# Patient Record
Sex: FEMALE | Race: WHITE | NOT HISPANIC OR LATINO | Employment: OTHER | URBAN - METROPOLITAN AREA
[De-identification: names, ages, dates, MRNs, and addresses within clinical notes are randomized per-mention and may not be internally consistent; named-entity substitution may affect disease eponyms.]

---

## 2017-01-03 ENCOUNTER — HOSPITAL ENCOUNTER (EMERGENCY)
Facility: HOSPITAL | Age: 62
Discharge: HOME | End: 2017-01-03
Payer: MEDICAID

## 2017-01-03 ENCOUNTER — OFFICE VISIT (OUTPATIENT)
Dept: FAMILY MEDICINE | Facility: CLINIC | Age: 62
End: 2017-01-03
Payer: MEDICAID

## 2017-01-03 VITALS
TEMPERATURE: 98 F | DIASTOLIC BLOOD PRESSURE: 70 MMHG | OXYGEN SATURATION: 96 % | HEART RATE: 83 BPM | SYSTOLIC BLOOD PRESSURE: 134 MMHG | HEIGHT: 62 IN | WEIGHT: 169.56 LBS | BODY MASS INDEX: 31.2 KG/M2

## 2017-01-03 VITALS
HEIGHT: 61 IN | HEART RATE: 85 BPM | RESPIRATION RATE: 16 BRPM | BODY MASS INDEX: 31.91 KG/M2 | DIASTOLIC BLOOD PRESSURE: 68 MMHG | WEIGHT: 169 LBS | SYSTOLIC BLOOD PRESSURE: 136 MMHG | TEMPERATURE: 98 F | OXYGEN SATURATION: 96 %

## 2017-01-03 DIAGNOSIS — M54.50 ACUTE BILATERAL LOW BACK PAIN WITHOUT SCIATICA: Primary | ICD-10-CM

## 2017-01-03 DIAGNOSIS — I71.40 ABDOMINAL AORTIC ANEURYSM (AAA) WITHOUT RUPTURE: Primary | ICD-10-CM

## 2017-01-03 LAB
ALBUMIN SERPL BCP-MCNC: 3.4 G/DL
ALP SERPL-CCNC: 99 U/L
ALT SERPL W/O P-5'-P-CCNC: 22 U/L
AMYLASE SERPL-CCNC: 56 U/L
ANION GAP SERPL CALC-SCNC: 9 MMOL/L
AST SERPL-CCNC: 18 U/L
BASOPHILS # BLD AUTO: 0.05 K/UL
BASOPHILS NFR BLD: 0.5 %
BILIRUB SERPL-MCNC: 0.3 MG/DL
BILIRUB UR QL STRIP: NEGATIVE
BUN SERPL-MCNC: 18 MG/DL
CALCIUM SERPL-MCNC: 9.5 MG/DL
CHLORIDE SERPL-SCNC: 100 MMOL/L
CLARITY UR: CLEAR
CO2 SERPL-SCNC: 24 MMOL/L
COLOR UR: YELLOW
CREAT SERPL-MCNC: 0.9 MG/DL
DIFFERENTIAL METHOD: ABNORMAL
EOSINOPHIL # BLD AUTO: 0.5 K/UL
EOSINOPHIL NFR BLD: 4.7 %
ERYTHROCYTE [DISTWIDTH] IN BLOOD BY AUTOMATED COUNT: 14.4 %
EST. GFR  (AFRICAN AMERICAN): >60 ML/MIN/1.73 M^2
EST. GFR  (NON AFRICAN AMERICAN): >60 ML/MIN/1.73 M^2
GLUCOSE SERPL-MCNC: 100 MG/DL
GLUCOSE UR QL STRIP: NEGATIVE
HCT VFR BLD AUTO: 36.7 %
HGB BLD-MCNC: 12.5 G/DL
HGB UR QL STRIP: NEGATIVE
KETONES UR QL STRIP: NEGATIVE
LEUKOCYTE ESTERASE UR QL STRIP: NEGATIVE
LIPASE SERPL-CCNC: 25 U/L
LYMPHOCYTES # BLD AUTO: 2.8 K/UL
LYMPHOCYTES NFR BLD: 29.2 %
MCH RBC QN AUTO: 30 PG
MCHC RBC AUTO-ENTMCNC: 34.1 %
MCV RBC AUTO: 88 FL
MONOCYTES # BLD AUTO: 0.7 K/UL
MONOCYTES NFR BLD: 7.1 %
NEUTROPHILS # BLD AUTO: 5.6 K/UL
NEUTROPHILS NFR BLD: 58.5 %
NITRITE UR QL STRIP: NEGATIVE
PH UR STRIP: 6 [PH] (ref 5–8)
PLATELET # BLD AUTO: 292 K/UL
PMV BLD AUTO: 9.5 FL
POTASSIUM SERPL-SCNC: 4.5 MMOL/L
PROT SERPL-MCNC: 7.4 G/DL
PROT UR QL STRIP: NEGATIVE
RBC # BLD AUTO: 4.16 M/UL
SODIUM SERPL-SCNC: 133 MMOL/L
SP GR UR STRIP: 1.01 (ref 1–1.03)
URN SPEC COLLECT METH UR: NORMAL
UROBILINOGEN UR STRIP-ACNC: NEGATIVE EU/DL
WBC # BLD AUTO: 9.53 K/UL

## 2017-01-03 PROCEDURE — 83690 ASSAY OF LIPASE: CPT

## 2017-01-03 PROCEDURE — 99284 EMERGENCY DEPT VISIT MOD MDM: CPT | Mod: 27

## 2017-01-03 PROCEDURE — 85025 COMPLETE CBC W/AUTO DIFF WBC: CPT

## 2017-01-03 PROCEDURE — 82150 ASSAY OF AMYLASE: CPT

## 2017-01-03 PROCEDURE — 99214 OFFICE O/P EST MOD 30 MIN: CPT | Mod: S$PBB,,, | Performed by: FAMILY MEDICINE

## 2017-01-03 PROCEDURE — 99999 PR PBB SHADOW E&M-EST. PATIENT-LVL III: CPT | Mod: PBBFAC,,, | Performed by: FAMILY MEDICINE

## 2017-01-03 PROCEDURE — 25500020 PHARM REV CODE 255: Performed by: PHYSICIAN ASSISTANT

## 2017-01-03 PROCEDURE — 81003 URINALYSIS AUTO W/O SCOPE: CPT

## 2017-01-03 PROCEDURE — 80053 COMPREHEN METABOLIC PANEL: CPT

## 2017-01-03 RX ORDER — CYCLOBENZAPRINE HCL 10 MG
10 TABLET ORAL 3 TIMES DAILY PRN
Qty: 15 TABLET | Refills: 0 | Status: SHIPPED | OUTPATIENT
Start: 2017-01-03 | End: 2017-01-08

## 2017-01-03 RX ORDER — HYDROCODONE BITARTRATE AND ACETAMINOPHEN 10; 325 MG/1; MG/1
1 TABLET ORAL EVERY 4 HOURS PRN
Qty: 12 TABLET | Refills: 0 | Status: SHIPPED | OUTPATIENT
Start: 2017-01-03 | End: 2017-01-13

## 2017-01-03 RX ADMIN — IOHEXOL 100 ML: 300 INJECTION, SOLUTION INTRAVENOUS at 05:01

## 2017-01-03 NOTE — DISCHARGE INSTRUCTIONS
General Neck and Back Pain    Both neck and back pain are usually caused by injury to the muscles or ligaments of the spine. Sometimes the disks that separate each bone of the spine may cause pain by pressing on a nearby nerve. Back and neck pain may appear after a sudden twisting or bending force (such as in a car accident), or sometimes after a simple awkward movement. In either case, muscle spasm is often present and adds to the pain.  Acute neck and back pain usually gets better in 1 to 2 weeks. Pain related to disk disease, arthritis in the spinal joints or spinal stenosis (narrowing of the spinal canal) can become chronic and last for months or years.  Back and neck pain are common problems. Most people feel better in 1 or 2 weeks, and most of the rest in 1 to 2 months. Most people can remain active.  People experience and describe pain differently.  · Pain can be sharp, stabbing, shooting, aching, cramping, or burning  · Movement, standing, bending, lifting, sitting, or walking may worsen the pain  · Pain can be localized to one spot or area, or it can be more generalized  · Pain can spread or radiate upwards, downwards, to the front, or go down your arms  · Muscle spasm may occur.  Most of the time mechanical problems with the muscles or spine cause the pain. it is usually caused by an injury, whether known or not, to the muscles or ligaments. While illnesses can cause back pain, it is usually not caused by a serious illness. Pain is usually related to physical activity, whether sports, exercise, work, or normal activity. Sometimes it can occur without an identifiable cause. This can happen simply by stretching or moving wrong, without noting pain at the time. Other causes include:  · Overexertion, lifting, pushing, pulling incorrectly or too aggressively.  · Sudden twisting, bending or stretching from an accident (car or fall), or accidental movement.  · Poor posture  · Poor conditioning, lack of regular  exercise  · Spinal disc disease or arthritis  · Stress  · Pregnancy, or illness like appendicitis, bladder or kidney infection, pelvic infections   Home care  · For neck pain: Use a comfortable pillow that supports the head and keeps the spine in a neutral position. The position of the head should not be tilted forward or backward.  · When in bed, try to find a position of comfort. A firm mattress is best. Try lying flat on your back with pillows under your knees. You can also try lying on your side with your knees bent up towards your chest and a pillow between your knees.  · At first, do not try to stretch out the sore spots. If there is a strain, it is not like the good soreness you get after exercising without an injury. In this case, stretching may make it worse.  · Avoid prolonged sitting, long car rides or travel. This puts more stress on the lower back than standing or walking.  · During the first 24 to 72 hours after an injury, apply an ice pack to the painful area for 20 minutes and then remove it for 20 minutes over a period of 60 to 90 minutes or several times a day.   · You can alternate ice and heat therapies. Talk with your healthcare provider about the best treatment for your back or neck pain. As a safety precaution, do not use a heating pad at bedtime. Sleeping with a heating pad can lead to skin burns or tissue damage.  · Therapeutic massage can help relax the back and neck muscles without stretching them.  · Be aware of safe lifting methods and do not lift anything over 15 pounds until all the pain is gone.  Medications  Talk to your healthcare provider before using medicine, especially if you have other medical problems or are taking other medicines.  · You may use over-the-counter medicine to control pain, unless another pain medicine was prescribed. If you have chronic conditions like diabetes, liver or kidney disease, stomach ulcers,  gastrointestinal bleeding, or are taking blood thinner  medicines.  · Be careful if you are given pain medicines, narcotics, or medicine for muscle spasm. They can cause drowsiness, and can affect your coordination, reflexes, and judgment. Do not drive or operate heavy machinery.  Follow-up care  Follow up with your healthcare provider, or as advised. Physical therapy or further tests may be needed.  If X-rays were taken, you will be notified of any new findings that may affect your care.  Call 911  Seek emergency medical care if any of the following occur:  · Trouble breathing  · Confusion  · Very drowsy or trouble awakening  · Fainting or loss of consciousness  · Rapid or very slow heart rate  · Loss of bowel or bladder control  When to seek medical advice  Call your healthcare provider right away if any of these occur:  · Pain becomes worse or spreads into your arms or legs  · Weakness, numbness or pain in one or both arms or legs  · Numbness in the groin area  · Difficulty walking  · Fever of 100.4ºF (38ºC) or higher, or as directed by your healthcare provider  © 4892-4008 [employer]. [address] [place] [address], [place] [address]. All rights reserved. This information is not intended as a substitute for professional medical care. Always follow your healthcare professional's instructions.

## 2017-01-03 NOTE — ED PROVIDER NOTES
"SCRIBE #1 NOTE: I, Kaye Lawson, am scribing for, and in the presence of, BRADLY Santos. I have scribed the entire note.      History      Chief Complaint   Patient presents with    Abdominal Pain     Pt staets, "I have a bad pain in my stomach and back, Dr Felix made me come here because I have an abdominal aneurysm, but I think I have a bladder infection".       Review of patient's allergies indicates:  No Known Allergies     HPI   HPI    1/3/2017, 3:33 PM   History obtained from the patient      History of Present Illness: Gemma Vick is a 61 y.o. female patient with PMHx of AAA who presents to the Emergency Department for abd pain with associated back pain which onset gradually 2 days ago. Sxs are improving and moderate in severity. The pain is located to the epigastric region, lower abdomen, and lower back. She reports taking laxatives with no relief because she thought she was constipated. Pt took Tylenol this morning. No exacerbating factors reported. No other sxs reported. Patient denies fever, chills, N/V/D, dysuria, hematuria, urinary frequency, and all other sxs at this time. No further complaints or concerns at this time.     Arrival mode: Personal vehicle    PCP: Olga Altman MD       Past Medical History:  Past Medical History   Diagnosis Date    AAA (abdominal aortic aneurysm) 2/13/2014    Abdominal aneurysm      3    Acute coronary syndrome     Arthritis     BPPV (benign paroxysmal positional vertigo)     Carotid artery plaque     Carotid artery stenosis and occlusion 2/13/2014    Chronic back pain     COPD (chronic obstructive pulmonary disease)     Coronary artery disease     Emphysema lung     Hyperlipidemia     Hypertension     Myocardial infarction      x3    Neuropathy        Past Surgical History:  Past Surgical History   Procedure Laterality Date    Cardiac catheterization      Coronary angioplasty           Family History:  History reviewed. No pertinent " family history.    Social History:  Social History     Social History Main Topics    Smoking status: Current Every Day Smoker     Packs/day: 0.50     Years: 44.00     Types: Cigarettes     Start date: 6/24/1970    Smokeless tobacco: Never Used    Alcohol use No    Drug use: No    Sexual activity: Not Currently     Birth control/ protection: None       ROS   Review of Systems   Constitutional: Negative for chills and fever.   HENT: Negative for sore throat.    Respiratory: Negative for shortness of breath.    Cardiovascular: Negative for chest pain.   Gastrointestinal: Positive for abdominal pain. Negative for diarrhea, nausea and vomiting.   Genitourinary: Negative for dysuria, frequency and hematuria.   Musculoskeletal: Positive for back pain.   Skin: Negative for rash.   Neurological: Negative for weakness.   Hematological: Does not bruise/bleed easily.   All other systems reviewed and are negative.      Physical Exam    Initial Vitals   BP Pulse Resp Temp SpO2   01/03/17 1503 01/03/17 1503 01/03/17 1503 01/03/17 1503 01/03/17 1503   140/70 72 20 98.1 °F (36.7 °C) 96 %      Physical Exam  Nursing Notes and Vital Signs Reviewed.  Constitutional: Patient is in no acute distress. Awake and alert. Well-developed and well-nourished.  Head: Atraumatic. Normocephalic.  Eyes: PERRL. EOM intact. Conjunctivae are not pale. No scleral icterus.  ENT: Mucous membranes are moist. Oropharynx is clear and symmetric.    Neck: Supple. Full ROM. No lymphadenopathy.  Cardiovascular: Regular rate. Regular rhythm. No murmurs, rubs, or gallops. Distal pulses are 2+ and symmetric.  Pulmonary/Chest: No respiratory distress. Clear to auscultation bilaterally. No wheezing, rales, or rhonchi.  Abdominal: Soft and non-distended.  There is lower abdominal tenderness. No rebound, guarding, or rigidity. Good bowel sounds.  No pulsatile mass.   Genitourinary: No CVA tenderness  Musculoskeletal: Moves all extremities. No obvious deformities.  "No edema. No calf tenderness.  Back: Lower back tenderness. No midline bony tenderness, deformities, or step-offs of the T-spine or L-spine. Skin appears normal without abrasions or bruising. No erythema, induration, or fluctuance.   Skin: Warm and dry.  Neurological:  Alert, awake, and appropriate.  Normal speech. Normal gait. No acute focal neurological deficits are appreciated.  Psychiatric: Normal affect. Good eye contact. Appropriate in content.    ED Course    Procedures  ED Vital Signs:  Vitals:    01/03/17 1503 01/03/17 1814   BP: (!) 140/70 136/68   Pulse: 72 85   Resp: 20 16   Temp: 98.1 °F (36.7 °C)    TempSrc: Oral    SpO2: 96% 96%   Weight: 76.7 kg (169 lb)    Height: 5' 1" (1.549 m)      Abnormal Lab Results:  Labs Reviewed   CBC W/ AUTO DIFFERENTIAL - Abnormal; Notable for the following:        Result Value    Hematocrit 36.7 (*)     All other components within normal limits   COMPREHENSIVE METABOLIC PANEL - Abnormal; Notable for the following:     Sodium 133 (*)     Albumin 3.4 (*)     All other components within normal limits   URINALYSIS   LIPASE   AMYLASE      All Lab Results:  Results for orders placed or performed during the hospital encounter of 01/03/17   Urinalysis Clean Catch   Result Value Ref Range    Specimen UA Urine, Clean Catch     Color, UA Yellow Yellow, Straw, Whit    Appearance, UA Clear Clear    pH, UA 6.0 5.0 - 8.0    Specific Gravity, UA 1.010 1.005 - 1.030    Protein, UA Negative Negative    Glucose, UA Negative Negative    Ketones, UA Negative Negative    Bilirubin (UA) Negative Negative    Occult Blood UA Negative Negative    Nitrite, UA Negative Negative    Urobilinogen, UA Negative <2.0 EU/dL    Leukocytes, UA Negative Negative   CBC W/ AUTO DIFFERENTIAL   Result Value Ref Range    WBC 9.53 3.90 - 12.70 K/uL    RBC 4.16 4.00 - 5.40 M/uL    Hemoglobin 12.5 12.0 - 16.0 g/dL    Hematocrit 36.7 (L) 37.0 - 48.5 %    MCV 88 82 - 98 fL    MCH 30.0 27.0 - 31.0 pg    MCHC 34.1 32.0 " - 36.0 %    RDW 14.4 11.5 - 14.5 %    Platelets 292 150 - 350 K/uL    MPV 9.5 9.2 - 12.9 fL    Gran # 5.6 1.8 - 7.7 K/uL    Lymph # 2.8 1.0 - 4.8 K/uL    Mono # 0.7 0.3 - 1.0 K/uL    Eos # 0.5 0.0 - 0.5 K/uL    Baso # 0.05 0.00 - 0.20 K/uL    Gran% 58.5 38.0 - 73.0 %    Lymph% 29.2 18.0 - 48.0 %    Mono% 7.1 4.0 - 15.0 %    Eosinophil% 4.7 0.0 - 8.0 %    Basophil% 0.5 0.0 - 1.9 %    Differential Method Automated    Comp. Metabolic Panel   Result Value Ref Range    Sodium 133 (L) 136 - 145 mmol/L    Potassium 4.5 3.5 - 5.1 mmol/L    Chloride 100 95 - 110 mmol/L    CO2 24 23 - 29 mmol/L    Glucose 100 70 - 110 mg/dL    BUN, Bld 18 8 - 23 mg/dL    Creatinine 0.9 0.5 - 1.4 mg/dL    Calcium 9.5 8.7 - 10.5 mg/dL    Total Protein 7.4 6.0 - 8.4 g/dL    Albumin 3.4 (L) 3.5 - 5.2 g/dL    Total Bilirubin 0.3 0.1 - 1.0 mg/dL    Alkaline Phosphatase 99 55 - 135 U/L    AST 18 10 - 40 U/L    ALT 22 10 - 44 U/L    Anion Gap 9 8 - 16 mmol/L    eGFR if African American >60 >60 mL/min/1.73 m^2    eGFR if non African American >60 >60 mL/min/1.73 m^2   Lipase   Result Value Ref Range    Lipase 25 4 - 60 U/L   Amylase   Result Value Ref Range    Amylase 56 20 - 110 U/L       Imaging Results:  Imaging Results         CT Abdomen Pelvis With Contrast (Final result) Result time:  01/03/17 17:23:50    Final result by Cristhian Villela MD (01/03/17 17:23:50)    Impression:        Fusiform infrarenal abdominal aortic aneurysm measuring up to 3.2 cm in maximal cross-sectional dimension.  No evidence of leak or rupture.  No significant change since 05/09/2016.        All CT scans at this facility use dose modulation, iterative reconstruction and/or weight based dosing when appropriate to reduced radiation dose to as low as reasonably achievable.        Electronically signed by: CRISTHIAN VILLELA MD  Date:     01/03/17  Time:    17:23     Narrative:    EXAM:CT ABDOMEN PELVIS WITH CONTRAST 01/03/17 17:13:07      HISTORY: Abdominal aortic  aneurysm.    TECHNIQUE: Standard axial imaging performed with reformatted sagittal and coronal images.       COMPARISON: 05/09/2016      FINDINGS:    Diffuse atherosclerotic change of the aorta present with ectasia and mild tortuosity.  There is an infrarenal bilobed fusiform aneurysm measuring up to 3.2 cm in maximal cross-sectional dimension.  There has been no change since the previous exam dated 05/19/2016.  No evidence of leak or rupture.  Iliac vasculature is normal in caliber.    The celiac access and SMA are patent.  Renal arteries are patent and unremarkable.  SANKET not definitively visualized and may be occluded.    Mild atelectasis at the right lung base.  Left lung base is clear.    The liver is normal.  Gallbladder is normal.  Portal vein patent.  The spleen and pancreas are normal.  The adrenal glands are normal.  Kidneys are normal.  Ureters are normal.    The stomach and small intestine are normal.  Appendix not definitively visualized.  Diverticulosis of the distal descending and sigmoid colon.  Negative for diverticulitis the rectum normal.    Bladder normal.  Uterus is surgical absent.  Adnexa regions are normal.    Multilevel degenerative changes of the spine.  No suspicious osseous lesions.               The Emergency Provider reviewed the vital signs and test results, which are outlined above.    ED Discussion     5:43 PM: Reassessed pt at this time. Pt states her condition has improved at this time. Discussed with pt imaging and lab results. Discussed pt dx and plan of tx. Informed pt to follow up with PCP. All questions and concerns were addressed at this time. Pt expresses understanding of information and instructions, and is comfortable with plan to discharge. Pt is stable for discharge.    I discussed with patient that evaluation in the ED does not suggest any emergent or life threatening medical conditions requiring immediate intervention beyond what was provided in the ED, and I believe  patient is safe for discharge.  Regardless, an unremarkable evaluation in the ED does not preclude the development or presence of a serious of life threatening condition. As such, patient was instructed to return immediately for any worsening or change in current symptoms.      ED Medication(s):  Medications   omnipaque 300 iohexol 100 mL (100 mLs Intravenous Given 1/3/17 1714)       Discharge Medication List as of 1/3/2017  5:48 PM      START taking these medications    Details   cyclobenzaprine (FLEXERIL) 10 MG tablet Take 1 tablet (10 mg total) by mouth 3 (three) times daily as needed for Muscle spasms., Starting 1/3/2017, Until Sun 1/8/17, Normal      hydrocodone-acetaminophen 10-325mg (NORCO)  mg Tab Take 1 tablet by mouth every 4 (four) hours as needed., Starting 1/3/2017, Until Fri 1/13/17, Print             Follow-up Information     Follow up with Olga Altman MD. Call in 2 days.    Specialty:  Family Medicine    Contact information:    [address] [address] [address] [phone]             Medical Decision Making    Medical Decision Making:   Clinical Tests:   Lab Tests: Ordered and Reviewed  Radiological Study: Ordered and Reviewed           Scribe Attestation:   Scribe #1: I performed the above scribed service and the documentation accurately describes the services I performed. I attest to the accuracy of the note.    Attending:   Physician Attestation Statement for Scribe #1: I, BRADLY Santos, personally performed the services described in this documentation, as scribed by Kaye Lawson, in my presence, and it is both accurate and complete.          Clinical Impression       ICD-10-CM ICD-9-CM   1. Acute bilateral low back pain without sciatica M54.5 724.2     338.19       Disposition:   Disposition: Discharged  Condition: Stable         BRADLY Contreras  01/06/17 0800

## 2017-01-03 NOTE — PROGRESS NOTES
Subjective:       Patient ID: Gemma Vick is a 61 y.o. female.    Chief Complaint: Abdominal Pain and Back Pain    HPI Comments: 61 y old female with AAA, Lumbar spondylosis now with severe lower back pain for 4 days . Worst with deep inhalation . No dysurias , she thought it  was constipation for which she took a laxative which did not make any difference . She has not taken any medication other that Ambien  very 6 hrs to help her rest . She supposed to have gotten repeated CTA last m which did not take  place     Abdominal Pain     Back Pain   Associated symptoms include abdominal pain.     Review of Systems   Constitutional: Negative.    Respiratory: Negative.    Gastrointestinal: Positive for abdominal pain.   Musculoskeletal: Positive for back pain.       Objective:      Physical Exam   Constitutional: She is oriented to person, place, and time. She appears well-developed and well-nourished. No distress.   HENT:   Head: Normocephalic and atraumatic.   Right Ear: External ear normal.   Left Ear: External ear normal.   Mouth/Throat: No oropharyngeal exudate.   Eyes: Conjunctivae and EOM are normal. Pupils are equal, round, and reactive to light. Right eye exhibits no discharge. Left eye exhibits no discharge. No scleral icterus.   Neck: Normal range of motion. Neck supple. No JVD present. No tracheal deviation present. No thyromegaly present.   Cardiovascular: Normal rate, regular rhythm and normal heart sounds.  Exam reveals no gallop and no friction rub.    No murmur heard.  Pulmonary/Chest: Effort normal and breath sounds normal. No stridor. No respiratory distress. She has no wheezes. She has no rales. She exhibits no tenderness.   Abdominal: Soft. Bowel sounds are normal. She exhibits no distension. There is tenderness in the suprapubic area and left upper quadrant. There is CVA tenderness. There is no rebound and no guarding.   Musculoskeletal: Normal range of motion.   Lymphadenopathy:     She has no  cervical adenopathy.   Neurological: She is alert and oriented to person, place, and time.   Skin: Skin is warm and dry. She is not diaphoretic.   Psychiatric: She has a normal mood and affect. Her behavior is normal. Judgment and thought content normal.       Assessment:     Gemma was seen today for abdominal pain and back pain.    Diagnoses and all orders for this visit:    Abdominal aortic aneurysm (AAA) without rupture  -     CTA Abdominal Aorta Bilateral Iliofem; Future      Plan:   ER EVAL recommended / Pt will go by private transportation , AMA form signed

## 2017-01-03 NOTE — MR AVS SNAPSHOT
[facility]  [address]  [facility] [address]  Phone: [phone]  Fax: [phone]                  Gemma Vick   1/3/2017 2:00 PM   Office Visit    Description:  Female : 1955   Provider:  Cristina Jimenes MD   Department:  [facility]           Reason for Visit     Abdominal Pain     Back Pain           Diagnoses this Visit        Comments    Abdominal aortic aneurysm (AAA) without rupture    -  Primary            To Do List           Future Appointments        Provider Department Dept Phone    3/15/2017 9:00 AM Nathan Rodriguez MD [facility] [phone]      Goals (5 Years of Data)     None      [facility]     [facility] -  Assistance  Registered nurses in the [facility] provide clinical advisement, health education, appointment booking, and other advisory services.  Call for this free service at [phone].             Medications           Message regarding Medications     Verify the changes and/or additions to your medication regime listed below are the same as discussed with your clinician today.  If any of these changes or additions are incorrect, please notify your healthcare provider.             Verify that the below list of medications is an accurate representation of the medications you are currently taking.  If none reported, the list may be blank. If incorrect, please contact your healthcare provider. Carry this list with you in case of emergency.           Current Medications     albuterol (PROAIR HFA) 90 mcg/actuation inhaler Inhale 2 puffs into the lungs every 6 (six) hours as needed.    albuterol-ipratropium 2.5mg-0.5mg/3mL (DUO-NEB) 0.5 mg-3 mg(2.5 mg base)/3 mL nebulizer solution USE ONE VIAL IN NEBULIZER TWICE DAILY    aspirin 81 MG Chew Take 81 mg by mouth once daily.    azelastine (OPTIVAR) 0.05 % ophthalmic solution INSTILL 1 DROP INTO EACH EYE TWICE  "DAILY    clopidogrel (PLAVIX) 75 mg tablet Take 1 tablet (75 mg total) by mouth once daily.    DULERA 200-5 mcg/actuation inhaler INHALE 2 PUFFS BY MOUTH TWICE A DAY    ezetimibe-simvastatin 10-40 mg (VYTORIN 10-40) 10-40 mg per tablet Take 1 tablet by mouth once daily.    felodipine (PLENDIL) 5 MG 24 hr tablet Take 1 tablet (5 mg total) by mouth once daily.    ipratropium-albuterol (COMBIVENT RESPIMAT)  mcg/actuation inhaler Inhale 1 puff into the lungs every 4 (four) hours as needed for Wheezing. Medically necessary    lisinopril (PRINIVIL,ZESTRIL) 40 MG tablet Take 40 mg by mouth once daily.    nitroGLYCERIN (NITROSTAT) 0.4 MG SL tablet Place 1 tablet (0.4 mg total) under the tongue as needed.    OXYGEN-AIR DELIVERY SYSTEMS MISC 2 L by Misc.(Non-Drug; Combo Route) route every evening.    zolpidem (AMBIEN) 5 MG Tab Take 1 tablet (5 mg total) by mouth nightly.    metoprolol succinate (TOPROL-XL) 25 MG 24 hr tablet Take 1 tablet by mouth as needed.    roflumilast 500 mcg Tab Take 1 tablet (500 mcg total) by mouth once daily.           Clinical Reference Information           Vital Signs - Last Recorded  Most recent update: 1/3/2017  2:03 PM by Ngozi Kamara MA    BP Pulse Temp Ht Wt SpO2    134/70 (BP Location: Right arm, Patient Position: Sitting, BP Method: Manual) 83 97.7 °F (36.5 °C) (Oral) 5' 2" (1.575 m) 76.9 kg (169 lb 8.5 oz) 96%    BMI                31.01 kg/m2          Blood Pressure          Most Recent Value    BP  134/70      Allergies as of 1/3/2017     No Known Allergies      Immunizations Administered on Date of Encounter - 1/3/2017     None      Orders Placed During Today's Visit     Future Labs/Procedures Expected by Expires    CTA Abdominal Aorta Bilateral Iliofem  1/3/2017 1/3/2018      Smoking Cessation     If you would like to quit smoking:   You may be eligible for free services if you are a [place] resident and started smoking cigarettes before September 1, 1988.  Call the Smoking " Cessation Trust ([facility]) toll free at (643) [phone] or ([phone].   Call 4-800-QUIT-NOW if you do not meet the above criteria.

## 2017-01-03 NOTE — ED AVS SNAPSHOT
[facility] -   [address] [facility] [address]               Gemma Vick   1/3/2017  3:22 PM   ED    Description:  Female : 1955   Department:  [facility] -            Your Care was Coordinated By:     Provider Role From To    BRADLY Contreras Physician Assistant 17 1539 --      Reason for Visit     Abdominal Pain           Diagnoses this Visit        Comments    Acute bilateral low back pain without sciatica    -  Primary       ED Disposition     None           To Do List           Follow-up Information     Follow up with Olga Altman MD. Call in 2 days.    Specialty:  Family Medicine    Contact information:    [address] [address] [id number] [phone]         These Medications        Disp Refills Start End    hydrocodone-acetaminophen 10-325mg (NORCO)  mg Tab 12 tablet 0 1/3/2017 2017    Take 1 tablet by mouth every 4 (four) hours as needed. - Oral    Pharmacy: [facility] - [address] Ph #: [phone]       cyclobenzaprine (FLEXERIL) 10 MG tablet 15 tablet 0 1/3/2017 2017    Take 1 tablet (10 mg total) by mouth 3 (three) times daily as needed for Muscle spasms. - Oral    Pharmacy: [facility] - [address] Ph #: [phone]         [facility]     [facility] Care [facility] -  Assistance  Registered nurses in the [facility] provide clinical advisement, health education, appointment booking, and other advisory services.  Call for this free service at [phone].             Medications           Message regarding Medications     Verify the changes and/or additions to your medication regime listed below are the same as discussed with your clinician today.  If any of these changes or additions are incorrect, please notify your healthcare provider.         START taking these NEW medications        Refills    hydrocodone-acetaminophen 10-325mg (NORCO)  mg Tab 0    Sig: Take 1 tablet by mouth every 4 (four) hours as needed.    Class: Print    Route: Oral    cyclobenzaprine (FLEXERIL) 10 MG tablet 0    Sig: Take 1 tablet (10 mg total) by mouth 3 (three) times daily as needed for Muscle spasms.    Class: Normal    Route: Oral      These medications were administered today        Dose Freq    omnipaque 300 iohexol 100 mL 100 mL IMG once as needed    Sig: Inject 100 mLs into the vein ONCE PRN for contrast.    Class: Normal    Route: Intravenous    Cosign for Ordering: Required by OB HIM       STOP taking these medications     metoprolol succinate (TOPROL-XL) 25 MG 24 hr tablet Take 1 tablet by mouth as needed.           Verify that the below list of medications is an accurate representation of the medications you are currently taking.  If none reported, the list may be blank. If incorrect, please contact your healthcare provider. Carry this list with you in case of emergency.           Current Medications     albuterol (PROAIR HFA) 90 mcg/actuation inhaler Inhale 2 puffs into the lungs every 6 (six) hours as needed.    albuterol-ipratropium 2.5mg-0.5mg/3mL (DUO-NEB) 0.5 mg-3 mg(2.5 mg base)/3 mL nebulizer solution USE ONE VIAL IN NEBULIZER TWICE DAILY    aspirin 81 MG Chew Take 81 mg by mouth once daily.    clopidogrel (PLAVIX) 75 mg tablet Take 1 tablet (75 mg total) by mouth once daily.    DULERA 200-5 mcg/actuation inhaler INHALE 2 PUFFS BY MOUTH TWICE A DAY    ezetimibe-simvastatin 10-40 mg (VYTORIN 10-40) 10-40 mg per tablet Take 1 tablet by mouth once daily.    felodipine (PLENDIL) 5 MG 24 hr tablet Take 1 tablet (5 mg total) by mouth once daily.    ipratropium-albuterol (COMBIVENT RESPIMAT)  mcg/actuation inhaler Inhale 1 puff into the lungs every 4 (four) hours as needed for Wheezing. Medically necessary    lisinopril  "(PRINIVIL,ZESTRIL) 40 MG tablet Take 40 mg by mouth once daily.    roflumilast 500 mcg Tab Take 1 tablet (500 mcg total) by mouth once daily.    zolpidem (AMBIEN) 5 MG Tab Take 1 tablet (5 mg total) by mouth nightly.    azelastine (OPTIVAR) 0.05 % ophthalmic solution INSTILL 1 DROP INTO EACH EYE TWICE DAILY    cyclobenzaprine (FLEXERIL) 10 MG tablet Take 1 tablet (10 mg total) by mouth 3 (three) times daily as needed for Muscle spasms.    hydrocodone-acetaminophen 10-325mg (NORCO)  mg Tab Take 1 tablet by mouth every 4 (four) hours as needed.    nitroGLYCERIN (NITROSTAT) 0.4 MG SL tablet Place 1 tablet (0.4 mg total) under the tongue as needed.    OXYGEN-AIR DELIVERY SYSTEMS MISC 2 L by Misc.(Non-Drug; Combo Route) route every evening.           Clinical Reference Information           Your Vitals Were     BP Pulse Temp Resp Height Weight    140/70 (BP Location: Right arm, Patient Position: Sitting) 72 98.1 °F (36.7 °C) (Oral) 20 5' 1" (1.549 m) 76.7 kg (169 lb)    SpO2 BMI             96% 31.93 kg/m2         Allergies as of 1/3/2017     No Known Allergies      Immunizations Administered on Date of Encounter - 1/3/2017     None      ED Micro, Lab, POCT     Start Ordered       Status Ordering Provider    01/03/17 1538 01/03/17 1541  Amylase  STAT      Final result     01/03/17 1537 01/03/17 1541  CBC W/ AUTO DIFFERENTIAL  STAT      Final result     01/03/17 1537 01/03/17 1541  Comp. Metabolic Panel  STAT      Final result     01/03/17 1537 01/03/17 1541  Lipase  Once      Final result     01/03/17 1506 01/03/17 1505  Urinalysis Clean Catch  STAT      Final result       ED Imaging Orders     Start Ordered       Status Ordering Provider    01/03/17 1538 01/03/17 1541  CT Abdomen Pelvis With Contrast  1 time imaging      Final result         Discharge Instructions         General Neck and Back Pain    Both neck and back pain are usually caused by injury to the muscles or ligaments of the spine. Sometimes the disks " that separate each bone of the spine may cause pain by pressing on a nearby nerve. Back and neck pain may appear after a sudden twisting or bending force (such as in a car accident), or sometimes after a simple awkward movement. In either case, muscle spasm is often present and adds to the pain.  Acute neck and back pain usually gets better in 1 to 2 weeks. Pain related to disk disease, arthritis in the spinal joints or spinal stenosis (narrowing of the spinal canal) can become chronic and last for months or years.  Back and neck pain are common problems. Most people feel better in 1 or 2 weeks, and most of the rest in 1 to 2 months. Most people can remain active.  People experience and describe pain differently.  · Pain can be sharp, stabbing, shooting, aching, cramping, or burning  · Movement, standing, bending, lifting, sitting, or walking may worsen the pain  · Pain can be localized to one spot or area, or it can be more generalized  · Pain can spread or radiate upwards, downwards, to the front, or go down your arms  · Muscle spasm may occur.  Most of the time mechanical problems with the muscles or spine cause the pain. it is usually caused by an injury, whether known or not, to the muscles or ligaments. While illnesses can cause back pain, it is usually not caused by a serious illness. Pain is usually related to physical activity, whether sports, exercise, work, or normal activity. Sometimes it can occur without an identifiable cause. This can happen simply by stretching or moving wrong, without noting pain at the time. Other causes include:  · Overexertion, lifting, pushing, pulling incorrectly or too aggressively.  · Sudden twisting, bending or stretching from an accident (car or fall), or accidental movement.  · Poor posture  · Poor conditioning, lack of regular exercise  · Spinal disc disease or arthritis  · Stress  · Pregnancy, or illness like appendicitis, bladder or kidney infection, pelvic  infections   Home care  · For neck pain: Use a comfortable pillow that supports the head and keeps the spine in a neutral position. The position of the head should not be tilted forward or backward.  · When in bed, try to find a position of comfort. A firm mattress is best. Try lying flat on your back with pillows under your knees. You can also try lying on your side with your knees bent up towards your chest and a pillow between your knees.  · At first, do not try to stretch out the sore spots. If there is a strain, it is not like the good soreness you get after exercising without an injury. In this case, stretching may make it worse.  · Avoid prolonged sitting, long car rides or travel. This puts more stress on the lower back than standing or walking.  · During the first 24 to 72 hours after an injury, apply an ice pack to the painful area for 20 minutes and then remove it for 20 minutes over a period of 60 to 90 minutes or several times a day.   · You can alternate ice and heat therapies. Talk with your healthcare provider about the best treatment for your back or neck pain. As a safety precaution, do not use a heating pad at bedtime. Sleeping with a heating pad can lead to skin burns or tissue damage.  · Therapeutic massage can help relax the back and neck muscles without stretching them.  · Be aware of safe lifting methods and do not lift anything over 15 pounds until all the pain is gone.  Medications  Talk to your healthcare provider before using medicine, especially if you have other medical problems or are taking other medicines.  · You may use over-the-counter medicine to control pain, unless another pain medicine was prescribed. If you have chronic conditions like diabetes, liver or kidney disease, stomach ulcers,  gastrointestinal bleeding, or are taking blood thinner medicines.  · Be careful if you are given pain medicines, narcotics, or medicine for muscle spasm. They can cause drowsiness, and can  affect your coordination, reflexes, and judgment. Do not drive or operate heavy machinery.  Follow-up care  Follow up with your healthcare provider, or as advised. Physical therapy or further tests may be needed.  If X-rays were taken, you will be notified of any new findings that may affect your care.  Call 911  Seek emergency medical care if any of the following occur:  · Trouble breathing  · Confusion  · Very drowsy or trouble awakening  · Fainting or loss of consciousness  · Rapid or very slow heart rate  · Loss of bowel or bladder control  When to seek medical advice  Call your healthcare provider right away if any of these occur:  · Pain becomes worse or spreads into your arms or legs  · Weakness, numbness or pain in one or both arms or legs  · Numbness in the groin area  · Difficulty walking  · Fever of 100.4ºF (38ºC) or higher, or as directed by your healthcare provider  © 3994-8988 [employer]. [address] [place] [address]. All rights reserved. This information is not intended as a substitute for professional medical care. Always follow your healthcare professional's instructions.          Your Scheduled Appointments     Mar 15, 2017  9:00 AM CDT   Established Patient Visit with MD Dona Cardo[facility] - Pulmonary Services ([facility])    [address] [facility] [address] [phone]              Smoking Cessation     If you would like to quit smoking:   You may be eligible for free services if you are a [place] resident and started smoking cigarettes before September 1, 1988.  Call the Smoking Cessation Trust ([employer]) toll free at (889) 160-7049 or (861) 027-0614.   Call 1-800-QUIT-NOW if you do not meet the above criteria.             [facility] complies with applicable Federal civil rights laws and does not discriminate on the basis of race, color, national origin, age, disability, or sex.        Language Assistance Services      ATTENTION: Language assistance services are available, free of charge. Please call [phone].      ATENCIÓN: Si habla español, tiene a mitchell disposición servicios gratuitos de asistencia lingüística. Llame al [phone].     CHÚ Ý: N?u b?n nói Ti?ng Vi?t, có các d?ch v? h? tr? ngôn ng? mi?n phí dành cho b?n. G?i s? [phone].

## 2017-01-15 RX ORDER — ESTROGENS, CONJUGATED 0.62 MG/1
TABLET, FILM COATED ORAL
Qty: 30 TABLET | Refills: 4 | Status: SHIPPED | OUTPATIENT
Start: 2017-01-15 | End: 2017-05-05 | Stop reason: SDUPTHER

## 2017-03-07 ENCOUNTER — OFFICE VISIT (OUTPATIENT)
Dept: INTERNAL MEDICINE | Facility: CLINIC | Age: 62
End: 2017-03-07
Payer: MEDICAID

## 2017-03-07 VITALS
WEIGHT: 170.19 LBS | DIASTOLIC BLOOD PRESSURE: 64 MMHG | OXYGEN SATURATION: 94 % | BODY MASS INDEX: 32.13 KG/M2 | HEIGHT: 61 IN | TEMPERATURE: 99 F | SYSTOLIC BLOOD PRESSURE: 136 MMHG | HEART RATE: 90 BPM

## 2017-03-07 DIAGNOSIS — I71.40 ABDOMINAL AORTIC ANEURYSM (AAA) WITHOUT RUPTURE: ICD-10-CM

## 2017-03-07 DIAGNOSIS — M75.82 ROTATOR CUFF TENDINITIS, LEFT: Primary | ICD-10-CM

## 2017-03-07 PROCEDURE — 99213 OFFICE O/P EST LOW 20 MIN: CPT | Mod: 25,S$PBB,, | Performed by: FAMILY MEDICINE

## 2017-03-07 PROCEDURE — 20551 NJX 1 TENDON ORIGIN/INSJ: CPT | Mod: S$PBB,,, | Performed by: FAMILY MEDICINE

## 2017-03-07 PROCEDURE — 20551 NJX 1 TENDON ORIGIN/INSJ: CPT | Mod: PBBFAC,PO | Performed by: FAMILY MEDICINE

## 2017-03-07 PROCEDURE — 99999 PR PBB SHADOW E&M-EST. PATIENT-LVL III: CPT | Mod: PBBFAC,,, | Performed by: FAMILY MEDICINE

## 2017-03-07 PROCEDURE — 99213 OFFICE O/P EST LOW 20 MIN: CPT | Mod: PBBFAC,PO | Performed by: FAMILY MEDICINE

## 2017-03-07 RX ORDER — DICLOFENAC SODIUM 10 MG/G
2 GEL TOPICAL DAILY
Qty: 1 TUBE | Refills: 2 | Status: SHIPPED | OUTPATIENT
Start: 2017-03-07 | End: 2017-06-05

## 2017-03-07 RX ORDER — SERTRALINE HYDROCHLORIDE 50 MG/1
TABLET, FILM COATED ORAL
COMMUNITY
Start: 2017-01-02 | End: 2017-05-05

## 2017-03-07 RX ORDER — LORAZEPAM 0.5 MG/1
TABLET ORAL
COMMUNITY
Start: 2017-01-13 | End: 2017-05-31 | Stop reason: SDUPTHER

## 2017-03-07 RX ORDER — METHYLPREDNISOLONE ACETATE 40 MG/ML
40 INJECTION, SUSPENSION INTRA-ARTICULAR; INTRALESIONAL; INTRAMUSCULAR; SOFT TISSUE
Status: COMPLETED | OUTPATIENT
Start: 2017-03-07 | End: 2017-03-07

## 2017-03-07 RX ORDER — EZETIMIBE AND SIMVASTATIN 10; 40 MG/1; MG/1
TABLET ORAL
COMMUNITY
Start: 2017-01-13 | End: 2017-05-05 | Stop reason: SDUPTHER

## 2017-03-07 RX ORDER — FOSINOPRIL SODIUM 40 MG/1
TABLET ORAL
COMMUNITY
Start: 2017-02-12 | End: 2017-05-05 | Stop reason: SDUPTHER

## 2017-03-07 RX ORDER — METOPROLOL SUCCINATE 25 MG/1
TABLET, EXTENDED RELEASE ORAL
COMMUNITY
Start: 2017-02-12 | End: 2017-05-05 | Stop reason: SDUPTHER

## 2017-03-07 RX ORDER — DULOXETIN HYDROCHLORIDE 60 MG/1
CAPSULE, DELAYED RELEASE ORAL
COMMUNITY
Start: 2017-02-12 | End: 2017-05-05 | Stop reason: SDUPTHER

## 2017-03-07 RX ADMIN — METHYLPREDNISOLONE ACETATE 40 MG: 40 INJECTION, SUSPENSION INTRALESIONAL; INTRAMUSCULAR; INTRASYNOVIAL; SOFT TISSUE at 09:03

## 2017-03-07 NOTE — PROGRESS NOTES
Chief Complaint:    Chief Complaint   Patient presents with    Arm Pain       History of Present Illness:    Patient presents today complaining of left shoulder pain going on for the past 3 months mostly pain felt at rest or sometimes with moving the arm.  No pain upon walking.  No chest pain or shortness of breath.  No injury that she can think of.    ROS:  Review of Systems    Past Medical History:   Diagnosis Date    AAA (abdominal aortic aneurysm) 2/13/2014    Abdominal aneurysm     3    Acute coronary syndrome     Arthritis     BPPV (benign paroxysmal positional vertigo)     Carotid artery plaque     Carotid artery stenosis and occlusion 2/13/2014    Chronic back pain     COPD (chronic obstructive pulmonary disease)     Coronary artery disease     Emphysema lung     Hyperlipidemia     Hypertension     Myocardial infarction     x3    Neuropathy        Social History:  Social History     Social History    Marital status:      Spouse name: N/A    Number of children: N/A    Years of education: N/A     Social History Main Topics    Smoking status: Current Every Day Smoker     Packs/day: 0.50     Years: 44.00     Types: Cigarettes     Start date: 6/24/1970    Smokeless tobacco: Never Used    Alcohol use No    Drug use: No    Sexual activity: Not Currently     Birth control/ protection: None     Other Topics Concern    None     Social History Narrative    None       Family History:   family history is not on file.    Health Maintenance   Topic Date Due    Zoster Vaccine  02/22/2015    Mammogram  03/24/2016    Lipid Panel  07/13/2017    Pap Smear  09/14/2019    Pneumococcal PPSV23 (Medium Risk) (2) 02/22/2020    Colonoscopy  09/14/2026    TETANUS VACCINE  11/15/2026    Hepatitis C Screening  Completed    Influenza Vaccine  Addressed       Physical Exam:    Vital Signs  Temp: 99.4 °F (37.4 °C)  Temp src: Tympanic  Pulse: 90  SpO2: (!) 94 %  BP: 136/64  Pain Score:   8  Height  "and Weight  Height: 5' 1" (154.9 cm)  Weight: 77.2 kg (170 lb 3.1 oz)  BSA (Calculated - sq m): 1.82 sq meters  BMI (Calculated): 32.2  Weight in (lb) to have BMI = 25: 132]    Body mass index is 32.16 kg/(m^2).    Physical Exam   Musculoskeletal:   Impingement sign is mildly positive on the left shoulder, range of motion is normal but there is tenderness at the attachment of deltoid and also posterior lateral rotator cuff.  Normal internal and external rotation.       Lab Results   Component Value Date    CHOL 135 07/13/2016    CHOL 158 01/14/2016    CHOL 143 10/07/2015    TRIG 111 07/13/2016    TRIG 130 01/14/2016    TRIG 118 10/07/2015    HDL 43 07/13/2016    HDL 57 01/14/2016    HDL 52 10/07/2015    TOTALCHOLEST 3.1 07/13/2016    TOTALCHOLEST 2.8 01/14/2016    TOTALCHOLEST 2.8 10/07/2015    NONHDLCHOL 92 07/13/2016    NONHDLCHOL 101 01/14/2016    NONHDLCHOL 91 10/07/2015       Lab Results   Component Value Date    HGBA1C 6.0 01/14/2016       Assessment:      ICD-10-CM ICD-9-CM   1. Rotator cuff tendinitis, left M75.82 726.10   2. Abdominal aortic aneurysm (AAA) without rupture I71.4 441.4         Plan:  Patient has a case of rotator cuff tendinitis essentially involving the deltoid muscle group, offered steroid injection she accepted  Offered steroid injection, risk of steroid injection discussed with the patient which include but not limited to,  1.  Infection  2.  Bleeding  3.  Tendon disruption  4.  Elevation of blood sugar  5.  Fat atrophy  6.  Worsening pain and other unforeseen complication.  Treatment alternatives were also discussed, patient likes to proceed with the steroid injection.  Depo-Medrol 40 mg and lidocaine 2% without epi Cc was used for injection, and the area was identified prepped and injection done sterile condition, patient tolerated procedure well.    Recommend physical therapy.  Prescription for Voltaren gel sent in to the pharmacy.    Orders Placed This Encounter   Procedures    " Ambulatory Referral to Physical/Occupational Therapy       Current Outpatient Prescriptions   Medication Sig Dispense Refill    albuterol (PROAIR HFA) 90 mcg/actuation inhaler Inhale 2 puffs into the lungs every 6 (six) hours as needed. 18 g 6    albuterol-ipratropium 2.5mg-0.5mg/3mL (DUO-NEB) 0.5 mg-3 mg(2.5 mg base)/3 mL nebulizer solution USE ONE VIAL IN NEBULIZER TWICE DAILY 270 mL 0    aspirin 81 MG Chew Take 81 mg by mouth once daily.      azelastine (OPTIVAR) 0.05 % ophthalmic solution INSTILL 1 DROP INTO EACH EYE TWICE DAILY 6 mL 1    clopidogrel (PLAVIX) 75 mg tablet Take 1 tablet (75 mg total) by mouth once daily. 90 tablet 3    DULERA 200-5 mcg/actuation inhaler INHALE 2 PUFFS BY MOUTH TWICE A DAY 13 g 1    felodipine (PLENDIL) 5 MG 24 hr tablet Take 1 tablet (5 mg total) by mouth once daily. 30 tablet 6    ipratropium-albuterol (COMBIVENT RESPIMAT)  mcg/actuation inhaler Inhale 1 puff into the lungs every 4 (four) hours as needed for Wheezing. Medically necessary 2 Package 11    lisinopril (PRINIVIL,ZESTRIL) 40 MG tablet Take 40 mg by mouth once daily.      nitroGLYCERIN (NITROSTAT) 0.4 MG SL tablet Place 1 tablet (0.4 mg total) under the tongue as needed. 25 tablet 2    OXYGEN-AIR DELIVERY SYSTEMS MISC 2 L by Misc.(Non-Drug; Combo Route) route every evening.      PREMARIN 0.625 mg tablet TAKE ONE TABLET BY MOUTH ONCE DAILY 30 tablet 4    roflumilast 500 mcg Tab Take 1 tablet (500 mcg total) by mouth once daily. 90 tablet 3    zolpidem (AMBIEN) 5 MG Tab Take 1 tablet (5 mg total) by mouth nightly. 30 tablet 5    diclofenac sodium (VOLTAREN) 1 % Gel Apply 2 g topically once daily. 1 Tube 2    duloxetine (CYMBALTA) 60 MG capsule       fosinopril (MONOPRIL) 40 MG tablet       lorazepam (ATIVAN) 0.5 MG tablet       metoprolol succinate (TOPROL-XL) 25 MG 24 hr tablet       sertraline (ZOLOFT) 50 MG tablet       VYTORIN 10-40 10-40 mg per tablet        No current  facility-administered medications for this visit.        There are no discontinued medications.    No Follow-up on file.      Dr Olga Altman MD    Disclaimer: This note is prepared using voice recognition system and as such is likely to have errors and is not proof read.

## 2017-03-07 NOTE — MR AVS SNAPSHOT
[facility]  [address] [place] [address]  Phone: [phone]                  Gemma Vick   3/7/2017 8:40 AM   Office Visit    Description:  Female : 1955   Provider:  Olga Altman MD   Department:  [facility]           Reason for Visit     Arm Pain           Diagnoses this Visit        Comments    Rotator cuff tendinitis, left    -  Primary     Abdominal aortic aneurysm (AAA) without rupture                To Do List           Future Appointments        Provider Department Dept Phone    3/15/2017 9:00 AM Nathan Rodriguez MD [facility] - Pul[facility] Services [phone]      Goals (5 Years of Data)     None       These Medications        Disp Refills Start End    diclofenac sodium (VOLTAREN) 1 % Gel 1 Tube 2 3/7/2017     Apply 2 g topically once daily. - Topical    Pharmacy: [facility] [address] [place] [address] [facility] [place] [facility] [address] [place] [address] Ph #: [phone]         [facility]     [facility] On Call Nurse Care Line -  Assistance  Registered nurses in the [facility] On Call Center provide clinical advisement, health education, appointment booking, and other advisory services.  Call for this free service at [phone].             Medications           Message regarding Medications     Verify the changes and/or additions to your medication regime listed below are the same as discussed with your clinician today.  If any of these changes or additions are incorrect, please notify your healthcare provider.        START taking these NEW medications        Refills    diclofenac sodium (VOLTAREN) 1 % Gel 2    Sig: Apply 2 g topically once daily.    Class: Normal    Route: Topical      These medications were administered today        Dose Freq    methylPREDNISolone acetate injection 40 mg 40 mg Clinic/HOD 1 time    Sig: Inject 1 mL (40 mg total) into the muscle one time.    Class: Normal    Route: Intramuscular            Verify that the below list of medications is an accurate representation of the medications you are currently taking.  If none reported, the list may be blank. If incorrect, please contact your healthcare provider. Carry this list with you in case of emergency.           Current Medications     albuterol (PROAIR HFA) 90 mcg/actuation inhaler Inhale 2 puffs into the lungs every 6 (six) hours as needed.    albuterol-ipratropium 2.5mg-0.5mg/3mL (DUO-NEB) 0.5 mg-3 mg(2.5 mg base)/3 mL nebulizer solution USE ONE VIAL IN NEBULIZER TWICE DAILY    aspirin 81 MG Chew Take 81 mg by mouth once daily.    azelastine (OPTIVAR) 0.05 % ophthalmic solution INSTILL 1 DROP INTO EACH EYE TWICE DAILY    clopidogrel (PLAVIX) 75 mg tablet Take 1 tablet (75 mg total) by mouth once daily.    DULERA 200-5 mcg/actuation inhaler INHALE 2 PUFFS BY MOUTH TWICE A DAY    felodipine (PLENDIL) 5 MG 24 hr tablet Take 1 tablet (5 mg total) by mouth once daily.    ipratropium-albuterol (COMBIVENT RESPIMAT)  mcg/actuation inhaler Inhale 1 puff into the lungs every 4 (four) hours as needed for Wheezing. Medically necessary    lisinopril (PRINIVIL,ZESTRIL) 40 MG tablet Take 40 mg by mouth once daily.    nitroGLYCERIN (NITROSTAT) 0.4 MG SL tablet Place 1 tablet (0.4 mg total) under the tongue as needed.    OXYGEN-AIR DELIVERY SYSTEMS MISC 2 L by Misc.(Non-Drug; Combo Route) route every evening.    PREMARIN 0.625 mg tablet TAKE ONE TABLET BY MOUTH ONCE DAILY    roflumilast 500 mcg Tab Take 1 tablet (500 mcg total) by mouth once daily.    zolpidem (AMBIEN) 5 MG Tab Take 1 tablet (5 mg total) by mouth nightly.    diclofenac sodium (VOLTAREN) 1 % Gel Apply 2 g topically once daily.    duloxetine (CYMBALTA) 60 MG capsule     fosinopril (MONOPRIL) 40 MG tablet     lorazepam (ATIVAN) 0.5 MG tablet     metoprolol succinate (TOPROL-XL) 25 MG 24 hr tablet     sertraline (ZOLOFT) 50 MG tablet     VYTORIN 10-40 10-40 mg per tablet            Clinical Reference  "Information           Your Vitals Were     BP Pulse Temp Height Weight SpO2    136/64 90 99.4 °F (37.4 °C) (Tympanic) 5' 1" (1.549 m) 77.2 kg (170 lb 3.1 oz) 94%    BMI                32.16 kg/m2          Blood Pressure          Most Recent Value    BP  136/64      Allergies as of 3/7/2017     No Known Allergies      Immunizations Administered on Date of Encounter - 3/7/2017     None      Orders Placed During Today's Visit      Normal Orders This Visit    Ambulatory Referral to Physical/Occupational Therapy       Administrations This Visit     methylPREDNISolone acetate injection 40 mg     Admin Date Action Dose Route Administered By             03/07/2017 Given 40 mg Intramuscular Olga Altman MD                      Smoking Cessation     If you would like to quit smoking:   You may be eligible for free services if you are a [place] resident and started smoking cigarettes before September 1, 1988.  Call the Smoking Cessation Trust ([facility]) toll free at [phone] or ([phone] [phone].   Call 4-800-QUIT-NOW if you do not meet the above criteria.            Language Assistance Services     ATTENTION: Language assistance services are available, free of charge. Please call [phone].      ATENCIÓN: Si habla español, tiene a mitchell disposición servicios gratuitos de asistencia lingüística. Llame al [phone].     CHÚ Ý: N?u b?n nói Ti?ng Vi?t, có các d?ch v? h? tr? ngôn ng? mi?n phí dành cho b?n. G?i s? [phone].         [facility] complies with applicable Federal civil rights laws and does not discriminate on the basis of race, color, national origin, age, disability, or sex.        "

## 2017-03-13 ENCOUNTER — TELEPHONE (OUTPATIENT)
Dept: INTERNAL MEDICINE | Facility: CLINIC | Age: 62
End: 2017-03-13

## 2017-03-13 ENCOUNTER — TELEPHONE (OUTPATIENT)
Dept: PULMONOLOGY | Facility: CLINIC | Age: 62
End: 2017-03-13

## 2017-03-13 NOTE — TELEPHONE ENCOUNTER
----- Message from Sherry Contreras sent at 3/13/2017  4:20 PM CDT -----  Contact: PT  Pt states she was informed by the pharmacist that a prior authorization/approval is needed to get gel filled. She can be reached at  [phone] .      [facility] [address] [place] [address] [facility] AT [facility] [place] & [place] [address] [facility]  [facility] [address]  Phone: [phone] Fax: [phone]

## 2017-03-15 ENCOUNTER — TELEPHONE (OUTPATIENT)
Dept: PULMONOLOGY | Facility: CLINIC | Age: 62
End: 2017-03-15

## 2017-03-15 ENCOUNTER — PROCEDURE VISIT (OUTPATIENT)
Dept: PULMONOLOGY | Facility: CLINIC | Age: 62
End: 2017-03-15
Payer: MEDICAID

## 2017-03-15 ENCOUNTER — HOSPITAL ENCOUNTER (OUTPATIENT)
Dept: RADIOLOGY | Facility: HOSPITAL | Age: 62
Discharge: HOME | End: 2017-03-15
Attending: INTERNAL MEDICINE
Payer: MEDICAID

## 2017-03-15 ENCOUNTER — OFFICE VISIT (OUTPATIENT)
Dept: PULMONOLOGY | Facility: CLINIC | Age: 62
End: 2017-03-15
Payer: MEDICAID

## 2017-03-15 VITALS
HEART RATE: 79 BPM | OXYGEN SATURATION: 98 % | DIASTOLIC BLOOD PRESSURE: 58 MMHG | HEIGHT: 62 IN | WEIGHT: 166 LBS | BODY MASS INDEX: 30.55 KG/M2 | RESPIRATION RATE: 18 BRPM | SYSTOLIC BLOOD PRESSURE: 100 MMHG

## 2017-03-15 DIAGNOSIS — R06.02 SOB (SHORTNESS OF BREATH): Primary | ICD-10-CM

## 2017-03-15 DIAGNOSIS — R06.02 SOB (SHORTNESS OF BREATH): ICD-10-CM

## 2017-03-15 DIAGNOSIS — J44.9 CHRONIC OBSTRUCTIVE PULMONARY DISEASE, UNSPECIFIED COPD TYPE: ICD-10-CM

## 2017-03-15 DIAGNOSIS — J44.9 CHRONIC OBSTRUCTIVE PULMONARY DISEASE, UNSPECIFIED COPD TYPE: Primary | ICD-10-CM

## 2017-03-15 DIAGNOSIS — J44.9 COPD, SEVERE: Primary | ICD-10-CM

## 2017-03-15 DIAGNOSIS — Z72.0 TOBACCO ABUSE: ICD-10-CM

## 2017-03-15 PROCEDURE — 94010 BREATHING CAPACITY TEST: CPT | Mod: 26,S$PBB,, | Performed by: INTERNAL MEDICINE

## 2017-03-15 PROCEDURE — 94010 BREATHING CAPACITY TEST: CPT | Mod: PBBFAC | Performed by: GENERAL PRACTICE

## 2017-03-15 PROCEDURE — 99999 PR PBB SHADOW E&M-EST. PATIENT-LVL III: CPT | Mod: PBBFAC,,, | Performed by: INTERNAL MEDICINE

## 2017-03-15 PROCEDURE — 71020 XR CHEST PA AND LATERAL: CPT | Mod: 26,,, | Performed by: RADIOLOGY

## 2017-03-15 PROCEDURE — 99214 OFFICE O/P EST MOD 30 MIN: CPT | Mod: 25,S$PBB,, | Performed by: INTERNAL MEDICINE

## 2017-03-15 NOTE — ASSESSMENT & PLAN NOTE
COPD score 29  mRC 1 ( some symptoms for claudication)  Dulera, Duoneb, Oxygen night time, Proair HFA  Quit Daliresp due to nausea  FEV1 1.30L( 56%)    CXR and [place] next

## 2017-03-15 NOTE — PATIENT INSTRUCTIONS
Planning to Quit Smoking  Your healthcare provider may have told you that you need to give up tobacco. Only you can decide if and when you are ready to quit. Quitting is hard to do. But the benefits will be worth it. When you  decide to quit, come up with a plan thats right for you. Discuss your plan with your healthcare provider. And talk to your provider about medicines to help you quit.    Line up support  To quit smoking, youll need a plan and some help. Pick a date within the next 2 to 4 weeks to quit. Use the time between now and that date to arrange for support.  · Classes and counselors. Quit-smoking classes  people like you through the process. Get to know others in a class, and support each other beyond the class. Telephone counseling also helps you keep on track. Ask your healthcare provider, local hospital, or public health department to put you in touch with a class and a phone counselor.  · Family and friends. Tell your family and friends about your quit date. Ask them to support your change. If they smoke, arrange to see them in smoke-free places. Forbid smoking in your home.     Finding something to replace cigarettes may be hard to do. Be aware that some things you choose may be as harmful as cigarettes:  · Smokeless (chewing) tobacco is just as harmful as regular tobacco. Tobacco should not be used as a substitute for cigarettes.  · Herbal medicines or teas may affect how your body handles nicotine. Talk to your healthcare provider before using these products.  · E-cigarettes have less toxins than the smoke from a regular cigarette. But the FDA says that these devices may still have substances that can cause cancer. E-cigarettes are not well regulated. They have not been studied enough to know if they are a good aid to help you stop smoking. Talk with your healthcare provider befor using these products.      Quit-smoking products  Many products can help you quit smoking. Some are  prescription medicines that help curb your cravings and withdrawal symptoms. Other products slowly lessen the level of nicotine your body absorbs. Nicotine is the highly addictive substance found in cigarettes, cigars, and chewing tobacco. Nicotine replacement products can help get your body used to slowly decreasing amounts of nicotine after you quit smoking. These products include a nicotine patch, gum, lozenge, nasal spray, and inhaler. Be sure you follow the directions for your medicine or product carefully. Your healthcare provider may tell you to start taking the prescription medicine a week before you plan to quit. Do not smoke while you use nicotine products. Doing so can be very harmful to your health.  For more information  · smokefree.gov/islv-zk-ut-expert  · National Cancer [place] Smoking Quitline: 877-44U-QUIT (638-655-0363)   Date Last Reviewed: 11/12/2014  © 8552-9606 [employer]. [address] [place] [address]. All rights reserved. This information is not intended as a substitute for professional medical care. Always follow your healthcare professional's instructions.        Getting Support for Quitting Smoking  You dont have to go through the process of quitting smoking without support. Tell people you are quitting. The support of friends, coworkers, and family members can make a big difference. Face-to-face or telephone counseling can also be helpful, as can a stop-smoking class or an ex-smokers group.    Set a quit date  If youre serious about quitting smoking, choose a date within the next 2 to 4 weeks. El it in bright, bold letters on a calendar you use often. Tell people about your quit date. Ask for their support. Let your friends and family know how they can help you quit.  Make a contract  A quit-smoking contract gives you a goal. Write out the contract and sign it. Have it witnessed, if you like. Then keep the contract where youll see it often, or carry it  with you. Read the contract when youre tempted to smoke.  Take action  On the day you quit, reread your quit contract. Think about the benefits you gain by quitting, such as better health and an improved sense of taste.  · Remove cigarettes from your home, car, or any other place where you stash them.  · Throw away all smoking materials, including matches, lighters, and ashtrays.  · Review your list of triggers and your plan for coping with them.  · Stay away from people or settings you link with smoking.  · Make a survival kit that includes gum, mints, carrot sticks, and things to keep your hands busy.  · Talk to your health care provider about using quit-smoking products, such as medication or a nicotine patch, inhaler, nasal spray, gum, or lozenges.  Ask for help  Sometimes you may just need to talk when you miss smoking. Ex-smokers are good to talk to, because theyre likely to know how you feel. You may need extra support in the first few weeks after you quit. Ask a friend to call you each day to see how youre doing. Telephone counseling can also help you keep on track. Ask your health care provider, local hospital, or public health department to put you in touch with a phone counselor. You may also have to deal with doubters when you decide to quit. Explain to any doubters why you are quitting. Tell them that quitting is important to you. Ask for their support. Tell your smoking buddies that you can walk together instead of smoking together. If someone thinks you wont succeed, say that you have a good quit plan. Let him or her know youre sticking with it.     For more information  · smokefree.gov/qhqu-cn-zp-expert  · National Cancer [place] Smoking Quitline: 877-44U-QUIT (482-276-5630)   Date Last Reviewed: 11/17/2014  © 5118-2851 [employer]. [address] [place] [address], [place] [address]. All rights reserved. This information is not intended as a substitute for professional medical care.  Always follow your healthcare professional's instructions.

## 2017-03-15 NOTE — PROGRESS NOTES
No post performed. Pt had taken a Duoneb treatment this morning. Pt had coughing episodes with each trial.

## 2017-03-15 NOTE — MR AVS SNAPSHOT
[facility]  [address] [facility] [address]  Phone: [phone]  Fax: [phone]                  Gemma Vick   3/15/2017 9:00 AM   Office Visit    Description:  Female : 1955   Provider:  Nathan Rodriguez MD   Department:  [facility]           Reason for Visit     COPD           Diagnoses this Visit        Comments    COPD, severe    -  Primary     Tobacco abuse                To Do List           Future Appointments        Provider Department Dept Phone    9/15/2017 10:00 AM ONLH XR1-DR [facility] [phone]    9/15/2017 10:20 AM PULMONARY LAB, [facility] [phone]    9/15/2017 10:40 AM Sandrine Devries NP [facility] [phone]      Goals (5 Years of Data)     None      Follow-Up and Disposition     Return in about 6 months (around 9/15/2017) for GAVIET, CXR and [place], smoking cessation.      [facility]     [facility] -  Assistance  Registered nurses in the [facility] provide clinical advisement, health education, appointment booking, and other advisory services.  Call for this free service at [phone].             Medications           Message regarding Medications     Verify the changes and/or additions to your medication regime listed below are the same as discussed with your clinician today.  If any of these changes or additions are incorrect, please notify your healthcare provider.        STOP taking these medications     roflumilast 500 mcg Tab Take 1 tablet (500 mcg total) by mouth once daily.           Verify that the below list of medications is an accurate representation of the medications you are currently taking.  If none reported, the list may be blank. If incorrect, please contact your healthcare provider. Carry this list with you in case of emergency.           Current Medications     albuterol (PROAIR HFA) 90  "mcg/actuation inhaler Inhale 2 puffs into the lungs every 6 (six) hours as needed.    albuterol-ipratropium 2.5mg-0.5mg/3mL (DUO-NEB) 0.5 mg-3 mg(2.5 mg base)/3 mL nebulizer solution USE ONE VIAL IN NEBULIZER TWICE DAILY    aspirin 81 MG Chew Take 81 mg by mouth once daily.    azelastine (OPTIVAR) 0.05 % ophthalmic solution INSTILL 1 DROP INTO EACH EYE TWICE DAILY    clopidogrel (PLAVIX) 75 mg tablet Take 1 tablet (75 mg total) by mouth once daily.    diclofenac sodium (VOLTAREN) 1 % Gel Apply 2 g topically once daily.    DULERA 200-5 mcg/actuation inhaler INHALE 2 PUFFS BY MOUTH TWICE A DAY    duloxetine (CYMBALTA) 60 MG capsule     felodipine (PLENDIL) 5 MG 24 hr tablet Take 1 tablet (5 mg total) by mouth once daily.    fosinopril (MONOPRIL) 40 MG tablet     ipratropium-albuterol (COMBIVENT RESPIMAT)  mcg/actuation inhaler Inhale 1 puff into the lungs every 4 (four) hours as needed for Wheezing. Medically necessary    lisinopril (PRINIVIL,ZESTRIL) 40 MG tablet Take 40 mg by mouth once daily.    lorazepam (ATIVAN) 0.5 MG tablet     OXYGEN-AIR DELIVERY SYSTEMS MISC 2 L by Misc.(Non-Drug; Combo Route) route every evening.    PREMARIN 0.625 mg tablet TAKE ONE TABLET BY MOUTH ONCE DAILY    sertraline (ZOLOFT) 50 MG tablet     VYTORIN 10-40 10-40 mg per tablet     zolpidem (AMBIEN) 5 MG Tab Take 1 tablet (5 mg total) by mouth nightly.    metoprolol succinate (TOPROL-XL) 25 MG 24 hr tablet     nitroGLYCERIN (NITROSTAT) 0.4 MG SL tablet Place 1 tablet (0.4 mg total) under the tongue as needed.           Clinical Reference Information           Your Vitals Were     BP Pulse Resp Height Weight SpO2    100/58 79 18 5' 2" (1.575 m) 75.3 kg (166 lb 0.1 oz) 98%    BMI                30.36 kg/m2          Blood Pressure          Most Recent Value    BP  (!)  100/58      Allergies as of 3/15/2017     No Known Allergies      Immunizations Administered on Date of Encounter - 3/15/2017     None      Orders Placed During Today's " Visit     Future Labs/Procedures Expected by Expires    X-Ray Chest PA And Lateral  3/15/2017 3/15/2018    Spirometry with/without bronchodilator  As directed 3/15/2018      Instructions      Planning to Quit Smoking  Your healthcare provider may have told you that you need to give up tobacco. Only you can decide if and when you are ready to quit. Quitting is hard to do. But the benefits will be worth it. When you  decide to quit, come up with a plan thats right for you. Discuss your plan with your healthcare provider. And talk to your provider about medicines to help you quit.    Line up support  To quit smoking, youll need a plan and some help. Pick a date within the next 2 to 4 weeks to quit. Use the time between now and that date to arrange for support.  · Classes and counselors. Quit-smoking classes  people like you through the process. Get to know others in a class, and support each other beyond the class. Telephone counseling also helps you keep on track. Ask your healthcare provider, local hospital, or public health department to put you in touch with a class and a phone counselor.  · Family and friends. Tell your family and friends about your quit date. Ask them to support your change. If they smoke, arrange to see them in smoke-free places. Forbid smoking in your home.     Finding something to replace cigarettes may be hard to do. Be aware that some things you choose may be as harmful as cigarettes:  · Smokeless (chewing) tobacco is just as harmful as regular tobacco. Tobacco should not be used as a substitute for cigarettes.  · Herbal medicines or teas may affect how your body handles nicotine. Talk to your healthcare provider before using these products.  · E-cigarettes have less toxins than the smoke from a regular cigarette. But the FDA says that these devices may still have substances that can cause cancer. E-cigarettes are not well regulated. They have not been studied enough to know if they  are a good aid to help you stop smoking. Talk with your healthcare provider befor using these products.      Quit-smoking products  Many products can help you quit smoking. Some are prescription medicines that help curb your cravings and withdrawal symptoms. Other products slowly lessen the level of nicotine your body absorbs. Nicotine is the highly addictive substance found in cigarettes, cigars, and chewing tobacco. Nicotine replacement products can help get your body used to slowly decreasing amounts of nicotine after you quit smoking. These products include a nicotine patch, gum, lozenge, nasal spray, and inhaler. Be sure you follow the directions for your medicine or product carefully. Your healthcare provider may tell you to start taking the prescription medicine a week before you plan to quit. Do not smoke while you use nicotine products. Doing so can be very harmful to your health.  For more information  · smokefree.gov/kvel-sr-db-expert  · National Cancer [place] Smoking Quitline: 877-44U-QUIT (686-207-1995)   Date Last Reviewed: 11/12/2014 © 2000-2016 [employer]. [address] [place] [address]. All rights reserved. This information is not intended as a substitute for professional medical care. Always follow your healthcare professional's instructions.        Getting Support for Quitting Smoking  You dont have to go through the process of quitting smoking without support. Tell people you are quitting. The support of friends, coworkers, and family members can make a big difference. Face-to-face or telephone counseling can also be helpful, as can a stop-smoking class or an ex-smokers group.    Set a quit date  If youre serious about quitting smoking, choose a date within the next 2 to 4 weeks. El it in bright, bold letters on a calendar you use often. Tell people about your quit date. Ask for their support. Let your friends and family know how they can help you quit.  Make a  contract  A quit-smoking contract gives you a goal. Write out the contract and sign it. Have it witnessed, if you like. Then keep the contract where youll see it often, or carry it with you. Read the contract when youre tempted to smoke.  Take action  On the day you quit, reread your quit contract. Think about the benefits you gain by quitting, such as better health and an improved sense of taste.  · Remove cigarettes from your home, car, or any other place where you stash them.  · Throw away all smoking materials, including matches, lighters, and ashtrays.  · Review your list of triggers and your plan for coping with them.  · Stay away from people or settings you link with smoking.  · Make a survival kit that includes gum, mints, carrot sticks, and things to keep your hands busy.  · Talk to your health care provider about using quit-smoking products, such as medication or a nicotine patch, inhaler, nasal spray, gum, or lozenges.  Ask for help  Sometimes you may just need to talk when you miss smoking. Ex-smokers are good to talk to, because theyre likely to know how you feel. You may need extra support in the first few weeks after you quit. Ask a friend to call you each day to see how youre doing. Telephone counseling can also help you keep on track. Ask your health care provider, local hospital, or public health department to put you in touch with a phone counselor. You may also have to deal with doubters when you decide to quit. Explain to any doubters why you are quitting. Tell them that quitting is important to you. Ask for their support. Tell your smoking buddies that you can walk together instead of smoking together. If someone thinks you wont succeed, say that you have a good quit plan. Let him or her know youre sticking with it.     For more information  · smokefree.gov/niww-sg-ef-expert  · National Cancer [place] Smoking Quitline: 877-44U-QUIT (840-771-4278)   Date Last Reviewed: 11/17/2014  ©  2414-3387 [employer]. [address] [place] [address], [place] [address]. All rights reserved. This information is not intended as a substitute for professional medical care. Always follow your healthcare professional's instructions.             Smoking Cessation     If you would like to quit smoking:   You may be eligible for free services if you are a [place] resident and started smoking cigarettes before September 1, 1988.  Call the Smoking Cessation Trust ([facility]) toll free at ([phone] or ([phone].   Call 1-923-QUIT-NOW if you do not meet the above criteria.            Language Assistance Services     ATTENTION: Language assistance services are available, free of charge. Please call [phone].      ATENCIÓN: Si habla español, tiene a mitchell disposición servicios gratuitos de asistencia lingüística. Llame al [phone].     CHÚ Ý: N?u b?n nói Ti?ng Vi?t, có các d?ch v? h? tr? ngôn ng? mi?n phí dành cho b?n. G?i s? [phone].         [facility] complies with applicable Federal civil rights laws and does not discriminate on the basis of race, color, national origin, age, disability, or sex.

## 2017-03-15 NOTE — PROGRESS NOTES
Subjective:      Gemma Vick is a 62 y.o. female with known COPD who presents without exacerbation.   Current symptoms include: chronic dyspnea. Symptoms began several months ago.   Patient uses 2 pillows at night. Patient currently is on oxygen at 2.0 L/min per nasal cannula..   Last office visit was December 2016 my nurse practitioner  COPD test score is 29.  MRC grade score 1  Dyspnea usually provoked by moderate housekeeping.  Medications reviewed Dulera HFA, DuoNeb via nebulizer, nocturnal oxygen, pro-air HFA  She has had difficulties using the DALIRESP.  Gave her lots of nausea  Patient is a flu vaccination  FVC  improved from 1.70.  Now 2.15.  FEV1 improved from 1.03 now 1.30  Discussed quit date for smoking    The following portions of the patient's history were reviewed and updated as appropriate:   She  has a past medical history of AAA (abdominal aortic aneurysm) (2/13/2014); Abdominal aneurysm; Acute coronary syndrome; Arthritis; BPPV (benign paroxysmal positional vertigo); Carotid artery plaque; Carotid artery stenosis and occlusion (2/13/2014); Chronic back pain; COPD (chronic obstructive pulmonary disease); Coronary artery disease; Emphysema lung; Hyperlipidemia; Hypertension; Myocardial infarction; and Neuropathy.  She  does not have any pertinent problems on file.  She  has a past surgical history that includes Cardiac catheterization and Coronary angioplasty.  Her family history is not on file.  She  reports that she has been smoking Cigarettes.  She started smoking about 46 years ago. She has a 22.00 pack-year smoking history. She has never used smokeless tobacco. She reports that she does not drink alcohol or use illicit drugs.  She has a current medication list which includes the following prescription(s): albuterol, albuterol-ipratropium 2.5mg-0.5mg/3ml, aspirin, azelastine, clopidogrel, diclofenac sodium, dulera, duloxetine, felodipine, fosinopril, ipratropium-albuterol, lisinopril,  lorazepam, oxygen-air delivery systems, premarin, sertraline, vytorin 10-40, zolpidem, metoprolol succinate, and nitroglycerin.  Current Outpatient Prescriptions on File Prior to Visit   Medication Sig Dispense Refill    albuterol (PROAIR HFA) 90 mcg/actuation inhaler Inhale 2 puffs into the lungs every 6 (six) hours as needed. 18 g 6    albuterol-ipratropium 2.5mg-0.5mg/3mL (DUO-NEB) 0.5 mg-3 mg(2.5 mg base)/3 mL nebulizer solution USE ONE VIAL IN NEBULIZER TWICE DAILY 270 mL 0    aspirin 81 MG Chew Take 81 mg by mouth once daily.      azelastine (OPTIVAR) 0.05 % ophthalmic solution INSTILL 1 DROP INTO EACH EYE TWICE DAILY 6 mL 1    clopidogrel (PLAVIX) 75 mg tablet Take 1 tablet (75 mg total) by mouth once daily. 90 tablet 3    diclofenac sodium (VOLTAREN) 1 % Gel Apply 2 g topically once daily. 1 Tube 2    DULERA 200-5 mcg/actuation inhaler INHALE 2 PUFFS BY MOUTH TWICE A DAY 13 g 1    duloxetine (CYMBALTA) 60 MG capsule       felodipine (PLENDIL) 5 MG 24 hr tablet Take 1 tablet (5 mg total) by mouth once daily. 30 tablet 6    fosinopril (MONOPRIL) 40 MG tablet       ipratropium-albuterol (COMBIVENT RESPIMAT)  mcg/actuation inhaler Inhale 1 puff into the lungs every 4 (four) hours as needed for Wheezing. Medically necessary 2 Package 11    lisinopril (PRINIVIL,ZESTRIL) 40 MG tablet Take 40 mg by mouth once daily.      lorazepam (ATIVAN) 0.5 MG tablet       OXYGEN-AIR DELIVERY SYSTEMS MISC 2 L by Misc.(Non-Drug; Combo Route) route every evening.      PREMARIN 0.625 mg tablet TAKE ONE TABLET BY MOUTH ONCE DAILY 30 tablet 4    sertraline (ZOLOFT) 50 MG tablet       VYTORIN 10-40 10-40 mg per tablet       zolpidem (AMBIEN) 5 MG Tab Take 1 tablet (5 mg total) by mouth nightly. 30 tablet 5    [DISCONTINUED] roflumilast 500 mcg Tab Take 1 tablet (500 mcg total) by mouth once daily. 90 tablet 3    metoprolol succinate (TOPROL-XL) 25 MG 24 hr tablet       nitroGLYCERIN (NITROSTAT) 0.4 MG SL  "tablet Place 1 tablet (0.4 mg total) under the tongue as needed. 25 tablet 2     No current facility-administered medications on file prior to visit.      She has No Known Allergies..    Review of Systems  A comprehensive review of systems was negative except for: Respiratory: positive for dyspnea on exertion  Cardiovascular: positive for claudication       Objective:      BP (!) 100/58  Pulse 79  Resp 18  Ht 5' 2" (1.575 m)  Wt 75.3 kg (166 lb 0.1 oz)  SpO2 98%  BMI 30.36 kg/m2  FEV1: 1.30 L, 56 % of predicted  FEV1/FVC: 60 %  General appearance: alert, appears stated age, cooperative and no distress  Head: atraumatic  Eyes: negative  Nose: no discharge  Neck: no adenopathy, no carotid bruit, no JVD, supple, symmetrical, trachea midline and thyroid not enlarged, symmetric, no tenderness/mass/nodules  Lungs: diminished breath sounds bibasilar and bilaterally and expiratory wheezes in apex  Heart: regular rate and rhythm, S1, S2 normal, no murmur, click, rub or gallop  Extremities: extremities normal, atraumatic, no cyanosis or edema  Pulses: 2+ and symmetric  Skin: Skin color, texture, turgor normal. No rashes or lesions  Lymph nodes: Cervical, supraclavicular, and axillary nodes normal.  Neurologic: Grossly normal         CXR:  Heart size and mediastinal contour are normal.  Aorta is mildly tortuous and calcified.    Lungs are hyperexpanded with mild coarsening of the basilar bronchovascular markings in keeping with COPD.    Evidence of old calcified granulomatous disease.  No acute infiltrate, edema, or effusion.  Mild thoracic spondylosis.  Assessment:      Problem List Items Addressed This Visit     Tobacco abuse     Smoking less  6-8 cigs daily  Cessation class too far did not go  Discussed setting quit date         COPD, severe - Primary     COPD score 29  mRC 1 ( some symptoms for claudication)  Dulera, Duoneb, Oxygen night time, Proair HFA  Quit Daliresp due to nausea  FEV1 1.30L( 56%)    CXR and Armando " next         Relevant Orders    Spirometry with/without bronchodilator    X-Ray Chest PA And Lateral        Plan:      Return in about 6 months (around 9/15/2017) for GAHN, CXR and Armando, smoking cessation.    This note was prepared using voice recognition system and is likely to have sound alike errors that may have been overlooked even after proof reading.  Please call me with any questions    Discussed diagnosis, its evaluation, treatment and usual course. All questions answered.    Thank you for the courtesy of participating in the care of this patient    Nathan Rodriguez MD

## 2017-03-16 ENCOUNTER — TELEPHONE (OUTPATIENT)
Dept: INTERNAL MEDICINE | Facility: CLINIC | Age: 62
End: 2017-03-16

## 2017-03-16 NOTE — TELEPHONE ENCOUNTER
----- Message from Aiyana Elizondo sent at 3/16/2017  8:08 AM CDT -----  duplicate msg rg status of gel rx substitute. also states that therapy doesn't take ins, arm in severe pain..[phone]

## 2017-03-28 ENCOUNTER — PATIENT MESSAGE (OUTPATIENT)
Dept: INTERNAL MEDICINE | Facility: CLINIC | Age: 62
End: 2017-03-28

## 2017-03-28 LAB
PRE FEV1 FVC: 60.34 % (ref 67.84–87.43)
PRE FEV1: 1.3 L (ref 1.77–2.86)
PRE FVC: 2.15 L (ref 2.36–3.66)
PRE PEF: 3.16 L/S (ref 4.27–7.48)

## 2017-04-21 DIAGNOSIS — I10 ESSENTIAL HYPERTENSION: ICD-10-CM

## 2017-04-21 RX ORDER — FELODIPINE 5 MG/1
TABLET, EXTENDED RELEASE ORAL
Qty: 30 TABLET | Refills: 0 | Status: SHIPPED | OUTPATIENT
Start: 2017-04-21 | End: 2017-04-24 | Stop reason: SDUPTHER

## 2017-04-24 DIAGNOSIS — I10 ESSENTIAL HYPERTENSION: ICD-10-CM

## 2017-04-24 RX ORDER — FELODIPINE 5 MG/1
5 TABLET, EXTENDED RELEASE ORAL DAILY
Qty: 30 TABLET | Refills: 6 | Status: SHIPPED | OUTPATIENT
Start: 2017-04-24 | End: 2017-05-05 | Stop reason: SDUPTHER

## 2017-05-05 ENCOUNTER — OFFICE VISIT (OUTPATIENT)
Dept: INTERNAL MEDICINE | Facility: CLINIC | Age: 62
End: 2017-05-05
Payer: MEDICAID

## 2017-05-05 VITALS
OXYGEN SATURATION: 95 % | HEART RATE: 78 BPM | BODY MASS INDEX: 31.36 KG/M2 | WEIGHT: 170.44 LBS | DIASTOLIC BLOOD PRESSURE: 78 MMHG | HEIGHT: 62 IN | SYSTOLIC BLOOD PRESSURE: 138 MMHG | TEMPERATURE: 98 F

## 2017-05-05 DIAGNOSIS — J44.9 CHRONIC OBSTRUCTIVE PULMONARY DISEASE, UNSPECIFIED COPD TYPE: ICD-10-CM

## 2017-05-05 DIAGNOSIS — R73.03 PREDIABETES: ICD-10-CM

## 2017-05-05 DIAGNOSIS — E78.2 MIXED HYPERLIPIDEMIA: ICD-10-CM

## 2017-05-05 DIAGNOSIS — Z00.00 WELL ADULT EXAM: Primary | ICD-10-CM

## 2017-05-05 DIAGNOSIS — G47.00 INSOMNIA, UNSPECIFIED TYPE: ICD-10-CM

## 2017-05-05 DIAGNOSIS — Z72.0 TOBACCO ABUSE: ICD-10-CM

## 2017-05-05 DIAGNOSIS — J44.9 COPD, SEVERE: ICD-10-CM

## 2017-05-05 DIAGNOSIS — Z12.39 BREAST CANCER SCREENING: ICD-10-CM

## 2017-05-05 DIAGNOSIS — I10 ESSENTIAL HYPERTENSION: ICD-10-CM

## 2017-05-05 DIAGNOSIS — I25.10 CORONARY ARTERY DISEASE INVOLVING NATIVE CORONARY ARTERY OF NATIVE HEART WITHOUT ANGINA PECTORIS: ICD-10-CM

## 2017-05-05 PROCEDURE — 99213 OFFICE O/P EST LOW 20 MIN: CPT | Mod: PBBFAC,PO | Performed by: FAMILY MEDICINE

## 2017-05-05 PROCEDURE — 99999 PR PBB SHADOW E&M-EST. PATIENT-LVL III: CPT | Mod: PBBFAC,,, | Performed by: FAMILY MEDICINE

## 2017-05-05 PROCEDURE — 99396 PREV VISIT EST AGE 40-64: CPT | Mod: S$PBB,,, | Performed by: FAMILY MEDICINE

## 2017-05-05 RX ORDER — METOPROLOL SUCCINATE 25 MG/1
25 TABLET, EXTENDED RELEASE ORAL DAILY
Qty: 30 TABLET | Refills: 6 | Status: SHIPPED | OUTPATIENT
Start: 2017-05-05 | End: 2017-08-29

## 2017-05-05 RX ORDER — FELODIPINE 5 MG/1
5 TABLET, EXTENDED RELEASE ORAL DAILY
Qty: 30 TABLET | Refills: 6 | Status: SHIPPED | OUTPATIENT
Start: 2017-05-05 | End: 2017-11-01 | Stop reason: SDUPTHER

## 2017-05-05 RX ORDER — DULOXETIN HYDROCHLORIDE 60 MG/1
60 CAPSULE, DELAYED RELEASE ORAL DAILY
Qty: 30 CAPSULE | Refills: 6 | Status: SHIPPED | OUTPATIENT
Start: 2017-05-05 | End: 2017-11-01 | Stop reason: SDUPTHER

## 2017-05-05 RX ORDER — FOSINOPRIL SODIUM 40 MG/1
40 TABLET ORAL DAILY
Qty: 30 TABLET | Refills: 6 | Status: SHIPPED | OUTPATIENT
Start: 2017-05-05 | End: 2017-08-09

## 2017-05-05 RX ORDER — CLOPIDOGREL BISULFATE 75 MG/1
75 TABLET ORAL DAILY
Qty: 90 TABLET | Refills: 3 | Status: SHIPPED | OUTPATIENT
Start: 2017-05-05 | End: 2017-11-01 | Stop reason: SDUPTHER

## 2017-05-05 RX ORDER — EZETIMIBE AND SIMVASTATIN 10; 40 MG/1; MG/1
1 TABLET ORAL NIGHTLY
Qty: 30 TABLET | Refills: 6 | Status: SHIPPED | OUTPATIENT
Start: 2017-05-05 | End: 2017-06-05 | Stop reason: CLARIF

## 2017-05-05 RX ORDER — ALBUTEROL SULFATE 90 UG/1
2 AEROSOL, METERED RESPIRATORY (INHALATION) EVERY 6 HOURS PRN
Qty: 18 G | Refills: 6 | Status: SHIPPED | OUTPATIENT
Start: 2017-05-05 | End: 2017-11-01 | Stop reason: SDUPTHER

## 2017-05-05 RX ORDER — ZOLPIDEM TARTRATE 5 MG/1
5 TABLET ORAL NIGHTLY
Qty: 30 TABLET | Refills: 5 | Status: SHIPPED | OUTPATIENT
Start: 2017-05-05 | End: 2017-11-01 | Stop reason: SDUPTHER

## 2017-05-05 NOTE — MR AVS SNAPSHOT
[facility]  [address] [place] [id number]  Phone: [phone]                  Gemma Vick   2017 9:20 AM   Office Visit    Description:  Female : 1955   Provider:  Olga Altman MD   Department:  [facility]           Reason for Visit     Medication Refill           Diagnoses this Visit        Comments    Well adult exam    -  Primary     Tobacco abuse         Prediabetes         Essential hypertension         Coronary artery disease involving native coronary artery of native heart without angina pectoris         Mixed hyperlipidemia         Insomnia, unspecified type         COPD, severe         Chronic obstructive pulmonary disease, unspecified COPD type         Breast cancer screening                To Do List           Future Appointments        Provider Department Dept Phone    6/15/2017 9:45 AM Millie Juarez MD [facility] [phone]    9/15/2017 10:00 AM ONLH XR1-DR [facility] [phone]    9/15/2017 10:20 AM PULMONARY LAB, [facility] [phone]    9/15/2017 10:40 AM Sandrine Devries NP [facility] [phone]      Goals (5 Years of Data)     None      Follow-Up and Disposition     Return in about 6 months (around 2017).       These Medications        Disp Refills Start End    estrogens, conjugated, (PREMARIN) 0.3 MG tablet 30 tablet 5 2017     Take 1 tablet (0.3 mg total) by mouth once daily. - Oral    Pharmacy: [facility] [address] [facility] [address] Ph #: [phone]       clopidogrel (PLAVIX) 75 mg tablet 90 tablet 3 2017     Take 1 tablet (75 mg total) by mouth once daily. - Oral    Pharmacy: [facility] [address] [address] Ph #: [phone]       zolpidem (AMBIEN) 5 MG Tab 30 tablet 5 2017    Take 1 tablet (5 mg total) by mouth nightly. - Oral    Pharmacy: [facility]  [place] [address] Ph #: [phone]       felodipine (PLENDIL) 5 MG 24 hr tablet 30 tablet 6 5/5/2017     Take 1 tablet (5 mg total) by mouth once daily. - Oral    Pharmacy: [place] [address] Ph #: [phone]       mometasone-formoterol (DULERA) 200-5 mcg/actuation inhaler 13 g 1 5/5/2017     INHALE 2 PUFFS BY MOUTH TWICE A DAY    Pharmacy: [place] [address] Ph #: [phone]       albuterol (PROAIR HFA) 90 mcg/actuation inhaler 18 g 6 5/5/2017     Inhale 2 puffs into the lungs every 6 (six) hours as needed. - Inhalation    Pharmacy: [place] [address] Ph #: [phone]       ipratropium-albuterol (COMBIVENT RESPIMAT)  mcg/actuation inhaler 2 Package 11 5/5/2017     Inhale 1 puff into the lungs every 4 (four) hours as needed for Wheezing. Medically necessary - Inhalation    Pharmacy: [place] [address] Ph #: [phone]       Notes to Pharmacy: Please call patient to pick prescription once it is ready.    metoprolol succinate (TOPROL-XL) 25 MG 24 hr tablet 30 tablet 6 5/5/2017     Take 1 tablet (25 mg total) by mouth once daily. - Oral    Pharmacy: [place] [address] Ph #: [phone]       fosinopril (MONOPRIL) 40 MG tablet 30 tablet 6 5/5/2017     Take 1 tablet (40 mg total) by mouth once daily. - Oral    Pharmacy: [place] [address] Ph #: [phone]       VYTORIN 10-40 10-40 mg per tablet 30 tablet 6 5/5/2017     Take 1 tablet by mouth every evening. - Oral    Pharmacy: [place] [address] Ph #: [phone]       duloxetine (CYMBALTA) 60 MG capsule 30 capsule 6 5/5/2017     Take 1 capsule (60 mg total) by mouth once daily. - Oral    Pharmacy: [facility] [place] [id number] [facility] [address] Ph #: [phone]         [facility]      [facility] Nurse Care [facility] - 24/7 Assistance  Unless otherwise directed by your provider, please contact [facility], our nurse care line that is available for 24/7 assistance.     Registered nurses in the [facility] provide: appointment scheduling, clinical advisement, health education, and other advisory services.  Call: [phone] (toll free)               Medications           Message regarding Medications     Verify the changes and/or additions to your medication regime listed below are the same as discussed with your clinician today.  If any of these changes or additions are incorrect, please notify your healthcare provider.        CHANGE how you are taking these medications     Start Taking Instead of    estrogens, conjugated, (PREMARIN) 0.3 MG tablet PREMARIN 0.625 mg tablet    Dosage:  Take 1 tablet (0.3 mg total) by mouth once daily. Dosage:  TAKE ONE TABLET BY MOUTH ONCE DAILY    Reason for Change:  Reorder     mometasone-formoterol (DULERA) 200-5 mcg/actuation inhaler DULERA 200-5 mcg/actuation inhaler    Dosage:  INHALE 2 PUFFS BY MOUTH TWICE A DAY Dosage:  INHALE 2 PUFFS BY MOUTH TWICE A DAY    Reason for Change:  Reorder     metoprolol succinate (TOPROL-XL) 25 MG 24 hr tablet metoprolol succinate (TOPROL-XL) 25 MG 24 hr tablet    Dosage:  Take 1 tablet (25 mg total) by mouth once daily.     Reason for Change:  Reorder     fosinopril (MONOPRIL) 40 MG tablet fosinopril (MONOPRIL) 40 MG tablet    Dosage:  Take 1 tablet (40 mg total) by mouth once daily.     Reason for Change:  Reorder     VYTORIN 10-40 10-40 mg per tablet VYTORIN 10-40 10-40 mg per tablet    Dosage:  Take 1 tablet by mouth every evening.     Reason for Change:  Reorder     duloxetine (CYMBALTA) 60 MG capsule duloxetine (CYMBALTA) 60 MG capsule    Dosage:  Take 1 capsule (60 mg total) by mouth once daily.     Reason for Change:  Reorder       STOP taking these medications     sertraline (ZOLOFT) 50 MG tablet             Verify that the below list of medications is an accurate representation of the medications you are currently taking.  If none reported, the list may be blank. If incorrect, please contact your healthcare provider. Carry this list with you in case of emergency.           Current Medications     albuterol (PROAIR HFA) 90 mcg/actuation inhaler Inhale 2 puffs into the lungs every 6 (six) hours as needed.    albuterol-ipratropium 2.5mg-0.5mg/3mL (DUO-NEB) 0.5 mg-3 mg(2.5 mg base)/3 mL nebulizer solution USE ONE VIAL IN NEBULIZER TWICE DAILY    aspirin 81 MG Chew Take 81 mg by mouth once daily.    azelastine (OPTIVAR) 0.05 % ophthalmic solution INSTILL 1 DROP INTO EACH EYE TWICE DAILY    clopidogrel (PLAVIX) 75 mg tablet Take 1 tablet (75 mg total) by mouth once daily.    duloxetine (CYMBALTA) 60 MG capsule Take 1 capsule (60 mg total) by mouth once daily.    estrogens, conjugated, (PREMARIN) 0.3 MG tablet Take 1 tablet (0.3 mg total) by mouth once daily.    felodipine (PLENDIL) 5 MG 24 hr tablet Take 1 tablet (5 mg total) by mouth once daily.    fosinopril (MONOPRIL) 40 MG tablet Take 1 tablet (40 mg total) by mouth once daily.    ipratropium-albuterol (COMBIVENT RESPIMAT)  mcg/actuation inhaler Inhale 1 puff into the lungs every 4 (four) hours as needed for Wheezing. Medically necessary    lisinopril (PRINIVIL,ZESTRIL) 40 MG tablet Take 40 mg by mouth once daily.    lorazepam (ATIVAN) 0.5 MG tablet     metoprolol succinate (TOPROL-XL) 25 MG 24 hr tablet Take 1 tablet (25 mg total) by mouth once daily.    mometasone-formoterol (DULERA) 200-5 mcg/actuation inhaler INHALE 2 PUFFS BY MOUTH TWICE A DAY    OXYGEN-AIR DELIVERY SYSTEMS MISC 2 L by Misc.(Non-Drug; Combo Route) route every evening.    VYTORIN 10-40 10-40 mg per tablet Take 1 tablet by mouth every evening.    zolpidem (AMBIEN) 5 MG Tab Take 1 tablet (5 mg total) by mouth nightly.    diclofenac sodium (VOLTAREN) 1 % Gel Apply 2 g topically once daily.  "   nitroGLYCERIN (NITROSTAT) 0.4 MG SL tablet Place 1 tablet (0.4 mg total) under the tongue as needed.           Clinical Reference Information           Your Vitals Were     BP Pulse Temp Height Weight SpO2    138/78 78 97.6 °F (36.4 °C) (Tympanic) 5' 2" (1.575 m) 77.3 kg (170 lb 6.7 oz) 95%    BMI                31.17 kg/m2          Blood Pressure          Most Recent Value    BP  138/78      Allergies as of 5/5/2017     No Known Allergies      Immunizations Administered on Date of Encounter - 5/5/2017     None      Orders Placed During Today's Visit     Future Labs/Procedures Expected by Expires    CBC auto differential  5/5/2017 (Approximate) 6/4/2017    Comprehensive metabolic panel  5/5/2017 (Approximate) 7/4/2018    Hemoglobin A1c  5/5/2017 (Approximate) 7/4/2018    Lipid panel  5/5/2017 (Approximate) 7/4/2017    Mammo Digital Screening Bilat with CAD  5/5/2017 (Approximate) 7/5/2018      Smoking Cessation     If you would like to quit smoking:   You may be eligible for free services if you are a [place] resident and started smoking cigarettes before September 1, 1988.  Call the Smoking Cessation [employer] ([facility]) toll free at [phone] or [phone].   Call 3-800-QUIT-NOW if you do not meet the above criteria.   Contact us via email: [email or web address]   View our website for more information: [email or web address]        Language Assistance Services     ATTENTION: Language assistance services are available, free of charge. Please call [phone].      ATENCIÓN: Si habla español, tiene a mitchell disposición servicios gratuitos de asistencia lingüística. Llame al [phone].     CHÚ Ý: N?u b?n nói Ti?ng Vi?t, có các d?ch v? h? tr? ngôn ng? mi?n phí dành cho b?n. G?i s? [phone].         [facility] complies with applicable Federal civil rights laws and does not discriminate on the basis of race, color, national origin, age, disability, or sex.        "

## 2017-05-05 NOTE — PROGRESS NOTES
Chief Complaint:    Chief Complaint   Patient presents with    Medication Refill       History of Present Illness:  She is here for 6 month follow-up,  Patient still has shoulder pain without significant for physical therapy but her insurance did not pay for the therapy.  She has COPD still continues to smoke I offered her to write on the Chantix which she already has some she does not want to go to smoking cessation class because he does not have the money for gas.  She has AAA which is stable being followed by cardiology.  Her blood pressure is stable.      ROS:  Review of Systems   Constitutional: Negative for activity change, chills, fatigue, fever and unexpected weight change.   HENT: Negative for congestion, ear discharge, ear pain, hearing loss, postnasal drip and rhinorrhea.    Eyes: Negative for pain and visual disturbance.   Respiratory: Negative for cough, chest tightness and shortness of breath.    Cardiovascular: Negative for chest pain and palpitations.   Gastrointestinal: Negative for abdominal pain, diarrhea and vomiting.   Endocrine: Negative for heat intolerance.   Genitourinary: Negative for dysuria, flank pain, frequency and hematuria.   Musculoskeletal: Negative for back pain, gait problem and neck pain.   Skin: Negative for color change and rash.   Neurological: Negative for dizziness, tremors, seizures, numbness and headaches.   Psychiatric/Behavioral: Negative for agitation, hallucinations, self-injury, sleep disturbance and suicidal ideas. The patient is not nervous/anxious.        Past Medical History:   Diagnosis Date    AAA (abdominal aortic aneurysm) 2/13/2014    Abdominal aneurysm     3    Acute coronary syndrome     Arthritis     BPPV (benign paroxysmal positional vertigo)     Carotid artery plaque     Carotid artery stenosis and occlusion 2/13/2014    Chronic back pain     COPD (chronic obstructive pulmonary disease)     Coronary artery disease     Emphysema lung      "Hyperlipidemia     Hypertension     Myocardial infarction     x3    Neuropathy        Social History:  Social History     Social History    Marital status:      Spouse name: N/A    Number of children: N/A    Years of education: N/A     Social History Main Topics    Smoking status: Current Every Day Smoker     Packs/day: 0.50     Years: 44.00     Types: Cigarettes     Start date: 6/24/1970    Smokeless tobacco: Never Used    Alcohol use No    Drug use: No    Sexual activity: Not Currently     Birth control/ protection: None     Other Topics Concern    None     Social History Narrative       Family History:   family history is not on file.    Health Maintenance   Topic Date Due    Zoster Vaccine  02/22/2015    Mammogram  03/24/2016    Lipid Panel  07/13/2017    Influenza Vaccine  08/01/2017    Fecal Occult Blood Test (FOBT)  05/05/2018    Pap Smear  09/14/2019    Pneumococcal PPSV23 (Medium Risk) (2) 02/22/2020    Colonoscopy  09/14/2026    TETANUS VACCINE  11/15/2026    Hepatitis C Screening  Completed       Physical Exam:    Vital Signs  Temp: 97.6 °F (36.4 °C)  Temp src: Tympanic  Pulse: 78  SpO2: 95 %  BP: 138/78  Pain Score:   4  Pain Loc: Generalized  Height and Weight  Height: 5' 2" (157.5 cm)  Weight: 77.3 kg (170 lb 6.7 oz)  BSA (Calculated - sq m): 1.84 sq meters  BMI (Calculated): 31.2  Weight in (lb) to have BMI = 25: 136.4]    Body mass index is 31.17 kg/(m^2).    Physical Exam   Constitutional: She is oriented to person, place, and time. She appears well-developed.   HENT:   Mouth/Throat: Oropharynx is clear and moist.   Eyes: Conjunctivae are normal. Pupils are equal, round, and reactive to light.   Neck: Normal range of motion. Neck supple.   Cardiovascular: Normal rate, regular rhythm and normal heart sounds.    No murmur heard.  Pulmonary/Chest: Effort normal and breath sounds normal. No respiratory distress. She has no wheezes. She has no rales. She exhibits no " tenderness.   Abdominal: Soft. She exhibits no distension and no mass. There is no tenderness. There is no guarding.   Musculoskeletal: She exhibits no edema or tenderness.   Lymphadenopathy:     She has no cervical adenopathy.   Neurological: She is alert and oriented to person, place, and time. She has normal reflexes.   Skin: Skin is warm and dry.   Psychiatric: She has a normal mood and affect. Her behavior is normal. Judgment and thought content normal.   Vitals reviewed.      Lab Results   Component Value Date    CHOL 135 07/13/2016    CHOL 158 01/14/2016    CHOL 143 10/07/2015    TRIG 111 07/13/2016    TRIG 130 01/14/2016    TRIG 118 10/07/2015    HDL 43 07/13/2016    HDL 57 01/14/2016    HDL 52 10/07/2015    TOTALCHOLEST 3.1 07/13/2016    TOTALCHOLEST 2.8 01/14/2016    TOTALCHOLEST 2.8 10/07/2015    NONHDLCHOL 92 07/13/2016    NONHDLCHOL 101 01/14/2016    NONHDLCHOL 91 10/07/2015       Lab Results   Component Value Date    HGBA1C 6.0 01/14/2016       Assessment:      ICD-10-CM ICD-9-CM   1. Well adult exam Z00.00 V70.0   2. Tobacco abuse Z72.0 305.1   3. Prediabetes R73.03 790.29   4. Essential hypertension I10 401.9   5. Coronary artery disease involving native coronary artery of native heart without angina pectoris I25.10 414.01   6. Mixed hyperlipidemia E78.2 272.2   7. Insomnia, unspecified type G47.00 780.52   8. COPD, severe J44.9 496   9. Chronic obstructive pulmonary disease, unspecified COPD type J44.9 496   10. Breast cancer screening Z12.39 V76.10         Plan:  Schedule screening mammogram.  Smoking cessation is strongly advised.  She has prediabetes with check labs she wants to wait until next month.  She is on hormone replacement therapy recommend that she decrease the dose to 0.3 mg of Premarin take it every other day for a few weeks and then space it out some more eventually stopping it completely over the next few months.  Other medical problems stable.  Recommended home exercises for the  shoulder  Follow-up 6 months  Orders Placed This Encounter   Procedures    Mammo Digital Screening Bilat with CAD    Hemoglobin A1c    Lipid panel    Comprehensive metabolic panel    CBC auto differential       Current Outpatient Prescriptions   Medication Sig Dispense Refill    albuterol (PROAIR HFA) 90 mcg/actuation inhaler Inhale 2 puffs into the lungs every 6 (six) hours as needed. 18 g 6    albuterol-ipratropium 2.5mg-0.5mg/3mL (DUO-NEB) 0.5 mg-3 mg(2.5 mg base)/3 mL nebulizer solution USE ONE VIAL IN NEBULIZER TWICE DAILY 270 mL 0    aspirin 81 MG Chew Take 81 mg by mouth once daily.      azelastine (OPTIVAR) 0.05 % ophthalmic solution INSTILL 1 DROP INTO EACH EYE TWICE DAILY 6 mL 1    clopidogrel (PLAVIX) 75 mg tablet Take 1 tablet (75 mg total) by mouth once daily. 90 tablet 3    duloxetine (CYMBALTA) 60 MG capsule Take 1 capsule (60 mg total) by mouth once daily. 30 capsule 6    estrogens, conjugated, (PREMARIN) 0.3 MG tablet Take 1 tablet (0.3 mg total) by mouth once daily. 30 tablet 5    felodipine (PLENDIL) 5 MG 24 hr tablet Take 1 tablet (5 mg total) by mouth once daily. 30 tablet 6    fosinopril (MONOPRIL) 40 MG tablet Take 1 tablet (40 mg total) by mouth once daily. 30 tablet 6    ipratropium-albuterol (COMBIVENT RESPIMAT)  mcg/actuation inhaler Inhale 1 puff into the lungs every 4 (four) hours as needed for Wheezing. Medically necessary 2 Package 11    lisinopril (PRINIVIL,ZESTRIL) 40 MG tablet Take 40 mg by mouth once daily.      lorazepam (ATIVAN) 0.5 MG tablet       metoprolol succinate (TOPROL-XL) 25 MG 24 hr tablet Take 1 tablet (25 mg total) by mouth once daily. 30 tablet 6    mometasone-formoterol (DULERA) 200-5 mcg/actuation inhaler INHALE 2 PUFFS BY MOUTH TWICE A DAY 13 g 1    OXYGEN-AIR DELIVERY SYSTEMS MISC 2 L by Misc.(Non-Drug; Combo Route) route every evening.      VYTORIN 10-40 10-40 mg per tablet Take 1 tablet by mouth every evening. 30 tablet 6    zolpidem  (AMBIEN) 5 MG Tab Take 1 tablet (5 mg total) by mouth nightly. 30 tablet 5    diclofenac sodium (VOLTAREN) 1 % Gel Apply 2 g topically once daily. 1 Tube 2    nitroGLYCERIN (NITROSTAT) 0.4 MG SL tablet Place 1 tablet (0.4 mg total) under the tongue as needed. 25 tablet 2     No current facility-administered medications for this visit.        Medications Discontinued During This Encounter   Medication Reason    sertraline (ZOLOFT) 50 MG tablet Patient no longer taking    PREMARIN 0.625 mg tablet Reorder    ezetimibe-simvastatin 10-40 mg (VYTORIN 10-40) 10-40 mg per tablet     ezetimibe-simvastatin 10-40 mg (VYTORIN) 10-40 mg per tablet     clopidogrel (PLAVIX) 75 mg tablet Reorder    zolpidem (AMBIEN) 5 MG Tab Reorder    felodipine (PLENDIL) 5 MG 24 hr tablet Reorder    DULERA 200-5 mcg/actuation inhaler Reorder    albuterol (PROAIR HFA) 90 mcg/actuation inhaler Reorder    ipratropium-albuterol (COMBIVENT RESPIMAT)  mcg/actuation inhaler Reorder    metoprolol succinate (TOPROL-XL) 25 MG 24 hr tablet Reorder    fosinopril (MONOPRIL) 40 MG tablet Reorder    VYTORIN 10-40 10-40 mg per tablet Reorder    duloxetine (CYMBALTA) 60 MG capsule Reorder       Return in about 6 months (around 11/5/2017).      Dr Olga Altman MD    Disclaimer: This note is prepared using voice recognition system and as such is likely to have errors and is not proof read.

## 2017-05-09 ENCOUNTER — TELEPHONE (OUTPATIENT)
Dept: PULMONOLOGY | Facility: CLINIC | Age: 62
End: 2017-05-09

## 2017-05-09 DIAGNOSIS — R06.02 SOB (SHORTNESS OF BREATH): Primary | ICD-10-CM

## 2017-05-11 ENCOUNTER — PATIENT MESSAGE (OUTPATIENT)
Dept: PULMONOLOGY | Facility: CLINIC | Age: 62
End: 2017-05-11

## 2017-05-15 ENCOUNTER — PROCEDURE VISIT (OUTPATIENT)
Dept: PULMONOLOGY | Facility: CLINIC | Age: 62
End: 2017-05-15
Payer: MEDICAID

## 2017-05-15 ENCOUNTER — TELEPHONE (OUTPATIENT)
Dept: PULMONOLOGY | Facility: CLINIC | Age: 62
End: 2017-05-15

## 2017-05-15 DIAGNOSIS — R06.02 SOB (SHORTNESS OF BREATH): ICD-10-CM

## 2017-05-15 DIAGNOSIS — R06.02 SOB (SHORTNESS OF BREATH): Primary | ICD-10-CM

## 2017-05-15 PROCEDURE — 94762 N-INVAS EAR/PLS OXIMTRY CONT: CPT | Mod: PBBFAC

## 2017-05-16 ENCOUNTER — OFFICE VISIT (OUTPATIENT)
Dept: PULMONOLOGY | Facility: CLINIC | Age: 62
End: 2017-05-16
Payer: MEDICAID

## 2017-05-16 VITALS
HEART RATE: 80 BPM | BODY MASS INDEX: 31.59 KG/M2 | DIASTOLIC BLOOD PRESSURE: 68 MMHG | WEIGHT: 167.31 LBS | SYSTOLIC BLOOD PRESSURE: 140 MMHG | RESPIRATION RATE: 24 BRPM | HEIGHT: 61 IN | OXYGEN SATURATION: 94 %

## 2017-05-16 DIAGNOSIS — J43.9 NOCTURNAL HYPOXEMIA DUE TO EMPHYSEMA: Primary | ICD-10-CM

## 2017-05-16 DIAGNOSIS — G47.36 NOCTURNAL HYPOXEMIA DUE TO EMPHYSEMA: Primary | ICD-10-CM

## 2017-05-16 DIAGNOSIS — J44.9 COPD, SEVERE: ICD-10-CM

## 2017-05-16 DIAGNOSIS — Z72.0 TOBACCO ABUSE: ICD-10-CM

## 2017-05-16 PROCEDURE — 99999 PR PBB SHADOW E&M-EST. PATIENT-LVL V: CPT | Mod: PBBFAC,,, | Performed by: NURSE PRACTITIONER

## 2017-05-16 PROCEDURE — 99214 OFFICE O/P EST MOD 30 MIN: CPT | Mod: 25,S$PBB,, | Performed by: NURSE PRACTITIONER

## 2017-05-16 PROCEDURE — 99215 OFFICE O/P EST HI 40 MIN: CPT | Mod: PBBFAC,PO | Performed by: NURSE PRACTITIONER

## 2017-05-16 RX ORDER — EZETIMIBE AND SIMVASTATIN 10; 40 MG/1; MG/1
1 TABLET ORAL NIGHTLY
COMMUNITY
Start: 2017-05-05 | End: 2017-11-01 | Stop reason: SDUPTHER

## 2017-05-16 NOTE — PROGRESS NOTES
Subjective:      Patient ID: Gemma Vick is a 62 y.o. female.    I have reviewed the patient's medical history in detail and updated the computerized patient record.    Problem list has been reviewed.    Chief Complaint: COPD    HPI  The patient does not have symptoms or an exacerbation.   She states that she  is at her respiratory baseline and denies new onset of pulmonary complaints.   She reports  cough that is chronic daily, she remains every day smoker between 1/4-1 pk/day, she is aware of need to quit and works daily toward complete cessation, presently has not been successful.  She would like to enroll in smoking cessation program.   Her current respiratory regimen: Albuterol MDI(or generic equivalent), Combivent Respimat , Dulera, Atrovent (Ipratropium bromide) nebulizer and Albuterol  Nebulizer.   She does use medications compliantly.   CAT score today is 33.    NC 2 lm oxygen for sleep. Re-qualified with over night oximetry 5/15-16/2017.   PSG.10/29/2017  No sleep apnea detected.       Previous Report Reviewed: lab reports, office notes and radiology reports     Past Medical History: The following portions of the patient's history were reviewed and updated as appropriate:   She  has a past surgical history that includes Cardiac catheterization and Coronary angioplasty.  Her family history includes Heart attacks under age 50 in her brother and father; Heart disease in her mother.  She  reports that she has been smoking Cigarettes.  She started smoking about 46 years ago. She has a 22.00 pack-year smoking history. She has never used smokeless tobacco. She reports that she does not drink alcohol or use illicit drugs.  She has a current medication list which includes the following prescription(s): albuterol, albuterol-ipratropium 2.5mg-0.5mg/3ml, aspirin, clopidogrel, duloxetine, estrogens (conjugated), ezetimibe-simvastatin 10-40 mg, felodipine, fosinopril, ipratropium-albuterol, lisinopril, lorazepam,  metoprolol succinate, mometasone-formoterol, oxygen-air delivery systems, zolpidem, azelastine, diclofenac sodium, nitroglycerin, and vytorin 10-40.  She has No Known Allergies..    The following portions of the patient's history were reviewed and updated as appropriate: allergies, current medications, past family history, past medical history, past social history, past surgical history and problem list.    Review of Systems   Constitutional: Negative for fever, chills, weight loss, weight gain, activity change, appetite change, fatigue and night sweats.   HENT: Negative for postnasal drip, rhinorrhea, sinus pressure, voice change and congestion.    Eyes: Negative for redness and itching.   Respiratory: Positive for cough (chronic). Negative for snoring, sputum production, chest tightness, shortness of breath, wheezing, orthopnea, asthma nighttime symptoms, dyspnea on extertion, use of rescue inhaler and somnolence.    Cardiovascular: Negative for chest pain, palpitations and leg swelling.   Genitourinary: Negative for difficulty urinating and hematuria.   Endocrine: Negative for polydipsia, polyphagia, cold intolerance, heat intolerance and polyuria.    Musculoskeletal: Negative for arthralgias, back pain, gait problem, joint swelling and myalgias.   Skin: Negative.    Gastrointestinal: Negative for nausea, vomiting, abdominal pain and acid reflux.   Neurological: Negative for dizziness, weakness, light-headedness and headaches.   Hematological: Negative for adenopathy. No excessive bruising.   Psychiatric/Behavioral: Negative for sleep disturbance. The patient is not nervous/anxious.       Objective:     Physical Exam   Constitutional: She is oriented to person, place, and time. Vital signs are normal. She appears well-developed and well-nourished. She is active and cooperative.  Non-toxic appearance. She does not appear ill. No distress.   HENT:   Head: Normocephalic.   Right Ear: External ear normal.   Left Ear:  "External ear normal.   Nose: Nose normal.   Mouth/Throat: Oropharynx is clear and moist. No oropharyngeal exudate.   Eyes: Conjunctivae are normal.   Neck: Normal range of motion. Neck supple.   Cardiovascular: Normal rate, regular rhythm, normal heart sounds and intact distal pulses.    Pulmonary/Chest: Effort normal and breath sounds normal. She has no wheezes. She has no rales.   Abdominal: Soft. Bowel sounds are normal.   Musculoskeletal: Normal range of motion. She exhibits no edema.   Neurological: She is alert and oriented to person, place, and time.   Skin: Skin is warm and dry.   Psychiatric: She has a normal mood and affect. Her behavior is normal. Judgment and thought content normal.   Vitals reviewed.    Vitals:    17 0952   BP: (!) 140/68   Pulse: 80   Resp: (!) 24   SpO2: (!) 94%   Weight: 75.9 kg (167 lb 5.3 oz)   Height: 5' 1.2" (1.554 m)     Body mass index is 31.41 kg/(m^2).    Personal Diagnostic Review  Pulse oximetry, resting: over night oximetry  5/15/2017 - 2017  Recording time: 07:43:44 Highest pulse: 97 Highest SpO2: 97%  Excluded samplin:15:08 Lowest pulse: 64 Lowest SpO2: 81%  Total valid samplin:28:36 Mean pulse: 79 Mean SpO2: 88.1%  1 S.D.: 4.8 1 S.D.: 2.2  Time with SpO2<90: 5:54:40, 79.1%  Time with SpO2<80: 0:00:00, 0.0%  Time with SpO2<70: 0:00:00, 0.0%  Time with SpO2<60: 0:00:00, 0.0%  Time with SpO2<88: 3:05:56, 41.4%  Time with SpO2 =>90: 1:33:56, 20.9%  Time with SpO2=>80 & <90: 5:54:40, 79.1%  Time with SpO2=>70 & <80: 0:00:00, 0.0%  Time with SpO2=>60 & <70: 0:00:00, 0.0%  The longest continuous time with saturation <=88 was 00:52:08, which started at  05/15/17 22:55:12.    10/29/2017 PSG. No sleep apnea detected.   SUMMARY STATEMENTS:  1. Findings related to sleep diagnoses: This was a full-night diagnostic study. Total sleep time was 6.4 hours. Sleep latency was short; 3.5 minutes, and sleep efficiency was high: 92.0%. The overall AHI was 0.0 (0 events). " "The low SpO2 was 86%. Period with saturations below 88% was 39.9 minutes. Very few PLMs were noted. Mild intermittent snoring.     2. EEG abnormalities: Normal sleep architecture.     3. ECG abnormalities: No      INTERPRETATION:   1. Normal Apnea hypopnea index. No GALLO.  2. Snoring.  3. No difficulty with sleep initiation, or maintenance.     RECOMMENDATIONS:   1. Supplementary oxygen indicated with sleep.    Assessment:     1. Nocturnal hypoxemia due to emphysema    2. Tobacco abuse    3. COPD, severe      Orders Placed This Encounter   Procedures    OXYGEN FOR HOME USE     Re-qualification for home oxygen.  DME: [facility]     Order Specific Question:   Liter Flow     Answer:   2     Order Specific Question:   Duration     Answer:   With sleep     Order Specific Question:   Qualifying SpO2:     Answer:   81%     Comments:   over night oximetry 5/15 - 5/16/2017 means spo2 88.1%. lowest spo2 81%.      Order Specific Question:   Testing done at:     Answer:   Sleep     Order Specific Question:   Route     Answer:   nasal cannula     Order Specific Question:   Portable mode:     Answer:   continuous     Order Specific Question:   Device     Answer:   home concentrator     Comments:   portable for back up in event of failed electricity.      Order Specific Question:   Length of need (in months):     Answer:   99 mos     Order Specific Question:   Patient condition with qualifying saturation     Answer:   COPD     Order Specific Question:   Height:     Answer:   5' 1.2" (1.554 m)     Order Specific Question:   Weight:     Answer:   75.9 kg (167 lb 5.3 oz)     Order Specific Question:   Alternative treatment measures have been tried or considered and deemed clinically ineffective.     Answer:   Yes    Ambulatory referral to Smoking Cessation Program     Referral Priority:   Routine     Referral Type:   Consultation     Referral Reason:   Specialty Services Required     Requested Specialty:   CTTS     Number of Visits " Requested:   1     Problem List Items Addressed This Visit     COPD, severe     COPD score 33.  Continue Dulera, Combivent respimat, Duoneb, Proair HFA  NC 2 lm nightly.  Re-qualified for home oxygen w/over night oximetry 5/15/2017. Lowest SpO2: 81% Mean SpO2: 88.1%  Unable to tolerate Daliresp related to nausea  3/15/2017 FEV1 1.30L( 56%)             Relevant Orders    OXYGEN FOR HOME USE    Ambulatory referral to Smoking Cessation Program    Nocturnal hypoxemia due to emphysema - Primary     equalification for home oxygen NC 2 lm. qualifying study:over night oximetry 5/15/2017 lowest 81%, mean 88.1%.          Relevant Orders    OXYGEN FOR HOME USE    Tobacco abuse     Current everyday smoker 6 cigarettes - 1 pack/day, currently up to 1pk/day.  States always ready to quit, but struggles on her own.  Has trouble with travel, but found out there is phone in program for smoking cessation.  Referral made to smoking cessation program.          Relevant Orders    Ambulatory referral to Smoking Cessation Program        Plan:     COPD, severe  COPD score 33.  Continue Dulera, Combivent respimat, Duoneb, Proair HFA  NC 2 lm nightly.  Re-qualified for home oxygen w/over night oximetry 5/15/2017. Lowest SpO2: 81% Mean SpO2: 88.1%  Unable to tolerate Daliresp related to nausea  3/15/2017 FEV1 1.30L( 56%)        Tobacco abuse  Current everyday smoker 6 cigarettes - 1 pack/day, currently up to 1pk/day.  States always ready to quit, but struggles on her own.  Has trouble with travel, but found out there is phone in program for smoking cessation.  Referral made to smoking cessation program.     Nocturnal hypoxemia due to emphysema  equalification for home oxygen NC 2 lm. qualifying study:over night oximetry 5/15/2017 lowest 81%, mean 88.1%.       Return in about 4 months (around 9/15/2017) for COPD follow up, w/review tomer/cxr. return sooner for any acute flares..

## 2017-05-16 NOTE — ASSESSMENT & PLAN NOTE
Current everyday smoker 6 cigarettes - 1 pack/day, currently up to 1pk/day.  States always ready to quit, but struggles on her own.  Has trouble with travel, but found out there is phone in program for smoking cessation.  Referral made to smoking cessation program.

## 2017-05-16 NOTE — MR AVS SNAPSHOT
[facility] - Pulmonary Services  [address] [place] [address]  Phone: [phone]  Fax: [phone]                  Gemma Vick   2017 9:20 AM   Office Visit    Description:  Female : 1955   Provider:  Sandrine Devries NP   Department:  [facility] - [facility]           Reason for Visit     COPD           Diagnoses this Visit        Comments    Nocturnal hypoxemia due to emphysema    -  Primary requalification for home oxygen NC 2 lm. qualifying study:over night oximetry 5/15/2017 lowest 81%, mean 88.1%.     COPD, moderate     SUMMARY STATEMENTS:  1. Findings related to sleep diagnoses: This was a full-night diagnostic study. Total sleep time was 6.4 hours. Sleep latency was short; 3.    Tobacco abuse     current everyday smoker, ready to quit, referral made to smoking cessation program.            To Do List           Future Appointments        Provider Department Dept Phone    6/15/2017 9:45 AM Millie Juarez MD [facility] [phone]    9/15/2017 10:00 AM ONL XR1-DR [facility] [phone]    9/15/2017 10:20 AM PULMONARY LAB, [facility] Pulm [facility] [phone]    9/15/2017 10:40 AM Sandrine Devries NP [facility] [phone]      Goals (5 Years of Data)     None      Follow-Up and Disposition     Return in about 4 months (around 9/15/2017) for COPD follow up, w/review tomer/cxr.      [facility]     [facility] Care [facility] - 24/ Assistance  Unless otherwise directed by your provider, please contact [facility], our nurse care line that is available for / assistance.     Registered nurses in the [facility] provide: appointment scheduling, clinical advisement, health education, and other advisory services.  Call: [phone] (toll free)               Medications           Message regarding Medications     Verify the changes and/or additions to your medication regime listed below are  the same as discussed with your clinician today.  If any of these changes or additions are incorrect, please notify your healthcare provider.             Verify that the below list of medications is an accurate representation of the medications you are currently taking.  If none reported, the list may be blank. If incorrect, please contact your healthcare provider. Carry this list with you in case of emergency.           Current Medications     albuterol (PROAIR HFA) 90 mcg/actuation inhaler Inhale 2 puffs into the lungs every 6 (six) hours as needed.    albuterol-ipratropium 2.5mg-0.5mg/3mL (DUO-NEB) 0.5 mg-3 mg(2.5 mg base)/3 mL nebulizer solution USE ONE VIAL IN NEBULIZER TWICE DAILY    aspirin 81 MG Chew Take 81 mg by mouth once daily.    clopidogrel (PLAVIX) 75 mg tablet Take 1 tablet (75 mg total) by mouth once daily.    duloxetine (CYMBALTA) 60 MG capsule Take 1 capsule (60 mg total) by mouth once daily.    estrogens, conjugated, (PREMARIN) 0.3 MG tablet Take 1 tablet (0.3 mg total) by mouth once daily.    ezetimibe-simvastatin 10-40 mg (VYTORIN) 10-40 mg per tablet     felodipine (PLENDIL) 5 MG 24 hr tablet Take 1 tablet (5 mg total) by mouth once daily.    fosinopril (MONOPRIL) 40 MG tablet Take 1 tablet (40 mg total) by mouth once daily.    ipratropium-albuterol (COMBIVENT RESPIMAT)  mcg/actuation inhaler Inhale 1 puff into the lungs every 4 (four) hours as needed for Wheezing. Medically necessary    lisinopril (PRINIVIL,ZESTRIL) 40 MG tablet Take 40 mg by mouth once daily.    lorazepam (ATIVAN) 0.5 MG tablet     metoprolol succinate (TOPROL-XL) 25 MG 24 hr tablet Take 1 tablet (25 mg total) by mouth once daily.    mometasone-formoterol (DULERA) 200-5 mcg/actuation inhaler INHALE 2 PUFFS BY MOUTH TWICE A DAY    OXYGEN-AIR DELIVERY SYSTEMS MISC 2 L by Misc.(Non-Drug; Combo Route) route every evening.    zolpidem (AMBIEN) 5 MG Tab Take 1 tablet (5 mg total) by mouth nightly.    azelastine (OPTIVAR)  "0.05 % ophthalmic solution INSTILL 1 DROP INTO EACH EYE TWICE DAILY    diclofenac sodium (VOLTAREN) 1 % Gel Apply 2 g topically once daily.    nitroGLYCERIN (NITROSTAT) 0.4 MG SL tablet Place 1 tablet (0.4 mg total) under the tongue as needed.    VYTORIN 10-40 10-40 mg per tablet Take 1 tablet by mouth every evening.           Clinical Reference Information           Your Vitals Were     BP Pulse Resp Height Weight SpO2    140/68 (BP Location: Right arm, Patient Position: Sitting, BP Method: Manual) 80 24 5' 1.2" (1.554 m) 75.9 kg (167 lb 5.3 oz) 94%    BMI                31.41 kg/m2          Blood Pressure          Most Recent Value    BP  (!)  140/68      Allergies as of 5/16/2017     No Known Allergies      Immunizations Administered on Date of Encounter - 5/16/2017     None      Orders Placed During Today's Visit      Normal Orders This Visit    Ambulatory referral to Smoking Cessation Program     OXYGEN FOR HOME USE       Smoking Cessation     If you would like to quit smoking:   You may be eligible for free services if you are a [place] resident and started smoking cigarettes before September 1, 1988.  Call the Smoking Cessation Trust ([facility]) toll free at [phone] or [phone].   Call 1-800-QUIT-NOW if you do not meet the above criteria.   Contact us via email: [email or web address]   View our website for more information: [email or web address]        Language Assistance Services     ATTENTION: Language assistance services are available, free of charge. Please call [phone].      ATENCIÓN: Si habla isidroañol, tiene a mitchell disposición servicios gratuitos de asistencia lingüística. Llame al [phone].     CHÚ Ý: N?u b?n nói Ti?ng Vi?t, có các d?ch v? h? tr? ngôn ng? mi?n phí dành cho b?n. G?i s? [phone].         [facility] complies with applicable Federal civil rights laws and does not discriminate on the basis of race, color, national origin, age, disability, " or sex.

## 2017-05-16 NOTE — ASSESSMENT & PLAN NOTE
COPD score 33.  Continue Dulera, Combivent respimat, Duoneb, Proair HFA  NC 2 lm nightly.  Re-qualified for home oxygen w/over night oximetry 5/15/2017. Lowest SpO2: 81% Mean SpO2: 88.1%  Unable to tolerate Daliresp related to nausea  3/15/2017 FEV1 1.30L( 56%)

## 2017-05-16 NOTE — ASSESSMENT & PLAN NOTE
equalification for home oxygen NC 2 lm. qualifying study:over night oximetry 5/15/2017 lowest 81%, mean 88.1%.

## 2017-05-16 NOTE — PROCEDURES
[facility]  [address] [facility] [address] * [place] [address]  Telephone: [phone]  Test date: 05/15/17 Start: 05/15/17 21:34:20 Gemma Vick  Doctor: Jim End: 17 05:18:04 [id number]  Oximetry: Summary Report  Comments: Overnight study breathing room air.  Recording time: 07:43:44 Highest pulse: 97 Highest SpO2: 97%  Excluded samplin:15:08 Lowest pulse: 64 Lowest SpO2: 81%  Total valid samplin:28:36 Mean pulse: 79 Mean SpO2: 88.1%  1 S.D.: 4.8 1 S.D.: 2.2  Time with SpO2<90: 5:54:40, 79.1%  Time with SpO2<80: 0:00:00, 0.0%  Time with SpO2<70: 0:00:00, 0.0%  Time with SpO2<60: 0:00:00, 0.0%  Time with SpO2<88: 3:05:56, 41.4%  Time with SpO2 =>90: 1:33:56, 20.9%  Time with SpO2=>80 & <90: 5:54:40, 79.1%  Time with SpO2=>70 & <80: 0:00:00, 0.0%  Time with SpO2=>60 & <70: 0:00:00, 0.0%  The longest continuous time with saturation <=88 was 00:52:08, which started at  05/15/17 22:55:12.  A desaturation event was defined as a decrease of saturation by 4 or more.  2 events were excluded due to artifact.  There were 11 desaturation events over 3 minutes duration.  There were 14 desaturation events of less than 3 minutes duration during which:  The mean high was 92.2%. The mean low was 87.0%.  The number of these events that were:  > 0 & <10 seconds: 0 > 0 seconds: 14  =>10 & <20 seconds: 1 =>10 seconds: 14  =>20 & <30 seconds: 2 =>20 seconds: 13  =>30 & <40 seconds: 0 =>30 seconds: 11  =>40 & <50 seconds: 2 =>40 seconds: 11  =>50 & <60 seconds: 2 =>50 seconds: 9  =>60 seconds: 7 =>60 seconds: 7  The mean length of desaturation events that were >=10 sec & <=3 mins was: 64.6 sec.  Desaturation event index (events >=10 sec per sampled hour): 1.9  Desaturation event index (events >= 0 sec per sampled hour): 1.9  © 3078-8204 [employer] Oximetry version Standard [id number].  Oximeter: Respironics 920M Plus memory, 4 second resolution.  [facility]  [address] [facility] * [place]  [place] [address]  Telephone: [phone]  Test date: 05/15/17 Start: 05/15/17 21:34:20 Gemma Vick  Doctor: Jim End: 05/16/17 05:18:04 [id number]  Oximetry: % Times Graphic Report  Comments: Overnight study breathing room air.  SATURATION  100  95  90  85  80  75  70  65  60  55  50  45  40  0 1 2 3 4 5 7 10 15 20 25  PERCENT OF TOTAL TIME  SATURATION  100  95  90  85  80  75  70  65  60  55  50  45  40  0 5 10 15 20 30 40 60 80 100  CUMULATIVE % TIME  © 5937-1648 [employer] Oximetry version Standard [id number].  Oximeter: RespirdotSyntaxs 920M Plus memory, 4 second resolution.  [facility]  [address] [place] [address]. * [place] [address]  Telephone: [phone]  Test date: 05/15/17 Start: 05/15/17 21:34:20 Gemma Vick  Doctor: Jim End: 05/16/17 05:18:04 [id number]  Oximetry: % Times Text Report  Comments: Overnight study breathing room air.  SpO2:100 0.0 % time  SpO2: 99 0.0 % time SpO2: 89 21.0 % time SpO2: 79 % time  SpO2: 98 0.0 % time SpO2: 88 16.6 % time SpO2: 78 % time  SpO2: 97 0.0 % time SpO2: 87 18.0 % time SpO2: 77 % time  SpO2: 96 0.1 % time SpO2: 86 14.1 % time SpO2: 76 % time  SpO2: 95 1.2 % time SpO2: 85 7.5 % time SpO2: 75 % time  SpO2: 94 1.5 % time SpO2: 84 1.7 % time SpO2: 74 % time  SpO2: 93 2.0 % time SpO2: 83 0.1 % time SpO2: 73 % time  SpO2: 92 1.6 % time SpO2: 82 0.0 % time SpO2: 72 % time  SpO2: 91 3.0 % time SpO2: 81 0.0 % time SpO2: 71 % time  SpO2: 90 11.4 % time SpO2: 80 % time SpO2: 70 % time  Total 90's 20.9 % time Total 80's 79.1 % time Total 70's 0.0 % time  SpO2: 69 % time SpO2: 59 % time SpO2: 49 % time  SpO2: 68 % time SpO2: 58 % time SpO2: 48 % time  SpO2: 67 % time SpO2: 57 % time SpO2: 47 % time  SpO2: 66 % time SpO2: 56 % time SpO2: 46 % time  SpO2: 65 % time SpO2: 55 % time SpO2: 45 % time  SpO2: 64 % time SpO2: 54 % time SpO2: 44 % time  SpO2: 63 % time SpO2: 53 % time SpO2: 43 % time  SpO2: 62 % time SpO2: 52 % time SpO2: 42 %  time  SpO2: 61 % time SpO2: 51 % time SpO2: 41 % time  SpO2: 60 % time SpO2: 50 % time SpO2: 40 % time  Total 60's 0.0 % time Total 50's 0.0 % time Total 40's 0.0 % time  SpO2 <40 0.0 % time  Pulse range > 199 0.0 % time  Pulse range 180 - 199 0.0 % time  Pulse range 160 - 179 0.0 % time  Pulse range 140 - 159 0.0 % time  Pulse range 120 - 139 0.0 % time  Pulse range 100 - 119 0.0 % time  Pulse range 90 - 99 1.0 % time  Pulse range 80 - 89 47.5 % time  Pulse range 70 - 79 50.0 % time  Pulse range 60 - 69 1.5 % time  Pulse range 50 - 59 0.0 % time  Pulse range < 50 0.0 % time  © 7348-1818 [employer]. Oximetry version Standard AZ0902.  Oximeter: Respironics 920M Plus memory, 4 second resolution.  [facility] [address] * [place] [address]  Telephone: [phone]  Test date: 05/15/17 Start: 05/15/17 21:34:20 Gemma Vick  Doctor: Jim End: 05/16/17 05:18:04 [id number]  Oximetry: Desaturation Report  Comments: Overnight study breathing room air.  The following lists the 14 desaturation events. The event durations had to be  within 3 minutes to qualify, and the event onset was defined as a decrease in SpO2  by 4 or more.  Saturation: Pulse Range:  Start date Start time End time Duration Onset Low Low High  1 05/15/17 21:39:40 21:41:24 00:01:44 96 91 81 - 91  2 05/15/17 21:59:00 21:59:56 00:00:56 95 89 83 - 90  3 05/15/17 22:00:16 22:01:08 00:00:52 95 90 81 - 83  4 05/15/17 22:02:16 22:04:00 00:01:44 95 89 79 - 87  5 05/15/17 22:04:20 22:05:24 00:01:04 94 89 80 - 84  6 05/15/17 22:06:00 22:06:20 00:00:20 95 89 79 - 83  7 05/15/17 22:45:00 22:45:16 00:00:16 85 81 88 - 88  8 05/15/17 22:46:36 22:46:56 00:00:20 91 85 94 - 96  9 05/15/17 22:47:20 22:48:08 00:00:48 90 86 82 - 97  10 05/16/17 03:32:40 03:33:56 00:01:16 87 83 69 - 77  11 05/16/17 03:34:36 03:35:20 00:00:44 89 85 69 - 78  12 05/16/17 03:49:56 03:50:56 00:01:00 89 85 75 - 82  13 05/16/17 04:37:00 04:39:00 00:02:00  97 87 77 - 89  14 17 04:48:08 04:50:08 00:02:00 93 89 74 - 88  © 2820-9885 [employer] Oximetry version Standard JO4923.  Oximeter: Respironics 920M Plus memory, 4 second resolution.  [facility]  [address] * [place] [address]  Telephone: [phone]  Test date: 05/15/17 Start: 05/15/17 21:34:20 Gemma Vick  Doctor: Jim End: 17 05:18:04 [id number]  Oximetry: 8 hours per page  Comments: Overnight study breathing room air.  HOUR 05/15/17 HOUR 17  22:00 2:00  22:30 2:30  23:00 3:00  23:30 3:30  0:00 4:00  0:30 4:30  1:00 5:00  1:30 5:30  60 80  PULSE  100 120 60 80  PULSE  70 80 100 120  SpO2  90 100 70 80  SpO2  90 100  © 7570-7934 [employer]. Oximetry version Standard [id number].  Oximeter: Respironics 920M Plus memory, 4 second resolution.  [facility]  [address] * [place] [address]  Telephone: [phone]  Test date: 05/15/17 Start: 05/15/17 21:34:20 Gemma Vick  Doctor: Jim End: 17 05:18:04 [id number]  Oximetry: 2 hours lowest average SpO2  Comments: Overnight study breathing room air.  HOUR 05/15/17 HOUR 05/15/17  22:15 23:15  22:30 23:30  22:45 23:45  23:00 0:00  60 80  PULSE  100 120 60 80  PULSE  70 80 100 120  SpO2  90 100 70 80  SpO2  90 100  © 3535-8340 [employer]. Oximetry version Standard [id number].  Oximeter: Respironics 920M Plus memory, 4 second resolution.    REPORT    OVERNIGHT OXIMETRY REPORT:    Dictated by: Nathan Rodriguez MD  Test date: 05/15/2017  Dictated on: 2017      Comment: This test was performed on Room Air     A desaturation event was defined as a decrease of saturation by 4 or more.    REPORT SUMMARY  Total valid samplin:28:36   High SpO2: 97%    Low SpO2: 81%    Mean SpO2  88.1 %  Cumulative time with oxygen saturation less than 88% (TC88): 00:52:08  Time with SpO2<88: 3:05:56, 41.4%    CLINICAL INTERPRETATION   There is   significant nocturnal oxygen desaturation,  Clinical correlation is advised and  Recommend overnight polysomnography if clinically indicated    Medicare Criteria Comments:   Oximetry test results suggest the patient falls under Medicare Group 1 Criteria. ( Arterial oxygen saturation at or below 88% for at least 5 minutes taken during sleep)  Nathan Rodriguez MD    Details about Medicare Group Criteria coverage can be found at http://www.cms.hhs.gov/manuals/downloads/

## 2017-05-16 NOTE — PROCEDURES
[facility]  [address] * [place] [address]  Telephone: [phone]  Test date: 10/13/15 Start: 10/13/15 19:52:31 Gemma Vick  Doctor: Dr. Rodriguez End: 10/14/15 06:44:55 ID#: [id number]  Oximetry: Summary Report  Comments: Room Air  Recording time: 10:52:24 Highest pulse: 102 Highest SpO2: 98%  Excluded samplin:46:28 Lowest pulse: 64 Lowest SpO2: 79%  Total valid samplin:05:56 Mean pulse: 82 Mean SpO2: 87.2%  1 S.D.: 5.9 1 S.D.: 2.3  Time with SpO2<90: 8:10:20, 89.8%  Time with SpO2<80: 0:00:04, 0.0%  Time with SpO2<70: 0:00:00, 0.0%  Time with SpO2<60: 0:00:00, 0.0%  Time with SpO2<88: 6:05:56, 67.0%  Time with SpO2 =>90: 0:55:36, 10.2%  Time with SpO2=>80 & <90: 8:10:16, 89.8%  Time with SpO2=>70 & <80: 0:00:04, 0.0%  Time with SpO2=>60 & <70: 0:00:00, 0.0%  The longest continuous time with saturation <=88 was 01:45:04, which started at  10/14/15 00:45:03.  A desaturation event was defined as a decrease of saturation by 4 or more.  No events were excluded due to artifact.  There were 13 desaturation events over 3 minutes duration.  There were 39 desaturation events of less than 3 minutes duration during which:  The mean high was 89.3%. The mean low was 83.9%.  The number of these events that were:  > 0 & <10 seconds: 2 > 0 seconds: 39  =>10 & <20 seconds: 4 =>10 seconds: 37  =>20 & <30 seconds: 9 =>20 seconds: 33  =>30 & <40 seconds: 2 =>30 seconds: 24  =>40 & <50 seconds: 3 =>40 seconds: 22  =>50 & <60 seconds: 1 =>50 seconds: 19  =>60 seconds: 18 =>60 seconds: 18  The mean length of desaturation events that were >=10 sec & <=3 mins was: 71.5 sec.  Desaturation event index (events >=10 sec per sampled hour): 4.1  Desaturation event index (events >= 0 sec per sampled hour): 4.3  ©  [employer] Oximetry version Respironics NE7560.  Oximeter: Respironics 920M Plus memory, 4 second resolution.  [facility]  [address] [place] [address] [place]. * [place]  [address]  Telephone: [phone]  Test date: 10/13/15 Start: 10/13/15 19:52:31 Gemma Vick  Doctor: Dr. Rodriguez End: 10/14/15 06:44:55 ID#: [id number]  Oximetry: % Times Graphic Report  Comments: Room Air  SATURATION  100  95  90  85  80  75  70  65  60  55  50  45  40  0 5 10 20 30 40 50  PERCENT OF TOTAL TIME  SATURATION  100  95  90  85  80  75  70  65  60  55  50  45  40  0 5 10 15 20 30 40 60 80 100  CUMULATIVE % TIME  © 2003 [employer] Oximetry version Respironics [id number].  Oximeter: Respironics 920M Plus memory, 4 second resolution.  [facility] [address] * [place] [address]  Telephone: [phone]  Test date: 10/13/15 Start: 10/13/15 19:52:31 Gemma Vick  Doctor: Dr. Rodriguez End: 10/14/15 06:44:55 ID#: [id number]  Oximetry: % Times Text Report  Comments: Room Air  SpO2:100 0.0 % time  SpO2: 99 0.0 % time SpO2: 89 8.4 % time SpO2: 79 0.0 % time  SpO2: 98 0.0 % time SpO2: 88 14.3 % time SpO2: 78 % time  SpO2: 97 0.1 % time SpO2: 87 28.3 % time SpO2: 77 % time  SpO2: 96 0.5 % time SpO2: 86 19.7 % time SpO2: 76 % time  SpO2: 95 1.3 % time SpO2: 85 13.6 % time SpO2: 75 % time  SpO2: 94 0.9 % time SpO2: 84 3.1 % time SpO2: 74 % time  SpO2: 93 1.4 % time SpO2: 83 1.5 % time SpO2: 73 % time  SpO2: 92 2.1 % time SpO2: 82 0.6 % time SpO2: 72 % time  SpO2: 91 2.4 % time SpO2: 81 0.2 % time SpO2: 71 % time  SpO2: 90 1.5 % time SpO2: 80 0.1 % time SpO2: 70 % time  Total 90's 10.2 % time Total 80's 89.8 % time Total 70's 0.0 % time  SpO2: 69 % time SpO2: 59 % time SpO2: 49 % time  SpO2: 68 % time SpO2: 58 % time SpO2: 48 % time  SpO2: 67 % time SpO2: 57 % time SpO2: 47 % time  SpO2: 66 % time SpO2: 56 % time SpO2: 46 % time  SpO2: 65 % time SpO2: 55 % time SpO2: 45 % time  SpO2: 64 % time SpO2: 54 % time SpO2: 44 % time  SpO2: 63 % time SpO2: 53 % time SpO2: 43 % time  SpO2: 62 % time SpO2: 52 % time SpO2: 42 % time  SpO2: 61 % time SpO2: 51 % time SpO2: 41 % time  SpO2: 60 %  time SpO2: 50 % time SpO2: 40 % time  Total 60's 0.0 % time Total 50's 0.0 % time Total 40's 0.0 % time  SpO2 <40 0.0 % time  Pulse range > 199 0.0 % time  Pulse range 180 - 199 0.0 % time  Pulse range 160 - 179 0.0 % time  Pulse range 140 - 159 0.0 % time  Pulse range 120 - 139 0.0 % time  Pulse range 100 - 119 0.0 % time  Pulse range 90 - 99 13.6 % time  Pulse range 80 - 89 57.8 % time  Pulse range 70 - 79 27.9 % time  Pulse range 60 - 69 0.6 % time  Pulse range 50 - 59 0.0 % time  Pulse range < 50 0.0 % time  © 2003 [employer] Oximetry version Respironics [id number].  Oximeter: Respironics 920M Plus memory, 4 second resolution.  [facility] [address] * [place] [address]  Telephone: [phone]  Test date: 10/13/15 Start: 10/13/15 19:52:31 Gemma Vick  Doctor: Dr. Rodriguez End: 10/14/15 06:44:55 ID#: [id number]  Oximetry: Desaturation Report  Comments: Room Air  The following lists the 39 desaturation events. The event durations had to be  within 3 minutes to qualify, and the event onset was defined as a decrease in SpO2  by 4 or more.  Saturation: Pulse Range:  Start date Start time End time Duration Onset Low Low High  1 10/13/15 21:49:47 21:52:07 00:02:20 98 92 75 - 82  2 10/13/15 21:52:59 21:53:35 00:00:36 96 92 80 - 85  3 10/13/15 21:54:03 21:56:31 00:02:28 97 91 79 - 86  4 10/13/15 23:19:11 23:19:31 00:00:20 89 83 80 - 91  5 10/13/15 23:20:43 23:22:47 00:02:04 89 83 86 - 92  6 10/13/15 23:23:19 23:25:11 00:01:52 87 82 86 - 94  7 10/13/15 23:25:39 23:26:19 00:00:40 88 83 89 - 92  8 10/13/15 23:26:47 23:27:07 00:00:20 87 83 90 - 93  9 10/13/15 23:27:27 23:29:35 00:02:08 88 84 87 - 92  10 10/13/15 23:29:55 23:30:19 00:00:24 88 84 88 - 93  11 10/13/15 23:30:47 23:32:11 00:01:24 88 82 87 - 93  12 10/13/15 23:32:59 23:33:15 00:00:16 87 82 89 - 93  13 10/13/15 23:33:59 23:34:27 00:00:28 88 81 88 - 95  14 10/13/15 23:34:55 23:35:15 00:00:20 87 81 90 - 94  15 10/13/15 23:35:51  23:36:07 00:00:16 86 82 90 - 94  16 10/13/15 23:37:03 23:37:47 00:00:44 88 82 85 - 98  17 10/13/15 23:38:11 23:38:23 00:00:12 86 82 88 - 93  18 10/13/15 23:39:03 23:39:59 00:00:56 87 82 89 - 95  19 10/14/15 00:35:55 00:38:47 00:02:52 96 88 82 - 99  20 10/14/15 02:01:51 02:03:39 00:01:48 85 79 79 - 83  21 10/14/15 02:04:47 02:06:31 00:01:44 85 81 79 - 84  22 10/14/15 02:20:35 02:21:35 00:01:00 87 83 80 - 87  23 10/14/15 02:22:07 02:22:43 00:00:36 87 83 77 - 83  24 10/14/15 02:29:39 02:30:07 00:00:28 88 84 79 - 83  25 10/14/15 02:36:47 02:37:15 00:00:28 87 83 79 - 85  26 10/14/15 03:26:31 03:29:11 00:02:40 91 85 82 - 88  27 10/14/15 03:42:35 03:44:07 00:01:32 88 83 74 - 81  28 10/14/15 04:33:35 04:34:15 00:00:40 87 83 82 - 85  29 10/14/15 04:41:43 04:44:35 00:02:52 89 82 71 - 83  30 10/14/15 06:02:47 06:02:51 00:00:04 89 85 73 - 75  31 10/14/15 06:04:19 06:06:19 00:02:00 90 83 73 - 82  32 10/14/15 06:07:07 06:07:23 00:00:16 89 82 77 - 82  33 10/14/15 06:11:43 06:11:51 00:00:08 90 86 85 - 94  34 10/14/15 06:12:07 06:14:43 00:02:36 96 85 69 - 82  35 10/14/15 06:15:31 06:16:55 00:01:24 91 86 67 - 87  36 10/14/15 06:17:51 06:18:51 00:01:00 92 86 73 - 81  37 10/14/15 06:19:15 06:19:43 00:00:28 90 85 74 - 86  38 10/14/15 06:21:11 06:21:35 00:00:24 91 85 73 - 81  39 10/14/15 06:22:35 06:24:03 00:01:28 90 85 77 - 89  © 2003 [employer] Oximetry version Respironics WV0084.  Oximeter: Respironics 920M Plus memory, 4 second resolution.  [facility]  [address] * [place] [address]  Telephone: [phone]  Test date: 10/13/15 Start: 10/13/15 19:52:31 Gemma Vick  Doctor: Dr. Rodriguez End: 10/14/15 06:44:55 ID#: [id number]  Oximetry: 8 hours per page  Comments: Room Air  HOUR 10/13/15 HOUR 10/13/15  20:00 0:00  20:30 0:30  21:00 1:00  21:30 1:30  22:00 2:00  22:30 2:30  23:00 3:00  23:30 3:30  60 80  PULSE  100 120 60 80  PULSE  70 80 100 120  SpO2  90 100 70 80  SpO2  90 100  © 2003 [employer] Oximetry version Respironics [id number].  Oximeter: Respironics 920M Plus memory, 4 second resolution.  [facility] [address] * [place] [address]  Telephone: [phone]  Test date: 10/13/15 Start: 10/13/15 19:52:31 Gemma Vick  Doctor: Dr. Rodriguez End: 10/14/15 06:44:55 ID#: [id number]  Oximetry: 8 hours per page  Comments: Room Air  HOUR 10/14/15 HOUR 10/14/15  4:00 8:00  4:30 8:30  5:00 9:00  5:30 9:30  6:00 10:00  6:30 10:30  7:00 11:00  7:30 11:30  60 80  PULSE  100 120 60 80  PULSE  70 80 100 120  SpO2  90 100 70 80  SpO2  90 100  © 2003 [employer] Oximetry version Respironics [id number].  Oximeter: Respironics 920M Plus memory, 4 second resolution.  [facility] [address] * [place] [address]  Telephone: [phone]  Test date: 10/13/15 Start: 10/13/15 19:52:31 Gemma Vick  Doctor: Dr. Rodriguez End: 10/14/15 06:44:55 ID#: [id number]  Oximetry: 2 hours lowest average SpO2  Comments: Room Air  HOUR 10/14/15 HOUR 10/14/15  1:30 2:30  1:45 2:45  2:00 3:00  2:15 3:15  60 80  PULSE  100 120 60 80  PULSE  70 80 100 120  SpO2  90 100 70 80  SpO2  90 100  © 2003 [employer] Oximetry version Respironics [id number].  Oximeter: Respironics 920M Plus memory, 4 second resolution.

## 2017-06-01 ENCOUNTER — PATIENT MESSAGE (OUTPATIENT)
Dept: INTERNAL MEDICINE | Facility: CLINIC | Age: 62
End: 2017-06-01

## 2017-06-01 RX ORDER — LORAZEPAM 0.5 MG/1
0.5 TABLET ORAL EVERY 8 HOURS PRN
Qty: 21 TABLET | Refills: 0 | Status: SHIPPED | OUTPATIENT
Start: 2017-06-01 | End: 2017-08-03 | Stop reason: SDUPTHER

## 2017-06-04 ENCOUNTER — PATIENT MESSAGE (OUTPATIENT)
Dept: PULMONOLOGY | Facility: CLINIC | Age: 62
End: 2017-06-04

## 2017-06-05 ENCOUNTER — PATIENT MESSAGE (OUTPATIENT)
Dept: FAMILY MEDICINE | Facility: CLINIC | Age: 62
End: 2017-06-05

## 2017-06-05 ENCOUNTER — TELEPHONE (OUTPATIENT)
Dept: URGENT CARE | Facility: CLINIC | Age: 62
End: 2017-06-05

## 2017-06-05 ENCOUNTER — OFFICE VISIT (OUTPATIENT)
Dept: URGENT CARE | Facility: CLINIC | Age: 62
End: 2017-06-05
Payer: MEDICAID

## 2017-06-05 VITALS
RESPIRATION RATE: 14 BRPM | HEART RATE: 84 BPM | TEMPERATURE: 97 F | BODY MASS INDEX: 30.73 KG/M2 | HEIGHT: 62 IN | SYSTOLIC BLOOD PRESSURE: 153 MMHG | OXYGEN SATURATION: 95 % | DIASTOLIC BLOOD PRESSURE: 77 MMHG | WEIGHT: 167 LBS

## 2017-06-05 DIAGNOSIS — I10 ESSENTIAL HYPERTENSION: ICD-10-CM

## 2017-06-05 DIAGNOSIS — N39.0 UTI (URINARY TRACT INFECTION), UNCOMPLICATED: Primary | ICD-10-CM

## 2017-06-05 DIAGNOSIS — R30.0 DYSURIA: ICD-10-CM

## 2017-06-05 DIAGNOSIS — Z72.0 TOBACCO ABUSE: ICD-10-CM

## 2017-06-05 DIAGNOSIS — R31.9 HEMATURIA: ICD-10-CM

## 2017-06-05 LAB
BILIRUB SERPL-MCNC: NEGATIVE MG/DL
BLOOD URINE, POC: ABNORMAL
COLOR, POC UA: ABNORMAL
GLUCOSE UR QL STRIP: NORMAL
KETONES UR QL STRIP: ABNORMAL
LEUKOCYTE ESTERASE URINE, POC: ABNORMAL
NITRITE, POC UA: POSITIVE
PH, POC UA: 7
PROTEIN, POC: ABNORMAL
SPECIFIC GRAVITY, POC UA: 1.01
UROBILINOGEN, POC UA: 1

## 2017-06-05 PROCEDURE — 99999 PR PBB SHADOW E&M-EST. PATIENT-LVL V: CPT | Mod: PBBFAC,,, | Performed by: NURSE PRACTITIONER

## 2017-06-05 PROCEDURE — 87088 URINE BACTERIA CULTURE: CPT

## 2017-06-05 PROCEDURE — 87086 URINE CULTURE/COLONY COUNT: CPT

## 2017-06-05 PROCEDURE — 87186 SC STD MICRODIL/AGAR DIL: CPT | Mod: 59

## 2017-06-05 PROCEDURE — 99213 OFFICE O/P EST LOW 20 MIN: CPT | Mod: S$PBB,,, | Performed by: NURSE PRACTITIONER

## 2017-06-05 PROCEDURE — 99215 OFFICE O/P EST HI 40 MIN: CPT | Mod: PBBFAC,PO | Performed by: NURSE PRACTITIONER

## 2017-06-05 PROCEDURE — 87077 CULTURE AEROBIC IDENTIFY: CPT | Mod: 59

## 2017-06-05 PROCEDURE — 81002 URINALYSIS NONAUTO W/O SCOPE: CPT | Mod: PBBFAC,PO | Performed by: NURSE PRACTITIONER

## 2017-06-05 RX ORDER — AMOXICILLIN AND CLAVULANATE POTASSIUM 875; 125 MG/1; MG/1
1 TABLET, FILM COATED ORAL 2 TIMES DAILY
Qty: 14 TABLET | Refills: 0 | Status: SHIPPED | OUTPATIENT
Start: 2017-06-05 | End: 2017-06-12

## 2017-06-05 RX ORDER — PHENAZOPYRIDINE HYDROCHLORIDE 200 MG/1
200 TABLET, FILM COATED ORAL
Qty: 6 TABLET | Refills: 0 | Status: SHIPPED | OUTPATIENT
Start: 2017-06-05 | End: 2017-06-07

## 2017-06-05 NOTE — PATIENT INSTRUCTIONS
Plan:  Lab work POCT UA, C&S   Consult Urology  Advise increase p.o. fluids--at least 64 ounces of water/juice & rest  Discussed smoking cessation  Meds: Augmentin and Pyridium  Practice good handwashing.  See PCP or go to ER if nausea, vomiting, fever, increased back pain, or fail to improve with treatment.  AVS provided and reviewed with patient including supportive care, follow up, and red flag symptoms.   Patient verbalizes understanding and agrees with treatment plan. Discharged from Urgent Care in stable condition.

## 2017-06-05 NOTE — PROGRESS NOTES
Chief complaint/reason for visit:  dysuria, urinary frequency    History of present illness:  52-year-old female complains of dysuria, urinary frequency & abdominal pain onset this morning.  Admits to having chest congestion & cough, still smoking. Patient denies any chest pain, shortness of breath, nausea, vomiting, fever & vaginal d/c.  Patient tried over-the-counter medications with little relief. Admits to drinking  Coffee, cokes & ice tea, but no water. Discussed smoking cessation.      Past Medical History:   Diagnosis Date    AAA (abdominal aortic aneurysm) 2/13/2014    Abdominal aneurysm     3    Acute coronary syndrome     Arthritis     BPPV (benign paroxysmal positional vertigo)     Carotid artery plaque     Carotid artery stenosis and occlusion 2/13/2014    Chronic back pain     COPD (chronic obstructive pulmonary disease)     Coronary artery disease     Emphysema lung     Hyperlipidemia     Hypertension     Myocardial infarction     x3    Neuropathy          Past Surgical History:   Procedure Laterality Date    CARDIAC CATHETERIZATION      CORONARY ANGIOPLASTY         Social History     Social History    Marital status:      Spouse name: N/A    Number of children: N/A    Years of education: N/A     Occupational History    Not on file.     Social History Main Topics    Smoking status: Current Every Day Smoker     Packs/day: 0.50     Years: 44.00     Types: Cigarettes     Start date: 6/24/1970    Smokeless tobacco: Never Used    Alcohol use No    Drug use: No    Sexual activity: Not Currently     Birth control/ protection: None     Other Topics Concern    Not on file     Social History Narrative    No narrative on file         ROS:  Gen.: Denies fatigue, weight loss  Skin:  Denies  no rashes, itching or changes in skin color or texture  HEENT: Denies headache, nasal congestion, sneezing  Nodes: No masses or lesions  Chest: Denies cyanosis, wheezing, cough and sputum  production  Cardiovascular: Denies chest pain, shortness of breath  Abdomen: Reports slight abdominal pain  Urinary: Reports dysuria, blood in urine   Musculoskeletal:  back pain  Neurologic: History of seizures, paralysis, alteration of gait or coordination  Psychiatric: Denies mood swings, depression or suicidal    PE:  Appearance: Well-nourished, well-developed, in mild distress  Skin: No masses, rashes or lesions  HEENT: turbinates pink, Mucus membranes okay, TM's clear bilateral   Chest: Lungs clear to auscultation.  Cardiovascular: Regular rate and rhythm.  Abdomen: Soft with minimal supra pubic tenderness, with active bowel sounds, no CVA tenderness  Musculoskeletal: Motor: 5/5 strength major flexors/extensors.  Neurologic: No sensory deficits. Gait and posture: Normal, No cerebellar signs.   Mental status: Patient awake, alert and oriented    Plan:  Lab work POCT UA, C&S   Consult Urology  Advise increase p.o. fluids--at least 64 ounces of water/juice & rest  Discussed smoking cessation  Meds: Augmentin and Pyridium  Practice good handwashing.  See PCP or go to ER if nausea, vomiting, fever, increased back pain, or fail to improve with treatment.  AVS provided and reviewed with patient including supportive care, follow up, and red flag symptoms.   Patient verbalizes understanding and agrees with treatment plan. Discharged from Urgent Care in stable condition.    Diagnosis:  Dysuria / UTI  Back pain  Urinary frequency  Hypertension  Tobacco use disorder

## 2017-06-05 NOTE — TELEPHONE ENCOUNTER
----- Message from Uzma Blount sent at 6/5/2017 11:26 AM CDT -----  Contact: [phone]  Patient would like to consult with nurse regarding additional symptoms since leaving Urgent Care. Patient is now shaking with no fever. Please call back at [phone].    Thanks,  Uzma Blount

## 2017-06-05 NOTE — TELEPHONE ENCOUNTER
Spoke with pt stated that she started having chills after leaving the clinic. Pt stated that she is feel better. Pt stated that she see a urologist on tomorrow. Pt stated that she have started taking her medication. Informed pt to keep appt.

## 2017-06-06 ENCOUNTER — PATIENT MESSAGE (OUTPATIENT)
Dept: UROLOGY | Facility: CLINIC | Age: 62
End: 2017-06-06

## 2017-06-06 ENCOUNTER — TELEPHONE (OUTPATIENT)
Dept: UROLOGY | Facility: CLINIC | Age: 62
End: 2017-06-06

## 2017-06-06 NOTE — TELEPHONE ENCOUNTER
Patient was contacted and informed that Dr. Carpio is not currently accepting medicaid patients.  Offered to provide alternative clinics to patient but she said that she would take care of it herself.

## 2017-06-08 LAB — BACTERIA UR CULT: NORMAL

## 2017-06-22 ENCOUNTER — PATIENT MESSAGE (OUTPATIENT)
Dept: CARDIOLOGY | Facility: CLINIC | Age: 62
End: 2017-06-22

## 2017-07-19 ENCOUNTER — HOSPITAL ENCOUNTER (OUTPATIENT)
Dept: RADIOLOGY | Facility: HOSPITAL | Age: 62
Discharge: HOME | End: 2017-07-19
Payer: MEDICAID

## 2017-07-19 ENCOUNTER — OFFICE VISIT (OUTPATIENT)
Dept: FAMILY MEDICINE | Facility: CLINIC | Age: 62
End: 2017-07-19
Payer: MEDICAID

## 2017-07-19 VITALS
WEIGHT: 169.06 LBS | HEIGHT: 62 IN | SYSTOLIC BLOOD PRESSURE: 128 MMHG | DIASTOLIC BLOOD PRESSURE: 72 MMHG | OXYGEN SATURATION: 96 % | BODY MASS INDEX: 31.11 KG/M2 | TEMPERATURE: 99 F | HEART RATE: 85 BPM

## 2017-07-19 DIAGNOSIS — M79.672 LEFT FOOT PAIN: ICD-10-CM

## 2017-07-19 DIAGNOSIS — M25.561 ACUTE PAIN OF BOTH KNEES: ICD-10-CM
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
## 2017-07-19 DIAGNOSIS — M25.562 ACUTE PAIN OF BOTH KNEES: ICD-10-CM
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
## 2017-07-19 PROCEDURE — 73562 X-RAY EXAM OF KNEE 3: CPT | Mod: 26,50,, | Performed by: RADIOLOGY

## 2017-07-19 PROCEDURE — 99213 OFFICE O/P EST LOW 20 MIN: CPT | Mod: S$PBB,,,

## 2017-07-19 PROCEDURE — 99999 PR PBB SHADOW E&M-EST. PATIENT-LVL V: CPT | Mod: PBBFAC,,,

## 2017-07-19 PROCEDURE — 73562 X-RAY EXAM OF KNEE 3: CPT | Mod: TC,50,PO

## 2017-07-19 PROCEDURE — 73630 X-RAY EXAM OF FOOT: CPT | Mod: 26,LT,, | Performed by: RADIOLOGY

## 2017-07-19 PROCEDURE — 73630 X-RAY EXAM OF FOOT: CPT | Mod: TC,PO,LT

## 2017-07-19 RX ORDER — HYDROCODONE BITARTRATE AND ACETAMINOPHEN 5; 325 MG/1; MG/1
1 TABLET ORAL EVERY 6 HOURS PRN
Qty: 20 TABLET | Refills: 0 | Status: SHIPPED | OUTPATIENT
Start: 2017-07-19 | End: 2017-07-24

## 2017-07-19 RX ORDER — TIZANIDINE 2 MG/1
4 TABLET ORAL EVERY 8 HOURS PRN
Qty: 60 TABLET | Refills: 0 | Status: SHIPPED | OUTPATIENT
Start: 2017-07-19 | End: 2017-07-29

## 2017-07-19 RX ORDER — MUPIROCIN 20 MG/G
OINTMENT TOPICAL 2 TIMES DAILY
Qty: 1 TUBE | Refills: 0 | Status: SHIPPED | OUTPATIENT
Start: 2017-07-19 | End: 2017-07-29

## 2017-07-19 NOTE — PROGRESS NOTES
Subjective:       Patient ID: Gemma Vick is a 62 y.o. female.    Chief Complaint: Fall (yesterday)    HPI   She is a stay with a one-day complaint of bilateral knee and left heel pain.  She says the pain started after she tripped over heard AND fell while walking to the mailbox.  She describes the pain as a constant moderate intensity aching sensation is punctuated with a sharp severe intensity stabbing sensation with certain movements.  The patient also voices some associated abrasions and ecchymosis where she landed.  She says the pain is worse with movements and eases with rest.  She fell from standing she denies any loss of consciousness.     Review of Systems   Constitutional: Negative for activity change, appetite change, fatigue and unexpected weight change.   HENT: Negative.    Eyes: Negative.    Respiratory: Negative for cough, chest tightness, shortness of breath and wheezing.    Cardiovascular: Negative for chest pain, palpitations and leg swelling.   Gastrointestinal: Negative for constipation, diarrhea, nausea and vomiting.   Endocrine: Negative.    Genitourinary: Negative.    Musculoskeletal: Positive for arthralgias (bilateral knees and left heel).   Skin: Negative for color change.   Allergic/Immunologic: Negative.    Neurological: Negative for dizziness, weakness and light-headedness.   Hematological: Negative.    Psychiatric/Behavioral: Negative for sleep disturbance.         Objective:      Physical Exam   Constitutional: She is oriented to person, place, and time. She appears well-developed and well-nourished.   HENT:   Head: Normocephalic and atraumatic.   Eyes: Conjunctivae are normal. Pupils are equal, round, and reactive to light. No scleral icterus.   Neck: Normal range of motion. Neck supple.   Cardiovascular: Normal rate, regular rhythm, normal heart sounds and intact distal pulses.  Exam reveals no gallop and no friction rub.    No murmur heard.  Pulmonary/Chest: Effort normal and  breath sounds normal. No respiratory distress. She has no wheezes. She has no rales. She exhibits no tenderness.   Musculoskeletal:        Right knee: She exhibits ecchymosis. She exhibits normal range of motion, no swelling, no effusion, no deformity, no laceration, no erythema, normal alignment, no LCL laxity, normal patellar mobility, no bony tenderness, normal meniscus and no MCL laxity. No tenderness found.        Left knee: She exhibits ecchymosis. She exhibits normal range of motion, no swelling, no effusion, no deformity, no laceration, no erythema, normal alignment, no LCL laxity, normal patellar mobility, no bony tenderness, normal meniscus and no MCL laxity. No tenderness found.        Left foot: There is tenderness. There is normal range of motion, no bony tenderness, no swelling, normal capillary refill, no crepitus, no deformity and no laceration.   Lymphadenopathy:     She has no cervical adenopathy.   Neurological: She is alert and oriented to person, place, and time. She exhibits normal muscle tone. Coordination normal.   Skin: Skin is warm and dry. No rash noted. No erythema. No pallor.   Psychiatric: She has a normal mood and affect. Her behavior is normal. Judgment and thought content normal.   Vitals reviewed.      Assessment:       1. Fall, initial encounter    2. Acute pain of both knees    3. Left foot pain          Plan:   Fall, initial encounter  -     Cancel: X-Ray Foot 2 View Left; Future; Expected date: 07/19/2017  -     Cancel: X-Ray Knee 1 or 2 View Bilateral; Future; Expected date: 07/19/2017  -     tizanidine (ZANAFLEX) 2 MG tablet; Take 2 tablets (4 mg total) by mouth every 8 (eight) hours as needed.  Dispense: 60 tablet; Refill: 0  -     hydrocodone-acetaminophen 5-325mg (NORCO) 5-325 mg per tablet; Take 1 tablet by mouth every 6 (six) hours as needed for Pain.  Dispense: 20 tablet; Refill: 0  -     mupirocin (BACTROBAN) 2 % ointment; Apply topically 2 (two) times daily.  Dispense:  1 Tube; Refill: 0  -     X-ray Knee Ortho Bilateral; Future; Expected date: 07/19/2017  -     X-Ray Foot Complete 3 view Left; Future; Expected date: 07/19/2017    Acute pain of both knees  -     Cancel: X-Ray Knee 1 or 2 View Bilateral; Future; Expected date: 07/19/2017  -     tizanidine (ZANAFLEX) 2 MG tablet; Take 2 tablets (4 mg total) by mouth every 8 (eight) hours as needed.  Dispense: 60 tablet; Refill: 0  -     hydrocodone-acetaminophen 5-325mg (NORCO) 5-325 mg per tablet; Take 1 tablet by mouth every 6 (six) hours as needed for Pain.  Dispense: 20 tablet; Refill: 0  -     X-ray Knee Ortho Bilateral; Future; Expected date: 07/19/2017    Left foot pain  -     Cancel: X-Ray Foot 2 View Left; Future; Expected date: 07/19/2017  -     tizanidine (ZANAFLEX) 2 MG tablet; Take 2 tablets (4 mg total) by mouth every 8 (eight) hours as needed.  Dispense: 60 tablet; Refill: 0  -     hydrocodone-acetaminophen 5-325mg (NORCO) 5-325 mg per tablet; Take 1 tablet by mouth every 6 (six) hours as needed for Pain.  Dispense: 20 tablet; Refill: 0  -     X-Ray Foot Complete 3 view Left; Future; Expected date: 07/19/2017            Disclaimer: This note is prepared using voice recognition software.  As such there may be errors in the dictation.  It has not been proofread.

## 2017-07-24 DIAGNOSIS — J44.9 CHRONIC OBSTRUCTIVE PULMONARY DISEASE, UNSPECIFIED COPD TYPE: ICD-10-CM

## 2017-07-24 RX ORDER — MOMETASONE FUROATE AND FORMOTEROL FUMARATE DIHYDRATE 200; 5 UG/1; UG/1
AEROSOL RESPIRATORY (INHALATION)
Qty: 13 G | Refills: 0 | Status: SHIPPED | OUTPATIENT
Start: 2017-07-24 | End: 2017-08-31 | Stop reason: SDUPTHER

## 2017-07-28 ENCOUNTER — PATIENT MESSAGE (OUTPATIENT)
Dept: FAMILY MEDICINE | Facility: CLINIC | Age: 62
End: 2017-07-28
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
## 2017-07-28 DIAGNOSIS — M25.511 ACUTE PAIN OF RIGHT SHOULDER: ICD-10-CM

## 2017-08-03 ENCOUNTER — PATIENT MESSAGE (OUTPATIENT)
Dept: FAMILY MEDICINE | Facility: CLINIC | Age: 62
End: 2017-08-03

## 2017-08-03 DIAGNOSIS — F41.9 ANXIETY: Primary | ICD-10-CM

## 2017-08-03 RX ORDER — LORAZEPAM 0.5 MG/1
0.5 TABLET ORAL ONCE
Qty: 1 TABLET | Refills: 0 | Status: SHIPPED | OUTPATIENT
Start: 2017-08-03 | End: 2017-08-09 | Stop reason: ALTCHOICE

## 2017-08-04 ENCOUNTER — HOSPITAL ENCOUNTER (OUTPATIENT)
Dept: RADIOLOGY | Facility: HOSPITAL | Age: 62
Discharge: HOME | End: 2017-08-04
Payer: MEDICAID

## 2017-08-04 ENCOUNTER — PATIENT MESSAGE (OUTPATIENT)
Dept: FAMILY MEDICINE | Facility: CLINIC | Age: 62
End: 2017-08-04
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
## 2017-08-04 DIAGNOSIS — M25.511 ACUTE PAIN OF RIGHT SHOULDER: ICD-10-CM

## 2017-08-04 PROCEDURE — 73221 MRI JOINT UPR EXTREM W/O DYE: CPT | Mod: TC,RT

## 2017-08-07 ENCOUNTER — PATIENT MESSAGE (OUTPATIENT)
Dept: FAMILY MEDICINE | Facility: CLINIC | Age: 62
End: 2017-08-07

## 2017-08-08 NOTE — TELEPHONE ENCOUNTER
Patient asking for a refill on pain medication. Her insurance is not letting us book. Do you want us to over ride your schedule?

## 2017-08-09 ENCOUNTER — OFFICE VISIT (OUTPATIENT)
Dept: FAMILY MEDICINE | Facility: CLINIC | Age: 62
End: 2017-08-09
Payer: MEDICAID

## 2017-08-09 ENCOUNTER — PATIENT MESSAGE (OUTPATIENT)
Dept: FAMILY MEDICINE | Facility: CLINIC | Age: 62
End: 2017-08-09

## 2017-08-09 ENCOUNTER — TELEPHONE (OUTPATIENT)
Dept: FAMILY MEDICINE | Facility: CLINIC | Age: 62
End: 2017-08-09

## 2017-08-09 VITALS
OXYGEN SATURATION: 95 % | BODY MASS INDEX: 31.1 KG/M2 | DIASTOLIC BLOOD PRESSURE: 70 MMHG | WEIGHT: 169 LBS | TEMPERATURE: 97 F | HEIGHT: 62 IN | SYSTOLIC BLOOD PRESSURE: 118 MMHG | HEART RATE: 82 BPM

## 2017-08-09 DIAGNOSIS — M25.511 ACUTE PAIN OF RIGHT SHOULDER: Primary | ICD-10-CM

## 2017-08-09 PROCEDURE — 20551 NJX 1 TENDON ORIGIN/INSJ: CPT | Mod: PBBFAC,PO | Performed by: FAMILY MEDICINE

## 2017-08-09 PROCEDURE — 99213 OFFICE O/P EST LOW 20 MIN: CPT | Mod: PBBFAC,PO | Performed by: FAMILY MEDICINE

## 2017-08-09 PROCEDURE — 99999 PR PBB SHADOW E&M-EST. PATIENT-LVL III: CPT | Mod: PBBFAC,,, | Performed by: FAMILY MEDICINE

## 2017-08-09 PROCEDURE — 3078F DIAST BP <80 MM HG: CPT | Mod: ,,, | Performed by: FAMILY MEDICINE

## 2017-08-09 PROCEDURE — 3074F SYST BP LT 130 MM HG: CPT | Mod: ,,, | Performed by: FAMILY MEDICINE

## 2017-08-09 PROCEDURE — 20551 NJX 1 TENDON ORIGIN/INSJ: CPT | Mod: S$PBB,,, | Performed by: FAMILY MEDICINE

## 2017-08-09 PROCEDURE — 3008F BODY MASS INDEX DOCD: CPT | Mod: ,,, | Performed by: FAMILY MEDICINE

## 2017-08-09 PROCEDURE — 99213 OFFICE O/P EST LOW 20 MIN: CPT | Mod: 25,S$PBB,, | Performed by: FAMILY MEDICINE

## 2017-08-09 RX ORDER — HYDROCODONE BITARTRATE AND ACETAMINOPHEN 5; 325 MG/1; MG/1
1 TABLET ORAL EVERY 12 HOURS PRN
Qty: 21 TABLET | Refills: 0 | Status: SHIPPED | OUTPATIENT
Start: 2017-08-09 | End: 2017-08-29

## 2017-08-09 RX ORDER — TIZANIDINE HYDROCHLORIDE 4 MG/1
4 CAPSULE, GELATIN COATED ORAL 2 TIMES DAILY PRN
Qty: 30 CAPSULE | Refills: 0 | Status: SHIPPED | OUTPATIENT
Start: 2017-08-09 | End: 2017-08-19

## 2017-08-09 RX ORDER — METHYLPREDNISOLONE ACETATE 40 MG/ML
40 INJECTION, SUSPENSION INTRA-ARTICULAR; INTRALESIONAL; INTRAMUSCULAR; SOFT TISSUE
Status: COMPLETED | OUTPATIENT
Start: 2017-08-09 | End: 2017-08-09

## 2017-08-09 RX ADMIN — METHYLPREDNISOLONE ACETATE 40 MG: 40 INJECTION, SUSPENSION INTRALESIONAL; INTRAMUSCULAR; INTRASYNOVIAL; SOFT TISSUE at 03:08

## 2017-08-09 NOTE — TELEPHONE ENCOUNTER
Patient stated that she only received a prescription for pain medication and was suppose to have a muscle relaxer as well. Please review and advise.

## 2017-08-09 NOTE — PROGRESS NOTES
Chief Complaint:    Chief Complaint   Patient presents with    Shoulder Pain       History of Present Illness:  Presents today complaining of right shoulder pain since she fell a month back.  Also had MRI.      ROS:  Review of Systems    Past Medical History:   Diagnosis Date    AAA (abdominal aortic aneurysm) 2/13/2014    Abdominal aneurysm     3    Acute coronary syndrome     Arthritis     BPPV (benign paroxysmal positional vertigo)     Carotid artery plaque     Carotid artery stenosis and occlusion 2/13/2014    Chronic back pain     COPD (chronic obstructive pulmonary disease)     Coronary artery disease     Emphysema lung     Hyperlipidemia     Hypertension     Myocardial infarction     x3    Neuropathy        Social History:  Social History     Social History    Marital status:      Spouse name: N/A    Number of children: N/A    Years of education: N/A     Social History Main Topics    Smoking status: Current Every Day Smoker     Packs/day: 0.50     Years: 44.00     Types: Cigarettes     Start date: 6/24/1970    Smokeless tobacco: Never Used    Alcohol use No    Drug use: No    Sexual activity: Not Currently     Birth control/ protection: None     Other Topics Concern    None     Social History Narrative    None       Family History:   family history includes Heart attacks under age 50 in her brother and father; Heart disease in her mother.    Health Maintenance   Topic Date Due    Zoster Vaccine  02/22/2015    Mammogram  03/24/2016    Lipid Panel  07/13/2017    Influenza Vaccine  08/01/2017    Fecal Occult Blood Test (FOBT)/[employer]  05/05/2018    Pneumococcal PPSV23 (Medium Risk) (2) 02/22/2020    TETANUS VACCINE  11/15/2026    Hepatitis C Screening  Completed       Physical Exam:    Vital Signs  Temp: 97.3 °F (36.3 °C)  Temp src: Tympanic  Pulse: 82  SpO2: 95 %  BP: 118/70  BP Location: Left arm  BP Method: Manual  Patient Position: Sitting  Pain Score:   6  Pain Loc:  "Shoulder  Height and Weight  Height: 5' 1.5" (156.2 cm)  Weight: 76.6 kg (168 lb 15.7 oz)  BSA (Calculated - sq m): 1.82 sq meters  BMI (Calculated): 31.5  Weight in (lb) to have BMI = 25: 134.2]    Body mass index is 31.41 kg/m².    Physical Exam   Musculoskeletal:   Limitation of range of motion of the right shoulder impingement sign is positive tenderness to palpation of the lateral and posterior shoulder.  Painful external and internal rotation.     Impression       Type II acromion with moderate hypertrophic arthropathy of the a.c. joint.    Moderate grade rotator cuff tendinosis with small focal partial rim rent tear of the supraspinatus tendon.    Labral degeneration with evidence of intra-articular bicipital tendinosis.  Moderate grade extra-articular bicipital tendinosis and tenosynovitis.  Please see axial images 20 through 26.    Moderate grade subscapularis tendinosis.    Small joint effusion.    No acute fracture.      Electronically signed by: ADRYAN MUHAMMAD MD  Date: 08/04/17  Time: 15:14     Encounter     View Encounter             Lab Results   Component Value Date    CHOL 135 07/13/2016    CHOL 158 01/14/2016    CHOL 143 10/07/2015    TRIG 111 07/13/2016    TRIG 130 01/14/2016    TRIG 118 10/07/2015    HDL 43 07/13/2016    HDL 57 01/14/2016    HDL 52 10/07/2015    TOTALCHOLEST 3.1 07/13/2016    TOTALCHOLEST 2.8 01/14/2016    TOTALCHOLEST 2.8 10/07/2015    NONHDLCHOL 92 07/13/2016    NONHDLCHOL 101 01/14/2016    NONHDLCHOL 91 10/07/2015       Lab Results   Component Value Date    HGBA1C 6.0 01/14/2016       Assessment:      ICD-10-CM ICD-9-CM   1. Acute pain of right shoulder M25.511 719.41         Plan:    Discussed that we can offer steroid injection for pain relief and then sent for physical therapy.  If no improvement after one month of physical therapy she will be referred to orthopedic.  She agreed  Steroid injection done in the posterior lateral rotator cuff.  Offered steroid injection, " risk of steroid injection discussed with the patient which include but not limited to,  1.  Infection  2.  Bleeding  3.  Tendon disruption  4.  Elevation of blood sugar  5.  Fat atrophy  6.  Worsening pain and other unforeseen complication.  Treatment alternatives were also discussed, patient likes to proceed with the steroid injection.  Depo-Medrol 40 mg and lidocaine 2% without epi Cc was used for injection, and the area was identified prepped and injection done sterile condition, patient tolerated procedure well.    Also given hydrocodone tablets, #21 tablets given use sparingly because a habit-forming medicine will not be refilled.  Orders Placed This Encounter   Procedures    Ambulatory Referral to Physical/Occupational Therapy       Current Outpatient Prescriptions   Medication Sig Dispense Refill    albuterol (PROAIR HFA) 90 mcg/actuation inhaler Inhale 2 puffs into the lungs every 6 (six) hours as needed. 18 g 6    albuterol-ipratropium 2.5mg-0.5mg/3mL (DUO-NEB) 0.5 mg-3 mg(2.5 mg base)/3 mL nebulizer solution USE ONE VIAL IN NEBULIZER TWICE DAILY 270 mL 0    aspirin 81 MG Chew Take 81 mg by mouth once daily.      clopidogrel (PLAVIX) 75 mg tablet Take 1 tablet (75 mg total) by mouth once daily. 90 tablet 3    DULERA 200-5 mcg/actuation inhaler INHALE 2 PUFFS 2 TIMES DAILY 13 g 0    duloxetine (CYMBALTA) 60 MG capsule Take 1 capsule (60 mg total) by mouth once daily. 30 capsule 6    estrogens, conjugated, (PREMARIN) 0.3 MG tablet Take 1 tablet (0.3 mg total) by mouth once daily. 30 tablet 5    ezetimibe-simvastatin 10-40 mg (VYTORIN) 10-40 mg per tablet Take 1 tablet by mouth every evening.       felodipine (PLENDIL) 5 MG 24 hr tablet Take 1 tablet (5 mg total) by mouth once daily. 30 tablet 6    ipratropium-albuterol (COMBIVENT RESPIMAT)  mcg/actuation inhaler Inhale 1 puff into the lungs every 4 (four) hours as needed for Wheezing. Medically necessary 2 Package 11    metoprolol succinate  (TOPROL-XL) 25 MG 24 hr tablet Take 1 tablet (25 mg total) by mouth once daily. 30 tablet 6    OXYGEN-AIR DELIVERY SYSTEMS MISC 2 L by Misc.(Non-Drug; Combo Route) route every evening.      zolpidem (AMBIEN) 5 MG Tab Take 1 tablet (5 mg total) by mouth nightly. 30 tablet 5    hydrocodone-acetaminophen 5-325mg (NORCO) 5-325 mg per tablet Take 1 tablet by mouth every 12 (twelve) hours as needed for Pain. 21 tablet 0     No current facility-administered medications for this visit.        Medications Discontinued During This Encounter   Medication Reason    fosinopril (MONOPRIL) 40 MG tablet Patient no longer taking    lisinopril (PRINIVIL,ZESTRIL) 40 MG tablet Patient no longer taking    lorazepam (ATIVAN) 0.5 MG tablet Therapy completed       No Follow-up on file.      Dr Olga Altman MD    Disclaimer: This note is prepared using voice recognition system and as such is likely to have errors and is not proof read.

## 2017-08-09 NOTE — TELEPHONE ENCOUNTER
----- Message from Georgia Bland sent at 8/9/2017  4:04 PM CDT -----  Contact: pt  She's calling stating that she is waiting on a RX to be approved, she stated that it is pain meds and muscle relaxers, please advise [phone]

## 2017-08-14 ENCOUNTER — TELEPHONE (OUTPATIENT)
Dept: FAMILY MEDICINE | Facility: CLINIC | Age: 62
End: 2017-08-14

## 2017-08-14 NOTE — TELEPHONE ENCOUNTER
----- Message from Elise Lopez sent at 8/14/2017 12:18 PM CDT -----  Contact: [facility]  Caller wants to see about getting the pt the tablets instead of the capsules that were called in on 08/09, the pt insurance doesn't cover the capsules.    [facility] - [place], [place] - [address] [facility] [address]  [address] [address] [address]  Phone: [phone] Fax: [phone]

## 2017-08-25 ENCOUNTER — TELEPHONE (OUTPATIENT)
Dept: CARDIOLOGY | Facility: CLINIC | Age: 62
End: 2017-08-25

## 2017-08-25 DIAGNOSIS — I25.10 CORONARY ARTERY DISEASE, ANGINA PRESENCE UNSPECIFIED, UNSPECIFIED VESSEL OR LESION TYPE, UNSPECIFIED WHETHER NATIVE OR TRANSPLANTED HEART: Primary | ICD-10-CM

## 2017-08-25 NOTE — TELEPHONE ENCOUNTER
----- Message from Jennifer Jimenez sent at 8/25/2017 11:56 AM CDT -----  Does pt need labs before tues appt?

## 2017-08-28 ENCOUNTER — LAB VISIT (OUTPATIENT)
Dept: LAB | Facility: HOSPITAL | Age: 62
End: 2017-08-28
Attending: INTERNAL MEDICINE
Payer: MEDICAID

## 2017-08-28 DIAGNOSIS — I25.10 CORONARY ARTERY DISEASE, ANGINA PRESENCE UNSPECIFIED, UNSPECIFIED VESSEL OR LESION TYPE, UNSPECIFIED WHETHER NATIVE OR TRANSPLANTED HEART: ICD-10-CM

## 2017-08-28 LAB
ALBUMIN SERPL BCP-MCNC: 3.3 G/DL
ALP SERPL-CCNC: 77 U/L
ALT SERPL W/O P-5'-P-CCNC: 14 U/L
ANION GAP SERPL CALC-SCNC: 11 MMOL/L
AST SERPL-CCNC: 15 U/L
BILIRUB SERPL-MCNC: 0.3 MG/DL
BUN SERPL-MCNC: 16 MG/DL
CALCIUM SERPL-MCNC: 9.6 MG/DL
CHLORIDE SERPL-SCNC: 104 MMOL/L
CHOLEST/HDLC SERPL: 2.7 {RATIO}
CO2 SERPL-SCNC: 24 MMOL/L
CREAT SERPL-MCNC: 1 MG/DL
EST. GFR  (AFRICAN AMERICAN): >60 ML/MIN/1.73 M^2
EST. GFR  (NON AFRICAN AMERICAN): >60 ML/MIN/1.73 M^2
GLUCOSE SERPL-MCNC: 103 MG/DL
HDL/CHOLESTEROL RATIO: 37.5 %
HDLC SERPL-MCNC: 152 MG/DL
HDLC SERPL-MCNC: 57 MG/DL
LDLC SERPL CALC-MCNC: 74.2 MG/DL
NONHDLC SERPL-MCNC: 95 MG/DL
POTASSIUM SERPL-SCNC: 3.9 MMOL/L
PROT SERPL-MCNC: 7.3 G/DL
SODIUM SERPL-SCNC: 139 MMOL/L
TRIGL SERPL-MCNC: 104 MG/DL

## 2017-08-28 PROCEDURE — 36415 COLL VENOUS BLD VENIPUNCTURE: CPT | Mod: PO

## 2017-08-28 PROCEDURE — 80061 LIPID PANEL: CPT

## 2017-08-28 PROCEDURE — 80053 COMPREHEN METABOLIC PANEL: CPT

## 2017-08-29 ENCOUNTER — PATIENT MESSAGE (OUTPATIENT)
Dept: CARDIOLOGY | Facility: CLINIC | Age: 62
End: 2017-08-29

## 2017-08-29 ENCOUNTER — PATIENT MESSAGE (OUTPATIENT)
Dept: PULMONOLOGY | Facility: CLINIC | Age: 62
End: 2017-08-29

## 2017-08-29 ENCOUNTER — OFFICE VISIT (OUTPATIENT)
Dept: CARDIOLOGY | Facility: CLINIC | Age: 62
End: 2017-08-29
Payer: MEDICAID

## 2017-08-29 ENCOUNTER — CLINICAL SUPPORT (OUTPATIENT)
Dept: CARDIOLOGY | Facility: CLINIC | Age: 62
End: 2017-08-29
Payer: MEDICAID

## 2017-08-29 VITALS
HEART RATE: 69 BPM | BODY MASS INDEX: 30.95 KG/M2 | SYSTOLIC BLOOD PRESSURE: 128 MMHG | DIASTOLIC BLOOD PRESSURE: 68 MMHG | HEIGHT: 62 IN | WEIGHT: 168.19 LBS

## 2017-08-29 DIAGNOSIS — I71.40 ABDOMINAL AORTIC ANEURYSM (AAA) WITHOUT RUPTURE: ICD-10-CM

## 2017-08-29 DIAGNOSIS — R73.03 PREDIABETES: ICD-10-CM

## 2017-08-29 DIAGNOSIS — G47.36 NOCTURNAL HYPOXEMIA DUE TO EMPHYSEMA: ICD-10-CM

## 2017-08-29 DIAGNOSIS — M51.36 DDD (DEGENERATIVE DISC DISEASE), LUMBAR: ICD-10-CM

## 2017-08-29 DIAGNOSIS — J43.9 NOCTURNAL HYPOXEMIA DUE TO EMPHYSEMA: ICD-10-CM

## 2017-08-29 DIAGNOSIS — I25.10 CORONARY ARTERY DISEASE, ANGINA PRESENCE UNSPECIFIED, UNSPECIFIED VESSEL OR LESION TYPE, UNSPECIFIED WHETHER NATIVE OR TRANSPLANTED HEART: ICD-10-CM

## 2017-08-29 DIAGNOSIS — J44.9 COPD, SEVERE: ICD-10-CM

## 2017-08-29 DIAGNOSIS — I25.10 CORONARY ARTERY DISEASE, ANGINA PRESENCE UNSPECIFIED, UNSPECIFIED VESSEL OR LESION TYPE, UNSPECIFIED WHETHER NATIVE OR TRANSPLANTED HEART: Primary | ICD-10-CM

## 2017-08-29 DIAGNOSIS — I25.10 CORONARY ARTERY DISEASE INVOLVING NATIVE CORONARY ARTERY OF NATIVE HEART WITHOUT ANGINA PECTORIS: Primary | ICD-10-CM

## 2017-08-29 DIAGNOSIS — I10 ESSENTIAL HYPERTENSION: ICD-10-CM

## 2017-08-29 DIAGNOSIS — I65.23 BILATERAL CAROTID ARTERY STENOSIS: ICD-10-CM

## 2017-08-29 DIAGNOSIS — I49.5 SINUS NODE DYSFUNCTION: ICD-10-CM

## 2017-08-29 DIAGNOSIS — E78.2 MIXED HYPERLIPIDEMIA: ICD-10-CM

## 2017-08-29 DIAGNOSIS — Z72.0 TOBACCO ABUSE: ICD-10-CM

## 2017-08-29 PROCEDURE — 3078F DIAST BP <80 MM HG: CPT | Mod: ,,, | Performed by: INTERNAL MEDICINE

## 2017-08-29 PROCEDURE — 93010 ELECTROCARDIOGRAM REPORT: CPT | Mod: S$PBB,,, | Performed by: INTERNAL MEDICINE

## 2017-08-29 PROCEDURE — 3008F BODY MASS INDEX DOCD: CPT | Mod: ,,, | Performed by: INTERNAL MEDICINE

## 2017-08-29 PROCEDURE — 99999 PR PBB SHADOW E&M-EST. PATIENT-LVL III: CPT | Mod: PBBFAC,,, | Performed by: INTERNAL MEDICINE

## 2017-08-29 PROCEDURE — 99213 OFFICE O/P EST LOW 20 MIN: CPT | Mod: S$PBB,,, | Performed by: INTERNAL MEDICINE

## 2017-08-29 PROCEDURE — 99213 OFFICE O/P EST LOW 20 MIN: CPT | Mod: PBBFAC,25 | Performed by: INTERNAL MEDICINE

## 2017-08-29 PROCEDURE — 93005 ELECTROCARDIOGRAM TRACING: CPT | Mod: PBBFAC | Performed by: INTERNAL MEDICINE

## 2017-08-29 PROCEDURE — 3074F SYST BP LT 130 MM HG: CPT | Mod: ,,, | Performed by: INTERNAL MEDICINE

## 2017-08-29 RX ORDER — TIZANIDINE 4 MG/1
TABLET ORAL
COMMUNITY
Start: 2017-08-14 | End: 2017-10-05

## 2017-08-29 NOTE — PROGRESS NOTES
Subjective:   Patient ID:  Gemma Vick is a 62 y.o. female who presents for follow up of Coronary Artery Disease      HPI  A 63 yo female with h/o cad carotid disease pvd tobacco use  Copd is here for f/u  Vascular disease. She stopped smoking she is very short of breath uses the vapor cigarettes. Has no other issues clinically no palpitations chf angina tia or claudication.  Past Medical History:   Diagnosis Date    AAA (abdominal aortic aneurysm) 2/13/2014    Abdominal aneurysm     3    Acute coronary syndrome     Arthritis     BPPV (benign paroxysmal positional vertigo)     Carotid artery plaque     Carotid artery stenosis and occlusion 2/13/2014    Chronic back pain     COPD (chronic obstructive pulmonary disease)     Coronary artery disease     Emphysema lung     Hyperlipidemia     Hypertension     Myocardial infarction     x3    Neuropathy        Past Surgical History:   Procedure Laterality Date    CARDIAC CATHETERIZATION      CORONARY ANGIOPLASTY         Social History   Substance Use Topics    Smoking status: Former Smoker     Packs/day: 0.50     Years: 44.00     Types: Cigarettes     Start date: 6/24/1970     Quit date: 7/15/2017    Smokeless tobacco: Never Used    Alcohol use No       Family History   Problem Relation Age of Onset    Heart disease Mother     Heart attacks under age 50 Father     Heart attacks under age 50 Brother        Current Outpatient Prescriptions   Medication Sig    albuterol-ipratropium 2.5mg-0.5mg/3mL (DUO-NEB) 0.5 mg-3 mg(2.5 mg base)/3 mL nebulizer solution USE ONE VIAL IN NEBULIZER TWICE DAILY    aspirin 81 MG Chew Take 81 mg by mouth once daily.    clopidogrel (PLAVIX) 75 mg tablet Take 1 tablet (75 mg total) by mouth once daily.    DULERA 200-5 mcg/actuation inhaler INHALE 2 PUFFS 2 TIMES DAILY    duloxetine (CYMBALTA) 60 MG capsule Take 1 capsule (60 mg total) by mouth once daily.    estrogens, conjugated, (PREMARIN) 0.3 MG tablet Take 1  tablet (0.3 mg total) by mouth once daily.    ezetimibe-simvastatin 10-40 mg (VYTORIN) 10-40 mg per tablet Take 1 tablet by mouth every evening.     felodipine (PLENDIL) 5 MG 24 hr tablet Take 1 tablet (5 mg total) by mouth once daily.    ipratropium-albuterol (COMBIVENT RESPIMAT)  mcg/actuation inhaler Inhale 1 puff into the lungs every 4 (four) hours as needed for Wheezing. Medically necessary    OXYGEN-AIR DELIVERY SYSTEMS MISC 2 L by Misc.(Non-Drug; Combo Route) route every evening.    tizanidine (ZANAFLEX) 4 MG tablet     zolpidem (AMBIEN) 5 MG Tab Take 1 tablet (5 mg total) by mouth nightly.    albuterol (PROAIR HFA) 90 mcg/actuation inhaler Inhale 2 puffs into the lungs every 6 (six) hours as needed.     No current facility-administered medications for this visit.      Current Outpatient Prescriptions on File Prior to Visit   Medication Sig    albuterol-ipratropium 2.5mg-0.5mg/3mL (DUO-NEB) 0.5 mg-3 mg(2.5 mg base)/3 mL nebulizer solution USE ONE VIAL IN NEBULIZER TWICE DAILY    aspirin 81 MG Chew Take 81 mg by mouth once daily.    clopidogrel (PLAVIX) 75 mg tablet Take 1 tablet (75 mg total) by mouth once daily.    DULERA 200-5 mcg/actuation inhaler INHALE 2 PUFFS 2 TIMES DAILY    duloxetine (CYMBALTA) 60 MG capsule Take 1 capsule (60 mg total) by mouth once daily.    estrogens, conjugated, (PREMARIN) 0.3 MG tablet Take 1 tablet (0.3 mg total) by mouth once daily.    ezetimibe-simvastatin 10-40 mg (VYTORIN) 10-40 mg per tablet Take 1 tablet by mouth every evening.     felodipine (PLENDIL) 5 MG 24 hr tablet Take 1 tablet (5 mg total) by mouth once daily.    ipratropium-albuterol (COMBIVENT RESPIMAT)  mcg/actuation inhaler Inhale 1 puff into the lungs every 4 (four) hours as needed for Wheezing. Medically necessary    OXYGEN-AIR DELIVERY SYSTEMS MISC 2 L by Misc.(Non-Drug; Combo Route) route every evening.    zolpidem (AMBIEN) 5 MG Tab Take 1 tablet (5 mg total) by mouth nightly.  "   albuterol (PROAIR HFA) 90 mcg/actuation inhaler Inhale 2 puffs into the lungs every 6 (six) hours as needed.    [DISCONTINUED] hydrocodone-acetaminophen 5-325mg (NORCO) 5-325 mg per tablet Take 1 tablet by mouth every 12 (twelve) hours as needed for Pain.    [DISCONTINUED] metoprolol succinate (TOPROL-XL) 25 MG 24 hr tablet Take 1 tablet (25 mg total) by mouth once daily.     No current facility-administered medications on file prior to visit.      Review of patient's allergies indicates:  No Known Allergies  ROS  Constitution: Positive for weakness and malaise/fatigue. Negative for diaphoresis and weight gain.   HENT: Negative for headaches and hoarse voice.    Eyes: Negative for double vision and visual disturbance.   Cardiovascular: Negative for chest pain, claudication, cyanosis, dyspnea on exertion, irregular heartbeat, leg swelling, near-syncope, orthopnea, palpitations, paroxysmal nocturnal dyspnea and syncope.   Respiratory: Positive for cough and shortness of breath. Negative for hemoptysis and snoring.    Hematologic/Lymphatic: Negative for bleeding problem. Does not bruise/bleed easily.   Skin: Negative for color change and poor wound healing.   Musculoskeletal: Negative for muscle cramps, muscle weakness and myalgias.   Gastrointestinal: Negative for bloating, abdominal pain, change in bowel habit, diarrhea, heartburn, hematemesis, hematochezia, melena and nausea.   Neurological: Negative for excessive daytime sleepiness, dizziness, light-headedness, loss of balance and numbness.   Psychiatric/Behavioral: Negative for memory loss. The patient does not have insomnia.    Allergic/Immunologic: Negative for hives.   Objective:   Physical Exam  Vitals:    08/29/17 1411   BP: 128/68   Pulse: 69   Weight: 76.3 kg (168 lb 3.4 oz)   Height: 5' 1.5" (1.562 m)   Constitutional: She is oriented to person, place, and time. She appears well-developed and well-nourished. She does not appear ill. No distress. "   HENT:   Head: Normocephalic and atraumatic.   Eyes: EOM are normal. Pupils are equal, round, and reactive to light. No scleral icterus.   Neck: Normal range of motion. Neck supple. Normal carotid pulses, no hepatojugular reflux and no JVD present. Carotid bruit is not present. No tracheal deviation present. No thyromegaly present.   Cardiovascular: Normal rate, regular rhythm, intact distal pulses and normal pulses. Exam reveals no gallop and no friction rub.   Murmur heard.  High-pitched blowing decrescendo early diastolic murmur is present with a grade of 1/6 at the upper right sternal border radiating to the apex Bilateral subclavian bruits  Pulses:  Carotid pulses are on the right side with bruit, and on the left side with bruit.  Pulmonary/Chest: Effort normal and breath sounds normal. No respiratory distress. She has wheezes. She has no rhonchi. She has no rales. She exhibits no tenderness.   Abdominal: Soft. Normal appearance, normal aorta and bowel sounds are normal. She exhibits no abdominal bruit, no ascites and no pulsatile midline mass. There is no hepatomegaly. There is no tenderness.   Musculoskeletal: She exhibits no edema.   Right shoulder: She exhibits no deformity.   Neurological: She is alert and oriented to person, place, and time. She has normal strength. No cranial nerve deficit. Coordination normal.   Skin: Skin is warm and dry. No rash noted. No cyanosis or erythema. Nails show no clubbing.   Psychiatric: She has a normal mood and affect. Her speech is normal and behavior is normal.   Lab Results   Component Value Date    CHOL 152 08/28/2017    CHOL 135 07/13/2016    CHOL 158 01/14/2016     Lab Results   Component Value Date    HDL 57 08/28/2017    HDL 43 07/13/2016    HDL 57 01/14/2016     Lab Results   Component Value Date    LDLCALC 74.2 08/28/2017    LDLCALC 69.8 07/13/2016    LDLCALC 75.0 01/14/2016     Lab Results   Component Value Date    TRIG 104 08/28/2017    TRIG 111 07/13/2016     TRIG 130 01/14/2016     Lab Results   Component Value Date    CHOLHDL 37.5 08/28/2017    CHOLHDL 31.9 07/13/2016    CHOLHDL 36.1 01/14/2016       Chemistry        Component Value Date/Time     08/28/2017 0816    K 3.9 08/28/2017 0816     08/28/2017 0816    CO2 24 08/28/2017 0816    BUN 16 08/28/2017 0816    CREATININE 1.0 08/28/2017 0816     08/28/2017 0816        Component Value Date/Time    CALCIUM 9.6 08/28/2017 0816    ALKPHOS 77 08/28/2017 0816    AST 15 08/28/2017 0816    ALT 14 08/28/2017 0816    BILITOT 0.3 08/28/2017 0816    ESTGFRAFRICA >60.0 08/28/2017 0816    EGFRNONAA >60.0 08/28/2017 0816          Lab Results   Component Value Date    TSH 1.928 12/31/2013     Lab Results   Component Value Date    INR 1.1 08/10/2012     Lab Results   Component Value Date    WBC 9.53 01/03/2017    HGB 12.5 01/03/2017    HCT 36.7 (L) 01/03/2017    MCV 88 01/03/2017     01/03/2017     BMP  Sodium   Date Value Ref Range Status   08/28/2017 139 136 - 145 mmol/L Final     Potassium   Date Value Ref Range Status   08/28/2017 3.9 3.5 - 5.1 mmol/L Final     Chloride   Date Value Ref Range Status   08/28/2017 104 95 - 110 mmol/L Final     CO2   Date Value Ref Range Status   08/28/2017 24 23 - 29 mmol/L Final     BUN, Bld   Date Value Ref Range Status   08/28/2017 16 8 - 23 mg/dL Final     Creatinine   Date Value Ref Range Status   08/28/2017 1.0 0.5 - 1.4 mg/dL Final     Calcium   Date Value Ref Range Status   08/28/2017 9.6 8.7 - 10.5 mg/dL Final     Anion Gap   Date Value Ref Range Status   08/28/2017 11 8 - 16 mmol/L Final     eGFR if    Date Value Ref Range Status   08/28/2017 >60.0 >60 mL/min/1.73 m^2 Final     eGFR if non    Date Value Ref Range Status   08/28/2017 >60.0 >60 mL/min/1.73 m^2 Final     Comment:     Calculation used to obtain the estimated glomerular filtration  rate (eGFR) is the CKD-EPI equation. Since race is unknown   in our information system, the  eGFR values for   -American and Non--American patients are given   for each creatinine result.       Estimated Creatinine Clearance: 55.2 mL/min (based on Cr of 1).    Assessment:     1. Coronary artery disease involving native coronary artery of native heart without angina pectoris    2. Sinus node dysfunction    3. COPD, severe    4. Nocturnal hypoxemia due to emphysema    5. DDD (degenerative disc disease), lumbar    6. Abdominal aortic aneurysm (AAA) without rupture    7. Bilateral carotid artery stenosis    8. Prediabetes    9. Essential hypertension    10. Mixed hyperlipidemia    11. Tobacco abuse      Stable clinically  Cardiac wise asymptomatic lipids on target. Her lung disease is a major issue for her limiting he ractivity.  Plan:   Continue current therapy  Cardiac low salt diet.  Risk factor modification and excercise program.  Smoking cessation counseling  F/u in 6 months with lipid cmp

## 2017-08-31 DIAGNOSIS — J44.9 CHRONIC OBSTRUCTIVE PULMONARY DISEASE, UNSPECIFIED COPD TYPE: ICD-10-CM

## 2017-08-31 RX ORDER — MOMETASONE FUROATE AND FORMOTEROL FUMARATE DIHYDRATE 200; 5 UG/1; UG/1
AEROSOL RESPIRATORY (INHALATION)
Qty: 13 G | Refills: 5 | Status: SHIPPED | OUTPATIENT
Start: 2017-08-31 | End: 2017-11-01 | Stop reason: SDUPTHER

## 2017-09-02 ENCOUNTER — PATIENT MESSAGE (OUTPATIENT)
Dept: CARDIOLOGY | Facility: CLINIC | Age: 62
End: 2017-09-02

## 2017-09-05 DIAGNOSIS — I65.29 STENOSIS OF CAROTID ARTERY, UNSPECIFIED LATERALITY: Primary | ICD-10-CM

## 2017-09-06 ENCOUNTER — CLINICAL SUPPORT (OUTPATIENT)
Dept: CARDIOLOGY | Facility: CLINIC | Age: 62
End: 2017-09-06
Payer: MEDICAID

## 2017-09-06 DIAGNOSIS — I65.29 STENOSIS OF CAROTID ARTERY, UNSPECIFIED LATERALITY: ICD-10-CM

## 2017-09-06 LAB — INTERNAL CAROTID STENOSIS: ABNORMAL

## 2017-09-06 PROCEDURE — 93880 EXTRACRANIAL BILAT STUDY: CPT | Mod: PBBFAC | Performed by: INTERNAL MEDICINE

## 2017-09-07 ENCOUNTER — TELEPHONE (OUTPATIENT)
Dept: CARDIOLOGY | Facility: CLINIC | Age: 62
End: 2017-09-07

## 2017-09-07 NOTE — TELEPHONE ENCOUNTER
[facility] OB & Gynecology    [address]    [place] [address]    Phone:  [phone]    Fax:  [phone]       Thank You for choosing us for your health care visit. We are glad to serve you and happy to provide you with this summary of your visit. Please help us to ensure we have accurate records. If you find anything that needs to be changed, please let our staff know as soon as possible.          Your Demographic Information     Patient Name Sex Kartik Kay Female 1993       Ethnic Group Patient Race    Not of  or  Origin White      Your Visit Details     Date & Time Provider Department    2017 10:45 AM Lauren Mina MD [facility] OB & Gynecology      Your Upcoming Appointment*(Max 10)     2017  2:00 PM CST   IV Fluid for Hydration with [facility] IV THERAPY CHAIR 1   [facility] ([facility])    [address]  [place] [address] [phone]            Wednesday March 15, 2017  3:45 PM CDT   Obstetric Check with Lauren Mina MD   [facility] Gynecology ([facility])    [address]  [place] [address] [phone]              Conditions Discussed Today or Order-Related Diagnoses        Comments    Encounter for supervision of other normal pregnancy in second trimester           Your Vitals Were     BP Pulse Temp Weight BMI Smoking Status    98/60 100 98 °F (36.7 °C) (Oral) 169 lb (76.7 kg) 28.12 kg/m2 Former Smoker      Medications Prescribed or Re-Ordered Today     None      Your Current Medications Are        Disp Refills Start End    Prenatal Multivit-Min-Fe-FA (PRENATAL VITAMINS PO)        Class: Historical Med    Route: Oral    ranitidine (ZANTAC) 150 MG tablet 60 tablet 1 11/15/2016     Sig - Route: Take 1 tablet by mouth 2 times daily. - Oral    Attempted without success to reach patient.  Message left on patient's voicemail.   Class: Eprescribe      Allergies     No Known Allergies      Immunizations History as of 2/27/2017     Name Date    MMR 9/24/2014 11:08 AM    Tdap 8/6/2014  4:18 PM      Problem List as of 2/27/2017     Encounter for supervision of normal pregnancy in second trimester    declines flu vaccine 11-23-16    plans on breastfeeding    declines yoni 11-23-16    Blood type O+    Gastroenteritis    Dehydration            Patient Instructions     None

## 2017-09-18 ENCOUNTER — HOSPITAL ENCOUNTER (OUTPATIENT)
Dept: RADIOLOGY | Facility: HOSPITAL | Age: 62
Discharge: HOME | End: 2017-09-18
Attending: FAMILY MEDICINE
Payer: MEDICAID

## 2017-09-18 ENCOUNTER — PROCEDURE VISIT (OUTPATIENT)
Dept: PULMONOLOGY | Facility: CLINIC | Age: 62
End: 2017-09-18
Payer: MEDICAID

## 2017-09-18 ENCOUNTER — HOSPITAL ENCOUNTER (OUTPATIENT)
Dept: RADIOLOGY | Facility: HOSPITAL | Age: 62
Discharge: HOME | End: 2017-09-18
Attending: INTERNAL MEDICINE
Payer: MEDICAID

## 2017-09-18 ENCOUNTER — OFFICE VISIT (OUTPATIENT)
Dept: PULMONOLOGY | Facility: CLINIC | Age: 62
End: 2017-09-18
Payer: MEDICAID

## 2017-09-18 VITALS
DIASTOLIC BLOOD PRESSURE: 70 MMHG | BODY MASS INDEX: 30.73 KG/M2 | RESPIRATION RATE: 20 BRPM | WEIGHT: 167 LBS | OXYGEN SATURATION: 94 % | HEIGHT: 62 IN | HEART RATE: 68 BPM | SYSTOLIC BLOOD PRESSURE: 120 MMHG

## 2017-09-18 DIAGNOSIS — G47.36 NOCTURNAL HYPOXEMIA DUE TO EMPHYSEMA: ICD-10-CM

## 2017-09-18 DIAGNOSIS — J44.9 COPD, MODERATE: Primary | ICD-10-CM

## 2017-09-18 DIAGNOSIS — Z12.39 BREAST CANCER SCREENING: ICD-10-CM

## 2017-09-18 DIAGNOSIS — J44.9 COPD, SEVERE: ICD-10-CM

## 2017-09-18 DIAGNOSIS — F17.201 TOBACCO USE DISORDER, MODERATE, IN SUSTAINED REMISSION: ICD-10-CM

## 2017-09-18 DIAGNOSIS — J43.9 NOCTURNAL HYPOXEMIA DUE TO EMPHYSEMA: ICD-10-CM

## 2017-09-18 LAB
POST FEF 25 75: 0.37 L/S (ref 1.56–2.72)
POST FET 100: 17.7 S
POST FEV1 FVC: 53 %
POST FEV1: 1.41 L (ref 2.02–2.57)
POST FIF 50: 3.13 L/S
POST FVC: 2.64 L (ref 2.66–3.31)
POST PEF: 3.26 L/S (ref 5.05–6.65)
PRE FEF 25 75: 0.33 L/S (ref 1.56–2.72)
PRE FET 100: 15.14 S
PRE FEV1 FVC: 50 %
PRE FEV1: 1.17 L (ref 2.02–2.57)
PRE FIF 50: 0.75 L/S
PRE FVC: 2.32 L (ref 2.66–3.31)
PRE PEF: 2.64 L/S (ref 5.05–6.65)
PREDICTED FEV1 FVC: 77.63 % (ref 72.74–82.53)
PREDICTED FEV1: 2.3 L (ref 2.02–2.57)
PREDICTED FVC: 2.99 L (ref 2.66–3.31)
PROVOCATION PROTOCOL: ABNORMAL

## 2017-09-18 PROCEDURE — 94060 EVALUATION OF WHEEZING: CPT | Mod: PBBFAC

## 2017-09-18 PROCEDURE — 71020 XR CHEST PA AND LATERAL: CPT | Mod: TC

## 2017-09-18 PROCEDURE — 99214 OFFICE O/P EST MOD 30 MIN: CPT | Mod: 25,S$PBB,, | Performed by: NURSE PRACTITIONER

## 2017-09-18 PROCEDURE — 94060 EVALUATION OF WHEEZING: CPT | Mod: 26,S$PBB,, | Performed by: INTERNAL MEDICINE

## 2017-09-18 PROCEDURE — 71020 XR CHEST PA AND LATERAL: CPT | Mod: 26,,, | Performed by: RADIOLOGY

## 2017-09-18 PROCEDURE — 77067 SCR MAMMO BI INCL CAD: CPT | Mod: TC

## 2017-09-18 PROCEDURE — 3074F SYST BP LT 130 MM HG: CPT | Mod: ,,, | Performed by: NURSE PRACTITIONER

## 2017-09-18 PROCEDURE — 3008F BODY MASS INDEX DOCD: CPT | Mod: ,,, | Performed by: NURSE PRACTITIONER

## 2017-09-18 PROCEDURE — 3078F DIAST BP <80 MM HG: CPT | Mod: ,,, | Performed by: NURSE PRACTITIONER

## 2017-09-18 PROCEDURE — 77063 BREAST TOMOSYNTHESIS BI: CPT | Mod: 26,,, | Performed by: RADIOLOGY

## 2017-09-18 PROCEDURE — 77067 SCR MAMMO BI INCL CAD: CPT | Mod: 26,,, | Performed by: RADIOLOGY

## 2017-09-18 PROCEDURE — 99214 OFFICE O/P EST MOD 30 MIN: CPT | Mod: PBBFAC,25 | Performed by: NURSE PRACTITIONER

## 2017-09-18 PROCEDURE — 99999 PR PBB SHADOW E&M-EST. PATIENT-LVL IV: CPT | Mod: PBBFAC,,, | Performed by: NURSE PRACTITIONER

## 2017-09-18 NOTE — LETTER
September 18, 2017      Nathan Rodriguez MD  [address] [address]           [facility]  [address] [place] [id number]  Phone: [phone]  Fax: [phone]          Patient: Gemma Vick   MR Number: [id number]   YOB: 1955   Date of Visit: 9/18/2017       Dear Dr. Nathan Rodriguez:    Thank you for referring Gemma Vick to me for evaluation. Attached you will find relevant portions of my assessment and plan of care.    If you have questions, please do not hesitate to call me. I look forward to following Gemma Vick along with you.    Sincerely,    Sandrine Devries, NP    Enclosure  CC:  No Recipients    If you would like to receive this communication electronically, please contact [email or web address] or [phone] to request more information on [employer] access.    For providers and/or their staff who would like to refer a patient to [facility], please contact us through our one-stop-shop provider referral line, [facility], at [phone].    If you feel you have received this communication in error or would no longer like to receive these types of communications, please e-mail [email or web address]

## 2017-09-18 NOTE — PROGRESS NOTES
"Subjective:      Chief Complaint   Patient presents with    COPD        Gemma Vick is a 62 y.o. female presents to pulmonary clinic for 6 month follow up visit related to diagnosis of COPD.   The patient is not currently have symptoms / an exacerbation.   She reports since quitting smoking in May 2017 she is significantly improved and rarely wheezes. If she does wheeze resolves with cough maneuver.   She is using her dulera 2 puffs twice a day most days of the week, occasionally misses a dose. Combivent inhaler 1-2 times a day, which is decreased usage, prior to stopping smoking she was using combivent about every 2 -3 hrs.   Has duo neb and albuterol inhaler on hand, has not used over past 6 months since stopping smoking.      She uses 1 pillow at night. Patient currently is on oxygen at 2 L/min per nasal cannula. at night.   The patient is having no constitutional symptoms, denying fever, chills, anorexia, or weight loss.  She quit cigarette smoking approximately May 2017. She continues to use nicotine in form of vapor 2-3 times a day.   The patient has some exercise limitations if walking long distances.    Previous Report Reviewed: lab reports, office notes and radiology reports     The following portions of the patient's history were reviewed and updated as appropriate: allergies, current medications, past family history, past medical history, past social history, past surgical history and problem list.    Review of Systems  A comprehensive review of systems was negative except for: Respiratory: positive for cough and wheezing rarely.     Objective:      /70 (BP Location: Right arm, Patient Position: Sitting)   Pulse 68   Resp 20   Ht 5' 2" (1.575 m)   Wt 75.8 kg (167 lb)   SpO2 (!) 94%   BMI 30.54 kg/m²   General appearance: alert, appears stated age and cooperative  Head: Normocephalic, without obvious abnormality, atraumatic  Eyes: negative  Ears: normal TM's and external ear canals both " ears  Nose: Nares normal. Septum midline. Mucosa normal. No drainage or sinus tenderness.  Throat: lips, mucosa, and tongue normal; teeth and gums normal  Neck: no adenopathy and no carotid bruit  Lungs: clear to auscultation bilaterally  Abdomen: normal findings: soft, non-tender  Extremities: extremities normal, atraumatic, no cyanosis or edema  Pulses: 2+ and symmetric  Skin: Skin color, texture, turgor normal. No rashes or lesions  Lymph nodes: Cervical, supraclavicular, and axillary nodes normal.  Neurologic: Grossly normal    Diagnostic Review   Pulmonary function tests: 9/18/2017 FEV1: 1.18  (51 % predicted), FVC:  2.32 (78 % predicted), FEV1/FVC:  51 (65 % predicted).  Post bronchodilator: FEV1: 1.41  (62 % predicted), FVC:  2.64 (88 % predicted), FEV1/FVC:  54 (69 % predicted).   Moderate obstructive airflow defect  Positive bronchodilator response with 20% increase in FEV1 post bronchodilator.   FEV1 improved by 14% compared to tomer 12/15/2016.     Chest xray 9/18/2017  Findings: The cardiac and mediastinal silhouettes are within normal limits.     Lungs remain mildly hyperinflated.    No parenchymal consolidations or pleural effusions demonstrated.   Visualized osseous structures demonstrate no acute abnormality.    Assessment:          1. COPD, moderate  Spirometry with/without bronchodilator   2. Tobacco use disorder, moderate, in sustained remission     3. Nocturnal hypoxemia due to emphysema          Plan:     Problem List Items Addressed This Visit     COPD, moderate - Primary     Improved with compete smoking cessation May 2017, continues to use nicotine vapor 2-3 times day.   Stable chest xray 9/18/2017  Tomer 9/18/2017 improved from severe airflow defect to moderate.   Moderate obstructive airflow defect  Positive bronchodilator response with 20% increase in FEV1 post bronchodilator.   FEV1 improved by 14% compared to tomer 12/15/2016.   Continue Dulera, combivent, has duo neb and albuterol if  needed           Relevant Orders    Spirometry with/without bronchodilator    Nocturnal hypoxemia due to emphysema     Continue NC 2 lm nightly.         Tobacco use disorder, moderate, in sustained remission     compete smoking cessation May 2017, continues to use nicotine vapor 2-3 times day.            Other Visit Diagnoses    None.        Discussed diagnosis, its evaluation, treatment and usual course.  All questions answered.      Return in about 6 months (around 3/18/2018) for COPD follow up, w/review tomer.

## 2017-09-19 NOTE — ASSESSMENT & PLAN NOTE
Improved with compete smoking cessation May 2017, continues to use nicotine vapor 2-3 times day.   Stable chest xray 9/18/2017  Tomer 9/18/2017 improved from severe airflow defect to moderate.   Moderate obstructive airflow defect  Positive bronchodilator response with 20% increase in FEV1 post bronchodilator.   FEV1 improved by 14% compared to tomer 12/15/2016.   Continue Dulera, combivent, has duo neb and albuterol if needed

## 2017-09-30 ENCOUNTER — PATIENT MESSAGE (OUTPATIENT)
Dept: FAMILY MEDICINE | Facility: CLINIC | Age: 62
End: 2017-09-30

## 2017-10-05 ENCOUNTER — OFFICE VISIT (OUTPATIENT)
Dept: CARDIOLOGY | Facility: CLINIC | Age: 62
End: 2017-10-05
Payer: MEDICAID

## 2017-10-05 VITALS
HEART RATE: 74 BPM | DIASTOLIC BLOOD PRESSURE: 58 MMHG | BODY MASS INDEX: 29.6 KG/M2 | WEIGHT: 161.81 LBS | SYSTOLIC BLOOD PRESSURE: 120 MMHG

## 2017-10-05 DIAGNOSIS — I49.5 SINUS NODE DYSFUNCTION: Primary | ICD-10-CM

## 2017-10-05 DIAGNOSIS — I25.10 CORONARY ARTERY DISEASE INVOLVING NATIVE CORONARY ARTERY OF NATIVE HEART WITHOUT ANGINA PECTORIS: ICD-10-CM

## 2017-10-05 DIAGNOSIS — I10 ESSENTIAL HYPERTENSION: ICD-10-CM

## 2017-10-05 PROCEDURE — 99214 OFFICE O/P EST MOD 30 MIN: CPT | Mod: S$PBB,,, | Performed by: INTERNAL MEDICINE

## 2017-10-05 PROCEDURE — 99213 OFFICE O/P EST LOW 20 MIN: CPT | Mod: PBBFAC | Performed by: INTERNAL MEDICINE

## 2017-10-05 PROCEDURE — 99999 PR PBB SHADOW E&M-EST. PATIENT-LVL III: CPT | Mod: PBBFAC,,, | Performed by: INTERNAL MEDICINE

## 2017-10-05 NOTE — PROGRESS NOTES
Subjective:    Patient ID:  Gemma Vick is a 62 y.o. female who presents for follow-up of Bradycardia      61 yoF HTN, CAD, COPD, AAA here for follow up of bradycardia and near syncope.     Prior visits: 9/15 She has history of near syncopal episodes over the past year. She has had holter monitors that showed 3s pauses. Her event monitor showed a near 4s pause; this was associated with her symptoms. She stopped metoprolol 50 mg qd and had no subsequent symptoms. She took metoprolol for history of CAD with remote history of MI, last 7 years ago. Her recent echo is listed below.     9/16: She has no subsequent syncope or near syncope events since stopping metoprolol. There are some episodes with waking up at night.     Interval history: Near syncope, very rare. Some episodes of near syncope are consistent with vasovagal syncope.     Echo 2015:  CONCLUSIONS   1 - Concentric remodeling.   2 - Normal left ventricular systolic function (EF 60-65%).   3 - Left ventricular diastolic dysfunction.   4 - Normal right ventricular systolic function .   5 - Moderate aortic regurgitation.     Past Medical History:  2/13/2014: AAA (abdominal aortic aneurysm)  No date: Abdominal aneurysm      Comment: 3  No date: Acute coronary syndrome  No date: Arthritis  No date: BPPV (benign paroxysmal positional vertigo)  No date: Carotid artery plaque  2/13/2014: Carotid artery stenosis and occlusion  No date: Chronic back pain  No date: COPD (chronic obstructive pulmonary disease)  No date: Coronary artery disease  No date: Emphysema lung  No date: Hyperlipidemia  No date: Hypertension  No date: Myocardial infarction      Comment: x3  No date: Neuropathy    Past Surgical History:  No date: CARDIAC CATHETERIZATION  No date: CORONARY ANGIOPLASTY  No date: HYSTERECTOMY    Social History    Marital status:              Spouse name:                       Years of education:                 Number of children:               Occupational  History    None on file    Social History Main Topics    Smoking status: Former Smoker                                                                Packs/day: 0.50      Years: 44.00          Types: Cigarettes, Vaping w/o nicotine       Start date: 6/24/1970       Quit date: 05/2017    Smokeless tobacco: Never Used                        Alcohol use: No              Drug use: No              Sexual activity: Not Currently           Birth control/protection: None    Other Topics            Concern    None on file    Social History Narrative    None on file    Review of patient's family history indicates:    Heart disease                  Mother                    Heart attacks under age 50     Father                    Heart attacks under age 50     Brother                         Review of Systems   Constitution: Negative for weakness and malaise/fatigue.   HENT: Negative.    Eyes: Negative.    Cardiovascular: Positive for near-syncope. Negative for chest pain, dyspnea on exertion, irregular heartbeat, palpitations and syncope.   Respiratory: Negative.    Endocrine: Negative.    Hematologic/Lymphatic: Negative.    Skin: Negative.    Musculoskeletal: Negative.    Gastrointestinal: Negative.    Genitourinary: Negative.    Neurological: Negative for dizziness.   Psychiatric/Behavioral: Negative.    Allergic/Immunologic: Negative.         Objective:    Physical Exam   Constitutional: She is oriented to person, place, and time. She appears well-developed and well-nourished. No distress.   HENT:   Head: Normocephalic and atraumatic.   Mouth/Throat: No oropharyngeal exudate.   Eyes: Conjunctivae and EOM are normal. Pupils are equal, round, and reactive to light. Right eye exhibits no discharge. Left eye exhibits no discharge.   Neck: Normal range of motion. Neck supple. No JVD present. No thyromegaly present.   Cardiovascular: Normal rate, regular rhythm and normal heart sounds.    No murmur heard.  Pulmonary/Chest:  Effort normal and breath sounds normal. No respiratory distress. She has no wheezes.   Abdominal: Soft. Bowel sounds are normal. She exhibits no distension. There is no tenderness.   Musculoskeletal: Normal range of motion. She exhibits no edema.   Neurological: She is alert and oriented to person, place, and time. No cranial nerve deficit.   Skin: Skin is warm and dry. No rash noted. She is not diaphoretic. No erythema.   Psychiatric: She has a normal mood and affect. Her behavior is normal. Judgment and thought content normal.   Vitals reviewed.        Assessment:       1. Sinus node dysfunction    2. Essential hypertension    3. Coronary artery disease involving native coronary artery of native heart without angina pectoris         Plan:       62 yoF SSS, syncope here for follow up. No recurrent syncope in 2 years since stopping metoprolol. No indication for PM. RTC as needed

## 2017-10-19 ENCOUNTER — PATIENT MESSAGE (OUTPATIENT)
Dept: CARDIOLOGY | Facility: CLINIC | Age: 62
End: 2017-10-19

## 2017-11-01 ENCOUNTER — OFFICE VISIT (OUTPATIENT)
Dept: FAMILY MEDICINE | Facility: CLINIC | Age: 62
End: 2017-11-01
Payer: MEDICAID

## 2017-11-01 VITALS
HEIGHT: 62 IN | OXYGEN SATURATION: 97 % | TEMPERATURE: 97 F | HEART RATE: 75 BPM | WEIGHT: 163.69 LBS | DIASTOLIC BLOOD PRESSURE: 68 MMHG | BODY MASS INDEX: 30.12 KG/M2 | SYSTOLIC BLOOD PRESSURE: 118 MMHG

## 2017-11-01 DIAGNOSIS — E78.2 MIXED HYPERLIPIDEMIA: ICD-10-CM

## 2017-11-01 DIAGNOSIS — M51.36 DDD (DEGENERATIVE DISC DISEASE), LUMBAR: ICD-10-CM

## 2017-11-01 DIAGNOSIS — F32.A DEPRESSION, UNSPECIFIED DEPRESSION TYPE: ICD-10-CM

## 2017-11-01 DIAGNOSIS — Z78.0 POSTMENOPAUSAL: ICD-10-CM

## 2017-11-01 DIAGNOSIS — G47.00 INSOMNIA, UNSPECIFIED TYPE: Primary | ICD-10-CM

## 2017-11-01 DIAGNOSIS — I10 ESSENTIAL HYPERTENSION: ICD-10-CM

## 2017-11-01 DIAGNOSIS — J44.9 CHRONIC OBSTRUCTIVE PULMONARY DISEASE, UNSPECIFIED COPD TYPE: ICD-10-CM

## 2017-11-01 DIAGNOSIS — I25.10 CORONARY ARTERY DISEASE INVOLVING NATIVE CORONARY ARTERY OF NATIVE HEART WITHOUT ANGINA PECTORIS: ICD-10-CM

## 2017-11-01 DIAGNOSIS — M77.32 CALCANEAL SPUR OF LEFT FOOT: ICD-10-CM

## 2017-11-01 PROCEDURE — 99999 PR PBB SHADOW E&M-EST. PATIENT-LVL IV: CPT | Mod: PBBFAC,,,

## 2017-11-01 PROCEDURE — 99214 OFFICE O/P EST MOD 30 MIN: CPT | Mod: S$PBB,,,

## 2017-11-01 PROCEDURE — 99214 OFFICE O/P EST MOD 30 MIN: CPT | Mod: PBBFAC,PO

## 2017-11-01 RX ORDER — EZETIMIBE AND SIMVASTATIN 10; 40 MG/1; MG/1
1 TABLET ORAL NIGHTLY
Qty: 30 TABLET | Refills: 5 | Status: SHIPPED | OUTPATIENT
Start: 2017-11-01 | End: 2018-06-19 | Stop reason: SDUPTHER

## 2017-11-01 RX ORDER — ALBUTEROL SULFATE 90 UG/1
2 AEROSOL, METERED RESPIRATORY (INHALATION) EVERY 6 HOURS PRN
Qty: 18 G | Refills: 6 | Status: SHIPPED | OUTPATIENT
Start: 2017-11-01 | End: 2018-07-31 | Stop reason: SDUPTHER

## 2017-11-01 RX ORDER — ZOLPIDEM TARTRATE 5 MG/1
5 TABLET ORAL NIGHTLY
Qty: 30 TABLET | Refills: 5 | Status: SHIPPED | OUTPATIENT
Start: 2017-11-01 | End: 2018-05-01 | Stop reason: SDUPTHER

## 2017-11-01 RX ORDER — CLOPIDOGREL BISULFATE 75 MG/1
75 TABLET ORAL DAILY
Qty: 30 TABLET | Refills: 5 | Status: SHIPPED | OUTPATIENT
Start: 2017-11-01 | End: 2018-05-01 | Stop reason: SDUPTHER

## 2017-11-01 RX ORDER — FELODIPINE 5 MG/1
5 TABLET, EXTENDED RELEASE ORAL DAILY
Qty: 30 TABLET | Refills: 5 | Status: SHIPPED | OUTPATIENT
Start: 2017-11-01 | End: 2018-04-18 | Stop reason: HOSPADM

## 2017-11-01 RX ORDER — DULOXETIN HYDROCHLORIDE 60 MG/1
60 CAPSULE, DELAYED RELEASE ORAL DAILY
Qty: 30 CAPSULE | Refills: 5 | Status: SHIPPED | OUTPATIENT
Start: 2017-11-01 | End: 2018-05-01 | Stop reason: SDUPTHER

## 2017-11-01 RX ORDER — IBUPROFEN 400 MG/1
400 TABLET ORAL EVERY 12 HOURS PRN
Qty: 30 TABLET | Refills: 5 | Status: SHIPPED | OUTPATIENT
Start: 2017-11-01 | End: 2018-03-12 | Stop reason: ALTCHOICE

## 2017-11-01 NOTE — PROGRESS NOTES
Subjective:       Patient ID: Gemma Vick is a 62 y.o. female.    Chief Complaint: Medication Refill    HPI   Today for refills for multiple chronic health complaints.  Patient has a history of insomnia for which she takes Ambien 5 mg by mouth daily at bedtime when necessary.  She says that medication works well to help her sleep.  She says without it her sleep is non-restful and she usually only gets 2-3 hours a night.  She denies any side effects such as parasomnias are hypersomnolence.    Patient also voices a history of COPD for which she sees pulmonology she takes Dulera and albuterol on an as-needed basis.  She says the medications were well to keep her symptoms stable well controlled she denies any shortness of breath, cough, or wheezing at this time.    Patient voices a history of essential hypertension for which she currently takes Plendil.  She says it works well to keep her symptoms stable well controlled she denies any chest pain, shortness of breath, or swelling of her extremities the patient denies a low salt intake and regular exercise routine.    Patient also voices a history of hyperlipidemia for which she takes Vytorin.  She says not one works best for her.  She has tried other medications in the past which she says did not work at all.  She denies a low fat diet or regular exercise routine.    Patient has a history of degenerative disc disease which causes pain in her back radiating down to her legs.  He currently takes Cymbalta for depression and this chronic pain.  She has spoken with her cardiologist and asked about some pain medication he advised that she start a small dose of an NSAID but wanted her PCP prescribe it.     Patient also has a history of depression which for which she also takes Cymbalta.  She says works well to keep her symptoms stable well controlled she denies any suicidal ideations    Patient also voices a history of coronary artery disease which is followed by  cardiology.  She is currently taking Vytorin.  She denies any chest pain at this time    The patient is postmenopausal and has been taking estrogen for some time.  I advised that it is time that we start considering tapering down the dose of the estrogen, she says she will discuss this with her PCP.    She also voices a constant waxing and waning left heel pain that has been going on for several months.  She had an x-ray performed several months ago which showed a calcaneal spur which is most likely the impetus that her pain as it correlates with where that she is hurting she denies any recent trauma.  She says the pain is worse in the mornings when she first gets up.       Review of Systems   Constitutional: Negative for activity change, appetite change, fatigue and unexpected weight change.   HENT: Negative.    Eyes: Negative.    Respiratory: Negative for cough, chest tightness, shortness of breath and wheezing.    Cardiovascular: Negative for chest pain, palpitations and leg swelling.   Gastrointestinal: Negative for constipation, diarrhea, nausea and vomiting.   Endocrine: Negative.    Genitourinary: Negative.    Musculoskeletal: Positive for myalgias (left heel pain).   Skin: Negative for color change.   Allergic/Immunologic: Negative.    Neurological: Negative for dizziness, weakness and light-headedness.   Hematological: Negative.    Psychiatric/Behavioral: Positive for sleep disturbance.         Objective:      Physical Exam   Constitutional: She is oriented to person, place, and time. She appears well-developed and well-nourished.   HENT:   Head: Normocephalic and atraumatic.   Eyes: Conjunctivae are normal. Pupils are equal, round, and reactive to light. No scleral icterus.   Neck: Normal range of motion. Neck supple.   Cardiovascular: Normal rate, regular rhythm, normal heart sounds and intact distal pulses.  Exam reveals no gallop and no friction rub.    No murmur heard.  Pulmonary/Chest: Effort normal  and breath sounds normal. No respiratory distress. She has no wheezes. She has no rales. She exhibits no tenderness.   Musculoskeletal: Normal range of motion.        Left foot: There is tenderness. There is normal range of motion, no bony tenderness, no swelling, normal capillary refill, no crepitus, no deformity and no laceration.        Feet:    Lymphadenopathy:     She has no cervical adenopathy.   Neurological: She is alert and oriented to person, place, and time. She exhibits normal muscle tone. Coordination normal.   Skin: Skin is warm and dry. No rash noted. No erythema. No pallor.   Psychiatric: She has a normal mood and affect. Her behavior is normal. Judgment and thought content normal.   Vitals reviewed.      Assessment:       1. Insomnia, unspecified type    2. Chronic obstructive pulmonary disease, unspecified COPD type    3. Essential hypertension    4. Mixed hyperlipidemia    5. DDD (degenerative disc disease), lumbar    6. Depression, unspecified depression type    7. Coronary artery disease involving native coronary artery of native heart without angina pectoris    8. Postmenopausal    9. Calcaneal spur of left foot          Plan:   Insomnia, unspecified type  -     zolpidem (AMBIEN) 5 MG Tab; Take 1 tablet (5 mg total) by mouth nightly.  Dispense: 30 tablet; Refill: 5    Chronic obstructive pulmonary disease, unspecified COPD type  -     ipratropium-albuterol (COMBIVENT RESPIMAT)  mcg/actuation inhaler; Inhale 1 puff into the lungs every 4 (four) hours as needed for Wheezing. Medically necessary  Dispense: 2 Package; Refill: 5  -     mometasone-formoterol (DULERA) 200-5 mcg/actuation inhaler; INHALE 2 PUFFS 2 TIMES DAILY  Dispense: 13 g; Refill: 5  -     albuterol (PROAIR HFA) 90 mcg/actuation inhaler; Inhale 2 puffs into the lungs every 6 (six) hours as needed.  Dispense: 18 g; Refill: 6    Essential hypertension  -     felodipine (PLENDIL) 5 MG 24 hr tablet; Take 1 tablet (5 mg total) by  mouth once daily.  Dispense: 30 tablet; Refill: 5    Mixed hyperlipidemia  -     ezetimibe-simvastatin 10-40 mg (VYTORIN) 10-40 mg per tablet; Take 1 tablet by mouth every evening.  Dispense: 30 tablet; Refill: 5    DDD (degenerative disc disease), lumbar  -     ibuprofen (ADVIL,MOTRIN) 400 MG tablet; Take 1 tablet (400 mg total) by mouth every 12 (twelve) hours as needed for Other.  Dispense: 30 tablet; Refill: 5    Depression, unspecified depression type  -     DULoxetine (CYMBALTA) 60 MG capsule; Take 1 capsule (60 mg total) by mouth once daily.  Dispense: 30 capsule; Refill: 5    Coronary artery disease involving native coronary artery of native heart without angina pectoris  -     clopidogrel (PLAVIX) 75 mg tablet; Take 1 tablet (75 mg total) by mouth once daily.  Dispense: 30 tablet; Refill: 5    Postmenopausal  -     estrogens, conjugated, (PREMARIN) 0.3 MG tablet; Take 1 tablet (0.3 mg total) by mouth every other day.  Dispense: 15 tablet; Refill: 5    Calcaneal spur of left foot  -     Ambulatory referral to Podiatry            Disclaimer: This note is prepared using voice recognition software.  As such there may be errors in the dictation.  It has not been proofread.

## 2017-12-07 ENCOUNTER — PATIENT MESSAGE (OUTPATIENT)
Dept: FAMILY MEDICINE | Facility: CLINIC | Age: 62
End: 2017-12-07

## 2017-12-07 DIAGNOSIS — M25.511 RIGHT SHOULDER PAIN, UNSPECIFIED CHRONICITY: Primary | ICD-10-CM

## 2017-12-07 RX ORDER — TIZANIDINE HYDROCHLORIDE 4 MG/1
4 CAPSULE, GELATIN COATED ORAL 2 TIMES DAILY PRN
Qty: 30 CAPSULE | Refills: 0 | Status: SHIPPED | OUTPATIENT
Start: 2017-12-07 | End: 2017-12-17

## 2017-12-07 RX ORDER — TIZANIDINE 4 MG/1
TABLET ORAL
Qty: 30 TABLET | Refills: 0 | OUTPATIENT
Start: 2017-12-07

## 2017-12-08 ENCOUNTER — HOSPITAL ENCOUNTER (OUTPATIENT)
Dept: RADIOLOGY | Facility: HOSPITAL | Age: 62
Discharge: HOME | End: 2017-12-08
Attending: ORTHOPAEDIC SURGERY
Payer: MEDICAID

## 2017-12-08 ENCOUNTER — OFFICE VISIT (OUTPATIENT)
Dept: ORTHOPEDICS | Facility: CLINIC | Age: 62
End: 2017-12-08
Payer: MEDICAID

## 2017-12-08 VITALS
HEART RATE: 79 BPM | SYSTOLIC BLOOD PRESSURE: 121 MMHG | WEIGHT: 163.56 LBS | HEIGHT: 62 IN | BODY MASS INDEX: 30.1 KG/M2 | DIASTOLIC BLOOD PRESSURE: 73 MMHG

## 2017-12-08 DIAGNOSIS — M25.511 RIGHT SHOULDER PAIN, UNSPECIFIED CHRONICITY: ICD-10-CM

## 2017-12-08 DIAGNOSIS — M75.101 ROTATOR CUFF SYNDROME OF RIGHT SHOULDER: ICD-10-CM

## 2017-12-08 DIAGNOSIS — M75.41 ROTATOR CUFF IMPINGEMENT SYNDROME OF RIGHT SHOULDER: Primary | ICD-10-CM

## 2017-12-08 DIAGNOSIS — M75.51 BURSITIS OF RIGHT SHOULDER: ICD-10-CM

## 2017-12-08 DIAGNOSIS — M19.019 ACROMIOCLAVICULAR JOINT ARTHRITIS: ICD-10-CM

## 2017-12-08 PROCEDURE — 73030 X-RAY EXAM OF SHOULDER: CPT | Mod: TC,RT

## 2017-12-08 PROCEDURE — 99999 PR PBB SHADOW E&M-EST. PATIENT-LVL III: CPT | Mod: PBBFAC,,, | Performed by: ORTHOPAEDIC SURGERY

## 2017-12-08 PROCEDURE — 99204 OFFICE O/P NEW MOD 45 MIN: CPT | Mod: 25,S$PBB,, | Performed by: ORTHOPAEDIC SURGERY

## 2017-12-08 PROCEDURE — 20610 DRAIN/INJ JOINT/BURSA W/O US: CPT | Mod: PBBFAC | Performed by: ORTHOPAEDIC SURGERY

## 2017-12-08 PROCEDURE — 73030 X-RAY EXAM OF SHOULDER: CPT | Mod: 26,RT,, | Performed by: RADIOLOGY

## 2017-12-08 PROCEDURE — 99213 OFFICE O/P EST LOW 20 MIN: CPT | Mod: PBBFAC,25 | Performed by: ORTHOPAEDIC SURGERY

## 2017-12-08 RX ORDER — MELOXICAM 7.5 MG/1
7.5 TABLET ORAL DAILY
Qty: 30 TABLET | Refills: 2 | Status: SHIPPED | OUTPATIENT
Start: 2017-12-08 | End: 2018-03-12

## 2017-12-08 RX ORDER — METHYLPREDNISOLONE ACETATE 80 MG/ML
80 INJECTION, SUSPENSION INTRA-ARTICULAR; INTRALESIONAL; INTRAMUSCULAR; SOFT TISSUE ONCE
Status: DISCONTINUED | OUTPATIENT
Start: 2017-12-08 | End: 2019-02-24

## 2017-12-08 RX ORDER — TIZANIDINE 4 MG/1
TABLET ORAL
Qty: 30 TABLET | Refills: 0 | Status: SHIPPED | OUTPATIENT
Start: 2017-12-08 | End: 2018-03-12

## 2017-12-08 RX ADMIN — METHYLPREDNISOLONE ACETATE 80 MG: 80 INJECTION, SUSPENSION INTRALESIONAL; INTRAMUSCULAR; INTRASYNOVIAL; SOFT TISSUE at 11:12

## 2017-12-08 NOTE — LETTER
December 12, 2017      Olga Altman MD  [id number] [address] [address]           [facility]  [address] [place] [id number]  Phone: [phone]  Fax: [phone]          Patient: Gemma Vick   MR Number: [id number]   YOB: 1955   Date of Visit: 12/8/2017       Dear Dr. Olga Altman:    Thank you for referring Gemma Vick to me for evaluation. Attached you will find relevant portions of my assessment and plan of care.    If you have questions, please do not hesitate to call me. I look forward to following Gemma Vick along with you.    Sincerely,    David Villafuerte  CC:  No Recipients    If you would like to receive this communication electronically, please contact [email or web address] or [phone] to request more information on [employer] Link access.    For providers and/or their staff who would like to refer a patient to [facility], please contact us through our one-stop-shop provider referral line, Talia Becerra, at [phone].    If you feel you have received this communication in error or would no longer like to receive these types of communications, please e-mail [email or web address]

## 2017-12-08 NOTE — PROCEDURES
Large Joint Aspiration/Injection  Date/Time: 12/8/2017 11:15 AM  Performed by: ZAHRA ROBERTS  Authorized by: ZAHRA ROBERTS     Consent Done?:  Yes (Verbal)  Indications:  Pain  Procedure site marked: Yes    Timeout: Prior to procedure the correct patient, procedure, and site was verified      Location:  Shoulder  Site:  R subacromial bursa  Prep: Patient was prepped and draped in usual sterile fashion    Ultrasonic Guidance for needle placement: No  Needle size:  22 G  Approach:  Anterior  Medications:  80 mg methylPREDNISolone acetate 80 mg/mL  Patient tolerance:  Patient tolerated the procedure well with no immediate complications

## 2017-12-17 ENCOUNTER — OFFICE VISIT (OUTPATIENT)
Dept: URGENT CARE | Facility: CLINIC | Age: 62
End: 2017-12-17
Payer: MEDICAID

## 2017-12-17 VITALS
WEIGHT: 164.69 LBS | TEMPERATURE: 103 F | DIASTOLIC BLOOD PRESSURE: 60 MMHG | BODY MASS INDEX: 30.61 KG/M2 | SYSTOLIC BLOOD PRESSURE: 172 MMHG

## 2017-12-17 DIAGNOSIS — J00 ACUTE NASOPHARYNGITIS: Primary | ICD-10-CM

## 2017-12-17 DIAGNOSIS — Z87.891 FORMER SMOKER: ICD-10-CM

## 2017-12-17 DIAGNOSIS — R68.89 FLU-LIKE SYMPTOMS: ICD-10-CM

## 2017-12-17 PROCEDURE — 99213 OFFICE O/P EST LOW 20 MIN: CPT | Mod: PBBFAC,PO

## 2017-12-17 PROCEDURE — 99213 OFFICE O/P EST LOW 20 MIN: CPT | Mod: S$PBB,,, | Performed by: NURSE PRACTITIONER

## 2017-12-17 PROCEDURE — 87400 INFLUENZA A/B EACH AG IA: CPT

## 2017-12-17 PROCEDURE — 99999 PR PBB SHADOW E&M-EST. PATIENT-LVL III: CPT | Mod: PBBFAC,,,

## 2017-12-17 RX ORDER — ACETAMINOPHEN 500 MG
1000 TABLET ORAL
Status: COMPLETED | OUTPATIENT
Start: 2017-12-17 | End: 2017-12-17

## 2017-12-17 RX ORDER — OSELTAMIVIR PHOSPHATE 75 MG/1
75 CAPSULE ORAL 2 TIMES DAILY
Qty: 10 CAPSULE | Refills: 0 | Status: SHIPPED | OUTPATIENT
Start: 2017-12-17 | End: 2017-12-21

## 2017-12-17 RX ADMIN — Medication 1000 MG: at 03:12

## 2017-12-17 NOTE — PROGRESS NOTES
CC: No chief complaint on file.    HPI: This is a new problem.   Gemma Vick is a 62 y.o. female with a complaint of URI.  The current episode started in the past 1 days.   The problem has been gradually worsening.   Associated symptoms included fever, chills    Pertinent negatives include nasal congestion, sore throat, cough, chest pain, dyspnea, wheezing   Treatments tried: none has been used and this has provided no relief.   Former smoker    [unfilled]  Outpatient Medications Prior to Visit   Medication Sig Dispense Refill    albuterol (PROAIR HFA) 90 mcg/actuation inhaler Inhale 2 puffs into the lungs every 6 (six) hours as needed. 18 g 6    albuterol-ipratropium 2.5mg-0.5mg/3mL (DUO-NEB) 0.5 mg-3 mg(2.5 mg base)/3 mL nebulizer solution USE ONE VIAL IN NEBULIZER TWICE DAILY 270 mL 0    aspirin 81 MG Chew Take 81 mg by mouth once daily.      clopidogrel (PLAVIX) 75 mg tablet Take 1 tablet (75 mg total) by mouth once daily. 30 tablet 5    DULoxetine (CYMBALTA) 60 MG capsule Take 1 capsule (60 mg total) by mouth once daily. 30 capsule 5    estrogens, conjugated, (PREMARIN) 0.3 MG tablet Take 1 tablet (0.3 mg total) by mouth every other day. 15 tablet 5    ezetimibe-simvastatin 10-40 mg (VYTORIN) 10-40 mg per tablet Take 1 tablet by mouth every evening. 30 tablet 5    felodipine (PLENDIL) 5 MG 24 hr tablet Take 1 tablet (5 mg total) by mouth once daily. 30 tablet 5    ibuprofen (ADVIL,MOTRIN) 400 MG tablet Take 1 tablet (400 mg total) by mouth every 12 (twelve) hours as needed for Other. 30 tablet 5    ipratropium-albuterol (COMBIVENT RESPIMAT)  mcg/actuation inhaler Inhale 1 puff into the lungs every 4 (four) hours as needed for Wheezing. Medically necessary 2 Package 5    meloxicam (MOBIC) 7.5 MG tablet Take 1 tablet (7.5 mg total) by mouth once daily. With food 30 tablet 2    mometasone-formoterol (DULERA) 200-5 mcg/actuation inhaler INHALE 2 PUFFS 2 TIMES DAILY 13 g 5    OXYGEN-AIR  DELIVERY SYSTEMS MISC 2 L by Misc.(Non-Drug; Combo Route) route every evening.      tiZANidine (ZANAFLEX) 4 MG tablet TAKE  (1)  CAPSULE  TWICE DAILY AS NEEDED. 30 tablet 0    tiZANidine 4 mg Cap Take 4 mg by mouth 2 (two) times daily as needed. 30 capsule 0    zolpidem (AMBIEN) 5 MG Tab Take 1 tablet (5 mg total) by mouth nightly. 30 tablet 5     Facility-Administered Medications Prior to Visit   Medication Dose Route Frequency Provider Last Rate Last Dose    methylPREDNISolone acetate injection 80 mg  80 mg Intramuscular Once Selwyn Schilling MD            Physical Exam   BP (!) 172/60   Temp (!) 102.9 °F (39.4 °C)   Wt 74.7 kg (164 lb 10.9 oz)   BMI 30.61 kg/m²   Constitutional: The patient appears well-developed and well-nourished.   Head: Normocephalic and atraumatic.   Right Ear: Tympanic membrane and ear canal normal. No drainage, swelling or tenderness. Tympanic membrane is not injected, not erythematous and not bulging.   Left Ear: Ear canal normal. No drainage, swelling or tenderness. Tympanic membrane is not injected, not erythematous and not bulging.   Nose: Mucosal edema and rhinorrhea present.  No sinus tenderness on palpation  Mouth/Throat: Uvula is midline. Posterior oropharyngeal erythema present. No oropharyngeal exudate.        THE MUCOSA IS BOGGY AND ERYTHEMATOUS.     Eyes: Conjunctivae normal and lids are normal. Pupils are equal, round, and reactive to light. Right eye exhibits no discharge. Left eye exhibits no discharge. Right eye exhibits normal extraocular motion. Left eye exhibits normal extraocular motion.   Neck: Trachea normal and normal range of motion. Neck supple. No tracheal tenderness present. No mass and no thyromegaly present.   Cardiovascular: Normal rate, regular rhythm, S1 normal, S2 normal and normal heart sounds.  Exam reveals no gallop, no S3, no S4 and no friction rub.    No murmur heard.  Pulmonary/Chest: Effort normal and breath sounds normal. No stridor. Not  tachypneic. No respiratory distress. The patient has no wheezes. The patient has no rhonchi. The patient has no rales.   Skin: The patient is not diaphoretic.     Encounter Diagnoses   Name Primary?    Acute nasopharyngitis Yes    Former smoker        PLAN:    Diagnoses and all orders for this visit:    Acute nasopharyngitis  -     Influenza antigen Nasopharyngeal Swab  -     acetaminophen tablet 1,000 mg; Take 2 tablets (1,000 mg total) by mouth one time.    Former smoker        Medications Ordered This Encounter      acetaminophen tablet 1,000 mg  Orders Placed This Encounter   Procedures    Influenza antigen Nasopharyngeal Swab     Order Specific Question:   Specimen Source     Answer:   Nasopharyngeal Swab     RTC if symptoms are worsening or changing significantly or if not improved by the end of therapy.

## 2017-12-18 LAB
FLUAV AG SPEC QL IA: NEGATIVE
FLUBV AG SPEC QL IA: NEGATIVE
SPECIMEN SOURCE: NORMAL

## 2017-12-20 ENCOUNTER — HOSPITAL ENCOUNTER (EMERGENCY)
Facility: HOSPITAL | Age: 62
Discharge: HOME | End: 2017-12-20
Attending: EMERGENCY MEDICINE
Payer: MEDICAID

## 2017-12-20 VITALS
HEART RATE: 94 BPM | SYSTOLIC BLOOD PRESSURE: 118 MMHG | OXYGEN SATURATION: 96 % | TEMPERATURE: 98 F | BODY MASS INDEX: 30.73 KG/M2 | WEIGHT: 167 LBS | DIASTOLIC BLOOD PRESSURE: 59 MMHG | RESPIRATION RATE: 17 BRPM | HEIGHT: 62 IN

## 2017-12-20 DIAGNOSIS — J44.1 COPD EXACERBATION: Primary | ICD-10-CM

## 2017-12-20 DIAGNOSIS — R07.9 CHEST PAIN: ICD-10-CM

## 2017-12-20 DIAGNOSIS — J18.9 PNEUMONIA DUE TO INFECTIOUS ORGANISM, UNSPECIFIED LATERALITY, UNSPECIFIED PART OF LUNG: ICD-10-CM

## 2017-12-20 LAB
ALBUMIN SERPL BCP-MCNC: 2.6 G/DL
ALLENS TEST: ABNORMAL
ALP SERPL-CCNC: 101 U/L
ALT SERPL W/O P-5'-P-CCNC: 23 U/L
ANION GAP SERPL CALC-SCNC: 9 MMOL/L
AST SERPL-CCNC: 28 U/L
BASOPHILS # BLD AUTO: 0.01 K/UL
BASOPHILS NFR BLD: 0.1 %
BILIRUB SERPL-MCNC: 0.5 MG/DL
BNP SERPL-MCNC: 81 PG/ML
BUN SERPL-MCNC: 16 MG/DL
CALCIUM SERPL-MCNC: 9.6 MG/DL
CHLORIDE SERPL-SCNC: 104 MMOL/L
CO2 SERPL-SCNC: 22 MMOL/L
CREAT SERPL-MCNC: 0.8 MG/DL
DELSYS: ABNORMAL
DIFFERENTIAL METHOD: ABNORMAL
EOSINOPHIL # BLD AUTO: 0 K/UL
EOSINOPHIL NFR BLD: 0.1 %
ERYTHROCYTE [DISTWIDTH] IN BLOOD BY AUTOMATED COUNT: 14.6 %
EST. GFR  (AFRICAN AMERICAN): >60 ML/MIN/1.73 M^2
EST. GFR  (NON AFRICAN AMERICAN): >60 ML/MIN/1.73 M^2
FIO2: 21
FLUAV AG SPEC QL IA: NEGATIVE
FLUBV AG SPEC QL IA: NEGATIVE
GLUCOSE SERPL-MCNC: 142 MG/DL
HCO3 UR-SCNC: 23.3 MMOL/L (ref 24–28)
HCT VFR BLD AUTO: 34.5 %
HGB BLD-MCNC: 11.7 G/DL
INR PPP: 1.1
LACTATE SERPL-SCNC: 1 MMOL/L
LYMPHOCYTES # BLD AUTO: 0.9 K/UL
LYMPHOCYTES NFR BLD: 6.3 %
MCH RBC QN AUTO: 30.2 PG
MCHC RBC AUTO-ENTMCNC: 33.9 G/DL
MCV RBC AUTO: 89 FL
MODE: ABNORMAL
MONOCYTES # BLD AUTO: 0.6 K/UL
MONOCYTES NFR BLD: 4.1 %
NEUTROPHILS # BLD AUTO: 12.5 K/UL
NEUTROPHILS NFR BLD: 89.4 %
PCO2 BLDA: 35.1 MMHG (ref 35–45)
PH SMN: 7.43 [PH] (ref 7.35–7.45)
PLATELET # BLD AUTO: 211 K/UL
PMV BLD AUTO: 10.4 FL
PO2 BLDA: 76 MMHG (ref 80–100)
POC BE: -1 MMOL/L
POC SATURATED O2: 96 % (ref 95–100)
POTASSIUM SERPL-SCNC: 3.8 MMOL/L
PROT SERPL-MCNC: 7.4 G/DL
PROTHROMBIN TIME: 11.1 SEC
RBC # BLD AUTO: 3.88 M/UL
SAMPLE: ABNORMAL
SITE: ABNORMAL
SODIUM SERPL-SCNC: 135 MMOL/L
SPECIMEN SOURCE: NORMAL
TROPONIN I SERPL DL<=0.01 NG/ML-MCNC: 0.01 NG/ML
WBC # BLD AUTO: 13.91 K/UL

## 2017-12-20 PROCEDURE — 99900035 HC TECH TIME PER 15 MIN (STAT)

## 2017-12-20 PROCEDURE — 87040 BLOOD CULTURE FOR BACTERIA: CPT

## 2017-12-20 PROCEDURE — 82803 BLOOD GASES ANY COMBINATION: CPT

## 2017-12-20 PROCEDURE — 87400 INFLUENZA A/B EACH AG IA: CPT | Mod: 59

## 2017-12-20 PROCEDURE — 93010 ELECTROCARDIOGRAM REPORT: CPT | Mod: 76,,, | Performed by: INTERNAL MEDICINE

## 2017-12-20 PROCEDURE — 80053 COMPREHEN METABOLIC PANEL: CPT

## 2017-12-20 PROCEDURE — 83605 ASSAY OF LACTIC ACID: CPT

## 2017-12-20 PROCEDURE — 84484 ASSAY OF TROPONIN QUANT: CPT

## 2017-12-20 PROCEDURE — 83880 ASSAY OF NATRIURETIC PEPTIDE: CPT

## 2017-12-20 PROCEDURE — 93010 ELECTROCARDIOGRAM REPORT: CPT | Mod: ,,, | Performed by: INTERNAL MEDICINE

## 2017-12-20 PROCEDURE — 96365 THER/PROPH/DIAG IV INF INIT: CPT

## 2017-12-20 PROCEDURE — 93005 ELECTROCARDIOGRAM TRACING: CPT

## 2017-12-20 PROCEDURE — 99284 EMERGENCY DEPT VISIT MOD MDM: CPT | Mod: 25

## 2017-12-20 PROCEDURE — 94640 AIRWAY INHALATION TREATMENT: CPT

## 2017-12-20 PROCEDURE — 25000242 PHARM REV CODE 250 ALT 637 W/ HCPCS: Performed by: EMERGENCY MEDICINE

## 2017-12-20 PROCEDURE — 63600175 PHARM REV CODE 636 W HCPCS: Performed by: EMERGENCY MEDICINE

## 2017-12-20 PROCEDURE — 36600 WITHDRAWAL OF ARTERIAL BLOOD: CPT

## 2017-12-20 PROCEDURE — 85610 PROTHROMBIN TIME: CPT

## 2017-12-20 PROCEDURE — 85025 COMPLETE CBC W/AUTO DIFF WBC: CPT

## 2017-12-20 PROCEDURE — 96375 TX/PRO/DX INJ NEW DRUG ADDON: CPT

## 2017-12-20 RX ORDER — CEFTRIAXONE 1 G/1
1 INJECTION, POWDER, FOR SOLUTION INTRAMUSCULAR; INTRAVENOUS
Status: COMPLETED | OUTPATIENT
Start: 2017-12-20 | End: 2017-12-20

## 2017-12-20 RX ORDER — IPRATROPIUM BROMIDE AND ALBUTEROL SULFATE 2.5; .5 MG/3ML; MG/3ML
3 SOLUTION RESPIRATORY (INHALATION)
Status: COMPLETED | OUTPATIENT
Start: 2017-12-20 | End: 2017-12-20

## 2017-12-20 RX ORDER — AMOXICILLIN AND CLAVULANATE POTASSIUM 875; 125 MG/1; MG/1
1 TABLET, FILM COATED ORAL 2 TIMES DAILY
Qty: 20 TABLET | Refills: 0 | Status: SHIPPED | OUTPATIENT
Start: 2017-12-20 | End: 2017-12-30

## 2017-12-20 RX ADMIN — IPRATROPIUM BROMIDE AND ALBUTEROL SULFATE 3 ML: .5; 3 SOLUTION RESPIRATORY (INHALATION) at 01:12

## 2017-12-20 RX ADMIN — AZITHROMYCIN MONOHYDRATE 500 MG: 500 INJECTION, POWDER, LYOPHILIZED, FOR SOLUTION INTRAVENOUS at 02:12

## 2017-12-20 RX ADMIN — IPRATROPIUM BROMIDE AND ALBUTEROL SULFATE 3 ML: .5; 3 SOLUTION RESPIRATORY (INHALATION) at 12:12

## 2017-12-20 RX ADMIN — CEFTRIAXONE SODIUM 1 G: 1 INJECTION, POWDER, FOR SOLUTION INTRAMUSCULAR; INTRAVENOUS at 02:12

## 2017-12-20 NOTE — ED PROVIDER NOTES
SCRIBE #1 NOTE: I, Cedrick Finley, am scribing for, and in the presence of, Mitch Aguila Jr., MD. I have scribed the entire note.      History      Chief Complaint   Patient presents with    Chest Pain     pt states she been coughing up blood every few hours, states she does not feel good her chest hurts       Review of patient's allergies indicates:  No Known Allergies     HPI   HPI    12/20/2017, 12:42 PM   History obtained from the patient      History of Present Illness: Gemma Vick is a 62 y.o. female patient who presents to the Emergency Department for CP which onset gradually today. Symptoms are constant and moderate in severity. Sx are exacerbated by nothing and relieved by nothing. Associated sxs include productive cough with bloody sputum and fever. No other sxs reported. Pt states she was seen at an urgent care yesterday for flu like sxs and sates she was given theraflu which she stopped taking when the urgent care called her with negative flu results. No other sxs reported. Patient denies any N/V/D, chills, SOB, leg swelling, palpitations, weakness/numbness, lightheadedness, dizziness, abd pain, rhinorrhea, congestion, sore throat, ear pain and all other sxs at this time. No further complaints or concerns at this time.     Arrival mode: Personal vehicle     PCP: Nathan Rodriguez MD       Past Medical History:  Past Medical History:   Diagnosis Date    AAA (abdominal aortic aneurysm) 2/13/2014    Abdominal aneurysm     3    Acute coronary syndrome     Arthritis     BPPV (benign paroxysmal positional vertigo)     Carotid artery plaque     Carotid artery stenosis and occlusion 2/13/2014    Chronic back pain     COPD (chronic obstructive pulmonary disease)     Coronary artery disease     Emphysema lung     Hyperlipidemia     Hypertension     Myocardial infarction     x3    Neuropathy        Past Surgical History:  Past Surgical History:   Procedure Laterality Date    CARDIAC  CATHETERIZATION      CORONARY ANGIOPLASTY      HYSTERECTOMY           Family History:  Family History   Problem Relation Age of Onset    Heart disease Mother     Heart attacks under age 50 Father     Heart attacks under age 50 Brother        Social History:  Social History     Social History Main Topics    Smoking status: Former Smoker     Packs/day: 0.50     Years: 44.00     Types: Cigarettes, Vaping w/o nicotine     Start date: 6/24/1970     Quit date: 05/2017    Smokeless tobacco: Never Used    Alcohol use No    Drug use: No    Sexual activity: Not Currently     Birth control/ protection: None       ROS   Review of Systems   Constitutional: Positive for fever. Negative for chills.   HENT: Negative for congestion and sore throat.    Respiratory: Positive for cough. Negative for chest tightness, shortness of breath and wheezing.    Cardiovascular: Positive for chest pain. Negative for palpitations and leg swelling.   Gastrointestinal: Negative for abdominal pain, nausea and vomiting.   Musculoskeletal: Negative for back pain and neck pain.   Skin: Negative for rash.   Neurological: Negative for dizziness, numbness and headaches.   Psychiatric/Behavioral: Negative for agitation and confusion.   All other systems reviewed and are negative.      Physical Exam      Initial Vitals [12/20/17 1104]   BP Pulse Resp Temp SpO2   (!) 164/71 80 18 97.7 °F (36.5 °C) (!) 90 %      MAP       102          Physical Exam  Nursing Notes and Vital Signs Reviewed.  Constitutional: Patient is in no apparent distress. Well-developed and well-nourished.  Head: Atraumatic. Normocephalic.  Eyes: PERRL. EOM intact. Conjunctivae are not pale. No scleral icterus.  ENT: Mucous membranes are moist. Oropharynx is clear and symmetric.    Neck: Supple. Full ROM. No lymphadenopathy.  Cardiovascular: Regular rate. Regular rhythm. No murmurs, rubs, or gallops. Distal pulses are 2+ and symmetric.  Pulmonary/Chest: Mild to moderate respiratory  "distress. Coarse breath sounds noted with expiratory wheezing. No rhonchi or rales appreciated.  Abdominal: Soft and non-distended.  There is no tenderness.  No rebound, guarding, or rigidity. Good bowel sounds.   Musculoskeletal: Moves all extremities. No obvious deformities. No edema. No calf tenderness.  Skin: Warm and dry.  Neurological:  Alert, awake, and appropriate.  Normal speech.  No acute focal neurological deficits are appreciated.  Psychiatric: Normal affect. Good eye contact. Appropriate in content.    ED Course    Procedures  ED Vital Signs:  Vitals:    12/20/17 1104 12/20/17 1259 12/20/17 1302 12/20/17 1303   BP: (!) 164/71  (!) 175/84    Pulse: 80 81 84 83   Resp: 18 20 17 17   Temp: 97.7 °F (36.5 °C)      TempSrc: Oral      SpO2: (!) 90% 96% 96% 97%   Weight: 75.8 kg (167 lb)      Height: 5' 2" (1.575 m)       12/20/17 1308 12/20/17 1530   BP:  (!) 118/59   Pulse: 83 94   Resp: 17    Temp:     TempSrc:     SpO2: 95% 96%   Weight:     Height:         Abnormal Lab Results:  Labs Reviewed   CBC W/ AUTO DIFFERENTIAL - Abnormal; Notable for the following:        Result Value    WBC 13.91 (*)     RBC 3.88 (*)     Hemoglobin 11.7 (*)     Hematocrit 34.5 (*)     RDW 14.6 (*)     Gran # 12.5 (*)     Lymph # 0.9 (*)     Gran% 89.4 (*)     Lymph% 6.3 (*)     All other components within normal limits   COMPREHENSIVE METABOLIC PANEL - Abnormal; Notable for the following:     Sodium 135 (*)     CO2 22 (*)     Glucose 142 (*)     Albumin 2.6 (*)     All other components within normal limits   ISTAT PROCEDURE - Abnormal; Notable for the following:     POC PO2 76 (*)     POC HCO3 23.3 (*)     All other components within normal limits   CULTURE, BLOOD   CULTURE, BLOOD   LACTIC ACID, PLASMA   TROPONIN I   B-TYPE NATRIURETIC PEPTIDE   PROTIME-INR   INFLUENZA A AND B ANTIGEN        All Lab Results:  Results for orders placed or performed during the hospital encounter of 12/20/17   CBC auto differential   Result Value " Ref Range    WBC 13.91 (H) 3.90 - 12.70 K/uL    RBC 3.88 (L) 4.00 - 5.40 M/uL    Hemoglobin 11.7 (L) 12.0 - 16.0 g/dL    Hematocrit 34.5 (L) 37.0 - 48.5 %    MCV 89 82 - 98 fL    MCH 30.2 27.0 - 31.0 pg    MCHC 33.9 32.0 - 36.0 g/dL    RDW 14.6 (H) 11.5 - 14.5 %    Platelets 211 150 - 350 K/uL    MPV 10.4 9.2 - 12.9 fL    Gran # 12.5 (H) 1.8 - 7.7 K/uL    Lymph # 0.9 (L) 1.0 - 4.8 K/uL    Mono # 0.6 0.3 - 1.0 K/uL    Eos # 0.0 0.0 - 0.5 K/uL    Baso # 0.01 0.00 - 0.20 K/uL    Gran% 89.4 (H) 38.0 - 73.0 %    Lymph% 6.3 (L) 18.0 - 48.0 %    Mono% 4.1 4.0 - 15.0 %    Eosinophil% 0.1 0.0 - 8.0 %    Basophil% 0.1 0.0 - 1.9 %    Differential Method Automated    Comprehensive metabolic panel   Result Value Ref Range    Sodium 135 (L) 136 - 145 mmol/L    Potassium 3.8 3.5 - 5.1 mmol/L    Chloride 104 95 - 110 mmol/L    CO2 22 (L) 23 - 29 mmol/L    Glucose 142 (H) 70 - 110 mg/dL    BUN, Bld 16 8 - 23 mg/dL    Creatinine 0.8 0.5 - 1.4 mg/dL    Calcium 9.6 8.7 - 10.5 mg/dL    Total Protein 7.4 6.0 - 8.4 g/dL    Albumin 2.6 (L) 3.5 - 5.2 g/dL    Total Bilirubin 0.5 0.1 - 1.0 mg/dL    Alkaline Phosphatase 101 55 - 135 U/L    AST 28 10 - 40 U/L    ALT 23 10 - 44 U/L    Anion Gap 9 8 - 16 mmol/L    eGFR if African American >60 >60 mL/min/1.73 m^2    eGFR if non African American >60 >60 mL/min/1.73 m^2   Lactic acid, plasma   Result Value Ref Range    Lactate (Lactic Acid) 1.0 0.5 - 2.2 mmol/L   Troponin I   Result Value Ref Range    Troponin I 0.007 0.000 - 0.026 ng/mL   B-Type natriuretic peptide (BNP)   Result Value Ref Range    BNP 81 0 - 99 pg/mL   Protime-INR   Result Value Ref Range    Prothrombin Time 11.1 9.0 - 12.5 sec    INR 1.1 0.8 - 1.2   Influenza antigen Nasopharyngeal Swab   Result Value Ref Range    Influenza A Ag, EIA Negative Negative    Influenza B Ag, EIA Negative Negative    Flu A & B Source Nasopharyngeal Swab    ISTAT PROCEDURE   Result Value Ref Range    POC PH 7.430 7.35 - 7.45    POC PCO2 35.1 35 - 45  mmHg    POC PO2 76 (L) 80 - 100 mmHg    POC HCO3 23.3 (L) 24 - 28 mmol/L    POC BE -1 -2 to 2 mmol/L    POC SATURATED O2 96 95 - 100 %    Sample ARTERIAL     Site LR     Allens Test Pass     DelSys Room Air     Mode SPONT     FiO2 21          Imaging Results:  Imaging Results          X-Ray Chest PA And Lateral (Final result)  Result time 12/20/17 13:34:20    Final result by AZIZA Salazar Sr., MD (12/20/17 13:34:20)                 Impression:        1. There has been interval development of a moderate amount of alveolar consolidation in the right upper lobe. This is consistent with the patient's history and characteristic of pneumonia  2. There is a tiny left pleural effusion.       Electronically signed by: AZIZA SALAZAR MD  Date:     12/20/17  Time:    13:34              Narrative:    Two-view chest x-ray    Clinical History:  Cough    Finding: Comparison was made to a prior examination performed on 9/18/2017. The size and contour of the heart are normal. There has been interval development of a moderate amount of alveolar consolidation in the right upper lobe. There is no evidence of an acute pulmonary process in the left lung. There is a tiny left pleural effusion. There is no pneumothorax.                                      The Emergency Provider reviewed the vital signs and test results, which are outlined above.    ED Discussion     2:54 PM: Reassessed pt at this time.  Pt states her condition has improved at this time and she is feeling better. Pt is laying comfortably in ED bed and in NAD. Pt is awake, alert, and oriented. Discussed with pt all pertinent ED information and results. Offered pt admission. Pt states she would rather be discharged home. Pt states she has oxygen and breathing machines at home to help her sxs. Instructed pt to return if she experiences any new or worsening sxs. Discussed with pt dx and plan of tx. Gave pt all f/u and return to the ED instructions. All questions and concerns  were addressed at this time. Pt expresses understanding of information and instructions, and is comfortable with plan to discharge. Pt is stable for discharge.    I have discussed with patient and/or family/caretaker chest pain precautions, specifically to return for worsening chest pain, shortness of breath, fever, or any concern.  I have low suspicion for cardiopulmonary, vascular, infectious, respiratory, or other emergent medical condition based on my evaluation in the ED.    I discussed with patient and/or family/caretaker that evaluation in the ED does not suggest any emergent or life threatening medical conditions requiring immediate intervention beyond what was provided in the ED, and I believe patient is safe for discharge.  Regardless, an unremarkable evaluation in the ED does not preclude the development or presence of a serious of life threatening condition. As such, patient was instructed to return immediately for any worsening or change in current symptoms.        ED Medication(s):  Medications   albuterol-ipratropium 2.5mg-0.5mg/3mL nebulizer solution 3 mL (3 mLs Nebulization Given 12/20/17 1308)   cefTRIAXone injection 1 g (1 g Intravenous Given 12/20/17 1428)   azithromycin 500 mg in dextrose 5 % 250 mL IVPB (ready to mix system) (0 mg Intravenous Stopped 12/20/17 1529)       Discharge Medication List as of 12/20/2017  3:38 PM      START taking these medications    Details   amoxicillin-clavulanate 875-125mg (AUGMENTIN) 875-125 mg per tablet Take 1 tablet by mouth 2 (two) times daily., Starting Wed 12/20/2017, Until Sat 12/30/2017, Print             Follow-up Information     Olga Altman MD. Call in 2 days.    Specialty:  Family Medicine  Contact information:  [address] [address] [address] [phone]                     Medical Decision Making    Medical Decision Making:   Clinical Tests:   Lab Tests: Ordered and Reviewed  Radiological Study: Reviewed and Ordered  Medical Tests:  Ordered and Reviewed           Scribe Attestation:   Scribe #1: I performed the above scribed service and the documentation accurately describes the services I performed. I attest to the accuracy of the note.    Attending:   Physician Attestation Statement for Scribe #1: I, Mitch Aguila Jr., MD, personally performed the services described in this documentation, as scribed by Cedrick Finley, in my presence, and it is both accurate and complete.          Clinical Impression       ICD-10-CM ICD-9-CM   1. COPD exacerbation J44.1 491.21   2. Chest pain R07.9 786.50   3. Pneumonia due to infectious organism, unspecified laterality, unspecified part of lung J18.9 136.9     484.8       Disposition:   Disposition: Discharged  Condition: Stable         Mitch Aguila Jr., MD  12/20/17 1603

## 2017-12-21 ENCOUNTER — OFFICE VISIT (OUTPATIENT)
Dept: PULMONOLOGY | Facility: CLINIC | Age: 62
End: 2017-12-21
Payer: MEDICAID

## 2017-12-21 VITALS
HEART RATE: 81 BPM | RESPIRATION RATE: 18 BRPM | BODY MASS INDEX: 29.32 KG/M2 | HEIGHT: 62 IN | DIASTOLIC BLOOD PRESSURE: 70 MMHG | OXYGEN SATURATION: 96 % | SYSTOLIC BLOOD PRESSURE: 148 MMHG | WEIGHT: 159.31 LBS

## 2017-12-21 DIAGNOSIS — G47.36 NOCTURNAL HYPOXEMIA DUE TO EMPHYSEMA: ICD-10-CM

## 2017-12-21 DIAGNOSIS — J13 PNEUMONIA OF RIGHT LUNG DUE TO STREPTOCOCCUS PNEUMONIAE, UNSPECIFIED PART OF LUNG: Primary | ICD-10-CM

## 2017-12-21 DIAGNOSIS — J44.9 COPD, MODERATE: ICD-10-CM

## 2017-12-21 DIAGNOSIS — J43.9 NOCTURNAL HYPOXEMIA DUE TO EMPHYSEMA: ICD-10-CM

## 2017-12-21 DIAGNOSIS — F17.201 TOBACCO USE DISORDER, MODERATE, IN SUSTAINED REMISSION: ICD-10-CM

## 2017-12-21 PROCEDURE — 99213 OFFICE O/P EST LOW 20 MIN: CPT | Mod: PBBFAC | Performed by: INTERNAL MEDICINE

## 2017-12-21 PROCEDURE — 99214 OFFICE O/P EST MOD 30 MIN: CPT | Mod: S$PBB,,, | Performed by: INTERNAL MEDICINE

## 2017-12-21 PROCEDURE — 99999 PR PBB SHADOW E&M-EST. PATIENT-LVL III: CPT | Mod: PBBFAC,,, | Performed by: INTERNAL MEDICINE

## 2017-12-21 NOTE — PROGRESS NOTES
Subjective:      Gemma Vick is a 62 y.o. female with known COPD who presents without exacerbation.   Current symptoms include: chronic dyspnea. Symptoms began several months ago.   Patient uses 2 pillows at night. Patient currently is on oxygen at 2.0 L/min per nasal cannula..   This a follow-up after emergency room visit  She was seen there with cough and fever and some bloody sputum  She had recently been seen in urgent care and given Tamiflu however her flu swab was negative  COPD test score is 15  Radiological imaging was reviewed which is consistent with a pneumonia  Immunizations are up-to-date  Patient tells me that within the last 24 she is feeling dramatically better  She is on Augmentin for 10 days  I encouraged her to complete the treatment course  We will repeat an x-ray on the following visit  She has her inhalers continues to take them  She'll continue other other maintenance medications      The following portions of the patient's history were reviewed and updated as appropriate:   She  has a past medical history of AAA (abdominal aortic aneurysm) (2/13/2014); Abdominal aneurysm; Acute coronary syndrome; Arthritis; BPPV (benign paroxysmal positional vertigo); Carotid artery plaque; Carotid artery stenosis and occlusion (2/13/2014); Chronic back pain; COPD (chronic obstructive pulmonary disease); Coronary artery disease; Emphysema lung; Hyperlipidemia; Hypertension; Myocardial infarction; and Neuropathy.  She  does not have any pertinent problems on file.  She  has a past surgical history that includes Cardiac catheterization; Coronary angioplasty; and Hysterectomy.  Her family history includes Heart attacks under age 50 in her brother and father; Heart disease in her mother.  She  reports that she quit smoking about 7 months ago. Her smoking use included Cigarettes and Vaping w/o nicotine. She started smoking about 47 years ago. She has a 22.00 pack-year smoking history. She has never used  smokeless tobacco. She reports that she does not drink alcohol or use drugs.  She has a current medication list which includes the following prescription(s): albuterol, albuterol-ipratropium 2.5mg-0.5mg/3ml, amoxicillin-clavulanate 875-125mg, aspirin, clopidogrel, duloxetine, estrogens (conjugated), ezetimibe-simvastatin 10-40 mg, felodipine, meloxicam, oxygen-air delivery systems, tizanidine, zolpidem, ibuprofen, and mometasone-formoterol, and the following Facility-Administered Medications: methylprednisolone acetate.  Current Outpatient Prescriptions on File Prior to Visit   Medication Sig Dispense Refill    albuterol (PROAIR HFA) 90 mcg/actuation inhaler Inhale 2 puffs into the lungs every 6 (six) hours as needed. 18 g 6    albuterol-ipratropium 2.5mg-0.5mg/3mL (DUO-NEB) 0.5 mg-3 mg(2.5 mg base)/3 mL nebulizer solution USE ONE VIAL IN NEBULIZER TWICE DAILY 270 mL 0    amoxicillin-clavulanate 875-125mg (AUGMENTIN) 875-125 mg per tablet Take 1 tablet by mouth 2 (two) times daily. 20 tablet 0    aspirin 81 MG Chew Take 81 mg by mouth once daily.      clopidogrel (PLAVIX) 75 mg tablet Take 1 tablet (75 mg total) by mouth once daily. 30 tablet 5    DULoxetine (CYMBALTA) 60 MG capsule Take 1 capsule (60 mg total) by mouth once daily. 30 capsule 5    estrogens, conjugated, (PREMARIN) 0.3 MG tablet Take 1 tablet (0.3 mg total) by mouth every other day. 15 tablet 5    ezetimibe-simvastatin 10-40 mg (VYTORIN) 10-40 mg per tablet Take 1 tablet by mouth every evening. 30 tablet 5    felodipine (PLENDIL) 5 MG 24 hr tablet Take 1 tablet (5 mg total) by mouth once daily. 30 tablet 5    meloxicam (MOBIC) 7.5 MG tablet Take 1 tablet (7.5 mg total) by mouth once daily. With food 30 tablet 2    OXYGEN-AIR DELIVERY SYSTEMS MISC 2 L by Misc.(Non-Drug; Combo Route) route every evening.      tiZANidine (ZANAFLEX) 4 MG tablet TAKE  (1)  CAPSULE  TWICE DAILY AS NEEDED. 30 tablet 0    zolpidem (AMBIEN) 5 MG Tab Take 1 tablet  "(5 mg total) by mouth nightly. 30 tablet 5    ibuprofen (ADVIL,MOTRIN) 400 MG tablet Take 1 tablet (400 mg total) by mouth every 12 (twelve) hours as needed for Other. 30 tablet 5    mometasone-formoterol (DULERA) 200-5 mcg/actuation inhaler INHALE 2 PUFFS 2 TIMES DAILY 13 g 5    [DISCONTINUED] ipratropium-albuterol (COMBIVENT RESPIMAT)  mcg/actuation inhaler Inhale 1 puff into the lungs every 4 (four) hours as needed for Wheezing. Medically necessary 2 Package 5    [DISCONTINUED] oseltamivir (TAMIFLU) 75 MG capsule Take 1 capsule (75 mg total) by mouth 2 (two) times daily. 10 capsule 0     Current Facility-Administered Medications on File Prior to Visit   Medication Dose Route Frequency Provider Last Rate Last Dose    methylPREDNISolone acetate injection 80 mg  80 mg Intramuscular Once Selwyn Schilling MD         She has No Known Allergies..    Review of Systems  A comprehensive review of systems was negative except for: Respiratory: positive for dyspnea on exertion  Cardiovascular: positive for claudication       Objective:      BP (!) 148/70   Pulse 81   Resp 18   Ht 5' 2" (1.575 m)   Wt 72.3 kg (159 lb 4.5 oz)   SpO2 96%   BMI 29.13 kg/m²   FEV1: 1.30 L, 56 % of predicted  FEV1/FVC: 60 %  General appearance: alert, appears stated age, cooperative and no distress  Head: atraumatic  Eyes: negative  Nose: no discharge  Neck: no adenopathy, no carotid bruit, no JVD, supple, symmetrical, trachea midline and thyroid not enlarged, symmetric, no tenderness/mass/nodules  Lungs: diminished breath sounds bibasilar and bilaterally and expiratory wheezes in apex  Heart: regular rate and rhythm, S1, S2 normal, no murmur, click, rub or gallop  Extremities: extremities normal, atraumatic, no cyanosis or edema  Pulses: 2+ and symmetric  Skin: Skin color, texture, turgor normal. No rashes or lesions  Lymph nodes: Cervical, supraclavicular, and axillary nodes normal.  Neurologic: Grossly normal     "     CXR:  Finding: Comparison was made to a prior examination performed on 9/18/2017. The size and contour of the heart are normal. There has been interval development of a moderate amount of alveolar consolidation in the right upper lobe. There is no evidence of an acute pulmonary process in the left lung. There is a tiny left pleural effusion. There is no pneumothorax.   Impression         1. There has been interval development of a moderate amount of alveolar consolidation in the right upper lobe. This is consistent with the patient's history and characteristic of pneumonia  2. There is a tiny left pleural effusion.          Assessment:      Problem List Items Addressed This Visit     Nocturnal hypoxemia due to emphysema     Continue Nasal canulla 2.0 LPM         COPD, moderate     CAT score 15.  mRC score 1  Meds: Proair HFA, Duoneb, Dulera HFA         Relevant Orders    X-Ray Chest PA And Lateral    Spirometry with/without bronchodilator    Tobacco use disorder, moderate, in sustained remission      Other Visit Diagnoses     Pneumonia of right lung due to Streptococcus pneumoniae, unspecified part of lung    -  Primary    complete Augmentin  Probiotic    Relevant Orders    X-Ray Chest PA And Lateral    Spirometry with/without bronchodilator        Plan:      Return in about 6 weeks (around 2/1/2018) for Spirometry and CXR next visit.    This note was prepared using voice recognition system and is likely to have sound alike errors that may have been overlooked even after proof reading.  Please call me with any questions    Discussed diagnosis, its evaluation, treatment and usual course. All questions answered.    Thank you for the courtesy of participating in the care of this patient    Nathan Rodriguez MD

## 2017-12-25 ENCOUNTER — TELEPHONE (OUTPATIENT)
Dept: EMERGENCY MEDICINE | Facility: HOSPITAL | Age: 62
End: 2017-12-25

## 2017-12-25 LAB — BACTERIA BLD CULT: NORMAL

## 2017-12-26 DIAGNOSIS — J44.9 COPD, SEVERITY TO BE DETERMINED: ICD-10-CM

## 2017-12-26 RX ORDER — IPRATROPIUM BROMIDE AND ALBUTEROL SULFATE 2.5; .5 MG/3ML; MG/3ML
SOLUTION RESPIRATORY (INHALATION)
Qty: 270 ML | Refills: 11 | Status: SHIPPED | OUTPATIENT
Start: 2017-12-26 | End: 2018-01-03 | Stop reason: SDUPTHER

## 2017-12-27 ENCOUNTER — TELEPHONE (OUTPATIENT)
Dept: FAMILY MEDICINE | Facility: CLINIC | Age: 62
End: 2017-12-27

## 2017-12-27 LAB
BACTERIA BLD CULT: NORMAL

## 2017-12-27 NOTE — TELEPHONE ENCOUNTER
----- Message from Angela Taveras sent at 12/27/2017  4:06 PM CST -----  The pt wants to schedule a chest xray to check for pneumonia , no orders in the system, the pt can be reached at [phone]///[id number]

## 2017-12-29 ENCOUNTER — HOSPITAL ENCOUNTER (OUTPATIENT)
Dept: RADIOLOGY | Facility: HOSPITAL | Age: 62
Discharge: HOME | End: 2017-12-29
Attending: FAMILY MEDICINE
Payer: MEDICAID

## 2017-12-29 ENCOUNTER — OFFICE VISIT (OUTPATIENT)
Dept: FAMILY MEDICINE | Facility: CLINIC | Age: 62
End: 2017-12-29
Payer: MEDICAID

## 2017-12-29 VITALS
SYSTOLIC BLOOD PRESSURE: 130 MMHG | OXYGEN SATURATION: 95 % | BODY MASS INDEX: 29.21 KG/M2 | WEIGHT: 158.75 LBS | HEIGHT: 62 IN | TEMPERATURE: 98 F | HEART RATE: 82 BPM | DIASTOLIC BLOOD PRESSURE: 72 MMHG

## 2017-12-29 DIAGNOSIS — J18.9 PNEUMONIA OF RIGHT UPPER LOBE DUE TO INFECTIOUS ORGANISM: Primary | ICD-10-CM

## 2017-12-29 DIAGNOSIS — J18.9 PNEUMONIA OF RIGHT UPPER LOBE DUE TO INFECTIOUS ORGANISM: ICD-10-CM

## 2017-12-29 PROCEDURE — 99213 OFFICE O/P EST LOW 20 MIN: CPT | Mod: S$PBB,,, | Performed by: FAMILY MEDICINE

## 2017-12-29 PROCEDURE — 71020 XR CHEST PA AND LATERAL: CPT | Mod: TC,PO

## 2017-12-29 PROCEDURE — 71020 XR CHEST PA AND LATERAL: CPT | Mod: 26,,, | Performed by: RADIOLOGY

## 2017-12-29 PROCEDURE — 99213 OFFICE O/P EST LOW 20 MIN: CPT | Mod: PBBFAC,25,PO | Performed by: FAMILY MEDICINE

## 2017-12-29 PROCEDURE — 99999 PR PBB SHADOW E&M-EST. PATIENT-LVL III: CPT | Mod: PBBFAC,,, | Performed by: FAMILY MEDICINE

## 2017-12-29 RX ORDER — PREDNISONE 50 MG/1
50 TABLET ORAL DAILY
Qty: 5 TABLET | Refills: 0 | Status: SHIPPED | OUTPATIENT
Start: 2017-12-29 | End: 2018-01-03

## 2017-12-29 NOTE — PROGRESS NOTES
Chief Complaint:    Chief Complaint   Patient presents with    Hospital Follow Up       History of Present Illness:    Patient is status post follow-up for right upper lobe pneumonia she was really sick at fever and altered mental status, she was prescribed Augmentin taking and she says she is improving no more fever she feels better cough is still there although not as bad.    ROS:  Review of Systems   Constitutional: Negative for appetite change, chills and fever.   HENT: Negative for congestion, ear discharge, ear pain, facial swelling, mouth sores, postnasal drip, rhinorrhea, sinus pressure, sneezing, sore throat, trouble swallowing and voice change.    Eyes: Negative for discharge, redness and itching.   Respiratory: Positive for cough. Negative for chest tightness, shortness of breath and wheezing.    Cardiovascular: Negative for chest pain.       Past Medical History:   Diagnosis Date    AAA (abdominal aortic aneurysm) 2/13/2014    Abdominal aneurysm     3    Acute coronary syndrome     Arthritis     BPPV (benign paroxysmal positional vertigo)     Carotid artery plaque     Carotid artery stenosis and occlusion 2/13/2014    Chronic back pain     COPD (chronic obstructive pulmonary disease)     Coronary artery disease     Emphysema lung     Hyperlipidemia     Hypertension     Myocardial infarction     x3    Neuropathy        Social History:  Social History     Social History    Marital status:      Spouse name: N/A    Number of children: N/A    Years of education: N/A     Social History Main Topics    Smoking status: Former Smoker     Packs/day: 0.50     Years: 44.00     Types: Cigarettes, Vaping w/o nicotine     Start date: 6/24/1970     Quit date: 05/2017    Smokeless tobacco: Never Used    Alcohol use No    Drug use: No    Sexual activity: Not Currently     Birth control/ protection: None     Other Topics Concern    None     Social History Narrative    None       Family  "History:   family history includes Heart attacks under age 50 in her brother and father; Heart disease in her mother.    Health Maintenance   Topic Date Due    Zoster Vaccine  02/22/2015    Influenza Vaccine  08/01/2017    Fecal Occult Blood Test (FOBT)/[employer]  05/05/2018    Lipid Panel  08/28/2018    Mammogram  09/18/2018    TETANUS VACCINE  11/15/2026    Hepatitis C Screening  Completed       Physical Exam:    Vital Signs  Temp: 97.5 °F (36.4 °C)  Temp src: Tympanic  Pulse: 82  SpO2: 95 %  BP: 130/72  BP Location: Left arm  Patient Position: Sitting  Pain Score: 0-No pain  Height and Weight  Height: 5' 2" (157.5 cm)  Weight: 72 kg (158 lb 11.7 oz)  BSA (Calculated - sq m): 1.77 sq meters  BMI (Calculated): 29.1  Weight in (lb) to have BMI = 25: 136.4]    Body mass index is 29.03 kg/m².    Physical Exam   Constitutional: She appears well-developed and well-nourished.   HENT:   Head: Normocephalic and atraumatic.   Right Ear: Tympanic membrane is not bulging.   Left Ear: Tympanic membrane is not bulging.   Nose: No rhinorrhea. Right sinus exhibits no maxillary sinus tenderness and no frontal sinus tenderness. Left sinus exhibits no maxillary sinus tenderness and no frontal sinus tenderness.   Mouth/Throat: Mucous membranes are normal. No posterior oropharyngeal erythema or tonsillar abscesses.   Eyes: Conjunctivae are normal. Pupils are equal, round, and reactive to light.   Neck: Normal range of motion.   Cardiovascular: Normal rate and normal heart sounds.    Pulmonary/Chest: Effort normal and breath sounds normal. No stridor. No respiratory distress. She has no wheezes. She has no rales. She exhibits no tenderness.   Abdominal: Soft. There is no tenderness.   Lymphadenopathy:     She has no cervical adenopathy.       Lab Results   Component Value Date    CHOL 152 08/28/2017    CHOL 135 07/13/2016    CHOL 158 01/14/2016    TRIG 104 08/28/2017    TRIG 111 07/13/2016    TRIG 130 01/14/2016    HDL 57 " 08/28/2017    HDL 43 07/13/2016    HDL 57 01/14/2016    TOTALCHOLEST 2.7 08/28/2017    TOTALCHOLEST 3.1 07/13/2016    TOTALCHOLEST 2.8 01/14/2016    NONHDLCHOL 95 08/28/2017    NONHDLCHOL 92 07/13/2016    NONHDLCHOL 101 01/14/2016       Lab Results   Component Value Date    HGBA1C 6.0 01/14/2016       Assessment:      ICD-10-CM ICD-9-CM   1. Pneumonia of right upper lobe due to infectious organism J18.1 486         Plan:  Repeat chest x-ray today clinically patient is improving explained to her that x-ray improvement lags behind clinical improvement  She is not smoking anymore she is gripping with 3% nicotine and is actively trying to quit  Orders Placed This Encounter   Procedures    X-Ray Chest PA And Lateral       Current Outpatient Prescriptions   Medication Sig Dispense Refill    albuterol (PROAIR HFA) 90 mcg/actuation inhaler Inhale 2 puffs into the lungs every 6 (six) hours as needed. 18 g 6    albuterol-ipratropium 2.5mg-0.5mg/3mL (DUO-NEB) 0.5 mg-3 mg(2.5 mg base)/3 mL nebulizer solution USE ONE VIAL IN NEBULIZER TWICE DAILY 270 mL 11    amoxicillin-clavulanate 875-125mg (AUGMENTIN) 875-125 mg per tablet Take 1 tablet by mouth 2 (two) times daily. 20 tablet 0    aspirin 81 MG Chew Take 81 mg by mouth once daily.      clopidogrel (PLAVIX) 75 mg tablet Take 1 tablet (75 mg total) by mouth once daily. 30 tablet 5    DULoxetine (CYMBALTA) 60 MG capsule Take 1 capsule (60 mg total) by mouth once daily. 30 capsule 5    estrogens, conjugated, (PREMARIN) 0.3 MG tablet Take 1 tablet (0.3 mg total) by mouth every other day. 15 tablet 5    ezetimibe-simvastatin 10-40 mg (VYTORIN) 10-40 mg per tablet Take 1 tablet by mouth every evening. 30 tablet 5    felodipine (PLENDIL) 5 MG 24 hr tablet Take 1 tablet (5 mg total) by mouth once daily. 30 tablet 5    ibuprofen (ADVIL,MOTRIN) 400 MG tablet Take 1 tablet (400 mg total) by mouth every 12 (twelve) hours as needed for Other. 30 tablet 5    meloxicam (MOBIC)  7.5 MG tablet Take 1 tablet (7.5 mg total) by mouth once daily. With food 30 tablet 2    mometasone-formoterol (DULERA) 200-5 mcg/actuation inhaler INHALE 2 PUFFS 2 TIMES DAILY 13 g 5    OXYGEN-AIR DELIVERY SYSTEMS MISC 2 L by Misc.(Non-Drug; Combo Route) route every evening.      tiZANidine (ZANAFLEX) 4 MG tablet TAKE  (1)  CAPSULE  TWICE DAILY AS NEEDED. 30 tablet 0    zolpidem (AMBIEN) 5 MG Tab Take 1 tablet (5 mg total) by mouth nightly. 30 tablet 5    predniSONE (DELTASONE) 50 MG Tab Take 1 tablet (50 mg total) by mouth once daily. 5 tablet 0     Current Facility-Administered Medications   Medication Dose Route Frequency Provider Last Rate Last Dose    methylPREDNISolone acetate injection 80 mg  80 mg Intramuscular Once Selwyn Schilling MD           There are no discontinued medications.    No Follow-up on file.      Olga Altman MD

## 2017-12-31 PROBLEM — M75.51 BURSITIS OF RIGHT SHOULDER: Status: ACTIVE | Noted: 2017-12-31

## 2017-12-31 RX ORDER — METHYLPREDNISOLONE ACETATE 80 MG/ML
80 INJECTION, SUSPENSION INTRA-ARTICULAR; INTRALESIONAL; INTRAMUSCULAR; SOFT TISSUE
Status: DISCONTINUED | OUTPATIENT
Start: 2017-12-08 | End: 2017-12-31 | Stop reason: HOSPADM

## 2017-12-31 NOTE — PROGRESS NOTES
Subjective:     Patient ID: Gemma Vick is a 62 y.o. female.    Chief Complaint: Pain of the Right Shoulder    HPI: The patient is seen for evaluation of right shoulder discomfort.  She slipped on stairs and had a near fall several months ago.  Her symptoms have worsened over time.  She rates her level of discomfort at a 7 or 8 on a pain scale of 1-10.  The patient is right-hand dominant.    Family History   Problem Relation Age of Onset    Heart disease Mother     Heart attacks under age 50 Father     Heart attacks under age 50 Brother      Past Medical History:   Diagnosis Date    AAA (abdominal aortic aneurysm) 2/13/2014    Abdominal aneurysm     3    Acute coronary syndrome     Arthritis     BPPV (benign paroxysmal positional vertigo)     Carotid artery plaque     Carotid artery stenosis and occlusion 2/13/2014    Chronic back pain     COPD (chronic obstructive pulmonary disease)     Coronary artery disease     Emphysema lung     Hyperlipidemia     Hypertension     Myocardial infarction     x3    Neuropathy      Social History     Social History    Marital status:      Spouse name: N/A    Number of children: N/A    Years of education: N/A     Social History Main Topics    Smoking status: Former Smoker     Packs/day: 0.50     Years: 44.00     Types: Cigarettes, Vaping w/o nicotine     Start date: 6/24/1970     Quit date: 05/2017    Smokeless tobacco: Never Used    Alcohol use No    Drug use: No    Sexual activity: Not Currently     Birth control/ protection: None     Other Topics Concern    None     Social History Narrative    None     Past Surgical History:   Procedure Laterality Date    CARDIAC CATHETERIZATION      CORONARY ANGIOPLASTY      HYSTERECTOMY       Review of patient's allergies indicates:  No Known Allergies      Medication List with Changes/Refills   New Medications    MELOXICAM (MOBIC) 7.5 MG TABLET    Take 1 tablet (7.5 mg total) by mouth once daily. With  food    PREDNISONE (DELTASONE) 50 MG TAB    Take 1 tablet (50 mg total) by mouth once daily.    TIZANIDINE (ZANAFLEX) 4 MG TABLET    TAKE  (1)  CAPSULE  TWICE DAILY AS NEEDED.   Current Medications    ALBUTEROL (PROAIR HFA) 90 MCG/ACTUATION INHALER    Inhale 2 puffs into the lungs every 6 (six) hours as needed.    ASPIRIN 81 MG CHEW    Take 81 mg by mouth once daily.    CLOPIDOGREL (PLAVIX) 75 MG TABLET    Take 1 tablet (75 mg total) by mouth once daily.    DULOXETINE (CYMBALTA) 60 MG CAPSULE    Take 1 capsule (60 mg total) by mouth once daily.    ESTROGENS, CONJUGATED, (PREMARIN) 0.3 MG TABLET    Take 1 tablet (0.3 mg total) by mouth every other day.    EZETIMIBE-SIMVASTATIN 10-40 MG (VYTORIN) 10-40 MG PER TABLET    Take 1 tablet by mouth every evening.    FELODIPINE (PLENDIL) 5 MG 24 HR TABLET    Take 1 tablet (5 mg total) by mouth once daily.    IBUPROFEN (ADVIL,MOTRIN) 400 MG TABLET    Take 1 tablet (400 mg total) by mouth every 12 (twelve) hours as needed for Other.    MOMETASONE-FORMOTEROL (DULERA) 200-5 MCG/ACTUATION INHALER    INHALE 2 PUFFS 2 TIMES DAILY    OXYGEN-AIR DELIVERY SYSTEMS MISC    2 L by Misc.(Non-Drug; Combo Route) route every evening.    ZOLPIDEM (AMBIEN) 5 MG TAB    Take 1 tablet (5 mg total) by mouth nightly.   Changed and/or Refilled Medications    Modified Medication Previous Medication    ALBUTEROL-IPRATROPIUM 2.5MG-0.5MG/3ML (DUO-NEB) 0.5 MG-3 MG(2.5 MG BASE)/3 ML NEBULIZER SOLUTION albuterol-ipratropium 2.5mg-0.5mg/3mL (DUO-NEB) 0.5 mg-3 mg(2.5 mg base)/3 mL nebulizer solution       USE ONE VIAL IN NEBULIZER TWICE DAILY    USE ONE VIAL IN NEBULIZER TWICE DAILY   Discontinued Medications    IPRATROPIUM-ALBUTEROL (COMBIVENT RESPIMAT)  MCG/ACTUATION INHALER    Inhale 1 puff into the lungs every 4 (four) hours as needed for Wheezing. Medically necessary       Review of Systems   Constitution: Negative for chills, decreased appetite, weight gain and weight loss.   HENT: Negative for  "congestion, ear pain, hearing loss and sore throat.    Eyes: Negative for blurred vision, double vision, vision loss in left eye, vision loss in right eye and visual disturbance.   Cardiovascular: Negative for chest pain, irregular heartbeat, leg swelling and palpitations.   Respiratory: Negative for cough and shortness of breath.    Endocrine: Negative for cold intolerance and heat intolerance.   Hematologic/Lymphatic: Negative for adenopathy. Does not bruise/bleed easily.   Skin: Negative for color change, nail changes, rash and suspicious lesions.   Musculoskeletal: Positive for joint pain.   Gastrointestinal: Negative for abdominal pain, constipation, diarrhea, heartburn, nausea and vomiting.   Genitourinary: Negative for bladder incontinence and dysuria.   Neurological: Negative for dizziness, paresthesias, sensory change and tremors.   Psychiatric/Behavioral: Negative for altered mental status, depression, memory loss and substance abuse.   Allergic/Immunologic: Negative for hives and persistent infections.       Objective:   /73 (BP Location: Right arm, Patient Position: Sitting, BP Method: Medium (Automatic))   Pulse 79   Ht 5' 1.5" (1.562 m)   Wt 74.2 kg (163 lb 9.3 oz)   BMI 30.41 kg/m²       General    Nursing note and vitals reviewed.  Constitutional: She is oriented to person, place, and time. She appears well-developed and well-nourished.   HENT:   Head: Normocephalic.   Nose: Nose normal.   Eyes: EOM are normal. Pupils are equal, round, and reactive to light.   Neck: Normal range of motion. Neck supple.   Cardiovascular: Normal rate and regular rhythm.    Pulmonary/Chest: Effort normal.   Abdominal: Soft. She exhibits no distension. There is no tenderness.   Neurological: She is alert and oriented to person, place, and time.   Psychiatric: She has a normal mood and affect. Her behavior is normal.     General Musculoskeletal Exam   Gait: normal   Pelvic Obliquity: none    Right Ankle/Foot " Exam   Right ankle exam is normal.    Range of Motion   The patient has normal right ankle ROM.    Alignment   Forefoot Alignment: normal    Muscle Strength   The patient has normal right ankle strength.    Tests   Anterior drawer: negative  Varus tilt: negative  Heel Walk: able to perform  Tiptoe Walk: able to perform    Other   Sensation: normal  Peroneal Subluxation: negative    Left Ankle/Foot Exam   Left ankle exam is normal.    Range of Motion   The patient has normal left ankle ROM.     Alignment   Forefoot Alignment: normal    Muscle Strength   The patient has normal left ankle strength.    Tests   Anterior drawer: negative  Varus tilt: negative  Heel Walk: able to perform  Tiptoe Walk: able to perform    Other   Sensation: normal  Peroneal Subluxation: negative    Right Knee Exam   Right knee exam is normal.    Inspection   Erythema: absent  Swelling: absent  Effusion: effusion  Deformity: deformity  Bruising: absent    Range of Motion   The patient has normal right knee ROM.    Tests   Meniscus   Trini:  Medial - negative Lateral - negative  Ligament Examination Lachman: normal (-1 to 2mm) PCL-Posterior Drawer: normal (0 to 2mm)     MCL - Valgus: normal (0 to 2mm)  LCL - Varus: normalPivot Shift: normal (Equal)  Posterolateral Corner: unstable (>15 degrees difference)  Patella   Patellar Apprehension: negative  Passive Patellar Tilt: neutral  Patellar Tracking: normal  Patellar Grind: negative    Other   Sensation: normal    Left Knee Exam   Left knee exam is normal.    Inspection   Erythema: absent  Swelling: absent  Effusion: absent  Deformity: deformity  Bruising: absent    Range of Motion   The patient has normal left knee ROM.    Tests   Meniscus   Tirni:  Medial - negative Lateral - negative  Stability Lachman: normal (-1 to 2mm) PCL-Posterior Drawer: normal (0 to 2mm)  MCL - Valgus: normal (0 to 2mm)  LCL - Varus: normal (0 to 2mm)Pivot Shift: normal (Equal)  Posterolateral Corner: unstable  (>15 degrees difference)  Patella   Patellar Apprehension: negative  Passive Patellar Tilt: neutral  Patellar Tracking: normal  Patellar Grind: negative    Other   Sensation: normal    Right Hip Exam   Right hip exam is normal.     Inspection   Swelling: absent  Bruising: absent    Muscle Strength   The patient has normal right hip strength.    Other   Sensation: normal  Left Hip Exam   Left hip exam is normal.    Inspection   Swelling: absent  Bruising: absent    Range of Motion   The patient has normal left hip ROM.    Muscle Strength   The patient has normal left hip strength.     Other   Sensation: normal      Back (L-Spine & T-Spine) / Neck (C-Spine) Exam   Back exam is normal.    Neck (C-Spine) Range of Motion   Flexion:     Normal  Extension: Normal  Right Lateral Bend: normal  Left Lateral Bend: normal  Right Rotation: normal  Left Rotation: normal    Spinal Sensation   Right Side Sensation  C-Spine Level: normal   L-Spine Level: normal  S-Spine Level: normal  T-Spine Level: normal  Left Side Sensation  C-Spine Level: normal  L-Spine Level: normal  S-Spine Level: normal  T-Spine Level: normal    Other She has no scoliosis .  Head Tilt:  Negative      Right Hand/Wrist Exam   Right hand exam is normal.    Inspection   Deformity: Wrist - deformity     Range of Motion   The patient has normal right hand/wrist ROM.    Tests   Phalens Sign: negative  Tinels Sign (Medial Nerve): negative  Finkelstein: negative  Cubital Tunnel Compression Test: negative      Other     Neuorologic Exam    Median Distribution: normal  Ulnar Distribution: normal  Radial Distribution: normal      Left Hand/Wrist Exam   Left hand exam is normal.    Inspection   Deformity: Wrist - absent     Range of Motion   The patient has normal left hand/wrist ROM.    Tests   Phalens Sign: negative  Tinels Sign (Medial Nerve): negative  Finkelstein: negative  Cubital Tunnel Compression Test: negative      Other     Sensory Exam  Median  Distribution: normal  Ulnar Distribution: normal  Radial Distribution: normal      Right Elbow Exam   Right elbow exam is normal.    Inspection   Effusion: absent  Bruising: absent  Deformity: absent    Range of Motion   The patient has normal right elbow ROM.    Tests Tinel's Sign (cubital tunnel): negative    Other   Sensation: normal      Left Elbow Exam   Left elbow exam is normal.    Inspection   Effusion: absent  Bruising: absent  Deformity: absent    Range of Motion   The patient has normal left elbow ROM.    Tests Tinel's Sign (cubital tunnel): negative    Other   Sensation: normal    Right Shoulder Exam     Tenderness   The patient is tender to palpation of the acromioclavicular joint, acromion and supraspinatus.    Range of Motion   Active Abduction: abnormal   Passive Abduction: abnormal   Extension: abnormal   Forward Flexion: abnormal   Forward Elevation: abnormal  Adduction: abnormal  External Rotation 90 degrees: abnormal  Internal Rotation 90 degrees: abnormal     Muscle Strength   The patient has normal right shoulder strength.    Tests & Signs   Apprehension: positive  Cross Arm: positive  Impingement: positive    Other   Sensation: normal    Left Shoulder Exam   Left shoulder exam is normal.    Range of Motion   Active Abduction: normal   Passive Abduction: normal   Extension: normal   Forward Flexion: normal   Forward Elevation: normal  Adduction: normal  External Rotation 0 degrees: normal   Internal Rotation 0 degrees: normal     Muscle Strength   The patient has normal left shoulder strength.    Tests & Signs   Apprehension: negative  Cross Arm: negative  Impingement: negative    Other   Sensation: normal       Muscle Strength   Right Upper Extremity   Wrist Extension: 5/5/5   Wrist Flexion: 5/5/5   : 5/5/5   Elbow Pronation:  5/5   Elbow Supination:  5/5   Elbow Extension: 5/5  Elbow Flexion: 5/5  Intrinsics: 5/5  Left Upper Extremity  Wrist Extension: 5/5/5   Wrist Flexion: 5/5/5   :   5/5/5   Elbow Pronation:  5/5   Elbow Supination:  5/5   Elbow Extension: 5/5  Elbow Flexion: 5/5  Intrinsics: 5/5  Right Lower Extremity   Hip Abduction: 5/5   Quadriceps:  5/5   Hamstrin/5   Left Lower Extremity   Hip Abduction: 5/5   Quadriceps:  5/5   Hamstrin/5     Reflexes     Left Side  Biceps:  2+  Triceps:  2+  Quadriceps:  2+  Achilles:  2+    Right Side   Biceps:  2+  Triceps:  2+  Quadriceps:  2+  Achilles:  2+    Vascular Exam     Right Pulses  Dorsalis Pedis:      2+  Posterior Tibial:      2+  Radial:                    2+      Left Pulses  Dorsalis Pedis:      2+  Posterior Tibial:      2+  Radial:                    2+      Capillary Refill  Right Hand: normal capillary refill  Left Hand: normal capillary refill        X-RAY:   Comparison: 10/15/2012    4 views    Findings:    Mild interval progression degenerative changes at the a.c. and glenohumeral joint.  Marginal osteophyte formation noted at the a.c. joint.  No fracture or dislocation.  No soft tissue calcification or foreign body.  Remaining osseous structures intact.  Included RIGHT upper lung field is clear.  Exam: MRI UPPER EXTREMITY JOINT WITHOUT CONTRAST RIGHT,    Date:  17 14:24:04    History: Acute right shoulder joint pain after a fall.    Comparison:  No prior relevant studies available    Findings: Multiplanar noncontrast MRI sequences of the right shoulder.    A type II acromion is present with moderate hypertrophic arthropathy of the a.c. joint.    The rotator cuff reveals mild to moderate thickening and increased signal consistent with rotator cuff tendinosis.    A small rim rent or partial tear of the anterior margin of the supraspinatus tendon is seen, best demonstrated on images 19 and 20.    The humeral head reveals minor subchondral cystic change.  The bony glenoid is intact.  There is mild labral degeneration involving the superior labrum and biceps anchor.  In addition the bicipital tendon reveals  thickening and intrasubstance signal.  There is fluid within the bicipital tendon sheath.  This is most pronounced involving the extra-articular tendon consistent with bicipital tendinosis and tenosynovitis.  Please see axial image 24 of series 3.    In addition there is prominent thickening and increased signal of the subscapularis tendon consistent with tendinosis.    Small joint effusion is present.   Impression    Type II acromion with moderate hypertrophic arthropathy of the a.c. joint.    Moderate grade rotator cuff tendinosis with small focal partial rim rent tear of the supraspinatus tendon.    Labral degeneration with evidence of intra-articular bicipital tendinosis.  Moderate grade extra-articular bicipital tendinosis and tenosynovitis.  Please see axial images 20 through 26.    Moderate grade subscapularis tendinosis.    Small joint effusion.    No acute fracture.           Assessment:         Encounter Diagnoses   Name Primary?    Rotator cuff impingement syndrome of right shoulder Yes    Acromioclavicular joint arthritis     Rotator cuff syndrome of right shoulder     Bursitis of right shoulder           Plan:   The patient is candidate for a steroid injection to the right subacromial area of the shoulder.  She was placed on Mobic 7.5 mg daily with food.  She'll be seen back in follow-up in 2 months for check on her progress.  She was sent to physical therapy to increase her range of motion in the right shoulder.               Disclaimer: This note is generated with speech recognition software and is subject to transcription error and sound alike phrases that may be missed by proofreading.

## 2018-01-02 ENCOUNTER — TELEPHONE (OUTPATIENT)
Dept: FAMILY MEDICINE | Facility: CLINIC | Age: 63
End: 2018-01-02

## 2018-01-02 DIAGNOSIS — J18.9 PNEUMONIA OF RIGHT LOWER LOBE DUE TO INFECTIOUS ORGANISM: Primary | ICD-10-CM

## 2018-01-03 DIAGNOSIS — J44.9 COPD, SEVERITY TO BE DETERMINED: ICD-10-CM

## 2018-01-03 RX ORDER — IPRATROPIUM BROMIDE AND ALBUTEROL SULFATE 2.5; .5 MG/3ML; MG/3ML
SOLUTION RESPIRATORY (INHALATION)
Qty: 270 ML | Refills: 11 | Status: SHIPPED | OUTPATIENT
Start: 2018-01-03 | End: 2018-07-31 | Stop reason: SDUPTHER

## 2018-01-10 ENCOUNTER — HOSPITAL ENCOUNTER (OUTPATIENT)
Dept: RADIOLOGY | Facility: HOSPITAL | Age: 63
Discharge: HOME | End: 2018-01-10
Payer: MEDICAID

## 2018-01-10 ENCOUNTER — OFFICE VISIT (OUTPATIENT)
Dept: FAMILY MEDICINE | Facility: CLINIC | Age: 63
End: 2018-01-10
Payer: MEDICAID

## 2018-01-10 VITALS
BODY MASS INDEX: 29.4 KG/M2 | SYSTOLIC BLOOD PRESSURE: 134 MMHG | WEIGHT: 159.75 LBS | HEIGHT: 62 IN | RESPIRATION RATE: 14 BRPM | HEART RATE: 66 BPM | TEMPERATURE: 98 F | OXYGEN SATURATION: 97 % | DIASTOLIC BLOOD PRESSURE: 59 MMHG

## 2018-01-10 DIAGNOSIS — J18.9 PNEUMONIA OF RIGHT LOWER LOBE DUE TO INFECTIOUS ORGANISM: ICD-10-CM

## 2018-01-10 DIAGNOSIS — J18.9 PNEUMONIA OF RIGHT LOWER LOBE DUE TO INFECTIOUS ORGANISM: Primary | ICD-10-CM

## 2018-01-10 PROCEDURE — 99214 OFFICE O/P EST MOD 30 MIN: CPT | Mod: PBBFAC,PO

## 2018-01-10 PROCEDURE — 99213 OFFICE O/P EST LOW 20 MIN: CPT | Mod: S$PBB,,,

## 2018-01-10 PROCEDURE — 71046 X-RAY EXAM CHEST 2 VIEWS: CPT | Mod: TC,FY,PO

## 2018-01-10 PROCEDURE — 71046 X-RAY EXAM CHEST 2 VIEWS: CPT | Mod: 26,,, | Performed by: RADIOLOGY

## 2018-01-10 PROCEDURE — 99999 PR PBB SHADOW E&M-EST. PATIENT-LVL IV: CPT | Mod: PBBFAC,,,

## 2018-01-10 NOTE — PROGRESS NOTES
Subjective:       Patient ID: Gemma Vick is a 62 y.o. female.    Chief Complaint: No chief complaint on file.    HPI   Patient is here today for follow-up for pneumonia.  She was diagnosed 12/21/2017.  She has had antibiotics which she says she took as prescribed.  She has had one follow-up chest x-ray which was not entirely clear.  Patient states that she feels better she denies any cough, wheezing, shortness of breath, fever, or any other complaints.  She denies any associated symptoms.  The patient does have a pre-existing diagnosis of COPD.    Review of Systems   Constitutional: Negative for activity change, appetite change, fatigue, fever and unexpected weight change.   HENT: Negative.    Eyes: Negative.    Respiratory: Positive for wheezing. Negative for cough, chest tightness and shortness of breath.    Cardiovascular: Negative for chest pain, palpitations and leg swelling.   Gastrointestinal: Negative for constipation, diarrhea, nausea and vomiting.   Endocrine: Negative.    Genitourinary: Negative.    Musculoskeletal: Negative.    Skin: Negative for color change.   Allergic/Immunologic: Negative.    Neurological: Negative for dizziness, weakness and light-headedness.   Hematological: Negative.    Psychiatric/Behavioral: Negative for sleep disturbance.         Objective:      Physical Exam   Constitutional: She is oriented to person, place, and time. She appears well-developed and well-nourished.   HENT:   Head: Normocephalic and atraumatic.   Eyes: Conjunctivae are normal. Pupils are equal, round, and reactive to light. No scleral icterus.   Neck: Normal range of motion. Neck supple.   Cardiovascular: Normal rate, regular rhythm, normal heart sounds and intact distal pulses.  Exam reveals no gallop and no friction rub.    No murmur heard.  Pulmonary/Chest: Effort normal. No respiratory distress. She has wheezes in the right middle field. She has no rales. She exhibits no tenderness.   Musculoskeletal:  Normal range of motion.   Lymphadenopathy:     She has no cervical adenopathy.   Neurological: She is alert and oriented to person, place, and time. She exhibits normal muscle tone. Coordination normal.   Skin: Skin is warm and dry. No rash noted. No erythema. No pallor.   Psychiatric: She has a normal mood and affect. Her behavior is normal. Judgment and thought content normal.   Vitals reviewed.      Assessment:       1. Pneumonia of right lower lobe due to infectious organism          Plan:   Pneumonia of right lower lobe due to infectious organism  -     X-Ray Chest PA And Lateral; Future; Expected date: 01/10/2018            Disclaimer: This note is prepared using voice recognition software.

## 2018-02-21 ENCOUNTER — PROCEDURE VISIT (OUTPATIENT)
Dept: PULMONOLOGY | Facility: CLINIC | Age: 63
End: 2018-02-21
Payer: MEDICAID

## 2018-02-21 ENCOUNTER — HOSPITAL ENCOUNTER (OUTPATIENT)
Dept: RADIOLOGY | Facility: HOSPITAL | Age: 63
Discharge: HOME | End: 2018-02-21
Attending: INTERNAL MEDICINE
Payer: MEDICAID

## 2018-02-21 ENCOUNTER — OFFICE VISIT (OUTPATIENT)
Dept: PULMONOLOGY | Facility: CLINIC | Age: 63
End: 2018-02-21
Payer: MEDICAID

## 2018-02-21 VITALS
DIASTOLIC BLOOD PRESSURE: 68 MMHG | SYSTOLIC BLOOD PRESSURE: 130 MMHG | WEIGHT: 151.88 LBS | RESPIRATION RATE: 18 BRPM | OXYGEN SATURATION: 97 % | HEART RATE: 76 BPM | BODY MASS INDEX: 27.78 KG/M2

## 2018-02-21 DIAGNOSIS — J13 PNEUMONIA OF RIGHT LUNG DUE TO STREPTOCOCCUS PNEUMONIAE, UNSPECIFIED PART OF LUNG: ICD-10-CM

## 2018-02-21 DIAGNOSIS — F17.201 TOBACCO USE DISORDER, MODERATE, IN SUSTAINED REMISSION: ICD-10-CM

## 2018-02-21 DIAGNOSIS — J44.9 COPD, MODERATE: ICD-10-CM

## 2018-02-21 DIAGNOSIS — J43.9 NOCTURNAL HYPOXEMIA DUE TO EMPHYSEMA: ICD-10-CM

## 2018-02-21 DIAGNOSIS — G47.36 NOCTURNAL HYPOXEMIA DUE TO EMPHYSEMA: ICD-10-CM

## 2018-02-21 DIAGNOSIS — J44.9 COPD, MODERATE: Primary | ICD-10-CM

## 2018-02-21 PROCEDURE — 3008F BODY MASS INDEX DOCD: CPT | Mod: S$PBB,,, | Performed by: INTERNAL MEDICINE

## 2018-02-21 PROCEDURE — 99213 OFFICE O/P EST LOW 20 MIN: CPT | Mod: PBBFAC | Performed by: INTERNAL MEDICINE

## 2018-02-21 PROCEDURE — 71046 X-RAY EXAM CHEST 2 VIEWS: CPT | Mod: TC

## 2018-02-21 PROCEDURE — 99214 OFFICE O/P EST MOD 30 MIN: CPT | Mod: 25,S$PBB,, | Performed by: INTERNAL MEDICINE

## 2018-02-21 PROCEDURE — 99999 PR PBB SHADOW E&M-EST. PATIENT-LVL III: CPT | Mod: PBBFAC,,, | Performed by: INTERNAL MEDICINE

## 2018-02-21 PROCEDURE — 71046 X-RAY EXAM CHEST 2 VIEWS: CPT | Mod: 26,,, | Performed by: RADIOLOGY

## 2018-02-21 PROCEDURE — 94060 EVALUATION OF WHEEZING: CPT | Mod: 26,S$PBB,, | Performed by: INTERNAL MEDICINE

## 2018-02-21 PROCEDURE — 94060 EVALUATION OF WHEEZING: CPT | Mod: PBBFAC

## 2018-02-21 NOTE — ASSESSMENT & PLAN NOTE
COPD ROS: taking medications as instructed, no medication side effects noted, no significant ongoing wheezing or shortness of breath, using bronchodilator MDI less than twice a week.   CAT score 16.  mRC score 2  FEV1  1.13( 51%) BRD improvement 29%    New concerns:Had stopped using maintaince DULERA HFA.     Exam: appears well, vitals normal, no respiratory distress, acyanotic, normal RR, chest clear, no wheezing, crepitations, rhonchi, normal symmetric air entry.     Assessment: COPD reasonably well controlled.     Plan: current treatment plan is effective, no change in therapy.   ADHERENCE with meds.

## 2018-02-21 NOTE — PROGRESS NOTES
Subjective:      Gemma Vick is a 62 y.o. female with known COPD who presents without exacerbation.   Was in ER with pneumonia 12/2017, accompanied by sister  No cough No wheezing, No SOB  Had quit using DULERA when felt better.  Current symptoms include: chronic dyspnea. mRC 2  Symptoms began several months ago.   Patient uses 2 pillows at night.   Patient currently is on oxygen at 2.0 L/min per nasal cannula at Night.   COPD test score is 16  Immunizations are up-to-date  I have reviewed the patient's medical history in detail and updated the computerized patient record.      The following portions of the patient's history were reviewed and updated as appropriate:   She  has a past medical history of AAA (abdominal aortic aneurysm) (2/13/2014); Abdominal aneurysm; Acute coronary syndrome; Arthritis; BPPV (benign paroxysmal positional vertigo); Carotid artery plaque; Carotid artery stenosis and occlusion (2/13/2014); Chronic back pain; COPD (chronic obstructive pulmonary disease); Coronary artery disease; Emphysema lung; Hyperlipidemia; Hypertension; Myocardial infarction; and Neuropathy.  She  does not have any pertinent problems on file.  She  has a past surgical history that includes Cardiac catheterization; Coronary angioplasty; and Hysterectomy.  Her family history includes Heart attacks under age 50 in her brother and father; Heart disease in her mother.  She  reports that she quit smoking about 9 months ago. Her smoking use included Cigarettes and Vaping w/o nicotine. She started smoking about 47 years ago. She has a 22.00 pack-year smoking history. She has never used smokeless tobacco. She reports that she does not drink alcohol or use drugs.  She has a current medication list which includes the following prescription(s): albuterol, albuterol-ipratropium 2.5mg-0.5mg/3ml, aspirin, clopidogrel, duloxetine, estrogens (conjugated), ezetimibe-simvastatin 10-40 mg, felodipine, ibuprofen,  mometasone-formoterol, oxygen-air delivery systems, tizanidine, zolpidem, and meloxicam, and the following Facility-Administered Medications: methylprednisolone acetate.  Current Outpatient Prescriptions on File Prior to Visit   Medication Sig Dispense Refill    albuterol (PROAIR HFA) 90 mcg/actuation inhaler Inhale 2 puffs into the lungs every 6 (six) hours as needed. 18 g 6    albuterol-ipratropium 2.5mg-0.5mg/3mL (DUO-NEB) 0.5 mg-3 mg(2.5 mg base)/3 mL nebulizer solution USE ONE VIAL IN NEBULIZER TWICE DAILY 270 mL 11    aspirin 81 MG Chew Take 81 mg by mouth once daily.      clopidogrel (PLAVIX) 75 mg tablet Take 1 tablet (75 mg total) by mouth once daily. 30 tablet 5    DULoxetine (CYMBALTA) 60 MG capsule Take 1 capsule (60 mg total) by mouth once daily. 30 capsule 5    estrogens, conjugated, (PREMARIN) 0.3 MG tablet Take 1 tablet (0.3 mg total) by mouth every other day. 15 tablet 5    ezetimibe-simvastatin 10-40 mg (VYTORIN) 10-40 mg per tablet Take 1 tablet by mouth every evening. 30 tablet 5    felodipine (PLENDIL) 5 MG 24 hr tablet Take 1 tablet (5 mg total) by mouth once daily. 30 tablet 5    ibuprofen (ADVIL,MOTRIN) 400 MG tablet Take 1 tablet (400 mg total) by mouth every 12 (twelve) hours as needed for Other. 30 tablet 5    mometasone-formoterol (DULERA) 200-5 mcg/actuation inhaler INHALE 2 PUFFS 2 TIMES DAILY 13 g 5    OXYGEN-AIR DELIVERY SYSTEMS MISC 2 L by Misc.(Non-Drug; Combo Route) route every evening.      tiZANidine (ZANAFLEX) 4 MG tablet TAKE  (1)  CAPSULE  TWICE DAILY AS NEEDED. 30 tablet 0    zolpidem (AMBIEN) 5 MG Tab Take 1 tablet (5 mg total) by mouth nightly. 30 tablet 5    meloxicam (MOBIC) 7.5 MG tablet Take 1 tablet (7.5 mg total) by mouth once daily. With food 30 tablet 2     Current Facility-Administered Medications on File Prior to Visit   Medication Dose Route Frequency Provider Last Rate Last Dose    methylPREDNISolone acetate injection 80 mg  80 mg Intramuscular  Once Selwyn Schilling MD         She has No Known Allergies..    Review of Systems  A comprehensive review of systems was negative except for: Respiratory: positive for dyspnea on exertion  Cardiovascular: positive for claudication       Objective:      /68 (BP Location: Right arm, Patient Position: Sitting)   Pulse 76   Resp 18   Wt 68.9 kg (151 lb 14.4 oz)   SpO2 97%   BMI 27.78 kg/m²   FEV1: 1.13 L, 51 % of predicted  FEV1/FVC: 56.51 %  General appearance: alert, appears stated age, cooperative and no distress  Head: atraumatic  Eyes: negative  Nose: no discharge  Neck: no adenopathy, no carotid bruit, no JVD, supple, symmetrical, trachea midline and thyroid not enlarged, symmetric, no tenderness/mass/nodules  Lungs: diminished breath sounds bibasilar and bilaterally and expiratory wheezes in apex  Heart: regular rate and rhythm, S1, S2 normal, no murmur, click, rub or gallop  Extremities: extremities normal, atraumatic, no cyanosis or edema  Pulses: 2+ and symmetric  Skin: Skin color, texture, turgor normal. No rashes or lesions  Lymph nodes: Cervical, supraclavicular, and axillary nodes normal.  Neurologic: Grossly normal       CXR:  Findings: The lungs are clear and free of infiltrate.  No pleural effusion or pneumothorax is identified.    The heart is borderline enlarged.  Calcified granuloma noted within the left midlung zone.    Armando:  FEV1.13( 51%), FVC 2.00( 69%)  FEV1/FVC 56.51        Assessment:      Problem List Items Addressed This Visit     Nocturnal hypoxemia due to emphysema    COPD, moderate - Primary     COPD ROS: taking medications as instructed, no medication side effects noted, no significant ongoing wheezing or shortness of breath, using bronchodilator MDI less than twice a week.   CAT score 16.  mRC score 2  FEV1  1.13( 51%) BRD improvement 29%    New concerns:Had stopped using maintaince DULERA HFA.     Exam: appears well, vitals normal, no respiratory distress, acyanotic, normal  RR, chest clear, no wheezing, crepitations, rhonchi, normal symmetric air entry.     Assessment: COPD reasonably well controlled.     Plan: current treatment plan is effective, no change in therapy.   ADHERENCE with meds.           Relevant Orders    X-Ray Chest PA And Lateral    Spirometry with/without bronchodilator    Tobacco use disorder, moderate, in sustained remission        Plan:      Follow-up in about 3 months (around 5/21/2018) for Spirometry and CXR next visit.  Gold class 2  Group 2:  Adherence with meds    This note was prepared using voice recognition system and is likely to have sound alike errors that may have been overlooked even after proof reading.  Please call me with any questions    Discussed diagnosis, its evaluation, treatment and usual course. All questions answered.    Thank you for the courtesy of participating in the care of this patient    Nathan Rodriguez MD

## 2018-02-22 LAB
PRE FEV1 FVC: 56.51 % (ref 67.84–87.43)
PRE FEV1: 1.13 L (ref 1.69–2.75)
PRE FVC: 2 L (ref 2.26–3.52)
PRE PEF: 2.57 L/S (ref 4.18–7.28)

## 2018-03-12 ENCOUNTER — OFFICE VISIT (OUTPATIENT)
Dept: ORTHOPEDICS | Facility: CLINIC | Age: 63
End: 2018-03-12
Payer: MEDICAID

## 2018-03-12 VITALS
HEIGHT: 62 IN | SYSTOLIC BLOOD PRESSURE: 121 MMHG | BODY MASS INDEX: 27.95 KG/M2 | WEIGHT: 151.88 LBS | HEART RATE: 69 BPM | DIASTOLIC BLOOD PRESSURE: 74 MMHG

## 2018-03-12 DIAGNOSIS — M75.101 ROTATOR CUFF SYNDROME OF RIGHT SHOULDER: Primary | ICD-10-CM

## 2018-03-12 DIAGNOSIS — M19.011 ARTHRITIS OF RIGHT ACROMIOCLAVICULAR JOINT: ICD-10-CM

## 2018-03-12 DIAGNOSIS — M75.41 ROTATOR CUFF IMPINGEMENT SYNDROME OF RIGHT SHOULDER: ICD-10-CM

## 2018-03-12 PROCEDURE — 99999 PR PBB SHADOW E&M-EST. PATIENT-LVL III: CPT | Mod: PBBFAC,,, | Performed by: PHYSICIAN ASSISTANT

## 2018-03-12 PROCEDURE — 99213 OFFICE O/P EST LOW 20 MIN: CPT | Mod: S$PBB,,, | Performed by: PHYSICIAN ASSISTANT

## 2018-03-12 PROCEDURE — 99213 OFFICE O/P EST LOW 20 MIN: CPT | Mod: PBBFAC | Performed by: PHYSICIAN ASSISTANT

## 2018-03-12 RX ORDER — METHYLPREDNISOLONE 4 MG/1
TABLET ORAL
Qty: 21 PACKAGE | Refills: 0 | Status: SHIPPED | OUTPATIENT
Start: 2018-03-12 | End: 2018-05-15

## 2018-03-12 RX ORDER — MELOXICAM 7.5 MG/1
7.5 TABLET ORAL DAILY
Qty: 30 TABLET | Refills: 2 | Status: SHIPPED | OUTPATIENT
Start: 2018-03-12 | End: 2018-09-19

## 2018-03-12 NOTE — PROGRESS NOTES
Subjective:     Patient ID: Gemma Vick is a 63 y.o. female.    Chief Complaint: Pain of the Right Shoulder    Pt presents to clinic for a 3mo f/u visit for R shoulder pain. At her last OV she received a steroid injection per Dr. Schilling for R shoulder pain and impingement syndrome, pt reports good pain relief w/ injection, >50% pain relief x 2 mo also with increased ROM w/o pain. Dr. Schilling also recommended PT , however, pt reports she was unable to attend due to not having a ride. I was unable to see an order for this. Will reorder today.   She reports daily sharp pains and catching in R shoulder with activity, mostly with reaching over or above head. Pain is located anterior and laterally on R shoulder. She denies sharp shooting pains down RUE, denies numbness or tingling. She is right handed. She was taking Mobic 7.5 mg q. 12 hrs prn which she reports was helping to relieve pain, though, pt is out of this medication. She also reports borrowing a TEN's unit which significantly helped with pain.  Denies gi irritation with NSAID. Denies any recent falls or trauma. Rates pain at a 5-6/10 w/ movement today.         Past Medical History:   Diagnosis Date    AAA (abdominal aortic aneurysm) 2/13/2014    Abdominal aneurysm     3    Acute coronary syndrome     Arthritis     BPPV (benign paroxysmal positional vertigo)     Carotid artery plaque     Carotid artery stenosis and occlusion 2/13/2014    Chronic back pain     COPD (chronic obstructive pulmonary disease)     Coronary artery disease     Emphysema lung     Hyperlipidemia     Hypertension     Myocardial infarction     x3    Neuropathy      Past Surgical History:   Procedure Laterality Date    CARDIAC CATHETERIZATION      CORONARY ANGIOPLASTY      HYSTERECTOMY       Family History   Problem Relation Age of Onset    Heart disease Mother     Heart attacks under age 50 Father     Heart attacks under age 50 Brother      Social History     Social  History    Marital status:      Spouse name: N/A    Number of children: N/A    Years of education: N/A     Occupational History    Not on file.     Social History Main Topics    Smoking status: Former Smoker     Packs/day: 0.50     Years: 44.00     Types: Cigarettes, Vaping w/o nicotine     Start date: 6/24/1970     Quit date: 05/2017    Smokeless tobacco: Never Used    Alcohol use No    Drug use: No    Sexual activity: Not Currently     Birth control/ protection: None     Other Topics Concern    Not on file     Social History Narrative    No narrative on file     Medication List with Changes/Refills   New Medications    METHYLPREDNISOLONE (MEDROL DOSEPACK) 4 MG TABLET    use as directed   Current Medications    ALBUTEROL (PROAIR HFA) 90 MCG/ACTUATION INHALER    Inhale 2 puffs into the lungs every 6 (six) hours as needed.    ALBUTEROL-IPRATROPIUM 2.5MG-0.5MG/3ML (DUO-NEB) 0.5 MG-3 MG(2.5 MG BASE)/3 ML NEBULIZER SOLUTION    USE ONE VIAL IN NEBULIZER TWICE DAILY    ASPIRIN 81 MG CHEW    Take 81 mg by mouth once daily.    CLOPIDOGREL (PLAVIX) 75 MG TABLET    Take 1 tablet (75 mg total) by mouth once daily.    DULOXETINE (CYMBALTA) 60 MG CAPSULE    Take 1 capsule (60 mg total) by mouth once daily.    ESTROGENS, CONJUGATED, (PREMARIN) 0.3 MG TABLET    Take 1 tablet (0.3 mg total) by mouth every other day.    EZETIMIBE-SIMVASTATIN 10-40 MG (VYTORIN) 10-40 MG PER TABLET    Take 1 tablet by mouth every evening.    FELODIPINE (PLENDIL) 5 MG 24 HR TABLET    Take 1 tablet (5 mg total) by mouth once daily.    MOMETASONE-FORMOTEROL (DULERA) 200-5 MCG/ACTUATION INHALER    INHALE 2 PUFFS 2 TIMES DAILY    OXYGEN-AIR DELIVERY SYSTEMS MISC    2 L by Misc.(Non-Drug; Combo Route) route every evening.    ZOLPIDEM (AMBIEN) 5 MG TAB    Take 1 tablet (5 mg total) by mouth nightly.   Changed and/or Refilled Medications    Modified Medication Previous Medication    MELOXICAM (MOBIC) 7.5 MG TABLET meloxicam (MOBIC) 7.5 MG  tablet       Take 1 tablet (7.5 mg total) by mouth once daily. With food    Take 1 tablet (7.5 mg total) by mouth once daily. With food   Discontinued Medications    IBUPROFEN (ADVIL,MOTRIN) 400 MG TABLET    Take 1 tablet (400 mg total) by mouth every 12 (twelve) hours as needed for Other.    TIZANIDINE (ZANAFLEX) 4 MG TABLET    TAKE  (1)  CAPSULE  TWICE DAILY AS NEEDED.     Review of patient's allergies indicates:  No Known Allergies  Review of Systems   Constitution: Negative for fever and weakness.   Respiratory: Negative for cough and shortness of breath.    Skin: Negative for rash.   Musculoskeletal: Positive for joint pain and myalgias. Negative for falls and joint swelling.   Neurological: Negative for disturbances in coordination, numbness and paresthesias.   Psychiatric/Behavioral: Negative for altered mental status.   All other systems reviewed and are negative.      Objective:   Body mass index is 27.78 kg/m².  Vitals:    03/12/18 1144   BP: 121/74   Pulse: 69       General: Gemma is well-developed, well-nourished, appears stated age, in no acute distress, alert and oriented to time, place and person.       General    Constitutional: She is oriented to person, place, and time. She appears well-developed and well-nourished.   HENT:   Head: Atraumatic.   Pulmonary/Chest: Effort normal.   Neurological: She is alert and oriented to person, place, and time.   Psychiatric: She has a normal mood and affect. Her behavior is normal.         Right Shoulder Exam     Inspection/Observation   Swelling: absent    Tenderness   The patient is tender to palpation of the biceps tendon, supraspinatus and acromion.    Crepitus   The patient has crepitus of the AC joint.    Range of Motion   Active Abduction: 160   Passive Abduction: 160   Forward Flexion: 170 Adduction: 30   External Rotation 0 degrees: 60   Internal Rotation 0 degrees: T11     Tests & Signs   Cross Arm: negative  Hawkin's test: positive  Impingement:  positive  Rotator Cuff Painful Arc/Range: moderate  Active Compression Test (Savannah's Sign): negative  Yergason's Test: positive  Speed's Test: negative    Other   Sensation: normal    Comments:  Discomfort over subacromial space.    Left Shoulder Exam     Range of Motion   External Rotation 0 degrees: 60     Other   Sensation: normal       Muscle Strength   Right Upper Extremity   Shoulder Abduction: 3/5   Biceps: 3/5/5     Vascular Exam     Right Pulses      Radial:                    2+      Left Pulses      Radial:                    2+      Capillary Refill  Right Hand: normal capillary refill  Left Hand: normal capillary refill    MRI RUE (07/2017): Type II acromion with moderate hypertrophic arthropathy of the a.c. joint.  Moderate grade rotator cuff tendinosis with small focal partial rim rent tear of the supraspinatus tendon.  Labral degeneration with evidence of intra-articular bicipital tendinosis.  Moderate grade extra-articular bicipital tendinosis and tenosynovitis.  Please see axial images 20 through 26.  Moderate grade subscapularis tendinosis.  Small joint effusion. No acute fracture.    XRAY R SHOULDER (12/2017): Mild interval progression degenerative changes at the a.c. and glenohumeral joint.  Marginal osteophyte formation noted at the a.c. joint.  No fracture or dislocation.  No soft tissue calcification or foreign body.  Remaining osseous structures intact.  Included RIGHT upper lung field is clear.      Assessment:     Encounter Diagnoses   Name Primary?    Rotator cuff syndrome of right shoulder Yes    Rotator cuff impingement syndrome of right shoulder     Arthritis of right acromioclavicular joint         Plan:       1. RX for MDP. Continue Mobic also.    2. PT/OT referral    3. I offered TENS unit order but patient is unable to afford our units. She will consider next month.    4. RTC in 6 weeks to reassess

## 2018-03-23 NOTE — PROGRESS NOTES
PHYSICAL THERAPY INITIAL OUTPATIENT EVALUATION    Referring Provider:  Anup Kamara    Diagnosis:       ICD-10-CM ICD-9-CM    1. Rotator cuff syndrome of right shoulder M75.101 726.10    2. Rotator cuff impingement syndrome of right shoulder M75.41 726.10    3. Arthritis of right acromioclavicular joint M19.011 716.91             Orders:  Evaluate and Treat    Date of Initial Evaluation: 3-26-18      Visit # 1     SUBJECTIVE:  Patient reports pain R shoulder a few months.  Pt. Reports discomfort with movement of R arm and feels bicep muscle is getting weaker.  Pt. Feels limited with everything even turning a doorknob.      Current Pain:  0/10, R shoulder  Worse pain : 8/10 when moving arm       OBJECTIVE:    Sensation:B UE intact    Cervical AROM:    ext.   20  Flex   60   Lat flex R:L     15:20   B rot.             60            L   R   Shoulder A/PROM: Flexion     WNL  150     Extension   WNL  WNL     Abduction   WNL  155     Adduction   WNL  25     Internal Rotation  WNL  30     External Rotation  WNL  80           L  R  Strength:  Supraspinatus   4+/5  4+/5     Subscapularis   4+/5  3/5     Latissimus dorsi   4/5  3+/5     Rhomboids   4/5  3+/5     Upper Trapezius  5/5  5/5     Middle Trapezius  3+/5  3/5P!     Lower Trapezius  3+/5  3/5     Levator scapula  NT  NT     Biceps    4/5  4+/5     Triceps   5/5  4/5     Flexors   4+/5  4/5     Abduction   4+/5  4/5     Hand intrinsics (dynomometer)    5/5 R wrist gross MMT except 3/5 R supinator        Special test:  NEER   +    Neely ryan -    Apprehension -    Relocation  NT    Painful Arc  + With external rotation    Drop arm  -    Empty can  +R        Tenderness to palpation: supraspinatus and infraspinatus fossa, R bicipital groove        Function: Patient reports 90.0% disability based on score of the Quick DASH.        ASSESSMENT:  The patient is a 63 y.o. year old female who presents to physical therapy with complaints of R shoulder pain.   Patient's impairments include mild to moderate weakness R shoulder/scapular muscles, + Neer and empty can and painful arc tests.  These impairments are limiting patient's ability to perform ADLs, sleep on R side, household work.  Patient's prognosis is good.  Patient will benefit from skilled physical therapy intervention to decrease R shoulder pain with increase R shoulder flexibility and strengthening.    Short Term Goals:  3-4 weeks  1. I with HEP  2. Full and pain free  R Shoulder AROM  3. Increase R shoulder scapula strength to 4/5 gross MMT     Long Term Goals: 6-8 weeks  1. Return to activities without complains of pain and dysfunction  2.  Increase R shoulder strength/ scapula muscles to 5/5 to lift groceries and household goods.  3. Decrease muscle tension in biceps to none to tolerate daily activities and grooming.    TREATMENT PROVIDED:  -Manual Therapy:  R shoulder PROM x 4 min., R shoulder joint mobilization x 4 min., STM x 4 min. R biceps  -Therapeutic Exercise:  Middle trapezius x 2min., lower trapezius x 2min., ext. Rot x 2min., int. Rot. X 2min.   -Modalities: e-stim with cold pack x 15 min. To R shoulder   -Evaluation completed  -Education on HEP, condition and posture, activity modification    PLAN:  Patient will benefit from physical therapy (2-3) x/week for (6-8) weeks including manual therapy, therapeutic exercise, functional activities, modalities, and patient education.    Thank you for this referral.    These services are reasonable and necessary for the conditions set forth above while under my care.

## 2018-03-26 ENCOUNTER — CLINICAL SUPPORT (OUTPATIENT)
Dept: REHABILITATION | Facility: HOSPITAL | Age: 63
End: 2018-03-26
Payer: MEDICAID

## 2018-03-26 DIAGNOSIS — M75.41 ROTATOR CUFF IMPINGEMENT SYNDROME OF RIGHT SHOULDER: ICD-10-CM

## 2018-03-26 DIAGNOSIS — M75.101 ROTATOR CUFF SYNDROME OF RIGHT SHOULDER: Primary | ICD-10-CM

## 2018-03-26 DIAGNOSIS — M19.011 ARTHRITIS OF RIGHT ACROMIOCLAVICULAR JOINT: ICD-10-CM

## 2018-03-26 PROCEDURE — 97161 PT EVAL LOW COMPLEX 20 MIN: CPT

## 2018-03-27 ENCOUNTER — PATIENT MESSAGE (OUTPATIENT)
Dept: FAMILY MEDICINE | Facility: CLINIC | Age: 63
End: 2018-03-27

## 2018-03-27 DIAGNOSIS — M54.9 BACK PAIN, UNSPECIFIED BACK LOCATION, UNSPECIFIED BACK PAIN LATERALITY, UNSPECIFIED CHRONICITY: Primary | ICD-10-CM

## 2018-04-03 ENCOUNTER — PATIENT MESSAGE (OUTPATIENT)
Dept: CARDIOLOGY | Facility: CLINIC | Age: 63
End: 2018-04-03

## 2018-04-16 ENCOUNTER — CLINICAL SUPPORT (OUTPATIENT)
Dept: REHABILITATION | Facility: HOSPITAL | Age: 63
End: 2018-04-16
Payer: MEDICAID

## 2018-04-16 ENCOUNTER — PATIENT MESSAGE (OUTPATIENT)
Dept: CARDIOLOGY | Facility: CLINIC | Age: 63
End: 2018-04-16

## 2018-04-16 DIAGNOSIS — M75.41 ROTATOR CUFF IMPINGEMENT SYNDROME OF RIGHT SHOULDER: ICD-10-CM

## 2018-04-16 DIAGNOSIS — M75.101 ROTATOR CUFF SYNDROME OF RIGHT SHOULDER: Primary | ICD-10-CM

## 2018-04-16 DIAGNOSIS — M19.011 ARTHRITIS OF RIGHT ACROMIOCLAVICULAR JOINT: ICD-10-CM

## 2018-04-16 DIAGNOSIS — M54.9 BACK PAIN, UNSPECIFIED BACK LOCATION, UNSPECIFIED BACK PAIN LATERALITY, UNSPECIFIED CHRONICITY: ICD-10-CM

## 2018-04-16 PROCEDURE — 97140 MANUAL THERAPY 1/> REGIONS: CPT

## 2018-04-16 PROCEDURE — 97164 PT RE-EVAL EST PLAN CARE: CPT

## 2018-04-16 NOTE — PROGRESS NOTES
PHYSICAL THERAPY Re-EVALUATION    Referring Provider:  Anup Kamara    Diagnosis:       ICD-10-CM ICD-9-CM    1. Rotator cuff syndrome of right shoulder M75.101 726.10    2. Rotator cuff impingement syndrome of right shoulder M75.41 726.10    3. Arthritis of right acromioclavicular joint M19.011 716.91    4. Back pain, unspecified back location, unspecified back pain laterality, unspecified chronicity M54.9 724.5             Orders:  Evaluate and Treat    Date of Initial Evaluation: 3-26-18      Visit # 2    SUBJECTIVE: Pt. Reports more pain in back lately.   Pt. Reports performing shoulder exercises but no relief in pain.  Pt. Reports back pain for years that has gotten worse over the years.  Pt. Reports more pain with bending.  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     Current Pain:  0/10, R shoulder  Worse pain : 8/10 with certain movements including opening jar, reaching        OBJECTIVE:    Sensation:B UE intact    Cervical AROM:    ext.   20  Flex   60   Lat flex R:L     15:20   B rot.             60          Eval    Re-Eval         L   R   R  Shoulder A/PROM: Flexion     WNL  150  155     Extension   WNL  WNL  WNL     Abduction   WNL  155  118     Adduction   WNL  25  25     Internal Rotation  WNL  30  46 pain     External Rotation  WNL  80  68 pain            Eval    Re-Eval         L  R  R  Strength:  Supraspinatus   4+/5  4+/5  3+/5     Subscapularis   4+/5  3/5  4/5     Latissimus dorsi   4/5  3+/5  3+/5     Rhomboids   4/5  3+/5  3+/5     Upper Trapezius  5/5  5/5  5/5     Middle Trapezius  3+/5  3/5P!  3/5     Lower Trapezius  3+/5  3/5  3/5     Levator scapula  NT  NT  NT     Biceps    4/5  4+/5  4+/5     Triceps   5/5  4/5  4/5     Flexors   4+/5  4/5  4/5     Abduction   4+/5  4/5  3+/5         Eval: 5/5 R wrist gross MMT except 3/5 R supinator           Eval  Special test:  JAMAR   +    Chin  ryan -    Apprehension -    Relocation  NT    Painful Arc  + With external rotation    Drop arm  -    Empty can  +R        Tenderness to palpation: Eval- supraspinatus and infraspinatus fossa, R bicipital groove        Function: Eval- Patient reports 90.0% disability based on score of the Quick DASH.    LUMBAR  Pain: 5-6/10, described as dull, tightness, aggravating on R low back, spasms occur when bending     Sensation:  NT     Lumbar ROM: Forward Bending   WNL pain in LB       Backwardbending  WNL pain at end range     Sidebend Right  WNL pain in contralateral flank      Sidebend Left   WNL pain in contralateral flank     Rotation Right   WNL little tightness     Rotation Left   WNL little tightness      Strength:  Gluteus Medius   L 3/5 pain, R 4+/5      B TFL     B 4+/5     B hip ext    B 3/5 pain greater on R than L      Psoas    B 3/5     Quadriceps   B 5/5     Hamstrings    L 5/5, R 3+/5     Rectus Abdominis  3/5     Anterior Tibialis  B 5/5     Gastrocnemius  B 5/5     Transverse Abdominis 3/5    Other: HS 90/90 B 20 degrees bilat.; L hip ext 0 degrees; soreness R glut med; tenderness along lumbar spinous processes    Function: Patient reports N/A disability on the Modified Oswestry Low Back Pain Disability Questionnaire.    Other:  Pt. Reports more pain radiating up back with repeated extension; R LE shorter than L LE ; L knee is higher than R knee , B ASIS are equal       ASSESSMENT:  Pt. Did not show any progress since initial eval.  Pt. Had not been to PT since initial eval and reports having several incidences since last visit limiting her from performing HEP.   Pt. Noted to have a decrease in R shoulder abduction and external rotation and an increase in R shoulder internal rotation.  Pt. Noted to have mild to moderate weakness in B LE , and decreased L hip extension, and pain or discomfort occurs with all lumbar planes of motion.  Back pain added referral.  Pt. Noted to fall into stability category  needing strengthening to core and lumbar and LE s.  Pt. Noted to have leg length discrepancy not corrected with manual tx.  Pt. Educated on neutral spine to increase deep core strength.  Pt. Still has pain with middle and lower trapezius strengthening.  Cont. POC.      Short Term Goals:  3-4 weeks  1. I with HEP Not Met  2. Full and pain free  R Shoulder AROM Not Met  3. Increase R shoulder scapula strength to 4/5 gross MMT Not Met     Long Term Goals: 6-8 weeks  1. Return to activities without complains of pain and dysfunction Not Met  2.  Increase R shoulder strength/ scapula muscles to 5/5 to lift groceries and household goods. Not Met  3. Decrease muscle tension in biceps to none to tolerate daily activities and grooming. Not Met    TREATMENT PROVIDED:  -Manual Therapy:  R ant. SI mobilization x 2min., R SI thrust mobilization x 2min. STM x 4 min. To R lumbar paraspinals and R glut medius  -Therapeutic Exercise:  Middle trapezius x 1min., lower trapezius x 1min., neutral spine x 2 min.  -Modalities: pt. Did not want modalities today  -Re-evaluation completed  -Education on HEP, condition and posture, activity modification    PLAN:  Patient will benefit from physical therapy (2-3) x/week for (6-8) weeks including manual therapy, therapeutic exercise, functional activities, modalities, and patient education.    Thank you for this referral.    These services are reasonable and necessary for the conditions set forth above while under my care.

## 2018-04-18 ENCOUNTER — OFFICE VISIT (OUTPATIENT)
Dept: CARDIOLOGY | Facility: CLINIC | Age: 63
End: 2018-04-18
Payer: MEDICAID

## 2018-04-18 ENCOUNTER — TELEPHONE (OUTPATIENT)
Dept: CARDIOLOGY | Facility: CLINIC | Age: 63
End: 2018-04-18

## 2018-04-18 ENCOUNTER — PATIENT MESSAGE (OUTPATIENT)
Dept: CARDIOLOGY | Facility: CLINIC | Age: 63
End: 2018-04-18

## 2018-04-18 VITALS
DIASTOLIC BLOOD PRESSURE: 78 MMHG | BODY MASS INDEX: 28.11 KG/M2 | WEIGHT: 152.75 LBS | SYSTOLIC BLOOD PRESSURE: 152 MMHG | HEIGHT: 62 IN | HEART RATE: 77 BPM

## 2018-04-18 DIAGNOSIS — I65.23 BILATERAL CAROTID ARTERY STENOSIS: ICD-10-CM

## 2018-04-18 DIAGNOSIS — E78.2 MIXED HYPERLIPIDEMIA: ICD-10-CM

## 2018-04-18 DIAGNOSIS — I10 ESSENTIAL HYPERTENSION: ICD-10-CM

## 2018-04-18 DIAGNOSIS — I25.10 CORONARY ARTERY DISEASE INVOLVING NATIVE CORONARY ARTERY OF NATIVE HEART WITHOUT ANGINA PECTORIS: Primary | ICD-10-CM

## 2018-04-18 DIAGNOSIS — R42 DIZZINESS: ICD-10-CM

## 2018-04-18 PROCEDURE — 99999 PR PBB SHADOW E&M-EST. PATIENT-LVL III: CPT | Mod: PBBFAC,,, | Performed by: INTERNAL MEDICINE

## 2018-04-18 PROCEDURE — 99214 OFFICE O/P EST MOD 30 MIN: CPT | Mod: S$PBB,,, | Performed by: INTERNAL MEDICINE

## 2018-04-18 PROCEDURE — 99213 OFFICE O/P EST LOW 20 MIN: CPT | Mod: PBBFAC,PO | Performed by: INTERNAL MEDICINE

## 2018-04-18 RX ORDER — HYDROCHLOROTHIAZIDE 12.5 MG/1
12.5 TABLET ORAL DAILY
Qty: 30 TABLET | Refills: 11 | Status: SHIPPED | OUTPATIENT
Start: 2018-04-18 | End: 2018-05-01 | Stop reason: SDUPTHER

## 2018-04-18 RX ORDER — FELODIPINE 5 MG/1
5 TABLET, EXTENDED RELEASE ORAL DAILY
Qty: 30 TABLET | Refills: 5
Start: 2018-04-18 | End: 2018-05-01 | Stop reason: SDUPTHER

## 2018-04-18 NOTE — TELEPHONE ENCOUNTER
Spoke with pt who said she couldn't get apt with ent as we were not in network.  Pt will call ins carrier and find one and we can send a referral if needed.

## 2018-04-18 NOTE — PROGRESS NOTES
Subjective:   Patient ID:  Gemma Vick is a 63 y.o. female who presents for follow up of Dizziness        PMH CAD s/p PCi 2 stents In 2007, HTN, COPD, bradycardia (resolved after off BB)  Recent eye bleeding no vision change.The ophamothamology is ok to continue Plavix.   Felt dizziness and heard pulse from the ears whne lying done.    Dizziness if turning head around or lift up. No dizziness if standing up from the bed to chiar.  BP dropped from 162 to 148 mmHg in the office.  No chest pain, dyspnea and palpitation.  Med compliance.  Off Felodipine before due to low BP. Chronic BP different between arms.                 Past Medical History:   Diagnosis Date    AAA (abdominal aortic aneurysm) 2/13/2014    Abdominal aneurysm     3    Acute coronary syndrome     Arthritis     BPPV (benign paroxysmal positional vertigo)     Carotid artery plaque     Carotid artery stenosis and occlusion 2/13/2014    Chronic back pain     COPD (chronic obstructive pulmonary disease)     Coronary artery disease     Emphysema lung     Hyperlipidemia     Hypertension     Myocardial infarction     x3    Neuropathy        Past Surgical History:   Procedure Laterality Date    CARDIAC CATHETERIZATION      CORONARY ANGIOPLASTY      HYSTERECTOMY         Social History   Substance Use Topics    Smoking status: Former Smoker     Packs/day: 0.50     Years: 44.00     Types: Cigarettes, Vaping w/o nicotine     Start date: 6/24/1970     Quit date: 05/2017    Smokeless tobacco: Never Used    Alcohol use No       Family History   Problem Relation Age of Onset    Heart disease Mother     Heart attacks under age 50 Father     Heart attacks under age 50 Brother          Review of Systems   Constitution: Negative for decreased appetite, diaphoresis, fever, weakness, malaise/fatigue and night sweats.   HENT: Negative for nosebleeds.    Eyes: Negative for blurred vision and double vision.   Cardiovascular: Negative for chest pain,  claudication, dyspnea on exertion, irregular heartbeat, leg swelling, near-syncope, orthopnea, palpitations, paroxysmal nocturnal dyspnea and syncope.   Respiratory: Negative for cough, shortness of breath, sleep disturbances due to breathing, snoring, sputum production and wheezing.    Endocrine: Negative for cold intolerance and polyuria.   Hematologic/Lymphatic: Does not bruise/bleed easily.   Skin: Negative for rash.   Musculoskeletal: Negative for back pain, falls, joint pain, joint swelling and neck pain.   Gastrointestinal: Negative for abdominal pain, heartburn, nausea and vomiting.   Genitourinary: Negative for dysuria, frequency and hematuria.   Neurological: Positive for dizziness. Negative for difficulty with concentration, focal weakness, headaches, light-headedness, numbness and seizures.   Psychiatric/Behavioral: Negative for depression, memory loss and substance abuse. The patient does not have insomnia.    Allergic/Immunologic: Negative for HIV exposure and hives.       Objective:   Physical Exam   Constitutional: She is oriented to person, place, and time. She appears well-nourished.   HENT:   Head: Normocephalic.   Eyes: Pupils are equal, round, and reactive to light.   Neck: Normal carotid pulses and no JVD present. Carotid bruit is not present. No thyromegaly present.   Cardiovascular: Normal rate, regular rhythm, normal heart sounds and normal pulses.   No extrasystoles are present. PMI is not displaced.  Exam reveals no gallop and no S3.    No murmur heard.  Pulmonary/Chest: Breath sounds normal. No stridor. No respiratory distress.   Abdominal: Soft. Bowel sounds are normal. There is no tenderness. There is no rebound.   Musculoskeletal: Normal range of motion.   Neurological: She is alert and oriented to person, place, and time.   Skin: Skin is intact. No rash noted.   Psychiatric: Her behavior is normal.       Lab Results   Component Value Date    CHOL 152 08/28/2017    CHOL 135 07/13/2016     CHOL 158 01/14/2016     Lab Results   Component Value Date    HDL 57 08/28/2017    HDL 43 07/13/2016    HDL 57 01/14/2016     Lab Results   Component Value Date    LDLCALC 74.2 08/28/2017    LDLCALC 69.8 07/13/2016    LDLCALC 75.0 01/14/2016     Lab Results   Component Value Date    TRIG 104 08/28/2017    TRIG 111 07/13/2016    TRIG 130 01/14/2016     Lab Results   Component Value Date    CHOLHDL 37.5 08/28/2017    CHOLHDL 31.9 07/13/2016    CHOLHDL 36.1 01/14/2016       Chemistry        Component Value Date/Time     (L) 12/20/2017 1300    K 3.8 12/20/2017 1300     12/20/2017 1300    CO2 22 (L) 12/20/2017 1300    BUN 16 12/20/2017 1300    CREATININE 0.8 12/20/2017 1300     (H) 12/20/2017 1300        Component Value Date/Time    CALCIUM 9.6 12/20/2017 1300    ALKPHOS 101 12/20/2017 1300    AST 28 12/20/2017 1300    ALT 23 12/20/2017 1300    BILITOT 0.5 12/20/2017 1300    ESTGFRAFRICA >60 12/20/2017 1300    EGFRNONAA >60 12/20/2017 1300          Lab Results   Component Value Date    TSH 1.928 12/31/2013     Lab Results   Component Value Date    INR 1.1 12/20/2017    INR 1.1 08/10/2012     Lab Results   Component Value Date    WBC 13.91 (H) 12/20/2017    HGB 11.7 (L) 12/20/2017    HCT 34.5 (L) 12/20/2017    MCV 89 12/20/2017     12/20/2017     BMP  Sodium   Date Value Ref Range Status   12/20/2017 135 (L) 136 - 145 mmol/L Final     Potassium   Date Value Ref Range Status   12/20/2017 3.8 3.5 - 5.1 mmol/L Final     Chloride   Date Value Ref Range Status   12/20/2017 104 95 - 110 mmol/L Final     CO2   Date Value Ref Range Status   12/20/2017 22 (L) 23 - 29 mmol/L Final     BUN, Bld   Date Value Ref Range Status   12/20/2017 16 8 - 23 mg/dL Final     Creatinine   Date Value Ref Range Status   12/20/2017 0.8 0.5 - 1.4 mg/dL Final     Calcium   Date Value Ref Range Status   12/20/2017 9.6 8.7 - 10.5 mg/dL Final     Anion Gap   Date Value Ref Range Status   12/20/2017 9 8 - 16 mmol/L Final      eGFR if    Date Value Ref Range Status   12/20/2017 >60 >60 mL/min/1.73 m^2 Final     eGFR if non    Date Value Ref Range Status   12/20/2017 >60 >60 mL/min/1.73 m^2 Final     Comment:     Calculation used to obtain the estimated glomerular filtration  rate (eGFR) is the CKD-EPI equation.        CrCl cannot be calculated (Patient's most recent lab result is older than the maximum 7 days allowed.).     Assessment:      1. Coronary artery disease involving native coronary artery of native heart without angina pectoris    2. Bilateral carotid artery stenosis    3. Essential hypertension    4. Mixed hyperlipidemia    5. Dizziness        Plan:   Repeat carotid US for dizziness  LUE arterial US foe different BP in arms  Refer to ENT for dizziness  Add HCTZ 12.5 mg daily and continue same dose of Felodipine to avoid to make dizziness worse.   RTC in 3 months qwith Dr. Juarez.  Continue ASA and Plavix, lipitor,

## 2018-04-20 ENCOUNTER — TELEPHONE (OUTPATIENT)
Dept: CARDIOLOGY | Facility: CLINIC | Age: 63
End: 2018-04-20

## 2018-04-20 NOTE — TELEPHONE ENCOUNTER
----- Message from Magi Burns sent at 4/20/2018  9:24 AM CDT -----  Contact: PT   Pt request call to reschedule Ultrasound [phone]

## 2018-04-20 NOTE — TELEPHONE ENCOUNTER
Returned call and rescheduled Carotiod US same day as Art upper ext US, starting at 0900, [facility] location.

## 2018-04-27 ENCOUNTER — CLINICAL SUPPORT (OUTPATIENT)
Dept: CARDIOLOGY | Facility: CLINIC | Age: 63
End: 2018-04-27
Attending: INTERNAL MEDICINE
Payer: MEDICAID

## 2018-04-27 ENCOUNTER — TELEPHONE (OUTPATIENT)
Dept: CARDIOLOGY | Facility: CLINIC | Age: 63
End: 2018-04-27

## 2018-04-27 DIAGNOSIS — I65.23 BILATERAL CAROTID ARTERY STENOSIS: ICD-10-CM

## 2018-04-27 DIAGNOSIS — R42 DIZZINESS: ICD-10-CM

## 2018-04-27 LAB — INTERNAL CAROTID STENOSIS: ABNORMAL

## 2018-04-27 PROCEDURE — 93931 UPPER EXTREMITY STUDY: CPT | Mod: PBBFAC | Performed by: INTERNAL MEDICINE

## 2018-04-27 PROCEDURE — 93880 EXTRACRANIAL BILAT STUDY: CPT | Mod: PBBFAC | Performed by: INTERNAL MEDICINE

## 2018-05-01 ENCOUNTER — TELEPHONE (OUTPATIENT)
Dept: CARDIOLOGY | Facility: CLINIC | Age: 63
End: 2018-05-01

## 2018-05-01 DIAGNOSIS — Z78.0 POSTMENOPAUSAL: ICD-10-CM

## 2018-05-01 DIAGNOSIS — I10 ESSENTIAL HYPERTENSION: ICD-10-CM

## 2018-05-01 DIAGNOSIS — M75.101 ROTATOR CUFF SYNDROME OF RIGHT SHOULDER: ICD-10-CM

## 2018-05-01 DIAGNOSIS — G47.00 INSOMNIA, UNSPECIFIED TYPE: ICD-10-CM

## 2018-05-01 DIAGNOSIS — M75.41 ROTATOR CUFF IMPINGEMENT SYNDROME OF RIGHT SHOULDER: ICD-10-CM

## 2018-05-01 DIAGNOSIS — F32.A DEPRESSION, UNSPECIFIED DEPRESSION TYPE: ICD-10-CM

## 2018-05-01 DIAGNOSIS — M19.011 ARTHRITIS OF RIGHT ACROMIOCLAVICULAR JOINT: ICD-10-CM

## 2018-05-01 DIAGNOSIS — I25.10 CORONARY ARTERY DISEASE INVOLVING NATIVE CORONARY ARTERY OF NATIVE HEART WITHOUT ANGINA PECTORIS: ICD-10-CM

## 2018-05-01 RX ORDER — FELODIPINE 5 MG/1
5 TABLET, EXTENDED RELEASE ORAL DAILY
Qty: 30 TABLET | Refills: 5
Start: 2018-05-01 | End: 2018-06-20 | Stop reason: SDUPTHER

## 2018-05-01 RX ORDER — DULOXETIN HYDROCHLORIDE 60 MG/1
60 CAPSULE, DELAYED RELEASE ORAL DAILY
Qty: 30 CAPSULE | Refills: 5 | Status: SHIPPED | OUTPATIENT
Start: 2018-05-01 | End: 2018-11-20 | Stop reason: SDUPTHER

## 2018-05-01 RX ORDER — ZOLPIDEM TARTRATE 5 MG/1
5 TABLET ORAL NIGHTLY
Qty: 30 TABLET | Refills: 5 | Status: SHIPPED | OUTPATIENT
Start: 2018-05-01 | End: 2018-09-25 | Stop reason: SDUPTHER

## 2018-05-01 RX ORDER — CLOPIDOGREL BISULFATE 75 MG/1
75 TABLET ORAL DAILY
Qty: 30 TABLET | Refills: 5 | Status: SHIPPED | OUTPATIENT
Start: 2018-05-01 | End: 2019-03-04 | Stop reason: SDUPTHER

## 2018-05-01 RX ORDER — HYDROCHLOROTHIAZIDE 12.5 MG/1
12.5 TABLET ORAL DAILY
Qty: 30 TABLET | Refills: 5 | Status: SHIPPED | OUTPATIENT
Start: 2018-05-01 | End: 2019-03-21

## 2018-05-01 NOTE — TELEPHONE ENCOUNTER
----- Message from Liz Baltazar sent at 5/1/2018  9:08 AM CDT -----  Contact: pt  Calling to be worked into the schedule for refill medication [phone]

## 2018-05-03 ENCOUNTER — PATIENT MESSAGE (OUTPATIENT)
Dept: FAMILY MEDICINE | Facility: CLINIC | Age: 63
End: 2018-05-03

## 2018-05-04 ENCOUNTER — LAB VISIT (OUTPATIENT)
Dept: LAB | Facility: HOSPITAL | Age: 63
End: 2018-05-04
Attending: FAMILY MEDICINE
Payer: MEDICAID

## 2018-05-04 ENCOUNTER — OFFICE VISIT (OUTPATIENT)
Dept: FAMILY MEDICINE | Facility: CLINIC | Age: 63
End: 2018-05-04
Payer: MEDICAID

## 2018-05-04 VITALS
BODY MASS INDEX: 28.75 KG/M2 | WEIGHT: 152.25 LBS | DIASTOLIC BLOOD PRESSURE: 74 MMHG | TEMPERATURE: 98 F | OXYGEN SATURATION: 96 % | HEART RATE: 78 BPM | HEIGHT: 61 IN | SYSTOLIC BLOOD PRESSURE: 134 MMHG

## 2018-05-04 DIAGNOSIS — R41.3 MEMORY DIFFICULTIES: Primary | ICD-10-CM

## 2018-05-04 DIAGNOSIS — R41.3 MEMORY DIFFICULTIES: ICD-10-CM

## 2018-05-04 DIAGNOSIS — J44.9 COPD, MODERATE: ICD-10-CM

## 2018-05-04 DIAGNOSIS — E55.9 VITAMIN D DEFICIENCY: ICD-10-CM

## 2018-05-04 DIAGNOSIS — Z78.0 MENOPAUSE: ICD-10-CM

## 2018-05-04 LAB
25(OH)D3+25(OH)D2 SERPL-MCNC: 25 NG/ML
ALBUMIN SERPL BCP-MCNC: 3.5 G/DL
ALP SERPL-CCNC: 71 U/L
ALT SERPL W/O P-5'-P-CCNC: 12 U/L
ANION GAP SERPL CALC-SCNC: 7 MMOL/L
AST SERPL-CCNC: 17 U/L
BILIRUB SERPL-MCNC: 0.4 MG/DL
BUN SERPL-MCNC: 16 MG/DL
CALCIUM SERPL-MCNC: 9.7 MG/DL
CHLORIDE SERPL-SCNC: 107 MMOL/L
CO2 SERPL-SCNC: 25 MMOL/L
CREAT SERPL-MCNC: 0.9 MG/DL
EST. GFR  (AFRICAN AMERICAN): >60 ML/MIN/1.73 M^2
EST. GFR  (NON AFRICAN AMERICAN): >60 ML/MIN/1.73 M^2
FOLATE SERPL-MCNC: 7.4 NG/ML
GLUCOSE SERPL-MCNC: 87 MG/DL
POTASSIUM SERPL-SCNC: 4.2 MMOL/L
PROT SERPL-MCNC: 6.9 G/DL
SODIUM SERPL-SCNC: 139 MMOL/L
TSH SERPL DL<=0.005 MIU/L-ACNC: 1.51 UIU/ML
VIT B12 SERPL-MCNC: 412 PG/ML

## 2018-05-04 PROCEDURE — 82746 ASSAY OF FOLIC ACID SERUM: CPT

## 2018-05-04 PROCEDURE — 82306 VITAMIN D 25 HYDROXY: CPT

## 2018-05-04 PROCEDURE — 80053 COMPREHEN METABOLIC PANEL: CPT

## 2018-05-04 PROCEDURE — 36415 COLL VENOUS BLD VENIPUNCTURE: CPT | Mod: PO

## 2018-05-04 PROCEDURE — 84443 ASSAY THYROID STIM HORMONE: CPT

## 2018-05-04 PROCEDURE — 99999 PR PBB SHADOW E&M-EST. PATIENT-LVL III: CPT | Mod: PBBFAC,,, | Performed by: FAMILY MEDICINE

## 2018-05-04 PROCEDURE — 99214 OFFICE O/P EST MOD 30 MIN: CPT | Mod: S$PBB,,, | Performed by: FAMILY MEDICINE

## 2018-05-04 PROCEDURE — 82607 VITAMIN B-12: CPT

## 2018-05-04 PROCEDURE — 99213 OFFICE O/P EST LOW 20 MIN: CPT | Mod: PBBFAC,PO | Performed by: FAMILY MEDICINE

## 2018-05-04 NOTE — PROGRESS NOTES
Subjective:       Patient ID: Gemma Vick is a 63 y.o. female.    Chief Complaint:       HPI       Additional comments: gets easily overwhelmed       Last edited by Ana Maria Michelle MA on 5/4/2018  9:41 AM. (History)      Menopause: associated with easily overwhelmed and starts crying, lack of concentration, sleepiness despite Ambien, frequent Hot Flashes. She hs been weaning down on premarin and now down to 0.3 every other day but it is not working. She would rather go back to daily premarin.    C/O Memory problem: x 5 months and it is worsening. She had a pneumonia in January and since then she cannot remember things on a daily basis. 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 That she agreed is due to lack of concentration. Family have noticed and told her that she needs to go and get her memory checked.    Past Medical History:   Diagnosis Date    AAA (abdominal aortic aneurysm) 2/13/2014    Abdominal aneurysm     3    Acute coronary syndrome     Arthritis     BPPV (benign paroxysmal positional vertigo)     Carotid artery plaque     Carotid artery stenosis and occlusion 2/13/2014    Chronic back pain     COPD (chronic obstructive pulmonary disease)     Coronary artery disease     Emphysema lung     Hyperlipidemia     Hypertension     Myocardial infarction     x3    Neuropathy      Family History   Problem Relation Age of Onset    Heart disease Mother     Heart attacks under age 50 Father     Heart attacks under age 50 Brother      Social History     Social History    Marital status:      Spouse name: N/A    Number of children: N/A    Years of education: N/A     Occupational History    Not on file.     Social History Main Topics    Smoking status: Former Smoker     Packs/day: 0.50     Years: 44.00     Types: Cigarettes, Vaping w/o nicotine     Start date:  "6/24/1970     Quit date: 05/2017    Smokeless tobacco: Never Used    Alcohol use No    Drug use: No    Sexual activity: Not Currently     Birth control/ protection: None     Other Topics Concern    Not on file     Social History Narrative    No narrative on file       Review of Systems   Constitutional: Negative for chills and fever.   HENT: Negative for congestion and facial swelling.    Eyes: Negative for discharge and itching.   Respiratory: Negative for cough and wheezing.    Cardiovascular: Negative for chest pain and palpitations.   Gastrointestinal: Negative for abdominal pain, nausea and vomiting.   Endocrine: Negative for cold intolerance and heat intolerance.   Genitourinary: Negative for dysuria and flank pain.   Musculoskeletal: Negative for myalgias and neck stiffness.   Skin: Negative for pallor and wound.   Neurological: Negative for facial asymmetry and weakness.   Psychiatric/Behavioral: Negative for agitation and suicidal ideas.       Objective:      /74 (BP Location: Right arm, Patient Position: Sitting, BP Method: Medium (Manual))   Pulse 78   Temp 97.8 °F (36.6 °C) (Oral)   Ht 5' 1" (1.549 m)   Wt 69 kg (152 lb 3.6 oz)   SpO2 96%   BMI 28.76 kg/m²   Results for orders placed or performed in visit on 04/27/18   CAR Ultrasound doppler carotid bilateral   Result Value Ref Range    Internal Carotid Stenosis 40-49% (A)      Physical Exam   Constitutional: She is oriented to person, place, and time. She appears well-developed and well-nourished.   HENT:   Head: Normocephalic and atraumatic.   Right Ear: External ear normal.   Left Ear: External ear normal.   Eyes: Conjunctivae are normal. Right eye exhibits no discharge. Left eye exhibits no discharge.   Neck: Neck supple.   Cardiovascular: Normal rate and regular rhythm.    Pulmonary/Chest: Effort normal. She has wheezes. She exhibits no tenderness.   Abdominal: Normal appearance. There is no hepatosplenomegaly.   Lymphadenopathy: "     She has no cervical adenopathy.   Neurological: She is alert and oriented to person, place, and time.   Skin: Skin is warm and dry.   Psychiatric: She has a normal mood and affect. Her behavior is normal.   Nursing note and vitals reviewed.      Assessment:       1. Memory difficulties    2. Vitamin D deficiency    3. Menopause    4. COPD, moderate        Plan:   Hot flashes due to menopause  -    Increase- estrogens, conjugated, (PREMARIN) 0.3 MG tablet; Take 1 tablet (0.3 mg total) by mouth once daily.  Dispense: 30 tablet; Refill: 5    Memory difficulties  -           MMSE score of 29 which is normal, patient reassurred  -     We will start with lab to check for electrolyte imbalance and nutritional deficiencies  -     Comprehensive metabolic panel; Future; Expected date: 05/04/2018  -     TSH; Future; Expected date: 05/04/2018  -     Vitamin B12; Future; Expected date: 05/04/2018  -     Folate; Future; Expected date: 05/04/2018    Vitamin D deficiency  -     VITAMIN D; Future; Expected date: 05/04/2018

## 2018-05-04 NOTE — PATIENT INSTRUCTIONS
Hormone Therapy for Women    Hormone therapy (HT) increases the levels of the hormones estrogen and progesterone in your body. This can help reduce symptoms of menopause. HT may also help prevent osteoporosis in some women. But HT may increase risk for certain conditions, including blood clots, gallstones, heart disease, and stroke. However, HT may also reduce the risk of heart disease in some women.  How to take hormones  To get the best results, always take your hormones exactly as directed. Hormones can be taken in any of these ways:  · Pills containing estrogen, and sometimes other hormones, are taken as often as every day. This is the most common form of hormone therapy.  · A patch, spray, or gel releases estrogen into the bloodstream through the skin. There is also a patch that contains estrogen and progesterone. The patch can be worn on your hip. Most patches are changed once or twice a week.  · Vaginal ring containing estrogen.  · Cream used inside the vagina releases estrogen locally. Only a very small amount gets into the bloodstream. For this reason, vaginal creams can treat vaginal atrophy and dryness, but are not used to treat hot flashes. The creams do not significantly increase your risk for heart attack or stroke. However, if used more than twice a week in other than very small doses, estrogen does get into the bloodstream in significant amounts. This may affect the uterus if present.  · Prolonged exposure of estrogen in the blood without the use of a progestin increases the risk of cancer of the uterus.  Follow up visits  Have regular visits with a healthcare provider. These visits are a way to fine-tune your therapy. You can also be checked for any problems that might require you to stop HT.  Call your healthcare provider  If you have any of the following symptoms, call your healthcare provider:  · Unexpected vaginal bleeding  · A breast lump, or breast tenderness that doesnt go away  · Severe  headaches  · Aching muscles in your back or legs  · Sudden pain in your legs or chest  · Shortness of breath   Date Last Reviewed: 2/1/2017 © 2000-2017 [employer]. [address] [place] [address], [place] [address]. All rights reserved. This information is not intended as a substitute for professional medical care. Always follow your healthcare professional's instructions.        Understanding Menopause  Menopause marks the point where youve gone 12 months in a row without a period. The average age for this is around 51, but it can happen at younger or older ages. During the months or years before menopause, your body goes through many changes. It may be helpful to understand these changes and what you can do about the symptoms that result.     Use a portable fan to help stay cool.    Symptoms  Perimenopause is sometimes called the menopause transition. It happens in the months or years before menopause. It may begin when you reach your mid-40s. During this time, your estrogen levels go up and down and then decrease. As a result, you may notice some of the following symptoms:  · Menstrual periods that come more or less often than usual  · Menstrual periods that are lighter or heavier than normal  · Increased premenstrual syndrome (PMS) symptoms  · Hot flashes  · Night sweats  · Mood swings  · Vaginal dryness with possible painful sexual activity  · Difficulty going to sleep or staying asleep  · Decreased sexual drive and function  · Urinating frequently  It is important to remember that you could become pregnant until 12 months have passed since your last menstrual period. Ask your healthcare provider about birth control choices.   Controlling symptoms  Your healthcare provider may suggest pills or an intrauterine device (IUD) that contain the hormone progesterone. This can make your periods more regular and prevent excess bleeding. If you have symptoms due to lower estrogen levels, your healthcare  provider may suggest pills that contain estrogen and/or progesterone. This is called hormone therapy (HT).  There are also other prescription medicines that help control some of the bothersome symptoms, like hot flashes, mood swings, and vaginal dryness.  Other ways for you to deal with symptoms are listed below.  · Hot flashes. Wear layers that you can remove. Try all-cotton clothing, sheets, and blankets. Keep a glass of cold water by your bed.  · Pain during sex. You can buy a water-based lubricant or vaginal moisturizer in the drugstore that may help. Your healthcare provider may also prescribe an estrogen cream for your vagina.  · Mood swings. Talking to friends who are going through the same changes can sometimes help.  Date Last Reviewed: 12/1/2016  © 8007-7465 [employer]. [address] [place] [address], [place] [address]. All rights reserved. This information is not intended as a substitute for professional medical care. Always follow your healthcare professional's instructions.

## 2018-05-10 ENCOUNTER — TELEPHONE (OUTPATIENT)
Dept: OTOLARYNGOLOGY | Facility: CLINIC | Age: 63
End: 2018-05-10

## 2018-05-10 ENCOUNTER — TELEPHONE (OUTPATIENT)
Dept: CARDIOLOGY | Facility: CLINIC | Age: 63
End: 2018-05-10

## 2018-05-10 NOTE — TELEPHONE ENCOUNTER
----- Message from Lakeshia Villalta MA sent at 5/10/2018  1:08 PM CDT -----  Contact: Pt  Ronald!!  Dr. Feng would like someone in ENT to see this pt.  I believe they are medicaid.  I'm not sure if you can help.  She has been to ENT before but I believe it's been a while.    Thank you!  Lakeshia    ----- Message -----  From: Cheryl Cisneros  Sent: 5/10/2018   8:12 AM  To: [facility] Staff    Pt stated she returned nurses call to schedule an appt...[phone].

## 2018-05-10 NOTE — TELEPHONE ENCOUNTER
Spoke with pt.  Message was for ENT.    ----- Message from Cheryl Cisneros sent at 5/10/2018  8:12 AM CDT -----  Contact: Pt  Pt stated she returned nurses call to schedule an appt...[phone].

## 2018-05-15 ENCOUNTER — CLINICAL SUPPORT (OUTPATIENT)
Dept: AUDIOLOGY | Facility: CLINIC | Age: 63
End: 2018-05-15
Payer: MEDICAID

## 2018-05-15 ENCOUNTER — OFFICE VISIT (OUTPATIENT)
Dept: OTOLARYNGOLOGY | Facility: CLINIC | Age: 63
End: 2018-05-15
Payer: MEDICAID

## 2018-05-15 VITALS — SYSTOLIC BLOOD PRESSURE: 129 MMHG | TEMPERATURE: 98 F | DIASTOLIC BLOOD PRESSURE: 77 MMHG | HEART RATE: 69 BPM

## 2018-05-15 DIAGNOSIS — H93.A3 PULSATILE TINNITUS, BILATERAL: ICD-10-CM

## 2018-05-15 DIAGNOSIS — H93.13 TINNITUS OF BOTH EARS: ICD-10-CM

## 2018-05-15 DIAGNOSIS — Z87.898 HISTORY OF HOARSENESS: ICD-10-CM

## 2018-05-15 DIAGNOSIS — H81.11 BPPV (BENIGN PAROXYSMAL POSITIONAL VERTIGO), RIGHT: Primary | ICD-10-CM

## 2018-05-15 DIAGNOSIS — H90.41 SENSORINEURAL HEARING LOSS (SNHL) OF RIGHT EAR WITH UNRESTRICTED HEARING OF LEFT EAR: ICD-10-CM

## 2018-05-15 DIAGNOSIS — H81.11 BENIGN PAROXYSMAL POSITIONAL VERTIGO OF RIGHT EAR: Primary | ICD-10-CM

## 2018-05-15 PROCEDURE — 95992 CANALITH REPOSITIONING PROC: CPT | Mod: S$PBB,,, | Performed by: PHYSICIAN ASSISTANT

## 2018-05-15 PROCEDURE — 92557 COMPREHENSIVE HEARING TEST: CPT | Mod: PBBFAC | Performed by: AUDIOLOGIST-HEARING AID FITTER

## 2018-05-15 PROCEDURE — 95992 CANALITH REPOSITIONING PROC: CPT | Mod: PBBFAC | Performed by: PHYSICIAN ASSISTANT

## 2018-05-15 PROCEDURE — 99999 PR PBB SHADOW E&M-EST. PATIENT-LVL III: CPT | Mod: PBBFAC,,, | Performed by: PHYSICIAN ASSISTANT

## 2018-05-15 PROCEDURE — 92567 TYMPANOMETRY: CPT | Mod: PBBFAC | Performed by: AUDIOLOGIST-HEARING AID FITTER

## 2018-05-15 PROCEDURE — 99213 OFFICE O/P EST LOW 20 MIN: CPT | Mod: 25,S$PBB,, | Performed by: PHYSICIAN ASSISTANT

## 2018-05-15 PROCEDURE — 99213 OFFICE O/P EST LOW 20 MIN: CPT | Mod: PBBFAC | Performed by: PHYSICIAN ASSISTANT
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
## 2018-05-15 NOTE — LETTER
May 15, 2018      Mack Feng MD  [address] [facility]  [place] [address]           [facility]  [address] [place] [address]  [place] [address]  Phone: [phone]  Fax: [phone]          Patient: Gemma Vick   MR Number: [id number]   YOB: 1955   Date of Visit: 5/15/2018       Dear Dr. Mack Feng:    Thank you for referring Gemma Vick to me for evaluation. Attached you will find relevant portions of my assessment and plan of care.    If you have questions, please do not hesitate to call me. I look forward to following Gemma Vick along with you.    Sincerely,    Vanna Taveras PA-C    Enclosure  CC:  No Recipients    If you would like to receive this communication electronically, please contact [email or web address] or [phone] to request more information on [employer] Link access.    For providers and/or their staff who would like to refer a patient to [facility], please contact us through our one-stop-shop provider referral line, [facility] , at [phone].    If you feel you have received this communication in error or would no longer like to receive these types of communications, please e-mail [email or web address]
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
## 2018-05-17 ENCOUNTER — PATIENT MESSAGE (OUTPATIENT)
Dept: OTOLARYNGOLOGY | Facility: CLINIC | Age: 63
End: 2018-05-17

## 2018-05-17 RX ORDER — MECLIZINE HYDROCHLORIDE 25 MG/1
25 TABLET ORAL 3 TIMES DAILY PRN
Qty: 10 TABLET | Refills: 0 | Status: SHIPPED | OUTPATIENT
Start: 2018-05-17 | End: 2019-02-24

## 2018-05-21 ENCOUNTER — OFFICE VISIT (OUTPATIENT)
Dept: ORTHOPEDICS | Facility: CLINIC | Age: 63
End: 2018-05-21
Payer: MEDICAID

## 2018-05-21 VITALS
BODY MASS INDEX: 28.72 KG/M2 | HEIGHT: 61 IN | SYSTOLIC BLOOD PRESSURE: 148 MMHG | HEART RATE: 69 BPM | WEIGHT: 152.13 LBS | DIASTOLIC BLOOD PRESSURE: 81 MMHG

## 2018-05-21 DIAGNOSIS — M75.101 ROTATOR CUFF SYNDROME OF RIGHT SHOULDER: Primary | ICD-10-CM

## 2018-05-21 DIAGNOSIS — M19.011 ARTHRITIS OF RIGHT ACROMIOCLAVICULAR JOINT: ICD-10-CM

## 2018-05-21 DIAGNOSIS — M75.51 BURSITIS OF RIGHT SHOULDER: ICD-10-CM

## 2018-05-21 DIAGNOSIS — M25.511 RIGHT SHOULDER PAIN, UNSPECIFIED CHRONICITY: ICD-10-CM

## 2018-05-21 PROCEDURE — 99213 OFFICE O/P EST LOW 20 MIN: CPT | Mod: S$PBB,,, | Performed by: PHYSICIAN ASSISTANT

## 2018-05-21 PROCEDURE — 99999 PR PBB SHADOW E&M-EST. PATIENT-LVL III: CPT | Mod: PBBFAC,,, | Performed by: PHYSICIAN ASSISTANT

## 2018-05-21 PROCEDURE — 99213 OFFICE O/P EST LOW 20 MIN: CPT | Mod: PBBFAC | Performed by: PHYSICIAN ASSISTANT

## 2018-05-21 RX ORDER — IPRATROPIUM BROMIDE AND ALBUTEROL 20; 100 UG/1; UG/1
SPRAY, METERED RESPIRATORY (INHALATION)
COMMUNITY
Start: 2018-04-23 | End: 2018-07-31 | Stop reason: SDUPTHER

## 2018-05-21 NOTE — LETTER
May 21, 2018      Olga Altman MD  [address] [address] [address]           [facility]  [address] [place] [address]  Phone: [phone]  Fax: [phone]          Patient: Gemma Vick   MR Number: [id number]   YOB: 1955   Date of Visit: 5/21/2018       Dear Dr. Ogla Altman:    Thank you for referring Gemma Vick to me for evaluation. Attached you will find relevant portions of my assessment and plan of care.    If you have questions, please do not hesitate to call me. I look forward to following Gemma Vick along with you.    Sincerely,    Anup Kamara PA-C    Enclosure  CC:  No Recipients    If you would like to receive this communication electronically, please contact [email or web address] or [phone] to request more information on [employer] Link access.    For providers and/or their staff who would like to refer a patient to [facility], please contact us through our one-stop-shop provider referral line, [facility], at [phone].    If you feel you have received this communication in error or would no longer like to receive these types of communications, please e-mail [email or web address]

## 2018-05-21 NOTE — PROGRESS NOTES
Subjective:     Patient ID: Gemma Vick is a 63 y.o. female.    Chief Complaint: Pain of the Right Shoulder      HPI   Patient is seen today for f/u regarding her R shoulder. She has been going to therapy, although only 2-3 sessions since her last OV. Her pain is dependent on movement. The more she does, the more pain she has. During her last OV, she was prescribed a MDP. It did provide her with good relief. She also takes Meloxicam as needed.   On average, her pain is a 6/10.    Past Medical History:   Diagnosis Date    AAA (abdominal aortic aneurysm) 2/13/2014    Abdominal aneurysm     3    Acute coronary syndrome     Arthritis     BPPV (benign paroxysmal positional vertigo)     Carotid artery plaque     Carotid artery stenosis and occlusion 2/13/2014    Chronic back pain     COPD (chronic obstructive pulmonary disease)     Coronary artery disease     Emphysema lung     Hyperlipidemia     Hypertension     Myocardial infarction     x3    Neuropathy      Past Surgical History:   Procedure Laterality Date    CARDIAC CATHETERIZATION      CORONARY ANGIOPLASTY      HYSTERECTOMY       Family History   Problem Relation Age of Onset    Heart disease Mother     Heart attacks under age 50 Father     Heart attacks under age 50 Brother      Social History     Social History    Marital status:      Spouse name: N/A    Number of children: N/A    Years of education: N/A     Occupational History    Not on file.     Social History Main Topics    Smoking status: Former Smoker     Packs/day: 0.50     Years: 44.00     Types: Cigarettes, Vaping w/o nicotine     Start date: 6/24/1970     Quit date: 05/2017    Smokeless tobacco: Never Used    Alcohol use No    Drug use: No    Sexual activity: Not Currently     Birth control/ protection: None     Other Topics Concern    Not on file     Social History Narrative    No narrative on file     Medication List with Changes/Refills   Current Medications     ALBUTEROL (PROAIR HFA) 90 MCG/ACTUATION INHALER    Inhale 2 puffs into the lungs every 6 (six) hours as needed.    ALBUTEROL-IPRATROPIUM 2.5MG-0.5MG/3ML (DUO-NEB) 0.5 MG-3 MG(2.5 MG BASE)/3 ML NEBULIZER SOLUTION    USE ONE VIAL IN NEBULIZER TWICE DAILY    ASPIRIN 81 MG CHEW    Take 81 mg by mouth once daily.    CLOPIDOGREL (PLAVIX) 75 MG TABLET    Take 1 tablet (75 mg total) by mouth once daily.    COMBIVENT RESPIMAT  MCG/ACTUATION INHALER        DULOXETINE (CYMBALTA) 60 MG CAPSULE    Take 1 capsule (60 mg total) by mouth once daily.    ERGOCALCIFEROL, VITAMIN D2, (VITAMIN D ORAL)    Take 800 Units by mouth once daily.     ESTROGENS, CONJUGATED, (PREMARIN) 0.3 MG TABLET    Take 1 tablet (0.3 mg total) by mouth once daily.    EZETIMIBE-SIMVASTATIN 10-40 MG (VYTORIN) 10-40 MG PER TABLET    Take 1 tablet by mouth every evening.    FELODIPINE (PLENDIL) 5 MG 24 HR TABLET    Take 1 tablet (5 mg total) by mouth once daily.    HYDROCHLOROTHIAZIDE (HYDRODIURIL) 12.5 MG TAB    Take 1 tablet (12.5 mg total) by mouth once daily.    MECLIZINE (ANTIVERT) 25 MG TABLET    Take 1 tablet (25 mg total) by mouth 3 (three) times daily as needed for Dizziness.    MELOXICAM (MOBIC) 7.5 MG TABLET    Take 1 tablet (7.5 mg total) by mouth once daily. With food    MOMETASONE-FORMOTEROL (DULERA) 200-5 MCG/ACTUATION INHALER    INHALE 2 PUFFS 2 TIMES DAILY    OXYGEN-AIR DELIVERY SYSTEMS MISC    2 L by Misc.(Non-Drug; Combo Route) route every evening.    PRENATAL VIT,AHSAN 74/IRON/FOLIC (PRENATAL VITAMIN 1+1 ORAL)    Take by mouth once daily.     ZOLPIDEM (AMBIEN) 5 MG TAB    Take 1 tablet (5 mg total) by mouth nightly.     Review of patient's allergies indicates:  No Known Allergies  Review of Systems   Constitution: Negative for chills and fever.   Musculoskeletal: Positive for joint pain and neck pain (chronic neck pain).   Gastrointestinal: Negative for abdominal pain, diarrhea, nausea and vomiting.       Objective:   Body mass index  is 28.74 kg/m².  Vitals:    05/21/18 1654   BP: (!) 148/81   Pulse: 69       General: Gemma is well-developed, well-nourished, appears stated age, in no acute distress, alert and oriented to time, place and person.       General    Nursing note and vitals reviewed.  Constitutional: She is oriented to person, place, and time. She appears well-developed and well-nourished.   HENT:   Head: Atraumatic.   Eyes: EOM are normal.   Neck: Neck supple.   Pulmonary/Chest: Effort normal.   Neurological: She is alert and oriented to person, place, and time.   Psychiatric: She has a normal mood and affect. Her behavior is normal.         Back (L-Spine & T-Spine) / Neck (C-Spine) Exam     Tenderness   The patient is tender to palpation of the right scapular and right SC joint.     Neck (C-Spine) Tests   Spurling's Test   Left:  Negative  Right: negative  Right Shoulder Exam     Tenderness   The patient is tender to palpation of the medial scapula and supraspinatus.    Range of Motion   Active Abduction: 160   Forward Flexion: 160   External Rotation 0 degrees: 60   Internal Rotation 0 degrees: L5     Tests & Signs   Cross Arm: positive  Impingement: positive  Rotator Cuff Painful Arc/Range: mild  Active Compression Test (Bollinger's Sign): positive    Comments:  TTP at deltoid    Muscle Strength   Right Upper Extremity   Shoulder Abduction: 4/5   Supraspinatus: 4/5/5   Biceps: 4/5/5       Assessment:     Encounter Diagnoses   Name Primary?    Rotator cuff syndrome of right shoulder Yes    Arthritis of right acromioclavicular joint     Bursitis of right shoulder     Right shoulder pain, unspecified chronicity         Plan:     1. Continue PT/OT for the next 4-6 weeks  2. Take mobic when needed but Tylenol daily for pain control- 2 tabs Q8hr  3. Consider OTC pain creams  4. RTC within 6 weeks. If pain has not improved, consider CSI. Pt declined CSI today

## 2018-05-24 ENCOUNTER — TELEPHONE (OUTPATIENT)
Dept: PULMONOLOGY | Facility: CLINIC | Age: 63
End: 2018-05-24

## 2018-05-28 ENCOUNTER — TELEPHONE (OUTPATIENT)
Dept: PULMONOLOGY | Facility: CLINIC | Age: 63
End: 2018-05-28

## 2018-06-08 ENCOUNTER — TELEPHONE (OUTPATIENT)
Dept: FAMILY MEDICINE | Facility: CLINIC | Age: 63
End: 2018-06-08

## 2018-06-08 ENCOUNTER — PATIENT MESSAGE (OUTPATIENT)
Dept: FAMILY MEDICINE | Facility: CLINIC | Age: 63
End: 2018-06-08

## 2018-06-08 ENCOUNTER — PATIENT MESSAGE (OUTPATIENT)
Dept: ORTHOPEDICS | Facility: CLINIC | Age: 63
End: 2018-06-08

## 2018-06-08 DIAGNOSIS — M54.9 BACK PAIN, UNSPECIFIED BACK LOCATION, UNSPECIFIED BACK PAIN LATERALITY, UNSPECIFIED CHRONICITY: Primary | ICD-10-CM

## 2018-06-08 DIAGNOSIS — M54.2 NECK PAIN: ICD-10-CM

## 2018-06-12 DIAGNOSIS — M75.41 ROTATOR CUFF IMPINGEMENT SYNDROME, RIGHT: Primary | ICD-10-CM

## 2018-06-14 ENCOUNTER — TELEPHONE (OUTPATIENT)
Dept: PULMONOLOGY | Facility: CLINIC | Age: 63
End: 2018-06-14

## 2018-06-14 ENCOUNTER — PATIENT MESSAGE (OUTPATIENT)
Dept: PULMONOLOGY | Facility: CLINIC | Age: 63
End: 2018-06-14

## 2018-06-14 NOTE — TELEPHONE ENCOUNTER
----- Message from Jimena Oh sent at 6/14/2018  3:33 PM CDT -----  Contact: self/[phone]  Returning call, please call back at [phone]. Thanks/ar

## 2018-06-18 ENCOUNTER — CLINICAL SUPPORT (OUTPATIENT)
Dept: REHABILITATION | Facility: HOSPITAL | Age: 63
End: 2018-06-18
Payer: MEDICAID

## 2018-06-18 ENCOUNTER — PATIENT MESSAGE (OUTPATIENT)
Dept: PULMONOLOGY | Facility: CLINIC | Age: 63
End: 2018-06-18

## 2018-06-18 DIAGNOSIS — G89.29 CHRONIC RIGHT-SIDED BACK PAIN, UNSPECIFIED BACK LOCATION: Primary | ICD-10-CM

## 2018-06-18 DIAGNOSIS — M54.9 CHRONIC RIGHT-SIDED BACK PAIN, UNSPECIFIED BACK LOCATION: Primary | ICD-10-CM

## 2018-06-18 PROCEDURE — 97161 PT EVAL LOW COMPLEX 20 MIN: CPT

## 2018-06-18 NOTE — PROGRESS NOTES
PHYSICAL THERAPY INITIAL OUTPATIENT EVALUATION    Referring Provider:  Dr. Olga Altman    Diagnosis:       ICD-10-CM ICD-9-CM    1. Chronic right-sided back pain, unspecified back location M54.9 724.5     G89.29 338.29        Orders:  Evaluate and Treat   Date: 06/18/2018      Visit # 1     SUBJECTIVE    Onset: Pt reports years of back and R hip pain due to being extremely active and having several injuries through the years.  Constant/ intermittent:  Pain is constant and variable  Aggravating positions: Worse with sleeping on R side, bending over for household activitieswalking or standing prolonged periods with back spasms  Relieving positions: laying down and stretching  Pain at worst: 8/10  Pain at best: 0/10   Pain currently:  3/10    Patient goal for PT: Get pain better and get stronger.    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     The following scores are patient-reported and range from 0-5, with 0 being least impaired and 5 being most impaired.    Section 1- Pain intensity    Score 1/5   Section 2- Person care  Score 2/5   Section 3 Lifting- Optional  Score 4/5     Section 4  Walking  Score 3/5  Section 5 Sitting   Score 3/5   Section 6 Standing  Score 2/5  Section 7 Sleeping  Score 2/5   Section 8 Social Life   Score 2/5  Section 9 Traveling  Score 2/5   Section 10 Employ/home  Score 3/5      OBJECTIVE :    Observation / Posture: Flat Lspine with slight L curvature T8-12    Gait: pelvic drop with stance R>L    Lumbar AROM:   % Pain Present (Y/N) Comments:    FB 69 yes Flat lspine through flexion   BB 20  yes Increased R sacral pain, minimal lumbar movement with extension   RSB 75 yes R sacral pain   LSB 75 yes Lumbar pain, decreased opening R lumbar   RR WFL no    LR WFL no        Other AROM/PROM: hips WFL      Lower Extremity Strength:    WFL, No Myotomal weakness and weakness noted  Strength/Myotome     L2 Hip Flexor    5  /5   L3 Quad    5  /5   L4 Ant Tib    5  /5   L5 Great Toe Ext    5  /5   S1 Hamstring / Gastroc    5  /5          Other strength: Glute medius R3+/5 L4/5    Trunk MM Strength:  Minimal active control of TA    Neuro/Sensation:     Reflexes :  Patellar- 2+     Achilles- 2+   Sensation:   WFL    Special Test:   SLR:   NEG  Slump: NEG  Stork: Pos for decreased pelvic stability  SI compression/ distraction: Pos for R hip pain  SI forward bend: Positive   Sacral / Pelvic positioning: Slight sacral Flex  Modified prone instability test: neg  Shear Testing:  Pos for general lumbar pain  Lumbar ERS: Positive    Joint Mobility:  Hypomobility L1-L5 with generalized back pain complaints with PA mobilization testing.     Palpation:  TOP over R glutes, R lumbar paraspinals.       ASSESSMENT:  The patient is a 63 y.o. year old female who presents to physical therapy with complaints of LBP and R hip pain.  Patient's impairments include decreased lumbopelvic mobility, LE and pelvic strength, pain.  These impairments are limiting patient's ability to perform household duties.  Patient's prognosis is Good .  Patient will benefit from skilled physical therapy intervention to decrease pain and improve strength to allow improved function.    Co-morbidities which may impact the plan of care and potentially impede the patient's progress in therapy include: Arthritis, chronic back pain, cardiovascular complications including AAA, CAD, neuropathy  Past Medical History:   Diagnosis Date    AAA (abdominal aortic aneurysm) 2/13/2014    Abdominal aneurysm     3    Acute coronary syndrome     Arthritis     BPPV (benign paroxysmal positional vertigo)     Carotid artery plaque     Carotid artery stenosis and occlusion 2/13/2014    Chronic back pain     COPD (chronic obstructive pulmonary disease)     Coronary artery disease     Emphysema lung      Hyperlipidemia     Hypertension     Myocardial infarction     x3    Neuropathy      Patients CLINICAL PRESENTATION is STABLE.    Short Term Goals:    1. Pt to reports a subjective decrease in pain  2. Pt to be instructed in a home exercise program / self care plan   3. Pt able to demonstrate proper lifting techinques  4. Pt able to perform light household activities with minimal pain     Long Term Goals:  1 .Pt to score  <15    on Oswestry Low back pain disability questionnaire  2. Pt to report minimal to no LBP with moderate household duties      TREATMENT PROVIDED:  -Evaluation- Low complexity evaluation completed today x 40 minutes  -Manual Therapy:    -Therapeutic Exercise:    -Modalities:   -Education on proper posture, body mechanics and condition    PLAN:  Patient will benefit from physical therapy 2 x/week for 6 weeks including manual therapy, therapeutic exercise, functional activities, modalities, and patient education.    Thank you for this referral.    Jeanne Cole PT      MD Signature : _______________________________________________________  These services are reasonable and necessary for the conditions set forth above while under my care.

## 2018-06-19 ENCOUNTER — TELEPHONE (OUTPATIENT)
Dept: ORTHOPEDICS | Facility: CLINIC | Age: 63
End: 2018-06-19

## 2018-06-19 DIAGNOSIS — J44.9 CHRONIC OBSTRUCTIVE PULMONARY DISEASE, UNSPECIFIED COPD TYPE: ICD-10-CM

## 2018-06-19 DIAGNOSIS — I10 ESSENTIAL HYPERTENSION: ICD-10-CM

## 2018-06-19 DIAGNOSIS — E78.2 MIXED HYPERLIPIDEMIA: ICD-10-CM

## 2018-06-19 RX ORDER — FELODIPINE 5 MG/1
TABLET, EXTENDED RELEASE ORAL
Qty: 30 TABLET | Refills: 0 | Status: SHIPPED | OUTPATIENT
Start: 2018-06-19 | End: 2018-09-25 | Stop reason: SDUPTHER

## 2018-06-19 RX ORDER — EZETIMIBE AND SIMVASTATIN 10; 40 MG/1; MG/1
1 TABLET ORAL NIGHTLY
Qty: 30 TABLET | Refills: 0 | Status: SHIPPED | OUTPATIENT
Start: 2018-06-19 | End: 2018-09-25 | Stop reason: SDUPTHER

## 2018-06-19 NOTE — TELEPHONE ENCOUNTER
CALLED PT TO RESCHEDULE 7/6 APPOINTMENT DUE TO PROVIDER BEING OUT OF OFFICE. PT WAS UNAVAILABLE. LEFT MESSAGE FOR PT TO CALL BACK AT [facility] [employer]VENI[employer].

## 2018-07-13 ENCOUNTER — LAB VISIT (OUTPATIENT)
Dept: LAB | Facility: HOSPITAL | Age: 63
End: 2018-07-13
Attending: INTERNAL MEDICINE
Payer: MEDICAID

## 2018-07-13 DIAGNOSIS — I25.10 CORONARY ARTERY DISEASE INVOLVING NATIVE CORONARY ARTERY OF NATIVE HEART WITHOUT ANGINA PECTORIS: ICD-10-CM

## 2018-07-13 LAB
ALBUMIN SERPL BCP-MCNC: 3.5 G/DL
ALP SERPL-CCNC: 70 U/L
ALT SERPL W/O P-5'-P-CCNC: 10 U/L
ANION GAP SERPL CALC-SCNC: 11 MMOL/L
AST SERPL-CCNC: 16 U/L
BILIRUB SERPL-MCNC: 0.4 MG/DL
BUN SERPL-MCNC: 16 MG/DL
CALCIUM SERPL-MCNC: 9.6 MG/DL
CHLORIDE SERPL-SCNC: 107 MMOL/L
CHOLEST SERPL-MCNC: 151 MG/DL
CHOLEST/HDLC SERPL: 2.3 {RATIO}
CO2 SERPL-SCNC: 23 MMOL/L
CREAT SERPL-MCNC: 1 MG/DL
EST. GFR  (AFRICAN AMERICAN): >60 ML/MIN/1.73 M^2
EST. GFR  (NON AFRICAN AMERICAN): >60 ML/MIN/1.73 M^2
GLUCOSE SERPL-MCNC: 129 MG/DL
HDLC SERPL-MCNC: 67 MG/DL
HDLC SERPL: 44.4 %
LDLC SERPL CALC-MCNC: 63.6 MG/DL
NONHDLC SERPL-MCNC: 84 MG/DL
POTASSIUM SERPL-SCNC: 3.9 MMOL/L
PROT SERPL-MCNC: 6.7 G/DL
SODIUM SERPL-SCNC: 141 MMOL/L
TRIGL SERPL-MCNC: 102 MG/DL

## 2018-07-13 PROCEDURE — 80053 COMPREHEN METABOLIC PANEL: CPT

## 2018-07-13 PROCEDURE — 80061 LIPID PANEL: CPT

## 2018-07-13 PROCEDURE — 36415 COLL VENOUS BLD VENIPUNCTURE: CPT | Mod: PO

## 2018-07-23 ENCOUNTER — PATIENT MESSAGE (OUTPATIENT)
Dept: PULMONOLOGY | Facility: CLINIC | Age: 63
End: 2018-07-23

## 2018-07-23 ENCOUNTER — TELEPHONE (OUTPATIENT)
Dept: PULMONOLOGY | Facility: CLINIC | Age: 63
End: 2018-07-23

## 2018-07-23 DIAGNOSIS — J44.9 CHRONIC OBSTRUCTIVE PULMONARY DISEASE, UNSPECIFIED COPD TYPE: ICD-10-CM

## 2018-07-23 NOTE — TELEPHONE ENCOUNTER
Informed pt that when calling to get PA for [employer], the insurance company states that her insurance has been  for years.  Explained to pt that she needs to call insurance company to discuss.

## 2018-07-24 ENCOUNTER — DOCUMENTATION ONLY (OUTPATIENT)
Dept: REHABILITATION | Facility: HOSPITAL | Age: 63
End: 2018-07-24

## 2018-07-24 DIAGNOSIS — F32.A DEPRESSION, UNSPECIFIED DEPRESSION TYPE: ICD-10-CM

## 2018-07-24 RX ORDER — DULOXETIN HYDROCHLORIDE 60 MG/1
CAPSULE, DELAYED RELEASE ORAL
Qty: 30 CAPSULE | Refills: 1 | Status: SHIPPED | OUTPATIENT
Start: 2018-07-24 | End: 2018-07-31

## 2018-07-24 NOTE — PROGRESS NOTES
Pt. Only came to PT for initial eval 3-26-18.  Pt. Did not return after eval.  Pt. Now D/C from PT.

## 2018-07-26 ENCOUNTER — OFFICE VISIT (OUTPATIENT)
Dept: CARDIOLOGY | Facility: CLINIC | Age: 63
End: 2018-07-26
Payer: MEDICAID

## 2018-07-26 VITALS
WEIGHT: 155.88 LBS | OXYGEN SATURATION: 98 % | DIASTOLIC BLOOD PRESSURE: 76 MMHG | HEIGHT: 62 IN | SYSTOLIC BLOOD PRESSURE: 128 MMHG | HEART RATE: 75 BPM | BODY MASS INDEX: 28.69 KG/M2

## 2018-07-26 DIAGNOSIS — R73.03 PREDIABETES: ICD-10-CM

## 2018-07-26 DIAGNOSIS — I71.40 ABDOMINAL AORTIC ANEURYSM (AAA) WITHOUT RUPTURE: ICD-10-CM

## 2018-07-26 DIAGNOSIS — I10 ESSENTIAL HYPERTENSION: ICD-10-CM

## 2018-07-26 DIAGNOSIS — E78.2 MIXED HYPERLIPIDEMIA: ICD-10-CM

## 2018-07-26 DIAGNOSIS — R42 DIZZINESS: ICD-10-CM

## 2018-07-26 DIAGNOSIS — I25.10 CORONARY ARTERY DISEASE INVOLVING NATIVE CORONARY ARTERY OF NATIVE HEART WITHOUT ANGINA PECTORIS: ICD-10-CM

## 2018-07-26 DIAGNOSIS — J44.9 COPD, MODERATE: ICD-10-CM

## 2018-07-26 DIAGNOSIS — H81.10 BENIGN PAROXYSMAL POSITIONAL VERTIGO, UNSPECIFIED LATERALITY: ICD-10-CM

## 2018-07-26 DIAGNOSIS — I65.23 BILATERAL CAROTID ARTERY STENOSIS: Primary | ICD-10-CM

## 2018-07-26 PROCEDURE — 99999 PR PBB SHADOW E&M-EST. PATIENT-LVL III: CPT | Mod: PBBFAC,,, | Performed by: INTERNAL MEDICINE

## 2018-07-26 PROCEDURE — 99213 OFFICE O/P EST LOW 20 MIN: CPT | Mod: PBBFAC | Performed by: INTERNAL MEDICINE

## 2018-07-26 PROCEDURE — 99214 OFFICE O/P EST MOD 30 MIN: CPT | Mod: S$PBB,,, | Performed by: INTERNAL MEDICINE

## 2018-07-26 NOTE — PROGRESS NOTES
Subjective:   Patient ID:  Gemma Vick is a 63 y.o. female who presents for follow up of No chief complaint on file.      HPI  A 64 yo female with h/o cad carotid disease pvd aaa htn hlp copd is here for f/u. She is bruising a lot has no new complaints has stopped smoking 1 year agio. Has shortness of breath when she picks something heavy. ahs a weak spell  Intermittently has to sit down. Has  No recent palpitation.  Can hear her heart beat in her ears. Has leg swelling intermittently if she stands up a lot on her feet.her lipids are on target.  Past Medical History:   Diagnosis Date    AAA (abdominal aortic aneurysm) 2/13/2014    Abdominal aneurysm     3    Acute coronary syndrome     Arthritis     BPPV (benign paroxysmal positional vertigo)     Carotid artery plaque     Carotid artery stenosis and occlusion 2/13/2014    Chronic back pain     COPD (chronic obstructive pulmonary disease)     Coronary artery disease     Emphysema lung     Hyperlipidemia     Hypertension     Myocardial infarction     x3    Neuropathy        Past Surgical History:   Procedure Laterality Date    CARDIAC CATHETERIZATION      CORONARY ANGIOPLASTY      HYSTERECTOMY         Social History   Substance Use Topics    Smoking status: Former Smoker     Packs/day: 0.50     Years: 44.00     Types: Cigarettes, Vaping w/o nicotine     Start date: 6/24/1970     Quit date: 05/2017    Smokeless tobacco: Never Used    Alcohol use No       Family History   Problem Relation Age of Onset    Heart disease Mother     Heart attacks under age 50 Father     Heart attacks under age 50 Brother        Current Outpatient Prescriptions   Medication Sig    albuterol (PROAIR HFA) 90 mcg/actuation inhaler Inhale 2 puffs into the lungs every 6 (six) hours as needed.    albuterol-ipratropium 2.5mg-0.5mg/3mL (DUO-NEB) 0.5 mg-3 mg(2.5 mg base)/3 mL nebulizer solution USE ONE VIAL IN NEBULIZER TWICE DAILY    aspirin 81 MG Chew Take 81 mg by  mouth once daily.    clopidogrel (PLAVIX) 75 mg tablet Take 1 tablet (75 mg total) by mouth once daily.    COMBIVENT RESPIMAT  mcg/actuation inhaler     DULoxetine (CYMBALTA) 60 MG capsule Take 1 capsule (60 mg total) by mouth once daily.    DULoxetine (CYMBALTA) 60 MG capsule TAKE (1) CAPSULE BY MOUTH DAILY    ergocalciferol, vitamin D2, (VITAMIN D ORAL) Take 800 Units by mouth once daily.     estrogens, conjugated, (PREMARIN) 0.3 MG tablet Take 1 tablet (0.3 mg total) by mouth once daily.    ezetimibe-simvastatin 10-40 mg (VYTORIN) 10-40 mg per tablet TAKE 1 TABLET BY MOUTH EVERY EVENING    felodipine (PLENDIL) 5 MG 24 hr tablet TAKE 1 TABLET BY MOUTH DAILY    hydroCHLOROthiazide (HYDRODIURIL) 12.5 MG Tab Take 1 tablet (12.5 mg total) by mouth once daily.    meclizine (ANTIVERT) 25 mg tablet Take 1 tablet (25 mg total) by mouth 3 (three) times daily as needed for Dizziness.    meloxicam (MOBIC) 7.5 MG tablet Take 1 tablet (7.5 mg total) by mouth once daily. With food    mometasone-formoterol (DULERA) 200-5 mcg/actuation inhaler INHALE 2 PUFFS 2 TIMES DAILY    OXYGEN-AIR DELIVERY SYSTEMS MISC 2 L by Misc.(Non-Drug; Combo Route) route every evening.    prenatal vit,victor manuel 74/iron/folic (PRENATAL VITAMIN 1+1 ORAL) Take by mouth once daily.     zolpidem (AMBIEN) 5 MG Tab Take 1 tablet (5 mg total) by mouth nightly.     Current Facility-Administered Medications   Medication    methylPREDNISolone acetate injection 80 mg     Current Outpatient Prescriptions on File Prior to Visit   Medication Sig    albuterol (PROAIR HFA) 90 mcg/actuation inhaler Inhale 2 puffs into the lungs every 6 (six) hours as needed.    albuterol-ipratropium 2.5mg-0.5mg/3mL (DUO-NEB) 0.5 mg-3 mg(2.5 mg base)/3 mL nebulizer solution USE ONE VIAL IN NEBULIZER TWICE DAILY    aspirin 81 MG Chew Take 81 mg by mouth once daily.    clopidogrel (PLAVIX) 75 mg tablet Take 1 tablet (75 mg total) by mouth once daily.    COMBIVENT  RESPIMAT  mcg/actuation inhaler     DULoxetine (CYMBALTA) 60 MG capsule Take 1 capsule (60 mg total) by mouth once daily.    DULoxetine (CYMBALTA) 60 MG capsule TAKE (1) CAPSULE BY MOUTH DAILY    ergocalciferol, vitamin D2, (VITAMIN D ORAL) Take 800 Units by mouth once daily.     estrogens, conjugated, (PREMARIN) 0.3 MG tablet Take 1 tablet (0.3 mg total) by mouth once daily.    ezetimibe-simvastatin 10-40 mg (VYTORIN) 10-40 mg per tablet TAKE 1 TABLET BY MOUTH EVERY EVENING    felodipine (PLENDIL) 5 MG 24 hr tablet TAKE 1 TABLET BY MOUTH DAILY    hydroCHLOROthiazide (HYDRODIURIL) 12.5 MG Tab Take 1 tablet (12.5 mg total) by mouth once daily.    meclizine (ANTIVERT) 25 mg tablet Take 1 tablet (25 mg total) by mouth 3 (three) times daily as needed for Dizziness.    meloxicam (MOBIC) 7.5 MG tablet Take 1 tablet (7.5 mg total) by mouth once daily. With food    mometasone-formoterol (DULERA) 200-5 mcg/actuation inhaler INHALE 2 PUFFS 2 TIMES DAILY    OXYGEN-AIR DELIVERY SYSTEMS MISC 2 L by Misc.(Non-Drug; Combo Route) route every evening.    prenatal vit,victor manuel 74/iron/folic (PRENATAL VITAMIN 1+1 ORAL) Take by mouth once daily.     zolpidem (AMBIEN) 5 MG Tab Take 1 tablet (5 mg total) by mouth nightly.     Current Facility-Administered Medications on File Prior to Visit   Medication    methylPREDNISolone acetate injection 80 mg     Review of patient's allergies indicates:  No Known Allergies  Review of Systems   Constitution: Negative for diaphoresis, weakness, malaise/fatigue and weight gain.   HENT: Negative for hoarse voice.    Eyes: Negative for double vision and visual disturbance.   Cardiovascular: Positive for dyspnea on exertion and leg swelling. Negative for chest pain, claudication, cyanosis, irregular heartbeat, near-syncope, orthopnea, palpitations, paroxysmal nocturnal dyspnea and syncope.   Respiratory: Positive for shortness of breath. Negative for cough, hemoptysis and snoring.     Hematologic/Lymphatic: Negative for bleeding problem. Does not bruise/bleed easily.   Skin: Negative for color change and poor wound healing.   Musculoskeletal: Negative for muscle cramps, muscle weakness and myalgias.   Gastrointestinal: Negative for bloating, abdominal pain, change in bowel habit, diarrhea, heartburn, hematemesis, hematochezia, melena and nausea.   Neurological: Negative for excessive daytime sleepiness, dizziness, headaches, light-headedness, loss of balance and numbness.   Psychiatric/Behavioral: Negative for memory loss. The patient does not have insomnia.    Allergic/Immunologic: Negative for hives.       Objective:   Physical Exam   Constitutional: She is oriented to person, place, and time. She appears well-developed and well-nourished. She does not appear ill. No distress.   HENT:   Head: Normocephalic and atraumatic.   Eyes: EOM are normal. Pupils are equal, round, and reactive to light. No scleral icterus.   Neck: Normal range of motion. Neck supple. Normal carotid pulses, no hepatojugular reflux and no JVD present. Carotid bruit is not present. No tracheal deviation present. No thyromegaly present.   Cardiovascular: Normal rate, regular rhythm and intact distal pulses.  Exam reveals no gallop and no friction rub.    Murmur heard.   Harsh midsystolic murmur is present with a grade of 1/6  at the upper right sternal border radiating to the neck  High-pitched blowing decrescendo early diastolic murmur is present with a grade of 1/6  at the upper right sternal border radiating to the apex  Pulses:       Carotid pulses are 2+ on the right side with bruit, and 2+ on the left side with bruit.       Radial pulses are 2+ on the right side, and 2+ on the left side.        Femoral pulses are 2+ on the right side with bruit, and 2+ on the left side with bruit.       Popliteal pulses are 2+ on the right side, and 2+ on the left side.        Dorsalis pedis pulses are 2+ on the right side, and 2+ on  "the left side.        Posterior tibial pulses are 2+ on the right side, and 2+ on the left side.   nop pulsatile mass   Abdominal bruit noted  Bilateral subclavian bruit noted   Pulmonary/Chest: Effort normal and breath sounds normal. No respiratory distress. She has no wheezes. She has no rhonchi. She has no rales. She exhibits no tenderness.   Abdominal: Soft. Normal appearance, normal aorta and bowel sounds are normal. She exhibits no abdominal bruit, no ascites and no pulsatile midline mass. There is no hepatomegaly. There is no tenderness.   Musculoskeletal: She exhibits no edema.        Right shoulder: She exhibits no deformity.   Neurological: She is alert and oriented to person, place, and time. She has normal strength. No cranial nerve deficit. Coordination normal.   Skin: Skin is warm and dry. No rash noted. She is not diaphoretic. No cyanosis or erythema. Nails show no clubbing.   Bruises noted   Psychiatric: She has a normal mood and affect. Her speech is normal and behavior is normal.   Nursing note and vitals reviewed.    Vitals:    07/26/18 1436 07/26/18 1439   BP: 122/70 128/76   BP Location: Right arm Left arm   Pulse: 75    SpO2: 98%    Weight: 70.7 kg (155 lb 13.8 oz)    Height: 5' 2" (1.575 m)      Lab Results   Component Value Date    CHOL 151 07/13/2018    CHOL 152 08/28/2017    CHOL 135 07/13/2016     Lab Results   Component Value Date    HDL 67 07/13/2018    HDL 57 08/28/2017    HDL 43 07/13/2016     Lab Results   Component Value Date    LDLCALC 63.6 07/13/2018    LDLCALC 74.2 08/28/2017    LDLCALC 69.8 07/13/2016     Lab Results   Component Value Date    TRIG 102 07/13/2018    TRIG 104 08/28/2017    TRIG 111 07/13/2016     Lab Results   Component Value Date    CHOLHDL 44.4 07/13/2018    CHOLHDL 37.5 08/28/2017    CHOLHDL 31.9 07/13/2016       Chemistry        Component Value Date/Time     07/13/2018 0827    K 3.9 07/13/2018 0827     07/13/2018 0827    CO2 23 07/13/2018 0827    " BUN 16 07/13/2018 0827    CREATININE 1.0 07/13/2018 0827     (H) 07/13/2018 0827        Component Value Date/Time    CALCIUM 9.6 07/13/2018 0827    ALKPHOS 70 07/13/2018 0827    AST 16 07/13/2018 0827    ALT 10 07/13/2018 0827    BILITOT 0.4 07/13/2018 0827    ESTGFRAFRICA >60.0 07/13/2018 0827    EGFRNONAA >60.0 07/13/2018 0827        Lab Results   Component Value Date    HGBA1C 6.0 01/14/2016     Lab Results   Component Value Date    TSH 1.509 05/04/2018     Lab Results   Component Value Date    INR 1.1 12/20/2017    INR 1.1 08/10/2012     Lab Results   Component Value Date    WBC 13.91 (H) 12/20/2017    HGB 11.7 (L) 12/20/2017    HCT 34.5 (L) 12/20/2017    MCV 89 12/20/2017     12/20/2017     BMP  Sodium   Date Value Ref Range Status   07/13/2018 141 136 - 145 mmol/L Final     Potassium   Date Value Ref Range Status   07/13/2018 3.9 3.5 - 5.1 mmol/L Final     Chloride   Date Value Ref Range Status   07/13/2018 107 95 - 110 mmol/L Final     CO2   Date Value Ref Range Status   07/13/2018 23 23 - 29 mmol/L Final     BUN, Bld   Date Value Ref Range Status   07/13/2018 16 8 - 23 mg/dL Final     Creatinine   Date Value Ref Range Status   07/13/2018 1.0 0.5 - 1.4 mg/dL Final     Calcium   Date Value Ref Range Status   07/13/2018 9.6 8.7 - 10.5 mg/dL Final     Anion Gap   Date Value Ref Range Status   07/13/2018 11 8 - 16 mmol/L Final     eGFR if    Date Value Ref Range Status   07/13/2018 >60.0 >60 mL/min/1.73 m^2 Final     eGFR if non    Date Value Ref Range Status   07/13/2018 >60.0 >60 mL/min/1.73 m^2 Final     Comment:     Calculation used to obtain the estimated glomerular filtration  rate (eGFR) is the CKD-EPI equation.        CrCl cannot be calculated (Patient's most recent lab result is older than the maximum 7 days allowed.).    Assessment:     1. Bilateral carotid artery stenosis    2. Mixed hyperlipidemia    3. Essential hypertension    4. Coronary artery disease  involving native coronary artery of native heart without angina pectoris    5. Benign paroxysmal positional vertigo, unspecified laterality    6. Prediabetes    7. Abdominal aortic aneurysm (AAA) without rupture    8. Dizziness    9. COPD, moderate      Stable clinically no angina lipids on target counseled about diet exercise weight loss. Proud that  she  stopped smoking.  Plan:   abd u/s  Continue current therapy  Cardiac low salt diet.  Risk factor modification and excercise program.  F/u in 6 months with lipid cmp

## 2018-07-31 ENCOUNTER — OFFICE VISIT (OUTPATIENT)
Dept: PULMONOLOGY | Facility: CLINIC | Age: 63
End: 2018-07-31
Payer: MEDICAID

## 2018-07-31 ENCOUNTER — PROCEDURE VISIT (OUTPATIENT)
Dept: PULMONOLOGY | Facility: CLINIC | Age: 63
End: 2018-07-31
Payer: MEDICAID

## 2018-07-31 ENCOUNTER — HOSPITAL ENCOUNTER (OUTPATIENT)
Dept: RADIOLOGY | Facility: HOSPITAL | Age: 63
Discharge: HOME | End: 2018-07-31
Attending: INTERNAL MEDICINE
Payer: MEDICAID

## 2018-07-31 VITALS
HEIGHT: 62 IN | SYSTOLIC BLOOD PRESSURE: 102 MMHG | OXYGEN SATURATION: 97 % | RESPIRATION RATE: 18 BRPM | HEART RATE: 69 BPM | DIASTOLIC BLOOD PRESSURE: 54 MMHG | BODY MASS INDEX: 53.92 KG/M2 | WEIGHT: 293 LBS

## 2018-07-31 VITALS
HEART RATE: 78 BPM | HEIGHT: 62 IN | RESPIRATION RATE: 16 BRPM | OXYGEN SATURATION: 98 % | WEIGHT: 158.5 LBS | BODY MASS INDEX: 29.17 KG/M2

## 2018-07-31 DIAGNOSIS — J44.9 COPD, SEVERITY TO BE DETERMINED: ICD-10-CM

## 2018-07-31 DIAGNOSIS — J44.9 COPD, MODERATE: ICD-10-CM

## 2018-07-31 DIAGNOSIS — J44.9 CHRONIC OBSTRUCTIVE PULMONARY DISEASE, UNSPECIFIED COPD TYPE: Primary | ICD-10-CM

## 2018-07-31 DIAGNOSIS — J44.9 COPD, MODERATE: Primary | ICD-10-CM

## 2018-07-31 LAB
BRPFT: ABNORMAL
FEF 25 75 LLN: 1.01
FEF 25 75 PRE REF: 31.3 %
FEF 25 75 PRE: 0.64 L/S (ref 1.01–3.44)
FEF 25 75 REF: 2.03
FET100 PRE: 9.39 SEC
FEV1 FVC LLN: 67
FEV1 FVC PRE REF: 76.6 %
FEV1 FVC PRE: 60.66 % (ref 66.75–89.87)
FEV1 FVC REF: 79
FEV1 LLN: 1.68
FEV1 PRE REF: 67.4 %
FEV1 PRE: 1.51 L (ref 1.68–2.79)
FEV1 REF: 2.24
FEV6 LLN: 2.23
FEV6 PRE REF: 80.5 %
FEV6 PRE: 2.31 L (ref 2.23–3.5)
FEV6 REF: 2.87
FVC LLN: 2.13
FVC PRE REF: 87.5 %
FVC PRE: 2.49 L (ref 2.13–3.6)
FVC REF: 2.85
MVV LLN: 70
MVV REF: 82
PEF LLN: 4.21
PEF PRE REF: 55 %
PEF PRE: 3.2 L/S (ref 4.21–7.42)
PEF REF: 5.82

## 2018-07-31 PROCEDURE — 71046 X-RAY EXAM CHEST 2 VIEWS: CPT | Mod: 26,,, | Performed by: RADIOLOGY

## 2018-07-31 PROCEDURE — 99213 OFFICE O/P EST LOW 20 MIN: CPT | Mod: PBBFAC,25 | Performed by: INTERNAL MEDICINE

## 2018-07-31 PROCEDURE — 94010 BREATHING CAPACITY TEST: CPT | Mod: PBBFAC

## 2018-07-31 PROCEDURE — 94010 BREATHING CAPACITY TEST: CPT | Mod: 26,S$PBB,, | Performed by: INTERNAL MEDICINE

## 2018-07-31 PROCEDURE — 99999 PR PBB SHADOW E&M-EST. PATIENT-LVL III: CPT | Mod: PBBFAC,,, | Performed by: INTERNAL MEDICINE

## 2018-07-31 PROCEDURE — 71046 X-RAY EXAM CHEST 2 VIEWS: CPT | Mod: TC

## 2018-07-31 PROCEDURE — 99214 OFFICE O/P EST MOD 30 MIN: CPT | Mod: 25,S$PBB,, | Performed by: INTERNAL MEDICINE

## 2018-07-31 RX ORDER — IPRATROPIUM BROMIDE AND ALBUTEROL 20; 100 UG/1; UG/1
2 SPRAY, METERED RESPIRATORY (INHALATION) EVERY 6 HOURS PRN
Qty: 4 G | Refills: 11 | Status: SHIPPED | OUTPATIENT
Start: 2018-07-31 | End: 2019-03-25 | Stop reason: SDUPTHER

## 2018-07-31 RX ORDER — IPRATROPIUM BROMIDE AND ALBUTEROL SULFATE 2.5; .5 MG/3ML; MG/3ML
SOLUTION RESPIRATORY (INHALATION)
Qty: 270 ML | Refills: 11 | Status: SHIPPED | OUTPATIENT
Start: 2018-07-31 | End: 2020-03-10 | Stop reason: SDUPTHER

## 2018-07-31 RX ORDER — ALBUTEROL SULFATE 90 UG/1
2 AEROSOL, METERED RESPIRATORY (INHALATION) EVERY 6 HOURS PRN
Qty: 18 G | Refills: 6 | Status: SHIPPED | OUTPATIENT
Start: 2018-07-31 | End: 2019-03-25 | Stop reason: SDUPTHER

## 2018-07-31 NOTE — PROGRESS NOTES
Pulmonary Disease Management  [facility]  Initial Visit    Referring Provider: Dr Rodriguez  Diagnosis: COPD, moderate  Last Hospital Admission: 12/20/17  Last provider visit: 7/31/18      Current Outpatient Prescriptions:     albuterol (PROAIR HFA) 90 mcg/actuation inhaler, Inhale 2 puffs into the lungs every 6 (six) hours as needed., Disp: 18 g, Rfl: 6    albuterol-ipratropium 2.5mg-0.5mg/3mL (DUO-NEB) 0.5 mg-3 mg(2.5 mg base)/3 mL nebulizer solution, USE ONE VIAL IN NEBULIZER TWICE DAILY, Disp: 270 mL, Rfl: 11    COMBIVENT RESPIMAT  mcg/actuation inhaler, , Disp: , Rfl:     mometasone-formoterol (DULERA) 200-5 mcg/actuation inhaler, INHALE 2 PUFFS 2 TIMES DAILY, Disp: 13 g, Rfl: 5    OXYGEN-AIR DELIVERY SYSTEMS MISC, 2 L by Misc.(Non-Drug; Combo Route) route every evening., Disp: , Rfl:     Current Facility-Administered Medications:     methylPREDNISolone acetate injection 80 mg, 80 mg, Intramuscular, Once, Selwyn Schilling MD    Review of patient's allergies indicates:  No Known Allergies    Smoking history:   History   Smoking Status    Former Smoker    Packs/day: 0.50    Years: 44.00    Types: Cigarettes, Vaping w/o nicotine    Start date: 6/24/1970    Quit date: 05/2017   Smokeless Tobacco    Never Used         Pulse Readings from Last 1 Encounters:   07/31/18 78     SpO2 Readings from Last 1 Encounters:   07/31/18 98%       Resp Readings from Last 1 Encounters:   07/31/18 16     BMI Readings from Last 1 Encounters:   07/31/18 28.99 kg/m²       All Lung Fields Breath Sounds: clear  Cough Type: productive  Sputum Color: clear  Sputum Amount: scant  Sputum Consistency: thick    Resting Richard Dyspnea Rating : 0   Current Oxygen Use: Yes (only at night)    COPD Questionnaire: Total- 19       PFT Results:  Pre FVC   Date/Time Value Ref Range Status   02/21/2018 03:16 PM 1.997 (L) 2.708256357397 - 3.197030769125 L Final     Pre FEV1   Date/Time Value Ref Range Status   02/21/2018 03:16 PM  1.62369 (L) 1.089118565685 - 2.680473908546 L Final     Pre FEV1 FVC   Date/Time Value Ref Range Status   02/21/2018 03:16 PM 56.80567 (L) 67.84 - 87.428 % Final         Is this patient a candidate for pulmonary rehab?: No  Method Used for Education: Literature, Demonstration, Verbal  Did the patient demonstrate understanding?: Yes    Patient Concerns or Expectations:     Therapist Comments:   Reviewed medications purpose and proper technique of Duoneb, Dulera MDI, ProAir HFA, Combivent Respimat .   Patient was given and practiced proper technique using the [employer] valved holding chamber. Demonstrated understanding and good technique using teach back method.     Patient is compliant with her respiratory regimen and understands the difference between her rescue and controller medications.   Patient uses Duoneb twice a day, Combivent and ProAir as needed.    Patient uses oxygen at 2l/m nasal cannula while sleeping.    Patient is a current cigerette smoker, understands the effects cigerette smoking has on her heart and lungs.  Discussed  [facility].  Patient not ready to commit to program at this time.    Reviewed self monitoring (worsening of) signs and symptoms for COPD.    Plan:    Reinforce education  Meds: MARY/DANAY, MARY, ICS/LABA  DME Needs: none  COPD Action Plan:   Immunization: pneumococcal- due, flu- due this fall  Next Provider Visit: none scheduled at this time (sent to nurse)  Next Spirometry/CPFT: none scheduled    Approximate time spent with patient: 60 minutes

## 2018-07-31 NOTE — LETTER
July 31, 2018      Nathan Rodriguez MD  [address] [facility] [address]           [facility]  [address] [place] [address]  Phone: [phone]  Fax: [phone]          Patient: Gemma Vick   MR Number: [id number]   YOB: 1955   Date of Visit: 7/31/2018       Dear Dr. Nathan Rodriguez:    Thank you for referring Gemma Vick to me for evaluation. Attached you will find relevant portions of my assessment and plan of care.    If you have questions, please do not hesitate to call me. I look forward to following Gemma Vick along with you.    Sincerely,    Andres Morales MD    Enclosure  CC:  No Recipients    If you would like to receive this communication electronically, please contact [email or web address] or [phone] to request more information on [facility] access.    For providers and/or their staff who would like to refer a patient to [facility], please contact us through our one-stop-shop provider referral line, [facility], at [phone].    If you feel you have received this communication in error or would no longer like to receive these types of communications, please e-mail [email or web address]

## 2018-07-31 NOTE — PROGRESS NOTES
Subjective:       Patient ID: Gemma Vick is a 63 y.o. female.    Chief Complaint: No chief complaint on file.    HPI COPD  She presents for evaluation and treatment of COPD. The patient is not currently have symptoms / an exacerbation. The patient has COPD for approximately 5 years. Symptoms in previous episodes have included dyspnea, cough and wheezing, and typically last 2 weeks. Previous episodes have been exacerbated by moderate activity. Current treatment includes albuterol inhaler, which generally provides some relief of symptoms.   She uses 1 pillows at night. Patient currently is not on home oxygen therapy.. The patient is having no constitutional symptoms, denying fever, chills, anorexia, or weight loss. The patient has been hospitalized for this condition before. She quit smoking approximately 1 year ago. The patient is experiencing exercise intolerance (difficulty climbing 3 flights of stairs).        Past Medical History:   Diagnosis Date    AAA (abdominal aortic aneurysm) 2/13/2014    Abdominal aneurysm     3    Acute coronary syndrome     Arthritis     BPPV (benign paroxysmal positional vertigo)     Carotid artery plaque     Carotid artery stenosis and occlusion 2/13/2014    Chronic back pain     COPD (chronic obstructive pulmonary disease)     Coronary artery disease     Emphysema lung     Hyperlipidemia     Hypertension     Myocardial infarction     x3    Neuropathy      Past Surgical History:   Procedure Laterality Date    CARDIAC CATHETERIZATION      CORONARY ANGIOPLASTY      HYSTERECTOMY       Social History     Social History    Marital status:      Spouse name: N/A    Number of children: N/A    Years of education: N/A     Occupational History    Not on file.     Social History Main Topics    Smoking status: Former Smoker     Packs/day: 0.50     Years: 44.00     Types: Cigarettes, Vaping w/o nicotine     Start date: 6/24/1970     Quit date: 05/2017    Smokeless  tobacco: Never Used    Alcohol use No    Drug use: No    Sexual activity: Not Currently     Birth control/ protection: None     Other Topics Concern    Not on file     Social History Narrative    No narrative on file     Review of Systems   Constitutional: Positive for fatigue. Negative for fever.   HENT: Positive for postnasal drip, rhinorrhea and congestion.    Eyes: Negative for redness and itching.   Respiratory: Positive for cough, sputum production, shortness of breath, dyspnea on extertion, use of rescue inhaler and Paroxysmal Nocturnal Dyspnea.    Cardiovascular: Negative for chest pain, palpitations and leg swelling.   Genitourinary: Negative for difficulty urinating and hematuria.   Endocrine: Negative for cold intolerance and heat intolerance.    Skin: Negative for rash.   Gastrointestinal: Negative for nausea and abdominal pain.   Neurological: Negative for dizziness, syncope, weakness and light-headedness.   Hematological: Negative for adenopathy. Does not bruise/bleed easily.   Psychiatric/Behavioral: Negative for sleep disturbance. The patient is not nervous/anxious.        Objective:      Physical Exam   Constitutional: She is oriented to person, place, and time. She appears well-developed and well-nourished.   HENT:   Head: Normocephalic and atraumatic.   Mouth/Throat: Oropharyngeal exudate present.   Eyes: Conjunctivae are normal. Pupils are equal, round, and reactive to light.   Neck: Neck supple. No JVD present. No tracheal deviation present. No thyromegaly present.   Cardiovascular: Normal rate, regular rhythm and normal heart sounds.    Pulmonary/Chest: Effort normal. No respiratory distress. She has decreased breath sounds. She has wheezes in the right lower field and the left lower field. She has no rhonchi. She has no rales. She exhibits no tenderness.   Abdominal: Soft. Bowel sounds are normal.   Musculoskeletal: Normal range of motion. She exhibits no edema.   Lymphadenopathy:     She  has no cervical adenopathy.   Neurological: She is alert and oriented to person, place, and time.   Skin: Skin is warm and dry.   Nursing note and vitals reviewed.    Personal Diagnostic Review  Chest X-Ray: I personally reviewed the films and findings are:, air trapping/emphysema  Pulmonary function tests:  Mod obstruction      Pulmonary Studies Review 7/31/2018 7/31/2018 7/26/2018 5/21/2018 5/4/2018 4/18/2018 3/12/2018   FVC - - - - - - -   FVC% - - - - - - -   FEV1 - - - - - - -   FEV1% - - - - - - -   FEV1/FVC - - - - - - -   SpO2 97 98 98 - 96 - -   Ordering Provider - - - - - - -   Interpreting Provider - - - - - - -   Performing nurse/tech/RT - - - - - - -   Diagnosis - - - - - - -   Height 62.000 62 62.000 61.000 61.000 62.000 62.000   Weight 5432.13 2536.17 2493.84 2433.88 2435.64 2444.46 2430.35   BMI (Calculated) 62.2 29.1 28.6 28.8 28.8 28 27.8   Predicted Distance 122.58 329.13 332.25 331 331 335.99 337.24   Patient Race - - - - - - -   6MWT Status - - - - - - -   Patient Reported - - - - - - -   6MW Distance walked (feet) - - - - - - -   Distance walked (meters) - - - - - - -   Did patient stop? - - - - - - -   Type of assistive device(s) used? - - - - - - -   Is extra documentation required for this patient? - - - - - - -   Oxygen Saturation - - - - - - -   Supplemental Oxygen - - - - - - -   Heart Rate - - - - - - -   Blood Pressure - - - - - - -   Oxygen Saturation - - - - - - -   Supplemental Oxygen - - - - - - -   Heart Rate - - - - - - -   Blood Pressure - - - - - - -   Richard Dyspnea Rating  - - - - - - -   Recovery Time (seconds) - - - - - - -   Oxygen Saturation - - - - - - -   Heart Rate - - - - - - -   Blood Pressure - - - - - - -   Richard Dyspnea Rating  - - - - - - -   Is procedure ready for interpretation? - - - - - - -   Total Time Walked (Calculated) - - - - - - -   Total Laps Walked - - - - - - -   Final Partial Lap Distance (feet) - - - - - - -   Total Distance Feet (Calculated) - - - - -  - -   Total Distance Meters (Calculated) - - - - - - -   Predicted Distance Meters (Calculated) 282.53 470.53 473.28 471.69 471.57 476.49 477.4   Percentage of Predicted (Calculated) - - - - - - -   Peak VO2 (Calculated) - - - - - - -   Mets - - - - - - -   Has The Patient Had a Previous Six Minute Walk Test? - - - - - - -   Oxygen Qualification? - - - - - - -     No flowsheet data found.      Assessment:       1. Chronic obstructive pulmonary disease, unspecified COPD type    2. COPD, severity to be determined        Outpatient Encounter Prescriptions as of 7/31/2018   Medication Sig Dispense Refill    albuterol (PROAIR HFA) 90 mcg/actuation inhaler Inhale 2 puffs into the lungs every 6 (six) hours as needed. 18 g 6    albuterol-ipratropium (DUO-NEB) 2.5 mg-0.5 mg/3 mL nebulizer solution USE ONE VIAL IN NEBULIZER TWICE DAILY 270 mL 11    aspirin 81 MG Chew Take 81 mg by mouth once daily.      clopidogrel (PLAVIX) 75 mg tablet Take 1 tablet (75 mg total) by mouth once daily. 30 tablet 5    COMBIVENT RESPIMAT  mcg/actuation inhaler Inhale 2 puffs into the lungs every 6 (six) hours as needed for Wheezing or Shortness of Breath. 4 g 11    DULoxetine (CYMBALTA) 60 MG capsule Take 1 capsule (60 mg total) by mouth once daily. 30 capsule 5    ergocalciferol, vitamin D2, (VITAMIN D ORAL) Take 800 Units by mouth once daily.       estrogens, conjugated, (PREMARIN) 0.3 MG tablet Take 1 tablet (0.3 mg total) by mouth once daily. 30 tablet 5    ezetimibe-simvastatin 10-40 mg (VYTORIN) 10-40 mg per tablet TAKE 1 TABLET BY MOUTH EVERY EVENING 30 tablet 0    felodipine (PLENDIL) 5 MG 24 hr tablet TAKE 1 TABLET BY MOUTH DAILY 30 tablet 0    hydroCHLOROthiazide (HYDRODIURIL) 12.5 MG Tab Take 1 tablet (12.5 mg total) by mouth once daily. 30 tablet 5    meclizine (ANTIVERT) 25 mg tablet Take 1 tablet (25 mg total) by mouth 3 (three) times daily as needed for Dizziness. 10 tablet 0    meloxicam (MOBIC) 7.5 MG tablet  "Take 1 tablet (7.5 mg total) by mouth once daily. With food 30 tablet 2    mometasone-formoterol (DULERA) 200-5 mcg/actuation inhaler INHALE 2 PUFFS 2 TIMES DAILY 13 g 5    OXYGEN-AIR DELIVERY SYSTEMS MISC 2 L by Misc.(Non-Drug; Combo Route) route every evening.      prenatal vit,victor manuel 74/iron/folic (PRENATAL VITAMIN 1+1 ORAL) Take by mouth once daily.       zolpidem (AMBIEN) 5 MG Tab Take 1 tablet (5 mg total) by mouth nightly. 30 tablet 5    [DISCONTINUED] albuterol (PROAIR HFA) 90 mcg/actuation inhaler Inhale 2 puffs into the lungs every 6 (six) hours as needed. 18 g 6    [DISCONTINUED] albuterol-ipratropium 2.5mg-0.5mg/3mL (DUO-NEB) 0.5 mg-3 mg(2.5 mg base)/3 mL nebulizer solution USE ONE VIAL IN NEBULIZER TWICE DAILY 270 mL 11    [DISCONTINUED] COMBIVENT RESPIMAT  mcg/actuation inhaler       [DISCONTINUED] mometasone-formoterol (DULERA) 200-5 mcg/actuation inhaler INHALE 2 PUFFS 2 TIMES DAILY 13 g 5    [DISCONTINUED] DULoxetine (CYMBALTA) 60 MG capsule TAKE (1) CAPSULE BY MOUTH DAILY 30 capsule 1     Facility-Administered Encounter Medications as of 7/31/2018   Medication Dose Route Frequency Provider Last Rate Last Dose    methylPREDNISolone acetate injection 80 mg  80 mg Intramuscular Once Selwyn Schilling MD         Orders Placed This Encounter   Procedures    NEBULIZER FOR HOME USE     [facility] for CPAP/Oxygen/Nebulizer supplies.  Customer Service: [phone]  Call: [phone]  Fax: [phone]  Billing Inquiries: [phone] or [phone]       Order Specific Question:   Height:     Answer:   5' 2" (1.575 m)     Order Specific Question:   Weight:     Answer:   154 kg (339 lb 8.1 oz)     Order Specific Question:   Length of need (1-99 months):     Answer:   99    NEBULIZER KIT (SUPPLIES) FOR HOME USE     Order Specific Question:   Height:     Answer:   5' 2" (1.575 m)     Order Specific Question:   Weight:     Answer:   154 kg (339 lb 8.1 oz)     Order Specific Question:   " Length of need (1-99 months):     Answer:   99     Order Specific Question:   Mask or Mouthpiece?     Answer:   Mouthpiece    X-Ray Chest PA And Lateral     Standing Status:   Future     Standing Expiration Date:   7/31/2019     Order Specific Question:   May the Radiologist modify the order per protocol to meet the clinical needs of the patient?     Answer:   Yes    Spirometry with/without bronchodilator     Standing Status:   Future     Standing Expiration Date:   7/31/2019     Plan:       Requested Prescriptions     Signed Prescriptions Disp Refills    albuterol (PROAIR HFA) 90 mcg/actuation inhaler 18 g 6     Sig: Inhale 2 puffs into the lungs every 6 (six) hours as needed.    mometasone-formoterol (DULERA) 200-5 mcg/actuation inhaler 13 g 5     Sig: INHALE 2 PUFFS 2 TIMES DAILY    COMBIVENT RESPIMAT  mcg/actuation inhaler 4 g 11     Sig: Inhale 2 puffs into the lungs every 6 (six) hours as needed for Wheezing or Shortness of Breath.    albuterol-ipratropium (DUO-NEB) 2.5 mg-0.5 mg/3 mL nebulizer solution 270 mL 11     Sig: USE ONE VIAL IN NEBULIZER TWICE DAILY     Chronic obstructive pulmonary disease, unspecified COPD type  -     albuterol (PROAIR HFA) 90 mcg/actuation inhaler; Inhale 2 puffs into the lungs every 6 (six) hours as needed.  Dispense: 18 g; Refill: 6  -     mometasone-formoterol (DULERA) 200-5 mcg/actuation inhaler; INHALE 2 PUFFS 2 TIMES DAILY  Dispense: 13 g; Refill: 5  -     COMBIVENT RESPIMAT  mcg/actuation inhaler; Inhale 2 puffs into the lungs every 6 (six) hours as needed for Wheezing or Shortness of Breath.  Dispense: 4 g; Refill: 11  -     X-Ray Chest PA And Lateral; Future; Expected date: 07/31/2018  -     Spirometry with/without bronchodilator; Future; Expected date: 01/31/2019  -     NEBULIZER FOR HOME USE  -     NEBULIZER KIT (SUPPLIES) FOR HOME USE    COPD, severity to be determined  -     albuterol-ipratropium (DUO-NEB) 2.5 mg-0.5 mg/3 mL nebulizer solution; USE  ONE VIAL IN NEBULIZER TWICE DAILY  Dispense: 270 mL; Refill: 11      Follow-up in about 1 year (around 7/31/2019) for Review tomer and CXR.      MEDICAL DECISION MAKING: Moderate  complexity.  Overall, the multiple problems listed are of moderate severity that may impact quality of life and activities of daily living. Side effects of medications, treatment plan as well as options and alternatives reviewed and discussed with patient. There was counseling of patient concerning these issues.    Total time spent in face to face counseling and coordination of care - 25 minutes over 50% of time was used in discussion of prognosis, risks, benefits of treatment, instructions and compliance with regimen . Discussion with other physicians and/or health care providers ( home health or for use of durable medical equipment (oxygen, nebulizers, CPAP, BiPAP) occurred.

## 2018-08-02 ENCOUNTER — DOCUMENTATION ONLY (OUTPATIENT)
Dept: REHABILITATION | Facility: HOSPITAL | Age: 63
End: 2018-08-02

## 2018-08-02 DIAGNOSIS — J44.9 COPD, MODERATE: Primary | ICD-10-CM

## 2018-08-23 RX ORDER — FELODIPINE 5 MG/1
TABLET, EXTENDED RELEASE ORAL
Qty: 30 TABLET | Refills: 0 | Status: SHIPPED | OUTPATIENT
Start: 2018-08-23 | End: 2018-09-25 | Stop reason: SDUPTHER

## 2018-09-06 ENCOUNTER — PATIENT MESSAGE (OUTPATIENT)
Dept: FAMILY MEDICINE | Facility: CLINIC | Age: 63
End: 2018-09-06

## 2018-09-06 DIAGNOSIS — R41.3 MEMORY LOSS: Primary | ICD-10-CM

## 2018-09-07 ENCOUNTER — TELEPHONE (OUTPATIENT)
Dept: RADIOLOGY | Facility: HOSPITAL | Age: 63
End: 2018-09-07

## 2018-09-10 ENCOUNTER — HOSPITAL ENCOUNTER (OUTPATIENT)
Dept: RADIOLOGY | Facility: HOSPITAL | Age: 63
Discharge: HOME | End: 2018-09-10
Attending: INTERNAL MEDICINE
Payer: MEDICAID

## 2018-09-10 DIAGNOSIS — I65.23 BILATERAL CAROTID ARTERY STENOSIS: ICD-10-CM

## 2018-09-10 DIAGNOSIS — I71.40 ABDOMINAL AORTIC ANEURYSM (AAA) WITHOUT RUPTURE: ICD-10-CM

## 2018-09-10 PROCEDURE — 76775 US EXAM ABDO BACK WALL LIM: CPT | Mod: TC

## 2018-09-10 PROCEDURE — 76775 US EXAM ABDO BACK WALL LIM: CPT | Mod: 26,,, | Performed by: RADIOLOGY

## 2018-09-11 ENCOUNTER — TELEPHONE (OUTPATIENT)
Dept: CARDIOLOGY | Facility: CLINIC | Age: 63
End: 2018-09-11

## 2018-09-11 DIAGNOSIS — I72.9 ANEURYSM: Primary | ICD-10-CM

## 2018-09-11 NOTE — TELEPHONE ENCOUNTER
----- Message from Millie Juarez MD sent at 9/10/2018  9:55 PM CDT -----  Aneurysm around 3.3 cm will reepat u/s in 6 months

## 2018-09-19 ENCOUNTER — TELEPHONE (OUTPATIENT)
Dept: URGENT CARE | Facility: CLINIC | Age: 63
End: 2018-09-19

## 2018-09-19 ENCOUNTER — OFFICE VISIT (OUTPATIENT)
Dept: URGENT CARE | Facility: CLINIC | Age: 63
End: 2018-09-19
Payer: MEDICAID

## 2018-09-19 VITALS
HEIGHT: 62 IN | OXYGEN SATURATION: 96 % | WEIGHT: 154.19 LBS | RESPIRATION RATE: 17 BRPM | BODY MASS INDEX: 28.37 KG/M2 | DIASTOLIC BLOOD PRESSURE: 72 MMHG | TEMPERATURE: 97 F | SYSTOLIC BLOOD PRESSURE: 136 MMHG | HEART RATE: 82 BPM

## 2018-09-19 DIAGNOSIS — H57.89 EYE IRRITATION: ICD-10-CM

## 2018-09-19 DIAGNOSIS — L02.33 CARBUNCLE AND FURUNCLE OF BUTTOCK: Primary | ICD-10-CM

## 2018-09-19 PROCEDURE — 99213 OFFICE O/P EST LOW 20 MIN: CPT | Mod: PBBFAC,PO | Performed by: NURSE PRACTITIONER

## 2018-09-19 PROCEDURE — 99214 OFFICE O/P EST MOD 30 MIN: CPT | Mod: S$PBB,,, | Performed by: NURSE PRACTITIONER

## 2018-09-19 PROCEDURE — 99999 PR PBB SHADOW E&M-EST. PATIENT-LVL III: CPT | Mod: PBBFAC,,, | Performed by: NURSE PRACTITIONER

## 2018-09-19 RX ORDER — CEPHALEXIN 500 MG/1
500 CAPSULE ORAL EVERY 8 HOURS
Qty: 30 CAPSULE | Refills: 0 | Status: SHIPPED | OUTPATIENT
Start: 2018-09-19 | End: 2018-09-29

## 2018-09-19 RX ORDER — HYDROXYZINE HYDROCHLORIDE 10 MG/1
10 TABLET, FILM COATED ORAL 3 TIMES DAILY PRN
Qty: 30 TABLET | Refills: 0 | Status: SHIPPED | OUTPATIENT
Start: 2018-09-19 | End: 2018-10-31

## 2018-09-19 NOTE — PROGRESS NOTES
Subjective:       Patient ID: Gemma Vick is a 63 y.o. female.    Chief Complaint: Rash    Rash   This is a new problem. The current episode started in the past 7 days. The problem is unchanged. The affected locations include the left buttock. The rash is characterized by redness, swelling, itchiness and draining. She was exposed to nothing. Pertinent negatives include no anorexia, congestion, diarrhea, facial edema, fatigue, fever, joint pain, nail changes, rhinorrhea, shortness of breath, sore throat or vomiting. Past treatments include nothing. There is no history of allergies, asthma, eczema or varicella.   Eye Problem    The right eye is affected. This is a new problem. The current episode started yesterday. The problem occurs rarely. The problem has been resolved. There was no injury mechanism. The patient is experiencing no pain. Associated symptoms include itching. Pertinent negatives include no blurred vision, eye discharge, double vision, eye redness, fever, foreign body sensation, nausea, photophobia, recent URI or vomiting. She has tried eye drops for the symptoms. The treatment provided significant relief.     Review of Systems   Constitutional: Negative for fatigue and fever.   HENT: Negative for congestion, rhinorrhea and sore throat.    Eyes: Positive for itching. Negative for blurred vision, double vision, photophobia, discharge and redness.   Respiratory: Negative for shortness of breath, wheezing and stridor.    Cardiovascular: Negative for chest pain, palpitations and leg swelling.   Gastrointestinal: Negative for anorexia, diarrhea, nausea and vomiting.   Genitourinary: Negative for difficulty urinating.   Musculoskeletal: Negative for joint pain.   Skin: Positive for rash. Negative for color change and nail changes.   Allergic/Immunologic: Negative for environmental allergies.   Neurological: Negative for dizziness and light-headedness.   Psychiatric/Behavioral: Negative for agitation.        Objective:      Physical Exam   Constitutional: She is oriented to person, place, and time. She appears well-developed and well-nourished.   Eyes: Conjunctivae, EOM and lids are normal.   Cardiovascular: Normal rate and normal heart sounds.   Pulmonary/Chest: Effort normal and breath sounds normal.   Neurological: She is alert and oriented to person, place, and time.   Skin: Skin is warm. Lesion noted.        Psychiatric: She has a normal mood and affect.   Vitals reviewed.      Assessment:       1. Carbuncle and furuncle of buttock    2. Eye irritation        Plan:         Gemma was seen today for rash.    Diagnoses and all orders for this visit:    Carbuncle and furuncle of buttock  -     cephALEXin (KEFLEX) 500 MG capsule; Take 1 capsule (500 mg total) by mouth every 8 (eight) hours. for 10 days    Eye irritation  Comments:  symptomatic treatment.    Keep wound clean and dry.  Apply warm compresses four times daily to the wound to help draw out infection.  Take antibiotic (keflex) as prescribed for full course of treatment.  Bactroban ointment three times day.  Keep wound covered when going outside or working.    If symptoms worsen or fail to improve with treatment, fever occurs, or if wound increases in size or fails to respond to antibiotic, see your Primary Care Provider or go to the nearest Emergency Room.

## 2018-09-19 NOTE — PATIENT INSTRUCTIONS
Understanding Carbuncles    A carbuncle is a painful boil under the skin. It happens when a group of hair follicles become infected. Follicles are the tiny holes from which hair grows out of your skin.  How to say it  Pauline   What causes carbuncles?  A carbuncle is caused by an infection with the bacteria Staphylococcus aureus. They are common on areas of the body where friction and sweat occur. They usually appear on the back of the neck, back, and thighs. This type of infection can also happen when the skin is injured, such as by a cut or bug bite.  The bacteria that causes carbuncles can spread easily from person to person. People at higher risk for boils are those with diabetes or a weak immune system. Drug users who use needles are also more likely to get them.  Symptoms of carbuncles  A carbuncle starts as a small painful bump. But it can grow quickly. It may become:  · Red  · Swollen  · Tender  Carbuncles may ooze pus. They may also cause fever and a general feeling of illness.  Treatment for carbuncles  A carbuncle may heal on its own in a few weeks. But the pus within it needs to come out first. Treatment options include:  · Warm compress. Putting a warm, wet washcloth on the boil will help the pus drain out. You should never try to pop a boil. That can cause the infection to spread.  · Surgical drainage. If the boil doesnt drain on its own, your healthcare provider may need to cut into it.  · Antibiotics. In some cases, oral antibiotics may be prescribed to fight the infection, especially if the carbuncle returns. You will likely have to take the medicine for 5 to 7 days. You may need to take it longer for a severe case.  · Good hygiene. Proper handwashing can prevent boils from spreading and coming back. Also wash things that have been in contact with the carbuncle in hot water. This includes items such as clothing and towels.  Complications of carbuncles  The main complication of a carbuncle  is the spreading of the infection. The bacteria can infect the heart and bone. It can also lead to septic shock, an infection of the entire body.  When to call your healthcare provider  Call your healthcare provider right away if you have any of these:  · Fever of 100.4°F (38°C) or higher, or as directed  · Redness, swelling, or fluid leaking from a carbuncle that gets worse  · Pain that gets worse  · Symptoms that dont get better, or get worse  · New symptoms   Date Last Reviewed: 5/1/2016 © 2000-2017 [employer]. [address] [place] [address]. All rights reserved. This information is not intended as a substitute for professional medical care. Always follow your healthcare professional's instructions.

## 2018-09-19 NOTE — TELEPHONE ENCOUNTER
----- Message from Angela Taveras sent at 9/19/2018  1:05 PM CDT -----  Contact: pt  The pt states she needs something called in for itching, the pt can be reached at [phone]///[id number]

## 2018-09-25 ENCOUNTER — OFFICE VISIT (OUTPATIENT)
Dept: FAMILY MEDICINE | Facility: CLINIC | Age: 63
End: 2018-09-25
Payer: MEDICAID

## 2018-09-25 VITALS
HEART RATE: 76 BPM | TEMPERATURE: 99 F | BODY MASS INDEX: 28.59 KG/M2 | WEIGHT: 156.31 LBS | DIASTOLIC BLOOD PRESSURE: 68 MMHG | SYSTOLIC BLOOD PRESSURE: 126 MMHG | OXYGEN SATURATION: 98 %

## 2018-09-25 DIAGNOSIS — G47.00 INSOMNIA, UNSPECIFIED TYPE: ICD-10-CM

## 2018-09-25 DIAGNOSIS — R41.3 MEMORY DIFFICULTIES: ICD-10-CM

## 2018-09-25 DIAGNOSIS — Z12.39 BREAST CANCER SCREENING: ICD-10-CM

## 2018-09-25 DIAGNOSIS — E78.2 MIXED HYPERLIPIDEMIA: ICD-10-CM

## 2018-09-25 DIAGNOSIS — F51.04 CHRONIC INSOMNIA: ICD-10-CM

## 2018-09-25 DIAGNOSIS — Z12.11 COLON CANCER SCREENING: ICD-10-CM

## 2018-09-25 DIAGNOSIS — I10 ESSENTIAL HYPERTENSION: ICD-10-CM

## 2018-09-25 DIAGNOSIS — F43.0 ACUTE STRESS REACTION: Primary | ICD-10-CM

## 2018-09-25 PROCEDURE — 99214 OFFICE O/P EST MOD 30 MIN: CPT | Mod: PBBFAC,PO | Performed by: FAMILY MEDICINE

## 2018-09-25 PROCEDURE — 99214 OFFICE O/P EST MOD 30 MIN: CPT | Mod: S$PBB,,, | Performed by: FAMILY MEDICINE

## 2018-09-25 PROCEDURE — 99999 PR PBB SHADOW E&M-EST. PATIENT-LVL IV: CPT | Mod: PBBFAC,,, | Performed by: FAMILY MEDICINE

## 2018-09-25 RX ORDER — FELODIPINE 5 MG/1
5 TABLET, EXTENDED RELEASE ORAL DAILY
Qty: 30 TABLET | Refills: 5 | Status: SHIPPED | OUTPATIENT
Start: 2018-09-25 | End: 2018-10-31

## 2018-09-25 RX ORDER — EZETIMIBE AND SIMVASTATIN 10; 40 MG/1; MG/1
1 TABLET ORAL NIGHTLY
Qty: 30 TABLET | Refills: 0 | Status: SHIPPED | OUTPATIENT
Start: 2018-09-25 | End: 2018-09-25 | Stop reason: SDUPTHER

## 2018-09-25 RX ORDER — EZETIMIBE AND SIMVASTATIN 10; 40 MG/1; MG/1
1 TABLET ORAL NIGHTLY
Qty: 30 TABLET | Refills: 0 | Status: SHIPPED | OUTPATIENT
Start: 2018-09-25 | End: 2018-12-27 | Stop reason: SDUPTHER

## 2018-09-25 RX ORDER — ZOLPIDEM TARTRATE 5 MG/1
5 TABLET ORAL NIGHTLY
Qty: 30 TABLET | Refills: 5 | Status: SHIPPED | OUTPATIENT
Start: 2018-09-25 | End: 2019-03-25 | Stop reason: SDUPTHER

## 2018-09-25 RX ORDER — MELOXICAM 7.5 MG/1
7.5 TABLET ORAL DAILY
Qty: 30 TABLET | Refills: 2 | Status: SHIPPED | OUTPATIENT
Start: 2018-09-25 | End: 2019-03-25 | Stop reason: SDUPTHER

## 2018-09-25 RX ORDER — PREDNISOLONE ACETATE 10 MG/ML
SUSPENSION/ DROPS OPHTHALMIC
COMMUNITY
Start: 2018-09-17 | End: 2018-10-31

## 2018-09-25 NOTE — PROGRESS NOTES
Chief Complaint:    Chief Complaint   Patient presents with    memory problems       History of Present Illness:    Patient presents today she says she has been having some memory troubles.    She says this has been going on when she had fever many months back.  She tends to forget her recent and past hold memory.  She says does not happen all the time it comes and goes.    On further questioning it turns out that she is under lot of stress.  She says she is trying to multi task things.  Further she tells me that she does not sleep well even with Ambien she cannot get more than 5 hr of sleep.    Does not drink alcohol.  Does not do any drugs.  She has had a mini-mental status examination and she scored 29 out of over 30      ROS:  Review of Systems   Constitutional: Negative for activity change, chills, fatigue, fever and unexpected weight change.   HENT: Negative for congestion, ear discharge, ear pain, hearing loss, postnasal drip and rhinorrhea.    Eyes: Negative for pain and visual disturbance.   Respiratory: Negative for cough, chest tightness and shortness of breath.    Cardiovascular: Negative for chest pain and palpitations.   Gastrointestinal: Negative for abdominal pain, diarrhea and vomiting.   Endocrine: Negative for heat intolerance.   Genitourinary: Negative for dysuria, flank pain, frequency and hematuria.   Musculoskeletal: Negative for back pain, gait problem and neck pain.   Skin: Negative for color change and rash.   Neurological: Negative for dizziness, tremors, seizures, numbness and headaches.   Psychiatric/Behavioral: Positive for sleep disturbance. Negative for agitation, hallucinations, self-injury and suicidal ideas. The patient is nervous/anxious.        Past Medical History:   Diagnosis Date    AAA (abdominal aortic aneurysm) 2/13/2014    Abdominal aneurysm     3    Acute coronary syndrome     Arthritis     BPPV (benign paroxysmal positional vertigo)     Carotid artery plaque      Carotid artery stenosis and occlusion 2014    Chronic back pain     COPD (chronic obstructive pulmonary disease)     Coronary artery disease     Emphysema lung     Hyperlipidemia     Hypertension     Myocardial infarction     x3    Neuropathy        Social History:  Social History     Socioeconomic History    Marital status:      Spouse name: None    Number of children: None    Years of education: None    Highest education level: None   Social Needs    Financial resource strain: None    Food insecurity - worry: None    Food insecurity - inability: None    Transportation needs - medical: None    Transportation needs - non-medical: None   Occupational History    None   Tobacco Use    Smoking status: Former Smoker     Packs/day: 0.50     Years: 44.00     Pack years: 22.00     Types: Cigarettes, Vaping w/o nicotine     Start date: 1970     Last attempt to quit: 2017     Years since quittin.4    Smokeless tobacco: Never Used   Substance and Sexual Activity    Alcohol use: No    Drug use: No    Sexual activity: Not Currently     Birth control/protection: None   Other Topics Concern    None   Social History Narrative    None       Family History:   family history includes Heart attacks under age 50 in her brother and father; Heart disease in her mother.    Health Maintenance   Topic Date Due    Zoster Vaccine  2015    Fecal Occult Blood Test (FOBT)/FitKit  2018    Mammogram  2018    Lipid Panel  2019    TETANUS VACCINE  11/15/2026    Hepatitis C Screening  Completed    Influenza Vaccine  Discontinued       Physical Exam:    Vital Signs  Temp: 98.5 °F (36.9 °C)  Temp src: Tympanic  Pulse: 76  SpO2: 98 %  BP: 126/68  BP Location: Left arm  Patient Position: Sitting  Pain Score:   3  Pain Loc: Back  Height and Weight  Weight: 70.9 kg (156 lb 4.9 oz)]    Body mass index is 28.59 kg/m².    Physical Exam   Constitutional: She is oriented to  person, place, and time. She appears well-developed.   HENT:   Mouth/Throat: Oropharynx is clear and moist.   Eyes: Conjunctivae are normal. Pupils are equal, round, and reactive to light.   Neck: Normal range of motion. Neck supple.   Cardiovascular: Normal rate, regular rhythm and normal heart sounds.   No murmur heard.  Pulmonary/Chest: Effort normal and breath sounds normal. No respiratory distress. She has no wheezes. She has no rales. She exhibits no tenderness.   Abdominal: Soft. She exhibits no distension and no mass. There is no tenderness. There is no guarding.   Musculoskeletal: She exhibits no edema or tenderness.   Lymphadenopathy:     She has no cervical adenopathy.   Neurological: She is alert and oriented to person, place, and time. She has normal reflexes.   Skin: Skin is warm and dry.   Psychiatric: She has a normal mood and affect. Her behavior is normal. Judgment and thought content normal.         Assessment:      ICD-10-CM ICD-9-CM   1. Acute stress reaction F43.0 308.9   2. Chronic insomnia F51.04 780.52   3. Memory difficulties R41.3 780.93   4. Insomnia, unspecified type G47.00 780.52   5. Essential hypertension I10 401.9   6. Mixed hyperlipidemia E78.2 272.2   7. Colon cancer screening Z12.11 V76.51   8. Breast cancer screening Z12.31 V76.10         Plan:     explained to patient that her memory difficulties are not related to an organic cause but rather related to the underlying stress, lack of sleep and possibly somewhat medication induced like from Ambien.  She does not want to stop Ambien at this time   Discussed stress reduction techniques.    Not interested in taking any medication.  Blood pressure is running okay today   Will schedule as mammogram  She is also due for screening colonoscopy  Please come back if the issues continue or gets worse.      Orders Placed This Encounter   Procedures    Mammo Digital Screening Bilat with CAD    Fecal Immunochemical Test (iFOBT)       Current  Outpatient Medications   Medication Sig Dispense Refill    albuterol (PROAIR HFA) 90 mcg/actuation inhaler Inhale 2 puffs into the lungs every 6 (six) hours as needed. 18 g 6    albuterol-ipratropium (DUO-NEB) 2.5 mg-0.5 mg/3 mL nebulizer solution USE ONE VIAL IN NEBULIZER TWICE DAILY 270 mL 11    aspirin 81 MG Chew Take 81 mg by mouth once daily.      cephALEXin (KEFLEX) 500 MG capsule Take 1 capsule (500 mg total) by mouth every 8 (eight) hours. for 10 days 30 capsule 0    clopidogrel (PLAVIX) 75 mg tablet Take 1 tablet (75 mg total) by mouth once daily. 30 tablet 5    COMBIVENT RESPIMAT  mcg/actuation inhaler Inhale 2 puffs into the lungs every 6 (six) hours as needed for Wheezing or Shortness of Breath. 4 g 11    DULoxetine (CYMBALTA) 60 MG capsule Take 1 capsule (60 mg total) by mouth once daily. 30 capsule 5    ergocalciferol, vitamin D2, (VITAMIN D ORAL) Take 800 Units by mouth once daily.       estrogens, conjugated, (PREMARIN) 0.3 MG tablet Take 1 tablet (0.3 mg total) by mouth once daily. 30 tablet 5    ezetimibe-simvastatin 10-40 mg (VYTORIN) 10-40 mg per tablet Take 1 tablet by mouth every evening. 30 tablet 0    felodipine (PLENDIL) 5 MG 24 hr tablet Take 1 tablet (5 mg total) by mouth once daily. 30 tablet 5    hydroCHLOROthiazide (HYDRODIURIL) 12.5 MG Tab Take 1 tablet (12.5 mg total) by mouth once daily. (Patient taking differently: Take 12.5 mg by mouth once daily. ) 30 tablet 5    hydrOXYzine HCl (ATARAX) 10 MG Tab Take 1 tablet (10 mg total) by mouth 3 (three) times daily as needed. 30 tablet 0    meclizine (ANTIVERT) 25 mg tablet Take 1 tablet (25 mg total) by mouth 3 (three) times daily as needed for Dizziness. 10 tablet 0    mometasone-formoterol (DULERA) 200-5 mcg/actuation inhaler INHALE 2 PUFFS 2 TIMES DAILY 13 g 5    OXYGEN-AIR DELIVERY SYSTEMS MISC 2 L by Misc.(Non-Drug; Combo Route) route every evening.      prednisoLONE acetate (PRED FORTE) 1 % DrpS       zolpidem  (AMBIEN) 5 MG Tab Take 1 tablet (5 mg total) by mouth nightly. 30 tablet 5    meloxicam (MOBIC) 7.5 MG tablet Take 1 tablet (7.5 mg total) by mouth once daily. 30 tablet 2     Current Facility-Administered Medications   Medication Dose Route Frequency Provider Last Rate Last Dose    methylPREDNISolone acetate injection 80 mg  80 mg Intramuscular Once Selwyn Schilling MD           Medications Discontinued During This Encounter   Medication Reason    felodipine (PLENDIL) 5 MG 24 hr tablet Duplicate Order    zolpidem (AMBIEN) 5 MG Tab Reorder    felodipine (PLENDIL) 5 MG 24 hr tablet Reorder    ezetimibe-simvastatin 10-40 mg (VYTORIN) 10-40 mg per tablet Reorder       Follow-up in about 6 months (around 3/25/2019).      Olga Altman MD

## 2018-09-26 ENCOUNTER — APPOINTMENT (OUTPATIENT)
Dept: LAB | Facility: HOSPITAL | Age: 63
End: 2018-09-26
Attending: FAMILY MEDICINE
Payer: MEDICAID

## 2018-10-22 RX ORDER — FELODIPINE 5 MG/1
TABLET, EXTENDED RELEASE ORAL
Qty: 30 TABLET | Refills: 3 | Status: SHIPPED | OUTPATIENT
Start: 2018-10-22 | End: 2019-03-25 | Stop reason: SDUPTHER

## 2018-10-29 ENCOUNTER — PATIENT MESSAGE (OUTPATIENT)
Dept: FAMILY MEDICINE | Facility: CLINIC | Age: 63
End: 2018-10-29

## 2018-10-29 ENCOUNTER — TELEPHONE (OUTPATIENT)
Dept: FAMILY MEDICINE | Facility: CLINIC | Age: 63
End: 2018-10-29

## 2018-10-29 DIAGNOSIS — L29.9 ITCHING: Primary | ICD-10-CM

## 2018-10-31 ENCOUNTER — OFFICE VISIT (OUTPATIENT)
Dept: FAMILY MEDICINE | Facility: CLINIC | Age: 63
End: 2018-10-31
Payer: MEDICAID

## 2018-10-31 ENCOUNTER — LAB VISIT (OUTPATIENT)
Dept: LAB | Facility: HOSPITAL | Age: 63
End: 2018-10-31
Attending: FAMILY MEDICINE
Payer: MEDICAID

## 2018-10-31 ENCOUNTER — PATIENT MESSAGE (OUTPATIENT)
Dept: FAMILY MEDICINE | Facility: CLINIC | Age: 63
End: 2018-10-31

## 2018-10-31 VITALS
HEART RATE: 83 BPM | TEMPERATURE: 98 F | OXYGEN SATURATION: 97 % | BODY MASS INDEX: 29.64 KG/M2 | HEIGHT: 62 IN | DIASTOLIC BLOOD PRESSURE: 70 MMHG | WEIGHT: 161.06 LBS | SYSTOLIC BLOOD PRESSURE: 150 MMHG

## 2018-10-31 DIAGNOSIS — L29.9 ITCHING: ICD-10-CM

## 2018-10-31 DIAGNOSIS — L29.9 ITCHING: Primary | ICD-10-CM

## 2018-10-31 DIAGNOSIS — I10 ESSENTIAL HYPERTENSION: ICD-10-CM

## 2018-10-31 LAB
ALBUMIN SERPL BCP-MCNC: 3.4 G/DL
ALP SERPL-CCNC: 73 U/L
ALT SERPL W/O P-5'-P-CCNC: 11 U/L
ANION GAP SERPL CALC-SCNC: 7 MMOL/L
AST SERPL-CCNC: 16 U/L
BILIRUB SERPL-MCNC: 0.3 MG/DL
BUN SERPL-MCNC: 18 MG/DL
CALCIUM SERPL-MCNC: 9.8 MG/DL
CHLORIDE SERPL-SCNC: 105 MMOL/L
CO2 SERPL-SCNC: 25 MMOL/L
CREAT SERPL-MCNC: 1 MG/DL
EST. GFR  (AFRICAN AMERICAN): >60 ML/MIN/1.73 M^2
EST. GFR  (NON AFRICAN AMERICAN): >60 ML/MIN/1.73 M^2
GLUCOSE SERPL-MCNC: 111 MG/DL
POTASSIUM SERPL-SCNC: 4.2 MMOL/L
PROT SERPL-MCNC: 7.2 G/DL
SODIUM SERPL-SCNC: 137 MMOL/L

## 2018-10-31 PROCEDURE — 36415 COLL VENOUS BLD VENIPUNCTURE: CPT | Mod: PO

## 2018-10-31 PROCEDURE — 99213 OFFICE O/P EST LOW 20 MIN: CPT | Mod: PBBFAC,PO | Performed by: FAMILY MEDICINE

## 2018-10-31 PROCEDURE — 99214 OFFICE O/P EST MOD 30 MIN: CPT | Mod: S$PBB,,, | Performed by: FAMILY MEDICINE

## 2018-10-31 PROCEDURE — 80053 COMPREHEN METABOLIC PANEL: CPT

## 2018-10-31 PROCEDURE — 99999 PR PBB SHADOW E&M-EST. PATIENT-LVL III: CPT | Mod: PBBFAC,,, | Performed by: FAMILY MEDICINE

## 2018-10-31 RX ORDER — FELODIPINE 10 MG/1
10 TABLET, EXTENDED RELEASE ORAL DAILY
Qty: 30 TABLET | Refills: 6 | Status: SHIPPED | OUTPATIENT
Start: 2018-10-31 | End: 2019-03-25

## 2018-10-31 RX ORDER — METHYLPREDNISOLONE 4 MG/1
TABLET ORAL
Qty: 1 PACKAGE | Refills: 0 | Status: SHIPPED | OUTPATIENT
Start: 2018-10-31 | End: 2019-02-24

## 2018-10-31 NOTE — PROGRESS NOTES
Chief Complaint:    Chief Complaint   Patient presents with    Forearm and wrist bilaterally itching       History of Present Illness:    She presents today complaining of itching bilateral forearms mostly on the elbow and the wrist area no other symptoms.  She does not HL severe she has no skin lesion she has not started any new medication.  She has been using hydrocortisone cream and Vistaril without any help.    Blood pressure is elevated on recheck it continues to high.  She says numbers at home also higher.    ROS:  Review of Systems   Constitutional: Negative for activity change, chills, fatigue, fever and unexpected weight change.   HENT: Negative for congestion, ear discharge, ear pain, hearing loss, postnasal drip and rhinorrhea.    Eyes: Negative for pain and visual disturbance.   Respiratory: Negative for cough, chest tightness and shortness of breath.    Cardiovascular: Negative for chest pain and palpitations.   Gastrointestinal: Negative for abdominal pain, diarrhea and vomiting.   Endocrine: Negative for heat intolerance.   Genitourinary: Negative for dysuria, flank pain, frequency and hematuria.   Musculoskeletal: Negative for back pain, gait problem and neck pain.   Skin: Negative for color change and rash.   Neurological: Negative for dizziness, tremors, seizures, numbness and headaches.   Psychiatric/Behavioral: Negative for agitation, hallucinations, self-injury, sleep disturbance and suicidal ideas. The patient is not nervous/anxious.        Past Medical History:   Diagnosis Date    AAA (abdominal aortic aneurysm) 2/13/2014    Abdominal aneurysm     3    Acute coronary syndrome     Arthritis     BPPV (benign paroxysmal positional vertigo)     Carotid artery plaque     Carotid artery stenosis and occlusion 2/13/2014    Chronic back pain     COPD (chronic obstructive pulmonary disease)     Coronary artery disease     Emphysema lung     Hyperlipidemia     Hypertension      "Myocardial infarction     x3    Neuropathy        Social History:  Social History     Socioeconomic History    Marital status:      Spouse name: None    Number of children: None    Years of education: None    Highest education level: None   Social Needs    Financial resource strain: None    Food insecurity - worry: None    Food insecurity - inability: None    Transportation needs - medical: None    Transportation needs - non-medical: None   Occupational History    None   Tobacco Use    Smoking status: Former Smoker     Packs/day: 0.50     Years: 44.00     Pack years: 22.00     Types: Cigarettes, Vaping w/o nicotine     Start date: 1970     Last attempt to quit: 2017     Years since quittin.5    Smokeless tobacco: Never Used   Substance and Sexual Activity    Alcohol use: No    Drug use: No    Sexual activity: Not Currently     Birth control/protection: None   Other Topics Concern    None   Social History Narrative    None       Family History:   family history includes Heart attacks under age 50 in her brother and father; Heart disease in her mother.    Health Maintenance   Topic Date Due    Zoster Vaccine  2015    Mammogram  2018    Lipid Panel  2019    Fecal Occult Blood Test (FOBT)/FitKit  2019    TETANUS VACCINE  11/15/2026    Hepatitis C Screening  Completed    Influenza Vaccine  Discontinued       Physical Exam:    Vital Signs  Temp: 98.1 °F (36.7 °C)  Temp src: Tympanic  Pulse: 83  SpO2: 97 %  BP: (!) 150/70  BP Location: Left arm  Patient Position: Sitting  Pain Score: 0-No pain  Height and Weight  Height: 5' 1.5" (156.2 cm)  Weight: 73.1 kg (161 lb 0.7 oz)  BSA (Calculated - sq m): 1.78 sq meters  BMI (Calculated): 30  Weight in (lb) to have BMI = 25: 134.2]    Body mass index is 29.94 kg/m².    Physical Exam   Constitutional: She is oriented to person, place, and time. She appears well-developed.   HENT:   Mouth/Throat: Oropharynx is clear " and moist.   Eyes: Conjunctivae are normal. Pupils are equal, round, and reactive to light.   Neck: Normal range of motion. Neck supple.   Cardiovascular: Normal rate, regular rhythm and normal heart sounds.   No murmur heard.  Pulmonary/Chest: Effort normal and breath sounds normal. No respiratory distress. She has no wheezes. She has no rales. She exhibits no tenderness.   Abdominal: Soft. She exhibits no distension and no mass. There is no tenderness. There is no guarding.   Musculoskeletal: She exhibits no edema or tenderness.        Arms:  Lymphadenopathy:     She has no cervical adenopathy.   Neurological: She is alert and oriented to person, place, and time. She has normal reflexes.   Skin: Skin is warm and dry.   Psychiatric: She has a normal mood and affect. Her behavior is normal. Judgment and thought content normal.         Assessment:      ICD-10-CM ICD-9-CM   1. Itching L29.9 698.9   2. Essential hypertension I10 401.9         Plan:    Bilateral forearm itching unclear etiology refer to Dermatology  Hypertension poor control increase Plendil 10 mg please monitor blood pressure readings 2 times a day bring us the numbers follow-up in 2-3 weeks        Orders Placed This Encounter   Procedures    Comprehensive metabolic panel    Ambulatory referral to Dermatology       Current Outpatient Medications   Medication Sig Dispense Refill    albuterol (PROAIR HFA) 90 mcg/actuation inhaler Inhale 2 puffs into the lungs every 6 (six) hours as needed. 18 g 6    albuterol-ipratropium (DUO-NEB) 2.5 mg-0.5 mg/3 mL nebulizer solution USE ONE VIAL IN NEBULIZER TWICE DAILY 270 mL 11    aspirin 81 MG Chew Take 81 mg by mouth once daily.      clopidogrel (PLAVIX) 75 mg tablet Take 1 tablet (75 mg total) by mouth once daily. 30 tablet 5    COMBIVENT RESPIMAT  mcg/actuation inhaler Inhale 2 puffs into the lungs every 6 (six) hours as needed for Wheezing or Shortness of Breath. 4 g 11    DULoxetine (CYMBALTA) 60  MG capsule Take 1 capsule (60 mg total) by mouth once daily. 30 capsule 5    ergocalciferol, vitamin D2, (VITAMIN D ORAL) Take 800 Units by mouth once daily.       estrogens, conjugated, (PREMARIN) 0.3 MG tablet Take 1 tablet (0.3 mg total) by mouth once daily. 30 tablet 5    ezetimibe-simvastatin 10-40 mg (VYTORIN) 10-40 mg per tablet Take 1 tablet by mouth every evening. 30 tablet 0    felodipine (PLENDIL) 10 MG 24 hr tablet Take 1 tablet (10 mg total) by mouth once daily. 30 tablet 6    felodipine (PLENDIL) 5 MG 24 hr tablet TAKE 1 TABLET BY MOUTH DAILY 30 tablet 3    hydroCHLOROthiazide (HYDRODIURIL) 12.5 MG Tab Take 1 tablet (12.5 mg total) by mouth once daily. (Patient taking differently: Take 12.5 mg by mouth once daily. ) 30 tablet 5    meclizine (ANTIVERT) 25 mg tablet Take 1 tablet (25 mg total) by mouth 3 (three) times daily as needed for Dizziness. 10 tablet 0    meloxicam (MOBIC) 7.5 MG tablet Take 1 tablet (7.5 mg total) by mouth once daily. 30 tablet 2    mometasone-formoterol (DULERA) 200-5 mcg/actuation inhaler INHALE 2 PUFFS 2 TIMES DAILY 13 g 5    OXYGEN-AIR DELIVERY SYSTEMS MISC 2 L by Misc.(Non-Drug; Combo Route) route every evening.      zolpidem (AMBIEN) 5 MG Tab Take 1 tablet (5 mg total) by mouth nightly. 30 tablet 5    methylPREDNISolone (MEDROL DOSEPACK) 4 mg tablet use as directed 1 Package 0     Current Facility-Administered Medications   Medication Dose Route Frequency Provider Last Rate Last Dose    methylPREDNISolone acetate injection 80 mg  80 mg Intramuscular Once Selwyn Schilling MD           Medications Discontinued During This Encounter   Medication Reason    hydrOXYzine HCl (ATARAX) 10 MG Tab Patient no longer taking    prednisoLONE acetate (PRED FORTE) 1 % DrpS Patient no longer taking    felodipine (PLENDIL) 5 MG 24 hr tablet        Follow-up in about 2 weeks (around 11/14/2018).      Olga Altman MD

## 2018-11-02 ENCOUNTER — PATIENT MESSAGE (OUTPATIENT)
Dept: FAMILY MEDICINE | Facility: CLINIC | Age: 63
End: 2018-11-02

## 2018-11-05 ENCOUNTER — TELEPHONE (OUTPATIENT)
Dept: FAMILY MEDICINE | Facility: CLINIC | Age: 63
End: 2018-11-05

## 2018-11-05 DIAGNOSIS — L29.9 ITCHING: Primary | ICD-10-CM

## 2018-11-05 NOTE — TELEPHONE ENCOUNTER
----- Message from Aiyana Elizondo sent at 11/5/2018  9:16 AM CST -----  Contact: [facility]  FYI: states that no dermatology autho is in system. pls call member when done

## 2018-11-06 ENCOUNTER — TELEPHONE (OUTPATIENT)
Dept: FAMILY MEDICINE | Facility: CLINIC | Age: 63
End: 2018-11-06

## 2018-11-15 ENCOUNTER — PATIENT MESSAGE (OUTPATIENT)
Dept: FAMILY MEDICINE | Facility: CLINIC | Age: 63
End: 2018-11-15

## 2018-11-20 DIAGNOSIS — Z78.0 MENOPAUSE: ICD-10-CM

## 2018-11-20 DIAGNOSIS — F32.A DEPRESSION, UNSPECIFIED DEPRESSION TYPE: ICD-10-CM

## 2018-11-20 RX ORDER — ESTROGENS, CONJUGATED 0.3 MG/1
TABLET, FILM COATED ORAL
Qty: 30 TABLET | Refills: 0 | Status: CANCELLED | OUTPATIENT
Start: 2018-11-20

## 2018-11-20 RX ORDER — ESTROGENS, CONJUGATED 0.3 MG/1
TABLET, FILM COATED ORAL
Qty: 30 TABLET | Refills: 0 | OUTPATIENT
Start: 2018-11-20

## 2018-11-20 RX ORDER — DULOXETIN HYDROCHLORIDE 60 MG/1
60 CAPSULE, DELAYED RELEASE ORAL DAILY
Qty: 30 CAPSULE | Refills: 5 | Status: SHIPPED | OUTPATIENT
Start: 2018-11-20 | End: 2019-02-02 | Stop reason: SDUPTHER

## 2018-11-20 RX ORDER — DULOXETIN HYDROCHLORIDE 60 MG/1
CAPSULE, DELAYED RELEASE ORAL
Qty: 30 CAPSULE | Refills: 0 | Status: SHIPPED | OUTPATIENT
Start: 2018-11-20 | End: 2018-11-20 | Stop reason: SDUPTHER

## 2018-12-26 ENCOUNTER — TELEPHONE (OUTPATIENT)
Dept: FAMILY MEDICINE | Facility: CLINIC | Age: 63
End: 2018-12-26

## 2018-12-26 ENCOUNTER — PATIENT MESSAGE (OUTPATIENT)
Dept: FAMILY MEDICINE | Facility: CLINIC | Age: 63
End: 2018-12-26

## 2018-12-26 DIAGNOSIS — L29.9 ITCHING: Primary | ICD-10-CM

## 2018-12-26 DIAGNOSIS — Q82.9 SKIN ANOMALY: ICD-10-CM

## 2018-12-27 DIAGNOSIS — E78.2 MIXED HYPERLIPIDEMIA: ICD-10-CM

## 2018-12-27 RX ORDER — EZETIMIBE AND SIMVASTATIN 10; 40 MG/1; MG/1
1 TABLET ORAL NIGHTLY
Qty: 30 TABLET | Refills: 1 | Status: SHIPPED | OUTPATIENT
Start: 2018-12-27 | End: 2019-03-25 | Stop reason: SDUPTHER

## 2019-01-02 DIAGNOSIS — I25.10 CORONARY ARTERY DISEASE INVOLVING NATIVE CORONARY ARTERY OF NATIVE HEART WITHOUT ANGINA PECTORIS: ICD-10-CM

## 2019-01-02 RX ORDER — CLOPIDOGREL BISULFATE 75 MG/1
TABLET ORAL
Qty: 30 TABLET | Refills: 0 | Status: SHIPPED | OUTPATIENT
Start: 2019-01-02 | End: 2019-02-24

## 2019-01-04 ENCOUNTER — PATIENT MESSAGE (OUTPATIENT)
Dept: CARDIOLOGY | Facility: CLINIC | Age: 64
End: 2019-01-04

## 2019-01-05 ENCOUNTER — HOSPITAL ENCOUNTER (OUTPATIENT)
Dept: RADIOLOGY | Facility: HOSPITAL | Age: 64
Discharge: HOME | End: 2019-01-05
Attending: FAMILY MEDICINE
Payer: MEDICAID

## 2019-01-05 VITALS — BODY MASS INDEX: 30.4 KG/M2 | HEIGHT: 61 IN | WEIGHT: 161 LBS

## 2019-01-05 DIAGNOSIS — Z12.39 BREAST CANCER SCREENING: ICD-10-CM

## 2019-01-05 PROCEDURE — 77063 MAMMO DIGITAL SCREENING BILAT WITH TOMOSYNTHESIS_CAD: ICD-10-PCS | Mod: 26,,, | Performed by: RADIOLOGY

## 2019-01-05 PROCEDURE — 77067 SCR MAMMO BI INCL CAD: CPT | Mod: 26,,, | Performed by: RADIOLOGY

## 2019-01-05 PROCEDURE — 77063 BREAST TOMOSYNTHESIS BI: CPT | Mod: 26,,, | Performed by: RADIOLOGY

## 2019-01-05 PROCEDURE — 77067 MAMMO DIGITAL SCREENING BILAT WITH TOMOSYNTHESIS_CAD: ICD-10-PCS | Mod: 26,,, | Performed by: RADIOLOGY

## 2019-01-05 PROCEDURE — 77067 SCR MAMMO BI INCL CAD: CPT | Mod: TC

## 2019-01-28 ENCOUNTER — TELEPHONE (OUTPATIENT)
Dept: FAMILY MEDICINE | Facility: CLINIC | Age: 64
End: 2019-01-28

## 2019-01-28 NOTE — TELEPHONE ENCOUNTER
Received prior authorization approval from insurance for Ezetimibe-Simvastatin 10-40 mg.    Pharmacy notified.

## 2019-02-02 DIAGNOSIS — F32.A DEPRESSION, UNSPECIFIED DEPRESSION TYPE: ICD-10-CM

## 2019-02-02 RX ORDER — DULOXETIN HYDROCHLORIDE 60 MG/1
CAPSULE, DELAYED RELEASE ORAL
Qty: 30 CAPSULE | Refills: 0 | Status: SHIPPED | OUTPATIENT
Start: 2019-02-02 | End: 2019-02-04 | Stop reason: SDUPTHER

## 2019-02-04 DIAGNOSIS — F32.A DEPRESSION, UNSPECIFIED DEPRESSION TYPE: ICD-10-CM

## 2019-02-04 RX ORDER — DULOXETIN HYDROCHLORIDE 60 MG/1
60 CAPSULE, DELAYED RELEASE ORAL DAILY
Qty: 30 CAPSULE | Refills: 3 | Status: SHIPPED | OUTPATIENT
Start: 2019-02-04 | End: 2019-03-25 | Stop reason: SDUPTHER

## 2019-02-07 ENCOUNTER — LAB VISIT (OUTPATIENT)
Dept: LAB | Facility: HOSPITAL | Age: 64
End: 2019-02-07
Attending: INTERNAL MEDICINE
Payer: MEDICAID

## 2019-02-07 DIAGNOSIS — I71.40 ABDOMINAL AORTIC ANEURYSM (AAA) WITHOUT RUPTURE: ICD-10-CM

## 2019-02-07 DIAGNOSIS — I65.23 BILATERAL CAROTID ARTERY STENOSIS: ICD-10-CM

## 2019-02-07 LAB
ALBUMIN SERPL BCP-MCNC: 3.4 G/DL
ALP SERPL-CCNC: 72 U/L
ALT SERPL W/O P-5'-P-CCNC: 14 U/L
ANION GAP SERPL CALC-SCNC: 9 MMOL/L
AST SERPL-CCNC: 18 U/L
BILIRUB SERPL-MCNC: 0.4 MG/DL
BUN SERPL-MCNC: 13 MG/DL
CALCIUM SERPL-MCNC: 9.7 MG/DL
CHLORIDE SERPL-SCNC: 105 MMOL/L
CHOLEST SERPL-MCNC: 167 MG/DL
CHOLEST/HDLC SERPL: 2.3 {RATIO}
CO2 SERPL-SCNC: 27 MMOL/L
CREAT SERPL-MCNC: 0.9 MG/DL
EST. GFR  (AFRICAN AMERICAN): >60 ML/MIN/1.73 M^2
EST. GFR  (NON AFRICAN AMERICAN): >60 ML/MIN/1.73 M^2
GLUCOSE SERPL-MCNC: 92 MG/DL
HDLC SERPL-MCNC: 73 MG/DL
HDLC SERPL: 43.7 %
LDLC SERPL CALC-MCNC: 75.8 MG/DL
NONHDLC SERPL-MCNC: 94 MG/DL
POTASSIUM SERPL-SCNC: 4.2 MMOL/L
PROT SERPL-MCNC: 7 G/DL
SODIUM SERPL-SCNC: 141 MMOL/L
TRIGL SERPL-MCNC: 91 MG/DL

## 2019-02-07 PROCEDURE — 80061 LIPID PANEL: CPT

## 2019-02-07 PROCEDURE — 36415 COLL VENOUS BLD VENIPUNCTURE: CPT | Mod: PO

## 2019-02-07 PROCEDURE — 80053 COMPREHEN METABOLIC PANEL: CPT

## 2019-02-24 ENCOUNTER — OFFICE VISIT (OUTPATIENT)
Dept: URGENT CARE | Facility: CLINIC | Age: 64
End: 2019-02-24
Payer: MEDICAID

## 2019-02-24 VITALS
SYSTOLIC BLOOD PRESSURE: 150 MMHG | DIASTOLIC BLOOD PRESSURE: 82 MMHG | BODY MASS INDEX: 29.86 KG/M2 | OXYGEN SATURATION: 98 % | HEART RATE: 88 BPM | WEIGHT: 162.25 LBS | HEIGHT: 62 IN | TEMPERATURE: 98 F | RESPIRATION RATE: 14 BRPM

## 2019-02-24 DIAGNOSIS — L03.012 CELLULITIS OF FINGER OF LEFT HAND: Primary | ICD-10-CM

## 2019-02-24 DIAGNOSIS — M10.00 ACUTE IDIOPATHIC GOUT, UNSPECIFIED SITE: ICD-10-CM

## 2019-02-24 PROCEDURE — 99215 OFFICE O/P EST HI 40 MIN: CPT | Mod: PBBFAC,PO | Performed by: NURSE PRACTITIONER

## 2019-02-24 PROCEDURE — 99214 OFFICE O/P EST MOD 30 MIN: CPT | Mod: S$PBB,,, | Performed by: NURSE PRACTITIONER

## 2019-02-24 PROCEDURE — 99999 PR PBB SHADOW E&M-EST. PATIENT-LVL V: CPT | Mod: PBBFAC,,, | Performed by: NURSE PRACTITIONER

## 2019-02-24 PROCEDURE — 99214 PR OFFICE/OUTPT VISIT, EST, LEVL IV, 30-39 MIN: ICD-10-PCS | Mod: S$PBB,,, | Performed by: NURSE PRACTITIONER

## 2019-02-24 PROCEDURE — 99999 PR PBB SHADOW E&M-EST. PATIENT-LVL V: ICD-10-PCS | Mod: PBBFAC,,, | Performed by: NURSE PRACTITIONER

## 2019-02-24 RX ORDER — CEPHALEXIN 500 MG/1
500 CAPSULE ORAL EVERY 12 HOURS
Qty: 20 CAPSULE | Refills: 0 | Status: SHIPPED | OUTPATIENT
Start: 2019-02-24 | End: 2019-03-06

## 2019-02-24 RX ORDER — PREDNISONE 20 MG/1
20 TABLET ORAL 2 TIMES DAILY
Qty: 10 TABLET | Refills: 0 | Status: SHIPPED | OUTPATIENT
Start: 2019-02-24 | End: 2019-03-01

## 2019-02-24 NOTE — PATIENT INSTRUCTIONS
PLAN:   Advise use of cool compresses and elevate  Advise increase oral fluids  Meds: Keflex / no refills  Meds: prednisone hold/ no refills   Advise follow up with PCP  Advise go to ER if symptoms worsen or fail to improve with treatment.  AVS provided and reviewed with patient including supportive care, follow up, and red flag symptoms.   Patient verbalizes understanding and agrees with treatment plan. Discharged from Urgent Care in stable condition.

## 2019-02-24 NOTE — PROGRESS NOTES
CHIEF COMPLAINT/REASON FOR VISIT: Reports red thumb    HISTORY OF PRESENT ILLNESS:  64-year-old female complains of redness and tenderness to left thumb with redness from thumb to wrist onset 1-2 days ago.  Patient denies any specific injury, insect,  puncture wound or trauma.  Admits has a history of arthritis and concern with possible gout.  Discussed with patient possible gout vs cellulitis.  Patient denies chest pain, shortness of breath, nausea, vomiting, diarrhea and no fever with body aches.       Past Medical History:   Diagnosis Date    AAA (abdominal aortic aneurysm) 2/13/2014    Abdominal aneurysm     3    Acute coronary syndrome     Arthritis     BPPV (benign paroxysmal positional vertigo)     Carotid artery plaque     Carotid artery stenosis and occlusion 2/13/2014    Chronic back pain     COPD (chronic obstructive pulmonary disease)     Coronary artery disease     Emphysema lung     Hyperlipidemia     Hypertension     Myocardial infarction     x3    Neuropathy        Past Surgical History:   Procedure Laterality Date    CARDIAC CATHETERIZATION      CORONARY ANGIOPLASTY      HYSTERECTOMY  1988            Family History   Problem Relation Age of Onset    Heart disease Mother     Heart attacks under age 50 Father     Heart attacks under age 50 Brother             Social History     Socioeconomic History    Marital status:      Spouse name: Not on file    Number of children: Not on file    Years of education: Not on file    Highest education level: Not on file   Social Needs    Financial resource strain: Not on file    Food insecurity - worry: Not on file    Food insecurity - inability: Not on file    Transportation needs - medical: Not on file    Transportation needs - non-medical: Not on file   Occupational History    Not on file   Tobacco Use    Smoking status: Former Smoker     Packs/day: 0.50     Years: 44.00     Pack years: 22.00     Types: Cigarettes, Vaping  w/o nicotine     Start date: 1970     Last attempt to quit: 2017     Years since quittin.8    Smokeless tobacco: Never Used   Substance and Sexual Activity    Alcohol use: No    Drug use: No    Sexual activity: Not Currently     Birth control/protection: None   Other Topics Concern    Not on file   Social History Narrative    Not on file       ROS:  GENERAL: No fever, chills, fatigability or weight loss.  SKIN: Reports red skin left thumb .  HEENT: No headaches or recent head trauma.  Denies ear pain, discharge or vertigo. No loss of smell, no epistaxis or postnasal drip. No hoarseness or change in voice.   CHEST: Denies cyanosis, wheezing, cough and sputum production.  CARDIOVASCULAR: Denies chest pain, shortness of breath  MUSCULOSKELETAL: redness left thumb  NEUROLOGIC: No history of seizures, paralysis, alteration of gait or coordination.  PSYCHIATRIC: Denies mood swings, depression or suicidal thoughts.    PE:   APPEARANCE: Well nourished, well developed, in mild distress.   V/S: Reviewed.  SKIN:  Left palmar aspect of thumb/hand with red streak to of left wrist, hot to touch, minimal tenderness on palpation, with minimal soft tissue swelling, left thumb, hand and wrist with full range of motion, no puncture wounds, abrasions or lacerations   CHEST:  No respiratory symptoms  CARDIOVASCULAR: Regular rate and rhythm.  MUSCULOSKELETAL: Left palmar aspect of thumb/and with red streak to of left wrist, hot to touch, minimal tenderness on palpation, with minimal soft tissue swelling, left thumb, hand and wrist with full range of motion, no puncture wounds, abrasions or lacerations   NEUROLOGIC: No sensory deficits. Gait & Posture: Normal. No cerebellar signs.  MENTAL STATUS: Patient alert, oriented x 3 & conversant.    PLAN:   Advise use of cool compresses and elevate  Advise increase oral fluids  Meds: Keflex / no refills  Meds: prednisone hold/ no refills   Advise follow up with PCP  Advise go to  ER if symptoms worsen or fail to improve with treatment.  AVS provided and reviewed with patient including supportive care, follow up, and red flag symptoms.   Patient verbalizes understanding and agrees with treatment plan. Discharged from Urgent Care in stable condition.    DIAGNOSIS:  Cellulitis vs Gout  Left thumb/ hand pain

## 2019-02-27 NOTE — PROGRESS NOTES
Subjective:   Patient ID:  Gemma Vick is a 64 y.o. female who presents for evaluation of No chief complaint on file.      HPI     Mrs. Vick is a 64 year old female who presents to clinic for 6 month follow up.   Her current medical conditions include AAA without rupture (3.3 cm), CAD, Carotid artery disease, COPD, HTN, HLP, Gout, prediabetes. She returns today and states she is doing ok. Denies chest pain or anginal equivalents. No shortness of breath, CASTANEDA or palpitations. She tries to stay physically active during the day. Denies regular exercise. She does not think that she snores at night but does have daytime fatigue. Declines sleep apnea evaluation today. She does endorse LE edema during the day but is resolved in AM. She is not wearing compression socks daily. She has stopped smoking but is vaping almost every day. Denies orthopnea, PND or abdominal bloating. Reports compliance with medications and dietary restrictions. No CNS complaints to suggest TIA or CVA today. NO signs of abnormal bleeding on ASA.     She starting taking her Toprol from previous Rx a few days ago due to elevated BP on home log. BP has been running 130-148/. She is using wrist BP cuff. BP well controlled today in office.     Repeat Abdominal U/S scheduled for next month.     Past Medical History:   Diagnosis Date    AAA (abdominal aortic aneurysm) 2/13/2014    Abdominal aneurysm     3    Acute coronary syndrome     Arthritis     BPPV (benign paroxysmal positional vertigo)     Carotid artery plaque     Carotid artery stenosis and occlusion 2/13/2014    Chronic back pain     COPD (chronic obstructive pulmonary disease)     Coronary artery disease     Emphysema lung     Hyperlipidemia     Hypertension     Myocardial infarction     x3    Neuropathy        Past Surgical History:   Procedure Laterality Date    CARDIAC CATHETERIZATION      CORONARY ANGIOPLASTY      HYSTERECTOMY  1988       Social History      Tobacco Use    Smoking status: Former Smoker     Packs/day: 0.50     Years: 44.00     Pack years: 22.00     Types: Cigarettes, Vaping w/o nicotine     Start date: 1970     Last attempt to quit: 2017     Years since quittin.8    Smokeless tobacco: Never Used   Substance Use Topics    Alcohol use: No    Drug use: No       Family History   Problem Relation Age of Onset    Heart disease Mother     Heart attacks under age 50 Father     Heart attacks under age 50 Brother      Wt Readings from Last 3 Encounters:   19 73.6 kg (162 lb 4.1 oz)   19 73 kg (161 lb)   10/31/18 73.1 kg (161 lb 0.7 oz)     Temp Readings from Last 3 Encounters:   19 98.2 °F (36.8 °C) (Tympanic)   10/31/18 98.1 °F (36.7 °C) (Tympanic)   18 98.5 °F (36.9 °C) (Tympanic)     BP Readings from Last 3 Encounters:   19 (!) 150/82   10/31/18 (!) 150/70   18 126/68     Pulse Readings from Last 3 Encounters:   19 88   10/31/18 83   18 76     Current Outpatient Medications on File Prior to Visit   Medication Sig Dispense Refill    albuterol (PROAIR HFA) 90 mcg/actuation inhaler Inhale 2 puffs into the lungs every 6 (six) hours as needed. 18 g 6    albuterol-ipratropium (DUO-NEB) 2.5 mg-0.5 mg/3 mL nebulizer solution USE ONE VIAL IN NEBULIZER TWICE DAILY 270 mL 11    aspirin 81 MG Chew Take 81 mg by mouth once daily.      cephALEXin (KEFLEX) 500 MG capsule Take 1 capsule (500 mg total) by mouth every 12 (twelve) hours. for 10 days 20 capsule 0    clopidogrel (PLAVIX) 75 mg tablet Take 1 tablet (75 mg total) by mouth once daily. 30 tablet 5    COMBIVENT RESPIMAT  mcg/actuation inhaler Inhale 2 puffs into the lungs every 6 (six) hours as needed for Wheezing or Shortness of Breath. 4 g 11    DULoxetine (CYMBALTA) 60 MG capsule Take 1 capsule (60 mg total) by mouth once daily. 30 capsule 3    ergocalciferol, vitamin D2, (VITAMIN D ORAL) Take 800 Units by mouth once daily.        estrogens, conjugated, (PREMARIN) 0.3 MG tablet Take 1 tablet (0.3 mg total) by mouth once daily. 30 tablet 5    ezetimibe-simvastatin 10-40 mg (VYTORIN) 10-40 mg per tablet TAKE 1 TABLET BY MOUTH EVERY EVENING 30 tablet 1    felodipine (PLENDIL) 10 MG 24 hr tablet Take 1 tablet (10 mg total) by mouth once daily. 30 tablet 6    felodipine (PLENDIL) 5 MG 24 hr tablet TAKE 1 TABLET BY MOUTH DAILY 30 tablet 3    hydroCHLOROthiazide (HYDRODIURIL) 12.5 MG Tab Take 1 tablet (12.5 mg total) by mouth once daily. (Patient taking differently: Take 12.5 mg by mouth once daily. ) 30 tablet 5    meloxicam (MOBIC) 7.5 MG tablet Take 1 tablet (7.5 mg total) by mouth once daily. 30 tablet 2    mometasone-formoterol (DULERA) 200-5 mcg/actuation inhaler INHALE 2 PUFFS 2 TIMES DAILY 13 g 5    OXYGEN-AIR DELIVERY SYSTEMS MISC 2 L by Misc.(Non-Drug; Combo Route) route every evening.      predniSONE (DELTASONE) 20 MG tablet Take 1 tablet (20 mg total) by mouth 2 (two) times daily. for 5 days 10 tablet 0    zolpidem (AMBIEN) 5 MG Tab Take 1 tablet (5 mg total) by mouth nightly. 30 tablet 5     No current facility-administered medications on file prior to visit.        Review of Systems   Constitution: Negative for weakness and malaise/fatigue.   HENT: Negative for hearing loss and hoarse voice.    Eyes: Negative for blurred vision and visual disturbance.   Cardiovascular: Positive for leg swelling. Negative for chest pain, claudication, dyspnea on exertion, irregular heartbeat, near-syncope, orthopnea, palpitations, paroxysmal nocturnal dyspnea and syncope.   Respiratory: Negative for cough, hemoptysis, shortness of breath, sleep disturbances due to breathing, snoring and wheezing.    Endocrine: Negative for cold intolerance and heat intolerance.   Hematologic/Lymphatic: Bruises/bleeds easily (on ASA and Plavix).   Skin: Negative for color change, dry skin and nail changes.   Musculoskeletal: Positive for arthritis. Negative for  "back pain, joint pain and myalgias.   Gastrointestinal: Negative for bloating, abdominal pain, constipation, nausea and vomiting.   Genitourinary: Negative for dysuria, flank pain, hematuria and hesitancy.   Neurological: Positive for paresthesias (left foot). Negative for headaches, light-headedness, loss of balance and numbness.   Psychiatric/Behavioral: Negative for altered mental status.   Allergic/Immunologic: Negative for environmental allergies.     /64   Pulse 74   Ht 5' 2" (1.575 m)   Wt 76.8 kg (169 lb 5 oz)   SpO2 97%   BMI 30.97 kg/m²     Objective:   Physical Exam   Constitutional: She is oriented to person, place, and time. She appears well-developed and well-nourished. No distress.   HENT:   Head: Normocephalic and atraumatic.   Eyes: Pupils are equal, round, and reactive to light.   Neck: Normal range of motion and full passive range of motion without pain. Neck supple. No JVD present.   Cardiovascular: Normal rate, regular rhythm, S1 normal, S2 normal and intact distal pulses.  Occasional extrasystoles are present. PMI is not displaced. Exam reveals no distant heart sounds.   No murmur heard.  Pulses:       Radial pulses are 2+ on the right side, and 2+ on the left side.        Dorsalis pedis pulses are 2+ on the right side, and 2+ on the left side.   +abdominal bruit     Pulmonary/Chest: Effort normal and breath sounds normal. No accessory muscle usage. No respiratory distress. She has no decreased breath sounds. She has no wheezes. She has no rales.   Abdominal: Soft. Bowel sounds are normal. She exhibits no distension. There is no tenderness.   Musculoskeletal: Normal range of motion. She exhibits edema (trace LE).        Right ankle: She exhibits swelling.        Left ankle: She exhibits swelling.   Neurological: She is alert and oriented to person, place, and time.   Skin: Skin is warm and dry. She is not diaphoretic. No cyanosis. Nails show no clubbing.   Psychiatric: She has a " normal mood and affect. Her speech is normal and behavior is normal. Judgment and thought content normal. Cognition and memory are normal.   Nursing note and vitals reviewed.      Lab Results   Component Value Date    CHOL 167 02/07/2019    CHOL 151 07/13/2018    CHOL 152 08/28/2017     Lab Results   Component Value Date    HDL 73 02/07/2019    HDL 67 07/13/2018    HDL 57 08/28/2017     Lab Results   Component Value Date    LDLCALC 75.8 02/07/2019    LDLCALC 63.6 07/13/2018    LDLCALC 74.2 08/28/2017     Lab Results   Component Value Date    TRIG 91 02/07/2019    TRIG 102 07/13/2018    TRIG 104 08/28/2017     Lab Results   Component Value Date    CHOLHDL 43.7 02/07/2019    CHOLHDL 44.4 07/13/2018    CHOLHDL 37.5 08/28/2017       Chemistry        Component Value Date/Time     02/07/2019 0810    K 4.2 02/07/2019 0810     02/07/2019 0810    CO2 27 02/07/2019 0810    BUN 13 02/07/2019 0810    CREATININE 0.9 02/07/2019 0810    GLU 92 02/07/2019 0810        Component Value Date/Time    CALCIUM 9.7 02/07/2019 0810    ALKPHOS 72 02/07/2019 0810    AST 18 02/07/2019 0810    ALT 14 02/07/2019 0810    BILITOT 0.4 02/07/2019 0810    ESTGFRAFRICA >60.0 02/07/2019 0810    EGFRNONAA >60.0 02/07/2019 0810          Lab Results   Component Value Date    TSH 1.509 05/04/2018     Lab Results   Component Value Date    INR 1.1 12/20/2017    INR 1.1 08/10/2012     Lab Results   Component Value Date    WBC 13.91 (H) 12/20/2017    HGB 11.7 (L) 12/20/2017    HCT 34.5 (L) 12/20/2017    MCV 89 12/20/2017     12/20/2017          Assessment:      1. Coronary artery disease of native artery of native heart with stable angina pectoris    2. Bilateral carotid artery stenosis    3. Essential hypertension    4. Mixed hyperlipidemia    5. Sinus node dysfunction    6. Prediabetes    7. Abdominal aortic aneurysm (AAA) without rupture    8. COPD, moderate    9. Nocturnal hypoxemia due to emphysema    10. Tobacco use disorder, moderate,  in sustained remission      Patient presents to clinic for follow up. Denies any new cardiac issues today.  She has been having LE edema in evening.  She has been using Toprol XL over the past few days due to elevated BP  BP well controlled today in office.   No chest pain or anginal equivalents.   Denies SOB or CASTANEDA today.   Reports compliance with meds and diet.   Plan:     Resume Toprol XL 12.5 mg nightly  Continue all other CV meds  OK to use HCTZ as needed for LE edema  Encouraged compression socks daily  DASH diet, 2gm sodium restriction  1.5L Fluid intake per day  Encourage regular physical activity as tolerated  Encourage weight loss  Follow up in 4 weeks for BP check/symptom review   Keep appt for abdominal U/S next month    ENOC Moreno

## 2019-02-28 ENCOUNTER — OFFICE VISIT (OUTPATIENT)
Dept: CARDIOLOGY | Facility: CLINIC | Age: 64
End: 2019-02-28
Payer: MEDICAID

## 2019-02-28 VITALS
WEIGHT: 169.31 LBS | OXYGEN SATURATION: 97 % | BODY MASS INDEX: 31.16 KG/M2 | HEIGHT: 62 IN | SYSTOLIC BLOOD PRESSURE: 134 MMHG | DIASTOLIC BLOOD PRESSURE: 64 MMHG | HEART RATE: 74 BPM

## 2019-02-28 DIAGNOSIS — F17.201 TOBACCO USE DISORDER, MODERATE, IN SUSTAINED REMISSION: ICD-10-CM

## 2019-02-28 DIAGNOSIS — J44.9 COPD, MODERATE: ICD-10-CM

## 2019-02-28 DIAGNOSIS — R73.03 PREDIABETES: ICD-10-CM

## 2019-02-28 DIAGNOSIS — I71.40 ABDOMINAL AORTIC ANEURYSM (AAA) WITHOUT RUPTURE: ICD-10-CM

## 2019-02-28 DIAGNOSIS — I65.23 BILATERAL CAROTID ARTERY STENOSIS: ICD-10-CM

## 2019-02-28 DIAGNOSIS — J43.9 NOCTURNAL HYPOXEMIA DUE TO EMPHYSEMA: ICD-10-CM

## 2019-02-28 DIAGNOSIS — I25.118 CORONARY ARTERY DISEASE OF NATIVE ARTERY OF NATIVE HEART WITH STABLE ANGINA PECTORIS: Primary | ICD-10-CM

## 2019-02-28 DIAGNOSIS — I10 ESSENTIAL HYPERTENSION: ICD-10-CM

## 2019-02-28 DIAGNOSIS — I49.5 SINUS NODE DYSFUNCTION: ICD-10-CM

## 2019-02-28 DIAGNOSIS — G47.36 NOCTURNAL HYPOXEMIA DUE TO EMPHYSEMA: ICD-10-CM

## 2019-02-28 DIAGNOSIS — E78.2 MIXED HYPERLIPIDEMIA: ICD-10-CM

## 2019-02-28 PROCEDURE — 99214 OFFICE O/P EST MOD 30 MIN: CPT | Mod: S$PBB,,, | Performed by: NURSE PRACTITIONER

## 2019-02-28 PROCEDURE — 99214 PR OFFICE/OUTPT VISIT, EST, LEVL IV, 30-39 MIN: ICD-10-PCS | Mod: S$PBB,,, | Performed by: NURSE PRACTITIONER

## 2019-02-28 PROCEDURE — 99999 PR PBB SHADOW E&M-EST. PATIENT-LVL IV: CPT | Mod: PBBFAC,,, | Performed by: NURSE PRACTITIONER

## 2019-02-28 PROCEDURE — 99999 PR PBB SHADOW E&M-EST. PATIENT-LVL IV: ICD-10-PCS | Mod: PBBFAC,,, | Performed by: NURSE PRACTITIONER

## 2019-02-28 PROCEDURE — 99214 OFFICE O/P EST MOD 30 MIN: CPT | Mod: PBBFAC | Performed by: NURSE PRACTITIONER

## 2019-02-28 RX ORDER — METOPROLOL SUCCINATE 25 MG/1
12.5 TABLET, EXTENDED RELEASE ORAL NIGHTLY
Qty: 15 TABLET | Refills: 11 | Status: SHIPPED | OUTPATIENT
Start: 2019-02-28 | End: 2019-03-25 | Stop reason: SDUPTHER

## 2019-02-28 NOTE — PATIENT INSTRUCTIONS
Eating Heart-Healthy Food: Using the DASH Plan    Eating for your heart doesnt have to be hard or boring. You just need to know how to make healthier choices. The DASH eating plan has been developed to help you do just that. DASH stands for Dietary Approaches to Stop Hypertension. It is a plan that has been proven to be healthier for your heart and to lower your risk for high blood pressure. It can also help lower your risk for cancer, heart disease, osteoporosis, and diabetes.  Choosing from each food group  Choose foods from each of the food groups below each day. Try to get the recommended number of servings for each food group. The serving numbers are based on a diet of 2,000 calories a day. Talk to your doctor if youre unsure about your calorie needs. Along with getting the correct servings, the DASH plan also recommends a sodium intake less than 2,300 mg per day.        Grains  Servings: 6 to 8 a day  A serving is:  · 1 slice bread  · 1 ounce dry cereal  · Half a cup cooked rice, pasta or cereal  Best choices: Whole grains and any grains high in fiber. Vegetables  Servings: 4 to 5 a day  A serving is:  · 1 cup raw leafy vegetable  · Half a cup cut-up raw or cooked vegetable  · Half a cup vegetable juice  Best choices: Fresh or frozen vegetables prepared without added salt or fat.   Fruits  Servings: 4 to 5 a day  A serving is:  · 1 medium fruit  · One-quarter cup dried fruit  · Half a cup fresh, frozen, or canned fruit  · Half a cup of 100% fruit juices  Best choices: A variety of fresh fruits of different colors. Whole fruits are a better choice than fruit juices. Low-fat or fat-free dairy  Servings: 2 to 3 a day  A serving is:  · 1 cup milk  · 1 cup yogurt  · One and a half ounces cheese  Best choices: Skim or 1% milk, low-fat or fat-free yogurt or buttermilk, and low-fat cheeses.         Lean meats, poultry, fish  Servings: 6 or fewer a day  A serving is:  · 1 ounce cooked meats, poultry, or fish  · 1  egg  Best choices: Lean poultry and fish. Trim away visible fat. Broil, grill, roast, or boil instead of frying. Remove skin from poultry before eating. Limit how much red meat you eat.  Nuts, seeds, beans  Servings: 4 to 5 a week  A serving is:  · One-third cup nuts (one and a half ounces)  · 2 tablespoons nut butter or seeds  · Half a cup cooked dry beans or legumes  Best choices: Dry roasted nuts with no salt added, lentils, kidney beans, garbanzo beans, and whole nichols beans.   Fats and oils  Servings: 2 to 3 a day  A serving is:  · 1 teaspoon vegetable oil  · 1 teaspoon soft margarine  · 1 tablespoon mayonnaise  · 2 tablespoons salad dressing  Best choices: Nut and vegetable oils (nontropical vegetable oils), such as olive and canola oil. Sweets  Servings: 5 a week or fewer  A serving is:  · 1 tablespoon sugar, maple syrup, or honey  · 1 tablespoon jam or jelly  · 1 half-ounce jelly beans (about 15)  · 1 cup lemonade  Best choices: Dried fruit can be a satisfying sweet. Choose low-fat sweets. And watch your serving sizes!      For more on the DASH eating plan, visit:  www.nhlbi.nih.gov/health/health-topics/topics/dash   Date Last Reviewed: 6/1/2016  © 9470-0086 [employer]. [address] [place] [address], [place] [address]. All rights reserved. This information is not intended as a substitute for professional medical care. Always follow your healthcare professional's instructions.

## 2019-03-04 DIAGNOSIS — I25.10 CORONARY ARTERY DISEASE INVOLVING NATIVE CORONARY ARTERY OF NATIVE HEART WITHOUT ANGINA PECTORIS: ICD-10-CM

## 2019-03-04 RX ORDER — CLOPIDOGREL BISULFATE 75 MG/1
TABLET ORAL
Qty: 30 TABLET | Refills: 0 | Status: SHIPPED | OUTPATIENT
Start: 2019-03-04 | End: 2019-03-25 | Stop reason: SDUPTHER

## 2019-03-05 ENCOUNTER — APPOINTMENT (OUTPATIENT)
Dept: RADIOLOGY | Facility: HOSPITAL | Age: 64
End: 2019-03-05
Attending: INTERNAL MEDICINE
Payer: MEDICAID

## 2019-03-05 ENCOUNTER — TELEPHONE (OUTPATIENT)
Dept: CARDIOLOGY | Facility: CLINIC | Age: 64
End: 2019-03-05

## 2019-03-05 DIAGNOSIS — I72.9 ANEURYSM: ICD-10-CM

## 2019-03-05 PROCEDURE — 76775 US EXAM ABDO BACK WALL LIM: CPT | Mod: 26,,, | Performed by: RADIOLOGY

## 2019-03-05 PROCEDURE — 76775 US EXAM ABDO BACK WALL LIM: CPT | Mod: TC,PO

## 2019-03-05 PROCEDURE — 76775 US ABDOMINAL AORTA: ICD-10-PCS | Mod: 26,,, | Performed by: RADIOLOGY

## 2019-03-05 NOTE — TELEPHONE ENCOUNTER
----- Message from Millie Juarez MD sent at 3/5/2019  4:35 PM CST -----  ANEURYSM IN ABDOMEN ABOUT 3.1 CM UNCHANGED.

## 2019-03-20 NOTE — PROGRESS NOTES
Subjective:   Patient ID:  Gemma Vick is a 64 y.o. female who presents for evaluation of No chief complaint on file.      HPI     Mrs. Vick is a 64 year old female who presents to clinic for BP check.  Her current medical conditions include HTN, HLP, COPD, old MI, AAA, ex-smoker.  She returns today and states she is doing ok.   Denies chest pain, chest tightness or anginal equivalents.   No shortness of breath, CASTANEDA or palpitations.  She feels good without any complaints lately.  She has been having issues with short term memory loss and needs referral for Neurology.  Denies orthopnea, PND or abdominal bloating.   Trace LE edema today  She is wearing compression socks today in office and reports compliance with daily use.   No dizziness, lightheadedness, syncope or near syncopal events.  She is not smoking today.   Reports that she has been having issues with itching after taking HCTZ lately.  Compliance with medications and dietary restrictions.  No CNS complaints to suggest TIA or CVA today.  No signs of abnormal bleeding on ASA and Plavix.         Past Medical History:   Diagnosis Date    AAA (abdominal aortic aneurysm) 2/13/2014    Abdominal aneurysm     3    Acute coronary syndrome     Arthritis     BPPV (benign paroxysmal positional vertigo)     Carotid artery plaque     Carotid artery stenosis and occlusion 2/13/2014    Chronic back pain     COPD (chronic obstructive pulmonary disease)     Coronary artery disease     Emphysema lung     Hyperlipidemia     Hypertension     Myocardial infarction     x3    Neuropathy        Past Surgical History:   Procedure Laterality Date    CARDIAC CATHETERIZATION      CORONARY ANGIOPLASTY      HYSTERECTOMY  1988       Social History     Tobacco Use    Smoking status: Former Smoker     Packs/day: 0.50     Years: 44.00     Pack years: 22.00     Types: Cigarettes, Vaping w/o nicotine     Start date: 6/24/1970     Last attempt to quit: 05/2017     Years  since quittin.8    Smokeless tobacco: Never Used   Substance Use Topics    Alcohol use: No    Drug use: No       Family History   Problem Relation Age of Onset    Heart disease Mother     Heart attacks under age 50 Father     Heart attacks under age 50 Brother      Wt Readings from Last 3 Encounters:   19 76.8 kg (169 lb 5 oz)   19 73.6 kg (162 lb 4.1 oz)   19 73 kg (161 lb)     Temp Readings from Last 3 Encounters:   19 98.2 °F (36.8 °C) (Tympanic)   10/31/18 98.1 °F (36.7 °C) (Tympanic)   18 98.5 °F (36.9 °C) (Tympanic)     BP Readings from Last 3 Encounters:   19 134/64   19 (!) 150/82   10/31/18 (!) 150/70     Pulse Readings from Last 3 Encounters:   19 74   19 88   10/31/18 83     Current Outpatient Medications on File Prior to Visit   Medication Sig Dispense Refill    albuterol (PROAIR HFA) 90 mcg/actuation inhaler Inhale 2 puffs into the lungs every 6 (six) hours as needed. 18 g 6    albuterol-ipratropium (DUO-NEB) 2.5 mg-0.5 mg/3 mL nebulizer solution USE ONE VIAL IN NEBULIZER TWICE DAILY 270 mL 11    aspirin 81 MG Chew Take 81 mg by mouth once daily.      clopidogrel (PLAVIX) 75 mg tablet TAKE 1 TABLET BY MOUTH DAILY 30 tablet 0    COMBIVENT RESPIMAT  mcg/actuation inhaler Inhale 2 puffs into the lungs every 6 (six) hours as needed for Wheezing or Shortness of Breath. 4 g 11    DULoxetine (CYMBALTA) 60 MG capsule Take 1 capsule (60 mg total) by mouth once daily. 30 capsule 3    ergocalciferol, vitamin D2, (VITAMIN D ORAL) Take 800 Units by mouth once daily.       estrogens, conjugated, (PREMARIN) 0.3 MG tablet Take 1 tablet (0.3 mg total) by mouth once daily. 30 tablet 5    ezetimibe-simvastatin 10-40 mg (VYTORIN) 10-40 mg per tablet TAKE 1 TABLET BY MOUTH EVERY EVENING 30 tablet 1    felodipine (PLENDIL) 10 MG 24 hr tablet Take 1 tablet (10 mg total) by mouth once daily. 30 tablet 6    felodipine (PLENDIL) 5 MG 24 hr tablet  TAKE 1 TABLET BY MOUTH DAILY 30 tablet 3    hydroCHLOROthiazide (HYDRODIURIL) 12.5 MG Tab Take 1 tablet (12.5 mg total) by mouth once daily. (Patient taking differently: Take 12.5 mg by mouth once daily. ) 30 tablet 5    meloxicam (MOBIC) 7.5 MG tablet Take 1 tablet (7.5 mg total) by mouth once daily. 30 tablet 2    metoprolol succinate (TOPROL-XL) 25 MG 24 hr tablet Take 0.5 tablets (12.5 mg total) by mouth every evening. 15 tablet 11    mometasone-formoterol (DULERA) 200-5 mcg/actuation inhaler INHALE 2 PUFFS 2 TIMES DAILY 13 g 5    OXYGEN-AIR DELIVERY SYSTEMS MISC 2 L by Misc.(Non-Drug; Combo Route) route every evening.      zolpidem (AMBIEN) 5 MG Tab Take 1 tablet (5 mg total) by mouth nightly. 30 tablet 5     No current facility-administered medications on file prior to visit.          Review of Systems   Constitution: Negative for weakness and malaise/fatigue.   HENT: Negative for hearing loss and hoarse voice.    Eyes: Negative for blurred vision and visual disturbance.   Cardiovascular: Negative for chest pain, claudication, dyspnea on exertion, irregular heartbeat, leg swelling, near-syncope, orthopnea, palpitations, paroxysmal nocturnal dyspnea and syncope.   Respiratory: Negative for cough, hemoptysis, shortness of breath, sleep disturbances due to breathing, snoring and wheezing.    Endocrine: Negative for cold intolerance and heat intolerance.   Hematologic/Lymphatic: Bruises/bleeds easily (on ASA and Plavix).   Skin: Negative for color change, dry skin and nail changes.   Musculoskeletal: Positive for arthritis. Negative for back pain, joint pain and myalgias.   Gastrointestinal: Negative for bloating, abdominal pain, constipation, nausea and vomiting.   Genitourinary: Negative for dysuria, flank pain, hematuria and hesitancy.   Neurological: Negative for headaches, light-headedness, loss of balance, numbness and paresthesias.   Psychiatric/Behavioral: Negative for altered mental status.  "  Allergic/Immunologic: Negative for environmental allergies.     BP (!) 142/62   Pulse 76   Ht 5' 2" (1.575 m)   Wt 77.5 kg (170 lb 13.7 oz)   SpO2 98%   BMI 31.25 kg/m²     Objective:   Physical Exam   Constitutional: She is oriented to person, place, and time. She appears well-developed and well-nourished. No distress.   HENT:   Head: Normocephalic and atraumatic.   Eyes: Pupils are equal, round, and reactive to light.   Neck: Normal range of motion and full passive range of motion without pain. Neck supple. No JVD present.   Cardiovascular: Normal rate, regular rhythm, S1 normal, S2 normal and intact distal pulses. PMI is not displaced. Exam reveals no distant heart sounds.   No murmur heard.  Pulses:       Radial pulses are 2+ on the right side, and 2+ on the left side.        Dorsalis pedis pulses are 2+ on the right side, and 2+ on the left side.   Pulmonary/Chest: Effort normal and breath sounds normal. No accessory muscle usage. No respiratory distress. She has no decreased breath sounds. She has no wheezes. She has no rales.   Abdominal: Soft. Bowel sounds are normal. She exhibits no distension. There is no tenderness.   Musculoskeletal: Normal range of motion. She exhibits edema (trace BLE).        Right ankle: She exhibits swelling.        Left ankle: She exhibits swelling.   Neurological: She is alert and oriented to person, place, and time.   Skin: Skin is warm and dry. She is not diaphoretic. No cyanosis. Nails show no clubbing.   Psychiatric: She has a normal mood and affect. Her speech is normal and behavior is normal. Judgment and thought content normal. Cognition and memory are normal.   Nursing note and vitals reviewed.      Lab Results   Component Value Date    CHOL 167 02/07/2019    CHOL 151 07/13/2018    CHOL 152 08/28/2017     Lab Results   Component Value Date    HDL 73 02/07/2019    HDL 67 07/13/2018    HDL 57 08/28/2017     Lab Results   Component Value Date    LDLCALC 75.8 02/07/2019 "    LDLCALC 63.6 07/13/2018    LDLCALC 74.2 08/28/2017     Lab Results   Component Value Date    TRIG 91 02/07/2019    TRIG 102 07/13/2018    TRIG 104 08/28/2017     Lab Results   Component Value Date    CHOLHDL 43.7 02/07/2019    CHOLHDL 44.4 07/13/2018    CHOLHDL 37.5 08/28/2017       Chemistry        Component Value Date/Time     02/07/2019 0810    K 4.2 02/07/2019 0810     02/07/2019 0810    CO2 27 02/07/2019 0810    BUN 13 02/07/2019 0810    CREATININE 0.9 02/07/2019 0810    GLU 92 02/07/2019 0810        Component Value Date/Time    CALCIUM 9.7 02/07/2019 0810    ALKPHOS 72 02/07/2019 0810    AST 18 02/07/2019 0810    ALT 14 02/07/2019 0810    BILITOT 0.4 02/07/2019 0810    ESTGFRAFRICA >60.0 02/07/2019 0810    EGFRNONAA >60.0 02/07/2019 0810          Lab Results   Component Value Date    TSH 1.509 05/04/2018     Lab Results   Component Value Date    INR 1.1 12/20/2017    INR 1.1 08/10/2012     Lab Results   Component Value Date    WBC 13.91 (H) 12/20/2017    HGB 11.7 (L) 12/20/2017    HCT 34.5 (L) 12/20/2017    MCV 89 12/20/2017     12/20/2017       Assessment:      1. Essential hypertension    2. Mixed hyperlipidemia    3. Sinus node dysfunction    4. Bilateral carotid artery stenosis    5. Coronary artery disease of native artery of native heart with stable angina pectoris    6. Abdominal aortic aneurysm (AAA) without rupture    7. COPD, moderate      Patient presents to clinic for routine follow up and is doing ok.   Denies any new cardiac issues today.  No chest pain or anginal equivalents.   Denies SOB, CASTANEDA or palpitations.   Reports compliance with medications and diet.  No CNS complaints to suggest TIA or CVA today.  NO signs of abnormal bleeding on ASA and Plavix.   Plan:     Continue same CV meds for now  Ok to stop HCTZ  May use Lasix 20 mg Daily PRN for leg swelling or weight gain.  Dash diet, 2gm sodium restrictions  1.5L Fluid restriction  Encourage daily compression socks    Encourage regular physical activity  Encourage weight loss  Monitor BP at home  Follow up in 6 months or sooner if needed  CMP, Lipids, Echo, MPI stress in 6 months prior to appt.  Referral to Neurology for memory loss issues affecting her QOL.     Melony Crouch, FARHANP-C

## 2019-03-21 ENCOUNTER — OFFICE VISIT (OUTPATIENT)
Dept: CARDIOLOGY | Facility: CLINIC | Age: 64
End: 2019-03-21
Payer: MEDICAID

## 2019-03-21 VITALS
OXYGEN SATURATION: 98 % | WEIGHT: 170.88 LBS | HEART RATE: 76 BPM | HEIGHT: 62 IN | DIASTOLIC BLOOD PRESSURE: 62 MMHG | BODY MASS INDEX: 31.45 KG/M2 | SYSTOLIC BLOOD PRESSURE: 142 MMHG

## 2019-03-21 DIAGNOSIS — J44.9 COPD, MODERATE: ICD-10-CM

## 2019-03-21 DIAGNOSIS — I49.5 SINUS NODE DYSFUNCTION: ICD-10-CM

## 2019-03-21 DIAGNOSIS — I65.23 BILATERAL CAROTID ARTERY STENOSIS: ICD-10-CM

## 2019-03-21 DIAGNOSIS — I10 ESSENTIAL HYPERTENSION: Primary | ICD-10-CM

## 2019-03-21 DIAGNOSIS — R41.3 SHORT-TERM MEMORY LOSS: ICD-10-CM

## 2019-03-21 DIAGNOSIS — I25.118 CORONARY ARTERY DISEASE OF NATIVE ARTERY OF NATIVE HEART WITH STABLE ANGINA PECTORIS: ICD-10-CM

## 2019-03-21 DIAGNOSIS — E78.2 MIXED HYPERLIPIDEMIA: ICD-10-CM

## 2019-03-21 DIAGNOSIS — I71.40 ABDOMINAL AORTIC ANEURYSM (AAA) WITHOUT RUPTURE: ICD-10-CM

## 2019-03-21 PROCEDURE — 99215 OFFICE O/P EST HI 40 MIN: CPT | Mod: S$PBB,,, | Performed by: NURSE PRACTITIONER

## 2019-03-21 PROCEDURE — 99215 PR OFFICE/OUTPT VISIT, EST, LEVL V, 40-54 MIN: ICD-10-PCS | Mod: S$PBB,,, | Performed by: NURSE PRACTITIONER

## 2019-03-21 PROCEDURE — 99999 PR PBB SHADOW E&M-EST. PATIENT-LVL IV: ICD-10-PCS | Mod: PBBFAC,,, | Performed by: NURSE PRACTITIONER

## 2019-03-21 PROCEDURE — 99999 PR PBB SHADOW E&M-EST. PATIENT-LVL IV: CPT | Mod: PBBFAC,,, | Performed by: NURSE PRACTITIONER

## 2019-03-21 PROCEDURE — 99214 OFFICE O/P EST MOD 30 MIN: CPT | Mod: PBBFAC | Performed by: NURSE PRACTITIONER

## 2019-03-21 RX ORDER — FUROSEMIDE 20 MG/1
20 TABLET ORAL DAILY PRN
Qty: 30 TABLET | Refills: 6 | Status: SHIPPED | OUTPATIENT
Start: 2019-03-21 | End: 2019-03-25 | Stop reason: SDUPTHER

## 2019-03-25 ENCOUNTER — PATIENT MESSAGE (OUTPATIENT)
Dept: FAMILY MEDICINE | Facility: CLINIC | Age: 64
End: 2019-03-25

## 2019-03-25 ENCOUNTER — OFFICE VISIT (OUTPATIENT)
Dept: FAMILY MEDICINE | Facility: CLINIC | Age: 64
End: 2019-03-25
Payer: MEDICAID

## 2019-03-25 VITALS
OXYGEN SATURATION: 98 % | BODY MASS INDEX: 31.45 KG/M2 | DIASTOLIC BLOOD PRESSURE: 80 MMHG | SYSTOLIC BLOOD PRESSURE: 130 MMHG | HEIGHT: 62 IN | HEART RATE: 70 BPM | TEMPERATURE: 97 F | WEIGHT: 170.88 LBS

## 2019-03-25 DIAGNOSIS — F32.A DEPRESSION, UNSPECIFIED DEPRESSION TYPE: ICD-10-CM

## 2019-03-25 DIAGNOSIS — E78.2 MIXED HYPERLIPIDEMIA: ICD-10-CM

## 2019-03-25 DIAGNOSIS — I25.10 CORONARY ARTERY DISEASE INVOLVING NATIVE CORONARY ARTERY OF NATIVE HEART WITHOUT ANGINA PECTORIS: ICD-10-CM

## 2019-03-25 DIAGNOSIS — G47.00 INSOMNIA, UNSPECIFIED TYPE: ICD-10-CM

## 2019-03-25 DIAGNOSIS — Z78.0 MENOPAUSE: ICD-10-CM

## 2019-03-25 DIAGNOSIS — I10 ESSENTIAL HYPERTENSION: Primary | ICD-10-CM

## 2019-03-25 DIAGNOSIS — L29.9 ITCHING: ICD-10-CM

## 2019-03-25 DIAGNOSIS — J44.9 CHRONIC OBSTRUCTIVE PULMONARY DISEASE, UNSPECIFIED COPD TYPE: ICD-10-CM

## 2019-03-25 DIAGNOSIS — I25.118 CORONARY ARTERY DISEASE OF NATIVE ARTERY OF NATIVE HEART WITH STABLE ANGINA PECTORIS: ICD-10-CM

## 2019-03-25 PROCEDURE — 99999 PR PBB SHADOW E&M-EST. PATIENT-LVL IV: CPT | Mod: PBBFAC,,, | Performed by: FAMILY MEDICINE

## 2019-03-25 PROCEDURE — 99214 OFFICE O/P EST MOD 30 MIN: CPT | Mod: PBBFAC,PO | Performed by: FAMILY MEDICINE

## 2019-03-25 PROCEDURE — 99214 PR OFFICE/OUTPT VISIT, EST, LEVL IV, 30-39 MIN: ICD-10-PCS | Mod: S$PBB,,, | Performed by: FAMILY MEDICINE

## 2019-03-25 PROCEDURE — 99999 PR PBB SHADOW E&M-EST. PATIENT-LVL IV: ICD-10-PCS | Mod: PBBFAC,,, | Performed by: FAMILY MEDICINE

## 2019-03-25 PROCEDURE — 99214 OFFICE O/P EST MOD 30 MIN: CPT | Mod: S$PBB,,, | Performed by: FAMILY MEDICINE

## 2019-03-25 RX ORDER — MELOXICAM 7.5 MG/1
7.5 TABLET ORAL DAILY PRN
Qty: 30 TABLET | Refills: 3 | Status: SHIPPED | OUTPATIENT
Start: 2019-03-25 | End: 2019-08-01 | Stop reason: SDUPTHER

## 2019-03-25 RX ORDER — FUROSEMIDE 20 MG/1
20 TABLET ORAL DAILY PRN
Qty: 30 TABLET | Refills: 6 | Status: SHIPPED | OUTPATIENT
Start: 2019-03-25 | End: 2020-01-09 | Stop reason: SDUPTHER

## 2019-03-25 RX ORDER — METOPROLOL SUCCINATE 25 MG/1
12.5 TABLET, EXTENDED RELEASE ORAL NIGHTLY
Qty: 15 TABLET | Refills: 11 | Status: SHIPPED | OUTPATIENT
Start: 2019-03-25 | End: 2019-11-01 | Stop reason: ALTCHOICE

## 2019-03-25 RX ORDER — FELODIPINE 5 MG/1
5 TABLET, EXTENDED RELEASE ORAL DAILY
Qty: 30 TABLET | Refills: 3 | Status: SHIPPED | OUTPATIENT
Start: 2019-03-25 | End: 2019-08-01 | Stop reason: SDUPTHER

## 2019-03-25 RX ORDER — DULOXETIN HYDROCHLORIDE 60 MG/1
60 CAPSULE, DELAYED RELEASE ORAL DAILY
Qty: 30 CAPSULE | Refills: 3 | Status: SHIPPED | OUTPATIENT
Start: 2019-03-25 | End: 2020-01-03

## 2019-03-25 RX ORDER — ZOLPIDEM TARTRATE 5 MG/1
5 TABLET ORAL NIGHTLY
Qty: 30 TABLET | Refills: 5 | Status: SHIPPED | OUTPATIENT
Start: 2019-03-25 | End: 2019-10-01 | Stop reason: SDUPTHER

## 2019-03-25 RX ORDER — EZETIMIBE AND SIMVASTATIN 10; 40 MG/1; MG/1
1 TABLET ORAL NIGHTLY
Qty: 30 TABLET | Refills: 1 | Status: SHIPPED | OUTPATIENT
Start: 2019-03-25 | End: 2019-06-04 | Stop reason: SDUPTHER

## 2019-03-25 RX ORDER — IPRATROPIUM BROMIDE AND ALBUTEROL 20; 100 UG/1; UG/1
2 SPRAY, METERED RESPIRATORY (INHALATION) EVERY 6 HOURS PRN
Qty: 4 G | Refills: 11 | Status: SHIPPED | OUTPATIENT
Start: 2019-03-25 | End: 2019-08-29 | Stop reason: SDUPTHER

## 2019-03-25 RX ORDER — CLOPIDOGREL BISULFATE 75 MG/1
75 TABLET ORAL DAILY
Qty: 30 TABLET | Refills: 0 | Status: SHIPPED | OUTPATIENT
Start: 2019-03-25 | End: 2019-06-04 | Stop reason: SDUPTHER

## 2019-03-25 RX ORDER — ALBUTEROL SULFATE 90 UG/1
2 AEROSOL, METERED RESPIRATORY (INHALATION) EVERY 6 HOURS PRN
Qty: 18 G | Refills: 6 | Status: SHIPPED | OUTPATIENT
Start: 2019-03-25 | End: 2019-09-28 | Stop reason: SDUPTHER

## 2019-03-25 NOTE — PROGRESS NOTES
Chief Complaint:    Chief Complaint   Patient presents with    Follow-up     6 mo f/u c/o itching all over her body , different places on her body     Memory Loss     having  to write everything down , working on getting a neurology appt        History of Present Illness:  Presents today for follow-up of chronic medical problems:  She has some memory issues she will be seen a neurologist  She complains of some itching in different places comes and goes mostly in her extremities she takes a bath every day with Dove soap.  Blood pressure is elevated today she says when she does not take her metoprolol it goes up otherwise it stays okay at home  Depression stable  Chronic insomnia stable taking Ambien        ROS:  Review of Systems   Constitutional: Negative for activity change, chills, fatigue, fever and unexpected weight change.   HENT: Negative for congestion, ear discharge, ear pain, hearing loss, postnasal drip and rhinorrhea.    Eyes: Negative for pain and visual disturbance.   Respiratory: Negative for cough, chest tightness and shortness of breath.    Cardiovascular: Negative for chest pain and palpitations.   Gastrointestinal: Negative for abdominal pain, diarrhea and vomiting.   Endocrine: Negative for heat intolerance.   Genitourinary: Negative for dysuria, flank pain, frequency and hematuria.   Musculoskeletal: Negative for back pain, gait problem and neck pain.   Skin: Negative for color change and rash.   Neurological: Negative for dizziness, tremors, seizures, numbness and headaches.   Psychiatric/Behavioral: Negative for agitation, hallucinations, self-injury, sleep disturbance and suicidal ideas. The patient is not nervous/anxious.        Past Medical History:   Diagnosis Date    AAA (abdominal aortic aneurysm) 2/13/2014    Abdominal aneurysm     3    Acute coronary syndrome     Arthritis     BPPV (benign paroxysmal positional vertigo)     Carotid artery plaque     Carotid artery stenosis  and occlusion 2014    Chronic back pain     COPD (chronic obstructive pulmonary disease)     Coronary artery disease     Emphysema lung     Hyperlipidemia     Hypertension     Myocardial infarction     x3    Neuropathy        Social History:  Social History     Socioeconomic History    Marital status:      Spouse name: None    Number of children: None    Years of education: None    Highest education level: None   Occupational History    None   Social Needs    Financial resource strain: None    Food insecurity:     Worry: None     Inability: None    Transportation needs:     Medical: None     Non-medical: None   Tobacco Use    Smoking status: Former Smoker     Packs/day: 0.50     Years: 44.00     Pack years: 22.00     Types: Cigarettes, Vaping w/o nicotine     Start date: 1970     Last attempt to quit: 2017     Years since quittin.8    Smokeless tobacco: Never Used   Substance and Sexual Activity    Alcohol use: No    Drug use: No    Sexual activity: Not Currently     Birth control/protection: None   Lifestyle    Physical activity:     Days per week: None     Minutes per session: None    Stress: None   Relationships    Social connections:     Talks on phone: None     Gets together: None     Attends L[employer] day service: None     Active member of club or organization: None     Attends meetings of clubs or organizations: None     Relationship status: None    Intimate partner violence:     Fear of current or ex partner: None     Emotionally abused: None     Physically abused: None     Forced sexual activity: None   Other Topics Concern    None   Social History Narrative    None       Family History:   family history includes Heart attacks under age 50 in her brother and father; Heart disease in her mother.    Health Maintenance   Topic Date Due    Zoster Vaccine  2015    Fecal Occult Blood Test (FOBT)/FitKit  2019    Mammogram  2020    Lipid Panel   "02/07/2020    High Dose Statin  03/25/2020    Aspirin/Antiplatelet Therapy  03/25/2020    TETANUS VACCINE  11/15/2026    Hepatitis C Screening  Completed    Pneumococcal Vaccine (Medium Risk)  Completed    Influenza Vaccine  Discontinued       Physical Exam:    Vital Signs  Temp: 97.2 °F (36.2 °C)  Temp src: Tympanic  Pulse: 70  SpO2: 98 %  BP: (!) 162/70  BP Location: Left arm  Patient Position: Sitting  Pain Score: 0-No pain  Height and Weight  Height: 5' 2" (157.5 cm)  Weight: 77.5 kg (170 lb 13.7 oz)  BSA (Calculated - sq m): 1.84 sq meters  BMI (Calculated): 31.3  Weight in (lb) to have BMI = 25: 136.4]    Body mass index is 31.25 kg/m².    Physical Exam   Constitutional: She is oriented to person, place, and time. She appears well-developed.   HENT:   Mouth/Throat: Oropharynx is clear and moist.   Eyes: Pupils are equal, round, and reactive to light. Conjunctivae are normal.   Neck: Normal range of motion. Neck supple.   Cardiovascular: Normal rate, regular rhythm and normal heart sounds.   No murmur heard.  Pulmonary/Chest: Effort normal and breath sounds normal. No respiratory distress. She has no wheezes. She has no rales. She exhibits no tenderness.   Abdominal: Soft. She exhibits no distension and no mass. There is no tenderness. There is no guarding.   Musculoskeletal: She exhibits no edema or tenderness.   Lymphadenopathy:     She has no cervical adenopathy.   Neurological: She is alert and oriented to person, place, and time. She has normal reflexes.   Skin: Skin is warm and dry.   Psychiatric: She has a normal mood and affect. Her behavior is normal. Judgment and thought content normal.         Assessment:      ICD-10-CM ICD-9-CM   1. Essential hypertension I10 401.9   2. Mixed hyperlipidemia E78.2 272.2   3. Insomnia, unspecified type G47.00 780.52   4. Itching L29.9 698.9   5. Chronic obstructive pulmonary disease, unspecified COPD type J44.9 496   6. Coronary artery disease involving native " coronary artery of native heart without angina pectoris I25.10 414.01   7. Depression, unspecified depression type F32.9 311   8. Menopause Z78.0 627.2   9. Coronary artery disease of native artery of native heart with stable angina pectoris I25.118 414.01     413.9         Plan:    No skin lesions identified recommend that she has switched over to a glycerin based soap    Please start taking metoprolol on a regular basis check blood pressure call us back with the numbers  Keep appointment Neurology about memory issues but it may be more related to her having several obligations that she has got  Other medical problems are stable continue current meds and plan        No orders of the defined types were placed in this encounter.      Current Outpatient Medications   Medication Sig Dispense Refill    albuterol (PROAIR HFA) 90 mcg/actuation inhaler Inhale 2 puffs into the lungs every 6 (six) hours as needed. 18 g 6    albuterol-ipratropium (DUO-NEB) 2.5 mg-0.5 mg/3 mL nebulizer solution USE ONE VIAL IN NEBULIZER TWICE DAILY 270 mL 11    aspirin 81 MG Chew Take 81 mg by mouth once daily.      clopidogrel (PLAVIX) 75 mg tablet Take 1 tablet (75 mg total) by mouth once daily. 30 tablet 0    COMBIVENT RESPIMAT  mcg/actuation inhaler Inhale 2 puffs into the lungs every 6 (six) hours as needed for Wheezing or Shortness of Breath. 4 g 11    DULoxetine (CYMBALTA) 60 MG capsule Take 1 capsule (60 mg total) by mouth once daily. 30 capsule 3    ergocalciferol, vitamin D2, (VITAMIN D ORAL) Take 800 Units by mouth once daily.       estrogens, conjugated, (PREMARIN) 0.3 MG tablet Take 1 tablet (0.3 mg total) by mouth once daily. 30 tablet 5    ezetimibe-simvastatin 10-40 mg (VYTORIN) 10-40 mg per tablet Take 1 tablet by mouth every evening. 30 tablet 1    felodipine (PLENDIL) 5 MG 24 hr tablet Take 1 tablet (5 mg total) by mouth once daily. 30 tablet 3    furosemide (LASIX) 20 MG tablet Take 1 tablet (20 mg total)  by mouth daily as needed. For leg swelling as needed. Or weight gain of 3 pounds in 1 day or 5 lbs in 1 week. 30 tablet 6    metoprolol succinate (TOPROL-XL) 25 MG 24 hr tablet Take 0.5 tablets (12.5 mg total) by mouth every evening. 15 tablet 11    mometasone-formoterol (DULERA) 200-5 mcg/actuation inhaler INHALE 2 PUFFS 2 TIMES DAILY 13 g 5    OXYGEN-AIR DELIVERY SYSTEMS MISC 2 L by Misc.(Non-Drug; Combo Route) route every evening.      zolpidem (AMBIEN) 5 MG Tab Take 1 tablet (5 mg total) by mouth nightly. 30 tablet 5    meloxicam (MOBIC) 7.5 MG tablet Take 1 tablet (7.5 mg total) by mouth daily as needed. 30 tablet 3     No current facility-administered medications for this visit.        Medications Discontinued During This Encounter   Medication Reason    felodipine (PLENDIL) 10 MG 24 hr tablet Patient no longer taking    albuterol (PROAIR HFA) 90 mcg/actuation inhaler Reorder    clopidogrel (PLAVIX) 75 mg tablet Reorder    COMBIVENT RESPIMAT  mcg/actuation inhaler Reorder    DULoxetine (CYMBALTA) 60 MG capsule Reorder    estrogens, conjugated, (PREMARIN) 0.3 MG tablet Reorder    ezetimibe-simvastatin 10-40 mg (VYTORIN) 10-40 mg per tablet Reorder    felodipine (PLENDIL) 5 MG 24 hr tablet Reorder    furosemide (LASIX) 20 MG tablet Reorder    meloxicam (MOBIC) 7.5 MG tablet Reorder    metoprolol succinate (TOPROL-XL) 25 MG 24 hr tablet Reorder    mometasone-formoterol (DULERA) 200-5 mcg/actuation inhaler Reorder    zolpidem (AMBIEN) 5 MG Tab Reorder       Follow up in about 6 months (around 9/25/2019).      Olga Altman MD

## 2019-04-10 ENCOUNTER — CLINICAL SUPPORT (OUTPATIENT)
Dept: CARDIOLOGY | Facility: CLINIC | Age: 64
End: 2019-04-10
Attending: NURSE PRACTITIONER
Payer: MEDICAID

## 2019-04-10 ENCOUNTER — HOSPITAL ENCOUNTER (OUTPATIENT)
Dept: RADIOLOGY | Facility: HOSPITAL | Age: 64
Discharge: HOME | End: 2019-04-10
Attending: NURSE PRACTITIONER
Payer: MEDICAID

## 2019-04-10 DIAGNOSIS — I71.40 ABDOMINAL AORTIC ANEURYSM (AAA) WITHOUT RUPTURE: ICD-10-CM

## 2019-04-10 DIAGNOSIS — I25.118 CORONARY ARTERY DISEASE OF NATIVE ARTERY OF NATIVE HEART WITH STABLE ANGINA PECTORIS: ICD-10-CM

## 2019-04-10 DIAGNOSIS — I65.23 BILATERAL CAROTID ARTERY STENOSIS: ICD-10-CM

## 2019-04-10 DIAGNOSIS — I10 ESSENTIAL HYPERTENSION: ICD-10-CM

## 2019-04-10 LAB
AORTIC VALVE REGURGITATION: NORMAL
DIASTOLIC DYSFUNCTION: NO
RETIRED EF AND QEF - SEE NOTES: 60 (ref 55–65)

## 2019-04-10 PROCEDURE — 99999 PR PBB SHADOW E&M-EST. PATIENT-LVL I: ICD-10-PCS | Mod: PBBFAC,,,

## 2019-04-10 PROCEDURE — 78452 HT MUSCLE IMAGE SPECT MULT: CPT | Mod: 26,,, | Performed by: NUCLEAR MEDICINE

## 2019-04-10 PROCEDURE — 93306 TTE W/DOPPLER COMPLETE: CPT | Mod: PBBFAC,PN | Performed by: INTERNAL MEDICINE

## 2019-04-10 PROCEDURE — 93306 2D ECHO WITH COLOR FLOW DOPPLER: ICD-10-PCS | Mod: 26,S$PBB,, | Performed by: INTERNAL MEDICINE

## 2019-04-10 PROCEDURE — 78452 NM MULTI PHARM STRESS CARDIAC COMPONENT: ICD-10-PCS | Mod: 26,,, | Performed by: NUCLEAR MEDICINE

## 2019-04-10 PROCEDURE — 93016 CV STRESS TEST SUPVJ ONLY: CPT | Mod: S$PBB,,, | Performed by: NUCLEAR MEDICINE

## 2019-04-10 PROCEDURE — 99211 OFF/OP EST MAY X REQ PHY/QHP: CPT | Mod: PBBFAC,25,PN

## 2019-04-10 PROCEDURE — 93016 NM MULTI PHARM STRESS CARDIAC COMPONENT: ICD-10-PCS | Mod: S$PBB,,, | Performed by: NUCLEAR MEDICINE

## 2019-04-10 PROCEDURE — 93018 NM MULTI PHARM STRESS CARDIAC COMPONENT: ICD-10-PCS | Mod: S$PBB,,, | Performed by: NUCLEAR MEDICINE

## 2019-04-10 PROCEDURE — 93018 CV STRESS TEST I&R ONLY: CPT | Mod: S$PBB,,, | Performed by: NUCLEAR MEDICINE

## 2019-04-10 PROCEDURE — A9502 TC99M TETROFOSMIN: HCPCS

## 2019-04-10 PROCEDURE — 99999 PR PBB SHADOW E&M-EST. PATIENT-LVL I: CPT | Mod: PBBFAC,,,

## 2019-04-11 ENCOUNTER — TELEPHONE (OUTPATIENT)
Dept: CARDIOLOGY | Facility: CLINIC | Age: 64
End: 2019-04-11

## 2019-04-11 LAB — DIASTOLIC DYSFUNCTION: NO

## 2019-04-11 NOTE — TELEPHONE ENCOUNTER
----- Message from Melony Crouch NP sent at 4/11/2019  8:03 AM CDT -----  Please call patient    Echo shows normal heart strength  Stress test normal    Thanks  Melony

## 2019-05-07 ENCOUNTER — TELEPHONE (OUTPATIENT)
Dept: FAMILY MEDICINE | Facility: CLINIC | Age: 64
End: 2019-05-07

## 2019-05-07 NOTE — TELEPHONE ENCOUNTER
Received prior authorization approval from insurance for Felodipine ER 5 mg tab.    Approved 5/7/2019  PA Case: [id number]  Status: Approved    Pharmacy notified.

## 2019-05-22 ENCOUNTER — HOSPITAL ENCOUNTER (OUTPATIENT)
Dept: RADIOLOGY | Facility: HOSPITAL | Age: 64
Discharge: HOME | End: 2019-05-22
Attending: INTERNAL MEDICINE
Payer: MEDICAID

## 2019-05-22 ENCOUNTER — PATIENT MESSAGE (OUTPATIENT)
Dept: PULMONOLOGY | Facility: CLINIC | Age: 64
End: 2019-05-22

## 2019-05-22 ENCOUNTER — OFFICE VISIT (OUTPATIENT)
Dept: PULMONOLOGY | Facility: CLINIC | Age: 64
End: 2019-05-22
Payer: MEDICAID

## 2019-05-22 VITALS
WEIGHT: 169.63 LBS | DIASTOLIC BLOOD PRESSURE: 70 MMHG | SYSTOLIC BLOOD PRESSURE: 140 MMHG | HEIGHT: 62 IN | BODY MASS INDEX: 31.21 KG/M2 | RESPIRATION RATE: 18 BRPM | OXYGEN SATURATION: 96 %

## 2019-05-22 DIAGNOSIS — J44.9 COPD, MODERATE: Primary | ICD-10-CM

## 2019-05-22 DIAGNOSIS — J43.9 NOCTURNAL HYPOXEMIA DUE TO EMPHYSEMA: ICD-10-CM

## 2019-05-22 DIAGNOSIS — G47.36 NOCTURNAL HYPOXEMIA DUE TO EMPHYSEMA: ICD-10-CM

## 2019-05-22 DIAGNOSIS — J44.9 CHRONIC OBSTRUCTIVE PULMONARY DISEASE, UNSPECIFIED COPD TYPE: ICD-10-CM

## 2019-05-22 DIAGNOSIS — F17.201 TOBACCO USE DISORDER, MODERATE, IN SUSTAINED REMISSION: ICD-10-CM

## 2019-05-22 PROCEDURE — 99999 PR PBB SHADOW E&M-EST. PATIENT-LVL III: CPT | Mod: PBBFAC,,, | Performed by: NURSE PRACTITIONER

## 2019-05-22 PROCEDURE — 99999 PR PBB SHADOW E&M-EST. PATIENT-LVL III: ICD-10-PCS | Mod: PBBFAC,,, | Performed by: NURSE PRACTITIONER

## 2019-05-22 PROCEDURE — 99214 OFFICE O/P EST MOD 30 MIN: CPT | Mod: S$PBB,,, | Performed by: NURSE PRACTITIONER

## 2019-05-22 PROCEDURE — 99213 OFFICE O/P EST LOW 20 MIN: CPT | Mod: PBBFAC,25 | Performed by: NURSE PRACTITIONER

## 2019-05-22 PROCEDURE — 71046 XR CHEST PA AND LATERAL: ICD-10-PCS | Mod: 26,,, | Performed by: RADIOLOGY

## 2019-05-22 PROCEDURE — 99214 PR OFFICE/OUTPT VISIT, EST, LEVL IV, 30-39 MIN: ICD-10-PCS | Mod: S$PBB,,, | Performed by: NURSE PRACTITIONER

## 2019-05-22 PROCEDURE — 71046 X-RAY EXAM CHEST 2 VIEWS: CPT | Mod: 26,,, | Performed by: RADIOLOGY

## 2019-05-22 PROCEDURE — 71046 X-RAY EXAM CHEST 2 VIEWS: CPT | Mod: TC

## 2019-05-22 RX ORDER — HYDROCORTISONE ACETATE, PRAMOXINE HCL 2.5; 1 G/100G; G/100G
CREAM TOPICAL
COMMUNITY
Start: 2019-04-26 | End: 2019-08-14

## 2019-05-22 RX ORDER — PREDNISONE 20 MG/1
TABLET ORAL
Qty: 20 TABLET | Refills: 0 | Status: SHIPPED | OUTPATIENT
Start: 2019-05-22 | End: 2019-08-14 | Stop reason: ALTCHOICE

## 2019-05-22 RX ORDER — DOXYCYCLINE 100 MG/1
100 CAPSULE ORAL EVERY 12 HOURS
Qty: 20 CAPSULE | Refills: 0 | Status: SHIPPED | OUTPATIENT
Start: 2019-05-22 | End: 2019-06-01

## 2019-05-22 NOTE — ASSESSMENT & PLAN NOTE
Not well controlled on dulera 200 mcg, albuterol and combivent rescue  Lungs clear to auscultation. Chest xray 5/22/2019 no acute findings.  CAT score 17   Mucous production clear. No fever. No chills.   Add on Spiriva respimat 5 mcg daily   Prednisone taper and doxy sick plan medication if not improving with add on spiriva.

## 2019-05-22 NOTE — ASSESSMENT & PLAN NOTE
requalification for home oxygen NC 2 lm. qualifying study:over night oximetry 5/15/2017 lowest 81%, mean 88.1%.   Continue NC 2 lm nightly.

## 2019-05-22 NOTE — PROGRESS NOTES
"Subjective:      Chief Complaint   Patient presents with    COPD        Gemma Vick is a 64 y.o. female presents to pulmonary clinic for acute care visit.  Followed in pulmonary for COPD by Dr Morales, last seen 7/31/2018.  The patient is reports symptoms with increased cough and chest congestion over past 4-6 months.   Yesterday she felt mild increased sensation of shortness of breath when going outside in heat.   Mucous production is clear and white off and mild recent increased.   Wheezing with laying down at night. Despite compliance with Dulera. Dulera 200 mcg used 2 puffs every 12 hrs.   Wheezing resolves with use of rescue combivent.   Typically no use of duo neb unless flared, used once this past week with good relief.   Chest xray 5/22/2019 no acute infiltrates.  Over all she feels not as well controlled over past 6 months despite compliance with medication.     Quit smoking in May 2017.      She uses 1 pillow at night. Patient currently is on oxygen at 2 L/min per nasal cannula. at night. No obstructive sleep apnea.   No constitutional symptoms, denies fever, chills, anorexia, or weight loss.    She continues to use nicotine in form of vapor 3 mg typically 2  times a day, some days not used.   Cares for two 4 month old great grand babies. Only exercise limitations if walking long distances.      Previous Report Reviewed: lab reports, office notes and radiology reports     The following portions of the patient's history were reviewed and updated as appropriate: allergies, current medications, past family history, past medical history, past social history, past surgical history and problem list.    Review of Systems  A comprehensive review of systems was negative except for: Respiratory: positive for cough and wheezing     Objective:      BP (!) 140/70   Resp 18   Ht 5' 2" (1.575 m)   Wt 76.9 kg (169 lb 10.3 oz)   SpO2 96% Comment: 2 liters  BMI 31.03 kg/m²   General appearance: alert, appears stated " age and cooperative  Head: Normocephalic, without obvious abnormality, atraumatic  Eyes: negative  Ears: normal TM's and external ear canals both ears  Nose: Nares normal. Septum midline. Mucosa normal. No drainage or sinus tenderness.  Throat: lips, mucosa, and tongue normal; teeth and gums normal  Neck: no adenopathy and no carotid bruit  Lungs: clear to auscultation bilaterally and no wheezing, no rales.   Abdomen: normal findings: soft, non-tender  Extremities: extremities normal, atraumatic, no cyanosis or edema  Pulses: 2+ and symmetric  Skin: Skin color, texture, turgor normal. No rashes or lesions  Lymph nodes: Cervical, supraclavicular, and axillary nodes normal.  Neurologic: Grossly normal    Diagnostic Review     X-Ray Chest PA And Lateral  Narrative: EXAMINATION:  XR CHEST PA AND LATERAL    CLINICAL HISTORY:  Chronic obstructive pulmonary disease, unspecified    TECHNIQUE:  PA and lateral views of the chest were performed.    COMPARISON:  July 31, 2018, January 10, 2018    FINDINGS:  No significant interval change.  Lungs are symmetrically aerated and clear.  CP angles are sharp and trachea midline.  Heart and pulmonary vasculature within normal limits.  Osseous structures intact.  Stable small calcific density lateral margin left midlung field.  Osseous structures intact.  Minimal spondylosis.  Surgical clips visualized projecting over upper abdomen on the lateral view.  Impression: Stable exam without acute infiltrate.    Electronically signed by: Ritchie Otto MD  Date:    05/22/2019  Time:    14:05      Spirometry 7/31/2018 FEV1 67% predicted, improved compared to prior.     Pulmonary function tests: 9/18/2017 FEV1: 1.18  (51 % predicted), FVC:  2.32 (78 % predicted), FEV1/FVC:  51 (65 % predicted).  Post bronchodilator: FEV1: 1.41  (62 % predicted), FVC:  2.64 (88 % predicted), FEV1/FVC:  54 (69 % predicted).   Moderate obstructive airflow defect  Positive bronchodilator response with 20% increase  in FEV1 post bronchodilator.   FEV1 improved by 14% compared to tomer 12/15/2016.     Assessment:          1. COPD, moderate  tiotropium bromide (SPIRIVA RESPIMAT) 2.5 mcg/actuation Mist    predniSONE (DELTASONE) 20 MG tablet    doxycycline (VIBRAMYCIN) 100 MG Cap   2. Tobacco use disorder, moderate, in sustained remission     3. Nocturnal hypoxemia due to emphysema          Plan:     Problem List Items Addressed This Visit     Tobacco use disorder, moderate, in sustained remission     compete smoking cessation May 2017, continues to use nicotine vapor 2 times day, most days.          Nocturnal hypoxemia due to emphysema     requalification for home oxygen NC 2 lm. qualifying study:over night oximetry 5/15/2017 lowest 81%, mean 88.1%.   Continue NC 2 lm nightly.         COPD, moderate - Primary     Not well controlled on dulera 200 mcg, albuterol and combivent rescue  Lungs clear to auscultation. Chest xray 5/22/2019 no acute findings.  CAT score 17   Mucous production clear. No fever. No chills.   Add on Spiriva respimat 5 mcg daily   Prednisone taper and doxy sick plan medication if not improving with add on spiriva.             Relevant Medications    tiotropium bromide (SPIRIVA RESPIMAT) 2.5 mcg/actuation Mist    predniSONE (DELTASONE) 20 MG tablet    doxycycline (VIBRAMYCIN) 100 MG Cap         Discussed diagnosis, its evaluation, treatment and usual course.  All questions answered.      Follow up for yearly fu COPD w/tomer as planned with Dr Morales. follow up sooner if needed

## 2019-05-29 ENCOUNTER — PATIENT MESSAGE (OUTPATIENT)
Dept: PULMONOLOGY | Facility: CLINIC | Age: 64
End: 2019-05-29

## 2019-06-04 DIAGNOSIS — I25.10 CORONARY ARTERY DISEASE INVOLVING NATIVE CORONARY ARTERY OF NATIVE HEART WITHOUT ANGINA PECTORIS: ICD-10-CM

## 2019-06-04 DIAGNOSIS — E78.2 MIXED HYPERLIPIDEMIA: ICD-10-CM

## 2019-06-04 RX ORDER — EZETIMIBE AND SIMVASTATIN 10; 40 MG/1; MG/1
1 TABLET ORAL NIGHTLY
Qty: 30 TABLET | Refills: 5 | Status: SHIPPED | OUTPATIENT
Start: 2019-06-04 | End: 2019-12-03 | Stop reason: SDUPTHER

## 2019-06-04 RX ORDER — CLOPIDOGREL BISULFATE 75 MG/1
75 TABLET ORAL DAILY
Qty: 30 TABLET | Refills: 5 | Status: SHIPPED | OUTPATIENT
Start: 2019-06-04 | End: 2019-12-03 | Stop reason: SDUPTHER

## 2019-06-27 ENCOUNTER — PATIENT MESSAGE (OUTPATIENT)
Dept: FAMILY MEDICINE | Facility: CLINIC | Age: 64
End: 2019-06-27

## 2019-06-28 ENCOUNTER — PATIENT MESSAGE (OUTPATIENT)
Dept: ADMINISTRATIVE | Facility: OTHER | Age: 64
End: 2019-06-28

## 2019-07-02 ENCOUNTER — PATIENT OUTREACH (OUTPATIENT)
Dept: OTHER | Facility: OTHER | Age: 64
End: 2019-07-02

## 2019-07-02 NOTE — LETTER
August 30, 2019     Gemma Vick  [address] [facility] [address]       Dear Gemma,    Welcome to [facility]! Our goal is to make care effective, proactive and convenient by using data you send us from home to better treat your chronic conditions.          My name is Ana Rosa Keita, and I am your dedicated Digital Medicine clinician. As an expert in medication management, I will help ensure that the medications you are taking continue to provide the intended benefits and help you reach your goals. You can reach me directly at [phone] or by sending me a message directly through your [facility] account.      I am Keely Pacheco and I will be your health . My job is to help you identify lifestyle changes to improve your disease control. We will talk about nutrition, exercise, and other ways you may be able to adjust your current habits to better your health. Additionally, we will help ensure you are completing the tests and screenings that are necessary to help manage your conditions. You can reach me directly at  or by sending me a message directly through your [facility] account.    Most importantly, YOU are at the center of this team. Together, we will work to improve your overall health and encourage you to meet your goals for a healthier lifestyle.     What we expect from YOU:  · Please take frequent home blood pressure measurements. We ask that you take at least 1 blood pressure reading per week, but more information will better help us get you know you. Be sure you rest for a few minutes before taking the reading in a quiet, comfortable place.     Be available to receive phone calls or [facility] messages, when appropriate, from your care team. Please let us know if there are any specific days or times that work best for us to reach you via phone.     Complete routine tests and screenings. Dont worry, we will help keep you on track!           What you should expect  from your Digital Medicine Care Team:   We will work with you to create a personalized plan of care and provide you with encouragement and education, including regarding lifestyle changes, that could help you manage your disease states.     We will adjust your current medications, if needed, and continue to monitor your long-term progress.     We will provide you and your physician with monthly progress reports after you have been in the program for more than 30 days.     We will send you reminders through [facility] and text messages to help ensure you do not miss any testing deadlines to help manage your disease states.    You will be able to reach us by phone or through your [facility] account by clicking our names under Care Team on the right side of the home screen.    I look forward to working with you to achieve your blood pressure goals!    We look forward to working with you to help manage your health,    Sincerely,    Your Digital Medicine Team    Please visit our websites to learn more:   · Hypertension: [email or web address]      Remember, we are not available for emergencies. If you have an emergency, please contact your doctors office directly or call [facility] on-call ([phone] or [phone]) or [phone].

## 2019-07-02 NOTE — LETTER
August 30, 2019     Gemma Vick  [address] [facility] [address]       Dear Gemma,    Welcome to [facility]! Our goal is to make care effective, proactive and convenient by using data you send us from home to better treat your chronic conditions.          My name is Ana Rosa Keita, and I am your dedicated Digital Medicine clinician. As an expert in medication management, I will help ensure that the medications you are taking continue to provide the intended benefits and help you reach your goals. You can reach me directly at [phone] or by sending me a message directly through your [facility] account.      I am Keely Pacheco and I will be your health . My job is to help you identify lifestyle changes to improve your disease control. We will talk about nutrition, exercise, and other ways you may be able to adjust your current habits to better your health. Additionally, we will help ensure you are completing the tests and screenings that are necessary to help manage your conditions. You can reach me directly at  or by sending me a message directly through your [facility] account.    Most importantly, YOU are at the center of this team. Together, we will work to improve your overall health and encourage you to meet your goals for a healthier lifestyle.     What we expect from YOU:  · Please take frequent home blood pressure measurements. We ask that you take at least 1 blood pressure reading per week, but more information will better help us get you know you. Be sure you rest for a few minutes before taking the reading in a quiet, comfortable place.     Be available to receive phone calls or [facility] messages, when appropriate, from your care team. Please let us know if there are any specific days or times that work best for us to reach you via phone.     Complete routine tests and screenings. Dont worry, we will help keep you on track!           What you should expect  from your Digital Medicine Care Team:   We will work with you to create a personalized plan of care and provide you with encouragement and education, including regarding lifestyle changes, that could help you manage your disease states.     We will adjust your current medications, if needed, and continue to monitor your long-term progress.     We will provide you and your physician with monthly progress reports after you have been in the program for more than 30 days.     We will send you reminders through [facility] and text messages to help ensure you do not miss any testing deadlines to help manage your disease states.    You will be able to reach us by phone or through your [facility] account by clicking our names under Care Team on the right side of the home screen.    I look forward to working with you to achieve your blood pressure goals!    We look forward to working with you to help manage your health,    Sincerely,    Your Digital Medicine Team    Please visit our websites to learn more:   · Hypertension: [email or web address]      Remember, we are not available for emergencies. If you have an emergency, please contact your doctors office directly or call [facility] on-call ([phone] or [phone]) or [phone].

## 2019-07-02 NOTE — PROGRESS NOTES
In attempt to complete enrollment call, pt states that she is busy. Pt request later call back in the week to complete enrollment call.       Last 5 Patient Entered Readings                                      Current 30 Day Average: 148/77     Recent Readings 6/30/2019 6/30/2019 6/29/2019 6/29/2019 6/29/2019    SBP (mmHg) 116 100 152 142 142    DBP (mmHg) 69 64 74 79 79    Pulse 70 72 78 84 84

## 2019-07-12 NOTE — PROGRESS NOTES
2nd attempt for enrollment call.  No answer, left voicemail.  Sent my portal message.      Last 5 Patient Entered Readings                                      Current 30 Day Average: 158/79     Recent Readings 7/5/2019 7/2/2019 6/30/2019 6/30/2019 6/30/2019    SBP (mmHg) 156 147 160 160 116    DBP (mmHg) 83 77 83 83 69    Pulse 85 84 77 77 70

## 2019-07-19 NOTE — PROGRESS NOTES
3rd attempt for enrollment call. No answer, voicemail full.      Last 5 Patient Entered Readings                                      Current 30 Day Average: 155/78     Recent Readings 7/13/2019 7/13/2019 7/5/2019 7/2/2019 6/30/2019    SBP (mmHg) 142 142 156 147 160    DBP (mmHg) 74 79 83 77 83    Pulse 80 81 85 84 77

## 2019-07-26 NOTE — PROGRESS NOTES
4th attempt for enrollment call. No answer, mailbox full.      Last 5 Patient Entered Readings                                      Current 30 Day Average: 154/78     Recent Readings 7/25/2019 7/25/2019 7/23/2019 7/21/2019 7/21/2019    SBP (mmHg) 142 143 153 170 153    DBP (mmHg) 78 78 82 82 81    Pulse 69 77 76 76 72

## 2019-08-01 RX ORDER — FELODIPINE 5 MG/1
TABLET, EXTENDED RELEASE ORAL
Qty: 30 TABLET | Refills: 1 | Status: SHIPPED | OUTPATIENT
Start: 2019-08-01 | End: 2019-09-23 | Stop reason: DRUGHIGH

## 2019-08-01 RX ORDER — MELOXICAM 7.5 MG/1
TABLET ORAL
Qty: 30 TABLET | Refills: 1 | Status: SHIPPED | OUTPATIENT
Start: 2019-08-01 | End: 2019-11-04 | Stop reason: SDUPTHER

## 2019-08-14 ENCOUNTER — TELEPHONE (OUTPATIENT)
Dept: FAMILY MEDICINE | Facility: CLINIC | Age: 64
End: 2019-08-14

## 2019-08-14 ENCOUNTER — OFFICE VISIT (OUTPATIENT)
Dept: FAMILY MEDICINE | Facility: CLINIC | Age: 64
End: 2019-08-14
Payer: MEDICAID

## 2019-08-14 VITALS
WEIGHT: 172.75 LBS | BODY MASS INDEX: 31.79 KG/M2 | HEIGHT: 62 IN | SYSTOLIC BLOOD PRESSURE: 138 MMHG | DIASTOLIC BLOOD PRESSURE: 74 MMHG | OXYGEN SATURATION: 98 % | TEMPERATURE: 99 F | HEART RATE: 77 BPM

## 2019-08-14 DIAGNOSIS — M72.2 PLANTAR FASCIAL FIBROMATOSIS OF RIGHT FOOT: Primary | ICD-10-CM

## 2019-08-14 PROCEDURE — 99999 PR PBB SHADOW E&M-EST. PATIENT-LVL III: ICD-10-PCS | Mod: PBBFAC,,, | Performed by: FAMILY MEDICINE

## 2019-08-14 PROCEDURE — 99213 PR OFFICE/OUTPT VISIT, EST, LEVL III, 20-29 MIN: ICD-10-PCS | Mod: S$PBB,,, | Performed by: FAMILY MEDICINE

## 2019-08-14 PROCEDURE — 99213 OFFICE O/P EST LOW 20 MIN: CPT | Mod: S$PBB,,, | Performed by: FAMILY MEDICINE

## 2019-08-14 PROCEDURE — 99999 PR PBB SHADOW E&M-EST. PATIENT-LVL III: CPT | Mod: PBBFAC,,, | Performed by: FAMILY MEDICINE

## 2019-08-14 PROCEDURE — 99213 OFFICE O/P EST LOW 20 MIN: CPT | Mod: PBBFAC,PO | Performed by: FAMILY MEDICINE

## 2019-08-14 NOTE — TELEPHONE ENCOUNTER
----- Message from Angelika Krishnan sent at 8/14/2019 10:28 AM CDT -----  Type:  Same Day Appointment Request    Caller is requesting a same day appointment.  Caller declined first available appointment listed below.    Name of Caller: Ar Menendez  When is the first available appointment?    Symptoms:        Right foot pain  Best Call Back Number:   [phone]  Additional Information:   Pt would like to have an appt today//please call asap//ed/onofre

## 2019-08-14 NOTE — PROGRESS NOTES
Chief Complaint:    Chief Complaint   Patient presents with    Foot Pain       History of Present Illness:    Complaining of right heel pain for the last 3 weeks no trauma as shown gets worse with walking.    ROS:  Review of Systems    Past Medical History:   Diagnosis Date    AAA (abdominal aortic aneurysm) 2014    Abdominal aneurysm     3    Acute coronary syndrome     Arthritis     BPPV (benign paroxysmal positional vertigo)     Carotid artery plaque     Carotid artery stenosis and occlusion 2014    Chronic back pain     COPD (chronic obstructive pulmonary disease)     Coronary artery disease     Emphysema lung     Hyperlipidemia     Hypertension     Myocardial infarction     x3    Neuropathy        Social History:  Social History     Socioeconomic History    Marital status:      Spouse name: Not on file    Number of children: Not on file    Years of education: Not on file    Highest education level: Not on file   Occupational History    Not on file   Social Needs    Financial resource strain: Not on file    Food insecurity:     Worry: Not on file     Inability: Not on file    Transportation needs:     Medical: Not on file     Non-medical: Not on file   Tobacco Use    Smoking status: Former Smoker     Packs/day: 0.50     Years: 44.00     Pack years: 22.00     Types: Cigarettes, Vaping w/o nicotine     Start date: 1970     Last attempt to quit: 2017     Years since quittin.2    Smokeless tobacco: Never Used   Substance and Sexual Activity    Alcohol use: No    Drug use: No    Sexual activity: Not Currently     Birth control/protection: None   Lifestyle    Physical activity:     Days per week: Not on file     Minutes per session: Not on file    Stress: Not on file   Relationships    Social connections:     Talks on phone: Not on file     Gets together: Not on file     Attends Orthodox service: Not on file     Active member of club or organization: Not  "on file     Attends meetings of clubs or organizations: Not on file     Relationship status: Not on file   Other Topics Concern    Not on file   Social History Narrative    Not on file       Family History:   family history includes Heart attacks under age 50 in her brother and father; Heart disease in her mother.    Health Maintenance   Topic Date Due    Fecal Occult Blood Test (FOBT)/[employer]Kit  09/26/2019    Mammogram  01/05/2020    Lipid Panel  02/07/2020    High Dose Statin  08/14/2020    Aspirin/Antiplatelet Therapy  08/14/2020    TETANUS VACCINE  11/15/2026    Hepatitis C Screening  Completed    Pneumococcal Vaccine (Medium Risk)  Completed       Physical Exam:    Vital Signs  Temp: 98.6 °F (37 °C)  Temp src: Temporal  Pulse: 77  SpO2: 98 %  BP: 138/74  BP Location: Left arm  Patient Position: Sitting  Pain Score: 0-No pain  Height and Weight  Height: 5' 2" (157.5 cm)  Weight: 78.4 kg (172 lb 11.7 oz)  BSA (Calculated - sq m): 1.85 sq meters  BMI (Calculated): 31.7  Weight in (lb) to have BMI = 25: 136.4]    Body mass index is 31.59 kg/m².    Physical Exam   Musculoskeletal:        Feet:          Assessment:      ICD-10-CM ICD-9-CM   1. Plantar fascial fibromatosis of right foot M72.2 728.71         Plan:         Ft stretching exercises recommended if not improved please come back      No orders of the defined types were placed in this encounter.      Current Outpatient Medications   Medication Sig Dispense Refill    albuterol (PROAIR HFA) 90 mcg/actuation inhaler Inhale 2 puffs into the lungs every 6 (six) hours as needed. 18 g 6    albuterol-ipratropium (DUO-NEB) 2.5 mg-0.5 mg/3 mL nebulizer solution USE ONE VIAL IN NEBULIZER TWICE DAILY 270 mL 11    aspirin 81 MG Chew Take 81 mg by mouth once daily.      clopidogrel (PLAVIX) 75 mg tablet Take 1 tablet (75 mg total) by mouth once daily. 30 tablet 5    COMBIVENT RESPIMAT  mcg/actuation inhaler Inhale 2 puffs into the lungs every 6 (six) " hours as needed for Wheezing or Shortness of Breath. 4 g 11    DULoxetine (CYMBALTA) 60 MG capsule Take 1 capsule (60 mg total) by mouth once daily. 30 capsule 3    ergocalciferol, vitamin D2, (VITAMIN D ORAL) Take 800 Units by mouth once daily.       estrogens, conjugated, (PREMARIN) 0.3 MG tablet Take 1 tablet (0.3 mg total) by mouth once daily. 30 tablet 5    ezetimibe-simvastatin 10-40 mg (VYTORIN) 10-40 mg per tablet Take 1 tablet by mouth every evening. 30 tablet 5    felodipine (PLENDIL) 5 MG 24 hr tablet TAKE 1 TABLET BY MOUTH DAILY 30 tablet 1    furosemide (LASIX) 20 MG tablet Take 1 tablet (20 mg total) by mouth daily as needed. For leg swelling as needed. Or weight gain of 3 pounds in 1 day or 5 lbs in 1 week. 30 tablet 6    meloxicam (MOBIC) 7.5 MG tablet TAKE (1) TABLET BY MOUTH ONCE DAILY AS NEEDED. 30 tablet 1    metoprolol succinate (TOPROL-XL) 25 MG 24 hr tablet Take 0.5 tablets (12.5 mg total) by mouth every evening. 15 tablet 11    mometasone-formoterol (DULERA) 200-5 mcg/actuation inhaler INHALE 2 PUFFS 2 TIMES DAILY 13 g 5    OXYGEN-AIR DELIVERY SYSTEMS MISC 2 L by Misc.(Non-Drug; Combo Route) route every evening.      zolpidem (AMBIEN) 5 MG Tab Take 1 tablet (5 mg total) by mouth nightly. 30 tablet 5     No current facility-administered medications for this visit.        Medications Discontinued During This Encounter   Medication Reason    predniSONE (DELTASONE) 20 MG tablet Therapy completed    tiotropium bromide (SPIRIVA RESPIMAT) 2.5 mcg/actuation Mist Patient no longer taking    pramoxine-hydrocortisone cream Patient no longer taking       No follow-ups on file.      Olga Altman MD

## 2019-08-29 ENCOUNTER — CLINICAL SUPPORT (OUTPATIENT)
Dept: PULMONOLOGY | Facility: CLINIC | Age: 64
End: 2019-08-29
Payer: MEDICAID

## 2019-08-29 ENCOUNTER — OFFICE VISIT (OUTPATIENT)
Dept: PULMONOLOGY | Facility: CLINIC | Age: 64
End: 2019-08-29
Payer: MEDICAID

## 2019-08-29 VITALS
DIASTOLIC BLOOD PRESSURE: 68 MMHG | BODY MASS INDEX: 31.83 KG/M2 | HEART RATE: 72 BPM | HEIGHT: 62 IN | WEIGHT: 173 LBS | OXYGEN SATURATION: 98 % | RESPIRATION RATE: 16 BRPM | SYSTOLIC BLOOD PRESSURE: 136 MMHG

## 2019-08-29 DIAGNOSIS — J44.9 CHRONIC OBSTRUCTIVE PULMONARY DISEASE, UNSPECIFIED COPD TYPE: ICD-10-CM

## 2019-08-29 DIAGNOSIS — J44.9 COPD, MODERATE: Primary | ICD-10-CM

## 2019-08-29 DIAGNOSIS — R09.02 EXERCISE HYPOXEMIA: ICD-10-CM

## 2019-08-29 PROCEDURE — 94060 EVALUATION OF WHEEZING: CPT | Mod: 26,S$PBB,, | Performed by: INTERNAL MEDICINE

## 2019-08-29 PROCEDURE — 94060 PR EVAL OF BRONCHOSPASM: ICD-10-PCS | Mod: 26,S$PBB,, | Performed by: INTERNAL MEDICINE

## 2019-08-29 PROCEDURE — 99999 PR PBB SHADOW E&M-EST. PATIENT-LVL III: CPT | Mod: PBBFAC,,, | Performed by: INTERNAL MEDICINE

## 2019-08-29 PROCEDURE — 99214 PR OFFICE/OUTPT VISIT, EST, LEVL IV, 30-39 MIN: ICD-10-PCS | Mod: 25,S$PBB,, | Performed by: INTERNAL MEDICINE

## 2019-08-29 PROCEDURE — 99999 PR PBB SHADOW E&M-EST. PATIENT-LVL III: ICD-10-PCS | Mod: PBBFAC,,, | Performed by: INTERNAL MEDICINE

## 2019-08-29 PROCEDURE — 99214 OFFICE O/P EST MOD 30 MIN: CPT | Mod: 25,S$PBB,, | Performed by: INTERNAL MEDICINE

## 2019-08-29 PROCEDURE — 94060 EVALUATION OF WHEEZING: CPT | Mod: PBBFAC

## 2019-08-29 PROCEDURE — 99213 OFFICE O/P EST LOW 20 MIN: CPT | Mod: PBBFAC,25 | Performed by: INTERNAL MEDICINE

## 2019-08-29 RX ORDER — IPRATROPIUM BROMIDE AND ALBUTEROL 20; 100 UG/1; UG/1
2 SPRAY, METERED RESPIRATORY (INHALATION) EVERY 6 HOURS PRN
Qty: 4 G | Refills: 11 | Status: SHIPPED | OUTPATIENT
Start: 2019-08-29 | End: 2020-03-10 | Stop reason: SDUPTHER

## 2019-08-29 NOTE — PROGRESS NOTES
Subjective:       Patient ID: Gemma Vick is a 64 y.o. female.    Chief Complaint: COPD    HPI COPD  She presents for evaluation and treatment of COPD. The patient is not currently have symptoms / an exacerbation. The patient has COPD for approximately 5 years. Symptoms in previous episodes have included dyspnea, cough and wheezing, and typically last 2 weeks. Previous episodes have been exacerbated by moderate activity. Current treatment includes albuterol inhaler and inhaled steroid, which generally provides some relief of symptoms.   She uses 1 pillows at night. Patient currently is on oxygen at 2 L/min per nasal cannula. - at night. The patient is having no constitutional symptoms, denying fever, chills, anorexia, or weight loss. The patient has been hospitalized for this condition before. She quit smoking approximately 4 years ago. The patient is experiencing exercise intolerance (difficulty walking 1 blocks on flat ground and difficulty climbing 1 flights of stairs).    Dulera worked better than spiriva.  Patient has stopped taking Spiriva due to cost and lack of effectiveness.  Patient already is taking Combivent Respimat that has an anticholinergic medication in the inhaler    Past Medical History:   Diagnosis Date    AAA (abdominal aortic aneurysm) 2/13/2014    Abdominal aneurysm     3    Acute coronary syndrome     Arthritis     BPPV (benign paroxysmal positional vertigo)     Carotid artery plaque     Carotid artery stenosis and occlusion 2/13/2014    Chronic back pain     COPD (chronic obstructive pulmonary disease)     Coronary artery disease     Emphysema lung     Hyperlipidemia     Hypertension     Myocardial infarction     x3    Neuropathy      Past Surgical History:   Procedure Laterality Date    CARDIAC CATHETERIZATION      CORONARY ANGIOPLASTY      HYSTERECTOMY  1988     Social History     Socioeconomic History    Marital status:      Spouse name: Not on file     Number of children: Not on file    Years of education: Not on file    Highest education level: Not on file   Occupational History    Not on file   Social Needs    Financial resource strain: Not on file    Food insecurity:     Worry: Not on file     Inability: Not on file    Transportation needs:     Medical: Not on file     Non-medical: Not on file   Tobacco Use    Smoking status: Former Smoker     Packs/day: 0.50     Years: 44.00     Pack years: 22.00     Types: Cigarettes, Vaping w/o nicotine     Start date: 1970     Last attempt to quit: 2017     Years since quittin.3    Smokeless tobacco: Never Used   Substance and Sexual Activity    Alcohol use: No    Drug use: No    Sexual activity: Not Currently     Birth control/protection: None   Lifestyle    Physical activity:     Days per week: Not on file     Minutes per session: Not on file    Stress: Not on file   Relationships    Social connections:     Talks on phone: Not on file     Gets together: Not on file     Attends Sabianism service: Not on file     Active member of club or organization: Not on file     Attends meetings of clubs or organizations: Not on file     Relationship status: Not on file   Other Topics Concern    Not on file   Social History Narrative    Not on file     Review of Systems   Constitutional: Positive for fatigue. Negative for fever.   HENT: Positive for postnasal drip, rhinorrhea and congestion.    Eyes: Negative for redness and itching.   Respiratory: Positive for cough, sputum production, shortness of breath, dyspnea on extertion, use of rescue inhaler and Paroxysmal Nocturnal Dyspnea.    Cardiovascular: Negative for chest pain, palpitations and leg swelling.   Genitourinary: Negative for difficulty urinating and hematuria.   Endocrine: Negative for cold intolerance and heat intolerance.    Skin: Negative for rash.   Gastrointestinal: Negative for nausea and abdominal pain.   Neurological: Negative for  dizziness, syncope, weakness and light-headedness.   Hematological: Negative for adenopathy. Does not bruise/bleed easily.   Psychiatric/Behavioral: Negative for sleep disturbance. The patient is not nervous/anxious.        Objective:      Physical Exam   Constitutional: She is oriented to person, place, and time. She appears well-developed and well-nourished.   HENT:   Head: Normocephalic and atraumatic.   Mouth/Throat: Oropharyngeal exudate present.   Eyes: Pupils are equal, round, and reactive to light. Conjunctivae are normal.   Neck: Neck supple. No JVD present. No tracheal deviation present. No thyromegaly present.   Cardiovascular: Normal rate, regular rhythm and normal heart sounds.   Pulmonary/Chest: Effort normal. No respiratory distress. She has decreased breath sounds. She has wheezes in the right lower field and the left lower field. She has no rhonchi. She has no rales. She exhibits no tenderness.   Abdominal: Soft. Bowel sounds are normal.   Musculoskeletal: Normal range of motion. She exhibits no edema.   Lymphadenopathy:     She has no cervical adenopathy.   Neurological: She is alert and oriented to person, place, and time.   Skin: Skin is warm and dry.   Nursing note and vitals reviewed.    Personal Diagnostic Review  Chest X-Ray: I personally reviewed the films and findings are:, air trapping/emphysema  Pulmonary function tests:  Mod obstruction      Pulmonary Studies Review 8/29/2019 8/14/2019 5/22/2019 3/25/2019 3/21/2019 2/28/2019 2/24/2019   FVC - - - - - - -   FVC% - - - - - - -   FEV1 - - - - - - -   FEV1% - - - - - - -   FEV1/FVC - - - - - - -   SpO2 98 98 96 98 98 97 98   Ordering Provider - - - - - - -   Interpreting Provider - - - - - - -   Performing nurse/tech/RT - - - - - - -   Diagnosis - - - - - - -   Height 62.000 62.000 62.000 62.000 62.000 62.000 62.000   Weight 2768 2763.69 2714.3 2733.7 2733.7 2709.01 2596.14   BMI (Calculated) 31.7 31.7 31.1 31.3 31.3 31 29.7   Predicted  Distance 307.07 307.07 310.82 309.57 309.57 311.44 319.55   Patient Race - - - - - - -   6MWT Status - - - - - - -   Patient Reported - - - - - - -   6MW Distance walked (feet) - - - - - - -   Distance walked (meters) - - - - - - -   Did patient stop? - - - - - - -   Type of assistive device(s) used? - - - - - - -   Is extra documentation required for this patient? - - - - - - -   Oxygen Saturation - - - - - - -   Supplemental Oxygen - - - - - - -   Heart Rate - - - - - - -   Blood Pressure - - - - - - -   Oxygen Saturation - - - - - - -   Supplemental Oxygen - - - - - - -   Heart Rate - - - - - - -   Blood Pressure - - - - - - -   Richard Dyspnea Rating  - - - - - - -   Recovery Time (seconds) - - - - - - -   Oxygen Saturation - - - - - - -   Heart Rate - - - - - - -   Blood Pressure - - - - - - -   Richard Dyspnea Rating  - - - - - - -   Is procedure ready for interpretation? - - - - - - -   Total Time Walked (Calculated) - - - - - - -   Total Laps Walked - - - - - - -   Final Partial Lap Distance (feet) - - - - - - -   Total Distance Feet (Calculated) - - - - - - -   Total Distance Meters (Calculated) - - - - - - -   Predicted Distance Meters (Calculated) 449.7 449.98 453.19 451.93 451.93 453.53 460.86   Percentage of Predicted (Calculated) - - - - - - -   Peak VO2 (Calculated) - - - - - - -   Mets - - - - - - -   Has The Patient Had a Previous Six Minute Walk Test? - - - - - - -   Oxygen Qualification? - - - - - - -     No flowsheet data found.      Assessment:       1. COPD, moderate    2. Chronic obstructive pulmonary disease, unspecified COPD type    3. Exercise hypoxemia        Outpatient Encounter Medications as of 8/29/2019   Medication Sig Dispense Refill    albuterol (PROAIR HFA) 90 mcg/actuation inhaler Inhale 2 puffs into the lungs every 6 (six) hours as needed. 18 g 6    albuterol-ipratropium (DUO-NEB) 2.5 mg-0.5 mg/3 mL nebulizer solution USE ONE VIAL IN NEBULIZER TWICE DAILY 270 mL 11    aspirin 81 MG  Chew Take 81 mg by mouth once daily.      clopidogrel (PLAVIX) 75 mg tablet Take 1 tablet (75 mg total) by mouth once daily. 30 tablet 5    COMBIVENT RESPIMAT  mcg/actuation inhaler Inhale 2 puffs into the lungs every 6 (six) hours as needed for Wheezing or Shortness of Breath. 4 g 11    DULoxetine (CYMBALTA) 60 MG capsule Take 1 capsule (60 mg total) by mouth once daily. 30 capsule 3    ergocalciferol, vitamin D2, (VITAMIN D ORAL) Take 800 Units by mouth once daily.       estrogens, conjugated, (PREMARIN) 0.3 MG tablet Take 1 tablet (0.3 mg total) by mouth once daily. 30 tablet 5    ezetimibe-simvastatin 10-40 mg (VYTORIN) 10-40 mg per tablet Take 1 tablet by mouth every evening. 30 tablet 5    felodipine (PLENDIL) 5 MG 24 hr tablet TAKE 1 TABLET BY MOUTH DAILY 30 tablet 1    furosemide (LASIX) 20 MG tablet Take 1 tablet (20 mg total) by mouth daily as needed. For leg swelling as needed. Or weight gain of 3 pounds in 1 day or 5 lbs in 1 week. 30 tablet 6    meloxicam (MOBIC) 7.5 MG tablet TAKE (1) TABLET BY MOUTH ONCE DAILY AS NEEDED. 30 tablet 1    metoprolol succinate (TOPROL-XL) 25 MG 24 hr tablet Take 0.5 tablets (12.5 mg total) by mouth every evening. 15 tablet 11    mometasone-formoterol (DULERA) 200-5 mcg/actuation inhaler INHALE 2 PUFFS 2 TIMES DAILY 13 g 11    OXYGEN-AIR DELIVERY SYSTEMS MISC 2 L by Misc.(Non-Drug; Combo Route) route every evening.      zolpidem (AMBIEN) 5 MG Tab Take 1 tablet (5 mg total) by mouth nightly. 30 tablet 5    [DISCONTINUED] COMBIVENT RESPIMAT  mcg/actuation inhaler Inhale 2 puffs into the lungs every 6 (six) hours as needed for Wheezing or Shortness of Breath. 4 g 11    [DISCONTINUED] mometasone-formoterol (DULERA) 200-5 mcg/actuation inhaler INHALE 2 PUFFS 2 TIMES DAILY 13 g 5     No facility-administered encounter medications on file as of 8/29/2019.      Orders Placed This Encounter   Procedures    Six Minute Walk Test to qualify for Home Oxygen      Standing Status:   Future     Standing Expiration Date:   8/29/2020     Plan:       Requested Prescriptions     Signed Prescriptions Disp Refills    COMBIVENT RESPIMAT  mcg/actuation inhaler 4 g 11     Sig: Inhale 2 puffs into the lungs every 6 (six) hours as needed for Wheezing or Shortness of Breath.    mometasone-formoterol (DULERA) 200-5 mcg/actuation inhaler 13 g 11     Sig: INHALE 2 PUFFS 2 TIMES DAILY     COPD, moderate  -     Six Minute Walk Test to qualify for Home Oxygen; Future    Chronic obstructive pulmonary disease, unspecified COPD type  -     COMBIVENT RESPIMAT  mcg/actuation inhaler; Inhale 2 puffs into the lungs every 6 (six) hours as needed for Wheezing or Shortness of Breath.  Dispense: 4 g; Refill: 11  -     mometasone-formoterol (DULERA) 200-5 mcg/actuation inhaler; INHALE 2 PUFFS 2 TIMES DAILY  Dispense: 13 g; Refill: 11    Exercise hypoxemia  -     Six Minute Walk Test to qualify for Home Oxygen; Future      Follow up in about 6 months (around 2/29/2020) for 6 min walk.    MEDICAL DECISION MAKING: Moderate  complexity.  Overall, the multiple problems listed are of moderate severity that may impact quality of life and activities of daily living. Side effects of medications, treatment plan as well as options and alternatives reviewed and discussed with patient. There was counseling of patient concerning these issues.    Total time spent in face to face counseling and coordination of care - 25 minutes over 50% of time was used in discussion of prognosis, risks, benefits of treatment, instructions and compliance with regimen . Discussion with other physicians and/or health care providers ( home health or for use of durable medical equipment (oxygen, nebulizers, CPAP, BiPAP) occurred.

## 2019-08-30 ENCOUNTER — PATIENT OUTREACH (OUTPATIENT)
Dept: OTHER | Facility: OTHER | Age: 64
End: 2019-08-30

## 2019-08-30 NOTE — PROGRESS NOTES
Last 5 Patient Entered Readings                                      Current 30 Day Average: 156/79     Recent Readings 8/30/2019 8/25/2019 8/24/2019 8/20/2019 8/19/2019    SBP (mmHg) 163 170 144 166 163    DBP (mmHg) 84 85 75 83 83    Pulse 70 73 86 68 69        Digital Medicine: Health  Introduction Call     Left voicemail and requested call back in order to complete Digital Medicine health  introduction call.

## 2019-08-30 NOTE — PROGRESS NOTES
Digital Medicine Enrollment Call    Introduced Mrs. Gemma Vick to Digital Medicine.     Discussed program expectations and requirements.    Introduced digital medicine care team.     Reviewed the importance of self-monitoring for digital medicine participation.     Reviewed that the Digital Medicine team is not available for emergencies and instructed the patient to call 911 or [facility] ([phone] or [phone]) if one arises.          Last 5 Patient Entered Readings                                      Current 30 Day Average: 156/78     Recent Readings 8/25/2019 8/24/2019 8/20/2019 8/19/2019 8/16/2019    SBP (mmHg) 170 144 166 163 154    DBP (mmHg) 85 75 83 83 74    Pulse 73 86 68 69 78

## 2019-09-03 NOTE — PROGRESS NOTES
Last 5 Patient Entered Readings                                      Current 30 Day Average: 157/79     Recent Readings 9/1/2019 8/30/2019 8/25/2019 8/24/2019 8/20/2019    SBP (mmHg) 170 163 170 144 166    DBP (mmHg) 83 84 85 75 83    Pulse 72 70 73 86 68          Digital Medicine: Health  Introduction Call     9/3/2019: Left voicemail and requested call back in order to complete Digital Medicine health  introduction call.

## 2019-09-04 LAB
BRPFT: ABNORMAL
FEF 25 75 CHG: -2 %
FEF 25 75 LLN: 0.97
FEF 25 75 POST REF: 24.2 %
FEF 25 75 PRE REF: 24.7 %
FEF 25 75 REF: 1.98
FET100 CHG: -5.6 %
FEV1 CHG: 1.7 %
FEV1 FVC CHG: 0.7 %
FEV1 FVC LLN: 67
FEV1 FVC POST REF: 69.7 %
FEV1 FVC PRE REF: 69.3 %
FEV1 FVC REF: 79
FEV1 LLN: 1.64
FEV1 POST REF: 59.9 %
FEV1 PRE REF: 58.9 %
FEV1 REF: 2.2
FVC CHG: 1 %
FVC LLN: 2.09
FVC POST REF: 85.4 %
FVC PRE REF: 84.6 %
FVC REF: 2.8
PEF CHG: 11.9 %
PEF LLN: 4.13
PEF POST REF: 51.5 %
PEF PRE REF: 46 %
PEF REF: 5.73
POST FEF 25 75: 0.48 L/S (ref 0.97–2.99)
POST FET 100: 8.46 SEC
POST FEV1 FVC: 55.17 % (ref 66.5–91.71)
POST FEV1: 1.32 L (ref 1.64–2.76)
POST FVC: 2.39 L (ref 2.09–3.51)
POST PEF: 2.95 L/S (ref 4.13–7.32)
PRE FEF 25 75: 0.49 L/S (ref 0.97–2.99)
PRE FET 100: 8.97 SEC
PRE FEV1 FVC: 54.8 % (ref 66.5–91.71)
PRE FEV1: 1.3 L (ref 1.64–2.76)
PRE FVC: 2.37 L (ref 2.09–3.51)
PRE PEF: 2.63 L/S (ref 4.13–7.32)

## 2019-09-05 ENCOUNTER — PATIENT MESSAGE (OUTPATIENT)
Dept: FAMILY MEDICINE | Facility: CLINIC | Age: 64
End: 2019-09-05

## 2019-09-05 DIAGNOSIS — M72.2 PLANTAR FASCIAL FIBROMATOSIS OF RIGHT FOOT: Primary | ICD-10-CM

## 2019-09-09 ENCOUNTER — PATIENT OUTREACH (OUTPATIENT)
Dept: OTHER | Facility: OTHER | Age: 64
End: 2019-09-09

## 2019-09-09 NOTE — PROGRESS NOTES
"Digital Medicine: Clinician Introduction    Gemma Vick is a 64 y.o. female who is newly enrolled in the Digital Medicine Clinic.    The following information was reviewed and updated:  Preferred pharmacy   [facility] - [place] - [address] [address]  [address]  [place] [address]  Phone: [phone] Fax: [phone]      Patient prefers a 30 days supply.     Review of patient's allergies indicates:  No Known Allergies    The history is provided by the patient. No  was used.   She states that sometimes she does not take her antihypertensive medications if her "blood pressure is good that day". She reports that she sets alarms to remind her to check her blood pressure at least once daily. She denies any issues with medication affordability. She denies any symptoms associated with hypertension or hypotension.     Last 5 Patient Entered Readings                                      Current 30 Day Average: 157/79     Recent Readings 9/7/2019 9/6/2019 9/4/2019 9/1/2019 8/30/2019    SBP (mmHg) 150 158 148 170 163    DBP (mmHg) 82 85 76 83 84    Pulse 76 70 81 72 70          Depression  Gemma Vick is in treatment for depression and is currently taking medication.    Sleep Apnea  Patient not previously diagnosed with GALLO and     Medication Affordability  Patient is currently not having problems affording medications      INTERVENTION(S)  reviewed appropriate dose schedule, reviewed monitoring technique, encouragement/support and goal setting    PLAN  patient verbalizes understanding and patient amenable to changes  Informed patient to take felodipine and metoprolol every day as prescribed, and check BP at least 1 hour after.    There are no preventive care reminders to display for this patient.    Current Medication Regimen:  Hypertension Medications             felodipine (PLENDIL) 5 MG 24 hr tablet TAKE 1 TABLET BY MOUTH DAILY    furosemide (LASIX) 20 MG tablet Take 1 " tablet (20 mg total) by mouth daily as needed. For leg swelling as needed. Or weight gain of 3 pounds in 1 day or 5 lbs in 1 week.    metoprolol succinate (TOPROL-XL) 25 MG 24 hr tablet Take 0.5 tablets (12.5 mg total) by mouth every evening.        Reviewed the importance of self-monitoring, medication adherence, and that the health  can be used as a resource for lifestyle modifications to help reduce or maintain a healthy lifestyle.    Sent link to [facility] webpages and my contact information via [employer] for future questions. Follow up scheduled.

## 2019-09-13 ENCOUNTER — OFFICE VISIT (OUTPATIENT)
Dept: PODIATRY | Facility: CLINIC | Age: 64
End: 2019-09-13
Payer: MEDICAID

## 2019-09-13 ENCOUNTER — HOSPITAL ENCOUNTER (OUTPATIENT)
Dept: RADIOLOGY | Facility: HOSPITAL | Age: 64
Discharge: HOME | End: 2019-09-13
Attending: PODIATRIST
Payer: MEDICAID

## 2019-09-13 VITALS
BODY MASS INDEX: 31.97 KG/M2 | HEART RATE: 66 BPM | WEIGHT: 173.75 LBS | DIASTOLIC BLOOD PRESSURE: 84 MMHG | SYSTOLIC BLOOD PRESSURE: 158 MMHG | HEIGHT: 62 IN

## 2019-09-13 DIAGNOSIS — M79.671 FOOT PAIN, RIGHT: ICD-10-CM

## 2019-09-13 DIAGNOSIS — M76.60 ACHILLES TENDINITIS, UNSPECIFIED LATERALITY: ICD-10-CM

## 2019-09-13 DIAGNOSIS — M79.671 PAIN OF RIGHT HEEL: ICD-10-CM

## 2019-09-13 DIAGNOSIS — M79.671 FOOT PAIN, RIGHT: Primary | ICD-10-CM

## 2019-09-13 DIAGNOSIS — M72.2 PLANTAR FASCIITIS: Primary | ICD-10-CM

## 2019-09-13 PROCEDURE — 99204 OFFICE O/P NEW MOD 45 MIN: CPT | Mod: S$PBB,,, | Performed by: PODIATRIST

## 2019-09-13 PROCEDURE — 99213 OFFICE O/P EST LOW 20 MIN: CPT | Mod: PBBFAC,25 | Performed by: PODIATRIST

## 2019-09-13 PROCEDURE — 99204 PR OFFICE/OUTPT VISIT, NEW, LEVL IV, 45-59 MIN: ICD-10-PCS | Mod: S$PBB,,, | Performed by: PODIATRIST

## 2019-09-13 PROCEDURE — 99999 PR PBB SHADOW E&M-EST. PATIENT-LVL III: CPT | Mod: PBBFAC,,, | Performed by: PODIATRIST

## 2019-09-13 PROCEDURE — 73630 XR FOOT COMPLETE 3 VIEW RIGHT: ICD-10-PCS | Mod: 26,RT,, | Performed by: RADIOLOGY

## 2019-09-13 PROCEDURE — 73630 X-RAY EXAM OF FOOT: CPT | Mod: TC,RT

## 2019-09-13 PROCEDURE — 99999 PR PBB SHADOW E&M-EST. PATIENT-LVL III: ICD-10-PCS | Mod: PBBFAC,,, | Performed by: PODIATRIST

## 2019-09-13 PROCEDURE — 73630 X-RAY EXAM OF FOOT: CPT | Mod: 26,RT,, | Performed by: RADIOLOGY

## 2019-09-13 NOTE — PROGRESS NOTES
Subjective:       Patient ID: Gemma Vick is a 64 y.o. female.    Chief Complaint: Heel Pain (Pt c/o of sharp pain in R heel, ongoing 3 months, rates pain 8/10, non diabetic, wears tennis w/ socks, PCP Dr Altman, last visit; 8/14/19.) and Foot Problem (Pt also c/o of numbness of the toes in left foot.)      HPI: Gemma Vick complains of right plantar foot pain which she says is worsened with weight-bearing.  She states that the pain is 8/10 at its worst and has been ongoing for approximately 3 month since July.  She states very painful for her to get out of bed without wearing shoes.  She states that she is unable to weight bear bare feet.  She reports that she has taken meloxicam at home with minimal improvement.  She states also reports that she purchased some Dr. Caceres over-the-counter inserts and a heel lift which she notice does help some.  She does limp with a gait. She does not note any identifiable trauma recently.  She states that her pain is worse towards the end of the day and after long periods rest. Patient's Primary Care Provider is Olga Altman MD.     Review of patient's allergies indicates:  No Known Allergies    Past Medical History:   Diagnosis Date    AAA (abdominal aortic aneurysm) 2/13/2014    Abdominal aneurysm     3    Acute coronary syndrome     Arthritis     BPPV (benign paroxysmal positional vertigo)     Carotid artery plaque     Carotid artery stenosis and occlusion 2/13/2014    Chronic back pain     COPD (chronic obstructive pulmonary disease)     Coronary artery disease     Emphysema lung     Hyperlipidemia     Hypertension     Myocardial infarction     x3    Neuropathy        Family History   Problem Relation Age of Onset    Heart disease Mother     Heart attacks under age 50 Father     Heart attacks under age 50 Brother        Social History     Socioeconomic History    Marital status:      Spouse name: Not on file    Number of children: Not on  file    Years of education: Not on file    Highest education level: Not on file   Occupational History    Not on file   Social Needs    Financial resource strain: Not on file    Food insecurity:     Worry: Not on file     Inability: Not on file    Transportation needs:     Medical: Not on file     Non-medical: Not on file   Tobacco Use    Smoking status: Former Smoker     Packs/day: 0.50     Years: 44.00     Pack years: 22.00     Types: Cigarettes, Vaping w/o nicotine     Start date: 1970     Last attempt to quit: 2017     Years since quittin.3    Smokeless tobacco: Never Used   Substance and Sexual Activity    Alcohol use: No    Drug use: No    Sexual activity: Not Currently     Birth control/protection: None   Lifestyle    Physical activity:     Days per week: Not on file     Minutes per session: Not on file    Stress: Not on file   Relationships    Social connections:     Talks on phone: Not on file     Gets together: Not on file     Attends L[employer] service: Not on file     Active member of club or organization: Not on file     Attends meetings of clubs or organizations: Not on file     Relationship status: Not on file   Other Topics Concern    Not on file   Social History Narrative    Not on file       Past Surgical History:   Procedure Laterality Date    CARDIAC CATHETERIZATION      CORONARY ANGIOPLASTY      HYSTERECTOMY  1988       Review of Systems   Constitutional: Positive for activity change (Right foot pain). Negative for appetite change, chills and fever.   HENT: Negative for sinus pain and sore throat.    Eyes: Negative for pain and visual disturbance.   Respiratory: Negative for cough and shortness of breath.    Cardiovascular: Negative for chest pain and palpitations.   Gastrointestinal: Negative for constipation, diarrhea, nausea and vomiting.   Musculoskeletal: Positive for gait problem (Associated with right foot pain). Negative for back pain and joint swelling.  "  Skin: Negative for color change, pallor, rash and wound.   Neurological: Negative for dizziness, weakness and numbness.   Psychiatric/Behavioral: The patient is not nervous/anxious.          Objective:   BP (!) 158/84 (BP Location: Right arm, Patient Position: Sitting, BP Method: Medium (Automatic))   Pulse 66   Ht 5' 2" (1.575 m)   Wt 78.8 kg (173 lb 11.6 oz)   BMI 31.77 kg/m²     X-Ray Chest PA And Lateral  Narrative: EXAMINATION:  XR CHEST PA AND LATERAL    CLINICAL HISTORY:  Chronic obstructive pulmonary disease, unspecified    TECHNIQUE:  PA and lateral views of the chest were performed.    COMPARISON:  July 31, 2018, January 10, 2018    FINDINGS:  No significant interval change.  Lungs are symmetrically aerated and clear.  CP angles are sharp and trachea midline.  Heart and pulmonary vasculature within normal limits.  Osseous structures intact.  Stable small calcific density lateral margin left midlung field.  Osseous structures intact.  Minimal spondylosis.  Surgical clips visualized projecting over upper abdomen on the lateral view.  Impression: Stable exam without acute infiltrate.    Electronically signed by: Ritchie Otto MD  Date:    05/22/2019  Time:    14:05      Physical Exam   Constitutional: She is well-developed, well-nourished, and in no distress. No distress.   Cardiovascular: Normal rate.   Pulmonary/Chest: Effort normal. No respiratory distress. She has no wheezes.   Musculoskeletal: She exhibits tenderness (On the right plantar heel at the  insertion of the plantar fascia of the intermediate band.  Tenderness on palpation of the insertion of the Achilles tendon on the posterior superior aspect of the calcaneal tuberosity) and deformity (Equinus deformity noted bilaterally). She exhibits no edema.   Neurological: She is alert. She exhibits normal muscle tone. Coordination normal.   Skin: Skin is warm and dry. No rash noted. She is not diaphoretic. No erythema. No pallor.   Vitals " reviewed.     LOWER EXTREMITY PHYSICAL EXAMINATION    VASCULAR: The right DP pulse is 2/4 and the left DP is 2/4. The right PT pulse is 2/4 and the left PT pulse is 2/4. Proximal to distal, warm to warm. No dependent rubor or elevation palor is noted. Capillary refill time is less than 3 seconds. Hair growth is appreciated to the dorsal foot and digits.    NEUROLOGY: Proprioception is intact, bilateral. Sensation to light touch is intact. Negative Tinel's Sign and negative Valleix Sign. No neurological sensations with compression of the area of Batista's Nerve in the area of the Abductor Hallucis muscle belly.    ORTHOPEDIC:  Pain on palpation of the central band of the plantar fascia at the insertion point at the calcaneal tuberosity.  There is no areas of thickening or masses noted.  No evidence of plantar fibromas.  There is no evidence of rupture or deficits.  There is minimal discomfort on palpation of the medial band.  There is no edema present.  On activation of the windlass mechanism, there is exacerbation of the discomfort on palpation.  Patient does have an equinus contracture bilaterally. There is also pain on palpation on the insertion of the Achilles tendon at the posterior superior aspect the calcaneal tuberosity.  There is no bursa sac palpated.  She does walk with antalgic gait.     DERMATOLOGY: No ecchymosis is noted.  Skin is supple, dry and intact. Skin is supple.  No hyperkeratosis noted. No calluses.  No open wounds or ulcerations are noted.  No palpable plantar fibromas noted.    Assessment:     1. Plantar fasciitis - Right Foot    2. Insertional Achilles tendinitis with calcific spur, unspecified laterality    3. Pain of right heel          Plan:     Plantar fasciitis - Right Foot    Insertional Achilles tendinitis with calcific spur, unspecified laterality    Pain of right heel    Thorough discussion is had with the patient this afternoon, concerning the diagnosis, its etiology, and the  treatment algorithm at present.    X-rays were taken and reviewed by me today in clinic with the patient.    I explained to the patient that etiology and treatment options for heel pain including rest,  ice messages, stretching exercises, strappings/tappings, NSAID's, injections, new shoegear with orthotic inserts, and/or surgical treatment. I also discussed a possible injection of steroid to help calm down the inflammation sooner, but patient would like to hold on this option at this time. I expressed the importance of wearing the orthotics in culmination of other treatment modalities. Patient agreed to stretching exercises and inserts which will help with the plantar fasciitis and insertional Achilles tendinitis. I gave written and verbal instructions on heel cord stretching and this was demonstrated for the patient. Patient expressed understanding and had the patient teach back the instructions.  We also instructed the patient about the break-in period associated with the orthotics.  She will wear these a couple hours a day and then return to regular shoe gear.  She will then have additional hour each day until she is able to tolerate for a full day. Patient instructed on adequate icing techniques which should be done for acute pain and inflammation.    Patient will follow up in 1 month.          Future Appointments   Date Time Provider Department Center   9/17/2019  8:00 [facility] [place]   9/24/2019 10:30 AM [facility] [place] ON CARDIO  [facility]   9/26/2019  2:20 PM Olga Altman MD [facility] MED [place]   10/14/2019  8:00 AM Daphney Bass DPM ON POD  [facility]   3/10/2020  1:45 PM PULMONARY [facility] ON PULMFS  [facility]   3/10/2020  2:20 PM Andres Morales MD ON PULMSVC  [facility]

## 2019-09-13 NOTE — PATIENT INSTRUCTIONS
Plantar Fasciitis  Plantar fasciitis is a painful swelling of the plantar fascia. The plantar fascia is a thick, fibrous layer of tissue. It covers the bones on the bottom of your foot. And it supports the foot bones in an arched position.  This can happen gradually or suddenly. It usually affects one foot at a time. Heel pain can be sharp, like a knife sticking into the bottom of your foot. You may feel pain after exercising, long-distance jogging, stair climbing, long periods of standing, or after standing up.  Risk factors include: non-active lifestyle, arthritis, diabetes, obesity or recent weight gain, flat foot, high arch. Wearing high heels, loose shoes, or shoes with poor arch support for long periods of time adds to the risk. This problem is commonly found in runners and dancers. It also found in people who stand on hard surfaces for long periods of time.  Foot pain from this condition is usually worse in the morning. But it improves with walking. By the end of the day there may be a dull aching. Treatment requires short-term rest and controlling swelling. It may take up to 9 months before all symptoms go away. Rarely, a steroid injection into the foot, or surgery, may be needed.  Home care  · If you are overweight, lose weight to help healing.  · Choose supportive shoes with good arch support and shock absorbency. Replace athletic shoes when they become worn out. Dont walk or run barefoot.  · Premade or custom-fitted shoe inserts may be helpful. Inserts made of silicone seem to be the most effective. Custom-made inserts can be provided by a podiatrist or foot specialist, physical therapist, or orthopedist.  · Premade or custom-made night splints keep the heel stretched out while you sleep. They may prevent morning pain.  · Avoid activities that stress the feet: jogging, prolonged standing or walking, contact sports, etc.  · First thing in the morning and before sports, stretch the bottom of your feet.  Gently flex your ankle so the toes move toward your knee.  · Icing may help control heel pain. Apply an ice pack to the heel for 10-20 minutes as a preventive. Or ice your heel after a severe flare-up of symptoms. You may repeat this every 1-2 hours as needed.  · You may use over-the-counter pain medicine to control pain, unless another medicine was prescribed. Anti-inflammatory pain medicines, such as ibuprofen or naproxen, may work better than acetaminophen. If you have chronic liver or kidney disease or ever had a stomach ulcer or GI bleeding, talk with your healthcare provider before using these medicines.  Follow-up care  Follow up with your healthcare provider, physical therapist, or podiatrist or foot specialist as advised.  Call for an appointment if pain worsens or there is no relief after a few weeks of home treatment. Shoe inserts, a night splint, or a special boot may be required.  If X-rays were taken, you will be told of any new findings that may affect your care.  When to seek medical advice  Call your healthcare provider right away if any of these occur:  · Foot swelling  · Redness with increasing pain  Date Last Reviewed: 11/21/2015  © 3535-6832 [email or web address]. [address] [place] [address], [place] [address]. All rights reserved. This information is not intended as a substitute for professional medical care. Always follow your healthcare professional's instructions.

## 2019-09-17 ENCOUNTER — LAB VISIT (OUTPATIENT)
Dept: LAB | Facility: HOSPITAL | Age: 64
End: 2019-09-17
Attending: FAMILY MEDICINE
Payer: MEDICAID

## 2019-09-17 DIAGNOSIS — I25.118 CORONARY ARTERY DISEASE OF NATIVE ARTERY OF NATIVE HEART WITH STABLE ANGINA PECTORIS: ICD-10-CM

## 2019-09-17 DIAGNOSIS — I10 ESSENTIAL HYPERTENSION: ICD-10-CM

## 2019-09-17 DIAGNOSIS — I71.40 ABDOMINAL AORTIC ANEURYSM (AAA) WITHOUT RUPTURE: ICD-10-CM

## 2019-09-17 DIAGNOSIS — I65.23 BILATERAL CAROTID ARTERY STENOSIS: ICD-10-CM

## 2019-09-17 PROCEDURE — 80061 LIPID PANEL: CPT

## 2019-09-17 PROCEDURE — 80053 COMPREHEN METABOLIC PANEL: CPT

## 2019-09-17 PROCEDURE — 36415 COLL VENOUS BLD VENIPUNCTURE: CPT | Mod: PO

## 2019-09-18 LAB
ALBUMIN SERPL BCP-MCNC: 3.5 G/DL (ref 3.5–5.2)
ALP SERPL-CCNC: 81 U/L (ref 55–135)
ALT SERPL W/O P-5'-P-CCNC: 10 U/L (ref 10–44)
ANION GAP SERPL CALC-SCNC: 9 MMOL/L (ref 8–16)
AST SERPL-CCNC: 13 U/L (ref 10–40)
BILIRUB SERPL-MCNC: 0.4 MG/DL (ref 0.1–1)
BUN SERPL-MCNC: 14 MG/DL (ref 8–23)
CALCIUM SERPL-MCNC: 9.4 MG/DL (ref 8.7–10.5)
CHLORIDE SERPL-SCNC: 106 MMOL/L (ref 95–110)
CHOLEST SERPL-MCNC: 164 MG/DL (ref 120–199)
CHOLEST/HDLC SERPL: 2.4 {RATIO} (ref 2–5)
CO2 SERPL-SCNC: 24 MMOL/L (ref 23–29)
CREAT SERPL-MCNC: 1 MG/DL (ref 0.5–1.4)
EST. GFR  (AFRICAN AMERICAN): >60 ML/MIN/1.73 M^2
EST. GFR  (NON AFRICAN AMERICAN): 59.7 ML/MIN/1.73 M^2
GLUCOSE SERPL-MCNC: 88 MG/DL (ref 70–110)
HDLC SERPL-MCNC: 67 MG/DL (ref 40–75)
HDLC SERPL: 40.9 % (ref 20–50)
LDLC SERPL CALC-MCNC: 80 MG/DL (ref 63–159)
NONHDLC SERPL-MCNC: 97 MG/DL
POTASSIUM SERPL-SCNC: 4.1 MMOL/L (ref 3.5–5.1)
PROT SERPL-MCNC: 7 G/DL (ref 6–8.4)
SODIUM SERPL-SCNC: 139 MMOL/L (ref 136–145)
TRIGL SERPL-MCNC: 85 MG/DL (ref 30–150)

## 2019-09-23 ENCOUNTER — PATIENT OUTREACH (OUTPATIENT)
Dept: OTHER | Facility: OTHER | Age: 64
End: 2019-09-23

## 2019-09-23 DIAGNOSIS — I10 ESSENTIAL HYPERTENSION: Primary | ICD-10-CM

## 2019-09-23 RX ORDER — FELODIPINE 10 MG/1
10 TABLET, EXTENDED RELEASE ORAL DAILY
Qty: 30 TABLET | Refills: 5 | Status: SHIPPED | OUTPATIENT
Start: 2019-09-23 | End: 2020-03-23 | Stop reason: SDUPTHER

## 2019-09-23 NOTE — PROGRESS NOTES
"Digital Medicine: Clinician Follow-Up    Called Ms. Menendez to follow up. Patient endorses adherence to medication regimen. Patient denies hypotensive s/sx (lightheadedness, dizziness, nausea, fatigue); patient denies hypertensive s/sx (SOB, CP, severe headaches, changes in vision). Instructed patient to seek medical care if BP > 180/110 and is accompanied by hypertensive s/sx associated, patient confirms understanding. She states that she sits on her recliner to check her BP because her "computer desk does not have enough room". She reports that she typically checks her BP several hours after taking her medication and has been more consistent with taking her antihypertensive medications.        The history is provided by the patient. No  was used.     Follow Up  Follow-up reason(s): routine education        Last 5 Patient Entered Readings                                      Current 30 Day Average: 154/79     Recent Readings 9/21/2019 9/21/2019 9/20/2019 9/18/2019 9/17/2019    SBP (mmHg) 143 154 164 153 168    DBP (mmHg) 73 74 80 84 84    Pulse 61 67 67 66 68            Sleep Apnea  Patient not previously diagnosed with GALLO and     Medication Affordability  Patient is currently having problems affording medications for other diagnosis.    Medication Adherence:   She misses doses: never    Patient is selectively taking diuretics.    She does not wonder if medications are working.  She knows purpose of medications.      Patient identified the following reasons for non-compliance: none    Adherence tools used: alarm    Assessment:  Reviewed recent readings. Per 2017 ACC/ AHA HTN guidelines (goal of BP < 130/80), current 30-day average need to be addressed more throroughly today.         INTERVENTION(S)  reviewed appropriate dose schedule, reviewed monitoring technique, recommended med change, encouragement/support and goal setting    PLAN  patient verbalizes understanding, demonstrates understanding " via teach back, patient amenable to changes, additional monitoring needed and Health  follow up    Increase felodipine dose to 10 mg daily. Informed patient of medication side effects and encouraged to continue to check BP using proper technique as discussed and provided in [employer] message. Encouraged patient to create space at computer desk and try checking BP while sitting at desk. I will continue to monitor regularly and will follow-up in 2 to 3 weeks, sooner if blood pressure begins to trend upward or downward.       There are no preventive care reminders to display for this patient.    Hypertension Medications             felodipine (PLENDIL) 10 MG 24 hr tablet Take 1 tablet (10 mg total) by mouth once daily.    furosemide (LASIX) 20 MG tablet Take 1 tablet (20 mg total) by mouth daily as needed. For leg swelling as needed. Or weight gain of 3 pounds in 1 day or 5 lbs in 1 week.    metoprolol succinate (TOPROL-XL) 25 MG 24 hr tablet Take 0.5 tablets (12.5 mg total) by mouth every evening.

## 2019-09-24 ENCOUNTER — OFFICE VISIT (OUTPATIENT)
Dept: CARDIOLOGY | Facility: CLINIC | Age: 64
End: 2019-09-24
Payer: MEDICAID

## 2019-09-24 ENCOUNTER — PATIENT MESSAGE (OUTPATIENT)
Dept: CARDIOLOGY | Facility: CLINIC | Age: 64
End: 2019-09-24

## 2019-09-24 VITALS
HEART RATE: 60 BPM | WEIGHT: 173.75 LBS | BODY MASS INDEX: 31.97 KG/M2 | DIASTOLIC BLOOD PRESSURE: 72 MMHG | HEIGHT: 62 IN | SYSTOLIC BLOOD PRESSURE: 146 MMHG

## 2019-09-24 DIAGNOSIS — I10 ESSENTIAL HYPERTENSION: ICD-10-CM

## 2019-09-24 DIAGNOSIS — R73.03 PREDIABETES: ICD-10-CM

## 2019-09-24 DIAGNOSIS — J44.9 COPD, MODERATE: ICD-10-CM

## 2019-09-24 DIAGNOSIS — J43.9 NOCTURNAL HYPOXEMIA DUE TO EMPHYSEMA: ICD-10-CM

## 2019-09-24 DIAGNOSIS — I71.40 ABDOMINAL AORTIC ANEURYSM (AAA) WITHOUT RUPTURE: ICD-10-CM

## 2019-09-24 DIAGNOSIS — I25.118 CORONARY ARTERY DISEASE OF NATIVE ARTERY OF NATIVE HEART WITH STABLE ANGINA PECTORIS: Primary | ICD-10-CM

## 2019-09-24 DIAGNOSIS — I49.5 SINUS NODE DYSFUNCTION: ICD-10-CM

## 2019-09-24 DIAGNOSIS — F17.201 TOBACCO USE DISORDER, MODERATE, IN SUSTAINED REMISSION: ICD-10-CM

## 2019-09-24 DIAGNOSIS — I65.23 BILATERAL CAROTID ARTERY STENOSIS: ICD-10-CM

## 2019-09-24 DIAGNOSIS — E78.2 MIXED HYPERLIPIDEMIA: ICD-10-CM

## 2019-09-24 DIAGNOSIS — G47.36 NOCTURNAL HYPOXEMIA DUE TO EMPHYSEMA: ICD-10-CM

## 2019-09-24 PROCEDURE — 99214 OFFICE O/P EST MOD 30 MIN: CPT | Mod: S$PBB,,, | Performed by: NURSE PRACTITIONER

## 2019-09-24 PROCEDURE — 99999 PR PBB SHADOW E&M-EST. PATIENT-LVL IV: CPT | Mod: PBBFAC,,, | Performed by: NURSE PRACTITIONER

## 2019-09-24 PROCEDURE — 99214 OFFICE O/P EST MOD 30 MIN: CPT | Mod: PBBFAC | Performed by: NURSE PRACTITIONER

## 2019-09-24 PROCEDURE — 99999 PR PBB SHADOW E&M-EST. PATIENT-LVL IV: ICD-10-PCS | Mod: PBBFAC,,, | Performed by: NURSE PRACTITIONER

## 2019-09-24 PROCEDURE — 99214 PR OFFICE/OUTPT VISIT, EST, LEVL IV, 30-39 MIN: ICD-10-PCS | Mod: S$PBB,,, | Performed by: NURSE PRACTITIONER

## 2019-09-24 NOTE — PROGRESS NOTES
Subjective:   Patient ID:  Gemma Vick is a 64 y.o. female who presents for evaluation of No chief complaint on file.      HPI    Gemma Vick is a 64 year old female who presents to clinic for 6 month follow up. Her current medical conditions include CAD s/p coronary stenting, Carotid artery disease, HTN, HLP, COPD, AAA without rupture, BPPV, ex-smoker. She returns today and states she is doing ok. She is having issues with plantar fascitis and recently received custom orthotics to assist with this.     Denies chest pain or anginal equivalents. No shortness of breath, CASTANEDA or palpitations. She does have some palpitations but have improved recently. She is trying to take Toprol XL nightly but had issues with low BP readings but has improved. Reports 2 pillow orthopnea. Denies orthopnea, PND or abdominal bloating. Reports regular walking without any issues lately. NO leg swelling or claudications. No recent falls, syncope or near syncopal events. Reports compliance with medications and dietary restrictions. NO CNS complaints to suggest TIA or CVA today. No signs of abnormal bleeding on ASA and Plavix.     Medications: Felodipine, ASA, Plavix, Toprol XL 12. 5 mg nightly.   Sodium: Doing better with her salt restrictions.   Diet: DASH Diet, 2 gm sodium restriction. 1500 ml fluid restriction  Activity: Denies any formal exercise program, reports 2 bone spurs which prohibit her from walking regularly.  Alcohol: Denies any regular use.   Smoking: Ex-smoker  BP log: Reviewed, Digital HTN program enrolled    Recent Readings 9/24/2019 9/23/2019 9/23/2019 9/21/2019 9/21/2019   SBP (mmHg) 124 146 130  (External Source: [employer]) 143  (External Source: [employer]) 154  (External Source: [employer])   DBP (mmHg) 73 75 69  (External Source: [employer]) 73  (External Source: [employer]) 74  (External Source: [employer])   Pulse 57 70 56  (External Source: [employer]) 61  (External Source:  [employer]) 67  (External Source: [employer])     Recent Readings 9/20/2019 9/18/2019 9/17/2019 9/16/2019 9/15/2019   SBP (mmHg) 164  (External Source: [employer]) 153 168  (External Source: [employer]) 141  (External Source: [employer]) 151  (External Source: [employer])   DBP (mmHg) 80  (External Source: [employer]) 84 84  (External Source: [employer]) 75  (External Source: [employer]) 80  (External Source: [employer])   Pulse 67  (External Source: [employer]) 66 68  (External Source: [employer]) 62  (External Source: [employer]) 67  (External Source: [employer])     Recent Readings 9/15/2019 9/14/2019 9/13/2019 9/13/2019 9/13/2019   SBP (mmHg) 124 128  (External Source: [employer]) 161  (External Source: [employer]) 162 137   DBP (mmHg) 74 70  (External Source: [employer]) 83  (External Source: [employer]) 79 78   Pulse 70 63  (External Source: [employer]) 67  (External Source: [employer]) 63 65     Recent Readings 9/12/2019 9/12/2019 9/11/2019 9/10/2019 9/7/2019   SBP (mmHg) 129  (External Source: [employer]) 129  (External Source: [employer]) 169  (External Source: [employer]) 160  (External Source: [employer]) 150  (External Source: [employer])   DBP (mmHg) 71  (External Source: [employer]) 71  (External Source: [employer]) 87  (External Source: [employer]) 82  (External Source: [employer]) 82  (External Source: [employer])   Pulse 64  (External Source: [employer]) 64  (External Source: [employer]) 66  (External Source: [employer]) 79  (External Source: [employer]) 76  (External Source: [employer])     Recent Readings 9/6/2019 9/4/2019 9/1/2019   SBP (mmHg) 158  (External Source: [employer]) 148  (External Source: [employer]) 170  (External Source: [employer])   DBP (mmHg) 85  (External Source: [employer])  76  (External Source: [employer]) 83  (External Source: [employer])   Pulse 70  (External Source: [employer]) 81  (External Source: [employer]) 72  (External Source: [employer])     Past Medical History:   Diagnosis Date    AAA (abdominal aortic aneurysm) 2014    Abdominal aneurysm     3    Acute coronary syndrome     Arthritis     BPPV (benign paroxysmal positional vertigo)     Carotid artery plaque     Carotid artery stenosis and occlusion 2014    Chronic back pain     COPD (chronic obstructive pulmonary disease)     Coronary artery disease     Emphysema lung     Hyperlipidemia     Hypertension     Myocardial infarction     x3    Neuropathy        Past Surgical History:   Procedure Laterality Date    CARDIAC CATHETERIZATION      CORONARY ANGIOPLASTY      HYSTERECTOMY         Social History     Tobacco Use    Smoking status: Former Smoker     Packs/day: 0.50     Years: 44.00     Pack years: 22.00     Types: Cigarettes, Vaping w/o nicotine     Start date: 1970     Last attempt to quit: 2017     Years since quittin.4    Smokeless tobacco: Never Used   Substance Use Topics    Alcohol use: No    Drug use: No       Family History   Problem Relation Age of Onset    Heart disease Mother     Heart attacks under age 50 Father     Heart attacks under age 50 Brother      Wt Readings from Last 3 Encounters:   19 78.8 kg (173 lb 11.6 oz)   19 78.5 kg (173 lb)   19 78.4 kg (172 lb 11.7 oz)     Temp Readings from Last 3 Encounters:   19 98.6 °F (37 °C) (Temporal)   19 97.2 °F (36.2 °C) (Tympanic)   19 98.2 °F (36.8 °C) (Tympanic)     BP Readings from Last 3 Encounters:   19 (!) 158/84   19 136/68   19 138/74     Pulse Readings from Last 3 Encounters:   19 66   19 72   19 77     Current Outpatient Medications on File Prior to Visit   Medication Sig Dispense Refill    albuterol (PROAIR  HFA) 90 mcg/actuation inhaler Inhale 2 puffs into the lungs every 6 (six) hours as needed. 18 g 6    albuterol-ipratropium (DUO-NEB) 2.5 mg-0.5 mg/3 mL nebulizer solution USE ONE VIAL IN NEBULIZER TWICE DAILY 270 mL 11    aspirin 81 MG Chew Take 81 mg by mouth once daily.      clopidogrel (PLAVIX) 75 mg tablet Take 1 tablet (75 mg total) by mouth once daily. 30 tablet 5    COMBIVENT RESPIMAT  mcg/actuation inhaler Inhale 2 puffs into the lungs every 6 (six) hours as needed for Wheezing or Shortness of Breath. 4 g 11    DULoxetine (CYMBALTA) 60 MG capsule Take 1 capsule (60 mg total) by mouth once daily. 30 capsule 3    ergocalciferol, vitamin D2, (VITAMIN D ORAL) Take 800 Units by mouth once daily.       estrogens, conjugated, (PREMARIN) 0.3 MG tablet Take 1 tablet (0.3 mg total) by mouth once daily. 30 tablet 5    ezetimibe-simvastatin 10-40 mg (VYTORIN) 10-40 mg per tablet Take 1 tablet by mouth every evening. 30 tablet 5    felodipine (PLENDIL) 10 MG 24 hr tablet Take 1 tablet (10 mg total) by mouth once daily. 30 tablet 5    furosemide (LASIX) 20 MG tablet Take 1 tablet (20 mg total) by mouth daily as needed. For leg swelling as needed. Or weight gain of 3 pounds in 1 day or 5 lbs in 1 week. 30 tablet 6    meloxicam (MOBIC) 7.5 MG tablet TAKE (1) TABLET BY MOUTH ONCE DAILY AS NEEDED. 30 tablet 1    metoprolol succinate (TOPROL-XL) 25 MG 24 hr tablet Take 0.5 tablets (12.5 mg total) by mouth every evening. 15 tablet 11    mometasone-formoterol (DULERA) 200-5 mcg/actuation inhaler INHALE 2 PUFFS 2 TIMES DAILY 13 g 11    OXYGEN-AIR DELIVERY SYSTEMS MISC 2 L by Misc.(Non-Drug; Combo Route) route every evening.      zolpidem (AMBIEN) 5 MG Tab Take 1 tablet (5 mg total) by mouth nightly. 30 tablet 5     No current facility-administered medications on file prior to visit.        Review of Systems   Constitution: Positive for malaise/fatigue.   HENT: Negative for hearing loss and hoarse voice.   "  Eyes: Negative for blurred vision and visual disturbance.   Cardiovascular: Positive for irregular heartbeat and palpitations. Negative for chest pain, claudication, dyspnea on exertion, leg swelling, near-syncope, orthopnea, paroxysmal nocturnal dyspnea and syncope.   Respiratory: Negative for cough, hemoptysis, shortness of breath, sleep disturbances due to breathing, snoring and wheezing.    Endocrine: Negative for cold intolerance and heat intolerance.   Hematologic/Lymphatic: Bruises/bleeds easily (on ASA and Plavix).   Skin: Negative for color change, dry skin and nail changes.   Musculoskeletal: Positive for arthritis and back pain. Negative for joint pain and myalgias.   Gastrointestinal: Negative for bloating, abdominal pain, constipation, nausea and vomiting.   Genitourinary: Negative for dysuria, flank pain, hematuria and hesitancy.   Neurological: Positive for weakness. Negative for headaches, light-headedness, loss of balance, numbness and paresthesias.   Psychiatric/Behavioral: Negative for altered mental status.   Allergic/Immunologic: Negative for environmental allergies.     BP (!) 146/72   Pulse 60 Comment: radial  Ht 5' 2" (1.575 m)   Wt 78.8 kg (173 lb 11.6 oz)   BMI 31.77 kg/m²     Objective:   Physical Exam   Constitutional: She is oriented to person, place, and time. She appears well-developed and well-nourished. No distress.   HENT:   Head: Normocephalic and atraumatic.   Eyes: Pupils are equal, round, and reactive to light.   Neck: Normal range of motion and full passive range of motion without pain. Neck supple. No JVD present.   Cardiovascular: Normal rate, regular rhythm, S1 normal, S2 normal and intact distal pulses.  Occasional extrasystoles are present. PMI is not displaced. Exam reveals no distant heart sounds.   No murmur heard.  Pulses:       Radial pulses are 2+ on the right side, and 2+ on the left side.        Dorsalis pedis pulses are 2+ on the right side, and 2+ on the " left side.   Pulmonary/Chest: Effort normal and breath sounds normal. No accessory muscle usage. No respiratory distress. She has no decreased breath sounds. She has no wheezes. She has no rales.   Abdominal: Soft. Bowel sounds are normal. She exhibits no distension. There is no tenderness.   Musculoskeletal: Normal range of motion. She exhibits no edema.        Right ankle: She exhibits no swelling.        Left ankle: She exhibits no swelling.   Neurological: She is alert and oriented to person, place, and time.   Skin: Skin is warm and dry. She is not diaphoretic. No cyanosis. Nails show no clubbing.   Psychiatric: She has a normal mood and affect. Her speech is normal and behavior is normal. Judgment and thought content normal. Cognition and memory are normal.   Nursing note and vitals reviewed.      Lab Results   Component Value Date    CHOL 164 09/17/2019    CHOL 167 02/07/2019    CHOL 151 07/13/2018     Lab Results   Component Value Date    HDL 67 09/17/2019    HDL 73 02/07/2019    HDL 67 07/13/2018     Lab Results   Component Value Date    LDLCALC 80.0 09/17/2019    LDLCALC 75.8 02/07/2019    LDLCALC 63.6 07/13/2018     Lab Results   Component Value Date    TRIG 85 09/17/2019    TRIG 91 02/07/2019    TRIG 102 07/13/2018     Lab Results   Component Value Date    CHOLHDL 40.9 09/17/2019    CHOLHDL 43.7 02/07/2019    CHOLHDL 44.4 07/13/2018       Chemistry        Component Value Date/Time     09/17/2019 0830    K 4.1 09/17/2019 0830     09/17/2019 0830    CO2 24 09/17/2019 0830    BUN 14 09/17/2019 0830    CREATININE 1.0 09/17/2019 0830    GLU 88 09/17/2019 0830        Component Value Date/Time    CALCIUM 9.4 09/17/2019 0830    ALKPHOS 81 09/17/2019 0830    AST 13 09/17/2019 0830    ALT 10 09/17/2019 0830    BILITOT 0.4 09/17/2019 0830    ESTGFRAFRICA >60.0 09/17/2019 0830    EGFRNONAA 59.7 (A) 09/17/2019 0830          Lab Results   Component Value Date    TSH 1.509 05/04/2018     Lab Results    Component Value Date    INR 1.1 12/20/2017    INR 1.1 08/10/2012     Lab Results   Component Value Date    WBC 13.91 (H) 12/20/2017    HGB 11.7 (L) 12/20/2017    HCT 34.5 (L) 12/20/2017    MCV 89 12/20/2017     12/20/2017   2D ECHO  CONCLUSIONS     1 - Concentric remodeling.     2 - No wall motion abnormalities.     3 - Normal left ventricular systolic function (EF 60-65%).     4 - Normal left ventricular diastolic function.     5 - Normal right ventricular systolic function .     6 - Trivial aortic regurgitation.             This document has been electronically    SIGNED BY: Millie Juarez MD On: 04/10/2019 18:32    MPI STRESS CONCLUSIONS:  Nuclear Quantitative Functional Analysis:   LVEF: >= 70 %    Impression: NORMAL MYOCARDIAL PERFUSION  1. The perfusion scan is free of evidence for myocardial ischemia or injury.   2. Resting wall motion is physiologic.   3. Resting LV function is normal.   4. The ventricular volumes are normal at rest and stress.   5. The extracardiac distribution of radioactivity is normal.           This document has been electronically    SIGNED BY: Dwaine Mckeon MD On: 04/11/2019 07:26       Assessment:      1. Coronary artery disease of native artery of native heart with stable angina pectoris    2. Essential hypertension    3. Mixed hyperlipidemia    4. Sinus node dysfunction    5. Prediabetes    6. Tobacco use disorder, moderate, in sustained remission    7. COPD, moderate    8. Nocturnal hypoxemia due to emphysema      Patient presents to clinic for 6 month follow up and is doing well today.   No new cardiac complaints today.  Continue Digital HTN program.   Plan:     Continue same CV meds for now  Dash diet, 2 gm sodium restriction  1500 ml fluid restriction  Encourage regular physical activity as tolerated  Encourage weight loss  Monitor BP with Digital HTN program  Continue Toprol XL nightly  CMP, Lipids, A1C, Carotid U/S, Abdominal U/S in 6 months  RTC in 6 months with  labs    [facility]

## 2019-09-26 ENCOUNTER — PATIENT MESSAGE (OUTPATIENT)
Dept: CARDIOLOGY | Facility: CLINIC | Age: 64
End: 2019-09-26

## 2019-09-28 DIAGNOSIS — J44.9 CHRONIC OBSTRUCTIVE PULMONARY DISEASE, UNSPECIFIED COPD TYPE: ICD-10-CM

## 2019-09-29 RX ORDER — ALBUTEROL SULFATE 90 UG/1
AEROSOL, METERED RESPIRATORY (INHALATION)
Qty: 8.5 G | Refills: 0 | Status: SHIPPED | OUTPATIENT
Start: 2019-09-29 | End: 2019-12-03 | Stop reason: SDUPTHER

## 2019-10-01 DIAGNOSIS — G47.00 INSOMNIA, UNSPECIFIED TYPE: ICD-10-CM

## 2019-10-01 RX ORDER — ZOLPIDEM TARTRATE 5 MG/1
5 TABLET ORAL NIGHTLY
Qty: 30 TABLET | Refills: 5 | Status: SHIPPED | OUTPATIENT
Start: 2019-10-01 | End: 2020-03-23 | Stop reason: SDUPTHER

## 2019-10-08 ENCOUNTER — PATIENT MESSAGE (OUTPATIENT)
Dept: ADMINISTRATIVE | Facility: OTHER | Age: 64
End: 2019-10-08

## 2019-10-10 ENCOUNTER — PATIENT OUTREACH (OUTPATIENT)
Dept: OTHER | Facility: OTHER | Age: 64
End: 2019-10-10

## 2019-10-10 NOTE — PROGRESS NOTES
10/10/19 1:42 PM - Called patient and left voicemail with call back number. Will follow up with patient at next encounter.

## 2019-10-17 ENCOUNTER — OFFICE VISIT (OUTPATIENT)
Dept: PODIATRY | Facility: CLINIC | Age: 64
End: 2019-10-17
Payer: MEDICAID

## 2019-10-17 VITALS
WEIGHT: 172.5 LBS | SYSTOLIC BLOOD PRESSURE: 122 MMHG | HEIGHT: 62 IN | HEART RATE: 66 BPM | BODY MASS INDEX: 31.74 KG/M2 | DIASTOLIC BLOOD PRESSURE: 67 MMHG

## 2019-10-17 DIAGNOSIS — M72.2 PLANTAR FASCIITIS: ICD-10-CM

## 2019-10-17 DIAGNOSIS — M76.60 ACHILLES TENDINITIS, UNSPECIFIED LATERALITY: Primary | ICD-10-CM

## 2019-10-17 PROCEDURE — 99213 PR OFFICE/OUTPT VISIT, EST, LEVL III, 20-29 MIN: ICD-10-PCS | Mod: S$PBB,,, | Performed by: PODIATRIST

## 2019-10-17 PROCEDURE — 99213 OFFICE O/P EST LOW 20 MIN: CPT | Mod: PBBFAC | Performed by: PODIATRIST

## 2019-10-17 PROCEDURE — 99213 OFFICE O/P EST LOW 20 MIN: CPT | Mod: S$PBB,,, | Performed by: PODIATRIST

## 2019-10-17 PROCEDURE — 99999 PR PBB SHADOW E&M-EST. PATIENT-LVL III: CPT | Mod: PBBFAC,,, | Performed by: PODIATRIST

## 2019-10-17 PROCEDURE — 99999 PR PBB SHADOW E&M-EST. PATIENT-LVL III: ICD-10-PCS | Mod: PBBFAC,,, | Performed by: PODIATRIST

## 2019-10-17 NOTE — PROGRESS NOTES
Subjective:       Patient ID: Gemma Vick is a 64 y.o. female.    Chief Complaint: Follow-up (plantar Fasciitis, pt rates pain 5/10, weras tennis w/socks, non diabetic, PCP , last visit 8/14/2019.)      HPI: Gemma Vick presents today clinic for follow-up of right plantar fasciitis and insertional posterior Achilles tendinitis.  She states that her pain is a 5/10 which is better from the previous 8/10 at her last appointment.  States that she has been doing stretching exercise 1-2 times per day and has been performing icing techniques with a frozen water bottle.  Patient stated that she did ultimately end up purchasing some a power step orthotics which he states has helped alleviate some of the pressure to the plantar heel.  She continues to have discomfort to the posterior aspect of the heel at the area of calcific spur. States post-static dyskinesia. Denies any recent identifiable trauma. Does limp with gait. Patient has not use NSAIDs secondary to anticoagulation therapy for coronary artery disease.  Reports that pain is increased before stepping down out of bed and later at the end of the day. Patient's Primary Care Provider is Olga Altman MD.     Review of patient's allergies indicates:  No Known Allergies    Past Medical History:   Diagnosis Date    AAA (abdominal aortic aneurysm) 2/13/2014    Abdominal aneurysm     3    Acute coronary syndrome     Arthritis     BPPV (benign paroxysmal positional vertigo)     Carotid artery plaque     Carotid artery stenosis and occlusion 2/13/2014    Chronic back pain     COPD (chronic obstructive pulmonary disease)     Coronary artery disease     Emphysema lung     Hyperlipidemia     Hypertension     Myocardial infarction     x3    Neuropathy        Family History   Problem Relation Age of Onset    Heart disease Mother     Heart attacks under age 50 Father     Heart attacks under age 50 Brother        Social History     Socioeconomic  History    Marital status:      Spouse name: Not on file    Number of children: Not on file    Years of education: Not on file    Highest education level: Not on file   Occupational History    Not on file   Social Needs    Financial resource strain: Not on file    Food insecurity:     Worry: Not on file     Inability: Not on file    Transportation needs:     Medical: Not on file     Non-medical: Not on file   Tobacco Use    Smoking status: Former Smoker     Packs/day: 0.50     Years: 44.00     Pack years: 22.00     Types: Cigarettes, Vaping w/o nicotine     Start date: 1970     Last attempt to quit: 2017     Years since quittin.4    Smokeless tobacco: Never Used   Substance and Sexual Activity    Alcohol use: No    Drug use: No    Sexual activity: Not Currently     Birth control/protection: None   Lifestyle    Physical activity:     Days per week: Not on file     Minutes per session: Not on file    Stress: Not on file   Relationships    Social connections:     Talks on phone: Not on file     Gets together: Not on file     Attends Jewish service: Not on file     Active member of club or organization: Not on file     Attends meetings of clubs or organizations: Not on file     Relationship status: Not on file   Other Topics Concern    Not on file   Social History Narrative    Not on file       Past Surgical History:   Procedure Laterality Date    CARDIAC CATHETERIZATION      CORONARY ANGIOPLASTY      HYSTERECTOMY  1988       Review of Systems   Constitutional: Negative for activity change, appetite change, chills and fever.   HENT: Negative for sinus pain, sore throat and voice change.    Eyes: Negative for pain, redness and visual disturbance.   Respiratory: Negative for cough and shortness of breath.    Cardiovascular: Negative for chest pain and palpitations.   Gastrointestinal: Negative for constipation, diarrhea, nausea and vomiting.   Musculoskeletal: Positive for gait  "problem (Antalgic gait with pain to the right posterior heel). Negative for back pain and joint swelling.   Skin: Negative for color change, pallor, rash and wound.   Neurological: Negative for dizziness, weakness and numbness.   Psychiatric/Behavioral: The patient is not nervous/anxious.        Objective:   /67 (BP Location: Left arm, Patient Position: Sitting, BP Method: Large (Automatic))   Pulse 66   Ht 5' 2" (1.575 m)   Wt 78.3 kg (172 lb 8.2 oz)   BMI 31.55 kg/m²     X-Ray Foot Complete Right  Narrative: EXAMINATION:  XR FOOT COMPLETE 3 VIEW RIGHT    CLINICAL HISTORY:  . Pain in right foot    TECHNIQUE:  AP, lateral, and oblique views of the right foot were performed.    COMPARISON:  None    FINDINGS:  Dorsal and plantar calcaneal enthesophytes.  No fracture or dislocation.  No soft tissue swelling.  Impression: As above    Electronically signed by: Jayce Walls MD  Date:    09/13/2019  Time:    11:30      Physical Exam   Constitutional: She is oriented to person, place, and time and well-developed, well-nourished, and in no distress. No distress.   HENT:   Head: Normocephalic and atraumatic.   Cardiovascular: Normal rate.   Pulses:       Dorsalis pedis pulses are 2+ on the right side.        Posterior tibial pulses are 2+ on the right side.   Pulmonary/Chest: Effort normal. No respiratory distress. She exhibits no tenderness.   Musculoskeletal:        Right foot: There is tenderness. There is no bony tenderness, no swelling, normal capillary refill, no crepitus and no deformity.        Feet:    Neurological: She is alert and oriented to person, place, and time. She displays no weakness. No sensory deficit. She exhibits normal muscle tone. Coordination normal.   Skin: Skin is warm and dry. No ecchymosis and no rash noted. She is not diaphoretic. No erythema. No pallor.   Nursing note and vitals reviewed.     LOWER EXTREMITY PHYSICAL EXAMINATION    VASCULAR: The right DP pulse is 2/4. The right PT " pulse is 2/4. Proximal to distal, warm to warm. No dependent rubor or elevation palor is noted. Capillary refill time is less than 3 seconds. Hair growth is appreciated to the dorsal foot and digits.    NEUROLOGY: Proprioception is intact. Sensation to light touch is intact. Negative Tinel's Sign and negative Valleix Sign. No neurological sensations with compression of the area of Batista's Nerve in the area of the Abductor Hallucis muscle belly.    ORTHOPEDIC:  Mild tenderness on palpation of the insertion of the plantar fascia at the medial aspect of the posterior calcaneal tuberosity.  There is no pain associated with stabbing or shooting.  There is no Tinel sign with palpation of the tarsal tunnel.  There is no defects along the course of the plantar fascia in the medial, central, or lateral bands.  These remain intact. There is no fibromas noted. Dorsiflexion of the metatarsophalangeal joints when activated the windlass mechanism does not exacerbate her discomfort.  She also has pain on direct palpation of the posterior aspect of the calcaneal tuberosity at the insertion of the Achilles tendon where a the inter calcific spur is visualized on x-rays.  She states this is painful when shoes are applying too much pressure on this.  She does have equinus as she is able to get to 90° of dorsiflexion.  She is ambulating with a semi antalgic gait but does not utilize an assistive device.    DERMATOLOGY: No ecchymosis is noted.  Skin is supple, dry and intact. Skin is supple.  No hyperkeratosis noted. No calluses.  No open wounds or ulcerations are noted.  No palpable plantar fibromas noted.    Assessment:     1. Insertional Achilles tendinitis with calcific spur, unspecified laterality - Right Foot    2. Plantar fasciitis - Right Foot          Plan:     Insertional Achilles tendinitis with calcific spur, unspecified laterality - Right Foot    Plantar fasciitis - Right Foot      Patient seen evaluated for plantar  fasciitis and posterior insertional Achilles tendinitis.  Patient has been doing well with conservative treatment therapy as talked about at last visit.  She has been wearing the power step inserts and doing stretching exercises 1-2 times per day.  We did discuss that doing this multiple times per day will help as to allow slow stretch and alleviate the excessive tension on these tendons and ligaments.  We did discuss performing a steroid injection to help alleviate the pain in the plantar fascia however patient would like to hold on this option this time.  We also discussed an oral steroid option which will help reduce the inflammation at both locations, however patient would like to wait on this option as well. She states that she is unable to take anti-inflammatories, but has spoken with the cardiologist about taking meloxicam 7.5, which she was prescribed previously by cardiologist, as needed if pain is increased.  I recommend she continue doing stretching and icing exercises.  May consider heel lift be applied in the right shoe.  Also recommend larger shoes which may alleviate the excessive pressure.     Patient would like to continue doing what she is doing as this has helped since her last appointment.  Patient to follow up p.r.n. if she develops increased pain.        Future Appointments   Date Time Provider Department Center   3/10/2020  1:45 PM PULMONARY LAB, [facility]   3/10/2020  2:20 PM Andres Morales MD [facility] PULMSVC [facility]   3/12/2020  8:30 AM LABORATORY, [facility] LAB [facility]   3/13/2020  9:30 AM CARDIOVASCULAR, [facility] EKG [facility]   3/13/2020 10:15 AM [facility] US1 [facility] U[facility]   3/19/2020  1:00 PM EKG, [facility] EKG [facility]   3/19/2020  1:15 PM Millie Juarez MD ON CARDIO [facility]

## 2019-10-17 NOTE — PROGRESS NOTES
Digital Medicine: Health  Introduction    Introduced Gemma Vick to Digital Medicine. Discussed health  role and recommended lifestyle modifications.    Patient states that she is in pain most of the time.Patient has fibromyalgia. She also expresses having back and neck pains along with the pain in her feet from bone spurs.    The history is provided by the patient.   Hypertension                   Diet:   Patient reports eating or drinking the following: fast food, water, frozen meals and packaged/processed foods    The patient drinks 28 ounces of water per day.        She eats 2 meals and 3 snacks per day.      The patient states that she typically eats a non-home cooked meal (ex: dining out, take out, frozen meals) 1-2 times per week.  She cooks for self.    Patient does the shopping for groceries.      She does not find cooking difficult.      Eating style: relies on convenience items    Patient states that most of her food is canned or processed. Reviewed recommended sodium intake with patient. Patient states that she rinses her canned foods with water. She admits to eating Fast Food occasionally and will get a hamburger. Patient states she eats two meals a day with potato chips. Discussed the importance of staying hydrated. She states that she normally tries to fill up her 28 oz cup with water one to two times a day.           Intervention(s): portion control, low sodium diet education, reducing sodium intake, reducing dining out and increasing water intake    Assigning the following patient goals: reduce portions and maintain low sodium diet    Physical Activity:   When asked if exercising, patient responded: no  Patient has the following chronic pain: back pain and Fibromyalgia, neck pain, Plantar fasciitis    She identified the following barriers to physical activity: limited mobility and pain/injury/recent surgery    Patient states that she stays active by taking care of 8 month grandchild.  "Patient states that with her fibromyalgia she has to stay moving. Patient also has bone spurs on the bottom of her foot and heels. Suggested exercise resources and stretches. Patient was open to resources via mail. Sent patient resources via mail.     Intervention(s): goal setting and exercise resources     Assigning the following patient goal(s): increase physical activity    Medication Adherence:       Patient states that she "skips her blood pressure pills sometimes when her blood pressure gets too low." Patient reports addressing situation to Clinician and working on a solution.      Tobacco and Alcohol:       Deferred     Patient is not eligible for referral to smoking cessation.      Deferred       [facility]    INTERVENTION(S)  recommended diet modifications, recommend physical activity, encouragement/support and goal setting    PLAN  patient verbalizes understanding and patient amenable to changes    Patient has difficulty exercising. Educated patient on low-sodium diet. Sent patient resources on exercise and diet modifications. Follow-up with patient on lifestyle modifications.       There are no preventive care reminders to display for this patient.    Reviewed the importance of self-monitoring, medication adherence, and that the health  can be used as a resource for lifestyle modifications to help reduce or maintain a healthy lifestyle.    Sent link to [facility] webpages and my contact information via [employer] for future questions. Follow up scheduled.       "

## 2019-10-29 NOTE — PROGRESS NOTES
"Digital Medicine: Clinician Follow-Up    Called patient to follow up. Patient endorses adherence to medication regimen. Patient denies hypotensive s/sx (lightheadedness, dizziness, nausea, fatigue); patient denies hypertensive s/sx (SOB, CP, severe headaches, changes in vision). Instructed patient to seek medical care if BP > 180/110 and is accompanied by hypertensive s/sx associated, patient confirms understanding. She reports that her BP was 132/69 mmHg and HR 77 this morning. She reports that she stopped taking her blood pressure medications because she was feeling weak and tired as if she "needs a B12 injection for energy". She states that she strongly believes that Toprol XL is the cause of her feeling this way. She is willing to try another beta blocker after I explained the purpose of her taking a beta blocker given her extensive cardiovascular history.         The history is provided by the patient. No  was used.     Follow Up  Follow-up reason(s): routine education        Last 5 Patient Entered Readings                                      Current 30 Day Average: 137/71     Recent Readings 10/29/2019 10/27/2019 10/26/2019 10/25/2019 10/22/2019    SBP (mmHg) 132 147 112 140 140    DBP (mmHg) 69 88 62 76 72    Pulse 77 84 61 63 74        INTERVENTION(S)  reviewed appropriate dose schedule, reviewed monitoring technique, encouragement/support and goal setting    PLAN  patient verbalizes understanding, demonstrates understanding via teach back and additional monitoring needed    Assessment:  Reviewed recent readings. Per 2017 ACC/ AHA HTN guidelines (goal of BP < 130/80), current 30-day average need to be addressed more throroughly today.     Plan:  Continue current medication regimen. Encouraged patient to continue to take felodipine 10 mg nightly. Reviewed proper BP measurement techniques and BP goals. Encouraged patient to remain adherent to medications and reviewed steps to take in the " event that she feels dizzy or weak. Will discuss with patient's PCP regarding replacing metoprolol 12.5 mg daily with bisoprolol 5 mg once daily. I will continue to monitor regularly and will follow-up in 2 to 3 days, sooner if blood pressure begins to trend upward or downward.         There are no preventive care reminders to display for this patient.      Hypertension Medications             felodipine (PLENDIL) 10 MG 24 hr tablet Take 1 tablet (10 mg total) by mouth once daily.    furosemide (LASIX) 20 MG tablet Take 1 tablet (20 mg total) by mouth daily as needed. For leg swelling as needed. Or weight gain of 3 pounds in 1 day or 5 lbs in 1 week.    metoprolol succinate (TOPROL-XL) 25 MG 24 hr tablet Take 0.5 tablets (12.5 mg total) by mouth every evening.                 Screenings

## 2019-11-01 ENCOUNTER — PATIENT OUTREACH (OUTPATIENT)
Dept: OTHER | Facility: OTHER | Age: 64
End: 2019-11-01

## 2019-11-01 DIAGNOSIS — I10 ESSENTIAL HYPERTENSION: Primary | ICD-10-CM

## 2019-11-01 RX ORDER — BISOPROLOL FUMARATE 5 MG/1
5 TABLET, FILM COATED ORAL DAILY
Qty: 30 TABLET | Refills: 2 | Status: SHIPPED | OUTPATIENT
Start: 2019-11-01 | End: 2020-02-03

## 2019-11-01 NOTE — PROGRESS NOTES
Digital Medicine: Clinician Follow-Up    Called patient to follow up. Patient endorses adherence to medication regimen. Patient denies hypotensive s/sx (lightheadedness, dizziness, nausea, fatigue); patient denies hypertensive s/sx (SOB, CP, severe headaches, changes in vision). Instructed patient to seek medical care if BP > 180/110 and is accompanied by hypertensive s/sx associated, patient confirms understanding. Spoke with patient's cardiology team who approved patient try bisoprolol 5 mg daily.        The history is provided by the patient. No  was used.     Follow Up  Follow-up reason(s): routine education      Medication Change: new med      Last 5 Patient Entered Readings                                      Current 30 Day Average: 138/71     Recent Readings 10/30/2019 10/29/2019 10/27/2019 10/26/2019 10/25/2019    SBP (mmHg) 144 132 147 112 140    DBP (mmHg) 73 69 88 62 76    Pulse 79 77 84 61 63        INTERVENTION(S)  reviewed appropriate dose schedule, recommended med change, encouragement/support and goal setting    PLAN  patient verbalizes understanding, demonstrates understanding via teach back, patient amenable to changes and continue monitoring    Assess ment:  Reviewed recent readings. Per 2017 ACC/ AHA HTN guidelines (goal of BP < 130/80), current 30-day average need to be addressed more throroughly today.     Plan:  Discontinue metoprolol 12.5 mg and initiate bisoprolol 5 mg daily. Informed patient of medication side effects and informed her to refrain from taking metoprolol once she starts bisoprolol.I will continue to monitor regularly and will follow-up in 2 to 3 weeks, sooner if blood pressure begins to trend upward or downward.       There are no preventive care reminders to display for this patient.    Hypertension Medications             felodipine (PLENDIL) 10 MG 24 hr tablet Take 1 tablet (10 mg total) by mouth once daily.    furosemide (LASIX) 20 MG tablet Take 1  tablet (20 mg total) by mouth daily as needed. For leg swelling as needed. Or weight gain of 3 pounds in 1 day or 5 lbs in 1 week.    bisoprolol 5 MG tablet Take 1 tablet (5 mg total) by mouth every evening.

## 2019-11-04 RX ORDER — MELOXICAM 7.5 MG/1
TABLET ORAL
Qty: 30 TABLET | Refills: 0 | Status: SHIPPED | OUTPATIENT
Start: 2019-11-04 | End: 2020-01-03

## 2019-11-07 ENCOUNTER — PATIENT OUTREACH (OUTPATIENT)
Dept: OTHER | Facility: OTHER | Age: 64
End: 2019-11-07

## 2019-11-09 ENCOUNTER — HOSPITAL ENCOUNTER (OUTPATIENT)
Dept: RADIOLOGY | Facility: HOSPITAL | Age: 64
Discharge: HOME | End: 2019-11-09
Attending: NURSE PRACTITIONER
Payer: MEDICAID

## 2019-11-09 ENCOUNTER — OFFICE VISIT (OUTPATIENT)
Dept: URGENT CARE | Facility: CLINIC | Age: 64
End: 2019-11-09
Payer: MEDICAID

## 2019-11-09 VITALS
BODY MASS INDEX: 32.98 KG/M2 | HEIGHT: 61 IN | TEMPERATURE: 100 F | WEIGHT: 174.69 LBS | SYSTOLIC BLOOD PRESSURE: 163 MMHG | DIASTOLIC BLOOD PRESSURE: 79 MMHG | HEART RATE: 75 BPM

## 2019-11-09 DIAGNOSIS — R05.9 COUGH: ICD-10-CM

## 2019-11-09 DIAGNOSIS — J98.8 BACTERIAL RESPIRATORY INFECTION: Primary | ICD-10-CM

## 2019-11-09 DIAGNOSIS — B96.89 BACTERIAL RESPIRATORY INFECTION: Primary | ICD-10-CM

## 2019-11-09 PROCEDURE — 71046 X-RAY EXAM CHEST 2 VIEWS: CPT | Mod: TC,PO

## 2019-11-09 PROCEDURE — 99999 PR PBB SHADOW E&M-EST. PATIENT-LVL III: CPT | Mod: PBBFAC,,, | Performed by: NURSE PRACTITIONER

## 2019-11-09 PROCEDURE — 71046 X-RAY EXAM CHEST 2 VIEWS: CPT | Mod: 26,,, | Performed by: RADIOLOGY

## 2019-11-09 PROCEDURE — 99213 OFFICE O/P EST LOW 20 MIN: CPT | Mod: PBBFAC,25,PO | Performed by: NURSE PRACTITIONER

## 2019-11-09 PROCEDURE — 99214 PR OFFICE/OUTPT VISIT, EST, LEVL IV, 30-39 MIN: ICD-10-PCS | Mod: S$PBB,,, | Performed by: NURSE PRACTITIONER

## 2019-11-09 PROCEDURE — 99999 PR PBB SHADOW E&M-EST. PATIENT-LVL III: ICD-10-PCS | Mod: PBBFAC,,, | Performed by: NURSE PRACTITIONER

## 2019-11-09 PROCEDURE — 71046 XR CHEST PA AND LATERAL: ICD-10-PCS | Mod: 26,,, | Performed by: RADIOLOGY

## 2019-11-09 PROCEDURE — 99214 OFFICE O/P EST MOD 30 MIN: CPT | Mod: S$PBB,,, | Performed by: NURSE PRACTITIONER

## 2019-11-09 RX ORDER — METHYLPREDNISOLONE 4 MG/1
TABLET ORAL
Qty: 1 PACKAGE | Refills: 0 | Status: SHIPPED | OUTPATIENT
Start: 2019-11-09 | End: 2019-11-30

## 2019-11-09 RX ORDER — DOXYCYCLINE 100 MG/1
100 CAPSULE ORAL 2 TIMES DAILY
Qty: 20 CAPSULE | Refills: 0 | Status: SHIPPED | OUTPATIENT
Start: 2019-11-09 | End: 2019-11-19

## 2019-11-09 NOTE — PATIENT INSTRUCTIONS
Viral Upper Respiratory Illness (Adult)  You have a viral upper respiratory illness (URI), which is another term for the common cold. This illness is contagious during the first few days. It is spread through the air by coughing and sneezing. It may also be spread by direct contact (touching the sick person and then touching your own eyes, nose, or mouth). Frequent handwashing will decrease risk of spread. Most viral illnesses go away within 7 to 10 days with rest and simple home remedies. Sometimes the illness may last for several weeks. Antibiotics will not kill a virus, and they are generally not prescribed for this condition.    Home care  · If symptoms are severe, rest at home for the first 2 to 3 days. When you resume activity, don't let yourself get too tired.  · Avoid being exposed to cigarette smoke (yours or others).  · You may use acetaminophen or ibuprofen to control pain and fever, unless another medicine was prescribed. (Note: If you have chronic liver or kidney disease, have ever had a stomach ulcer or gastrointestinal bleeding, or are taking blood-thinning medicines, talk with your healthcare provider before using these medicines.) Aspirin should never be given to anyone under 18 years of age who is ill with a viral infection or fever. It may cause severe liver or brain damage.  · Your appetite may be poor, so a light diet is fine. Avoid dehydration by drinking 6 to 8 glasses of fluids per day (water, soft drinks, juices, tea, or soup). Extra fluids will help loosen secretions in the nose and lungs.  · Over-the-counter cold medicines will not shorten the length of time youre sick, but they may be helpful for the following symptoms: cough, sore throat, and nasal and sinus congestion. (Note: Do not use decongestants if you have high blood pressure.)  Follow-up care  Follow up with your healthcare provider, or as advised.  When to seek medical advice  Call your healthcare provider right away if any  of these occur:  · Cough with lots of colored sputum (mucus)  · Severe headache; face, neck, or ear pain  · Difficulty swallowing due to throat pain  · Fever of 100.4°F (38°C)  Call 911, or get immediate medical care  Call emergency services right away if any of these occur:  · Chest pain, shortness of breath, wheezing, or difficulty breathing  · Coughing up blood  · Inability to swallow due to throat pain  Date Last Reviewed: 9/13/2015 © 2000-2017 [employer]. [address] [place] [address]. All rights reserved. This information is not intended as a substitute for professional medical care. Always follow your healthcare professional's instructions.          Preventing Common Respiratory Infections  Respiratory infections such as colds and influenza (the flu) are common in winter. These infections are often caused by viruses. They may share some symptoms, but not all respiratory infections are the same. Some make you more sick than others. You can take steps to prevent common respiratory infections. And if you get sick, you can take care of yourself to keep the infection from getting worse.    What is a cold?  · Symptoms include runny nose, coughing and sneezing, and sore throat. Cold symptoms tend to be milder than flu symptoms.  · Symptoms tend to come on slowly. They last for a few days to about a week.  · With a cold, you can still do most of the things you usually do.  What is the flu?  · Symptoms include fever, headache, fatigue, cough, sore throat, runny nose, and muscle aches. Children may have upset stomach and vomiting, but adults usually dont.  · Symptoms tend to come on quickly. Some, such as fatigue and cough, can last a few weeks.  · With the flu, you may feel worn out and not able to do normal activities.  · Its most likely NOT the flu if an adult has vomiting or diarrhea for a day or two. This so-called stomach flu is probably a GI (gastrointestinal) infection.  When  the infection gets worse  Without proper care, a respiratory infection can get worse. It can lead to serious complications and death. If you arent getting better, call your healthcare provider. Complications can include:  · Bronchitis (infection of the airways that leads to shortness of breath and coughing up thick yellow or green mucus)  · Pneumonia (infection of the lungs in which fluid and mucus settle in the lungs, making breathing difficult)  · Worsening of chronic conditions such as heart failure, chronic lung disease, asthma, or diabetes  · Severe dehydration (loss of fluids)  · Sinus problems  · Ear infections   Get a flu vaccine  A flu vaccine protects you from influenza (but not other colds or infections). Get a vaccine each fall, before flu season starts. This can be done at a clinic, healthcare providers office, drugstore, senior center, or through your workplace.  Get pneumococcal vaccines  Pneumonia can be a complication of influenza. There are 2 pneumococcal pneumonia vaccines that protect against many types of pneumonia. Talk with your healthcare provider about these important vaccines.   Keep germs from spreading  No one likes getting sick. To protect yourself and others from cold and flu germs:  · Wash your hands often. Use alcohol-based hand  when you dont have access to soap and water.  · Dont touch your eyes, nose, and mouth. This may help you keep germs out of your body.  · Try to avoid people with respiratory infections. You may want to stay out of crowds during flu season (winter).  · Ask your healthcare provider if you should get a pneumonia vaccination.  How to wash your hands  · Use warm water and plenty of soap. Work up a good lather.  · Clean your whole hand, under your nails, between your fingers, and up your wrists. Wash for at least 15 to 20 seconds. Dont just wipe--rub well.  · Rinse. Let the water run down your fingertips, not up your wrists.  · In a public restroom,  use a paper towel to turn off the faucet and open the door.   Date Last Reviewed: 12/1/2016  © 2175-7321 [employer]. [address] [place] [address], [place] [address]. All rights reserved. This information is not intended as a substitute for professional medical care. Always follow your healthcare professional's instructions.

## 2019-11-09 NOTE — PROGRESS NOTES
"Subjective:       Patient ID: Gemma Vick is a 64 y.o. female.    Vitals:  height is 5' 1" (1.549 m) and weight is 79.2 kg (174 lb 11.4 oz). Her tympanic temperature is 99.5 °F (37.5 °C). Her blood pressure is 163/79 (abnormal) and her pulse is 75.     Chief Complaint: Chest Congestion; Nasal Congestion; Cough; and Fever    Cough   This is a new problem. Episode onset: 3 days. Progression since onset: mucus better, PND worse. The cough is productive of sputum (green). Associated symptoms include nasal congestion (green) and wheezing. Pertinent negatives include no shortness of breath. Nothing aggravates the symptoms. Treatments tried: benadryl, prescription inhalers. The treatment provided mild relief. Her past medical history is significant for bronchitis, COPD, emphysema and pneumonia. There is no history of asthma.       HENT: Positive for congestion.    Respiratory: Positive for cough, sputum production and wheezing. Negative for chest tightness and shortness of breath.        Objective:      Physical Exam   Constitutional: She is oriented to person, place, and time. She appears well-developed and well-nourished. She is cooperative.  Non-toxic appearance. She does not appear ill. No distress.   HENT:   Head: Normocephalic and atraumatic.   Right Ear: Hearing, tympanic membrane, external ear and ear canal normal.   Left Ear: Hearing, tympanic membrane, external ear and ear canal normal.   Nose: No mucosal edema, rhinorrhea or nasal deformity. No epistaxis. Right sinus exhibits maxillary sinus tenderness (mild). Right sinus exhibits no frontal sinus tenderness. Left sinus exhibits maxillary sinus tenderness (mild). Left sinus exhibits no frontal sinus tenderness.   Mouth/Throat: Uvula is midline, oropharynx is clear and moist and mucous membranes are normal. No trismus in the jaw. Normal dentition. No uvula swelling. No posterior oropharyngeal erythema.   PND-yellow with green tinge, moderately thick   Eyes: " Conjunctivae and lids are normal. Right eye exhibits no discharge. Left eye exhibits no discharge. No scleral icterus.   Neck: Trachea normal, normal range of motion, full passive range of motion without pain and phonation normal. Neck supple.   Cardiovascular: Normal rate, regular rhythm, normal heart sounds, intact distal pulses and normal pulses.   Pulmonary/Chest: Effort normal. No respiratory distress. She has wheezes (mild expiratory) in the right upper field, the right middle field, the right lower field, the left upper field, the left middle field and the left lower field.   Abdominal: Soft. Normal appearance and bowel sounds are normal. She exhibits no distension, no pulsatile midline mass and no mass. There is no tenderness.   Musculoskeletal: Normal range of motion. She exhibits no edema or deformity.   Lymphadenopathy:        Head (right side): No submandibular and no tonsillar adenopathy present.        Head (left side): No submandibular and no tonsillar adenopathy present.     She has no cervical adenopathy.   Neurological: She is alert and oriented to person, place, and time. She exhibits normal muscle tone. Coordination normal.   Skin: Skin is warm, dry, intact, not diaphoretic and not pale.   Psychiatric: She has a normal mood and affect. Her speech is normal and behavior is normal. Judgment and thought content normal. Cognition and memory are normal.   Nursing note and vitals reviewed.        Assessment:       1. Bacterial respiratory infection    2. Cough        Plan:         Bacterial respiratory infection  -     doxycycline (VIBRAMYCIN) 100 MG Cap; Take 1 capsule (100 mg total) by mouth 2 (two) times daily. for 10 days  Dispense: 20 capsule; Refill: 0  -     methylPREDNISolone (MEDROL DOSEPACK) 4 mg tablet; use as directed  Dispense: 1 Package; Refill: 0    Cough  -     X-Ray Chest PA And Lateral; Future; Expected date: 11/09/2019         X-ray Chest Pa And Lateral    Result Date:  11/11/2019  EXAMINATION: XR CHEST PA AND LATERAL CLINICAL HISTORY: Cough TECHNIQUE: PA and lateral views of the chest were performed. COMPARISON: 05/22/2019 FINDINGS: Cardiac silhouette and mediastinal contours are normal.  Lungs demonstrates a right basilar nodular opacity, possible nipple shadow.  Osseous structures are intact.     Right basilar nodular opacity, possible nipple shadow.  CT chest with contrast recommended for confirmation. Electronically signed by: Juan Daley MD Date:    11/11/2019 Time:    07:57  Patient aware of lung nodule and is being follow by a provider     Take antibiotics for entire course.  Do not save medications for later, all medications must be taken for full regimen.  Continue oral inhalers as prescribed  Follow prescribed treatment plan as directed.  Stay hydrated and rest.  Report to ER if symptoms worsen.  Follow up with PCP in 2-3 days or sooner if symptoms do not improve.

## 2019-11-21 NOTE — PROGRESS NOTES
Digital Medicine: Health  Follow-Up    Brief reading review with patient. Patient states that she has been very busy and stressed preparing for Thanksgiving and watching her grandchildren. Reviewed readings with patient. BP trending down. Patient states that she usually tires to write down whether or not she too her blood pressure medication. Patient states that she does use a pill box. She states that she has not made changes with her diet and exercise but is mindful of sodium intake. Sent patient Holiday Tips as a resources via e-mail.     The history is provided by the patient.     Follow Up  Follow-up reason(s): reading review      Readings are trending down due to medication adherence.    Routine Education Topics: eating patterns and physical activity        INTERVENTION(S)  encouragement/support    PLAN  patient verbalizes understanding and patient amenable to changes      There are no preventive care reminders to display for this patient.    Last 5 Patient Entered Readings                                      Current 30 Day Average: 136/72     Recent Readings 11/17/2019 11/14/2019 11/12/2019 11/6/2019 11/4/2019    SBP (mmHg) 105 153 120 153 139    DBP (mmHg) 52 73 65 76 74    Pulse 54 64 65 74 91                      Diet Screening   No change to diet.      Physical Activity Screening   No change to exercise routine.          SDOH

## 2019-11-22 ENCOUNTER — PATIENT OUTREACH (OUTPATIENT)
Dept: OTHER | Facility: OTHER | Age: 64
End: 2019-11-22

## 2019-11-22 NOTE — PROGRESS NOTES
11/22/19 3:23 PM - Called patient and left voicemail with call back number. Will follow up with patient at next encounter.

## 2019-12-03 DIAGNOSIS — Z78.0 MENOPAUSE: ICD-10-CM

## 2019-12-03 DIAGNOSIS — I25.10 CORONARY ARTERY DISEASE INVOLVING NATIVE CORONARY ARTERY OF NATIVE HEART WITHOUT ANGINA PECTORIS: ICD-10-CM

## 2019-12-03 DIAGNOSIS — J44.9 CHRONIC OBSTRUCTIVE PULMONARY DISEASE, UNSPECIFIED COPD TYPE: ICD-10-CM

## 2019-12-03 DIAGNOSIS — E78.2 MIXED HYPERLIPIDEMIA: ICD-10-CM

## 2019-12-03 RX ORDER — ESTROGENS, CONJUGATED 0.3 MG/1
TABLET, FILM COATED ORAL
Qty: 30 TABLET | Refills: 0 | Status: SHIPPED | OUTPATIENT
Start: 2019-12-03 | End: 2020-02-03

## 2019-12-03 RX ORDER — EZETIMIBE AND SIMVASTATIN 10; 40 MG/1; MG/1
TABLET ORAL
Qty: 30 TABLET | Refills: 0 | Status: SHIPPED | OUTPATIENT
Start: 2019-12-03 | End: 2020-02-03

## 2019-12-03 RX ORDER — CLOPIDOGREL BISULFATE 75 MG/1
TABLET ORAL
Qty: 30 TABLET | Refills: 0 | Status: SHIPPED | OUTPATIENT
Start: 2019-12-03 | End: 2020-02-03

## 2019-12-03 RX ORDER — ALBUTEROL SULFATE 90 UG/1
AEROSOL, METERED RESPIRATORY (INHALATION)
Qty: 8.5 G | Refills: 0 | Status: SHIPPED | OUTPATIENT
Start: 2019-12-03 | End: 2020-02-04

## 2019-12-19 ENCOUNTER — PATIENT OUTREACH (OUTPATIENT)
Dept: OTHER | Facility: OTHER | Age: 64
End: 2019-12-19

## 2020-01-03 DIAGNOSIS — F32.A DEPRESSION, UNSPECIFIED DEPRESSION TYPE: ICD-10-CM

## 2020-01-03 RX ORDER — MELOXICAM 7.5 MG/1
TABLET ORAL
Qty: 30 TABLET | Refills: 0 | Status: SHIPPED | OUTPATIENT
Start: 2020-01-03 | End: 2020-02-03

## 2020-01-03 RX ORDER — DULOXETIN HYDROCHLORIDE 60 MG/1
CAPSULE, DELAYED RELEASE ORAL
Qty: 30 CAPSULE | Refills: 0 | Status: SHIPPED | OUTPATIENT
Start: 2020-01-03 | End: 2020-02-03

## 2020-01-09 DIAGNOSIS — I10 ESSENTIAL HYPERTENSION: ICD-10-CM

## 2020-01-09 RX ORDER — FUROSEMIDE 20 MG/1
20 TABLET ORAL DAILY PRN
Qty: 30 TABLET | Refills: 6 | Status: SHIPPED | OUTPATIENT
Start: 2020-01-09 | End: 2020-03-23 | Stop reason: SDUPTHER

## 2020-01-16 NOTE — PROGRESS NOTES
"Digital Medicine: Health  Follow-Up    Daughter had sinus cancer and no is not a good time to talk. She states that her daughter is at the hospital,but they removed the cancer.         The history is provided by the patient.     Follow Up  Follow-up reason(s): reading review and routine education      Routine Education Topics: eating patterns and physical activity        INTERVENTION(S)  recommended diet modifications, encouragement/support and goal setting    PLAN  patient verbalizes understanding, patient amenable to changes and Clinician follow-up    Communicated with patient's clinician on medication incident. Will follow-up in 3-4 weeks.      There are no preventive care reminders to display for this patient.    Last 5 Patient Entered Readings                                      Current 30 Day Average: 145/73     Recent Readings 1/11/2020 1/9/2020 1/6/2020 1/1/2020 12/29/2019    SBP (mmHg) 137 157 153 140 135    DBP (mmHg) 72 75 75 71 69    Pulse 69 82 78 67 64                      Diet Screening   No change to diet.      The patient drinks 16 ounces of water per day.        Intervention(s): increasing water intake    Physical Activity Screening   No change to exercise routine.        Medication Adherence Screening   She misses doses: once a week      Patient reports still experiencing ankle and leg swelling. She would like to speak with clinician. Patient states that when she was in [place] with her daughter the other day, she forgot to take her medication. She decided to take her daughter's "Lisinopril-Hydrocholorthiazide". She states that ever since she took that she has been doing well.     Patient is still experiencing cramping. She states that she may need to take potassium pills. Patient reports only drinking one 16oz bottle of water a day. Educated patient on water intake and set goal to drink 8-10 glasses a day or 5-6 water bottles a day.        SDOH  "

## 2020-01-16 NOTE — PROGRESS NOTES
Digital Medicine: Clinician Follow-Up    Called patient to follow up. Patient endorses adherence to medication regimen. Patient denies hypotensive s/sx (lightheadedness, dizziness, nausea, fatigue); patient denies hypertensive s/sx (SOB, CP, severe headaches, changes in vision). Instructed patient to seek medical care if BP > 180/110 and is accompanied by hypertensive s/sx associated, patient confirms understanding.     She reports that she has been helping her daughter who had cancer in her sinuses. She did not have her medication on January 10th and took her daughter's blood pressure medication - lisinopril HCTZ.     The history is provided by the patient. No  was used.     Follow Up  Follow-up reason(s): medication change and routine education      Medication Change: new med      Last 5 Patient Entered Readings                                      Current 30 Day Average: 145/73     Recent Readings 1/11/2020 1/9/2020 1/6/2020 1/1/2020 12/29/2019    SBP (mmHg) 137 157 153 140 135    DBP (mmHg) 72 75 75 71 69    Pulse 69 82 78 67 64        INTERVENTION(S)  reviewed appropriate dose schedule, reviewed monitoring technique, encouragement/support and goal setting    PLAN  patient verbalizes understanding, additional monitoring needed and continue monitoring    Assessment:  Reviewed recent readings. Per 2017 ACC/ AHA HTN guidelines (goal of BP < 130/80), current 30-day average need to be addressed more throroughly today.     Plan:  Continue current medication regimen. Encouraged patient to continue to check her BP using proper BP measurement techniques. If BP remains elevated in the future, may initiate irbesartan 150 mg daily. I will continue to monitor regularly and will follow-up in ~3 weeks, sooner if blood pressure begins to trend upward or downward. Patient denies having questions or concerns. Patient has my contact information and knows to call with any concerns or clinical changes.      There  are no preventive care reminders to display for this patient.    Hypertension Medications             bisoprolol (ZEBETA) 5 MG tablet Take 1 tablet (5 mg total) by mouth once daily.    felodipine (PLENDIL) 10 MG 24 hr tablet Take 1 tablet (10 mg total) by mouth once daily.    furosemide (LASIX) 20 MG tablet Take 1 tablet (20 mg total) by mouth daily as needed. For leg swelling as needed. Or weight gain of 3 pounds in 1 day or 5 lbs in 1 week.                         Medication Adherence Screening   She misses doses: never    Patient is selectively taking diuretics.    She does not wonder if medications are working.  She knows purpose of medications.      Patient identified the following reasons for non-compliance: none    Adherence tools used: none

## 2020-01-29 ENCOUNTER — PATIENT MESSAGE (OUTPATIENT)
Dept: ADMINISTRATIVE | Facility: OTHER | Age: 65
End: 2020-01-29

## 2020-01-29 ENCOUNTER — PATIENT MESSAGE (OUTPATIENT)
Dept: CARDIOLOGY | Facility: CLINIC | Age: 65
End: 2020-01-29

## 2020-02-03 DIAGNOSIS — I10 ESSENTIAL HYPERTENSION: ICD-10-CM

## 2020-02-03 DIAGNOSIS — I25.10 CORONARY ARTERY DISEASE INVOLVING NATIVE CORONARY ARTERY OF NATIVE HEART WITHOUT ANGINA PECTORIS: ICD-10-CM

## 2020-02-03 DIAGNOSIS — F32.A DEPRESSION, UNSPECIFIED DEPRESSION TYPE: ICD-10-CM

## 2020-02-03 DIAGNOSIS — Z78.0 MENOPAUSE: ICD-10-CM

## 2020-02-03 DIAGNOSIS — E78.2 MIXED HYPERLIPIDEMIA: ICD-10-CM

## 2020-02-03 RX ORDER — BISOPROLOL FUMARATE 5 MG/1
TABLET, FILM COATED ORAL
Qty: 30 TABLET | Refills: 0 | Status: SHIPPED | OUTPATIENT
Start: 2020-02-03 | End: 2020-02-04 | Stop reason: SDUPTHER

## 2020-02-03 RX ORDER — EZETIMIBE AND SIMVASTATIN 10; 40 MG/1; MG/1
TABLET ORAL
Qty: 30 TABLET | Refills: 0 | Status: SHIPPED | OUTPATIENT
Start: 2020-02-03 | End: 2020-03-02

## 2020-02-03 RX ORDER — CLOPIDOGREL BISULFATE 75 MG/1
TABLET ORAL
Qty: 30 TABLET | Refills: 0 | Status: SHIPPED | OUTPATIENT
Start: 2020-02-03 | End: 2020-03-02

## 2020-02-03 RX ORDER — MELOXICAM 7.5 MG/1
TABLET ORAL
Qty: 30 TABLET | Refills: 0 | Status: SHIPPED | OUTPATIENT
Start: 2020-02-03 | End: 2020-03-02

## 2020-02-03 RX ORDER — DULOXETIN HYDROCHLORIDE 60 MG/1
CAPSULE, DELAYED RELEASE ORAL
Qty: 30 CAPSULE | Refills: 0 | Status: SHIPPED | OUTPATIENT
Start: 2020-02-03 | End: 2020-03-02

## 2020-02-03 RX ORDER — ESTROGENS, CONJUGATED 0.3 MG/1
TABLET, FILM COATED ORAL
Qty: 30 TABLET | Refills: 0 | Status: SHIPPED | OUTPATIENT
Start: 2020-02-03 | End: 2020-03-02

## 2020-02-04 DIAGNOSIS — I10 ESSENTIAL HYPERTENSION: ICD-10-CM

## 2020-02-04 DIAGNOSIS — E78.2 MIXED HYPERLIPIDEMIA: ICD-10-CM

## 2020-02-04 DIAGNOSIS — F32.A DEPRESSION, UNSPECIFIED DEPRESSION TYPE: ICD-10-CM

## 2020-02-04 DIAGNOSIS — J44.9 CHRONIC OBSTRUCTIVE PULMONARY DISEASE, UNSPECIFIED COPD TYPE: ICD-10-CM

## 2020-02-04 DIAGNOSIS — Z78.0 MENOPAUSE: ICD-10-CM

## 2020-02-04 DIAGNOSIS — I25.10 CORONARY ARTERY DISEASE INVOLVING NATIVE CORONARY ARTERY OF NATIVE HEART WITHOUT ANGINA PECTORIS: ICD-10-CM

## 2020-02-04 RX ORDER — BISOPROLOL FUMARATE 5 MG/1
5 TABLET, FILM COATED ORAL DAILY
Qty: 30 TABLET | Refills: 5 | OUTPATIENT
Start: 2020-02-04

## 2020-02-04 RX ORDER — DULOXETIN HYDROCHLORIDE 60 MG/1
CAPSULE, DELAYED RELEASE ORAL
Qty: 30 CAPSULE | Refills: 5 | OUTPATIENT
Start: 2020-02-04

## 2020-02-04 RX ORDER — ALBUTEROL SULFATE 90 UG/1
AEROSOL, METERED RESPIRATORY (INHALATION)
Qty: 8.5 G | Refills: 3 | Status: SHIPPED | OUTPATIENT
Start: 2020-02-04 | End: 2020-03-10 | Stop reason: SDUPTHER

## 2020-02-04 RX ORDER — MELOXICAM 7.5 MG/1
TABLET ORAL
Qty: 30 TABLET | Refills: 5 | OUTPATIENT
Start: 2020-02-04

## 2020-02-04 RX ORDER — EZETIMIBE AND SIMVASTATIN 10; 40 MG/1; MG/1
1 TABLET ORAL NIGHTLY
Qty: 30 TABLET | Refills: 5 | OUTPATIENT
Start: 2020-02-04

## 2020-02-04 RX ORDER — ALBUTEROL SULFATE 90 UG/1
AEROSOL, METERED RESPIRATORY (INHALATION)
Qty: 8.5 G | Refills: 0 | Status: SHIPPED | OUTPATIENT
Start: 2020-02-04 | End: 2020-02-04 | Stop reason: SDUPTHER

## 2020-02-04 RX ORDER — CLOPIDOGREL BISULFATE 75 MG/1
75 TABLET ORAL DAILY
Qty: 30 TABLET | Refills: 5 | OUTPATIENT
Start: 2020-02-04

## 2020-02-04 RX ORDER — BISOPROLOL FUMARATE 5 MG/1
5 TABLET, FILM COATED ORAL DAILY
Qty: 30 TABLET | Refills: 5 | Status: SHIPPED | OUTPATIENT
Start: 2020-02-04 | End: 2020-03-23 | Stop reason: SDUPTHER

## 2020-02-05 ENCOUNTER — DOCUMENTATION ONLY (OUTPATIENT)
Dept: FAMILY MEDICINE | Facility: CLINIC | Age: 65
End: 2020-02-05

## 2020-02-05 NOTE — PROGRESS NOTES
Received prior authorization([id number]) approval for Ezetimibe-simvastatin 10-40 mg #30 approved fro 02/05/2020 through 02/04/2021.

## 2020-02-10 ENCOUNTER — PATIENT OUTREACH (OUTPATIENT)
Dept: OTHER | Facility: OTHER | Age: 65
End: 2020-02-10

## 2020-02-10 NOTE — PROGRESS NOTES
Digital Medicine: Clinician Follow-Up    Called patient to follow up. Patient endorses adherence to medication regimen. Patient denies hypotensive s/sx (lightheadedness, dizziness, nausea, fatigue); patient denies hypertensive s/sx (SOB, CP, severe headaches, changes in vision). Instructed patient to seek medical care if BP > 180/110 and is accompanied by hypertensive s/sx associated, patient confirms understanding.     She reports that she has been stressed with dealing with her daughter who has sinus cancer. 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     The history is provided by the patient. No  was used.     Follow Up  Follow-up reason(s): reading review and medication change follow-up      Readings are trending up   Patient started new medication.    Is patient tolerating med change?:  YesAssessment:  Reviewed recent readings. Per 2017 ACC/ AHA HTN guidelines (goal of BP < 130/80), current 30-day average need to be addressed more throroughly today.       INTERVENTION(S)  reviewed appropriate dose schedule, reviewed monitoring technique, encouragement/support and goal setting    PLAN  patient verbalizes understanding, additional monitoring needed and continue monitoring    Plan:  >Continue current medication regimen.   >I will continue to monitor regularly and will follow-up in ~4 weeks, sooner if blood pressure begins to trend upward or downward.   >Patient denies having questions or concerns. Patient has my contact information and knows to call with any concerns or clinical changes.        There are no preventive care reminders to display for this patient.    Last 5 Patient Entered Readings                                      Current 30 Day Average: 147/74     Recent Readings 2/4/2020 1/28/2020 1/24/2020 1/16/2020 1/11/2020    SBP (mmHg) 147 139 151 160 137    DBP (mmHg) 70 73 79 77 72    Pulse 85 79 82 77 69             Hypertension Medications              bisoprolol (ZEBETA) 5 MG tablet Take 1 tablet (5 mg total) by mouth once daily.    felodipine (PLENDIL) 10 MG 24 hr tablet Take 1 tablet (10 mg total) by mouth once daily.    furosemide (LASIX) 20 MG tablet Take 1 tablet (20 mg total) by mouth daily as needed. For leg swelling as needed. Or weight gain of 3 pounds in 1 day or 5 lbs in 1 week.                 Screenings

## 2020-02-13 ENCOUNTER — PATIENT OUTREACH (OUTPATIENT)
Dept: OTHER | Facility: OTHER | Age: 65
End: 2020-02-13

## 2020-02-13 NOTE — PROGRESS NOTES
2/13/2020:     Reviewed patient chart. Patient's Clinician reached out to patient recently on 2/10/2020. Patient expressed stress while taking care of daughter with sinus cancer and taking care of two 1 year olds as well.     AVG /75mmHg. Will push patient outreach 2 weeks.

## 2020-03-02 DIAGNOSIS — E78.2 MIXED HYPERLIPIDEMIA: ICD-10-CM

## 2020-03-02 DIAGNOSIS — F32.A DEPRESSION, UNSPECIFIED DEPRESSION TYPE: ICD-10-CM

## 2020-03-02 DIAGNOSIS — I25.10 CORONARY ARTERY DISEASE INVOLVING NATIVE CORONARY ARTERY OF NATIVE HEART WITHOUT ANGINA PECTORIS: ICD-10-CM

## 2020-03-02 DIAGNOSIS — Z78.0 MENOPAUSE: ICD-10-CM

## 2020-03-02 RX ORDER — MELOXICAM 7.5 MG/1
TABLET ORAL
Qty: 30 TABLET | Refills: 0 | Status: SHIPPED | OUTPATIENT
Start: 2020-03-02 | End: 2020-03-04

## 2020-03-02 RX ORDER — DULOXETIN HYDROCHLORIDE 60 MG/1
CAPSULE, DELAYED RELEASE ORAL
Qty: 30 CAPSULE | Refills: 0 | Status: SHIPPED | OUTPATIENT
Start: 2020-03-02 | End: 2020-03-04 | Stop reason: SDUPTHER

## 2020-03-02 RX ORDER — ESTROGENS, CONJUGATED 0.3 MG/1
TABLET, FILM COATED ORAL
Qty: 30 TABLET | Refills: 0 | Status: SHIPPED | OUTPATIENT
Start: 2020-03-02 | End: 2020-03-04 | Stop reason: SDUPTHER

## 2020-03-02 RX ORDER — CLOPIDOGREL BISULFATE 75 MG/1
TABLET ORAL
Qty: 30 TABLET | Refills: 0 | Status: SHIPPED | OUTPATIENT
Start: 2020-03-02 | End: 2020-03-04 | Stop reason: SDUPTHER

## 2020-03-02 RX ORDER — EZETIMIBE AND SIMVASTATIN 10; 40 MG/1; MG/1
TABLET ORAL
Qty: 30 TABLET | Refills: 0 | Status: SHIPPED | OUTPATIENT
Start: 2020-03-02 | End: 2020-03-04 | Stop reason: SDUPTHER

## 2020-03-04 DIAGNOSIS — E78.2 MIXED HYPERLIPIDEMIA: ICD-10-CM

## 2020-03-04 DIAGNOSIS — I25.10 CORONARY ARTERY DISEASE INVOLVING NATIVE CORONARY ARTERY OF NATIVE HEART WITHOUT ANGINA PECTORIS: ICD-10-CM

## 2020-03-04 DIAGNOSIS — F32.A DEPRESSION, UNSPECIFIED DEPRESSION TYPE: ICD-10-CM

## 2020-03-04 DIAGNOSIS — Z78.0 MENOPAUSE: ICD-10-CM

## 2020-03-04 RX ORDER — EZETIMIBE AND SIMVASTATIN 10; 40 MG/1; MG/1
1 TABLET ORAL NIGHTLY
Qty: 30 TABLET | Refills: 1 | Status: SHIPPED | OUTPATIENT
Start: 2020-03-04 | End: 2020-03-23 | Stop reason: SDUPTHER

## 2020-03-04 RX ORDER — CLOPIDOGREL BISULFATE 75 MG/1
75 TABLET ORAL DAILY
Qty: 30 TABLET | Refills: 1 | Status: SHIPPED | OUTPATIENT
Start: 2020-03-04 | End: 2020-03-23 | Stop reason: SDUPTHER

## 2020-03-04 RX ORDER — DULOXETIN HYDROCHLORIDE 60 MG/1
CAPSULE, DELAYED RELEASE ORAL
Qty: 30 CAPSULE | Refills: 1 | Status: SHIPPED | OUTPATIENT
Start: 2020-03-04 | End: 2020-03-23 | Stop reason: SDUPTHER

## 2020-03-06 ENCOUNTER — PATIENT OUTREACH (OUTPATIENT)
Dept: ADMINISTRATIVE | Facility: OTHER | Age: 65
End: 2020-03-06

## 2020-03-06 DIAGNOSIS — J44.9 CHRONIC OBSTRUCTIVE PULMONARY DISEASE, UNSPECIFIED COPD TYPE: ICD-10-CM

## 2020-03-07 ENCOUNTER — PATIENT OUTREACH (OUTPATIENT)
Dept: ADMINISTRATIVE | Facility: HOSPITAL | Age: 65
End: 2020-03-07

## 2020-03-07 DIAGNOSIS — Z12.31 ENCOUNTER FOR SCREENING MAMMOGRAM FOR BREAST CANCER: ICD-10-CM

## 2020-03-07 DIAGNOSIS — Z12.11 SCREENING FOR COLON CANCER: Primary | ICD-10-CM

## 2020-03-09 ENCOUNTER — PATIENT OUTREACH (OUTPATIENT)
Dept: OTHER | Facility: OTHER | Age: 65
End: 2020-03-09

## 2020-03-09 NOTE — PROGRESS NOTES
03/09/2020 10:36 AM Called patient and left voicemail with call back number. Will follow up with patient at next encounter.

## 2020-03-10 ENCOUNTER — CLINICAL SUPPORT (OUTPATIENT)
Dept: PULMONOLOGY | Facility: CLINIC | Age: 65
End: 2020-03-10
Payer: MEDICARE

## 2020-03-10 ENCOUNTER — OFFICE VISIT (OUTPATIENT)
Dept: PULMONOLOGY | Facility: CLINIC | Age: 65
End: 2020-03-10
Payer: MEDICARE

## 2020-03-10 ENCOUNTER — HOSPITAL ENCOUNTER (OUTPATIENT)
Dept: RADIOLOGY | Facility: HOSPITAL | Age: 65
Discharge: HOME | End: 2020-03-10
Attending: FAMILY MEDICINE
Payer: MEDICARE

## 2020-03-10 VITALS — HEIGHT: 62 IN | BODY MASS INDEX: 31.8 KG/M2 | WEIGHT: 172.81 LBS

## 2020-03-10 VITALS
BODY MASS INDEX: 31.65 KG/M2 | WEIGHT: 172 LBS | RESPIRATION RATE: 20 BRPM | SYSTOLIC BLOOD PRESSURE: 130 MMHG | TEMPERATURE: 99 F | DIASTOLIC BLOOD PRESSURE: 60 MMHG | HEIGHT: 62 IN | OXYGEN SATURATION: 96 % | HEART RATE: 64 BPM

## 2020-03-10 VITALS — WEIGHT: 171.94 LBS | HEIGHT: 61 IN | BODY MASS INDEX: 32.46 KG/M2

## 2020-03-10 DIAGNOSIS — J44.9 COPD, SEVERITY TO BE DETERMINED: ICD-10-CM

## 2020-03-10 DIAGNOSIS — J44.9 CHRONIC OBSTRUCTIVE PULMONARY DISEASE, UNSPECIFIED COPD TYPE: ICD-10-CM

## 2020-03-10 DIAGNOSIS — J30.89 SEASONAL ALLERGIC RHINITIS DUE TO OTHER ALLERGIC TRIGGER: Primary | ICD-10-CM

## 2020-03-10 DIAGNOSIS — Z12.31 ENCOUNTER FOR SCREENING MAMMOGRAM FOR BREAST CANCER: ICD-10-CM

## 2020-03-10 DIAGNOSIS — J44.9 COPD, MODERATE: ICD-10-CM

## 2020-03-10 DIAGNOSIS — R09.02 EXERCISE HYPOXEMIA: ICD-10-CM

## 2020-03-10 PROCEDURE — 77067 MAMMO DIGITAL SCREENING BILAT WITH TOMOSYNTHESIS_CAD: ICD-10-PCS | Mod: 26,,, | Performed by: RADIOLOGY

## 2020-03-10 PROCEDURE — 99999 PR PBB SHADOW E&M-EST. PATIENT-LVL I: ICD-10-PCS | Mod: PBBFAC,,,

## 2020-03-10 PROCEDURE — 99214 PR OFFICE/OUTPT VISIT, EST, LEVL IV, 30-39 MIN: ICD-10-PCS | Mod: 25,S$PBB,, | Performed by: INTERNAL MEDICINE

## 2020-03-10 PROCEDURE — 77063 BREAST TOMOSYNTHESIS BI: CPT | Mod: 26,,, | Performed by: RADIOLOGY

## 2020-03-10 PROCEDURE — 99211 OFF/OP EST MAY X REQ PHY/QHP: CPT | Mod: PBBFAC,25

## 2020-03-10 PROCEDURE — 99999 PR PBB SHADOW E&M-EST. PATIENT-LVL IV: CPT | Mod: PBBFAC,,, | Performed by: INTERNAL MEDICINE

## 2020-03-10 PROCEDURE — 94618 PULMONARY STRESS TESTING: ICD-10-PCS | Mod: 26,S$PBB,, | Performed by: INTERNAL MEDICINE

## 2020-03-10 PROCEDURE — 94618 PULMONARY STRESS TESTING: CPT | Mod: 26,S$PBB,, | Performed by: INTERNAL MEDICINE

## 2020-03-10 PROCEDURE — 77067 SCR MAMMO BI INCL CAD: CPT | Mod: TC

## 2020-03-10 PROCEDURE — 99999 PR PBB SHADOW E&M-EST. PATIENT-LVL IV: ICD-10-PCS | Mod: PBBFAC,,, | Performed by: INTERNAL MEDICINE

## 2020-03-10 PROCEDURE — 77067 SCR MAMMO BI INCL CAD: CPT | Mod: 26,,, | Performed by: RADIOLOGY

## 2020-03-10 PROCEDURE — 77063 MAMMO DIGITAL SCREENING BILAT WITH TOMOSYNTHESIS_CAD: ICD-10-PCS | Mod: 26,,, | Performed by: RADIOLOGY

## 2020-03-10 PROCEDURE — 99214 OFFICE O/P EST MOD 30 MIN: CPT | Mod: 25,S$PBB,, | Performed by: INTERNAL MEDICINE

## 2020-03-10 PROCEDURE — 94618 PULMONARY STRESS TESTING: CPT | Mod: PBBFAC

## 2020-03-10 PROCEDURE — 99999 PR PBB SHADOW E&M-EST. PATIENT-LVL I: CPT | Mod: PBBFAC,,,

## 2020-03-10 PROCEDURE — 99214 OFFICE O/P EST MOD 30 MIN: CPT | Mod: PBBFAC,27,25 | Performed by: INTERNAL MEDICINE

## 2020-03-10 RX ORDER — FLUTICASONE PROPIONATE 50 MCG
2 SPRAY, SUSPENSION (ML) NASAL DAILY
Qty: 16 G | Refills: 11 | Status: SHIPPED | OUTPATIENT
Start: 2020-03-10 | End: 2020-09-18

## 2020-03-10 RX ORDER — IPRATROPIUM BROMIDE AND ALBUTEROL SULFATE 2.5; .5 MG/3ML; MG/3ML
SOLUTION RESPIRATORY (INHALATION)
Qty: 270 ML | Refills: 11 | Status: SHIPPED | OUTPATIENT
Start: 2020-03-10 | End: 2021-03-29 | Stop reason: ALTCHOICE

## 2020-03-10 RX ORDER — IPRATROPIUM BROMIDE AND ALBUTEROL 20; 100 UG/1; UG/1
2 SPRAY, METERED RESPIRATORY (INHALATION) EVERY 6 HOURS PRN
Qty: 4 G | Refills: 11 | Status: SHIPPED | OUTPATIENT
Start: 2020-03-10 | End: 2020-03-23 | Stop reason: SDUPTHER

## 2020-03-10 RX ORDER — ALBUTEROL SULFATE 90 UG/1
AEROSOL, METERED RESPIRATORY (INHALATION)
Qty: 8.5 G | Refills: 3 | Status: SHIPPED | OUTPATIENT
Start: 2020-03-10 | End: 2021-01-12

## 2020-03-10 NOTE — PROGRESS NOTES
Digital Medicine: Clinician Follow-Up    Called patient to follow up. Patient endorses adherence to medication regimen. Patient denies hypotensive s/sx (lightheadedness, dizziness, nausea, fatigue); patient denies hypertensive s/sx (SOB, CP, severe headaches, changes in vision). Instructed patient to seek medical care if BP > 180/110 and is accompanied by hypertensive s/sx associated, patient confirms understanding.     She reports that she has been busy with caring for her daughter who is recovering from sinus cancer and post surgery. She states that she notices that her legs swell from being on her feet all day however she props her legs up with pillows at night when she goes to bed. She reports that the swelling in her legs occurred within the last couple of months; of note -     She states that she typically takes her blood pressure medication (felodipine) at 6 AM and checks her BP around 9 AM. She reports that she has been breaking bisoprolol in half and only taking half in the evening. She states that she sits at her computer table to check her BP and charged the base of her BP cuff 2 days ago.    She reports that she has a pulmonology appointment and cardiology appointment this month.       The history is provided by the patient. No  was used.     Follow Up  Follow-up reason(s): routine education    Assessment:  Reviewed recent readings. Per 2017 ACC/ AHA HTN guidelines (goal of BP < 130/80), current 30-day average need to be addressed more throroughly today.     Last 5 Patient Entered Readings                                      Current 30 Day Average: 146/74     Recent Readings 3/9/2020 3/7/2020 3/7/2020 3/7/2020 2/28/2020    SBP (mmHg) 140 159 162 167 146    DBP (mmHg) 74 79 79 83 68    Pulse 69 74 72 73 80        INTERVENTION(S)  reviewed appropriate dose schedule, recommended med change - patient refuses, encouragement/support and goal setting    PLAN  patient verbalizes  "understanding and continue monitoring    >Continue current medication regimen.   >Informed patient to discuss persistent pedal edema with PCP.  >Informed patient to take half bisoprolol in the AM and other half in the PM. Would like to initiate irbesartan 75 mg daily however patient is apprehensive to initiate new medication for fear of dropping BP "too low".  >Reviewed complications that may arise from persistent elevated BP. She verbalizes understanding.  >I will continue to monitor regularly and will follow-up in 2 to 3 weeks, sooner if blood pressure begins to trend upward or downward.   >Patient denies having questions or concerns. Patient has my contact information and knows to call with any concerns or clinical changes.      There are no preventive care reminders to display for this patient.    Hypertension Medications             bisoprolol (ZEBETA) 5 MG tablet Take 1 tablet (5 mg total) by mouth once daily.    felodipine (PLENDIL) 10 MG 24 hr tablet Take 1 tablet (10 mg total) by mouth once daily.    furosemide (LASIX) 20 MG tablet Take 1 tablet (20 mg total) by mouth daily as needed. For leg swelling as needed. Or weight gain of 3 pounds in 1 day or 5 lbs in 1 week.            "

## 2020-03-10 NOTE — PROCEDURES
"[facility]  Six Minute Walk     SUMMARY     Ordering Provider: Dr Morales   Interpreting Provider: Dr Morales  Performing nurse/tech/RT: MENDEZ Erickson CRT  Diagnosis: COPD(exercise hypoxemia)  Height: 5' 1.5" (156.2 cm)  Weight: 78.4 kg (172 lb 13.5 oz)  BMI (Calculated): 32.1   Patient Race:             Phase Oxygen Assessment Supplemental O2 Heart   Rate Blood Pressure Richard Dyspnea Scale Rating   Resting 96 % Room Air 64 bpm 130/64 4   Exercise        Minute        1 95 % Room Air 81 bpm     2 94 % Room Air 82 bpm     3 93 % Room Air 95 bpm     4 94 % Room Air 84 bpm     5 96 % Room Air 85 bpm     6  95 % Room Air 87 bpm 155/62 9   Recovery        Minute        1 96 % Room Air 78 bpm     2 97 % Room Air 71 bpm     3 97 % Room Air 71 bpm     4 97 % Room Air 70 bpm 136/60 7-8     Six Minute Walk Summary  6MWT Status: completed without stopping  Patient Reported: Dyspnea, Dizziness, Lightheadedness, Leg pain(LEG PAIN)     Interpretation:  Did the patient stop or pause?: No              Total Time Walked (Calculated): 360 seconds  Final Partial Lap Distance (feet): 100 feet  Total Distance Meters (Calculated): 335.28 meters  Predicted Distance Meters (Calculated): 441.35 meters  Percentage of Predicted (Calculated): 75.97  Peak VO2 (Calculated): 14.04  Mets: 4.01  Has The Patient Had a Previous Six Minute Walk Test?: Yes       Previous 6MWT Results  Has The Patient Had a Previous Six Minute Walk Test?: Yes  Date of Previous Test: 12/07/15  Total Time Walked: 360 seconds  Total Distance (meters): 426.72  Predicted Distance (meters): 490.48 meters  Percentage of Predicted: 87  Percent Change (Calculated): 0.21      Interpretation:  Total distance walked in six minutes is mildly reduced indicating a reduction in overall  functional capacity. There was no exercise induced hypoxemia with significant oxygen desaturation.  Oxygen desaturation did not meet criteria for supplemental oxygen " prescription.    Clinical correlation suggested.    Andres Morales MD    Mild exercise-induced hypoxemia described as an arterial oxygen saturation of 93-95% (or 3-4% less than at rest), moderate exercise-induced hypoxemia as 89-93%, and severe exercise induced hypoxemia as < 89% O2 saturation.  Medicare Criteria Comments:   When arterial oxygen saturation is at or below 88% during exercise (severe exercise induced hypoxemia) then the patient falls under Medicare Group 1 criteria for supplemental oxygen.  Details about Medicare Group Criteria coverage can be found at http://www.cms.hhs.gov/manuals/downloads/

## 2020-03-10 NOTE — PROGRESS NOTES
Subjective:       Patient ID: Gemma Vick is a 65 y.o. female.    Chief Complaint: COPD (REV WALK)    COPD   Associated symptoms include congestion, coughing and fatigue. Pertinent negatives include no abdominal pain, chest pain, fever, nausea, rash or weakness.    COPD  She presents for evaluation and treatment of COPD. The patient is not currently have symptoms / an exacerbation. The patient has COPD for approximately 5 years. Symptoms in previous episodes have included dyspnea, cough and wheezing, and typically last 2 weeks. Previous episodes have been exacerbated by moderate activity. Current treatment includes albuterol inhaler and inhaled steroid, which generally provides some relief of symptoms.   She uses 1 pillows at night. Patient currently is on oxygen at 2 L/min per nasal cannula. - at night. The patient is having no constitutional symptoms, denying fever, chills, anorexia, or weight loss. The patient has been hospitalized for this condition before. She quit smoking approximately 4 years ago. The patient is experiencing exercise intolerance (difficulty walking 1 blocks on flat ground and difficulty climbing 1 flights of stairs).     Patient already is taking Combivent Respimat that has an anticholinergic medication in the inhaler    Past Medical History:   Diagnosis Date    AAA (abdominal aortic aneurysm) 2/13/2014    Abdominal aneurysm     3    Acute coronary syndrome     Arthritis     BPPV (benign paroxysmal positional vertigo)     Carotid artery plaque     Carotid artery stenosis and occlusion 2/13/2014    Chronic back pain     COPD (chronic obstructive pulmonary disease)     Coronary artery disease     Emphysema lung     Hyperlipidemia     Hypertension     Myocardial infarction     x3    Neuropathy      Past Surgical History:   Procedure Laterality Date    CARDIAC CATHETERIZATION      CORONARY ANGIOPLASTY      HYSTERECTOMY  1988     Social History     Socioeconomic History     Marital status:      Spouse name: Not on file    Number of children: Not on file    Years of education: Not on file    Highest education level: Not on file   Occupational History    Not on file   Social Needs    Financial resource strain: Not on file    Food insecurity:     Worry: Not on file     Inability: Not on file    Transportation needs:     Medical: Not on file     Non-medical: Not on file   Tobacco Use    Smoking status: Former Smoker     Packs/day: 0.50     Years: 44.00     Pack years: 22.00     Types: Cigarettes, Vaping w/o nicotine     Start date: 1970     Last attempt to quit: 2017     Years since quittin.8    Smokeless tobacco: Never Used    Tobacco comment: 3 MG NICOTINE FOR HEART.    Substance and Sexual Activity    Alcohol use: No    Drug use: No    Sexual activity: Not Currently     Birth control/protection: None   Lifestyle    Physical activity:     Days per week: Not on file     Minutes per session: Not on file    Stress: Not on file   Relationships    Social connections:     Talks on phone: Not on file     Gets together: Not on file     Attends [employer] service: Not on file     Active member of club or organization: Not on file     Attends meetings of clubs or organizations: Not on file     Relationship status: Not on file   Other Topics Concern    Not on file   Social History Narrative    Not on file     Review of Systems   Constitutional: Positive for fatigue. Negative for fever.   HENT: Positive for postnasal drip, rhinorrhea and congestion.    Eyes: Negative for redness and itching.   Respiratory: Positive for cough, sputum production, shortness of breath, dyspnea on extertion, use of rescue inhaler and Paroxysmal Nocturnal Dyspnea.    Cardiovascular: Negative for chest pain, palpitations and leg swelling.   Genitourinary: Negative for difficulty urinating and hematuria.   Endocrine: Negative for cold intolerance and heat intolerance.    Skin: Negative  for rash.   Gastrointestinal: Negative for nausea and abdominal pain.   Neurological: Negative for dizziness, syncope, weakness and light-headedness.   Hematological: Negative for adenopathy. Does not bruise/bleed easily.   Psychiatric/Behavioral: Negative for sleep disturbance. The patient is not nervous/anxious.        Objective:      Physical Exam   Constitutional: She is oriented to person, place, and time. She appears well-developed and well-nourished.   HENT:   Head: Normocephalic and atraumatic.   Mouth/Throat: Oropharyngeal exudate present.   Eyes: Pupils are equal, round, and reactive to light. Conjunctivae are normal.   Neck: Neck supple. No JVD present. No tracheal deviation present. No thyromegaly present.   Cardiovascular: Normal rate, regular rhythm and normal heart sounds.   Pulmonary/Chest: Effort normal. No respiratory distress. She has decreased breath sounds. She has wheezes in the right lower field and the left lower field. She has no rhonchi. She has no rales. She exhibits no tenderness.   Abdominal: Soft. Bowel sounds are normal.   Musculoskeletal: Normal range of motion. She exhibits no edema.   Lymphadenopathy:     She has no cervical adenopathy.   Neurological: She is alert and oriented to person, place, and time.   Skin: Skin is warm and dry.   Nursing note and vitals reviewed.    Personal Diagnostic Review  Chest X-Ray: I personally reviewed the films and findings are:, air trapping/emphysema  Pulmonary function tests:  Mod obstruction        Pulmonary Studies Review 3/10/2020 3/10/2020 3/10/2020 11/9/2019 10/17/2019 9/24/2019 9/13/2019   FVC - - - - - - -   FVC% - - - - - - -   FEV1 - - - - - - -   FEV1% - - - - - - -   FEV1/FVC - - - - - - -   SpO2 - 96 - - - - -   Ordering Provider - - Dr Morales - - - -   Interpreting Provider - - Dr Morales - - - -   Performing nurse/tech/RT - - MENDEZ Erickson CRT - - - -   Diagnosis - - COPD - - - -   Height 61 61.500 61.5 61.000 62.000 62.000 62.000    Weight [id number] 2752 2765.45 2795.43 2760.16 2779.56 2779.56   BMI (Calculated) 32.5 32 32.1 33 31.6 31.8 31.8   Predicted Distance 296.25 299.37 298.75 298.96 307.7 306.45 306.45   Patient Race - -  - - - -   6MWT Status - - completed without stopping - - - -   Patient Reported - - Dyspnea;Dizziness;Lightheadedness;Leg pain - - - -   Was O2 used? - - No - - - -   6MW Distance walked (feet) - - - - - - -   Distance walked (meters) - - - - - - -   Did patient stop? - - No - - - -   Type of assistive device(s) used? - - no assistive devices - - - -   Is extra documentation required for this patient? - - Yes - - - -   Oxygen Saturation - - 96 - - - -   Supplemental Oxygen - - Room Air - - - -   Heart Rate - - 64 - - - -   Blood Pressure - - 130/64 - - - -   Richard Dyspnea Rating  - - somewhat heavy - - - -   Oxygen Saturation - - 95 - - - -   Supplemental Oxygen - - Room Air - - - -   Heart Rate - - 87 - - - -   Blood Pressure - - 155/62 - - - -   Richard Dyspnea Rating  - - very,very heavy - - - -   Recovery Time (seconds) - - 240 - - - -   Oxygen Saturation - - 97 - - - -   Supplemental Oxygen - - Room Air - - - -   Heart Rate - - 70 - - - -   Blood Pressure - - 136/60 - - - -   Richard Dyspnea Rating  - - very heavy - - - -   Is procedure ready for interpretation? - - Yes - - - -   Did the patient stop or pause? - - No - - - -   Total Time Walked (Calculated) - - 360 - - - -   Total Laps Walked - - 5 - - - -   Final Partial Lap Distance (feet) - - 100 - - - -   Total Distance Feet (Calculated) - - 1100 - - - -   Total Distance Meters (Calculated) - - 335.28 - - - -   Predicted Distance Meters (Calculated) 439.52 442.22 441.35 442.44 450.21 448.95 448.95   Percentage of Predicted (Calculated) - - 75.97 - - - -   Peak VO2 (Calculated) - - 14.04 - - - -   Mets - - 4.01 - - - -   Has The Patient Had a Previous Six Minute Walk Test? - - Yes - - - -   Oxygen Qualification? - - No - - - -     No flowsheet data  found.      Assessment:       1. Seasonal allergic rhinitis due to other allergic trigger    2. Chronic obstructive pulmonary disease, unspecified COPD type    3. COPD, severity to be determined        Outpatient Encounter Medications as of 3/10/2020   Medication Sig Dispense Refill    albuterol (PROVENTIL/VENTOLIN HFA) 90 mcg/actuation inhaler INHALE 2 PUFFS INTO THE LUNGS EVERY 6 HOURS AS NEEDED 8.5 g 3    albuterol-ipratropium (DUO-NEB) 2.5 mg-0.5 mg/3 mL nebulizer solution USE ONE VIAL IN NEBULIZER TWICE DAILY 270 mL 11    aspirin 81 MG Chew Take 81 mg by mouth once daily.      bisoprolol (ZEBETA) 5 MG tablet Take 1 tablet (5 mg total) by mouth once daily. 30 tablet 5    clopidogreL (PLAVIX) 75 mg tablet Take 1 tablet (75 mg total) by mouth once daily. 30 tablet 1    COMBIVENT RESPIMAT  mcg/actuation inhaler Inhale 2 puffs into the lungs every 6 (six) hours as needed for Wheezing or Shortness of Breath. 4 g 11    DULoxetine (CYMBALTA) 60 MG capsule TAKE (1) CAPSULE BY MOUTH DAILY 30 capsule 1    estrogens, conjugated, (PREMARIN) 0.3 MG tablet Take 1 tablet (0.3 mg total) by mouth once daily. 30 tablet 1    ezetimibe-simvastatin 10-40 mg (VYTORIN) 10-40 mg per tablet Take 1 tablet by mouth every evening. 30 tablet 1    felodipine (PLENDIL) 10 MG 24 hr tablet Take 1 tablet (10 mg total) by mouth once daily. 30 tablet 5    furosemide (LASIX) 20 MG tablet Take 1 tablet (20 mg total) by mouth daily as needed. For leg swelling as needed. Or weight gain of 3 pounds in 1 day or 5 lbs in 1 week. 30 tablet 6    mometasone-formoterol (DULERA) 200-5 mcg/actuation inhaler INHALE 2 PUFFS 2 TIMES DAILY 13 g 11    OXYGEN-AIR DELIVERY SYSTEMS MISC 2 L by Misc.(Non-Drug; Combo Route) route every evening.      zolpidem (AMBIEN) 5 MG Tab Take 1 tablet (5 mg total) by mouth nightly. 30 tablet 5    [DISCONTINUED] albuterol (PROVENTIL/VENTOLIN HFA) 90 mcg/actuation inhaler INHALE 2 PUFFS INTO THE LUNGS EVERY 6  HOURS AS NEEDED 8.5 g 3    [DISCONTINUED] albuterol-ipratropium (DUO-NEB) 2.5 mg-0.5 mg/3 mL nebulizer solution USE ONE VIAL IN NEBULIZER TWICE DAILY 270 mL 11    [DISCONTINUED] COMBIVENT RESPIMAT  mcg/actuation inhaler Inhale 2 puffs into the lungs every 6 (six) hours as needed for Wheezing or Shortness of Breath. 4 g 11    [DISCONTINUED] mometasone-formoterol (DULERA) 200-5 mcg/actuation inhaler INHALE 2 PUFFS 2 TIMES DAILY 13 g 11    fluticasone propionate (FLONASE) 50 mcg/actuation nasal spray 2 sprays (100 mcg total) by Each Nostril route once daily. 16 g 11    [DISCONTINUED] ergocalciferol, vitamin D2, (VITAMIN D ORAL) Take 800 Units by mouth once daily.        No facility-administered encounter medications on file as of 3/10/2020.      Orders Placed This Encounter   Procedures    X-Ray Chest PA And Lateral     Standing Status:   Future     Standing Expiration Date:   9/10/2021     Order Specific Question:   Reason for Exam:     Answer:   SOB    Spirometry with/without bronchodilator     Standing Status:   Future     Standing Expiration Date:   3/10/2021     Plan:       Requested Prescriptions     Signed Prescriptions Disp Refills    albuterol (PROVENTIL/VENTOLIN HFA) 90 mcg/actuation inhaler 8.5 g 3     Sig: INHALE 2 PUFFS INTO THE LUNGS EVERY 6 HOURS AS NEEDED    albuterol-ipratropium (DUO-NEB) 2.5 mg-0.5 mg/3 mL nebulizer solution 270 mL 11     Sig: USE ONE VIAL IN NEBULIZER TWICE DAILY    COMBIVENT RESPIMAT  mcg/actuation inhaler 4 g 11     Sig: Inhale 2 puffs into the lungs every 6 (six) hours as needed for Wheezing or Shortness of Breath.    mometasone-formoterol (DULERA) 200-5 mcg/actuation inhaler 13 g 11     Sig: INHALE 2 PUFFS 2 TIMES DAILY    fluticasone propionate (FLONASE) 50 mcg/actuation nasal spray 16 g 11     Si sprays (100 mcg total) by Each Nostril route once daily.     Seasonal allergic rhinitis due to other allergic trigger  -     fluticasone propionate  (FLONASE) 50 mcg/actuation nasal spray; 2 sprays (100 mcg total) by Each Nostril route once daily.  Dispense: 16 g; Refill: 11    Chronic obstructive pulmonary disease, unspecified COPD type  -     albuterol (PROVENTIL/VENTOLIN HFA) 90 mcg/actuation inhaler; INHALE 2 PUFFS INTO THE LUNGS EVERY 6 HOURS AS NEEDED  Dispense: 8.5 g; Refill: 3  -     COMBIVENT RESPIMAT  mcg/actuation inhaler; Inhale 2 puffs into the lungs every 6 (six) hours as needed for Wheezing or Shortness of Breath.  Dispense: 4 g; Refill: 11  -     mometasone-formoterol (DULERA) 200-5 mcg/actuation inhaler; INHALE 2 PUFFS 2 TIMES DAILY  Dispense: 13 g; Refill: 11  -     Spirometry with/without bronchodilator; Future; Expected date: 09/10/2020  -     X-Ray Chest PA And Lateral; Future; Expected date: 03/10/2020    COPD, severity to be determined  -     albuterol-ipratropium (DUO-NEB) 2.5 mg-0.5 mg/3 mL nebulizer solution; USE ONE VIAL IN NEBULIZER TWICE DAILY  Dispense: 270 mL; Refill: 11      Follow up in about 1 year (around 3/10/2021) for Review CXR, Review tomer.    MEDICAL DECISION MAKING: Moderate  complexity.  Overall, the multiple problems listed are of moderate severity that may impact quality of life and activities of daily living. Side effects of medications, treatment plan as well as options and alternatives reviewed and discussed with patient. There was counseling of patient concerning these issues.    Total time spent in face to face counseling and coordination of care - 25 minutes over 50% of time was used in discussion of prognosis, risks, benefits of treatment, instructions and compliance with regimen . Discussion with other physicians and/or health care providers ( home health or for use of durable medical equipment (oxygen, nebulizers, CPAP, BiPAP) occurred.

## 2020-03-12 ENCOUNTER — TELEPHONE (OUTPATIENT)
Dept: RADIOLOGY | Facility: HOSPITAL | Age: 65
End: 2020-03-12

## 2020-03-13 ENCOUNTER — HOSPITAL ENCOUNTER (OUTPATIENT)
Dept: RADIOLOGY | Facility: HOSPITAL | Age: 65
Discharge: HOME | End: 2020-03-13
Attending: NURSE PRACTITIONER
Payer: MEDICARE

## 2020-03-13 ENCOUNTER — PATIENT MESSAGE (OUTPATIENT)
Dept: FAMILY MEDICINE | Facility: CLINIC | Age: 65
End: 2020-03-13

## 2020-03-13 ENCOUNTER — HOSPITAL ENCOUNTER (OUTPATIENT)
Dept: CARDIOLOGY | Facility: HOSPITAL | Age: 65
Discharge: HOME | End: 2020-03-13
Attending: NURSE PRACTITIONER
Payer: MEDICARE

## 2020-03-13 VITALS — WEIGHT: 171 LBS | BODY MASS INDEX: 32.28 KG/M2 | HEIGHT: 61 IN

## 2020-03-13 DIAGNOSIS — I65.23 BILATERAL CAROTID ARTERY STENOSIS: ICD-10-CM

## 2020-03-13 DIAGNOSIS — I71.40 ABDOMINAL AORTIC ANEURYSM (AAA) WITHOUT RUPTURE: ICD-10-CM

## 2020-03-13 LAB
LEFT ARM DIASTOLIC BLOOD PRESSURE: 71 MMHG
LEFT ARM SYSTOLIC BLOOD PRESSURE: 120 MMHG
LEFT CBA DIAS: 20 CM/S
LEFT CBA SYS: 78 CM/S
LEFT CCA DIST DIAS: 17 CM/S
LEFT CCA DIST SYS: 65 CM/S
LEFT CCA MID DIAS: 17 CM/S
LEFT CCA MID SYS: 71 CM/S
LEFT CCA PROX DIAS: 14 CM/S
LEFT CCA PROX SYS: 78 CM/S
LEFT ECA DIAS: 19 CM/S
LEFT ECA SYS: 119 CM/S
LEFT ICA DIST DIAS: 39 CM/S
LEFT ICA DIST SYS: 122 CM/S
LEFT ICA MID DIAS: 32 CM/S
LEFT ICA MID SYS: 113 CM/S
LEFT ICA PROX DIAS: 22 CM/S
LEFT ICA PROX SYS: 100 CM/S
LEFT VERTEBRAL DIAS: 18 CM/S
LEFT VERTEBRAL SYS: 71 CM/S
OHS CV CAROTID RIGHT ICA EDV HIGHEST: 29
OHS CV CAROTID ULTRASOUND LEFT ICA/CCA RATIO: 1.56
OHS CV CAROTID ULTRASOUND RIGHT ICA/CCA RATIO: 1.51
OHS CV PV CAROTID LEFT HIGHEST CCA: 78
OHS CV PV CAROTID LEFT HIGHEST ICA: 122
OHS CV PV CAROTID RIGHT HIGHEST CCA: 102
OHS CV PV CAROTID RIGHT HIGHEST ICA: 154
OHS CV US CAROTID LEFT HIGHEST EDV: 39
RIGHT ARM DIASTOLIC BLOOD PRESSURE: 77 MMHG
RIGHT ARM SYSTOLIC BLOOD PRESSURE: 138 MMHG
RIGHT CBA DIAS: 17 CM/S
RIGHT CBA SYS: 68 CM/S
RIGHT CCA DIST DIAS: 14 CM/S
RIGHT CCA DIST SYS: 55 CM/S
RIGHT CCA MID DIAS: 16 CM/S
RIGHT CCA MID SYS: 88 CM/S
RIGHT CCA PROX DIAS: 20 CM/S
RIGHT CCA PROX SYS: 102 CM/S
RIGHT ECA DIAS: 16 CM/S
RIGHT ECA SYS: 102 CM/S
RIGHT ICA DIST DIAS: 28 CM/S
RIGHT ICA DIST SYS: 112 CM/S
RIGHT ICA MID DIAS: 29 CM/S
RIGHT ICA MID SYS: 154 CM/S
RIGHT ICA PROX DIAS: 27 CM/S
RIGHT ICA PROX SYS: 122 CM/S

## 2020-03-13 PROCEDURE — 76775 US EXAM ABDO BACK WALL LIM: CPT | Mod: TC

## 2020-03-13 PROCEDURE — 76775 US ABDOMINAL AORTA: ICD-10-PCS | Mod: 26,,, | Performed by: RADIOLOGY

## 2020-03-13 PROCEDURE — 93880 EXTRACRANIAL BILAT STUDY: CPT | Mod: 26,,, | Performed by: INTERNAL MEDICINE

## 2020-03-13 PROCEDURE — 93880 EXTRACRANIAL BILAT STUDY: CPT

## 2020-03-13 PROCEDURE — 76775 US EXAM ABDO BACK WALL LIM: CPT | Mod: 26,,, | Performed by: RADIOLOGY

## 2020-03-13 PROCEDURE — 93880 CV US DOPPLER CAROTID (CUPID ONLY): ICD-10-PCS | Mod: 26,,, | Performed by: INTERNAL MEDICINE

## 2020-03-13 NOTE — TELEPHONE ENCOUNTER
Patient was seen last on 09/14/19 and is asking for a 90 day supply on all her medications. Is this ok?

## 2020-03-20 ENCOUNTER — PATIENT MESSAGE (OUTPATIENT)
Dept: ADMINISTRATIVE | Facility: OTHER | Age: 65
End: 2020-03-20

## 2020-03-23 ENCOUNTER — PATIENT MESSAGE (OUTPATIENT)
Dept: FAMILY MEDICINE | Facility: CLINIC | Age: 65
End: 2020-03-23

## 2020-03-23 DIAGNOSIS — J44.9 CHRONIC OBSTRUCTIVE PULMONARY DISEASE, UNSPECIFIED COPD TYPE: ICD-10-CM

## 2020-03-23 DIAGNOSIS — E78.2 MIXED HYPERLIPIDEMIA: ICD-10-CM

## 2020-03-23 DIAGNOSIS — F32.A DEPRESSION, UNSPECIFIED DEPRESSION TYPE: ICD-10-CM

## 2020-03-23 DIAGNOSIS — I25.10 CORONARY ARTERY DISEASE INVOLVING NATIVE CORONARY ARTERY OF NATIVE HEART WITHOUT ANGINA PECTORIS: ICD-10-CM

## 2020-03-23 DIAGNOSIS — Z78.0 MENOPAUSE: ICD-10-CM

## 2020-03-23 DIAGNOSIS — I10 ESSENTIAL HYPERTENSION: ICD-10-CM

## 2020-03-23 DIAGNOSIS — G47.00 INSOMNIA, UNSPECIFIED TYPE: ICD-10-CM

## 2020-03-24 ENCOUNTER — PATIENT OUTREACH (OUTPATIENT)
Dept: OTHER | Facility: OTHER | Age: 65
End: 2020-03-24

## 2020-03-24 DIAGNOSIS — I10 ESSENTIAL HYPERTENSION: ICD-10-CM

## 2020-03-24 RX ORDER — EZETIMIBE AND SIMVASTATIN 10; 40 MG/1; MG/1
1 TABLET ORAL NIGHTLY
Qty: 90 TABLET | Refills: 1 | Status: SHIPPED | OUTPATIENT
Start: 2020-03-24 | End: 2020-04-28 | Stop reason: SDUPTHER

## 2020-03-24 RX ORDER — IPRATROPIUM BROMIDE AND ALBUTEROL 20; 100 UG/1; UG/1
2 SPRAY, METERED RESPIRATORY (INHALATION) EVERY 6 HOURS PRN
Qty: 4 G | Refills: 11 | Status: SHIPPED | OUTPATIENT
Start: 2020-03-24 | End: 2021-02-01 | Stop reason: CLARIF

## 2020-03-24 RX ORDER — DULOXETIN HYDROCHLORIDE 60 MG/1
CAPSULE, DELAYED RELEASE ORAL
Qty: 90 CAPSULE | Refills: 1 | Status: SHIPPED | OUTPATIENT
Start: 2020-03-24 | End: 2021-01-08

## 2020-03-24 RX ORDER — FELODIPINE 10 MG/1
10 TABLET, EXTENDED RELEASE ORAL DAILY
Qty: 90 TABLET | Refills: 1 | Status: SHIPPED | OUTPATIENT
Start: 2020-03-24 | End: 2020-03-24

## 2020-03-24 RX ORDER — CLOPIDOGREL BISULFATE 75 MG/1
75 TABLET ORAL DAILY
Qty: 90 TABLET | Refills: 1 | Status: SHIPPED | OUTPATIENT
Start: 2020-03-24 | End: 2021-01-08

## 2020-03-24 RX ORDER — FELODIPINE 5 MG/1
5 TABLET, EXTENDED RELEASE ORAL DAILY
Qty: 90 TABLET | Refills: 1
Start: 2020-03-24 | End: 2020-05-04 | Stop reason: ALTCHOICE

## 2020-03-24 RX ORDER — BISOPROLOL FUMARATE 5 MG/1
5 TABLET, FILM COATED ORAL DAILY
Qty: 90 TABLET | Refills: 1 | Status: SHIPPED | OUTPATIENT
Start: 2020-03-24 | End: 2021-01-26 | Stop reason: SDUPTHER

## 2020-03-24 RX ORDER — FUROSEMIDE 20 MG/1
20 TABLET ORAL DAILY PRN
Qty: 30 TABLET | Refills: 6 | Status: SHIPPED | OUTPATIENT
Start: 2020-03-24 | End: 2021-04-15

## 2020-03-24 RX ORDER — ZOLPIDEM TARTRATE 5 MG/1
5 TABLET ORAL NIGHTLY
Qty: 30 TABLET | Refills: 5 | Status: SHIPPED | OUTPATIENT
Start: 2020-03-24 | End: 2020-09-18 | Stop reason: SDUPTHER

## 2020-03-24 NOTE — PROGRESS NOTES
Digital Medicine: Clinician Follow-Up    Called patient to follow up. Patient endorses adherence to medication regimen. Patient denies hypotensive s/sx (lightheadedness, dizziness, nausea, fatigue); patient denies hypertensive s/sx (SOB, CP, severe headaches, changes in vision). Instructed patient to seek medical care if BP > 180/110 and is accompanied by hypertensive s/sx associated, patient confirms understanding.     She is currently taking 5 mg of felodipine as suggested from our previous [facility] messages. She reports that she has noticed a reduction in her ankle swelling as well.     The history is provided by the patient.     Follow Up  Follow-up reason(s): medication change and routine education      Medication Change: dose decrease      Assessment:  Reviewed recent readings. Per 2017 ACC/ AHA HTN guidelines (goal of BP < 130/80), current 30-day average need to be addressed more throroughly today.       Last 5 Patient Entered Readings                                      Current 30 Day Average: 147/76     Recent Readings 3/23/2020 3/22/2020 3/20/2020 3/15/2020 3/11/2020    SBP (mmHg) 143 145 152 144 136    DBP (mmHg) 74 75 80 75 71    Pulse 72 87 77 63 58        INTERVENTION(S)  reviewed appropriate dose schedule, recommended med change, encouragement/support and goal setting    PLAN  patient verbalizes understanding and continue monitoring    >Continue current medication regimen.   >In future, may increase felodipine back to 10 mg daily - 5 mg in the AM and 5 mg in the PM.   >I will continue to monitor regularly and will follow-up in 2 to 3 weeks, sooner if blood pressure begins to trend upward or downward.   >Patient denies having questions or concerns.   >Patient has my contact information and knows to call with any concerns or clinical changes.      There are no preventive care reminders to display for this patient.    Hypertension Medications             bisoprolol (ZEBETA) 5 MG tablet Take 1 tablet (5  mg total) by mouth once daily.    felodipine (PLENDIL) 5 MG 24 hr tablet Take 1 tablet (5 mg total) by mouth once daily.    furosemide (LASIX) 20 MG tablet Take 1 tablet (20 mg total) by mouth daily as needed. For leg swelling as needed. Or weight gain of 3 pounds in 1 day or 5 lbs in 1 week.                 Screenings

## 2020-03-25 ENCOUNTER — PATIENT MESSAGE (OUTPATIENT)
Dept: FAMILY MEDICINE | Facility: CLINIC | Age: 65
End: 2020-03-25

## 2020-03-26 ENCOUNTER — PATIENT OUTREACH (OUTPATIENT)
Dept: ADMINISTRATIVE | Facility: OTHER | Age: 65
End: 2020-03-26

## 2020-03-26 ENCOUNTER — OFFICE VISIT (OUTPATIENT)
Dept: CARDIOLOGY | Facility: CLINIC | Age: 65
End: 2020-03-26
Payer: MEDICARE

## 2020-03-26 VITALS — SYSTOLIC BLOOD PRESSURE: 148 MMHG | HEART RATE: 61 BPM | DIASTOLIC BLOOD PRESSURE: 70 MMHG

## 2020-03-26 DIAGNOSIS — R73.03 PREDIABETES: ICD-10-CM

## 2020-03-26 DIAGNOSIS — I49.5 SINUS NODE DYSFUNCTION: ICD-10-CM

## 2020-03-26 DIAGNOSIS — J44.9 COPD, MODERATE: ICD-10-CM

## 2020-03-26 DIAGNOSIS — I10 ESSENTIAL HYPERTENSION: ICD-10-CM

## 2020-03-26 DIAGNOSIS — I65.23 BILATERAL CAROTID ARTERY STENOSIS: ICD-10-CM

## 2020-03-26 DIAGNOSIS — I71.40 ABDOMINAL AORTIC ANEURYSM (AAA) WITHOUT RUPTURE: ICD-10-CM

## 2020-03-26 DIAGNOSIS — J43.9 NOCTURNAL HYPOXEMIA DUE TO EMPHYSEMA: ICD-10-CM

## 2020-03-26 DIAGNOSIS — Z12.11 ENCOUNTER FOR FIT (FECAL IMMUNOCHEMICAL TEST) SCREENING: Primary | ICD-10-CM

## 2020-03-26 DIAGNOSIS — G47.36 NOCTURNAL HYPOXEMIA DUE TO EMPHYSEMA: ICD-10-CM

## 2020-03-26 DIAGNOSIS — E78.2 MIXED HYPERLIPIDEMIA: ICD-10-CM

## 2020-03-26 DIAGNOSIS — F17.201 TOBACCO USE DISORDER, MODERATE, IN SUSTAINED REMISSION: ICD-10-CM

## 2020-03-26 DIAGNOSIS — I25.118 CORONARY ARTERY DISEASE OF NATIVE ARTERY OF NATIVE HEART WITH STABLE ANGINA PECTORIS: Primary | ICD-10-CM

## 2020-03-26 PROCEDURE — 99214 PR OFFICE/OUTPT VISIT, EST, LEVL IV, 30-39 MIN: ICD-10-PCS | Mod: 95,,, | Performed by: INTERNAL MEDICINE

## 2020-03-26 PROCEDURE — 99214 OFFICE O/P EST MOD 30 MIN: CPT | Mod: 95,,, | Performed by: INTERNAL MEDICINE

## 2020-03-26 NOTE — PROGRESS NOTES
Care Everywhere: updated  Immunization: updated  Health Maintenance: updated  Media Review: reviewed for possible outside colon cancer report  Legacy Review: n/a  Order placed: fecal immunochemical test  Upcoming appts:n/a

## 2020-03-26 NOTE — PROGRESS NOTES
Digital Medicine: Health  Follow-Up    Patient is doing well. She had a virtual visit this morning with her doctor. She reports taking her medication as advised by HTN Clinician. She has found that only taking half of the medications is helping with the swelling in the legs the past 3-4 days. Patient is pleased with BP readings but would like it to be lower. Patient experiences life stressors, anxiety, and has the grandchildren at her home.         The history is provided by the patient.     Follow Up  Follow-up reason(s): reading review and routine education      Readings are trending down due to lifestyle change and medication adherence.    Routine Education Topics: eating patterns and physical activity        INTERVENTION(S)  reviewed appropriate dose schedule, recommended diet modifications, recommend physical activity, encouragement/support, denied resources, denied support and denied questions    PLAN  patient verbalizes understanding and patient amenable to changes    Patient knows to call Care Team with any questions or concerns in the future. Encouraged patient to continue with low-sodium diet.      There are no preventive care reminders to display for this patient.    Last 5 Patient Entered Readings                                      Current 30 Day Average: 146/74     Recent Readings 3/26/2020 3/26/2020 3/26/2020 3/25/2020 3/24/2020    SBP (mmHg) 143 148 146 147 148    DBP (mmHg) 61 70 71 72 74    Pulse 69 69 64 68 73                      Diet Screening   No change to diet.      Physical Activity Screening   No change to exercise routine.        Patient states that she is on her feet a lot around the house but does not do much activity.     Medication Adherence Screening       Patient reports having trouble with insurance. She reports that her Medicare will not pay for her VYTORIN. She would like some assistance.     Intervention(s): Provided patient education and Recommended alternate therapy due  to medication cost       SDOH

## 2020-03-26 NOTE — PROGRESS NOTES
The patient location is: home  The chief complaint leading to consultation is: test results  Visit type: Virtual visit with synchronous audio and video  Total time spent with patient: 20  Each patient to whom he or she provides medical services by telemedicine is:  (1) informed of the relationship between the physician and patient and the respective role of any other health care provider with respect to management of the patient; and (2) notified that he or she may decline to receive medical services by telemedicine and may withdraw from such care at any time.    Notes:   Subjective:   Patient ID:  Gemma Vick is a 65 y.o. female who presents for follow up of No chief complaint on file.      HPI  A 66 yo female with pvd carotid disease htn hlp copd exsmoker on nocturnal o2 therapy wants to discuss test results. She is in digital htn has been unable to tolerate bisoprolol daily feels tired fatigued no energy she takes meds regularily tries to be compliant with salt no exercise but stays busy in the house. She takes care of her grandbabies. Has occasional leg swelling she takes lasix prn for weight change she stands on her feet a lot. She is compliant with inhalers to control shortness of breath no palpitation has chronic cough.  Her carotid u/s reviewed unchanged.abd u/s no aneurysm  Lipids on target she is well controlled on vytorin .she has difficulty with crestor and lipitor   Past Medical History:   Diagnosis Date    AAA (abdominal aortic aneurysm) 2/13/2014    Abdominal aneurysm     3    Acute coronary syndrome     Arthritis     BPPV (benign paroxysmal positional vertigo)     Carotid artery plaque     Carotid artery stenosis and occlusion 2/13/2014    Chronic back pain     COPD (chronic obstructive pulmonary disease)     Coronary artery disease     Emphysema lung     Hyperlipidemia     Hypertension     Myocardial infarction     x3    Neuropathy        Past Surgical History:   Procedure  Laterality Date    CARDIAC CATHETERIZATION      CORONARY ANGIOPLASTY      HYSTERECTOMY         Social History     Tobacco Use    Smoking status: Former Smoker     Packs/day: 0.50     Years: 44.00     Pack years: 22.00     Types: Cigarettes, Vaping w/o nicotine     Start date: 1970     Last attempt to quit: 2017     Years since quittin.9    Smokeless tobacco: Never Used    Tobacco comment: 3 MG NICOTINE FOR HEART.    Substance Use Topics    Alcohol use: No    Drug use: No       Family History   Problem Relation Age of Onset    Heart disease Mother     Heart attacks under age 50 Father     Heart attacks under age 50 Brother     Breast cancer Paternal Aunt        Current Outpatient Medications   Medication Sig    albuterol (PROVENTIL/VENTOLIN HFA) 90 mcg/actuation inhaler INHALE 2 PUFFS INTO THE LUNGS EVERY 6 HOURS AS NEEDED    albuterol-ipratropium (DUO-NEB) 2.5 mg-0.5 mg/3 mL nebulizer solution USE ONE VIAL IN NEBULIZER TWICE DAILY    aspirin 81 MG Chew Take 81 mg by mouth once daily.    bisoprolol (ZEBETA) 5 MG tablet Take 1 tablet (5 mg total) by mouth once daily.    clopidogreL (PLAVIX) 75 mg tablet Take 1 tablet (75 mg total) by mouth once daily.    COMBIVENT RESPIMAT  mcg/actuation inhaler Inhale 2 puffs into the lungs every 6 (six) hours as needed for Wheezing or Shortness of Breath.    DULoxetine (CYMBALTA) 60 MG capsule TAKE (1) CAPSULE BY MOUTH DAILY    estrogens, conjugated, (PREMARIN) 0.3 MG tablet Take 1 tablet (0.3 mg total) by mouth once daily.    ezetimibe-simvastatin 10-40 mg (VYTORIN) 10-40 mg per tablet Take 1 tablet by mouth every evening.    felodipine (PLENDIL) 5 MG 24 hr tablet Take 1 tablet (5 mg total) by mouth once daily.    fluticasone propionate (FLONASE) 50 mcg/actuation nasal spray 2 sprays (100 mcg total) by Each Nostril route once daily.    furosemide (LASIX) 20 MG tablet Take 1 tablet (20 mg total) by mouth daily as needed. For leg swelling  as needed. Or weight gain of 3 pounds in 1 day or 5 lbs in 1 week.    mometasone-formoterol (DULERA) 200-5 mcg/actuation inhaler INHALE 2 PUFFS 2 TIMES DAILY    OXYGEN-AIR DELIVERY SYSTEMS MISC 2 L by Misc.(Non-Drug; Combo Route) route every evening.    zolpidem (AMBIEN) 5 MG Tab Take 1 tablet (5 mg total) by mouth nightly.     No current facility-administered medications for this visit.      Current Outpatient Medications on File Prior to Visit   Medication Sig    albuterol (PROVENTIL/VENTOLIN HFA) 90 mcg/actuation inhaler INHALE 2 PUFFS INTO THE LUNGS EVERY 6 HOURS AS NEEDED    albuterol-ipratropium (DUO-NEB) 2.5 mg-0.5 mg/3 mL nebulizer solution USE ONE VIAL IN NEBULIZER TWICE DAILY    aspirin 81 MG Chew Take 81 mg by mouth once daily.    bisoprolol (ZEBETA) 5 MG tablet Take 1 tablet (5 mg total) by mouth once daily.    clopidogreL (PLAVIX) 75 mg tablet Take 1 tablet (75 mg total) by mouth once daily.    COMBIVENT RESPIMAT  mcg/actuation inhaler Inhale 2 puffs into the lungs every 6 (six) hours as needed for Wheezing or Shortness of Breath.    DULoxetine (CYMBALTA) 60 MG capsule TAKE (1) CAPSULE BY MOUTH DAILY    estrogens, conjugated, (PREMARIN) 0.3 MG tablet Take 1 tablet (0.3 mg total) by mouth once daily.    ezetimibe-simvastatin 10-40 mg (VYTORIN) 10-40 mg per tablet Take 1 tablet by mouth every evening.    felodipine (PLENDIL) 5 MG 24 hr tablet Take 1 tablet (5 mg total) by mouth once daily.    fluticasone propionate (FLONASE) 50 mcg/actuation nasal spray 2 sprays (100 mcg total) by Each Nostril route once daily.    furosemide (LASIX) 20 MG tablet Take 1 tablet (20 mg total) by mouth daily as needed. For leg swelling as needed. Or weight gain of 3 pounds in 1 day or 5 lbs in 1 week.    mometasone-formoterol (DULERA) 200-5 mcg/actuation inhaler INHALE 2 PUFFS 2 TIMES DAILY    OXYGEN-AIR DELIVERY SYSTEMS MISC 2 L by Misc.(Non-Drug; Combo Route) route every evening.    zolpidem (AMBIEN)  5 MG Tab Take 1 tablet (5 mg total) by mouth nightly.     No current facility-administered medications on file prior to visit.      Review of patient's allergies indicates:  No Known Allergies  Review of Systems   Constitution: Negative for diaphoresis, malaise/fatigue and weight gain.   HENT: Negative for hoarse voice.    Eyes: Negative for double vision and visual disturbance.   Cardiovascular: Negative for chest pain, claudication, cyanosis, dyspnea on exertion, irregular heartbeat, leg swelling, near-syncope, orthopnea, palpitations, paroxysmal nocturnal dyspnea and syncope.   Respiratory: Positive for cough and shortness of breath. Negative for hemoptysis and snoring.    Hematologic/Lymphatic: Negative for bleeding problem. Does not bruise/bleed easily.   Skin: Negative for color change and poor wound healing.   Musculoskeletal: Negative for muscle cramps, muscle weakness and myalgias.   Gastrointestinal: Negative for bloating, abdominal pain, change in bowel habit, diarrhea, heartburn, hematemesis, hematochezia, melena and nausea.   Neurological: Negative for excessive daytime sleepiness, dizziness, headaches, light-headedness, loss of balance, numbness and weakness.   Psychiatric/Behavioral: Negative for memory loss. The patient does not have insomnia.    Allergic/Immunologic: Negative for hives.       Objective:   Physical Exam  Vitals:    03/26/20 1031 03/26/20 1032   BP: (!) 143/61 (!) 148/70   BP Location: Right arm Left arm   Pulse: 61 61     Lab Results   Component Value Date    CHOL 158 03/13/2020    CHOL 164 09/17/2019    CHOL 167 02/07/2019     Lab Results   Component Value Date    HDL 71 03/13/2020    HDL 67 09/17/2019    HDL 73 02/07/2019     Lab Results   Component Value Date    LDLCALC 69.0 03/13/2020    LDLCALC 80.0 09/17/2019    LDLCALC 75.8 02/07/2019     Lab Results   Component Value Date    TRIG 90 03/13/2020    TRIG 85 09/17/2019    TRIG 91 02/07/2019     Lab Results   Component Value Date     CHOLHDL 44.9 03/13/2020    CHOLHDL 40.9 09/17/2019    CHOLHDL 43.7 02/07/2019       Chemistry        Component Value Date/Time     03/13/2020 0855    K 4.4 03/13/2020 0855     03/13/2020 0855    CO2 25 03/13/2020 0855    BUN 18 03/13/2020 0855    CREATININE 1.1 03/13/2020 0855    GLU 93 03/13/2020 0855        Component Value Date/Time    CALCIUM 9.7 03/13/2020 0855    ALKPHOS 77 03/13/2020 0855    AST 16 03/13/2020 0855    ALT 12 03/13/2020 0855    BILITOT 0.4 03/13/2020 0855    ESTGFRAFRICA >60 03/13/2020 0855    EGFRNONAA 53 (A) 03/13/2020 0855          Lab Results   Component Value Date    TSH 1.509 05/04/2018     Lab Results   Component Value Date    INR 1.1 12/20/2017    INR 1.1 08/10/2012     Lab Results   Component Value Date    WBC 13.91 (H) 12/20/2017    HGB 11.7 (L) 12/20/2017    HCT 34.5 (L) 12/20/2017    MCV 89 12/20/2017     12/20/2017     BMP  Sodium   Date Value Ref Range Status   03/13/2020 142 136 - 145 mmol/L Final     Potassium   Date Value Ref Range Status   03/13/2020 4.4 3.5 - 5.1 mmol/L Final     Chloride   Date Value Ref Range Status   03/13/2020 103 95 - 110 mmol/L Final     CO2   Date Value Ref Range Status   03/13/2020 25 23 - 29 mmol/L Final     BUN, Bld   Date Value Ref Range Status   03/13/2020 18 8 - 23 mg/dL Final     Creatinine   Date Value Ref Range Status   03/13/2020 1.1 0.5 - 1.4 mg/dL Final     Calcium   Date Value Ref Range Status   03/13/2020 9.7 8.7 - 10.5 mg/dL Final     Anion Gap   Date Value Ref Range Status   03/13/2020 14 8 - 16 mmol/L Final     eGFR if    Date Value Ref Range Status   03/13/2020 >60 >60 mL/min/1.73 m^2 Final     eGFR if non    Date Value Ref Range Status   03/13/2020 53 (A) >60 mL/min/1.73 m^2 Final     Comment:     Calculation used to obtain the estimated glomerular filtration  rate (eGFR) is the CKD-EPI equation.        CrCl cannot be calculated (Patient's most recent lab result is older than the  maximum 7 days allowed.).    Assessment:     1. Coronary artery disease of native artery of native heart with stable angina pectoris    2. Mixed hyperlipidemia    3. Essential hypertension    4. Prediabetes    5. Bilateral carotid artery stenosis    6. Abdominal aortic aneurysm (AAA) without rupture    7. Sinus node dysfunction    8. Nocturnal hypoxemia due to emphysema    9. COPD, moderate    10. Tobacco use disorder, moderate, in sustained remission      Stable clinically. Her issue is inability to handle lower bp due to symptoms.  Carotid stable   No angina or claudication  Lipids on target   Encouraged exercise and weight loss.  As far as lipid therapy this combination vytorin is the only combination that would get lipids on target w/o side effects.  Plan:   Continue current therapy  Cardiac low salt diet.  Risk factor modification and excercise program./weight loss   F/u in 6 months with lipid cmp .

## 2020-04-14 ENCOUNTER — PATIENT OUTREACH (OUTPATIENT)
Dept: OTHER | Facility: OTHER | Age: 65
End: 2020-04-14

## 2020-04-14 NOTE — PROGRESS NOTES
"Digital Medicine: Clinician Follow-Up    Called patient to follow up. Patient endorses adherence to medication regimen. Patient denies hypotensive s/sx (lightheadedness, dizziness, nausea, fatigue); patient denies hypertensive s/sx (SOB, CP, severe headaches, changes in vision). She reports that she has not been taking felodipine because she is "trying to see what BP looks like without it". She reports that she will "give it a couple more days" without taking the felodipine because she wants her swelling to reduce.     The history is provided by the patient. No  was used.     Follow Up  Follow-up reason(s): medication change follow-up and routine education      Patient did not start new medication.    Adherence Barriers: lack of perceived benefit    Is patient tolerating med change?:  No  Assessment:  Reviewed recent readings. Per 2017 ACC/ AHA HTN guidelines (goal of BP < 130/80), current 30-day average need to be addressed more throroughly today.       Last 5 Patient Entered Readings                                      Current 30 Day Average: 149/75     Recent Readings 4/12/2020 4/10/2020 4/10/2020 4/9/2020 4/9/2020    SBP (mmHg) 148 152 165 163 175    DBP (mmHg) 68 76 81 82 81    Pulse 68 72 78 78 86        INTERVENTION(S)  reviewed appropriate dose schedule, reviewed monitoring technique, encouragement/support and goal setting    PLAN  patient verbalizes understanding and continue monitoring    >Continue current medication regimen.  >Explained the purpose of BP medications and encouraged her to continue to take her medications; also informed her to report any unwanted side effects.  >Would like to discontinue felodipine and initiate ARB however patient does not want a change at this time.  >I will continue to monitor regularly and will follow-up in 2 to 3 weeks, sooner if blood pressure begins to trend upward or downward.   >Patient denies having questions or concerns. Patient has my contact " information and knows to call with any concerns or clinical changes.      There are no preventive care reminders to display for this patient.    Hypertension Medications             bisoprolol (ZEBETA) 5 MG tablet Take 1 tablet (5 mg total) by mouth once daily.    felodipine (PLENDIL) 5 MG 24 hr tablet Take 1 tablet (5 mg total) by mouth once daily.    furosemide (LASIX) 20 MG tablet Take 1 tablet (20 mg total) by mouth daily as needed. For leg swelling as needed. Or weight gain of 3 pounds in 1 day or 5 lbs in 1 week.                           Medication Adherence Screening   She missed a dose once this week.    Patient identified the following reasons for non-compliance: Lack of perceived benefit

## 2020-04-27 ENCOUNTER — PATIENT OUTREACH (OUTPATIENT)
Dept: OTHER | Facility: OTHER | Age: 65
End: 2020-04-27

## 2020-04-27 NOTE — PROGRESS NOTES
Digital Medicine: Clinician Follow-Up    Called patient to follow up. Patient endorses adherence to medication regimen. Patient denies hypotensive s/sx (lightheadedness, dizziness, nausea, fatigue); patient denies hypertensive s/sx (SOB, CP, severe headaches, changes in vision). Instructed patient to seek medical care if BP > 180/110 and is accompanied by hypertensive s/sx associated, patient confirms understanding. She reports that she forgot to take her medication one night, then last night she took the felodipine and bisoprolol. She reports that she does not want to switch medications due to her past medical history of heart blockages and family history of heart blockade. She reports that her insurance is no longer paying for Vytorin; she has made her PCP aware of this matter. She does not want to take felodipine on a consistent basis due to her pedal edema. She reports that she does not experience as much edema without the medication however.    The history is provided by the patient. No  was used.     Follow Up  Follow-up reason(s): reading review and routine education      Alert received.   Care Team received high BP alert.  Patient is not experiencing symptoms.  Assessment:  Reviewed recent readings. Per 2017 ACC/ AHA HTN guidelines (goal of BP < 130/80), current 30-day average need to be addressed more throroughly today.     Last 5 Patient Entered Readings                                      Current 30 Day Average: 158/78     Recent Readings 4/27/2020 4/25/2020 4/25/2020 4/24/2020 4/24/2020    SBP (mmHg) 153 178 183 178 180    DBP (mmHg) 71 79 80 83 82    Pulse 53 57 61 62 63        INTERVENTION(S)  reviewed appropriate dose schedule, reviewed monitoring technique, encouragement/support and goal setting    PLAN  patient verbalizes understanding and continue monitoring    >Continue current medication regimen.   >Would like to discontinue felodipine and initiate ARB however patient does  not want a medication change at this time. Informed patient of benefits of lower BP and risks of maintaining an elevated BP.   >Informed patient of felodipine side effects and links to edema however patient is adamant on not changing or consistently taking her medication.  >I will continue to monitor regularly and will follow-up in 1 week, sooner if blood pressure begins to trend upward or downward.   >Patient denies having questions or concerns. Patient has my contact information and knows to call with any concerns or clinical changes.      There are no preventive care reminders to display for this patient.    Hypertension Medications             bisoprolol (ZEBETA) 5 MG tablet Take 1 tablet (5 mg total) by mouth once daily.    felodipine (PLENDIL) 5 MG 24 hr tablet Take 1 tablet (5 mg total) by mouth once daily.    furosemide (LASIX) 20 MG tablet Take 1 tablet (20 mg total) by mouth daily as needed. For leg swelling as needed. Or weight gain of 3 pounds in 1 day or 5 lbs in 1 week.                             Medication Adherence Screening   She missed a dose once this week.  Patient wonders if medications are working.

## 2020-04-28 ENCOUNTER — PATIENT MESSAGE (OUTPATIENT)
Dept: FAMILY MEDICINE | Facility: CLINIC | Age: 65
End: 2020-04-28

## 2020-04-28 DIAGNOSIS — Z78.0 MENOPAUSE: ICD-10-CM

## 2020-04-28 DIAGNOSIS — J44.9 CHRONIC OBSTRUCTIVE PULMONARY DISEASE, UNSPECIFIED COPD TYPE: ICD-10-CM

## 2020-04-28 DIAGNOSIS — E78.2 MIXED HYPERLIPIDEMIA: ICD-10-CM

## 2020-04-28 RX ORDER — EZETIMIBE AND SIMVASTATIN 10; 40 MG/1; MG/1
1 TABLET ORAL NIGHTLY
Qty: 90 TABLET | Refills: 1 | Status: SHIPPED | OUTPATIENT
Start: 2020-04-28 | End: 2020-04-29

## 2020-04-29 NOTE — TELEPHONE ENCOUNTER
Insurance will not cover Vytorin but will pay for it if split in two different medications and the Premarin is not covered but Estradiol tablets are covered. Please review and advise.

## 2020-04-29 NOTE — TELEPHONE ENCOUNTER
----- Message from Lacho Escalante sent at 4/29/2020  4:11 PM CDT -----  Contact: Pt   Pt is calling rg having some questions rg medication and insurance     Type:  Needs Medical Advice    Who Called:  Pt   Symptoms (please be specific): right hip[ pain    How long has patient had these symptoms:  approx a 1 1/2 weeks  Pharmacy name and phone #:    Would the patient rather a call back or a response via [facility]? phone  Best Call Back Number: [phone]  Additional Information: wants to be advised on what to do

## 2020-04-30 ENCOUNTER — OFFICE VISIT (OUTPATIENT)
Dept: FAMILY MEDICINE | Facility: CLINIC | Age: 65
End: 2020-04-30
Payer: MEDICARE

## 2020-04-30 DIAGNOSIS — E78.2 MIXED HYPERLIPIDEMIA: ICD-10-CM

## 2020-04-30 DIAGNOSIS — M25.551 PAIN OF RIGHT HIP JOINT: Primary | ICD-10-CM

## 2020-04-30 DIAGNOSIS — S69.91XA INJURY OF RIGHT HAND, INITIAL ENCOUNTER: ICD-10-CM

## 2020-04-30 PROCEDURE — 99214 PR OFFICE/OUTPT VISIT, EST, LEVL IV, 30-39 MIN: ICD-10-PCS | Mod: 95,,, | Performed by: FAMILY MEDICINE

## 2020-04-30 PROCEDURE — G0463 HOSPITAL OUTPT CLINIC VISIT: HCPCS | Mod: PO

## 2020-04-30 PROCEDURE — 99214 OFFICE O/P EST MOD 30 MIN: CPT | Mod: 95,,, | Performed by: FAMILY MEDICINE

## 2020-04-30 PROCEDURE — 99211 OFF/OP EST MAY X REQ PHY/QHP: CPT | Mod: PO

## 2020-04-30 RX ORDER — METHYLPREDNISOLONE 4 MG/1
TABLET ORAL
Qty: 1 PACKAGE | Refills: 0 | Status: SHIPPED | OUTPATIENT
Start: 2020-04-30 | End: 2020-06-12

## 2020-04-30 RX ORDER — SIMVASTATIN 40 MG/1
40 TABLET, FILM COATED ORAL NIGHTLY
Qty: 90 TABLET | Refills: 3 | OUTPATIENT
Start: 2020-04-30 | End: 2021-04-30

## 2020-04-30 RX ORDER — EZETIMIBE 10 MG/1
10 TABLET ORAL DAILY
Qty: 90 TABLET | Refills: 3 | Status: SHIPPED | OUTPATIENT
Start: 2020-04-30 | End: 2021-03-15

## 2020-04-30 RX ORDER — EZETIMIBE 10 MG/1
10 TABLET ORAL DAILY
Qty: 90 TABLET | Refills: 3 | OUTPATIENT
Start: 2020-04-30 | End: 2021-04-30

## 2020-04-30 RX ORDER — SIMVASTATIN 40 MG/1
40 TABLET, FILM COATED ORAL NIGHTLY
Qty: 90 TABLET | Refills: 3 | Status: SHIPPED | OUTPATIENT
Start: 2020-04-30 | End: 2020-09-18

## 2020-04-30 NOTE — PROGRESS NOTES
Chief Complaint:  No chief complaint on file.      History of Present Illness:  Presents today she has had some right hip pain the this is a virtual visit mostly on the right lateral side going on for several days no trauma moderate intensity.   Denies any tingling numbness.  She says it hurts a lot she has been taking meloxicam it has improved slightly  Also she scraped the back of her right hand against the door few days back and it is an open wound although there is no infection.  She is on Vytorin for hyperlipidemia and since insurance is not paying for she wants to split it into simvastatin and Zetia she had her lipids and chemistry done recently.    ROS:  Review of Systems   Constitutional: Negative for activity change, chills, fatigue, fever and unexpected weight change.   HENT: Negative for congestion, ear discharge, ear pain, hearing loss, postnasal drip and rhinorrhea.    Eyes: Negative for pain and visual disturbance.   Respiratory: Negative for cough, chest tightness and shortness of breath.    Cardiovascular: Negative for chest pain and palpitations.   Gastrointestinal: Negative for abdominal pain, diarrhea and vomiting.   Endocrine: Negative for heat intolerance.   Genitourinary: Negative for dysuria, flank pain, frequency and hematuria.   Musculoskeletal: Negative for back pain, gait problem and neck pain.   Skin: Negative for color change and rash.   Neurological: Negative for dizziness, tremors, seizures, numbness and headaches.   Psychiatric/Behavioral: Negative for agitation, hallucinations, self-injury, sleep disturbance and suicidal ideas. The patient is not nervous/anxious.        Past Medical History:   Diagnosis Date    AAA (abdominal aortic aneurysm) 2/13/2014    Abdominal aneurysm     3    Acute coronary syndrome     Arthritis     BPPV (benign paroxysmal positional vertigo)     Carotid artery plaque     Carotid artery stenosis and occlusion 2/13/2014    Chronic back pain     COPD  (chronic obstructive pulmonary disease)     Coronary artery disease     Emphysema lung     Hyperlipidemia     Hypertension     Myocardial infarction     x3    Neuropathy        Social History:  Social History     Socioeconomic History    Marital status:      Spouse name: Not on file    Number of children: Not on file    Years of education: Not on file    Highest education level: Not on file   Occupational History    Not on file   Social Needs    Financial resource strain: Not on file    Food insecurity:     Worry: Not on file     Inability: Not on file    Transportation needs:     Medical: Not on file     Non-medical: Not on file   Tobacco Use    Smoking status: Former Smoker     Packs/day: 0.50     Years: 44.00     Pack years: 22.00     Types: Cigarettes, Vaping w/o nicotine     Start date: 6/24/1970     Last attempt to quit: 05/2017     Years since quitting: 3.0    Smokeless tobacco: Never Used    Tobacco comment: 3 MG NICOTINE FOR HEART.    Substance and Sexual Activity    Alcohol use: No    Drug use: No    Sexual activity: Not Currently     Birth control/protection: None   Lifestyle    Physical activity:     Days per week: Not on file     Minutes per session: Not on file    Stress: Not on file   Relationships    Social connections:     Talks on phone: Not on file     Gets together: Not on file     Attends Bahai service: Not on file     Active member of club or organization: Not on file     Attends meetings of clubs or organizations: Not on file     Relationship status: Not on file   Other Topics Concern    Not on file   Social History Narrative    Not on file       Family History:   family history includes Breast cancer in her paternal aunt; Heart attacks under age 50 in her brother and father; Heart disease in her mother.    Health Maintenance   Topic Date Due    Colonoscopy  02/22/1973    High Dose Statin  02/22/1976    DEXA SCAN  02/22/1995    Pneumococcal Vaccine (65+  Low/Medium Risk) (1 of 2 - PCV13) 02/22/2020    Mammogram  03/10/2021    Lipid Panel  03/13/2021    Aspirin/Antiplatelet Therapy  03/26/2021    TETANUS VACCINE  11/15/2026    Hepatitis C Screening  Completed       Physical Exam:     ]    There is no height or weight on file to calculate BMI.    Physical Exam      Assessment:      ICD-10-CM ICD-9-CM   1. Pain of right hip joint M25.551 719.45         Plan:        Examination done using the virtual visit camera patient pointed to the tenderness on the lateral part of the hip.  Differential include possible hip tendinitis versus lumbar radiculitis  Will give a prescription for Medrol Dosepak and she will continue meloxicam also take Pepcid and take with food re-evaluate in the office if not improved in 1 week or sooner.  The right hand wound appears to be healing well there are no signs of infection looking through the camera continue using topical antibiotic cream  Vytorin will be changed to simvastatin 40 and Zetia 10    The patient location is: Home   The chief complaint leading to consultation is: as below  Visit type: Virtual visit with synchronous audio and video  Total time spent with patient: 20+ mins  Each patient to whom he or she provides medical services by telemedicine is:  (1) informed of the relationship between the physician and patient and the respective role of any other health care provider with respect to management of the patient; and (2) notified that he or she may decline to receive medical services by telemedicine and may withdraw from such care at any time.    Notes: see below          No orders of the defined types were placed in this encounter.      Current Outpatient Medications   Medication Sig Dispense Refill    albuterol (PROVENTIL/VENTOLIN HFA) 90 mcg/actuation inhaler INHALE 2 PUFFS INTO THE LUNGS EVERY 6 HOURS AS NEEDED 8.5 g 3    albuterol-ipratropium (DUO-NEB) 2.5 mg-0.5 mg/3 mL nebulizer solution USE ONE VIAL IN NEBULIZER  TWICE DAILY 270 mL 11    aspirin 81 MG Chew Take 81 mg by mouth once daily.      bisoprolol (ZEBETA) 5 MG tablet Take 1 tablet (5 mg total) by mouth once daily. 90 tablet 1    clopidogreL (PLAVIX) 75 mg tablet Take 1 tablet (75 mg total) by mouth once daily. 90 tablet 1    COMBIVENT RESPIMAT  mcg/actuation inhaler Inhale 2 puffs into the lungs every 6 (six) hours as needed for Wheezing or Shortness of Breath. 4 g 11    DULoxetine (CYMBALTA) 60 MG capsule TAKE (1) CAPSULE BY MOUTH DAILY 90 capsule 1    estrogens, conjugated, (PREMARIN) 0.3 MG tablet Take 1 tablet (0.3 mg total) by mouth once daily. 9 tablet 1    ezetimibe (ZETIA) 10 mg tablet Take 1 tablet (10 mg total) by mouth once daily. 90 tablet 3    felodipine (PLENDIL) 5 MG 24 hr tablet Take 1 tablet (5 mg total) by mouth once daily. 90 tablet 1    fluticasone propionate (FLONASE) 50 mcg/actuation nasal spray 2 sprays (100 mcg total) by Each Nostril route once daily. 16 g 11    furosemide (LASIX) 20 MG tablet Take 1 tablet (20 mg total) by mouth daily as needed. For leg swelling as needed. Or weight gain of 3 pounds in 1 day or 5 lbs in 1 week. 30 tablet 6    methylPREDNISolone (MEDROL DOSEPACK) 4 mg tablet use as directed 1 Package 0    mometasone-formoterol (DULERA) 200-5 mcg/actuation inhaler INHALE 2 PUFFS 2 TIMES DAILY 13 g 11    OXYGEN-AIR DELIVERY SYSTEMS MISC 2 L by Misc.(Non-Drug; Combo Route) route every evening.      simvastatin (ZOCOR) 40 MG tablet Take 1 tablet (40 mg total) by mouth every evening. 90 tablet 3    zolpidem (AMBIEN) 5 MG Tab Take 1 tablet (5 mg total) by mouth nightly. 30 tablet 5     No current facility-administered medications for this visit.        There are no discontinued medications.    No follow-ups on file.      Olga Altman MD

## 2020-05-04 ENCOUNTER — PATIENT OUTREACH (OUTPATIENT)
Dept: OTHER | Facility: OTHER | Age: 65
End: 2020-05-04

## 2020-05-04 DIAGNOSIS — I10 ESSENTIAL HYPERTENSION: Primary | ICD-10-CM

## 2020-05-04 RX ORDER — IRBESARTAN 150 MG/1
150 TABLET ORAL NIGHTLY
Qty: 30 TABLET | Refills: 3 | Status: SHIPPED | OUTPATIENT
Start: 2020-05-04 | End: 2020-07-14 | Stop reason: ALTCHOICE

## 2020-05-04 NOTE — PROGRESS NOTES
Digital Medicine: Clinician Follow-Up    Called patient to follow up. Patient endorses adherence to medication regimen. Patient denies hypotensive s/sx (lightheadedness, dizziness, nausea, fatigue); patient denies hypertensive s/sx (SOB, CP, severe headaches, changes in vision). She reports that she took Plendil for 2 days then her ankles began to swell again. She states that she stopped taking the medication and her legs return to normal.    The history is provided by the patient. No  was used.     Follow Up  Follow-up reason(s): medication change and routine education      Medication Change: new med and stop therapy    Assessment:  Reviewed recent readings. Per 2017 ACC/ AHA HTN guidelines (goal of BP < 130/80), current 30-day average need to be addressed more throroughly today.    Last 5 Patient Entered Readings                                      Current 30 Day Average: 163/79     Recent Readings 4/27/2020 4/27/2020 4/27/2020 4/27/2020 4/25/2020    SBP (mmHg) 168 175 177 153 178    DBP (mmHg) 80 81 85 71 79    Pulse 58 57 61 53 57        INTERVENTION(S)  reviewed appropriate dose schedule, reviewed monitoring technique, recommended med change, encouragement/support and goal setting    PLAN  patient verbalizes understanding, patient amenable to changes and continue monitoring    >Discontinue Plendil and initiate irbesartan 150 mg daily.  >Reviewed medication side effects and dosing with patient.  >Will schedule a repeat BMP at future encounter.  >I will continue to monitor regularly and will follow-up in 2 to 3 weeks, sooner if blood pressure begins to trend upward or downward.   >Patient denies having questions or concerns. Patient has my contact information and knows to call with any concerns or clinical changes.      There are no preventive care reminders to display for this patient.    Hypertension Medications             bisoprolol (ZEBETA) 5 MG tablet Take 1 tablet (5 mg total) by mouth  once daily.    furosemide (LASIX) 20 MG tablet Take 1 tablet (20 mg total) by mouth daily as needed. For leg swelling as needed. Or weight gain of 3 pounds in 1 day or 5 lbs in 1 week.    irbesartan (AVAPRO) 150 MG tablet Take 1 tablet (150 mg total) by mouth every evening.

## 2020-05-08 ENCOUNTER — PATIENT MESSAGE (OUTPATIENT)
Dept: PODIATRY | Facility: CLINIC | Age: 65
End: 2020-05-08

## 2020-05-18 ENCOUNTER — PATIENT OUTREACH (OUTPATIENT)
Dept: OTHER | Facility: OTHER | Age: 65
End: 2020-05-18

## 2020-05-18 DIAGNOSIS — I10 ESSENTIAL HYPERTENSION: Primary | ICD-10-CM

## 2020-05-18 NOTE — PROGRESS NOTES
Digital Medicine: Clinician Follow-Up    Called patient to follow up. Patient endorses adherence to medication regimen. Patient denies hypotensive s/sx (lightheadedness, dizziness, nausea, fatigue); patient denies hypertensive s/sx (SOB, CP, severe headaches, changes in vision). She reports that she was taking steroids last week for bursitis. She states that steroids and pain caused her BP elevations.     She is tolerating irbesartan well at this time and states that her pedal edema has gone away since stopping felodipine.     The history is provided by the patient. No  was used.     Follow Up  Follow-up reason(s): reading review and medication change follow-up      Readings are trending down due to medication adherence.    Patient started new medication.    Is patient tolerating med change?:  Yes    Assessment:  Reviewed recent readings. Per 2017 ACC/ AHA HTN guidelines (goal of BP < 130/80), current 30-day average need to be addressed more throroughly today.    Last 5 Patient Entered Readings                                      Current 30 Day Average: 164/78     Recent Readings 5/12/2020 5/10/2020 5/9/2020 5/8/2020 5/7/2020    SBP (mmHg) 162 146 154 175 162    DBP (mmHg) 74 73 75 78 79    Pulse 70 71 67 73 62        INTERVENTION(S)  reviewed monitoring technique, encouragement/support and goal setting    PLAN  patient verbalizes understanding and continue monitoring    >Continue current medication regimen.   >In future, may increase irbesartan dose to 300 mg daily.  >I will continue to monitor regularly and will follow-up in ~3 weeks, sooner if blood pressure begins to trend upward or downward.   >Patient denies having questions or concerns. Patient has my contact information and knows to call with any concerns or clinical changes.      There are no preventive care reminders to display for this patient.    Hypertension Medications             bisoprolol (ZEBETA) 5 MG tablet Take 1 tablet (5 mg  total) by mouth once daily.    furosemide (LASIX) 20 MG tablet Take 1 tablet (20 mg total) by mouth daily as needed. For leg swelling as needed. Or weight gain of 3 pounds in 1 day or 5 lbs in 1 week.    irbesartan (AVAPRO) 150 MG tablet Take 1 tablet (150 mg total) by mouth every evening.

## 2020-05-21 ENCOUNTER — PATIENT OUTREACH (OUTPATIENT)
Dept: OTHER | Facility: OTHER | Age: 65
End: 2020-05-21

## 2020-05-21 NOTE — PROGRESS NOTES
"Digital Medicine: Health  Follow-Up    Patient reports doing well. She states that the cause of her BP elevating is the steroid shots she has been taking for her hip. She states that she is experiencing some pain but mostly aggravation to the hip. She states that the pain worsens when she is sitting too long. Encouraged patient to stretch between hours. 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       The history is provided by the patient.     Follow Up  Follow-up reason(s): reading review and routine education      Readings are trending down due to lifestyle change and medication adherence.    Routine Education Topics: eating patterns and physical activity        INTERVENTION(S)  recommend physical activity and encouragement/support    PLAN  patient verbalizes understanding and patient amenable to changes    Encouraged patient to stretch daily.   Will f/u in 4-5 weeks on patient progress.      There are no preventive care reminders to display for this patient.    Last 5 Patient Entered Readings                                      Current 30 Day Average: 165/78     Recent Readings 5/12/2020 5/10/2020 5/9/2020 5/8/2020 5/7/2020    SBP (mmHg) 162 146 154 175 162    DBP (mmHg) 74 73 75 78 79    Pulse 70 71 67 73 62                      Diet Screening   No change to diet.  Patient reports eating or drinking the following: water, fresh vegetables, packaged/processed foods and deli meat    Eating style: confused about food/nutrition and relies on convenience items    Barriers to a Healthy Diet: lack of knowledge and time/convenience    Patient states that she has been eating smaller portions and tries to not eat so late.     Assigning the following patient goals: reduce portions and maintain low sodium diet    Physical Activity Screening   No change to exercise routine.      Patient has the following chronic pain: arthritis and joint pain    She identified the following barriers to physical " activity: pain/injury/recent surgery      SDOH

## 2020-05-26 ENCOUNTER — TELEPHONE (OUTPATIENT)
Dept: FAMILY MEDICINE | Facility: CLINIC | Age: 65
End: 2020-05-26

## 2020-05-26 NOTE — TELEPHONE ENCOUNTER
----- Message from Matthew Duke sent at 5/26/2020  1:46 PM CDT -----  Contact: pt  Pt called and stated she is having a few issues with her stomach and would like to know if she could speak to someone and have a prescription called in    Pt can be reached at [phone]

## 2020-05-26 NOTE — TELEPHONE ENCOUNTER
----- Message from Matthew Duke sent at 5/26/2020  1:46 PM CDT -----  Contact: pt  Pt called and stated she is having a few issues with her stomach and would like to know if she could speak to someone and have a prescription called in    Pt can be reached at [phone]

## 2020-05-27 ENCOUNTER — LAB VISIT (OUTPATIENT)
Dept: LAB | Facility: HOSPITAL | Age: 65
End: 2020-05-27
Payer: MEDICARE

## 2020-05-27 ENCOUNTER — OFFICE VISIT (OUTPATIENT)
Dept: FAMILY MEDICINE | Facility: CLINIC | Age: 65
End: 2020-05-27
Payer: MEDICARE

## 2020-05-27 VITALS
OXYGEN SATURATION: 96 % | DIASTOLIC BLOOD PRESSURE: 72 MMHG | WEIGHT: 169.19 LBS | TEMPERATURE: 98 F | SYSTOLIC BLOOD PRESSURE: 138 MMHG | HEART RATE: 62 BPM | BODY MASS INDEX: 31.97 KG/M2

## 2020-05-27 DIAGNOSIS — M70.62 TROCHANTERIC BURSITIS OF BOTH HIPS: ICD-10-CM

## 2020-05-27 DIAGNOSIS — R10.13 EPIGASTRIC PAIN: Primary | ICD-10-CM

## 2020-05-27 DIAGNOSIS — R10.13 EPIGASTRIC PAIN: ICD-10-CM

## 2020-05-27 DIAGNOSIS — M79.10 MUSCLE PAIN: ICD-10-CM

## 2020-05-27 DIAGNOSIS — Z12.11 COLON CANCER SCREENING: ICD-10-CM

## 2020-05-27 DIAGNOSIS — M70.61 TROCHANTERIC BURSITIS OF BOTH HIPS: ICD-10-CM

## 2020-05-27 LAB
ALBUMIN SERPL BCP-MCNC: 3.2 G/DL (ref 3.5–5.2)
ALP SERPL-CCNC: 103 U/L (ref 55–135)
ALT SERPL W/O P-5'-P-CCNC: 12 U/L (ref 10–44)
ANION GAP SERPL CALC-SCNC: 6 MMOL/L (ref 8–16)
AST SERPL-CCNC: 15 U/L (ref 10–40)
BASOPHILS # BLD AUTO: 0.05 K/UL (ref 0–0.2)
BASOPHILS NFR BLD: 0.6 % (ref 0–1.9)
BILIRUB SERPL-MCNC: 0.3 MG/DL (ref 0.1–1)
BUN SERPL-MCNC: 14 MG/DL (ref 8–23)
CALCIUM SERPL-MCNC: 9.8 MG/DL (ref 8.7–10.5)
CHLORIDE SERPL-SCNC: 106 MMOL/L (ref 95–110)
CO2 SERPL-SCNC: 25 MMOL/L (ref 23–29)
CREAT SERPL-MCNC: 0.9 MG/DL (ref 0.5–1.4)
CRP SERPL-MCNC: 10.8 MG/L (ref 0–8.2)
DIFFERENTIAL METHOD: ABNORMAL
EOSINOPHIL # BLD AUTO: 0.2 K/UL (ref 0–0.5)
EOSINOPHIL NFR BLD: 2.5 % (ref 0–8)
ERYTHROCYTE [DISTWIDTH] IN BLOOD BY AUTOMATED COUNT: 13.7 % (ref 11.5–14.5)
EST. GFR  (AFRICAN AMERICAN): >60 ML/MIN/1.73 M^2
EST. GFR  (NON AFRICAN AMERICAN): >60 ML/MIN/1.73 M^2
GLUCOSE SERPL-MCNC: 91 MG/DL (ref 70–110)
HCT VFR BLD AUTO: 39.4 % (ref 37–48.5)
HGB BLD-MCNC: 12.4 G/DL (ref 12–16)
IMM GRANULOCYTES # BLD AUTO: 0.02 K/UL (ref 0–0.04)
IMM GRANULOCYTES NFR BLD AUTO: 0.2 % (ref 0–0.5)
LYMPHOCYTES # BLD AUTO: 1.8 K/UL (ref 1–4.8)
LYMPHOCYTES NFR BLD: 22.1 % (ref 18–48)
MCH RBC QN AUTO: 30.1 PG (ref 27–31)
MCHC RBC AUTO-ENTMCNC: 31.5 G/DL (ref 32–36)
MCV RBC AUTO: 96 FL (ref 82–98)
MONOCYTES # BLD AUTO: 0.9 K/UL (ref 0.3–1)
MONOCYTES NFR BLD: 11.3 % (ref 4–15)
NEUTROPHILS # BLD AUTO: 5.2 K/UL (ref 1.8–7.7)
NEUTROPHILS NFR BLD: 63.3 % (ref 38–73)
NRBC BLD-RTO: 0 /100 WBC
PLATELET # BLD AUTO: 379 K/UL (ref 150–350)
PMV BLD AUTO: 10.2 FL (ref 9.2–12.9)
POTASSIUM SERPL-SCNC: 4.1 MMOL/L (ref 3.5–5.1)
PROT SERPL-MCNC: 7.3 G/DL (ref 6–8.4)
RBC # BLD AUTO: 4.12 M/UL (ref 4–5.4)
SODIUM SERPL-SCNC: 137 MMOL/L (ref 136–145)
WBC # BLD AUTO: 8.14 K/UL (ref 3.9–12.7)

## 2020-05-27 PROCEDURE — 99214 PR OFFICE/OUTPT VISIT, EST, LEVL IV, 30-39 MIN: ICD-10-PCS | Mod: S$PBB,,, | Performed by: FAMILY MEDICINE

## 2020-05-27 PROCEDURE — 36415 COLL VENOUS BLD VENIPUNCTURE: CPT | Mod: PO

## 2020-05-27 PROCEDURE — 99999 PR PBB SHADOW E&M-EST. PATIENT-LVL III: ICD-10-PCS | Mod: PBBFAC,,, | Performed by: FAMILY MEDICINE

## 2020-05-27 PROCEDURE — 80053 COMPREHEN METABOLIC PANEL: CPT

## 2020-05-27 PROCEDURE — 99214 OFFICE O/P EST MOD 30 MIN: CPT | Mod: S$PBB,,, | Performed by: FAMILY MEDICINE

## 2020-05-27 PROCEDURE — 99213 OFFICE O/P EST LOW 20 MIN: CPT | Mod: PBBFAC,PO | Performed by: FAMILY MEDICINE

## 2020-05-27 PROCEDURE — 99999 PR PBB SHADOW E&M-EST. PATIENT-LVL III: CPT | Mod: PBBFAC,,, | Performed by: FAMILY MEDICINE

## 2020-05-27 PROCEDURE — 86140 C-REACTIVE PROTEIN: CPT

## 2020-05-27 PROCEDURE — 85025 COMPLETE CBC W/AUTO DIFF WBC: CPT

## 2020-05-27 RX ORDER — PANTOPRAZOLE SODIUM 40 MG/1
40 TABLET, DELAYED RELEASE ORAL DAILY
Qty: 30 TABLET | Refills: 1 | Status: SHIPPED | OUTPATIENT
Start: 2020-05-27 | End: 2020-07-24

## 2020-05-27 NOTE — PROGRESS NOTES
Chief Complaint:    Chief Complaint   Patient presents with    Rectal Pain    Hip Pain       History of Present Illness:    Says she was constipated when she uses stool softener and had a good bowel movement after that her bowel movements have been normal but she feels some soreness in the abdomen any time she has a bowel movement there is also some soreness in the upper part of the abdomen.  No other symptoms  She has not had a colonoscopy.    Still has bilateral hip pain on the lateral side no tingling numbness    She also complains of muscles soreness she says she hurts anywhere she bumps    ROS:  Review of Systems   Constitutional: Negative for activity change, chills, fatigue, fever and unexpected weight change.   HENT: Negative for congestion, ear discharge, ear pain, hearing loss, postnasal drip and rhinorrhea.    Eyes: Negative for pain and visual disturbance.   Respiratory: Negative for cough, chest tightness and shortness of breath.    Cardiovascular: Negative for chest pain and palpitations.   Gastrointestinal: Positive for abdominal pain. Negative for diarrhea and vomiting.   Endocrine: Negative for heat intolerance.   Genitourinary: Negative for dysuria, flank pain, frequency and hematuria.   Musculoskeletal: Negative for back pain, gait problem and neck pain.   Skin: Negative for color change and rash.   Neurological: Negative for dizziness, tremors, seizures, numbness and headaches.   Psychiatric/Behavioral: Negative for agitation, hallucinations, self-injury, sleep disturbance and suicidal ideas. The patient is not nervous/anxious.        Past Medical History:   Diagnosis Date    AAA (abdominal aortic aneurysm) 2/13/2014    Abdominal aneurysm     3    Acute coronary syndrome     Arthritis     BPPV (benign paroxysmal positional vertigo)     Carotid artery plaque     Carotid artery stenosis and occlusion 2/13/2014    Chronic back pain     COPD (chronic obstructive pulmonary disease)      Coronary artery disease     Emphysema lung     Hyperlipidemia     Hypertension     Myocardial infarction     x3    Neuropathy        Social History:  Social History     Socioeconomic History    Marital status:      Spouse name: Not on file    Number of children: Not on file    Years of education: Not on file    Highest education level: Not on file   Occupational History    Not on file   Social Needs    Financial resource strain: Not on file    Food insecurity:     Worry: Not on file     Inability: Not on file    Transportation needs:     Medical: Not on file     Non-medical: Not on file   Tobacco Use    Smoking status: Former Smoker     Packs/day: 0.50     Years: 44.00     Pack years: 22.00     Types: Cigarettes, Vaping w/o nicotine     Start date: 6/24/1970     Last attempt to quit: 05/2017     Years since quitting: 3.0    Smokeless tobacco: Never Used    Tobacco comment: 3 MG NICOTINE FOR HEART.    Substance and Sexual Activity    Alcohol use: No    Drug use: No    Sexual activity: Not Currently     Birth control/protection: None   Lifestyle    Physical activity:     Days per week: Not on file     Minutes per session: Not on file    Stress: Not on file   Relationships    Social connections:     Talks on phone: Not on file     Gets together: Not on file     Attends [employer] service: Not on file     Active member of club or organization: Not on file     Attends meetings of clubs or organizations: Not on file     Relationship status: Not on file   Other Topics Concern    Not on file   Social History Narrative    Not on file       Family History:   family history includes Breast cancer in her paternal aunt; Heart attacks under age 50 in her brother and father; Heart disease in her mother.    Health Maintenance   Topic Date Due    Colonoscopy  02/22/1973    DEXA SCAN  02/22/1995    Pneumococcal Vaccine (65+ Low/Medium Risk) (1 of 2 - PCV13) 02/22/2020    Mammogram  03/10/2021     Lipid Panel  03/13/2021    Aspirin/Antiplatelet Therapy  05/27/2021    TETANUS VACCINE  11/15/2026    Hepatitis C Screening  Completed       Physical Exam:    Vital Signs  Temp: 97.8 °F (36.6 °C)  Temp src: Oral  Pulse: 62  SpO2: 96 %  BP: 138/72  BP Location: Left arm  Patient Position: Sitting  Pain Score:   6  Pain Loc: Hip  Height and Weight  Weight: 76.8 kg (169 lb 3.3 oz)]    Body mass index is 31.97 kg/m².    Physical Exam   Constitutional: She is oriented to person, place, and time. She appears well-developed.   HENT:   Mouth/Throat: Oropharynx is clear and moist.   Eyes: Pupils are equal, round, and reactive to light. Conjunctivae are normal.   Neck: Normal range of motion. Neck supple.   Cardiovascular: Normal rate, regular rhythm and normal heart sounds.   No murmur heard.  Pulmonary/Chest: Effort normal and breath sounds normal. No respiratory distress. She has no wheezes. She has no rales. She exhibits no tenderness.   Abdominal: Soft. She exhibits no distension and no mass. There is no tenderness. There is no guarding.   Genitourinary:   Genitourinary Comments: Proctoscopy examination is normal   Musculoskeletal: She exhibits no edema or tenderness.   Lymphadenopathy:     She has no cervical adenopathy.   Neurological: She is alert and oriented to person, place, and time. She has normal reflexes.   Skin: Skin is warm and dry.   Psychiatric: She has a normal mood and affect. Her behavior is normal. Judgment and thought content normal.         Assessment:      ICD-10-CM ICD-9-CM   1. Epigastric pain R10.13 789.06   2. Colon cancer screening Z12.11 V76.51   3. Trochanteric bursitis of both hips M70.61 726.5    M70.62    4. Muscle pain M79.10 729.1         Plan:        Check labs as below recommend a trial of Protonix  Do recommend screening colonoscopy  Recommend physical therapy for hip bursitis  Please hold simvastatin for 3 weeks and let us know if the muscle pain improves        Orders Placed This  Encounter   Procedures    CBC auto differential    Comprehensive metabolic panel    C-reactive protein    Ambulatory referral/consult to Physical/Occupational Therapy       Current Outpatient Medications   Medication Sig Dispense Refill    albuterol (PROVENTIL/VENTOLIN HFA) 90 mcg/actuation inhaler INHALE 2 PUFFS INTO THE LUNGS EVERY 6 HOURS AS NEEDED 8.5 g 3    albuterol-ipratropium (DUO-NEB) 2.5 mg-0.5 mg/3 mL nebulizer solution USE ONE VIAL IN NEBULIZER TWICE DAILY 270 mL 11    aspirin 81 MG Chew Take 81 mg by mouth once daily.      clopidogreL (PLAVIX) 75 mg tablet Take 1 tablet (75 mg total) by mouth once daily. 90 tablet 1    COMBIVENT RESPIMAT  mcg/actuation inhaler Inhale 2 puffs into the lungs every 6 (six) hours as needed for Wheezing or Shortness of Breath. 4 g 11    DULoxetine (CYMBALTA) 60 MG capsule TAKE (1) CAPSULE BY MOUTH DAILY 90 capsule 1    ezetimibe (ZETIA) 10 mg tablet Take 1 tablet (10 mg total) by mouth once daily. 90 tablet 3    fluticasone propionate (FLONASE) 50 mcg/actuation nasal spray 2 sprays (100 mcg total) by Each Nostril route once daily. 16 g 11    furosemide (LASIX) 20 MG tablet Take 1 tablet (20 mg total) by mouth daily as needed. For leg swelling as needed. Or weight gain of 3 pounds in 1 day or 5 lbs in 1 week. 30 tablet 6    irbesartan (AVAPRO) 150 MG tablet Take 1 tablet (150 mg total) by mouth every evening. 30 tablet 3    mometasone-formoterol (DULERA) 200-5 mcg/actuation inhaler INHALE 2 PUFFS 2 TIMES DAILY 13 g 11    OXYGEN-AIR DELIVERY SYSTEMS MISC 2 L by Misc.(Non-Drug; Combo Route) route every evening.      simvastatin (ZOCOR) 40 MG tablet Take 1 tablet (40 mg total) by mouth every evening. 90 tablet 3    zolpidem (AMBIEN) 5 MG Tab Take 1 tablet (5 mg total) by mouth nightly. 30 tablet 5    bisoprolol (ZEBETA) 5 MG tablet Take 1 tablet (5 mg total) by mouth once daily. 90 tablet 1    estrogens, conjugated, (PREMARIN) 0.3 MG tablet Take 1  tablet (0.3 mg total) by mouth once daily. (Patient not taking: Reported on 5/27/2020) 9 tablet 1    methylPREDNISolone (MEDROL DOSEPACK) 4 mg tablet use as directed (Patient not taking: Reported on 5/27/2020) 1 Package 0    pantoprazole (PROTONIX) 40 MG tablet Take 1 tablet (40 mg total) by mouth once daily. 30 tablet 1     No current facility-administered medications for this visit.        There are no discontinued medications.    No follow-ups on file.      Olga Altman MD

## 2020-05-28 ENCOUNTER — TELEPHONE (OUTPATIENT)
Dept: FAMILY MEDICINE | Facility: CLINIC | Age: 65
End: 2020-05-28

## 2020-05-28 DIAGNOSIS — R79.82 CRP ELEVATED: Primary | ICD-10-CM

## 2020-06-01 ENCOUNTER — HOSPITAL ENCOUNTER (OUTPATIENT)
Dept: RADIOLOGY | Facility: HOSPITAL | Age: 65
Discharge: HOME | End: 2020-06-01
Attending: FAMILY MEDICINE
Payer: MEDICARE

## 2020-06-01 DIAGNOSIS — R79.82 CRP ELEVATED: ICD-10-CM

## 2020-06-01 PROCEDURE — 74177 CT ABD & PELVIS W/CONTRAST: CPT | Mod: TC,HCNC

## 2020-06-01 PROCEDURE — 25500020 PHARM REV CODE 255: Mod: HCNC | Performed by: FAMILY MEDICINE

## 2020-06-01 RX ORDER — METRONIDAZOLE 500 MG/1
500 TABLET ORAL EVERY 8 HOURS
Qty: 21 TABLET | Refills: 0 | Status: SHIPPED | OUTPATIENT
Start: 2020-06-01 | End: 2020-07-17 | Stop reason: ALTCHOICE

## 2020-06-01 RX ORDER — CIPROFLOXACIN 500 MG/1
500 TABLET ORAL 2 TIMES DAILY
Qty: 14 TABLET | Refills: 0 | Status: SHIPPED | OUTPATIENT
Start: 2020-06-01 | End: 2020-06-08

## 2020-06-01 RX ADMIN — IOHEXOL 30 ML: 350 INJECTION, SOLUTION INTRAVENOUS at 02:06

## 2020-06-01 RX ADMIN — IOHEXOL 75 ML: 350 INJECTION, SOLUTION INTRAVENOUS at 02:06

## 2020-06-12 ENCOUNTER — PATIENT OUTREACH (OUTPATIENT)
Dept: OTHER | Facility: OTHER | Age: 65
End: 2020-06-12

## 2020-06-12 NOTE — PROGRESS NOTES
Digital Medicine: Clinician Follow-Up    Called patient to follow up. Patient endorses adherence to medication regimen. Patient denies hypotensive s/sx (lightheadedness, dizziness, nausea, fatigue); patient denies hypertensive s/sx (SOB, CP, severe headaches, changes in vision). Instructed patient to seek medical care if BP > 180/110 and is accompanied by hypertensive s/sx associated, patient confirms understanding. She reports that she was dealing with diverticulitis and received treatment for it. She states that she is no longer having the pain but experiencing mild diarrhea. She states that she also had bursitis in her right hip and she could not stand. She states that she was given steroids which caused her BP to increase.       The history is provided by the patient.   Follow Up  Follow-up reason(s): reading review and routine education      Readings are trending up     Assessment:  Reviewed recent readings. Per 2017 ACC/ AHA HTN guidelines (goal of BP < 130/80), current 30-day average need to be addressed more throroughly today.     Last 5 Patient Entered Readings                                      Current 30 Day Average: 165/77     Recent Readings 6/9/2020 6/7/2020 5/29/2020 5/25/2020 5/21/2020    SBP (mmHg) 163 146 167 172 176    DBP (mmHg) 68 74 82 77 83    Pulse 66 64 64 62 78        INTERVENTION(S)  reviewed appropriate dose schedule, reviewed monitoring technique, recommended med change, encouragement/support and goal setting    PLAN  patient verbalizes understanding, patient refuses additional therapy, patient unable to make changes at this time, await MD intervention and continue monitoring    >Continue current medication regimen. Recommended to patient to increase irbesartan to 300 mg daily however she refuses to do so at this time because she does not like the way she feels when her BP drops.  >Informed patient of cardiovascular complications that may arise from elevated BP. She verbalizes  understanding.  >I will continue to monitor regularly and will follow-up in 2 to 3 weeks, sooner if blood pressure begins to trend upward or downward.  >Patient denies having questions or concerns. Patient has my contact information and knows to call with any concerns or clinical changes.      There are no preventive care reminders to display for this patient.    Hypertension Medications             bisoprolol (ZEBETA) 5 MG tablet Take 1 tablet (5 mg total) by mouth once daily.    furosemide (LASIX) 20 MG tablet Take 1 tablet (20 mg total) by mouth daily as needed. For leg swelling as needed. Or weight gain of 3 pounds in 1 day or 5 lbs in 1 week.    irbesartan (AVAPRO) 150 MG tablet Take 1 tablet (150 mg total) by mouth every evening.

## 2020-06-18 ENCOUNTER — PATIENT OUTREACH (OUTPATIENT)
Dept: OTHER | Facility: OTHER | Age: 65
End: 2020-06-18

## 2020-06-18 ENCOUNTER — TELEPHONE (OUTPATIENT)
Dept: ENDOSCOPY | Facility: HOSPITAL | Age: 65
End: 2020-06-18

## 2020-06-22 ENCOUNTER — TELEPHONE (OUTPATIENT)
Dept: PULMONOLOGY | Facility: CLINIC | Age: 65
End: 2020-06-22

## 2020-06-22 NOTE — TELEPHONE ENCOUNTER
Called pharmacy states dulura inhaler was filled don 06/06/2020 from mail order pharmacy. Spoke with patient she states [facility] letter list of medication to be covered patient will call back to give names of medications.

## 2020-06-25 NOTE — PROGRESS NOTES
Digital Medicine: Health  Follow-Up    Routine f/u. Patient was babysitting her two granddaughters. She states that she has been having some stress discomfort with her hip. She is in her second week of physical therapy trying to strengthen her back. It has helped improve the pain in her hip. No other changes in lifestyle.   DBP continues to look great. AVG /74.     Pt knows to call if any questions or concerns.     The history is provided by the patient.   Follow Up  Follow-up reason(s): reading review      Readings are trending down due to lifestyle change and medication adherence.    Routine Education Topics: eating patterns and physical activity        INTERVENTION(S)  encouragement/support, denied resources and denied questions    PLAN  patient verbalizes understanding, patient amenable to changes and continue monitoring      There are no preventive care reminders to display for this patient.    Last 5 Patient Entered Readings                                      Current 30 Day Average: 153/74     Recent Readings 6/23/2020 6/19/2020 6/9/2020 6/7/2020 5/29/2020    SBP (mmHg) 156 135 163 146 167    DBP (mmHg) 75 73 68 74 82    Pulse 67 62 66 64 64                      Diet Screening   No change to diet.      Physical Activity Screening   No change to exercise routine.          SDOH

## 2020-06-30 ENCOUNTER — PATIENT OUTREACH (OUTPATIENT)
Dept: OTHER | Facility: OTHER | Age: 65
End: 2020-06-30

## 2020-06-30 DIAGNOSIS — I10 ESSENTIAL HYPERTENSION: ICD-10-CM

## 2020-06-30 RX ORDER — IRBESARTAN 150 MG/1
300 TABLET ORAL NIGHTLY
Qty: 30 TABLET | Refills: 3
Start: 2020-06-30 | End: 2020-07-30

## 2020-06-30 NOTE — PROGRESS NOTES
"Digital Medicine: Clinician Follow-Up    Called patient to follow up. Patient endorses adherence to medication regimen. Patient denies hypotensive s/sx (lightheadedness, dizziness, nausea, fatigue); patient denies hypertensive s/sx (SOB, CP, severe headaches, changes in vision). She reports that she has been doing physical therapy 3 days a week for 1 hour at a time. She states that both legs are "frozen to the bone at night". She states that her legs have been hurting for about 1.5 months. She was informed that she has "diverticulosis in the vein below aneurysms". She states that she uses an electric blanket every night. She states that she is back on premarin and has minimal hot flashes.     The history is provided by the patient. No  was used.   Follow Up  Follow-up reason(s): medication change and routine education      Medication Change: dose increase      Assessment:  Reviewed recent readings. Per 2017 ACC/ AHA HTN guidelines (goal of BP < 130/80), current 30-day average need to be addressed more throroughly today.     Last 5 Patient Entered Readings                                      Current 30 Day Average: 150/73     Recent Readings 6/23/2020 6/19/2020 6/9/2020 6/7/2020 5/29/2020    SBP (mmHg) 156 135 163 146 167    DBP (mmHg) 75 73 68 74 82    Pulse 67 62 66 64 64        INTERVENTION(S)  reviewed appropriate dose schedule, recommended med change, encouragement/support and goal setting    PLAN  patient verbalizes understanding and continue monitoring    >Increase irbesartan dose to 300 mg daily.   >Informed patient to schedule an appointment with her PCP regarding her leg concerns.   >I will continue to monitor regularly and will follow-up in 2 to 3 weeks, sooner if blood pressure begins to trend upward or downward.   >Patient denies having questions or concerns. Patient has my contact information and knows to call with any concerns or clinical changes.    There are no preventive care " reminders to display for this patient.    Hypertension Medications             bisoprolol (ZEBETA) 5 MG tablet Take 1 tablet (5 mg total) by mouth once daily.    furosemide (LASIX) 20 MG tablet Take 1 tablet (20 mg total) by mouth daily as needed. For leg swelling as needed. Or weight gain of 3 pounds in 1 day or 5 lbs in 1 week.    irbesartan (AVAPRO) 150 MG tablet Take 2 tablet (300) mg total) by mouth every evening.                             Medication Adherence Screening   She did not miss a dose this month.  Patient is selectively taking diuretics.

## 2020-07-14 ENCOUNTER — PATIENT OUTREACH (OUTPATIENT)
Dept: OTHER | Facility: OTHER | Age: 65
End: 2020-07-14

## 2020-07-17 ENCOUNTER — OFFICE VISIT (OUTPATIENT)
Dept: FAMILY MEDICINE | Facility: CLINIC | Age: 65
End: 2020-07-17
Payer: MEDICARE

## 2020-07-17 ENCOUNTER — TELEPHONE (OUTPATIENT)
Dept: FAMILY MEDICINE | Facility: CLINIC | Age: 65
End: 2020-07-17

## 2020-07-17 ENCOUNTER — HOSPITAL ENCOUNTER (OUTPATIENT)
Dept: RADIOLOGY | Facility: HOSPITAL | Age: 65
Discharge: HOME | End: 2020-07-17
Attending: FAMILY MEDICINE
Payer: MEDICARE

## 2020-07-17 VITALS
HEART RATE: 72 BPM | BODY MASS INDEX: 30.44 KG/M2 | SYSTOLIC BLOOD PRESSURE: 120 MMHG | DIASTOLIC BLOOD PRESSURE: 64 MMHG | HEIGHT: 62 IN | OXYGEN SATURATION: 95 % | WEIGHT: 165.44 LBS | TEMPERATURE: 99 F

## 2020-07-17 DIAGNOSIS — I71.40 ABDOMINAL AORTIC ANEURYSM (AAA) WITHOUT RUPTURE: ICD-10-CM

## 2020-07-17 DIAGNOSIS — K57.92 DIVERTICULITIS: ICD-10-CM

## 2020-07-17 DIAGNOSIS — K57.92 DIVERTICULITIS: Primary | ICD-10-CM

## 2020-07-17 DIAGNOSIS — N30.00 ACUTE CYSTITIS WITHOUT HEMATURIA: ICD-10-CM

## 2020-07-17 DIAGNOSIS — K59.09 OTHER CONSTIPATION: ICD-10-CM

## 2020-07-17 PROCEDURE — 74019 RADEX ABDOMEN 2 VIEWS: CPT | Mod: 26,HCNC,, | Performed by: RADIOLOGY

## 2020-07-17 PROCEDURE — 3078F DIAST BP <80 MM HG: CPT | Mod: HCNC,CPTII,S$GLB, | Performed by: FAMILY MEDICINE

## 2020-07-17 PROCEDURE — 1101F PR PT FALLS ASSESS DOC 0-1 FALLS W/OUT INJ PAST YR: ICD-10-PCS | Mod: HCNC,CPTII,S$GLB, | Performed by: FAMILY MEDICINE

## 2020-07-17 PROCEDURE — 99214 OFFICE O/P EST MOD 30 MIN: CPT | Mod: HCNC,S$GLB,, | Performed by: FAMILY MEDICINE

## 2020-07-17 PROCEDURE — 3008F PR BODY MASS INDEX (BMI) DOCUMENTED: ICD-10-PCS | Mod: HCNC,CPTII,S$GLB, | Performed by: FAMILY MEDICINE

## 2020-07-17 PROCEDURE — 99999 PR PBB SHADOW E&M-EST. PATIENT-LVL V: ICD-10-PCS | Mod: PBBFAC,HCNC,, | Performed by: FAMILY MEDICINE

## 2020-07-17 PROCEDURE — 3078F PR MOST RECENT DIASTOLIC BLOOD PRESSURE < 80 MM HG: ICD-10-PCS | Mod: HCNC,CPTII,S$GLB, | Performed by: FAMILY MEDICINE

## 2020-07-17 PROCEDURE — 3074F PR MOST RECENT SYSTOLIC BLOOD PRESSURE < 130 MM HG: ICD-10-PCS | Mod: HCNC,CPTII,S$GLB, | Performed by: FAMILY MEDICINE

## 2020-07-17 PROCEDURE — 99999 PR PBB SHADOW E&M-EST. PATIENT-LVL V: CPT | Mod: PBBFAC,HCNC,, | Performed by: FAMILY MEDICINE

## 2020-07-17 PROCEDURE — 99214 PR OFFICE/OUTPT VISIT, EST, LEVL IV, 30-39 MIN: ICD-10-PCS | Mod: HCNC,S$GLB,, | Performed by: FAMILY MEDICINE

## 2020-07-17 PROCEDURE — 74019 XR ABDOMEN FLAT AND ERECT: ICD-10-PCS | Mod: 26,HCNC,, | Performed by: RADIOLOGY

## 2020-07-17 PROCEDURE — 1101F PT FALLS ASSESS-DOCD LE1/YR: CPT | Mod: HCNC,CPTII,S$GLB, | Performed by: FAMILY MEDICINE

## 2020-07-17 PROCEDURE — 3008F BODY MASS INDEX DOCD: CPT | Mod: HCNC,CPTII,S$GLB, | Performed by: FAMILY MEDICINE

## 2020-07-17 PROCEDURE — 74019 RADEX ABDOMEN 2 VIEWS: CPT | Mod: TC,HCNC,FY,PO

## 2020-07-17 PROCEDURE — 3074F SYST BP LT 130 MM HG: CPT | Mod: HCNC,CPTII,S$GLB, | Performed by: FAMILY MEDICINE

## 2020-07-17 RX ORDER — SULFAMETHOXAZOLE AND TRIMETHOPRIM 800; 160 MG/1; MG/1
1 TABLET ORAL 2 TIMES DAILY
Qty: 14 TABLET | Refills: 0 | Status: SHIPPED | OUTPATIENT
Start: 2020-07-17 | End: 2020-07-24

## 2020-07-17 RX ORDER — DICYCLOMINE HYDROCHLORIDE 10 MG/1
10 CAPSULE ORAL
Qty: 30 CAPSULE | Refills: 0 | Status: SHIPPED | OUTPATIENT
Start: 2020-07-17 | End: 2020-08-05

## 2020-07-17 RX ORDER — METRONIDAZOLE 500 MG/1
500 TABLET ORAL 3 TIMES DAILY
Qty: 30 TABLET | Refills: 0 | Status: SHIPPED | OUTPATIENT
Start: 2020-07-17 | End: 2020-08-05

## 2020-07-17 NOTE — PROGRESS NOTES
Subjective:       Patient ID: Gemma Vick is a 65 y.o. female.    Chief Complaint: Abdominal Pain      History of Present Illness:   Gemma Vick 65 y.o. female presents today with  Acute on chronic. Abd pain is diffuse to lower abd. She has a history of diverticulitis and had a CT of abd on 06/01 that shows divertiuclitis.   She was treated with abx, pain improved but came back last night. She was not able to sleep. Gradual onset and worsening.  Advocates constipation for the past 2 days. Yesterday she did a fleet enema and last night bisacodyl without much result.  Denies fever, nausea and vomiting.  She has a AAA and cipro is contraindicated.  Also complaint of burning vaginal pain with urination.      Past Medical History:   Diagnosis Date    AAA (abdominal aortic aneurysm) 2/13/2014    Abdominal aneurysm     3    Acute coronary syndrome     Arthritis     BPPV (benign paroxysmal positional vertigo)     Carotid artery plaque     Carotid artery stenosis and occlusion 2/13/2014    Chronic back pain     COPD (chronic obstructive pulmonary disease)     Coronary artery disease     Emphysema lung     Hyperlipidemia     Hypertension     Myocardial infarction     x3    Neuropathy      Family History   Problem Relation Age of Onset    Heart disease Mother     Heart attacks under age 50 Father     Heart attacks under age 50 Brother     Breast cancer Paternal Aunt      Social History     Socioeconomic History    Marital status:      Spouse name: Not on file    Number of children: Not on file    Years of education: Not on file    Highest education level: Not on file   Occupational History    Not on file   Social Needs    Financial resource strain: Hard    Food insecurity     Worry: Sometimes true     Inability: Sometimes true    Transportation needs     Medical: No     Non-medical: No   Tobacco Use    Smoking status: Former Smoker     Packs/day: 0.50     Years: 44.00     Pack years:  22.00     Types: Cigarettes, Vaping w/o nicotine     Start date: 6/24/1970     Quit date: 05/2017     Years since quitting: 3.2    Smokeless tobacco: Never Used    Tobacco comment: 3 MG NICOTINE FOR HEART.    Substance and Sexual Activity    Alcohol use: No     Frequency: Never    Drug use: No    Sexual activity: Not Currently     Birth control/protection: None   Lifestyle    Physical activity     Days per week: 3 days     Minutes per session: 20 min    Stress: Rather much   Relationships    Social connections     Talks on phone: Three times a week     Gets together: Twice a week     Attends Bahai service: Not on file     Active member of club or organization: Yes     Attends meetings of clubs or organizations: 1 to 4 times per year     Relationship status:    Other Topics Concern    Not on file   Social History Narrative    Not on file     Outpatient Encounter Medications as of 7/17/2020   Medication Sig Dispense Refill    albuterol (PROVENTIL/VENTOLIN HFA) 90 mcg/actuation inhaler INHALE 2 PUFFS INTO THE LUNGS EVERY 6 HOURS AS NEEDED 8.5 g 3    albuterol-ipratropium (DUO-NEB) 2.5 mg-0.5 mg/3 mL nebulizer solution USE ONE VIAL IN NEBULIZER TWICE DAILY 270 mL 11    aspirin 81 MG Chew Take 81 mg by mouth once daily.      bisoprolol (ZEBETA) 5 MG tablet Take 1 tablet (5 mg total) by mouth once daily. 90 tablet 1    clopidogreL (PLAVIX) 75 mg tablet Take 1 tablet (75 mg total) by mouth once daily. 90 tablet 1    COMBIVENT RESPIMAT  mcg/actuation inhaler Inhale 2 puffs into the lungs every 6 (six) hours as needed for Wheezing or Shortness of Breath. 4 g 11    DULoxetine (CYMBALTA) 60 MG capsule TAKE (1) CAPSULE BY MOUTH DAILY 90 capsule 1    estrogens, conjugated, (PREMARIN) 0.3 MG tablet Take 1 tablet (0.3 mg total) by mouth once daily. 9 tablet 1    ezetimibe (ZETIA) 10 mg tablet Take 1 tablet (10 mg total) by mouth once daily. 90 tablet 3    fluticasone propionate (FLONASE) 50  mcg/actuation nasal spray 2 sprays (100 mcg total) by Each Nostril route once daily. 16 g 11    furosemide (LASIX) 20 MG tablet Take 1 tablet (20 mg total) by mouth daily as needed. For leg swelling as needed. Or weight gain of 3 pounds in 1 day or 5 lbs in 1 week. 30 tablet 6    irbesartan (AVAPRO) 150 MG tablet Take 2 tablets (300 mg total) by mouth every evening. 30 tablet 3    mometasone-formoterol (DULERA) 200-5 mcg/actuation inhaler INHALE 2 PUFFS 2 TIMES DAILY 13 g 11    OXYGEN-AIR DELIVERY SYSTEMS MISC 2 L by Misc.(Non-Drug; Combo Route) route every evening.      pantoprazole (PROTONIX) 40 MG tablet Take 1 tablet (40 mg total) by mouth once daily. 30 tablet 1    simvastatin (ZOCOR) 40 MG tablet Take 1 tablet (40 mg total) by mouth every evening. 90 tablet 3    zolpidem (AMBIEN) 5 MG Tab Take 1 tablet (5 mg total) by mouth nightly. 30 tablet 5    [DISCONTINUED] metroNIDAZOLE (FLAGYL) 500 MG tablet Take 1 tablet (500 mg total) by mouth every 8 (eight) hours. 21 tablet 0    dicyclomine (BENTYL) 10 MG capsule Take 1 capsule (10 mg total) by mouth before meals and at bedtime as needed. 30 capsule 0    metroNIDAZOLE (FLAGYL) 500 MG tablet Take 1 tablet (500 mg total) by mouth 3 (three) times daily. for 10 days 30 tablet 0    sulfamethoxazole-trimethoprim 800-160mg (BACTRIM DS) 800-160 mg Tab Take 1 tablet by mouth 2 (two) times daily. for 7 days 14 tablet 0     No facility-administered encounter medications on file as of 7/17/2020.        Review of Systems   Constitutional: Negative for chills and fever.   HENT: Negative for congestion and facial swelling.    Eyes: Negative for discharge and itching.   Respiratory: Negative for cough and wheezing.    Cardiovascular: Negative for chest pain and palpitations.   Gastrointestinal: Negative for abdominal pain, nausea and vomiting.   Endocrine: Negative for cold intolerance and heat intolerance.   Genitourinary: Negative for dysuria and flank pain.  "  Musculoskeletal: Negative for myalgias and neck stiffness.   Skin: Negative for pallor and wound.   Neurological: Negative for facial asymmetry and weakness.   Psychiatric/Behavioral: Negative for agitation and suicidal ideas.       Objective:      /64 (BP Location: Right arm, Patient Position: Sitting, BP Method: Medium (Manual))   Pulse 72   Temp 98.5 °F (36.9 °C) (Temporal)   Ht 5' 2" (1.575 m)   Wt 75 kg (165 lb 7.3 oz)   SpO2 95%   BMI 30.26 kg/m²   Physical Exam  Vitals signs and nursing note reviewed.   Constitutional:       General: She is not in acute distress.     Appearance: She is well-developed.   HENT:      Head: Normocephalic and atraumatic.      Right Ear: External ear normal.      Left Ear: External ear normal.   Eyes:      Conjunctiva/sclera: Conjunctivae normal.   Neck:      Musculoskeletal: Neck supple.      Thyroid: No thyromegaly.   Cardiovascular:      Rate and Rhythm: Normal rate and regular rhythm.   Pulmonary:      Effort: Pulmonary effort is normal. No respiratory distress.   Abdominal:      General: Bowel sounds are normal. There is no distension.      Palpations: Abdomen is soft. There is no mass.      Tenderness: There is generalized abdominal tenderness.       Genitourinary:     Comments: deferred  Musculoskeletal:         General: No deformity.   Lymphadenopathy:      Head:      Right side of head: No submandibular adenopathy.      Left side of head: No submandibular adenopathy.      Cervical: No cervical adenopathy.   Skin:     General: Skin is warm and dry.   Neurological:      Mental Status: She is alert and oriented to person, place, and time.   Psychiatric:         Behavior: Behavior normal.         Results for orders placed or performed in visit on 05/27/20   CBC auto differential   Result Value Ref Range    WBC 8.14 3.90 - 12.70 K/uL    RBC 4.12 4.00 - 5.40 M/uL    Hemoglobin 12.4 12.0 - 16.0 g/dL    Hematocrit 39.4 37.0 - 48.5 %    Mean Corpuscular Volume 96 82 - " 98 fL    Mean Corpuscular Hemoglobin 30.1 27.0 - 31.0 pg    Mean Corpuscular Hemoglobin Conc 31.5 (L) 32.0 - 36.0 g/dL    RDW 13.7 11.5 - 14.5 %    Platelets 379 (H) 150 - 350 K/uL    MPV 10.2 9.2 - 12.9 fL    Immature Granulocytes 0.2 0.0 - 0.5 %    Gran # (ANC) 5.2 1.8 - 7.7 K/uL    Immature Grans (Abs) 0.02 0.00 - 0.04 K/uL    Lymph # 1.8 1.0 - 4.8 K/uL    Mono # 0.9 0.3 - 1.0 K/uL    Eos # 0.2 0.0 - 0.5 K/uL    Baso # 0.05 0.00 - 0.20 K/uL    nRBC 0 0 /100 WBC    Gran% 63.3 38.0 - 73.0 %    Lymph% 22.1 18.0 - 48.0 %    Mono% 11.3 4.0 - 15.0 %    Eosinophil% 2.5 0.0 - 8.0 %    Basophil% 0.6 0.0 - 1.9 %    Differential Method Automated    Comprehensive metabolic panel   Result Value Ref Range    Sodium 137 136 - 145 mmol/L    Potassium 4.1 3.5 - 5.1 mmol/L    Chloride 106 95 - 110 mmol/L    CO2 25 23 - 29 mmol/L    Glucose 91 70 - 110 mg/dL    BUN, Bld 14 8 - 23 mg/dL    Creatinine 0.9 0.5 - 1.4 mg/dL    Calcium 9.8 8.7 - 10.5 mg/dL    Total Protein 7.3 6.0 - 8.4 g/dL    Albumin 3.2 (L) 3.5 - 5.2 g/dL    Total Bilirubin 0.3 0.1 - 1.0 mg/dL    Alkaline Phosphatase 103 55 - 135 U/L    AST 15 10 - 40 U/L    ALT 12 10 - 44 U/L    Anion Gap 6 (L) 8 - 16 mmol/L    eGFR if African American >60.0 >60 mL/min/1.73 m^2    eGFR if non African American >60.0 >60 mL/min/1.73 m^2   C-reactive protein   Result Value Ref Range    CRP 10.8 (H) 0.0 - 8.2 mg/L   X-Ray Abdomen Flat And Erect  Narrative: EXAMINATION:  XR ABDOMEN FLAT AND ERECT    CLINICAL HISTORY:  Diverticulitis of intestine, part unspecified, without perforation or abscess without bleeding    TECHNIQUE:  Flat and erect AP views of the abdomen were performed.    COMPARISON:  CT 06/01/2020    FINDINGS:  Bowel gas pattern is nonobstructive.  No suspicious mass-effect or calcifications present.  Pelvic vascular phleboliths noted.  Cholecystectomy clips.  Osseous structures intact.  Atherosclerotic changes of the aorta noted with distal aneurysmal dilatation.  Lung bases  clear.  Impression: Nonspecific nonobstructive abdomen.  See recent CT report.    Electronically signed by: Juan Daley MD  Date:    07/17/2020  Time:    17:06    Assessment:       1. Diverticulitis    2. Abdominal aortic aneurysm (AAA) without rupture    3. Acute cystitis without hematuria    4. Other constipation        Plan:   I have reviewed all of the patient's clinical history available in Epic and have utilized this in my evaluation and management recommendations today.       -     POCT URINALYSIS; to rule out UA Bactrim will cover for UTI too empirically.  -     X-Ray Abdomen Flat And Erect; Future; Expected date: 07/17/2020; I reviewed abd xray: no dilated loops of bowels noted.ileus is ruled out with abd xray.  -     metroNIDAZOLE (FLAGYL) 500 MG tablet; Take 1 tablet (500 mg total) by mouth 3 (three) times daily. for 10 days  Dispense: 30 tablet; Refill: 0  -     dicyclomine (BENTYL) 10 MG capsule; Take 1 capsule (10 mg total) by mouth before meals and at bedtime as needed.  Dispense: 30 capsule; Refill: 0  -     sulfamethoxazole-trimethoprim 800-160mg (BACTRIM DS) 800-160 mg Tab; Take 1 tablet by mouth 2 (two) times daily. for 7 days  Dispense: 14 tablet; Refill: 0    Avoid heavy meals  Get adequate hydration  Go to the ED if fever or sxs gets worse

## 2020-07-17 NOTE — PATIENT INSTRUCTIONS
Diverticulitis    Some people get pouches along the wall of the colon as they get older. The pouches, called diverticuli, usually cause no symptoms. If the pouches become blocked, you can get an infection. This infection is called diverticulitis. It causes pain in your lower abdomen and fever. If not treated, it can become a serious condition, causing an abscess to form inside the pouch. The abscess may block the intestinal tract even or rupture, spreading infection throughout the abdomen.  When treatment is started early, oral antibiotics alone may be enough to cure diverticulitis. This method is tried first. But, if you don't improve or if your condition gets worse while using oral antibiotics, you may need to be admitted to the hospital for IV antibiotics. Severe cases may require surgery.  Home care  The following guidelines will help you care for yourself at home:  · During the acute illness, rest and follow your healthcare provider's instructions about diet. Sometimes you will need to follow a clear liquid diet to rest your bowel. Once your symptoms are better, you may be told to follow a low-fiber diet for some time. Include foods like:  ¨ Flake cereal, mashed potatoes, pancakes, waffles, pasta, white bread, rice, applesauce, bananas, eggs, fish, poultry, tofu, and cooked soft vegetables  · Take antibiotics exactly as instructed. Don't miss any doses or stop taking the medication, even if you feel better.  · Monitor your temperature and tell your healthcare provider if you have rising temperatures.  Preventing future attacks  Once you have an episode of diverticulitis, you are at risk for having it again. After you have recovered from this episode, you may be able to lower your risk by eating a high-fiber diet (20 gm/day to 35 gm/day of fiber). This cleans out the colon pouches that already exist and may prevent new ones from forming. Foods high in fiber include fresh fruits and edible peelings, raw or  lightly cooked vegetables, whole grain cereals and breads, dried beans and peas, and bran.  Other steps that can help prevent future attacks include:  · Take your medicines, such as antibiotics, as your healthcare provider says.  · Drink 6 to 8 glasses of water every day, unless told otherwise.  · Use a heating pad or hot water bottle to help abdominal cramping or pain.  · Begin an exercise program. Ask your healthcare provider how to get started. You can benefit from simple activities such as walking or gardening.  · Treat diarrhea with a bland diet. Start with liquids only; then slowly add fiber over time.  · Watch for changes in your bowel movements (constipation to diarrhea). Avoid constipation by eating a high fiber diet and taking a stool softener if needed.  · Get plenty of rest and sleep.  Follow-up care  Follow up with your healthcare provider as advised or sooner if you are not getting better in the next 2 days.  When to seek medical advice  Call your healthcare provider right away if any of these occur:  · Fever of 100.4°F (38°C) or higher, or as directed by your healthcare provider  · Repeated vomiting or swelling of the abdomen  · Weakness, dizziness, light-headedness  · Pain in your abdomen that gets worse, severe, or spreads to your back  · Pain that moves to the right lower abdomen  · Rectal bleeding (stools that are red, black or maroon color)  · Unexpected vaginal bleeding  Date Last Reviewed: 9/1/2016  © 2541-1897 [employer]. [address] [place] [address], [place] [address]. All rights reserved. This information is not intended as a substitute for professional medical care. Always follow your healthcare professional's instructions.

## 2020-07-21 NOTE — PROGRESS NOTES
Digital Medicine: Clinician Follow-Up    She reports that she has been dealing with diverticulitis for the 2nd time. She went to see her provider about this and was started on metronidazole, dicyclomine, and Bactrim. BP in office was well controlled at 120/64 mmHg. She reports that she started taking Prevagen Brain Pills again despite her PCP advising her not to do so.      Follow-up reason(s): medication change follow-up and routine follow up.   Care Team received high BP alert.      Patient is experiencing signs/symptoms of hypotension.  Patient is not experiencing signs/symptoms of hypertension.  Patient is not experiencing signs/symptoms of hypoglycemia.   She has been feeling mildly dizzy especially while using the bathroom when she leans backward.    Patient did make medication change.    Is patient tolerating med change?: yes        Last 5 Patient Entered Readings                                      Current 30 Day Average: 158/78     Recent Readings 7/16/2020 7/14/2020 7/12/2020 7/12/2020 7/12/2020    SBP (mmHg) 153 156 173 167 163    DBP (mmHg) 78 78 85 82 78    Pulse 62 66 65 65 71     ASSESSMENT(S)  Patients BP average is 158/78 mmHg, which is above goal. Patient's BP goal is less than or equal to 130/80 per 2017 ACC/AHA Hypertension Guidelines.   Uncontrolled BP likely due to pain associated with diverticulitis. Will re-assess once patient has finished course of diverticulitis therapy and if BP remains elevated, may consider changing bisoprolol to carvedilol for added BP lowering effect.    PLAN  Additional monitoring needed:  Continue current therapy: Continue current antihypertensive therapy at this time.  Provided patient education: Informed patient that dicyclomine and metronidazole have a high prevalence of causing dizziness and that she should report if the dizziness begins to affect her activities of daily living or causes falls.    Patient verbalizes understanding. Patient did not express  questions or concerns and patient has contact information if needed.        There are no preventive care reminders to display for this patient.      Hypertension Medications             bisoprolol (ZEBETA) 5 MG tablet Take 1 tablet (5 mg total) by mouth once daily.    furosemide (LASIX) 20 MG tablet Take 1 tablet (20 mg total) by mouth daily as needed. For leg swelling as needed. Or weight gain of 3 pounds in 1 day or 5 lbs in 1 week.    irbesartan (AVAPRO) 150 MG tablet Take 2 tablets (300 mg total) by mouth every evening.

## 2020-07-30 ENCOUNTER — PATIENT OUTREACH (OUTPATIENT)
Dept: OTHER | Facility: OTHER | Age: 65
End: 2020-07-30

## 2020-07-30 ENCOUNTER — OFFICE VISIT (OUTPATIENT)
Dept: FAMILY MEDICINE | Facility: CLINIC | Age: 65
End: 2020-07-30
Payer: MEDICARE

## 2020-07-30 ENCOUNTER — HOSPITAL ENCOUNTER (OUTPATIENT)
Dept: RADIOLOGY | Facility: HOSPITAL | Age: 65
Discharge: HOME | End: 2020-07-30
Attending: FAMILY MEDICINE
Payer: MEDICARE

## 2020-07-30 VITALS
WEIGHT: 168.19 LBS | DIASTOLIC BLOOD PRESSURE: 84 MMHG | BODY MASS INDEX: 30.77 KG/M2 | OXYGEN SATURATION: 98 % | HEART RATE: 65 BPM | SYSTOLIC BLOOD PRESSURE: 158 MMHG | TEMPERATURE: 99 F

## 2020-07-30 DIAGNOSIS — M79.672 LEFT FOOT PAIN: Primary | ICD-10-CM

## 2020-07-30 DIAGNOSIS — M79.672 LEFT FOOT PAIN: ICD-10-CM

## 2020-07-30 DIAGNOSIS — R20.8 COLD HANDS AND FEET WITHOUT PERIPHERAL VASCULAR DISEASE: ICD-10-CM

## 2020-07-30 DIAGNOSIS — I10 ESSENTIAL HYPERTENSION: ICD-10-CM

## 2020-07-30 PROCEDURE — 3077F PR MOST RECENT SYSTOLIC BLOOD PRESSURE >= 140 MM HG: ICD-10-PCS | Mod: HCNC,CPTII,S$GLB, | Performed by: FAMILY MEDICINE

## 2020-07-30 PROCEDURE — 3079F DIAST BP 80-89 MM HG: CPT | Mod: HCNC,CPTII,S$GLB, | Performed by: FAMILY MEDICINE

## 2020-07-30 PROCEDURE — 3008F BODY MASS INDEX DOCD: CPT | Mod: HCNC,CPTII,S$GLB, | Performed by: FAMILY MEDICINE

## 2020-07-30 PROCEDURE — 73630 X-RAY EXAM OF FOOT: CPT | Mod: TC,HCNC,FY,PO,LT

## 2020-07-30 PROCEDURE — 73630 X-RAY EXAM OF FOOT: CPT | Mod: 26,HCNC,LT, | Performed by: RADIOLOGY

## 2020-07-30 PROCEDURE — 3008F PR BODY MASS INDEX (BMI) DOCUMENTED: ICD-10-PCS | Mod: HCNC,CPTII,S$GLB, | Performed by: FAMILY MEDICINE

## 2020-07-30 PROCEDURE — 3077F SYST BP >= 140 MM HG: CPT | Mod: HCNC,CPTII,S$GLB, | Performed by: FAMILY MEDICINE

## 2020-07-30 PROCEDURE — 1101F PT FALLS ASSESS-DOCD LE1/YR: CPT | Mod: HCNC,CPTII,S$GLB, | Performed by: FAMILY MEDICINE

## 2020-07-30 PROCEDURE — 99999 PR PBB SHADOW E&M-EST. PATIENT-LVL V: ICD-10-PCS | Mod: PBBFAC,HCNC,, | Performed by: FAMILY MEDICINE

## 2020-07-30 PROCEDURE — 99214 OFFICE O/P EST MOD 30 MIN: CPT | Mod: HCNC,S$GLB,, | Performed by: FAMILY MEDICINE

## 2020-07-30 PROCEDURE — 99999 PR PBB SHADOW E&M-EST. PATIENT-LVL V: CPT | Mod: PBBFAC,HCNC,, | Performed by: FAMILY MEDICINE

## 2020-07-30 PROCEDURE — 3079F PR MOST RECENT DIASTOLIC BLOOD PRESSURE 80-89 MM HG: ICD-10-PCS | Mod: HCNC,CPTII,S$GLB, | Performed by: FAMILY MEDICINE

## 2020-07-30 PROCEDURE — 73630 XR FOOT COMPLETE 3 VIEW LEFT: ICD-10-PCS | Mod: 26,HCNC,LT, | Performed by: RADIOLOGY

## 2020-07-30 PROCEDURE — 1101F PR PT FALLS ASSESS DOC 0-1 FALLS W/OUT INJ PAST YR: ICD-10-PCS | Mod: HCNC,CPTII,S$GLB, | Performed by: FAMILY MEDICINE

## 2020-07-30 PROCEDURE — 99214 PR OFFICE/OUTPT VISIT, EST, LEVL IV, 30-39 MIN: ICD-10-PCS | Mod: HCNC,S$GLB,, | Performed by: FAMILY MEDICINE

## 2020-07-30 RX ORDER — IRBESARTAN 300 MG/1
300 TABLET ORAL NIGHTLY
Qty: 30 TABLET | Refills: 3 | Status: SHIPPED | OUTPATIENT
Start: 2020-07-30 | End: 2021-01-08

## 2020-07-30 RX ORDER — AMLODIPINE BESYLATE 5 MG/1
5 TABLET ORAL DAILY
Qty: 30 TABLET | Refills: 11 | Status: SHIPPED | OUTPATIENT
Start: 2020-07-30 | End: 2020-12-08

## 2020-07-30 NOTE — PROGRESS NOTES
"Digital Medicine: Health  Follow-Up    The history is provided by the patient.             Reason for review: Blood pressure at goal        Topics Covered on Call: physical activity and Diet    Additional Follow-up details: Discussed elevated SBP with patient today.   She states that she continues to have trouble with her legs being "cold" causing discomfort and pain in her feet. She reports also noticing her heart rate being "pretty low".     Patient reports having a "tender left foot" from breaking it years ago. She states that her little grand daughter stepped on it, "there was nothing to it", and she started having problems with the top of her foot. She states that she feels like a stress fracture. She has been putting ice packs on it. It was been 4-5 days since it was "lightly" stepped on.  She reports that the top of her foot feels sore and "feverish".    Patient is recovering from diverticulitis. She reports the pain being mild and getting better.     The pain and discomfort may be causing elevation in SBP.     Advised patient to schedule appointment with PCP to get her legs and feet checked out. Patient agreed.         Diet-no change to diet    No change to diet.      Additional diet details:    Physical Activity-no change to routine  No change to exercise routine.     Medication Adherence-Medication Adherence not addressed.        Substance, Sleep, Stress-No change  stress-assessed  Details:no major stressors  Intervention(s):    Sleep-not assessed  Details:  Intervention(s):    Alcohol -not assessed  Details:  Intervention(s):    Tobacco-Not Assessed  Details:  Intervention(s):        PLAN  Additional monitoring needed:  Continue current diet/physical activity routine:    Patient verbalizes understanding. Patient did not express questions or concerns and patient has contact information if needed.    Explained the importance of self-monitoring and medication adherence. Encouraged the patient to " communicate with their health  for lifestyle modifications to help improve or maintain a healthy lifestyle.        There are no preventive care reminders to display for this patient.    Last 5 Patient Entered Readings                                      Current 30 Day Average: 160/79     Recent Readings 7/29/2020 7/28/2020 7/25/2020 7/24/2020 7/22/2020    SBP (mmHg) 162 170 160 149 165    DBP (mmHg) 75 81 82 78 85    Pulse 60 57 66 70 62

## 2020-08-05 DIAGNOSIS — K57.92 DIVERTICULITIS: ICD-10-CM

## 2020-08-05 RX ORDER — DICYCLOMINE HYDROCHLORIDE 10 MG/1
CAPSULE ORAL
Qty: 30 CAPSULE | Refills: 0 | Status: SHIPPED | OUTPATIENT
Start: 2020-08-05 | End: 2020-09-18 | Stop reason: SDUPTHER

## 2020-08-05 RX ORDER — SULFAMETHOXAZOLE AND TRIMETHOPRIM 800; 160 MG/1; MG/1
1 TABLET ORAL 2 TIMES DAILY
Qty: 14 TABLET | Refills: 0 | Status: SHIPPED | OUTPATIENT
Start: 2020-08-05 | End: 2020-08-12

## 2020-08-05 RX ORDER — METRONIDAZOLE 500 MG/1
TABLET ORAL
Qty: 30 TABLET | Refills: 0 | Status: SHIPPED | OUTPATIENT
Start: 2020-08-05 | End: 2021-03-29 | Stop reason: ALTCHOICE

## 2020-08-05 NOTE — TELEPHONE ENCOUNTER
Patient stated that she saw Dr. Mercedes on 07/17 for diverticulitis and is having another flare up and is needing the same medication as prescribed then. Please review and advise.

## 2020-08-05 NOTE — TELEPHONE ENCOUNTER
----- Message from Yelena Villa sent at 8/5/2020  4:31 PM CDT -----  Regarding: refill  Contact: Gemma Joyner called to consult with nurse about getting a refill on her medications, however pt did not remember the names of the 3 medication that work together name. Please call pt back at [phone]. Thanks       [facility] [address] [facility] [address] [id number] [address] [address]  Phone: [phone] Fax: [phone]

## 2020-08-18 ENCOUNTER — PATIENT OUTREACH (OUTPATIENT)
Dept: OTHER | Facility: OTHER | Age: 65
End: 2020-08-18

## 2020-08-18 NOTE — PROGRESS NOTES
Digital Medicine: Clinician Follow-Up    Called patient to follow up on [facility]. She reports that she is taking amlodipine 5 mg as prescribed. She denies any unwanted complications from the medication. She states that she is working to get her Fitbit connected to her phone so that she can track her steps and engage in more physical activity. She states that she still suffers with diverticulitis complications despite doing 2 course of treatment with metronidazole, dicyclomine, and Bactrim.     The history is provided by the patient.   Follow-up reason(s): medication change follow-up and routine follow up.     Patient is not experiencing signs/symptoms of hypotension.  Patient is not experiencing signs/symptoms of hypertension.      Patient did make medication change.    Is patient tolerating med change? yes        Last 5 Patient Entered Readings                                      Current 30 Day Average: 155/77     Recent Readings 8/14/2020 8/6/2020 8/4/2020 8/3/2020 8/3/2020    SBP (mmHg) 156 136 144 170 170    DBP (mmHg) 77 70 73 81 83    Pulse 68 55 59 58 59          ASSESSMENT(S)  Patients BP average is 155/77 mmHg, which is above goal. Patient's BP goal is less than or equal to 130/80 per 2017 ACC/AHA Hypertension Guidelines.     BP is trending downward to goal with management of HTN via DHP CCB, ARB, and beta blocker.      Hypertension Plan  Additional monitoring needed.  Continue current therapy.  Provided patient education. Encouraged patient to discuss with PCP if GI specialist is needed to help manage her symptoms.       Addressed any questions or concerns and patient has my contact information if needed prior to next outreach. Patient verbalizes understanding.            There are no preventive care reminders to display for this patient.    Hypertension Medications             amLODIPine (NORVASC) 5 MG tablet Take 1 tablet (5 mg total) by mouth once daily.    bisoprolol (ZEBETA) 5 MG tablet Take 1 tablet (5  mg total) by mouth once daily.    furosemide (LASIX) 20 MG tablet Take 1 tablet (20 mg total) by mouth daily as needed. For leg swelling as needed. Or weight gain of 3 pounds in 1 day or 5 lbs in 1 week.    irbesartan (AVAPRO) 300 MG tablet Take 1 tablet (300 mg total) by mouth every evening.

## 2020-08-27 ENCOUNTER — PATIENT OUTREACH (OUTPATIENT)
Dept: OTHER | Facility: OTHER | Age: 65
End: 2020-08-27

## 2020-09-18 ENCOUNTER — OFFICE VISIT (OUTPATIENT)
Dept: FAMILY MEDICINE | Facility: CLINIC | Age: 65
End: 2020-09-18
Payer: MEDICARE

## 2020-09-18 ENCOUNTER — LAB VISIT (OUTPATIENT)
Dept: LAB | Facility: HOSPITAL | Age: 65
End: 2020-09-18
Attending: INTERNAL MEDICINE
Payer: MEDICARE

## 2020-09-18 VITALS
SYSTOLIC BLOOD PRESSURE: 134 MMHG | DIASTOLIC BLOOD PRESSURE: 70 MMHG | OXYGEN SATURATION: 96 % | HEIGHT: 62 IN | WEIGHT: 165.56 LBS | HEART RATE: 65 BPM | BODY MASS INDEX: 30.47 KG/M2 | TEMPERATURE: 99 F

## 2020-09-18 DIAGNOSIS — N30.00 ACUTE CYSTITIS WITHOUT HEMATURIA: Primary | ICD-10-CM

## 2020-09-18 DIAGNOSIS — E78.2 MIXED HYPERLIPIDEMIA: ICD-10-CM

## 2020-09-18 DIAGNOSIS — G47.00 INSOMNIA, UNSPECIFIED TYPE: ICD-10-CM

## 2020-09-18 DIAGNOSIS — K55.1 SMA STENOSIS: ICD-10-CM

## 2020-09-18 DIAGNOSIS — R10.10 UPPER ABDOMINAL PAIN: ICD-10-CM

## 2020-09-18 LAB
ALBUMIN SERPL BCP-MCNC: 3.5 G/DL (ref 3.5–5.2)
ALP SERPL-CCNC: 84 U/L (ref 55–135)
ALT SERPL W/O P-5'-P-CCNC: 14 U/L (ref 10–44)
ANION GAP SERPL CALC-SCNC: 8 MMOL/L (ref 8–16)
AST SERPL-CCNC: 15 U/L (ref 10–40)
BACTERIA #/AREA URNS AUTO: ABNORMAL /HPF
BILIRUB SERPL-MCNC: 0.6 MG/DL (ref 0.1–1)
BILIRUB UR QL STRIP: NEGATIVE
BUN SERPL-MCNC: 10 MG/DL (ref 8–23)
CALCIUM SERPL-MCNC: 9.3 MG/DL (ref 8.7–10.5)
CHLORIDE SERPL-SCNC: 102 MMOL/L (ref 95–110)
CHOLEST SERPL-MCNC: 219 MG/DL (ref 120–199)
CHOLEST/HDLC SERPL: 4 {RATIO} (ref 2–5)
CLARITY UR REFRACT.AUTO: ABNORMAL
CO2 SERPL-SCNC: 27 MMOL/L (ref 23–29)
COLOR UR AUTO: ABNORMAL
CREAT SERPL-MCNC: 0.9 MG/DL (ref 0.5–1.4)
EST. GFR  (AFRICAN AMERICAN): >60 ML/MIN/1.73 M^2
EST. GFR  (NON AFRICAN AMERICAN): >60 ML/MIN/1.73 M^2
GLUCOSE SERPL-MCNC: 99 MG/DL (ref 70–110)
GLUCOSE UR QL STRIP: NEGATIVE
HDLC SERPL-MCNC: 55 MG/DL (ref 40–75)
HDLC SERPL: 25.1 % (ref 20–50)
HGB UR QL STRIP: ABNORMAL
HYALINE CASTS UR QL AUTO: 0 /LPF
KETONES UR QL STRIP: NEGATIVE
LDLC SERPL CALC-MCNC: 144.8 MG/DL (ref 63–159)
LEUKOCYTE ESTERASE UR QL STRIP: ABNORMAL
MICROSCOPIC COMMENT: ABNORMAL
NITRITE UR QL STRIP: NEGATIVE
NONHDLC SERPL-MCNC: 164 MG/DL
PH UR STRIP: 6 [PH] (ref 5–8)
POTASSIUM SERPL-SCNC: 4 MMOL/L (ref 3.5–5.1)
PROT SERPL-MCNC: 7.2 G/DL (ref 6–8.4)
PROT UR QL STRIP: ABNORMAL
RBC #/AREA URNS AUTO: 44 /HPF (ref 0–4)
SODIUM SERPL-SCNC: 137 MMOL/L (ref 136–145)
SP GR UR STRIP: 1.01 (ref 1–1.03)
SQUAMOUS #/AREA URNS AUTO: 6 /HPF
TRIGL SERPL-MCNC: 96 MG/DL (ref 30–150)
URN SPEC COLLECT METH UR: ABNORMAL
WBC #/AREA URNS AUTO: >100 /HPF (ref 0–5)
WBC CLUMPS UR QL AUTO: ABNORMAL

## 2020-09-18 PROCEDURE — 99999 PR PBB SHADOW E&M-EST. PATIENT-LVL V: ICD-10-PCS | Mod: PBBFAC,HCNC,, | Performed by: FAMILY MEDICINE

## 2020-09-18 PROCEDURE — 99214 PR OFFICE/OUTPT VISIT, EST, LEVL IV, 30-39 MIN: ICD-10-PCS | Mod: HCNC,S$GLB,, | Performed by: FAMILY MEDICINE

## 2020-09-18 PROCEDURE — 99999 PR PBB SHADOW E&M-EST. PATIENT-LVL V: CPT | Mod: PBBFAC,HCNC,, | Performed by: FAMILY MEDICINE

## 2020-09-18 PROCEDURE — 3078F DIAST BP <80 MM HG: CPT | Mod: HCNC,CPTII,S$GLB, | Performed by: FAMILY MEDICINE

## 2020-09-18 PROCEDURE — 80053 COMPREHEN METABOLIC PANEL: CPT | Mod: HCNC

## 2020-09-18 PROCEDURE — 80061 LIPID PANEL: CPT | Mod: HCNC

## 2020-09-18 PROCEDURE — 3075F SYST BP GE 130 - 139MM HG: CPT | Mod: HCNC,CPTII,S$GLB, | Performed by: FAMILY MEDICINE

## 2020-09-18 PROCEDURE — 3075F PR MOST RECENT SYSTOLIC BLOOD PRESS GE 130-139MM HG: ICD-10-PCS | Mod: HCNC,CPTII,S$GLB, | Performed by: FAMILY MEDICINE

## 2020-09-18 PROCEDURE — 36415 COLL VENOUS BLD VENIPUNCTURE: CPT | Mod: HCNC,PO

## 2020-09-18 PROCEDURE — 1101F PR PT FALLS ASSESS DOC 0-1 FALLS W/OUT INJ PAST YR: ICD-10-PCS | Mod: HCNC,CPTII,S$GLB, | Performed by: FAMILY MEDICINE

## 2020-09-18 PROCEDURE — 81001 URINALYSIS AUTO W/SCOPE: CPT | Mod: HCNC

## 2020-09-18 PROCEDURE — 3078F PR MOST RECENT DIASTOLIC BLOOD PRESSURE < 80 MM HG: ICD-10-PCS | Mod: HCNC,CPTII,S$GLB, | Performed by: FAMILY MEDICINE

## 2020-09-18 PROCEDURE — 87086 URINE CULTURE/COLONY COUNT: CPT | Mod: HCNC

## 2020-09-18 PROCEDURE — 3008F PR BODY MASS INDEX (BMI) DOCUMENTED: ICD-10-PCS | Mod: HCNC,CPTII,S$GLB, | Performed by: FAMILY MEDICINE

## 2020-09-18 PROCEDURE — 1101F PT FALLS ASSESS-DOCD LE1/YR: CPT | Mod: HCNC,CPTII,S$GLB, | Performed by: FAMILY MEDICINE

## 2020-09-18 PROCEDURE — 3008F BODY MASS INDEX DOCD: CPT | Mod: HCNC,CPTII,S$GLB, | Performed by: FAMILY MEDICINE

## 2020-09-18 PROCEDURE — 99214 OFFICE O/P EST MOD 30 MIN: CPT | Mod: HCNC,S$GLB,, | Performed by: FAMILY MEDICINE

## 2020-09-18 RX ORDER — CEPHALEXIN 500 MG/1
500 CAPSULE ORAL EVERY 8 HOURS
Qty: 21 CAPSULE | Refills: 0 | Status: SHIPPED | OUTPATIENT
Start: 2020-09-18 | End: 2021-01-22 | Stop reason: SDUPTHER

## 2020-09-18 RX ORDER — ZOLPIDEM TARTRATE 5 MG/1
5 TABLET ORAL NIGHTLY
Qty: 30 TABLET | Refills: 5 | Status: SHIPPED | OUTPATIENT
Start: 2020-09-18 | End: 2021-03-02

## 2020-09-18 RX ORDER — METRONIDAZOLE 500 MG/1
TABLET ORAL
Qty: 30 TABLET | Refills: 1 | Status: CANCELLED | OUTPATIENT
Start: 2020-09-18

## 2020-09-18 RX ORDER — DICYCLOMINE HYDROCHLORIDE 10 MG/1
CAPSULE ORAL
Qty: 30 CAPSULE | Refills: 1 | Status: SHIPPED | OUTPATIENT
Start: 2020-09-18 | End: 2021-07-27 | Stop reason: SDUPTHER

## 2020-09-18 NOTE — PROGRESS NOTES
Chief Complaint:    Chief Complaint   Patient presents with    Dysuria    Medication Refill       History of Present Illness:    Complaining of burning in the urine for last couple days no fever no nausea vomiting    She does have some upper abdominal pain last night but is gone now no fever nausea vomiting or any other symptoms.  She had a CT scan that showed stenosis of superior mesenteric artery was asked to see vascular surgery has not done so yet.  She has not done a colonoscopy yet either.  She had couple of episodes of diverticulitis before    Insomnia requesting refill    ROS:  Review of Systems   Constitutional: Negative for activity change, chills, fatigue, fever and unexpected weight change.   HENT: Negative for congestion, ear discharge, ear pain, hearing loss, postnasal drip and rhinorrhea.    Eyes: Negative for pain and visual disturbance.   Respiratory: Negative for cough, chest tightness and shortness of breath.    Cardiovascular: Negative for chest pain and palpitations.   Gastrointestinal: Positive for abdominal pain. Negative for diarrhea and vomiting.   Endocrine: Negative for heat intolerance.   Genitourinary: Positive for dysuria. Negative for flank pain, frequency and hematuria.   Musculoskeletal: Negative for back pain, gait problem and neck pain.   Skin: Negative for color change and rash.   Neurological: Negative for dizziness, tremors, seizures, numbness and headaches.   Psychiatric/Behavioral: Negative for agitation, hallucinations, self-injury, sleep disturbance and suicidal ideas. The patient is not nervous/anxious.        Past Medical History:   Diagnosis Date    AAA (abdominal aortic aneurysm) 2/13/2014    Abdominal aneurysm     3    Acute coronary syndrome     Arthritis     BPPV (benign paroxysmal positional vertigo)     Carotid artery plaque     Carotid artery stenosis and occlusion 2/13/2014    Chronic back pain     COPD (chronic obstructive pulmonary disease)      Coronary artery disease     Emphysema lung     Hyperlipidemia     Hypertension     Myocardial infarction     x3    Neuropathy        Social History:  Social History     Socioeconomic History    Marital status:      Spouse name: Not on file    Number of children: Not on file    Years of education: Not on file    Highest education level: Not on file   Occupational History    Not on file   Social Needs    Financial resource strain: Hard    Food insecurity     Worry: Sometimes true     Inability: Sometimes true    Transportation needs     Medical: No     Non-medical: No   Tobacco Use    Smoking status: Former Smoker     Packs/day: 0.50     Years: 44.00     Pack years: 22.00     Types: Cigarettes, Vaping w/o nicotine     Start date: 6/24/1970     Quit date: 05/2017     Years since quitting: 3.3    Smokeless tobacco: Never Used    Tobacco comment: 3 MG NICOTINE FOR HEART.    Substance and Sexual Activity    Alcohol use: No     Frequency: Never    Drug use: No    Sexual activity: Not Currently     Birth control/protection: None   Lifestyle    Physical activity     Days per week: 3 days     Minutes per session: 20 min    Stress: Rather much   Relationships    Social connections     Talks on phone: Three times a week     Gets together: Twice a week     Attends [employer] service: Not on file     Active member of club or organization: Yes     Attends meetings of clubs or organizations: 1 to 4 times per year     Relationship status:    Other Topics Concern    Not on file   Social History Narrative    Not on file       Family History:   family history includes Breast cancer in her paternal aunt; Heart attacks under age 50 in her brother and father; Heart disease in her mother.    Health Maintenance   Topic Date Due    DEXA SCAN  02/22/1995    Pneumococcal Vaccine (65+ Low/Medium Risk) (1 of 2 - PCV13) 02/22/2020    Mammogram  03/10/2021    Lipid Panel  03/13/2021    Aspirin/Antiplatelet  "Therapy  09/18/2021    TETANUS VACCINE  11/15/2026    Hepatitis C Screening  Completed       Physical Exam:    Vital Signs  Temp: 98.8 °F (37.1 °C)  Temp src: Temporal  Pulse: 65  SpO2: 96 %  BP: 134/70  BP Location: Left arm  Patient Position: Sitting  Pain Score:   3  Pain Loc: Vagina  Height and Weight  Height: 5' 2" (157.5 cm)  Weight: 75.1 kg (165 lb 9.1 oz)  BSA (Calculated - sq m): 1.81 sq meters  BMI (Calculated): 30.3  Weight in (lb) to have BMI = 25: 136.4]    Body mass index is 30.28 kg/m².    Physical Exam  Constitutional:       Appearance: She is well-developed.   Eyes:      Conjunctiva/sclera: Conjunctivae normal.      Pupils: Pupils are equal, round, and reactive to light.   Neck:      Musculoskeletal: Normal range of motion and neck supple.   Cardiovascular:      Rate and Rhythm: Normal rate and regular rhythm.      Heart sounds: Normal heart sounds. No murmur.   Pulmonary:      Effort: Pulmonary effort is normal. No respiratory distress.      Breath sounds: Normal breath sounds. No wheezing or rales.   Chest:      Chest wall: No tenderness.   Abdominal:      General: There is no distension.      Palpations: Abdomen is soft. There is no mass.      Tenderness: There is no abdominal tenderness. There is no guarding.   Musculoskeletal:         General: No tenderness.   Lymphadenopathy:      Cervical: No cervical adenopathy.   Skin:     General: Skin is warm and dry.   Neurological:      Mental Status: She is alert and oriented to person, place, and time.      Deep Tendon Reflexes: Reflexes are normal and symmetric.   Psychiatric:         Behavior: Behavior normal.         Thought Content: Thought content normal.         Judgment: Judgment normal.           Assessment:      ICD-10-CM ICD-9-CM   1. Acute cystitis without hematuria  N30.00 595.0   2. Insomnia, unspecified type  G47.00 780.52   3. Upper abdominal pain  R10.10 789.09   4. SMA stenosis  I77.1 557.1         Plan:        With underlying " superior mesenteric artery stenosis and recurrent upper abdominal pain will consult GI  She does need a colonoscopy  Treat you chest Keflex  Efrani refilled        Orders Placed This Encounter   Procedures    Urinalysis, Reflex to Urine Culture Urine, Clean Catch    Ambulatory referral/consult to Gastroenterology       Current Outpatient Medications   Medication Sig Dispense Refill    albuterol (PROVENTIL/VENTOLIN HFA) 90 mcg/actuation inhaler INHALE 2 PUFFS INTO THE LUNGS EVERY 6 HOURS AS NEEDED 8.5 g 3    albuterol-ipratropium (DUO-NEB) 2.5 mg-0.5 mg/3 mL nebulizer solution USE ONE VIAL IN NEBULIZER TWICE DAILY 270 mL 11    amLODIPine (NORVASC) 5 MG tablet Take 1 tablet (5 mg total) by mouth once daily. 30 tablet 11    aspirin 81 MG Chew Take 81 mg by mouth once daily.      bisoprolol (ZEBETA) 5 MG tablet Take 1 tablet (5 mg total) by mouth once daily. 90 tablet 1    clopidogreL (PLAVIX) 75 mg tablet Take 1 tablet (75 mg total) by mouth once daily. 90 tablet 1    COMBIVENT RESPIMAT  mcg/actuation inhaler Inhale 2 puffs into the lungs every 6 (six) hours as needed for Wheezing or Shortness of Breath. 4 g 11    dicyclomine (BENTYL) 10 MG capsule TAKE 1 CAPSULE BY MOUTH BEFORE MEALS AND AT BEDTIME AS NEEDED 30 capsule 1    DULoxetine (CYMBALTA) 60 MG capsule TAKE (1) CAPSULE BY MOUTH DAILY 90 capsule 1    estrogens, conjugated, (PREMARIN) 0.3 MG tablet Take 1 tablet (0.3 mg total) by mouth once daily. 9 tablet 1    ezetimibe (ZETIA) 10 mg tablet Take 1 tablet (10 mg total) by mouth once daily. 90 tablet 3    furosemide (LASIX) 20 MG tablet Take 1 tablet (20 mg total) by mouth daily as needed. For leg swelling as needed. Or weight gain of 3 pounds in 1 day or 5 lbs in 1 week. 30 tablet 6    irbesartan (AVAPRO) 300 MG tablet Take 1 tablet (300 mg total) by mouth every evening. 30 tablet 3    metroNIDAZOLE (FLAGYL) 500 MG tablet TAKE 1 TABLET 3 TIMES A DAY FOR 10 DAYS 30 tablet 0     mometasone-formoterol (DULERA) 200-5 mcg/actuation inhaler INHALE 2 PUFFS 2 TIMES DAILY 13 g 11    OXYGEN-AIR DELIVERY SYSTEMS MISC 2 L by Misc.(Non-Drug; Combo Route) route every evening.      pantoprazole (PROTONIX) 40 MG tablet Take 1 tablet (40 mg total) by mouth once daily. 30 tablet 5    zolpidem (AMBIEN) 5 MG Tab Take 1 tablet (5 mg total) by mouth nightly. 30 tablet 5    cephALEXin (KEFLEX) 500 MG capsule Take 1 capsule (500 mg total) by mouth every 8 (eight) hours. 21 capsule 0     No current facility-administered medications for this visit.        Medications Discontinued During This Encounter   Medication Reason    fluticasone propionate (FLONASE) 50 mcg/actuation nasal spray Patient no longer taking    simvastatin (ZOCOR) 40 MG tablet Patient no longer taking    zolpidem (AMBIEN) 5 MG Tab Reorder    dicyclomine (BENTYL) 10 MG capsule Reorder       No follow-ups on file.      Olga Altman MD

## 2020-09-20 LAB
BACTERIA UR CULT: NORMAL
BACTERIA UR CULT: NORMAL

## 2020-09-24 ENCOUNTER — OFFICE VISIT (OUTPATIENT)
Dept: CARDIOLOGY | Facility: CLINIC | Age: 65
End: 2020-09-24
Payer: MEDICARE

## 2020-09-24 VITALS
OXYGEN SATURATION: 97 % | SYSTOLIC BLOOD PRESSURE: 138 MMHG | DIASTOLIC BLOOD PRESSURE: 64 MMHG | HEART RATE: 69 BPM | WEIGHT: 166.69 LBS | BODY MASS INDEX: 30.48 KG/M2

## 2020-09-24 DIAGNOSIS — I25.118 CORONARY ARTERY DISEASE OF NATIVE ARTERY OF NATIVE HEART WITH STABLE ANGINA PECTORIS: ICD-10-CM

## 2020-09-24 DIAGNOSIS — I71.40 ABDOMINAL AORTIC ANEURYSM (AAA) WITHOUT RUPTURE: ICD-10-CM

## 2020-09-24 DIAGNOSIS — E78.2 MIXED HYPERLIPIDEMIA: Primary | ICD-10-CM

## 2020-09-24 DIAGNOSIS — I65.23 BILATERAL CAROTID ARTERY STENOSIS: ICD-10-CM

## 2020-09-24 DIAGNOSIS — F17.201 TOBACCO USE DISORDER, MODERATE, IN SUSTAINED REMISSION: ICD-10-CM

## 2020-09-24 DIAGNOSIS — J44.9 COPD, MODERATE: ICD-10-CM

## 2020-09-24 DIAGNOSIS — I10 ESSENTIAL HYPERTENSION: ICD-10-CM

## 2020-09-24 DIAGNOSIS — R73.03 PREDIABETES: ICD-10-CM

## 2020-09-24 DIAGNOSIS — I49.5 SINUS NODE DYSFUNCTION: ICD-10-CM

## 2020-09-24 DIAGNOSIS — G47.36 NOCTURNAL HYPOXEMIA DUE TO EMPHYSEMA: ICD-10-CM

## 2020-09-24 DIAGNOSIS — J43.9 NOCTURNAL HYPOXEMIA DUE TO EMPHYSEMA: ICD-10-CM

## 2020-09-24 PROCEDURE — 3075F PR MOST RECENT SYSTOLIC BLOOD PRESS GE 130-139MM HG: ICD-10-PCS | Mod: HCNC,CPTII,S$GLB, | Performed by: INTERNAL MEDICINE

## 2020-09-24 PROCEDURE — 99999 PR PBB SHADOW E&M-EST. PATIENT-LVL IV: ICD-10-PCS | Mod: PBBFAC,HCNC,, | Performed by: INTERNAL MEDICINE

## 2020-09-24 PROCEDURE — 99214 OFFICE O/P EST MOD 30 MIN: CPT | Mod: HCNC,S$GLB,, | Performed by: INTERNAL MEDICINE

## 2020-09-24 PROCEDURE — 99499 RISK ADDL DX/OHS AUDIT: ICD-10-PCS | Mod: S$PBB,HCNC,, | Performed by: INTERNAL MEDICINE

## 2020-09-24 PROCEDURE — 99999 PR PBB SHADOW E&M-EST. PATIENT-LVL IV: CPT | Mod: PBBFAC,HCNC,, | Performed by: INTERNAL MEDICINE

## 2020-09-24 PROCEDURE — 3078F PR MOST RECENT DIASTOLIC BLOOD PRESSURE < 80 MM HG: ICD-10-PCS | Mod: HCNC,CPTII,S$GLB, | Performed by: INTERNAL MEDICINE

## 2020-09-24 PROCEDURE — 1101F PT FALLS ASSESS-DOCD LE1/YR: CPT | Mod: HCNC,CPTII,S$GLB, | Performed by: INTERNAL MEDICINE

## 2020-09-24 PROCEDURE — 99499 UNLISTED E&M SERVICE: CPT | Mod: S$PBB,HCNC,, | Performed by: INTERNAL MEDICINE

## 2020-09-24 PROCEDURE — 99214 PR OFFICE/OUTPT VISIT, EST, LEVL IV, 30-39 MIN: ICD-10-PCS | Mod: HCNC,S$GLB,, | Performed by: INTERNAL MEDICINE

## 2020-09-24 PROCEDURE — 1101F PR PT FALLS ASSESS DOC 0-1 FALLS W/OUT INJ PAST YR: ICD-10-PCS | Mod: HCNC,CPTII,S$GLB, | Performed by: INTERNAL MEDICINE

## 2020-09-24 PROCEDURE — 3008F BODY MASS INDEX DOCD: CPT | Mod: HCNC,CPTII,S$GLB, | Performed by: INTERNAL MEDICINE

## 2020-09-24 PROCEDURE — 3078F DIAST BP <80 MM HG: CPT | Mod: HCNC,CPTII,S$GLB, | Performed by: INTERNAL MEDICINE

## 2020-09-24 PROCEDURE — 3008F PR BODY MASS INDEX (BMI) DOCUMENTED: ICD-10-PCS | Mod: HCNC,CPTII,S$GLB, | Performed by: INTERNAL MEDICINE

## 2020-09-24 PROCEDURE — 3075F SYST BP GE 130 - 139MM HG: CPT | Mod: HCNC,CPTII,S$GLB, | Performed by: INTERNAL MEDICINE

## 2020-09-24 NOTE — PROGRESS NOTES
Subjective:   Patient ID:  Gemma Vick is a 65 y.o. female who presents for follow up of No chief complaint on file.      HPI3/26/2020  A 64 yo female with pvd carotid disease htn hlp copd exsmoker on nocturnal o2 therapy wants to discuss test results. She is in digital htn has been unable to tolerate bisoprolol daily feels tired fatigued no energy she takes meds regularily tries to be compliant with salt no exercise but stays busy in the house. She takes care of her grandbabies. Has occasional leg swelling she takes lasix prn for weight change she stands on her feet a lot. She is compliant with inhalers to control shortness of breath no palpitation has chronic cough.  Her carotid u/s reviewed unchanged.abd u/s no aneurysm  Lipids on target she is well controlled on vytorin .she has difficulty with crestor and lipitor   9/24/2020  She is here for  f/u she is complaining of recurrent episodes of abdominal pain lower quadrant and got to be upper abdomen associated with nausea vomiting no post prandial pain no weight loss or food avoidance. Has no new symptoms of chest pain she is still short of breath no new palpitation tia.   Ct abdomen showed diverticulitis aaa 3.4 cm and sma stenosis.   Her ldl has increased. She has had sore muscles she stopped statins w/o improvemnet. She needs to back on statins that would explain he rincreased ldl.   Past Medical History:   Diagnosis Date    AAA (abdominal aortic aneurysm) 2/13/2014    Abdominal aneurysm     3    Acute coronary syndrome     Arthritis     BPPV (benign paroxysmal positional vertigo)     Carotid artery plaque     Carotid artery stenosis and occlusion 2/13/2014    Chronic back pain     COPD (chronic obstructive pulmonary disease)     Coronary artery disease     Emphysema lung     Hyperlipidemia     Hypertension     Myocardial infarction     x3    Neuropathy        Past Surgical History:   Procedure Laterality Date    CARDIAC CATHETERIZATION       CORONARY ANGIOPLASTY      HYSTERECTOMY  1988       Social History     Tobacco Use    Smoking status: Former Smoker     Packs/day: 0.50     Years: 44.00     Pack years: 22.00     Types: Cigarettes, Vaping w/o nicotine     Start date: 6/24/1970     Quit date: 05/2017     Years since quitting: 3.4    Smokeless tobacco: Never Used    Tobacco comment: 3 MG NICOTINE FOR HEART.    Substance Use Topics    Alcohol use: No     Frequency: Never    Drug use: No       Family History   Problem Relation Age of Onset    Heart disease Mother     Heart attacks under age 50 Father     Heart attacks under age 50 Brother     Breast cancer Paternal Aunt        Current Outpatient Medications   Medication Sig    albuterol (PROVENTIL/VENTOLIN HFA) 90 mcg/actuation inhaler INHALE 2 PUFFS INTO THE LUNGS EVERY 6 HOURS AS NEEDED    albuterol-ipratropium (DUO-NEB) 2.5 mg-0.5 mg/3 mL nebulizer solution USE ONE VIAL IN NEBULIZER TWICE DAILY    amLODIPine (NORVASC) 5 MG tablet Take 1 tablet (5 mg total) by mouth once daily.    aspirin 81 MG Chew Take 81 mg by mouth once daily.    bisoprolol (ZEBETA) 5 MG tablet Take 1 tablet (5 mg total) by mouth once daily.    clopidogreL (PLAVIX) 75 mg tablet Take 1 tablet (75 mg total) by mouth once daily.    COMBIVENT RESPIMAT  mcg/actuation inhaler Inhale 2 puffs into the lungs every 6 (six) hours as needed for Wheezing or Shortness of Breath.    dicyclomine (BENTYL) 10 MG capsule TAKE 1 CAPSULE BY MOUTH BEFORE MEALS AND AT BEDTIME AS NEEDED    DULoxetine (CYMBALTA) 60 MG capsule TAKE (1) CAPSULE BY MOUTH DAILY    estrogens, conjugated, (PREMARIN) 0.3 MG tablet Take 1 tablet (0.3 mg total) by mouth once daily.    ezetimibe (ZETIA) 10 mg tablet Take 1 tablet (10 mg total) by mouth once daily.    furosemide (LASIX) 20 MG tablet Take 1 tablet (20 mg total) by mouth daily as needed. For leg swelling as needed. Or weight gain of 3 pounds in 1 day or 5 lbs in 1 week.    irbesartan  (AVAPRO) 300 MG tablet Take 1 tablet (300 mg total) by mouth every evening.    mometasone-formoterol (DULERA) 200-5 mcg/actuation inhaler INHALE 2 PUFFS 2 TIMES DAILY    OXYGEN-AIR DELIVERY SYSTEMS MISC 2 L by Misc.(Non-Drug; Combo Route) route every evening.    pantoprazole (PROTONIX) 40 MG tablet Take 1 tablet (40 mg total) by mouth once daily.    zolpidem (AMBIEN) 5 MG Tab Take 1 tablet (5 mg total) by mouth nightly.    cephALEXin (KEFLEX) 500 MG capsule Take 1 capsule (500 mg total) by mouth every 8 (eight) hours. (Patient not taking: Reported on 9/24/2020)    metroNIDAZOLE (FLAGYL) 500 MG tablet TAKE 1 TABLET 3 TIMES A DAY FOR 10 DAYS (Patient not taking: Reported on 9/24/2020)     No current facility-administered medications for this visit.      Current Outpatient Medications on File Prior to Visit   Medication Sig    albuterol (PROVENTIL/VENTOLIN HFA) 90 mcg/actuation inhaler INHALE 2 PUFFS INTO THE LUNGS EVERY 6 HOURS AS NEEDED    albuterol-ipratropium (DUO-NEB) 2.5 mg-0.5 mg/3 mL nebulizer solution USE ONE VIAL IN NEBULIZER TWICE DAILY    amLODIPine (NORVASC) 5 MG tablet Take 1 tablet (5 mg total) by mouth once daily.    aspirin 81 MG Chew Take 81 mg by mouth once daily.    bisoprolol (ZEBETA) 5 MG tablet Take 1 tablet (5 mg total) by mouth once daily.    clopidogreL (PLAVIX) 75 mg tablet Take 1 tablet (75 mg total) by mouth once daily.    COMBIVENT RESPIMAT  mcg/actuation inhaler Inhale 2 puffs into the lungs every 6 (six) hours as needed for Wheezing or Shortness of Breath.    dicyclomine (BENTYL) 10 MG capsule TAKE 1 CAPSULE BY MOUTH BEFORE MEALS AND AT BEDTIME AS NEEDED    DULoxetine (CYMBALTA) 60 MG capsule TAKE (1) CAPSULE BY MOUTH DAILY    estrogens, conjugated, (PREMARIN) 0.3 MG tablet Take 1 tablet (0.3 mg total) by mouth once daily.    ezetimibe (ZETIA) 10 mg tablet Take 1 tablet (10 mg total) by mouth once daily.    furosemide (LASIX) 20 MG tablet Take 1 tablet (20 mg  total) by mouth daily as needed. For leg swelling as needed. Or weight gain of 3 pounds in 1 day or 5 lbs in 1 week.    irbesartan (AVAPRO) 300 MG tablet Take 1 tablet (300 mg total) by mouth every evening.    mometasone-formoterol (DULERA) 200-5 mcg/actuation inhaler INHALE 2 PUFFS 2 TIMES DAILY    OXYGEN-AIR DELIVERY SYSTEMS MISC 2 L by Misc.(Non-Drug; Combo Route) route every evening.    pantoprazole (PROTONIX) 40 MG tablet Take 1 tablet (40 mg total) by mouth once daily.    zolpidem (AMBIEN) 5 MG Tab Take 1 tablet (5 mg total) by mouth nightly.    cephALEXin (KEFLEX) 500 MG capsule Take 1 capsule (500 mg total) by mouth every 8 (eight) hours. (Patient not taking: Reported on 9/24/2020)    metroNIDAZOLE (FLAGYL) 500 MG tablet TAKE 1 TABLET 3 TIMES A DAY FOR 10 DAYS (Patient not taking: Reported on 9/24/2020)     No current facility-administered medications on file prior to visit.      Review of patient's allergies indicates:  No Known Allergies  Review of Systems   Constitution: Negative for diaphoresis, malaise/fatigue and weight gain.   HENT: Negative for hoarse voice.    Eyes: Negative for double vision and visual disturbance.   Cardiovascular: Positive for dyspnea on exertion. Negative for chest pain, claudication, cyanosis, irregular heartbeat, leg swelling, near-syncope, orthopnea, palpitations, paroxysmal nocturnal dyspnea and syncope.   Respiratory: Positive for shortness of breath. Negative for cough, hemoptysis and snoring.    Hematologic/Lymphatic: Negative for bleeding problem. Does not bruise/bleed easily.   Skin: Negative for color change and poor wound healing.   Musculoskeletal: Negative for muscle cramps, muscle weakness and myalgias.   Gastrointestinal: Positive for bloating, abdominal pain and nausea. Negative for change in bowel habit, diarrhea, heartburn, hematemesis, hematochezia and melena.   Neurological: Negative for excessive daytime sleepiness, dizziness, headaches,  light-headedness, loss of balance, numbness and weakness.   Psychiatric/Behavioral: Negative for memory loss. The patient does not have insomnia.    Allergic/Immunologic: Negative for hives.       Objective:   Physical Exam   Constitutional: She is oriented to person, place, and time. She appears well-developed and well-nourished. She does not appear ill. No distress.   HENT:   Head: Normocephalic and atraumatic.   Eyes: Pupils are equal, round, and reactive to light. EOM are normal. No scleral icterus.   Neck: Normal range of motion. Neck supple. Normal carotid pulses, no hepatojugular reflux and no JVD present. Carotid bruit is not present. No tracheal deviation present. No thyromegaly present.   Cardiovascular: Normal rate, regular rhythm and intact distal pulses. Exam reveals no gallop and no friction rub.   Murmur heard.   Harsh midsystolic murmur is present with a grade of 2/6 at the upper right sternal border radiating to the neck.  High-pitched blowing decrescendo early diastolic murmur is present with a grade of 1/6 at the upper right sternal border radiating to the apex.  Pulses:       Carotid pulses are 2+ on the right side with bruit and 2+ on the left side with bruit.       Radial pulses are 2+ on the right side and 2+ on the left side.        Femoral pulses are 2+ on the right side with bruit and 2+ on the left side with bruit.       Popliteal pulses are 2+ on the right side and 2+ on the left side.        Dorsalis pedis pulses are 2+ on the right side and 2+ on the left side.        Posterior tibial pulses are 2+ on the right side and 2+ on the left side.   No pulsatile abdominal mass or bruits.   Bilateral subclavina bruits   Pulmonary/Chest: Effort normal and breath sounds normal. No respiratory distress. She has no wheezes. She has no rhonchi. She has no rales. She exhibits no tenderness.   Abdominal: Soft. Normal appearance, normal aorta and bowel sounds are normal. She exhibits no distension, no  abdominal bruit, no ascites and no pulsatile midline mass. There is no hepatomegaly. There is abdominal tenderness.   Musculoskeletal:         General: No edema.      Right shoulder: She exhibits no deformity.   Neurological: She is alert and oriented to person, place, and time. She has normal strength. No cranial nerve deficit. Coordination normal.   Skin: Skin is warm and dry. No rash noted. She is not diaphoretic. No cyanosis or erythema. Nails show no clubbing.   Psychiatric: She has a normal mood and affect. Her speech is normal and behavior is normal.   Nursing note and vitals reviewed.    Vitals:    09/24/20 1408 09/24/20 1411   BP: 134/60 138/64   BP Location: Left arm Right arm   Patient Position: Sitting Sitting   BP Method: Medium (Manual) Medium (Manual)   Pulse: 69    SpO2: 97%    Weight: 75.6 kg (166 lb 10.7 oz)      Lab Results   Component Value Date    CHOL 219 (H) 09/18/2020    CHOL 158 03/13/2020    CHOL 164 09/17/2019     Lab Results   Component Value Date    HDL 55 09/18/2020    HDL 71 03/13/2020    HDL 67 09/17/2019     Lab Results   Component Value Date    LDLCALC 144.8 09/18/2020    LDLCALC 69.0 03/13/2020    LDLCALC 80.0 09/17/2019     Lab Results   Component Value Date    TRIG 96 09/18/2020    TRIG 90 03/13/2020    TRIG 85 09/17/2019     Lab Results   Component Value Date    CHOLHDL 25.1 09/18/2020    CHOLHDL 44.9 03/13/2020    CHOLHDL 40.9 09/17/2019       Chemistry        Component Value Date/Time     09/18/2020 0928    K 4.0 09/18/2020 0928     09/18/2020 0928    CO2 27 09/18/2020 0928    BUN 10 09/18/2020 0928    CREATININE 0.9 09/18/2020 0928    GLU 99 09/18/2020 0928        Component Value Date/Time    CALCIUM 9.3 09/18/2020 0928    ALKPHOS 84 09/18/2020 0928    AST 15 09/18/2020 0928    ALT 14 09/18/2020 0928    BILITOT 0.6 09/18/2020 0928    ESTGFRAFRICA >60.0 09/18/2020 0928    EGFRNONAA >60.0 09/18/2020 0928          Lab Results   Component Value Date    TSH 1.509  05/04/2018     Lab Results   Component Value Date    INR 1.1 12/20/2017    INR 1.1 08/10/2012     Lab Results   Component Value Date    WBC 8.14 05/27/2020    HGB 12.4 05/27/2020    HCT 39.4 05/27/2020    MCV 96 05/27/2020     (H) 05/27/2020     BMP  Sodium   Date Value Ref Range Status   09/18/2020 137 136 - 145 mmol/L Final     Potassium   Date Value Ref Range Status   09/18/2020 4.0 3.5 - 5.1 mmol/L Final     Chloride   Date Value Ref Range Status   09/18/2020 102 95 - 110 mmol/L Final     CO2   Date Value Ref Range Status   09/18/2020 27 23 - 29 mmol/L Final     BUN, Bld   Date Value Ref Range Status   09/18/2020 10 8 - 23 mg/dL Final     Creatinine   Date Value Ref Range Status   09/18/2020 0.9 0.5 - 1.4 mg/dL Final     Calcium   Date Value Ref Range Status   09/18/2020 9.3 8.7 - 10.5 mg/dL Final     Anion Gap   Date Value Ref Range Status   09/18/2020 8 8 - 16 mmol/L Final     eGFR if    Date Value Ref Range Status   09/18/2020 >60.0 >60 mL/min/1.73 m^2 Final     eGFR if non    Date Value Ref Range Status   09/18/2020 >60.0 >60 mL/min/1.73 m^2 Final     Comment:     Calculation used to obtain the estimated glomerular filtration  rate (eGFR) is the CKD-EPI equation.        Estimated Creatinine Clearance: 59.3 mL/min (based on SCr of 0.9 mg/dL).    Assessment:     1. Mixed hyperlipidemia    2. Essential hypertension    3. Coronary artery disease of native artery of native heart with stable angina pectoris    4. Prediabetes    5. Bilateral carotid artery stenosis    6. Abdominal aortic aneurysm (AAA) without rupture    7. Sinus node dysfunction    8. Nocturnal hypoxemia due to emphysema    9. COPD, moderate    10. Tobacco use disorder, moderate, in sustained remission      abdominal symptoms not typical for mesenteric angina despite sma disease.   No change in over all abdominal aorta size.  htn ok today per digital fluctuating counseled about compliance salt intake.   hlp  will resume statins.  Cad asymptomatic   Shortness of breath stable related to lung disease.   Plan:   Continue current therapy  Cardiac low salt diet.  Risk factor modification and excercise program.  F/u in 6 months with lipid cmp ..

## 2020-10-01 NOTE — PROGRESS NOTES
"Digital Medicine: Health  Follow-Up    The history is provided by the patient.             Reason for review: Blood pressure not at goal        Topics Covered on Call: physical activity and Diet    Additional Follow-up details: Patient AVG BP elevated 150/72.   Patient states that she feels her blood pressure is normal for her. She had an appointment a week or so ago with her heart doctor. She states that he said everything seemed to be fine. She states that for years she has had elevated blood pressure. Family history of heart problems. She states that before her stent was placed she was on 3-4 BP medications. Her BP started to come very low to the point she was not taking any BP medications after stent was placed. It started to "creep back up again". She states that she feels fine.   Dicussed feeling ok but understanding the concerns of consistently elevated BP readings. Discussed how it is the "silent killer" and could result in a heart attack or stroke. Patient understood.     She states that her right shoulder has been flaring up again causing discomfort. She continues to babysit everyday, keeping busy. She states that she still has Diverticulitis. She has to go to the GI doctor tomorrow. She reports having blockages in her lower stomach and sees a vascular jolie on Tuesday. She is not nervous our anxious.              Diet-no change to diet    No change to diet.      Intervention(s): DASH diet      Physical Activity-no change to routine  No change to exercise routine.       Intervention(s): provided exercise ideas         Additional physical activity details: Taking care of grandchildren.     Strongly encouraged when open to the idea, creating an exercise routine.   Discussed taking the grandchildren for a walk around the block or going in the afternoon as an option. Patient considered but was not fully interested. Will continue to work on creating an exercise goal.      Medication Adherence-Medication " Adherence not addressed.        Substance, Sleep, Stress-No change  stress-assessed  Details:denies stressors  Intervention(s):    Sleep-not assessed  Details:  Intervention(s):    Alcohol -not assessed  Details:  Intervention(s):    Tobacco-Not Assessed  Details:  Intervention(s):          Continue current diet/physical activity routine.       Addressed patient questions and patient has my contact information if needed prior to next outreach. Patient verbalizes understanding.      Explained the importance of self-monitoring and medication adherence. Encouraged the patient to communicate with their health  for lifestyle modifications to help improve or maintain a healthy lifestyle.            There are no preventive care reminders to display for this patient.    Last 5 Patient Entered Readings                                      Current 30 Day Average: 150/72     Recent Readings 9/30/2020 9/27/2020 9/17/2020 9/14/2020 9/14/2020    SBP (mmHg) 134 144 152 154 144    DBP (mmHg) 70 71 73 71 72    Pulse 67 61 77 62 66

## 2020-10-02 ENCOUNTER — OFFICE VISIT (OUTPATIENT)
Dept: GASTROENTEROLOGY | Facility: CLINIC | Age: 65
End: 2020-10-02
Payer: MEDICARE

## 2020-10-02 VITALS
DIASTOLIC BLOOD PRESSURE: 70 MMHG | HEART RATE: 68 BPM | WEIGHT: 167.75 LBS | BODY MASS INDEX: 30.87 KG/M2 | HEIGHT: 62 IN | SYSTOLIC BLOOD PRESSURE: 160 MMHG

## 2020-10-02 DIAGNOSIS — R10.13 EPIGASTRIC ABDOMINAL PAIN: ICD-10-CM

## 2020-10-02 DIAGNOSIS — R10.13 EPIGASTRIC PAIN: Primary | ICD-10-CM

## 2020-10-02 DIAGNOSIS — K57.92 DIVERTICULITIS: ICD-10-CM

## 2020-10-02 DIAGNOSIS — R10.10 UPPER ABDOMINAL PAIN: ICD-10-CM

## 2020-10-02 DIAGNOSIS — K55.1 SUPERIOR MESENTERIC ARTERY STENOSIS: ICD-10-CM

## 2020-10-02 PROCEDURE — 3078F DIAST BP <80 MM HG: CPT | Mod: HCNC,CPTII,S$GLB, | Performed by: INTERNAL MEDICINE

## 2020-10-02 PROCEDURE — 99203 OFFICE O/P NEW LOW 30 MIN: CPT | Mod: HCNC,S$GLB,, | Performed by: INTERNAL MEDICINE

## 2020-10-02 PROCEDURE — 99203 PR OFFICE/OUTPT VISIT, NEW, LEVL III, 30-44 MIN: ICD-10-PCS | Mod: HCNC,S$GLB,, | Performed by: INTERNAL MEDICINE

## 2020-10-02 PROCEDURE — 99999 PR PBB SHADOW E&M-EST. PATIENT-LVL V: ICD-10-PCS | Mod: PBBFAC,HCNC,, | Performed by: INTERNAL MEDICINE

## 2020-10-02 PROCEDURE — 3078F PR MOST RECENT DIASTOLIC BLOOD PRESSURE < 80 MM HG: ICD-10-PCS | Mod: HCNC,CPTII,S$GLB, | Performed by: INTERNAL MEDICINE

## 2020-10-02 PROCEDURE — 99499 RISK ADDL DX/OHS AUDIT: ICD-10-PCS | Mod: S$GLB,,, | Performed by: INTERNAL MEDICINE

## 2020-10-02 PROCEDURE — 3077F SYST BP >= 140 MM HG: CPT | Mod: HCNC,CPTII,S$GLB, | Performed by: INTERNAL MEDICINE

## 2020-10-02 PROCEDURE — 1100F PTFALLS ASSESS-DOCD GE2>/YR: CPT | Mod: HCNC,CPTII,S$GLB, | Performed by: INTERNAL MEDICINE

## 2020-10-02 PROCEDURE — 3008F BODY MASS INDEX DOCD: CPT | Mod: HCNC,CPTII,S$GLB, | Performed by: INTERNAL MEDICINE

## 2020-10-02 PROCEDURE — 1100F PR PT FALLS ASSESS DOC 2+ FALLS/FALL W/INJURY/YR: ICD-10-PCS | Mod: HCNC,CPTII,S$GLB, | Performed by: INTERNAL MEDICINE

## 2020-10-02 PROCEDURE — 99999 PR PBB SHADOW E&M-EST. PATIENT-LVL V: CPT | Mod: PBBFAC,HCNC,, | Performed by: INTERNAL MEDICINE

## 2020-10-02 PROCEDURE — 99499 UNLISTED E&M SERVICE: CPT | Mod: S$GLB,,, | Performed by: INTERNAL MEDICINE

## 2020-10-02 PROCEDURE — 3288F PR FALLS RISK ASSESSMENT DOCUMENTED: ICD-10-PCS | Mod: HCNC,CPTII,S$GLB, | Performed by: INTERNAL MEDICINE

## 2020-10-02 PROCEDURE — 3008F PR BODY MASS INDEX (BMI) DOCUMENTED: ICD-10-PCS | Mod: HCNC,CPTII,S$GLB, | Performed by: INTERNAL MEDICINE

## 2020-10-02 PROCEDURE — 3077F PR MOST RECENT SYSTOLIC BLOOD PRESSURE >= 140 MM HG: ICD-10-PCS | Mod: HCNC,CPTII,S$GLB, | Performed by: INTERNAL MEDICINE

## 2020-10-02 PROCEDURE — 3288F FALL RISK ASSESSMENT DOCD: CPT | Mod: HCNC,CPTII,S$GLB, | Performed by: INTERNAL MEDICINE

## 2020-10-02 RX ORDER — ZINC GLUCONATE 50 MG
50 TABLET ORAL DAILY
COMMUNITY
End: 2021-03-29 | Stop reason: ALTCHOICE

## 2020-10-02 RX ORDER — ASCORBIC ACID 500 MG
500 TABLET ORAL DAILY
COMMUNITY
End: 2021-03-29 | Stop reason: ALTCHOICE

## 2020-10-02 RX ORDER — CHOLECALCIFEROL (VITAMIN D3) 25 MCG
1000 TABLET ORAL DAILY
COMMUNITY

## 2020-10-02 RX ORDER — SPIRONOLACTONE 25 MG
20 TABLET ORAL DAILY
COMMUNITY
End: 2021-03-29 | Stop reason: ALTCHOICE

## 2020-10-02 RX ORDER — DICYCLOMINE HYDROCHLORIDE 10 MG/1
10 CAPSULE ORAL
Qty: 120 CAPSULE | Refills: 0 | Status: SHIPPED | OUTPATIENT
Start: 2020-10-02 | End: 2020-11-01

## 2020-10-02 RX ORDER — FERROUS SULFATE 324(65)MG
325 TABLET, DELAYED RELEASE (ENTERIC COATED) ORAL DAILY
COMMUNITY
End: 2021-06-09

## 2020-10-02 RX ORDER — SIMVASTATIN 40 MG/1
40 TABLET, FILM COATED ORAL NIGHTLY
COMMUNITY
End: 2021-03-15

## 2020-10-02 NOTE — ASSESSMENT & PLAN NOTE
Epigastric discomfort   -Described as abdominal cramping that is relieved with bowel movements   -Denies post-prandial abdominal pain. Denies food avoidance or weight loss.   -Pain is less likely from SMA stenosis. She just saw cardiology. This is also addressed  In their plan. Continue to follow with cardiology  -RUQ Abdominal US ordered   -Patient states Isiah helps with her pain. She can continue this medication prn  -Continue PPI  -Performing an EGD was discussed with the patient, and she does not want to have an EGD at this time given her comorbidities.

## 2020-10-02 NOTE — ASSESSMENT & PLAN NOTE
-Patient had an acute episode of diverticulitis in 5/2020  -Discussed the need for an colonoscopy after her episode of diverticulitis, patient does not want to get a colonoscopy at this time given her comorbidities

## 2020-10-02 NOTE — LETTER
October 2, 2020      Olga Altman MD  [id number] [address] [address]           [facility]  [address] [place] [id number]  Phone: [phone]  Fax: [phone]          Patient: Gemma Vick   MR Number: [id number]   YOB: 1955   Date of Visit: 10/2/2020       Dear Dr. Olga Altman:    Thank you for referring Gemma Vick to me for evaluation. Attached you will find relevant portions of my assessment and plan of care.    If you have questions, please do not hesitate to call me. I look forward to following Gemma Vick along with you.    Sincerely,    Blake Gutierres MD    Enclosure  CC:  No Recipients    If you would like to receive this communication electronically, please contact [email or web address] or [phone] to request more information on [employer] Link access.    For providers and/or their staff who would like to refer a patient to [facility], please contact us through our one-stop-shop provider referral line, [facility], at [phone].    If you feel you have received this communication in error or would no longer like to receive these types of communications, please e-mail [email or web address]

## 2020-10-02 NOTE — PROGRESS NOTES
Clinic Consult:  [facility] Consultation Note    Reason for Consult:  The primary encounter diagnosis was Epigastric pain. Diagnoses of Upper abdominal pain, Superior mesenteric artery stenosis, Epigastric abdominal pain, and Diverticulitis were also pertinent to this visit.    PCP: Olga Altman   [id number] [facility] [address]    HPI:  This is a 65 y.o. female referred for abdominal pain and h/o diverticulitis.     Patient states that she has been experiencing abdominal cramping that is relieved with bowel movements. The abdominal discomfort is in the epigastric area. She was diagnosed with diverticulitis in 5/2020. She states that the abdominal discomfort she is experiencing now is different from when she had diverticulitis.     She has occasionally GERD when she eats fatty or greasy food. Symptoms are resolved with safia seltzer. She occasionally has constipation that resolves with stool softener.     She had a CTAP that revealed SMA stenosis. She is followed by cardiology, who did not think her symptoms were consistent with mesenteric angina. Patient does deny post prandial abdominal pain. She also denies food avoidance or weight loss.     ROS:  Review of Systems   Constitutional: Negative for appetite change, chills, fatigue, fever and unexpected weight change.   HENT: Negative for trouble swallowing.    Eyes: Negative for pain.   Respiratory: Negative for shortness of breath.    Cardiovascular: Negative for chest pain.   Gastrointestinal: Positive for constipation (occasionally). Negative for abdominal distention, abdominal pain, blood in stool, diarrhea, nausea and vomiting.        Denies melena or hematemesis  Occasional episodes of GERD   Abdominal cramping that is relieved with bowel movement    Musculoskeletal: Positive for arthralgias and back pain. Negative for myalgias.   Skin: Negative for color change.   Allergic/Immunologic: Negative for immunocompromised state.    Neurological: Negative for syncope, weakness and light-headedness.   Hematological: Negative for adenopathy.   Psychiatric/Behavioral: Negative for confusion.           Medical History:   Past Medical History:   Diagnosis Date    AAA (abdominal aortic aneurysm) 2/13/2014    Abdominal aneurysm     3    Acute coronary syndrome     Arthritis     BPPV (benign paroxysmal positional vertigo)     Carotid artery plaque     Carotid artery stenosis and occlusion 2/13/2014    Chronic back pain     COPD (chronic obstructive pulmonary disease)     Coronary artery disease     Emphysema lung     Hyperlipidemia     Hypertension     Myocardial infarction     x3    Neuropathy        Surgical History:  Past Surgical History:   Procedure Laterality Date    CARDIAC CATHETERIZATION      CORONARY ANGIOPLASTY      HYSTERECTOMY  1988       Family History:   Family History   Problem Relation Age of Onset    Heart disease Mother     Heart attacks under age 50 Father     Heart attacks under age 50 Brother     Breast cancer Paternal Aunt        Social History:   Social History     Tobacco Use    Smoking status: Former Smoker     Packs/day: 0.50     Years: 44.00     Pack years: 22.00     Types: Cigarettes, Vaping w/o nicotine     Start date: 6/24/1970     Quit date: 05/2017     Years since quitting: 3.4    Smokeless tobacco: Never Used    Tobacco comment: 3 MG NICOTINE FOR HEART.    Substance Use Topics    Alcohol use: No     Frequency: Never    Drug use: No       Allergies: Reviewed    Home Medications:   Medication List with Changes/Refills   New Medications    DICYCLOMINE (BENTYL) 10 MG CAPSULE    Take 1 capsule (10 mg total) by mouth 4 (four) times daily before meals and nightly.   Current Medications    ALBUTEROL (PROVENTIL/VENTOLIN HFA) 90 MCG/ACTUATION INHALER    INHALE 2 PUFFS INTO THE LUNGS EVERY 6 HOURS AS NEEDED    ALBUTEROL-IPRATROPIUM (DUO-NEB) 2.5 MG-0.5 MG/3 ML NEBULIZER SOLUTION    USE ONE VIAL IN  NEBULIZER TWICE DAILY    AMLODIPINE (NORVASC) 5 MG TABLET    Take 1 tablet (5 mg total) by mouth once daily.    ASCORBIC ACID, VITAMIN C, (VITAMIN C) 500 MG TABLET    Take 500 mg by mouth once daily.    ASPIRIN 81 MG CHEW    Take 81 mg by mouth once daily.    BISOPROLOL (ZEBETA) 5 MG TABLET    Take 1 tablet (5 mg total) by mouth once daily.    CEPHALEXIN (KEFLEX) 500 MG CAPSULE    Take 1 capsule (500 mg total) by mouth every 8 (eight) hours.    CLOPIDOGREL (PLAVIX) 75 MG TABLET    Take 1 tablet (75 mg total) by mouth once daily.    COMBIVENT RESPIMAT  MCG/ACTUATION INHALER    Inhale 2 puffs into the lungs every 6 (six) hours as needed for Wheezing or Shortness of Breath.    DICYCLOMINE (BENTYL) 10 MG CAPSULE    TAKE 1 CAPSULE BY MOUTH BEFORE MEALS AND AT BEDTIME AS NEEDED    DULOXETINE (CYMBALTA) 60 MG CAPSULE    TAKE (1) CAPSULE BY MOUTH DAILY    ESTROGENS, CONJUGATED, (PREMARIN) 0.3 MG TABLET    Take 1 tablet (0.3 mg total) by mouth once daily.    EZETIMIBE (ZETIA) 10 MG TABLET    Take 1 tablet (10 mg total) by mouth once daily.    FERROUS SULFATE 324 MG (65 MG IRON) TBEC    Take 325 mg by mouth once daily.    FUROSEMIDE (LASIX) 20 MG TABLET    Take 1 tablet (20 mg total) by mouth daily as needed. For leg swelling as needed. Or weight gain of 3 pounds in 1 day or 5 lbs in 1 week.    IRBESARTAN (AVAPRO) 300 MG TABLET    Take 1 tablet (300 mg total) by mouth every evening.    LUTEIN 20 MG CAP    Take 20 mg by mouth once daily.    METRONIDAZOLE (FLAGYL) 500 MG TABLET    TAKE 1 TABLET 3 TIMES A DAY FOR 10 DAYS    MOMETASONE-FORMOTEROL (DULERA) 200-5 MCG/ACTUATION INHALER    INHALE 2 PUFFS 2 TIMES DAILY    OXYGEN-AIR DELIVERY SYSTEMS MISC    2 L by Misc.(Non-Drug; Combo Route) route every evening.    PANTOPRAZOLE (PROTONIX) 40 MG TABLET    Take 1 tablet (40 mg total) by mouth once daily.    PNV NO.95/FERROUS FUM/FOLIC AC (PRENATAL MULTIVITAMINS ORAL)    Take 1 tablet by mouth once daily.    SIMVASTATIN (ZOCOR) 40  "MG TABLET    Take 40 mg by mouth every evening.    VIT A/VIT C/VIT E/ZINC/COPPER (PRESERVISION AREDS ORAL)    Take 1 capsule by mouth once daily.    VITAMIN D (VITAMIN D3) 1000 UNITS TAB    Take 1,000 Units by mouth once daily.    ZINC GLUCONATE 50 MG TABLET    Take 50 mg by mouth once daily.    ZOLPIDEM (AMBIEN) 5 MG TAB    Take 1 tablet (5 mg total) by mouth nightly.         Physical Exam:  Vital Signs:  BP (!) 160/70   Pulse 68   Ht 5' 1.5" (1.562 m)   Wt 76.1 kg (167 lb 12.3 oz)   BMI 31.19 kg/m²   Body mass index is 31.19 kg/m².    Physical Exam  Constitutional:       Appearance: Normal appearance. She is not toxic-appearing.   HENT:      Head: Normocephalic and atraumatic.      Mouth/Throat:      Mouth: Mucous membranes are dry.      Pharynx: Oropharynx is clear.   Eyes:      General: No scleral icterus.     Extraocular Movements: Extraocular movements intact.      Conjunctiva/sclera: Conjunctivae normal.   Neck:      Musculoskeletal: Normal range of motion.   Cardiovascular:      Rate and Rhythm: Normal rate and regular rhythm.      Pulses: Normal pulses.      Heart sounds: Normal heart sounds. No murmur.   Pulmonary:      Effort: Pulmonary effort is normal.      Breath sounds: Normal breath sounds.   Abdominal:      General: There is no distension.      Palpations: Abdomen is soft.      Tenderness: Tenderness: epigastric discomfort  There is no guarding.   Musculoskeletal:         General: Tenderness present. No swelling.   Skin:     General: Skin is warm and dry.      Coloration: Skin is not jaundiced.   Neurological:      General: No focal deficit present.      Mental Status: She is alert and oriented to person, place, and time. Mental status is at baseline.   Psychiatric:         Mood and Affect: Mood normal.          Labs: Pertinent labs reviewed.    Endoscopy:  Never     Colonoscopy: Never     Imaging:    [facility] 5/2020: Impression:     1. Acute sigmoid colonic diverticulitis without evidence of " complication.  2. Moderate to severe stenosis of the origin of the SMA relating to calcific atheromatous plaque.  3. Ectasia of the infrarenal abdominal aorta to 34 mm.  4. Grade 1 degenerative spondylolisthesis at L4-L5 with severe spinal stenosis at this level.       Assessment:    Problem List Items Addressed This Visit        GI    Epigastric abdominal pain    Current Assessment & Plan     Epigastric discomfort   -Described as abdominal cramping that is relieved with bowel movements   -Denies post-prandial abdominal pain. Denies food avoidance or weight loss.   -Pain is less likely from SMA stenosis. She just saw cardiology. This is also addressed  In their plan. Continue to follow with cardiology  -RUQ Abdominal US ordered   -Patient states Bentyl helps with her pain. She can continue this medication prn  -Continue PPI  -Performing an EGD was discussed with the patient, and she does not want to have an EGD at this time given her comorbidities.          Diverticulitis    Current Assessment & Plan     -Patient had an acute episode of diverticulitis in 5/2020  -Discussed the need for an colonoscopy after her episode of diverticulitis, patient does not want to get a colonoscopy at this time given her comorbidities            Other Visit Diagnoses     Epigastric pain    -  Primary    Upper abdominal pain        Superior mesenteric artery stenosis              Epigastric pain  -     US Abdomen Complete; Future; Expected date: 10/02/2020    Diverticulitis    Other orders  -     dicyclomine (BENTYL) 10 MG capsule; Take 1 capsule (10 mg total) by mouth 4 (four) times daily before meals and nightly.  Dispense: 120 capsule; Refill: 0        Follow up in about 3 months (around 1/2/2021).    Order summary:  Orders Placed This Encounter   Procedures    US Abdomen Complete       Thank you so much for allowing me to participate in the care of Gemma Gutierres MD

## 2020-10-05 ENCOUNTER — PATIENT MESSAGE (OUTPATIENT)
Dept: ADMINISTRATIVE | Facility: HOSPITAL | Age: 65
End: 2020-10-05

## 2020-10-06 ENCOUNTER — INITIAL CONSULT (OUTPATIENT)
Dept: VASCULAR SURGERY | Facility: CLINIC | Age: 65
End: 2020-10-06
Payer: MEDICARE

## 2020-10-06 VITALS
DIASTOLIC BLOOD PRESSURE: 72 MMHG | BODY MASS INDEX: 31.72 KG/M2 | SYSTOLIC BLOOD PRESSURE: 143 MMHG | WEIGHT: 168 LBS | HEART RATE: 63 BPM | TEMPERATURE: 98 F | HEIGHT: 61 IN

## 2020-10-06 DIAGNOSIS — K55.1 SUPERIOR MESENTERIC ARTERY STENOSIS: ICD-10-CM

## 2020-10-06 PROCEDURE — 99999 PR PBB SHADOW E&M-EST. PATIENT-LVL III: CPT | Mod: PBBFAC,HCNC,, | Performed by: SURGERY

## 2020-10-06 PROCEDURE — 1100F PR PT FALLS ASSESS DOC 2+ FALLS/FALL W/INJURY/YR: ICD-10-PCS | Mod: HCNC,CPTII,S$GLB, | Performed by: SURGERY

## 2020-10-06 PROCEDURE — 3288F PR FALLS RISK ASSESSMENT DOCUMENTED: ICD-10-PCS | Mod: HCNC,CPTII,S$GLB, | Performed by: SURGERY

## 2020-10-06 PROCEDURE — 3008F BODY MASS INDEX DOCD: CPT | Mod: HCNC,CPTII,S$GLB, | Performed by: SURGERY

## 2020-10-06 PROCEDURE — 1100F PTFALLS ASSESS-DOCD GE2>/YR: CPT | Mod: HCNC,CPTII,S$GLB, | Performed by: SURGERY

## 2020-10-06 PROCEDURE — 99204 PR OFFICE/OUTPT VISIT, NEW, LEVL IV, 45-59 MIN: ICD-10-PCS | Mod: HCNC,S$GLB,, | Performed by: SURGERY

## 2020-10-06 PROCEDURE — 99999 PR PBB SHADOW E&M-EST. PATIENT-LVL III: ICD-10-PCS | Mod: PBBFAC,HCNC,, | Performed by: SURGERY

## 2020-10-06 PROCEDURE — 99204 OFFICE O/P NEW MOD 45 MIN: CPT | Mod: HCNC,S$GLB,, | Performed by: SURGERY

## 2020-10-06 PROCEDURE — 3008F PR BODY MASS INDEX (BMI) DOCUMENTED: ICD-10-PCS | Mod: HCNC,CPTII,S$GLB, | Performed by: SURGERY

## 2020-10-06 PROCEDURE — 3078F DIAST BP <80 MM HG: CPT | Mod: HCNC,CPTII,S$GLB, | Performed by: SURGERY

## 2020-10-06 PROCEDURE — 3288F FALL RISK ASSESSMENT DOCD: CPT | Mod: HCNC,CPTII,S$GLB, | Performed by: SURGERY

## 2020-10-06 PROCEDURE — 3077F PR MOST RECENT SYSTOLIC BLOOD PRESSURE >= 140 MM HG: ICD-10-PCS | Mod: HCNC,CPTII,S$GLB, | Performed by: SURGERY

## 2020-10-06 PROCEDURE — 3078F PR MOST RECENT DIASTOLIC BLOOD PRESSURE < 80 MM HG: ICD-10-PCS | Mod: HCNC,CPTII,S$GLB, | Performed by: SURGERY

## 2020-10-06 PROCEDURE — 3077F SYST BP >= 140 MM HG: CPT | Mod: HCNC,CPTII,S$GLB, | Performed by: SURGERY

## 2020-10-06 NOTE — LETTER
October 6, 2020      Olga Altman MD  [address] [address] [address]           [facility]  [address] [place] [address] , 3RD FLOOR  [facility] [address]  Phone: [phone]          Patient: Gemma Vick   MR Number: [id number]   YOB: 1955   Date of Visit: 10/6/2020       Dear Dr. Olga Altman:    Thank you for referring Gemma Vick to me for evaluation. Attached you will find relevant portions of my assessment and plan of care.    If you have questions, please do not hesitate to call me. I look forward to following Gemma Vick along with you.    Sincerely,    Almas Cagle MD    Enclosure  CC:  No Recipients    If you would like to receive this communication electronically, please contact [email or web address] or [phone] to request more information on [employer] Link access.    For providers and/or their staff who would like to refer a patient to [facility], please contact us through our one-stop-shop provider referral line, Talia Becerra, at [phone].    If you feel you have received this communication in error or would no longer like to receive these types of communications, please e-mail [email or web address]

## 2020-10-06 NOTE — PROGRESS NOTES
Subjective:       Patient ID: Gemma Vick is a 65 y.o. female.    Chief Complaint: Peripheral Vascular Disease    HPIasked to eval 66 y/o female for possible chronic mesenteric ischemic.  History of HTN, pre-DM, CAD s/p PTCA stenting in the past, former smoker.  Reports episode of diverticulitis a few months ago.  Has been having issues with bowel movements and pain.  She denies that the pain is related to eating and has a good appetite and denies any weight loss or food fear.  She was sent for CT which identified some moderate SMA stenosis  Review of Systems    no f/c no cp/sob  No n/v  Objective:      Physical Exam    A/o NAD  rrr  ctab  Palp pedal  Assessment:       1. Superior mesenteric artery stenosis        Plan:       66 y/o female htn/CAD/former smoker with history diverticulitis and some recent abdominal pain  Does have some SMA stenosis on CT but appears to be largely asymptomatic.  Low suspicion for CMI and would not recommend intervention unless pt develops symptoms related to mesenteric ischemia.    Will obtain aortomesenteric US to define velocities

## 2020-10-09 ENCOUNTER — TELEPHONE (OUTPATIENT)
Dept: FAMILY MEDICINE | Facility: CLINIC | Age: 65
End: 2020-10-09

## 2020-10-09 ENCOUNTER — PATIENT MESSAGE (OUTPATIENT)
Dept: FAMILY MEDICINE | Facility: CLINIC | Age: 65
End: 2020-10-09

## 2020-10-09 DIAGNOSIS — R31.9 HEMATURIA, UNSPECIFIED TYPE: Primary | ICD-10-CM

## 2020-10-12 ENCOUNTER — TELEPHONE (OUTPATIENT)
Dept: RADIOLOGY | Facility: HOSPITAL | Age: 65
End: 2020-10-12

## 2020-10-14 ENCOUNTER — HOSPITAL ENCOUNTER (OUTPATIENT)
Dept: RADIOLOGY | Facility: HOSPITAL | Age: 65
Discharge: HOME | End: 2020-10-14
Attending: INTERNAL MEDICINE
Payer: MEDICARE

## 2020-10-14 DIAGNOSIS — R10.13 EPIGASTRIC PAIN: ICD-10-CM

## 2020-10-14 PROCEDURE — 76700 US EXAM ABDOM COMPLETE: CPT | Mod: 26,HCNC,, | Performed by: RADIOLOGY

## 2020-10-14 PROCEDURE — 76700 US EXAM ABDOM COMPLETE: CPT | Mod: TC,HCNC

## 2020-10-14 PROCEDURE — 76700 US ABDOMEN COMPLETE: ICD-10-PCS | Mod: 26,HCNC,, | Performed by: RADIOLOGY

## 2020-10-15 ENCOUNTER — PATIENT MESSAGE (OUTPATIENT)
Dept: GASTROENTEROLOGY | Facility: CLINIC | Age: 65
End: 2020-10-15

## 2020-10-22 ENCOUNTER — PATIENT OUTREACH (OUTPATIENT)
Dept: OTHER | Facility: OTHER | Age: 65
End: 2020-10-22

## 2020-11-05 NOTE — PROGRESS NOTES
"Digital Medicine: Health  Follow-Up    The history is provided by the patient.             Reason for review: Blood pressure not at goal        Topics Covered on Call: physical activity and Diet    Additional Follow-up details: AVG /76    Patient reports doing well. Confirms proper technique when testing. She reports taking her BP medication at 6pm and waiting an hour or two and will test at the table. She states that she tries to relax and focus on testing. Discussed any distractions or surrounding noises. She babysits her 3 grandchildren that can occasionally be a distraction when testing.            Physical Activity-no change to routine  No change to exercise routine.       Additional physical activity details: Babysits and states that she stays "on her toes" the whole time.   Will discuss making exercise a priority.       Medication Adherence-        Patient reports being on three different BP medications. She has 2 alarms set so she does not forget to take medication.     Substance, Sleep, Stress-change  stress-assessed  Details:diverticulitis  Intervention(s):    Sleep-assessed  Details:Time to relax when its bed time  Intervention(s):    Alcohol -  Details:  Intervention(s):    Tobacco-  Details:  Intervention(s):    Additional details:Reports having stress and discomfort due to her diverticulitis flare ups..         Additional monitoring needed.  Continue current diet/physical activity routine.       Addressed patient questions and patient has my contact information if needed prior to next outreach. Patient verbalizes understanding.      Explained the importance of self-monitoring and medication adherence. Encouraged the patient to communicate with their health  for lifestyle modifications to help improve or maintain a healthy lifestyle.               There are no preventive care reminders to display for this patient.      Last 5 Patient Entered Readings                                      " Current 30 Day Average: 148/76     Recent Readings 11/1/2020 11/1/2020 11/1/2020 10/24/2020 10/14/2020    SBP (mmHg) 154 159 159 143 148    DBP (mmHg) 75 78 81 74 75    Pulse 65 66 66 65 61

## 2020-11-06 ENCOUNTER — PATIENT OUTREACH (OUTPATIENT)
Dept: OTHER | Facility: OTHER | Age: 65
End: 2020-11-06

## 2020-11-09 ENCOUNTER — PATIENT MESSAGE (OUTPATIENT)
Dept: ADMINISTRATIVE | Facility: OTHER | Age: 65
End: 2020-11-09

## 2020-11-10 ENCOUNTER — PATIENT MESSAGE (OUTPATIENT)
Dept: ADMINISTRATIVE | Facility: OTHER | Age: 65
End: 2020-11-10

## 2020-11-12 ENCOUNTER — PATIENT MESSAGE (OUTPATIENT)
Dept: ADMINISTRATIVE | Facility: OTHER | Age: 65
End: 2020-11-12

## 2020-12-03 ENCOUNTER — PATIENT OUTREACH (OUTPATIENT)
Dept: OTHER | Facility: OTHER | Age: 65
End: 2020-12-03

## 2020-12-08 NOTE — PROGRESS NOTES
"Digital Medicine: Clinician Follow-Up    Called patient to follow up on HDMP. She reports that she still feels about the same that she felt when we last messaged via [employer]. She reports that she tested positive for COVID 19 before Thanksgiving at [facility] after hours. She states that she has not been taking her BP medications because she has been feeling weak and BP has been fluctuating into lower ranges. She states that for about 2 weeks, she was not taking aspirin or Plavix however she realized how important those medications were and began to take them again. She states that at this time, she only takes Plavix, aspirin, vitamin D and zinc regularly.    She reports that she is having symptoms of fatigue, diarrhea that is "black and mucus like", and nausea/vomiting. She reports that there was one night that she was having hallucinations and was not sure what sparked the incident. She states that she does not have much of an appetite lately; today she only had a slice of toast.     The history is provided by the patient.   Follow-up reason(s): routine follow up.     Hypertension    Readings are trending down   Patient is not experiencing signs/symptoms of hypotension.  Patient is not experiencing signs/symptoms of hypertension.        Last 5 Patient Entered Readings                                      Current 30 Day Average: 147/72     Recent Readings 12/5/2020 12/4/2020 12/2/2020 11/22/2020 11/22/2020    SBP (mmHg) 122 155 126 157 168    DBP (mmHg) 65 75 70 71 79    Pulse 68 86 72 68 68             ASSESSMENT(S)  Patients BP average is 147/72 mmHg, which is above goal. Patient's BP goal is less than or equal to 130/80.     HTN is appropriately managed with first line antihypertensive agent - DHP CCB, ARB - along with along with cardioselective beta blocker.       Hypertension Plan  Additional monitoring needed.  Continue current therapy.  Continue current diet/physical activity routine.  Await MD " intervention. Will consult patient's PCP regarding her continued symptoms for further workup. Concern for GI bleed with dark stool.   Provided patient education.  Updated patient's medication list.      Addressed patient questions and patient has my contact information if needed prior to next outreach. Patient verbalizes understanding.      Explained the importance of self-monitoring and medication adherence. Encouraged the patient to communicate with their health  for lifestyle modifications to help improve or maintain a healthy lifestyle.               There are no preventive care reminders to display for this patient.  There are no preventive care reminders to display for this patient.      Hypertension Medications             bisoprolol (ZEBETA) 5 MG tablet Take 1 tablet (5 mg total) by mouth once daily.    felodipine (PLENDIL) 10 MG 24 hr tablet TAKE 1 TABLET ONCE DAILY    furosemide (LASIX) 20 MG tablet Take 1 tablet (20 mg total) by mouth daily as needed. For leg swelling as needed. Or weight gain of 3 pounds in 1 day or 5 lbs in 1 week.    irbesartan (AVAPRO) 300 MG tablet Take 1 tablet (300 mg total) by mouth every evening.

## 2020-12-10 ENCOUNTER — PATIENT MESSAGE (OUTPATIENT)
Dept: FAMILY MEDICINE | Facility: CLINIC | Age: 65
End: 2020-12-10

## 2020-12-11 ENCOUNTER — PATIENT OUTREACH (OUTPATIENT)
Dept: ADMINISTRATIVE | Facility: HOSPITAL | Age: 65
End: 2020-12-11

## 2020-12-11 ENCOUNTER — PATIENT MESSAGE (OUTPATIENT)
Dept: ADMINISTRATIVE | Facility: HOSPITAL | Age: 65
End: 2020-12-11

## 2020-12-11 DIAGNOSIS — Z12.11 SCREENING FOR COLON CANCER: Primary | ICD-10-CM

## 2020-12-14 ENCOUNTER — PATIENT OUTREACH (OUTPATIENT)
Dept: ADMINISTRATIVE | Facility: HOSPITAL | Age: 65
End: 2020-12-14

## 2020-12-17 NOTE — PROGRESS NOTES
"Digital Medicine: Health  Follow-Up    The history is provided by the patient.             Reason for review: Blood pressure not at goal        Topics Covered on Call: physical activity and Diet    Additional Follow-up details: AVG /74.    Recently recovered from covid-19. She states GI was effected "bad". She states that she is feeling much better now. She has reached out to her PCP about her GI issues.     Encouraged patient to continue with diet and exercise routine.  No questions or concerns at this time.   Will f/u as scheduled.                 Diet-no change to diet    No change to diet.        Physical Activity-no change to routine  No change to exercise routine.     Medication Adherence-        She states that when she had covid her BP and pulse were very low. She took herself off of BP medication. She states that she noticed it coming back up and started back as prescribed yesterday.     Will continue to monitor.  Substance, Sleep, Stress-Not assessed          There are no preventive care reminders to display for this patient.      Last 5 Patient Entered Readings                                      Current 30 Day Average: 146/74     Recent Readings 12/16/2020 12/16/2020 12/14/2020 12/14/2020 12/9/2020    SBP (mmHg) 163 172 153 144 146    DBP (mmHg) 83 80 74 77 68    Pulse 80 83 86 84 87               "

## 2020-12-28 ENCOUNTER — LAB VISIT (OUTPATIENT)
Dept: LAB | Facility: HOSPITAL | Age: 65
End: 2020-12-28
Attending: FAMILY MEDICINE
Payer: MEDICARE

## 2020-12-28 DIAGNOSIS — Z12.11 SCREENING FOR COLON CANCER: ICD-10-CM

## 2020-12-28 PROCEDURE — 82274 ASSAY TEST FOR BLOOD FECAL: CPT

## 2021-01-08 LAB — HEMOCCULT STL QL IA: NEGATIVE

## 2021-01-13 ENCOUNTER — PATIENT MESSAGE (OUTPATIENT)
Dept: ADMINISTRATIVE | Facility: OTHER | Age: 66
End: 2021-01-13

## 2021-01-21 ENCOUNTER — PATIENT MESSAGE (OUTPATIENT)
Dept: FAMILY MEDICINE | Facility: CLINIC | Age: 66
End: 2021-01-21

## 2021-01-21 DIAGNOSIS — N30.00 ACUTE CYSTITIS WITHOUT HEMATURIA: Primary | ICD-10-CM

## 2021-01-22 ENCOUNTER — LAB VISIT (OUTPATIENT)
Dept: LAB | Facility: HOSPITAL | Age: 66
End: 2021-01-22
Attending: FAMILY MEDICINE
Payer: MEDICARE

## 2021-01-22 ENCOUNTER — TELEPHONE (OUTPATIENT)
Dept: FAMILY MEDICINE | Facility: CLINIC | Age: 66
End: 2021-01-22

## 2021-01-22 DIAGNOSIS — N30.00 ACUTE CYSTITIS WITHOUT HEMATURIA: Primary | ICD-10-CM

## 2021-01-22 DIAGNOSIS — N30.00 ACUTE CYSTITIS WITHOUT HEMATURIA: ICD-10-CM

## 2021-01-22 PROCEDURE — 87086 URINE CULTURE/COLONY COUNT: CPT

## 2021-01-22 PROCEDURE — 81001 URINALYSIS AUTO W/SCOPE: CPT

## 2021-01-22 RX ORDER — CEPHALEXIN 500 MG/1
500 CAPSULE ORAL 3 TIMES DAILY
Qty: 21 CAPSULE | Refills: 0 | Status: SHIPPED | OUTPATIENT
Start: 2021-01-22 | End: 2021-01-29

## 2021-01-23 LAB
BACTERIA #/AREA URNS AUTO: ABNORMAL /HPF
BILIRUB UR QL STRIP: NEGATIVE
CLARITY UR REFRACT.AUTO: ABNORMAL
COLOR UR AUTO: ABNORMAL
GLUCOSE UR QL STRIP: NEGATIVE
HGB UR QL STRIP: NEGATIVE
KETONES UR QL STRIP: ABNORMAL
LEUKOCYTE ESTERASE UR QL STRIP: NEGATIVE
MICROSCOPIC COMMENT: ABNORMAL
NITRITE UR QL STRIP: POSITIVE
PH UR STRIP: 5 [PH] (ref 5–8)
PROT UR QL STRIP: NEGATIVE
RBC #/AREA URNS AUTO: 2 /HPF (ref 0–4)
SP GR UR STRIP: 1.01 (ref 1–1.03)
URN SPEC COLLECT METH UR: ABNORMAL
WBC #/AREA URNS AUTO: 28 /HPF (ref 0–5)

## 2021-01-25 LAB
BACTERIA UR CULT: NORMAL
BACTERIA UR CULT: NORMAL

## 2021-01-29 ENCOUNTER — PATIENT MESSAGE (OUTPATIENT)
Dept: PULMONOLOGY | Facility: CLINIC | Age: 66
End: 2021-01-29

## 2021-01-29 DIAGNOSIS — J44.9 CHRONIC OBSTRUCTIVE PULMONARY DISEASE, UNSPECIFIED COPD TYPE: ICD-10-CM

## 2021-01-29 DIAGNOSIS — J44.9 COPD, MODERATE: Primary | ICD-10-CM

## 2021-02-01 RX ORDER — BUDESONIDE, GLYCOPYRROLATE, AND FORMOTEROL FUMARATE 160; 9; 4.8 UG/1; UG/1; UG/1
2 AEROSOL, METERED RESPIRATORY (INHALATION) 2 TIMES DAILY
Qty: 10.7 G | Refills: 11 | Status: SHIPPED | OUTPATIENT
Start: 2021-02-01 | End: 2021-12-14 | Stop reason: SDUPTHER

## 2021-02-01 RX ORDER — ALBUTEROL SULFATE 90 UG/1
AEROSOL, METERED RESPIRATORY (INHALATION)
Qty: 18 G | Refills: 11 | Status: SHIPPED | OUTPATIENT
Start: 2021-02-01 | End: 2022-03-07 | Stop reason: SDUPTHER

## 2021-02-19 ENCOUNTER — PATIENT MESSAGE (OUTPATIENT)
Dept: PULMONOLOGY | Facility: CLINIC | Age: 66
End: 2021-02-19

## 2021-02-19 DIAGNOSIS — J44.9 COPD, MODERATE: Primary | ICD-10-CM

## 2021-03-01 ENCOUNTER — TELEPHONE (OUTPATIENT)
Dept: PULMONOLOGY | Facility: CLINIC | Age: 66
End: 2021-03-01

## 2021-03-01 DIAGNOSIS — J44.9 CHRONIC OBSTRUCTIVE PULMONARY DISEASE, UNSPECIFIED COPD TYPE: Primary | ICD-10-CM

## 2021-03-03 DIAGNOSIS — J44.9 COPD, MODERATE: Primary | ICD-10-CM

## 2021-03-15 ENCOUNTER — OFFICE VISIT (OUTPATIENT)
Dept: FAMILY MEDICINE | Facility: CLINIC | Age: 66
End: 2021-03-15
Payer: MEDICARE

## 2021-03-15 VITALS
HEIGHT: 61 IN | DIASTOLIC BLOOD PRESSURE: 68 MMHG | TEMPERATURE: 98 F | BODY MASS INDEX: 30.53 KG/M2 | WEIGHT: 161.69 LBS | OXYGEN SATURATION: 98 % | SYSTOLIC BLOOD PRESSURE: 132 MMHG | HEART RATE: 64 BPM

## 2021-03-15 DIAGNOSIS — H69.91 EUSTACHIAN TUBE DYSFUNCTION, RIGHT: ICD-10-CM

## 2021-03-15 DIAGNOSIS — M75.81 TENDINITIS OF RIGHT ROTATOR CUFF: Primary | ICD-10-CM

## 2021-03-15 DIAGNOSIS — K55.1 SUPERIOR MESENTERIC ARTERY STENOSIS: ICD-10-CM

## 2021-03-15 DIAGNOSIS — G47.36 NOCTURNAL HYPOXEMIA DUE TO EMPHYSEMA: ICD-10-CM

## 2021-03-15 DIAGNOSIS — Z12.39 ENCOUNTER FOR SCREENING FOR MALIGNANT NEOPLASM OF BREAST, UNSPECIFIED SCREENING MODALITY: ICD-10-CM

## 2021-03-15 DIAGNOSIS — I25.118 CORONARY ARTERY DISEASE OF NATIVE ARTERY OF NATIVE HEART WITH STABLE ANGINA PECTORIS: ICD-10-CM

## 2021-03-15 DIAGNOSIS — J43.9 NOCTURNAL HYPOXEMIA DUE TO EMPHYSEMA: ICD-10-CM

## 2021-03-15 DIAGNOSIS — I49.5 SINUS NODE DYSFUNCTION: ICD-10-CM

## 2021-03-15 DIAGNOSIS — F51.04 CHRONIC INSOMNIA: ICD-10-CM

## 2021-03-15 PROCEDURE — 99214 OFFICE O/P EST MOD 30 MIN: CPT | Mod: S$GLB,,, | Performed by: FAMILY MEDICINE

## 2021-03-15 PROCEDURE — 99999 PR PBB SHADOW E&M-EST. PATIENT-LVL V: ICD-10-PCS | Mod: PBBFAC,,, | Performed by: FAMILY MEDICINE

## 2021-03-15 PROCEDURE — 3075F SYST BP GE 130 - 139MM HG: CPT | Mod: CPTII,S$GLB,, | Performed by: FAMILY MEDICINE

## 2021-03-15 PROCEDURE — 3008F BODY MASS INDEX DOCD: CPT | Mod: CPTII,S$GLB,, | Performed by: FAMILY MEDICINE

## 2021-03-15 PROCEDURE — 1159F PR MEDICATION LIST DOCUMENTED IN MEDICAL RECORD: ICD-10-PCS | Mod: S$GLB,,, | Performed by: FAMILY MEDICINE

## 2021-03-15 PROCEDURE — 1159F MED LIST DOCD IN RCRD: CPT | Mod: S$GLB,,, | Performed by: FAMILY MEDICINE

## 2021-03-15 PROCEDURE — 99214 PR OFFICE/OUTPT VISIT, EST, LEVL IV, 30-39 MIN: ICD-10-PCS | Mod: S$GLB,,, | Performed by: FAMILY MEDICINE

## 2021-03-15 PROCEDURE — 99499 UNLISTED E&M SERVICE: CPT | Mod: S$GLB,,, | Performed by: FAMILY MEDICINE

## 2021-03-15 PROCEDURE — 1101F PR PT FALLS ASSESS DOC 0-1 FALLS W/OUT INJ PAST YR: ICD-10-PCS | Mod: CPTII,S$GLB,, | Performed by: FAMILY MEDICINE

## 2021-03-15 PROCEDURE — 1125F PR PAIN SEVERITY QUANTIFIED, PAIN PRESENT: ICD-10-PCS | Mod: S$GLB,,, | Performed by: FAMILY MEDICINE

## 2021-03-15 PROCEDURE — 3075F PR MOST RECENT SYSTOLIC BLOOD PRESS GE 130-139MM HG: ICD-10-PCS | Mod: CPTII,S$GLB,, | Performed by: FAMILY MEDICINE

## 2021-03-15 PROCEDURE — 3288F FALL RISK ASSESSMENT DOCD: CPT | Mod: CPTII,S$GLB,, | Performed by: FAMILY MEDICINE

## 2021-03-15 PROCEDURE — 3078F PR MOST RECENT DIASTOLIC BLOOD PRESSURE < 80 MM HG: ICD-10-PCS | Mod: CPTII,S$GLB,, | Performed by: FAMILY MEDICINE

## 2021-03-15 PROCEDURE — 3288F PR FALLS RISK ASSESSMENT DOCUMENTED: ICD-10-PCS | Mod: CPTII,S$GLB,, | Performed by: FAMILY MEDICINE

## 2021-03-15 PROCEDURE — 3078F DIAST BP <80 MM HG: CPT | Mod: CPTII,S$GLB,, | Performed by: FAMILY MEDICINE

## 2021-03-15 PROCEDURE — 99999 PR PBB SHADOW E&M-EST. PATIENT-LVL V: CPT | Mod: PBBFAC,,, | Performed by: FAMILY MEDICINE

## 2021-03-15 PROCEDURE — 99499 RISK ADDL DX/OHS AUDIT: ICD-10-PCS | Mod: S$GLB,,, | Performed by: FAMILY MEDICINE

## 2021-03-15 PROCEDURE — 3008F PR BODY MASS INDEX (BMI) DOCUMENTED: ICD-10-PCS | Mod: CPTII,S$GLB,, | Performed by: FAMILY MEDICINE

## 2021-03-15 PROCEDURE — 1125F AMNT PAIN NOTED PAIN PRSNT: CPT | Mod: S$GLB,,, | Performed by: FAMILY MEDICINE

## 2021-03-15 PROCEDURE — 1101F PT FALLS ASSESS-DOCD LE1/YR: CPT | Mod: CPTII,S$GLB,, | Performed by: FAMILY MEDICINE

## 2021-03-15 RX ORDER — EZETIMIBE AND SIMVASTATIN 10; 40 MG/1; MG/1
1 TABLET ORAL DAILY
COMMUNITY
Start: 2021-02-02 | End: 2021-04-15

## 2021-03-15 RX ORDER — ZOLPIDEM TARTRATE 6.25 MG/1
6.25 TABLET, FILM COATED, EXTENDED RELEASE ORAL NIGHTLY PRN
Qty: 30 TABLET | Refills: 5 | Status: SHIPPED | OUTPATIENT
Start: 2021-03-15 | End: 2021-03-29

## 2021-03-18 ENCOUNTER — LAB VISIT (OUTPATIENT)
Dept: LAB | Facility: HOSPITAL | Age: 66
End: 2021-03-18
Attending: INTERNAL MEDICINE
Payer: MEDICARE

## 2021-03-18 DIAGNOSIS — I25.118 CORONARY ARTERY DISEASE OF NATIVE ARTERY OF NATIVE HEART WITH STABLE ANGINA PECTORIS: ICD-10-CM

## 2021-03-18 PROCEDURE — 80061 LIPID PANEL: CPT | Performed by: INTERNAL MEDICINE

## 2021-03-18 PROCEDURE — 36415 COLL VENOUS BLD VENIPUNCTURE: CPT | Mod: PO | Performed by: INTERNAL MEDICINE

## 2021-03-18 PROCEDURE — 80053 COMPREHEN METABOLIC PANEL: CPT | Performed by: INTERNAL MEDICINE

## 2021-03-19 LAB
ALBUMIN SERPL BCP-MCNC: 3.2 G/DL (ref 3.5–5.2)
ALP SERPL-CCNC: 85 U/L (ref 55–135)
ALT SERPL W/O P-5'-P-CCNC: 8 U/L (ref 10–44)
ANION GAP SERPL CALC-SCNC: 11 MMOL/L (ref 8–16)
AST SERPL-CCNC: 13 U/L (ref 10–40)
BILIRUB SERPL-MCNC: 0.4 MG/DL (ref 0.1–1)
BUN SERPL-MCNC: 16 MG/DL (ref 8–23)
CALCIUM SERPL-MCNC: 8.9 MG/DL (ref 8.7–10.5)
CHLORIDE SERPL-SCNC: 106 MMOL/L (ref 95–110)
CHOLEST SERPL-MCNC: 147 MG/DL (ref 120–199)
CHOLEST/HDLC SERPL: 2.6 {RATIO} (ref 2–5)
CO2 SERPL-SCNC: 23 MMOL/L (ref 23–29)
CREAT SERPL-MCNC: 0.9 MG/DL (ref 0.5–1.4)
EST. GFR  (AFRICAN AMERICAN): >60 ML/MIN/1.73 M^2
EST. GFR  (NON AFRICAN AMERICAN): >60 ML/MIN/1.73 M^2
GLUCOSE SERPL-MCNC: 95 MG/DL (ref 70–110)
HDLC SERPL-MCNC: 57 MG/DL (ref 40–75)
HDLC SERPL: 38.8 % (ref 20–50)
LDLC SERPL CALC-MCNC: 60.4 MG/DL (ref 63–159)
NONHDLC SERPL-MCNC: 90 MG/DL
POTASSIUM SERPL-SCNC: 4.4 MMOL/L (ref 3.5–5.1)
PROT SERPL-MCNC: 7.1 G/DL (ref 6–8.4)
SODIUM SERPL-SCNC: 140 MMOL/L (ref 136–145)
TRIGL SERPL-MCNC: 148 MG/DL (ref 30–150)

## 2021-03-23 ENCOUNTER — PATIENT MESSAGE (OUTPATIENT)
Dept: FAMILY MEDICINE | Facility: CLINIC | Age: 66
End: 2021-03-23

## 2021-03-23 DIAGNOSIS — F51.04 CHRONIC INSOMNIA: Primary | ICD-10-CM

## 2021-03-23 RX ORDER — ZOLPIDEM TARTRATE 10 MG/1
10 TABLET ORAL NIGHTLY PRN
Qty: 30 TABLET | Refills: 0 | Status: SHIPPED | OUTPATIENT
Start: 2021-03-23 | End: 2021-04-15

## 2021-03-25 ENCOUNTER — OFFICE VISIT (OUTPATIENT)
Dept: CARDIOLOGY | Facility: CLINIC | Age: 66
End: 2021-03-25
Payer: MEDICARE

## 2021-03-25 VITALS
OXYGEN SATURATION: 95 % | HEIGHT: 61 IN | RESPIRATION RATE: 16 BRPM | BODY MASS INDEX: 31.09 KG/M2 | SYSTOLIC BLOOD PRESSURE: 146 MMHG | DIASTOLIC BLOOD PRESSURE: 72 MMHG | WEIGHT: 164.69 LBS | HEART RATE: 71 BPM

## 2021-03-25 DIAGNOSIS — H81.10 BENIGN PAROXYSMAL POSITIONAL VERTIGO, UNSPECIFIED LATERALITY: ICD-10-CM

## 2021-03-25 DIAGNOSIS — I10 ESSENTIAL HYPERTENSION: ICD-10-CM

## 2021-03-25 DIAGNOSIS — E78.2 MIXED HYPERLIPIDEMIA: ICD-10-CM

## 2021-03-25 DIAGNOSIS — I49.5 SINUS NODE DYSFUNCTION: ICD-10-CM

## 2021-03-25 DIAGNOSIS — F17.201 TOBACCO USE DISORDER, MODERATE, IN SUSTAINED REMISSION: ICD-10-CM

## 2021-03-25 DIAGNOSIS — I71.40 ABDOMINAL AORTIC ANEURYSM (AAA) WITHOUT RUPTURE: ICD-10-CM

## 2021-03-25 DIAGNOSIS — J44.9 COPD, MODERATE: ICD-10-CM

## 2021-03-25 DIAGNOSIS — I25.118 CORONARY ARTERY DISEASE OF NATIVE ARTERY OF NATIVE HEART WITH STABLE ANGINA PECTORIS: Primary | ICD-10-CM

## 2021-03-25 DIAGNOSIS — I65.23 BILATERAL CAROTID ARTERY STENOSIS: ICD-10-CM

## 2021-03-25 DIAGNOSIS — R79.9 ABNORMAL FINDING OF BLOOD CHEMISTRY, UNSPECIFIED: ICD-10-CM

## 2021-03-25 DIAGNOSIS — R73.03 PREDIABETES: ICD-10-CM

## 2021-03-25 PROCEDURE — 99999 PR PBB SHADOW E&M-EST. PATIENT-LVL IV: CPT | Mod: PBBFAC,,, | Performed by: INTERNAL MEDICINE

## 2021-03-25 PROCEDURE — 3078F DIAST BP <80 MM HG: CPT | Mod: CPTII,S$GLB,, | Performed by: INTERNAL MEDICINE

## 2021-03-25 PROCEDURE — 1159F MED LIST DOCD IN RCRD: CPT | Mod: S$GLB,,, | Performed by: INTERNAL MEDICINE

## 2021-03-25 PROCEDURE — 99999 PR PBB SHADOW E&M-EST. PATIENT-LVL IV: ICD-10-PCS | Mod: PBBFAC,,, | Performed by: INTERNAL MEDICINE

## 2021-03-25 PROCEDURE — 3008F PR BODY MASS INDEX (BMI) DOCUMENTED: ICD-10-PCS | Mod: CPTII,S$GLB,, | Performed by: INTERNAL MEDICINE

## 2021-03-25 PROCEDURE — 3008F BODY MASS INDEX DOCD: CPT | Mod: CPTII,S$GLB,, | Performed by: INTERNAL MEDICINE

## 2021-03-25 PROCEDURE — 3074F SYST BP LT 130 MM HG: CPT | Mod: CPTII,S$GLB,, | Performed by: INTERNAL MEDICINE

## 2021-03-25 PROCEDURE — 99214 PR OFFICE/OUTPT VISIT, EST, LEVL IV, 30-39 MIN: ICD-10-PCS | Mod: S$GLB,,, | Performed by: INTERNAL MEDICINE

## 2021-03-25 PROCEDURE — 3074F PR MOST RECENT SYSTOLIC BLOOD PRESSURE < 130 MM HG: ICD-10-PCS | Mod: CPTII,S$GLB,, | Performed by: INTERNAL MEDICINE

## 2021-03-25 PROCEDURE — 99214 OFFICE O/P EST MOD 30 MIN: CPT | Mod: S$GLB,,, | Performed by: INTERNAL MEDICINE

## 2021-03-25 PROCEDURE — 3078F PR MOST RECENT DIASTOLIC BLOOD PRESSURE < 80 MM HG: ICD-10-PCS | Mod: CPTII,S$GLB,, | Performed by: INTERNAL MEDICINE

## 2021-03-25 PROCEDURE — 1159F PR MEDICATION LIST DOCUMENTED IN MEDICAL RECORD: ICD-10-PCS | Mod: S$GLB,,, | Performed by: INTERNAL MEDICINE

## 2021-03-25 RX ORDER — AMLODIPINE BESYLATE 2.5 MG/1
2.5 TABLET ORAL NIGHTLY
Qty: 30 TABLET | Refills: 11 | Status: SHIPPED | OUTPATIENT
Start: 2021-03-25 | End: 2021-05-06

## 2021-03-29 ENCOUNTER — OFFICE VISIT (OUTPATIENT)
Dept: FAMILY MEDICINE | Facility: CLINIC | Age: 66
End: 2021-03-29
Payer: MEDICARE

## 2021-03-29 DIAGNOSIS — R10.13 EPIGASTRIC PAIN: Primary | ICD-10-CM

## 2021-03-29 DIAGNOSIS — K59.09 OTHER CONSTIPATION: ICD-10-CM

## 2021-03-29 DIAGNOSIS — K21.9 GASTROESOPHAGEAL REFLUX DISEASE WITHOUT ESOPHAGITIS: ICD-10-CM

## 2021-03-29 PROCEDURE — 1159F MED LIST DOCD IN RCRD: CPT | Mod: 95,,, | Performed by: FAMILY MEDICINE

## 2021-03-29 PROCEDURE — 99214 OFFICE O/P EST MOD 30 MIN: CPT | Mod: 95,,, | Performed by: FAMILY MEDICINE

## 2021-03-29 PROCEDURE — 99214 PR OFFICE/OUTPT VISIT, EST, LEVL IV, 30-39 MIN: ICD-10-PCS | Mod: 95,,, | Performed by: FAMILY MEDICINE

## 2021-03-29 PROCEDURE — 1159F PR MEDICATION LIST DOCUMENTED IN MEDICAL RECORD: ICD-10-PCS | Mod: 95,,, | Performed by: FAMILY MEDICINE

## 2021-03-30 ENCOUNTER — HOSPITAL ENCOUNTER (OUTPATIENT)
Dept: RADIOLOGY | Facility: HOSPITAL | Age: 66
Discharge: HOME | End: 2021-03-30
Attending: FAMILY MEDICINE
Payer: MEDICARE

## 2021-03-30 DIAGNOSIS — R10.13 EPIGASTRIC PAIN: ICD-10-CM

## 2021-03-30 PROCEDURE — 74019 XR ABDOMEN FLAT AND ERECT: ICD-10-PCS | Mod: 26,,, | Performed by: RADIOLOGY

## 2021-03-30 PROCEDURE — 74019 RADEX ABDOMEN 2 VIEWS: CPT | Mod: TC,FY,PO

## 2021-03-30 PROCEDURE — 74019 RADEX ABDOMEN 2 VIEWS: CPT | Mod: 26,,, | Performed by: RADIOLOGY

## 2021-04-07 DIAGNOSIS — J44.9 COPD, MODERATE: Primary | ICD-10-CM

## 2021-04-08 DIAGNOSIS — J44.9 COPD, MODERATE: ICD-10-CM

## 2021-04-21 ENCOUNTER — TELEPHONE (OUTPATIENT)
Dept: PULMONOLOGY | Facility: CLINIC | Age: 66
End: 2021-04-21

## 2021-05-03 ENCOUNTER — PATIENT MESSAGE (OUTPATIENT)
Dept: PULMONOLOGY | Facility: CLINIC | Age: 66
End: 2021-05-03

## 2021-05-19 DIAGNOSIS — J44.9 COPD, MODERATE: ICD-10-CM

## 2021-06-06 ENCOUNTER — LAB VISIT (OUTPATIENT)
Dept: URGENT CARE | Facility: CLINIC | Age: 66
End: 2021-06-06
Payer: MEDICARE

## 2021-06-06 DIAGNOSIS — J44.9 COPD, MODERATE: ICD-10-CM

## 2021-06-06 PROCEDURE — U0003 INFECTIOUS AGENT DETECTION BY NUCLEIC ACID (DNA OR RNA); SEVERE ACUTE RESPIRATORY SYNDROME CORONAVIRUS 2 (SARS-COV-2) (CORONAVIRUS DISEASE [COVID-19]), AMPLIFIED PROBE TECHNIQUE, MAKING USE OF HIGH THROUGHPUT TECHNOLOGIES AS DESCRIBED BY CMS-2020-01-R: HCPCS | Performed by: INTERNAL MEDICINE

## 2021-06-06 PROCEDURE — U0005 INFEC AGEN DETEC AMPLI PROBE: HCPCS | Performed by: INTERNAL MEDICINE

## 2021-06-07 LAB — SARS-COV-2 RNA RESP QL NAA+PROBE: NOT DETECTED

## 2021-06-09 ENCOUNTER — CLINICAL SUPPORT (OUTPATIENT)
Dept: PULMONOLOGY | Facility: CLINIC | Age: 66
End: 2021-06-09
Payer: MEDICARE

## 2021-06-09 ENCOUNTER — HOSPITAL ENCOUNTER (OUTPATIENT)
Dept: RADIOLOGY | Facility: HOSPITAL | Age: 66
Discharge: HOME | End: 2021-06-09
Attending: INTERNAL MEDICINE
Payer: MEDICARE

## 2021-06-09 ENCOUNTER — HOSPITAL ENCOUNTER (OUTPATIENT)
Dept: RADIOLOGY | Facility: HOSPITAL | Age: 66
Discharge: HOME | End: 2021-06-09
Attending: FAMILY MEDICINE
Payer: MEDICARE

## 2021-06-09 ENCOUNTER — OFFICE VISIT (OUTPATIENT)
Dept: PULMONOLOGY | Facility: CLINIC | Age: 66
End: 2021-06-09
Payer: MEDICARE

## 2021-06-09 VITALS
SYSTOLIC BLOOD PRESSURE: 134 MMHG | HEIGHT: 61 IN | WEIGHT: 163.13 LBS | BODY MASS INDEX: 30.8 KG/M2 | RESPIRATION RATE: 17 BRPM | HEART RATE: 70 BPM | DIASTOLIC BLOOD PRESSURE: 60 MMHG | OXYGEN SATURATION: 96 %

## 2021-06-09 DIAGNOSIS — Z86.16 PERSONAL HISTORY OF COVID-19: ICD-10-CM

## 2021-06-09 DIAGNOSIS — R73.03 PREDIABETES: ICD-10-CM

## 2021-06-09 DIAGNOSIS — I10 ESSENTIAL HYPERTENSION: ICD-10-CM

## 2021-06-09 DIAGNOSIS — J44.9 COPD, MODERATE: ICD-10-CM

## 2021-06-09 DIAGNOSIS — Z12.39 ENCOUNTER FOR SCREENING FOR MALIGNANT NEOPLASM OF BREAST, UNSPECIFIED SCREENING MODALITY: ICD-10-CM

## 2021-06-09 DIAGNOSIS — J44.9 COPD, MODERATE: Primary | ICD-10-CM

## 2021-06-09 DIAGNOSIS — J43.9 NOCTURNAL HYPOXEMIA DUE TO EMPHYSEMA: ICD-10-CM

## 2021-06-09 DIAGNOSIS — G47.36 NOCTURNAL HYPOXEMIA DUE TO EMPHYSEMA: ICD-10-CM

## 2021-06-09 DIAGNOSIS — Z12.31 VISIT FOR SCREENING MAMMOGRAM: ICD-10-CM

## 2021-06-09 LAB
BRPFT: ABNORMAL
FEF 25 75 CHG: 35.6 %
FEF 25 75 LLN: 0.91
FEF 25 75 POST REF: 26.7 %
FEF 25 75 PRE REF: 19.7 %
FEF 25 75 REF: 1.9
FET100 CHG: -16 %
FEV1 CHG: 11.3 %
FEV1 FVC CHG: 6.9 %
FEV1 FVC LLN: 66
FEV1 FVC POST REF: 72.8 %
FEV1 FVC PRE REF: 68.1 %
FEV1 FVC REF: 79
FEV1 LLN: 1.57
FEV1 POST REF: 65.9 %
FEV1 PRE REF: 59.2 %
FEV1 REF: 2.12
FVC CHG: 4.1 %
FVC LLN: 2
FVC POST REF: 90.1 %
FVC PRE REF: 86.5 %
FVC REF: 2.7
PEF CHG: -1.6 %
PEF LLN: 3.97
PEF POST REF: 53.2 %
PEF PRE REF: 54.1 %
PEF REF: 5.54
POST FEF 25 75: 0.51 L/S (ref 0.91–2.88)
POST FET 100: 9.97 SEC
POST FEV1 FVC: 57.43 % (ref 66.1–91.78)
POST FEV1: 1.4 L (ref 1.57–2.68)
POST FVC: 2.44 L (ref 2–3.41)
POST PEF: 2.95 L/S (ref 3.97–7.12)
PRE FEF 25 75: 0.37 L/S (ref 0.91–2.88)
PRE FET 100: 11.86 SEC
PRE FEV1 FVC: 53.74 % (ref 66.1–91.78)
PRE FEV1: 1.26 L (ref 1.57–2.68)
PRE FVC: 2.34 L (ref 2–3.41)
PRE PEF: 3 L/S (ref 3.97–7.12)

## 2021-06-09 PROCEDURE — 71046 X-RAY EXAM CHEST 2 VIEWS: CPT | Mod: TC

## 2021-06-09 PROCEDURE — 77067 SCR MAMMO BI INCL CAD: CPT | Mod: TC

## 2021-06-09 PROCEDURE — 71046 XR CHEST PA AND LATERAL: ICD-10-PCS | Mod: 26,,, | Performed by: RADIOLOGY

## 2021-06-09 PROCEDURE — 77063 MAMMO DIGITAL SCREENING BILAT WITH TOMO: ICD-10-PCS | Mod: 26,,, | Performed by: RADIOLOGY

## 2021-06-09 PROCEDURE — 71046 X-RAY EXAM CHEST 2 VIEWS: CPT | Mod: 26,,, | Performed by: RADIOLOGY

## 2021-06-09 PROCEDURE — 77067 SCR MAMMO BI INCL CAD: CPT | Mod: 26,,, | Performed by: RADIOLOGY

## 2021-06-09 PROCEDURE — 99999 PR PBB SHADOW E&M-EST. PATIENT-LVL IV: CPT | Mod: PBBFAC,,, | Performed by: NURSE PRACTITIONER

## 2021-06-09 PROCEDURE — 77067 MAMMO DIGITAL SCREENING BILAT WITH TOMO: ICD-10-PCS | Mod: 26,,, | Performed by: RADIOLOGY

## 2021-06-09 PROCEDURE — 99999 PR PBB SHADOW E&M-EST. PATIENT-LVL IV: ICD-10-PCS | Mod: PBBFAC,,, | Performed by: NURSE PRACTITIONER

## 2021-06-09 PROCEDURE — 94762 PR NONINVASV OXYGEN SATUR, CONT: ICD-10-PCS | Mod: S$GLB,,, | Performed by: INTERNAL MEDICINE

## 2021-06-09 PROCEDURE — 99214 PR OFFICE/OUTPT VISIT, EST, LEVL IV, 30-39 MIN: ICD-10-PCS | Mod: 25,S$GLB,ICN, | Performed by: NURSE PRACTITIONER

## 2021-06-09 PROCEDURE — 99214 OFFICE O/P EST MOD 30 MIN: CPT | Mod: PBBFAC,25 | Performed by: NURSE PRACTITIONER

## 2021-06-09 PROCEDURE — 94762 N-INVAS EAR/PLS OXIMTRY CONT: CPT | Mod: S$GLB,,, | Performed by: INTERNAL MEDICINE

## 2021-06-09 PROCEDURE — 77063 BREAST TOMOSYNTHESIS BI: CPT | Mod: 26,,, | Performed by: RADIOLOGY

## 2021-06-09 PROCEDURE — 99214 OFFICE O/P EST MOD 30 MIN: CPT | Mod: 25,S$GLB,ICN, | Performed by: NURSE PRACTITIONER

## 2021-06-09 PROCEDURE — 94060 EVALUATION OF WHEEZING: CPT | Mod: S$GLB,,, | Performed by: INTERNAL MEDICINE

## 2021-06-09 PROCEDURE — 94060 PR EVAL OF BRONCHOSPASM: ICD-10-PCS | Mod: S$GLB,,, | Performed by: INTERNAL MEDICINE

## 2021-06-10 ENCOUNTER — PATIENT MESSAGE (OUTPATIENT)
Dept: FAMILY MEDICINE | Facility: CLINIC | Age: 66
End: 2021-06-10

## 2021-06-11 ENCOUNTER — PATIENT MESSAGE (OUTPATIENT)
Dept: FAMILY MEDICINE | Facility: CLINIC | Age: 66
End: 2021-06-11

## 2021-06-11 DIAGNOSIS — M75.81 TENDINITIS OF RIGHT ROTATOR CUFF: Primary | ICD-10-CM

## 2021-06-13 RX ORDER — IBUPROFEN 400 MG/1
400 TABLET ORAL EVERY 6 HOURS PRN
Qty: 30 TABLET | Refills: 2 | Status: SHIPPED | OUTPATIENT
Start: 2021-06-13 | End: 2021-08-20

## 2021-06-16 ENCOUNTER — PATIENT MESSAGE (OUTPATIENT)
Dept: PULMONOLOGY | Facility: CLINIC | Age: 66
End: 2021-06-16

## 2021-07-07 ENCOUNTER — PATIENT MESSAGE (OUTPATIENT)
Dept: PULMONOLOGY | Facility: CLINIC | Age: 66
End: 2021-07-07

## 2021-07-08 DIAGNOSIS — J44.9 COPD, SEVERITY TO BE DETERMINED: ICD-10-CM

## 2021-07-08 RX ORDER — IPRATROPIUM BROMIDE AND ALBUTEROL SULFATE 2.5; .5 MG/3ML; MG/3ML
3 SOLUTION RESPIRATORY (INHALATION) EVERY 6 HOURS PRN
Qty: 360 ML | Refills: 11 | Status: SHIPPED | OUTPATIENT
Start: 2021-07-08 | End: 2022-06-13

## 2021-07-12 ENCOUNTER — PATIENT MESSAGE (OUTPATIENT)
Dept: ADMINISTRATIVE | Facility: OTHER | Age: 66
End: 2021-07-12

## 2021-07-21 ENCOUNTER — PATIENT OUTREACH (OUTPATIENT)
Dept: ADMINISTRATIVE | Facility: OTHER | Age: 66
End: 2021-07-21

## 2021-07-23 ENCOUNTER — OFFICE VISIT (OUTPATIENT)
Dept: GASTROENTEROLOGY | Facility: CLINIC | Age: 66
End: 2021-07-23
Payer: MEDICARE

## 2021-07-23 VITALS
DIASTOLIC BLOOD PRESSURE: 62 MMHG | BODY MASS INDEX: 28.93 KG/M2 | SYSTOLIC BLOOD PRESSURE: 120 MMHG | WEIGHT: 157.19 LBS | HEART RATE: 60 BPM | HEIGHT: 62 IN

## 2021-07-23 DIAGNOSIS — R10.13 EPIGASTRIC ABDOMINAL PAIN: ICD-10-CM

## 2021-07-23 DIAGNOSIS — R19.5 LOOSE STOOLS: ICD-10-CM

## 2021-07-23 DIAGNOSIS — K21.9 GASTROESOPHAGEAL REFLUX DISEASE, UNSPECIFIED WHETHER ESOPHAGITIS PRESENT: ICD-10-CM

## 2021-07-23 PROCEDURE — 99999 PR PBB SHADOW E&M-EST. PATIENT-LVL IV: ICD-10-PCS | Mod: PBBFAC,,, | Performed by: INTERNAL MEDICINE

## 2021-07-23 PROCEDURE — 3288F PR FALLS RISK ASSESSMENT DOCUMENTED: ICD-10-PCS | Mod: CPTII,S$GLB,, | Performed by: INTERNAL MEDICINE

## 2021-07-23 PROCEDURE — 1126F AMNT PAIN NOTED NONE PRSNT: CPT | Mod: CPTII,S$GLB,, | Performed by: INTERNAL MEDICINE

## 2021-07-23 PROCEDURE — 99214 PR OFFICE/OUTPT VISIT, EST, LEVL IV, 30-39 MIN: ICD-10-PCS | Mod: S$GLB,,, | Performed by: INTERNAL MEDICINE

## 2021-07-23 PROCEDURE — 3008F BODY MASS INDEX DOCD: CPT | Mod: CPTII,S$GLB,, | Performed by: INTERNAL MEDICINE

## 2021-07-23 PROCEDURE — 3074F SYST BP LT 130 MM HG: CPT | Mod: CPTII,S$GLB,, | Performed by: INTERNAL MEDICINE

## 2021-07-23 PROCEDURE — 1159F MED LIST DOCD IN RCRD: CPT | Mod: CPTII,S$GLB,, | Performed by: INTERNAL MEDICINE

## 2021-07-23 PROCEDURE — 1101F PR PT FALLS ASSESS DOC 0-1 FALLS W/OUT INJ PAST YR: ICD-10-PCS | Mod: CPTII,S$GLB,, | Performed by: INTERNAL MEDICINE

## 2021-07-23 PROCEDURE — 99214 OFFICE O/P EST MOD 30 MIN: CPT | Mod: S$GLB,,, | Performed by: INTERNAL MEDICINE

## 2021-07-23 PROCEDURE — 3074F PR MOST RECENT SYSTOLIC BLOOD PRESSURE < 130 MM HG: ICD-10-PCS | Mod: CPTII,S$GLB,, | Performed by: INTERNAL MEDICINE

## 2021-07-23 PROCEDURE — 99999 PR PBB SHADOW E&M-EST. PATIENT-LVL IV: CPT | Mod: PBBFAC,,, | Performed by: INTERNAL MEDICINE

## 2021-07-23 PROCEDURE — 1101F PT FALLS ASSESS-DOCD LE1/YR: CPT | Mod: CPTII,S$GLB,, | Performed by: INTERNAL MEDICINE

## 2021-07-23 PROCEDURE — 1126F PR PAIN SEVERITY QUANTIFIED, NO PAIN PRESENT: ICD-10-PCS | Mod: CPTII,S$GLB,, | Performed by: INTERNAL MEDICINE

## 2021-07-23 PROCEDURE — 3008F PR BODY MASS INDEX (BMI) DOCUMENTED: ICD-10-PCS | Mod: CPTII,S$GLB,, | Performed by: INTERNAL MEDICINE

## 2021-07-23 PROCEDURE — 3288F FALL RISK ASSESSMENT DOCD: CPT | Mod: CPTII,S$GLB,, | Performed by: INTERNAL MEDICINE

## 2021-07-23 PROCEDURE — 99499 UNLISTED E&M SERVICE: CPT | Mod: S$PBB,,, | Performed by: INTERNAL MEDICINE

## 2021-07-23 PROCEDURE — 99499 RISK ADDL DX/OHS AUDIT: ICD-10-PCS | Mod: S$PBB,,, | Performed by: INTERNAL MEDICINE

## 2021-07-23 PROCEDURE — 3078F PR MOST RECENT DIASTOLIC BLOOD PRESSURE < 80 MM HG: ICD-10-PCS | Mod: CPTII,S$GLB,, | Performed by: INTERNAL MEDICINE

## 2021-07-23 PROCEDURE — 3078F DIAST BP <80 MM HG: CPT | Mod: CPTII,S$GLB,, | Performed by: INTERNAL MEDICINE

## 2021-07-23 PROCEDURE — 1159F PR MEDICATION LIST DOCUMENTED IN MEDICAL RECORD: ICD-10-PCS | Mod: CPTII,S$GLB,, | Performed by: INTERNAL MEDICINE

## 2021-07-23 RX ORDER — LOPERAMIDE HYDROCHLORIDE 2 MG/1
2 CAPSULE ORAL 4 TIMES DAILY PRN
Qty: 100 CAPSULE | Refills: 0 | Status: SHIPPED | OUTPATIENT
Start: 2021-07-23 | End: 2021-08-17

## 2021-07-27 ENCOUNTER — PATIENT MESSAGE (OUTPATIENT)
Dept: FAMILY MEDICINE | Facility: CLINIC | Age: 66
End: 2021-07-27

## 2021-07-27 RX ORDER — DICYCLOMINE HYDROCHLORIDE 10 MG/1
CAPSULE ORAL
Qty: 30 CAPSULE | Refills: 1 | Status: SHIPPED | OUTPATIENT
Start: 2021-07-27 | End: 2022-04-05 | Stop reason: SDUPTHER

## 2021-08-17 ENCOUNTER — TELEPHONE (OUTPATIENT)
Dept: PULMONOLOGY | Facility: CLINIC | Age: 66
End: 2021-08-17

## 2021-08-31 ENCOUNTER — PATIENT MESSAGE (OUTPATIENT)
Dept: FAMILY MEDICINE | Facility: CLINIC | Age: 66
End: 2021-08-31

## 2021-09-02 ENCOUNTER — PATIENT MESSAGE (OUTPATIENT)
Dept: FAMILY MEDICINE | Facility: CLINIC | Age: 66
End: 2021-09-02

## 2021-09-23 ENCOUNTER — HOSPITAL ENCOUNTER (OUTPATIENT)
Dept: CARDIOLOGY | Facility: HOSPITAL | Age: 66
Discharge: HOME | End: 2021-09-23
Attending: INTERNAL MEDICINE
Payer: MEDICARE

## 2021-09-23 VITALS — HEIGHT: 61 IN | WEIGHT: 157 LBS | BODY MASS INDEX: 29.64 KG/M2

## 2021-09-23 DIAGNOSIS — I65.23 BILATERAL CAROTID ARTERY STENOSIS: ICD-10-CM

## 2021-09-23 PROCEDURE — 93880 CV US DOPPLER CAROTID (CUPID ONLY): ICD-10-PCS | Mod: 26,HCNC,, | Performed by: INTERNAL MEDICINE

## 2021-09-23 PROCEDURE — 93880 EXTRACRANIAL BILAT STUDY: CPT | Mod: 26,HCNC,, | Performed by: INTERNAL MEDICINE

## 2021-09-23 PROCEDURE — 93880 EXTRACRANIAL BILAT STUDY: CPT | Mod: HCNC

## 2021-09-24 LAB
LEFT ARM DIASTOLIC BLOOD PRESSURE: 77 MMHG
LEFT ARM SYSTOLIC BLOOD PRESSURE: 181 MMHG
LEFT CBA DIAS: 17 CM/S
LEFT CBA SYS: 87 CM/S
LEFT CCA DIST DIAS: 13 CM/S
LEFT CCA DIST SYS: 65 CM/S
LEFT CCA MID DIAS: 12 CM/S
LEFT CCA MID SYS: 72 CM/S
LEFT CCA PROX DIAS: 16 CM/S
LEFT CCA PROX SYS: 74 CM/S
LEFT ECA DIAS: 14 CM/S
LEFT ECA SYS: 132 CM/S
LEFT ICA DIST DIAS: 31 CM/S
LEFT ICA DIST SYS: 111 CM/S
LEFT ICA MID DIAS: 30 CM/S
LEFT ICA MID SYS: 130 CM/S
LEFT ICA PROX DIAS: 20 CM/S
LEFT ICA PROX SYS: 93 CM/S
LEFT VERTEBRAL DIAS: 15 CM/S
LEFT VERTEBRAL SYS: 70 CM/S
OHS CV CAROTID RIGHT ICA EDV HIGHEST: 29
OHS CV CAROTID ULTRASOUND LEFT ICA/CCA RATIO: 2
OHS CV CAROTID ULTRASOUND RIGHT ICA/CCA RATIO: 3.1
OHS CV PV CAROTID LEFT HIGHEST CCA: 74
OHS CV PV CAROTID LEFT HIGHEST ICA: 130
OHS CV PV CAROTID RIGHT HIGHEST CCA: 96
OHS CV PV CAROTID RIGHT HIGHEST ICA: 155
OHS CV US CAROTID LEFT HIGHEST EDV: 31
RIGHT ARM DIASTOLIC BLOOD PRESSURE: 75 MMHG
RIGHT ARM SYSTOLIC BLOOD PRESSURE: 159 MMHG
RIGHT CBA DIAS: 12 CM/S
RIGHT CBA SYS: 50 CM/S
RIGHT CCA DIST DIAS: 9 CM/S
RIGHT CCA DIST SYS: 50 CM/S
RIGHT CCA MID DIAS: 10 CM/S
RIGHT CCA MID SYS: 61 CM/S
RIGHT CCA PROX DIAS: 12 CM/S
RIGHT CCA PROX SYS: 96 CM/S
RIGHT ECA DIAS: 11 CM/S
RIGHT ECA SYS: 108 CM/S
RIGHT ICA DIST DIAS: 27 CM/S
RIGHT ICA DIST SYS: 103 CM/S
RIGHT ICA MID DIAS: 29 CM/S
RIGHT ICA MID SYS: 137 CM/S
RIGHT ICA PROX DIAS: 24 CM/S
RIGHT ICA PROX SYS: 155 CM/S

## 2021-09-28 ENCOUNTER — PATIENT MESSAGE (OUTPATIENT)
Dept: CARDIOLOGY | Facility: CLINIC | Age: 66
End: 2021-09-28

## 2021-09-28 ENCOUNTER — PATIENT MESSAGE (OUTPATIENT)
Dept: PULMONOLOGY | Facility: CLINIC | Age: 66
End: 2021-09-28

## 2021-09-30 ENCOUNTER — PATIENT OUTREACH (OUTPATIENT)
Dept: ADMINISTRATIVE | Facility: OTHER | Age: 66
End: 2021-09-30

## 2021-09-30 ENCOUNTER — OFFICE VISIT (OUTPATIENT)
Dept: CARDIOLOGY | Facility: CLINIC | Age: 66
End: 2021-09-30
Payer: MEDICARE

## 2021-09-30 VITALS
SYSTOLIC BLOOD PRESSURE: 164 MMHG | DIASTOLIC BLOOD PRESSURE: 78 MMHG | OXYGEN SATURATION: 98 % | HEIGHT: 61 IN | HEART RATE: 62 BPM | BODY MASS INDEX: 30.39 KG/M2 | WEIGHT: 160.94 LBS

## 2021-09-30 DIAGNOSIS — I49.5 SINUS NODE DYSFUNCTION: ICD-10-CM

## 2021-09-30 DIAGNOSIS — J43.9 NOCTURNAL HYPOXEMIA DUE TO EMPHYSEMA: ICD-10-CM

## 2021-09-30 DIAGNOSIS — I71.40 ABDOMINAL AORTIC ANEURYSM (AAA) WITHOUT RUPTURE: ICD-10-CM

## 2021-09-30 DIAGNOSIS — J44.9 COPD, MODERATE: ICD-10-CM

## 2021-09-30 DIAGNOSIS — I65.23 BILATERAL CAROTID ARTERY STENOSIS: Primary | ICD-10-CM

## 2021-09-30 DIAGNOSIS — R20.8 COLD HANDS AND FEET WITHOUT PERIPHERAL VASCULAR DISEASE: ICD-10-CM

## 2021-09-30 DIAGNOSIS — Z86.16 PERSONAL HISTORY OF COVID-19: ICD-10-CM

## 2021-09-30 DIAGNOSIS — I71.40 ABDOMINAL AORTIC ANEURYSM, WITHOUT RUPTURE: ICD-10-CM

## 2021-09-30 DIAGNOSIS — G47.36 NOCTURNAL HYPOXEMIA DUE TO EMPHYSEMA: ICD-10-CM

## 2021-09-30 DIAGNOSIS — I10 ESSENTIAL HYPERTENSION: ICD-10-CM

## 2021-09-30 DIAGNOSIS — E78.2 MIXED HYPERLIPIDEMIA: ICD-10-CM

## 2021-09-30 DIAGNOSIS — F17.201 TOBACCO USE DISORDER, MODERATE, IN SUSTAINED REMISSION: ICD-10-CM

## 2021-09-30 DIAGNOSIS — K21.9 GASTROESOPHAGEAL REFLUX DISEASE, UNSPECIFIED WHETHER ESOPHAGITIS PRESENT: ICD-10-CM

## 2021-09-30 DIAGNOSIS — R73.03 PREDIABETES: ICD-10-CM

## 2021-09-30 DIAGNOSIS — I25.118 CORONARY ARTERY DISEASE OF NATIVE ARTERY OF NATIVE HEART WITH STABLE ANGINA PECTORIS: ICD-10-CM

## 2021-09-30 DIAGNOSIS — R19.07 GENERALIZED ABDOMINAL MASS: ICD-10-CM

## 2021-09-30 PROCEDURE — 3078F PR MOST RECENT DIASTOLIC BLOOD PRESSURE < 80 MM HG: ICD-10-PCS | Mod: HCNC,CPTII,S$GLB, | Performed by: INTERNAL MEDICINE

## 2021-09-30 PROCEDURE — 3078F DIAST BP <80 MM HG: CPT | Mod: HCNC,CPTII,S$GLB, | Performed by: INTERNAL MEDICINE

## 2021-09-30 PROCEDURE — 1159F MED LIST DOCD IN RCRD: CPT | Mod: HCNC,CPTII,S$GLB, | Performed by: INTERNAL MEDICINE

## 2021-09-30 PROCEDURE — 1100F PR PT FALLS ASSESS DOC 2+ FALLS/FALL W/INJURY/YR: ICD-10-PCS | Mod: HCNC,CPTII,S$GLB, | Performed by: INTERNAL MEDICINE

## 2021-09-30 PROCEDURE — 3288F PR FALLS RISK ASSESSMENT DOCUMENTED: ICD-10-PCS | Mod: HCNC,CPTII,S$GLB, | Performed by: INTERNAL MEDICINE

## 2021-09-30 PROCEDURE — 3077F PR MOST RECENT SYSTOLIC BLOOD PRESSURE >= 140 MM HG: ICD-10-PCS | Mod: HCNC,CPTII,S$GLB, | Performed by: INTERNAL MEDICINE

## 2021-09-30 PROCEDURE — 1126F PR PAIN SEVERITY QUANTIFIED, NO PAIN PRESENT: ICD-10-PCS | Mod: HCNC,CPTII,S$GLB, | Performed by: INTERNAL MEDICINE

## 2021-09-30 PROCEDURE — 99214 PR OFFICE/OUTPT VISIT, EST, LEVL IV, 30-39 MIN: ICD-10-PCS | Mod: HCNC,S$GLB,, | Performed by: INTERNAL MEDICINE

## 2021-09-30 PROCEDURE — 1100F PTFALLS ASSESS-DOCD GE2>/YR: CPT | Mod: HCNC,CPTII,S$GLB, | Performed by: INTERNAL MEDICINE

## 2021-09-30 PROCEDURE — 99214 OFFICE O/P EST MOD 30 MIN: CPT | Mod: HCNC,S$GLB,, | Performed by: INTERNAL MEDICINE

## 2021-09-30 PROCEDURE — 4010F PR ACE/ARB THEARPY RXD/TAKEN: ICD-10-PCS | Mod: HCNC,CPTII,S$GLB, | Performed by: INTERNAL MEDICINE

## 2021-09-30 PROCEDURE — 1160F RVW MEDS BY RX/DR IN RCRD: CPT | Mod: HCNC,CPTII,S$GLB, | Performed by: INTERNAL MEDICINE

## 2021-09-30 PROCEDURE — 99999 PR PBB SHADOW E&M-EST. PATIENT-LVL V: CPT | Mod: PBBFAC,HCNC,, | Performed by: INTERNAL MEDICINE

## 2021-09-30 PROCEDURE — 1160F PR REVIEW ALL MEDS BY PRESCRIBER/CLIN PHARMACIST DOCUMENTED: ICD-10-PCS | Mod: HCNC,CPTII,S$GLB, | Performed by: INTERNAL MEDICINE

## 2021-09-30 PROCEDURE — 3044F HG A1C LEVEL LT 7.0%: CPT | Mod: HCNC,CPTII,S$GLB, | Performed by: INTERNAL MEDICINE

## 2021-09-30 PROCEDURE — 99999 PR PBB SHADOW E&M-EST. PATIENT-LVL V: ICD-10-PCS | Mod: PBBFAC,HCNC,, | Performed by: INTERNAL MEDICINE

## 2021-09-30 PROCEDURE — 3044F PR MOST RECENT HEMOGLOBIN A1C LEVEL <7.0%: ICD-10-PCS | Mod: HCNC,CPTII,S$GLB, | Performed by: INTERNAL MEDICINE

## 2021-09-30 PROCEDURE — 1159F PR MEDICATION LIST DOCUMENTED IN MEDICAL RECORD: ICD-10-PCS | Mod: HCNC,CPTII,S$GLB, | Performed by: INTERNAL MEDICINE

## 2021-09-30 PROCEDURE — 4010F ACE/ARB THERAPY RXD/TAKEN: CPT | Mod: HCNC,CPTII,S$GLB, | Performed by: INTERNAL MEDICINE

## 2021-09-30 PROCEDURE — 1126F AMNT PAIN NOTED NONE PRSNT: CPT | Mod: HCNC,CPTII,S$GLB, | Performed by: INTERNAL MEDICINE

## 2021-09-30 PROCEDURE — 3008F BODY MASS INDEX DOCD: CPT | Mod: HCNC,CPTII,S$GLB, | Performed by: INTERNAL MEDICINE

## 2021-09-30 PROCEDURE — 3008F PR BODY MASS INDEX (BMI) DOCUMENTED: ICD-10-PCS | Mod: HCNC,CPTII,S$GLB, | Performed by: INTERNAL MEDICINE

## 2021-09-30 PROCEDURE — 3288F FALL RISK ASSESSMENT DOCD: CPT | Mod: HCNC,CPTII,S$GLB, | Performed by: INTERNAL MEDICINE

## 2021-09-30 PROCEDURE — 3077F SYST BP >= 140 MM HG: CPT | Mod: HCNC,CPTII,S$GLB, | Performed by: INTERNAL MEDICINE

## 2021-09-30 RX ORDER — ZINC GLUCONATE 50 MG
50 TABLET ORAL DAILY
COMMUNITY
End: 2022-12-12

## 2021-09-30 RX ORDER — AMLODIPINE BESYLATE 2.5 MG/1
2.5 TABLET ORAL DAILY
Qty: 30 TABLET | Refills: 11 | Status: SHIPPED | OUTPATIENT
Start: 2021-09-30 | End: 2021-09-30 | Stop reason: SDUPTHER

## 2021-09-30 RX ORDER — AMLODIPINE BESYLATE 5 MG/1
5 TABLET ORAL DAILY
Qty: 30 TABLET | Refills: 6
Start: 2021-09-30 | End: 2021-10-01

## 2021-09-30 RX ORDER — AMLODIPINE BESYLATE 2.5 MG/1
2.5 TABLET ORAL DAILY
Qty: 30 TABLET | Refills: 11 | Status: SHIPPED | OUTPATIENT
Start: 2021-09-30 | End: 2021-10-21 | Stop reason: DRUGHIGH

## 2021-10-01 ENCOUNTER — TELEPHONE (OUTPATIENT)
Dept: CARDIOLOGY | Facility: CLINIC | Age: 66
End: 2021-10-01

## 2021-10-03 ENCOUNTER — PATIENT MESSAGE (OUTPATIENT)
Dept: FAMILY MEDICINE | Facility: CLINIC | Age: 66
End: 2021-10-03

## 2021-10-06 ENCOUNTER — OFFICE VISIT (OUTPATIENT)
Dept: FAMILY MEDICINE | Facility: CLINIC | Age: 66
End: 2021-10-06
Payer: MEDICARE

## 2021-10-06 ENCOUNTER — PATIENT MESSAGE (OUTPATIENT)
Dept: FAMILY MEDICINE | Facility: CLINIC | Age: 66
End: 2021-10-06

## 2021-10-06 DIAGNOSIS — F32.A DEPRESSION, UNSPECIFIED DEPRESSION TYPE: ICD-10-CM

## 2021-10-06 DIAGNOSIS — F51.04 CHRONIC INSOMNIA: ICD-10-CM

## 2021-10-06 DIAGNOSIS — F17.210 NICOTINE DEPENDENCE, CIGARETTES, UNCOMPLICATED: ICD-10-CM

## 2021-10-06 DIAGNOSIS — Z12.2 ENCOUNTER FOR SCREENING FOR LUNG CANCER: ICD-10-CM

## 2021-10-06 DIAGNOSIS — Z78.0 ASYMPTOMATIC MENOPAUSAL STATE: ICD-10-CM

## 2021-10-06 DIAGNOSIS — G47.00 INSOMNIA, UNSPECIFIED TYPE: Primary | ICD-10-CM

## 2021-10-06 PROCEDURE — 99499 RISK ADDL DX/OHS AUDIT: ICD-10-PCS | Mod: 95,,, | Performed by: FAMILY MEDICINE

## 2021-10-06 PROCEDURE — 99499 UNLISTED E&M SERVICE: CPT | Mod: 95,,, | Performed by: FAMILY MEDICINE

## 2021-10-06 PROCEDURE — 99214 PR OFFICE/OUTPT VISIT, EST, LEVL IV, 30-39 MIN: ICD-10-PCS | Mod: 95,,, | Performed by: FAMILY MEDICINE

## 2021-10-06 PROCEDURE — 99214 OFFICE O/P EST MOD 30 MIN: CPT | Mod: 95,,, | Performed by: FAMILY MEDICINE

## 2021-10-06 RX ORDER — DULOXETIN HYDROCHLORIDE 60 MG/1
60 CAPSULE, DELAYED RELEASE ORAL DAILY
Qty: 90 CAPSULE | Refills: 1 | Status: SHIPPED | OUTPATIENT
Start: 2021-10-06 | End: 2021-12-02

## 2021-10-06 RX ORDER — EZETIMIBE AND SIMVASTATIN 10; 40 MG/1; MG/1
1 TABLET ORAL NIGHTLY
Qty: 90 TABLET | Refills: 1 | Status: SHIPPED | OUTPATIENT
Start: 2021-10-06 | End: 2022-07-08

## 2021-10-06 RX ORDER — ZOLPIDEM TARTRATE 10 MG/1
10 TABLET ORAL NIGHTLY
Qty: 30 TABLET | Refills: 5 | Status: SHIPPED | OUTPATIENT
Start: 2021-10-06 | End: 2021-10-22 | Stop reason: SDUPTHER

## 2021-10-08 DIAGNOSIS — I10 ESSENTIAL HYPERTENSION: ICD-10-CM

## 2021-10-08 RX ORDER — AMLODIPINE BESYLATE 5 MG/1
5 TABLET ORAL DAILY
Qty: 30 TABLET | Refills: 3 | Status: SHIPPED | OUTPATIENT
Start: 2021-10-08 | End: 2021-10-21 | Stop reason: DRUGHIGH

## 2021-10-14 ENCOUNTER — HOSPITAL ENCOUNTER (OUTPATIENT)
Dept: RADIOLOGY | Facility: HOSPITAL | Age: 66
Discharge: HOME | End: 2021-10-14
Attending: INTERNAL MEDICINE
Payer: MEDICARE

## 2021-10-14 DIAGNOSIS — I71.40 ABDOMINAL AORTIC ANEURYSM, WITHOUT RUPTURE: ICD-10-CM

## 2021-10-14 DIAGNOSIS — R19.07 GENERALIZED ABDOMINAL MASS: ICD-10-CM

## 2021-10-14 PROCEDURE — 74175 CTA ABDOMEN W/CONTRAST: CPT | Mod: TC,HCNC

## 2021-10-14 PROCEDURE — 25500020 PHARM REV CODE 255: Mod: HCNC | Performed by: INTERNAL MEDICINE

## 2021-10-14 PROCEDURE — 72191 CT ANGIOGRAPH PELV W/O&W/DYE: CPT | Mod: TC,HCNC

## 2021-10-14 RX ADMIN — IOHEXOL 85 ML: 350 INJECTION, SOLUTION INTRAVENOUS at 02:10

## 2021-10-20 ENCOUNTER — HOSPITAL ENCOUNTER (OUTPATIENT)
Dept: RADIOLOGY | Facility: HOSPITAL | Age: 66
Discharge: HOME | End: 2021-10-20
Attending: FAMILY MEDICINE
Payer: MEDICARE

## 2021-10-20 DIAGNOSIS — F17.210 NICOTINE DEPENDENCE, CIGARETTES, UNCOMPLICATED: ICD-10-CM

## 2021-10-20 PROCEDURE — 71271 CT THORAX LUNG CANCER SCR C-: CPT | Mod: TC

## 2021-10-20 PROCEDURE — 71271 CT CHEST LUNG SCREENING LOW DOSE: ICD-10-PCS | Mod: 26,,, | Performed by: RADIOLOGY

## 2021-10-20 PROCEDURE — 71271 CT THORAX LUNG CANCER SCR C-: CPT | Mod: 26,,, | Performed by: RADIOLOGY

## 2021-10-22 ENCOUNTER — TELEPHONE (OUTPATIENT)
Dept: FAMILY MEDICINE | Facility: CLINIC | Age: 66
End: 2021-10-22
Payer: MEDICARE

## 2021-10-22 DIAGNOSIS — R91.1 SOLITARY PULMONARY NODULE: ICD-10-CM

## 2021-10-22 DIAGNOSIS — F51.04 CHRONIC INSOMNIA: ICD-10-CM

## 2021-10-22 RX ORDER — ZOLPIDEM TARTRATE 10 MG/1
10 TABLET ORAL NIGHTLY
Qty: 30 TABLET | Refills: 5 | Status: SHIPPED | OUTPATIENT
Start: 2021-10-22 | End: 2022-05-11

## 2021-10-29 ENCOUNTER — PATIENT MESSAGE (OUTPATIENT)
Dept: FAMILY MEDICINE | Facility: CLINIC | Age: 66
End: 2021-10-29
Payer: MEDICARE

## 2021-11-01 ENCOUNTER — OFFICE VISIT (OUTPATIENT)
Dept: CARDIOLOGY | Facility: CLINIC | Age: 66
End: 2021-11-01
Payer: MEDICARE

## 2021-11-01 VITALS
RESPIRATION RATE: 16 BRPM | OXYGEN SATURATION: 98 % | DIASTOLIC BLOOD PRESSURE: 60 MMHG | HEART RATE: 62 BPM | SYSTOLIC BLOOD PRESSURE: 130 MMHG | BODY MASS INDEX: 30.39 KG/M2 | WEIGHT: 160.94 LBS | HEIGHT: 61 IN

## 2021-11-01 DIAGNOSIS — R42 DIZZINESS: ICD-10-CM

## 2021-11-01 DIAGNOSIS — I25.118 CORONARY ARTERY DISEASE OF NATIVE ARTERY OF NATIVE HEART WITH STABLE ANGINA PECTORIS: Primary | ICD-10-CM

## 2021-11-01 DIAGNOSIS — I71.40 ABDOMINAL AORTIC ANEURYSM (AAA) WITHOUT RUPTURE: ICD-10-CM

## 2021-11-01 DIAGNOSIS — F17.201 TOBACCO USE DISORDER, MODERATE, IN SUSTAINED REMISSION: ICD-10-CM

## 2021-11-01 DIAGNOSIS — R73.03 PREDIABETES: ICD-10-CM

## 2021-11-01 DIAGNOSIS — I10 ESSENTIAL HYPERTENSION: ICD-10-CM

## 2021-11-01 DIAGNOSIS — G47.36 NOCTURNAL HYPOXEMIA DUE TO EMPHYSEMA: ICD-10-CM

## 2021-11-01 DIAGNOSIS — E78.2 MIXED HYPERLIPIDEMIA: ICD-10-CM

## 2021-11-01 DIAGNOSIS — J44.9 COPD, MODERATE: ICD-10-CM

## 2021-11-01 DIAGNOSIS — J43.9 NOCTURNAL HYPOXEMIA DUE TO EMPHYSEMA: ICD-10-CM

## 2021-11-01 DIAGNOSIS — K21.9 GASTROESOPHAGEAL REFLUX DISEASE, UNSPECIFIED WHETHER ESOPHAGITIS PRESENT: ICD-10-CM

## 2021-11-01 DIAGNOSIS — K57.92 DIVERTICULITIS: ICD-10-CM

## 2021-11-01 DIAGNOSIS — I70.219 ATHEROSCLEROTIC PVD WITH INTERMITTENT CLAUDICATION: ICD-10-CM

## 2021-11-01 PROCEDURE — 1159F PR MEDICATION LIST DOCUMENTED IN MEDICAL RECORD: ICD-10-PCS | Mod: HCNC,CPTII,S$GLB, | Performed by: INTERNAL MEDICINE

## 2021-11-01 PROCEDURE — 99999 PR PBB SHADOW E&M-EST. PATIENT-LVL V: CPT | Mod: PBBFAC,HCNC,, | Performed by: INTERNAL MEDICINE

## 2021-11-01 PROCEDURE — 3044F PR MOST RECENT HEMOGLOBIN A1C LEVEL <7.0%: ICD-10-PCS | Mod: HCNC,CPTII,S$GLB, | Performed by: INTERNAL MEDICINE

## 2021-11-01 PROCEDURE — 4010F ACE/ARB THERAPY RXD/TAKEN: CPT | Mod: HCNC,CPTII,S$GLB, | Performed by: INTERNAL MEDICINE

## 2021-11-01 PROCEDURE — 4010F PR ACE/ARB THEARPY RXD/TAKEN: ICD-10-PCS | Mod: HCNC,CPTII,S$GLB, | Performed by: INTERNAL MEDICINE

## 2021-11-01 PROCEDURE — 3008F BODY MASS INDEX DOCD: CPT | Mod: HCNC,CPTII,S$GLB, | Performed by: INTERNAL MEDICINE

## 2021-11-01 PROCEDURE — 3044F HG A1C LEVEL LT 7.0%: CPT | Mod: HCNC,CPTII,S$GLB, | Performed by: INTERNAL MEDICINE

## 2021-11-01 PROCEDURE — 3075F SYST BP GE 130 - 139MM HG: CPT | Mod: HCNC,CPTII,S$GLB, | Performed by: INTERNAL MEDICINE

## 2021-11-01 PROCEDURE — 3078F DIAST BP <80 MM HG: CPT | Mod: HCNC,CPTII,S$GLB, | Performed by: INTERNAL MEDICINE

## 2021-11-01 PROCEDURE — 1159F MED LIST DOCD IN RCRD: CPT | Mod: HCNC,CPTII,S$GLB, | Performed by: INTERNAL MEDICINE

## 2021-11-01 PROCEDURE — 99214 OFFICE O/P EST MOD 30 MIN: CPT | Mod: HCNC,S$GLB,, | Performed by: INTERNAL MEDICINE

## 2021-11-01 PROCEDURE — 99999 PR PBB SHADOW E&M-EST. PATIENT-LVL V: ICD-10-PCS | Mod: PBBFAC,HCNC,, | Performed by: INTERNAL MEDICINE

## 2021-11-01 PROCEDURE — 3078F PR MOST RECENT DIASTOLIC BLOOD PRESSURE < 80 MM HG: ICD-10-PCS | Mod: HCNC,CPTII,S$GLB, | Performed by: INTERNAL MEDICINE

## 2021-11-01 PROCEDURE — 3075F PR MOST RECENT SYSTOLIC BLOOD PRESS GE 130-139MM HG: ICD-10-PCS | Mod: HCNC,CPTII,S$GLB, | Performed by: INTERNAL MEDICINE

## 2021-11-01 PROCEDURE — 99214 PR OFFICE/OUTPT VISIT, EST, LEVL IV, 30-39 MIN: ICD-10-PCS | Mod: HCNC,S$GLB,, | Performed by: INTERNAL MEDICINE

## 2021-11-01 PROCEDURE — 1160F RVW MEDS BY RX/DR IN RCRD: CPT | Mod: HCNC,CPTII,S$GLB, | Performed by: INTERNAL MEDICINE

## 2021-11-01 PROCEDURE — 3008F PR BODY MASS INDEX (BMI) DOCUMENTED: ICD-10-PCS | Mod: HCNC,CPTII,S$GLB, | Performed by: INTERNAL MEDICINE

## 2021-11-01 PROCEDURE — 1160F PR REVIEW ALL MEDS BY PRESCRIBER/CLIN PHARMACIST DOCUMENTED: ICD-10-PCS | Mod: HCNC,CPTII,S$GLB, | Performed by: INTERNAL MEDICINE

## 2021-11-03 DIAGNOSIS — I10 ESSENTIAL HYPERTENSION: ICD-10-CM

## 2021-11-03 RX ORDER — AMLODIPINE BESYLATE 5 MG/1
5 TABLET ORAL DAILY
COMMUNITY
End: 2021-11-03 | Stop reason: SDUPTHER

## 2021-11-03 RX ORDER — OLMESARTAN MEDOXOMIL 40 MG/1
40 TABLET ORAL DAILY
COMMUNITY
End: 2021-11-03 | Stop reason: SDUPTHER

## 2021-11-03 RX ORDER — OLMESARTAN MEDOXOMIL 40 MG/1
40 TABLET ORAL DAILY
COMMUNITY
Start: 2021-11-03 | End: 2021-11-03 | Stop reason: SDUPTHER

## 2021-11-03 RX ORDER — OLMESARTAN MEDOXOMIL 40 MG/1
40 TABLET ORAL DAILY
Qty: 90 TABLET | Refills: 3 | Status: SHIPPED | OUTPATIENT
Start: 2021-11-03 | End: 2022-11-01

## 2021-11-03 RX ORDER — AMLODIPINE BESYLATE 2.5 MG/1
2.5 TABLET ORAL DAILY
Qty: 90 TABLET | Refills: 3 | Status: SHIPPED | OUTPATIENT
Start: 2021-11-03 | End: 2022-03-07

## 2021-11-03 RX ORDER — AMLODIPINE BESYLATE 2.5 MG/1
2.5 TABLET ORAL DAILY
COMMUNITY
End: 2021-11-03 | Stop reason: SDUPTHER

## 2021-11-03 RX ORDER — AMLODIPINE BESYLATE 5 MG/1
5 TABLET ORAL DAILY
Qty: 90 TABLET | Refills: 3 | Status: SHIPPED | OUTPATIENT
Start: 2021-11-03 | End: 2021-12-03

## 2021-11-19 ENCOUNTER — HOSPITAL ENCOUNTER (OUTPATIENT)
Dept: CARDIOLOGY | Facility: HOSPITAL | Age: 66
Discharge: HOME | End: 2021-11-19
Attending: INTERNAL MEDICINE
Payer: MEDICARE

## 2021-11-19 VITALS
WEIGHT: 160 LBS | HEIGHT: 61 IN | BODY MASS INDEX: 30.21 KG/M2 | DIASTOLIC BLOOD PRESSURE: 76 MMHG | SYSTOLIC BLOOD PRESSURE: 151 MMHG

## 2021-11-19 VITALS — WEIGHT: 160 LBS | BODY MASS INDEX: 30.21 KG/M2 | HEIGHT: 61 IN

## 2021-11-19 DIAGNOSIS — I71.40 ABDOMINAL AORTIC ANEURYSM (AAA) WITHOUT RUPTURE: ICD-10-CM

## 2021-11-19 DIAGNOSIS — I70.219 ATHEROSCLEROTIC PVD WITH INTERMITTENT CLAUDICATION: ICD-10-CM

## 2021-11-19 DIAGNOSIS — E78.2 MIXED HYPERLIPIDEMIA: ICD-10-CM

## 2021-11-19 PROCEDURE — 93925 LOWER EXTREMITY STUDY: CPT | Mod: 26,HCNC,, | Performed by: INTERNAL MEDICINE

## 2021-11-19 PROCEDURE — 93925 LOWER EXTREMITY STUDY: CPT | Mod: HCNC

## 2021-11-19 PROCEDURE — 93924 ANKLE BRACHIAL INDICES (ABI): ICD-10-PCS | Mod: 26,HCNC,, | Performed by: INTERNAL MEDICINE

## 2021-11-19 PROCEDURE — 93924 LWR XTR VASC STDY BILAT: CPT | Mod: HCNC

## 2021-11-19 PROCEDURE — 93925 CV US DOPPLER ARTERIAL LEGS BILATERAL (CUPID ONLY): ICD-10-PCS | Mod: 26,HCNC,, | Performed by: INTERNAL MEDICINE

## 2021-11-19 PROCEDURE — 93924 LWR XTR VASC STDY BILAT: CPT | Mod: 26,HCNC,, | Performed by: INTERNAL MEDICINE

## 2021-11-21 LAB
ABI POST MINUTES1: 3 MIN
IMMEDIATE ARM BP: 153 MMHG
IMMEDIATE LEFT ABI: 1.03
IMMEDIATE LEFT TIBIAL: 158 MMHG
IMMEDIATE RIGHT ABI: 0.95
IMMEDIATE RIGHT TIBIAL: 145 MMHG
LEFT ABI: 1.06
LEFT ANT TIBIAL SYS PSV: 92 CM/S
LEFT ARM BP: 151 MMHG
LEFT CFA PSV: 150 CM/S
LEFT DORSALIS PEDIS: 157 MMHG
LEFT PERONEAL SYS PSV: 86 CM/S
LEFT POPLITEAL PSV: 88 CM/S
LEFT POST TIBIAL SYS PSV: 112 CM/S
LEFT POSTERIOR TIBIAL: 160 MMHG
LEFT PROFUNDA SYS PSV: 145 CM/S
LEFT SUPER FEMORAL DIST SYS PSV: 96 CM/S
LEFT SUPER FEMORAL MID SYS PSV: 105 CM/S
LEFT SUPER FEMORAL OSTIAL SYS PSV: 142 CM/S
LEFT SUPER FEMORAL PROX SYS PSV: 117 CM/S
LEFT TBI: 0.82
LEFT TIB/PER TRUNK SYS PSV: 96 CM/S
LEFT TOE PRESSURE: 124 MMHG
OHS CV LEFT LOWER EXTREMITY ABI (NO CALC): 1.06
OHS CV RIGHT ABI LOWER EXTREMITY (NO CALC): 1.01
POST1 ARM BP: 136 MMHG
POST1 LEFT ABI: 1.11
POST1 LEFT TIBIAL: 151 MMHG
POST1 RIGHT ABI: 1.06
POST1 RIGHT TIBIAL: 144 MMHG
RIGHT ABI: 1.01
RIGHT ANT TIBIAL SYS PSV: 62 CM/S
RIGHT ARM BP: 151 MMHG
RIGHT CFA PSV: 201 CM/S
RIGHT DORSALIS PEDIS: 153 MMHG
RIGHT PERONEAL SYS PSV: 81 CM/S
RIGHT POPLITEAL PSV: 64 CM/S
RIGHT POST TIBIAL SYS PSV: 85 CM/S
RIGHT POSTERIOR TIBIAL: 150 MMHG
RIGHT PROFUNDA SYS PSV: 95 CM/S
RIGHT SUPER FEMORAL DIST SYS PSV: 97 CM/S
RIGHT SUPER FEMORAL MID SYS PSV: 93 CM/S
RIGHT SUPER FEMORAL OSTIAL SYS PSV: 201 CM/S
RIGHT SUPER FEMORAL PROX SYS PSV: 207 CM/S
RIGHT TBI: 0.76
RIGHT TIB/PER TRUNK SYS PSV: 66 CM/S
RIGHT TOE PRESSURE: 115 MMHG
TREADMILL GRADE: 10 %
TREADMILL SPEED: 5 MPH
TREADMILL TIME: 5 MIN

## 2021-11-22 ENCOUNTER — TELEPHONE (OUTPATIENT)
Dept: CARDIOLOGY | Facility: CLINIC | Age: 66
End: 2021-11-22
Payer: MEDICARE

## 2021-11-30 ENCOUNTER — TELEPHONE (OUTPATIENT)
Dept: CARDIOLOGY | Facility: CLINIC | Age: 66
End: 2021-11-30
Payer: MEDICARE

## 2021-12-08 ENCOUNTER — OFFICE VISIT (OUTPATIENT)
Dept: CARDIOLOGY | Facility: CLINIC | Age: 66
End: 2021-12-08
Payer: MEDICARE

## 2021-12-08 VITALS
SYSTOLIC BLOOD PRESSURE: 130 MMHG | DIASTOLIC BLOOD PRESSURE: 60 MMHG | HEART RATE: 62 BPM | OXYGEN SATURATION: 97 % | BODY MASS INDEX: 30.09 KG/M2 | HEIGHT: 61 IN | WEIGHT: 159.38 LBS

## 2021-12-08 DIAGNOSIS — R73.03 PREDIABETES: ICD-10-CM

## 2021-12-08 DIAGNOSIS — G47.36 NOCTURNAL HYPOXEMIA DUE TO EMPHYSEMA: ICD-10-CM

## 2021-12-08 DIAGNOSIS — I65.23 BILATERAL CAROTID ARTERY STENOSIS: ICD-10-CM

## 2021-12-08 DIAGNOSIS — M51.36 DDD (DEGENERATIVE DISC DISEASE), LUMBAR: ICD-10-CM

## 2021-12-08 DIAGNOSIS — I10 ESSENTIAL HYPERTENSION: Primary | ICD-10-CM

## 2021-12-08 DIAGNOSIS — I49.5 SINUS NODE DYSFUNCTION: ICD-10-CM

## 2021-12-08 DIAGNOSIS — H81.10 BENIGN PAROXYSMAL POSITIONAL VERTIGO, UNSPECIFIED LATERALITY: ICD-10-CM

## 2021-12-08 DIAGNOSIS — I71.40 ABDOMINAL AORTIC ANEURYSM (AAA) WITHOUT RUPTURE: ICD-10-CM

## 2021-12-08 DIAGNOSIS — J43.9 NOCTURNAL HYPOXEMIA DUE TO EMPHYSEMA: ICD-10-CM

## 2021-12-08 DIAGNOSIS — J44.9 COPD, MODERATE: ICD-10-CM

## 2021-12-08 DIAGNOSIS — E78.2 MIXED HYPERLIPIDEMIA: ICD-10-CM

## 2021-12-08 DIAGNOSIS — K21.9 GASTROESOPHAGEAL REFLUX DISEASE, UNSPECIFIED WHETHER ESOPHAGITIS PRESENT: ICD-10-CM

## 2021-12-08 DIAGNOSIS — I25.118 CORONARY ARTERY DISEASE OF NATIVE ARTERY OF NATIVE HEART WITH STABLE ANGINA PECTORIS: ICD-10-CM

## 2021-12-08 PROCEDURE — 99214 PR OFFICE/OUTPT VISIT, EST, LEVL IV, 30-39 MIN: ICD-10-PCS | Mod: HCNC,S$GLB,, | Performed by: INTERNAL MEDICINE

## 2021-12-08 PROCEDURE — 4010F PR ACE/ARB THEARPY RXD/TAKEN: ICD-10-PCS | Mod: HCNC,CPTII,S$GLB, | Performed by: INTERNAL MEDICINE

## 2021-12-08 PROCEDURE — 4010F ACE/ARB THERAPY RXD/TAKEN: CPT | Mod: HCNC,CPTII,S$GLB, | Performed by: INTERNAL MEDICINE

## 2021-12-08 PROCEDURE — 99999 PR PBB SHADOW E&M-EST. PATIENT-LVL V: ICD-10-PCS | Mod: PBBFAC,HCNC,, | Performed by: INTERNAL MEDICINE

## 2021-12-08 PROCEDURE — 99214 OFFICE O/P EST MOD 30 MIN: CPT | Mod: HCNC,S$GLB,, | Performed by: INTERNAL MEDICINE

## 2021-12-08 PROCEDURE — 99999 PR PBB SHADOW E&M-EST. PATIENT-LVL V: CPT | Mod: PBBFAC,HCNC,, | Performed by: INTERNAL MEDICINE

## 2021-12-14 ENCOUNTER — PATIENT MESSAGE (OUTPATIENT)
Dept: PULMONOLOGY | Facility: CLINIC | Age: 66
End: 2021-12-14
Payer: MEDICARE

## 2021-12-14 DIAGNOSIS — J44.9 COPD, MODERATE: Primary | ICD-10-CM

## 2021-12-14 RX ORDER — BUDESONIDE, GLYCOPYRROLATE, AND FORMOTEROL FUMARATE 160; 9; 4.8 UG/1; UG/1; UG/1
2 AEROSOL, METERED RESPIRATORY (INHALATION) 2 TIMES DAILY
Qty: 10.7 G | Refills: 11 | Status: SHIPPED | OUTPATIENT
Start: 2021-12-14 | End: 2022-03-07 | Stop reason: SDUPTHER

## 2022-01-02 ENCOUNTER — PATIENT MESSAGE (OUTPATIENT)
Dept: PULMONOLOGY | Facility: CLINIC | Age: 67
End: 2022-01-02
Payer: MEDICARE

## 2022-01-05 ENCOUNTER — PATIENT MESSAGE (OUTPATIENT)
Dept: ADMINISTRATIVE | Facility: OTHER | Age: 67
End: 2022-01-05
Payer: MEDICARE

## 2022-01-11 ENCOUNTER — PATIENT OUTREACH (OUTPATIENT)
Dept: ADMINISTRATIVE | Facility: HOSPITAL | Age: 67
End: 2022-01-11
Payer: MEDICARE

## 2022-01-11 NOTE — LETTER
AUTHORIZATION FOR RELEASE OF   CONFIDENTIAL INFORMATION        We are seeing Gemma Vick, date of birth 1955, in the clinic at [facility]. Olga Altman MD is the patient's PCP. Gemma Vick has an outstanding lab/procedure at the time we reviewed her chart. In order to help keep her health information updated, she has authorized us to request the following medical record(s):        (  )  MAMMOGRAM                                      (  )  COLONOSCOPY      (  )  PAP SMEAR                                          (  )  OUTSIDE LAB RESULTS     (  )  DEXA SCAN                                          ( XX )  DIABETIC EYE EXAM            (  )  FOOT EXAM                                          (  )  ENTIRE RECORD     (  )  OUTSIDE IMMUNIZATIONS                 (  )  _______________         Please fax records to [facility], Arif Qureshi, MD, [phone]     If you have any questions, please contact Del Ramirez           Patient Name: Gemma Vick  : 1955  Patient Phone #: [phone]

## 2022-01-11 NOTE — PROGRESS NOTES
Eye Exam requested     Contacted patient to follow up on overdue fit kit. Left message requesting a call back.

## 2022-01-11 NOTE — LETTER
AUTHORIZATION FOR RELEASE OF   CONFIDENTIAL INFORMATION        We are seeing Gemma Vick, date of birth 1955, in the clinic at [facility]. Olga Altman MD is the patient's PCP. Gemma Vick has an outstanding lab/procedure at the time we reviewed her chart. In order to help keep her health information updated, she has authorized us to request the following medical record(s):        (  )  MAMMOGRAM                                      (  )  COLONOSCOPY      (  )  PAP SMEAR                                          (  )  OUTSIDE LAB RESULTS     (  )  DEXA SCAN                                          ( XX )  DIABETIC EYE EXAM            (  )  FOOT EXAM                                          (  )  ENTIRE RECORD     (  )  OUTSIDE IMMUNIZATIONS                 (  )  _______________         Please fax records to [facility], Arif Qureshi, MD, [phone]     If you have any questions, please contact Del Ramirez        Patient Name: Gemma Vick  : 1955  Patient Phone #: [phone]

## 2022-01-31 ENCOUNTER — PATIENT MESSAGE (OUTPATIENT)
Dept: PULMONOLOGY | Facility: CLINIC | Age: 67
End: 2022-01-31
Payer: MEDICARE

## 2022-01-31 DIAGNOSIS — J44.9 COPD, MODERATE: Primary | ICD-10-CM

## 2022-01-31 NOTE — TELEPHONE ENCOUNTER
Orders Placed This Encounter   Procedures    NEBULIZER KIT (SUPPLIES) FOR HOME USE     Medical Necessity of Nebulizer Treatment:  The use of a nebulizer is explicitly recommended on a daily basis for treatment of Chronic Obstructive Pulmonary Disease. Use of the nebulizer is medically necessary for mobilization of secretions for relief of pulmonary symptoms of coughing and airway obstruction. Additionally the use of nebulizer is medically necessary for administration of bronchodilator medications and in some cases inhaled steroids and inhaled antibiotics. The use of nebulizer is recommended at least twice a day and may be used as often as every 4- 6 hours depending on the clinical condition.     Order Specific Question:   Height:     Answer:   5 ft 1 in     Order Specific Question:   Weight:     Answer:   159 lbs     Order Specific Question:   Length of need (1-99 months):     Answer:   99     Order Specific Question:   Mask or Mouthpiece?     Answer:   Mouthpiece    NEBULIZER FOR HOME USE     Malfunction of current nebulizer will no longer nebulized needs replacement.    Medical Necessity of Nebulizer Treatment:  The use of a nebulizer is explicitly recommended on a daily basis for treatment of Chronic Obstructive Pulmonary Disease. Use of the nebulizer is medically necessary for mobilization of secretions for relief of pulmonary symptoms of coughing and airway obstruction. Additionally the use of nebulizer is medically necessary for administration of bronchodilator medications and in some cases inhaled steroids and inhaled antibiotics. The use of nebulizer is recommended at least twice a day and may be used as often as every 4- 6 hours depending on the clinical condition.     Order Specific Question:   Height:     Answer:   5 ft 1 in     Order Specific Question:   Weight:     Answer:   159 lbs     Order Specific Question:   Length of need (1-99 months):     Answer:   99     1. COPD, moderate  NEBULIZER KIT  (SUPPLIES) FOR HOME USE    NEBULIZER FOR HOME USE

## 2022-02-01 ENCOUNTER — TELEPHONE (OUTPATIENT)
Dept: PULMONOLOGY | Facility: CLINIC | Age: 67
End: 2022-02-01
Payer: MEDICARE

## 2022-02-03 ENCOUNTER — PATIENT MESSAGE (OUTPATIENT)
Dept: PULMONOLOGY | Facility: CLINIC | Age: 67
End: 2022-02-03
Payer: MEDICARE

## 2022-02-03 ENCOUNTER — TELEPHONE (OUTPATIENT)
Dept: PULMONOLOGY | Facility: CLINIC | Age: 67
End: 2022-02-03
Payer: MEDICARE

## 2022-02-07 ENCOUNTER — TELEPHONE (OUTPATIENT)
Dept: PULMONOLOGY | Facility: CLINIC | Age: 67
End: 2022-02-07
Payer: MEDICARE

## 2022-02-11 ENCOUNTER — PATIENT OUTREACH (OUTPATIENT)
Dept: ADMINISTRATIVE | Facility: HOSPITAL | Age: 67
End: 2022-02-11
Payer: MEDICARE

## 2022-03-02 DIAGNOSIS — F32.A DEPRESSION, UNSPECIFIED DEPRESSION TYPE: ICD-10-CM

## 2022-03-02 DIAGNOSIS — I25.10 CORONARY ARTERY DISEASE INVOLVING NATIVE CORONARY ARTERY OF NATIVE HEART WITHOUT ANGINA PECTORIS: ICD-10-CM

## 2022-03-02 DIAGNOSIS — I10 ESSENTIAL HYPERTENSION: ICD-10-CM

## 2022-03-02 DIAGNOSIS — Z78.0 MENOPAUSE: ICD-10-CM

## 2022-03-02 RX ORDER — DULOXETIN HYDROCHLORIDE 60 MG/1
CAPSULE, DELAYED RELEASE ORAL
Qty: 90 CAPSULE | Refills: 0 | Status: SHIPPED | OUTPATIENT
Start: 2022-03-02 | End: 2022-03-03 | Stop reason: SDUPTHER

## 2022-03-02 RX ORDER — CLOPIDOGREL BISULFATE 75 MG/1
TABLET ORAL
Qty: 90 TABLET | Refills: 0 | Status: SHIPPED | OUTPATIENT
Start: 2022-03-02 | End: 2022-03-03 | Stop reason: SDUPTHER

## 2022-03-02 RX ORDER — FUROSEMIDE 20 MG/1
TABLET ORAL
Qty: 90 TABLET | Refills: 0 | Status: SHIPPED | OUTPATIENT
Start: 2022-03-02 | End: 2022-03-03 | Stop reason: SDUPTHER

## 2022-03-02 RX ORDER — ESTROGENS, CONJUGATED 0.3 MG/1
TABLET, FILM COATED ORAL
Qty: 90 TABLET | Refills: 0 | Status: SHIPPED | OUTPATIENT
Start: 2022-03-02 | End: 2022-03-03 | Stop reason: SDUPTHER

## 2022-03-02 RX ORDER — BISOPROLOL FUMARATE 5 MG/1
TABLET, FILM COATED ORAL
Qty: 90 TABLET | Refills: 0 | Status: SHIPPED | OUTPATIENT
Start: 2022-03-02 | End: 2022-03-03 | Stop reason: SDUPTHER

## 2022-03-02 RX ORDER — PANTOPRAZOLE SODIUM 40 MG/1
TABLET, DELAYED RELEASE ORAL
Qty: 90 TABLET | Refills: 0 | Status: SHIPPED | OUTPATIENT
Start: 2022-03-02 | End: 2022-03-03 | Stop reason: SDUPTHER

## 2022-03-02 NOTE — TELEPHONE ENCOUNTER
No new care gaps identified.  Powered by [employer]. Reference number: [id number].   3/02/2022 1:25:10 PM CST

## 2022-03-03 DIAGNOSIS — Z78.0 MENOPAUSE: ICD-10-CM

## 2022-03-03 DIAGNOSIS — F32.A DEPRESSION, UNSPECIFIED DEPRESSION TYPE: ICD-10-CM

## 2022-03-03 DIAGNOSIS — I25.10 CORONARY ARTERY DISEASE INVOLVING NATIVE CORONARY ARTERY OF NATIVE HEART WITHOUT ANGINA PECTORIS: ICD-10-CM

## 2022-03-03 DIAGNOSIS — I10 ESSENTIAL HYPERTENSION: ICD-10-CM

## 2022-03-03 RX ORDER — FUROSEMIDE 20 MG/1
TABLET ORAL
Qty: 90 TABLET | Refills: 0 | Status: SHIPPED | OUTPATIENT
Start: 2022-03-03 | End: 2022-09-06

## 2022-03-03 RX ORDER — PANTOPRAZOLE SODIUM 40 MG/1
40 TABLET, DELAYED RELEASE ORAL DAILY
Qty: 90 TABLET | Refills: 0 | Status: SHIPPED | OUTPATIENT
Start: 2022-03-03 | End: 2022-09-06

## 2022-03-03 RX ORDER — BISOPROLOL FUMARATE 5 MG/1
5 TABLET, FILM COATED ORAL DAILY
Qty: 90 TABLET | Refills: 0 | Status: SHIPPED | OUTPATIENT
Start: 2022-03-03 | End: 2022-08-01 | Stop reason: ALTCHOICE

## 2022-03-03 RX ORDER — DULOXETIN HYDROCHLORIDE 60 MG/1
CAPSULE, DELAYED RELEASE ORAL
Qty: 90 CAPSULE | Refills: 0 | Status: SHIPPED | OUTPATIENT
Start: 2022-03-03 | End: 2022-07-28

## 2022-03-03 RX ORDER — CLOPIDOGREL BISULFATE 75 MG/1
75 TABLET ORAL DAILY
Qty: 90 TABLET | Refills: 0 | Status: SHIPPED | OUTPATIENT
Start: 2022-03-03 | End: 2022-09-06

## 2022-03-03 NOTE — TELEPHONE ENCOUNTER
No new care gaps identified.  Powered by [employer]. Reference number: [id number].   3/03/2022 1:53:51 PM CST

## 2022-03-07 ENCOUNTER — OFFICE VISIT (OUTPATIENT)
Dept: PULMONOLOGY | Facility: CLINIC | Age: 67
End: 2022-03-07
Payer: MEDICARE

## 2022-03-07 ENCOUNTER — HOSPITAL ENCOUNTER (OUTPATIENT)
Dept: RADIOLOGY | Facility: HOSPITAL | Age: 67
Discharge: HOME | End: 2022-03-07
Attending: NURSE PRACTITIONER
Payer: MEDICARE

## 2022-03-07 VITALS
WEIGHT: 150.56 LBS | HEART RATE: 60 BPM | RESPIRATION RATE: 18 BRPM | DIASTOLIC BLOOD PRESSURE: 60 MMHG | BODY MASS INDEX: 28.42 KG/M2 | OXYGEN SATURATION: 97 % | HEIGHT: 61 IN | SYSTOLIC BLOOD PRESSURE: 120 MMHG

## 2022-03-07 DIAGNOSIS — J44.9 COPD, MODERATE: ICD-10-CM

## 2022-03-07 DIAGNOSIS — K21.9 GASTROESOPHAGEAL REFLUX DISEASE, UNSPECIFIED WHETHER ESOPHAGITIS PRESENT: ICD-10-CM

## 2022-03-07 DIAGNOSIS — I65.23 BILATERAL CAROTID ARTERY STENOSIS: ICD-10-CM

## 2022-03-07 DIAGNOSIS — G47.36 NOCTURNAL HYPOXEMIA DUE TO EMPHYSEMA: ICD-10-CM

## 2022-03-07 DIAGNOSIS — I71.40 ABDOMINAL AORTIC ANEURYSM (AAA) WITHOUT RUPTURE: ICD-10-CM

## 2022-03-07 DIAGNOSIS — J44.9 COPD, MODERATE: Primary | ICD-10-CM

## 2022-03-07 DIAGNOSIS — I10 ESSENTIAL HYPERTENSION: ICD-10-CM

## 2022-03-07 DIAGNOSIS — F17.201 TOBACCO USE DISORDER, MODERATE, IN SUSTAINED REMISSION: ICD-10-CM

## 2022-03-07 DIAGNOSIS — J43.9 NOCTURNAL HYPOXEMIA DUE TO EMPHYSEMA: ICD-10-CM

## 2022-03-07 PROCEDURE — 1160F RVW MEDS BY RX/DR IN RCRD: CPT | Mod: CPTII,S$GLB,, | Performed by: NURSE PRACTITIONER

## 2022-03-07 PROCEDURE — 3074F SYST BP LT 130 MM HG: CPT | Mod: CPTII,S$GLB,, | Performed by: NURSE PRACTITIONER

## 2022-03-07 PROCEDURE — 3074F PR MOST RECENT SYSTOLIC BLOOD PRESSURE < 130 MM HG: ICD-10-PCS | Mod: CPTII,S$GLB,, | Performed by: NURSE PRACTITIONER

## 2022-03-07 PROCEDURE — 3078F PR MOST RECENT DIASTOLIC BLOOD PRESSURE < 80 MM HG: ICD-10-PCS | Mod: CPTII,S$GLB,, | Performed by: NURSE PRACTITIONER

## 2022-03-07 PROCEDURE — 99214 PR OFFICE/OUTPT VISIT, EST, LEVL IV, 30-39 MIN: ICD-10-PCS | Mod: S$GLB,,, | Performed by: NURSE PRACTITIONER

## 2022-03-07 PROCEDURE — 4010F PR ACE/ARB THEARPY RXD/TAKEN: ICD-10-PCS | Mod: CPTII,S$GLB,, | Performed by: NURSE PRACTITIONER

## 2022-03-07 PROCEDURE — 99999 PR PBB SHADOW E&M-EST. PATIENT-LVL IV: ICD-10-PCS | Mod: PBBFAC,,, | Performed by: NURSE PRACTITIONER

## 2022-03-07 PROCEDURE — 3008F BODY MASS INDEX DOCD: CPT | Mod: CPTII,S$GLB,, | Performed by: NURSE PRACTITIONER

## 2022-03-07 PROCEDURE — 3008F PR BODY MASS INDEX (BMI) DOCUMENTED: ICD-10-PCS | Mod: CPTII,S$GLB,, | Performed by: NURSE PRACTITIONER

## 2022-03-07 PROCEDURE — 71046 X-RAY EXAM CHEST 2 VIEWS: CPT | Mod: TC

## 2022-03-07 PROCEDURE — 4010F ACE/ARB THERAPY RXD/TAKEN: CPT | Mod: CPTII,S$GLB,, | Performed by: NURSE PRACTITIONER

## 2022-03-07 PROCEDURE — 99999 PR PBB SHADOW E&M-EST. PATIENT-LVL IV: CPT | Mod: PBBFAC,,, | Performed by: NURSE PRACTITIONER

## 2022-03-07 PROCEDURE — 1160F PR REVIEW ALL MEDS BY PRESCRIBER/CLIN PHARMACIST DOCUMENTED: ICD-10-PCS | Mod: CPTII,S$GLB,, | Performed by: NURSE PRACTITIONER

## 2022-03-07 PROCEDURE — 99214 OFFICE O/P EST MOD 30 MIN: CPT | Mod: S$GLB,,, | Performed by: NURSE PRACTITIONER

## 2022-03-07 PROCEDURE — 71046 XR CHEST PA AND LATERAL: ICD-10-PCS | Mod: 26,,, | Performed by: RADIOLOGY

## 2022-03-07 PROCEDURE — 3078F DIAST BP <80 MM HG: CPT | Mod: CPTII,S$GLB,, | Performed by: NURSE PRACTITIONER

## 2022-03-07 PROCEDURE — 3288F PR FALLS RISK ASSESSMENT DOCUMENTED: ICD-10-PCS | Mod: CPTII,S$GLB,, | Performed by: NURSE PRACTITIONER

## 2022-03-07 PROCEDURE — 1101F PR PT FALLS ASSESS DOC 0-1 FALLS W/OUT INJ PAST YR: ICD-10-PCS | Mod: CPTII,S$GLB,, | Performed by: NURSE PRACTITIONER

## 2022-03-07 PROCEDURE — 3288F FALL RISK ASSESSMENT DOCD: CPT | Mod: CPTII,S$GLB,, | Performed by: NURSE PRACTITIONER

## 2022-03-07 PROCEDURE — 71046 X-RAY EXAM CHEST 2 VIEWS: CPT | Mod: 26,,, | Performed by: RADIOLOGY

## 2022-03-07 PROCEDURE — 1101F PT FALLS ASSESS-DOCD LE1/YR: CPT | Mod: CPTII,S$GLB,, | Performed by: NURSE PRACTITIONER

## 2022-03-07 PROCEDURE — 1159F MED LIST DOCD IN RCRD: CPT | Mod: CPTII,S$GLB,, | Performed by: NURSE PRACTITIONER

## 2022-03-07 PROCEDURE — 1159F PR MEDICATION LIST DOCUMENTED IN MEDICAL RECORD: ICD-10-PCS | Mod: CPTII,S$GLB,, | Performed by: NURSE PRACTITIONER

## 2022-03-07 RX ORDER — PREDNISOLONE ACETATE 10 MG/ML
SUSPENSION/ DROPS OPHTHALMIC
COMMUNITY
Start: 2021-12-14 | End: 2022-05-23

## 2022-03-07 RX ORDER — ALBUTEROL SULFATE 90 UG/1
AEROSOL, METERED RESPIRATORY (INHALATION)
Qty: 54 G | Refills: 3 | Status: SHIPPED | OUTPATIENT
Start: 2022-03-07 | End: 2022-06-13 | Stop reason: SDUPTHER

## 2022-03-07 RX ORDER — AMLODIPINE BESYLATE 5 MG/1
10 TABLET ORAL DAILY
COMMUNITY
Start: 2022-01-21 | End: 2022-05-23

## 2022-03-07 RX ORDER — BUDESONIDE, GLYCOPYRROLATE, AND FORMOTEROL FUMARATE 160; 9; 4.8 UG/1; UG/1; UG/1
2 AEROSOL, METERED RESPIRATORY (INHALATION) 2 TIMES DAILY
Qty: 32.1 G | Refills: 3 | Status: SHIPPED | OUTPATIENT
Start: 2022-03-07 | End: 2022-06-24 | Stop reason: SDUPTHER

## 2022-03-07 NOTE — PROGRESS NOTES
"  Chief Complaint   Patient presents with    COPD        Gemma Vick is a 67 y.o. female presents to pulmonary clinic for follow up visit COPD with review chest xray.     Followed in pulmonary for COPD by me, 6/9/2021.    Presents stable. No acute flare. Rare cough or wheezing since on Breztri ICS/LABA/LAMA.    Current regimen: controller Breztri triple therapy ICS/LABA/LAMA controller.  Rescue combivent respimat and Albuterol inhaler     Quit cigarette smoking in May 2017.      She uses 1 pillow at night. Patient currently is on oxygen at 3 L/min per nasal cannula. at night. No obstructive sleep apnea.   No constitutional symptoms, denies fever, chills, anorexia, or weight loss.    Continues to use nicotine in form of vapor 3 mg typically every 2-3 days.   Cares for 12, 11, 7 grand children and two 2 yr old great grand babies. Only exercise limitations if walking long distances.      Previous Report Reviewed: lab reports, office notes and radiology reports     The following portions of the patient's history were reviewed and updated as appropriate: allergies, current medications, past family history, past medical history, past social history, past surgical history and problem list.    Review of Systems  A comprehensive review of systems was negative except for: Respiratory: positive for cough and wheezing     Objective:      /60   Pulse 60   Resp 18   Ht 5' 1" (1.549 m)   Wt 68.3 kg (150 lb 9.2 oz)   SpO2 97% Comment: 3 liters  BMI 28.45 kg/m²   General appearance: alert, appears stated age and cooperative  Head: Normocephalic, without obvious abnormality, atraumatic  Eyes: negative  Ears: normal TM's and external ear canals both ears  Nose: Nares normal. Septum midline. Mucosa normal. No drainage or sinus tenderness.  Throat: lips, mucosa, and tongue normal; teeth and gums normal  Neck: no adenopathy and no carotid bruit  Lungs: clear to auscultation bilaterally and no wheezing, no rales. "   Abdomen: normal findings: soft, non-tender  Extremities: extremities normal, atraumatic, no cyanosis or edema  Pulses: 2+ and symmetric  Skin: Skin color, texture, turgor normal. No rashes or lesions  Lymph nodes: Cervical, supraclavicular, and axillary nodes normal.  Neurologic: Grossly normal    Diagnostic Review       X-Ray Chest PA And Lateral  Narrative: EXAMINATION:  XR CHEST PA AND LATERAL    CLINICAL HISTORY:  Chronic obstructive pulmonary disease, unspecified    TECHNIQUE:  PA and lateral views of the chest were performed.    COMPARISON:  06/09/2021    FINDINGS:  The lungs are clear and free of infiltrate.  No pleural effusion or pneumothorax. The heart is borderline enlarged.  There is mild tortuosity of the descending thoracic aorta.  Single surgical clip noted in the right upper quadrant of the abdomen.  Impression: 1.  No acute cardiopulmonary process.    Electronically signed by: Francis Ventura DO  Date:    03/07/2022  Time:    11:11      Spirometry 6/9/2021 moderate obstruction FEV1 59.2%.    Spirometry 7/31/2018 FEV1 67% predicted, improved compared to prior.     Pulmonary function tests: 9/18/2017 FEV1: 1.18  (51 % predicted), FVC:  2.32 (78 % predicted), FEV1/FVC:  51 (65 % predicted).  Post bronchodilator: FEV1: 1.41  (62 % predicted), FVC:  2.64 (88 % predicted), FEV1/FVC:  54 (69 % predicted).   Moderate obstructive airflow defect  Positive bronchodilator response with 20% increase in FEV1 post bronchodilator.   FEV1 improved by 14% compared to tomer 12/15/2016.       6/9/2021 Overnight Oxygen Saturation Study: abnormal, 6/9/2021 NC 3 lm sleep.     INTERPRETATION:   Conditions of Test: Noted above in report     Interpretation of results of overnight oxygen saturation study.   This was a technically  adequate study. Overnight oxygen   saturation study is abnormal with O2 saturation less than 88%.   Time with SpO2<89: 2:10:40, 26.3%of the study period. Lowest   oxygen saturation recorded was 74%  .   Clinical correlation suggested.   Andres Morales MD     Medicare Criteria Comments:   Overnight Oximetry test results suggest the patient does fall   under Medicare Group 1 Criteria and would be eligible for oxygen   prescription.    (Arterial oxygen saturation at or below 88% for at least 5   minutes taken during sleep on stable outpatient)      10/29/2015 PSG Normal Apnea hypopnea index. AHI 0.0. No GALLO. The low SpO2 was 86%. Period with saturations below 88% was 39.9 minutes. Supplementary oxygen indicated with sleep.         Assessment:          1. COPD, moderate  Spirometry with/without bronchodilator    albuterol (PROVENTIL/VENTOLIN HFA) 90 mcg/actuation inhaler    ipratropium-albuteroL (COMBIVENT)  mcg/actuation inhaler    budesonide-glycopyr-formoterol (BREZTRI AEROSPHERE) 160-9-4.8 mcg/actuation HFAA   2. Nocturnal hypoxemia due to emphysema     3. Tobacco use disorder, moderate, in sustained remission     4. Gastroesophageal reflux disease, unspecified whether esophagitis present     5. Essential hypertension     6. Abdominal aortic aneurysm (AAA) without rupture     7. Bilateral carotid artery stenosis          Plan:     Problem List Items Addressed This Visit     Tobacco use disorder, moderate, in sustained remission     compete smoking cessation May 2017, continues to use nicotine vapor 2 times day, most days.     LDCT ordered by Dr. Altman   10/17/2020 LDCT  Probably Benign- 6 month LDCT  Repeat LDCT scheduled 4/28/2022 ordered by Dr. Altman            Nocturnal hypoxemia due to emphysema     6/9/2021 abnormal overnight oximetry requalifed to continue home O2. 6/14/2021 orders NC 3lm sleep.   10/29/2015 PSG Normal Apnea hypopnea index. AHI 0.0. No GALLO. The low SpO2 was 86%. Period with saturations below 88% was 39.9 minutes. Supplementary oxygen indicated with sleep.                GERD (gastroesophageal reflux disease)     Stable  Managed by primary care provider             Essential  hypertension     Stable and controlled. Continue current treatment plan as previously prescribed with your cardiologist and [facility]               COPD, moderate - Primary     Controlled on Breztri, combivent, Albuterol inhaler. Duo nebs if needed  3/7/2022 chest xray stable COPD  6/9/2021 spirometry moderate obstruction FEV1 59.2  Continue current regimen   Continue NC 3lm sleep   Follow up 6 months spirometry            Relevant Medications    albuterol (PROVENTIL/VENTOLIN HFA) 90 mcg/actuation inhaler    ipratropium-albuteroL (COMBIVENT)  mcg/actuation inhaler    budesonide-glycopyr-formoterol (BREZTRI AEROSPHERE) 160-9-4.8 mcg/actuation HFAA    Other Relevant Orders    Spirometry with/without bronchodilator    Carotid stenosis     Managed by cardiology             AAA (abdominal aortic aneurysm)     Follows with Dr. Kamara yearly                 Discussed diagnosis, its evaluation, treatment and usual course.  All questions answered.      Follow up in about 6 months (around 9/7/2022) for COPD, w/review tomer.

## 2022-03-07 NOTE — ASSESSMENT & PLAN NOTE
Stable and controlled. Continue current treatment plan as previously prescribed with your cardiologist and [facility]

## 2022-03-07 NOTE — ASSESSMENT & PLAN NOTE
6/9/2021 abnormal overnight oximetry requalifed to continue home O2. 6/14/2021 orders NC 3lm sleep.   10/29/2015 PSG Normal Apnea hypopnea index. AHI 0.0. No GALLO. The low SpO2 was 86%. Period with saturations below 88% was 39.9 minutes. Supplementary oxygen indicated with sleep.

## 2022-03-07 NOTE — ASSESSMENT & PLAN NOTE
compete smoking cessation May 2017, continues to use nicotine vapor 2 times day, most days.     LDCT ordered by Dr. Altman   10/17/2020 LDCT  Probably Benign- 6 month LDCT  Repeat LDCT scheduled 4/28/2022 ordered by Dr. Altman

## 2022-03-07 NOTE — ASSESSMENT & PLAN NOTE
Controlled on Breztri, combivent, Albuterol inhaler. Duo nebs if needed  3/7/2022 chest xray stable COPD  6/9/2021 spirometry moderate obstruction FEV1 59.2  Continue current regimen   Continue NC 3lm sleep   Follow up 6 months spirometry

## 2022-04-01 ENCOUNTER — TELEPHONE (OUTPATIENT)
Dept: FAMILY MEDICINE | Facility: CLINIC | Age: 67
End: 2022-04-01
Payer: MEDICARE

## 2022-04-01 ENCOUNTER — OFFICE VISIT (OUTPATIENT)
Dept: CARDIOLOGY | Facility: CLINIC | Age: 67
End: 2022-04-01
Payer: MEDICARE

## 2022-04-01 VITALS
DIASTOLIC BLOOD PRESSURE: 74 MMHG | BODY MASS INDEX: 29.1 KG/M2 | HEART RATE: 63 BPM | OXYGEN SATURATION: 98 % | SYSTOLIC BLOOD PRESSURE: 130 MMHG | WEIGHT: 154.13 LBS | HEIGHT: 61 IN

## 2022-04-01 DIAGNOSIS — E78.2 MIXED HYPERLIPIDEMIA: ICD-10-CM

## 2022-04-01 DIAGNOSIS — F17.201 TOBACCO USE DISORDER, MODERATE, IN SUSTAINED REMISSION: ICD-10-CM

## 2022-04-01 DIAGNOSIS — I25.118 CORONARY ARTERY DISEASE OF NATIVE ARTERY OF NATIVE HEART WITH STABLE ANGINA PECTORIS: ICD-10-CM

## 2022-04-01 DIAGNOSIS — R73.03 PREDIABETES: ICD-10-CM

## 2022-04-01 DIAGNOSIS — I10 ESSENTIAL HYPERTENSION: Primary | ICD-10-CM

## 2022-04-01 DIAGNOSIS — R42 DIZZINESS: ICD-10-CM

## 2022-04-01 DIAGNOSIS — I49.5 SINUS NODE DYSFUNCTION: ICD-10-CM

## 2022-04-01 DIAGNOSIS — J44.9 COPD, MODERATE: ICD-10-CM

## 2022-04-01 DIAGNOSIS — H81.10 BENIGN PAROXYSMAL POSITIONAL VERTIGO, UNSPECIFIED LATERALITY: ICD-10-CM

## 2022-04-01 DIAGNOSIS — I65.23 BILATERAL CAROTID ARTERY STENOSIS: ICD-10-CM

## 2022-04-01 DIAGNOSIS — Z86.16 PERSONAL HISTORY OF COVID-19: ICD-10-CM

## 2022-04-01 DIAGNOSIS — I71.40 ABDOMINAL AORTIC ANEURYSM (AAA) WITHOUT RUPTURE: ICD-10-CM

## 2022-04-01 PROCEDURE — 1100F PR PT FALLS ASSESS DOC 2+ FALLS/FALL W/INJURY/YR: ICD-10-PCS | Mod: CPTII,S$GLB,, | Performed by: INTERNAL MEDICINE

## 2022-04-01 PROCEDURE — 3078F PR MOST RECENT DIASTOLIC BLOOD PRESSURE < 80 MM HG: ICD-10-PCS | Mod: CPTII,S$GLB,, | Performed by: INTERNAL MEDICINE

## 2022-04-01 PROCEDURE — 4010F PR ACE/ARB THEARPY RXD/TAKEN: ICD-10-PCS | Mod: CPTII,S$GLB,, | Performed by: INTERNAL MEDICINE

## 2022-04-01 PROCEDURE — 99499 UNLISTED E&M SERVICE: CPT | Mod: HCNC,S$GLB,, | Performed by: INTERNAL MEDICINE

## 2022-04-01 PROCEDURE — 3288F PR FALLS RISK ASSESSMENT DOCUMENTED: ICD-10-PCS | Mod: CPTII,S$GLB,, | Performed by: INTERNAL MEDICINE

## 2022-04-01 PROCEDURE — 4010F ACE/ARB THERAPY RXD/TAKEN: CPT | Mod: CPTII,S$GLB,, | Performed by: INTERNAL MEDICINE

## 2022-04-01 PROCEDURE — 99499 RISK ADDL DX/OHS AUDIT: ICD-10-PCS | Mod: HCNC,S$GLB,, | Performed by: INTERNAL MEDICINE

## 2022-04-01 PROCEDURE — 3075F PR MOST RECENT SYSTOLIC BLOOD PRESS GE 130-139MM HG: ICD-10-PCS | Mod: CPTII,S$GLB,, | Performed by: INTERNAL MEDICINE

## 2022-04-01 PROCEDURE — 1159F PR MEDICATION LIST DOCUMENTED IN MEDICAL RECORD: ICD-10-PCS | Mod: CPTII,S$GLB,, | Performed by: INTERNAL MEDICINE

## 2022-04-01 PROCEDURE — 1126F AMNT PAIN NOTED NONE PRSNT: CPT | Mod: CPTII,S$GLB,, | Performed by: INTERNAL MEDICINE

## 2022-04-01 PROCEDURE — 3078F DIAST BP <80 MM HG: CPT | Mod: CPTII,S$GLB,, | Performed by: INTERNAL MEDICINE

## 2022-04-01 PROCEDURE — 1126F PR PAIN SEVERITY QUANTIFIED, NO PAIN PRESENT: ICD-10-PCS | Mod: CPTII,S$GLB,, | Performed by: INTERNAL MEDICINE

## 2022-04-01 PROCEDURE — 99214 PR OFFICE/OUTPT VISIT, EST, LEVL IV, 30-39 MIN: ICD-10-PCS | Mod: S$GLB,,, | Performed by: INTERNAL MEDICINE

## 2022-04-01 PROCEDURE — 3288F FALL RISK ASSESSMENT DOCD: CPT | Mod: CPTII,S$GLB,, | Performed by: INTERNAL MEDICINE

## 2022-04-01 PROCEDURE — 3008F PR BODY MASS INDEX (BMI) DOCUMENTED: ICD-10-PCS | Mod: CPTII,S$GLB,, | Performed by: INTERNAL MEDICINE

## 2022-04-01 PROCEDURE — 99999 PR PBB SHADOW E&M-EST. PATIENT-LVL V: CPT | Mod: PBBFAC,,, | Performed by: INTERNAL MEDICINE

## 2022-04-01 PROCEDURE — 99999 PR PBB SHADOW E&M-EST. PATIENT-LVL V: ICD-10-PCS | Mod: PBBFAC,,, | Performed by: INTERNAL MEDICINE

## 2022-04-01 PROCEDURE — 1159F MED LIST DOCD IN RCRD: CPT | Mod: CPTII,S$GLB,, | Performed by: INTERNAL MEDICINE

## 2022-04-01 PROCEDURE — 3075F SYST BP GE 130 - 139MM HG: CPT | Mod: CPTII,S$GLB,, | Performed by: INTERNAL MEDICINE

## 2022-04-01 PROCEDURE — 99214 OFFICE O/P EST MOD 30 MIN: CPT | Mod: S$GLB,,, | Performed by: INTERNAL MEDICINE

## 2022-04-01 PROCEDURE — 1100F PTFALLS ASSESS-DOCD GE2>/YR: CPT | Mod: CPTII,S$GLB,, | Performed by: INTERNAL MEDICINE

## 2022-04-01 PROCEDURE — 3008F BODY MASS INDEX DOCD: CPT | Mod: CPTII,S$GLB,, | Performed by: INTERNAL MEDICINE

## 2022-04-01 NOTE — TELEPHONE ENCOUNTER
Spoke with pt regarding medications she is requesting for diverticulitis. Pt states she went to urgent care with intestines full of bile was prescribed a round of magnesium citrate and finished it. She was told her diverticulitis flare up and is requesting antibiotics, bentyl and probiotics that was given in the past. Please advise!

## 2022-04-01 NOTE — TELEPHONE ENCOUNTER
----- Message from Justin June sent at 4/1/2022  3:21 PM CDT -----  Contact: PT  Calling in regards to medication she needs. Call back at [phone]

## 2022-04-01 NOTE — PROGRESS NOTES
Subjective:   Patient ID:  Gemma Vick is a 67 y.o. female who presents for follow up of Essential hypertension      HPI  3/26/2020  A 64 yo female with pvd carotid disease htn hlp copd exsmoker on nocturnal o2 therapy wants to discuss test results. She is in digital htn has been unable to tolerate bisoprolol daily feels tired fatigued no energy she takes meds regularily tries to be compliant with salt no exercise but stays busy in the house. She takes care of her grandbabies. Has occasional leg swelling she takes lasix prn for weight change she stands on her feet a lot. She is compliant with inhalers to control shortness of breath no palpitation has chronic cough.  Her carotid u/s reviewed unchanged.abd u/s no aneurysm  Lipids on target she is well controlled on vytorin .she has difficulty with crestor and lipitor   9/24/2020  She is here for  f/u she is complaining of recurrent episodes of abdominal pain lower quadrant and got to be upper abdomen associated with nausea vomiting no post prandial pain no weight loss or food avoidance. Has no new symptoms of chest pain she is still short of breath no new palpitation tia.   Ct abdomen showed diverticulitis aaa 3.4 cm and sma stenosis.   Her ldl has increased. She has had sore muscles she stopped statins w/o improvemnet. She needs to back on statins that would explain he rincreased ldl.      3/25/2021  Here for f/u has sharp recurrent left sided occurring at rest sitting in recliner she cannot take a deep breath no exertional symptoms she takes care of her grandbabies no smoking no tia palpitation syncope near syncope. She takes  meds regularily .reveiw of her bp chart still showed elevated indices. She claims salt compliance. She could not tolerate amlodipine bid . Her lipids aRE ON TARGET.      9/30/2021    has been using o2 therapy at nite no change in symptoms her bp is elevated she takes amlodipine 5 mg at nite she is not compliant with diet. Salt wise.she  takes care of grandkids no chest pain no cardiac symptoms. She has the urge to go to bathroom after she eats she has upperabdominal pain and feels like throwing up. Has known stenosis of the sma  She has lost weight.      11/1/2021   She is taking 7.5 mg amlodipine still needs better  salt control bp acceptable here however at home is more elevated but we have not checked her machine. She continues to have abdominal symptoms. She wants alternatives to ibesartan her pill is too big to swallow her lipids are on target. (she will check on diovan 320 and benicar 40 mg with pharmacist about size and cost).she is in digital htn her bp readings are more elevated that today clinic readings.she ahs not been compliant with diet she had fried chicken mashed potatoes french fries and   And biscuits  From [employer].   She had cta for her abdominal symptoms showing celiac stenosis and stenosis at the sma. Has also bilateral iliac stenosis greater than 50%.   Her carotid anatomy is unchanged.         12/8/2021   Today bp is controlled she ahs taken a fluid pill her bp readings at home since last visit has been 160-170 range. She si non compliant with salt she took diuretics due to leg swelling which is related to amlodipine. She has also an mason with exercise that did not suggest significant disease she continues to have symptoms suggestive of musculoskeltal on the rt and sciatica. No definite claudication no discoloration. she has no tia.     4/1/2022  Not much leg swelling has been drinking a lot of water bp fluctuates based on salt her digital htn reflects that now today is on target. Has been in after hours due abdominal pain constipation issue. Has nausea at that time . All improved still          dealing with constipation has use magnesium citrate.   She is not using diuretics except intermittently   Has question about arthritis meds she can take advised short course is ok but prolonged use is high risk of bleeding.   Past  Medical History:   Diagnosis Date    AAA (abdominal aortic aneurysm) 2014    Abdominal aneurysm     3    Acute coronary syndrome     Arthritis     BPPV (benign paroxysmal positional vertigo)     Carotid artery plaque     Carotid artery stenosis and occlusion 2014    Chronic back pain     COPD (chronic obstructive pulmonary disease)     Coronary artery disease     Emphysema lung     Hyperlipidemia     Hypertension     Myocardial infarction     x3    Neuropathy        Past Surgical History:   Procedure Laterality Date    CARDIAC CATHETERIZATION      CORONARY ANGIOPLASTY      HYSTERECTOMY         Social History     Tobacco Use    Smoking status: Former Smoker     Packs/day: 0.50     Years: 44.00     Pack years: 22.00     Types: Cigarettes, Vaping w/o nicotine     Start date: 1970     Quit date: 2017     Years since quittin.9    Smokeless tobacco: Never Used    Tobacco comment: 3 MG NICOTINE FOR HEART.    Substance Use Topics    Alcohol use: No    Drug use: No       Family History   Problem Relation Age of Onset    Heart disease Mother     Heart attacks under age 50 Father     Heart attacks under age 50 Brother     Breast cancer Paternal Aunt        Current Outpatient Medications   Medication Sig    albuterol (PROVENTIL/VENTOLIN HFA) 90 mcg/actuation inhaler INHALE 2 PUFFS INTO LUNGS EVERY 6 HOURS AS NEEDED    albuterol-ipratropium (DUO-NEB) 2.5 mg-0.5 mg/3 mL nebulizer solution Take 3 mLs by nebulization every 6 (six) hours as needed for Wheezing or Shortness of Breath. 1 VIAL NEBULIZED TWICE A DAY    amLODIPine (NORVASC) 5 MG tablet     aspirin 81 MG Chew Take 81 mg by mouth once daily.    bisoprolol (ZEBETA) 5 MG tablet Take 1 tablet (5 mg total) by mouth once daily.    budesonide-glycopyr-formoterol (BREZTRI AEROSPHERE) 160-9-4.8 mcg/actuation HFAA Inhale 2 puffs into the lungs 2 (two) times a day.    clopidogreL (PLAVIX) 75 mg tablet Take 1 tablet (75 mg  total) by mouth once daily.    dicyclomine (BENTYL) 10 MG capsule TAKE 1 CAPSULE BY MOUTH BEFORE MEALS AND AT BEDTIME AS NEEDED    DULoxetine (CYMBALTA) 60 MG capsule TAKE (1) CAPSULE BY MOUTH ONCE A DAY    estrogens, conjugated, (PREMARIN) 0.3 MG tablet Take 1 tablet (0.3 mg total) by mouth once daily.    ezetimibe-simvastatin 10-40 mg (VYTORIN) 10-40 mg per tablet Take 1 tablet by mouth every evening.    furosemide (LASIX) 20 MG tablet 1 TAB DAILY AS NEEDED FOR SWELLING OF LEG,OR WEIGHT GAIN OF 3 PDS IN 1 DAY OR 5 PDS IN 1 WEEK    ibuprofen (ADVIL,MOTRIN) 400 MG tablet TAKE 1 TABLET BY MOUTH EVERY 6 HOURS AS NEEDED    ipratropium-albuteroL (COMBIVENT)  mcg/actuation inhaler Inhale 1 puff into the lungs every 6 (six) hours as needed for Wheezing. Rescue    olmesartan (BENICAR) 40 MG tablet Take 1 tablet (40 mg total) by mouth once daily.    OXYGEN-AIR DELIVERY SYSTEMS MISC 2 L by Misc.(Non-Drug; Combo Route) route every evening.    pantoprazole (PROTONIX) 40 MG tablet Take 1 tablet (40 mg total) by mouth once daily.    prednisoLONE acetate (PRED FORTE) 1 % DrpS     vitamin D (VITAMIN D3) 1000 units Tab Take 1,000 Units by mouth once daily.    zinc gluconate 50 mg tablet Take 50 mg by mouth once daily.    zolpidem (AMBIEN) 10 mg Tab Take 1 tablet (10 mg total) by mouth every evening.     No current facility-administered medications for this visit.     Current Outpatient Medications on File Prior to Visit   Medication Sig    albuterol (PROVENTIL/VENTOLIN HFA) 90 mcg/actuation inhaler INHALE 2 PUFFS INTO LUNGS EVERY 6 HOURS AS NEEDED    albuterol-ipratropium (DUO-NEB) 2.5 mg-0.5 mg/3 mL nebulizer solution Take 3 mLs by nebulization every 6 (six) hours as needed for Wheezing or Shortness of Breath. 1 VIAL NEBULIZED TWICE A DAY    amLODIPine (NORVASC) 5 MG tablet     aspirin 81 MG Chew Take 81 mg by mouth once daily.    bisoprolol (ZEBETA) 5 MG tablet Take 1 tablet (5 mg total) by mouth once  daily.    budesonide-glycopyr-formoterol (BREZTRI AEROSPHERE) 160-9-4.8 mcg/actuation HFAA Inhale 2 puffs into the lungs 2 (two) times a day.    clopidogreL (PLAVIX) 75 mg tablet Take 1 tablet (75 mg total) by mouth once daily.    dicyclomine (BENTYL) 10 MG capsule TAKE 1 CAPSULE BY MOUTH BEFORE MEALS AND AT BEDTIME AS NEEDED    DULoxetine (CYMBALTA) 60 MG capsule TAKE (1) CAPSULE BY MOUTH ONCE A DAY    estrogens, conjugated, (PREMARIN) 0.3 MG tablet Take 1 tablet (0.3 mg total) by mouth once daily.    ezetimibe-simvastatin 10-40 mg (VYTORIN) 10-40 mg per tablet Take 1 tablet by mouth every evening.    furosemide (LASIX) 20 MG tablet 1 TAB DAILY AS NEEDED FOR SWELLING OF LEG,OR WEIGHT GAIN OF 3 PDS IN 1 DAY OR 5 PDS IN 1 WEEK    ibuprofen (ADVIL,MOTRIN) 400 MG tablet TAKE 1 TABLET BY MOUTH EVERY 6 HOURS AS NEEDED    ipratropium-albuteroL (COMBIVENT)  mcg/actuation inhaler Inhale 1 puff into the lungs every 6 (six) hours as needed for Wheezing. Rescue    olmesartan (BENICAR) 40 MG tablet Take 1 tablet (40 mg total) by mouth once daily.    OXYGEN-AIR DELIVERY SYSTEMS MISC 2 L by Misc.(Non-Drug; Combo Route) route every evening.    pantoprazole (PROTONIX) 40 MG tablet Take 1 tablet (40 mg total) by mouth once daily.    prednisoLONE acetate (PRED FORTE) 1 % DrpS     vitamin D (VITAMIN D3) 1000 units Tab Take 1,000 Units by mouth once daily.    zinc gluconate 50 mg tablet Take 50 mg by mouth once daily.    zolpidem (AMBIEN) 10 mg Tab Take 1 tablet (10 mg total) by mouth every evening.     No current facility-administered medications on file prior to visit.       Review of Systems   Constitutional: Negative for malaise/fatigue.   Eyes: Negative for blurred vision.   Cardiovascular: Negative for chest pain, claudication, cyanosis, dyspnea on exertion, irregular heartbeat, leg swelling, near-syncope, orthopnea, palpitations and paroxysmal nocturnal dyspnea.   Respiratory: Negative for cough, hemoptysis  and shortness of breath.    Hematologic/Lymphatic: Negative for bleeding problem. Bruises/bleeds easily.   Skin: Negative for dry skin and itching.   Musculoskeletal: Positive for arthritis and falls. Negative for muscle weakness and myalgias.   Gastrointestinal: Negative for abdominal pain, diarrhea, heartburn, hematemesis, hematochezia and melena.   Genitourinary: Negative for flank pain and hematuria.   Neurological: Positive for loss of balance. Negative for dizziness, focal weakness, headaches, light-headedness, numbness, paresthesias, seizures and weakness.   Psychiatric/Behavioral: Negative for altered mental status and memory loss. The patient is not nervous/anxious.    Allergic/Immunologic: Negative for hives.       Objective:   Physical Exam  Vitals and nursing note reviewed.   Constitutional:       General: She is not in acute distress.     Appearance: Normal appearance. She is well-developed. She is not ill-appearing.   HENT:      Head: Normocephalic and atraumatic.   Eyes:      General: No scleral icterus.     Pupils: Pupils are equal, round, and reactive to light.   Neck:      Thyroid: No thyromegaly.      Vascular: Normal carotid pulses. Carotid bruit present. No hepatojugular reflux or JVD.      Trachea: No tracheal deviation.   Cardiovascular:      Rate and Rhythm: Normal rate and regular rhythm.      Pulses:           Carotid pulses are 2+ on the right side with bruit and 2+ on the left side with bruit.       Radial pulses are 3+ on the right side.        Femoral pulses are 2+ on the right side with bruit and 2+ on the left side with bruit.       Popliteal pulses are 2+ on the right side and 2+ on the left side.        Dorsalis pedis pulses are 2+ on the right side and 2+ on the left side.        Posterior tibial pulses are 2+ on the right side and 2+ on the left side.      Heart sounds: Murmur heard.    Harsh midsystolic murmur is present with a grade of 1/6 at the upper right sternal border  "radiating to the neck.  High-pitched blowing decrescendo early diastolic murmur is present with a grade of 1/4 at the upper right sternal border radiating to the apex.    No friction rub. No gallop.   Pulmonary:      Effort: Pulmonary effort is normal. No respiratory distress.      Breath sounds: Normal breath sounds. No wheezing, rhonchi or rales.   Chest:      Chest wall: No tenderness.   Abdominal:      General: Bowel sounds are normal. There is no abdominal bruit.      Palpations: Abdomen is soft. There is no hepatomegaly or pulsatile mass.      Tenderness: There is no abdominal tenderness.   Musculoskeletal:      Right shoulder: No deformity.      Cervical back: Normal range of motion and neck supple.   Skin:     General: Skin is warm and dry.      Findings: Bruising present. No erythema or rash.      Nails: There is no clubbing.   Neurological:      Mental Status: She is alert and oriented to person, place, and time.      Cranial Nerves: No cranial nerve deficit.      Coordination: Coordination normal.   Psychiatric:         Speech: Speech normal.         Behavior: Behavior normal.       Vitals:    04/01/22 1122 04/01/22 1125   BP: 120/70 130/74   BP Location: Right arm Left arm   Patient Position: Sitting Sitting   BP Method: Medium (Manual) Medium (Manual)   Pulse: 63    SpO2: 98%    Weight: 69.9 kg (154 lb 1.6 oz)    Height: 5' 1" (1.549 m)      Lab Results   Component Value Date    CHOL 136 09/23/2021    CHOL 147 03/18/2021    CHOL 219 (H) 09/18/2020     Lab Results   Component Value Date    HDL 53 09/23/2021    HDL 57 03/18/2021    HDL 55 09/18/2020     Lab Results   Component Value Date    LDLCALC 66.6 09/23/2021    LDLCALC 60.4 (L) 03/18/2021    LDLCALC 144.8 09/18/2020     Lab Results   Component Value Date    TRIG 82 09/23/2021    TRIG 148 03/18/2021    TRIG 96 09/18/2020     Lab Results   Component Value Date    CHOLHDL 39.0 09/23/2021    CHOLHDL 38.8 03/18/2021    CHOLHDL 25.1 09/18/2020       " Chemistry        Component Value Date/Time     09/23/2021 0940    K 3.9 09/23/2021 0940     09/23/2021 0940    CO2 25 09/23/2021 0940    BUN 13 09/23/2021 0940    CREATININE 0.8 09/23/2021 0940    GLU 91 09/23/2021 0940        Component Value Date/Time    CALCIUM 9.5 09/23/2021 0940    ALKPHOS 90 09/23/2021 0940    AST 14 09/23/2021 0940    ALT 6 (L) 09/23/2021 0940    BILITOT 0.7 09/23/2021 0940    ESTGFRAFRICA >60.0 09/23/2021 0940    EGFRNONAA >60.0 09/23/2021 0940        Lab Results   Component Value Date    HGBA1C 5.5 09/23/2021     Lab Results   Component Value Date    TSH 1.509 05/04/2018     Lab Results   Component Value Date    INR 1.1 12/20/2017    INR 1.1 08/10/2012     Lab Results   Component Value Date    WBC 8.14 05/27/2020    HGB 12.4 05/27/2020    HCT 39.4 05/27/2020    MCV 96 05/27/2020     (H) 05/27/2020     BMP  Sodium   Date Value Ref Range Status   09/23/2021 140 136 - 145 mmol/L Final     Potassium   Date Value Ref Range Status   09/23/2021 3.9 3.5 - 5.1 mmol/L Final     Chloride   Date Value Ref Range Status   09/23/2021 105 95 - 110 mmol/L Final     CO2   Date Value Ref Range Status   09/23/2021 25 23 - 29 mmol/L Final     BUN   Date Value Ref Range Status   09/23/2021 13 8 - 23 mg/dL Final     Creatinine   Date Value Ref Range Status   09/23/2021 0.8 0.5 - 1.4 mg/dL Final     Calcium   Date Value Ref Range Status   09/23/2021 9.5 8.7 - 10.5 mg/dL Final     Anion Gap   Date Value Ref Range Status   09/23/2021 10 8 - 16 mmol/L Final     eGFR if    Date Value Ref Range Status   09/23/2021 >60.0 >60 mL/min/1.73 m^2 Final     eGFR if non    Date Value Ref Range Status   09/23/2021 >60.0 >60 mL/min/1.73 m^2 Final     Comment:     Calculation used to obtain the estimated glomerular filtration  rate (eGFR) is the CKD-EPI equation.        CrCl cannot be calculated (Patient's most recent lab result is older than the maximum 7 days  allowed.).    Assessment:     1. Essential hypertension    2. Benign paroxysmal positional vertigo, unspecified laterality    3. COPD, moderate    4. Abdominal aortic aneurysm (AAA) without rupture    5. Bilateral carotid artery stenosis    6. Coronary artery disease of native artery of native heart with stable angina pectoris    7. Mixed hyperlipidemia    8. Sinus node dysfunction    9. Prediabetes    10. Dizziness    11. Personal history of COVID-19    12. Tobacco use disorder, moderate, in sustained remission      htn labile due to salt intake she is on appropriate therapy counseled about compliance  prediabetes on target will repeat a1c    hlp on target tolerating meds well continue same    abdominal symptoms needing gi eval  aaa stable will repeat cta   Copd stable follows with pulmonary continue same meds    Plan:       Continue current therapy  Cardiac low salt diet.  Risk factor modification and excercise program./weight loss  F/u in 6 months with lipid cmp a1c and now   cta abdomen in 6 months f/u abdominal aneurysm

## 2022-04-02 RX ORDER — AMOXICILLIN AND CLAVULANATE POTASSIUM 875; 125 MG/1; MG/1
1 TABLET, FILM COATED ORAL EVERY 12 HOURS
Qty: 14 TABLET | Refills: 0 | Status: SHIPPED | OUTPATIENT
Start: 2022-04-02 | End: 2022-04-05

## 2022-04-03 NOTE — TELEPHONE ENCOUNTER
Sending augmentin, need to know where she got her ct scan and why did they not prescribe antibiotics.

## 2022-04-05 ENCOUNTER — OFFICE VISIT (OUTPATIENT)
Dept: FAMILY MEDICINE | Facility: CLINIC | Age: 67
End: 2022-04-05
Payer: MEDICARE

## 2022-04-05 ENCOUNTER — PATIENT MESSAGE (OUTPATIENT)
Dept: FAMILY MEDICINE | Facility: CLINIC | Age: 67
End: 2022-04-05

## 2022-04-05 VITALS
SYSTOLIC BLOOD PRESSURE: 128 MMHG | HEIGHT: 61 IN | WEIGHT: 156.31 LBS | TEMPERATURE: 98 F | OXYGEN SATURATION: 96 % | DIASTOLIC BLOOD PRESSURE: 62 MMHG | HEART RATE: 64 BPM | BODY MASS INDEX: 29.51 KG/M2

## 2022-04-05 DIAGNOSIS — R10.32 LLQ PAIN: Primary | ICD-10-CM

## 2022-04-05 DIAGNOSIS — R10.32 LEFT LOWER QUADRANT PAIN: ICD-10-CM

## 2022-04-05 PROCEDURE — 3078F DIAST BP <80 MM HG: CPT | Mod: CPTII,S$GLB,, | Performed by: FAMILY MEDICINE

## 2022-04-05 PROCEDURE — 3074F SYST BP LT 130 MM HG: CPT | Mod: CPTII,S$GLB,, | Performed by: FAMILY MEDICINE

## 2022-04-05 PROCEDURE — 3288F PR FALLS RISK ASSESSMENT DOCUMENTED: ICD-10-PCS | Mod: CPTII,S$GLB,, | Performed by: FAMILY MEDICINE

## 2022-04-05 PROCEDURE — 99999 PR PBB SHADOW E&M-EST. PATIENT-LVL IV: CPT | Mod: PBBFAC,,, | Performed by: FAMILY MEDICINE

## 2022-04-05 PROCEDURE — 1125F AMNT PAIN NOTED PAIN PRSNT: CPT | Mod: CPTII,S$GLB,, | Performed by: FAMILY MEDICINE

## 2022-04-05 PROCEDURE — 3288F FALL RISK ASSESSMENT DOCD: CPT | Mod: CPTII,S$GLB,, | Performed by: FAMILY MEDICINE

## 2022-04-05 PROCEDURE — 1159F PR MEDICATION LIST DOCUMENTED IN MEDICAL RECORD: ICD-10-PCS | Mod: CPTII,S$GLB,, | Performed by: FAMILY MEDICINE

## 2022-04-05 PROCEDURE — 4010F ACE/ARB THERAPY RXD/TAKEN: CPT | Mod: CPTII,S$GLB,, | Performed by: FAMILY MEDICINE

## 2022-04-05 PROCEDURE — 4010F PR ACE/ARB THEARPY RXD/TAKEN: ICD-10-PCS | Mod: CPTII,S$GLB,, | Performed by: FAMILY MEDICINE

## 2022-04-05 PROCEDURE — 3044F HG A1C LEVEL LT 7.0%: CPT | Mod: CPTII,S$GLB,, | Performed by: FAMILY MEDICINE

## 2022-04-05 PROCEDURE — 3078F PR MOST RECENT DIASTOLIC BLOOD PRESSURE < 80 MM HG: ICD-10-PCS | Mod: CPTII,S$GLB,, | Performed by: FAMILY MEDICINE

## 2022-04-05 PROCEDURE — 99214 OFFICE O/P EST MOD 30 MIN: CPT | Mod: S$GLB,,, | Performed by: FAMILY MEDICINE

## 2022-04-05 PROCEDURE — 3074F PR MOST RECENT SYSTOLIC BLOOD PRESSURE < 130 MM HG: ICD-10-PCS | Mod: CPTII,S$GLB,, | Performed by: FAMILY MEDICINE

## 2022-04-05 PROCEDURE — 99214 PR OFFICE/OUTPT VISIT, EST, LEVL IV, 30-39 MIN: ICD-10-PCS | Mod: S$GLB,,, | Performed by: FAMILY MEDICINE

## 2022-04-05 PROCEDURE — 1100F PR PT FALLS ASSESS DOC 2+ FALLS/FALL W/INJURY/YR: ICD-10-PCS | Mod: CPTII,S$GLB,, | Performed by: FAMILY MEDICINE

## 2022-04-05 PROCEDURE — 3008F PR BODY MASS INDEX (BMI) DOCUMENTED: ICD-10-PCS | Mod: CPTII,S$GLB,, | Performed by: FAMILY MEDICINE

## 2022-04-05 PROCEDURE — 3008F BODY MASS INDEX DOCD: CPT | Mod: CPTII,S$GLB,, | Performed by: FAMILY MEDICINE

## 2022-04-05 PROCEDURE — 3044F PR MOST RECENT HEMOGLOBIN A1C LEVEL <7.0%: ICD-10-PCS | Mod: CPTII,S$GLB,, | Performed by: FAMILY MEDICINE

## 2022-04-05 PROCEDURE — 1159F MED LIST DOCD IN RCRD: CPT | Mod: CPTII,S$GLB,, | Performed by: FAMILY MEDICINE

## 2022-04-05 PROCEDURE — 1125F PR PAIN SEVERITY QUANTIFIED, PAIN PRESENT: ICD-10-PCS | Mod: CPTII,S$GLB,, | Performed by: FAMILY MEDICINE

## 2022-04-05 PROCEDURE — 99999 PR PBB SHADOW E&M-EST. PATIENT-LVL IV: ICD-10-PCS | Mod: PBBFAC,,, | Performed by: FAMILY MEDICINE

## 2022-04-05 PROCEDURE — 1100F PTFALLS ASSESS-DOCD GE2>/YR: CPT | Mod: CPTII,S$GLB,, | Performed by: FAMILY MEDICINE

## 2022-04-05 RX ORDER — CIPROFLOXACIN 500 MG/1
500 TABLET ORAL 2 TIMES DAILY
Qty: 14 TABLET | Refills: 0 | Status: SHIPPED | OUTPATIENT
Start: 2022-04-05 | End: 2022-04-12

## 2022-04-05 RX ORDER — DICYCLOMINE HYDROCHLORIDE 10 MG/1
CAPSULE ORAL
Qty: 30 CAPSULE | Refills: 1 | Status: SHIPPED | OUTPATIENT
Start: 2022-04-05 | End: 2022-06-10

## 2022-04-05 RX ORDER — METRONIDAZOLE 500 MG/1
500 TABLET ORAL 3 TIMES DAILY
Qty: 14 TABLET | Refills: 0 | Status: SHIPPED | OUTPATIENT
Start: 2022-04-05 | End: 2023-02-24 | Stop reason: ALTCHOICE

## 2022-04-05 NOTE — TELEPHONE ENCOUNTER
You wanted to know who did her CT of abdomen DX with diverticulitis , she said that Dr Juarez did it BACK in 10/14/2021     It says that she has diverticulosis.

## 2022-04-05 NOTE — PROGRESS NOTES
Chief Complaint:    Chief Complaint   Patient presents with    Abdominal Pain       History of Present Illness:  Patient with a hx of DDD, BPPV, COPD, HLD, HTN, CAD, AAA, prediabetes, GERD, diverticulitis, and insomnia presents today for abdominal pain    Went to urgent care last week for left lower quadrant pain. Was told she had a lot of stool and to take magnesium. Developed left-sided abdominal pain and a fever for three days. Took meds she had at home because she wasn't prescribed anything at urgent care. Reports vomiting after trying to eat. Denies dysuria.   Had a fall and wants to know when the bruising on her left upper arm will heal.       ROS:  Review of Systems   Constitutional: Negative for activity change, appetite change, chills, fever and unexpected weight change.   HENT: Negative for congestion, ear pain, hearing loss, postnasal drip, rhinorrhea, sinus pressure, sinus pain and trouble swallowing.    Eyes: Negative for pain, discharge and visual disturbance.   Respiratory: Negative for cough, chest tightness, shortness of breath and wheezing.    Cardiovascular: Negative for chest pain and palpitations.   Gastrointestinal: Positive for abdominal pain, diarrhea and vomiting. Negative for blood in stool, constipation and nausea.   Endocrine: Negative for polydipsia and polyuria.   Genitourinary: Negative for difficulty urinating, dysuria, flank pain, hematuria and menstrual problem.   Musculoskeletal: Negative for arthralgias, joint swelling, myalgias and neck pain.   Skin: Negative for pallor and wound.   Neurological: Negative for dizziness, tremors, speech difficulty, weakness, light-headedness and headaches.   Psychiatric/Behavioral: Negative for behavioral problems, confusion, dysphoric mood and sleep disturbance. The patient is not nervous/anxious.    All other systems reviewed and are negative.      Past Medical History:   Diagnosis Date    AAA (abdominal aortic aneurysm) 2/13/2014     Abdominal aneurysm     3    Acute coronary syndrome     Arthritis     BPPV (benign paroxysmal positional vertigo)     Carotid artery plaque     Carotid artery stenosis and occlusion 2014    Chronic back pain     COPD (chronic obstructive pulmonary disease)     Coronary artery disease     Emphysema lung     Hyperlipidemia     Hypertension     Myocardial infarction     x3    Neuropathy        Social History:  Social History     Socioeconomic History    Marital status:    Tobacco Use    Smoking status: Former Smoker     Packs/day: 0.50     Years: 44.00     Pack years: 22.00     Types: Cigarettes, Vaping w/o nicotine     Start date: 1970     Quit date: 2017     Years since quittin.9    Smokeless tobacco: Never Used    Tobacco comment: 3 MG NICOTINE FOR HEART.    Substance and Sexual Activity    Alcohol use: No    Drug use: No    Sexual activity: Not Currently     Birth control/protection: None     Social Determinants of Health     Financial Resource Strain: High Risk    Difficulty of Paying Living Expenses: Very hard   Food Insecurity: Food Insecurity Present    Worried About Running Out of Food in the Last Year: Sometimes true    Ran Out of Food in the Last Year: Sometimes true   Transportation Needs: Unmet Transportation Needs    Lack of Transportation (Medical): Yes    Lack of Transportation (Non-Medical): Yes   Physical Activity: Insufficiently Active    Days of Exercise per Week: 2 days    Minutes of Exercise per Session: 20 min   Stress: Stress Concern Present    Feeling of Stress : Rather much   Social Connections: Unknown    Frequency of Communication with Friends and Family: Twice a week    Frequency of Social Gatherings with Friends and Family: Twice a week    Active Member of Clubs or Organizations: Yes    Attends Club or Organization Meetings: More than 4 times per year    Marital Status:    Housing Stability: Low Risk     Unable to Pay for  "Housing in the Last Year: No    Number of Places Lived in the Last Year: 1    Unstable Housing in the Last Year: No       Family History:   family history includes Breast cancer in her paternal aunt; Heart attacks under age 50 in her brother and father; Heart disease in her mother.    Health Maintenance   Topic Date Due    Mammogram  06/09/2022    Lipid Panel  09/23/2022    LDCT Lung Screen  10/20/2022    High Dose Statin  04/05/2023    Aspirin/Antiplatelet Therapy  04/05/2023    DEXA Scan  10/20/2024    TETANUS VACCINE  11/15/2026    Hepatitis C Screening  Completed       Physical Exam:    Vital Signs  Temp: 97.9 °F (36.6 °C)  Pulse: 64  SpO2: 96 %  BP: 128/62  Pain Score:   4  Pain Loc: Abdomen  Height and Weight  Height: 5' 1" (154.9 cm)  Weight: 70.9 kg (156 lb 4.9 oz)  BSA (Calculated - sq m): 1.75 sq meters  BMI (Calculated): 29.5  Weight in (lb) to have BMI = 25: 132]    Body mass index is 29.53 kg/m².    Mild tenderness left lower quadrant    Assessment:      ICD-10-CM ICD-9-CM   1. LLQ pain  R10.32 789.04   2. Left lower quadrant pain  R10.32 789.04         Plan:       Recommend 500 mg ciprofloxacin, 500 mg flagyl for LLQ. Flagyl causes nausea.   Order CT Abdomen Pelvis W Contrast for left lower quadrant pain.     See labs below.   Orders Placed This Encounter   Procedures    CT Abdomen Pelvis W Wo Contrast    CBC Auto Differential    Comprehensive Metabolic Panel    C-reactive protein    Urinalysis, Reflex to Urine Culture Urine, Clean Catch       Current Outpatient Medications   Medication Sig Dispense Refill    albuterol (PROVENTIL/VENTOLIN HFA) 90 mcg/actuation inhaler INHALE 2 PUFFS INTO LUNGS EVERY 6 HOURS AS NEEDED 54 g 3    albuterol-ipratropium (DUO-NEB) 2.5 mg-0.5 mg/3 mL nebulizer solution Take 3 mLs by nebulization every 6 (six) hours as needed for Wheezing or Shortness of Breath. 1 VIAL NEBULIZED TWICE A  mL 11    aspirin 81 MG Chew Take 81 mg by mouth once daily.      " bisoprolol (ZEBETA) 5 MG tablet Take 1 tablet (5 mg total) by mouth once daily. 90 tablet 0    budesonide-glycopyr-formoterol (BREZTRI AEROSPHERE) 160-9-4.8 mcg/actuation HFAA Inhale 2 puffs into the lungs 2 (two) times a day. 32.1 g 3    clopidogreL (PLAVIX) 75 mg tablet Take 1 tablet (75 mg total) by mouth once daily. 90 tablet 0    DULoxetine (CYMBALTA) 60 MG capsule TAKE (1) CAPSULE BY MOUTH ONCE A DAY 90 capsule 0    estrogens, conjugated, (PREMARIN) 0.3 MG tablet Take 1 tablet (0.3 mg total) by mouth once daily. 90 tablet 0    ezetimibe-simvastatin 10-40 mg (VYTORIN) 10-40 mg per tablet Take 1 tablet by mouth every evening. 90 tablet 1    furosemide (LASIX) 20 MG tablet 1 TAB DAILY AS NEEDED FOR SWELLING OF LEG,OR WEIGHT GAIN OF 3 PDS IN 1 DAY OR 5 PDS IN 1 WEEK 90 tablet 0    ibuprofen (ADVIL,MOTRIN) 400 MG tablet TAKE 1 TABLET BY MOUTH EVERY 6 HOURS AS NEEDED 30 tablet 0    ipratropium-albuteroL (COMBIVENT)  mcg/actuation inhaler Inhale 1 puff into the lungs every 6 (six) hours as needed for Wheezing. Rescue 12 g 3    olmesartan (BENICAR) 40 MG tablet Take 1 tablet (40 mg total) by mouth once daily. 90 tablet 3    OXYGEN-AIR DELIVERY SYSTEMS MISC 2 L by Misc.(Non-Drug; Combo Route) route every evening.      pantoprazole (PROTONIX) 40 MG tablet Take 1 tablet (40 mg total) by mouth once daily. 90 tablet 0    vitamin D (VITAMIN D3) 1000 units Tab Take 1,000 Units by mouth once daily.      zinc gluconate 50 mg tablet Take 50 mg by mouth once daily.      zolpidem (AMBIEN) 10 mg Tab Take 1 tablet (10 mg total) by mouth every evening. 30 tablet 5    amLODIPine (NORVASC) 5 MG tablet       ciprofloxacin HCl (CIPRO) 500 MG tablet Take 1 tablet (500 mg total) by mouth 2 (two) times daily. for 7 days 14 tablet 0    dicyclomine (BENTYL) 10 MG capsule TAKE 1 CAPSULE BY MOUTH BEFORE MEALS AND AT BEDTIME AS NEEDED 30 capsule 1    metroNIDAZOLE (FLAGYL) 500 MG tablet Take 1 tablet (500 mg total) by  mouth 3 (three) times daily. 14 tablet 0    prednisoLONE acetate (PRED FORTE) 1 % DrpS        No current facility-administered medications for this visit.       Medications Discontinued During This Encounter   Medication Reason    amoxicillin-clavulanate 875-125mg (AUGMENTIN) 875-125 mg per tablet     dicyclomine (BENTYL) 10 MG capsule Reorder       No follow-ups on file.      Olga Altman MD  Scribe Attestation:   I, Nola Baltazar, am scribing for, and in the presence of, Dr.Arif Altman I performed the above scribed service and the documentation accurately describes the services I performed. I attest to the accuracy of the note.    I, Dr. Olga Altman, reviewed documentation as scribed above. I performed the services described in this documentation.  I agree that the record reflects my personal performance and is accurate and complete. Olga Altman MD.  10/04/2021

## 2022-04-07 ENCOUNTER — LAB VISIT (OUTPATIENT)
Dept: LAB | Facility: HOSPITAL | Age: 67
End: 2022-04-07
Attending: FAMILY MEDICINE
Payer: MEDICARE

## 2022-04-07 DIAGNOSIS — R10.32 LEFT LOWER QUADRANT PAIN: ICD-10-CM

## 2022-04-07 DIAGNOSIS — R73.03 PREDIABETES: ICD-10-CM

## 2022-04-07 DIAGNOSIS — R10.32 LLQ PAIN: ICD-10-CM

## 2022-04-07 DIAGNOSIS — I25.118 CORONARY ARTERY DISEASE OF NATIVE ARTERY OF NATIVE HEART WITH STABLE ANGINA PECTORIS: ICD-10-CM

## 2022-04-07 LAB
ALBUMIN SERPL BCP-MCNC: 2.9 G/DL (ref 3.5–5.2)
ALP SERPL-CCNC: 80 U/L (ref 55–135)
ALT SERPL W/O P-5'-P-CCNC: 10 U/L (ref 10–44)
ANION GAP SERPL CALC-SCNC: 11 MMOL/L (ref 8–16)
AST SERPL-CCNC: 21 U/L (ref 10–40)
BASOPHILS # BLD AUTO: 0.05 K/UL (ref 0–0.2)
BASOPHILS NFR BLD: 0.6 % (ref 0–1.9)
BILIRUB SERPL-MCNC: 0.3 MG/DL (ref 0.1–1)
BUN SERPL-MCNC: 12 MG/DL (ref 8–23)
CALCIUM SERPL-MCNC: 9.4 MG/DL (ref 8.7–10.5)
CHLORIDE SERPL-SCNC: 100 MMOL/L (ref 95–110)
CHOLEST SERPL-MCNC: 143 MG/DL (ref 120–199)
CHOLEST/HDLC SERPL: 2.9 {RATIO} (ref 2–5)
CO2 SERPL-SCNC: 25 MMOL/L (ref 23–29)
CREAT SERPL-MCNC: 0.9 MG/DL (ref 0.5–1.4)
CRP SERPL-MCNC: 10.6 MG/L (ref 0–8.2)
DIFFERENTIAL METHOD: ABNORMAL
EOSINOPHIL # BLD AUTO: 0.4 K/UL (ref 0–0.5)
EOSINOPHIL NFR BLD: 4.9 % (ref 0–8)
ERYTHROCYTE [DISTWIDTH] IN BLOOD BY AUTOMATED COUNT: 13 % (ref 11.5–14.5)
EST. GFR  (AFRICAN AMERICAN): >60 ML/MIN/1.73 M^2
EST. GFR  (NON AFRICAN AMERICAN): >60 ML/MIN/1.73 M^2
ESTIMATED AVG GLUCOSE: 111 MG/DL (ref 68–131)
GLUCOSE SERPL-MCNC: 121 MG/DL (ref 70–110)
HBA1C MFR BLD: 5.5 % (ref 4–5.6)
HCT VFR BLD AUTO: 36.2 % (ref 37–48.5)
HDLC SERPL-MCNC: 49 MG/DL (ref 40–75)
HDLC SERPL: 34.3 % (ref 20–50)
HGB BLD-MCNC: 11.3 G/DL (ref 12–16)
IMM GRANULOCYTES # BLD AUTO: 0.02 K/UL (ref 0–0.04)
IMM GRANULOCYTES NFR BLD AUTO: 0.3 % (ref 0–0.5)
LDLC SERPL CALC-MCNC: 77.4 MG/DL (ref 63–159)
LYMPHOCYTES # BLD AUTO: 1.3 K/UL (ref 1–4.8)
LYMPHOCYTES NFR BLD: 15.9 % (ref 18–48)
MCH RBC QN AUTO: 28.8 PG (ref 27–31)
MCHC RBC AUTO-ENTMCNC: 31.2 G/DL (ref 32–36)
MCV RBC AUTO: 92 FL (ref 82–98)
MONOCYTES # BLD AUTO: 0.6 K/UL (ref 0.3–1)
MONOCYTES NFR BLD: 8.1 % (ref 4–15)
NEUTROPHILS # BLD AUTO: 5.6 K/UL (ref 1.8–7.7)
NEUTROPHILS NFR BLD: 70.2 % (ref 38–73)
NONHDLC SERPL-MCNC: 94 MG/DL
NRBC BLD-RTO: 0 /100 WBC
PLATELET # BLD AUTO: 377 K/UL (ref 150–450)
PMV BLD AUTO: 10.2 FL (ref 9.2–12.9)
POTASSIUM SERPL-SCNC: 4.5 MMOL/L (ref 3.5–5.1)
PROT SERPL-MCNC: 6.9 G/DL (ref 6–8.4)
RBC # BLD AUTO: 3.93 M/UL (ref 4–5.4)
SODIUM SERPL-SCNC: 136 MMOL/L (ref 136–145)
TRIGL SERPL-MCNC: 83 MG/DL (ref 30–150)
WBC # BLD AUTO: 7.93 K/UL (ref 3.9–12.7)

## 2022-04-07 PROCEDURE — 83036 HEMOGLOBIN GLYCOSYLATED A1C: CPT | Performed by: INTERNAL MEDICINE

## 2022-04-07 PROCEDURE — 80053 COMPREHEN METABOLIC PANEL: CPT | Performed by: FAMILY MEDICINE

## 2022-04-07 PROCEDURE — 36415 COLL VENOUS BLD VENIPUNCTURE: CPT | Mod: PO | Performed by: FAMILY MEDICINE

## 2022-04-07 PROCEDURE — 86140 C-REACTIVE PROTEIN: CPT | Performed by: FAMILY MEDICINE

## 2022-04-07 PROCEDURE — 85025 COMPLETE CBC W/AUTO DIFF WBC: CPT | Performed by: FAMILY MEDICINE

## 2022-04-07 PROCEDURE — 80061 LIPID PANEL: CPT | Performed by: INTERNAL MEDICINE

## 2022-04-08 ENCOUNTER — TELEPHONE (OUTPATIENT)
Dept: CARDIOLOGY | Facility: CLINIC | Age: 67
End: 2022-04-08
Payer: MEDICARE

## 2022-04-08 NOTE — TELEPHONE ENCOUNTER
Contacted pt to discuss lab results. Pt verbalized understanding and will call back with any questions or concerns    ----- Message from Millie Juarez MD sent at 4/8/2022  7:52 AM CDT -----  Lipids and a1c looks good

## 2022-04-11 ENCOUNTER — PATIENT MESSAGE (OUTPATIENT)
Dept: FAMILY MEDICINE | Facility: CLINIC | Age: 67
End: 2022-04-11
Payer: MEDICARE

## 2022-04-11 ENCOUNTER — HOSPITAL ENCOUNTER (OUTPATIENT)
Dept: RADIOLOGY | Facility: HOSPITAL | Age: 67
Discharge: HOME | End: 2022-04-11
Attending: INTERNAL MEDICINE
Payer: MEDICARE

## 2022-04-11 DIAGNOSIS — I71.40 ABDOMINAL AORTIC ANEURYSM (AAA) WITHOUT RUPTURE: ICD-10-CM

## 2022-04-11 PROCEDURE — 74174 CTA ABD&PLVS W/CONTRAST: CPT | Mod: TC

## 2022-04-11 PROCEDURE — 25500020 PHARM REV CODE 255: Performed by: INTERNAL MEDICINE

## 2022-04-11 RX ADMIN — IOHEXOL 100 ML: 350 INJECTION, SOLUTION INTRAVENOUS at 02:04

## 2022-04-11 NOTE — TELEPHONE ENCOUNTER
A CT scan that I ordered a was the different CT scan and what she has done today was with Dr. Garcia which is only focused on looking at the aneurysm.    As far as the early a question about serotonin syndrome it is a very rare condition and it is a medical emergency if the think that patient is having that the need to take her to the ER.

## 2022-04-11 NOTE — TELEPHONE ENCOUNTER
She had a CT abdomen for AAA ordered by Dr gallego today, she actually thought this was the CT you ordered for LLQ pain. Are you able to look at those results and see what you would be looking for on the CT you ordered?

## 2022-04-12 ENCOUNTER — PATIENT MESSAGE (OUTPATIENT)
Dept: FAMILY MEDICINE | Facility: CLINIC | Age: 67
End: 2022-04-12
Payer: MEDICARE

## 2022-04-12 ENCOUNTER — TELEPHONE (OUTPATIENT)
Dept: CARDIOLOGY | Facility: CLINIC | Age: 67
End: 2022-04-12
Payer: MEDICARE

## 2022-04-12 NOTE — TELEPHONE ENCOUNTER
Patient was notified of results. All questions were answered. Pt verbalized understanding. Pt will call back with any other questions or concerns.    ----- Message from Millie Juarez MD sent at 4/11/2022  9:38 PM CDT -----  Aneurysm size unchanged she significant cholesterol build up in her aorta and her celiac vessels.

## 2022-04-18 ENCOUNTER — PATIENT MESSAGE (OUTPATIENT)
Dept: FAMILY MEDICINE | Facility: CLINIC | Age: 67
End: 2022-04-18
Payer: MEDICARE

## 2022-04-18 DIAGNOSIS — M25.551 RIGHT HIP PAIN: Primary | ICD-10-CM

## 2022-04-19 NOTE — TELEPHONE ENCOUNTER
She has continued to have LLQ pain and severe hip pain. The CTA abdomen  and pelvis that Dr Juarez did show :    GI tract/Mesentery: Stomach is normal appearance.  Visualized loops of small bowel appear within normal limits.  There are scattered colonic diverticula.  No evidence of bowel obstruction or acute inflammatory change.  The appendix is not visualized and may be surgically absent.  No inflammatory changes appreciated within the cecal tip.        She is scheduled to have the CT abdomen and pelvis that you ordered tomorrow  I think you are looking for diverticulitis .     I do not see anywhere on the CTA that mentions hip , it does talk about L4-5 degenerative anterolisthesis

## 2022-04-20 ENCOUNTER — PATIENT MESSAGE (OUTPATIENT)
Dept: CARDIOLOGY | Facility: CLINIC | Age: 67
End: 2022-04-20
Payer: MEDICARE

## 2022-04-20 ENCOUNTER — HOSPITAL ENCOUNTER (OUTPATIENT)
Dept: RADIOLOGY | Facility: HOSPITAL | Age: 67
Discharge: HOME | End: 2022-04-20
Attending: FAMILY MEDICINE
Payer: MEDICARE

## 2022-04-20 DIAGNOSIS — R10.32 LEFT LOWER QUADRANT PAIN: ICD-10-CM

## 2022-04-20 DIAGNOSIS — M25.551 RIGHT HIP PAIN: ICD-10-CM

## 2022-04-20 PROCEDURE — 25500020 PHARM REV CODE 255: Performed by: FAMILY MEDICINE

## 2022-04-20 PROCEDURE — 74177 CT ABD & PELVIS W/CONTRAST: CPT | Mod: TC

## 2022-04-20 PROCEDURE — 73502 X-RAY EXAM HIP UNI 2-3 VIEWS: CPT | Mod: TC,RT

## 2022-04-20 RX ADMIN — IOHEXOL 100 ML: 350 INJECTION, SOLUTION INTRAVENOUS at 11:04

## 2022-04-21 ENCOUNTER — TELEPHONE (OUTPATIENT)
Dept: FAMILY MEDICINE | Facility: CLINIC | Age: 67
End: 2022-04-21
Payer: MEDICARE

## 2022-04-21 DIAGNOSIS — I71.40 ABDOMINAL AORTIC ANEURYSM (AAA) WITHOUT RUPTURE: Primary | ICD-10-CM

## 2022-04-21 NOTE — TELEPHONE ENCOUNTER
----- Message from Olga Altman MD sent at 4/20/2022 10:42 PM CDT -----  Arthritis seen in the back and hip  Also seen is the aneurysm of the aorta.  Aneurysm is about 4 cm need to follow-up with vascular surgery for surveillance.

## 2022-04-22 ENCOUNTER — PATIENT MESSAGE (OUTPATIENT)
Dept: FAMILY MEDICINE | Facility: CLINIC | Age: 67
End: 2022-04-22
Payer: MEDICARE

## 2022-04-22 ENCOUNTER — TELEPHONE (OUTPATIENT)
Dept: FAMILY MEDICINE | Facility: CLINIC | Age: 67
End: 2022-04-22
Payer: MEDICARE

## 2022-04-22 DIAGNOSIS — I71.40 ABDOMINAL AORTIC ANEURYSM (AAA) WITHOUT RUPTURE: Primary | ICD-10-CM

## 2022-04-22 NOTE — TELEPHONE ENCOUNTER
----- Message from Sendy Taveras sent at 4/22/2022 11:42 AM CDT -----  Contact: BHARAT MONTANA [[id number]]  Type:  Patient Returning Call    Who Called:BHARAT MONTANA [[id number]]    Who Left Message for Patient:Leona Rowland LPN    Does the patient know what this is regarding?:    Would the patient rather a call back or a response via [facility]?     Best Call Back Number: [phone] (mobile)    Additional Information:

## 2022-04-25 ENCOUNTER — OFFICE VISIT (OUTPATIENT)
Dept: FAMILY MEDICINE | Facility: CLINIC | Age: 67
End: 2022-04-25
Payer: MEDICARE

## 2022-04-25 ENCOUNTER — LAB VISIT (OUTPATIENT)
Dept: LAB | Facility: HOSPITAL | Age: 67
End: 2022-04-25
Attending: FAMILY MEDICINE
Payer: MEDICARE

## 2022-04-25 VITALS
HEART RATE: 51 BPM | BODY MASS INDEX: 28.76 KG/M2 | OXYGEN SATURATION: 96 % | SYSTOLIC BLOOD PRESSURE: 130 MMHG | DIASTOLIC BLOOD PRESSURE: 59 MMHG | WEIGHT: 152.25 LBS

## 2022-04-25 DIAGNOSIS — G47.00 INSOMNIA, UNSPECIFIED TYPE: ICD-10-CM

## 2022-04-25 DIAGNOSIS — D64.9 MILD ANEMIA: ICD-10-CM

## 2022-04-25 DIAGNOSIS — I71.40 ABDOMINAL AORTIC ANEURYSM (AAA) WITHOUT RUPTURE: ICD-10-CM

## 2022-04-25 DIAGNOSIS — F17.201 TOBACCO USE DISORDER, MODERATE, IN SUSTAINED REMISSION: ICD-10-CM

## 2022-04-25 DIAGNOSIS — M70.61 TROCHANTERIC BURSITIS OF BOTH HIPS: ICD-10-CM

## 2022-04-25 DIAGNOSIS — Z12.31 BREAST CANCER SCREENING BY MAMMOGRAM: ICD-10-CM

## 2022-04-25 DIAGNOSIS — Z00.00 WELL ADULT EXAM: Primary | ICD-10-CM

## 2022-04-25 DIAGNOSIS — Z12.11 COLON CANCER SCREENING: ICD-10-CM

## 2022-04-25 DIAGNOSIS — M70.62 TROCHANTERIC BURSITIS OF BOTH HIPS: ICD-10-CM

## 2022-04-25 LAB
BASOPHILS # BLD AUTO: 0.08 K/UL (ref 0–0.2)
BASOPHILS NFR BLD: 1.2 % (ref 0–1.9)
DIFFERENTIAL METHOD: ABNORMAL
EOSINOPHIL # BLD AUTO: 0.3 K/UL (ref 0–0.5)
EOSINOPHIL NFR BLD: 4.5 % (ref 0–8)
ERYTHROCYTE [DISTWIDTH] IN BLOOD BY AUTOMATED COUNT: 13.6 % (ref 11.5–14.5)
FOLATE SERPL-MCNC: 4.7 NG/ML (ref 4–24)
HCT VFR BLD AUTO: 37.3 % (ref 37–48.5)
HGB BLD-MCNC: 11.6 G/DL (ref 12–16)
IMM GRANULOCYTES # BLD AUTO: 0.01 K/UL (ref 0–0.04)
IMM GRANULOCYTES NFR BLD AUTO: 0.1 % (ref 0–0.5)
IRON SERPL-MCNC: 68 UG/DL (ref 30–160)
LYMPHOCYTES # BLD AUTO: 1.7 K/UL (ref 1–4.8)
LYMPHOCYTES NFR BLD: 24.5 % (ref 18–48)
MCH RBC QN AUTO: 29.4 PG (ref 27–31)
MCHC RBC AUTO-ENTMCNC: 31.1 G/DL (ref 32–36)
MCV RBC AUTO: 95 FL (ref 82–98)
MONOCYTES # BLD AUTO: 0.8 K/UL (ref 0.3–1)
MONOCYTES NFR BLD: 11.9 % (ref 4–15)
NEUTROPHILS # BLD AUTO: 4 K/UL (ref 1.8–7.7)
NEUTROPHILS NFR BLD: 57.8 % (ref 38–73)
NRBC BLD-RTO: 0 /100 WBC
PLATELET # BLD AUTO: 286 K/UL (ref 150–450)
PMV BLD AUTO: 11 FL (ref 9.2–12.9)
RBC # BLD AUTO: 3.94 M/UL (ref 4–5.4)
SATURATED IRON: 17 % (ref 20–50)
TOTAL IRON BINDING CAPACITY: 400 UG/DL (ref 250–450)
TRANSFERRIN SERPL-MCNC: 270 MG/DL (ref 200–375)
WBC # BLD AUTO: 6.91 K/UL (ref 3.9–12.7)

## 2022-04-25 PROCEDURE — 3008F PR BODY MASS INDEX (BMI) DOCUMENTED: ICD-10-PCS | Mod: CPTII,S$GLB,, | Performed by: FAMILY MEDICINE

## 2022-04-25 PROCEDURE — 3078F DIAST BP <80 MM HG: CPT | Mod: CPTII,S$GLB,, | Performed by: FAMILY MEDICINE

## 2022-04-25 PROCEDURE — 3044F HG A1C LEVEL LT 7.0%: CPT | Mod: CPTII,S$GLB,, | Performed by: FAMILY MEDICINE

## 2022-04-25 PROCEDURE — 36415 COLL VENOUS BLD VENIPUNCTURE: CPT | Mod: PO | Performed by: FAMILY MEDICINE

## 2022-04-25 PROCEDURE — 3288F PR FALLS RISK ASSESSMENT DOCUMENTED: ICD-10-PCS | Mod: CPTII,S$GLB,, | Performed by: FAMILY MEDICINE

## 2022-04-25 PROCEDURE — 4010F PR ACE/ARB THEARPY RXD/TAKEN: ICD-10-PCS | Mod: CPTII,S$GLB,, | Performed by: FAMILY MEDICINE

## 2022-04-25 PROCEDURE — 3078F PR MOST RECENT DIASTOLIC BLOOD PRESSURE < 80 MM HG: ICD-10-PCS | Mod: CPTII,S$GLB,, | Performed by: FAMILY MEDICINE

## 2022-04-25 PROCEDURE — 99397 PR PREVENTIVE VISIT,EST,65 & OVER: ICD-10-PCS | Mod: S$GLB,,, | Performed by: FAMILY MEDICINE

## 2022-04-25 PROCEDURE — 1159F PR MEDICATION LIST DOCUMENTED IN MEDICAL RECORD: ICD-10-PCS | Mod: CPTII,S$GLB,, | Performed by: FAMILY MEDICINE

## 2022-04-25 PROCEDURE — 99999 PR PBB SHADOW E&M-EST. PATIENT-LVL V: ICD-10-PCS | Mod: PBBFAC,,, | Performed by: FAMILY MEDICINE

## 2022-04-25 PROCEDURE — 1126F PR PAIN SEVERITY QUANTIFIED, NO PAIN PRESENT: ICD-10-PCS | Mod: CPTII,S$GLB,, | Performed by: FAMILY MEDICINE

## 2022-04-25 PROCEDURE — 3075F PR MOST RECENT SYSTOLIC BLOOD PRESS GE 130-139MM HG: ICD-10-PCS | Mod: CPTII,S$GLB,, | Performed by: FAMILY MEDICINE

## 2022-04-25 PROCEDURE — 84466 ASSAY OF TRANSFERRIN: CPT | Performed by: FAMILY MEDICINE

## 2022-04-25 PROCEDURE — 1100F PTFALLS ASSESS-DOCD GE2>/YR: CPT | Mod: CPTII,S$GLB,, | Performed by: FAMILY MEDICINE

## 2022-04-25 PROCEDURE — 85045 AUTOMATED RETICULOCYTE COUNT: CPT | Performed by: FAMILY MEDICINE

## 2022-04-25 PROCEDURE — 3288F FALL RISK ASSESSMENT DOCD: CPT | Mod: CPTII,S$GLB,, | Performed by: FAMILY MEDICINE

## 2022-04-25 PROCEDURE — 99397 PER PM REEVAL EST PAT 65+ YR: CPT | Mod: S$GLB,,, | Performed by: FAMILY MEDICINE

## 2022-04-25 PROCEDURE — 1159F MED LIST DOCD IN RCRD: CPT | Mod: CPTII,S$GLB,, | Performed by: FAMILY MEDICINE

## 2022-04-25 PROCEDURE — 82746 ASSAY OF FOLIC ACID SERUM: CPT | Mod: DBM | Performed by: FAMILY MEDICINE

## 2022-04-25 PROCEDURE — 85025 COMPLETE CBC W/AUTO DIFF WBC: CPT | Performed by: FAMILY MEDICINE

## 2022-04-25 PROCEDURE — 99999 PR PBB SHADOW E&M-EST. PATIENT-LVL V: CPT | Mod: PBBFAC,,, | Performed by: FAMILY MEDICINE

## 2022-04-25 PROCEDURE — 1126F AMNT PAIN NOTED NONE PRSNT: CPT | Mod: CPTII,S$GLB,, | Performed by: FAMILY MEDICINE

## 2022-04-25 PROCEDURE — 3044F PR MOST RECENT HEMOGLOBIN A1C LEVEL <7.0%: ICD-10-PCS | Mod: CPTII,S$GLB,, | Performed by: FAMILY MEDICINE

## 2022-04-25 PROCEDURE — 4010F ACE/ARB THERAPY RXD/TAKEN: CPT | Mod: CPTII,S$GLB,, | Performed by: FAMILY MEDICINE

## 2022-04-25 PROCEDURE — 3075F SYST BP GE 130 - 139MM HG: CPT | Mod: CPTII,S$GLB,, | Performed by: FAMILY MEDICINE

## 2022-04-25 PROCEDURE — 3008F BODY MASS INDEX DOCD: CPT | Mod: CPTII,S$GLB,, | Performed by: FAMILY MEDICINE

## 2022-04-25 PROCEDURE — 1100F PR PT FALLS ASSESS DOC 2+ FALLS/FALL W/INJURY/YR: ICD-10-PCS | Mod: CPTII,S$GLB,, | Performed by: FAMILY MEDICINE

## 2022-04-25 NOTE — PROGRESS NOTES
Chief Complaint:    Chief Complaint   Patient presents with    Follow-up     6 month f/u       History of Present Illness:  Patient with DDD, BPPV, COPD, HLD, HTN, CAD, AAA, prediabetes, GERD, diverticulitis, and insomnia presents today for a six month follow up.    Took flagyl for. Didn't go to ER. Doing well now.   Has a 4 cm abdominal aorta. Following with vascular surgery. Was told it's stable and to follow up in six months.   Reports right hip pain that she describes as crushing. Worse at night. Relieved by 4% lidocaine cream. Hip x-ray showed her joint is normal, has arthritis of lumbar spine. Nothing that requires surgery.   On ambien for insomnia. She also takes it for her hip pain.   Reports sore pain in her epigastric area. Has been hurting for a long time.   Labs showed she's anemic.   Due for mammogram and colon cancer screening.   Hasn't smoked in 12-15 years.     ROS:  Review of Systems   Constitutional: Negative for appetite change, chills and fever.   HENT: Negative for congestion, ear pain, postnasal drip, rhinorrhea, sinus pressure and sinus pain.    Eyes: Negative for pain.   Respiratory: Negative for cough, chest tightness and shortness of breath.    Cardiovascular: Negative for chest pain and palpitations.   Gastrointestinal: Positive for abdominal pain. Negative for blood in stool, constipation, diarrhea and nausea.   Genitourinary: Negative for difficulty urinating, dysuria, flank pain and hematuria.   Musculoskeletal: Positive for arthralgias and myalgias.   Skin: Negative for pallor and wound.   Neurological: Negative for dizziness, tremors, speech difficulty, light-headedness and headaches.   Psychiatric/Behavioral: Negative for behavioral problems, dysphoric mood and sleep disturbance. The patient is not nervous/anxious.    All other systems reviewed and are negative.      Past Medical History:   Diagnosis Date    AAA (abdominal aortic aneurysm) 2/13/2014    Abdominal aneurysm     3     Acute coronary syndrome     Arthritis     BPPV (benign paroxysmal positional vertigo)     Carotid artery plaque     Carotid artery stenosis and occlusion 2014    Chronic back pain     COPD (chronic obstructive pulmonary disease)     Coronary artery disease     Emphysema lung     Hyperlipidemia     Hypertension     Myocardial infarction     x3    Neuropathy        Social History:  Social History     Socioeconomic History    Marital status:    Tobacco Use    Smoking status: Former Smoker     Packs/day: 0.50     Years: 44.00     Pack years: 22.00     Types: Cigarettes, Vaping w/o nicotine     Start date: 1970     Quit date: 2017     Years since quittin.9    Smokeless tobacco: Never Used    Tobacco comment: 3 MG NICOTINE FOR HEART.    Substance and Sexual Activity    Alcohol use: No    Drug use: No    Sexual activity: Not Currently     Birth control/protection: None     Social Determinants of Health     Financial Resource Strain: Medium Risk    Difficulty of Paying Living Expenses: Somewhat hard   Food Insecurity: Food Insecurity Present    Worried About Running Out of Food in the Last Year: Sometimes true    Ran Out of Food in the Last Year: Sometimes true   Transportation Needs: No Transportation Needs    Lack of Transportation (Medical): No    Lack of Transportation (Non-Medical): No   Physical Activity: Insufficiently Active    Days of Exercise per Week: 1 day    Minutes of Exercise per Session: 10 min   Stress: No Stress Concern Present    Feeling of Stress : Only a little   Social Connections: Unknown    Frequency of Communication with Friends and Family: Three times a week    Frequency of Social Gatherings with Friends and Family: Twice a week    Active Member of Clubs or Organizations: Yes    Attends Club or Organization Meetings: More than 4 times per year    Marital Status:    Housing Stability: Low Risk     Unable to Pay for Housing in the  Last Year: No    Number of Places Lived in the Last Year: 1    Unstable Housing in the Last Year: No       Family History:   family history includes Breast cancer in her paternal aunt; Heart attacks under age 50 in her brother and father; Heart disease in her mother.    Health Maintenance   Topic Date Due    Mammogram  06/09/2022    LDCT Lung Screen  10/20/2022    High Dose Statin  04/05/2023    Aspirin/Antiplatelet Therapy  04/05/2023    Lipid Panel  04/07/2023    DEXA Scan  10/20/2024    TETANUS VACCINE  11/15/2026    Hepatitis C Screening  Completed       Physical Exam:    Vital Signs  Pulse: (!) 51  SpO2: 96 %  BP: (!) 130/59  BP Location: Left arm  Patient Position: Sitting  Pain Score: 0-No pain  Height and Weight  Weight: 69 kg (152 lb 3.6 oz)]    Body mass index is 28.76 kg/m².    Physical Exam  Vitals and nursing note reviewed.   Constitutional:       Appearance: Normal appearance. She is not toxic-appearing.   HENT:      Head: Normocephalic and atraumatic.      Right Ear: Tympanic membrane normal.      Left Ear: Tympanic membrane normal.   Eyes:      Extraocular Movements: Extraocular movements intact.      Pupils: Pupils are equal, round, and reactive to light.   Cardiovascular:      Rate and Rhythm: Normal rate and regular rhythm.      Heart sounds: Normal heart sounds.   Pulmonary:      Effort: Pulmonary effort is normal.      Breath sounds: Normal breath sounds. No wheezing, rhonchi or rales.   Abdominal:      General: Bowel sounds are normal. There is no distension.      Palpations: Abdomen is soft.      Tenderness: There is abdominal tenderness in the epigastric area.       Musculoskeletal:         General: Normal range of motion.      Cervical back: Normal range of motion.      Lumbar back: Tenderness present.        Back:       Right hip: Tenderness present.      Left hip: Tenderness present.        Legs:       Comments: Positive straight leg test on the right   Skin:     General: Skin is  warm and dry.      Capillary Refill: Capillary refill takes less than 2 seconds.   Neurological:      General: No focal deficit present.      Mental Status: She is alert and oriented to person, place, and time.   Psychiatric:         Mood and Affect: Mood normal.         Behavior: Behavior normal.         Judgment: Judgment normal.           Assessment:      ICD-10-CM ICD-9-CM   1. Well adult exam  Z00.00 V70.0   2. Trochanteric bursitis of both hips  M70.61 726.5    M70.62    3. Mild anemia  D64.9 285.9   4. Insomnia, unspecified type  G47.00 780.52   5. Abdominal aortic aneurysm (AAA) without rupture  I71.4 441.4   6. Colon cancer screening  Z12.11 V76.51   7. Breast cancer screening by mammogram  Z12.31 V76.12     Plan:       Follow up with CT lung scan on 04/28.  Continue follow up with vascular surgery for AAA.  Refer to [facility] physical therapy for trochanteric bursitis of both hips.    Schedule mammogram.  Complete Cologuard kit for cancer screening.  Continue smoking cessation.  See labs below.    Follow up 6 months.   Orders Placed This Encounter   Procedures    Cologuard Screening (Multitarget Stool DNA)    Mammo Digital Screening Bilat    Iron and TIBC    CBC Auto Differential    Reticulocytes    Folate    Ambulatory referral/consult to Physical/Occupational Therapy       Current Outpatient Medications   Medication Sig Dispense Refill    albuterol (PROVENTIL/VENTOLIN HFA) 90 mcg/actuation inhaler INHALE 2 PUFFS INTO LUNGS EVERY 6 HOURS AS NEEDED 54 g 3    albuterol-ipratropium (DUO-NEB) 2.5 mg-0.5 mg/3 mL nebulizer solution Take 3 mLs by nebulization every 6 (six) hours as needed for Wheezing or Shortness of Breath. 1 VIAL NEBULIZED TWICE A  mL 11    amLODIPine (NORVASC) 5 MG tablet Take 5 mg by mouth once daily. 1 Tablet a day      aspirin 81 MG Chew Take 81 mg by mouth once daily.      bisoprolol (ZEBETA) 5 MG tablet Take 1 tablet (5 mg total) by mouth once daily. 90 tablet 0     budesonide-glycopyr-formoterol (BREZTRI AEROSPHERE) 160-9-4.8 mcg/actuation HFAA Inhale 2 puffs into the lungs 2 (two) times a day. 32.1 g 3    clopidogreL (PLAVIX) 75 mg tablet Take 1 tablet (75 mg total) by mouth once daily. 90 tablet 0    dicyclomine (BENTYL) 10 MG capsule TAKE 1 CAPSULE BY MOUTH BEFORE MEALS AND AT BEDTIME AS NEEDED 30 capsule 1    DULoxetine (CYMBALTA) 60 MG capsule TAKE (1) CAPSULE BY MOUTH ONCE A DAY 90 capsule 0    estrogens, conjugated, (PREMARIN) 0.3 MG tablet Take 1 tablet (0.3 mg total) by mouth once daily. 90 tablet 0    ezetimibe-simvastatin 10-40 mg (VYTORIN) 10-40 mg per tablet Take 1 tablet by mouth every evening. 90 tablet 1    furosemide (LASIX) 20 MG tablet 1 TAB DAILY AS NEEDED FOR SWELLING OF LEG,OR WEIGHT GAIN OF 3 PDS IN 1 DAY OR 5 PDS IN 1 WEEK 90 tablet 0    ibuprofen (ADVIL,MOTRIN) 400 MG tablet TAKE 1 TABLET BY MOUTH EVERY 6 HOURS AS NEEDED 30 tablet 0    ipratropium-albuteroL (COMBIVENT)  mcg/actuation inhaler Inhale 1 puff into the lungs every 6 (six) hours as needed for Wheezing. Rescue 12 g 3    metroNIDAZOLE (FLAGYL) 500 MG tablet Take 1 tablet (500 mg total) by mouth 3 (three) times daily. 14 tablet 0    olmesartan (BENICAR) 40 MG tablet Take 1 tablet (40 mg total) by mouth once daily. 90 tablet 3    OXYGEN-AIR DELIVERY SYSTEMS MISC 2 L by Misc.(Non-Drug; Combo Route) route every evening.      pantoprazole (PROTONIX) 40 MG tablet Take 1 tablet (40 mg total) by mouth once daily. 90 tablet 0    prednisoLONE acetate (PRED FORTE) 1 % DrpS       vitamin D (VITAMIN D3) 1000 units Tab Take 1,000 Units by mouth once daily.      zinc gluconate 50 mg tablet Take 50 mg by mouth once daily.      zolpidem (AMBIEN) 10 mg Tab Take 1 tablet (10 mg total) by mouth every evening. 30 tablet 5     No current facility-administered medications for this visit.       There are no discontinued medications.    Follow up in about 6 months (around 10/25/2022).      Arif  MD Anupama  Scribe Attestation:   I, Nola Baltazar, am scribing for, and in the presence of, Dr.Arif Altman I performed the above scribed service and the documentation accurately describes the services I performed. I attest to the accuracy of the note.    I, Dr. Olga Altman, reviewed documentation as scribed above. I performed the services described in this documentation.  I agree that the record reflects my personal performance and is accurate and complete. Olga Altman MD.  04/25/2022

## 2022-04-26 ENCOUNTER — PATIENT MESSAGE (OUTPATIENT)
Dept: FAMILY MEDICINE | Facility: CLINIC | Age: 67
End: 2022-04-26
Payer: MEDICARE

## 2022-04-26 DIAGNOSIS — D64.9 ANEMIA, UNSPECIFIED TYPE: Primary | ICD-10-CM

## 2022-04-26 LAB — RETICS/RBC NFR AUTO: 1.5 % (ref 0.5–2.5)

## 2022-04-26 RX ORDER — FERROUS GLUCONATE 324(38)MG
324 TABLET ORAL 2 TIMES DAILY WITH MEALS
Qty: 180 TABLET | Refills: 1 | Status: SHIPPED | OUTPATIENT
Start: 2022-04-26 | End: 2023-02-06

## 2022-04-28 ENCOUNTER — PATIENT OUTREACH (OUTPATIENT)
Dept: ADMINISTRATIVE | Facility: OTHER | Age: 67
End: 2022-04-28
Payer: MEDICARE

## 2022-04-28 ENCOUNTER — HOSPITAL ENCOUNTER (OUTPATIENT)
Dept: RADIOLOGY | Facility: HOSPITAL | Age: 67
Discharge: HOME | End: 2022-04-28
Attending: FAMILY MEDICINE
Payer: MEDICARE

## 2022-04-28 DIAGNOSIS — R91.1 SOLITARY PULMONARY NODULE: ICD-10-CM

## 2022-04-28 PROCEDURE — 71271 CT CHEST LUNG SCREENING LOW DOSE: ICD-10-PCS | Mod: 26,,, | Performed by: RADIOLOGY

## 2022-04-28 PROCEDURE — 71271 CT THORAX LUNG CANCER SCR C-: CPT | Mod: TC

## 2022-04-28 PROCEDURE — 71271 CT THORAX LUNG CANCER SCR C-: CPT | Mod: 26,,, | Performed by: RADIOLOGY

## 2022-04-28 NOTE — PROGRESS NOTES
Health Maintenance Due   Topic Date Due    COVID-19 Vaccine (1) Never done    Shingles Vaccine (1 of 2) Never done    Pneumococcal Vaccines (Age 65+) (1 - PCV) 02/22/2020    Influenza Vaccine (1) 09/01/2021    Colorectal Cancer Screening  01/08/2022    Mammogram  06/09/2022     Updates were requested from care everywhere.  Chart was reviewed for overdue Proactive [facility] Encounters (JAYDON) topics (CRS, Breast Cancer Screening, Eye exam)  Health Maintenance has been updated.  LINKS immunization registry triggered.  Immunizations were reconciled.

## 2022-04-29 ENCOUNTER — PATIENT MESSAGE (OUTPATIENT)
Dept: GASTROENTEROLOGY | Facility: CLINIC | Age: 67
End: 2022-04-29
Payer: MEDICARE

## 2022-05-08 LAB — NONINV COLON CA DNA+OCC BLD SCRN STL QL: NORMAL

## 2022-05-09 ENCOUNTER — PATIENT MESSAGE (OUTPATIENT)
Dept: FAMILY MEDICINE | Facility: CLINIC | Age: 67
End: 2022-05-09
Payer: MEDICARE

## 2022-05-10 ENCOUNTER — PATIENT MESSAGE (OUTPATIENT)
Dept: ADMINISTRATIVE | Facility: OTHER | Age: 67
End: 2022-05-10
Payer: MEDICARE

## 2022-05-10 PROBLEM — Z91.81 HISTORY OF FALL: Status: ACTIVE | Noted: 2022-05-10

## 2022-05-11 DIAGNOSIS — F51.04 CHRONIC INSOMNIA: ICD-10-CM

## 2022-05-11 RX ORDER — ZOLPIDEM TARTRATE 10 MG/1
TABLET ORAL
Qty: 30 TABLET | Refills: 0 | Status: SHIPPED | OUTPATIENT
Start: 2022-05-11 | End: 2022-06-10

## 2022-05-11 NOTE — TELEPHONE ENCOUNTER
No new care gaps identified.  [facility] Embedded Care Gaps. Reference number: [id number]. 5/11/2022   12:16:24 PM CDT

## 2022-05-16 ENCOUNTER — OFFICE VISIT (OUTPATIENT)
Dept: GASTROENTEROLOGY | Facility: CLINIC | Age: 67
End: 2022-05-16
Payer: MEDICARE

## 2022-05-16 ENCOUNTER — TELEPHONE (OUTPATIENT)
Dept: GASTROENTEROLOGY | Facility: CLINIC | Age: 67
End: 2022-05-16

## 2022-05-16 VITALS
WEIGHT: 152.69 LBS | HEART RATE: 53 BPM | DIASTOLIC BLOOD PRESSURE: 60 MMHG | OXYGEN SATURATION: 97 % | HEIGHT: 61 IN | BODY MASS INDEX: 28.83 KG/M2 | SYSTOLIC BLOOD PRESSURE: 138 MMHG

## 2022-05-16 DIAGNOSIS — K55.1 MESENTERIC VASCULAR INSUFFICIENCY: ICD-10-CM

## 2022-05-16 DIAGNOSIS — D64.9 ANEMIA, UNSPECIFIED TYPE: ICD-10-CM

## 2022-05-16 DIAGNOSIS — Z87.19 HX OF DIVERTICULITIS OF COLON: ICD-10-CM

## 2022-05-16 DIAGNOSIS — D50.0 IRON DEFICIENCY ANEMIA DUE TO CHRONIC BLOOD LOSS: Primary | ICD-10-CM

## 2022-05-16 PROCEDURE — 3075F SYST BP GE 130 - 139MM HG: CPT | Mod: CPTII,S$GLB,, | Performed by: INTERNAL MEDICINE

## 2022-05-16 PROCEDURE — 1159F MED LIST DOCD IN RCRD: CPT | Mod: CPTII,S$GLB,, | Performed by: INTERNAL MEDICINE

## 2022-05-16 PROCEDURE — 4010F ACE/ARB THERAPY RXD/TAKEN: CPT | Mod: CPTII,S$GLB,, | Performed by: INTERNAL MEDICINE

## 2022-05-16 PROCEDURE — 99499 UNLISTED E&M SERVICE: CPT | Mod: S$GLB,,, | Performed by: INTERNAL MEDICINE

## 2022-05-16 PROCEDURE — 3078F PR MOST RECENT DIASTOLIC BLOOD PRESSURE < 80 MM HG: ICD-10-PCS | Mod: CPTII,S$GLB,, | Performed by: INTERNAL MEDICINE

## 2022-05-16 PROCEDURE — 99999 PR PBB SHADOW E&M-EST. PATIENT-LVL V: CPT | Mod: PBBFAC,,, | Performed by: INTERNAL MEDICINE

## 2022-05-16 PROCEDURE — 3008F BODY MASS INDEX DOCD: CPT | Mod: CPTII,S$GLB,, | Performed by: INTERNAL MEDICINE

## 2022-05-16 PROCEDURE — 1100F PTFALLS ASSESS-DOCD GE2>/YR: CPT | Mod: CPTII,S$GLB,, | Performed by: INTERNAL MEDICINE

## 2022-05-16 PROCEDURE — 1126F PR PAIN SEVERITY QUANTIFIED, NO PAIN PRESENT: ICD-10-PCS | Mod: CPTII,S$GLB,, | Performed by: INTERNAL MEDICINE

## 2022-05-16 PROCEDURE — 3044F HG A1C LEVEL LT 7.0%: CPT | Mod: CPTII,S$GLB,, | Performed by: INTERNAL MEDICINE

## 2022-05-16 PROCEDURE — 1126F AMNT PAIN NOTED NONE PRSNT: CPT | Mod: CPTII,S$GLB,, | Performed by: INTERNAL MEDICINE

## 2022-05-16 PROCEDURE — 3288F PR FALLS RISK ASSESSMENT DOCUMENTED: ICD-10-PCS | Mod: CPTII,S$GLB,, | Performed by: INTERNAL MEDICINE

## 2022-05-16 PROCEDURE — 99499 RISK ADDL DX/OHS AUDIT: ICD-10-PCS | Mod: S$GLB,,, | Performed by: INTERNAL MEDICINE

## 2022-05-16 PROCEDURE — 99214 PR OFFICE/OUTPT VISIT, EST, LEVL IV, 30-39 MIN: ICD-10-PCS | Mod: S$GLB,,, | Performed by: INTERNAL MEDICINE

## 2022-05-16 PROCEDURE — 3044F PR MOST RECENT HEMOGLOBIN A1C LEVEL <7.0%: ICD-10-PCS | Mod: CPTII,S$GLB,, | Performed by: INTERNAL MEDICINE

## 2022-05-16 PROCEDURE — 4010F PR ACE/ARB THEARPY RXD/TAKEN: ICD-10-PCS | Mod: CPTII,S$GLB,, | Performed by: INTERNAL MEDICINE

## 2022-05-16 PROCEDURE — 3078F DIAST BP <80 MM HG: CPT | Mod: CPTII,S$GLB,, | Performed by: INTERNAL MEDICINE

## 2022-05-16 PROCEDURE — 99214 OFFICE O/P EST MOD 30 MIN: CPT | Mod: S$GLB,,, | Performed by: INTERNAL MEDICINE

## 2022-05-16 PROCEDURE — 1159F PR MEDICATION LIST DOCUMENTED IN MEDICAL RECORD: ICD-10-PCS | Mod: CPTII,S$GLB,, | Performed by: INTERNAL MEDICINE

## 2022-05-16 PROCEDURE — 1160F PR REVIEW ALL MEDS BY PRESCRIBER/CLIN PHARMACIST DOCUMENTED: ICD-10-PCS | Mod: CPTII,S$GLB,, | Performed by: INTERNAL MEDICINE

## 2022-05-16 PROCEDURE — 3075F PR MOST RECENT SYSTOLIC BLOOD PRESS GE 130-139MM HG: ICD-10-PCS | Mod: CPTII,S$GLB,, | Performed by: INTERNAL MEDICINE

## 2022-05-16 PROCEDURE — 3008F PR BODY MASS INDEX (BMI) DOCUMENTED: ICD-10-PCS | Mod: CPTII,S$GLB,, | Performed by: INTERNAL MEDICINE

## 2022-05-16 PROCEDURE — 1160F RVW MEDS BY RX/DR IN RCRD: CPT | Mod: CPTII,S$GLB,, | Performed by: INTERNAL MEDICINE

## 2022-05-16 PROCEDURE — 99999 PR PBB SHADOW E&M-EST. PATIENT-LVL V: ICD-10-PCS | Mod: PBBFAC,,, | Performed by: INTERNAL MEDICINE

## 2022-05-16 PROCEDURE — 3288F FALL RISK ASSESSMENT DOCD: CPT | Mod: CPTII,S$GLB,, | Performed by: INTERNAL MEDICINE

## 2022-05-16 PROCEDURE — 1100F PR PT FALLS ASSESS DOC 2+ FALLS/FALL W/INJURY/YR: ICD-10-PCS | Mod: CPTII,S$GLB,, | Performed by: INTERNAL MEDICINE

## 2022-05-16 RX ORDER — ONDANSETRON 8 MG/1
TABLET, ORALLY DISINTEGRATING ORAL
COMMUNITY
Start: 2022-03-26 | End: 2022-06-13

## 2022-05-16 RX ORDER — SODIUM, POTASSIUM,MAG SULFATES 17.5-3.13G
1 SOLUTION, RECONSTITUTED, ORAL ORAL DAILY
Qty: 1 KIT | Refills: 0 | Status: SHIPPED | OUTPATIENT
Start: 2022-05-16 | End: 2022-05-18

## 2022-05-16 NOTE — PROGRESS NOTES
Subjective:       Patient ID: Gemma Vick is a 67 y.o. female.    Chief Complaint: Abdominal Pain and Diverticulitis    The patient is here with complaint of lower abdominal pain. She carries a diagnosis of diverticulosis and states she has been given antibiotics for diverticulitis. Her last documented bout of diverticulitis was in June of 2020. She has had a CTA and a CT last month which showed only Diverticulosis. 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 She also has an AAA.     She has never had a colonoscopy. She will need assessment. She has also had iron deficiency and is mildly anemic.     Review of Systems   Constitutional: Positive for fatigue. Negative for activity change, appetite change, chills, diaphoresis, fever and unexpected weight change.   HENT: Negative for congestion, ear discharge, ear pain, hearing loss, nosebleeds, postnasal drip and tinnitus.    Eyes: Negative for photophobia and visual disturbance.   Respiratory: Negative for apnea, cough, choking, chest tightness, shortness of breath and wheezing.    Cardiovascular: Negative for chest pain, palpitations and leg swelling.   Gastrointestinal: Positive for abdominal pain, constipation, diarrhea and nausea. Negative for abdominal distention, anal bleeding, blood in stool, rectal pain and vomiting.        Bloating  Gas     Genitourinary: Negative for difficulty urinating, dyspareunia, dysuria, flank pain, frequency, hematuria, menstrual problem, pelvic pain, urgency, vaginal bleeding and vaginal discharge.   Musculoskeletal: Positive for back pain and joint swelling. Negative for arthralgias, gait problem, myalgias and neck stiffness.        Claudication  Joint stiffness   Skin: Positive for color change. Negative for pallor and rash.   Neurological: Negative for dizziness,  tremors, seizures, syncope, speech difficulty, weakness, numbness and headaches.   Hematological: Negative for adenopathy.   Psychiatric/Behavioral: Negative for agitation, confusion, hallucinations, sleep disturbance and suicidal ideas.       Objective:      Physical Exam  Vitals reviewed.   Constitutional:       Appearance: She is well-developed.   HENT:      Head: Normocephalic and atraumatic.   Eyes:      General: No scleral icterus.        Right eye: No discharge.         Left eye: No discharge.      Conjunctiva/sclera: Conjunctivae normal.      Pupils: Pupils are equal, round, and reactive to light.   Neck:      Thyroid: No thyromegaly.      Vascular: No JVD.   Cardiovascular:      Rate and Rhythm: Normal rate and regular rhythm.      Heart sounds: Normal heart sounds. No murmur heard.    No friction rub. No gallop.   Pulmonary:      Effort: Pulmonary effort is normal. No respiratory distress.      Breath sounds: Normal breath sounds. No wheezing or rales.   Chest:      Chest wall: No tenderness.   Abdominal:      General: Bowel sounds are normal. There is no distension.      Palpations: Abdomen is soft. There is no mass.      Tenderness: There is abdominal tenderness. There is no guarding or rebound.      Comments: Mild epigastric tenderness.   Musculoskeletal:         General: Normal range of motion.      Cervical back: Normal range of motion and neck supple.   Lymphadenopathy:      Cervical: No cervical adenopathy.   Skin:     General: Skin is warm and dry.      Coloration: Skin is not pale.      Findings: No erythema or rash.   Neurological:      Mental Status: She is alert and oriented to person, place, and time.      Motor: No abnormal muscle tone.      Coordination: Coordination normal.      Deep Tendon Reflexes: Reflexes are normal and symmetric.   Psychiatric:         Behavior: Behavior normal.         Thought Content: Thought content normal.         Judgment: Judgment normal.         Assessment:    Iron deficiency anemia due to chronic blood loss  -     sodium,potassium,mag sulfates (SUPREP BOWEL PREP KIT) 17.5-3.13-1.6 gram SolR; Take 177 mLs by mouth once daily. for 2 days  Dispense: 1 kit; Refill: 0  -     Case Request Endoscopy: COLONOSCOPY, EGD (ESOPHAGOGASTRODUODENOSCOPY)    Anemia, unspecified type  -     Ambulatory referral/consult to Gastroenterology    Hx of diverticulitis of colon  -     sodium,potassium,mag sulfates (SUPREP BOWEL PREP KIT) 17.5-3.13-1.6 gram SolR; Take 177 mLs by mouth once daily. for 2 days  Dispense: 1 kit; Refill: 0  -     Case Request Endoscopy: COLONOSCOPY, EGD (ESOPHAGOGASTRODUODENOSCOPY)    Mesenteric vascular insufficiency  -     sodium,potassium,mag sulfates (SUPREP BOWEL PREP KIT) 17.5-3.13-1.6 gram SolR; Take 177 mLs by mouth once daily. for 2 days  Dispense: 1 kit; Refill: 0  -     Case Request Endoscopy: COLONOSCOPY, EGD (ESOPHAGOGASTRODUODENOSCOPY)             Plan:   As above.

## 2022-05-16 NOTE — TELEPHONE ENCOUNTER
While I was trying to schedule the pt. For colonoscopy, she decided to leave, because she has to  her grandchild, she stated that, she will call back to schedule her Egd/Colonoscopy.

## 2022-05-27 ENCOUNTER — PATIENT MESSAGE (OUTPATIENT)
Dept: GASTROENTEROLOGY | Facility: CLINIC | Age: 67
End: 2022-05-27
Payer: MEDICARE

## 2022-05-27 LAB — NONINV COLON CA DNA+OCC BLD SCRN STL QL: POSITIVE

## 2022-05-29 DIAGNOSIS — R19.5 POSITIVE COLORECTAL CANCER SCREENING USING COLOGUARD TEST: Primary | ICD-10-CM

## 2022-05-30 ENCOUNTER — TELEPHONE (OUTPATIENT)
Dept: GASTROENTEROLOGY | Facility: CLINIC | Age: 67
End: 2022-05-30
Payer: MEDICARE

## 2022-05-30 NOTE — TELEPHONE ENCOUNTER
Lynette Juarez, Dr. Coronel has ordered a procedure on this pt. We trying to get a clearance from you before this procedure can be done, and thanks in advance for your help sirNikita

## 2022-05-30 NOTE — TELEPHONE ENCOUNTER
Lynette Ortiz, Dr Coronel has ordered a procedure on this pt, and we need your clearance before we can proceed with this procedure, and thanks in advance ma'am foryour help.

## 2022-05-31 ENCOUNTER — TELEPHONE (OUTPATIENT)
Dept: PULMONOLOGY | Facility: CLINIC | Age: 67
End: 2022-05-31
Payer: MEDICARE

## 2022-05-31 DIAGNOSIS — J44.9 COPD, MODERATE: Primary | ICD-10-CM

## 2022-06-03 ENCOUNTER — PATIENT MESSAGE (OUTPATIENT)
Dept: ADMINISTRATIVE | Facility: OTHER | Age: 67
End: 2022-06-03
Payer: MEDICARE

## 2022-06-05 ENCOUNTER — PATIENT MESSAGE (OUTPATIENT)
Dept: ADMINISTRATIVE | Facility: OTHER | Age: 67
End: 2022-06-05
Payer: MEDICARE

## 2022-06-05 ENCOUNTER — PATIENT MESSAGE (OUTPATIENT)
Dept: GASTROENTEROLOGY | Facility: CLINIC | Age: 67
End: 2022-06-05
Payer: MEDICARE

## 2022-06-05 ENCOUNTER — PATIENT MESSAGE (OUTPATIENT)
Dept: FAMILY MEDICINE | Facility: CLINIC | Age: 67
End: 2022-06-05
Payer: MEDICARE

## 2022-06-05 DIAGNOSIS — R19.5 POSITIVE COLORECTAL CANCER SCREENING USING COLOGUARD TEST: Primary | ICD-10-CM

## 2022-06-06 ENCOUNTER — PATIENT MESSAGE (OUTPATIENT)
Dept: CARDIOLOGY | Facility: CLINIC | Age: 67
End: 2022-06-06
Payer: MEDICARE

## 2022-06-06 DIAGNOSIS — U07.1 COVID-19: ICD-10-CM

## 2022-06-07 ENCOUNTER — PATIENT MESSAGE (OUTPATIENT)
Dept: FAMILY MEDICINE | Facility: CLINIC | Age: 67
End: 2022-06-07
Payer: MEDICARE

## 2022-06-07 DIAGNOSIS — M51.36 DDD (DEGENERATIVE DISC DISEASE), LUMBAR: Primary | ICD-10-CM

## 2022-06-08 ENCOUNTER — PATIENT MESSAGE (OUTPATIENT)
Dept: HEPATOLOGY | Facility: CLINIC | Age: 67
End: 2022-06-08
Payer: MEDICARE

## 2022-06-10 ENCOUNTER — LAB VISIT (OUTPATIENT)
Dept: PRIMARY CARE CLINIC | Facility: CLINIC | Age: 67
End: 2022-06-10
Payer: MEDICARE

## 2022-06-10 DIAGNOSIS — U07.1 COVID-19: ICD-10-CM

## 2022-06-10 LAB — SARS-COV-2 RNA RESP QL NAA+PROBE: NOT DETECTED

## 2022-06-10 PROCEDURE — U0003 INFECTIOUS AGENT DETECTION BY NUCLEIC ACID (DNA OR RNA); SEVERE ACUTE RESPIRATORY SYNDROME CORONAVIRUS 2 (SARS-COV-2) (CORONAVIRUS DISEASE [COVID-19]), AMPLIFIED PROBE TECHNIQUE, MAKING USE OF HIGH THROUGHPUT TECHNOLOGIES AS DESCRIBED BY CMS-2020-01-R: HCPCS | Performed by: PHYSICIAN ASSISTANT

## 2022-06-10 PROCEDURE — U0005 INFEC AGEN DETEC AMPLI PROBE: HCPCS | Performed by: PHYSICIAN ASSISTANT

## 2022-06-13 ENCOUNTER — CLINICAL SUPPORT (OUTPATIENT)
Dept: PULMONOLOGY | Facility: CLINIC | Age: 67
End: 2022-06-13
Payer: MEDICARE

## 2022-06-13 ENCOUNTER — OFFICE VISIT (OUTPATIENT)
Dept: PULMONOLOGY | Facility: CLINIC | Age: 67
End: 2022-06-13
Payer: MEDICARE

## 2022-06-13 VITALS
BODY MASS INDEX: 28.22 KG/M2 | WEIGHT: 149.5 LBS | SYSTOLIC BLOOD PRESSURE: 120 MMHG | OXYGEN SATURATION: 98 % | DIASTOLIC BLOOD PRESSURE: 80 MMHG | HEIGHT: 61 IN | RESPIRATION RATE: 20 BRPM | HEART RATE: 58 BPM

## 2022-06-13 DIAGNOSIS — Z87.891 FORMER SMOKER: ICD-10-CM

## 2022-06-13 DIAGNOSIS — Z01.811 PREOP RESPIRATORY EXAM: Primary | ICD-10-CM

## 2022-06-13 DIAGNOSIS — J44.9 COPD, MODERATE: ICD-10-CM

## 2022-06-13 DIAGNOSIS — G47.34 NOCTURNAL HYPOXEMIA: ICD-10-CM

## 2022-06-13 DIAGNOSIS — I70.0 AORTIC ATHEROSCLEROSIS: ICD-10-CM

## 2022-06-13 DIAGNOSIS — J84.10 LUNG GRANULOMA: ICD-10-CM

## 2022-06-13 PROCEDURE — 3074F SYST BP LT 130 MM HG: CPT | Mod: CPTII,S$GLB,, | Performed by: INTERNAL MEDICINE

## 2022-06-13 PROCEDURE — 3044F HG A1C LEVEL LT 7.0%: CPT | Mod: CPTII,S$GLB,, | Performed by: INTERNAL MEDICINE

## 2022-06-13 PROCEDURE — 99499 RISK ADDL DX/OHS AUDIT: ICD-10-PCS | Mod: S$GLB,,, | Performed by: INTERNAL MEDICINE

## 2022-06-13 PROCEDURE — 3074F PR MOST RECENT SYSTOLIC BLOOD PRESSURE < 130 MM HG: ICD-10-PCS | Mod: CPTII,S$GLB,, | Performed by: INTERNAL MEDICINE

## 2022-06-13 PROCEDURE — 94060 EVALUATION OF WHEEZING: CPT | Mod: S$GLB,,, | Performed by: INTERNAL MEDICINE

## 2022-06-13 PROCEDURE — 3288F PR FALLS RISK ASSESSMENT DOCUMENTED: ICD-10-PCS | Mod: CPTII,S$GLB,, | Performed by: INTERNAL MEDICINE

## 2022-06-13 PROCEDURE — 94060 PR EVAL OF BRONCHOSPASM: ICD-10-PCS | Mod: S$GLB,,, | Performed by: INTERNAL MEDICINE

## 2022-06-13 PROCEDURE — 4010F PR ACE/ARB THEARPY RXD/TAKEN: ICD-10-PCS | Mod: CPTII,S$GLB,, | Performed by: INTERNAL MEDICINE

## 2022-06-13 PROCEDURE — 1100F PR PT FALLS ASSESS DOC 2+ FALLS/FALL W/INJURY/YR: ICD-10-PCS | Mod: CPTII,S$GLB,, | Performed by: INTERNAL MEDICINE

## 2022-06-13 PROCEDURE — 99999 PR PBB SHADOW E&M-EST. PATIENT-LVL III: ICD-10-PCS | Mod: PBBFAC,,, | Performed by: INTERNAL MEDICINE

## 2022-06-13 PROCEDURE — 3079F DIAST BP 80-89 MM HG: CPT | Mod: CPTII,S$GLB,, | Performed by: INTERNAL MEDICINE

## 2022-06-13 PROCEDURE — 3008F PR BODY MASS INDEX (BMI) DOCUMENTED: ICD-10-PCS | Mod: CPTII,S$GLB,, | Performed by: INTERNAL MEDICINE

## 2022-06-13 PROCEDURE — 99215 PR OFFICE/OUTPT VISIT, EST, LEVL V, 40-54 MIN: ICD-10-PCS | Mod: 25,S$GLB,, | Performed by: INTERNAL MEDICINE

## 2022-06-13 PROCEDURE — 3079F PR MOST RECENT DIASTOLIC BLOOD PRESSURE 80-89 MM HG: ICD-10-PCS | Mod: CPTII,S$GLB,, | Performed by: INTERNAL MEDICINE

## 2022-06-13 PROCEDURE — 1100F PTFALLS ASSESS-DOCD GE2>/YR: CPT | Mod: CPTII,S$GLB,, | Performed by: INTERNAL MEDICINE

## 2022-06-13 PROCEDURE — 99499 UNLISTED E&M SERVICE: CPT | Mod: S$GLB,,, | Performed by: INTERNAL MEDICINE

## 2022-06-13 PROCEDURE — 3008F BODY MASS INDEX DOCD: CPT | Mod: CPTII,S$GLB,, | Performed by: INTERNAL MEDICINE

## 2022-06-13 PROCEDURE — 4010F ACE/ARB THERAPY RXD/TAKEN: CPT | Mod: CPTII,S$GLB,, | Performed by: INTERNAL MEDICINE

## 2022-06-13 PROCEDURE — 99215 OFFICE O/P EST HI 40 MIN: CPT | Mod: 25,S$GLB,, | Performed by: INTERNAL MEDICINE

## 2022-06-13 PROCEDURE — 99999 PR PBB SHADOW E&M-EST. PATIENT-LVL III: CPT | Mod: PBBFAC,,, | Performed by: INTERNAL MEDICINE

## 2022-06-13 PROCEDURE — 3044F PR MOST RECENT HEMOGLOBIN A1C LEVEL <7.0%: ICD-10-PCS | Mod: CPTII,S$GLB,, | Performed by: INTERNAL MEDICINE

## 2022-06-13 PROCEDURE — 3288F FALL RISK ASSESSMENT DOCD: CPT | Mod: CPTII,S$GLB,, | Performed by: INTERNAL MEDICINE

## 2022-06-13 RX ORDER — ALBUTEROL SULFATE 90 UG/1
AEROSOL, METERED RESPIRATORY (INHALATION)
Qty: 54 G | Refills: 5 | Status: SHIPPED | OUTPATIENT
Start: 2022-06-13 | End: 2023-02-13 | Stop reason: SDUPTHER

## 2022-06-13 NOTE — PROGRESS NOTES
Subjective:    This patient is new to me.  Previous records with colleagues including office visits, testing were personally reviewed.  Outside records under care everywhere if available that includes office visits and testing were reviewed personally as well.     Patient ID: Gemma Vick is a 67 y.o. female.    Chief Complaint: COPD      68yo female here for follow up of COPD        67-year-old female patient with known history of moderately severe to severe COPD, presenting for preop respiratory evaluation prior to undergoing colonoscopy for a positive Cologuard.      Presents stable. No acute flare. Rare cough or wheezing since on Breztri ICS/LABA/LAMA.  On Combivent DuoNeb and albuterol for p.r.n. usage.      Moderate SOB with moderate limitation of activities of daily living.    On 3 L oxygen during sleep.    Quit cigarette smoking in May 2017.        Current Outpatient Medications on File Prior to Visit   Medication Sig Dispense Refill    aspirin 81 MG Chew Take 81 mg by mouth once daily.      bisoprolol (ZEBETA) 5 MG tablet Take 1 tablet (5 mg total) by mouth once daily. 90 tablet 0    budesonide-glycopyr-formoterol (BREZTRI AEROSPHERE) 160-9-4.8 mcg/actuation HFAA Inhale 2 puffs into the lungs 2 (two) times a day. 32.1 g 3    clopidogreL (PLAVIX) 75 mg tablet Take 1 tablet (75 mg total) by mouth once daily. 90 tablet 0    dicyclomine (BENTYL) 10 MG capsule TAKE 1 CAPSULE BEFORE MEALS AND 1 CAPSULE BEFORE BEDTIME 30 capsule 0    DULoxetine (CYMBALTA) 60 MG capsule TAKE (1) CAPSULE BY MOUTH ONCE A DAY 90 capsule 0    estrogens, conjugated, (PREMARIN) 0.3 MG tablet Take 1 tablet (0.3 mg total) by mouth once daily. 90 tablet 0    ezetimibe-simvastatin 10-40 mg (VYTORIN) 10-40 mg per tablet Take 1 tablet by mouth every evening. 90 tablet 1    ferrous gluconate (FERGON) 324 MG tablet Take 1 tablet (324 mg total) by mouth 2 (two) times daily with meals. 180 tablet 1    furosemide (LASIX) 20 MG tablet 1  TAB DAILY AS NEEDED FOR SWELLING OF LEG,OR WEIGHT GAIN OF 3 PDS IN 1 DAY OR 5 PDS IN 1 WEEK 90 tablet 0    ibuprofen (ADVIL,MOTRIN) 400 MG tablet TAKE 1 TABLET BY MOUTH EVERY 6 HOURS AS NEEDED 30 tablet 0    metroNIDAZOLE (FLAGYL) 500 MG tablet Take 1 tablet (500 mg total) by mouth 3 (three) times daily. 14 tablet 0    olmesartan (BENICAR) 40 MG tablet Take 1 tablet (40 mg total) by mouth once daily. 90 tablet 3    OXYGEN-AIR DELIVERY SYSTEMS MISC 2 L by Misc.(Non-Drug; Combo Route) route every evening.      pantoprazole (PROTONIX) 40 MG tablet Take 1 tablet (40 mg total) by mouth once daily. 90 tablet 0    vitamin D (VITAMIN D3) 1000 units Tab Take 1,000 Units by mouth once daily.      zinc gluconate 50 mg tablet Take 50 mg by mouth once daily.      zolpidem (AMBIEN) 10 mg Tab TAKE 1 TABLET BY MOUTH IN THE EVENING 30 tablet 0    [DISCONTINUED] albuterol-ipratropium (DUO-NEB) 2.5 mg-0.5 mg/3 mL nebulizer solution Take 3 mLs by nebulization every 6 (six) hours as needed for Wheezing or Shortness of Breath. 1 VIAL NEBULIZED TWICE A  mL 11    [DISCONTINUED] albuterol (PROVENTIL/VENTOLIN HFA) 90 mcg/actuation inhaler INHALE 2 PUFFS INTO LUNGS EVERY 6 HOURS AS NEEDED 54 g 3    [DISCONTINUED] ipratropium-albuteroL (COMBIVENT)  mcg/actuation inhaler Inhale 1 puff into the lungs every 6 (six) hours as needed for Wheezing. Rescue 12 g 3    [DISCONTINUED] IRBESARTAN ORAL irbesartan Take No date recorded No form recorded No frequency recorded No route recorded No set duration recorded No set duration amount recorded active No dosage strength recorded No dosage strength units of measure recorded      [DISCONTINUED] ondansetron (ZOFRAN-ODT) 8 MG TbDL ondansetron Take 1 tablet (oral) 2 times per day PRN for 10 days 20220326 tablet,disintegrating 2 times per day oral 10 days active 8 mg       No current facility-administered medications on file prior to visit.        Review of Systems   Constitutional:  "Positive for fatigue. Negative for fever and chills.   HENT: Negative for nosebleeds.    Eyes: Negative for redness.   Respiratory: Positive for cough, sputum production, shortness of breath, wheezing and use of rescue inhaler. Negative for choking.    Genitourinary: Negative for hematuria.   Endocrine: Negative for cold intolerance.    Musculoskeletal: Positive for arthralgias.   Gastrointestinal: Negative for vomiting.   Neurological: Negative for syncope.   Hematological: Negative for adenopathy.   Psychiatric/Behavioral: Negative for confusion.       Objective:       Vitals:    06/13/22 1207   BP: 120/80   Pulse: (!) 58   Resp: 20   SpO2: 98%   Weight: 67.8 kg (149 lb 7.6 oz)   Height: 5' 1" (1.549 m)       Physical Exam   Constitutional: She is oriented to person, place, and time. She appears well-developed. No distress.   HENT:   Head: Normocephalic.   Cardiovascular: Normal rate and regular rhythm.   Pulmonary/Chest: Normal expansion and effort normal. No stridor. No respiratory distress. She has decreased breath sounds. She has wheezes. She exhibits no tenderness.   Few rare fine and expiratory wheezes scattered   Abdominal: She exhibits no distension.   Musculoskeletal:         General: No tenderness.      Cervical back: Neck supple.   Lymphadenopathy:     She has no cervical adenopathy.   Neurological: She is alert and oriented to person, place, and time. Gait normal.   Skin: Skin is warm.   Psychiatric: She has a normal mood and affect. Her behavior is normal. Judgment and thought content normal.   Nursing note and vitals reviewed.    Personal Diagnostic Review    CT Chest Lung Screening Low Dose  Narrative: EXAMINATION:  CT CHEST LUNG SCREENING LOW DOSE    CLINICAL HISTORY:  Lung nodule, < 6mm, high cancer risk; Solitary pulmonary nodule    TECHNIQUE:  CT of the thorax was performed with low dose, lung screening protocol.  No contrast was administered.  Sagittal and coronal reconstructions were " obtained.    COMPARISON:  CT from 10/20/2021    FINDINGS:  Lungs: There are stable calcified granulomas seen within either lung.  Stable tiny 3 mm noncalcified pulmonary nodule within the right upper lobe series 4, image 131.  Nodular opacity again seen within the left lower lobe adjacent to bronchovascular structures series 4, image 283 unchanged in size and appearance.  No new or enlarging nodules are identified.  The lungs show findings consistent with emphysema.    Pleura:   No effusion..    Heart and pericardium: Normal size without effusion.    Aorta and vasculature: Atherosclerosis including coronary arteries.    Chest wall and skeletal structures: Unremarkable except age-appropriate degenerative changes.    Upper abdomen: Patient status post cholecystectomy.  Impression: Lung-RADS Category:  2 - Benign Appearance or Behavior - continue annual screening with LDCT in 12 months.    Clinically or potentially clinically significant non lung cancer finding:  None.    Prior Lung Cancer Modifier:  No history of prior lung cancer.    Electronically signed by: Francis Ventura DO  Date:    04/28/2022  Time:    10:13          No flowsheet data found.      Assessment/Plan:       Problem List Items Addressed This Visit        Pulmonary    COPD, moderate    Relevant Medications    albuterol (PROVENTIL/VENTOLIN HFA) 90 mcg/actuation inhaler       Other    Stopped smoking with greater than 30 pack year history     Encouraged patient to continue smoking cessation.  Low-dose screening CT chest.  Forty years smoking more than 1 pack per day             Other Visit Diagnoses     Preop respiratory exam    -  Primary    Nocturnal hypoxemia        Lung granuloma        Aortic atherosclerosis            Continue albuterol p.r.n.    Continue BREZTRI inhaler 2 puffs twice daily.    Discontinue DuoNeb and discontinue Combivent to avoid dual  anticholinergic/anti muscarinic inhalation treatment.    Encouraged patient to continue smoking  cessation.    Low-dose screening CT chest per guidelines.    Continue aspirin Plavix anti hyperlipidemic, Benicar and furosemide monitor lipid levels and blood pressure.    I reviewed patient spirometry today.  Moderately severe COPD noted.    Patient at increased risk for pulmonary postop complications after colonoscopy due to her history of moderately severe COPD however she appears in a stable respiratory status and does not have absolute contraindication to proceed with colonoscopy as planned.    Postop incentive spirometry, pain control, ambulation, use albuterol p.r.n. perioperatively and continue maintenance inhalation regimen.    Today's visit report will be forwarded to Dr. [facility].

## 2022-06-13 NOTE — ASSESSMENT & PLAN NOTE
Encouraged patient to continue smoking cessation.  Low-dose screening CT chest.  Forty years smoking more than 1 pack per day

## 2022-06-14 LAB
BRPFT: NORMAL
FEF 25 75 CHG: 1.9 %
FEF 25 75 LLN: 0.88
FEF 25 75 POST REF: 14.6 %
FEF 25 75 PRE REF: 14.3 %
FEF 25 75 REF: 1.84
FET100 CHG: -8.7 %
FEV1 CHG: -3 %
FEV1 FVC CHG: 5.3 %
FEV1 FVC LLN: 66
FEV1 FVC POST REF: 61.7 %
FEV1 FVC PRE REF: 58.6 %
FEV1 FVC REF: 79
FEV1 LLN: 1.52
FEV1 POST REF: 61.8 %
FEV1 PRE REF: 63.7 %
FEV1 REF: 2.07
FVC CHG: -7.9 %
FVC LLN: 1.95
FVC POST REF: 99.6 %
FVC PRE REF: 108.2 %
FVC REF: 2.64
PEF CHG: -17.9 %
PEF LLN: 3.86
PEF POST REF: 53.3 %
PEF PRE REF: 64.9 %
PEF REF: 5.42
POST FEF 25 75: 0.27 L/S
POST FET 100: 19.88 SEC
POST FEV1 FVC: 48.65 %
POST FEV1: 1.28 L
POST FVC: 2.63 L
POST PEF: 2.88 L/S
PRE FEF 25 75: 0.26 L/S
PRE FET 100: 21.77 SEC
PRE FEV1 FVC: 46.18 %
PRE FEV1: 1.32 L
PRE FVC: 2.85 L
PRE PEF: 3.51 L/S

## 2022-06-15 ENCOUNTER — PATIENT MESSAGE (OUTPATIENT)
Dept: PULMONOLOGY | Facility: CLINIC | Age: 67
End: 2022-06-15
Payer: MEDICARE

## 2022-06-20 ENCOUNTER — HOSPITAL ENCOUNTER (OUTPATIENT)
Dept: PREADMISSION TESTING | Facility: HOSPITAL | Age: 67
Discharge: HOME | End: 2022-06-20
Attending: FAMILY MEDICINE
Payer: MEDICARE

## 2022-06-20 DIAGNOSIS — R19.5 POSITIVE COLORECTAL CANCER SCREENING USING COLOGUARD TEST: ICD-10-CM

## 2022-06-21 RX ORDER — POLYETHYLENE GLYCOL 3350, SODIUM SULFATE ANHYDROUS, SODIUM BICARBONATE, SODIUM CHLORIDE, POTASSIUM CHLORIDE 236; 22.74; 6.74; 5.86; 2.97 G/4L; G/4L; G/4L; G/4L; G/4L
4 POWDER, FOR SOLUTION ORAL ONCE
Qty: 4000 ML | Refills: 0 | Status: SHIPPED | OUTPATIENT
Start: 2022-06-21 | End: 2022-06-21

## 2022-06-24 ENCOUNTER — PATIENT MESSAGE (OUTPATIENT)
Dept: ENDOSCOPY | Facility: HOSPITAL | Age: 67
End: 2022-06-24
Payer: MEDICARE

## 2022-06-24 DIAGNOSIS — J44.9 COPD, MODERATE: ICD-10-CM

## 2022-06-24 RX ORDER — BUDESONIDE, GLYCOPYRROLATE, AND FORMOTEROL FUMARATE 160; 9; 4.8 UG/1; UG/1; UG/1
2 AEROSOL, METERED RESPIRATORY (INHALATION) 2 TIMES DAILY
Qty: 32.1 G | Refills: 3 | Status: SHIPPED | OUTPATIENT
Start: 2022-06-24 | End: 2023-01-13 | Stop reason: SDUPTHER

## 2022-07-04 ENCOUNTER — PATIENT MESSAGE (OUTPATIENT)
Dept: ADMINISTRATIVE | Facility: OTHER | Age: 67
End: 2022-07-04
Payer: MEDICARE

## 2022-07-11 ENCOUNTER — HOSPITAL ENCOUNTER (EMERGENCY)
Facility: HOSPITAL | Age: 67
Discharge: HOME | End: 2022-07-12
Attending: FAMILY MEDICINE
Payer: MEDICARE

## 2022-07-11 VITALS
SYSTOLIC BLOOD PRESSURE: 188 MMHG | TEMPERATURE: 98 F | WEIGHT: 151 LBS | OXYGEN SATURATION: 98 % | BODY MASS INDEX: 28.51 KG/M2 | HEART RATE: 55 BPM | RESPIRATION RATE: 20 BRPM | HEIGHT: 61 IN | DIASTOLIC BLOOD PRESSURE: 84 MMHG

## 2022-07-11 DIAGNOSIS — M25.531 RIGHT WRIST PAIN: ICD-10-CM

## 2022-07-11 DIAGNOSIS — R07.81 RIB PAIN ON RIGHT SIDE: ICD-10-CM
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
## 2022-07-11 DIAGNOSIS — S99.912A INJURY OF LEFT ANKLE, INITIAL ENCOUNTER: ICD-10-CM

## 2022-07-11 PROCEDURE — 25000003 PHARM REV CODE 250: Performed by: REGISTERED NURSE

## 2022-07-11 PROCEDURE — 99284 EMERGENCY DEPT VISIT MOD MDM: CPT

## 2022-07-11 RX ORDER — HYDROCODONE BITARTRATE AND ACETAMINOPHEN 5; 325 MG/1; MG/1
1 TABLET ORAL
Status: COMPLETED | OUTPATIENT
Start: 2022-07-11 | End: 2022-07-11

## 2022-07-11 RX ADMIN — HYDROCODONE BITARTRATE AND ACETAMINOPHEN 1 TABLET: 5; 325 TABLET ORAL at 11:07

## 2022-07-12 RX ORDER — HYDROCODONE BITARTRATE AND ACETAMINOPHEN 5; 325 MG/1; MG/1
1 TABLET ORAL EVERY 6 HOURS PRN
Qty: 12 TABLET | Refills: 0 | Status: SHIPPED | OUTPATIENT
Start: 2022-07-12 | End: 2022-10-25

## 2022-07-12 NOTE — ED PROVIDER NOTES
Encounter Date: 2022       History     Chief Complaint   Patient presents with    Fall     Pt fell last Wednesday. Pt CO pain to L ankle, R elbow & wrist and ribs.     The history is provided by the patient.   67-year-old female presents emergency department after experiencing a fall 5 days ago.  Patient reports rolling her left ankle after stepping into a pothole and then falling onto her right wrist and ribs.  Patient denies any loss of consciousness, head injury, weakness, chest pain, shortness of breath or any other symptoms at this time.    Review of patient's allergies indicates:  No Known Allergies  Past Medical History:   Diagnosis Date    AAA (abdominal aortic aneurysm) 2014    Abdominal aneurysm     3    Acute coronary syndrome     Arthritis     BPPV (benign paroxysmal positional vertigo)     Carotid artery plaque     Carotid artery stenosis and occlusion 2014    Chronic back pain     COPD (chronic obstructive pulmonary disease)     Coronary artery disease     Emphysema lung     Hyperlipidemia     Hypertension     Myocardial infarction     x3    Neuropathy      Past Surgical History:   Procedure Laterality Date    CARDIAC CATHETERIZATION      CORONARY ANGIOPLASTY      HYSTERECTOMY       Family History   Problem Relation Age of Onset    Heart disease Mother     Heart attacks under age 50 Father     Heart attacks under age 50 Brother     Breast cancer Paternal Aunt      Social History     Tobacco Use    Smoking status: Former Smoker     Packs/day: 0.50     Years: 44.00     Pack years: 22.00     Types: Cigarettes, Vaping w/o nicotine     Start date: 1970     Quit date: 2017     Years since quittin.2    Smokeless tobacco: Never Used    Tobacco comment: 3 MG NICOTINE FOR HEART.    Substance Use Topics    Alcohol use: No    Drug use: No     Review of Systems   Constitutional: Negative for fever.   HENT: Negative for sore throat.    Respiratory: Negative  for shortness of breath.    Cardiovascular: Negative for chest pain.   Gastrointestinal: Negative for nausea.   Genitourinary: Negative for dysuria.   Musculoskeletal: Positive for arthralgias. Negative for back pain.        +right rib pain   Skin: Negative for rash.   Neurological: Negative for weakness.   Hematological: Does not bruise/bleed easily.   All other systems reviewed and are negative.      Physical Exam     Initial Vitals [07/11/22 2051]   BP Pulse Resp Temp SpO2   (!) 211/83 62 16 97.8 °F (36.6 °C) 96 %      MAP       --         Physical Exam    Constitutional: She appears well-developed and well-nourished. She is not diaphoretic. No distress.   HENT:   Head: Normocephalic and atraumatic.   Eyes: Conjunctivae and EOM are normal. Pupils are equal, round, and reactive to light.   Neck: Neck supple.   Normal range of motion.  Cardiovascular: Normal rate, regular rhythm and normal heart sounds.   No murmur heard.  Pulmonary/Chest: Breath sounds normal. No respiratory distress. She has no wheezes. She has no rales.   Abdominal: Abdomen is soft. Bowel sounds are normal. There is no abdominal tenderness. There is no rebound and no guarding.   Musculoskeletal:         General: No edema. Normal range of motion.      Right wrist: Tenderness present. No swelling or deformity. Normal range of motion.      Cervical back: Normal range of motion and neck supple.      Left ankle: Swelling and ecchymosis present. Tenderness present over the lateral malleolus.      Left foot: Normal range of motion and normal capillary refill. Tenderness present. Normal pulse.     Neurological: She is alert and oriented to person, place, and time. No cranial nerve deficit. GCS score is 15. GCS eye subscore is 4. GCS verbal subscore is 5. GCS motor subscore is 6.   Skin: Skin is warm and dry. Capillary refill takes less than 2 seconds.   Psychiatric: She has a normal mood and affect. Thought content normal.         ED Course    Procedures  Labs Reviewed - No data to display       Imaging Results          X-Ray Wrist Complete Right (Final result)  Result time 07/11/22 22:08:11    Final result by Gurpreet Lopez MD (07/11/22 22:08:11)                 Impression:      No definite acute abnormality.  Clinical correlation and further evaluation as warranted.      Electronically signed by: Gurpreet Lopez  Date:    07/11/2022  Time:    22:08             Narrative:    EXAMINATION:  XR WRIST COMPLETE 3 VIEWS RIGHT    CLINICAL HISTORY:  Unspecified fall, initial encounter    TECHNIQUE:  PA, lateral, and oblique views of the right wrist were performed.    COMPARISON:  None    FINDINGS:  No fracture.  No traumatic malalignment.  No osseous destructive process.    Mild degenerative changes.                               X-Ray Ribs 2 View Right (Final result)  Result time 07/11/22 22:07:24    Final result by Gurpreet Lopez MD (07/11/22 22:07:24)                 Impression:      No right rib fracture identified.      Electronically signed by: Gurpreet Lopez  Date:    07/11/2022  Time:    22:07             Narrative:    EXAMINATION:  XR RIBS 2 VIEW RIGHT    CLINICAL HISTORY:  Unspecified fall, initial encounter    TECHNIQUE:  Two views of the right ribs were performed.    COMPARISON:  None    FINDINGS:  Right lung is clear.  No pleural fluid or pneumothorax.    No definite right rib fracture is identified.                               X-Ray Foot Complete Left (Final result)  Result time 07/11/22 21:59:31    Final result by Gurpreet Lopez MD (07/11/22 21:59:31)                 Impression:      No definite acute abnormality.  Clinical correlation and further evaluation as warranted.      Electronically signed by: Gurpreet Lopez  Date:    07/11/2022  Time:    21:59             Narrative:    EXAMINATION:  XR FOOT COMPLETE 3 VIEW LEFT    CLINICAL HISTORY:  .  Unspecified fall, initial encounter    TECHNIQUE:  AP, lateral and oblique views of the left  foot were performed.    COMPARISON:  Multiple priors.    FINDINGS:  No fracture. No traumatic malalignment. No osseous destructive process. Mild degenerative changes with prominent calcaneal spurring.                               X-Ray Ankle Complete Left (Final result)  Result time 07/11/22 21:58:13    Final result by Gurpreet Lopez MD (07/11/22 21:58:13)                 Impression:      No acute displaced fracture is identified.  Soft tissue swelling and small joint effusion.  Clinical correlation and further evaluation as warranted.      Electronically signed by: Gurpreet Lopez  Date:    07/11/2022  Time:    21:58             Narrative:    EXAMINATION:  XR ANKLE COMPLETE 3 VIEW LEFT    CLINICAL HISTORY:  Unspecified fall, initial encounter    TECHNIQUE:  AP, lateral and oblique views of the left ankle were performed.    COMPARISON:  Multiple prior foot radiographs.    FINDINGS:  No acute displaced fracture.  No traumatic malalignment.  No osseous destructive process.  Soft tissue swelling and small joint effusion.    Calcaneal spurring.                                 Medications   HYDROcodone-acetaminophen 5-325 mg per tablet 1 tablet (1 tablet Oral Given 7/11/22 2350)       I discussed with patient and/or family/caretaker that negative X-ray does not rule out occult fracture or other soft tissue injury.  Persistent pain greater than 7-10 days or increased pain requires follow up, specifically with orthopedics.     I discussed with patient and/or family/caretaker that evaluation in the ED does not suggest any emergent or life threatening medical conditions requiring immediate intervention beyond what was provided in the ED, and I believe patient is safe for discharge.  Regardless, an unremarkable evaluation in the ED does not preclude the development or presence of a serious of life threatening condition. As such, patient was instructed to return immediately for any worsening or change in current  symptoms.                      Clinical Impression:   Final diagnoses:  [W19.XXXA] Fall (Primary)  [M25.531] Right wrist pain  [R07.81] Rib pain on right side  [S99.912A] Injury of left ankle, initial encounter          ED Disposition Condition    Discharge Stable        ED Prescriptions     Medication Sig Dispense Start Date End Date Auth. Provider    HYDROcodone-acetaminophen (NORCO) 5-325 mg per tablet Take 1 tablet by mouth every 6 (six) hours as needed for Pain. 12 tablet 7/12/2022  MERCED Stoll Jr.        Follow-up Information     Follow up With Specialties Details Why Contact Info    Olga Altman MD Family Medicine In 1 week  [address] [address] [address] [phone]             MERCED Stoll Jr.  07/12/22 0005

## 2022-07-12 NOTE — FIRST PROVIDER EVALUATION
"Medical screening exam completed.  I have conducted a focused provider triage encounter, findings are as follows:    Brief history of present illness:  Patient presents to ER for fall that occurred 5 days ago.  Patient states she stepped in a pothole injuring left foot/ankle and fell forward.  She denies head trauma loss of consciousness.  Patient localizes pain to right ribs, left foot/ankle, and right wrist.    Vitals:    07/11/22 2051   BP: (!) 211/83   BP Location: Right arm   Patient Position: Sitting   Pulse: 62   Resp: 16   Temp: 97.8 °F (36.6 °C)   TempSrc: Oral   SpO2: 96%   Weight: 68.5 kg (151 lb)   Height: 5' 1" (1.549 m)       Pertinent physical exam:  AAOx3, respirations even, unlabored. No acute distress.    Brief workup plan:  Imaging.    Preliminary workup initiated; this workup will be continued and followed by the physician or advanced practice provider that is assigned to the patient when roomed.  "

## 2022-07-19 ENCOUNTER — PATIENT MESSAGE (OUTPATIENT)
Dept: CARDIOLOGY | Facility: CLINIC | Age: 67
End: 2022-07-19
Payer: MEDICARE

## 2022-07-22 ENCOUNTER — LAB VISIT (OUTPATIENT)
Dept: PRIMARY CARE CLINIC | Facility: CLINIC | Age: 67
End: 2022-07-22
Payer: MEDICARE

## 2022-07-22 DIAGNOSIS — Z20.822 ENCOUNTER FOR LABORATORY TESTING FOR COVID-19 VIRUS: ICD-10-CM

## 2022-07-22 PROCEDURE — U0003 INFECTIOUS AGENT DETECTION BY NUCLEIC ACID (DNA OR RNA); SEVERE ACUTE RESPIRATORY SYNDROME CORONAVIRUS 2 (SARS-COV-2) (CORONAVIRUS DISEASE [COVID-19]), AMPLIFIED PROBE TECHNIQUE, MAKING USE OF HIGH THROUGHPUT TECHNOLOGIES AS DESCRIBED BY CMS-2020-01-R: HCPCS | Performed by: INTERNAL MEDICINE

## 2022-07-22 PROCEDURE — U0005 INFEC AGEN DETEC AMPLI PROBE: HCPCS | Performed by: INTERNAL MEDICINE

## 2022-07-23 LAB
SARS-COV-2 RNA RESP QL NAA+PROBE: NOT DETECTED
SARS-COV-2- CYCLE NUMBER: NORMAL

## 2022-07-24 ENCOUNTER — PATIENT MESSAGE (OUTPATIENT)
Dept: ENDOSCOPY | Facility: HOSPITAL | Age: 67
End: 2022-07-24
Payer: MEDICARE

## 2022-07-26 ENCOUNTER — TELEPHONE (OUTPATIENT)
Dept: CARDIOLOGY | Facility: CLINIC | Age: 67
End: 2022-07-26
Payer: MEDICARE

## 2022-07-26 DIAGNOSIS — I25.118 CORONARY ARTERY DISEASE OF NATIVE ARTERY OF NATIVE HEART WITH STABLE ANGINA PECTORIS: Primary | ICD-10-CM

## 2022-07-26 DIAGNOSIS — I10 ESSENTIAL HYPERTENSION: ICD-10-CM

## 2022-07-26 NOTE — TELEPHONE ENCOUNTER
"Spoke to patient and scheduled for 7/27 with Dr. Juarez----- Message from Millie Juarez MD sent at 7/26/2022  7:32 AM CDT -----  Regarding: RE: vascular and cardiac clearance before proceeding with colonoscopy.  Please Salma get her on my schedule   ----- Message -----  From: Candy Ortega MD  Sent: 7/25/2022   8:27 PM CDT  To: Millie Juarez MD, Quintin Coronel III, MD, #  Subject: vascular and cardiac clearance before procee#    This patient was scheduled with me today and is on Plavix and ASA. She has a number of cardiac and vascular co-moribidities that made me defer her colonoscopy today. I contacted Dr. Nguyen who is on call and we talked about this case. She is symptomatic from her mesenteric vascular issues. 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 Furthermore, she mentioned a significant cardiac history stating she had "4 heart attacks". She last saw Dr. Juarez in April and I don't see that we obtained cardiac clearance. Patient also states she has carotid stenosis and has not seen vascular for some time.    For these many reasons, I cancelled her case today. I asked her to follow up with Dr. Cagle. She is clearly symptomatic from her mesenteric ischemia issues. This needs to be addressed before we can safely do a colonoscopy.    Thanks  AN        "

## 2022-07-27 ENCOUNTER — PATIENT MESSAGE (OUTPATIENT)
Dept: FAMILY MEDICINE | Facility: CLINIC | Age: 67
End: 2022-07-27
Payer: MEDICARE

## 2022-07-27 ENCOUNTER — OFFICE VISIT (OUTPATIENT)
Dept: CARDIOLOGY | Facility: CLINIC | Age: 67
End: 2022-07-27
Payer: MEDICARE

## 2022-07-27 ENCOUNTER — HOSPITAL ENCOUNTER (OUTPATIENT)
Dept: CARDIOLOGY | Facility: HOSPITAL | Age: 67
Discharge: HOME | End: 2022-07-27
Attending: INTERNAL MEDICINE
Payer: MEDICARE

## 2022-07-27 VITALS
SYSTOLIC BLOOD PRESSURE: 137 MMHG | HEIGHT: 61 IN | HEART RATE: 62 BPM | BODY MASS INDEX: 28.35 KG/M2 | DIASTOLIC BLOOD PRESSURE: 67 MMHG | WEIGHT: 150.13 LBS | OXYGEN SATURATION: 98 %

## 2022-07-27 DIAGNOSIS — G47.36 NOCTURNAL HYPOXEMIA DUE TO EMPHYSEMA: ICD-10-CM

## 2022-07-27 DIAGNOSIS — J44.9 COPD, MODERATE: ICD-10-CM

## 2022-07-27 DIAGNOSIS — E78.2 MIXED HYPERLIPIDEMIA: ICD-10-CM

## 2022-07-27 DIAGNOSIS — I71.40 ABDOMINAL AORTIC ANEURYSM (AAA) WITHOUT RUPTURE: ICD-10-CM

## 2022-07-27 DIAGNOSIS — I10 ESSENTIAL HYPERTENSION: Primary | ICD-10-CM

## 2022-07-27 DIAGNOSIS — I25.118 CORONARY ARTERY DISEASE OF NATIVE ARTERY OF NATIVE HEART WITH STABLE ANGINA PECTORIS: ICD-10-CM

## 2022-07-27 DIAGNOSIS — R19.5 LOOSE STOOLS: ICD-10-CM

## 2022-07-27 DIAGNOSIS — I49.5 SINUS NODE DYSFUNCTION: ICD-10-CM

## 2022-07-27 DIAGNOSIS — Z86.16 PERSONAL HISTORY OF COVID-19: ICD-10-CM

## 2022-07-27 DIAGNOSIS — K57.92 DIVERTICULITIS: ICD-10-CM

## 2022-07-27 DIAGNOSIS — R10.13 EPIGASTRIC ABDOMINAL PAIN: ICD-10-CM

## 2022-07-27 DIAGNOSIS — R73.03 PREDIABETES: ICD-10-CM

## 2022-07-27 DIAGNOSIS — I65.23 BILATERAL CAROTID ARTERY STENOSIS: ICD-10-CM

## 2022-07-27 DIAGNOSIS — I10 ESSENTIAL HYPERTENSION: ICD-10-CM

## 2022-07-27 DIAGNOSIS — R41.3 MEMORY LOSS: Primary | ICD-10-CM

## 2022-07-27 DIAGNOSIS — K21.9 GASTROESOPHAGEAL REFLUX DISEASE, UNSPECIFIED WHETHER ESOPHAGITIS PRESENT: ICD-10-CM

## 2022-07-27 DIAGNOSIS — J43.9 NOCTURNAL HYPOXEMIA DUE TO EMPHYSEMA: ICD-10-CM

## 2022-07-27 PROCEDURE — 93010 EKG 12-LEAD: ICD-10-PCS | Mod: ,,, | Performed by: INTERNAL MEDICINE

## 2022-07-27 PROCEDURE — 3288F FALL RISK ASSESSMENT DOCD: CPT | Mod: CPTII,S$GLB,, | Performed by: INTERNAL MEDICINE

## 2022-07-27 PROCEDURE — 3075F SYST BP GE 130 - 139MM HG: CPT | Mod: CPTII,S$GLB,, | Performed by: INTERNAL MEDICINE

## 2022-07-27 PROCEDURE — 3288F PR FALLS RISK ASSESSMENT DOCUMENTED: ICD-10-PCS | Mod: CPTII,S$GLB,, | Performed by: INTERNAL MEDICINE

## 2022-07-27 PROCEDURE — 3078F DIAST BP <80 MM HG: CPT | Mod: CPTII,S$GLB,, | Performed by: INTERNAL MEDICINE

## 2022-07-27 PROCEDURE — 3044F PR MOST RECENT HEMOGLOBIN A1C LEVEL <7.0%: ICD-10-PCS | Mod: CPTII,S$GLB,, | Performed by: INTERNAL MEDICINE

## 2022-07-27 PROCEDURE — 3044F HG A1C LEVEL LT 7.0%: CPT | Mod: CPTII,S$GLB,, | Performed by: INTERNAL MEDICINE

## 2022-07-27 PROCEDURE — 99214 PR OFFICE/OUTPT VISIT, EST, LEVL IV, 30-39 MIN: ICD-10-PCS | Mod: S$GLB,,, | Performed by: INTERNAL MEDICINE

## 2022-07-27 PROCEDURE — 4010F PR ACE/ARB THEARPY RXD/TAKEN: ICD-10-PCS | Mod: CPTII,S$GLB,, | Performed by: INTERNAL MEDICINE

## 2022-07-27 PROCEDURE — 99214 OFFICE O/P EST MOD 30 MIN: CPT | Mod: S$GLB,,, | Performed by: INTERNAL MEDICINE

## 2022-07-27 PROCEDURE — 1159F MED LIST DOCD IN RCRD: CPT | Mod: CPTII,S$GLB,, | Performed by: INTERNAL MEDICINE

## 2022-07-27 PROCEDURE — 1159F PR MEDICATION LIST DOCUMENTED IN MEDICAL RECORD: ICD-10-PCS | Mod: CPTII,S$GLB,, | Performed by: INTERNAL MEDICINE

## 2022-07-27 PROCEDURE — 1160F RVW MEDS BY RX/DR IN RCRD: CPT | Mod: CPTII,S$GLB,, | Performed by: INTERNAL MEDICINE

## 2022-07-27 PROCEDURE — 3075F PR MOST RECENT SYSTOLIC BLOOD PRESS GE 130-139MM HG: ICD-10-PCS | Mod: CPTII,S$GLB,, | Performed by: INTERNAL MEDICINE

## 2022-07-27 PROCEDURE — 3008F PR BODY MASS INDEX (BMI) DOCUMENTED: ICD-10-PCS | Mod: CPTII,S$GLB,, | Performed by: INTERNAL MEDICINE

## 2022-07-27 PROCEDURE — 1101F PR PT FALLS ASSESS DOC 0-1 FALLS W/OUT INJ PAST YR: ICD-10-PCS | Mod: CPTII,S$GLB,, | Performed by: INTERNAL MEDICINE

## 2022-07-27 PROCEDURE — 3008F BODY MASS INDEX DOCD: CPT | Mod: CPTII,S$GLB,, | Performed by: INTERNAL MEDICINE

## 2022-07-27 PROCEDURE — 99999 PR PBB SHADOW E&M-EST. PATIENT-LVL IV: CPT | Mod: PBBFAC,,, | Performed by: INTERNAL MEDICINE

## 2022-07-27 PROCEDURE — 1101F PT FALLS ASSESS-DOCD LE1/YR: CPT | Mod: CPTII,S$GLB,, | Performed by: INTERNAL MEDICINE

## 2022-07-27 PROCEDURE — 4010F ACE/ARB THERAPY RXD/TAKEN: CPT | Mod: CPTII,S$GLB,, | Performed by: INTERNAL MEDICINE

## 2022-07-27 PROCEDURE — 93010 ELECTROCARDIOGRAM REPORT: CPT | Mod: ,,, | Performed by: INTERNAL MEDICINE

## 2022-07-27 PROCEDURE — 3078F PR MOST RECENT DIASTOLIC BLOOD PRESSURE < 80 MM HG: ICD-10-PCS | Mod: CPTII,S$GLB,, | Performed by: INTERNAL MEDICINE

## 2022-07-27 PROCEDURE — 93005 ELECTROCARDIOGRAM TRACING: CPT

## 2022-07-27 PROCEDURE — 99999 PR PBB SHADOW E&M-EST. PATIENT-LVL IV: ICD-10-PCS | Mod: PBBFAC,,, | Performed by: INTERNAL MEDICINE

## 2022-07-27 PROCEDURE — 1160F PR REVIEW ALL MEDS BY PRESCRIBER/CLIN PHARMACIST DOCUMENTED: ICD-10-PCS | Mod: CPTII,S$GLB,, | Performed by: INTERNAL MEDICINE

## 2022-07-27 RX ORDER — AMLODIPINE BESYLATE 2.5 MG/1
TABLET ORAL
COMMUNITY
Start: 2022-07-26 | End: 2022-08-15

## 2022-07-27 NOTE — PROGRESS NOTES
Subjective:   Patient ID:  Gemma Vick is a 67 y.o. female who presents for follow up of Pre-op Exam      HPI  3/26/2020  A 64 yo female with pvd carotid disease htn hlp copd exsmoker on nocturnal o2 therapy wants to discuss test results. She is in digital htn has been unable to tolerate bisoprolol daily feels tired fatigued no energy she takes meds regularily tries to be compliant with salt no exercise but stays busy in the house. She takes care of her grandbabies. Has occasional leg swelling she takes lasix prn for weight change she stands on her feet a lot. She is compliant with inhalers to control shortness of breath no palpitation has chronic cough.  Her carotid u/s reviewed unchanged.abd u/s no aneurysm  Lipids on target she is well controlled on vytorin .she has difficulty with crestor and lipitor   9/24/2020  She is here for  f/u she is complaining of recurrent episodes of abdominal pain lower quadrant and got to be upper abdomen associated with nausea vomiting no post prandial pain no weight loss or food avoidance. Has no new symptoms of chest pain she is still short of breath no new palpitation tia.   Ct abdomen showed diverticulitis aaa 3.4 cm and sma stenosis.   Her ldl has increased. She has had sore muscles she stopped statins w/o improvemnet. She needs to back on statins that would explain he rincreased ldl.      3/25/2021  Here for f/u has sharp recurrent left sided occurring at rest sitting in recliner she cannot take a deep breath no exertional symptoms she takes care of her grandbabies no smoking no tia palpitation syncope near syncope. She takes  meds regularily .reveiw of her bp chart still showed elevated indices. She claims salt compliance. She could not tolerate amlodipine bid . Her lipids aRE ON TARGET.      9/30/2021    has been using o2 therapy at nite no change in symptoms her bp is elevated she takes amlodipine 5 mg at nite she is not compliant with diet. Salt wise.she takes care of  grandkids no chest pain no cardiac symptoms. She has the urge to go to bathroom after she eats she has upperabdominal pain and feels like throwing up. Has known stenosis of the sma  She has lost weight.      11/1/2021   She is taking 7.5 mg amlodipine still needs better  salt control bp acceptable here however at home is more elevated but we have not checked her machine. She continues to have abdominal symptoms. She wants alternatives to ibesartan her pill is too big to swallow her lipids are on target. (she will check on diovan 320 and benicar 40 mg with pharmacist about size and cost).she is in digital htn her bp readings are more elevated that today clinic readings.she ahs not been compliant with diet she had fried chicken mashed potatoes french fries and   And biscuits  From [employer].   She had cta for her abdominal symptoms showing celiac stenosis and stenosis at the sma. Has also bilateral iliac stenosis greater than 50%.   Her carotid anatomy is unchanged.         12/8/2021   Today bp is controlled she ahs taken a fluid pill her bp readings at home since last visit has been 160-170 range. She si non compliant with salt she took diuretics due to leg swelling which is related to amlodipine. She has also an mason with exercise that did not suggest significant disease she continues to have symptoms suggestive of musculoskeltal on the rt and sciatica. No definite claudication no discoloration. she has no tia.      4/1/2022  Not much leg swelling has been drinking a lot of water bp fluctuates based on salt her digital htn reflects that now today is on target. Has been in after hours due abdominal pain constipation issue. Has nausea at that time . All improved still          dealing with constipation has use magnesium citrate.   She is not using diuretics except intermittently   Has question about arthritis meds she can take advised short course is ok but prolonged use is high risk of bleeding.     7/27/2022  Here for  evaluation for colonoscopy. Has a positive school screening test was scheduled for colonoscopy . Has iron deficiency anemia has fatigue. She has seen vascular surgery because her colonoscopy was cancel;ed has m,oderate carotid disease. Has cta mesenteric stenosis. She has abdominal pain has improvement after taking bentyl some times she vomited digetsed food after eating no diarrhea  Has postprandial pain  opn the rt then progresses to to the bottom and lower abdomen. She is non compliant with salt that is why her bp is elevated.   Past Medical History:   Diagnosis Date    AAA (abdominal aortic aneurysm) 2014    Abdominal aneurysm     3    Acute coronary syndrome     Arthritis     BPPV (benign paroxysmal positional vertigo)     Carotid artery plaque     Carotid artery stenosis and occlusion 2014    Chronic back pain     COPD (chronic obstructive pulmonary disease)     Coronary artery disease     Emphysema lung     Hyperlipidemia     Hypertension     Myocardial infarction     x3    Neuropathy        Past Surgical History:   Procedure Laterality Date    CARDIAC CATHETERIZATION      CORONARY ANGIOPLASTY      HYSTERECTOMY         Social History     Tobacco Use    Smoking status: Former Smoker     Packs/day: 0.50     Years: 44.00     Pack years: 22.00     Types: Cigarettes, Vaping w/o nicotine     Start date: 1970     Quit date: 2017     Years since quittin.2    Smokeless tobacco: Never Used    Tobacco comment: 3 MG NICOTINE FOR HEART.    Substance Use Topics    Alcohol use: No    Drug use: No       Family History   Problem Relation Age of Onset    Heart disease Mother     Heart attacks under age 50 Father     Heart attacks under age 50 Brother     Breast cancer Paternal Aunt        Current Outpatient Medications   Medication Sig    albuterol (PROVENTIL/VENTOLIN HFA) 90 mcg/actuation inhaler INHALE 2 PUFFS INTO LUNGS EVERY 6 HOURS AS NEEDED    amLODIPine (NORVASC)  2.5 MG tablet     aspirin 81 MG Chew Take 81 mg by mouth once daily.    bisoprolol (ZEBETA) 5 MG tablet Take 1 tablet (5 mg total) by mouth once daily.    budesonide-glycopyr-formoterol (BREZTRI AEROSPHERE) 160-9-4.8 mcg/actuation HFAA Inhale 2 puffs into the lungs 2 (two) times a day.    clopidogreL (PLAVIX) 75 mg tablet Take 1 tablet (75 mg total) by mouth once daily.    dicyclomine (BENTYL) 10 MG capsule TAKE 1 CAPSULE BEFORE MEALS AND 1 CAPSULE BEFORE BEDTIME    DULoxetine (CYMBALTA) 60 MG capsule TAKE (1) CAPSULE BY MOUTH ONCE A DAY    estrogens, conjugated, (PREMARIN) 0.3 MG tablet Take 1 tablet (0.3 mg total) by mouth once daily.    ezetimibe-simvastatin 10-40 mg (VYTORIN) 10-40 mg per tablet TAKE 1 TABLET BY MOUTH EVERY EVENING    ferrous gluconate (FERGON) 324 MG tablet Take 1 tablet (324 mg total) by mouth 2 (two) times daily with meals.    furosemide (LASIX) 20 MG tablet 1 TAB DAILY AS NEEDED FOR SWELLING OF LEG,OR WEIGHT GAIN OF 3 PDS IN 1 DAY OR 5 PDS IN 1 WEEK    ibuprofen (ADVIL,MOTRIN) 400 MG tablet TAKE 1 TABLET BY MOUTH EVERY 6 HOURS AS NEEDED    metroNIDAZOLE (FLAGYL) 500 MG tablet Take 1 tablet (500 mg total) by mouth 3 (three) times daily.    olmesartan (BENICAR) 40 MG tablet Take 1 tablet (40 mg total) by mouth once daily.    OXYGEN-AIR DELIVERY SYSTEMS MISC 2 L by Misc.(Non-Drug; Combo Route) route every evening.    pantoprazole (PROTONIX) 40 MG tablet Take 1 tablet (40 mg total) by mouth once daily.    vitamin D (VITAMIN D3) 1000 units Tab Take 1,000 Units by mouth once daily.    zinc gluconate 50 mg tablet Take 50 mg by mouth once daily.    zolpidem (AMBIEN) 10 mg Tab TAKE 1 TABLET BY MOUTH IN THE EVENING    HYDROcodone-acetaminophen (NORCO) 5-325 mg per tablet Take 1 tablet by mouth every 6 (six) hours as needed for Pain. (Patient not taking: Reported on 7/27/2022)     No current facility-administered medications for this visit.     Current Outpatient Medications on File  Prior to Visit   Medication Sig    albuterol (PROVENTIL/VENTOLIN HFA) 90 mcg/actuation inhaler INHALE 2 PUFFS INTO LUNGS EVERY 6 HOURS AS NEEDED    amLODIPine (NORVASC) 2.5 MG tablet     aspirin 81 MG Chew Take 81 mg by mouth once daily.    bisoprolol (ZEBETA) 5 MG tablet Take 1 tablet (5 mg total) by mouth once daily.    budesonide-glycopyr-formoterol (BREZTRI AEROSPHERE) 160-9-4.8 mcg/actuation HFAA Inhale 2 puffs into the lungs 2 (two) times a day.    clopidogreL (PLAVIX) 75 mg tablet Take 1 tablet (75 mg total) by mouth once daily.    dicyclomine (BENTYL) 10 MG capsule TAKE 1 CAPSULE BEFORE MEALS AND 1 CAPSULE BEFORE BEDTIME    DULoxetine (CYMBALTA) 60 MG capsule TAKE (1) CAPSULE BY MOUTH ONCE A DAY    estrogens, conjugated, (PREMARIN) 0.3 MG tablet Take 1 tablet (0.3 mg total) by mouth once daily.    ezetimibe-simvastatin 10-40 mg (VYTORIN) 10-40 mg per tablet TAKE 1 TABLET BY MOUTH EVERY EVENING    ferrous gluconate (FERGON) 324 MG tablet Take 1 tablet (324 mg total) by mouth 2 (two) times daily with meals.    furosemide (LASIX) 20 MG tablet 1 TAB DAILY AS NEEDED FOR SWELLING OF LEG,OR WEIGHT GAIN OF 3 PDS IN 1 DAY OR 5 PDS IN 1 WEEK    ibuprofen (ADVIL,MOTRIN) 400 MG tablet TAKE 1 TABLET BY MOUTH EVERY 6 HOURS AS NEEDED    metroNIDAZOLE (FLAGYL) 500 MG tablet Take 1 tablet (500 mg total) by mouth 3 (three) times daily.    olmesartan (BENICAR) 40 MG tablet Take 1 tablet (40 mg total) by mouth once daily.    OXYGEN-AIR DELIVERY SYSTEMS MISC 2 L by Misc.(Non-Drug; Combo Route) route every evening.    pantoprazole (PROTONIX) 40 MG tablet Take 1 tablet (40 mg total) by mouth once daily.    vitamin D (VITAMIN D3) 1000 units Tab Take 1,000 Units by mouth once daily.    zinc gluconate 50 mg tablet Take 50 mg by mouth once daily.    zolpidem (AMBIEN) 10 mg Tab TAKE 1 TABLET BY MOUTH IN THE EVENING    HYDROcodone-acetaminophen (NORCO) 5-325 mg per tablet Take 1 tablet by mouth every 6 (six) hours  as needed for Pain. (Patient not taking: Reported on 7/27/2022)     No current facility-administered medications on file prior to visit.     Review of patient's allergies indicates:  No Known Allergies  Review of Systems   Constitutional: Negative for diaphoresis, malaise/fatigue and weight gain.   HENT: Negative for hoarse voice.    Eyes: Negative for double vision and visual disturbance.   Cardiovascular: Negative for chest pain, claudication, cyanosis, dyspnea on exertion, irregular heartbeat, leg swelling, near-syncope, orthopnea, palpitations, paroxysmal nocturnal dyspnea and syncope.   Respiratory: Negative for cough, hemoptysis, shortness of breath and snoring.    Hematologic/Lymphatic: Negative for bleeding problem. Does not bruise/bleed easily.   Skin: Negative for color change and poor wound healing.   Musculoskeletal: Negative for muscle cramps, muscle weakness and myalgias.   Gastrointestinal: Positive for bloating and abdominal pain. Negative for change in bowel habit, diarrhea, heartburn, hematemesis, hematochezia, melena and nausea.   Neurological: Negative for excessive daytime sleepiness, dizziness, headaches, light-headedness, loss of balance, numbness and weakness.   Psychiatric/Behavioral: Negative for memory loss. The patient does not have insomnia.    Allergic/Immunologic: Negative for hives.       Objective:   Physical Exam  Vitals and nursing note reviewed.   Constitutional:       General: She is not in acute distress.     Appearance: Normal appearance. She is well-developed. She is not ill-appearing.   HENT:      Head: Normocephalic and atraumatic.   Eyes:      General: No scleral icterus.     Pupils: Pupils are equal, round, and reactive to light.   Neck:      Thyroid: No thyromegaly.      Vascular: Normal carotid pulses. No carotid bruit, hepatojugular reflux or JVD.      Trachea: No tracheal deviation.   Cardiovascular:      Rate and Rhythm: Normal rate and regular rhythm.      Pulses:  Normal pulses.           Carotid pulses are 2+ on the right side with bruit and 2+ on the left side with bruit.       Radial pulses are 2+ on the right side and 2+ on the left side.        Femoral pulses are 2+ on the right side with bruit and 2+ on the left side with bruit.       Popliteal pulses are 2+ on the right side and 2+ on the left side.        Dorsalis pedis pulses are 2+ on the right side and 2+ on the left side.        Posterior tibial pulses are 2+ on the right side and 2+ on the left side.      Heart sounds: Murmur heard.    Harsh midsystolic murmur is present with a grade of 1/6 at the upper right sternal border radiating to the neck.  High-pitched blowing decrescendo early diastolic murmur is present with a grade of 1/4 at the upper right sternal border radiating to the apex.    No friction rub. No gallop.   Pulmonary:      Effort: Pulmonary effort is normal. No respiratory distress.      Breath sounds: Normal breath sounds. No wheezing, rhonchi or rales.   Chest:      Chest wall: No tenderness.   Abdominal:      General: Bowel sounds are normal. There is no distension or abdominal bruit.      Palpations: Abdomen is soft. There is no hepatomegaly, mass or pulsatile mass.      Tenderness: There is no abdominal tenderness.      Comments: Abdominal bruit.   Musculoskeletal:      Right shoulder: No deformity.      Cervical back: Normal range of motion and neck supple.   Skin:     General: Skin is warm and dry.      Findings: No erythema or rash.      Nails: There is no clubbing.   Neurological:      Mental Status: She is alert and oriented to person, place, and time.      Cranial Nerves: No cranial nerve deficit.      Coordination: Coordination normal.   Psychiatric:         Speech: Speech normal.         Behavior: Behavior normal.         Judgment: Judgment normal.       Vitals:    07/27/22 1425 07/27/22 1431   BP: (!) 155/68 137/67   Pulse: 62    SpO2: 98%    Weight: 68.1 kg (150 lb 2.1 oz)    Height:  "5' 1" (1.549 m)      Lab Results   Component Value Date    CHOL 143 04/07/2022    CHOL 136 09/23/2021    CHOL 147 03/18/2021     Lab Results   Component Value Date    HDL 49 04/07/2022    HDL 53 09/23/2021    HDL 57 03/18/2021     Lab Results   Component Value Date    LDLCALC 77.4 04/07/2022    LDLCALC 66.6 09/23/2021    LDLCALC 60.4 (L) 03/18/2021     Lab Results   Component Value Date    TRIG 83 04/07/2022    TRIG 82 09/23/2021    TRIG 148 03/18/2021     Lab Results   Component Value Date    CHOLHDL 34.3 04/07/2022    CHOLHDL 39.0 09/23/2021    CHOLHDL 38.8 03/18/2021       Chemistry        Component Value Date/Time     04/07/2022 0922    K 4.5 04/07/2022 0922     04/07/2022 0922    CO2 25 04/07/2022 0922    BUN 12 04/07/2022 0922    CREATININE 0.9 04/07/2022 0922     (H) 04/07/2022 0922        Component Value Date/Time    CALCIUM 9.4 04/07/2022 0922    ALKPHOS 80 04/07/2022 0922    AST 21 04/07/2022 0922    ALT 10 04/07/2022 0922    BILITOT 0.3 04/07/2022 0922    ESTGFRAFRICA >60.0 04/07/2022 0922    EGFRNONAA >60.0 04/07/2022 0922          Lab Results   Component Value Date    TSH 1.509 05/04/2018     Lab Results   Component Value Date    INR 1.1 12/20/2017    INR 1.1 08/10/2012     Lab Results   Component Value Date    WBC 6.91 04/25/2022    HGB 11.6 (L) 04/25/2022    HCT 37.3 04/25/2022    MCV 95 04/25/2022     04/25/2022     BMP  Sodium   Date Value Ref Range Status   04/07/2022 136 136 - 145 mmol/L Final     Potassium   Date Value Ref Range Status   04/07/2022 4.5 3.5 - 5.1 mmol/L Final     Chloride   Date Value Ref Range Status   04/07/2022 100 95 - 110 mmol/L Final     CO2   Date Value Ref Range Status   04/07/2022 25 23 - 29 mmol/L Final     BUN   Date Value Ref Range Status   04/07/2022 12 8 - 23 mg/dL Final     Creatinine   Date Value Ref Range Status   04/07/2022 0.9 0.5 - 1.4 mg/dL Final     Calcium   Date Value Ref Range Status   04/07/2022 9.4 8.7 - 10.5 mg/dL Final "     Anion Gap   Date Value Ref Range Status   04/07/2022 11 8 - 16 mmol/L Final     eGFR if    Date Value Ref Range Status   04/07/2022 >60.0 >60 mL/min/1.73 m^2 Final     eGFR if non    Date Value Ref Range Status   04/07/2022 >60.0 >60 mL/min/1.73 m^2 Final     Comment:     Calculation used to obtain the estimated glomerular filtration  rate (eGFR) is the CKD-EPI equation.        CrCl cannot be calculated (Patient's most recent lab result is older than the maximum 7 days allowed.).    Assessment:     1. Essential hypertension    2. COPD, moderate    3. Nocturnal hypoxemia due to emphysema    4. Abdominal aortic aneurysm (AAA) without rupture    5. Bilateral carotid artery stenosis    6. Coronary artery disease of native artery of native heart with stable angina pectoris    7. Mixed hyperlipidemia    8. Sinus node dysfunction    9. Personal history of COVID-19    10. Prediabetes    11. Diverticulitis    12. Epigastric abdominal pain    13. Gastroesophageal reflux disease, unspecified whether esophagitis present    14. Loose stools    htn labile because  She is non compliant with salt intake and has forgetfulness ? Dementia to take meds.    hlp on meds on target    carotid disease asymptomatic on appropriate tehrapy    abdominal pain multifactorial may have an element of mesenteric ischemia still needs her colonoscopy and also egd since she has iron deficiency atht is symptomatic no contraindication to proceed with gi eval.  aaa stable follow ct scan    cad wise asymptomatic.  Nocturnal hypoxia using o2 therapy.  Plan:   Proceed with gi w/u no contraindication  Continue current therapy  Cardiac low salt diet.  Risk factor modification and excercise program.  F/u as scheduled    needs neuro eval due to forgetfulness that si progressing ? Vascular dementia.  Reviewed her cta her celiac stenosis may be arcuate ligament has moderate sma disease. This has been stable.   Copy to   STARR

## 2022-07-28 ENCOUNTER — OFFICE VISIT (OUTPATIENT)
Dept: PODIATRY | Facility: CLINIC | Age: 67
End: 2022-07-28
Payer: MEDICARE

## 2022-07-28 VITALS — WEIGHT: 150.13 LBS | BODY MASS INDEX: 28.35 KG/M2 | HEIGHT: 61 IN

## 2022-07-28 DIAGNOSIS — M54.16 LUMBAR RADICULOPATHY: ICD-10-CM

## 2022-07-28 DIAGNOSIS — M51.36 DDD (DEGENERATIVE DISC DISEASE), LUMBAR: ICD-10-CM

## 2022-07-28 DIAGNOSIS — M79.672 FOOT PAIN, BILATERAL: Primary | ICD-10-CM

## 2022-07-28 DIAGNOSIS — M79.671 FOOT PAIN, BILATERAL: Primary | ICD-10-CM

## 2022-07-28 PROCEDURE — 1160F RVW MEDS BY RX/DR IN RCRD: CPT | Mod: CPTII,S$GLB,, | Performed by: PODIATRIST

## 2022-07-28 PROCEDURE — 99999 PR PBB SHADOW E&M-EST. PATIENT-LVL IV: CPT | Mod: PBBFAC,,, | Performed by: PODIATRIST

## 2022-07-28 PROCEDURE — 1159F MED LIST DOCD IN RCRD: CPT | Mod: CPTII,S$GLB,, | Performed by: PODIATRIST

## 2022-07-28 PROCEDURE — 1101F PT FALLS ASSESS-DOCD LE1/YR: CPT | Mod: CPTII,S$GLB,, | Performed by: PODIATRIST

## 2022-07-28 PROCEDURE — 1101F PR PT FALLS ASSESS DOC 0-1 FALLS W/OUT INJ PAST YR: ICD-10-PCS | Mod: CPTII,S$GLB,, | Performed by: PODIATRIST

## 2022-07-28 PROCEDURE — 99214 PR OFFICE/OUTPT VISIT, EST, LEVL IV, 30-39 MIN: ICD-10-PCS | Mod: S$GLB,,, | Performed by: PODIATRIST

## 2022-07-28 PROCEDURE — 3008F PR BODY MASS INDEX (BMI) DOCUMENTED: ICD-10-PCS | Mod: CPTII,S$GLB,, | Performed by: PODIATRIST

## 2022-07-28 PROCEDURE — 99214 OFFICE O/P EST MOD 30 MIN: CPT | Mod: S$GLB,,, | Performed by: PODIATRIST

## 2022-07-28 PROCEDURE — 4010F ACE/ARB THERAPY RXD/TAKEN: CPT | Mod: CPTII,S$GLB,, | Performed by: PODIATRIST

## 2022-07-28 PROCEDURE — 3044F PR MOST RECENT HEMOGLOBIN A1C LEVEL <7.0%: ICD-10-PCS | Mod: CPTII,S$GLB,, | Performed by: PODIATRIST

## 2022-07-28 PROCEDURE — 3008F BODY MASS INDEX DOCD: CPT | Mod: CPTII,S$GLB,, | Performed by: PODIATRIST

## 2022-07-28 PROCEDURE — 3044F HG A1C LEVEL LT 7.0%: CPT | Mod: CPTII,S$GLB,, | Performed by: PODIATRIST

## 2022-07-28 PROCEDURE — 4010F PR ACE/ARB THEARPY RXD/TAKEN: ICD-10-PCS | Mod: CPTII,S$GLB,, | Performed by: PODIATRIST

## 2022-07-28 PROCEDURE — 99999 PR PBB SHADOW E&M-EST. PATIENT-LVL IV: ICD-10-PCS | Mod: PBBFAC,,, | Performed by: PODIATRIST

## 2022-07-28 PROCEDURE — 3288F FALL RISK ASSESSMENT DOCD: CPT | Mod: CPTII,S$GLB,, | Performed by: PODIATRIST

## 2022-07-28 PROCEDURE — 3288F PR FALLS RISK ASSESSMENT DOCUMENTED: ICD-10-PCS | Mod: CPTII,S$GLB,, | Performed by: PODIATRIST

## 2022-07-28 PROCEDURE — 1160F PR REVIEW ALL MEDS BY PRESCRIBER/CLIN PHARMACIST DOCUMENTED: ICD-10-PCS | Mod: CPTII,S$GLB,, | Performed by: PODIATRIST

## 2022-07-28 PROCEDURE — 1159F PR MEDICATION LIST DOCUMENTED IN MEDICAL RECORD: ICD-10-PCS | Mod: CPTII,S$GLB,, | Performed by: PODIATRIST

## 2022-07-28 NOTE — PROGRESS NOTES
Subjective:       Patient ID: Gemma Vick is a 67 y.o. female.    Chief Complaint: Foot Problem (C/o tolerable foot pain and coldness to feet at night. Non diabetic, PCP: Dr. Sotelo last seen 22)    HPI: Gemma Vick presents to the clinic today with the complaint of persistent coolness and numbness and tingling to the B/L lower extremity. Patient states these symptoms have been on going now for the past several months, and are worsening. Patient states the symptoms seem to be moreso nocturnal. Pain/Discomfort is described as an irritant, but is occasionally sharp and shooting like. Patient does have a history of lumbosacral pathology; spinal stenosis, radiculopathy, sciatica. Patient is not currently on Gabapentin or Lyrica or Cymbalta and/or etc... States prior Gabapentin and Cymbalta.     Review of patient's allergies indicates:  No Known Allergies    Past Medical History:   Diagnosis Date    AAA (abdominal aortic aneurysm) 2014    Abdominal aneurysm     3    Acute coronary syndrome     Arthritis     BPPV (benign paroxysmal positional vertigo)     Carotid artery plaque     Carotid artery stenosis and occlusion 2014    Chronic back pain     COPD (chronic obstructive pulmonary disease)     Coronary artery disease     Emphysema lung     Hyperlipidemia     Hypertension     Myocardial infarction     x3    Neuropathy        Family History   Problem Relation Age of Onset    Heart disease Mother     Heart attacks under age 50 Father     Heart attacks under age 50 Brother     Breast cancer Paternal Aunt        Social History     Socioeconomic History    Marital status:    Tobacco Use    Smoking status: Former Smoker     Packs/day: 0.50     Years: 44.00     Pack years: 22.00     Types: Cigarettes, Vaping w/o nicotine     Start date: 1970     Quit date: 2017     Years since quittin.2    Smokeless tobacco: Never Used    Tobacco comment: 3 MG NICOTINE FOR  HEART.    Substance and Sexual Activity    Alcohol use: No    Drug use: No    Sexual activity: Not Currently     Birth control/protection: None     Social Determinants of Health     Financial Resource Strain: High Risk    Difficulty of Paying Living Expenses: Very hard   Food Insecurity: Food Insecurity Present    Worried About Running Out of Food in the Last Year: Sometimes true    Ran Out of Food in the Last Year: Often true   Transportation Needs: Unmet Transportation Needs    Lack of Transportation (Medical): No    Lack of Transportation (Non-Medical): Yes   Physical Activity: Inactive    Days of Exercise per Week: 0 days    Minutes of Exercise per Session: 0 min   Stress: No Stress Concern Present    Feeling of Stress : Only a little   Social Connections: Unknown    Frequency of Communication with Friends and Family: More than three times a week    Frequency of Social Gatherings with Friends and Family: More than three times a week    Active Member of Clubs or Organizations: Yes    Attends Club or Organization Meetings: More than 4 times per year    Marital Status:    Housing Stability: Low Risk     Unable to Pay for Housing in the Last Year: No    Number of Places Lived in the Last Year: 1    Unstable Housing in the Last Year: No       Past Surgical History:   Procedure Laterality Date    CARDIAC CATHETERIZATION      CORONARY ANGIOPLASTY      HYSTERECTOMY  1988       Review of Systems   Constitutional: Negative for chills, fatigue and fever.   HENT: Negative for hearing loss.    Eyes: Negative for photophobia and visual disturbance.   Respiratory: Negative for cough, chest tightness, shortness of breath and wheezing.    Cardiovascular: Negative for chest pain and palpitations.   Gastrointestinal: Negative for constipation, diarrhea, nausea and vomiting.   Endocrine: Negative for cold intolerance and heat intolerance.   Genitourinary: Negative for flank pain.   Musculoskeletal:  "Negative for neck pain and neck stiffness.   Neurological: Negative for light-headedness and headaches.        Neuritis      Psychiatric/Behavioral: Negative for sleep disturbance.          Objective:   Ht 5' 1" (1.549 m)   Wt 68.1 kg (150 lb 2.1 oz)   BMI 28.37 kg/m²     Physical Exam    LOWER EXTREMITY PHYSICAL EXAMINATION  DERMATOLOGY: Skin is supple, dry and intact.     VASCULAR: The B/L dorsalis pedis pulse is 2/4 and the posterior tibial pulse is 2/4. Hair growth is noted on the dorsal foot and digits. Proximal to distal, warm to warm. Capillary refill time is WNL at less than 3s.    ORTHOPEDIC: Manual Muscle Testing is 5/5 in all planes on the B/L LE, without pains, with and without resistance. Dorsal midfoot arthropathy is noted.     NEUROLOGY: Sensation to light touch is intact. Proprioception is intact, bilateral. Sensation to pin prick is reduced to absent. Examination with 5.07 [place] monofilament reveals that protective sensation is not intact to the left and right plantar surfaces of the foot and digits, as the patient has no sensation/detection at greater than 4 distinct points of contact.     Assessment:     1. Foot pain, bilateral    2. DDD (degenerative disc disease), lumbar    3. Lumbar radiculopathy        Plan:     Foot pain, bilateral  -     Ambulatory referral/consult to Podiatry    DDD (degenerative disc disease), lumbar    Lumbar radiculopathy      Thorough discussion is had with the patient today, concerning the diagnosis, its etiology, and the treatment algorithm at present.     Etiology of foot pains and perceived coolness is lumbar spine, likely.    No tingling or burning consistently, thus will not Rx Lyrica (could not Gabapentin prior and does not like Cymbalta).    Patient is advised to follow up with Pain Management for further evaluation, diagnostics and/or treatment of the aforementioned pathologies.             Future Appointments   Date Time Provider Department Center "   8/3/2022  2:30 PM Christine McDaniel, PA-C [facility] Virtual   9/29/2022  8:00 AM LABORATORY, [facility] [place]   10/6/2022 11:00 AM Millie Juarez MD [facility]   10/25/2022 11:20 AM Olga Altman MD [facility] [place]   12/12/2022 11:00 AM Sandrine Devries NP [facility]

## 2022-08-02 ENCOUNTER — PATIENT MESSAGE (OUTPATIENT)
Dept: CARDIOLOGY | Facility: CLINIC | Age: 67
End: 2022-08-02
Payer: MEDICARE

## 2022-08-03 DIAGNOSIS — R42 DIZZINESS: Primary | ICD-10-CM

## 2022-08-06 ENCOUNTER — PATIENT MESSAGE (OUTPATIENT)
Dept: FAMILY MEDICINE | Facility: CLINIC | Age: 67
End: 2022-08-06
Payer: MEDICARE

## 2022-08-08 DIAGNOSIS — K55.1 MESENTERIC VASCULAR INSUFFICIENCY: ICD-10-CM

## 2022-08-08 DIAGNOSIS — Z87.19 HX OF DIVERTICULITIS OF COLON: Primary | ICD-10-CM

## 2022-08-08 DIAGNOSIS — D50.0 IRON DEFICIENCY ANEMIA DUE TO CHRONIC BLOOD LOSS: ICD-10-CM

## 2022-08-09 ENCOUNTER — HOSPITAL ENCOUNTER (OUTPATIENT)
Dept: CARDIOLOGY | Facility: HOSPITAL | Age: 67
Discharge: HOME | End: 2022-08-09
Attending: INTERNAL MEDICINE
Payer: MEDICARE

## 2022-08-09 ENCOUNTER — TELEPHONE (OUTPATIENT)
Dept: PREADMISSION TESTING | Facility: HOSPITAL | Age: 67
End: 2022-08-09
Payer: MEDICARE

## 2022-08-09 DIAGNOSIS — R42 DIZZINESS: ICD-10-CM

## 2022-08-09 PROCEDURE — 93227 HOLTER MONITOR - 48 HOUR (CUPID ONLY): ICD-10-PCS | Mod: ,,, | Performed by: INTERNAL MEDICINE

## 2022-08-09 PROCEDURE — 93227 XTRNL ECG REC<48 HR R&I: CPT | Mod: ,,, | Performed by: INTERNAL MEDICINE

## 2022-08-09 PROCEDURE — 93225 XTRNL ECG REC<48 HRS REC: CPT

## 2022-08-09 NOTE — TELEPHONE ENCOUNTER
Received message to call and endosocpy schedule procedures with dr alonso.  Called patient to schedule procedures, she declined to schedule at this time.  Patient stated she would call office when ready to schedule.

## 2022-08-10 DIAGNOSIS — F51.04 CHRONIC INSOMNIA: ICD-10-CM

## 2022-08-10 RX ORDER — DICYCLOMINE HYDROCHLORIDE 10 MG/1
10 CAPSULE ORAL
Qty: 30 CAPSULE | Refills: 0 | Status: SHIPPED | OUTPATIENT
Start: 2022-08-10 | End: 2022-11-02

## 2022-08-10 RX ORDER — ZOLPIDEM TARTRATE 10 MG/1
TABLET ORAL
Qty: 30 TABLET | Refills: 3 | Status: SHIPPED | OUTPATIENT
Start: 2022-08-10 | End: 2023-03-02

## 2022-08-10 NOTE — TELEPHONE ENCOUNTER
No new care gaps identified.  [facility] Embedded Care Gaps. Reference number: [id number]. 8/10/2022   9:29:43 AM THERESET

## 2022-08-11 LAB
OHS CV EVENT MONITOR DAY: 0
OHS CV HOLTER LENGTH DECIMAL HOURS: 48
OHS CV HOLTER LENGTH HOURS: 48
OHS CV HOLTER LENGTH MINUTES: 0
OHS CV HOLTER SINUS AVERAGE HR: 79
OHS CV HOLTER SINUS MAX HR: 128
OHS CV HOLTER SINUS MIN HR: 51

## 2022-08-12 ENCOUNTER — PATIENT MESSAGE (OUTPATIENT)
Dept: CARDIOLOGY | Facility: CLINIC | Age: 67
End: 2022-08-12
Payer: MEDICARE

## 2022-08-12 ENCOUNTER — TELEPHONE (OUTPATIENT)
Dept: CARDIOLOGY | Facility: CLINIC | Age: 67
End: 2022-08-12
Payer: MEDICARE

## 2022-08-12 NOTE — TELEPHONE ENCOUNTER
Patient was notified of results. All questions were answered. Pt verbalized understanding. Pt will call back with any other questions or concerns.    ----- Message from Millie Juarez MD sent at 8/12/2022  7:46 AM CDT -----  HAS FEW SKIPPED BEATS NO SIGNIFICANT RHYTHM ISSUES TO WORRY ABOUT PACEMAKER.

## 2022-09-12 ENCOUNTER — PATIENT MESSAGE (OUTPATIENT)
Dept: FAMILY MEDICINE | Facility: CLINIC | Age: 67
End: 2022-09-12
Payer: MEDICARE

## 2022-09-12 RX ORDER — LORAZEPAM 1 MG/1
1 TABLET ORAL ONCE
Qty: 1 TABLET | Refills: 0 | Status: SHIPPED | OUTPATIENT
Start: 2022-09-12 | End: 2022-10-25

## 2022-09-12 NOTE — TELEPHONE ENCOUNTER
No new care gaps identified.  [facility] Embedded Care Gaps. Reference number: [id number]. 9/12/2022   5:54:09 PM CDT

## 2022-09-12 NOTE — TELEPHONE ENCOUNTER
Ativan 1 mg p.o. take a couple hours before the procedure.    Do not drive when taking the medication

## 2022-09-12 NOTE — TELEPHONE ENCOUNTER
Pt is requesting a Rx to relax her for a brain MRI tomorrow. Patient states that she is extremely nervous. Please advise and authorize.

## 2022-09-16 ENCOUNTER — PATIENT MESSAGE (OUTPATIENT)
Dept: PULMONOLOGY | Facility: CLINIC | Age: 67
End: 2022-09-16
Payer: MEDICARE

## 2022-09-21 ENCOUNTER — OFFICE VISIT (OUTPATIENT)
Dept: CARDIOLOGY | Facility: CLINIC | Age: 67
End: 2022-09-21
Payer: MEDICARE

## 2022-09-21 VITALS
DIASTOLIC BLOOD PRESSURE: 72 MMHG | HEART RATE: 73 BPM | BODY MASS INDEX: 28.42 KG/M2 | SYSTOLIC BLOOD PRESSURE: 144 MMHG | WEIGHT: 150.56 LBS | OXYGEN SATURATION: 98 % | HEIGHT: 61 IN

## 2022-09-21 DIAGNOSIS — R42 DIZZINESS: ICD-10-CM

## 2022-09-21 PROCEDURE — 3008F BODY MASS INDEX DOCD: CPT | Mod: CPTII,S$GLB,, | Performed by: INTERNAL MEDICINE

## 2022-09-21 PROCEDURE — 99205 PR OFFICE/OUTPT VISIT, NEW, LEVL V, 60-74 MIN: ICD-10-PCS | Mod: S$GLB,,, | Performed by: INTERNAL MEDICINE

## 2022-09-21 PROCEDURE — 3288F PR FALLS RISK ASSESSMENT DOCUMENTED: ICD-10-PCS | Mod: CPTII,S$GLB,, | Performed by: INTERNAL MEDICINE

## 2022-09-21 PROCEDURE — 3044F HG A1C LEVEL LT 7.0%: CPT | Mod: CPTII,S$GLB,, | Performed by: INTERNAL MEDICINE

## 2022-09-21 PROCEDURE — 1159F PR MEDICATION LIST DOCUMENTED IN MEDICAL RECORD: ICD-10-PCS | Mod: CPTII,S$GLB,, | Performed by: INTERNAL MEDICINE

## 2022-09-21 PROCEDURE — 3077F SYST BP >= 140 MM HG: CPT | Mod: CPTII,S$GLB,, | Performed by: INTERNAL MEDICINE

## 2022-09-21 PROCEDURE — 3044F PR MOST RECENT HEMOGLOBIN A1C LEVEL <7.0%: ICD-10-PCS | Mod: CPTII,S$GLB,, | Performed by: INTERNAL MEDICINE

## 2022-09-21 PROCEDURE — 4010F PR ACE/ARB THEARPY RXD/TAKEN: ICD-10-PCS | Mod: CPTII,S$GLB,, | Performed by: INTERNAL MEDICINE

## 2022-09-21 PROCEDURE — 1101F PT FALLS ASSESS-DOCD LE1/YR: CPT | Mod: CPTII,S$GLB,, | Performed by: INTERNAL MEDICINE

## 2022-09-21 PROCEDURE — 99205 OFFICE O/P NEW HI 60 MIN: CPT | Mod: S$GLB,,, | Performed by: INTERNAL MEDICINE

## 2022-09-21 PROCEDURE — 1101F PR PT FALLS ASSESS DOC 0-1 FALLS W/OUT INJ PAST YR: ICD-10-PCS | Mod: CPTII,S$GLB,, | Performed by: INTERNAL MEDICINE

## 2022-09-21 PROCEDURE — 99999 PR PBB SHADOW E&M-EST. PATIENT-LVL V: CPT | Mod: PBBFAC,,, | Performed by: INTERNAL MEDICINE

## 2022-09-21 PROCEDURE — 3008F PR BODY MASS INDEX (BMI) DOCUMENTED: ICD-10-PCS | Mod: CPTII,S$GLB,, | Performed by: INTERNAL MEDICINE

## 2022-09-21 PROCEDURE — 4010F ACE/ARB THERAPY RXD/TAKEN: CPT | Mod: CPTII,S$GLB,, | Performed by: INTERNAL MEDICINE

## 2022-09-21 PROCEDURE — 1126F PR PAIN SEVERITY QUANTIFIED, NO PAIN PRESENT: ICD-10-PCS | Mod: CPTII,S$GLB,, | Performed by: INTERNAL MEDICINE

## 2022-09-21 PROCEDURE — 1159F MED LIST DOCD IN RCRD: CPT | Mod: CPTII,S$GLB,, | Performed by: INTERNAL MEDICINE

## 2022-09-21 PROCEDURE — 3078F DIAST BP <80 MM HG: CPT | Mod: CPTII,S$GLB,, | Performed by: INTERNAL MEDICINE

## 2022-09-21 PROCEDURE — 3288F FALL RISK ASSESSMENT DOCD: CPT | Mod: CPTII,S$GLB,, | Performed by: INTERNAL MEDICINE

## 2022-09-21 PROCEDURE — 99999 PR PBB SHADOW E&M-EST. PATIENT-LVL V: ICD-10-PCS | Mod: PBBFAC,,, | Performed by: INTERNAL MEDICINE

## 2022-09-21 PROCEDURE — 1126F AMNT PAIN NOTED NONE PRSNT: CPT | Mod: CPTII,S$GLB,, | Performed by: INTERNAL MEDICINE

## 2022-09-21 PROCEDURE — 3078F PR MOST RECENT DIASTOLIC BLOOD PRESSURE < 80 MM HG: ICD-10-PCS | Mod: CPTII,S$GLB,, | Performed by: INTERNAL MEDICINE

## 2022-09-21 PROCEDURE — 3077F PR MOST RECENT SYSTOLIC BLOOD PRESSURE >= 140 MM HG: ICD-10-PCS | Mod: CPTII,S$GLB,, | Performed by: INTERNAL MEDICINE

## 2022-09-21 NOTE — PROGRESS NOTES
Subjective:    Patient ID:  Gemma Vick is a 67 y.o. female who presents for evaluation of abnormal holter monitor      67 yoF referred for abnormal holter monitor    Prior visits: 9/15 She has history of near syncopal episodes over the past year. She has had holter monitors that showed 3s pauses. Her event monitor showed a near 4s pause; this was associated with her symptoms. She stopped metoprolol 50 mg qd and had no subsequent symptoms. She took metoprolol for history of CAD with remote history of MI, last 7 years ago. Her recent echo is listed below.     9/16: She has no subsequent syncope or near syncope events since stopping metoprolol. There are some episodes with waking up at night.     2017: Near syncope, very rare. Some episodes of near syncope are consistent with vasovagal syncope.     Interval history: No arrhythmic symptoms. Holter with very rare PAC and PVCs. Normal heart rate average 79 bpm      Holter 8/22:  · Predominant Rhythm Sinus rhythm with heart rates varying between 51 and 128 BPM with an average of 79BPM.  · Supraventricular Arrhythmias There were rare PACs totalling 413 and averaging 8.6 per hour.    Past Medical History:  2/13/2014: AAA (abdominal aortic aneurysm)  No date: Abdominal aneurysm      Comment:  3  No date: Acute coronary syndrome  No date: Arthritis  No date: BPPV (benign paroxysmal positional vertigo)  No date: Carotid artery plaque  2/13/2014: Carotid artery stenosis and occlusion  No date: Chronic back pain  No date: COPD (chronic obstructive pulmonary disease)  No date: Coronary artery disease  No date: Emphysema lung  No date: Hyperlipidemia  No date: Hypertension  No date: Myocardial infarction      Comment:  x3  No date: Neuropathy    Past Surgical History:  No date: CARDIAC CATHETERIZATION  No date: CORONARY ANGIOPLASTY  1988: HYSTERECTOMY    Social History    Socioeconomic History      Marital status:     Tobacco Use      Smoking status: Former         Packs/day: 0.50        Years: 44.00        Pack years: 22        Types: Cigarettes, Vaping w/o nicotine        Start date: 1970        Quit date: 2017        Years since quittin.3      Smokeless tobacco: Never      Tobacco comments: 3 MG NICOTINE FOR HEART.     Substance and Sexual Activity      Alcohol use: No      Drug use: No      Sexual activity: Not Currently        Birth control/protection: None    Social Determinants of Health  Financial Resource Strain: High Risk      Difficulty of Paying Living Expenses: Very hard  Food Insecurity: Food Insecurity Present      Worried About Running Out of Food in the Last Year: Often true      Ran Out of Food in the Last Year: Sometimes true  Transportation Needs: No Transportation Needs      Lack of Transportation (Medical): No      Lack of Transportation (Non-Medical): No  Physical Activity: Insufficiently Active      Days of Exercise per Week: 3 days      Minutes of Exercise per Session: 10 min  Stress: Stress Concern Present      Feeling of Stress : Rather much  Social Connections: Unknown      Frequency of Communication with Friends and Family: More than three times a week      Frequency of Social Gatherings with Friends and Family: More than three times a week      Active Member of Clubs or Organizations: Yes      Attends Club or Organization Meetings: More than 4 times per year      Marital Status:   Housing Stability: Low Risk       Unable to Pay for Housing in the Last Year: No      Number of Places Lived in the Last Year: 1      Unstable Housing in the Last Year: No    Review of patient's family history indicates:      Current Outpatient Medications:  albuterol (PROVENTIL/VENTOLIN HFA) 90 mcg/actuation inhaler, INHALE 2 PUFFS INTO LUNGS EVERY 6 HOURS AS NEEDED, Disp: 54 g, Rfl: 5  amLODIPine (NORVASC) 2.5 MG tablet, Take 1 tablet (2.5 mg total) by mouth 2 (two) times daily., Disp: 60 tablet, Rfl: 11  aspirin 81 MG Chew, Take 81 mg by mouth once  daily., Disp: , Rfl:   bisoprolol (ZEBETA) 5 MG tablet, TAKE 1 TABLET BY MOUTH ONCE A DAY, Disp: 90 tablet, Rfl: 0  budesonide-glycopyr-formoterol (BREZTRI AEROSPHERE) 160-9-4.8 mcg/actuation HFAA, Inhale 2 puffs into the lungs 2 (two) times a day., Disp: 32.1 g, Rfl: 3  clopidogreL (PLAVIX) 75 mg tablet, TAKE 1 TABLET BY MOUTH ONCE A DAY, Disp: 90 tablet, Rfl: 0  dicyclomine (BENTYL) 10 MG capsule, Take 1 capsule (10 mg total) by mouth 4 (four) times daily before meals and nightly., Disp: 30 capsule, Rfl: 0  DULoxetine (CYMBALTA) 60 MG capsule, TAKE (1) CAPSULE BY MOUTH ONCE A DAY, Disp: 90 capsule, Rfl: 0  ezetimibe-simvastatin 10-40 mg (VYTORIN) 10-40 mg per tablet, TAKE 1 TABLET BY MOUTH EVERY EVENING, Disp: 90 tablet, Rfl: 3  ferrous gluconate (FERGON) 324 MG tablet, Take 1 tablet (324 mg total) by mouth 2 (two) times daily with meals., Disp: 180 tablet, Rfl: 1  furosemide (LASIX) 20 MG tablet, 1 TAB AS NEEDED FOR SWELLINGLEG,OR WEIGHT GAIN OF 3 PDS IN 1 DAY OR 5 PDS IN 1WEEK, Disp: 90 tablet, Rfl: 0  ibuprofen (ADVIL,MOTRIN) 400 MG tablet, TAKE 1 TABLET BY MOUTH EVERY 6 HOURS AS NEEDED, Disp: 30 tablet, Rfl: 0  metroNIDAZOLE (FLAGYL) 500 MG tablet, Take 1 tablet (500 mg total) by mouth 3 (three) times daily., Disp: 14 tablet, Rfl: 0  olmesartan (BENICAR) 40 MG tablet, Take 1 tablet (40 mg total) by mouth once daily., Disp: 90 tablet, Rfl: 3  OXYGEN-AIR DELIVERY SYSTEMS MISC, 2 L by Misc.(Non-Drug; Combo Route) route every evening., Disp: , Rfl:   pantoprazole (PROTONIX) 40 MG tablet, TAKE 1 TABLET BY MOUTH ONCE A DAY, Disp: 90 tablet, Rfl: 0  PREMARIN 0.3 mg tablet, TAKE 1 TABLET BY MOUTH ONCE A DAY, Disp: 90 tablet, Rfl: 0  vitamin D (VITAMIN D3) 1000 units Tab, Take 1,000 Units by mouth once daily., Disp: , Rfl:   zolpidem (AMBIEN) 10 mg Tab, TAKE 1 TABLET BY MOUTH IN THE EVENING, Disp: 30 tablet, Rfl: 3  DULoxetine (CYMBALTA) 60 MG capsule, TAKE (1) CAPSULE BY MOUTH ONCE A DAY, Disp: 90 capsule, Rfl:  0  HYDROcodone-acetaminophen (NORCO) 5-325 mg per tablet, Take 1 tablet by mouth every 6 (six) hours as needed for Pain., Disp: 12 tablet, Rfl: 0  LORazepam (ATIVAN) 1 MG tablet, Take 1 tablet (1 mg total) by mouth once. for 1 dose, Disp: 1 tablet, Rfl: 0  zinc gluconate 50 mg tablet, Take 50 mg by mouth once daily., Disp: , Rfl:     No current facility-administered medications for this visit.            Review of Systems   Constitutional: Negative for diaphoresis, malaise/fatigue and weight gain.   HENT:  Negative for hoarse voice.    Eyes:  Negative for double vision and visual disturbance.   Cardiovascular:  Negative for chest pain, claudication, cyanosis, dyspnea on exertion, irregular heartbeat, leg swelling, near-syncope, orthopnea, palpitations, paroxysmal nocturnal dyspnea and syncope.   Respiratory:  Negative for cough, hemoptysis, shortness of breath and snoring.    Hematologic/Lymphatic: Negative for bleeding problem. Does not bruise/bleed easily.   Skin:  Negative for color change and poor wound healing.   Musculoskeletal:  Negative for muscle cramps, muscle weakness and myalgias.   Gastrointestinal:  Positive for bloating and abdominal pain. Negative for change in bowel habit, diarrhea, heartburn, hematemesis, hematochezia, melena and nausea.   Neurological:  Negative for excessive daytime sleepiness, dizziness, headaches, light-headedness, loss of balance, numbness and weakness.   Psychiatric/Behavioral:  Negative for memory loss. The patient does not have insomnia.    Allergic/Immunologic: Negative for hives.      Objective:    Physical Exam  Vitals and nursing note reviewed.   Constitutional:       General: She is not in acute distress.     Appearance: Normal appearance. She is well-developed. She is not ill-appearing.   HENT:      Head: Normocephalic and atraumatic.   Eyes:      General: No scleral icterus.     Pupils: Pupils are equal, round, and reactive to light.   Neck:      Thyroid: No  thyromegaly.      Vascular: Normal carotid pulses. No carotid bruit, hepatojugular reflux or JVD.      Trachea: No tracheal deviation.   Cardiovascular:      Rate and Rhythm: Normal rate and regular rhythm.      Pulses: Normal pulses.           Carotid pulses are 2+ on the right side with bruit and 2+ on the left side with bruit.       Radial pulses are 2+ on the right side and 2+ on the left side.        Femoral pulses are 2+ on the right side with bruit and 2+ on the left side with bruit.       Popliteal pulses are 2+ on the right side and 2+ on the left side.        Dorsalis pedis pulses are 2+ on the right side and 2+ on the left side.        Posterior tibial pulses are 2+ on the right side and 2+ on the left side.      Heart sounds: Murmur heard.   Harsh midsystolic murmur is present with a grade of 1/6 at the upper right sternal border radiating to the neck.   High-pitched blowing decrescendo early diastolic murmur is present with a grade of 1/4 at the upper right sternal border radiating to the apex.     No friction rub. No gallop.   Pulmonary:      Effort: Pulmonary effort is normal. No respiratory distress.      Breath sounds: Normal breath sounds. No wheezing, rhonchi or rales.   Chest:      Chest wall: No tenderness.   Abdominal:      General: Bowel sounds are normal. There is no distension or abdominal bruit.      Palpations: Abdomen is soft. There is no hepatomegaly, mass or pulsatile mass.      Tenderness: There is no abdominal tenderness.      Comments: Abdominal bruit.   Musculoskeletal:      Right shoulder: No deformity.      Cervical back: Normal range of motion and neck supple.   Skin:     General: Skin is warm and dry.      Findings: No erythema or rash.      Nails: There is no clubbing.   Neurological:      Mental Status: She is alert and oriented to person, place, and time.      Cranial Nerves: No cranial nerve deficit.      Coordination: Coordination normal.   Psychiatric:         Speech:  Speech normal.         Behavior: Behavior normal.         Judgment: Judgment normal.         Assessment:       1. Dizziness         Plan:       67 yoF here for arrhythmia management. No arrhythmias noted on holter with very rare PACs, PVCs. Normal sinus rates. RTC as needed

## 2022-10-25 ENCOUNTER — OFFICE VISIT (OUTPATIENT)
Dept: FAMILY MEDICINE | Facility: CLINIC | Age: 67
End: 2022-10-25
Payer: MEDICARE

## 2022-10-25 ENCOUNTER — LAB VISIT (OUTPATIENT)
Dept: LAB | Facility: HOSPITAL | Age: 67
End: 2022-10-25
Attending: FAMILY MEDICINE
Payer: MEDICARE

## 2022-10-25 VITALS
OXYGEN SATURATION: 97 % | DIASTOLIC BLOOD PRESSURE: 58 MMHG | WEIGHT: 147.19 LBS | SYSTOLIC BLOOD PRESSURE: 130 MMHG | TEMPERATURE: 97 F | BODY MASS INDEX: 27.79 KG/M2 | HEIGHT: 61 IN

## 2022-10-25 DIAGNOSIS — J44.9 COPD, MODERATE: ICD-10-CM

## 2022-10-25 DIAGNOSIS — Z12.2 ENCOUNTER FOR SCREENING FOR LUNG CANCER: ICD-10-CM

## 2022-10-25 DIAGNOSIS — I10 ESSENTIAL HYPERTENSION: ICD-10-CM

## 2022-10-25 DIAGNOSIS — E78.2 MIXED HYPERLIPIDEMIA: ICD-10-CM

## 2022-10-25 DIAGNOSIS — D50.9 IRON DEFICIENCY ANEMIA, UNSPECIFIED IRON DEFICIENCY ANEMIA TYPE: ICD-10-CM

## 2022-10-25 DIAGNOSIS — I10 ESSENTIAL HYPERTENSION: Primary | ICD-10-CM

## 2022-10-25 DIAGNOSIS — F17.210 NICOTINE DEPENDENCE, CIGARETTES, UNCOMPLICATED: ICD-10-CM

## 2022-10-25 DIAGNOSIS — R41.3 MEMORY LOSS: ICD-10-CM

## 2022-10-25 DIAGNOSIS — Z12.31 BREAST CANCER SCREENING BY MAMMOGRAM: ICD-10-CM

## 2022-10-25 DIAGNOSIS — I71.43 INFRARENAL ABDOMINAL AORTIC ANEURYSM (AAA) WITHOUT RUPTURE: ICD-10-CM

## 2022-10-25 DIAGNOSIS — Z79.890 HORMONE REPLACEMENT THERAPY (HRT): ICD-10-CM

## 2022-10-25 LAB
ALBUMIN SERPL BCP-MCNC: 3.5 G/DL (ref 3.5–5.2)
ALP SERPL-CCNC: 58 U/L (ref 55–135)
ALT SERPL W/O P-5'-P-CCNC: 12 U/L (ref 10–44)
ANION GAP SERPL CALC-SCNC: 10 MMOL/L (ref 8–16)
AST SERPL-CCNC: 17 U/L (ref 10–40)
BASOPHILS # BLD AUTO: 0.05 K/UL (ref 0–0.2)
BASOPHILS NFR BLD: 0.7 % (ref 0–1.9)
BILIRUB SERPL-MCNC: 0.5 MG/DL (ref 0.1–1)
BUN SERPL-MCNC: 14 MG/DL (ref 8–23)
CALCIUM SERPL-MCNC: 9.4 MG/DL (ref 8.7–10.5)
CHLORIDE SERPL-SCNC: 105 MMOL/L (ref 95–110)
CHOLEST SERPL-MCNC: 164 MG/DL (ref 120–199)
CHOLEST/HDLC SERPL: 2.7 {RATIO} (ref 2–5)
CO2 SERPL-SCNC: 23 MMOL/L (ref 23–29)
CREAT SERPL-MCNC: 1.1 MG/DL (ref 0.5–1.4)
DIFFERENTIAL METHOD: NORMAL
EOSINOPHIL # BLD AUTO: 0.4 K/UL (ref 0–0.5)
EOSINOPHIL NFR BLD: 5.7 % (ref 0–8)
ERYTHROCYTE [DISTWIDTH] IN BLOOD BY AUTOMATED COUNT: 13.1 % (ref 11.5–14.5)
EST. GFR  (NO RACE VARIABLE): 55.1 ML/MIN/1.73 M^2
ESTIMATED AVG GLUCOSE: 105 MG/DL (ref 68–131)
GLUCOSE SERPL-MCNC: 94 MG/DL (ref 70–110)
HBA1C MFR BLD: 5.3 % (ref 4–5.6)
HCT VFR BLD AUTO: 38.3 % (ref 37–48.5)
HDLC SERPL-MCNC: 60 MG/DL (ref 40–75)
HDLC SERPL: 36.6 % (ref 20–50)
HGB BLD-MCNC: 12.6 G/DL (ref 12–16)
IMM GRANULOCYTES # BLD AUTO: 0.01 K/UL (ref 0–0.04)
IMM GRANULOCYTES NFR BLD AUTO: 0.1 % (ref 0–0.5)
IRON SERPL-MCNC: 96 UG/DL (ref 30–160)
LDLC SERPL CALC-MCNC: 83 MG/DL (ref 63–159)
LYMPHOCYTES # BLD AUTO: 1.5 K/UL (ref 1–4.8)
LYMPHOCYTES NFR BLD: 21.4 % (ref 18–48)
MCH RBC QN AUTO: 31 PG (ref 27–31)
MCHC RBC AUTO-ENTMCNC: 32.9 G/DL (ref 32–36)
MCV RBC AUTO: 94 FL (ref 82–98)
MONOCYTES # BLD AUTO: 0.8 K/UL (ref 0.3–1)
MONOCYTES NFR BLD: 11.5 % (ref 4–15)
NEUTROPHILS # BLD AUTO: 4.1 K/UL (ref 1.8–7.7)
NEUTROPHILS NFR BLD: 60.6 % (ref 38–73)
NONHDLC SERPL-MCNC: 104 MG/DL
NRBC BLD-RTO: 0 /100 WBC
PLATELET # BLD AUTO: 240 K/UL (ref 150–450)
PMV BLD AUTO: 10.7 FL (ref 9.2–12.9)
POTASSIUM SERPL-SCNC: 3.8 MMOL/L (ref 3.5–5.1)
PROT SERPL-MCNC: 6.7 G/DL (ref 6–8.4)
RBC # BLD AUTO: 4.06 M/UL (ref 4–5.4)
SATURATED IRON: 28 % (ref 20–50)
SODIUM SERPL-SCNC: 138 MMOL/L (ref 136–145)
TOTAL IRON BINDING CAPACITY: 340 UG/DL (ref 250–450)
TRANSFERRIN SERPL-MCNC: 230 MG/DL (ref 200–375)
TRIGL SERPL-MCNC: 105 MG/DL (ref 30–150)
WBC # BLD AUTO: 6.81 K/UL (ref 3.9–12.7)

## 2022-10-25 PROCEDURE — 4010F ACE/ARB THERAPY RXD/TAKEN: CPT | Mod: CPTII,S$GLB,, | Performed by: FAMILY MEDICINE

## 2022-10-25 PROCEDURE — 80053 COMPREHEN METABOLIC PANEL: CPT | Performed by: FAMILY MEDICINE

## 2022-10-25 PROCEDURE — 3288F FALL RISK ASSESSMENT DOCD: CPT | Mod: CPTII,S$GLB,, | Performed by: FAMILY MEDICINE

## 2022-10-25 PROCEDURE — 1159F MED LIST DOCD IN RCRD: CPT | Mod: CPTII,S$GLB,, | Performed by: FAMILY MEDICINE

## 2022-10-25 PROCEDURE — 1101F PT FALLS ASSESS-DOCD LE1/YR: CPT | Mod: CPTII,S$GLB,, | Performed by: FAMILY MEDICINE

## 2022-10-25 PROCEDURE — 99214 PR OFFICE/OUTPT VISIT, EST, LEVL IV, 30-39 MIN: ICD-10-PCS | Mod: S$GLB,,, | Performed by: FAMILY MEDICINE

## 2022-10-25 PROCEDURE — 3044F PR MOST RECENT HEMOGLOBIN A1C LEVEL <7.0%: ICD-10-PCS | Mod: CPTII,S$GLB,, | Performed by: FAMILY MEDICINE

## 2022-10-25 PROCEDURE — 1101F PR PT FALLS ASSESS DOC 0-1 FALLS W/OUT INJ PAST YR: ICD-10-PCS | Mod: CPTII,S$GLB,, | Performed by: FAMILY MEDICINE

## 2022-10-25 PROCEDURE — 3075F SYST BP GE 130 - 139MM HG: CPT | Mod: CPTII,S$GLB,, | Performed by: FAMILY MEDICINE

## 2022-10-25 PROCEDURE — 83036 HEMOGLOBIN GLYCOSYLATED A1C: CPT | Performed by: FAMILY MEDICINE

## 2022-10-25 PROCEDURE — 3044F HG A1C LEVEL LT 7.0%: CPT | Mod: CPTII,S$GLB,, | Performed by: FAMILY MEDICINE

## 2022-10-25 PROCEDURE — 99214 OFFICE O/P EST MOD 30 MIN: CPT | Mod: S$GLB,,, | Performed by: FAMILY MEDICINE

## 2022-10-25 PROCEDURE — 99999 PR PBB SHADOW E&M-EST. PATIENT-LVL V: ICD-10-PCS | Mod: PBBFAC,,, | Performed by: FAMILY MEDICINE

## 2022-10-25 PROCEDURE — 85025 COMPLETE CBC W/AUTO DIFF WBC: CPT | Performed by: FAMILY MEDICINE

## 2022-10-25 PROCEDURE — 3078F DIAST BP <80 MM HG: CPT | Mod: CPTII,S$GLB,, | Performed by: FAMILY MEDICINE

## 2022-10-25 PROCEDURE — 4010F PR ACE/ARB THEARPY RXD/TAKEN: ICD-10-PCS | Mod: CPTII,S$GLB,, | Performed by: FAMILY MEDICINE

## 2022-10-25 PROCEDURE — 1160F PR REVIEW ALL MEDS BY PRESCRIBER/CLIN PHARMACIST DOCUMENTED: ICD-10-PCS | Mod: CPTII,S$GLB,, | Performed by: FAMILY MEDICINE

## 2022-10-25 PROCEDURE — 3075F PR MOST RECENT SYSTOLIC BLOOD PRESS GE 130-139MM HG: ICD-10-PCS | Mod: CPTII,S$GLB,, | Performed by: FAMILY MEDICINE

## 2022-10-25 PROCEDURE — 99999 PR PBB SHADOW E&M-EST. PATIENT-LVL V: CPT | Mod: PBBFAC,,, | Performed by: FAMILY MEDICINE

## 2022-10-25 PROCEDURE — 1159F PR MEDICATION LIST DOCUMENTED IN MEDICAL RECORD: ICD-10-PCS | Mod: CPTII,S$GLB,, | Performed by: FAMILY MEDICINE

## 2022-10-25 PROCEDURE — 36415 COLL VENOUS BLD VENIPUNCTURE: CPT | Mod: PO | Performed by: FAMILY MEDICINE

## 2022-10-25 PROCEDURE — 84466 ASSAY OF TRANSFERRIN: CPT | Performed by: FAMILY MEDICINE

## 2022-10-25 PROCEDURE — 80061 LIPID PANEL: CPT | Performed by: FAMILY MEDICINE

## 2022-10-25 PROCEDURE — 3288F PR FALLS RISK ASSESSMENT DOCUMENTED: ICD-10-PCS | Mod: CPTII,S$GLB,, | Performed by: FAMILY MEDICINE

## 2022-10-25 PROCEDURE — 3078F PR MOST RECENT DIASTOLIC BLOOD PRESSURE < 80 MM HG: ICD-10-PCS | Mod: CPTII,S$GLB,, | Performed by: FAMILY MEDICINE

## 2022-10-25 PROCEDURE — 1160F RVW MEDS BY RX/DR IN RCRD: CPT | Mod: CPTII,S$GLB,, | Performed by: FAMILY MEDICINE

## 2022-10-25 NOTE — PROGRESS NOTES
Chief Complaint:    Chief Complaint   Patient presents with    Follow-up       History of Present Illness:  Patient with AAA presents today for a six month follow up.      On Protonix for GERD.   She has carotid stenosis. Went to Dr. Goodrich at [facility] for memory loss and will do further work up next month with Dr. Anguiano.   Taking iron pills.   On premarin.   Has a 4 cm abdominal aorta. Following with vascular surgery. Was told it's stable and to follow up in six months. Hasn't smoked in 12-15 years. Vapes up to 10 times per day.   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 Bobby.   Following with cardiology for coronary artery disease related issues which have been stable. MI in 2010.   Follows with pulmonology uses oxygen at night.        ROS:  Review of Systems   Constitutional:  Negative for appetite change, chills and fever.   HENT:  Negative for congestion, ear pain, postnasal drip, rhinorrhea, sinus pressure and sinus pain.    Eyes:  Negative for pain.   Respiratory:  Negative for cough, chest tightness and shortness of breath.    Cardiovascular:  Negative for chest pain and palpitations.   Gastrointestinal:  Negative for abdominal pain, blood in stool, constipation, diarrhea and nausea.   Genitourinary:  Negative for difficulty urinating, dysuria, flank pain and hematuria.   Musculoskeletal:  Negative for arthralgias and myalgias.   Skin:  Negative for pallor and wound.   Neurological:  Negative for dizziness, tremors, speech difficulty, light-headedness and headaches.   Psychiatric/Behavioral:  Negative for behavioral problems, dysphoric mood and sleep disturbance. The patient is not nervous/anxious.    All other systems reviewed and are negative.    Past Medical History:   Diagnosis Date    AAA (abdominal aortic aneurysm) 2/13/2014    Abdominal aneurysm     3    Acute coronary syndrome     Arthritis     BPPV  (benign paroxysmal positional vertigo)     Carotid artery plaque     Carotid artery stenosis and occlusion 2014    Chronic back pain     COPD (chronic obstructive pulmonary disease)     Coronary artery disease     Emphysema lung     Hyperlipidemia     Hypertension     Myocardial infarction     x3    Neuropathy        Social History:  Social History     Socioeconomic History    Marital status:    Tobacco Use    Smoking status: Former     Packs/day: 0.50     Years: 44.00     Pack years: 22.00     Types: Cigarettes, Vaping w/o nicotine     Start date: 1970     Quit date: 2017     Years since quittin.4    Smokeless tobacco: Never    Tobacco comments:     3 MG NICOTINE FOR HEART.    Substance and Sexual Activity    Alcohol use: No    Drug use: No    Sexual activity: Not Currently     Birth control/protection: None     Social Determinants of Health     Financial Resource Strain: High Risk    Difficulty of Paying Living Expenses: Very hard   Food Insecurity: Food Insecurity Present    Worried About Running Out of Food in the Last Year: Often true    Ran Out of Food in the Last Year: Sometimes true   Transportation Needs: No Transportation Needs    Lack of Transportation (Medical): No    Lack of Transportation (Non-Medical): No   Physical Activity: Insufficiently Active    Days of Exercise per Week: 3 days    Minutes of Exercise per Session: 10 min   Stress: Stress Concern Present    Feeling of Stress : Rather much   Social Connections: Unknown    Frequency of Communication with Friends and Family: More than three times a week    Frequency of Social Gatherings with Friends and Family: More than three times a week    Active Member of Clubs or Organizations: Yes    Attends Club or Organization Meetings: More than 4 times per year    Marital Status:    Housing Stability: Low Risk     Unable to Pay for Housing in the Last Year: No    Number of Places Lived in the Last Year: 1    Unstable Housing  "in the Last Year: No       Family History:   family history includes Breast cancer in her paternal aunt; Heart attacks under age 50 in her brother and father; Heart disease in her mother.    Health Maintenance   Topic Date Due    Mammogram  06/09/2022    Lipid Panel  04/07/2023    LDCT Lung Screen  04/28/2023    High Dose Statin  10/25/2023    Aspirin/Antiplatelet Therapy  10/25/2023    DEXA Scan  10/20/2024    TETANUS VACCINE  11/15/2026    Hepatitis C Screening  Completed       Physical Exam:    Vital Signs  Temp: 97.3 °F (36.3 °C)  Temp src: Temporal  SpO2: 97 %  BP: (!) 130/58  BP Location: Left arm  Patient Position: Sitting  Height and Weight  Height: 5' 1" (154.9 cm)  Weight: 66.7 kg (147 lb 2.5 oz)  BSA (Calculated - sq m): 1.69 sq meters  BMI (Calculated): 27.8  Weight in (lb) to have BMI = 25: 132]    Body mass index is 27.81 kg/m².    Physical Exam  Vitals and nursing note reviewed.   Constitutional:       Appearance: Normal appearance. She is not toxic-appearing.   HENT:      Head: Normocephalic and atraumatic.      Right Ear: Tympanic membrane normal.      Left Ear: Tympanic membrane normal.   Eyes:      Extraocular Movements: Extraocular movements intact.      Pupils: Pupils are equal, round, and reactive to light.   Cardiovascular:      Rate and Rhythm: Normal rate and regular rhythm.      Heart sounds: Normal heart sounds.   Pulmonary:      Effort: Pulmonary effort is normal.      Breath sounds: Normal breath sounds. No wheezing, rhonchi or rales.   Abdominal:      General: Bowel sounds are normal. There is no distension.      Palpations: Abdomen is soft.      Tenderness: There is no abdominal tenderness.   Musculoskeletal:         General: Normal range of motion.      Cervical back: Normal range of motion.   Skin:     General: Skin is warm and dry.      Capillary Refill: Capillary refill takes less than 2 seconds.   Neurological:      General: No focal deficit present.      Mental Status: She is " alert and oriented to person, place, and time.   Psychiatric:         Mood and Affect: Mood normal.         Behavior: Behavior normal.         Judgment: Judgment normal.         Assessment:      ICD-10-CM ICD-9-CM   1. Essential hypertension  I10 401.9   2. Encounter for screening for lung cancer  Z12.2 V76.0   3. Nicotine dependence, cigarettes, uncomplicated  F17.210 305.1   4. Memory loss  R41.3 780.93   5. Infrarenal abdominal aortic aneurysm (AAA) without rupture  I71.43 441.4   6. COPD, moderate  J44.9 496   7. Mixed hyperlipidemia  E78.2 272.2   8. Iron deficiency anemia, unspecified iron deficiency anemia type  D50.9 280.9   9. Breast cancer screening by mammogram  Z12.31 V76.12     Plan:     Order low dose CT lung scan for cancer screening.   Continue follow up with vascular surgery for AAA.  Send records from neurology to office.  Undergoing workup for memory loss  Carotid stenosis following with vascular surgery.  Stay active as tolerated.  Wean off Premarin.   See labs below. Recheck iron.  Follow up 6 months.   Orders Placed This Encounter   Procedures    CT Chest Lung Screening Low Dose    CBC Auto Differential    Comprehensive Metabolic Panel    Lipid Panel    Hemoglobin A1C    Iron and TIBC     Current Outpatient Medications   Medication Sig Dispense Refill    albuterol (PROVENTIL/VENTOLIN HFA) 90 mcg/actuation inhaler INHALE 2 PUFFS INTO LUNGS EVERY 6 HOURS AS NEEDED 54 g 5    amLODIPine (NORVASC) 2.5 MG tablet Take 1 tablet (2.5 mg total) by mouth 2 (two) times daily. 60 tablet 11    aspirin 81 MG Chew Take 81 mg by mouth once daily.      bisoprolol (ZEBETA) 5 MG tablet TAKE 1 TABLET BY MOUTH ONCE A DAY 90 tablet 0    budesonide-glycopyr-formoterol (BREZTRI AEROSPHERE) 160-9-4.8 mcg/actuation HFAA Inhale 2 puffs into the lungs 2 (two) times a day. 32.1 g 3    clopidogreL (PLAVIX) 75 mg tablet TAKE 1 TABLET BY MOUTH ONCE A DAY 90 tablet 0    dicyclomine (BENTYL) 10 MG capsule Take 1 capsule (10 mg  total) by mouth 4 (four) times daily before meals and nightly. 30 capsule 0    DULoxetine (CYMBALTA) 60 MG capsule TAKE (1) CAPSULE BY MOUTH ONCE A DAY 90 capsule 0    ezetimibe-simvastatin 10-40 mg (VYTORIN) 10-40 mg per tablet TAKE 1 TABLET BY MOUTH EVERY EVENING 90 tablet 3    ferrous gluconate (FERGON) 324 MG tablet Take 1 tablet (324 mg total) by mouth 2 (two) times daily with meals. 180 tablet 1    furosemide (LASIX) 20 MG tablet 1 TAB AS NEEDED FOR SWELLINGLEG,OR WEIGHT GAIN OF 3 PDS IN 1 DAY OR 5 PDS IN 1WEEK 90 tablet 0    ibuprofen (ADVIL,MOTRIN) 400 MG tablet TAKE 1 TABLET BY MOUTH EVERY 6 HOURS AS NEEDED 30 tablet 0    metroNIDAZOLE (FLAGYL) 500 MG tablet Take 1 tablet (500 mg total) by mouth 3 (three) times daily. 14 tablet 0    olmesartan (BENICAR) 40 MG tablet Take 1 tablet (40 mg total) by mouth once daily. 90 tablet 3    OXYGEN-AIR DELIVERY SYSTEMS MISC 2 L by Misc.(Non-Drug; Combo Route) route every evening.      pantoprazole (PROTONIX) 40 MG tablet TAKE 1 TABLET BY MOUTH ONCE A DAY 90 tablet 0    PREMARIN 0.3 mg tablet TAKE 1 TABLET BY MOUTH ONCE A DAY 90 tablet 0    vitamin D (VITAMIN D3) 1000 units Tab Take 1,000 Units by mouth once daily.      zolpidem (AMBIEN) 10 mg Tab TAKE 1 TABLET BY MOUTH IN THE EVENING 30 tablet 3    zinc gluconate 50 mg tablet Take 50 mg by mouth once daily.       No current facility-administered medications for this visit.       Medications Discontinued During This Encounter   Medication Reason    LORazepam (ATIVAN) 1 MG tablet Patient no longer taking    HYDROcodone-acetaminophen (NORCO) 5-325 mg per tablet Patient no longer taking    DULoxetine (CYMBALTA) 60 MG capsule Patient no longer taking       No follow-ups on file.      Olga Altman MD  Scribe Attestation:   I, Nola Baltazar, am scribing for, and in the presence of, Dr.Arif Altman I performed the above scribed service and the documentation accurately describes the services I performed. I attest to the  accuracy of the note.    I, Dr. Olga Altman, reviewed documentation as scribed above. I performed the services described in this documentation.  I agree that the record reflects my personal performance and is accurate and complete. Olga Altman MD.  10/25/2022

## 2022-11-08 ENCOUNTER — HOSPITAL ENCOUNTER (OUTPATIENT)
Dept: RADIOLOGY | Facility: HOSPITAL | Age: 67
Discharge: HOME | End: 2022-11-08
Attending: FAMILY MEDICINE
Payer: MEDICARE

## 2022-11-08 DIAGNOSIS — F17.210 NICOTINE DEPENDENCE, CIGARETTES, UNCOMPLICATED: ICD-10-CM

## 2022-11-08 PROCEDURE — 71271 CT THORAX LUNG CANCER SCR C-: CPT | Mod: TC

## 2022-11-08 PROCEDURE — 71271 CT CHEST LUNG SCREENING LOW DOSE: ICD-10-PCS | Mod: 26,,, | Performed by: RADIOLOGY

## 2022-11-08 PROCEDURE — 71271 CT THORAX LUNG CANCER SCR C-: CPT | Mod: 26,,, | Performed by: RADIOLOGY

## 2022-11-15 ENCOUNTER — PATIENT MESSAGE (OUTPATIENT)
Dept: PULMONOLOGY | Facility: CLINIC | Age: 67
End: 2022-11-15
Payer: MEDICARE

## 2022-11-15 ENCOUNTER — PATIENT MESSAGE (OUTPATIENT)
Dept: CARDIOLOGY | Facility: CLINIC | Age: 67
End: 2022-11-15
Payer: MEDICARE

## 2022-11-15 DIAGNOSIS — G47.36 NOCTURNAL HYPOXEMIA DUE TO EMPHYSEMA: Primary | ICD-10-CM

## 2022-11-15 DIAGNOSIS — J43.9 NOCTURNAL HYPOXEMIA DUE TO EMPHYSEMA: Primary | ICD-10-CM

## 2022-11-15 NOTE — TELEPHONE ENCOUNTER
11/05/2015 PSG Normal Apnea hypopnea index. No GALLO.    6/09/2022 Overnight Oxygen Saturation Study:     INTERPRETATION:  Conditions of Test: Noted above in report     Interpretation of results of overnight oxygen saturation study. This was a technically  adequate study. Overnight oxygen saturation study is abnormal with O2 saturation less than 88%.   Time with SpO2<89: 2:10:40, 26.3%of the study period. Lowest oxygen saturation recorded was 74% .  Clinical correlation suggested\      6/14/2022 re-certification for home O2 with sleep NC 3L.     Orders Placed This Encounter   Procedures    PULSE OXIMETRY OVERNIGHT     Standing Status:   Future     Standing Expiration Date:   11/15/2023     Order Specific Question:   Reason for Test?     Answer:   Other     Comments:   determine if NC 3 L optimal for sleep     Order Specific Question:   Symptoms:     Answer:   Other     Comments:   brain atrophy     Order Specific Question:   Oxygen Settings:     Answer:   NC 3L     Order Specific Question:   BiPAP Settings:     Answer:   na     Order Specific Question:   CPAP Settings:     Answer:   na     Order Specific Question:   Room Air:     Answer:   No     Comments:   NC 3L     1. Nocturnal hypoxemia due to emphysema  PULSE OXIMETRY OVERNIGHT

## 2022-11-16 ENCOUNTER — PATIENT MESSAGE (OUTPATIENT)
Dept: PULMONOLOGY | Facility: CLINIC | Age: 67
End: 2022-11-16
Payer: MEDICARE

## 2022-12-01 ENCOUNTER — LAB VISIT (OUTPATIENT)
Dept: LAB | Facility: HOSPITAL | Age: 67
End: 2022-12-01
Attending: INTERNAL MEDICINE
Payer: MEDICARE

## 2022-12-01 DIAGNOSIS — I25.118 CORONARY ARTERY DISEASE OF NATIVE ARTERY OF NATIVE HEART WITH STABLE ANGINA PECTORIS: ICD-10-CM

## 2022-12-01 DIAGNOSIS — R73.03 PREDIABETES: ICD-10-CM

## 2022-12-01 LAB
ALBUMIN SERPL BCP-MCNC: 3.5 G/DL (ref 3.5–5.2)
ALP SERPL-CCNC: 57 U/L (ref 55–135)
ALT SERPL W/O P-5'-P-CCNC: 10 U/L (ref 10–44)
ANION GAP SERPL CALC-SCNC: 8 MMOL/L (ref 8–16)
AST SERPL-CCNC: 16 U/L (ref 10–40)
BILIRUB SERPL-MCNC: 0.7 MG/DL (ref 0.1–1)
BUN SERPL-MCNC: 13 MG/DL (ref 8–23)
CALCIUM SERPL-MCNC: 9.7 MG/DL (ref 8.7–10.5)
CHLORIDE SERPL-SCNC: 102 MMOL/L (ref 95–110)
CHOLEST SERPL-MCNC: 143 MG/DL (ref 120–199)
CHOLEST/HDLC SERPL: 2.6 {RATIO} (ref 2–5)
CO2 SERPL-SCNC: 27 MMOL/L (ref 23–29)
CREAT SERPL-MCNC: 0.9 MG/DL (ref 0.5–1.4)
EST. GFR  (NO RACE VARIABLE): >60 ML/MIN/1.73 M^2
ESTIMATED AVG GLUCOSE: 108 MG/DL (ref 68–131)
GLUCOSE SERPL-MCNC: 86 MG/DL (ref 70–110)
HBA1C MFR BLD: 5.4 % (ref 4–5.6)
HDLC SERPL-MCNC: 56 MG/DL (ref 40–75)
HDLC SERPL: 39.2 % (ref 20–50)
LDLC SERPL CALC-MCNC: 72 MG/DL (ref 63–159)
NONHDLC SERPL-MCNC: 87 MG/DL
POTASSIUM SERPL-SCNC: 4 MMOL/L (ref 3.5–5.1)
PROT SERPL-MCNC: 6.8 G/DL (ref 6–8.4)
SODIUM SERPL-SCNC: 137 MMOL/L (ref 136–145)
TRIGL SERPL-MCNC: 75 MG/DL (ref 30–150)

## 2022-12-01 PROCEDURE — 36415 COLL VENOUS BLD VENIPUNCTURE: CPT | Mod: PO | Performed by: INTERNAL MEDICINE

## 2022-12-01 PROCEDURE — 83036 HEMOGLOBIN GLYCOSYLATED A1C: CPT | Performed by: INTERNAL MEDICINE

## 2022-12-01 PROCEDURE — 80053 COMPREHEN METABOLIC PANEL: CPT | Performed by: INTERNAL MEDICINE

## 2022-12-01 PROCEDURE — 80061 LIPID PANEL: CPT | Performed by: INTERNAL MEDICINE

## 2022-12-08 ENCOUNTER — PATIENT MESSAGE (OUTPATIENT)
Dept: CARDIOLOGY | Facility: CLINIC | Age: 67
End: 2022-12-08

## 2022-12-08 ENCOUNTER — HOSPITAL ENCOUNTER (OUTPATIENT)
Dept: RADIOLOGY | Facility: HOSPITAL | Age: 67
Discharge: HOME | End: 2022-12-08
Attending: FAMILY MEDICINE
Payer: MEDICARE

## 2022-12-08 ENCOUNTER — OFFICE VISIT (OUTPATIENT)
Dept: CARDIOLOGY | Facility: CLINIC | Age: 67
End: 2022-12-08
Payer: MEDICARE

## 2022-12-08 VITALS
HEART RATE: 50 BPM | HEIGHT: 61 IN | SYSTOLIC BLOOD PRESSURE: 164 MMHG | WEIGHT: 149.25 LBS | BODY MASS INDEX: 28.18 KG/M2 | DIASTOLIC BLOOD PRESSURE: 70 MMHG

## 2022-12-08 DIAGNOSIS — J43.9 NOCTURNAL HYPOXEMIA DUE TO EMPHYSEMA: ICD-10-CM

## 2022-12-08 DIAGNOSIS — I65.23 BILATERAL CAROTID ARTERY STENOSIS: ICD-10-CM

## 2022-12-08 DIAGNOSIS — K21.9 GASTROESOPHAGEAL REFLUX DISEASE, UNSPECIFIED WHETHER ESOPHAGITIS PRESENT: ICD-10-CM

## 2022-12-08 DIAGNOSIS — I10 ESSENTIAL HYPERTENSION: Primary | ICD-10-CM

## 2022-12-08 DIAGNOSIS — J44.9 COPD, MODERATE: ICD-10-CM

## 2022-12-08 DIAGNOSIS — I25.118 CORONARY ARTERY DISEASE OF NATIVE ARTERY OF NATIVE HEART WITH STABLE ANGINA PECTORIS: ICD-10-CM

## 2022-12-08 DIAGNOSIS — F17.201 TOBACCO USE DISORDER, MODERATE, IN SUSTAINED REMISSION: ICD-10-CM

## 2022-12-08 DIAGNOSIS — Z86.16 PERSONAL HISTORY OF COVID-19: ICD-10-CM

## 2022-12-08 DIAGNOSIS — I49.5 SINUS NODE DYSFUNCTION: ICD-10-CM

## 2022-12-08 DIAGNOSIS — G47.36 NOCTURNAL HYPOXEMIA DUE TO EMPHYSEMA: ICD-10-CM

## 2022-12-08 DIAGNOSIS — I71.43 INFRARENAL ABDOMINAL AORTIC ANEURYSM (AAA) WITHOUT RUPTURE: ICD-10-CM

## 2022-12-08 DIAGNOSIS — R42 DIZZINESS: ICD-10-CM

## 2022-12-08 DIAGNOSIS — R73.03 PREDIABETES: ICD-10-CM

## 2022-12-08 DIAGNOSIS — Z12.31 BREAST CANCER SCREENING BY MAMMOGRAM: ICD-10-CM

## 2022-12-08 DIAGNOSIS — E78.2 MIXED HYPERLIPIDEMIA: ICD-10-CM

## 2022-12-08 PROCEDURE — 99999 PR PBB SHADOW E&M-EST. PATIENT-LVL V: ICD-10-PCS | Mod: PBBFAC,,, | Performed by: INTERNAL MEDICINE

## 2022-12-08 PROCEDURE — 1100F PR PT FALLS ASSESS DOC 2+ FALLS/FALL W/INJURY/YR: ICD-10-PCS | Mod: CPTII,S$GLB,, | Performed by: INTERNAL MEDICINE

## 2022-12-08 PROCEDURE — 1100F PTFALLS ASSESS-DOCD GE2>/YR: CPT | Mod: CPTII,S$GLB,, | Performed by: INTERNAL MEDICINE

## 2022-12-08 PROCEDURE — 3077F PR MOST RECENT SYSTOLIC BLOOD PRESSURE >= 140 MM HG: ICD-10-PCS | Mod: CPTII,S$GLB,, | Performed by: INTERNAL MEDICINE

## 2022-12-08 PROCEDURE — 4010F PR ACE/ARB THEARPY RXD/TAKEN: ICD-10-PCS | Mod: CPTII,S$GLB,, | Performed by: INTERNAL MEDICINE

## 2022-12-08 PROCEDURE — 1159F MED LIST DOCD IN RCRD: CPT | Mod: CPTII,S$GLB,, | Performed by: INTERNAL MEDICINE

## 2022-12-08 PROCEDURE — 99999 PR PBB SHADOW E&M-EST. PATIENT-LVL V: CPT | Mod: PBBFAC,,, | Performed by: INTERNAL MEDICINE

## 2022-12-08 PROCEDURE — 99499 UNLISTED E&M SERVICE: CPT | Mod: HCNC,S$GLB,, | Performed by: INTERNAL MEDICINE

## 2022-12-08 PROCEDURE — 3288F PR FALLS RISK ASSESSMENT DOCUMENTED: ICD-10-PCS | Mod: CPTII,S$GLB,, | Performed by: INTERNAL MEDICINE

## 2022-12-08 PROCEDURE — 3044F PR MOST RECENT HEMOGLOBIN A1C LEVEL <7.0%: ICD-10-PCS | Mod: CPTII,S$GLB,, | Performed by: INTERNAL MEDICINE

## 2022-12-08 PROCEDURE — 99214 OFFICE O/P EST MOD 30 MIN: CPT | Mod: S$GLB,,, | Performed by: INTERNAL MEDICINE

## 2022-12-08 PROCEDURE — 3044F HG A1C LEVEL LT 7.0%: CPT | Mod: CPTII,S$GLB,, | Performed by: INTERNAL MEDICINE

## 2022-12-08 PROCEDURE — 1159F PR MEDICATION LIST DOCUMENTED IN MEDICAL RECORD: ICD-10-PCS | Mod: CPTII,S$GLB,, | Performed by: INTERNAL MEDICINE

## 2022-12-08 PROCEDURE — 3078F DIAST BP <80 MM HG: CPT | Mod: CPTII,S$GLB,, | Performed by: INTERNAL MEDICINE

## 2022-12-08 PROCEDURE — 1126F AMNT PAIN NOTED NONE PRSNT: CPT | Mod: CPTII,S$GLB,, | Performed by: INTERNAL MEDICINE

## 2022-12-08 PROCEDURE — 99214 PR OFFICE/OUTPT VISIT, EST, LEVL IV, 30-39 MIN: ICD-10-PCS | Mod: S$GLB,,, | Performed by: INTERNAL MEDICINE

## 2022-12-08 PROCEDURE — 3008F PR BODY MASS INDEX (BMI) DOCUMENTED: ICD-10-PCS | Mod: CPTII,S$GLB,, | Performed by: INTERNAL MEDICINE

## 2022-12-08 PROCEDURE — 3078F PR MOST RECENT DIASTOLIC BLOOD PRESSURE < 80 MM HG: ICD-10-PCS | Mod: CPTII,S$GLB,, | Performed by: INTERNAL MEDICINE

## 2022-12-08 PROCEDURE — 3008F BODY MASS INDEX DOCD: CPT | Mod: CPTII,S$GLB,, | Performed by: INTERNAL MEDICINE

## 2022-12-08 PROCEDURE — 4010F ACE/ARB THERAPY RXD/TAKEN: CPT | Mod: CPTII,S$GLB,, | Performed by: INTERNAL MEDICINE

## 2022-12-08 PROCEDURE — 99499 RISK ADDL DX/OHS AUDIT: ICD-10-PCS | Mod: HCNC,S$GLB,, | Performed by: INTERNAL MEDICINE

## 2022-12-08 PROCEDURE — 1126F PR PAIN SEVERITY QUANTIFIED, NO PAIN PRESENT: ICD-10-PCS | Mod: CPTII,S$GLB,, | Performed by: INTERNAL MEDICINE

## 2022-12-08 PROCEDURE — 3077F SYST BP >= 140 MM HG: CPT | Mod: CPTII,S$GLB,, | Performed by: INTERNAL MEDICINE

## 2022-12-08 PROCEDURE — 3288F FALL RISK ASSESSMENT DOCD: CPT | Mod: CPTII,S$GLB,, | Performed by: INTERNAL MEDICINE

## 2022-12-08 NOTE — PROGRESS NOTES
Subjective:   Patient ID:  Gemma Vick is a 67 y.o. female who presents for follow up of Follow-up (6mth)      HPI  3/26/2020  A 64 yo female with pvd carotid disease htn hlp copd exsmoker on nocturnal o2 therapy wants to discuss test results. She is in digital htn has been unable to tolerate bisoprolol daily feels tired fatigued no energy she takes meds regularily tries to be compliant with salt no exercise but stays busy in the house. She takes care of her grandbabies. Has occasional leg swelling she takes lasix prn for weight change she stands on her feet a lot. She is compliant with inhalers to control shortness of breath no palpitation has chronic cough.  Her carotid u/s reviewed unchanged.abd u/s no aneurysm  Lipids on target she is well controlled on vytorin .she has difficulty with crestor and lipitor   9/24/2020  She is here for  f/u she is complaining of recurrent episodes of abdominal pain lower quadrant and got to be upper abdomen associated with nausea vomiting no post prandial pain no weight loss or food avoidance. Has no new symptoms of chest pain she is still short of breath no new palpitation tia.   Ct abdomen showed diverticulitis aaa 3.4 cm and sma stenosis.   Her ldl has increased. She has had sore muscles she stopped statins w/o improvemnet. She needs to back on statins that would explain he rincreased ldl.      3/25/2021  Here for f/u has sharp recurrent left sided occurring at rest sitting in recliner she cannot take a deep breath no exertional symptoms she takes care of her grandbabies no smoking no tia palpitation syncope near syncope. She takes  meds regularily .reveiw of her bp chart still showed elevated indices. She claims salt compliance. She could not tolerate amlodipine bid . Her lipids aRE ON TARGET.      9/30/2021    has been using o2 therapy at nite no change in symptoms her bp is elevated she takes amlodipine 5 mg at nite she is not compliant with diet. Salt wise.she takes  care of grandkids no chest pain no cardiac symptoms. She has the urge to go to bathroom after she eats she has upperabdominal pain and feels like throwing up. Has known stenosis of the sma  She has lost weight.      11/1/2021   She is taking 7.5 mg amlodipine still needs better  salt control bp acceptable here however at home is more elevated but we have not checked her machine. She continues to have abdominal symptoms. She wants alternatives to ibesartan her pill is too big to swallow her lipids are on target. (she will check on diovan 320 and benicar 40 mg with pharmacist about size and cost).she is in digital htn her bp readings are more elevated that today clinic readings.she ahs not been compliant with diet she had fried chicken mashed potatoes french fries and   And biscuits  From [employer].   She had cta for her abdominal symptoms showing celiac stenosis and stenosis at the sma. Has also bilateral iliac stenosis greater than 50%.   Her carotid anatomy is unchanged.         12/8/2021   Today bp is controlled she ahs taken a fluid pill her bp readings at home since last visit has been 160-170 range. She si non compliant with salt she took diuretics due to leg swelling which is related to amlodipine. She has also an mason with exercise that did not suggest significant disease she continues to have symptoms suggestive of musculoskeltal on the rt and sciatica. No definite claudication no discoloration. she has no tia.      4/1/2022  Not much leg swelling has been drinking a lot of water bp fluctuates based on salt her digital htn reflects that now today is on target. Has been in after hours due abdominal pain constipation issue. Has nausea at that time . All improved still          dealing with constipation has use magnesium citrate.   She is not using diuretics except intermittently   Has question about arthritis meds she can take advised short course is ok but prolonged use is high risk of bleeding.       7/27/2022  Here for evaluation for colonoscopy. Has a positive school screening test was scheduled for colonoscopy . Has iron deficiency anemia has fatigue. She has seen vascular surgery because her colonoscopy was cancel;ed has m,oderate carotid disease. Has cta mesenteric stenosis. She has abdominal pain has improvement after taking bentyl some times she vomited digetsed food after eating no diarrhea  Has postprandial pain  opn the rt then progresses to to the bottom and lower abdomen. She is non compliant with salt that is why her bp is elevated.     12/8/2022   Here forf /u has been evaluated by ep no need for any pacer or intervention. Has been evaluated by vascular surgery no significant carotid disease. She is getting very forgetful was evaluated [facility] she is labeled as vascular dementia  and shrinking brain. Her aaa is around 4 cm.   She had a slaty meal before coming to see me her bp is elevated she is non complaint with salt intake. She cannot remember her meds. She stopped a bp med that she does not remember the name. No cardiac symptoms.   Past Medical History:   Diagnosis Date    AAA (abdominal aortic aneurysm) 2/13/2014    Abdominal aneurysm     3    Acute coronary syndrome     Arthritis     BPPV (benign paroxysmal positional vertigo)     Carotid artery plaque     Carotid artery stenosis and occlusion 2/13/2014    Chronic back pain     COPD (chronic obstructive pulmonary disease)     Coronary artery disease     Emphysema lung     Hyperlipidemia     Hypertension     Myocardial infarction     x3    Neuropathy        Past Surgical History:   Procedure Laterality Date    CARDIAC CATHETERIZATION      CORONARY ANGIOPLASTY      HYSTERECTOMY  1988       Social History     Tobacco Use    Smoking status: Former     Packs/day: 0.50     Years: 44.00     Pack years: 22.00     Types: Cigarettes, Vaping w/o nicotine     Start date: 6/24/1970     Quit date: 05/2017     Years since quitting:  5.6    Smokeless tobacco: Never    Tobacco comments:     3 MG NICOTINE FOR HEART.    Substance Use Topics    Alcohol use: No    Drug use: No       Family History   Problem Relation Age of Onset    Heart disease Mother     Heart attacks under age 50 Father     Heart attacks under age 50 Brother     Breast cancer Paternal Aunt        Current Outpatient Medications   Medication Sig    albuterol (PROVENTIL/VENTOLIN HFA) 90 mcg/actuation inhaler INHALE 2 PUFFS INTO LUNGS EVERY 6 HOURS AS NEEDED    amLODIPine (NORVASC) 2.5 MG tablet Take 1 tablet (2.5 mg total) by mouth 2 (two) times daily.    aspirin 81 MG Chew Take 81 mg by mouth once daily.    bisoprolol (ZEBETA) 5 MG tablet TAKE 1 TABLET BY MOUTH ONCE A DAY    budesonide-glycopyr-formoterol (BREZTRI AEROSPHERE) 160-9-4.8 mcg/actuation HFAA Inhale 2 puffs into the lungs 2 (two) times a day.    clopidogreL (PLAVIX) 75 mg tablet TAKE 1 TABLET BY MOUTH ONCE A DAY    dicyclomine (BENTYL) 10 MG capsule TAKE 1 CAPSULE BY MOUTH 4 TIMES DAILY BEFORE MEALS AND NIGHTLY Strength: 10 mg    DULoxetine (CYMBALTA) 60 MG capsule TAKE (1) CAPSULE BY MOUTH ONCE A DAY    ezetimibe-simvastatin 10-40 mg (VYTORIN) 10-40 mg per tablet TAKE 1 TABLET BY MOUTH EVERY EVENING    ferrous gluconate (FERGON) 324 MG tablet Take 1 tablet (324 mg total) by mouth 2 (two) times daily with meals.    furosemide (LASIX) 20 MG tablet 1 TAB AS NEEDED FOR SWELLING LEG,OR WEIGHT GAIN OF 3 PDS IN 1 DAY OR 5 PDS IN 1WEEK    ibuprofen (ADVIL,MOTRIN) 400 MG tablet TAKE 1 TABLET BY MOUTH EVERY 6 HOURS AS NEEDED    olmesartan (BENICAR) 40 MG tablet TAKE 1 TABLET BY MOUTH ONCE A DAY    OXYGEN-AIR DELIVERY SYSTEMS MISC 3 L by Misc.(Non-Drug; Combo Route) route every evening.    pantoprazole (PROTONIX) 40 MG tablet TAKE 1 TABLET BY MOUTH ONCE A DAY    PREMARIN 0.3 mg tablet TAKE 1 TABLET BY MOUTH ONCE A DAY    vitamin D (VITAMIN D3) 1000 units Tab Take 1,000 Units by mouth once daily.    zolpidem (AMBIEN) 10 mg Tab TAKE  1 TABLET BY MOUTH IN THE EVENING    metroNIDAZOLE (FLAGYL) 500 MG tablet Take 1 tablet (500 mg total) by mouth 3 (three) times daily. (Patient not taking: Reported on 12/8/2022)    zinc gluconate 50 mg tablet Take 50 mg by mouth once daily.     No current facility-administered medications for this visit.     Current Outpatient Medications on File Prior to Visit   Medication Sig    albuterol (PROVENTIL/VENTOLIN HFA) 90 mcg/actuation inhaler INHALE 2 PUFFS INTO LUNGS EVERY 6 HOURS AS NEEDED    amLODIPine (NORVASC) 2.5 MG tablet Take 1 tablet (2.5 mg total) by mouth 2 (two) times daily.    aspirin 81 MG Chew Take 81 mg by mouth once daily.    bisoprolol (ZEBETA) 5 MG tablet TAKE 1 TABLET BY MOUTH ONCE A DAY    budesonide-glycopyr-formoterol (BREZTRI AEROSPHERE) 160-9-4.8 mcg/actuation HFAA Inhale 2 puffs into the lungs 2 (two) times a day.    clopidogreL (PLAVIX) 75 mg tablet TAKE 1 TABLET BY MOUTH ONCE A DAY    dicyclomine (BENTYL) 10 MG capsule TAKE 1 CAPSULE BY MOUTH 4 TIMES DAILY BEFORE MEALS AND NIGHTLY Strength: 10 mg    DULoxetine (CYMBALTA) 60 MG capsule TAKE (1) CAPSULE BY MOUTH ONCE A DAY    ezetimibe-simvastatin 10-40 mg (VYTORIN) 10-40 mg per tablet TAKE 1 TABLET BY MOUTH EVERY EVENING    ferrous gluconate (FERGON) 324 MG tablet Take 1 tablet (324 mg total) by mouth 2 (two) times daily with meals.    furosemide (LASIX) 20 MG tablet 1 TAB AS NEEDED FOR SWELLING LEG,OR WEIGHT GAIN OF 3 PDS IN 1 DAY OR 5 PDS IN 1WEEK    ibuprofen (ADVIL,MOTRIN) 400 MG tablet TAKE 1 TABLET BY MOUTH EVERY 6 HOURS AS NEEDED    olmesartan (BENICAR) 40 MG tablet TAKE 1 TABLET BY MOUTH ONCE A DAY    OXYGEN-AIR DELIVERY SYSTEMS MISC 3 L by Misc.(Non-Drug; Combo Route) route every evening.    pantoprazole (PROTONIX) 40 MG tablet TAKE 1 TABLET BY MOUTH ONCE A DAY    PREMARIN 0.3 mg tablet TAKE 1 TABLET BY MOUTH ONCE A DAY    vitamin D (VITAMIN D3) 1000 units Tab Take 1,000 Units by mouth once daily.    zolpidem (AMBIEN) 10 mg Tab TAKE  1 TABLET BY MOUTH IN THE EVENING    metroNIDAZOLE (FLAGYL) 500 MG tablet Take 1 tablet (500 mg total) by mouth 3 (three) times daily. (Patient not taking: Reported on 12/8/2022)    zinc gluconate 50 mg tablet Take 50 mg by mouth once daily.     No current facility-administered medications on file prior to visit.     Review of patient's allergies indicates:  No Known Allergies   Review of Systems   Constitutional: Negative for diaphoresis, malaise/fatigue and weight gain.   HENT:  Negative for hoarse voice.    Eyes:  Negative for double vision and visual disturbance.   Cardiovascular:  Negative for chest pain, claudication, cyanosis, dyspnea on exertion, irregular heartbeat, leg swelling, near-syncope, orthopnea, palpitations, paroxysmal nocturnal dyspnea and syncope.   Respiratory:  Negative for cough, hemoptysis, shortness of breath and snoring.    Hematologic/Lymphatic: Negative for bleeding problem. Does not bruise/bleed easily.   Skin:  Negative for color change and poor wound healing.   Musculoskeletal:  Negative for muscle cramps, muscle weakness and myalgias.   Gastrointestinal:  Negative for bloating, abdominal pain, change in bowel habit, diarrhea, heartburn, hematemesis, hematochezia, melena and nausea.   Neurological:  Negative for excessive daytime sleepiness, dizziness, headaches, light-headedness, loss of balance, numbness and weakness.   Psychiatric/Behavioral:  Positive for memory loss. The patient is nervous/anxious. The patient does not have insomnia.    Allergic/Immunologic: Negative for hives.     Objective:   Physical Exam  Vitals and nursing note reviewed.   Constitutional:       General: She is not in acute distress.     Appearance: Normal appearance. She is well-developed. She is not ill-appearing.   HENT:      Head: Normocephalic and atraumatic.   Eyes:      General: No scleral icterus.     Pupils: Pupils are equal, round, and reactive to light.   Neck:      Thyroid: No thyromegaly.       Vascular: Normal carotid pulses. Carotid bruit present. No hepatojugular reflux or JVD.      Trachea: No tracheal deviation.   Cardiovascular:      Rate and Rhythm: Normal rate and regular rhythm.      Pulses: Normal pulses.           Carotid pulses are  on the right side with bruit and  on the left side with bruit.       Radial pulses are 2+ on the right side and 2+ on the left side.        Femoral pulses are 2+ on the right side with bruit and 2+ on the left side with bruit.       Popliteal pulses are 2+ on the right side and 2+ on the left side.        Dorsalis pedis pulses are 2+ on the right side and 2+ on the left side.        Posterior tibial pulses are 2+ on the right side and 2+ on the left side.      Heart sounds: Murmur heard.   Harsh midsystolic murmur is present with a grade of 2/6 at the upper right sternal border radiating to the neck.   High-pitched blowing decrescendo early diastolic murmur is present with a grade of 1/4 at the upper right sternal border radiating to the apex.     No friction rub. No gallop.      Comments: Abdominal bruits.  Aaa mass 4 cm   Pulmonary:      Effort: Pulmonary effort is normal. No respiratory distress.      Breath sounds: Normal breath sounds. No wheezing, rhonchi or rales.   Chest:      Chest wall: No tenderness.   Abdominal:      General: Bowel sounds are normal. There is no abdominal bruit.      Palpations: Abdomen is soft. There is no hepatomegaly or pulsatile mass.      Tenderness: There is no abdominal tenderness.   Musculoskeletal:      Right shoulder: No deformity.      Cervical back: Normal range of motion and neck supple.   Skin:     General: Skin is warm and dry.      Findings: No erythema or rash.      Nails: There is no clubbing.   Neurological:      Mental Status: She is alert and oriented to person, place, and time.      Cranial Nerves: No cranial nerve deficit.      Coordination: Coordination normal.   Psychiatric:         Speech: Speech normal.          "Behavior: Behavior normal.     Vitals:    12/08/22 1340 12/08/22 1355   BP: (!) 172/75 (!) 164/70   BP Location: Right arm Left arm   Pulse: (!) 50 (!) 50   Weight: 67.7 kg (149 lb 4 oz)    Height: 5' 1" (1.549 m)      Lab Results   Component Value Date    CHOL 143 12/01/2022    CHOL 164 10/25/2022    CHOL 143 04/07/2022     Lab Results   Component Value Date    HDL 56 12/01/2022    HDL 60 10/25/2022    HDL 49 04/07/2022     Lab Results   Component Value Date    LDLCALC 72.0 12/01/2022    LDLCALC 83.0 10/25/2022    LDLCALC 77.4 04/07/2022     Lab Results   Component Value Date    TRIG 75 12/01/2022    TRIG 105 10/25/2022    TRIG 83 04/07/2022     Lab Results   Component Value Date    CHOLHDL 39.2 12/01/2022    CHOLHDL 36.6 10/25/2022    CHOLHDL 34.3 04/07/2022       Chemistry        Component Value Date/Time     12/01/2022 1013    K 4.0 12/01/2022 1013     12/01/2022 1013    CO2 27 12/01/2022 1013    BUN 13 12/01/2022 1013    CREATININE 0.9 12/01/2022 1013    GLU 86 12/01/2022 1013        Component Value Date/Time    CALCIUM 9.7 12/01/2022 1013    ALKPHOS 57 12/01/2022 1013    AST 16 12/01/2022 1013    ALT 10 12/01/2022 1013    BILITOT 0.7 12/01/2022 1013    ESTGFRAFRICA >60.0 04/07/2022 0922    EGFRNONAA >60.0 04/07/2022 0922        Lab Results   Component Value Date    HGBA1C 5.4 12/01/2022     '    Lab Results   Component Value Date    TSH 1.509 05/04/2018     Lab Results   Component Value Date    INR 1.1 12/20/2017    INR 1.1 08/10/2012     Lab Results   Component Value Date    WBC 6.81 10/25/2022    HGB 12.6 10/25/2022    HCT 38.3 10/25/2022    MCV 94 10/25/2022     10/25/2022     BMP  Sodium   Date Value Ref Range Status   12/01/2022 137 136 - 145 mmol/L Final     Potassium   Date Value Ref Range Status   12/01/2022 4.0 3.5 - 5.1 mmol/L Final     Chloride   Date Value Ref Range Status   12/01/2022 102 95 - 110 mmol/L Final     CO2   Date Value Ref Range Status   12/01/2022 27 23 - 29 mmol/L " Final     BUN   Date Value Ref Range Status   12/01/2022 13 8 - 23 mg/dL Final     Creatinine   Date Value Ref Range Status   12/01/2022 0.9 0.5 - 1.4 mg/dL Final     Calcium   Date Value Ref Range Status   12/01/2022 9.7 8.7 - 10.5 mg/dL Final     Anion Gap   Date Value Ref Range Status   12/01/2022 8 8 - 16 mmol/L Final     eGFR if    Date Value Ref Range Status   04/07/2022 >60.0 >60 mL/min/1.73 m^2 Final     eGFR if non    Date Value Ref Range Status   04/07/2022 >60.0 >60 mL/min/1.73 m^2 Final     Comment:     Calculation used to obtain the estimated glomerular filtration  rate (eGFR) is the CKD-EPI equation.        CrCl cannot be calculated (Patient's most recent lab result is older than the maximum 7 days allowed.).    Assessment:     1. Essential hypertension    2. Mixed hyperlipidemia    3. Coronary artery disease of native artery of native heart with stable angina pectoris    4. Prediabetes    5. Bilateral carotid artery stenosis    6. Infrarenal abdominal aortic aneurysm (AAA) without rupture    7. Dizziness    8. Sinus node dysfunction    9. Nocturnal hypoxemia due to emphysema    10. COPD, moderate    11. Tobacco use disorder, moderate, in sustained remission    12. Personal history of COVID-19    13. Gastroesophageal reflux disease, unspecified whether esophagitis present      The issue is really she is a vasculopath and  having now vascular dementia probably early stages being evaluated by [facility].    Her real issue is her compliance with salt which is making her htn control a nightmare and her taking meds at times erratically. Long discussion with daughter presence. She understands,.   Hlp on target will continue same meds.    A1c on target counseled about compliance   Has aaa stable followed by vascular surgery as well as her celiac stenosis. Not sure about her gi symptoms if all is ibs vs mesenteric ischemia. She ssems to have post prandial pain that suggest  ischemia. Will defer to vascular surgery.    Arrythmias wise no need for any intervention reassured./   Plan:     Continue current therapy  Cardiac low salt diet.  Risk factor modification and excercise program.  F/u in 6 months with lipid cmp

## 2022-12-12 ENCOUNTER — CLINICAL SUPPORT (OUTPATIENT)
Dept: PULMONOLOGY | Facility: CLINIC | Age: 67
End: 2022-12-12
Payer: MEDICARE

## 2022-12-12 ENCOUNTER — OFFICE VISIT (OUTPATIENT)
Dept: PULMONOLOGY | Facility: CLINIC | Age: 67
End: 2022-12-12
Payer: MEDICARE

## 2022-12-12 VITALS
DIASTOLIC BLOOD PRESSURE: 72 MMHG | SYSTOLIC BLOOD PRESSURE: 150 MMHG | BODY MASS INDEX: 28.04 KG/M2 | HEART RATE: 54 BPM | OXYGEN SATURATION: 99 % | HEIGHT: 61 IN | RESPIRATION RATE: 16 BRPM | WEIGHT: 148.5 LBS

## 2022-12-12 DIAGNOSIS — J43.9 NOCTURNAL HYPOXEMIA DUE TO EMPHYSEMA: ICD-10-CM

## 2022-12-12 DIAGNOSIS — G47.36 NOCTURNAL HYPOXEMIA DUE TO EMPHYSEMA: ICD-10-CM

## 2022-12-12 DIAGNOSIS — J44.9 COPD, MODERATE: Primary | ICD-10-CM

## 2022-12-12 DIAGNOSIS — F17.201 TOBACCO USE DISORDER, MODERATE, IN SUSTAINED REMISSION: ICD-10-CM

## 2022-12-12 DIAGNOSIS — I10 ESSENTIAL HYPERTENSION: ICD-10-CM

## 2022-12-12 PROCEDURE — 99214 OFFICE O/P EST MOD 30 MIN: CPT | Mod: S$GLB,,, | Performed by: NURSE PRACTITIONER

## 2022-12-12 PROCEDURE — 1160F RVW MEDS BY RX/DR IN RCRD: CPT | Mod: CPTII,S$GLB,, | Performed by: NURSE PRACTITIONER

## 2022-12-12 PROCEDURE — 3077F PR MOST RECENT SYSTOLIC BLOOD PRESSURE >= 140 MM HG: ICD-10-PCS | Mod: CPTII,S$GLB,, | Performed by: NURSE PRACTITIONER

## 2022-12-12 PROCEDURE — 3078F PR MOST RECENT DIASTOLIC BLOOD PRESSURE < 80 MM HG: ICD-10-PCS | Mod: CPTII,S$GLB,, | Performed by: NURSE PRACTITIONER

## 2022-12-12 PROCEDURE — 1101F PT FALLS ASSESS-DOCD LE1/YR: CPT | Mod: CPTII,S$GLB,, | Performed by: NURSE PRACTITIONER

## 2022-12-12 PROCEDURE — 94762 PR NONINVASV OXYGEN SATUR, CONT: ICD-10-PCS | Mod: S$GLB,,, | Performed by: INTERNAL MEDICINE

## 2022-12-12 PROCEDURE — 3288F PR FALLS RISK ASSESSMENT DOCUMENTED: ICD-10-PCS | Mod: CPTII,S$GLB,, | Performed by: NURSE PRACTITIONER

## 2022-12-12 PROCEDURE — 94762 N-INVAS EAR/PLS OXIMTRY CONT: CPT | Mod: S$GLB,,, | Performed by: INTERNAL MEDICINE

## 2022-12-12 PROCEDURE — 3078F DIAST BP <80 MM HG: CPT | Mod: CPTII,S$GLB,, | Performed by: NURSE PRACTITIONER

## 2022-12-12 PROCEDURE — 4010F PR ACE/ARB THEARPY RXD/TAKEN: ICD-10-PCS | Mod: CPTII,S$GLB,, | Performed by: NURSE PRACTITIONER

## 2022-12-12 PROCEDURE — 3288F FALL RISK ASSESSMENT DOCD: CPT | Mod: CPTII,S$GLB,, | Performed by: NURSE PRACTITIONER

## 2022-12-12 PROCEDURE — 99214 PR OFFICE/OUTPT VISIT, EST, LEVL IV, 30-39 MIN: ICD-10-PCS | Mod: S$GLB,,, | Performed by: NURSE PRACTITIONER

## 2022-12-12 PROCEDURE — 3008F PR BODY MASS INDEX (BMI) DOCUMENTED: ICD-10-PCS | Mod: CPTII,S$GLB,, | Performed by: NURSE PRACTITIONER

## 2022-12-12 PROCEDURE — 99999 PR PBB SHADOW E&M-EST. PATIENT-LVL V: CPT | Mod: PBBFAC,,, | Performed by: NURSE PRACTITIONER

## 2022-12-12 PROCEDURE — 99999 PR PBB SHADOW E&M-EST. PATIENT-LVL V: ICD-10-PCS | Mod: PBBFAC,,, | Performed by: NURSE PRACTITIONER

## 2022-12-12 PROCEDURE — 1160F PR REVIEW ALL MEDS BY PRESCRIBER/CLIN PHARMACIST DOCUMENTED: ICD-10-PCS | Mod: CPTII,S$GLB,, | Performed by: NURSE PRACTITIONER

## 2022-12-12 PROCEDURE — 1159F PR MEDICATION LIST DOCUMENTED IN MEDICAL RECORD: ICD-10-PCS | Mod: CPTII,S$GLB,, | Performed by: NURSE PRACTITIONER

## 2022-12-12 PROCEDURE — 1159F MED LIST DOCD IN RCRD: CPT | Mod: CPTII,S$GLB,, | Performed by: NURSE PRACTITIONER

## 2022-12-12 PROCEDURE — 3044F HG A1C LEVEL LT 7.0%: CPT | Mod: CPTII,S$GLB,, | Performed by: NURSE PRACTITIONER

## 2022-12-12 PROCEDURE — 4010F ACE/ARB THERAPY RXD/TAKEN: CPT | Mod: CPTII,S$GLB,, | Performed by: NURSE PRACTITIONER

## 2022-12-12 PROCEDURE — 3044F PR MOST RECENT HEMOGLOBIN A1C LEVEL <7.0%: ICD-10-PCS | Mod: CPTII,S$GLB,, | Performed by: NURSE PRACTITIONER

## 2022-12-12 PROCEDURE — 3008F BODY MASS INDEX DOCD: CPT | Mod: CPTII,S$GLB,, | Performed by: NURSE PRACTITIONER

## 2022-12-12 PROCEDURE — 1101F PR PT FALLS ASSESS DOC 0-1 FALLS W/OUT INJ PAST YR: ICD-10-PCS | Mod: CPTII,S$GLB,, | Performed by: NURSE PRACTITIONER

## 2022-12-12 PROCEDURE — 3077F SYST BP >= 140 MM HG: CPT | Mod: CPTII,S$GLB,, | Performed by: NURSE PRACTITIONER

## 2022-12-12 RX ORDER — INHALER, ASSIST DEVICES
SPACER (EA) MISCELLANEOUS
Qty: 1 EACH | Refills: 2 | Status: SHIPPED | OUTPATIENT
Start: 2022-12-12 | End: 2024-02-05

## 2022-12-12 RX ORDER — IPRATROPIUM BROMIDE AND ALBUTEROL 20; 100 UG/1; UG/1
SPRAY, METERED RESPIRATORY (INHALATION)
COMMUNITY
Start: 2022-11-29 | End: 2023-02-02 | Stop reason: SDUPTHER

## 2022-12-12 RX ORDER — IPRATROPIUM BROMIDE AND ALBUTEROL SULFATE 2.5; .5 MG/3ML; MG/3ML
SOLUTION RESPIRATORY (INHALATION)
COMMUNITY
Start: 2022-11-29 | End: 2024-01-29 | Stop reason: SDUPTHER

## 2022-12-12 RX ORDER — CARVEDILOL PHOSPHATE 40 MG/1
CAPSULE, EXTENDED RELEASE ORAL
COMMUNITY
Start: 2022-11-29 | End: 2023-01-12

## 2022-12-12 NOTE — ASSESSMENT & PLAN NOTE
Controlled on Breztri, combivent, Albuterol inhaler. Duo nebs if needed.  NC 3lm sleep with benefit and adherence.   6/9/2021 spirometry moderate obstruction FEV1 59.2  The current medical regimen is effective;  continue present plan and medications.  Follow up 6 months chest xray

## 2022-12-12 NOTE — ASSESSMENT & PLAN NOTE
compete smoking cessation May 2017, continues to use nicotine vapor 2 times day, most days.   LDCT ordered by Dr. Altman completed 11/8/2022 LDCT benign findings, repeat annually November 2023 per Dr Altman.

## 2022-12-12 NOTE — PROGRESS NOTES
Chief Complaint   Patient presents with    COPD        Gemma Vick is a 67 y.o. 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     12/12/2022 patient picked up Overnight oximetry testing equipment to testing on NC 3l during sleep.     11/05/2015 PSG Normal Apnea hypopnea index. No GALLO.      Followed in pulmonary for COPD and nocturnal hypoxemia.     Last seen in pulmonary 6/13/2022 Dr. Anand     6/09/2021 Overnight Oxygen Saturation Study: Overnight oxygen saturation study is abnormal with O2 saturation less than 88%.   Time with SpO2<89: 2:10:40, 26.3%of the study period. Lowest oxygen saturation recorded was 74% .    Presents stable. No acute flare. Significant improved on Breztri ICS/LABA/LAMA.    Current regimen: controller Breztri triple therapy ICS/LABA/LAMA controller with chamber.  Rescue combivent respimat and Albuterol inhaler     Quit cigarette smoking in May 2017.      She uses 1 pillow at night. Patient currently is on oxygen at 3 L/min per nasal cannula. at night. No obstructive sleep apnea.   No constitutional symptoms, denies fever, chills, anorexia, or weight loss.    Continues to use nicotine in form of vapor 3 mg typically every 2-3 days.   Cares for 12, 11, 7 grand children and two 2 yr old great grand babies. Only exercise limitations if walking long distances.      Previous Report Reviewed: lab reports, office notes and radiology reports     The following portions of the patient's history were reviewed and updated as appropriate: allergies, current medications, past family history, past medical history, past social history, past surgical history and problem list.    Review of Systems  A comprehensive review of systems was negative except for: Respiratory: positive for cough and wheezing     Objective:      BP (!) 150/72   Pulse (!) 54   Resp  "16   Ht 5' 1" (1.549 m)   Wt 67.4 kg (148 lb 7.7 oz)   SpO2 99% Comment: 3 liters at night  BMI 28.06 kg/m²   General appearance: alert, appears stated age and cooperative  Head: Normocephalic, without obvious abnormality, atraumatic  Eyes: negative  Ears: normal TM's and external ear canals both ears  Nose: Nares normal. Septum midline. Mucosa normal. No drainage or sinus tenderness.  Throat: lips, mucosa, and tongue normal; teeth and gums normal  Neck: no adenopathy and no carotid bruit  Lungs: clear to auscultation bilaterally and no wheezing, no rales.   Abdomen: normal findings: soft, non-tender  Extremities: extremities normal, atraumatic, no cyanosis or edema  Pulses: 2+ and symmetric  Skin: Skin color, texture, turgor normal. No rashes or lesions  Lymph nodes: Cervical, supraclavicular, and axillary nodes normal.  Neurologic: Grossly normal    Diagnostic Review    6/13/2022 Spirometry shows moderate-severe obstruction. Spirometry remains unimproved following bronchodilator       CT Chest Lung Screening Low Dose  Narrative: EXAMINATION:  CT CHEST LUNG SCREENING LOW DOSE    CLINICAL HISTORY:  Lung cancer annual screening, asymptomatic, current smoker (min. 30 pack-yrs); Nicotine dependence, cigarettes, uncomplicated    TECHNIQUE:  CT of the thorax was performed with low dose, lung screening protocol.  No contrast was administered.  Sagittal and coronal reconstructions were obtained.    COMPARISON:  04/28/2022    FINDINGS:  Lungs: There is a 3 mm noncalcified nodule in the right upper lobe (series 4, image 150).  Redemonstration of bronchovascular nodularity in the left lower lobe (series 4, image 293) grossly stable in size.  Emphysematous changes are present within the right greater than left upper lobes.    Pleura:   No effusion..    Heart and pericardium: Normal size without effusion.    Aorta and vasculature: Dense atherosclerotic changes are present within the coronary arteries and aorta including the " upper abdomen    Chest wall and skeletal structures: Unremarkable except age-appropriate degenerative changes.    Upper abdomen: Unremarkable.  Impression: Lung-RADS Category:  2 - Benign Appearance or Behavior - continue annual screening with LDCT in 12 months.    Clinically or potentially clinically significant non lung cancer finding:  None.    Prior Lung Cancer Modifier:  No history of prior lung cancer.    Electronically signed by: Jaya Colvin  Date:    11/08/2022  Time:    12:59      Spirometry 6/9/2021 moderate obstruction FEV1 59.2%.    Spirometry 7/31/2018 FEV1 67% predicted, improved compared to prior.     Pulmonary function tests: 9/18/2017 FEV1: 1.18  (51 % predicted), FVC:  2.32 (78 % predicted), FEV1/FVC:  51 (65 % predicted).  Post bronchodilator: FEV1: 1.41  (62 % predicted), FVC:  2.64 (88 % predicted), FEV1/FVC:  54 (69 % predicted).   Moderate obstructive airflow defect  Positive bronchodilator response with 20% increase in FEV1 post bronchodilator.   FEV1 improved by 14% compared to tomer 12/15/2016.       6/9/2021 Overnight Oxygen Saturation Study: abnormal, 6/9/2021 NC 3 lm sleep.     INTERPRETATION:   Conditions of Test: Noted above in report     Interpretation of results of overnight oxygen saturation study.   This was a technically  adequate study. Overnight oxygen   saturation study is abnormal with O2 saturation less than 88%.   Time with SpO2<89: 2:10:40, 26.3%of the study period. Lowest   oxygen saturation recorded was 74% .   Clinical correlation suggested.   Andres Morales MD     Medicare Criteria Comments:   Overnight Oximetry test results suggest the patient does fall   under Medicare Group 1 Criteria and would be eligible for oxygen   prescription.    (Arterial oxygen saturation at or below 88% for at least 5   minutes taken during sleep on stable outpatient)      10/29/2015 PSG Normal Apnea hypopnea index. AHI 0.0. No GALLO. The low SpO2 was 86%. Period with saturations below 88% was  39.9 minutes. Supplementary oxygen indicated with sleep.       Assessment:          1. COPD, moderate  inhalation spacing device (COMPACT SPACE CHAMBER)    X-Ray Chest PA And Lateral      2. Nocturnal hypoxemia due to emphysema        3. Essential hypertension        4. Tobacco use disorder, moderate, in sustained remission             Plan:     Problem List Items Addressed This Visit       Tobacco use disorder, moderate, in sustained remission     compete smoking cessation May 2017, continues to use nicotine vapor 2 times day, most days.   LDCT ordered by Dr. Altman completed 11/8/2022 LDCT benign findings, repeat annually November 2023 per Dr Altman.          Nocturnal hypoxemia due to emphysema     6/9/2021 abnormal overnight oximetry requalifed to continue home O2. 6/14/2021 orders NC 3lm sleep.   10/29/2015 PSG Normal Apnea hypopnea index. AHI 0.0. No GALLO. The low SpO2 was 86%. Period with saturations below 88% was 39.9 minutes. Supplementary oxygen indicated with sleep.   On NC 3 L sleep with benefit and adherence  Overnight oximetry determine if NC 3L optimal. If abnormal consider repeat PSG.               Essential hypertension     Stable and controlled. Continue current treatment plan as previously prescribed with your cardiologist and [facility] program             COPD, moderate - Primary     Controlled on Breztri, combivent, Albuterol inhaler. Duo nebs if needed.  NC 3lm sleep with benefit and adherence.   6/9/2021 spirometry moderate obstruction FEV1 59.2  The current medical regimen is effective;  continue present plan and medications.  Follow up 6 months chest xray           Relevant Medications    inhalation spacing device (COMPACT SPACE CHAMBER)    Other Relevant Orders    X-Ray Chest PA And Lateral      Discussed diagnosis, its evaluation, treatment and usual course.  All questions answered.      Follow up in about 6 months (around 6/12/2023) for COPD, w/review cxr.

## 2022-12-12 NOTE — ASSESSMENT & PLAN NOTE
6/9/2021 abnormal overnight oximetry requalifed to continue home O2. 6/14/2021 orders NC 3lm sleep.   10/29/2015 PSG Normal Apnea hypopnea index. AHI 0.0. No GALLO. The low SpO2 was 86%. Period with saturations below 88% was 39.9 minutes. Supplementary oxygen indicated with sleep.   On NC 3 L sleep with benefit and adherence  Overnight oximetry determine if NC 3L optimal. If abnormal consider repeat PSG.

## 2022-12-13 NOTE — PROCEDURES
[address] * [place] [address]  Telephone: [phone]  Test date: 22 Start: 22 00:26:28 Gemma Vick  Doctor: DRAKE Devries End: 22 10:21:56 [id number]  Oximetry: Summary Report  Comments: NC 3L  Recording time: 09:55:28 Highest pulse: 78 Highest SpO2: 100%  Excluded samplin:05:12 Lowest pulse: 49 Lowest SpO2: 83%  Total valid samplin:50:16 Mean pulse: 57 Mean SpO2: 97.7%  1 S.D.: 4.5 1 S.D.: 1.0  Time with SpO2<90: 0:00:16, 0.0%  Time with SpO2<80: 0:00:00, 0.0%  Time with SpO2<70: 0:00:00, 0.0%  Time with SpO2<60: 0:00:00, 0.0%  Time with SpO2<89: 0:00:16, 0.0%  Time with SpO2 =>90: 9:50:00, 100.0%  Time with SpO2=>80 & <90: 0:00:16, 0.0%  Time with SpO2=>70 & <80: 0:00:00, 0.0%  Time with SpO2=>60 & <70: 0:00:00, 0.0%  The longest continuous time with saturation <=88 was 00:00:16, which started at  22 07:22:28.  A desaturation event was defined as a decrease of saturation by 4 or more.  One event was excluded due to artifact.  There was one desaturation event over 3 minutes duration.  There were no desaturation events of less than 3 minutes duration.  Â© -

## 2022-12-16 ENCOUNTER — TELEPHONE (OUTPATIENT)
Dept: FAMILY MEDICINE | Facility: CLINIC | Age: 67
End: 2022-12-16
Payer: MEDICARE

## 2022-12-16 ENCOUNTER — PATIENT MESSAGE (OUTPATIENT)
Dept: FAMILY MEDICINE | Facility: CLINIC | Age: 67
End: 2022-12-16
Payer: MEDICARE

## 2022-12-16 NOTE — TELEPHONE ENCOUNTER
MRI reveals that there are some severe chronic appearing white matter changes throughout both cerebral hemispheres with focus of abnormal signal within the silvio these could be most likely ischemic in nature but they could also represent demyelinating process or prior infectious or inflammatory cause.    I recommend a neurology consult to go over this MRI  Please refer

## 2022-12-19 ENCOUNTER — TELEPHONE (OUTPATIENT)
Dept: FAMILY MEDICINE | Facility: CLINIC | Age: 67
End: 2022-12-19
Payer: MEDICARE

## 2022-12-20 ENCOUNTER — OFFICE VISIT (OUTPATIENT)
Dept: ELECTROPHYSIOLOGY | Facility: CLINIC | Age: 67
End: 2022-12-20
Payer: MEDICARE

## 2022-12-20 DIAGNOSIS — I49.5 SINUS NODE DYSFUNCTION: Primary | ICD-10-CM

## 2022-12-20 PROCEDURE — 1159F PR MEDICATION LIST DOCUMENTED IN MEDICAL RECORD: ICD-10-PCS | Mod: CPTII,95,, | Performed by: INTERNAL MEDICINE

## 2022-12-20 PROCEDURE — 4010F ACE/ARB THERAPY RXD/TAKEN: CPT | Mod: CPTII,95,, | Performed by: INTERNAL MEDICINE

## 2022-12-20 PROCEDURE — 3044F PR MOST RECENT HEMOGLOBIN A1C LEVEL <7.0%: ICD-10-PCS | Mod: CPTII,95,, | Performed by: INTERNAL MEDICINE

## 2022-12-20 PROCEDURE — 1160F RVW MEDS BY RX/DR IN RCRD: CPT | Mod: CPTII,95,, | Performed by: INTERNAL MEDICINE

## 2022-12-20 PROCEDURE — 1160F PR REVIEW ALL MEDS BY PRESCRIBER/CLIN PHARMACIST DOCUMENTED: ICD-10-PCS | Mod: CPTII,95,, | Performed by: INTERNAL MEDICINE

## 2022-12-20 PROCEDURE — 4010F PR ACE/ARB THEARPY RXD/TAKEN: ICD-10-PCS | Mod: CPTII,95,, | Performed by: INTERNAL MEDICINE

## 2022-12-20 PROCEDURE — 3044F HG A1C LEVEL LT 7.0%: CPT | Mod: CPTII,95,, | Performed by: INTERNAL MEDICINE

## 2022-12-20 PROCEDURE — 99214 PR OFFICE/OUTPT VISIT, EST, LEVL IV, 30-39 MIN: ICD-10-PCS | Mod: 95,,, | Performed by: INTERNAL MEDICINE

## 2022-12-20 PROCEDURE — 99214 OFFICE O/P EST MOD 30 MIN: CPT | Mod: 95,,, | Performed by: INTERNAL MEDICINE

## 2022-12-20 PROCEDURE — 1159F MED LIST DOCD IN RCRD: CPT | Mod: CPTII,95,, | Performed by: INTERNAL MEDICINE

## 2022-12-28 ENCOUNTER — HOSPITAL ENCOUNTER (OUTPATIENT)
Dept: CARDIOLOGY | Facility: HOSPITAL | Age: 67
Discharge: HOME | End: 2022-12-28
Attending: INTERNAL MEDICINE
Payer: MEDICARE

## 2022-12-28 DIAGNOSIS — I49.5 SINUS NODE DYSFUNCTION: ICD-10-CM

## 2022-12-28 PROCEDURE — 93227 XTRNL ECG REC<48 HR R&I: CPT | Mod: ,,, | Performed by: INTERNAL MEDICINE

## 2022-12-28 PROCEDURE — 93227 HOLTER MONITOR - 24 HOUR (CUPID ONLY): ICD-10-PCS | Mod: ,,, | Performed by: INTERNAL MEDICINE

## 2022-12-28 PROCEDURE — 93226 XTRNL ECG REC<48 HR SCAN A/R: CPT

## 2022-12-30 LAB
OHS CV EVENT MONITOR DAY: 0
OHS CV HOLTER LENGTH DECIMAL HOURS: 24
OHS CV HOLTER LENGTH HOURS: 24
OHS CV HOLTER LENGTH MINUTES: 0
OHS CV HOLTER SINUS AVERAGE HR: 69
OHS CV HOLTER SINUS MAX HR: 96
OHS CV HOLTER SINUS MIN HR: 50

## 2023-01-03 ENCOUNTER — PATIENT MESSAGE (OUTPATIENT)
Dept: ADMINISTRATIVE | Facility: OTHER | Age: 68
End: 2023-01-03
Payer: MEDICARE

## 2023-01-13 DIAGNOSIS — J44.9 COPD, MODERATE: ICD-10-CM

## 2023-01-13 RX ORDER — BUDESONIDE, GLYCOPYRROLATE, AND FORMOTEROL FUMARATE 160; 9; 4.8 UG/1; UG/1; UG/1
2 AEROSOL, METERED RESPIRATORY (INHALATION) 2 TIMES DAILY
Qty: 32.1 G | Refills: 3 | Status: SHIPPED | OUTPATIENT
Start: 2023-01-13 | End: 2024-01-29 | Stop reason: SDUPTHER

## 2023-02-02 ENCOUNTER — PATIENT MESSAGE (OUTPATIENT)
Dept: FAMILY MEDICINE | Facility: CLINIC | Age: 68
End: 2023-02-02
Payer: MEDICARE

## 2023-02-02 DIAGNOSIS — J44.9 COPD, MODERATE: Primary | ICD-10-CM

## 2023-02-02 RX ORDER — IPRATROPIUM BROMIDE AND ALBUTEROL 20; 100 UG/1; UG/1
1 SPRAY, METERED RESPIRATORY (INHALATION) EVERY 6 HOURS PRN
Qty: 12 G | Refills: 11 | Status: SHIPPED | OUTPATIENT
Start: 2023-02-02 | End: 2023-10-04 | Stop reason: SDUPTHER

## 2023-02-06 RX ORDER — FERROUS GLUCONATE 324(38)MG
TABLET ORAL
Qty: 60 TABLET | Refills: 0 | Status: SHIPPED | OUTPATIENT
Start: 2023-02-06 | End: 2023-02-22

## 2023-02-07 DIAGNOSIS — Z00.00 ENCOUNTER FOR MEDICARE ANNUAL WELLNESS EXAM: ICD-10-CM

## 2023-02-08 ENCOUNTER — PATIENT MESSAGE (OUTPATIENT)
Dept: FAMILY MEDICINE | Facility: CLINIC | Age: 68
End: 2023-02-08
Payer: MEDICARE

## 2023-02-09 DIAGNOSIS — Z00.00 ENCOUNTER FOR MEDICARE ANNUAL WELLNESS EXAM: ICD-10-CM

## 2023-02-10 ENCOUNTER — OFFICE VISIT (OUTPATIENT)
Dept: GASTROENTEROLOGY | Facility: CLINIC | Age: 68
End: 2023-02-10
Payer: MEDICARE

## 2023-02-10 ENCOUNTER — E-CONSULT (OUTPATIENT)
Dept: SLEEP MEDICINE | Facility: CLINIC | Age: 68
End: 2023-02-10
Payer: MEDICARE

## 2023-02-10 VITALS
DIASTOLIC BLOOD PRESSURE: 62 MMHG | WEIGHT: 143.88 LBS | SYSTOLIC BLOOD PRESSURE: 140 MMHG | HEART RATE: 64 BPM | HEIGHT: 61 IN | BODY MASS INDEX: 27.16 KG/M2 | OXYGEN SATURATION: 97 %

## 2023-02-10 DIAGNOSIS — G47.36 NOCTURNAL HYPOXEMIA DUE TO EMPHYSEMA: Primary | ICD-10-CM

## 2023-02-10 DIAGNOSIS — J44.9 COPD, MODERATE: ICD-10-CM

## 2023-02-10 DIAGNOSIS — R19.5 POSITIVE COLORECTAL CANCER SCREENING USING COLOGUARD TEST: ICD-10-CM

## 2023-02-10 DIAGNOSIS — Z87.19 HX OF DIVERTICULITIS OF COLON: ICD-10-CM

## 2023-02-10 DIAGNOSIS — K55.1 MESENTERIC VASCULAR INSUFFICIENCY: Primary | ICD-10-CM

## 2023-02-10 DIAGNOSIS — Z01.810 PREOP CARDIOVASCULAR EXAM: ICD-10-CM

## 2023-02-10 DIAGNOSIS — J43.9 NOCTURNAL HYPOXEMIA DUE TO EMPHYSEMA: Primary | ICD-10-CM

## 2023-02-10 PROCEDURE — 1101F PT FALLS ASSESS-DOCD LE1/YR: CPT | Mod: HCNC,CPTII,S$GLB, | Performed by: PHYSICIAN ASSISTANT

## 2023-02-10 PROCEDURE — 3288F FALL RISK ASSESSMENT DOCD: CPT | Mod: HCNC,CPTII,S$GLB, | Performed by: PHYSICIAN ASSISTANT

## 2023-02-10 PROCEDURE — 3077F SYST BP >= 140 MM HG: CPT | Mod: HCNC,CPTII,S$GLB, | Performed by: PHYSICIAN ASSISTANT

## 2023-02-10 PROCEDURE — 1159F MED LIST DOCD IN RCRD: CPT | Mod: HCNC,CPTII,S$GLB, | Performed by: PHYSICIAN ASSISTANT

## 2023-02-10 PROCEDURE — 1126F AMNT PAIN NOTED NONE PRSNT: CPT | Mod: HCNC,CPTII,S$GLB, | Performed by: PHYSICIAN ASSISTANT

## 2023-02-10 PROCEDURE — 1159F PR MEDICATION LIST DOCUMENTED IN MEDICAL RECORD: ICD-10-PCS | Mod: HCNC,CPTII,S$GLB, | Performed by: PHYSICIAN ASSISTANT

## 2023-02-10 PROCEDURE — 99447 NTRPROF PH1/NTRNET/EHR 11-20: CPT | Mod: S$GLB,,, | Performed by: INTERNAL MEDICINE

## 2023-02-10 PROCEDURE — 3077F PR MOST RECENT SYSTOLIC BLOOD PRESSURE >= 140 MM HG: ICD-10-PCS | Mod: HCNC,CPTII,S$GLB, | Performed by: PHYSICIAN ASSISTANT

## 2023-02-10 PROCEDURE — 99214 OFFICE O/P EST MOD 30 MIN: CPT | Mod: HCNC,S$GLB,, | Performed by: PHYSICIAN ASSISTANT

## 2023-02-10 PROCEDURE — 99442 PR PHYSICIAN TELEPHONE EVALUATION 11-20 MIN: ICD-10-PCS | Mod: 95,S$GLB,, | Performed by: INTERNAL MEDICINE

## 2023-02-10 PROCEDURE — 99447 PR INTERPROF, PHONE/INTERNET/EHR, CONSULT, 11-20 MINS: ICD-10-PCS | Mod: S$GLB,,, | Performed by: INTERNAL MEDICINE

## 2023-02-10 PROCEDURE — 3078F PR MOST RECENT DIASTOLIC BLOOD PRESSURE < 80 MM HG: ICD-10-PCS | Mod: HCNC,CPTII,S$GLB, | Performed by: PHYSICIAN ASSISTANT

## 2023-02-10 PROCEDURE — 99442 PR PHYSICIAN TELEPHONE EVALUATION 11-20 MIN: CPT | Mod: 95,S$GLB,, | Performed by: INTERNAL MEDICINE

## 2023-02-10 PROCEDURE — 3288F PR FALLS RISK ASSESSMENT DOCUMENTED: ICD-10-PCS | Mod: HCNC,CPTII,S$GLB, | Performed by: PHYSICIAN ASSISTANT

## 2023-02-10 PROCEDURE — 3078F DIAST BP <80 MM HG: CPT | Mod: HCNC,CPTII,S$GLB, | Performed by: PHYSICIAN ASSISTANT

## 2023-02-10 PROCEDURE — 1126F PR PAIN SEVERITY QUANTIFIED, NO PAIN PRESENT: ICD-10-PCS | Mod: HCNC,CPTII,S$GLB, | Performed by: PHYSICIAN ASSISTANT

## 2023-02-10 PROCEDURE — 3008F PR BODY MASS INDEX (BMI) DOCUMENTED: ICD-10-PCS | Mod: HCNC,CPTII,S$GLB, | Performed by: PHYSICIAN ASSISTANT

## 2023-02-10 PROCEDURE — 4010F ACE/ARB THERAPY RXD/TAKEN: CPT | Mod: HCNC,CPTII,S$GLB, | Performed by: PHYSICIAN ASSISTANT

## 2023-02-10 PROCEDURE — 99214 PR OFFICE/OUTPT VISIT, EST, LEVL IV, 30-39 MIN: ICD-10-PCS | Mod: HCNC,S$GLB,, | Performed by: PHYSICIAN ASSISTANT

## 2023-02-10 PROCEDURE — 3008F BODY MASS INDEX DOCD: CPT | Mod: HCNC,CPTII,S$GLB, | Performed by: PHYSICIAN ASSISTANT

## 2023-02-10 PROCEDURE — 4010F PR ACE/ARB THEARPY RXD/TAKEN: ICD-10-PCS | Mod: HCNC,CPTII,S$GLB, | Performed by: PHYSICIAN ASSISTANT

## 2023-02-10 PROCEDURE — 1101F PR PT FALLS ASSESS DOC 0-1 FALLS W/OUT INJ PAST YR: ICD-10-PCS | Mod: HCNC,CPTII,S$GLB, | Performed by: PHYSICIAN ASSISTANT

## 2023-02-10 PROCEDURE — 99999 PR PBB SHADOW E&M-EST. PATIENT-LVL V: CPT | Mod: PBBFAC,HCNC,, | Performed by: PHYSICIAN ASSISTANT

## 2023-02-10 PROCEDURE — 99999 PR PBB SHADOW E&M-EST. PATIENT-LVL V: ICD-10-PCS | Mod: PBBFAC,HCNC,, | Performed by: PHYSICIAN ASSISTANT

## 2023-02-10 RX ORDER — CARVEDILOL PHOSPHATE 40 MG/1
CAPSULE, EXTENDED RELEASE ORAL
COMMUNITY
Start: 2023-02-06 | End: 2023-03-02

## 2023-02-10 RX ORDER — POLYETHYLENE GLYCOL 3350, SODIUM SULFATE ANHYDROUS, SODIUM BICARBONATE, SODIUM CHLORIDE, POTASSIUM CHLORIDE 236; 22.74; 6.74; 5.86; 2.97 G/4L; G/4L; G/4L; G/4L; G/4L
4 POWDER, FOR SOLUTION ORAL ONCE
Qty: 4000 ML | Refills: 0 | Status: SHIPPED | OUTPATIENT
Start: 2023-02-10 | End: 2023-02-10

## 2023-02-10 RX ORDER — MEMANTINE HYDROCHLORIDE 5 MG/1
10 TABLET ORAL DAILY
COMMUNITY
Start: 2023-02-03 | End: 2023-09-11

## 2023-02-10 NOTE — PROGRESS NOTES
Subjective:      Patient ID: Gemma Vick is a 67 y.o. female.    Chief Complaint: Colonoscopy    HPI:  Patient reports to clinic today for follow up of the above. History of COPD, CAD, HTN, possible mesenteric ischemia, AAA. Patient typically seen by Dr. Coronel but is new to me. She has history of SHEYLA, but most recently, no anemia or iron deficiency. She denies blood in the stool or black stool.  Has + Cologuard. She was scheduled for EGD colonoscopy back in June- reported day of scope but was told she needed to be cleared by cardiology and pulmonology.  Has since seen vascular, cardiology and pulmonology. Takes Plavix and Aspirin 81mg.   Saw electrophysiology for sinus node dysfunction 12/20/22.  Being evaluated by neuromedical for vascular dementia.  O2 via nasal cannula at night.   Quit smoking 2017.  She has never had a colonoscopy.  Complains of chronic, intermittent abdominal discomfort. Worse to R and left sides. Can be worse after eating.  CTA 4/2022 - stable AAA at 3.8cm. Atherosclerosis of AA, stenosis of celiac origin, SMA, renal artery origins, distal AA and iliac bifurcation.    Review of Systems   Constitutional:  Negative for activity change, appetite change, chills, diaphoresis and fatigue.   HENT:  Negative for trouble swallowing.    Respiratory:  Positive for shortness of breath.    Cardiovascular:  Negative for chest pain.   Gastrointestinal:  Positive for abdominal pain (Intermittent. Worse after eating. Worse to RUQ, RLQ, and left side). Negative for abdominal distention, anal bleeding, blood in stool, constipation and diarrhea.        Gets abdominal pain prior to BM - about once every few weeks, pain will cause her to become severely nauseated and vomit.   Skin:  Negative for color change and pallor.   Neurological:  Negative for dizziness, syncope, weakness and light-headedness.   Psychiatric/Behavioral:  Negative for dysphoric mood. The patient is not nervous/anxious.      Medical  History: Reviewed    Social History: Reviewed    Allergies: Reviewed    Objective:     Physical Exam  Constitutional:       General: She is not in acute distress.     Appearance: Normal appearance. She is not ill-appearing, toxic-appearing or diaphoretic.   HENT:      Head: Normocephalic and atraumatic.   Eyes:      General: No scleral icterus.     Extraocular Movements: Extraocular movements intact.   Cardiovascular:      Rate and Rhythm: Normal rate and regular rhythm.   Pulmonary:      Effort: Pulmonary effort is normal. No respiratory distress.      Breath sounds: No stridor. No wheezing or rhonchi.   Abdominal:      General: Bowel sounds are normal. There is no distension.      Palpations: Abdomen is soft. There is no mass.      Tenderness: There is no abdominal tenderness. There is no guarding.   Musculoskeletal:         General: Normal range of motion.      Cervical back: Normal range of motion.   Skin:     General: Skin is warm and dry.      Coloration: Skin is not jaundiced or pale.   Neurological:      Mental Status: She is alert and oriented to person, place, and time.       Assessment:     1. Mesenteric vascular insufficiency    2. Hx of diverticulitis of colon    3. Positive colorectal cancer screening using Cologuard test        Plan:     -Patient in need of colonoscopy for positive cologuard and chronic abdominal pain, however, has significant pulmonary and cardiac history. Econsult placed (pulm and cardiology) to ensure patient safe to undergo anesthesia for procedure.   -Suspicious of ischemia given her previous CTA findings and vascular issues.   -On Plavix.       Gemma was seen today for colonoscopy.    Diagnoses and all orders for this visit:    Mesenteric vascular insufficiency  -     Case Request Endoscopy: COLONOSCOPY  -     polyethylene glycol (GOLYTELY) 236-22.74-6.74 -5.86 gram suspension; Take 4,000 mLs (4 L total) by mouth once. for 1 dose  -     Ambulatory referral/consult to Endo  Procedure ; Future    Hx of diverticulitis of colon  -     Case Request Endoscopy: COLONOSCOPY  -     polyethylene glycol (GOLYTELY) 236-22.74-6.74 -5.86 gram suspension; Take 4,000 mLs (4 L total) by mouth once. for 1 dose  -     Ambulatory referral/consult to Endo Procedure ; Future    Positive colorectal cancer screening using Cologuard test  -     Case Request Endoscopy: COLONOSCOPY  -     polyethylene glycol (GOLYTELY) 236-22.74-6.74 -5.86 gram suspension; Take 4,000 mLs (4 L total) by mouth once. for 1 dose  -     Ambulatory referral/consult to Endo Procedure ; Future        No follow-ups on file.    Thank you for the opportunity to participate in the care of this patient.   Mattie Hanson PA-C.

## 2023-02-10 NOTE — PROGRESS NOTES
[facility] [address]  Response for E-Consult     Patient Name: Gemma Vick  MRN: [id number]  Primary Care Provider: Olga Altman MD   Requesting Provider: Mattie Hanosn PA-C      Findings:   Last visit in pulmonary: 12/12/2022  FEV1: 1.18L( 51%) Moderate COPD  Conditional has been stable and optimized  On 3 LPM portable oxygen: adherent  Inhalers: BREZTRI: adherent  Last imaging: chest CT 11/2022: reviewed    Patient has increased risk  No apparent pulmonary contraindications currently    This eConsult is based on the clinical data available to me and is furnished without benefit of a physical examination. The eConsult will need to be interpreted in light of any clinical issues or changes in patient status not available to me at the time of filing this eConsults. Significant changes in patient condition or level of acuity should result in immediate formal consultation and reevaluation. Please alert me if you have further questions.     Thank you for your consult.      Nathan Rodriguez MD       I did not speak to the requesting provider verbally about this.     Total time of Consultation: 15 minute    Percentage of time spent on verbal/written discussion: 50%     *This eConsult is based on the clinical data available to me and is furnished without benefit of a physical examination. The eConsult will need to be interpreted in light of any clinical issues or changes in patient status not available to me at the time of filing this eConsults. Significant changes in patient condition or level of acuity should result in immediate formal consultation and reevaluation. Please alert me if you have further questions.    Thank you for your consult.     Nathan Rodriguez MD  [facility] [address]

## 2023-02-13 DIAGNOSIS — J44.9 COPD, MODERATE: ICD-10-CM

## 2023-02-13 DIAGNOSIS — Z78.0 MENOPAUSE: ICD-10-CM

## 2023-02-13 PROBLEM — Z01.810 PREOP CARDIOVASCULAR EXAM: Status: ACTIVE | Noted: 2023-02-13

## 2023-02-13 RX ORDER — ALBUTEROL SULFATE 90 UG/1
AEROSOL, METERED RESPIRATORY (INHALATION)
Qty: 54 G | Refills: 5 | Status: SHIPPED | OUTPATIENT
Start: 2023-02-13 | End: 2023-04-26

## 2023-02-14 NOTE — PROGRESS NOTES
[facility] [address]  Response for E-Consult     Patient Name: Gemma Vick  MRN: [id number]  Primary Care Provider: Olga Altman MD   Requesting Provider: Mattie Hanson PA-C      Findings: 68 yo female, E consult for preop clearance of colonoscopy  The chart reviewed.  PMH CAD PVD, COPD, AAA, CAD, HTN, carotid stenosis.   ekg NSR and 1st AVB   echo EF nl and phar MPI no ischemia    Plan  Elevated periop risk of CV events for non-high risk procedure.  Ok to proceed the scheduled procedure without further cardiac study.  OK to hold Plavix 5 to 7 days before the procedure and resume postop once hemodynamically stable      I did not speak to the requesting provider verbally about this.     Total time of Consultation: 15 minute    Percentage of time spent on verbal/written discussion: 75%     *This eConsult is based on the clinical data available to me and is furnished without benefit of a physical examination. The eConsult will need to be interpreted in light of any clinical issues or changes in patient status not available to me at the time of filing this eConsults. Significant changes in patient condition or level of acuity should result in immediate formal consultation and reevaluation. Please alert me if you have further questions.    Thank you for your consult.     Ja Feng MD  [facility] [address]

## 2023-02-20 ENCOUNTER — TELEPHONE (OUTPATIENT)
Dept: FAMILY MEDICINE | Facility: CLINIC | Age: 68
End: 2023-02-20
Payer: MEDICARE

## 2023-02-20 ENCOUNTER — HOSPITAL ENCOUNTER (OUTPATIENT)
Dept: PREADMISSION TESTING | Facility: HOSPITAL | Age: 68
Discharge: HOME | End: 2023-02-20
Attending: INTERNAL MEDICINE
Payer: MEDICARE

## 2023-02-20 DIAGNOSIS — Z87.19 HX OF DIVERTICULITIS OF COLON: ICD-10-CM

## 2023-02-20 DIAGNOSIS — R19.5 POSITIVE COLORECTAL CANCER SCREENING USING COLOGUARD TEST: ICD-10-CM

## 2023-02-20 DIAGNOSIS — K55.1 MESENTERIC VASCULAR INSUFFICIENCY: ICD-10-CM

## 2023-02-20 NOTE — TELEPHONE ENCOUNTER
----- Message from Amira Garcia sent at 2/20/2023  4:19 PM CST -----  Contact: Patient  Type:  Patient Call          Who Called: Patient         Does the patient know what this is regarding?: Requesting a call back about a Rx she was supposed to be receiving ; please advise           Would the patient rather a call back or a response via [facility]? Call           Best Call Back Number:[phone]             Additional Information:

## 2023-02-23 ENCOUNTER — TELEPHONE (OUTPATIENT)
Dept: FAMILY MEDICINE | Facility: CLINIC | Age: 68
End: 2023-02-23
Payer: MEDICARE

## 2023-02-23 NOTE — TELEPHONE ENCOUNTER
----- Message from Jimena Oh sent at 2/23/2023 12:09 PM CST -----  Contact: [facility](Shila)-[phone]  Shila is calling to consult with nurse to request a demographics sheet fax over  for this patient. Thanks/ar

## 2023-02-24 ENCOUNTER — TELEPHONE (OUTPATIENT)
Dept: FAMILY MEDICINE | Facility: CLINIC | Age: 68
End: 2023-02-24
Payer: MEDICARE

## 2023-02-24 NOTE — TELEPHONE ENCOUNTER
I do not know why EKG dropped off from a watch, if that is any concerns he can get a 12 lead EKG in the clinic so it can be read by Cardiology

## 2023-02-24 NOTE — TELEPHONE ENCOUNTER
Dropped off EKG from her watch and is asking is it is ok and if she needs to get back with cardiology

## 2023-02-27 ENCOUNTER — PATIENT MESSAGE (OUTPATIENT)
Dept: CARDIOLOGY | Facility: CLINIC | Age: 68
End: 2023-02-27
Payer: MEDICARE

## 2023-03-02 ENCOUNTER — OFFICE VISIT (OUTPATIENT)
Dept: FAMILY MEDICINE | Facility: CLINIC | Age: 68
End: 2023-03-02
Payer: MEDICARE

## 2023-03-02 VITALS
BODY MASS INDEX: 26.35 KG/M2 | HEIGHT: 62 IN | WEIGHT: 143.19 LBS | HEART RATE: 86 BPM | OXYGEN SATURATION: 99 % | RESPIRATION RATE: 18 BRPM | DIASTOLIC BLOOD PRESSURE: 72 MMHG | SYSTOLIC BLOOD PRESSURE: 138 MMHG

## 2023-03-02 DIAGNOSIS — I71.43 INFRARENAL ABDOMINAL AORTIC ANEURYSM (AAA) WITHOUT RUPTURE: ICD-10-CM

## 2023-03-02 DIAGNOSIS — E78.2 MIXED HYPERLIPIDEMIA: ICD-10-CM

## 2023-03-02 DIAGNOSIS — G47.36 NOCTURNAL HYPOXEMIA DUE TO EMPHYSEMA: ICD-10-CM

## 2023-03-02 DIAGNOSIS — D69.2 OTHER NONTHROMBOCYTOPENIC PURPURA: ICD-10-CM

## 2023-03-02 DIAGNOSIS — M51.36 DDD (DEGENERATIVE DISC DISEASE), LUMBAR: ICD-10-CM

## 2023-03-02 DIAGNOSIS — I49.5 SINUS NODE DYSFUNCTION: ICD-10-CM

## 2023-03-02 DIAGNOSIS — I70.0 AORTIC ATHEROSCLEROSIS: ICD-10-CM

## 2023-03-02 DIAGNOSIS — F01.A0 MILD VASCULAR DEMENTIA WITHOUT BEHAVIORAL DISTURBANCE, PSYCHOTIC DISTURBANCE, MOOD DISTURBANCE, OR ANXIETY: ICD-10-CM

## 2023-03-02 DIAGNOSIS — Z00.00 ENCOUNTER FOR MEDICARE ANNUAL WELLNESS EXAM: ICD-10-CM

## 2023-03-02 DIAGNOSIS — I25.118 CORONARY ARTERY DISEASE OF NATIVE ARTERY OF NATIVE HEART WITH STABLE ANGINA PECTORIS: ICD-10-CM

## 2023-03-02 DIAGNOSIS — Z00.00 ENCOUNTER FOR PREVENTIVE HEALTH EXAMINATION: Primary | ICD-10-CM

## 2023-03-02 DIAGNOSIS — K21.9 GASTROESOPHAGEAL REFLUX DISEASE, UNSPECIFIED WHETHER ESOPHAGITIS PRESENT: ICD-10-CM

## 2023-03-02 DIAGNOSIS — I10 ESSENTIAL HYPERTENSION: ICD-10-CM

## 2023-03-02 DIAGNOSIS — J43.9 NOCTURNAL HYPOXEMIA DUE TO EMPHYSEMA: ICD-10-CM

## 2023-03-02 PROBLEM — Z86.16 PERSONAL HISTORY OF COVID-19: Status: RESOLVED | Noted: 2021-06-09 | Resolved: 2023-03-02

## 2023-03-02 PROCEDURE — 3078F DIAST BP <80 MM HG: CPT | Mod: HCNC,CPTII,S$GLB, | Performed by: NURSE PRACTITIONER

## 2023-03-02 PROCEDURE — 1170F PR FUNCTIONAL STATUS ASSESSED: ICD-10-PCS | Mod: HCNC,CPTII,S$GLB, | Performed by: NURSE PRACTITIONER

## 2023-03-02 PROCEDURE — 1125F PR PAIN SEVERITY QUANTIFIED, PAIN PRESENT: ICD-10-PCS | Mod: HCNC,CPTII,S$GLB, | Performed by: NURSE PRACTITIONER

## 2023-03-02 PROCEDURE — G9919 PR SCREENING AND POSITIVE: ICD-10-PCS | Mod: HCNC,CPTII,S$GLB, | Performed by: NURSE PRACTITIONER

## 2023-03-02 PROCEDURE — G0439 PR MEDICARE ANNUAL WELLNESS SUBSEQUENT VISIT: ICD-10-PCS | Mod: HCNC,S$GLB,, | Performed by: NURSE PRACTITIONER

## 2023-03-02 PROCEDURE — 3075F SYST BP GE 130 - 139MM HG: CPT | Mod: HCNC,CPTII,S$GLB, | Performed by: NURSE PRACTITIONER

## 2023-03-02 PROCEDURE — 99999 PR PBB SHADOW E&M-EST. PATIENT-LVL V: ICD-10-PCS | Mod: PBBFAC,HCNC,, | Performed by: NURSE PRACTITIONER

## 2023-03-02 PROCEDURE — 3075F PR MOST RECENT SYSTOLIC BLOOD PRESS GE 130-139MM HG: ICD-10-PCS | Mod: HCNC,CPTII,S$GLB, | Performed by: NURSE PRACTITIONER

## 2023-03-02 PROCEDURE — 1160F RVW MEDS BY RX/DR IN RCRD: CPT | Mod: HCNC,CPTII,S$GLB, | Performed by: NURSE PRACTITIONER

## 2023-03-02 PROCEDURE — 1159F MED LIST DOCD IN RCRD: CPT | Mod: HCNC,CPTII,S$GLB, | Performed by: NURSE PRACTITIONER

## 2023-03-02 PROCEDURE — 1170F FXNL STATUS ASSESSED: CPT | Mod: HCNC,CPTII,S$GLB, | Performed by: NURSE PRACTITIONER

## 2023-03-02 PROCEDURE — 3078F PR MOST RECENT DIASTOLIC BLOOD PRESSURE < 80 MM HG: ICD-10-PCS | Mod: HCNC,CPTII,S$GLB, | Performed by: NURSE PRACTITIONER

## 2023-03-02 PROCEDURE — 3008F BODY MASS INDEX DOCD: CPT | Mod: HCNC,CPTII,S$GLB, | Performed by: NURSE PRACTITIONER

## 2023-03-02 PROCEDURE — 1125F AMNT PAIN NOTED PAIN PRSNT: CPT | Mod: HCNC,CPTII,S$GLB, | Performed by: NURSE PRACTITIONER

## 2023-03-02 PROCEDURE — 1101F PT FALLS ASSESS-DOCD LE1/YR: CPT | Mod: HCNC,CPTII,S$GLB, | Performed by: NURSE PRACTITIONER

## 2023-03-02 PROCEDURE — 1159F PR MEDICATION LIST DOCUMENTED IN MEDICAL RECORD: ICD-10-PCS | Mod: HCNC,CPTII,S$GLB, | Performed by: NURSE PRACTITIONER

## 2023-03-02 PROCEDURE — 3008F PR BODY MASS INDEX (BMI) DOCUMENTED: ICD-10-PCS | Mod: HCNC,CPTII,S$GLB, | Performed by: NURSE PRACTITIONER

## 2023-03-02 PROCEDURE — 1160F PR REVIEW ALL MEDS BY PRESCRIBER/CLIN PHARMACIST DOCUMENTED: ICD-10-PCS | Mod: HCNC,CPTII,S$GLB, | Performed by: NURSE PRACTITIONER

## 2023-03-02 PROCEDURE — 3288F PR FALLS RISK ASSESSMENT DOCUMENTED: ICD-10-PCS | Mod: HCNC,CPTII,S$GLB, | Performed by: NURSE PRACTITIONER

## 2023-03-02 PROCEDURE — 4010F PR ACE/ARB THEARPY RXD/TAKEN: ICD-10-PCS | Mod: HCNC,CPTII,S$GLB, | Performed by: NURSE PRACTITIONER

## 2023-03-02 PROCEDURE — 1101F PR PT FALLS ASSESS DOC 0-1 FALLS W/OUT INJ PAST YR: ICD-10-PCS | Mod: HCNC,CPTII,S$GLB, | Performed by: NURSE PRACTITIONER

## 2023-03-02 PROCEDURE — G0439 PPPS, SUBSEQ VISIT: HCPCS | Mod: HCNC,S$GLB,, | Performed by: NURSE PRACTITIONER

## 2023-03-02 PROCEDURE — 3288F FALL RISK ASSESSMENT DOCD: CPT | Mod: HCNC,CPTII,S$GLB, | Performed by: NURSE PRACTITIONER

## 2023-03-02 PROCEDURE — 99999 PR PBB SHADOW E&M-EST. PATIENT-LVL V: CPT | Mod: PBBFAC,HCNC,, | Performed by: NURSE PRACTITIONER

## 2023-03-02 PROCEDURE — G9919 SCRN ND POS ND PROV OF REC: HCPCS | Mod: HCNC,CPTII,S$GLB, | Performed by: NURSE PRACTITIONER

## 2023-03-02 PROCEDURE — 4010F ACE/ARB THERAPY RXD/TAKEN: CPT | Mod: HCNC,CPTII,S$GLB, | Performed by: NURSE PRACTITIONER

## 2023-03-02 RX ORDER — AMLODIPINE BESYLATE 5 MG/1
5 TABLET ORAL DAILY
Qty: 30 TABLET | Refills: 11
Start: 2023-03-02 | End: 2023-03-23 | Stop reason: SDUPTHER

## 2023-03-02 NOTE — PATIENT INSTRUCTIONS
Counseling and Referral of Other Preventative  (Italic type indicates deductible and co-insurance are waived)    Patient Name: Gemma Vick  Today's Date: 3/2/2023    Health Maintenance       Date Due Completion Date    COVID-19 Vaccine (1) Never done ---    Shingles Vaccine (1 of 2) Never done ---    Pneumococcal Vaccines (Age 65+) (2 - PCV) 09/27/2013 9/27/2012    Mammogram 06/09/2022 6/9/2021    Influenza Vaccine (1) 09/01/2022 10/14/2015    LDCT Lung Screen 11/08/2023 11/8/2022    Hemoglobin A1c (Prediabetes) 12/01/2023 12/1/2022    Lipid Panel 12/01/2023 12/1/2022    Override on 12/31/2013: Done    High Dose Statin 02/24/2024 2/24/2023    Aspirin/Antiplatelet Therapy 02/24/2024 2/24/2023    DEXA Scan 10/20/2024 10/20/2021    Colorectal Cancer Screening 05/16/2025 5/16/2022    TETANUS VACCINE 11/15/2026 11/15/2016        No orders of the defined types were placed in this encounter.    The following information is provided to all patients.  This information is to help you find resources for any of the problems found today that may be affecting your health:                Living healthy guide: www.[facility].[place].gov      Understanding Diabetes: www.diabetes.org      Eating healthy: www.cdc.gov/healthyweight      CDC home safety checklist: www.cdc.gov/steadi/patient.html      Agency on Aging: www.goea.[place].gov      Alcoholics anonymous (AA): www.aa.org      Physical Activity: www.cristy.nih.gov/hk2sbpl      Tobacco use: www.quitwithusla.org

## 2023-03-02 NOTE — PROGRESS NOTES
"  Gemma Vick presented for a  Medicare AWV and comprehensive Health Risk Assessment today. The following components were reviewed and updated:    Medical history  Family History  Social history  Allergies and Current Medications  Health Risk Assessment  Health Maintenance  Care Team     Patient screened moderate and/or high risk for one or more social determinants of health (SDOH). Patient connected to community resources through the ED Navigator.      ** See Completed Assessments for Annual Wellness Visit within the encounter summary.**         The following assessments were completed:  Living Situation  CAGE  Depression Screening  Timed Get Up and Go  Whisper Test  Cognitive Function Screening  Nutrition Screening  ADL Screening  PAQ Screening        Vitals:    03/02/23 0808   BP: 138/72   Pulse: 86   Resp: 18   SpO2: 99%   Weight: 64.9 kg (143 lb 3 oz)   Height: 5' 1.5" (1.562 m)     Body mass index is 26.62 kg/m².  Physical Exam  Constitutional:       General: She is not in acute distress.     Appearance: Normal appearance. She is well-developed. She is not ill-appearing, toxic-appearing or diaphoretic.   HENT:      Head: Normocephalic and atraumatic.      Right Ear: External ear normal.      Left Ear: External ear normal.      Nose: Nose normal.      Mouth/Throat:      Mouth: Mucous membranes are moist.   Eyes:      General: No scleral icterus.        Right eye: No discharge.         Left eye: No discharge.      Conjunctiva/sclera: Conjunctivae normal.   Neck:      Thyroid: No thyromegaly.      Vascular: No carotid bruit.      Trachea: No tracheal deviation.   Cardiovascular:      Rate and Rhythm: Normal rate and regular rhythm.      Pulses: Normal pulses.      Heart sounds: Normal heart sounds. No murmur heard.    No friction rub. No gallop.   Pulmonary:      Effort: Pulmonary effort is normal. No respiratory distress.      Breath sounds: Normal breath sounds. No stridor. No wheezing, rhonchi or rales. "   Chest:      Chest wall: No tenderness.   Abdominal:      General: Bowel sounds are normal. There is no distension.      Palpations: Abdomen is soft. There is no mass.      Tenderness: There is no abdominal tenderness. There is no guarding or rebound.      Hernia: No hernia is present.   Musculoskeletal:         General: No tenderness. Normal range of motion.      Cervical back: Normal range of motion and neck supple. No edema or tenderness. Normal range of motion.   Lymphadenopathy:      Head:      Right side of head: No tonsillar adenopathy.      Left side of head: No tonsillar adenopathy.      Cervical: No cervical adenopathy.      Upper Body:      Right upper body: No supraclavicular adenopathy.      Left upper body: No supraclavicular adenopathy.   Skin:     General: Skin is warm and dry.      Findings: No lesion or rash.   Neurological:      Mental Status: She is alert and oriented to person, place, and time.      Deep Tendon Reflexes: Reflexes are normal and symmetric.   Psychiatric:         Mood and Affect: Mood normal.         Behavior: Behavior normal.             Diagnoses and health risks identified today and associated recommendations/orders:    1. Encounter for preventive health examination  Screenings performed, as noted above.  Personal preventative testing needs reviewed.      2. Essential hypertension  Treated with meds, stable, cont tx, follow up with cardiology,is on the digital med prgram  - amLODIPine (NORVASC) 5 MG tablet; Take 1 tablet (5 mg total) by mouth once daily.  Dispense: 30 tablet; Refill: 11    3. Other nonthrombocytopenic purpura  Monitored, spencer, camera not functioning today, wear long sleeves    4. Mild vascular dementia without behavioral disturbance, psychotic disturbance, mood disturbance, or anxiety  Treated with Namendaspencer, uses lots of calendars and reminders in the house, cont tx    5. Coronary artery disease of native artery of native heart with stable angina  pectoris  Monitored, stable, is followed by cardiology    6. Sinus node dysfunction  Monitored, stable, follow up with cardiology    7. Nocturnal hypoxemia due to emphysema  Treated with home oxygen at night, cont tx    8. Aortic athersclerosis  Monitored, stable, CT chest 11/2022    9. Infrarenal abdominal aortic aneurysm (AAA) without rupture  Monitored, stable, states she sees Dr Cagle at least yearly to follow up    10. Mixed hyperlipidemia  Treated with vytorin, fish oil, stable, cont tx    11. Gastroesophageal reflux disease, unspecified whether esophagitis present  Treated with protonix, stable, cont tx    12. DDD (degenerative disc disease), lumbar  Monitored, stable, follow up with pcp    13.Encounter for Medicare annual wellness exam  Screenings performed, as noted above.  Personal preventative testing needs reviewed.   - Ambulatory Referral/Consult to Enhanced Annual Wellness Visit (eAWV)    Review for Substance Use Disorders: patient does use substances per chart - Ambien nightly  Review for opioid screening: Patient is not prescribed opioids       Provided Gemma with a 5-10 year written screening schedule and personal prevention plan. Recommendations were developed using the USPSTF age appropriate recommendations. Education, counseling, and referrals were provided as needed. After Visit Summary printed and given to patient which includes a list of additional screenings\tests needed.    No follow-ups on file.    Jim Thakur, DRAKE  I offered to discuss advanced care planning, including how to pick a person who would make decisions for you if you were unable to make them for yourself, called a health care power of , and what kind of decisions you might make such as use of life sustaining treatments such as ventilators and tube feeding when faced with a life limiting illness recorded on a living will that they will need to know. (How you want to be cared for as you near the end of your natural  life)     X Patient is interested in learning more about how to make advanced directives.  I provided them paperwork and offered to discuss this with them.

## 2023-03-03 DIAGNOSIS — Z87.19 HX OF DIVERTICULITIS OF COLON: ICD-10-CM

## 2023-03-03 DIAGNOSIS — K55.1 MESENTERIC VASCULAR INSUFFICIENCY: Primary | ICD-10-CM

## 2023-03-03 DIAGNOSIS — R19.5 POSITIVE COLORECTAL CANCER SCREENING USING COLOGUARD TEST: ICD-10-CM

## 2023-03-06 ENCOUNTER — TELEPHONE (OUTPATIENT)
Dept: FAMILY MEDICINE | Facility: CLINIC | Age: 68
End: 2023-03-06
Payer: MEDICARE

## 2023-03-22 ENCOUNTER — TELEPHONE (OUTPATIENT)
Dept: FAMILY MEDICINE | Facility: CLINIC | Age: 68
End: 2023-03-22
Payer: MEDICARE

## 2023-03-22 ENCOUNTER — LAB VISIT (OUTPATIENT)
Dept: LAB | Facility: HOSPITAL | Age: 68
End: 2023-03-22
Attending: FAMILY MEDICINE
Payer: MEDICARE

## 2023-03-22 DIAGNOSIS — R30.0 DYSURIA: ICD-10-CM

## 2023-03-22 DIAGNOSIS — R30.0 DYSURIA: Primary | ICD-10-CM

## 2023-03-22 PROCEDURE — 87086 URINE CULTURE/COLONY COUNT: CPT | Mod: HCNC | Performed by: FAMILY MEDICINE

## 2023-03-22 PROCEDURE — 81001 URINALYSIS AUTO W/SCOPE: CPT | Mod: HCNC | Performed by: FAMILY MEDICINE

## 2023-03-22 RX ORDER — CEPHALEXIN 500 MG/1
500 CAPSULE ORAL EVERY 8 HOURS
Qty: 21 CAPSULE | Refills: 0 | Status: SHIPPED | OUTPATIENT
Start: 2023-03-22 | End: 2023-04-26

## 2023-03-22 NOTE — TELEPHONE ENCOUNTER
----- Message from Ely Baca sent at 3/22/2023  9:53 AM CDT -----  Contact: Junito  Patient is calling to speak with nurse regarding orders. Reports urine Is yellow and cloudy and would like a urine test ordered so she can come to the [place] location today and get it completed while she's there for her husbands appt. Please return patents call at [phone]

## 2023-03-23 ENCOUNTER — PATIENT MESSAGE (OUTPATIENT)
Dept: FAMILY MEDICINE | Facility: CLINIC | Age: 68
End: 2023-03-23
Payer: MEDICARE

## 2023-03-23 LAB
BACTERIA #/AREA URNS AUTO: ABNORMAL /HPF
BILIRUB UR QL STRIP: NEGATIVE
CLARITY UR REFRACT.AUTO: ABNORMAL
COLOR UR AUTO: YELLOW
GLUCOSE UR QL STRIP: NEGATIVE
HGB UR QL STRIP: ABNORMAL
HYALINE CASTS UR QL AUTO: 0 /LPF
KETONES UR QL STRIP: NEGATIVE
LEUKOCYTE ESTERASE UR QL STRIP: ABNORMAL
MICROSCOPIC COMMENT: ABNORMAL
NITRITE UR QL STRIP: NEGATIVE
PH UR STRIP: 6 [PH] (ref 5–8)
PROT UR QL STRIP: ABNORMAL
RBC #/AREA URNS AUTO: 49 /HPF (ref 0–4)
SP GR UR STRIP: 1.01 (ref 1–1.03)
URN SPEC COLLECT METH UR: ABNORMAL
WBC #/AREA URNS AUTO: >100 /HPF (ref 0–5)
WBC CLUMPS UR QL AUTO: ABNORMAL

## 2023-03-24 LAB
BACTERIA UR CULT: NORMAL
BACTERIA UR CULT: NORMAL

## 2023-03-28 ENCOUNTER — PATIENT MESSAGE (OUTPATIENT)
Dept: FAMILY MEDICINE | Facility: CLINIC | Age: 68
End: 2023-03-28
Payer: MEDICARE

## 2023-03-28 ENCOUNTER — TELEPHONE (OUTPATIENT)
Dept: FAMILY MEDICINE | Facility: CLINIC | Age: 68
End: 2023-03-28
Payer: MEDICARE

## 2023-03-28 ENCOUNTER — LAB VISIT (OUTPATIENT)
Dept: LAB | Facility: HOSPITAL | Age: 68
End: 2023-03-28
Attending: FAMILY MEDICINE
Payer: MEDICARE

## 2023-03-28 DIAGNOSIS — R31.9 HEMATURIA, UNSPECIFIED TYPE: ICD-10-CM

## 2023-03-28 DIAGNOSIS — R31.9 HEMATURIA, UNSPECIFIED TYPE: Primary | ICD-10-CM

## 2023-03-28 LAB
BILIRUB UR QL STRIP: NEGATIVE
CLARITY UR REFRACT.AUTO: CLEAR
COLOR UR AUTO: YELLOW
GLUCOSE UR QL STRIP: NEGATIVE
HGB UR QL STRIP: NEGATIVE
KETONES UR QL STRIP: ABNORMAL
LEUKOCYTE ESTERASE UR QL STRIP: NEGATIVE
NITRITE UR QL STRIP: NEGATIVE
PH UR STRIP: 6 [PH] (ref 5–8)
PROT UR QL STRIP: NEGATIVE
SP GR UR STRIP: 1.02 (ref 1–1.03)
URN SPEC COLLECT METH UR: ABNORMAL

## 2023-03-28 PROCEDURE — 81003 URINALYSIS AUTO W/O SCOPE: CPT | Mod: HCNC | Performed by: FAMILY MEDICINE

## 2023-03-29 ENCOUNTER — PATIENT MESSAGE (OUTPATIENT)
Dept: FAMILY MEDICINE | Facility: CLINIC | Age: 68
End: 2023-03-29
Payer: MEDICARE

## 2023-03-30 ENCOUNTER — OFFICE VISIT (OUTPATIENT)
Dept: FAMILY MEDICINE | Facility: CLINIC | Age: 68
End: 2023-03-30
Payer: MEDICARE

## 2023-03-30 VITALS
TEMPERATURE: 98 F | DIASTOLIC BLOOD PRESSURE: 61 MMHG | OXYGEN SATURATION: 98 % | HEIGHT: 62 IN | SYSTOLIC BLOOD PRESSURE: 138 MMHG | RESPIRATION RATE: 20 BRPM | BODY MASS INDEX: 25.84 KG/M2 | WEIGHT: 140.44 LBS | HEART RATE: 61 BPM

## 2023-03-30 DIAGNOSIS — Z87.891 STOPPED SMOKING WITH GREATER THAN 30 PACK YEAR HISTORY: ICD-10-CM

## 2023-03-30 DIAGNOSIS — I10 ESSENTIAL HYPERTENSION: ICD-10-CM

## 2023-03-30 DIAGNOSIS — F51.04 CHRONIC INSOMNIA: ICD-10-CM

## 2023-03-30 DIAGNOSIS — J44.9 COPD, MODERATE: ICD-10-CM

## 2023-03-30 DIAGNOSIS — I71.43 INFRARENAL ABDOMINAL AORTIC ANEURYSM (AAA) WITHOUT RUPTURE: ICD-10-CM

## 2023-03-30 DIAGNOSIS — F01.B0 MODERATE VASCULAR DEMENTIA WITHOUT BEHAVIORAL DISTURBANCE, PSYCHOTIC DISTURBANCE, MOOD DISTURBANCE, OR ANXIETY: Primary | ICD-10-CM

## 2023-03-30 DIAGNOSIS — R73.03 PREDIABETES: ICD-10-CM

## 2023-03-30 PROCEDURE — 3078F DIAST BP <80 MM HG: CPT | Mod: HCNC,CPTII,S$GLB, | Performed by: FAMILY MEDICINE

## 2023-03-30 PROCEDURE — 3075F SYST BP GE 130 - 139MM HG: CPT | Mod: HCNC,CPTII,S$GLB, | Performed by: FAMILY MEDICINE

## 2023-03-30 PROCEDURE — 3078F PR MOST RECENT DIASTOLIC BLOOD PRESSURE < 80 MM HG: ICD-10-PCS | Mod: HCNC,CPTII,S$GLB, | Performed by: FAMILY MEDICINE

## 2023-03-30 PROCEDURE — 1159F MED LIST DOCD IN RCRD: CPT | Mod: HCNC,CPTII,S$GLB, | Performed by: FAMILY MEDICINE

## 2023-03-30 PROCEDURE — 1125F AMNT PAIN NOTED PAIN PRSNT: CPT | Mod: HCNC,CPTII,S$GLB, | Performed by: FAMILY MEDICINE

## 2023-03-30 PROCEDURE — 3288F FALL RISK ASSESSMENT DOCD: CPT | Mod: HCNC,CPTII,S$GLB, | Performed by: FAMILY MEDICINE

## 2023-03-30 PROCEDURE — 1159F PR MEDICATION LIST DOCUMENTED IN MEDICAL RECORD: ICD-10-PCS | Mod: HCNC,CPTII,S$GLB, | Performed by: FAMILY MEDICINE

## 2023-03-30 PROCEDURE — 1125F PR PAIN SEVERITY QUANTIFIED, PAIN PRESENT: ICD-10-PCS | Mod: HCNC,CPTII,S$GLB, | Performed by: FAMILY MEDICINE

## 2023-03-30 PROCEDURE — 1101F PR PT FALLS ASSESS DOC 0-1 FALLS W/OUT INJ PAST YR: ICD-10-PCS | Mod: HCNC,CPTII,S$GLB, | Performed by: FAMILY MEDICINE

## 2023-03-30 PROCEDURE — 3008F BODY MASS INDEX DOCD: CPT | Mod: HCNC,CPTII,S$GLB, | Performed by: FAMILY MEDICINE

## 2023-03-30 PROCEDURE — 99999 PR PBB SHADOW E&M-EST. PATIENT-LVL IV: CPT | Mod: PBBFAC,HCNC,, | Performed by: FAMILY MEDICINE

## 2023-03-30 PROCEDURE — 4010F ACE/ARB THERAPY RXD/TAKEN: CPT | Mod: HCNC,CPTII,S$GLB, | Performed by: FAMILY MEDICINE

## 2023-03-30 PROCEDURE — 1101F PT FALLS ASSESS-DOCD LE1/YR: CPT | Mod: HCNC,CPTII,S$GLB, | Performed by: FAMILY MEDICINE

## 2023-03-30 PROCEDURE — 99999 PR PBB SHADOW E&M-EST. PATIENT-LVL IV: ICD-10-PCS | Mod: PBBFAC,HCNC,, | Performed by: FAMILY MEDICINE

## 2023-03-30 PROCEDURE — 99214 PR OFFICE/OUTPT VISIT, EST, LEVL IV, 30-39 MIN: ICD-10-PCS | Mod: HCNC,S$GLB,, | Performed by: FAMILY MEDICINE

## 2023-03-30 PROCEDURE — 3075F PR MOST RECENT SYSTOLIC BLOOD PRESS GE 130-139MM HG: ICD-10-PCS | Mod: HCNC,CPTII,S$GLB, | Performed by: FAMILY MEDICINE

## 2023-03-30 PROCEDURE — 99214 OFFICE O/P EST MOD 30 MIN: CPT | Mod: HCNC,S$GLB,, | Performed by: FAMILY MEDICINE

## 2023-03-30 PROCEDURE — 3008F PR BODY MASS INDEX (BMI) DOCUMENTED: ICD-10-PCS | Mod: HCNC,CPTII,S$GLB, | Performed by: FAMILY MEDICINE

## 2023-03-30 PROCEDURE — 4010F PR ACE/ARB THEARPY RXD/TAKEN: ICD-10-PCS | Mod: HCNC,CPTII,S$GLB, | Performed by: FAMILY MEDICINE

## 2023-03-30 PROCEDURE — 3288F PR FALLS RISK ASSESSMENT DOCUMENTED: ICD-10-PCS | Mod: HCNC,CPTII,S$GLB, | Performed by: FAMILY MEDICINE

## 2023-03-30 RX ORDER — ZOLPIDEM TARTRATE 10 MG/1
TABLET ORAL
Qty: 30 TABLET | Refills: 5 | Status: SHIPPED | OUTPATIENT
Start: 2023-03-30 | End: 2023-09-14

## 2023-03-30 NOTE — PROGRESS NOTES
Chief Complaint:    Chief Complaint   Patient presents with    Follow-up       History of Present Illness:  Patient with AAA presents today for a six month follow up.    Patient says she is been diagnosed with vascular dementia currently taking Namenda.    She has a 4 cm size abdominal aneurysm following with vascular surgery on a regular basis     Carotid stenosis seeing Dr. Goodrich neurology    Underlying mesenteric artery stenosis but asymptomatic    Coronary artery disease history of MI stable        ROS:  Review of Systems   Constitutional:  Negative for appetite change, chills and fever.   HENT:  Negative for congestion, ear pain, postnasal drip, rhinorrhea, sinus pressure and sinus pain.    Eyes:  Negative for pain.   Respiratory:  Negative for cough, chest tightness and shortness of breath.    Cardiovascular:  Negative for chest pain and palpitations.   Gastrointestinal:  Negative for abdominal pain, blood in stool, constipation, diarrhea and nausea.   Genitourinary:  Negative for difficulty urinating, dysuria, flank pain and hematuria.   Musculoskeletal:  Negative for arthralgias and myalgias.   Skin:  Negative for pallor and wound.   Neurological:  Negative for dizziness, tremors, speech difficulty, light-headedness and headaches.   Psychiatric/Behavioral:  Negative for behavioral problems, dysphoric mood and sleep disturbance. The patient is not nervous/anxious.    All other systems reviewed and are negative.    Past Medical History:   Diagnosis Date    AAA (abdominal aortic aneurysm) 2/13/2014    Abdominal aneurysm     3    Acute coronary syndrome     Arthritis     BPPV (benign paroxysmal positional vertigo)     Carotid artery plaque     Carotid artery stenosis and occlusion 2/13/2014    Chronic back pain     COPD (chronic obstructive pulmonary disease)     Coronary artery disease     Emphysema lung     Hyperlipidemia     Hypertension     Myocardial infarction     x3    Neuropathy     Personal history of  COVID-19 2020 +Covid, recovered at home        Social History:  Social History     Socioeconomic History    Marital status:    Tobacco Use    Smoking status: Former     Packs/day: 0.50     Years: 44.00     Pack years: 22.00     Types: Cigarettes, Vaping w/o nicotine     Start date: 1970     Quit date: 2017     Years since quittin.9    Smokeless tobacco: Never    Tobacco comments:     3 MG NICOTINE FOR HEART.    Substance and Sexual Activity    Alcohol use: No    Drug use: No    Sexual activity: Not Currently     Birth control/protection: None     Social Determinants of Health     Financial Resource Strain: Low Risk     Difficulty of Paying Living Expenses: Not hard at all   Food Insecurity: No Food Insecurity    Worried About Running Out of Food in the Last Year: Never true    Ran Out of Food in the Last Year: Never true   Transportation Needs: No Transportation Needs    Lack of Transportation (Medical): No    Lack of Transportation (Non-Medical): No   Physical Activity: Insufficiently Active    Days of Exercise per Week: 3 days    Minutes of Exercise per Session: 10 min   Stress: No Stress Concern Present    Feeling of Stress : Only a little   Social Connections: Moderately Integrated    Frequency of Communication with Friends and Family: More than three times a week    Frequency of Social Gatherings with Friends and Family: More than three times a week    Attends Jewish Services: More than 4 times per year    Active Member of Clubs or Organizations: No    Attends Club or Organization Meetings: Never    Marital Status:    Housing Stability: Low Risk     Unable to Pay for Housing in the Last Year: No    Number of Places Lived in the Last Year: 1    Unstable Housing in the Last Year: No       Family History:   family history includes Breast cancer in her paternal aunt; Heart attacks under age 50 in her brother and father; Heart disease in her mother.    Health  "Maintenance   Topic Date Due    Mammogram  06/09/2022    LDCT Lung Screen  11/08/2023    Lipid Panel  12/01/2023    High Dose Statin  03/30/2024    Aspirin/Antiplatelet Therapy  03/30/2024    DEXA Scan  10/20/2024    TETANUS VACCINE  11/15/2026    Hepatitis C Screening  Completed       Physical Exam:    Vital Signs  Temp: 97.5 °F (36.4 °C)  Temp Source: Tympanic  Pulse: 61  Resp: 20  SpO2: 98 %  BP: 138/61  BP Location: Left arm  Patient Position: Sitting  Pain Score:   5  Height and Weight  Height: 5' 1.5" (156.2 cm)  Weight: 63.7 kg (140 lb 6.9 oz)  BSA (Calculated - sq m): 1.66 sq meters  BMI (Calculated): 26.1  Weight in (lb) to have BMI = 25: 134.2]    Body mass index is 26.1 kg/m².    Physical Exam  Vitals and nursing note reviewed.   Constitutional:       Appearance: Normal appearance. She is not toxic-appearing.   HENT:      Head: Normocephalic and atraumatic.      Right Ear: Tympanic membrane normal.      Left Ear: Tympanic membrane normal.   Eyes:      Extraocular Movements: Extraocular movements intact.      Pupils: Pupils are equal, round, and reactive to light.   Cardiovascular:      Rate and Rhythm: Normal rate and regular rhythm.      Heart sounds: Normal heart sounds.   Pulmonary:      Effort: Pulmonary effort is normal.      Breath sounds: Normal breath sounds. No wheezing, rhonchi or rales.   Abdominal:      General: Bowel sounds are normal. There is no distension.      Palpations: Abdomen is soft.      Tenderness: There is no abdominal tenderness.   Musculoskeletal:         General: Normal range of motion.      Cervical back: Normal range of motion.   Skin:     General: Skin is warm and dry.      Capillary Refill: Capillary refill takes less than 2 seconds.   Neurological:      General: No focal deficit present.      Mental Status: She is alert and oriented to person, place, and time.   Psychiatric:         Mood and Affect: Mood normal.         Behavior: Behavior normal.         Judgment: Judgment " normal.         Assessment:      ICD-10-CM ICD-9-CM   1. Moderate vascular dementia without behavioral disturbance, psychotic disturbance, mood disturbance, or anxiety  F01.B0 290.40   2. Infrarenal abdominal aortic aneurysm (AAA) without rupture  I71.43 441.4   3. Essential hypertension  I10 401.9   4. Stopped smoking with greater than 30 pack year history  Z87.891 V15.82   5. Prediabetes  R73.03 790.29   6. COPD, moderate  J44.9 496   7. Chronic insomnia  F51.04 780.52     Plan:       Patient had the above-mentioned medical problem pretty stable   She does not drive   Please do not handle finances continue Namenda   Continue follow with vascular surgery   Prediabetes stable   COPD stable   Chronic insomnia stable taking Ambien Ambien refill    Follow up 6 months.     No orders of the defined types were placed in this encounter.    Current Outpatient Medications   Medication Sig Dispense Refill    albuterol-ipratropium (DUO-NEB) 2.5 mg-0.5 mg/3 mL nebulizer solution       amLODIPine (NORVASC) 5 MG tablet Take 1 tablet (5 mg total) by mouth once daily. 90 tablet 3    aspirin 81 MG Chew Take 81 mg by mouth once daily.      bisoprolol (ZEBETA) 5 MG tablet Take 5 mg by mouth once daily.      budesonide-glycopyr-formoterol (BREZTRI AEROSPHERE) 160-9-4.8 mcg/actuation HFAA Inhale 2 puffs into the lungs 2 (two) times a day. 32.1 g 3    clopidogreL (PLAVIX) 75 mg tablet TAKE 1 TABLET BY MOUTH ONCE A DAY 90 tablet 3    COMBIVENT RESPIMAT  mcg/actuation inhaler Inhale 1 puff into the lungs every 6 (six) hours as needed for Wheezing. 12 g 11    dicyclomine (BENTYL) 10 MG capsule TAKE 1 CAPSULE BY MOUTH 4 TIMES DAILY BEFORE MEALS AND NIGHTLY Strength: 10 mg (Patient taking differently: 4 (four) times daily as needed. TAKE 1 CAPSULE BY MOUTH 4 TIMES DAILY BEFORE MEALS AND NIGHTLY Strength: 10 mg) 120 capsule 2    ezetimibe-simvastatin 10-40 mg (VYTORIN) 10-40 mg per tablet TAKE 1 TABLET BY MOUTH EVERY EVENING 90 tablet 3     ferrous gluconate (FERGON) 324 MG tablet TAKE (1) TABLET BY MOUTH 2 TIMES A DAY WITH MEALS 60 tablet 3    furosemide (LASIX) 20 MG tablet TAKE 20 MG TABLET ONCE DAILY (Patient taking differently: daily as needed. TAKE 20 MG TABLET ONCE DAILY) 90 tablet 1    ibuprofen (ADVIL,MOTRIN) 400 MG tablet TAKE 1 TABLET BY MOUTH EVERY 6 HOURS AS NEEDED 30 tablet 0    inhalation spacing device (COMPACT SPACE CHAMBER) Use as directed for inhalation. 1 each 2    memantine (NAMENDA) 5 MG Tab Take 10 mg by mouth once daily.      omega-3 fatty acids/fish oil (FISH OIL-OMEGA-3 FATTY ACIDS) 300-1,000 mg capsule Take by mouth once daily.      OXYGEN-AIR DELIVERY SYSTEMS MISC 3 L by Misc.(Non-Drug; Combo Route) route every evening.      pantoprazole (PROTONIX) 40 MG tablet TAKE 1 TABLET BY MOUTH ONCE A DAY 90 tablet 3    TENS unit and electrodes Cmpk 1 each by Misc.(Non-Drug; Combo Route) route 3 (three) times daily as needed (for back pain). 1 each 5    vitamin D (VITAMIN D3) 1000 units Tab Take 1,000 Units by mouth once daily.      albuterol (PROVENTIL/VENTOLIN HFA) 90 mcg/actuation inhaler INHALE 2 PUFFS INTO LUNGS EVERY 6 HOURS AS NEEDED (Patient not taking: Reported on 3/30/2023) 54 g 5    cephALEXin (KEFLEX) 500 MG capsule Take 1 capsule (500 mg total) by mouth every 8 (eight) hours. (Patient not taking: Reported on 3/30/2023) 21 capsule 0    co-enzyme Q-10 30 mg capsule Take 30 mg by mouth 3 (three) times daily.      estrogens, conjugated, (PREMARIN) 0.3 MG tablet Take 1 tablet (0.3 mg total) by mouth once daily. (Patient not taking: Reported on 3/30/2023) 90 tablet 0    olmesartan (BENICAR) 40 MG tablet TAKE 1 TABLET BY MOUTH ONCE A DAY (Patient not taking: Reported on 3/30/2023) 90 tablet 3    zolpidem (AMBIEN) 10 mg Tab TAKE 1 TABLET BY MOUTH EVERY EVENING 30 tablet 5     No current facility-administered medications for this visit.       Medications Discontinued During This Encounter   Medication Reason    zolpidem (AMBIEN)  10 mg Tab Reorder       Follow up in about 6 months (around 9/30/2023).      Olga Altman MD  Scribe Attestation:   I, Nola Baltazar, am scribing for, and in the presence of, Dr.Arif Altman I performed the above scribed service and the documentation accurately describes the services I performed. I attest to the accuracy of the note.    I, Dr. Olga Altman, reviewed documentation as scribed above. I performed the services described in this documentation.  I agree that the record reflects my personal performance and is accurate and complete. Olga Altman MD.  03/30/2023

## 2023-04-18 ENCOUNTER — LAB VISIT (OUTPATIENT)
Dept: LAB | Facility: HOSPITAL | Age: 68
End: 2023-04-18
Attending: INTERNAL MEDICINE
Payer: MEDICARE

## 2023-04-18 DIAGNOSIS — R73.03 PREDIABETES: ICD-10-CM

## 2023-04-18 DIAGNOSIS — I25.118 CORONARY ARTERY DISEASE OF NATIVE ARTERY OF NATIVE HEART WITH STABLE ANGINA PECTORIS: ICD-10-CM

## 2023-04-18 DIAGNOSIS — E78.2 MIXED HYPERLIPIDEMIA: ICD-10-CM

## 2023-04-18 LAB
ALBUMIN SERPL BCP-MCNC: 3.5 G/DL (ref 3.5–5.2)
ALP SERPL-CCNC: 67 U/L (ref 55–135)
ALT SERPL W/O P-5'-P-CCNC: 9 U/L (ref 10–44)
ANION GAP SERPL CALC-SCNC: 11 MMOL/L (ref 8–16)
AST SERPL-CCNC: 15 U/L (ref 10–40)
BILIRUB SERPL-MCNC: 0.5 MG/DL (ref 0.1–1)
BUN SERPL-MCNC: 7 MG/DL (ref 8–23)
CALCIUM SERPL-MCNC: 9.5 MG/DL (ref 8.7–10.5)
CHLORIDE SERPL-SCNC: 104 MMOL/L (ref 95–110)
CHOLEST SERPL-MCNC: 138 MG/DL (ref 120–199)
CHOLEST/HDLC SERPL: 3.1 {RATIO} (ref 2–5)
CO2 SERPL-SCNC: 25 MMOL/L (ref 23–29)
CREAT SERPL-MCNC: 0.9 MG/DL (ref 0.5–1.4)
EST. GFR  (NO RACE VARIABLE): >60 ML/MIN/1.73 M^2
ESTIMATED AVG GLUCOSE: 111 MG/DL (ref 68–131)
GLUCOSE SERPL-MCNC: 83 MG/DL (ref 70–110)
HBA1C MFR BLD: 5.5 % (ref 4–5.6)
HDLC SERPL-MCNC: 45 MG/DL (ref 40–75)
HDLC SERPL: 32.6 % (ref 20–50)
LDLC SERPL CALC-MCNC: 79 MG/DL (ref 63–159)
NONHDLC SERPL-MCNC: 93 MG/DL
POTASSIUM SERPL-SCNC: 4.1 MMOL/L (ref 3.5–5.1)
PROT SERPL-MCNC: 6.4 G/DL (ref 6–8.4)
SODIUM SERPL-SCNC: 140 MMOL/L (ref 136–145)
TRIGL SERPL-MCNC: 70 MG/DL (ref 30–150)

## 2023-04-18 PROCEDURE — 36415 COLL VENOUS BLD VENIPUNCTURE: CPT | Mod: HCNC,PO | Performed by: INTERNAL MEDICINE

## 2023-04-18 PROCEDURE — 80061 LIPID PANEL: CPT | Mod: HCNC | Performed by: INTERNAL MEDICINE

## 2023-04-18 PROCEDURE — 80053 COMPREHEN METABOLIC PANEL: CPT | Mod: HCNC | Performed by: INTERNAL MEDICINE

## 2023-04-18 PROCEDURE — 83036 HEMOGLOBIN GLYCOSYLATED A1C: CPT | Mod: HCNC | Performed by: INTERNAL MEDICINE

## 2023-04-25 ENCOUNTER — OFFICE VISIT (OUTPATIENT)
Dept: CARDIOLOGY | Facility: CLINIC | Age: 68
End: 2023-04-25
Payer: MEDICARE

## 2023-04-25 VITALS
SYSTOLIC BLOOD PRESSURE: 140 MMHG | HEIGHT: 62 IN | BODY MASS INDEX: 24.7 KG/M2 | OXYGEN SATURATION: 97 % | HEART RATE: 68 BPM | DIASTOLIC BLOOD PRESSURE: 68 MMHG | WEIGHT: 134.25 LBS

## 2023-04-25 DIAGNOSIS — F01.A0 MILD VASCULAR DEMENTIA WITHOUT BEHAVIORAL DISTURBANCE, PSYCHOTIC DISTURBANCE, MOOD DISTURBANCE, OR ANXIETY: ICD-10-CM

## 2023-04-25 DIAGNOSIS — I10 ESSENTIAL HYPERTENSION: Primary | ICD-10-CM

## 2023-04-25 DIAGNOSIS — G47.36 NOCTURNAL HYPOXEMIA DUE TO EMPHYSEMA: ICD-10-CM

## 2023-04-25 DIAGNOSIS — Z87.891 STOPPED SMOKING WITH GREATER THAN 30 PACK YEAR HISTORY: ICD-10-CM

## 2023-04-25 DIAGNOSIS — J44.9 COPD, MODERATE: ICD-10-CM

## 2023-04-25 DIAGNOSIS — R06.02 SHORTNESS OF BREATH: ICD-10-CM

## 2023-04-25 DIAGNOSIS — J43.9 NOCTURNAL HYPOXEMIA DUE TO EMPHYSEMA: ICD-10-CM

## 2023-04-25 DIAGNOSIS — I70.0 AORTIC ATHEROSCLEROSIS: ICD-10-CM

## 2023-04-25 DIAGNOSIS — F17.201 TOBACCO USE DISORDER, MODERATE, IN SUSTAINED REMISSION: ICD-10-CM

## 2023-04-25 DIAGNOSIS — I25.118 CORONARY ARTERY DISEASE OF NATIVE ARTERY OF NATIVE HEART WITH STABLE ANGINA PECTORIS: ICD-10-CM

## 2023-04-25 DIAGNOSIS — R42 DIZZINESS: ICD-10-CM

## 2023-04-25 DIAGNOSIS — I72.2 ANEURYSM OF RENAL ARTERY: ICD-10-CM

## 2023-04-25 DIAGNOSIS — E78.2 MIXED HYPERLIPIDEMIA: ICD-10-CM

## 2023-04-25 DIAGNOSIS — I71.43 INFRARENAL ABDOMINAL AORTIC ANEURYSM (AAA) WITHOUT RUPTURE: ICD-10-CM

## 2023-04-25 DIAGNOSIS — R73.03 PREDIABETES: ICD-10-CM

## 2023-04-25 DIAGNOSIS — I65.23 BILATERAL CAROTID ARTERY STENOSIS: ICD-10-CM

## 2023-04-25 PROCEDURE — 3078F DIAST BP <80 MM HG: CPT | Mod: HCNC,CPTII,S$GLB, | Performed by: INTERNAL MEDICINE

## 2023-04-25 PROCEDURE — 4010F PR ACE/ARB THEARPY RXD/TAKEN: ICD-10-PCS | Mod: HCNC,CPTII,S$GLB, | Performed by: INTERNAL MEDICINE

## 2023-04-25 PROCEDURE — 99999 PR PBB SHADOW E&M-EST. PATIENT-LVL V: CPT | Mod: PBBFAC,HCNC,, | Performed by: INTERNAL MEDICINE

## 2023-04-25 PROCEDURE — 4010F ACE/ARB THERAPY RXD/TAKEN: CPT | Mod: HCNC,CPTII,S$GLB, | Performed by: INTERNAL MEDICINE

## 2023-04-25 PROCEDURE — 1159F PR MEDICATION LIST DOCUMENTED IN MEDICAL RECORD: ICD-10-PCS | Mod: HCNC,CPTII,S$GLB, | Performed by: INTERNAL MEDICINE

## 2023-04-25 PROCEDURE — 3078F PR MOST RECENT DIASTOLIC BLOOD PRESSURE < 80 MM HG: ICD-10-PCS | Mod: HCNC,CPTII,S$GLB, | Performed by: INTERNAL MEDICINE

## 2023-04-25 PROCEDURE — 3008F PR BODY MASS INDEX (BMI) DOCUMENTED: ICD-10-PCS | Mod: HCNC,CPTII,S$GLB, | Performed by: INTERNAL MEDICINE

## 2023-04-25 PROCEDURE — 99215 PR OFFICE/OUTPT VISIT, EST, LEVL V, 40-54 MIN: ICD-10-PCS | Mod: HCNC,S$GLB,, | Performed by: INTERNAL MEDICINE

## 2023-04-25 PROCEDURE — 3077F PR MOST RECENT SYSTOLIC BLOOD PRESSURE >= 140 MM HG: ICD-10-PCS | Mod: HCNC,CPTII,S$GLB, | Performed by: INTERNAL MEDICINE

## 2023-04-25 PROCEDURE — 1125F AMNT PAIN NOTED PAIN PRSNT: CPT | Mod: HCNC,CPTII,S$GLB, | Performed by: INTERNAL MEDICINE

## 2023-04-25 PROCEDURE — 1125F PR PAIN SEVERITY QUANTIFIED, PAIN PRESENT: ICD-10-PCS | Mod: HCNC,CPTII,S$GLB, | Performed by: INTERNAL MEDICINE

## 2023-04-25 PROCEDURE — 3044F PR MOST RECENT HEMOGLOBIN A1C LEVEL <7.0%: ICD-10-PCS | Mod: HCNC,CPTII,S$GLB, | Performed by: INTERNAL MEDICINE

## 2023-04-25 PROCEDURE — 3077F SYST BP >= 140 MM HG: CPT | Mod: HCNC,CPTII,S$GLB, | Performed by: INTERNAL MEDICINE

## 2023-04-25 PROCEDURE — 3008F BODY MASS INDEX DOCD: CPT | Mod: HCNC,CPTII,S$GLB, | Performed by: INTERNAL MEDICINE

## 2023-04-25 PROCEDURE — 3288F FALL RISK ASSESSMENT DOCD: CPT | Mod: HCNC,CPTII,S$GLB, | Performed by: INTERNAL MEDICINE

## 2023-04-25 PROCEDURE — 1159F MED LIST DOCD IN RCRD: CPT | Mod: HCNC,CPTII,S$GLB, | Performed by: INTERNAL MEDICINE

## 2023-04-25 PROCEDURE — 1101F PR PT FALLS ASSESS DOC 0-1 FALLS W/OUT INJ PAST YR: ICD-10-PCS | Mod: HCNC,CPTII,S$GLB, | Performed by: INTERNAL MEDICINE

## 2023-04-25 PROCEDURE — 3044F HG A1C LEVEL LT 7.0%: CPT | Mod: HCNC,CPTII,S$GLB, | Performed by: INTERNAL MEDICINE

## 2023-04-25 PROCEDURE — 99999 PR PBB SHADOW E&M-EST. PATIENT-LVL V: ICD-10-PCS | Mod: PBBFAC,HCNC,, | Performed by: INTERNAL MEDICINE

## 2023-04-25 PROCEDURE — 3288F PR FALLS RISK ASSESSMENT DOCUMENTED: ICD-10-PCS | Mod: HCNC,CPTII,S$GLB, | Performed by: INTERNAL MEDICINE

## 2023-04-25 PROCEDURE — 99215 OFFICE O/P EST HI 40 MIN: CPT | Mod: HCNC,S$GLB,, | Performed by: INTERNAL MEDICINE

## 2023-04-25 PROCEDURE — 1101F PT FALLS ASSESS-DOCD LE1/YR: CPT | Mod: HCNC,CPTII,S$GLB, | Performed by: INTERNAL MEDICINE

## 2023-04-25 NOTE — PROGRESS NOTES
Subjective:   Patient ID:  Gemma Vick is a 68 y.o. female who presents for follow up of No chief complaint on file.      HPI  3/26/2020  A 66 yo female with pvd carotid disease htn hlp copd exsmoker on nocturnal o2 therapy wants to discuss test results. She is in digital htn has been unable to tolerate bisoprolol daily feels tired fatigued no energy she takes meds regularily tries to be compliant with salt no exercise but stays busy in the house. She takes care of her grandbabies. Has occasional leg swelling she takes lasix prn for weight change she stands on her feet a lot. She is compliant with inhalers to control shortness of breath no palpitation has chronic cough.  Her carotid u/s reviewed unchanged.abd u/s no aneurysm  Lipids on target she is well controlled on vytorin .she has difficulty with crestor and lipitor   9/24/2020  She is here for  f/u she is complaining of recurrent episodes of abdominal pain lower quadrant and got to be upper abdomen associated with nausea vomiting no post prandial pain no weight loss or food avoidance. Has no new symptoms of chest pain she is still short of breath no new palpitation tia.   Ct abdomen showed diverticulitis aaa 3.4 cm and sma stenosis.   Her ldl has increased. She has had sore muscles she stopped statins w/o improvemnet. She needs to back on statins that would explain he rincreased ldl.      3/25/2021  Here for f/u has sharp recurrent left sided occurring at rest sitting in recliner she cannot take a deep breath no exertional symptoms she takes care of her grandbabies no smoking no tia palpitation syncope near syncope. She takes  meds regularily .reveiw of her bp chart still showed elevated indices. She claims salt compliance. She could not tolerate amlodipine bid . Her lipids aRE ON TARGET.      9/30/2021    has been using o2 therapy at nite no change in symptoms her bp is elevated she takes amlodipine 5 mg at nite she is not compliant with diet. Salt  castell[place].she takes care of grandkids no chest pain no cardiac symptoms. She has the urge to go to bathroom after she eats she has upperabdominal pain and feels like throwing up. Has known stenosis of the sma  She has lost weight.      11/1/2021   She is taking 7.5 mg amlodipine still needs better  salt control bp acceptable here however at home is more elevated but we have not checked her machine. She continues to have abdominal symptoms. She wants alternatives to ibesartan her pill is too big to swallow her lipids are on target. (she will check on diovan 320 and benicar 40 mg with pharmacist about size and cost).she is in digital htn her bp readings are more elevated that today clinic readings.she ahs not been compliant with diet she had fried chicken mashed potatoes french fries and   And biscuits  From [employer].   She had cta for her abdominal symptoms showing celiac stenosis and stenosis at the sma. Has also bilateral iliac stenosis greater than 50%.   Her carotid anatomy is unchanged.         12/8/2021   Today bp is controlled she ahs taken a fluid pill her bp readings at home since last visit has been 160-170 range. She si non compliant with salt she took diuretics due to leg swelling which is related to amlodipine. She has also an mason with exercise that did not suggest significant disease she continues to have symptoms suggestive of musculoskeltal on the rt and sciatica. No definite claudication no discoloration. she has no tia.      4/1/2022  Not much leg swelling has been drinking a lot of water bp fluctuates based on salt her digital htn reflects that now today is on target. Has been in after hours due abdominal pain constipation issue. Has nausea at that time . All improved still          dealing with constipation has use magnesium citrate.   She is not using diuretics except intermittently   Has question about arthritis meds she can take advised short course is ok but prolonged use is high risk of bleeding.       7/27/2022  Here for evaluation for colonoscopy. Has a positive school screening test was scheduled for colonoscopy . Has iron deficiency anemia has fatigue. She has seen vascular surgery because her colonoscopy was cancel;ed has m,oderate carotid disease. Has cta mesenteric stenosis. She has abdominal pain has improvement after taking bentyl some times she vomited digetsed food after eating no diarrhea  Has postprandial pain  opn the rt then progresses to to the bottom and lower abdomen. She is non compliant with salt that is why her bp is elevated.      12/8/2022   Here forf /u has been evaluated by ep no need for any pacer or intervention. Has been evaluated by vascular surgery no significant carotid disease. She is getting very forgetful was evaluated at [facility] for dementia she is labeled as vascular dementia  and shrinking brain. Her aaa is around 4 cm.   She had a slaty meal before coming to see me her bp is elevated she is non complaint with salt intake. She cannot remember her meds. She stopped a bp med that she does not remember the name. No cardiac symptoms.     4/25/2023 family is present they are concerned about her situation.   here for f /u has still been having abdominal pain post prandial. Has vascular dementia per neurologist opinion. She is getting forgetful. However she is not complaint with salt .charlotte ate at "[employer] before she comes see em today. had back pain no tia no claudication. Had a death  in family her brother passed away. She is concerned about her heart . Has aaa around 4 cm.  Past Medical History:   Diagnosis Date    AAA (abdominal aortic aneurysm) 2/13/2014    Abdominal aneurysm     3    Acute coronary syndrome     Arthritis     BPPV (benign paroxysmal positional vertigo)     Carotid artery plaque     Carotid artery stenosis and occlusion 2/13/2014    Chronic back pain     COPD (chronic obstructive pulmonary disease)     Coronary artery disease     Emphysema lung      Hyperlipidemia     Hypertension     Myocardial infarction     x3    Neuropathy     Personal history of COVID-19 2020 +Covid, recovered at home        Past Surgical History:   Procedure Laterality Date    CARDIAC CATHETERIZATION      CORONARY ANGIOPLASTY      HYSTERECTOMY         Social History     Tobacco Use    Smoking status: Former     Packs/day: 0.50     Years: 44.00     Pack years: 22.00     Types: Cigarettes, Vaping w/o nicotine     Start date: 1970     Quit date: 2017     Years since quittin.9    Smokeless tobacco: Never    Tobacco comments:     3 MG NICOTINE FOR HEART.    Substance Use Topics    Alcohol use: No    Drug use: No       Family History   Problem Relation Age of Onset    Heart disease Mother     Heart attacks under age 50 Father     Heart attacks under age 50 Brother     Breast cancer Paternal Aunt        Current Outpatient Medications   Medication Sig    albuterol-ipratropium (DUO-NEB) 2.5 mg-0.5 mg/3 mL nebulizer solution     amLODIPine (NORVASC) 5 MG tablet Take 1 tablet (5 mg total) by mouth once daily.    aspirin 81 MG Chew Take 81 mg by mouth once daily.    bisoprolol (ZEBETA) 5 MG tablet Take 5 mg by mouth once daily.    budesonide-glycopyr-formoterol (BREZTRI AEROSPHERE) 160-9-4.8 mcg/actuation HFAA Inhale 2 puffs into the lungs 2 (two) times a day.    clopidogreL (PLAVIX) 75 mg tablet TAKE 1 TABLET BY MOUTH ONCE A DAY    co-enzyme Q-10 30 mg capsule Take 30 mg by mouth 3 (three) times daily.    COMBIVENT RESPIMAT  mcg/actuation inhaler Inhale 1 puff into the lungs every 6 (six) hours as needed for Wheezing.    dicyclomine (BENTYL) 10 MG capsule TAKE 1 CAPSULE BY MOUTH 4 TIMES DAILY BEFORE MEALS AND NIGHTLY Strength: 10 mg (Patient taking differently: 4 (four) times daily as needed. TAKE 1 CAPSULE BY MOUTH 4 TIMES DAILY BEFORE MEALS AND NIGHTLY Strength: 10 mg)    ezetimibe-simvastatin 10-40 mg (VYTORIN) 10-40 mg per tablet TAKE 1 TABLET BY MOUTH  EVERY EVENING    furosemide (LASIX) 20 MG tablet TAKE 20 MG TABLET ONCE DAILY (Patient taking differently: daily as needed. TAKE 20 MG TABLET ONCE DAILY)    ibuprofen (ADVIL,MOTRIN) 400 MG tablet TAKE 1 TABLET BY MOUTH EVERY 6 HOURS AS NEEDED    memantine (NAMENDA) 5 MG Tab Take 10 mg by mouth once daily.    omega-3 fatty acids/fish oil (FISH OIL-OMEGA-3 FATTY ACIDS) 300-1,000 mg capsule Take by mouth once daily.    OXYGEN-AIR DELIVERY SYSTEMS MISC 3 L by Misc.(Non-Drug; Combo Route) route every evening.    pantoprazole (PROTONIX) 40 MG tablet TAKE 1 TABLET BY MOUTH ONCE A DAY    TENS unit and electrodes Cmpk 1 each by Misc.(Non-Drug; Combo Route) route 3 (three) times daily as needed (for back pain).    vitamin D (VITAMIN D3) 1000 units Tab Take 1,000 Units by mouth once daily.    albuterol (PROVENTIL/VENTOLIN HFA) 90 mcg/actuation inhaler INHALE 2 PUFFS INTO LUNGS EVERY 6 HOURS AS NEEDED (Patient not taking: Reported on 3/30/2023)    cephALEXin (KEFLEX) 500 MG capsule Take 1 capsule (500 mg total) by mouth every 8 (eight) hours. (Patient not taking: Reported on 3/30/2023)    estrogens, conjugated, (PREMARIN) 0.3 MG tablet Take 1 tablet (0.3 mg total) by mouth once daily. (Patient not taking: Reported on 3/30/2023)    ferrous gluconate (FERGON) 324 MG tablet TAKE (1) TABLET BY MOUTH 2 TIMES A DAY WITH MEALS (Patient not taking: Reported on 4/25/2023)    inhalation spacing device (COMPACT SPACE CHAMBER) Use as directed for inhalation.    olmesartan (BENICAR) 40 MG tablet TAKE 1 TABLET BY MOUTH ONCE A DAY (Patient not taking: Reported on 3/30/2023)    zolpidem (AMBIEN) 10 mg Tab TAKE 1 TABLET BY MOUTH EVERY EVENING     No current facility-administered medications for this visit.     Current Outpatient Medications on File Prior to Visit   Medication Sig    albuterol-ipratropium (DUO-NEB) 2.5 mg-0.5 mg/3 mL nebulizer solution     amLODIPine (NORVASC) 5 MG tablet Take 1 tablet (5 mg total) by mouth once daily.     aspirin 81 MG Chew Take 81 mg by mouth once daily.    bisoprolol (ZEBETA) 5 MG tablet Take 5 mg by mouth once daily.    budesonide-glycopyr-formoterol (BREZTRI AEROSPHERE) 160-9-4.8 mcg/actuation HFAA Inhale 2 puffs into the lungs 2 (two) times a day.    clopidogreL (PLAVIX) 75 mg tablet TAKE 1 TABLET BY MOUTH ONCE A DAY    co-enzyme Q-10 30 mg capsule Take 30 mg by mouth 3 (three) times daily.    COMBIVENT RESPIMAT  mcg/actuation inhaler Inhale 1 puff into the lungs every 6 (six) hours as needed for Wheezing.    dicyclomine (BENTYL) 10 MG capsule TAKE 1 CAPSULE BY MOUTH 4 TIMES DAILY BEFORE MEALS AND NIGHTLY Strength: 10 mg (Patient taking differently: 4 (four) times daily as needed. TAKE 1 CAPSULE BY MOUTH 4 TIMES DAILY BEFORE MEALS AND NIGHTLY Strength: 10 mg)    ezetimibe-simvastatin 10-40 mg (VYTORIN) 10-40 mg per tablet TAKE 1 TABLET BY MOUTH EVERY EVENING    furosemide (LASIX) 20 MG tablet TAKE 20 MG TABLET ONCE DAILY (Patient taking differently: daily as needed. TAKE 20 MG TABLET ONCE DAILY)    ibuprofen (ADVIL,MOTRIN) 400 MG tablet TAKE 1 TABLET BY MOUTH EVERY 6 HOURS AS NEEDED    memantine (NAMENDA) 5 MG Tab Take 10 mg by mouth once daily.    omega-3 fatty acids/fish oil (FISH OIL-OMEGA-3 FATTY ACIDS) 300-1,000 mg capsule Take by mouth once daily.    OXYGEN-AIR DELIVERY SYSTEMS MISC 3 L by Misc.(Non-Drug; Combo Route) route every evening.    pantoprazole (PROTONIX) 40 MG tablet TAKE 1 TABLET BY MOUTH ONCE A DAY    TENS unit and electrodes Cmpk 1 each by Misc.(Non-Drug; Combo Route) route 3 (three) times daily as needed (for back pain).    vitamin D (VITAMIN D3) 1000 units Tab Take 1,000 Units by mouth once daily.    albuterol (PROVENTIL/VENTOLIN HFA) 90 mcg/actuation inhaler INHALE 2 PUFFS INTO LUNGS EVERY 6 HOURS AS NEEDED (Patient not taking: Reported on 3/30/2023)    cephALEXin (KEFLEX) 500 MG capsule Take 1 capsule (500 mg total) by mouth every 8 (eight) hours. (Patient not taking: Reported on  3/30/2023)    estrogens, conjugated, (PREMARIN) 0.3 MG tablet Take 1 tablet (0.3 mg total) by mouth once daily. (Patient not taking: Reported on 3/30/2023)    ferrous gluconate (FERGON) 324 MG tablet TAKE (1) TABLET BY MOUTH 2 TIMES A DAY WITH MEALS (Patient not taking: Reported on 4/25/2023)    inhalation spacing device (COMPACT SPACE CHAMBER) Use as directed for inhalation.    olmesartan (BENICAR) 40 MG tablet TAKE 1 TABLET BY MOUTH ONCE A DAY (Patient not taking: Reported on 3/30/2023)    zolpidem (AMBIEN) 10 mg Tab TAKE 1 TABLET BY MOUTH EVERY EVENING     No current facility-administered medications on file prior to visit.     Review of patient's allergies indicates:  No Known Allergies   Review of Systems   Constitutional: Positive for decreased appetite and weight loss. Negative for diaphoresis, malaise/fatigue and weight gain.   HENT:  Negative for hoarse voice.    Eyes:  Negative for double vision and visual disturbance.   Cardiovascular:  Positive for dyspnea on exertion. Negative for chest pain, claudication, cyanosis, irregular heartbeat, leg swelling, near-syncope, orthopnea, palpitations, paroxysmal nocturnal dyspnea and syncope.   Respiratory:  Positive for shortness of breath. Negative for cough, hemoptysis and snoring.    Hematologic/Lymphatic: Negative for bleeding problem. Does not bruise/bleed easily.   Skin:  Negative for color change and poor wound healing.   Musculoskeletal:  Positive for arthritis, back pain and joint pain. Negative for muscle cramps, muscle weakness and myalgias.   Gastrointestinal:  Positive for abdominal pain. Negative for bloating, change in bowel habit, diarrhea, heartburn, hematemesis, hematochezia, melena and nausea.   Neurological:  Negative for excessive daytime sleepiness, dizziness, headaches, light-headedness, loss of balance, numbness and weakness.   Psychiatric/Behavioral:  Negative for memory loss. The patient does not have insomnia.    Allergic/Immunologic:  Negative for hives.     Objective:   Physical Exam  Vitals and nursing note reviewed.   Constitutional:       General: She is not in acute distress.     Appearance: Normal appearance. She is well-developed. She is not ill-appearing.   HENT:      Head: Normocephalic and atraumatic.   Eyes:      General: No scleral icterus.     Pupils: Pupils are equal, round, and reactive to light.   Neck:      Thyroid: No thyromegaly.      Vascular: Normal carotid pulses. Carotid bruit present. No hepatojugular reflux or JVD.      Trachea: No tracheal deviation.   Cardiovascular:      Rate and Rhythm: Normal rate and regular rhythm.      Pulses: Normal pulses.      Heart sounds: Murmur heard.   Harsh midsystolic murmur is present with a grade of 2/6 at the upper right sternal border radiating to the neck.   High-pitched blowing decrescendo early diastolic murmur is present with a grade of 1/4 at the upper right sternal border radiating to the apex.     No friction rub. No gallop.      Comments: Abdominal bruit.  Pulmonary:      Effort: Pulmonary effort is normal. No respiratory distress.      Breath sounds: Normal breath sounds. No wheezing, rhonchi or rales.   Chest:      Chest wall: No tenderness.   Abdominal:      General: Bowel sounds are normal. There is no abdominal bruit.      Palpations: Abdomen is soft. There is no hepatomegaly or pulsatile mass.      Tenderness: There is no abdominal tenderness.   Musculoskeletal:      Right shoulder: No deformity.      Cervical back: Normal range of motion and neck supple.      Right lower leg: No edema.      Left lower leg: No edema.   Skin:     General: Skin is warm and dry.      Findings: No erythema or rash.      Nails: There is no clubbing.   Neurological:      Mental Status: She is alert and oriented to person, place, and time.      Cranial Nerves: No cranial nerve deficit.      Coordination: Coordination normal.   Psychiatric:         Speech: Speech normal.         Behavior:  "Behavior normal.     Vitals:    04/25/23 1301 04/25/23 1304   BP: (!) 142/72 (!) 140/68   BP Location: Left arm Right arm   Patient Position: Sitting Sitting   BP Method: Medium (Manual) Medium (Manual)   Pulse: 68    SpO2: 97%    Weight: 60.9 kg (134 lb 4.2 oz)    Height: 5' 1.5" (1.562 m)      Lab Results   Component Value Date    CHOL 138 04/18/2023    CHOL 143 12/01/2022    CHOL 164 10/25/2022      Body mass index is 24.96 kg/m².   Lab Results   Component Value Date    HGBA1C 5.5 04/18/2023      BMP  Lab Results   Component Value Date     04/18/2023    K 4.1 04/18/2023     04/18/2023    CO2 25 04/18/2023    BUN 7 (L) 04/18/2023    CREATININE 0.9 04/18/2023    CALCIUM 9.5 04/18/2023    ANIONGAP 11 04/18/2023    EGFRNORACEVR >60.0 04/18/2023      Lab Results   Component Value Date    HDL 45 04/18/2023    HDL 56 12/01/2022    HDL 60 10/25/2022     Lab Results   Component Value Date    LDLCALC 79.0 04/18/2023    LDLCALC 72.0 12/01/2022    LDLCALC 83.0 10/25/2022     Lab Results   Component Value Date    TRIG 70 04/18/2023    TRIG 75 12/01/2022    TRIG 105 10/25/2022     Lab Results   Component Value Date    CHOLHDL 32.6 04/18/2023    CHOLHDL 39.2 12/01/2022    CHOLHDL 36.6 10/25/2022       Chemistry        Component Value Date/Time     04/18/2023 0953    K 4.1 04/18/2023 0953     04/18/2023 0953    CO2 25 04/18/2023 0953    BUN 7 (L) 04/18/2023 0953    CREATININE 0.9 04/18/2023 0953    GLU 83 04/18/2023 0953        Component Value Date/Time    CALCIUM 9.5 04/18/2023 0953    ALKPHOS 67 04/18/2023 0953    AST 15 04/18/2023 0953    ALT 9 (L) 04/18/2023 0953    BILITOT 0.5 04/18/2023 0953    ESTGFRAFRICA >60.0 04/07/2022 0922    EGFRNONAA >60.0 04/07/2022 0922          Lab Results   Component Value Date    TSH 1.509 05/04/2018     Lab Results   Component Value Date    INR 1.1 12/20/2017    INR 1.1 08/10/2012     Lab Results   Component Value Date    WBC 6.81 10/25/2022    HGB 12.6 10/25/2022    " HCT 38.3 10/25/2022    MCV 94 10/25/2022     10/25/2022     BMP  Sodium   Date Value Ref Range Status   04/18/2023 140 136 - 145 mmol/L Final     Potassium   Date Value Ref Range Status   04/18/2023 4.1 3.5 - 5.1 mmol/L Final     Chloride   Date Value Ref Range Status   04/18/2023 104 95 - 110 mmol/L Final     CO2   Date Value Ref Range Status   04/18/2023 25 23 - 29 mmol/L Final     BUN   Date Value Ref Range Status   04/18/2023 7 (L) 8 - 23 mg/dL Final     Creatinine   Date Value Ref Range Status   04/18/2023 0.9 0.5 - 1.4 mg/dL Final     Calcium   Date Value Ref Range Status   04/18/2023 9.5 8.7 - 10.5 mg/dL Final     Anion Gap   Date Value Ref Range Status   04/18/2023 11 8 - 16 mmol/L Final     eGFR if    Date Value Ref Range Status   04/07/2022 >60.0 >60 mL/min/1.73 m^2 Final     eGFR if non    Date Value Ref Range Status   04/07/2022 >60.0 >60 mL/min/1.73 m^2 Final     Comment:     Calculation used to obtain the estimated glomerular filtration  rate (eGFR) is the CKD-EPI equation.        CrCl cannot be calculated (Patient's most recent lab result is older than the maximum 7 days allowed.).    Assessment:     1. Essential hypertension    2. COPD, moderate    3. Nocturnal hypoxemia due to emphysema    4. Infrarenal abdominal aortic aneurysm (AAA) without rupture    5. Aortic atherosclerosis    6. Bilateral carotid artery stenosis    7. Coronary artery disease of native artery of native heart with stable angina pectoris    8. Mixed hyperlipidemia    9. Prediabetes    10. Dizziness    11. Mild vascular dementia without behavioral disturbance, psychotic disturbance, mood disturbance, or anxiety    12. Stopped smoking with greater than 30 pack year history    13. Tobacco use disorder, moderate, in sustained remission    Here for f/u with multiple issues    She has  dementia cannot remember previous discussions she is followed by neurology.   Has exertional dyspnea  multifactorial including lung disease aortic insufficiency coronary ischemia uncontrolled htn  She is also non compliant with slat causing her bop to stay elevated despite medical therapy    Has bradycardia asymptomatic her w/u is unrevealing reviewed watch readings no arrythmias clinically will continue same   Has mesenteric ischemia symptoms with her aaa will repeat cta abdomen.   Carotid disease follows with vascular surgery.  Prediabetes on target counseled about compliance   Hlp on target still non compliant with diet counseled.   Plan:   Cta abdomen    Cardiolite   Echo  Phone review decide f/u

## 2023-04-26 ENCOUNTER — OFFICE VISIT (OUTPATIENT)
Dept: FAMILY MEDICINE | Facility: CLINIC | Age: 68
End: 2023-04-26
Payer: MEDICARE

## 2023-04-26 ENCOUNTER — HOSPITAL ENCOUNTER (OUTPATIENT)
Dept: RADIOLOGY | Facility: HOSPITAL | Age: 68
Discharge: HOME | End: 2023-04-26
Attending: FAMILY MEDICINE
Payer: MEDICARE

## 2023-04-26 VITALS
OXYGEN SATURATION: 97 % | BODY MASS INDEX: 25.18 KG/M2 | SYSTOLIC BLOOD PRESSURE: 134 MMHG | HEART RATE: 64 BPM | DIASTOLIC BLOOD PRESSURE: 80 MMHG | HEIGHT: 62 IN | WEIGHT: 136.81 LBS

## 2023-04-26 DIAGNOSIS — J44.1 COPD WITH ACUTE EXACERBATION: ICD-10-CM

## 2023-04-26 DIAGNOSIS — J44.1 COPD WITH ACUTE EXACERBATION: Primary | ICD-10-CM

## 2023-04-26 PROCEDURE — 1159F PR MEDICATION LIST DOCUMENTED IN MEDICAL RECORD: ICD-10-PCS | Mod: HCNC,CPTII,S$GLB, | Performed by: FAMILY MEDICINE

## 2023-04-26 PROCEDURE — 3044F HG A1C LEVEL LT 7.0%: CPT | Mod: HCNC,CPTII,S$GLB, | Performed by: FAMILY MEDICINE

## 2023-04-26 PROCEDURE — 1101F PT FALLS ASSESS-DOCD LE1/YR: CPT | Mod: HCNC,CPTII,S$GLB, | Performed by: FAMILY MEDICINE

## 2023-04-26 PROCEDURE — 1101F PR PT FALLS ASSESS DOC 0-1 FALLS W/OUT INJ PAST YR: ICD-10-PCS | Mod: HCNC,CPTII,S$GLB, | Performed by: FAMILY MEDICINE

## 2023-04-26 PROCEDURE — 3075F PR MOST RECENT SYSTOLIC BLOOD PRESS GE 130-139MM HG: ICD-10-PCS | Mod: HCNC,CPTII,S$GLB, | Performed by: FAMILY MEDICINE

## 2023-04-26 PROCEDURE — 1126F AMNT PAIN NOTED NONE PRSNT: CPT | Mod: HCNC,CPTII,S$GLB, | Performed by: FAMILY MEDICINE

## 2023-04-26 PROCEDURE — 4010F ACE/ARB THERAPY RXD/TAKEN: CPT | Mod: HCNC,CPTII,S$GLB, | Performed by: FAMILY MEDICINE

## 2023-04-26 PROCEDURE — 99213 PR OFFICE/OUTPT VISIT, EST, LEVL III, 20-29 MIN: ICD-10-PCS | Mod: HCNC,S$GLB,, | Performed by: FAMILY MEDICINE

## 2023-04-26 PROCEDURE — 3075F SYST BP GE 130 - 139MM HG: CPT | Mod: HCNC,CPTII,S$GLB, | Performed by: FAMILY MEDICINE

## 2023-04-26 PROCEDURE — 3008F BODY MASS INDEX DOCD: CPT | Mod: HCNC,CPTII,S$GLB, | Performed by: FAMILY MEDICINE

## 2023-04-26 PROCEDURE — 71046 XR CHEST PA AND LATERAL: ICD-10-PCS | Mod: 26,HCNC,, | Performed by: STUDENT IN AN ORGANIZED HEALTH CARE EDUCATION/TRAINING PROGRAM

## 2023-04-26 PROCEDURE — 71046 X-RAY EXAM CHEST 2 VIEWS: CPT | Mod: TC,HCNC,FY,PO

## 2023-04-26 PROCEDURE — 99213 OFFICE O/P EST LOW 20 MIN: CPT | Mod: HCNC,S$GLB,, | Performed by: FAMILY MEDICINE

## 2023-04-26 PROCEDURE — 3079F DIAST BP 80-89 MM HG: CPT | Mod: HCNC,CPTII,S$GLB, | Performed by: FAMILY MEDICINE

## 2023-04-26 PROCEDURE — 99999 PR PBB SHADOW E&M-EST. PATIENT-LVL IV: CPT | Mod: PBBFAC,HCNC,, | Performed by: FAMILY MEDICINE

## 2023-04-26 PROCEDURE — 1159F MED LIST DOCD IN RCRD: CPT | Mod: HCNC,CPTII,S$GLB, | Performed by: FAMILY MEDICINE

## 2023-04-26 PROCEDURE — 1126F PR PAIN SEVERITY QUANTIFIED, NO PAIN PRESENT: ICD-10-PCS | Mod: HCNC,CPTII,S$GLB, | Performed by: FAMILY MEDICINE

## 2023-04-26 PROCEDURE — 4010F PR ACE/ARB THEARPY RXD/TAKEN: ICD-10-PCS | Mod: HCNC,CPTII,S$GLB, | Performed by: FAMILY MEDICINE

## 2023-04-26 PROCEDURE — 3044F PR MOST RECENT HEMOGLOBIN A1C LEVEL <7.0%: ICD-10-PCS | Mod: HCNC,CPTII,S$GLB, | Performed by: FAMILY MEDICINE

## 2023-04-26 PROCEDURE — 3288F PR FALLS RISK ASSESSMENT DOCUMENTED: ICD-10-PCS | Mod: HCNC,CPTII,S$GLB, | Performed by: FAMILY MEDICINE

## 2023-04-26 PROCEDURE — 71046 X-RAY EXAM CHEST 2 VIEWS: CPT | Mod: 26,HCNC,, | Performed by: STUDENT IN AN ORGANIZED HEALTH CARE EDUCATION/TRAINING PROGRAM

## 2023-04-26 PROCEDURE — 3079F PR MOST RECENT DIASTOLIC BLOOD PRESSURE 80-89 MM HG: ICD-10-PCS | Mod: HCNC,CPTII,S$GLB, | Performed by: FAMILY MEDICINE

## 2023-04-26 PROCEDURE — 3008F PR BODY MASS INDEX (BMI) DOCUMENTED: ICD-10-PCS | Mod: HCNC,CPTII,S$GLB, | Performed by: FAMILY MEDICINE

## 2023-04-26 PROCEDURE — 99999 PR PBB SHADOW E&M-EST. PATIENT-LVL IV: ICD-10-PCS | Mod: PBBFAC,HCNC,, | Performed by: FAMILY MEDICINE

## 2023-04-26 PROCEDURE — 3288F FALL RISK ASSESSMENT DOCD: CPT | Mod: HCNC,CPTII,S$GLB, | Performed by: FAMILY MEDICINE

## 2023-04-26 RX ORDER — AMOXICILLIN AND CLAVULANATE POTASSIUM 875; 125 MG/1; MG/1
1 TABLET, FILM COATED ORAL EVERY 12 HOURS
Qty: 14 TABLET | Refills: 0 | Status: SHIPPED | OUTPATIENT
Start: 2023-04-26 | End: 2023-05-03

## 2023-04-26 RX ORDER — PREDNISONE 50 MG/1
50 TABLET ORAL DAILY
Qty: 5 TABLET | Refills: 0 | Status: SHIPPED | OUTPATIENT
Start: 2023-04-26 | End: 2023-05-01

## 2023-04-26 NOTE — PROGRESS NOTES
Chief Complaint:    Chief Complaint   Patient presents with    Cough     Started about 8 days , cough productive was green & temp of 100 , took 3 days of augmentin . Felt better no more fever , coughing up yellow now, had wheezing yesterday at took breathing treatment and felt better        History of Present Illness:    Patient presents today  She is some cough congestion wheezing she took Augmentin for 3-4 days had some fever that resolved in the yellow-green sputum improved but it is not completely clear she also has COPD.    ROS:  Review of Systems   Constitutional:  Negative for appetite change, chills and fever.   HENT:  Negative for congestion, ear discharge, ear pain, facial swelling, mouth sores, postnasal drip, rhinorrhea, sinus pressure, sneezing, sore throat, trouble swallowing and voice change.    Eyes:  Negative for discharge, redness and itching.   Respiratory:  Negative for cough, chest tightness, shortness of breath and wheezing.    Cardiovascular:  Negative for chest pain.     Past Medical History:   Diagnosis Date    AAA (abdominal aortic aneurysm) 2014    Abdominal aneurysm     3    Acute coronary syndrome     Arthritis     BPPV (benign paroxysmal positional vertigo)     Carotid artery plaque     Carotid artery stenosis and occlusion 2014    Chronic back pain     COPD (chronic obstructive pulmonary disease)     Coronary artery disease     Emphysema lung     Hyperlipidemia     Hypertension     Myocardial infarction     x3    Neuropathy     Personal history of COVID-19 2020 +Covid, recovered at home        Social History:  Social History     Socioeconomic History    Marital status:    Tobacco Use    Smoking status: Former     Packs/day: 0.50     Years: 44.00     Pack years: 22.00     Types: Cigarettes, Vaping w/o nicotine     Start date: 1970     Quit date: 2017     Years since quittin.9    Smokeless tobacco: Never    Tobacco comments:     3 MG  "NICOTINE FOR HEART.    Substance and Sexual Activity    Alcohol use: No    Drug use: No    Sexual activity: Not Currently     Birth control/protection: None     Social Determinants of Health     Financial Resource Strain: Medium Risk    Difficulty of Paying Living Expenses: Somewhat hard   Food Insecurity: Food Insecurity Present    Worried About Running Out of Food in the Last Year: Sometimes true    Ran Out of Food in the Last Year: Sometimes true   Transportation Needs: No Transportation Needs    Lack of Transportation (Medical): No    Lack of Transportation (Non-Medical): No   Physical Activity: Insufficiently Active    Days of Exercise per Week: 1 day    Minutes of Exercise per Session: 10 min   Stress: Stress Concern Present    Feeling of Stress : To some extent   Social Connections: Socially Integrated    Frequency of Communication with Friends and Family: More than three times a week    Frequency of Social Gatherings with Friends and Family: Twice a week    Attends Zoroastrian Services: More than 4 times per year    Active Member of Clubs or Organizations: Yes    Attends Club or Organization Meetings: More than 4 times per year    Marital Status:    Housing Stability: Low Risk     Unable to Pay for Housing in the Last Year: No    Number of Places Lived in the Last Year: 1    Unstable Housing in the Last Year: No       Family History:   family history includes Breast cancer in her paternal aunt; Heart attacks under age 50 in her brother and father; Heart disease in her mother.    Health Maintenance   Topic Date Due    Mammogram  06/09/2022    LDCT Lung Screen  11/08/2023    Lipid Panel  04/18/2024    High Dose Statin  04/26/2024    Aspirin/Antiplatelet Therapy  04/26/2024    DEXA Scan  10/20/2024    TETANUS VACCINE  11/15/2026    Hepatitis C Screening  Completed       Exam:Physical     Vital Signs  Pulse: 64  SpO2: 97 %  BP: 134/80  Pain Score: 0-No pain  Height and Weight  Height: 5' 1.5" (156.2 " cm)  Weight: 62.1 kg (136 lb 12.7 oz)  BSA (Calculated - sq m): 1.64 sq meters  BMI (Calculated): 25.4  Weight in (lb) to have BMI = 25: 134.2]    Body mass index is 25.43 kg/m².    Physical Exam  Constitutional:       Appearance: She is well-developed.   HENT:      Head: Normocephalic and atraumatic.      Right Ear: Tympanic membrane and ear canal normal. Tympanic membrane is not bulging.      Left Ear: Tympanic membrane and ear canal normal. Tympanic membrane is not bulging.      Nose: Nose normal. No rhinorrhea.      Right Sinus: No maxillary sinus tenderness or frontal sinus tenderness.      Left Sinus: No maxillary sinus tenderness or frontal sinus tenderness.      Mouth/Throat:      Mouth: Mucous membranes are moist.      Pharynx: No posterior oropharyngeal erythema.      Tonsils: No tonsillar abscesses.   Eyes:      Conjunctiva/sclera: Conjunctivae normal.      Pupils: Pupils are equal, round, and reactive to light.   Cardiovascular:      Rate and Rhythm: Normal rate.      Heart sounds: Normal heart sounds.   Pulmonary:      Effort: Pulmonary effort is normal. No respiratory distress.      Breath sounds: No stridor. Wheezing present. No rales.   Chest:      Chest wall: No tenderness.   Abdominal:      Palpations: Abdomen is soft.      Tenderness: There is no abdominal tenderness.   Musculoskeletal:      Cervical back: Normal range of motion.   Lymphadenopathy:      Cervical: No cervical adenopathy.   Neurological:      Mental Status: She is alert.         Assessment:      ICD-10-CM ICD-9-CM   1. COPD with acute exacerbation  J44.1 491.21         Plan:      Gemma was seen today for cough.    Diagnoses and all orders for this visit:    COPD with acute exacerbation  -     X-Ray Chest PA And Lateral; Future  -     POCT COVID-19 Rapid Screening    Other orders  -     amoxicillin-clavulanate 875-125mg (AUGMENTIN) 875-125 mg per tablet; Take 1 tablet by mouth every 12 (twelve) hours. for 7 days  -     predniSONE  (DELTASONE) 50 MG Tab; Take 1 tablet (50 mg total) by mouth once daily. for 5 days      Please use the nebulizer every 6 hours      No follow-ups on file.      Olga Altman MD

## 2023-05-04 ENCOUNTER — PATIENT MESSAGE (OUTPATIENT)
Dept: FAMILY MEDICINE | Facility: CLINIC | Age: 68
End: 2023-05-04
Payer: MEDICARE

## 2023-05-04 DIAGNOSIS — R30.0 DYSURIA: Primary | ICD-10-CM

## 2023-05-05 ENCOUNTER — PATIENT MESSAGE (OUTPATIENT)
Dept: FAMILY MEDICINE | Facility: CLINIC | Age: 68
End: 2023-05-05
Payer: MEDICARE

## 2023-05-05 DIAGNOSIS — R30.0 DYSURIA: Primary | ICD-10-CM

## 2023-05-05 RX ORDER — CEPHALEXIN 500 MG/1
500 CAPSULE ORAL 4 TIMES DAILY
Qty: 21 CAPSULE | Refills: 0 | Status: SHIPPED | OUTPATIENT
Start: 2023-05-05 | End: 2023-06-12

## 2023-05-09 ENCOUNTER — HOSPITAL ENCOUNTER (OUTPATIENT)
Dept: RADIOLOGY | Facility: HOSPITAL | Age: 68
Discharge: HOME | End: 2023-05-09
Attending: INTERNAL MEDICINE
Payer: MEDICARE

## 2023-05-09 DIAGNOSIS — I71.43 INFRARENAL ABDOMINAL AORTIC ANEURYSM (AAA) WITHOUT RUPTURE: ICD-10-CM

## 2023-05-09 DIAGNOSIS — R06.02 SHORTNESS OF BREATH: ICD-10-CM

## 2023-05-09 DIAGNOSIS — I70.0 AORTIC ATHEROSCLEROSIS: ICD-10-CM

## 2023-05-09 DIAGNOSIS — R42 DIZZINESS: ICD-10-CM

## 2023-05-09 DIAGNOSIS — I72.2 ANEURYSM OF RENAL ARTERY: ICD-10-CM

## 2023-05-09 DIAGNOSIS — I25.118 CORONARY ARTERY DISEASE OF NATIVE ARTERY OF NATIVE HEART WITH STABLE ANGINA PECTORIS: ICD-10-CM

## 2023-05-09 PROCEDURE — 74174 CTA ABD&PLVS W/CONTRAST: CPT | Mod: 26,,, | Performed by: RADIOLOGY

## 2023-05-09 PROCEDURE — 25500020 PHARM REV CODE 255: Performed by: INTERNAL MEDICINE

## 2023-05-09 PROCEDURE — 74174 CTA ABDOMEN AND PELVIS: ICD-10-PCS | Mod: 26,,, | Performed by: RADIOLOGY

## 2023-05-09 PROCEDURE — 74174 CTA ABD&PLVS W/CONTRAST: CPT | Mod: TC

## 2023-05-09 RX ADMIN — IOHEXOL 100 ML: 350 INJECTION, SOLUTION INTRAVENOUS at 02:05

## 2023-05-10 ENCOUNTER — PATIENT MESSAGE (OUTPATIENT)
Dept: FAMILY MEDICINE | Facility: CLINIC | Age: 68
End: 2023-05-10
Payer: MEDICARE

## 2023-05-10 ENCOUNTER — TELEPHONE (OUTPATIENT)
Dept: CARDIOLOGY | Facility: CLINIC | Age: 68
End: 2023-05-10
Payer: MEDICARE

## 2023-05-10 NOTE — TELEPHONE ENCOUNTER
Patient was notified of results. All questions were answered. Pt verbalized understanding. Pt will call back with any other questions or concerns.      ----- Message from Millie Juarez MD sent at 5/9/2023  5:18 PM CDT -----  Has significant blockages in the gut vessels explaining abdominal pain weight loss.   Her aneurysm has not changed at 4 cm.

## 2023-05-12 ENCOUNTER — TELEPHONE (OUTPATIENT)
Dept: CARDIOLOGY | Facility: CLINIC | Age: 68
End: 2023-05-12
Payer: MEDICARE

## 2023-05-12 DIAGNOSIS — E78.2 MIXED HYPERLIPIDEMIA: ICD-10-CM

## 2023-05-12 DIAGNOSIS — I10 ESSENTIAL HYPERTENSION: Primary | ICD-10-CM

## 2023-05-12 DIAGNOSIS — M79.609 PAIN IN EXTREMITY, UNSPECIFIED EXTREMITY: ICD-10-CM

## 2023-05-12 DIAGNOSIS — I71.43 INFRARENAL ABDOMINAL AORTIC ANEURYSM (AAA) WITHOUT RUPTURE: ICD-10-CM

## 2023-05-12 NOTE — TELEPHONE ENCOUNTER
Spoke to patient and scheduled for procedure on 5/22/23 with Dr. Juarez. Patient will come in on 5/17 for labs and to sign consents.----- Message from Millie Juarez MD sent at 5/10/2023  5:26 PM CDT -----  She needs her bl;ockage addressed to help her symptoms.    She will need to have an angiogram by either me or go see the vascular surgeons she saw DR YAN  ----- Message -----  From: Salma Velásquez LPN  Sent: 5/10/2023   4:46 PM CDT  To: Millie Juarez MD    She wants to know if she needs to see anyone regarding the blockages?  ----- Message -----  From: Millie Juarez MD  Sent: 5/9/2023   5:18 PM CDT  To: Larry HATHAWAY Staff    Has significant blockages in the gut vessels explaining abdominal pain weight loss.   Her aneurysm has not changed at 4 cm.

## 2023-05-15 ENCOUNTER — PATIENT MESSAGE (OUTPATIENT)
Dept: CARDIOLOGY | Facility: HOSPITAL | Age: 68
End: 2023-05-15
Payer: MEDICARE

## 2023-06-12 ENCOUNTER — OFFICE VISIT (OUTPATIENT)
Dept: PULMONOLOGY | Facility: CLINIC | Age: 68
End: 2023-06-12
Payer: MEDICARE

## 2023-06-12 ENCOUNTER — HOSPITAL ENCOUNTER (OUTPATIENT)
Dept: RADIOLOGY | Facility: HOSPITAL | Age: 68
Discharge: HOME | End: 2023-06-12
Attending: NURSE PRACTITIONER
Payer: MEDICARE

## 2023-06-12 VITALS
HEIGHT: 62 IN | OXYGEN SATURATION: 97 % | RESPIRATION RATE: 16 BRPM | SYSTOLIC BLOOD PRESSURE: 138 MMHG | HEART RATE: 74 BPM | DIASTOLIC BLOOD PRESSURE: 68 MMHG | BODY MASS INDEX: 25.1 KG/M2 | WEIGHT: 136.38 LBS

## 2023-06-12 DIAGNOSIS — J44.9 COPD, MODERATE: ICD-10-CM

## 2023-06-12 DIAGNOSIS — F01.A0 MILD VASCULAR DEMENTIA WITHOUT BEHAVIORAL DISTURBANCE, PSYCHOTIC DISTURBANCE, MOOD DISTURBANCE, OR ANXIETY: ICD-10-CM

## 2023-06-12 DIAGNOSIS — F17.211 CIGARETTE NICOTINE DEPENDENCE IN REMISSION: ICD-10-CM

## 2023-06-12 DIAGNOSIS — K21.9 GASTROESOPHAGEAL REFLUX DISEASE, UNSPECIFIED WHETHER ESOPHAGITIS PRESENT: ICD-10-CM

## 2023-06-12 DIAGNOSIS — I10 ESSENTIAL HYPERTENSION: ICD-10-CM

## 2023-06-12 DIAGNOSIS — J44.9 COPD, MODERATE: Primary | ICD-10-CM

## 2023-06-12 DIAGNOSIS — G47.00 INSOMNIA, UNSPECIFIED TYPE: ICD-10-CM

## 2023-06-12 PROCEDURE — 99999 PR PBB SHADOW E&M-EST. PATIENT-LVL V: CPT | Mod: PBBFAC,,, | Performed by: NURSE PRACTITIONER

## 2023-06-12 PROCEDURE — 3008F BODY MASS INDEX DOCD: CPT | Mod: CPTII,S$GLB,, | Performed by: NURSE PRACTITIONER

## 2023-06-12 PROCEDURE — 1101F PR PT FALLS ASSESS DOC 0-1 FALLS W/OUT INJ PAST YR: ICD-10-PCS | Mod: CPTII,S$GLB,, | Performed by: NURSE PRACTITIONER

## 2023-06-12 PROCEDURE — 3288F PR FALLS RISK ASSESSMENT DOCUMENTED: ICD-10-PCS | Mod: CPTII,S$GLB,, | Performed by: NURSE PRACTITIONER

## 2023-06-12 PROCEDURE — 1101F PT FALLS ASSESS-DOCD LE1/YR: CPT | Mod: CPTII,S$GLB,, | Performed by: NURSE PRACTITIONER

## 2023-06-12 PROCEDURE — 99214 OFFICE O/P EST MOD 30 MIN: CPT | Mod: S$GLB,,, | Performed by: NURSE PRACTITIONER

## 2023-06-12 PROCEDURE — 3044F PR MOST RECENT HEMOGLOBIN A1C LEVEL <7.0%: ICD-10-PCS | Mod: CPTII,S$GLB,, | Performed by: NURSE PRACTITIONER

## 2023-06-12 PROCEDURE — 3044F HG A1C LEVEL LT 7.0%: CPT | Mod: CPTII,S$GLB,, | Performed by: NURSE PRACTITIONER

## 2023-06-12 PROCEDURE — 71046 X-RAY EXAM CHEST 2 VIEWS: CPT | Mod: 26,,, | Performed by: RADIOLOGY

## 2023-06-12 PROCEDURE — 3288F FALL RISK ASSESSMENT DOCD: CPT | Mod: CPTII,S$GLB,, | Performed by: NURSE PRACTITIONER

## 2023-06-12 PROCEDURE — 1160F RVW MEDS BY RX/DR IN RCRD: CPT | Mod: CPTII,S$GLB,, | Performed by: NURSE PRACTITIONER

## 2023-06-12 PROCEDURE — 3078F DIAST BP <80 MM HG: CPT | Mod: CPTII,S$GLB,, | Performed by: NURSE PRACTITIONER

## 2023-06-12 PROCEDURE — 99999 PR PBB SHADOW E&M-EST. PATIENT-LVL V: ICD-10-PCS | Mod: PBBFAC,,, | Performed by: NURSE PRACTITIONER

## 2023-06-12 PROCEDURE — 3078F PR MOST RECENT DIASTOLIC BLOOD PRESSURE < 80 MM HG: ICD-10-PCS | Mod: CPTII,S$GLB,, | Performed by: NURSE PRACTITIONER

## 2023-06-12 PROCEDURE — 3075F SYST BP GE 130 - 139MM HG: CPT | Mod: CPTII,S$GLB,, | Performed by: NURSE PRACTITIONER

## 2023-06-12 PROCEDURE — 4010F ACE/ARB THERAPY RXD/TAKEN: CPT | Mod: CPTII,S$GLB,, | Performed by: NURSE PRACTITIONER

## 2023-06-12 PROCEDURE — 71046 XR CHEST PA AND LATERAL: ICD-10-PCS | Mod: 26,,, | Performed by: RADIOLOGY

## 2023-06-12 PROCEDURE — 1159F PR MEDICATION LIST DOCUMENTED IN MEDICAL RECORD: ICD-10-PCS | Mod: CPTII,S$GLB,, | Performed by: NURSE PRACTITIONER

## 2023-06-12 PROCEDURE — 99214 PR OFFICE/OUTPT VISIT, EST, LEVL IV, 30-39 MIN: ICD-10-PCS | Mod: S$GLB,,, | Performed by: NURSE PRACTITIONER

## 2023-06-12 PROCEDURE — 1159F MED LIST DOCD IN RCRD: CPT | Mod: CPTII,S$GLB,, | Performed by: NURSE PRACTITIONER

## 2023-06-12 PROCEDURE — 1160F PR REVIEW ALL MEDS BY PRESCRIBER/CLIN PHARMACIST DOCUMENTED: ICD-10-PCS | Mod: CPTII,S$GLB,, | Performed by: NURSE PRACTITIONER

## 2023-06-12 PROCEDURE — 4010F PR ACE/ARB THEARPY RXD/TAKEN: ICD-10-PCS | Mod: CPTII,S$GLB,, | Performed by: NURSE PRACTITIONER

## 2023-06-12 PROCEDURE — 71046 X-RAY EXAM CHEST 2 VIEWS: CPT | Mod: TC

## 2023-06-12 PROCEDURE — 3075F PR MOST RECENT SYSTOLIC BLOOD PRESS GE 130-139MM HG: ICD-10-PCS | Mod: CPTII,S$GLB,, | Performed by: NURSE PRACTITIONER

## 2023-06-12 PROCEDURE — 3008F PR BODY MASS INDEX (BMI) DOCUMENTED: ICD-10-PCS | Mod: CPTII,S$GLB,, | Performed by: NURSE PRACTITIONER

## 2023-06-12 RX ORDER — HYDROCHLOROTHIAZIDE 12.5 MG/1
12.5 CAPSULE ORAL
COMMUNITY
Start: 2023-05-24 | End: 2023-06-28

## 2023-06-12 RX ORDER — LIDOCAINE AND PRILOCAINE 25; 25 MG/G; MG/G
CREAM TOPICAL
COMMUNITY
Start: 2023-03-06 | End: 2024-03-14

## 2023-06-12 RX ORDER — MIRTAZAPINE 15 MG/1
TABLET, FILM COATED ORAL
COMMUNITY
Start: 2023-05-05 | End: 2023-12-05

## 2023-06-12 RX ORDER — ALBUTEROL SULFATE 90 UG/1
AEROSOL, METERED RESPIRATORY (INHALATION)
COMMUNITY
Start: 2023-04-26 | End: 2024-01-29 | Stop reason: SDUPTHER

## 2023-06-12 RX ORDER — FERROUS SULFATE TAB 325 MG (65 MG ELEMENTAL FE) 325 (65 FE) MG
TAB ORAL
COMMUNITY
Start: 2023-05-12 | End: 2024-01-29 | Stop reason: SDUPTHER

## 2023-06-12 RX ORDER — CARVEDILOL PHOSPHATE 40 MG/1
40 CAPSULE, EXTENDED RELEASE ORAL
COMMUNITY
Start: 2023-05-12 | End: 2023-06-28

## 2023-06-12 RX ORDER — OLMESARTAN MEDOXOMIL 40 MG/1
40 TABLET ORAL
COMMUNITY
Start: 2023-05-12 | End: 2023-06-28

## 2023-06-12 NOTE — ASSESSMENT & PLAN NOTE
Continued management with neurology.   No obstructive sleep apnea. nocturnal hypoxemia on NC 3 L sleep with adherence

## 2023-06-12 NOTE — ASSESSMENT & PLAN NOTE
competed smoking cessation May 2017.  11/8/2022 LDCT benign findings. Follow up 1 year LDCT November 2023.

## 2023-06-12 NOTE — PROGRESS NOTES
Chief Complaint   Patient presents with    COPD       6/12/2023 Jaycob NP   Gemma Vick is a 68 y.o. female present for follow up pulmonary clinic COPD review chest xray.     6/12/2023 chest xray no acute findings.     Presents stable.  Patient reports COPD well controlled on Breztri triple therapy.     Current treatment: Breztri, Combivent. Duo nebs.     On NC 3 L nightly benefits with adherence.   12/12/2022 Overnight oximetry on NC 3 L normal oxygenation  No obstructive sleep apnea.  11/05/2015 PSG Normal Apnea hypopnea index. No GALLO.    Quit cigarette smoking in May 2017.     Still cares for her 3 great grandchildren in her home.     12/12/2022 Jaycob JUAN    Gemma Vick is a 68 y.o. 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     12/12/2022 patient picked up Overnight oximetry testing equipment to testing on NC 3l during sleep.     11/05/2015 PSG Normal Apnea hypopnea index. No GALLO.      Followed in pulmonary for COPD and nocturnal hypoxemia.     Last seen in pulmonary 6/13/2022 Dr. Anand     6/09/2021 Overnight Oxygen Saturation Study: Overnight oxygen saturation study is abnormal with O2 saturation less than 88%.   Time with SpO2<89: 2:10:40, 26.3%of the study period. Lowest oxygen saturation recorded was 74% .    Presents stable. No acute flare. Significant improved on Breztri ICS/LABA/LAMA.    Current regimen: controller Breztri triple therapy ICS/LABA/LAMA controller with chamber.  Rescue combivent respimat and Albuterol inhaler     Quit cigarette smoking in May 2017.      She uses 1 pillow at night. Patient currently is on oxygen at 3 L/min per nasal cannula. at night. No obstructive sleep apnea.   No constitutional symptoms, denies fever, chills, anorexia, or weight loss.    Continues to use nicotine in form of vapor 3 mg typically every 2-3 days.  "  Cares for 12, 11, 7 grand children and two 2 yr old great grand babies. Only exercise limitations if walking long distances.      Previous Report Reviewed: lab reports, office notes and radiology reports     The following portions of the patient's history were reviewed and updated as appropriate: allergies, current medications, past family history, past medical history, past social history, past surgical history and problem list.    Review of Systems  A comprehensive review of systems was negative except for: Respiratory: positive for cough and wheezing     Objective:      /68   Pulse 74   Resp 16   Ht 5' 1.5" (1.562 m)   Wt 61.8 kg (136 lb 5.7 oz)   SpO2 97%   BMI 25.35 kg/m²   General appearance: alert, appears stated age and cooperative  Head: Normocephalic, without obvious abnormality, atraumatic  Eyes: negative  Ears: normal TM's and external ear canals both ears  Nose: Nares normal. Septum midline. Mucosa normal. No drainage or sinus tenderness.  Throat: lips, mucosa, and tongue normal; teeth and gums normal  Neck: no adenopathy and no carotid bruit  Lungs: clear to auscultation bilaterally and no wheezing, no rales.   Abdomen: normal findings: soft, non-tender  Extremities: extremities normal, atraumatic, no cyanosis or edema  Pulses: 2+ and symmetric  Skin: Skin color, texture, turgor normal. No rashes or lesions  Lymph nodes: Cervical, supraclavicular, and axillary nodes normal.  Neurologic: Grossly normal    Diagnostic Review    6/13/2022 Spirometry shows moderate-severe obstruction. Spirometry remains unimproved following bronchodilator     X-Ray Chest PA And Lateral  Narrative: EXAM: XR CHEST PA AND LATERAL    CLINICAL HISTORY:  COPD.    TECHNIQUE: 2 view chest x-ray.    COMPARISON: 04/26/2023.    FINDINGS: The heart is normal in size.  Aorta is atherosclerotic.  Calcified granuloma left upper lobe.  Suspect underlying emphysematous change.  No acute infiltrates.  Impression:  No acute " findings.    Finalized on: 6/12/2023 10:53 AM By:  Angel Starks MD  [facility]# [id number]      2023-06-12 10:[id number]    [facility]      12/13/2022 Overnight Oxygen Saturation Study:  Interpretation:  Conditions of testing: Stable outpatient.  3 L nasal cannula  Overnight oxygen saturuation study revealed transient hypoxemia (oxygen saturation less than 89%). Time with SpO2<89: 0:00:16, 0.0% of study period. Lowest oxygen saturation recorded as 83% .  Clinicial correlation suggested.     Alvin Anand M.D      Medicare Criteria Comments:   Oximetry test results suggest the patient does not fall under Medicare Group 1 Criteria for oxygen prescription.   (Arterial oxygen saturation at or below 88% for at least 5 minutes taken during sleep).       Spirometry 6/9/2021 moderate obstruction FEV1 59.2%.    Spirometry 7/31/2018 FEV1 67% predicted, improved compared to prior.     Pulmonary function tests: 9/18/2017 FEV1: 1.18  (51 % predicted), FVC:  2.32 (78 % predicted), FEV1/FVC:  51 (65 % predicted).  Post bronchodilator: FEV1: 1.41  (62 % predicted), FVC:  2.64 (88 % predicted), FEV1/FVC:  54 (69 % predicted).   Moderate obstructive airflow defect  Positive bronchodilator response with 20% increase in FEV1 post bronchodilator.   FEV1 improved by 14% compared to tomer 12/15/2016.       6/9/2021 Overnight Oxygen Saturation Study: abnormal, 6/9/2021 NC 3 lm sleep.     INTERPRETATION:   Conditions of Test: Noted above in report     Interpretation of results of overnight oxygen saturation study.   This was a technically  adequate study. Overnight oxygen   saturation study is abnormal with O2 saturation less than 88%.   Time with SpO2<89: 2:10:40, 26.3%of the study period. Lowest   oxygen saturation recorded was 74% .   Clinical correlation suggested.   Andres Morales MD     Medicare Criteria Comments:   Overnight Oximetry test results suggest the patient does fall   under Medicare Group 1 Criteria and would be eligible for oxygen    prescription.    (Arterial oxygen saturation at or below 88% for at least 5   minutes taken during sleep on stable outpatient)      10/29/2015 PSG Normal Apnea hypopnea index. AHI 0.0. No GALLO. The low SpO2 was 86%. Period with saturations below 88% was 39.9 minutes. Supplementary oxygen indicated with sleep.       Assessment:          1. COPD, moderate  Complete PFT with bronchodilator      2. Cigarette nicotine dependence in remission  CT Chest Lung Screening Low Dose      3. Essential hypertension        4. Gastroesophageal reflux disease, unspecified whether esophagitis present        5. Insomnia, unspecified type        6. Mild vascular dementia without behavioral disturbance, psychotic disturbance, mood disturbance, or anxiety               Plan:     Problem List Items Addressed This Visit       Mild vascular dementia without behavioral disturbance, psychotic disturbance, mood disturbance, or anxiety     Continued management with neurology.   No obstructive sleep apnea. nocturnal hypoxemia on NC 3 L sleep with adherence         Insomnia     Stable on ambien 10 mg. No obstructive sleep apnea. On NC 3 L sleep. Continued management with primary care provider          GERD (gastroesophageal reflux disease)     Stable continued management with primary care provider           Essential hypertension     Stable and controlled. Continue current treatment plan as previously prescribed with your cardiologist and [facility] htn program             COPD, moderate - Primary     Controlled on Breztri, combivent, Albuterol inhaler. Duo nebs if needed.  NC 3lm sleep with benefit and adherence.   6/9/2021 spirometry moderate obstruction FEV1 59.2  The current medical regimen is effective;  continue present plan and medications.  Follow up 6 months cpft/LDCT           Relevant Orders    Complete PFT with bronchodilator    Cigarette nicotine dependence in remission     competed smoking cessation May 2017.  11/8/2022 LDCT benign  findings. Follow up 1 year LDCT November 2023.          Relevant Orders    CT Chest Lung Screening Low Dose     I spent a total of 32 minutes on the day of the visit.  This includes face to face time and non-face to face time preparing to see the patient (eg, review of tests), obtaining and/or reviewing separately obtained history, documenting clinical information in the electronic or other health record, independently interpreting results and communicating results to the patient/family/caregiver, or care coordinator.       Discussed diagnosis, its evaluation, treatment and usual course.  All questions answered.      Follow up in about 5 months (around 11/14/2023) for COPD cpft, LDCT same day appt at [facility]. .

## 2023-06-12 NOTE — ASSESSMENT & PLAN NOTE
Stable on ambien 10 mg. No obstructive sleep apnea. On NC 3 L sleep. Continued management with primary care provider

## 2023-06-12 NOTE — ASSESSMENT & PLAN NOTE
Controlled on Breztri, combivent, Albuterol inhaler. Duo nebs if needed.  NC 3lm sleep with benefit and adherence.   6/9/2021 spirometry moderate obstruction FEV1 59.2  The current medical regimen is effective;  continue present plan and medications.  Follow up 6 months cpft/LDCT

## 2023-06-14 ENCOUNTER — HOSPITAL ENCOUNTER (OUTPATIENT)
Dept: CARDIOLOGY | Facility: HOSPITAL | Age: 68
Discharge: HOME | End: 2023-06-14
Attending: INTERNAL MEDICINE
Payer: MEDICARE

## 2023-06-14 ENCOUNTER — TELEPHONE (OUTPATIENT)
Dept: CARDIOLOGY | Facility: CLINIC | Age: 68
End: 2023-06-14
Payer: MEDICARE

## 2023-06-14 ENCOUNTER — HOSPITAL ENCOUNTER (OUTPATIENT)
Dept: RADIOLOGY | Facility: HOSPITAL | Age: 68
Discharge: HOME | End: 2023-06-14
Attending: INTERNAL MEDICINE
Payer: MEDICARE

## 2023-06-14 VITALS
SYSTOLIC BLOOD PRESSURE: 138 MMHG | DIASTOLIC BLOOD PRESSURE: 68 MMHG | HEIGHT: 61 IN | WEIGHT: 136 LBS | BODY MASS INDEX: 25.68 KG/M2

## 2023-06-14 DIAGNOSIS — I70.0 AORTIC ATHEROSCLEROSIS: ICD-10-CM

## 2023-06-14 DIAGNOSIS — R42 DIZZINESS: ICD-10-CM

## 2023-06-14 DIAGNOSIS — R06.02 SHORTNESS OF BREATH: ICD-10-CM

## 2023-06-14 DIAGNOSIS — I71.43 INFRARENAL ABDOMINAL AORTIC ANEURYSM (AAA) WITHOUT RUPTURE: ICD-10-CM

## 2023-06-14 DIAGNOSIS — I25.118 CORONARY ARTERY DISEASE OF NATIVE ARTERY OF NATIVE HEART WITH STABLE ANGINA PECTORIS: ICD-10-CM

## 2023-06-14 LAB
AORTIC ROOT ANNULUS: 3.23 CM
ASCENDING AORTA: 3.02 CM
AV INDEX (PROSTH): 0.82
AV MEAN GRADIENT: 4 MMHG
AV PEAK GRADIENT: 8 MMHG
AV REGURGITATION PRESSURE HALF TIME: 408.66 MS
AV VALVE AREA: 2.36 CM2
AV VELOCITY RATIO: 0.84
BSA FOR ECHO PROCEDURE: 1.63 M2
CV ECHO LV RWT: 0.47 CM
CV STRESS BASE HR: 58 BPM
DIASTOLIC BLOOD PRESSURE: 72 MMHG
DOP CALC AO PEAK VEL: 1.43 M/S
DOP CALC AO VTI: 37.7 CM
DOP CALC LVOT AREA: 2.9 CM2
DOP CALC LVOT DIAMETER: 1.91 CM
DOP CALC LVOT PEAK VEL: 1.2 M/S
DOP CALC LVOT STROKE VOLUME: 89.06 CM3
DOP CALC RVOT PEAK VEL: 0.68 M/S
DOP CALC RVOT VTI: 18 CM
DOP CALCLVOT PEAK VEL VTI: 31.1 CM
E WAVE DECELERATION TIME: 210.28 MSEC
E/A RATIO: 1.06
E/E' RATIO: 19.82 M/S
ECHO LV POSTERIOR WALL: 0.99 CM (ref 0.6–1.1)
EJECTION FRACTION: 60 %
FRACTIONAL SHORTENING: 33 % (ref 28–44)
INTERVENTRICULAR SEPTUM: 0.99 CM (ref 0.6–1.1)
IVC DIAMETER: 1.06 CM
IVRT: 125.59 MSEC
LA MAJOR: 4.81 CM
LA MINOR: 4.6 CM
LA WIDTH: 3.4 CM
LEFT ATRIUM SIZE: 2.99 CM
LEFT ATRIUM VOLUME INDEX MOD: 25.7 ML/M2
LEFT ATRIUM VOLUME INDEX: 25.4 ML/M2
LEFT ATRIUM VOLUME MOD: 41.15 CM3
LEFT ATRIUM VOLUME: 40.64 CM3
LEFT INTERNAL DIMENSION IN SYSTOLE: 2.81 CM (ref 2.1–4)
LEFT VENTRICLE DIASTOLIC VOLUME INDEX: 48.65 ML/M2
LEFT VENTRICLE DIASTOLIC VOLUME: 77.84 ML
LEFT VENTRICLE MASS INDEX: 84 G/M2
LEFT VENTRICLE SYSTOLIC VOLUME INDEX: 18.6 ML/M2
LEFT VENTRICLE SYSTOLIC VOLUME: 29.76 ML
LEFT VENTRICULAR INTERNAL DIMENSION IN DIASTOLE: 4.18 CM (ref 3.5–6)
LEFT VENTRICULAR MASS: 134.31 G
LV LATERAL E/E' RATIO: 18.17 M/S
LV SEPTAL E/E' RATIO: 21.8 M/S
LVOT MG: 2.68 MMHG
LVOT MV: 0.75 CM/S
MV PEAK A VEL: 1.03 M/S
MV PEAK E VEL: 1.09 M/S
MV STENOSIS PRESSURE HALF TIME: 60.98 MS
MV VALVE AREA P 1/2 METHOD: 3.61 CM2
NUC REST EJECTION FRACTION: 75
NUC STRESS EJECTION FRACTION: 72 %
OHS CV CPX 85 PERCENT MAX PREDICTED HEART RATE MALE: 124
OHS CV CPX ESTIMATED METS: 1
OHS CV CPX MAX PREDICTED HEART RATE: 146
OHS CV CPX PATIENT IS FEMALE: 1
OHS CV CPX PATIENT IS MALE: 0
OHS CV CPX PEAK DIASTOLIC BLOOD PRESSURE: 68 MMHG
OHS CV CPX PEAK HEAR RATE: 75 BPM
OHS CV CPX PEAK RATE PRESSURE PRODUCT: NORMAL
OHS CV CPX PEAK SYSTOLIC BLOOD PRESSURE: 138 MMHG
OHS CV CPX PERCENT MAX PREDICTED HEART RATE ACHIEVED: 51
OHS CV CPX RATE PRESSURE PRODUCT PRESENTING: 7772
PISA AR MAX VEL: 5.01 M/S
PISA TR MAX VEL: 2.8 M/S
PV MEAN GRADIENT: 1.01 MMHG
PV PEAK VELOCITY: 0.71 CM/S
RA MAJOR: 4.04 CM
RA WIDTH: 3.22 CM
RIGHT VENTRICULAR END-DIASTOLIC DIMENSION: 2.95 CM
SINUS: 3.33 CM
STJ: 3.04 CM
STRESS ECHO POST EXERCISE DUR MIN: 0 MINUTES
STRESS ECHO POST EXERCISE DUR SEC: 43 SECONDS
SYSTOLIC BLOOD PRESSURE: 134 MMHG
TDI LATERAL: 0.06 M/S
TDI SEPTAL: 0.05 M/S
TDI: 0.06 M/S
TR MAX PG: 31 MMHG

## 2023-06-14 PROCEDURE — 93017 CV STRESS TEST TRACING ONLY: CPT

## 2023-06-14 PROCEDURE — 78452 HT MUSCLE IMAGE SPECT MULT: CPT | Mod: 26,,, | Performed by: INTERNAL MEDICINE

## 2023-06-14 PROCEDURE — 93306 TTE W/DOPPLER COMPLETE: CPT

## 2023-06-14 PROCEDURE — 93018 CV STRESS TEST I&R ONLY: CPT | Mod: ,,, | Performed by: INTERNAL MEDICINE

## 2023-06-14 PROCEDURE — A9502 TC99M TETROFOSMIN: HCPCS

## 2023-06-14 PROCEDURE — 93018 NUCLEAR STRESS - CARDIOLOGY INTERPRETED (CUPID ONLY): ICD-10-PCS | Mod: ,,, | Performed by: INTERNAL MEDICINE

## 2023-06-14 PROCEDURE — 93016 CV STRESS TEST SUPVJ ONLY: CPT | Mod: ,,, | Performed by: INTERNAL MEDICINE

## 2023-06-14 PROCEDURE — 93306 TTE W/DOPPLER COMPLETE: CPT | Mod: 26,,, | Performed by: INTERNAL MEDICINE

## 2023-06-14 PROCEDURE — 78452 NUCLEAR STRESS - CARDIOLOGY INTERPRETED (CUPID ONLY): ICD-10-PCS | Mod: 26,,, | Performed by: INTERNAL MEDICINE

## 2023-06-14 PROCEDURE — 93016 NUCLEAR STRESS - CARDIOLOGY INTERPRETED (CUPID ONLY): ICD-10-PCS | Mod: ,,, | Performed by: INTERNAL MEDICINE

## 2023-06-14 PROCEDURE — 93306 ECHO (CUPID ONLY): ICD-10-PCS | Mod: 26,,, | Performed by: INTERNAL MEDICINE

## 2023-06-14 PROCEDURE — 63600175 PHARM REV CODE 636 W HCPCS: Performed by: INTERNAL MEDICINE

## 2023-06-14 RX ORDER — REGADENOSON 0.08 MG/ML
0.4 INJECTION, SOLUTION INTRAVENOUS ONCE
Status: COMPLETED | OUTPATIENT
Start: 2023-06-14 | End: 2023-06-14

## 2023-06-14 RX ADMIN — REGADENOSON 0.4 MG: 0.08 INJECTION, SOLUTION INTRAVENOUS at 01:06

## 2023-06-14 NOTE — TELEPHONE ENCOUNTER
Spoke to patient after speaking with dr. Juarez and she will be seen on Friday at 8am at [facility].----- Message from Millie Juarez MD sent at 6/14/2023  8:12 AM CDT -----  Contact: cherrie  Yes she needs to make up her mind may be need to come to the office and discuss face to face.  ----- Message -----  From: Salma Velásquez LPN  Sent: 6/13/2023   4:10 PM CDT  To: Millie Juarez MD    Patient is calling and wants to go ahead and do the procedure after she though about it..She talked to her other doctor and they told her that is a lot of her problems and wants to know if you are ok with doing it next week.? You are already full for Monday, do you want to get her in on Tuesday?  ----- Message -----  From: Sonya Johnson  Sent: 6/13/2023   4:00 PM CDT  To: Larry HATHAWAY Staff    Patient is calling to speak with the nurse regarding appointment . Reports stats needing to schedule test and wants to know if she can be seen tomorrow. Please give the patient a call back at [phone]  Thanks [email or web address]

## 2023-06-14 NOTE — TELEPHONE ENCOUNTER
Patient was notified of results. All questions were answered. Pt verbalized understanding. Pt will call back with any other questions or concerns.      ----- Message from Millie Juarez MD sent at 6/14/2023  2:26 PM CDT -----  Normal heart function.  Has high pressure in lungs from her smoking   Leak in valve from htn

## 2023-06-15 ENCOUNTER — TELEPHONE (OUTPATIENT)
Dept: CARDIOLOGY | Facility: CLINIC | Age: 68
End: 2023-06-15
Payer: MEDICARE

## 2023-06-15 DIAGNOSIS — I10 ESSENTIAL HYPERTENSION: Primary | ICD-10-CM

## 2023-06-15 NOTE — TELEPHONE ENCOUNTER
Patient was notified of results. All questions were answered. Pt verbalized understanding. Pt will call back with any other questions or concerns.      ----- Message from Millie Juarez MD sent at 6/14/2023  7:02 PM CDT -----  Stress test negative

## 2023-06-16 ENCOUNTER — HOSPITAL ENCOUNTER (OUTPATIENT)
Dept: CARDIOLOGY | Facility: HOSPITAL | Age: 68
Discharge: HOME | End: 2023-06-16
Attending: INTERNAL MEDICINE
Payer: MEDICARE

## 2023-06-16 ENCOUNTER — OFFICE VISIT (OUTPATIENT)
Dept: CARDIOLOGY | Facility: CLINIC | Age: 68
End: 2023-06-16
Payer: MEDICARE

## 2023-06-16 ENCOUNTER — PATIENT MESSAGE (OUTPATIENT)
Dept: PULMONOLOGY | Facility: CLINIC | Age: 68
End: 2023-06-16
Payer: MEDICARE

## 2023-06-16 VITALS
BODY MASS INDEX: 25.34 KG/M2 | SYSTOLIC BLOOD PRESSURE: 128 MMHG | HEART RATE: 54 BPM | DIASTOLIC BLOOD PRESSURE: 72 MMHG | OXYGEN SATURATION: 98 % | HEIGHT: 61 IN | WEIGHT: 134.25 LBS

## 2023-06-16 DIAGNOSIS — I49.5 SINUS NODE DYSFUNCTION: ICD-10-CM

## 2023-06-16 DIAGNOSIS — G47.36 NOCTURNAL HYPOXEMIA DUE TO EMPHYSEMA: ICD-10-CM

## 2023-06-16 DIAGNOSIS — I25.118 CORONARY ARTERY DISEASE OF NATIVE ARTERY OF NATIVE HEART WITH STABLE ANGINA PECTORIS: ICD-10-CM

## 2023-06-16 DIAGNOSIS — I70.0 AORTIC ATHEROSCLEROSIS: ICD-10-CM

## 2023-06-16 DIAGNOSIS — E78.2 MIXED HYPERLIPIDEMIA: ICD-10-CM

## 2023-06-16 DIAGNOSIS — I10 ESSENTIAL HYPERTENSION: ICD-10-CM

## 2023-06-16 DIAGNOSIS — J44.9 COPD, MODERATE: ICD-10-CM

## 2023-06-16 DIAGNOSIS — R73.03 PREDIABETES: ICD-10-CM

## 2023-06-16 DIAGNOSIS — F17.211 CIGARETTE NICOTINE DEPENDENCE IN REMISSION: ICD-10-CM

## 2023-06-16 DIAGNOSIS — I71.43 INFRARENAL ABDOMINAL AORTIC ANEURYSM (AAA) WITHOUT RUPTURE: ICD-10-CM

## 2023-06-16 DIAGNOSIS — J43.9 NOCTURNAL HYPOXEMIA DUE TO EMPHYSEMA: ICD-10-CM

## 2023-06-16 DIAGNOSIS — F01.A0 MILD VASCULAR DEMENTIA WITHOUT BEHAVIORAL DISTURBANCE, PSYCHOTIC DISTURBANCE, MOOD DISTURBANCE, OR ANXIETY: ICD-10-CM

## 2023-06-16 DIAGNOSIS — I65.23 BILATERAL CAROTID ARTERY STENOSIS: ICD-10-CM

## 2023-06-16 DIAGNOSIS — K55.1 MESENTERIC ISCHEMIA, CHRONIC: Primary | ICD-10-CM

## 2023-06-16 PROCEDURE — 3074F SYST BP LT 130 MM HG: CPT | Mod: CPTII,S$GLB,, | Performed by: INTERNAL MEDICINE

## 2023-06-16 PROCEDURE — 3288F FALL RISK ASSESSMENT DOCD: CPT | Mod: CPTII,S$GLB,, | Performed by: INTERNAL MEDICINE

## 2023-06-16 PROCEDURE — 3044F HG A1C LEVEL LT 7.0%: CPT | Mod: CPTII,S$GLB,, | Performed by: INTERNAL MEDICINE

## 2023-06-16 PROCEDURE — 3074F PR MOST RECENT SYSTOLIC BLOOD PRESSURE < 130 MM HG: ICD-10-PCS | Mod: CPTII,S$GLB,, | Performed by: INTERNAL MEDICINE

## 2023-06-16 PROCEDURE — 3008F PR BODY MASS INDEX (BMI) DOCUMENTED: ICD-10-PCS | Mod: CPTII,S$GLB,, | Performed by: INTERNAL MEDICINE

## 2023-06-16 PROCEDURE — 4010F PR ACE/ARB THEARPY RXD/TAKEN: ICD-10-PCS | Mod: CPTII,S$GLB,, | Performed by: INTERNAL MEDICINE

## 2023-06-16 PROCEDURE — 1126F PR PAIN SEVERITY QUANTIFIED, NO PAIN PRESENT: ICD-10-PCS | Mod: CPTII,S$GLB,, | Performed by: INTERNAL MEDICINE

## 2023-06-16 PROCEDURE — 99215 OFFICE O/P EST HI 40 MIN: CPT | Mod: S$GLB,,, | Performed by: INTERNAL MEDICINE

## 2023-06-16 PROCEDURE — 93005 ELECTROCARDIOGRAM TRACING: CPT

## 2023-06-16 PROCEDURE — 3008F BODY MASS INDEX DOCD: CPT | Mod: CPTII,S$GLB,, | Performed by: INTERNAL MEDICINE

## 2023-06-16 PROCEDURE — 1160F RVW MEDS BY RX/DR IN RCRD: CPT | Mod: CPTII,S$GLB,, | Performed by: INTERNAL MEDICINE

## 2023-06-16 PROCEDURE — 1126F AMNT PAIN NOTED NONE PRSNT: CPT | Mod: CPTII,S$GLB,, | Performed by: INTERNAL MEDICINE

## 2023-06-16 PROCEDURE — 3078F DIAST BP <80 MM HG: CPT | Mod: CPTII,S$GLB,, | Performed by: INTERNAL MEDICINE

## 2023-06-16 PROCEDURE — 1101F PR PT FALLS ASSESS DOC 0-1 FALLS W/OUT INJ PAST YR: ICD-10-PCS | Mod: CPTII,S$GLB,, | Performed by: INTERNAL MEDICINE

## 2023-06-16 PROCEDURE — 99999 PR PBB SHADOW E&M-EST. PATIENT-LVL III: CPT | Mod: PBBFAC,,, | Performed by: INTERNAL MEDICINE

## 2023-06-16 PROCEDURE — 99215 PR OFFICE/OUTPT VISIT, EST, LEVL V, 40-54 MIN: ICD-10-PCS | Mod: S$GLB,,, | Performed by: INTERNAL MEDICINE

## 2023-06-16 PROCEDURE — 1159F PR MEDICATION LIST DOCUMENTED IN MEDICAL RECORD: ICD-10-PCS | Mod: CPTII,S$GLB,, | Performed by: INTERNAL MEDICINE

## 2023-06-16 PROCEDURE — 3044F PR MOST RECENT HEMOGLOBIN A1C LEVEL <7.0%: ICD-10-PCS | Mod: CPTII,S$GLB,, | Performed by: INTERNAL MEDICINE

## 2023-06-16 PROCEDURE — 1101F PT FALLS ASSESS-DOCD LE1/YR: CPT | Mod: CPTII,S$GLB,, | Performed by: INTERNAL MEDICINE

## 2023-06-16 PROCEDURE — 4010F ACE/ARB THERAPY RXD/TAKEN: CPT | Mod: CPTII,S$GLB,, | Performed by: INTERNAL MEDICINE

## 2023-06-16 PROCEDURE — 3078F PR MOST RECENT DIASTOLIC BLOOD PRESSURE < 80 MM HG: ICD-10-PCS | Mod: CPTII,S$GLB,, | Performed by: INTERNAL MEDICINE

## 2023-06-16 PROCEDURE — 93010 ELECTROCARDIOGRAM REPORT: CPT | Mod: ,,, | Performed by: INTERNAL MEDICINE

## 2023-06-16 PROCEDURE — 99999 PR PBB SHADOW E&M-EST. PATIENT-LVL III: ICD-10-PCS | Mod: PBBFAC,,, | Performed by: INTERNAL MEDICINE

## 2023-06-16 PROCEDURE — 3288F PR FALLS RISK ASSESSMENT DOCUMENTED: ICD-10-PCS | Mod: CPTII,S$GLB,, | Performed by: INTERNAL MEDICINE

## 2023-06-16 PROCEDURE — 1160F PR REVIEW ALL MEDS BY PRESCRIBER/CLIN PHARMACIST DOCUMENTED: ICD-10-PCS | Mod: CPTII,S$GLB,, | Performed by: INTERNAL MEDICINE

## 2023-06-16 PROCEDURE — 93010 EKG 12-LEAD: ICD-10-PCS | Mod: ,,, | Performed by: INTERNAL MEDICINE

## 2023-06-16 PROCEDURE — 1159F MED LIST DOCD IN RCRD: CPT | Mod: CPTII,S$GLB,, | Performed by: INTERNAL MEDICINE

## 2023-06-16 NOTE — TELEPHONE ENCOUNTER
Telephoned patient regarding her question regarding Pul HTN, dr gallego advised related to her COPD.   She states she read on [facility] that there is medication for pul htn.  Advised she is maximized on care for COPD.   On home O2 for sleep  No sleep apnea   She should follow up with dr Morales in November 2023 or make sooner follow up if she would like to discuss as Dr. Morales would need to make recommendations if medication is indicated.     6/14/2023 ECHO            Concentric remodeling and normal systolic function.  The estimated ejection fraction is 60%.  Indeterminate left ventricular diastolic function.  Normal right ventricular size with normal right ventricular systolic function.  Mild tricuspid regurgitation.  There is pulmonary hypertension. The estimated PA systolic pressure is greater than 31 mmHg.  Mild aortic regurgitation.

## 2023-06-16 NOTE — PROGRESS NOTES
Subjective:   Patient ID:  Gemma Vick is a 68 y.o. female who presents for follow up of No chief complaint on file.      HPI  3/26/2020  A 66 yo female with pvd carotid disease htn hlp copd exsmoker on nocturnal o2 therapy wants to discuss test results. She is in digital htn has been unable to tolerate bisoprolol daily feels tired fatigued no energy she takes meds regularily tries to be compliant with salt no exercise but stays busy in the house. She takes care of her grandbabies. Has occasional leg swelling she takes lasix prn for weight change she stands on her feet a lot. She is compliant with inhalers to control shortness of breath no palpitation has chronic cough.  Her carotid u/s reviewed unchanged.abd u/s no aneurysm  Lipids on target she is well controlled on vytorin .she has difficulty with crestor and lipitor   9/24/2020  She is here for  f/u she is complaining of recurrent episodes of abdominal pain lower quadrant and got to be upper abdomen associated with nausea vomiting no post prandial pain no weight loss or food avoidance. Has no new symptoms of chest pain she is still short of breath no new palpitation tia.   Ct abdomen showed diverticulitis aaa 3.4 cm and sma stenosis.   Her ldl has increased. She has had sore muscles she stopped statins w/o improvemnet. She needs to back on statins that would explain he rincreased ldl.      3/25/2021  Here for f/u has sharp recurrent left sided occurring at rest sitting in recliner she cannot take a deep breath no exertional symptoms she takes care of her grandbabies no smoking no tia palpitation syncope near syncope. She takes  meds regularily .reveiw of her bp chart still showed elevated indices. She claims salt compliance. She could not tolerate amlodipine bid . Her lipids aRE ON TARGET.      9/30/2021    has been using o2 therapy at nite no change in symptoms her bp is elevated she takes amlodipine 5 mg at nite she is not compliant with diet. Salt  castellano.she takes care of grandkids no chest pain no cardiac symptoms. She has the urge to go to bathroom after she eats she has upperabdominal pain and feels like throwing up. Has known stenosis of the sma  She has lost weight.      11/1/2021   She is taking 7.5 mg amlodipine still needs better  salt control bp acceptable here however at home is more elevated but we have not checked her machine. She continues to have abdominal symptoms. She wants alternatives to ibesartan her pill is too big to swallow her lipids are on target. (she will check on diovan 320 and benicar 40 mg with pharmacist about size and cost).she is in digital htn her bp readings are more elevated that today clinic readings.she ahs not been compliant with diet she had fried chicken mashed potatoes french fries and   And biscuits  From [employer].   She had cta for her abdominal symptoms showing celiac stenosis and stenosis at the sma. Has also bilateral iliac stenosis greater than 50%.   Her carotid anatomy is unchanged.         12/8/2021   Today bp is controlled she ahs taken a fluid pill her bp readings at home since last visit has been 160-170 range. She si non compliant with salt she took diuretics due to leg swelling which is related to amlodipine. She has also an mason with exercise that did not suggest significant disease she continues to have symptoms suggestive of musculoskeltal on the rt and sciatica. No definite claudication no discoloration. she has no tia.      4/1/2022  Not much leg swelling has been drinking a lot of water bp fluctuates based on salt her digital htn reflects that now today is on target. Has been in after hours due abdominal pain constipation issue. Has nausea at that time . All improved still          dealing with constipation has use magnesium citrate.   She is not using diuretics except intermittently   Has question about arthritis meds she can take advised short course is ok but prolonged use is high risk of bleeding.       7/27/2022  Here for evaluation for colonoscopy. Has a positive school screening test was scheduled for colonoscopy . Has iron deficiency anemia has fatigue. She has seen vascular surgery because her colonoscopy was cancel;ed has m,oderate carotid disease. Has cta mesenteric stenosis. She has abdominal pain has improvement after taking bentyl some times she vomited digetsed food after eating no diarrhea  Has postprandial pain  opn the rt then progresses to to the bottom and lower abdomen. She is non compliant with salt that is why her bp is elevated.      12/8/2022   Here forf /u has been evaluated by ep no need for any pacer or intervention. Has been evaluated by vascular surgery no significant carotid disease. She is getting very forgetful was evaluated at [facility] for dementia she is labeled as vascular dementia  and shrinking brain. Her aaa is around 4 cm.   She had a slaty meal before coming to see me her bp is elevated she is non complaint with salt intake. She cannot remember her meds. She stopped a bp med that she does not remember the name. No cardiac symptoms.      4/25/2023 family is present they are concerned about her situation.   here for f /u has still been having abdominal pain post prandial. Has vascular dementia per neurologist opinion. She is getting forgetful. However she is not complaint with salt .charlotte ate at [employer] before she comes see em today. had back pain no tia no claudication. Had a death  in family her brother passed away. She is concerned about her heart . Has aaa around 4 cm.    6/16/2023  HERE TO DISCUSS HER CTA FINDINGS SHE CONTINUES TO HAVE POSTPRANDIAL ABDOMINAL PAIN CRAMPS SHE IS NOT ABLE TOE AT HER MEALS SHE IHAS LOST A LOT OF WEIGHT. HER CTA SHOWED SEVERE CELIAC AND SMA DISEASE. . SHE IS TRYING TO UNDERSTAND HER SITUATION CLINICALLY AND NEED FOR INTERVENTION. HER DAUGHTER WAS PRESENT TO HELP UNDERSTAND SINCE SHE HAS VASCULAR DEMENTIA. I ANSWERED ALL THEIR QUESTIONS  REVIEWED CTA PICTURES AND EXPLAINED PROCEDURE IN DETAIL WITH INDICATIONS RISK BENEFITS AND COMPLICATIONS  Past Medical History:   Diagnosis Date    AAA (abdominal aortic aneurysm) 2014    Abdominal aneurysm     3    Acute coronary syndrome     Arthritis     BPPV (benign paroxysmal positional vertigo)     Carotid artery plaque     Carotid artery stenosis and occlusion 2014    Chronic back pain     COPD (chronic obstructive pulmonary disease)     Coronary artery disease     Emphysema lung     Hyperlipidemia     Hypertension     Myocardial infarction     x3    Neuropathy     Personal history of COVID-19 2020 +Covid, recovered at home        Past Surgical History:   Procedure Laterality Date    CARDIAC CATHETERIZATION      CORONARY ANGIOPLASTY      HYSTERECTOMY         Social History     Tobacco Use    Smoking status: Former     Packs/day: 0.50     Years: 44.00     Pack years: 22.00     Types: Cigarettes, Vaping w/o nicotine     Start date: 1970     Quit date: 2017     Years since quittin.1    Smokeless tobacco: Never    Tobacco comments:     3 MG NICOTINE FOR HEART.    Substance Use Topics    Alcohol use: No    Drug use: No       Family History   Problem Relation Age of Onset    Heart disease Mother     Heart attacks under age 50 Father     Heart attacks under age 50 Brother     Breast cancer Paternal Aunt        Current Outpatient Medications   Medication Sig    albuterol (PROVENTIL/VENTOLIN HFA) 90 mcg/actuation inhaler     albuterol-ipratropium (DUO-NEB) 2.5 mg-0.5 mg/3 mL nebulizer solution     amLODIPine (NORVASC) 5 MG tablet Take 1 tablet (5 mg total) by mouth once daily.    aspirin 81 MG Chew Take 81 mg by mouth once daily.    bisoprolol (ZEBETA) 5 MG tablet Take 5 mg by mouth once daily.    budesonide-glycopyr-formoterol (BREZTRI AEROSPHERE) 160-9-4.8 mcg/actuation HFAA Inhale 2 puffs into the lungs 2 (two) times a day.    carvedilol PHOSPHATE 40 MG ORAL CM24 (COREG  CR) 40 MG CM24 Take 40 mg by mouth.    clopidogreL (PLAVIX) 75 mg tablet TAKE 1 TABLET BY MOUTH ONCE A DAY    COMBIVENT RESPIMAT  mcg/actuation inhaler Inhale 1 puff into the lungs every 6 (six) hours as needed for Wheezing.    dicyclomine (BENTYL) 10 MG capsule TAKE 1 CAPSULE BY MOUTH 4 TIMES DAILY BEFORE MEALS AND NIGHTLY Strength: 10 mg (Patient taking differently: 4 (four) times daily as needed. TAKE 1 CAPSULE BY MOUTH 4 TIMES DAILY BEFORE MEALS AND NIGHTLY Strength: 10 mg)    ezetimibe-simvastatin 10-40 mg (VYTORIN) 10-40 mg per tablet TAKE 1 TABLET BY MOUTH EVERY EVENING    FEROSUL 325 mg (65 mg iron) Tab tablet TAKE (1) TABLET BY MOUTH 2 TIMES A DAY WITH MEALS    furosemide (LASIX) 20 MG tablet TAKE 20 MG TABLET ONCE DAILY (Patient taking differently: daily as needed. TAKE 20 MG TABLET ONCE DAILY)    hydroCHLOROthiazide (MICROZIDE) 12.5 mg capsule Take 12.5 mg by mouth.    inhalation spacing device (COMPACT SPACE CHAMBER) Use as directed for inhalation.    LIDOcaine-prilocaine (EMLA) cream     memantine (NAMENDA) 5 MG Tab Take 10 mg by mouth once daily.    mirtazapine (REMERON) 15 MG tablet     olmesartan (BENICAR) 40 MG tablet Take 40 mg by mouth.    OXYGEN-AIR DELIVERY SYSTEMS MISC 3 L by Misc.(Non-Drug; Combo Route) route every evening.    pantoprazole (PROTONIX) 40 MG tablet TAKE 1 TABLET BY MOUTH ONCE A DAY    vitamin D (VITAMIN D3) 1000 units Tab Take 1,000 Units by mouth once daily.    zolpidem (AMBIEN) 10 mg Tab TAKE 1 TABLET BY MOUTH EVERY EVENING    hydroCHLOROthiazide (HYDRODIURIL) 12.5 MG Tab Take 1 tablet (12.5 mg total) by mouth once daily.    TENS unit and electrodes Cmpk 1 each by Misc.(Non-Drug; Combo Route) route 3 (three) times daily as needed (for back pain).     No current facility-administered medications for this visit.     Current Outpatient Medications on File Prior to Visit   Medication Sig    albuterol (PROVENTIL/VENTOLIN HFA) 90 mcg/actuation inhaler     albuterol-ipratropium  (DUO-NEB) 2.5 mg-0.5 mg/3 mL nebulizer solution     amLODIPine (NORVASC) 5 MG tablet Take 1 tablet (5 mg total) by mouth once daily.    aspirin 81 MG Chew Take 81 mg by mouth once daily.    bisoprolol (ZEBETA) 5 MG tablet Take 5 mg by mouth once daily.    budesonide-glycopyr-formoterol (BREZTRI AEROSPHERE) 160-9-4.8 mcg/actuation HFAA Inhale 2 puffs into the lungs 2 (two) times a day.    carvedilol PHOSPHATE 40 MG ORAL CM24 (COREG CR) 40 MG CM24 Take 40 mg by mouth.    clopidogreL (PLAVIX) 75 mg tablet TAKE 1 TABLET BY MOUTH ONCE A DAY    COMBIVENT RESPIMAT  mcg/actuation inhaler Inhale 1 puff into the lungs every 6 (six) hours as needed for Wheezing.    dicyclomine (BENTYL) 10 MG capsule TAKE 1 CAPSULE BY MOUTH 4 TIMES DAILY BEFORE MEALS AND NIGHTLY Strength: 10 mg (Patient taking differently: 4 (four) times daily as needed. TAKE 1 CAPSULE BY MOUTH 4 TIMES DAILY BEFORE MEALS AND NIGHTLY Strength: 10 mg)    ezetimibe-simvastatin 10-40 mg (VYTORIN) 10-40 mg per tablet TAKE 1 TABLET BY MOUTH EVERY EVENING    FEROSUL 325 mg (65 mg iron) Tab tablet TAKE (1) TABLET BY MOUTH 2 TIMES A DAY WITH MEALS    furosemide (LASIX) 20 MG tablet TAKE 20 MG TABLET ONCE DAILY (Patient taking differently: daily as needed. TAKE 20 MG TABLET ONCE DAILY)    hydroCHLOROthiazide (MICROZIDE) 12.5 mg capsule Take 12.5 mg by mouth.    inhalation spacing device (COMPACT SPACE CHAMBER) Use as directed for inhalation.    LIDOcaine-prilocaine (EMLA) cream     memantine (NAMENDA) 5 MG Tab Take 10 mg by mouth once daily.    mirtazapine (REMERON) 15 MG tablet     olmesartan (BENICAR) 40 MG tablet Take 40 mg by mouth.    OXYGEN-AIR DELIVERY SYSTEMS MISC 3 L by Misc.(Non-Drug; Combo Route) route every evening.    pantoprazole (PROTONIX) 40 MG tablet TAKE 1 TABLET BY MOUTH ONCE A DAY    vitamin D (VITAMIN D3) 1000 units Tab Take 1,000 Units by mouth once daily.    zolpidem (AMBIEN) 10 mg Tab TAKE 1 TABLET BY MOUTH EVERY EVENING     hydroCHLOROthiazide (HYDRODIURIL) 12.5 MG Tab Take 1 tablet (12.5 mg total) by mouth once daily.    TENS unit and electrodes Cmpk 1 each by Misc.(Non-Drug; Combo Route) route 3 (three) times daily as needed (for back pain).     No current facility-administered medications on file prior to visit.     Review of patient's allergies indicates:  No Known Allergies   Review of Systems   Constitutional: Negative for diaphoresis, malaise/fatigue and weight gain.   HENT:  Negative for hoarse voice.    Eyes:  Negative for double vision and visual disturbance.   Cardiovascular:  Negative for chest pain, claudication, cyanosis, dyspnea on exertion, irregular heartbeat, leg swelling, near-syncope, orthopnea, palpitations, paroxysmal nocturnal dyspnea and syncope.   Respiratory:  Negative for cough, hemoptysis, shortness of breath and snoring.    Hematologic/Lymphatic: Negative for bleeding problem. Does not bruise/bleed easily.   Skin:  Negative for color change and poor wound healing.   Musculoskeletal:  Negative for muscle cramps, muscle weakness and myalgias.   Gastrointestinal:  Positive for bloating and abdominal pain. Negative for change in bowel habit, diarrhea, heartburn, hematemesis, hematochezia, melena and nausea.   Neurological:  Negative for excessive daytime sleepiness, dizziness, headaches, light-headedness, loss of balance, numbness and weakness.   Psychiatric/Behavioral:  Negative for memory loss. The patient does not have insomnia.    Allergic/Immunologic: Negative for hives.     Objective:   Constitutional:       General: She is not in acute distress.     Appearance: Normal appearance. She is well-developed. She is not ill-appearing.   HENT:      Head: Normocephalic and atraumatic.   Eyes:      General: No scleral icterus.     Pupils: Pupils are equal, round, and reactive to light.   Neck:      Thyroid: No thyromegaly.      Vascular: Normal carotid pulses. Carotid bruit present. No hepatojugular reflux or JVD.  "     Trachea: No tracheal deviation.   Cardiovascular:      Rate and Rhythm: Normal rate and regular rhythm.      Pulses: Normal pulses.      Heart sounds: Murmur heard.   Harsh midsystolic murmur is present with a grade of 2/6 at the upper right sternal border radiating to the neck.   High-pitched blowing decrescendo early diastolic murmur is present with a grade of 1/4 at the upper right sternal border radiating to the apex.     No friction rub. No gallop.      Comments: Abdominal bruit.  Pulmonary:      Effort: Pulmonary effort is normal. No respiratory distress.      Breath sounds: Normal breath sounds. No wheezing, rhonchi or rales.   Chest:      Chest wall: No tenderness.   Abdominal:      General: Bowel sounds are normal. There is no abdominal bruit.      Palpations: Abdomen is soft. There is no hepatomegaly or pulsatile mass.      Tenderness: There is no abdominal tenderness.   Musculoskeletal:      Right shoulder: No deformity.      Cervical back: Normal range of motion and neck supple.      Right lower leg: No edema.      Left lower leg: No edema.   Skin:     General: Skin is warm and dry.      Findings: No erythema or rash.      Nails: There is no clubbing.   Neurological:      Mental Status: She is alert and oriented to person, place, and time.      Cranial Nerves: No cranial nerve deficit.      Coordination: Coordination normal.   Psychiatric:         Speech: Speech normal.         Behavior: Behavior normal.      Vitals:    06/16/23 0812 06/16/23 0813   BP: 120/70 128/72   BP Location: Left arm Right arm   Patient Position: Sitting Sitting   BP Method: Medium (Manual) Medium (Manual)   Pulse: (!) 54    SpO2: 98%    Weight: 60.9 kg (134 lb 4.2 oz)    Height: 5' 1" (1.549 m)      Lab Results   Component Value Date    CHOL 138 04/18/2023    CHOL 143 12/01/2022    CHOL 164 10/25/2022      Body mass index is 25.37 kg/m².   Lab Results   Component Value Date    HGBA1C 5.5 04/18/2023      BMP  Lab Results "   Component Value Date     04/18/2023    K 4.1 04/18/2023     04/18/2023    CO2 25 04/18/2023    BUN 7 (L) 04/18/2023    CREATININE 0.9 04/18/2023    CALCIUM 9.5 04/18/2023    ANIONGAP 11 04/18/2023    EGFRNORACEVR >60.0 04/18/2023      Lab Results   Component Value Date    HDL 45 04/18/2023    HDL 56 12/01/2022    HDL 60 10/25/2022     Lab Results   Component Value Date    LDLCALC 79.0 04/18/2023    LDLCALC 72.0 12/01/2022    LDLCALC 83.0 10/25/2022     Lab Results   Component Value Date    TRIG 70 04/18/2023    TRIG 75 12/01/2022    TRIG 105 10/25/2022     Lab Results   Component Value Date    CHOLHDL 32.6 04/18/2023    CHOLHDL 39.2 12/01/2022    CHOLHDL 36.6 10/25/2022       Chemistry        Component Value Date/Time     04/18/2023 0953    K 4.1 04/18/2023 0953     04/18/2023 0953    CO2 25 04/18/2023 0953    BUN 7 (L) 04/18/2023 0953    CREATININE 0.9 04/18/2023 0953    GLU 83 04/18/2023 0953        Component Value Date/Time    CALCIUM 9.5 04/18/2023 0953    ALKPHOS 67 04/18/2023 0953    AST 15 04/18/2023 0953    ALT 9 (L) 04/18/2023 0953    BILITOT 0.5 04/18/2023 0953    ESTGFRAFRICA >60.0 04/07/2022 0922    EGFRNONAA >60.0 04/07/2022 0922          Lab Results   Component Value Date    TSH 1.509 05/04/2018     Lab Results   Component Value Date    INR 1.1 12/20/2017    INR 1.1 08/10/2012     Lab Results   Component Value Date    WBC 6.81 10/25/2022    HGB 12.6 10/25/2022    HCT 38.3 10/25/2022    MCV 94 10/25/2022     10/25/2022     BMP  Sodium   Date Value Ref Range Status   04/18/2023 140 136 - 145 mmol/L Final     Potassium   Date Value Ref Range Status   04/18/2023 4.1 3.5 - 5.1 mmol/L Final     Chloride   Date Value Ref Range Status   04/18/2023 104 95 - 110 mmol/L Final     CO2   Date Value Ref Range Status   04/18/2023 25 23 - 29 mmol/L Final     BUN   Date Value Ref Range Status   04/18/2023 7 (L) 8 - 23 mg/dL Final     Creatinine   Date Value Ref Range Status   04/18/2023  0.9 0.5 - 1.4 mg/dL Final     Calcium   Date Value Ref Range Status   04/18/2023 9.5 8.7 - 10.5 mg/dL Final     Anion Gap   Date Value Ref Range Status   04/18/2023 11 8 - 16 mmol/L Final     eGFR if    Date Value Ref Range Status   04/07/2022 >60.0 >60 mL/min/1.73 m^2 Final     eGFR if non    Date Value Ref Range Status   04/07/2022 >60.0 >60 mL/min/1.73 m^2 Final     Comment:     Calculation used to obtain the estimated glomerular filtration  rate (eGFR) is the CKD-EPI equation.        CrCl cannot be calculated (Patient's most recent lab result is older than the maximum 7 days allowed.).  Narrative & Impression  EXAMINATION:  CTA ABDOMEN AND PELVIS     CLINICAL HISTORY:  Aneurysm, renal or visceral;mesenteric ischemia;Infrarenal abdominal aortic aneurysm, without rupture     TECHNIQUE:  Contiguous 1.25 mm axial images were obtained through the abdomen and pelvis following the administration of 100 cc of intravenous Omnipaque 350.  Sagittal and coronal reformats and maximum intensity projections were also submitted.     COMPARISON:  04/20/2022     FINDINGS:  Visualized Chest: Lung bases are clear     Abdomen:     Liver: Within normal limits.     Gallbladder and biliary: Prior cholecystectomy.     Spleen: Within normal limits.     Pancreas: Within normal limits.     Adrenals: Within normal limits.     Kidneys: Multiple small bilateral renal cysts do not appear significantly changed from prior.  No hydronephrosis or hydroureter.     Bowel: Numerous sigmoid colonic diverticula present without evidence of acute diverticulitis.  No abnormal bowel wall thickening or evidence of obstruction.     Peritoneum: No ascites or pneumoperitoneum.     Abdominal adenopathy: None.     Vasculature: Extensive atherosclerotic disease is again noted throughout the abdomen and pelvis as well as the visualized thorax.  Fusiform aneurysmal dilatation of the infrarenal abdominal aorta is unchanged measuring  up to 4 cm in diameter.  No evidence of dissection or rupture.  There is persistent severe stenosis at the origin of the celiac trunk.  Stenosis at the origin of the SMA appears similar to prior however there is new short segment high-grade stenosis with near occlusion seen in the proximal SMA approximately 15 mm from its origin and spanning roughly 3.5 mm.  Note that both the right and left hepatic arteries appear to arise from the SMA, distal to the site of stenosis.  Less than 50% luminal stenosis suspected at the origin of the bilateral renal arteries.  The SANKET appears patent.  Near 50% luminal narrowing at the origins of the bilateral common iliacs appears similar to prior.     Pelvis:     Urinary bladder: Decompressed and not well evaluated.     Uterus and adnexa: Prior hysterectomy.  No adnexal masses visualized.     Pelvic adenopathy: None.     Bones: Multilevel degenerative findings noted in the visualized spine.  No acute or suspicious osseous findings.     Miscellaneous: None.     Impression:     1. Unchanged fusiform aneurysmal dilatation of the infrarenal abdominal aorta measuring 4 cm in diameter  2. Extensive atherosclerotic disease with new short segment severe stenosis/near occlusion of the proximal SMA as detailed above  3. Persistent severe stenosis at the origin of the celiac trunk.  4. Other stable findings as above  This report was flagged in Epic as abnormal.        Electronically signed by: Carlitos Martinez MD  Date:                                            05/09/2023  Time:                                           14:46           Exam Ended: 05/09/23 14:18 Last Resulted: 05/09/23 14:46         Conclusion         Normal myocardial perfusion scan. There is no evidence of myocardial ischemia or infarction.    The gated perfusion images showed an ejection fraction of 75% at rest. The gated perfusion images showed an ejection fraction of 72% post stress.    There is normal wall motion at rest  and post stress.    LV cavity size is normal at rest and normal at stress.    The ECG portion of the study is negative for ischemia.    The patient reported no chest pain during the stress test.      Summary    Concentric remodeling and normal systolic function.  The estimated ejection fraction is 60%.  Indeterminate left ventricular diastolic function.  Normal right ventricular size with normal right ventricular systolic function.  Mild tricuspid regurgitation.  There is pulmonary hypertension.  Mild aortic regurgitation.     Assessment:     1. Mesenteric ischemia, chronic    2. COPD, moderate    3. Nocturnal hypoxemia due to emphysema    4. Infrarenal abdominal aortic aneurysm (AAA) without rupture    5. Aortic atherosclerosis    6. Bilateral carotid artery stenosis    7. Coronary artery disease of native artery of native heart with stable angina pectoris    8. Essential hypertension    9. Mixed hyperlipidemia    10. Sinus node dysfunction    11. Prediabetes    12. Cigarette nicotine dependence in remission    13. Mild vascular dementia without behavioral disturbance, psychotic disturbance, mood disturbance, or anxiety    Has chronic mesenteric ischemia with severe mesenteric stenosis limiting her ability to eat and continued weight loss. She will benefit from angiography and attempt at revascularization to improve lifestyle and address weight loss.she will thinik about it and let me know timing for invasive evaluation  Has neurogenic claudication despite pvd   Htn well controlled now continue same   Pulmonary htn secondary to longstanding smoking follow with pulmonary.  Hlp on appropriate therapy ldl near target continue same    Has atherosclerosis systemic on antiplatelet therapy and statins counseled about compliance   Coronary calcification on antiplatelets negative for ischemia by non invasive eval continue same   Prediabetes counseled about compliance  Vascular dementia on antiplatelet follows with  neurology. I have discussed her mesenteric ischemia and her cta findings and reviewed her imaging studiers with her and her DAUGHTER TO MAKE SURE EVERYBODY UNDERSTANDS THE SITUATION AND NEEDS FOR REVASCULARIZATION AND COMPLICATIONS AND RISKS AND BENEFITS.   Plan:   Aogram mesenteric angio/pta   I have explained the risks, benefits , and alternatives of the procedure in detail.the patient voices understanding and all questions have been answered.the patient agrees to proceed as planned.

## 2023-06-19 ENCOUNTER — PATIENT MESSAGE (OUTPATIENT)
Dept: CARDIOLOGY | Facility: CLINIC | Age: 68
End: 2023-06-19
Payer: MEDICARE

## 2023-06-29 ENCOUNTER — PATIENT OUTREACH (OUTPATIENT)
Dept: ADMINISTRATIVE | Facility: HOSPITAL | Age: 68
End: 2023-06-29
Payer: MEDICARE

## 2023-06-29 NOTE — PROGRESS NOTES
Positive cologuard needing follow up report: called and spoke with patient, she is aware of her needing colonoscopy. However right now she is working with her drs on addressing some stomach issues, she has an aneurysm and blockage that has to be dealt with prior to doing colonoscopy. She will be back in touch once she can go forward. HM updated to reflect her being due.

## 2023-07-07 ENCOUNTER — PATIENT MESSAGE (OUTPATIENT)
Dept: INFECTIOUS DISEASES | Facility: CLINIC | Age: 68
End: 2023-07-07
Payer: MEDICARE

## 2023-07-07 ENCOUNTER — PATIENT MESSAGE (OUTPATIENT)
Dept: ADMINISTRATIVE | Facility: OTHER | Age: 68
End: 2023-07-07
Payer: MEDICARE

## 2023-07-22 RX ORDER — EZETIMIBE AND SIMVASTATIN 10; 40 MG/1; MG/1
1 TABLET ORAL NIGHTLY
Qty: 90 TABLET | Refills: 2 | Status: SHIPPED | OUTPATIENT
Start: 2023-07-22 | End: 2024-01-29 | Stop reason: SDUPTHER

## 2023-07-22 NOTE — TELEPHONE ENCOUNTER
Refill Decision Note   Gemma Vick  is requesting a refill authorization.  Brief Assessment and Rationale for Refill:  Approve     Medication Therapy Plan:       Medication Reconciliation Completed: No   Comments:     No Care Gaps recommended.     Note composed:5:02 PM 07/22/2023

## 2023-08-02 ENCOUNTER — OFFICE VISIT (OUTPATIENT)
Dept: SPORTS MEDICINE | Facility: CLINIC | Age: 68
End: 2023-08-02
Payer: MEDICARE

## 2023-08-02 ENCOUNTER — HOSPITAL ENCOUNTER (OUTPATIENT)
Dept: RADIOLOGY | Facility: HOSPITAL | Age: 68
Discharge: HOME | End: 2023-08-02
Attending: STUDENT IN AN ORGANIZED HEALTH CARE EDUCATION/TRAINING PROGRAM
Payer: MEDICARE

## 2023-08-02 VITALS — HEIGHT: 61 IN | WEIGHT: 134 LBS | BODY MASS INDEX: 25.3 KG/M2

## 2023-08-02 DIAGNOSIS — G89.29 CHRONIC BILATERAL LOW BACK PAIN WITH RIGHT-SIDED SCIATICA: Primary | ICD-10-CM

## 2023-08-02 DIAGNOSIS — M54.41 CHRONIC BILATERAL LOW BACK PAIN WITH RIGHT-SIDED SCIATICA: Primary | ICD-10-CM

## 2023-08-02 DIAGNOSIS — M25.559 HIP PAIN, UNSPECIFIED LATERALITY: ICD-10-CM

## 2023-08-02 DIAGNOSIS — M25.559 HIP PAIN, UNSPECIFIED LATERALITY: Primary | ICD-10-CM

## 2023-08-02 PROCEDURE — 1160F RVW MEDS BY RX/DR IN RCRD: CPT | Mod: HCNC,CPTII,S$GLB, | Performed by: STUDENT IN AN ORGANIZED HEALTH CARE EDUCATION/TRAINING PROGRAM

## 2023-08-02 PROCEDURE — 1100F PR PT FALLS ASSESS DOC 2+ FALLS/FALL W/INJURY/YR: ICD-10-PCS | Mod: HCNC,CPTII,S$GLB, | Performed by: STUDENT IN AN ORGANIZED HEALTH CARE EDUCATION/TRAINING PROGRAM

## 2023-08-02 PROCEDURE — 1125F PR PAIN SEVERITY QUANTIFIED, PAIN PRESENT: ICD-10-PCS | Mod: HCNC,CPTII,S$GLB, | Performed by: STUDENT IN AN ORGANIZED HEALTH CARE EDUCATION/TRAINING PROGRAM

## 2023-08-02 PROCEDURE — 73521 X-RAY EXAM HIPS BI 2 VIEWS: CPT | Mod: 26,HCNC,, | Performed by: RADIOLOGY

## 2023-08-02 PROCEDURE — 4010F PR ACE/ARB THEARPY RXD/TAKEN: ICD-10-PCS | Mod: HCNC,CPTII,S$GLB, | Performed by: STUDENT IN AN ORGANIZED HEALTH CARE EDUCATION/TRAINING PROGRAM

## 2023-08-02 PROCEDURE — 99203 OFFICE O/P NEW LOW 30 MIN: CPT | Mod: HCNC,S$GLB,, | Performed by: STUDENT IN AN ORGANIZED HEALTH CARE EDUCATION/TRAINING PROGRAM

## 2023-08-02 PROCEDURE — 72114 X-RAY EXAM L-S SPINE BENDING: CPT | Mod: 26,HCNC,, | Performed by: RADIOLOGY

## 2023-08-02 PROCEDURE — 3008F PR BODY MASS INDEX (BMI) DOCUMENTED: ICD-10-PCS | Mod: HCNC,CPTII,S$GLB, | Performed by: STUDENT IN AN ORGANIZED HEALTH CARE EDUCATION/TRAINING PROGRAM

## 2023-08-02 PROCEDURE — 3288F PR FALLS RISK ASSESSMENT DOCUMENTED: ICD-10-PCS | Mod: HCNC,CPTII,S$GLB, | Performed by: STUDENT IN AN ORGANIZED HEALTH CARE EDUCATION/TRAINING PROGRAM

## 2023-08-02 PROCEDURE — 72114 XR LUMBAR SPINE 5 VIEW WITH FLEX AND EXT: ICD-10-PCS | Mod: 26,HCNC,, | Performed by: RADIOLOGY

## 2023-08-02 PROCEDURE — 4010F ACE/ARB THERAPY RXD/TAKEN: CPT | Mod: HCNC,CPTII,S$GLB, | Performed by: STUDENT IN AN ORGANIZED HEALTH CARE EDUCATION/TRAINING PROGRAM

## 2023-08-02 PROCEDURE — 1125F AMNT PAIN NOTED PAIN PRSNT: CPT | Mod: HCNC,CPTII,S$GLB, | Performed by: STUDENT IN AN ORGANIZED HEALTH CARE EDUCATION/TRAINING PROGRAM

## 2023-08-02 PROCEDURE — 73521 X-RAY EXAM HIPS BI 2 VIEWS: CPT | Mod: TC,HCNC

## 2023-08-02 PROCEDURE — 3044F HG A1C LEVEL LT 7.0%: CPT | Mod: HCNC,CPTII,S$GLB, | Performed by: STUDENT IN AN ORGANIZED HEALTH CARE EDUCATION/TRAINING PROGRAM

## 2023-08-02 PROCEDURE — 1100F PTFALLS ASSESS-DOCD GE2>/YR: CPT | Mod: HCNC,CPTII,S$GLB, | Performed by: STUDENT IN AN ORGANIZED HEALTH CARE EDUCATION/TRAINING PROGRAM

## 2023-08-02 PROCEDURE — 73521 XR HIPS BILATERAL 2 VIEW INCL AP PELVIS: ICD-10-PCS | Mod: 26,HCNC,, | Performed by: RADIOLOGY

## 2023-08-02 PROCEDURE — 99999 PR PBB SHADOW E&M-EST. PATIENT-LVL IV: CPT | Mod: PBBFAC,HCNC,, | Performed by: STUDENT IN AN ORGANIZED HEALTH CARE EDUCATION/TRAINING PROGRAM

## 2023-08-02 PROCEDURE — 1160F PR REVIEW ALL MEDS BY PRESCRIBER/CLIN PHARMACIST DOCUMENTED: ICD-10-PCS | Mod: HCNC,CPTII,S$GLB, | Performed by: STUDENT IN AN ORGANIZED HEALTH CARE EDUCATION/TRAINING PROGRAM

## 2023-08-02 PROCEDURE — 97110 THERAPEUTIC EXERCISES: CPT | Mod: HCNC,GP,S$GLB,97 | Performed by: STUDENT IN AN ORGANIZED HEALTH CARE EDUCATION/TRAINING PROGRAM

## 2023-08-02 PROCEDURE — 3044F PR MOST RECENT HEMOGLOBIN A1C LEVEL <7.0%: ICD-10-PCS | Mod: HCNC,CPTII,S$GLB, | Performed by: STUDENT IN AN ORGANIZED HEALTH CARE EDUCATION/TRAINING PROGRAM

## 2023-08-02 PROCEDURE — 1159F PR MEDICATION LIST DOCUMENTED IN MEDICAL RECORD: ICD-10-PCS | Mod: HCNC,CPTII,S$GLB, | Performed by: STUDENT IN AN ORGANIZED HEALTH CARE EDUCATION/TRAINING PROGRAM

## 2023-08-02 PROCEDURE — 3288F FALL RISK ASSESSMENT DOCD: CPT | Mod: HCNC,CPTII,S$GLB, | Performed by: STUDENT IN AN ORGANIZED HEALTH CARE EDUCATION/TRAINING PROGRAM

## 2023-08-02 PROCEDURE — 3008F BODY MASS INDEX DOCD: CPT | Mod: HCNC,CPTII,S$GLB, | Performed by: STUDENT IN AN ORGANIZED HEALTH CARE EDUCATION/TRAINING PROGRAM

## 2023-08-02 PROCEDURE — 99999 PR PBB SHADOW E&M-EST. PATIENT-LVL IV: ICD-10-PCS | Mod: PBBFAC,HCNC,, | Performed by: STUDENT IN AN ORGANIZED HEALTH CARE EDUCATION/TRAINING PROGRAM

## 2023-08-02 PROCEDURE — 1159F MED LIST DOCD IN RCRD: CPT | Mod: HCNC,CPTII,S$GLB, | Performed by: STUDENT IN AN ORGANIZED HEALTH CARE EDUCATION/TRAINING PROGRAM

## 2023-08-02 PROCEDURE — 97110 PR THERAPEUTIC EXERCISES: ICD-10-PCS | Mod: HCNC,GP,S$GLB,97 | Performed by: STUDENT IN AN ORGANIZED HEALTH CARE EDUCATION/TRAINING PROGRAM

## 2023-08-02 PROCEDURE — 99203 PR OFFICE/OUTPT VISIT, NEW, LEVL III, 30-44 MIN: ICD-10-PCS | Mod: HCNC,S$GLB,, | Performed by: STUDENT IN AN ORGANIZED HEALTH CARE EDUCATION/TRAINING PROGRAM

## 2023-08-02 PROCEDURE — 72114 X-RAY EXAM L-S SPINE BENDING: CPT | Mod: TC,HCNC

## 2023-08-02 NOTE — PATIENT INSTRUCTIONS
If you have any difficulties reading this information, you may visit the online version using the following link: Low Back Pain  ([email or web address])

## 2023-08-02 NOTE — PROGRESS NOTES
[facility]ed[facility] Sports [facility]     Low Back Initial Visit         8/2/2023    Referring MD: Self, Aaareferral    Chief Complaint   Patient presents with    Right Hip - Pain    Left Hip - Pain         History of Present Illness:   Gemma Vick is a 68 y.o. female who presents with chronic low back pain and dysfunction.    Onset of the symptoms was many years ago.     Inciting event: No specific injury or trauma, gradual onset of symptoms       Current symptoms include chronic low back pain that is constant and bilateral sciatica worse on right side. She rates her pain a 8/10 today. She denies issues with bowel and bladder control.      Pain is aggravated by prolonged standing or walking, especially grocery shopping      Evaluation to date: XR     Treatment to date: Rest, activity modification     Of note she is unable to use steroids due to cardiac concerns. She has also been advised not to undergo general anesthesia due to these issues as well.      Past Medical History:   Past Medical History:   Diagnosis Date    AAA (abdominal aortic aneurysm) 2/13/2014    Abdominal aneurysm     3    Acute coronary syndrome     Arthritis     BPPV (benign paroxysmal positional vertigo)     Carotid artery plaque     Carotid artery stenosis and occlusion 2/13/2014    Chronic back pain     COPD (chronic obstructive pulmonary disease)     Coronary artery disease     Emphysema lung     Hyperlipidemia     Hypertension     Myocardial infarction     x3    Neuropathy     Personal history of COVID-19 6/9/2021 11/16/2020 +Covid, recovered at home        Past Surgical History:   Past Surgical History:   Procedure Laterality Date    CARDIAC CATHETERIZATION      CORONARY ANGIOPLASTY      HYSTERECTOMY  1988       Medications:  Patient's Medications   New Prescriptions    No medications on file   Previous Medications    ALBUTEROL (PROVENTIL/VENTOLIN HFA) 90 MCG/ACTUATION INHALER        ALBUTEROL-IPRATROPIUM (DUO-NEB) 2.5  MG-0.5 MG/3 ML NEBULIZER SOLUTION        AMLODIPINE (NORVASC) 5 MG TABLET    Take 1 tablet (5 mg total) by mouth once daily.    ASPIRIN 81 MG CHEW    Take 81 mg by mouth once daily.    BISOPROLOL (ZEBETA) 5 MG TABLET    Take 5 mg by mouth once daily.    BUDESONIDE-GLYCOPYR-FORMOTEROL (BREZTRI AEROSPHERE) 160-9-4.8 MCG/ACTUATION HFAA    Inhale 2 puffs into the lungs 2 (two) times a day.    CLOPIDOGREL (PLAVIX) 75 MG TABLET    TAKE 1 TABLET BY MOUTH ONCE A DAY    COMBIVENT RESPIMAT  MCG/ACTUATION INHALER    Inhale 1 puff into the lungs every 6 (six) hours as needed for Wheezing.    DICYCLOMINE (BENTYL) 10 MG CAPSULE    TAKE 1 CAPSULE BY MOUTH 4 TIMES DAILY BEFORE MEALS AND NIGHTLY Strength: 10 mg    EZETIMIBE-SIMVASTATIN 10-40 MG (VYTORIN) 10-40 MG PER TABLET    TAKE 1 TABLET BY MOUTH EVERY EVENING    FEROSUL 325 MG (65 MG IRON) TAB TABLET    TAKE (1) TABLET BY MOUTH 2 TIMES A DAY WITH MEALS    FUROSEMIDE (LASIX) 20 MG TABLET    TAKE 20 MG TABLET ONCE DAILY    INHALATION SPACING DEVICE (COMPACT SPACE CHAMBER)    Use as directed for inhalation.    LIDOCAINE-PRILOCAINE (EMLA) CREAM        MEMANTINE (NAMENDA) 5 MG TAB    Take 10 mg by mouth once daily.    MIRTAZAPINE (REMERON) 15 MG TABLET        OXYGEN-AIR DELIVERY SYSTEMS MISC    3 L by Misc.(Non-Drug; Combo Route) route every evening.    PANTOPRAZOLE (PROTONIX) 40 MG TABLET    TAKE 1 TABLET BY MOUTH ONCE A DAY    TENS UNIT AND ELECTRODES CMPK    1 each by Misc.(Non-Drug; Combo Route) route 3 (three) times daily as needed (for back pain).    VITAMIN D (VITAMIN D3) 1000 UNITS TAB    Take 1,000 Units by mouth once daily.    ZOLPIDEM (AMBIEN) 10 MG TAB    TAKE 1 TABLET BY MOUTH EVERY EVENING   Modified Medications    No medications on file   Discontinued Medications    No medications on file       Allergies: Review of patient's allergies indicates:  No Known Allergies    Social History:   Home town: [place], [place]  Alcohol use: She reports no history of alcohol  "use.  Tobacco use: She reports that she quit smoking about 6 years ago. Her smoking use included cigarettes and vaping w/o nicotine. She started smoking about 53 years ago. She has a 23.4 pack-year smoking history. She has never used smokeless tobacco.    Review of systems:  History of recent illness, fevers, shakes, or chills: no  History of cardiac problems or chest pain: Yes, aortic aneurysm  History of pulmonary problems or asthma: Yes, COPD  History of diabetes: no  History of prior dvt or clotting problems: no  History of sleep apnea: no      Physical Examination:  Estimated body mass index is 25.32 kg/m² as calculated from the following:    Height as of this encounter: 5' 1" (1.549 m).    Weight as of this encounter: 60.8 kg (134 lb).    General  Healthy appearing female in no acute distress  Alert and oriented, normal mood, appropriate affect    Back Exam     Tenderness   The patient is experiencing tenderness in the lumbar.    Muscle Strength   Right Quadriceps:  5/5   Left Quadriceps:  5/5   Right Hamstrings:  5/5   Left Hamstrings:  5/5           Imaging:  X-Ray Hips Bilateral 2 View Incl AP Pelvis  Narrative: EXAM: XR HIPS BILATERAL 2 VIEW INCL AP PELVIS    CLINICAL HISTORY:  Pain in unspecified hip    TECHNIQUE: 2 views of both hips a single view of the pelvis.    FINDINGS: No acute fracture.  Joint alignments are anatomic bilaterally.  Joint spaces appear maintained.  No significant periarticular osteophyte formation.  Right-sided greater trochanter enthesophyte.  Joint space loss and sclerosis noted of the pubic symphysis degenerative change.  Fractures pelvis intact.  There are scattered presumed phleboliths throughout the pelvis.  Degenerative changes of the lower lumbar spine.  Partially visualized distal abdominal aortic aneurysm.  Impression:   As above.    Finalized on: 8/2/2023 3:16 PM By:  Rock Washington MD  [facility]# [id number]      2023-08-02 15:18:05.091    [facility]  X-Ray Lumbar Complete Including " Flex And Ext  Narrative: EXAM: XR LUMBAR SPINE 5 VIEW WITH FLEX AND EXT    CLINICAL HISTORY: Pain in unspecified hip    TECHNIQUE: 7 views of the lumbar spine including flexion and extension views.    FINDINGS: There appear to be 6, nonrib-bearing lumbar-type vertebral bodies.  Grade 1 L5-L6 anterolisthesis.  No significant change in the degree of listhesis on flexion and extension views.  Lumbar vertebral body heights appear maintained.  Visualized pars interarticularis appear intact.  L5-L6 and L6-S1 prominent facet arthropathy.  L5-L6 and L6 S1 disc is remaining intervertebral disc spaces appear largely maintained.  No evidence of an acute fracture.    There is a distal abdominal aortic aneurysm with atherosclerotic calcification.  Impression:  As above.    Finalized on: 8/2/2023 3:13 PM By:  Rock Washington MD  R# [id number]      2023-08-02 15:15:54.921    [facility]       Physician Read: I agree with the above impression.      Impression:  68 y.o. female with chronic low back pain with right side radiculopathy       Plan:  Discussed diagnosis and treatment options with the patient today. She reports she is unable to take oral or injectable steroids because it elevates her blood pressure which is not recommended considering her other CV issues with heart and aortic aneurysm.  She also reports that she has had gabapentin in the past but prefers not to take it because of how drowsy it made her.  I recommend referral to interventional pain for consideration for RFA procedure of lumbar spine as well as home exercise program to work on core flexibility and strengthening. The patient is in agreemetn with this plan.    At least 15 minutes were spent developing, teaching, and performing a home exercise program.  A written summary was provided and all questions were answered.  This service was performed under the direction of Nayan Edwards MD.  CPT [id number].  Referral to interventional pain management placed  today.   Follow up with me as needed.            Nayan Edwards MD    I, Nithin Dickens, acted as a scribe for Nayan Edwards MD for the duration of this office visit.

## 2023-08-03 ENCOUNTER — TELEPHONE (OUTPATIENT)
Dept: PAIN MEDICINE | Facility: CLINIC | Age: 68
End: 2023-08-03
Payer: MEDICARE

## 2023-08-04 ENCOUNTER — TELEPHONE (OUTPATIENT)
Dept: PAIN MEDICINE | Facility: CLINIC | Age: 68
End: 2023-08-04
Payer: MEDICARE

## 2023-08-18 ENCOUNTER — PATIENT MESSAGE (OUTPATIENT)
Dept: CARDIOLOGY | Facility: CLINIC | Age: 68
End: 2023-08-18
Payer: MEDICARE

## 2023-08-21 ENCOUNTER — OFFICE VISIT (OUTPATIENT)
Dept: URGENT CARE | Facility: CLINIC | Age: 68
End: 2023-08-21
Payer: MEDICARE

## 2023-08-21 VITALS
OXYGEN SATURATION: 96 % | TEMPERATURE: 98 F | DIASTOLIC BLOOD PRESSURE: 74 MMHG | HEART RATE: 82 BPM | WEIGHT: 134 LBS | SYSTOLIC BLOOD PRESSURE: 166 MMHG | BODY MASS INDEX: 25.3 KG/M2 | RESPIRATION RATE: 18 BRPM | HEIGHT: 61 IN

## 2023-08-21 DIAGNOSIS — L03.113 CELLULITIS OF ARM, RIGHT: ICD-10-CM
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
## 2023-08-21 DIAGNOSIS — I10 ELEVATED BLOOD PRESSURE READING IN OFFICE WITH DIAGNOSIS OF HYPERTENSION: ICD-10-CM

## 2023-08-21 DIAGNOSIS — S40.861A INSECT BITE OF RIGHT ARM, INITIAL ENCOUNTER: Primary | ICD-10-CM

## 2023-08-21 PROCEDURE — 99214 OFFICE O/P EST MOD 30 MIN: CPT | Mod: S$GLB,,, | Performed by: NURSE PRACTITIONER

## 2023-08-21 PROCEDURE — 99214 PR OFFICE/OUTPT VISIT, EST, LEVL IV, 30-39 MIN: ICD-10-PCS | Mod: S$GLB,,, | Performed by: NURSE PRACTITIONER

## 2023-08-21 RX ORDER — SULFAMETHOXAZOLE AND TRIMETHOPRIM 800; 160 MG/1; MG/1
1 TABLET ORAL 2 TIMES DAILY
Qty: 14 TABLET | Refills: 0 | Status: SHIPPED | OUTPATIENT
Start: 2023-08-21 | End: 2023-08-28

## 2023-08-22 NOTE — PROGRESS NOTES
"  Subjective:      Patient ID: Gemma Vick is a 68 y.o. female.    Vitals:  height is 5' 1" (1.549 m) and weight is 60.8 kg (134 lb). Her oral temperature is 98.2 °F (36.8 °C). Her blood pressure is 166/74 (abnormal) and her pulse is 82. Her respiration is 18 and oxygen saturation is 96%.     Chief Complaint: Insect Bite    Patient is a 69 yo female who presents for evaluation of infection of insect bit. Patient states she noticed an insect bite to the right forearm about 2 days ago with a pus pocket on the top. She reports she popped it and it drained, then she cleaned area with peroxide and applied antibiotic ointment. Now area is red and warm. No numbness, tingling, fever, or drainage since onset. No other concerns were voiced.     Insect Bite  This is a new problem. The current episode started in the past 7 days. The problem occurs constantly. The problem has been gradually worsening. Pertinent negatives include no chills, fever, nausea, numbness, swollen glands, vomiting or weakness. The treatment provided no relief.       Constitution: Negative for chills and fever.   Gastrointestinal:  Negative for nausea and vomiting.   Skin:  Positive for lesion and erythema. Negative for bruising.   Neurological:  Negative for numbness and tingling.      Objective:     Physical Exam   Constitutional: She is oriented to person, place, and time. She appears well-developed. She is cooperative.  Non-toxic appearance. She does not appear ill. No distress.   HENT:   Head: Normocephalic and atraumatic.   Ears:   Right Ear: Hearing and external ear normal.   Left Ear: Hearing and external ear normal.   Nose: Nose normal. No mucosal edema, rhinorrhea or nasal deformity. No epistaxis. Right sinus exhibits no maxillary sinus tenderness and no frontal sinus tenderness. Left sinus exhibits no maxillary sinus tenderness and no frontal sinus tenderness.   Mouth/Throat: Uvula is midline, oropharynx is clear and moist and mucous " membranes are normal. No trismus in the jaw. Normal dentition. No uvula swelling. No oropharyngeal exudate, posterior oropharyngeal edema or posterior oropharyngeal erythema.   Eyes: Conjunctivae and lids are normal. No scleral icterus.   Neck: Trachea normal and phonation normal. Neck supple. No edema present. No erythema present. No neck rigidity present.   Cardiovascular: Normal rate, regular rhythm, normal heart sounds and normal pulses.   Pulmonary/Chest: Effort normal and breath sounds normal. No respiratory distress. She has no decreased breath sounds. She has no rhonchi.   Abdominal: Normal appearance.   Musculoskeletal: Normal range of motion.         General: No deformity. Normal range of motion.   Neurological: She is alert and oriented to person, place, and time. She exhibits normal muscle tone. Coordination normal.   Skin: Skin is warm, dry, intact, not diaphoretic and not pale. erythema         Comments: Right forearm: round lesion with surrounding erythema, no active drainage. Area is mildly swollen, warm and tender to palpation. No induration or fluctuance noted. No lymphangitic streaking. See photo   Psychiatric: Her speech is normal and behavior is normal. Judgment and thought content normal.   Nursing note and vitals reviewed.      Assessment:     1. Insect bite of right arm, initial encounter    2. Cellulitis of arm, right    3. Elevated blood pressure reading in office with diagnosis of hypertension        Plan:       Insect bite of right arm, initial encounter    Cellulitis of arm, right    Elevated blood pressure reading in office with diagnosis of hypertension    Other orders  -     sulfamethoxazole-trimethoprim 800-160mg (BACTRIM DS) 800-160 mg Tab; Take 1 tablet by mouth 2 (two) times daily. for 7 days  Dispense: 14 tablet; Refill: 0          Medical Decision Making:   Initial Assessment:   Nontoxic appearing 69 yo female c/o infected insect bite.  This patient presents with initial  presentation of local erythema, warmth, swelling concerning for cellulitis. Sensitivity/pain to light touch around the erythematous area. No lymphangitic spread visible and no fluid pockets or fluctuance concerning for abscess noted. Low concern for osteomyelitis or DVT. No immune compromise, bullae, pain out of proportion, or rapid progression concerning for necrotizing fasciitis. Patient age and history of CAD/HTN may cause increased risk for infection and slowed healing.  Patient to be discharged home with prescription for bactrim . Provided with follow up instructions and ER precautions.       Patient is seen in clinic with known history of essential hypertension noted to be elevated above goal today in clinic.  Patient was counseled that blood pressure was elevated. I advised adherence to current medication regime, low-salt heart smart diet, exercise and self-monitoring.  Patient follow up with primary physician for long-term management adjustments as needed.      Patient Instructions   Take antibiotics as prescribed until gone    Clean the insect bite/cellulitis twice a day with antibacterial soap and water,    Apply warm compresses to area three times a day or more.       Tylenol or ibuprofen for pain or fever as needed    If your condition worsens or fails to improve we recommend that you receive another evaluation at the ER immediately or contact your PCP to discuss your concerns or return here.     You must understand that you've received an urgent care treatment only and that you may be released before all your medical problems are known or treated. You the patient will arrange for followup care as instructed.

## 2023-08-22 NOTE — PATIENT INSTRUCTIONS
Take antibiotics as prescribed until gone    Clean the insect bite/cellulitis twice a day with antibacterial soap and water,    Apply warm compresses to area three times a day or more.       Tylenol or ibuprofen for pain or fever as needed    If your condition worsens or fails to improve we recommend that you receive another evaluation at the ER immediately or contact your PCP to discuss your concerns or return here.     You must understand that you've received an urgent care treatment only and that you may be released before all your medical problems are known or treated. You the patient will arrange for followup care as instructed.

## 2023-08-23 ENCOUNTER — OFFICE VISIT (OUTPATIENT)
Dept: FAMILY MEDICINE | Facility: CLINIC | Age: 68
End: 2023-08-23
Payer: MEDICARE

## 2023-08-23 VITALS
OXYGEN SATURATION: 98 % | RESPIRATION RATE: 18 BRPM | WEIGHT: 130.38 LBS | SYSTOLIC BLOOD PRESSURE: 139 MMHG | HEIGHT: 61 IN | DIASTOLIC BLOOD PRESSURE: 73 MMHG | BODY MASS INDEX: 24.62 KG/M2 | HEART RATE: 73 BPM

## 2023-08-23 DIAGNOSIS — R19.5 POSITIVE COLORECTAL CANCER SCREENING USING COLOGUARD TEST: ICD-10-CM

## 2023-08-23 DIAGNOSIS — L03.113 CELLULITIS OF RIGHT UPPER EXTREMITY: Primary | ICD-10-CM

## 2023-08-23 PROCEDURE — 1101F PR PT FALLS ASSESS DOC 0-1 FALLS W/OUT INJ PAST YR: ICD-10-PCS | Mod: HCNC,CPTII,S$GLB, | Performed by: FAMILY MEDICINE

## 2023-08-23 PROCEDURE — 3044F PR MOST RECENT HEMOGLOBIN A1C LEVEL <7.0%: ICD-10-PCS | Mod: HCNC,CPTII,S$GLB, | Performed by: FAMILY MEDICINE

## 2023-08-23 PROCEDURE — 1126F AMNT PAIN NOTED NONE PRSNT: CPT | Mod: HCNC,CPTII,S$GLB, | Performed by: FAMILY MEDICINE

## 2023-08-23 PROCEDURE — 4010F ACE/ARB THERAPY RXD/TAKEN: CPT | Mod: HCNC,CPTII,S$GLB, | Performed by: FAMILY MEDICINE

## 2023-08-23 PROCEDURE — 3078F PR MOST RECENT DIASTOLIC BLOOD PRESSURE < 80 MM HG: ICD-10-PCS | Mod: HCNC,CPTII,S$GLB, | Performed by: FAMILY MEDICINE

## 2023-08-23 PROCEDURE — 99999 PR PBB SHADOW E&M-EST. PATIENT-LVL IV: ICD-10-PCS | Mod: PBBFAC,HCNC,, | Performed by: FAMILY MEDICINE

## 2023-08-23 PROCEDURE — 99213 OFFICE O/P EST LOW 20 MIN: CPT | Mod: HCNC,S$GLB,, | Performed by: FAMILY MEDICINE

## 2023-08-23 PROCEDURE — 99999 PR PBB SHADOW E&M-EST. PATIENT-LVL IV: CPT | Mod: PBBFAC,HCNC,, | Performed by: FAMILY MEDICINE

## 2023-08-23 PROCEDURE — 3008F PR BODY MASS INDEX (BMI) DOCUMENTED: ICD-10-PCS | Mod: HCNC,CPTII,S$GLB, | Performed by: FAMILY MEDICINE

## 2023-08-23 PROCEDURE — 3075F SYST BP GE 130 - 139MM HG: CPT | Mod: HCNC,CPTII,S$GLB, | Performed by: FAMILY MEDICINE

## 2023-08-23 PROCEDURE — 99213 PR OFFICE/OUTPT VISIT, EST, LEVL III, 20-29 MIN: ICD-10-PCS | Mod: HCNC,S$GLB,, | Performed by: FAMILY MEDICINE

## 2023-08-23 PROCEDURE — 3078F DIAST BP <80 MM HG: CPT | Mod: HCNC,CPTII,S$GLB, | Performed by: FAMILY MEDICINE

## 2023-08-23 PROCEDURE — 3044F HG A1C LEVEL LT 7.0%: CPT | Mod: HCNC,CPTII,S$GLB, | Performed by: FAMILY MEDICINE

## 2023-08-23 PROCEDURE — 1101F PT FALLS ASSESS-DOCD LE1/YR: CPT | Mod: HCNC,CPTII,S$GLB, | Performed by: FAMILY MEDICINE

## 2023-08-23 PROCEDURE — 3075F PR MOST RECENT SYSTOLIC BLOOD PRESS GE 130-139MM HG: ICD-10-PCS | Mod: HCNC,CPTII,S$GLB, | Performed by: FAMILY MEDICINE

## 2023-08-23 PROCEDURE — 4010F PR ACE/ARB THEARPY RXD/TAKEN: ICD-10-PCS | Mod: HCNC,CPTII,S$GLB, | Performed by: FAMILY MEDICINE

## 2023-08-23 PROCEDURE — 3008F BODY MASS INDEX DOCD: CPT | Mod: HCNC,CPTII,S$GLB, | Performed by: FAMILY MEDICINE

## 2023-08-23 PROCEDURE — 1159F PR MEDICATION LIST DOCUMENTED IN MEDICAL RECORD: ICD-10-PCS | Mod: HCNC,CPTII,S$GLB, | Performed by: FAMILY MEDICINE

## 2023-08-23 PROCEDURE — 1126F PR PAIN SEVERITY QUANTIFIED, NO PAIN PRESENT: ICD-10-PCS | Mod: HCNC,CPTII,S$GLB, | Performed by: FAMILY MEDICINE

## 2023-08-23 PROCEDURE — 1159F MED LIST DOCD IN RCRD: CPT | Mod: HCNC,CPTII,S$GLB, | Performed by: FAMILY MEDICINE

## 2023-08-23 PROCEDURE — 3288F PR FALLS RISK ASSESSMENT DOCUMENTED: ICD-10-PCS | Mod: HCNC,CPTII,S$GLB, | Performed by: FAMILY MEDICINE

## 2023-08-23 PROCEDURE — 3288F FALL RISK ASSESSMENT DOCD: CPT | Mod: HCNC,CPTII,S$GLB, | Performed by: FAMILY MEDICINE

## 2023-08-23 NOTE — PROGRESS NOTES
Chief Complaint:  No chief complaint on file.      History of Present Illness:    Patient presents today for insect bite,    Went to  for insect bite 5 days Has been taking abx for 4 days. Says it was swollen and red and slightly blistering.  Has gotten better now and wanted to follow-up to make sure there was no infection.    Due for colonoscopy. Had a positive Cologuard test.  ROS:  Review of Systems   Constitutional:  Negative for appetite change, chills and fever.   HENT:  Negative for congestion, ear pain, postnasal drip, rhinorrhea, sinus pressure and sinus pain.    Eyes:  Negative for pain.   Respiratory:  Negative for cough, chest tightness and shortness of breath.    Cardiovascular:  Negative for chest pain and palpitations.   Gastrointestinal:  Negative for abdominal pain, blood in stool, constipation, diarrhea and nausea.   Genitourinary:  Negative for difficulty urinating, dysuria, flank pain and hematuria.   Musculoskeletal:  Negative for arthralgias, back pain and myalgias.   Skin:  Positive for wound (R forearm). Negative for pallor.   Neurological:  Negative for dizziness, tremors, speech difficulty, light-headedness and headaches.   Psychiatric/Behavioral:  Negative for behavioral problems, dysphoric mood and sleep disturbance.    All other systems reviewed and are negative.      Past Medical History:   Diagnosis Date    AAA (abdominal aortic aneurysm) 2/13/2014    Abdominal aneurysm     3    Acute coronary syndrome     Arthritis     BPPV (benign paroxysmal positional vertigo)     Carotid artery plaque     Carotid artery stenosis and occlusion 2/13/2014    Chronic back pain     COPD (chronic obstructive pulmonary disease)     Coronary artery disease     Emphysema lung     Hyperlipidemia     Hypertension     Myocardial infarction     x3    Neuropathy     Personal history of COVID-19 6/9/2021 11/16/2020 +Covid, recovered at home        Social History:  Social History     Socioeconomic History     Marital status:    Tobacco Use    Smoking status: Former     Current packs/day: 0.00     Average packs/day: 0.5 packs/day for 46.9 years (23.4 ttl pk-yrs)     Types: Cigarettes, Vaping w/o nicotine     Start date: 1970     Quit date: 2017     Years since quittin.3    Smokeless tobacco: Never    Tobacco comments:     3 MG NICOTINE FOR HEART.    Substance and Sexual Activity    Alcohol use: No    Drug use: No    Sexual activity: Not Currently     Birth control/protection: None     Social Determinants of Health     Financial Resource Strain: Medium Risk (2023)    Overall Financial Resource Strain (CARDIA)     Difficulty of Paying Living Expenses: Somewhat hard   Food Insecurity: Food Insecurity Present (2023)    Hunger Vital Sign     Worried About Running Out of Food in the Last Year: Sometimes true     Ran Out of Food in the Last Year: Sometimes true   Transportation Needs: No Transportation Needs (2023)    PRAPARE - Transportation     Lack of Transportation (Medical): No     Lack of Transportation (Non-Medical): No   Physical Activity: Insufficiently Active (2023)    Exercise Vital Sign     Days of Exercise per Week: 1 day     Minutes of Exercise per Session: 20 min   Stress: No Stress Concern Present (2023)    [employer] [place] [employer] - Occupational Stress Questionnaire     Feeling of Stress : Only a little   Recent Concern: Stress - Stress Concern Present (2023)    [employer] [place] [employer] - Occupational Stress Questionnaire     Feeling of Stress : To some extent   Social Connections: Socially Integrated (2023)    Social Connection and Isolation Panel [NHANES]     Frequency of Communication with Friends and Family: More than three times a week     Frequency of Social Gatherings with Friends and Family: Twice a week     Attends [employer] Services: More than 4 times per year     Active Member of Clubs or Organizations: Yes      "Attends Club or Organization Meetings: More than 4 times per year     Marital Status:    Housing Stability: Low Risk  (6/16/2023)    Housing Stability Vital Sign     Unable to Pay for Housing in the Last Year: No     Number of Places Lived in the Last Year: 1     Unstable Housing in the Last Year: No       Family History:   family history includes Breast cancer in her paternal aunt; Heart attacks under age 50 in her brother and father; Heart disease in her mother.    Health Maintenance   Topic Date Due    Shingles Vaccine (1 of 2) Never done    Colorectal Cancer Screening  05/17/2022    Mammogram  06/09/2022    LDCT Lung Screen  11/08/2023    Lipid Panel  04/18/2024    High Dose Statin  08/21/2024    Aspirin/Antiplatelet Therapy  08/21/2024    DEXA Scan  10/20/2024    TETANUS VACCINE  11/15/2026    Hepatitis C Screening  Completed       Exam:Physical     Vital Signs  Pulse: 73  Resp: 18  SpO2: 98 %  BP: 139/73  Pain Score: 0-No pain  Height and Weight  Height: 5' 1" (154.9 cm)  Weight: 59.1 kg (130 lb 6.4 oz)  BSA (Calculated - sq m): 1.6 sq meters  BMI (Calculated): 24.7  Weight in (lb) to have BMI = 25: 132]    Body mass index is 24.64 kg/m².    Physical Exam  Vitals and nursing note reviewed.   Constitutional:       Appearance: Normal appearance. She is not toxic-appearing.   HENT:      Head: Normocephalic and atraumatic.      Right Ear: Tympanic membrane normal.      Left Ear: Tympanic membrane normal.   Eyes:      Extraocular Movements: Extraocular movements intact.      Pupils: Pupils are equal, round, and reactive to light.   Cardiovascular:      Rate and Rhythm: Normal rate and regular rhythm.      Heart sounds: Normal heart sounds.   Pulmonary:      Effort: Pulmonary effort is normal.      Breath sounds: Normal breath sounds. No wheezing, rhonchi or rales.   Abdominal:      General: Bowel sounds are normal. There is no distension.      Palpations: Abdomen is soft.      Tenderness: There is no " abdominal tenderness.   Musculoskeletal:         General: Normal range of motion.      Cervical back: Normal range of motion.      Lumbar back: No tenderness.   Skin:     General: Skin is warm and dry.      Capillary Refill: Capillary refill takes less than 2 seconds.   Neurological:      General: No focal deficit present.      Mental Status: She is alert and oriented to person, place, and time.   Psychiatric:         Mood and Affect: Mood normal.         Behavior: Behavior normal.         Judgment: Judgment normal.     Cellulitis  improving      Assessment:      ICD-10-CM ICD-9-CM   1. Cellulitis of right upper extremity  L03.113 682.3   2. Positive colorectal cancer screening using Cologuard test  R19.5 787.7         Plan:  Continue taking abx given from .  Schedule colonoscopy. Pt is very hesitant and thinks it isn't necessary even thouhg jshe had a positive cologuard test. Says she will set it up but may not get it done.  Orders Placed This Encounter   Procedures    Ambulatory referral/consult to Endo Procedure           No follow-ups on file.      Olga Altman MD    Scribe Attestation:   I, Amaury Real, am scribing for, and in the presence of, Dr.Arif Altman I performed the above scribed service and the documentation accurately describes the services I performed. I attest to the accuracy of the note.    I, Dr. Olga Altman, reviewed documentation as scribed above. I performed the services described in this documentation.  I agree that the record reflects my personal performance and is accurate and complete. Olga Altman MD.  08/23/2023

## 2023-09-01 ENCOUNTER — TELEPHONE (OUTPATIENT)
Dept: CARDIOLOGY | Facility: CLINIC | Age: 68
End: 2023-09-01
Payer: MEDICARE

## 2023-09-01 NOTE — TELEPHONE ENCOUNTER
Millie Juarez MD  You Just now (3:38 PM)       HOLD ZEBETA FOR NOW AND MONITOR HEART RATE.       The patient has been notified of this information and all questions answered.            Been taking panprolazine- plavix- norvasc- vytorin- baby aspirin - D3 vitamin- memantine - Zebeta(only took 1/2 today)- because BP was low today  -- normally taking routine meds around 10 am - 11am  -- plays with the Zebeta depending on BP - everything else as ordered  Has a fall prevention watch from [facility] - watch messages her that her HR was under 50- 44 to be exact around 1:29pm then one other one at 1:33pm 47 - each lasting about 10- 20 seconds   -- 1st time today with HR   -- on [facility] program to look at BP  -- been feeling bad last couple of months - the zebeta - BP brings down but causes HR to go down too  -- usually runs 70-90 HR   -- pt is symptomatic when BP and HR drop - feels very very cold - no chest pain - no SOB     Please advise:                ----- Message from Kelsie Rodriguez sent at 9/1/2023  3:06 PM CDT -----  Regarding: pt called  Name of Who is Calling: BHARAT MONTANA [[id number]]      What is the request in detail: pt states she is getting some low heartbeats under 50 . Please advise       Can the clinic reply by [facility]: No       [facility][phone]

## 2023-09-05 ENCOUNTER — OFFICE VISIT (OUTPATIENT)
Dept: URGENT CARE | Facility: CLINIC | Age: 68
End: 2023-09-05
Payer: MEDICARE

## 2023-09-05 VITALS
TEMPERATURE: 98 F | SYSTOLIC BLOOD PRESSURE: 160 MMHG | DIASTOLIC BLOOD PRESSURE: 71 MMHG | BODY MASS INDEX: 22.76 KG/M2 | HEART RATE: 70 BPM | RESPIRATION RATE: 14 BRPM | HEIGHT: 63 IN | WEIGHT: 128.44 LBS | OXYGEN SATURATION: 97 %

## 2023-09-05 DIAGNOSIS — J30.9 ALLERGIC RHINITIS, UNSPECIFIED SEASONALITY, UNSPECIFIED TRIGGER: Primary | ICD-10-CM

## 2023-09-05 DIAGNOSIS — J02.9 SORE THROAT: ICD-10-CM

## 2023-09-05 LAB
CTP QC/QA: YES
SARS-COV-2 AG RESP QL IA.RAPID: NEGATIVE

## 2023-09-05 PROCEDURE — 99213 PR OFFICE/OUTPT VISIT, EST, LEVL III, 20-29 MIN: ICD-10-PCS | Mod: S$GLB,,, | Performed by: PHYSICIAN ASSISTANT

## 2023-09-05 PROCEDURE — 87811 SARS CORONAVIRUS 2 ANTIGEN POCT, MANUAL READ: ICD-10-PCS | Mod: QW,S$GLB,, | Performed by: PHYSICIAN ASSISTANT

## 2023-09-05 PROCEDURE — 87811 SARS-COV-2 COVID19 W/OPTIC: CPT | Mod: QW,S$GLB,, | Performed by: PHYSICIAN ASSISTANT

## 2023-09-05 PROCEDURE — 99213 OFFICE O/P EST LOW 20 MIN: CPT | Mod: S$GLB,,, | Performed by: PHYSICIAN ASSISTANT

## 2023-09-05 NOTE — PROGRESS NOTES
"Subjective:      Patient ID: Gemma Vick is a 68 y.o. female.    Vitals:  height is 5' 2.99" (1.6 m) and weight is 58.3 kg (128 lb 6.7 oz). Her temperature is 98.2 °F (36.8 °C). Her blood pressure is 160/71 (abnormal) and her pulse is 70. Her respiration is 14 and oxygen saturation is 97%.     Chief Complaint: No chief complaint on file.    Onset of symptoms 09/04/23. Pt is c/o sore throat,nasal romeo,chest romeo,prod cough,head aches,ear romeo,and fatigue.  Pt states she is coughing up bright green mucus when she coughs. Pt has not taken any medications to alleviate symptoms at this time.  States symptoms have been on and off.  Denies sinus tenderness or pressure.  No fevers.  No known sick contacts.    Sore Throat   This is a new problem. The current episode started yesterday. The problem has been unchanged. There has been no fever. The pain is at a severity of 4/10. The pain is mild. Associated symptoms include congestion, coughing, headaches and a plugged ear sensation. The treatment provided no relief.       HENT:  Positive for congestion and sore throat.    Respiratory:  Positive for cough.    Neurological:  Positive for headaches.      Objective:     Physical Exam   Constitutional: She is oriented to person, place, and time. She appears well-developed.   HENT:   Head: Normocephalic and atraumatic.   Ears:   Right Ear: Hearing, tympanic membrane, external ear and ear canal normal.   Left Ear: Hearing, tympanic membrane, external ear and ear canal normal.   Nose: Nose normal. Right sinus exhibits no maxillary sinus tenderness and no frontal sinus tenderness. Left sinus exhibits no maxillary sinus tenderness and no frontal sinus tenderness.   Mouth/Throat: Uvula is midline, oropharynx is clear and moist and mucous membranes are normal. Mucous membranes are moist. No tonsillar exudate.   Eyes: Conjunctivae and EOM are normal. Pupils are equal, round, and reactive to light.   Neck: Neck supple.   Cardiovascular: " Normal rate, regular rhythm, normal heart sounds and normal pulses.   Pulmonary/Chest: Effort normal and breath sounds normal.   Musculoskeletal: Normal range of motion.         General: Normal range of motion.   Lymphadenopathy:     She has no cervical adenopathy.   Neurological: She is alert and oriented to person, place, and time.   Skin: Skin is warm and dry.   Vitals reviewed.      Assessment:     1. Allergic rhinitis, unspecified seasonality, unspecified trigger    2. Sore throat        Plan:       Allergic rhinitis, unspecified seasonality, unspecified trigger    Sore throat  -     SARS Coronavirus 2 Antigen, POCT Manual Read        Results for orders placed or performed in visit on 09/05/23   SARS Coronavirus 2 Antigen, POCT Manual Read   Result Value Ref Range    SARS Coronavirus 2 Antigen Negative Negative     Acceptable Yes      *Note: Due to a large number of results and/or encounters for the requested time period, some results have not been displayed. A complete set of results can be found in Results Review.       Advise increase p.o. fluids--at least 64 ounces of water/juice & rest  Meds:  Recommend adding in an antihistamine daily.  Normal saline nasal wash to irrigate sinuses and for congestion/runny nose.  Cool mist humidifier/vaporizer.  Practice good handwashing.  Mucinex for cough and chest congestion.  Tylenol or Ibuprofen for fever, headache and body aches.  Warm salt water gargles for throat comfort.  Chloraseptic spray or lozenges for throat comfort.  See PCP or go to ER if symptoms worsen or fail to improve with treatment.

## 2023-09-06 ENCOUNTER — PATIENT MESSAGE (OUTPATIENT)
Dept: CARDIOLOGY | Facility: CLINIC | Age: 68
End: 2023-09-06
Payer: MEDICARE

## 2023-09-07 ENCOUNTER — TELEPHONE (OUTPATIENT)
Dept: CARDIOLOGY | Facility: CLINIC | Age: 68
End: 2023-09-07
Payer: MEDICARE

## 2023-09-07 NOTE — TELEPHONE ENCOUNTER
Ke to patient and she gave correct fax number to fax over papers----- Message from Jesus Irby sent at 9/7/2023 11:27 AM CDT -----  Pt would like to be called back asap regarding questions concerning current symptoms (Hypertension and heart rate)    Pt can be reached at [phone].    Thanks

## 2023-09-09 ENCOUNTER — TELEPHONE (OUTPATIENT)
Dept: URGENT CARE | Facility: CLINIC | Age: 68
End: 2023-09-09
Payer: MEDICARE

## 2023-09-09 NOTE — TELEPHONE ENCOUNTER
Courtesy call made for 9/5 visit. Pt reported doing better, and had no further questions or concerns.

## 2023-09-11 ENCOUNTER — TELEPHONE (OUTPATIENT)
Dept: FAMILY MEDICINE | Facility: CLINIC | Age: 68
End: 2023-09-11
Payer: MEDICARE

## 2023-09-11 ENCOUNTER — OFFICE VISIT (OUTPATIENT)
Dept: URGENT CARE | Facility: CLINIC | Age: 68
End: 2023-09-11
Payer: MEDICARE

## 2023-09-11 VITALS
OXYGEN SATURATION: 95 % | HEART RATE: 84 BPM | SYSTOLIC BLOOD PRESSURE: 163 MMHG | WEIGHT: 128 LBS | BODY MASS INDEX: 23.55 KG/M2 | HEIGHT: 62 IN | TEMPERATURE: 98 F | DIASTOLIC BLOOD PRESSURE: 71 MMHG | RESPIRATION RATE: 18 BRPM

## 2023-09-11 DIAGNOSIS — L08.9 INFECTED PUNCTURE WOUND: ICD-10-CM

## 2023-09-11 DIAGNOSIS — T14.8XXA INFECTED PUNCTURE WOUND: ICD-10-CM

## 2023-09-11 DIAGNOSIS — S41.102A ARM WOUND, LEFT, INITIAL ENCOUNTER: Primary | ICD-10-CM

## 2023-09-11 PROCEDURE — 93005 ELECTROCARDIOGRAM TRACING: CPT | Mod: HCNC

## 2023-09-11 PROCEDURE — 99214 PR OFFICE/OUTPT VISIT, EST, LEVL IV, 30-39 MIN: ICD-10-PCS | Mod: S$GLB,,, | Performed by: NURSE PRACTITIONER

## 2023-09-11 PROCEDURE — 96361 HYDRATE IV INFUSION ADD-ON: CPT | Mod: HCNC

## 2023-09-11 PROCEDURE — 99285 EMERGENCY DEPT VISIT HI MDM: CPT | Mod: 25,HCNC

## 2023-09-11 PROCEDURE — 96374 THER/PROPH/DIAG INJ IV PUSH: CPT | Mod: HCNC

## 2023-09-11 PROCEDURE — 93010 EKG 12-LEAD: ICD-10-PCS | Mod: HCNC,,, | Performed by: INTERNAL MEDICINE

## 2023-09-11 PROCEDURE — 99214 OFFICE O/P EST MOD 30 MIN: CPT | Mod: S$GLB,,, | Performed by: NURSE PRACTITIONER

## 2023-09-11 PROCEDURE — 93010 ELECTROCARDIOGRAM REPORT: CPT | Mod: HCNC,,, | Performed by: INTERNAL MEDICINE

## 2023-09-11 RX ORDER — HYDROCHLOROTHIAZIDE 12.5 MG/1
12.5 CAPSULE ORAL
COMMUNITY
Start: 2023-08-16 | End: 2024-01-29 | Stop reason: SDUPTHER

## 2023-09-11 RX ORDER — DOXEPIN HYDROCHLORIDE 10 MG/1
10-20 CAPSULE ORAL NIGHTLY PRN
COMMUNITY
Start: 2023-08-30 | End: 2023-12-05

## 2023-09-11 RX ORDER — MEMANTINE HYDROCHLORIDE 10 MG/1
10 TABLET ORAL 2 TIMES DAILY
COMMUNITY
Start: 2023-08-21 | End: 2024-01-29 | Stop reason: SDUPTHER

## 2023-09-11 RX ORDER — MUPIROCIN 20 MG/G
OINTMENT TOPICAL
Status: DISCONTINUED | OUTPATIENT
Start: 2023-09-11 | End: 2023-09-11 | Stop reason: HOSPADM

## 2023-09-11 RX ORDER — OLMESARTAN MEDOXOMIL 40 MG/1
40 TABLET ORAL
COMMUNITY
Start: 2023-08-07 | End: 2023-11-16

## 2023-09-11 RX ORDER — DOXYCYCLINE 100 MG/1
100 CAPSULE ORAL EVERY 12 HOURS
Qty: 14 CAPSULE | Refills: 0 | Status: SHIPPED | OUTPATIENT
Start: 2023-09-11 | End: 2023-09-18

## 2023-09-11 RX ADMIN — MUPIROCIN: 20 OINTMENT TOPICAL at 03:09

## 2023-09-11 NOTE — TELEPHONE ENCOUNTER
----- Message from Soraida Lamas sent at 9/11/2023  3:52 PM CDT -----  Regarding: ASAP  Pt has acquired a puncture wound from a piece of metal,believes that it may be infected. She did go to Urgent Care . But pt believe it may set up staff.Please place orders and call pt back at [phone].Thanks

## 2023-09-11 NOTE — PATIENT INSTRUCTIONS
Td/tdap Up to date and next due 2026     Patient Instructions   Keep wound covered with dsd     Take antibiotic as prescribed   Do not apply ointments to wound until more healed approx 5 days  Avoid immersing hand in dirty water   F/U in 2-3 days for wound check   Any concerning pain, purulent drainage, increased redness or streaking upward hand f/u to [facility]  or local ER

## 2023-09-11 NOTE — PROGRESS NOTES
"Subjective:      Patient ID: Gemma Vick is a 68 y.o. female.    Vitals:  height is 5' 2" (1.575 m) and weight is 58.1 kg (128 lb). Her oral temperature is 98.2 °F (36.8 °C). Her blood pressure is 163/71 (abnormal) and her pulse is 84. Her respiration is 18 and oxygen saturation is 95%.     Chief Complaint: Puncture Wound    Patient present with a puncture wound on left forearm. Symptoms began last night. Pt states she used mupirocin       Skin:  Positive for wound and puncture wound. Negative for erythema.      Objective:     Vitals:    09/11/23 1507   BP: (!) 163/71   Pulse: 84   Resp: 18   Temp: 98.2 °F (36.8 °C)   TempSrc: Oral   SpO2: 95%   Weight: 58.1 kg (128 lb)   Height: 5' 2" (1.575 m)       Physical Exam   Constitutional: She is oriented to person, place, and time. She appears well-developed.   HENT:   Head: Normocephalic and atraumatic. Head is without abrasion, without contusion and without laceration.   Ears:   Right Ear: External ear normal.   Left Ear: External ear normal.   Nose: Nose normal.   Mouth/Throat: Oropharynx is clear and moist and mucous membranes are normal.   Eyes: Conjunctivae, EOM and lids are normal. Pupils are equal, round, and reactive to light.   Neck: Trachea normal and phonation normal. Neck supple.   Cardiovascular: Normal rate, regular rhythm and normal heart sounds.   Pulmonary/Chest: Effort normal and breath sounds normal. No stridor. No respiratory distress.   Musculoskeletal: Normal range of motion.         General: Normal range of motion.   Neurological: She is alert and oriented to person, place, and time.   Skin: Skin is warm and no rash. Capillary refill takes less than 2 seconds. No abrasion, No burn, No bruising, No erythema and No ecchymosis         Comments: Puncture wound to left forearm with purulent discharge and localized erythema    Psychiatric: Her speech is normal and behavior is normal. Judgment and thought content normal.   Nursing note and vitals " reviewed.      Assessment:     1. Arm wound, left, initial encounter    2. Infected puncture wound        Plan:     Patient stable for discharge and home management of condition  Will  treat to cover MRSA as known contact in home     Arm wound, left, initial encounter  -     mupirocin 2 % ointment  -     doxycycline (VIBRAMYCIN) 100 MG Cap; Take 1 capsule (100 mg total) by mouth every 12 (twelve) hours. Take with food for 7 days  Dispense: 14 capsule; Refill: 0    Infected puncture wound  -     mupirocin 2 % ointment  -     doxycycline (VIBRAMYCIN) 100 MG Cap; Take 1 capsule (100 mg total) by mouth every 12 (twelve) hours. Take with food for 7 days  Dispense: 14 capsule; Refill: 0        Patient Instructions   Td/tdap Up to date and next due 2026     Patient Instructions   Keep wound covered with dsd     Take antibiotic as prescribed   Do not apply ointments to wound until more healed approx 5 days  Avoid immersing hand in dirty water   F/U in 2-3 days for wound check   Any concerning pain, purulent drainage, increased redness or streaking upward hand f/u to [facility]  or local ER

## 2023-09-11 NOTE — TELEPHONE ENCOUNTER
"Spoke with patient and she cut her had a piece of cardboard like a paper cut, she want to UC and they gave her antibiotics, Doxy 100 BID for 7 days she said that the wound had pus come out it and she is coughing up green stuff   She is worried that "something is wrong with her blood" wants to do blood work because she is coughing up green and he hand had pus   Please advise   "

## 2023-09-12 ENCOUNTER — HOSPITAL ENCOUNTER (OUTPATIENT)
Facility: HOSPITAL | Age: 68
Discharge: HOME | End: 2023-09-13
Attending: EMERGENCY MEDICINE | Admitting: HOSPITALIST
Payer: MEDICARE

## 2023-09-12 ENCOUNTER — PATIENT MESSAGE (OUTPATIENT)
Dept: FAMILY MEDICINE | Facility: CLINIC | Age: 68
End: 2023-09-12
Payer: MEDICARE

## 2023-09-12 DIAGNOSIS — R07.89 OTHER CHEST PAIN: ICD-10-CM

## 2023-09-12 DIAGNOSIS — I25.10 CAD (CORONARY ARTERY DISEASE): ICD-10-CM

## 2023-09-12 DIAGNOSIS — I10 ESSENTIAL HYPERTENSION: ICD-10-CM

## 2023-09-12 DIAGNOSIS — R07.9 CHEST PAIN: ICD-10-CM

## 2023-09-12 DIAGNOSIS — K55.1 MESENTERIC ISCHEMIA, CHRONIC: ICD-10-CM

## 2023-09-12 DIAGNOSIS — E78.2 MIXED HYPERLIPIDEMIA: ICD-10-CM

## 2023-09-12 DIAGNOSIS — I20.0 UNSTABLE ANGINA PECTORIS: Primary | ICD-10-CM

## 2023-09-12 DIAGNOSIS — I25.110 CORONARY ARTERY DISEASE INVOLVING NATIVE CORONARY ARTERY OF NATIVE HEART WITH UNSTABLE ANGINA PECTORIS: ICD-10-CM

## 2023-09-12 LAB
ALBUMIN SERPL BCP-MCNC: 4 G/DL (ref 3.5–5.2)
ALP SERPL-CCNC: 78 U/L (ref 55–135)
ALT SERPL W/O P-5'-P-CCNC: 19 U/L (ref 10–44)
ANION GAP SERPL CALC-SCNC: 10 MMOL/L (ref 8–16)
AST SERPL-CCNC: 27 U/L (ref 10–40)
BASOPHILS # BLD AUTO: 0.07 K/UL (ref 0–0.2)
BASOPHILS NFR BLD: 0.7 % (ref 0–1.9)
BILIRUB SERPL-MCNC: 0.4 MG/DL (ref 0.1–1)
BILIRUB UR QL STRIP: NEGATIVE
BNP SERPL-MCNC: 42 PG/ML (ref 0–99)
BUN SERPL-MCNC: 9 MG/DL (ref 8–23)
CALCIUM SERPL-MCNC: 10 MG/DL (ref 8.7–10.5)
CATH EF QUANTITATIVE: 60 %
CHLORIDE SERPL-SCNC: 101 MMOL/L (ref 95–110)
CLARITY UR: CLEAR
CO2 SERPL-SCNC: 20 MMOL/L (ref 23–29)
COLOR UR: COLORLESS
CREAT SERPL-MCNC: 1.1 MG/DL (ref 0.5–1.4)
DIFFERENTIAL METHOD: ABNORMAL
EOSINOPHIL # BLD AUTO: 0.2 K/UL (ref 0–0.5)
EOSINOPHIL NFR BLD: 1.7 % (ref 0–8)
ERYTHROCYTE [DISTWIDTH] IN BLOOD BY AUTOMATED COUNT: 13.1 % (ref 11.5–14.5)
EST. GFR  (NO RACE VARIABLE): 55 ML/MIN/1.73 M^2
GLUCOSE SERPL-MCNC: 98 MG/DL (ref 70–110)
GLUCOSE UR QL STRIP: NEGATIVE
HCT VFR BLD AUTO: 36.2 % (ref 37–48.5)
HGB BLD-MCNC: 12.5 G/DL (ref 12–16)
HGB UR QL STRIP: NEGATIVE
IMM GRANULOCYTES # BLD AUTO: 0.03 K/UL (ref 0–0.04)
IMM GRANULOCYTES NFR BLD AUTO: 0.3 % (ref 0–0.5)
KETONES UR QL STRIP: NEGATIVE
LEUKOCYTE ESTERASE UR QL STRIP: NEGATIVE
LYMPHOCYTES # BLD AUTO: 2.3 K/UL (ref 1–4.8)
LYMPHOCYTES NFR BLD: 23.1 % (ref 18–48)
MCH RBC QN AUTO: 30.9 PG (ref 27–31)
MCHC RBC AUTO-ENTMCNC: 34.5 G/DL (ref 32–36)
MCV RBC AUTO: 90 FL (ref 82–98)
MONOCYTES # BLD AUTO: 1 K/UL (ref 0.3–1)
MONOCYTES NFR BLD: 9.9 % (ref 4–15)
NEUTROPHILS # BLD AUTO: 6.5 K/UL (ref 1.8–7.7)
NEUTROPHILS NFR BLD: 64.3 % (ref 38–73)
NITRITE UR QL STRIP: NEGATIVE
NRBC BLD-RTO: 0 /100 WBC
PH UR STRIP: 7 [PH] (ref 5–8)
PLATELET # BLD AUTO: 259 K/UL (ref 150–450)
PMV BLD AUTO: 9.3 FL (ref 9.2–12.9)
POC ACTIVATED CLOTTING TIME K: 245 SEC (ref 74–137)
POC ACTIVATED CLOTTING TIME K: 263 SEC (ref 74–137)
POTASSIUM SERPL-SCNC: 3.9 MMOL/L (ref 3.5–5.1)
PROT SERPL-MCNC: 7.5 G/DL (ref 6–8.4)
PROT UR QL STRIP: NEGATIVE
RBC # BLD AUTO: 4.04 M/UL (ref 4–5.4)
SAMPLE: ABNORMAL
SAMPLE: ABNORMAL
SODIUM SERPL-SCNC: 131 MMOL/L (ref 136–145)
SP GR UR STRIP: <1.005 (ref 1–1.03)
TROPONIN I SERPL DL<=0.01 NG/ML-MCNC: 0.02 NG/ML (ref 0–0.03)
URN SPEC COLLECT METH UR: ABNORMAL
UROBILINOGEN UR STRIP-ACNC: NEGATIVE EU/DL
WBC # BLD AUTO: 10.09 K/UL (ref 3.9–12.7)

## 2023-09-12 PROCEDURE — 63600175 PHARM REV CODE 636 W HCPCS: Mod: HCNC | Performed by: INTERNAL MEDICINE

## 2023-09-12 PROCEDURE — 93010 ELECTROCARDIOGRAM REPORT: CPT | Mod: HCNC,,, | Performed by: INTERNAL MEDICINE

## 2023-09-12 PROCEDURE — 83880 ASSAY OF NATRIURETIC PEPTIDE: CPT | Mod: HCNC | Performed by: NURSE PRACTITIONER

## 2023-09-12 PROCEDURE — 96361 HYDRATE IV INFUSION ADD-ON: CPT | Mod: 59

## 2023-09-12 PROCEDURE — 80053 COMPREHEN METABOLIC PANEL: CPT | Mod: HCNC | Performed by: NURSE PRACTITIONER

## 2023-09-12 PROCEDURE — 81003 URINALYSIS AUTO W/O SCOPE: CPT | Mod: HCNC | Performed by: EMERGENCY MEDICINE

## 2023-09-12 PROCEDURE — 93458 L HRT ARTERY/VENTRICLE ANGIO: CPT | Mod: 26,59,51,HCNC | Performed by: INTERNAL MEDICINE

## 2023-09-12 PROCEDURE — C1887 CATHETER, GUIDING: HCPCS | Mod: HCNC | Performed by: INTERNAL MEDICINE

## 2023-09-12 PROCEDURE — C9600 PERC DRUG-EL COR STENT SING: HCPCS | Mod: RC,HCNC | Performed by: INTERNAL MEDICINE

## 2023-09-12 PROCEDURE — 93010 EKG 12-LEAD: ICD-10-PCS | Mod: HCNC,,, | Performed by: INTERNAL MEDICINE

## 2023-09-12 PROCEDURE — 25000003 PHARM REV CODE 250: Mod: HCNC | Performed by: NURSE PRACTITIONER

## 2023-09-12 PROCEDURE — 99152 MOD SED SAME PHYS/QHP 5/>YRS: CPT | Mod: HCNC | Performed by: INTERNAL MEDICINE

## 2023-09-12 PROCEDURE — 93458 PR CATH PLACE/CORON ANGIO, IMG SUPER/INTERP,W LEFT HEART VENTRICULOGRAPHY: ICD-10-PCS | Mod: 26,59,51,HCNC | Performed by: INTERNAL MEDICINE

## 2023-09-12 PROCEDURE — 63600175 PHARM REV CODE 636 W HCPCS: Mod: HCNC | Performed by: EMERGENCY MEDICINE

## 2023-09-12 PROCEDURE — 84484 ASSAY OF TROPONIN QUANT: CPT | Mod: 91,HCNC | Performed by: NURSE PRACTITIONER

## 2023-09-12 PROCEDURE — 25500020 PHARM REV CODE 255: Mod: HCNC | Performed by: INTERNAL MEDICINE

## 2023-09-12 PROCEDURE — 93458 L HRT ARTERY/VENTRICLE ANGIO: CPT | Mod: HCNC,59 | Performed by: INTERNAL MEDICINE

## 2023-09-12 PROCEDURE — 36415 COLL VENOUS BLD VENIPUNCTURE: CPT | Mod: HCNC | Performed by: NURSE PRACTITIONER

## 2023-09-12 PROCEDURE — 99152 MOD SED SAME PHYS/QHP 5/>YRS: CPT | Mod: HCNC,,, | Performed by: INTERNAL MEDICINE

## 2023-09-12 PROCEDURE — 92928 PR STENT: ICD-10-PCS | Mod: RC,HCNC,, | Performed by: INTERNAL MEDICINE

## 2023-09-12 PROCEDURE — 25000003 PHARM REV CODE 250: Mod: HCNC | Performed by: EMERGENCY MEDICINE

## 2023-09-12 PROCEDURE — 99153 MOD SED SAME PHYS/QHP EA: CPT | Mod: HCNC | Performed by: INTERNAL MEDICINE

## 2023-09-12 PROCEDURE — 84484 ASSAY OF TROPONIN QUANT: CPT | Mod: HCNC | Performed by: NURSE PRACTITIONER

## 2023-09-12 PROCEDURE — 92928 PRQ TCAT PLMT NTRAC ST 1 LES: CPT | Mod: RC,HCNC,, | Performed by: INTERNAL MEDICINE

## 2023-09-12 PROCEDURE — 85025 COMPLETE CBC W/AUTO DIFF WBC: CPT | Mod: HCNC | Performed by: NURSE PRACTITIONER

## 2023-09-12 PROCEDURE — 25000003 PHARM REV CODE 250: Mod: HCNC | Performed by: INTERNAL MEDICINE

## 2023-09-12 PROCEDURE — 93005 ELECTROCARDIOGRAM TRACING: CPT | Mod: HCNC

## 2023-09-12 PROCEDURE — 92973 PR PERCUT TRANSLUMIN CORONARY THROMBECTOMY: ICD-10-PCS | Mod: HCNC,,, | Performed by: INTERNAL MEDICINE

## 2023-09-12 PROCEDURE — 99213 PR OFFICE/OUTPT VISIT, EST, LEVL III, 20-29 MIN: ICD-10-PCS | Mod: HCNC,25,, | Performed by: INTERNAL MEDICINE

## 2023-09-12 PROCEDURE — G0378 HOSPITAL OBSERVATION PER HR: HCPCS | Mod: HCNC

## 2023-09-12 PROCEDURE — C1725 CATH, TRANSLUMIN NON-LASER: HCPCS | Mod: HCNC | Performed by: INTERNAL MEDICINE

## 2023-09-12 PROCEDURE — C1876 STENT, NON-COA/NON-COV W/DEL: HCPCS | Mod: HCNC | Performed by: INTERNAL MEDICINE

## 2023-09-12 PROCEDURE — 99213 OFFICE O/P EST LOW 20 MIN: CPT | Mod: HCNC,25,, | Performed by: INTERNAL MEDICINE

## 2023-09-12 PROCEDURE — C1769 GUIDE WIRE: HCPCS | Mod: HCNC | Performed by: INTERNAL MEDICINE

## 2023-09-12 PROCEDURE — 99152 PR MOD CONSCIOUS SEDATION, SAME PHYS, 5+ YRS, FIRST 15 MIN: ICD-10-PCS | Mod: HCNC,,, | Performed by: INTERNAL MEDICINE

## 2023-09-12 PROCEDURE — 92973 PRQ TRLUML C MCHN ASP THRMBC: CPT | Mod: HCNC,,, | Performed by: INTERNAL MEDICINE

## 2023-09-12 PROCEDURE — 92973 PRQ TRLUML C MCHN ASP THRMBC: CPT | Mod: HCNC | Performed by: INTERNAL MEDICINE

## 2023-09-12 PROCEDURE — 96376 TX/PRO/DX INJ SAME DRUG ADON: CPT | Mod: 59

## 2023-09-12 PROCEDURE — 25000003 PHARM REV CODE 250: Mod: HCNC | Performed by: PHYSICIAN ASSISTANT

## 2023-09-12 PROCEDURE — 27201423 OPTIME MED/SURG SUP & DEVICES STERILE SUPPLY: Mod: HCNC | Performed by: INTERNAL MEDICINE

## 2023-09-12 PROCEDURE — 94761 N-INVAS EAR/PLS OXIMETRY MLT: CPT | Mod: HCNC,59

## 2023-09-12 PROCEDURE — C1894 INTRO/SHEATH, NON-LASER: HCPCS | Mod: HCNC | Performed by: INTERNAL MEDICINE

## 2023-09-12 DEVICE — EVEROLIMUS-ELUTING PLATINUM CHROMIUM CORONARY STENT SYSTEM
Type: IMPLANTABLE DEVICE | Site: CORONARY | Status: FUNCTIONAL
Brand: SYNERGY MEGATRON™

## 2023-09-12 RX ORDER — FENTANYL CITRATE 50 UG/ML
INJECTION, SOLUTION INTRAMUSCULAR; INTRAVENOUS
Status: DISCONTINUED | OUTPATIENT
Start: 2023-09-12 | End: 2023-09-12 | Stop reason: HOSPADM

## 2023-09-12 RX ORDER — NITROGLYCERIN 5 MG/ML
INJECTION, SOLUTION INTRAVENOUS
Status: DISCONTINUED | OUTPATIENT
Start: 2023-09-12 | End: 2023-09-12 | Stop reason: HOSPADM

## 2023-09-12 RX ORDER — ONDANSETRON 2 MG/ML
4 INJECTION INTRAMUSCULAR; INTRAVENOUS EVERY 8 HOURS PRN
Status: DISCONTINUED | OUTPATIENT
Start: 2023-09-12 | End: 2023-09-13 | Stop reason: HOSPADM

## 2023-09-12 RX ORDER — CLOPIDOGREL 300 MG/1
TABLET, FILM COATED ORAL
Status: DISCONTINUED | OUTPATIENT
Start: 2023-09-12 | End: 2023-09-12 | Stop reason: HOSPADM

## 2023-09-12 RX ORDER — HEPARIN SODIUM 1000 [USP'U]/ML
INJECTION, SOLUTION INTRAVENOUS; SUBCUTANEOUS
Status: DISCONTINUED | OUTPATIENT
Start: 2023-09-12 | End: 2023-09-12 | Stop reason: HOSPADM

## 2023-09-12 RX ORDER — NAPROXEN SODIUM 220 MG/1
81 TABLET, FILM COATED ORAL DAILY
Status: DISCONTINUED | OUTPATIENT
Start: 2023-09-12 | End: 2023-09-13 | Stop reason: HOSPADM

## 2023-09-12 RX ORDER — ONDANSETRON 2 MG/ML
INJECTION INTRAMUSCULAR; INTRAVENOUS
Status: DISCONTINUED | OUTPATIENT
Start: 2023-09-12 | End: 2023-09-12 | Stop reason: HOSPADM

## 2023-09-12 RX ORDER — NALOXONE HCL 0.4 MG/ML
0.02 VIAL (ML) INJECTION
Status: DISCONTINUED | OUTPATIENT
Start: 2023-09-12 | End: 2023-09-13 | Stop reason: HOSPADM

## 2023-09-12 RX ORDER — VERAPAMIL HYDROCHLORIDE 2.5 MG/ML
INJECTION, SOLUTION INTRAVENOUS
Status: DISCONTINUED | OUTPATIENT
Start: 2023-09-12 | End: 2023-09-12 | Stop reason: HOSPADM

## 2023-09-12 RX ORDER — GLUCAGON 1 MG
1 KIT INJECTION
Status: DISCONTINUED | OUTPATIENT
Start: 2023-09-12 | End: 2023-09-13 | Stop reason: HOSPADM

## 2023-09-12 RX ORDER — SODIUM CHLORIDE 0.9 % (FLUSH) 0.9 %
3 SYRINGE (ML) INJECTION EVERY 12 HOURS PRN
Status: DISCONTINUED | OUTPATIENT
Start: 2023-09-12 | End: 2023-09-13 | Stop reason: HOSPADM

## 2023-09-12 RX ORDER — MORPHINE SULFATE 4 MG/ML
2 INJECTION, SOLUTION INTRAMUSCULAR; INTRAVENOUS EVERY 4 HOURS PRN
Status: DISCONTINUED | OUTPATIENT
Start: 2023-09-12 | End: 2023-09-13 | Stop reason: HOSPADM

## 2023-09-12 RX ORDER — MEMANTINE HYDROCHLORIDE 5 MG/1
10 TABLET ORAL 2 TIMES DAILY
Status: DISCONTINUED | OUTPATIENT
Start: 2023-09-12 | End: 2023-09-13 | Stop reason: HOSPADM

## 2023-09-12 RX ORDER — PROMETHAZINE HYDROCHLORIDE 25 MG/1
25 TABLET ORAL EVERY 6 HOURS PRN
Status: DISCONTINUED | OUTPATIENT
Start: 2023-09-12 | End: 2023-09-13 | Stop reason: HOSPADM

## 2023-09-12 RX ORDER — ACETAMINOPHEN 325 MG/1
650 TABLET ORAL EVERY 8 HOURS PRN
Status: DISCONTINUED | OUTPATIENT
Start: 2023-09-12 | End: 2023-09-13 | Stop reason: HOSPADM

## 2023-09-12 RX ORDER — ASPIRIN 325 MG
325 TABLET ORAL
Status: COMPLETED | OUTPATIENT
Start: 2023-09-12 | End: 2023-09-12

## 2023-09-12 RX ORDER — SIMETHICONE 80 MG
1 TABLET,CHEWABLE ORAL 4 TIMES DAILY PRN
Status: DISCONTINUED | OUTPATIENT
Start: 2023-09-12 | End: 2023-09-13 | Stop reason: HOSPADM

## 2023-09-12 RX ORDER — LIDOCAINE HYDROCHLORIDE 20 MG/ML
INJECTION, SOLUTION INFILTRATION; PERINEURAL
Status: DISCONTINUED | OUTPATIENT
Start: 2023-09-12 | End: 2023-09-12 | Stop reason: HOSPADM

## 2023-09-12 RX ORDER — IBUPROFEN 200 MG
16 TABLET ORAL
Status: DISCONTINUED | OUTPATIENT
Start: 2023-09-12 | End: 2023-09-13 | Stop reason: HOSPADM

## 2023-09-12 RX ORDER — IBUPROFEN 200 MG
24 TABLET ORAL
Status: DISCONTINUED | OUTPATIENT
Start: 2023-09-12 | End: 2023-09-13 | Stop reason: HOSPADM

## 2023-09-12 RX ORDER — HYDROCODONE BITARTRATE AND ACETAMINOPHEN 5; 325 MG/1; MG/1
1 TABLET ORAL EVERY 6 HOURS PRN
Status: DISCONTINUED | OUTPATIENT
Start: 2023-09-12 | End: 2023-09-13 | Stop reason: HOSPADM

## 2023-09-12 RX ORDER — TALC
6 POWDER (GRAM) TOPICAL NIGHTLY PRN
Status: DISCONTINUED | OUTPATIENT
Start: 2023-09-12 | End: 2023-09-13 | Stop reason: HOSPADM

## 2023-09-12 RX ORDER — ONDANSETRON 8 MG/1
8 TABLET, ORALLY DISINTEGRATING ORAL EVERY 8 HOURS PRN
Status: DISCONTINUED | OUTPATIENT
Start: 2023-09-12 | End: 2023-09-13 | Stop reason: HOSPADM

## 2023-09-12 RX ORDER — ACETAMINOPHEN 650 MG/1
650 SUPPOSITORY RECTAL EVERY 4 HOURS PRN
Status: DISCONTINUED | OUTPATIENT
Start: 2023-09-12 | End: 2023-09-13 | Stop reason: HOSPADM

## 2023-09-12 RX ORDER — AMLODIPINE BESYLATE 5 MG/1
5 TABLET ORAL DAILY
Status: DISCONTINUED | OUTPATIENT
Start: 2023-09-12 | End: 2023-09-13 | Stop reason: HOSPADM

## 2023-09-12 RX ORDER — SODIUM CHLORIDE 9 MG/ML
INJECTION, SOLUTION INTRAVENOUS CONTINUOUS
Status: DISCONTINUED | OUTPATIENT
Start: 2023-09-12 | End: 2023-09-13

## 2023-09-12 RX ORDER — ZOLPIDEM TARTRATE 5 MG/1
10 TABLET ORAL NIGHTLY PRN
Status: DISCONTINUED | OUTPATIENT
Start: 2023-09-12 | End: 2023-09-13 | Stop reason: HOSPADM

## 2023-09-12 RX ORDER — DIPHENHYDRAMINE HYDROCHLORIDE 50 MG/ML
INJECTION INTRAMUSCULAR; INTRAVENOUS
Status: DISCONTINUED | OUTPATIENT
Start: 2023-09-12 | End: 2023-09-12 | Stop reason: HOSPADM

## 2023-09-12 RX ORDER — ZOLPIDEM TARTRATE 5 MG/1
5 TABLET ORAL NIGHTLY PRN
Status: DISCONTINUED | OUTPATIENT
Start: 2023-09-12 | End: 2023-09-12

## 2023-09-12 RX ORDER — ATORVASTATIN CALCIUM 10 MG/1
20 TABLET, FILM COATED ORAL NIGHTLY
Status: DISCONTINUED | OUTPATIENT
Start: 2023-09-12 | End: 2023-09-13 | Stop reason: HOSPADM

## 2023-09-12 RX ORDER — VALSARTAN 160 MG/1
320 TABLET ORAL DAILY
Status: DISCONTINUED | OUTPATIENT
Start: 2023-09-12 | End: 2023-09-13 | Stop reason: HOSPADM

## 2023-09-12 RX ORDER — HYDRALAZINE HYDROCHLORIDE 20 MG/ML
10 INJECTION INTRAMUSCULAR; INTRAVENOUS
Status: COMPLETED | OUTPATIENT
Start: 2023-09-12 | End: 2023-09-12

## 2023-09-12 RX ORDER — HYDRALAZINE HYDROCHLORIDE 20 MG/ML
10 INJECTION INTRAMUSCULAR; INTRAVENOUS ONCE
Status: COMPLETED | OUTPATIENT
Start: 2023-09-12 | End: 2023-09-12

## 2023-09-12 RX ORDER — MAG HYDROX/ALUMINUM HYD/SIMETH 200-200-20
30 SUSPENSION, ORAL (FINAL DOSE FORM) ORAL 4 TIMES DAILY PRN
Status: DISCONTINUED | OUTPATIENT
Start: 2023-09-12 | End: 2023-09-13 | Stop reason: HOSPADM

## 2023-09-12 RX ORDER — ACETAMINOPHEN 325 MG/1
650 TABLET ORAL EVERY 4 HOURS PRN
Status: DISCONTINUED | OUTPATIENT
Start: 2023-09-12 | End: 2023-09-13 | Stop reason: HOSPADM

## 2023-09-12 RX ORDER — EZETIMIBE 10 MG/1
10 TABLET ORAL NIGHTLY
Status: DISCONTINUED | OUTPATIENT
Start: 2023-09-12 | End: 2023-09-13 | Stop reason: HOSPADM

## 2023-09-12 RX ORDER — LIDOCAINE HYDROCHLORIDE 10 MG/ML
INJECTION, SOLUTION EPIDURAL; INFILTRATION; INTRACAUDAL; PERINEURAL
Status: DISCONTINUED | OUTPATIENT
Start: 2023-09-12 | End: 2023-09-12 | Stop reason: HOSPADM

## 2023-09-12 RX ORDER — MIDAZOLAM HYDROCHLORIDE 1 MG/ML
INJECTION, SOLUTION INTRAMUSCULAR; INTRAVENOUS
Status: DISCONTINUED | OUTPATIENT
Start: 2023-09-12 | End: 2023-09-12 | Stop reason: HOSPADM

## 2023-09-12 RX ORDER — FUROSEMIDE 20 MG/1
20 TABLET ORAL DAILY PRN
Status: DISCONTINUED | OUTPATIENT
Start: 2023-09-12 | End: 2023-09-13 | Stop reason: HOSPADM

## 2023-09-12 RX ORDER — CLOPIDOGREL BISULFATE 75 MG/1
75 TABLET ORAL DAILY
Status: DISCONTINUED | OUTPATIENT
Start: 2023-09-12 | End: 2023-09-13 | Stop reason: HOSPADM

## 2023-09-12 RX ORDER — IPRATROPIUM BROMIDE AND ALBUTEROL SULFATE 2.5; .5 MG/3ML; MG/3ML
3 SOLUTION RESPIRATORY (INHALATION) EVERY 4 HOURS PRN
Status: DISCONTINUED | OUTPATIENT
Start: 2023-09-12 | End: 2023-09-13 | Stop reason: HOSPADM

## 2023-09-12 RX ORDER — POLYETHYLENE GLYCOL 3350 17 G/17G
17 POWDER, FOR SOLUTION ORAL DAILY PRN
Status: DISCONTINUED | OUTPATIENT
Start: 2023-09-12 | End: 2023-09-13 | Stop reason: HOSPADM

## 2023-09-12 RX ORDER — METOPROLOL TARTRATE 1 MG/ML
INJECTION, SOLUTION INTRAVENOUS
Status: DISCONTINUED | OUTPATIENT
Start: 2023-09-12 | End: 2023-09-12 | Stop reason: HOSPADM

## 2023-09-12 RX ORDER — SODIUM CHLORIDE 9 MG/ML
INJECTION, SOLUTION INTRAVENOUS CONTINUOUS
Status: DISCONTINUED | OUTPATIENT
Start: 2023-09-12 | End: 2023-09-12

## 2023-09-12 RX ADMIN — MEMANTINE HYDROCHLORIDE 10 MG: 5 TABLET ORAL at 08:09

## 2023-09-12 RX ADMIN — HYDRALAZINE HYDROCHLORIDE 10 MG: 20 INJECTION, SOLUTION INTRAMUSCULAR; INTRAVENOUS at 03:09

## 2023-09-12 RX ADMIN — ZOLPIDEM TARTRATE 10 MG: 5 TABLET ORAL at 03:09

## 2023-09-12 RX ADMIN — AMLODIPINE BESYLATE 5 MG: 5 TABLET ORAL at 08:09

## 2023-09-12 RX ADMIN — HYDRALAZINE HYDROCHLORIDE 10 MG: 20 INJECTION, SOLUTION INTRAMUSCULAR; INTRAVENOUS at 01:09

## 2023-09-12 RX ADMIN — ASPIRIN 325 MG ORAL TABLET 325 MG: 325 PILL ORAL at 01:09

## 2023-09-12 RX ADMIN — ATORVASTATIN CALCIUM 20 MG: 10 TABLET, FILM COATED ORAL at 08:09

## 2023-09-12 RX ADMIN — SODIUM CHLORIDE: 9 INJECTION, SOLUTION INTRAVENOUS at 10:09

## 2023-09-12 RX ADMIN — VALSARTAN 320 MG: 160 TABLET, FILM COATED ORAL at 08:09

## 2023-09-12 RX ADMIN — ASPIRIN 81 MG CHEWABLE TABLET 81 MG: 81 TABLET CHEWABLE at 08:09

## 2023-09-12 RX ADMIN — ZOLPIDEM TARTRATE 10 MG: 5 TABLET ORAL at 09:09

## 2023-09-12 RX ADMIN — EZETIMIBE 10 MG: 10 TABLET ORAL at 08:09

## 2023-09-12 RX ADMIN — ACETAMINOPHEN 650 MG: 325 TABLET ORAL at 08:09

## 2023-09-12 RX ADMIN — SODIUM CHLORIDE: 9 INJECTION, SOLUTION INTRAVENOUS at 05:09

## 2023-09-12 RX ADMIN — CLOPIDOGREL BISULFATE 75 MG: 75 TABLET ORAL at 08:09

## 2023-09-12 NOTE — ASSESSMENT & PLAN NOTE
-Presents with epigastric/substernal CP, concerning for UA  -Serial troponin negative  -EKG reviewed showed SR, possible anterior infarct, T wave inversions anteriorly  -Prior MPI stress test 6/23 negative for ischemia; echo 6/23 with normal EF  -Continue ASA, Plavix, CCB, statin, ARB  -LHC today by Dr. Juarez. All risks, benefits, and treatment alternatives to explained to patient in detail. All questions answered. She has agreed to proceed. Discussed with her daughter who was at bedside as well.

## 2023-09-12 NOTE — SUBJECTIVE & OBJECTIVE
Past Medical History:   Diagnosis Date    AAA (abdominal aortic aneurysm) 02/13/2014    Abdominal aneurysm     3    Acute coronary syndrome     Arthritis     BPPV (benign paroxysmal positional vertigo)     Carotid artery plaque     Carotid artery stenosis and occlusion 02/13/2014    Chronic back pain     COPD (chronic obstructive pulmonary disease)     Coronary artery disease     Dementia     Emphysema lung     Hyperlipidemia     Hypertension     Myocardial infarction     x3    Neuropathy     Personal history of COVID-19 06/09/2021 11/16/2020 +Covid, recovered at home        Past Surgical History:   Procedure Laterality Date    CARDIAC CATHETERIZATION      CORONARY ANGIOPLASTY      HYSTERECTOMY  1988    TONSILLECTOMY         Review of patient's allergies indicates:  No Known Allergies    Current Facility-Administered Medications on File Prior to Encounter   Medication    [COMPLETED] mupirocin 2 % ointment     Current Outpatient Medications on File Prior to Encounter   Medication Sig    amLODIPine (NORVASC) 5 MG tablet Take 1 tablet (5 mg total) by mouth once daily.    aspirin 81 MG Chew Take 81 mg by mouth once daily.    clopidogreL (PLAVIX) 75 mg tablet TAKE 1 TABLET BY MOUTH ONCE A DAY    ezetimibe-simvastatin 10-40 mg (VYTORIN) 10-40 mg per tablet TAKE 1 TABLET BY MOUTH EVERY EVENING    memantine (NAMENDA) 10 MG Tab Take 10 mg by mouth 2 (two) times daily.    olmesartan (BENICAR) 40 MG tablet Take 40 mg by mouth.    vitamin D (VITAMIN D3) 1000 units Tab Take 1,000 Units by mouth once daily.    albuterol (PROVENTIL/VENTOLIN HFA) 90 mcg/actuation inhaler     albuterol-ipratropium (DUO-NEB) 2.5 mg-0.5 mg/3 mL nebulizer solution     bisoprolol (ZEBETA) 5 MG tablet Take 5 mg by mouth once daily.    budesonide-glycopyr-formoterol (BREZTRI AEROSPHERE) 160-9-4.8 mcg/actuation HFAA Inhale 2 puffs into the lungs 2 (two) times a day.    COMBIVENT RESPIMAT  mcg/actuation inhaler Inhale 1 puff into the lungs every 6  (six) hours as needed for Wheezing.    dicyclomine (BENTYL) 10 MG capsule TAKE 1 CAPSULE BY MOUTH 4 TIMES DAILY BEFORE MEALS AND NIGHTLY Strength: 10 mg (Patient taking differently: 4 (four) times daily as needed. TAKE 1 CAPSULE BY MOUTH 4 TIMES DAILY BEFORE MEALS AND NIGHTLY Strength: 10 mg)    doxepin (SINEQUAN) 10 MG capsule Take 10-20 mg by mouth nightly as needed.    doxycycline (VIBRAMYCIN) 100 MG Cap Take 1 capsule (100 mg total) by mouth every 12 (twelve) hours. Take with food for 7 days    FEROSUL 325 mg (65 mg iron) Tab tablet TAKE (1) TABLET BY MOUTH 2 TIMES A DAY WITH MEALS    furosemide (LASIX) 20 MG tablet TAKE 20 MG TABLET ONCE DAILY (Patient taking differently: daily as needed. TAKE 20 MG TABLET ONCE DAILY)    hydroCHLOROthiazide (MICROZIDE) 12.5 mg capsule Take 12.5 mg by mouth.    inhalation spacing device (COMPACT SPACE CHAMBER) Use as directed for inhalation.    LIDOcaine-prilocaine (EMLA) cream     mirtazapine (REMERON) 15 MG tablet     OXYGEN-AIR DELIVERY SYSTEMS MISC 3 L by Misc.(Non-Drug; Combo Route) route every evening.    pantoprazole (PROTONIX) 40 MG tablet TAKE 1 TABLET BY MOUTH ONCE A DAY    TENS unit and electrodes Cmpk 1 each by Misc.(Non-Drug; Combo Route) route 3 (three) times daily as needed (for back pain).    zolpidem (AMBIEN) 10 mg Tab TAKE 1 TABLET BY MOUTH EVERY EVENING     Family History       Problem Relation (Age of Onset)    Breast cancer Paternal Aunt    Heart attacks under age 50 Father, Brother    Heart disease Mother          Tobacco Use    Smoking status: Former     Current packs/day: 0.00     Average packs/day: 0.5 packs/day for 46.9 years (23.4 ttl pk-yrs)     Types: Cigarettes, Vaping w/o nicotine     Start date: 1970     Quit date: 2017     Years since quittin.3    Smokeless tobacco: Never    Tobacco comments:     3 MG NICOTINE FOR HEART.    Substance and Sexual Activity    Alcohol use: No    Drug use: No    Sexual activity: Not Currently     Birth  control/protection: None     Review of Systems   Respiratory:  Positive for chest tightness and shortness of breath. Negative for cough, wheezing and stridor.    Cardiovascular:  Positive for chest pain, palpitations and leg swelling.   Gastrointestinal:  Positive for abdominal pain. Negative for abdominal distention, blood in stool, diarrhea, nausea and vomiting.   All other systems reviewed and are negative.    Objective:     Vital Signs (Most Recent):  Temp: 97.6 °F (36.4 °C) (09/11/23 2135)  Pulse: 105 (09/12/23 0302)  Resp: 20 (09/12/23 0302)  BP: (!) 158/70 (09/12/23 0302)  SpO2: 96 % (09/12/23 0302) Vital Signs (24h Range):  Temp:  [97.6 °F (36.4 °C)-98.2 °F (36.8 °C)] 97.6 °F (36.4 °C)  Pulse:  [] 105  Resp:  [16-20] 20  SpO2:  [95 %-98 %] 96 %  BP: (158-237)/(70-98) 158/70     Weight: 60 kg (132 lb 4.4 oz)  Body mass index is 24.19 kg/m².     Physical Exam  Vitals and nursing note reviewed.   Constitutional:       General: She is awake. She is not in acute distress.     Appearance: Normal appearance. She is well-developed and well-groomed. She is not ill-appearing, toxic-appearing or diaphoretic.   HENT:      Head: Normocephalic and atraumatic.   Eyes:      Extraocular Movements: Extraocular movements intact.      Conjunctiva/sclera: Conjunctivae normal.   Cardiovascular:      Rate and Rhythm: Normal rate and regular rhythm.      Heart sounds: Normal heart sounds. No murmur heard.  Pulmonary:      Effort: Pulmonary effort is normal.      Breath sounds: Normal breath sounds. No decreased breath sounds, wheezing, rhonchi or rales.   Abdominal:      General: Bowel sounds are normal.      Palpations: Abdomen is soft.      Tenderness: There is no abdominal tenderness.   Musculoskeletal:      Cervical back: Normal range of motion and neck supple.      Right lower leg: No edema.      Left lower leg: No edema.      Comments: 5/5 strength throughout   Skin:     General: Skin is warm and dry.      Capillary  Refill: Capillary refill takes less than 2 seconds.   Neurological:      General: No focal deficit present.      Mental Status: She is alert and oriented to person, place, and time. Mental status is at baseline.      GCS: GCS eye subscore is 4. GCS verbal subscore is 5. GCS motor subscore is 6.      Cranial Nerves: Cranial nerves 2-12 are intact.      Sensory: Sensation is intact.      Motor: Motor function is intact.   Psychiatric:         Mood and Affect: Mood normal.         Speech: Speech normal.         Behavior: Behavior normal. Behavior is cooperative.        LABS:  Recent Results (from the past 24 hour(s))   CBC auto differential    Collection Time: 09/12/23 12:48 AM   Result Value Ref Range    WBC 10.09 3.90 - 12.70 K/uL    RBC 4.04 4.00 - 5.40 M/uL    Hemoglobin 12.5 12.0 - 16.0 g/dL    Hematocrit 36.2 (L) 37.0 - 48.5 %    MCV 90 82 - 98 fL    MCH 30.9 27.0 - 31.0 pg    MCHC 34.5 32.0 - 36.0 g/dL    RDW 13.1 11.5 - 14.5 %    Platelets 259 150 - 450 K/uL    MPV 9.3 9.2 - 12.9 fL    Immature Granulocytes 0.3 0.0 - 0.5 %    Gran # (ANC) 6.5 1.8 - 7.7 K/uL    Immature Grans (Abs) 0.03 0.00 - 0.04 K/uL    Lymph # 2.3 1.0 - 4.8 K/uL    Mono # 1.0 0.3 - 1.0 K/uL    Eos # 0.2 0.0 - 0.5 K/uL    Baso # 0.07 0.00 - 0.20 K/uL    nRBC 0 0 /100 WBC    Gran % 64.3 38.0 - 73.0 %    Lymph % 23.1 18.0 - 48.0 %    Mono % 9.9 4.0 - 15.0 %    Eosinophil % 1.7 0.0 - 8.0 %    Basophil % 0.7 0.0 - 1.9 %    Differential Method Automated    Comprehensive metabolic panel    Collection Time: 09/12/23 12:48 AM   Result Value Ref Range    Sodium 131 (L) 136 - 145 mmol/L    Potassium 3.9 3.5 - 5.1 mmol/L    Chloride 101 95 - 110 mmol/L    CO2 20 (L) 23 - 29 mmol/L    Glucose 98 70 - 110 mg/dL    BUN 9 8 - 23 mg/dL    Creatinine 1.1 0.5 - 1.4 mg/dL    Calcium 10.0 8.7 - 10.5 mg/dL    Total Protein 7.5 6.0 - 8.4 g/dL    Albumin 4.0 3.5 - 5.2 g/dL    Total Bilirubin 0.4 0.1 - 1.0 mg/dL    Alkaline Phosphatase 78 55 - 135 U/L    AST 27 10 -  40 U/L    ALT 19 10 - 44 U/L    eGFR 55 (A) >60 mL/min/1.73 m^2    Anion Gap 10 8 - 16 mmol/L   Troponin I #1    Collection Time: 09/12/23 12:48 AM   Result Value Ref Range    Troponin I 0.016 0.000 - 0.026 ng/mL   BNP    Collection Time: 09/12/23 12:48 AM   Result Value Ref Range    BNP 42 0 - 99 pg/mL   Urinalysis, Reflex to Urine Culture Urine, Clean Catch    Collection Time: 09/12/23  1:04 AM    Specimen: Urine   Result Value Ref Range    Specimen UA Urine, Clean Catch     Color, UA Colorless (A) Yellow, Straw, Whit    Appearance, UA Clear Clear    pH, UA 7.0 5.0 - 8.0    Specific Gravity, UA <1.005 (A) 1.005 - 1.030    Protein, UA Negative Negative    Glucose, UA Negative Negative    Ketones, UA Negative Negative    Bilirubin (UA) Negative Negative    Occult Blood UA Negative Negative    Nitrite, UA Negative Negative    Urobilinogen, UA Negative <2.0 EU/dL    Leukocytes, UA Negative Negative       RADIOLOGY  X-Ray Chest AP Portable    Result Date: 9/11/2023  EXAMINATION: XR CHEST AP PORTABLE CLINICAL HISTORY: Chest pain, unspecified TECHNIQUE: Single frontal view of the chest was performed. COMPARISON: None FINDINGS: The lungs are clear, with normal appearance of pulmonary vasculature and no pleural effusion or pneumothorax. The cardiac silhouette is normal in size. The hilar and mediastinal contours are unremarkable. Bones are intact.     No acute abnormality. Electronically signed by: Lucas Dominguez Date:    09/11/2023 Time:    22:04      EKG    MICROBIOLOGY    MDM     Amount and/or Complexity of Data Reviewed  Clinical lab tests: reviewed  Tests in the radiology section of CPT®: reviewed  Tests in the medicine section of CPT®: reviewed  Discussion of test results with the performing providers: yes  Decide to obtain previous medical records or to obtain history from someone other than the patient: yes  Obtain history from someone other than the patient: yes  Review and summarize past medical records:  yes  Discuss the patient with other providers: yes  Independent visualization of images, tracings, or specimens: yes

## 2023-09-12 NOTE — ED NOTES
Pt advised that she is now NPO. Pt did have breakfast prior to this order being placed. Pt only ate a couple of bites of her eggs

## 2023-09-12 NOTE — ASSESSMENT & PLAN NOTE
Currently asymptomatic. Likely referred pain from chest vs chronic mesenteric ischemia.  Plan:  -analgesics prn  -advanced imaging if warranted

## 2023-09-12 NOTE — OP NOTE
INPATIENT Operative Note         SUMMARY     Surgery Date: 9/12/2023     Surgeon(s) and Role:     * Millie Juarez MD - Primary    ASSISTANT:none    Pre-op Diagnosis:  Other chest pain [R07.89]  Coronary artery disease involving native coronary artery of native heart with unstable angina pectoris [I25.110]  Mesenteric ischemia, chronic [K55.1]      Post-op Diagnosis:  Other chest pain [R07.89]  Coronary artery disease involving native coronary artery of native heart with unstable angina pectoris [I25.110]  Mesenteric ischemia, chronic [K55.1]    Procedure(s) (LRB):  Left heart cath (Left)  Percutaneous coronary intervention (N/A)  Thrombectomy, Coronary  INSERTION, STENT, CORONARY ARTERY (N/A)    COMPLICATION:none    Anesthesia: RN IV Sedation    Findings/Key Components:  L;eft system non obs disease   Lvf normal  Rca instent stenosis ulcerated with clot treated with penumbra aspiration catheter and stenting of megatron 4.0x28 AND 4.024    Estimated Blood Loss: < 50 ML.         SPECIMEN: NONE    Devices/Prostetics: None    PLAN: ROUTINE POST STENT CARE.

## 2023-09-12 NOTE — Clinical Note
The DP pulses were 1+ bilaterally. The PT pulses were 2+ bilaterally. The left radial pulse was allens test negative.

## 2023-09-12 NOTE — H&P (VIEW-ONLY)
[facility] - Emergency Dept.  Cardiology  Consult Note    Patient Name: Gemma Vick  MRN: [id number]  Admission Date: 9/12/2023  Hospital Length of Stay: 0 days  Code Status: Full Code   Attending Provider: Veronica Cloud MD   Consulting Provider: Vidhya Lee PA-C  Primary Care Physician: Olga Altman MD  Principal Problem:Other chest pain    Patient information was obtained from patient, relative(s), past medical records and ER records.     Inpatient consult to Cardiology  Consult performed by: Vidhya Lee PA-C  Consult ordered by: Bhavik Chopra NP        Subjective:     Chief Complaint:  Chest pain    HPI:   Ms. Vick is a 68 year old female patient whose current medical conditions include PVD, carotid artery disease, HTN, hyperlipidemia, COPD, former smoker, vascular dementia, and mesenteric ischemia who presented to [facility] ED yesterday due to epigastric/substernal CP that onset shortly PTA. Patient reported the pain was intense and radiated to her jaw and was associated with SOB and facial numbness. She denied any associated fever, chills, palpitations, nausea, vomiting, diaphoresis, near syncope, or syncope. Initial workup in ED un-revealing with exception of uncontrolled BP (> 200 systolic), and patient was subsequently admitted for further evaluation and treatment. Cardiology consulted to assist with management. Patient seen and examined today with her daughter at bedside. Feels ok. States chest pain has resolved. Reports she has been having issues with BP at home. She is compliant with her medications. Followed routinely in clinic by Dr. Juarez. Serial troponin negative. Prior echo 6/23 showed normal EF. MPI stress test 6/23 negative for ischemia. EKG reviewed, SR with 1st degree AV block, possible anterior infarct, inverted T waves anterior leads.            Past Medical History:   Diagnosis Date    AAA (abdominal aortic aneurysm) 02/13/2014    Abdominal aneurysm     3    Acute  coronary syndrome     Arthritis     BPPV (benign paroxysmal positional vertigo)     Carotid artery plaque     Carotid artery stenosis and occlusion 02/13/2014    Chronic back pain     COPD (chronic obstructive pulmonary disease)     Coronary artery disease     Dementia     Emphysema lung     Hyperlipidemia     Hypertension     Myocardial infarction     x3    Neuropathy     Personal history of COVID-19 06/09/2021 11/16/2020 +Covid, recovered at home        Past Surgical History:   Procedure Laterality Date    CARDIAC CATHETERIZATION      CORONARY ANGIOPLASTY      HYSTERECTOMY  1988    TONSILLECTOMY         Review of patient's allergies indicates:  No Known Allergies    Current Facility-Administered Medications on File Prior to Encounter   Medication    [COMPLETED] mupirocin 2 % ointment     Current Outpatient Medications on File Prior to Encounter   Medication Sig    amLODIPine (NORVASC) 5 MG tablet Take 1 tablet (5 mg total) by mouth once daily.    aspirin 81 MG Chew Take 81 mg by mouth once daily.    clopidogreL (PLAVIX) 75 mg tablet TAKE 1 TABLET BY MOUTH ONCE A DAY    ezetimibe-simvastatin 10-40 mg (VYTORIN) 10-40 mg per tablet TAKE 1 TABLET BY MOUTH EVERY EVENING    memantine (NAMENDA) 10 MG Tab Take 10 mg by mouth 2 (two) times daily.    olmesartan (BENICAR) 40 MG tablet Take 40 mg by mouth.    vitamin D (VITAMIN D3) 1000 units Tab Take 1,000 Units by mouth once daily.    albuterol (PROVENTIL/VENTOLIN HFA) 90 mcg/actuation inhaler     albuterol-ipratropium (DUO-NEB) 2.5 mg-0.5 mg/3 mL nebulizer solution     bisoprolol (ZEBETA) 5 MG tablet Take 5 mg by mouth once daily.    budesonide-glycopyr-formoterol (BREZTRI AEROSPHERE) 160-9-4.8 mcg/actuation HFAA Inhale 2 puffs into the lungs 2 (two) times a day.    COMBIVENT RESPIMAT  mcg/actuation inhaler Inhale 1 puff into the lungs every 6 (six) hours as needed for Wheezing.    dicyclomine (BENTYL) 10 MG capsule TAKE 1 CAPSULE  BY MOUTH 4 TIMES DAILY BEFORE MEALS AND NIGHTLY Strength: 10 mg (Patient taking differently: 4 (four) times daily as needed. TAKE 1 CAPSULE BY MOUTH 4 TIMES DAILY BEFORE MEALS AND NIGHTLY Strength: 10 mg)    doxepin (SINEQUAN) 10 MG capsule Take 10-20 mg by mouth nightly as needed.    doxycycline (VIBRAMYCIN) 100 MG Cap Take 1 capsule (100 mg total) by mouth every 12 (twelve) hours. Take with food for 7 days    FEROSUL 325 mg (65 mg iron) Tab tablet TAKE (1) TABLET BY MOUTH 2 TIMES A DAY WITH MEALS    furosemide (LASIX) 20 MG tablet TAKE 20 MG TABLET ONCE DAILY (Patient taking differently: daily as needed. TAKE 20 MG TABLET ONCE DAILY)    hydroCHLOROthiazide (MICROZIDE) 12.5 mg capsule Take 12.5 mg by mouth.    inhalation spacing device (COMPACT SPACE CHAMBER) Use as directed for inhalation.    LIDOcaine-prilocaine (EMLA) cream     mirtazapine (REMERON) 15 MG tablet     OXYGEN-AIR DELIVERY SYSTEMS MISC 3 L by Misc.(Non-Drug; Combo Route) route every evening.    pantoprazole (PROTONIX) 40 MG tablet TAKE 1 TABLET BY MOUTH ONCE A DAY    TENS unit and electrodes Cmpk 1 each by Misc.(Non-Drug; Combo Route) route 3 (three) times daily as needed (for back pain).    zolpidem (AMBIEN) 10 mg Tab TAKE 1 TABLET BY MOUTH EVERY EVENING     Family History       Problem Relation (Age of Onset)    Breast cancer Paternal Aunt    Heart attacks under age 50 Father, Brother    Heart disease Mother          Tobacco Use    Smoking status: Former     Current packs/day: 0.00     Average packs/day: 0.5 packs/day for 46.9 years (23.4 ttl pk-yrs)     Types: Cigarettes, Vaping w/o nicotine     Start date: 1970     Quit date: 2017     Years since quittin.3    Smokeless tobacco: Never    Tobacco comments:     3 MG NICOTINE FOR HEART.    Substance and Sexual Activity    Alcohol use: No    Drug use: No    Sexual activity: Not Currently     Birth control/protection: None     Review of Systems   Constitutional: Positive  for malaise/fatigue.   HENT:          +jaw pain   Cardiovascular:  Positive for chest pain and dyspnea on exertion.   Respiratory:  Positive for shortness of breath.    Endocrine: Negative.    Hematologic/Lymphatic: Negative.    Skin: Negative.    Gastrointestinal:  Positive for abdominal pain (post prandial (chronic due to mesenteric ischemia)).        Epigastric     Genitourinary: Negative.    Neurological: Negative.    Psychiatric/Behavioral:  Positive for memory loss (vascular dementia).    Allergic/Immunologic: Negative.      Objective:     Vital Signs (Most Recent):  Temp: 98 °F (36.7 °C) (09/12/23 0717)  Pulse: 92 (09/12/23 0846)  Resp: 16 (09/12/23 0846)  BP: 135/65 (09/12/23 0846)  SpO2: 99 % (09/12/23 0846) Vital Signs (24h Range):  Temp:  [97.6 °F (36.4 °C)-98.2 °F (36.8 °C)] 98 °F (36.7 °C)  Pulse:  [] 92  Resp:  [16-20] 16  SpO2:  [95 %-100 %] 99 %  BP: (115-237)/(56-98) 135/65     Weight: 60 kg (132 lb 4.4 oz)  Body mass index is 24.19 kg/m².    SpO2: 99 %       No intake or output data in the 24 hours ending 09/12/23 0933    Lines/Drains/Airways       Peripheral Intravenous Line  Duration                  Peripheral IV - Single Lumen 09/12/23 0048 20 G Left Antecubital <1 day                     Physical Exam  Vitals and nursing note reviewed.   Constitutional:       General: She is not in acute distress.     Appearance: Normal appearance. She is well-developed. She is not diaphoretic.   HENT:      Head: Normocephalic and atraumatic.   Eyes:      General:         Right eye: No discharge.         Left eye: No discharge.      Pupils: Pupils are equal, round, and reactive to light.   Neck:      Thyroid: No thyromegaly.      Vascular: No JVD.      Trachea: No tracheal deviation.   Cardiovascular:      Rate and Rhythm: Normal rate and regular rhythm.      Heart sounds: Normal heart sounds, S1 normal and S2 normal. No murmur heard.  Pulmonary:      Effort: Pulmonary effort is normal. No respiratory  distress.      Breath sounds: Normal breath sounds. No wheezing or rales.   Abdominal:      General: There is no distension.      Palpations: Abdomen is soft.      Tenderness: There is no rebound.   Musculoskeletal:      Cervical back: Neck supple.      Right lower leg: No edema.      Left lower leg: No edema.   Skin:     General: Skin is warm and dry.      Findings: No erythema.   Neurological:      General: No focal deficit present.      Mental Status: She is alert and oriented to person, place, and time.   Psychiatric:         Mood and Affect: Mood normal.         Behavior: Behavior normal.         Thought Content: Thought content normal.          Significant Labs: CMP   Recent Labs   Lab 09/12/23 0048   *   K 3.9      CO2 20*   GLU 98   BUN 9   CREATININE 1.1   CALCIUM 10.0   PROT 7.5   ALBUMIN 4.0   BILITOT 0.4   ALKPHOS 78   AST 27   ALT 19   ANIONGAP 10   , CBC   Recent Labs   Lab 09/12/23 0048   WBC 10.09   HGB 12.5   HCT 36.2*      , Troponin   Recent Labs   Lab 09/12/23  0048 09/12/23  0348 09/12/23  0544   TROPONINI 0.016 0.019 0.024   , and All pertinent lab results from the last 24 hours have been reviewed.    Significant Imaging: Echocardiogram: Transthoracic echo (TTE) complete ([facility] Only):   Results for orders placed or performed during the hospital encounter of 06/14/23   Echo   Result Value Ref Range    BSA 1.63 m2    TDI SEPTAL 0.05 m/s    LV LATERAL E/E' RATIO 18.17 m/s    LV SEPTAL E/E' RATIO 21.80 m/s    LA WIDTH 3.40 cm    IVC diameter 1.06 cm    Left Ventricular Outflow Tract Mean Velocity 0.75 cm/s    Left Ventricular Outflow Tract Mean Gradient 2.68 mmHg    TDI LATERAL 0.06 m/s    PV PEAK VELOCITY 0.71 cm/s    LVIDd 4.18 3.5 - 6.0 cm    IVS 0.99 0.6 - 1.1 cm    Posterior Wall 0.99 0.6 - 1.1 cm    Ao root annulus 3.23 cm    LVIDs 2.81 2.1 - 4.0 cm    FS 33 28 - 44 %    LA volume 40.64 cm3    Sinus 3.33 cm    STJ 3.04 cm    Ascending aorta 3.02 cm    LV mass 134.31 g     LA size 2.99 cm    RVDD 2.95 cm    Left Ventricle Relative Wall Thickness 0.47 cm    AV regurgitation pressure 1/2 time [id number] ms    AV mean gradient 4 mmHg    AV valve area 2.36 cm2    AV Velocity Ratio 0.84     AV index (prosthetic) 0.82     MV valve area p 1/2 method 3.61 cm2    E/A ratio 1.06     Mean e' 0.06 m/s    E wave deceleration time 210.28 msec    IVRT 125.59 msec    LVOT diameter 1.91 cm    LVOT area 2.9 cm2    LVOT peak oz 1.20 m/s    LVOT peak VTI 31.10 cm    Ao peak oz 1.43 m/s    Ao VTI 37.7 cm    RVOT peak oz 0.68 m/s    RVOT peak VTI 18.0 cm    LVOT stroke volume 89.06 cm3    AV peak gradient 8 mmHg    PV mean gradient 1.01 mmHg    E/E' ratio 19.82 m/s    MV Peak E Oz 1.09 m/s    AR Max Oz 5.01 m/s    TR Max Oz 2.80 m/s    MV stenosis pressure 1/2 time 60.98 ms    MV Peak A Oz 1.03 m/s    LV Systolic Volume 29.76 mL    LV Systolic Volume Index 18.6 mL/m2    LV Diastolic Volume 77.84 mL    LV Diastolic Volume Index 48.65 mL/m2    LA Volume Index 25.4 mL/m2    LV Mass Index 84 g/m2    RA Major Axis 4.04 cm    Left Atrium Minor Axis 4.60 cm    Left Atrium Major Axis 4.81 cm    Triscuspid Valve Regurgitation Peak Gradient 31 mmHg    LA Volume Index (Mod) 25.7 mL/m2    LA volume (mod) 41.15 cm3    RA Width 3.22 cm    EF 60 %    Narrative    · Concentric remodeling and normal systolic function.  · The estimated ejection fraction is 60%.  · Indeterminate left ventricular diastolic function.  · Normal right ventricular size with normal right ventricular systolic   function.  · Mild tricuspid regurgitation.  · There is pulmonary hypertension.  · Mild aortic regurgitation.      , EKG: Reviewed, and X-Ray: CXR: X-Ray Chest 1 View (CXR): No results found for this visit on 09/12/23. and X-Ray Chest PA and Lateral (CXR): No results found for this visit on 09/12/23.    Assessment and Plan:   Patient who presents with CP, symptoms concerning for UA. Labile BP may also be contributing. Continue  OMT. C today, further rec's today.    * Other chest pain  -Presents with epigastric/substernal CP, concerning for UA  -Serial troponin negative  -EKG reviewed showed SR, possible anterior infarct, T wave inversions anteriorly  -Prior MPI stress test 6/23 negative for ischemia; echo 6/23 with normal EF  -Continue ASA, Plavix, CCB, statin, ARB  -LHC today by Dr. Juarez. All risks, benefits, and treatment alternatives to explained to patient in detail. All questions answered. She has agreed to proceed. Discussed with her daughter who was at bedside as well.    Mesenteric ischemia, chronic  -Continue ASA, Plavix, statin  -Will need OP intervention    Mild vascular dementia without behavioral disturbance, psychotic disturbance, mood disturbance, or anxiety  -Mgmt as per hospital medicine    COPD, moderate  -Mgmt as per hospital medicine    Essential hypertension  -BP labile  -Continue CCB, ARB  -Monitor BP trend    Hyperlipidemia  -Statin        VTE Risk Mitigation (From admission, onward)         Ordered     IP VTE LOW RISK PATIENT  Once         09/12/23 0150     Place sequential compression device  Until discontinued         09/12/23 0150                Thank you for your consult. I will follow-up with patient. Please contact us if you have any additional questions.    Vidhya Lee PA-C  Cardiology   [facility] - Emergency Dept.

## 2023-09-12 NOTE — ASSESSMENT & PLAN NOTE
Patient is chronically on statin.will continue for now. Last Lipid Panel:   Lab Results   Component Value Date    CHOL 138 04/18/2023    HDL 45 04/18/2023    LDLCALC 79.0 04/18/2023    TRIG 70 04/18/2023    CHOLHDL 32.6 04/18/2023     Plan:  -Continue home medication  -low fat/low calorie diet

## 2023-09-12 NOTE — CONSULTS
[facility] - Emergency Dept.  Cardiology  Consult Note    Patient Name: Gemma Vick  MRN: [id number]  Admission Date: 9/12/2023  Hospital Length of Stay: 0 days  Code Status: Full Code   Attending Provider: Veronica Cloud MD   Consulting Provider: Vidhya Lee PA-C  Primary Care Physician: Olga Altman MD  Principal Problem:Other chest pain    Patient information was obtained from patient, relative(s), past medical records and ER records.     Inpatient consult to Cardiology  Consult performed by: Vidhya Lee PA-C  Consult ordered by: Bhavik Chopra NP        Subjective:     Chief Complaint:  Chest pain    HPI:   Ms. Vick is a 68 year old female patient whose current medical conditions include PVD, carotid artery disease, HTN, hyperlipidemia, COPD, former smoker, vascular dementia, and mesenteric ischemia who presented to [facility] ED yesterday due to epigastric/substernal CP that onset shortly PTA. Patient reported the pain was intense and radiated to her jaw and was associated with SOB and facial numbness. She denied any associated fever, chills, palpitations, nausea, vomiting, diaphoresis, near syncope, or syncope. Initial workup in ED un-revealing with exception of uncontrolled BP (> 200 systolic), and patient was subsequently admitted for further evaluation and treatment. Cardiology consulted to assist with management. Patient seen and examined today with her daughter at bedside. Feels ok. States chest pain has resolved. Reports she has been having issues with BP at home. She is compliant with her medications. Followed routinely in clinic by Dr. Juarez. Serial troponin negative. Prior echo 6/23 showed normal EF. MPI stress test 6/23 negative for ischemia. EKG reviewed, SR with 1st degree AV block, possible anterior infarct, inverted T waves anterior leads.            Past Medical History:   Diagnosis Date    AAA (abdominal aortic aneurysm) 02/13/2014    Abdominal aneurysm     3    Acute  coronary syndrome     Arthritis     BPPV (benign paroxysmal positional vertigo)     Carotid artery plaque     Carotid artery stenosis and occlusion 02/13/2014    Chronic back pain     COPD (chronic obstructive pulmonary disease)     Coronary artery disease     Dementia     Emphysema lung     Hyperlipidemia     Hypertension     Myocardial infarction     x3    Neuropathy     Personal history of COVID-19 06/09/2021 11/16/2020 +Covid, recovered at home        Past Surgical History:   Procedure Laterality Date    CARDIAC CATHETERIZATION      CORONARY ANGIOPLASTY      HYSTERECTOMY  1988    TONSILLECTOMY         Review of patient's allergies indicates:  No Known Allergies    Current Facility-Administered Medications on File Prior to Encounter   Medication    [COMPLETED] mupirocin 2 % ointment     Current Outpatient Medications on File Prior to Encounter   Medication Sig    amLODIPine (NORVASC) 5 MG tablet Take 1 tablet (5 mg total) by mouth once daily.    aspirin 81 MG Chew Take 81 mg by mouth once daily.    clopidogreL (PLAVIX) 75 mg tablet TAKE 1 TABLET BY MOUTH ONCE A DAY    ezetimibe-simvastatin 10-40 mg (VYTORIN) 10-40 mg per tablet TAKE 1 TABLET BY MOUTH EVERY EVENING    memantine (NAMENDA) 10 MG Tab Take 10 mg by mouth 2 (two) times daily.    olmesartan (BENICAR) 40 MG tablet Take 40 mg by mouth.    vitamin D (VITAMIN D3) 1000 units Tab Take 1,000 Units by mouth once daily.    albuterol (PROVENTIL/VENTOLIN HFA) 90 mcg/actuation inhaler     albuterol-ipratropium (DUO-NEB) 2.5 mg-0.5 mg/3 mL nebulizer solution     bisoprolol (ZEBETA) 5 MG tablet Take 5 mg by mouth once daily.    budesonide-glycopyr-formoterol (BREZTRI AEROSPHERE) 160-9-4.8 mcg/actuation HFAA Inhale 2 puffs into the lungs 2 (two) times a day.    COMBIVENT RESPIMAT  mcg/actuation inhaler Inhale 1 puff into the lungs every 6 (six) hours as needed for Wheezing.    dicyclomine (BENTYL) 10 MG capsule TAKE 1 CAPSULE  BY MOUTH 4 TIMES DAILY BEFORE MEALS AND NIGHTLY Strength: 10 mg (Patient taking differently: 4 (four) times daily as needed. TAKE 1 CAPSULE BY MOUTH 4 TIMES DAILY BEFORE MEALS AND NIGHTLY Strength: 10 mg)    doxepin (SINEQUAN) 10 MG capsule Take 10-20 mg by mouth nightly as needed.    doxycycline (VIBRAMYCIN) 100 MG Cap Take 1 capsule (100 mg total) by mouth every 12 (twelve) hours. Take with food for 7 days    FEROSUL 325 mg (65 mg iron) Tab tablet TAKE (1) TABLET BY MOUTH 2 TIMES A DAY WITH MEALS    furosemide (LASIX) 20 MG tablet TAKE 20 MG TABLET ONCE DAILY (Patient taking differently: daily as needed. TAKE 20 MG TABLET ONCE DAILY)    hydroCHLOROthiazide (MICROZIDE) 12.5 mg capsule Take 12.5 mg by mouth.    inhalation spacing device (COMPACT SPACE CHAMBER) Use as directed for inhalation.    LIDOcaine-prilocaine (EMLA) cream     mirtazapine (REMERON) 15 MG tablet     OXYGEN-AIR DELIVERY SYSTEMS MISC 3 L by Misc.(Non-Drug; Combo Route) route every evening.    pantoprazole (PROTONIX) 40 MG tablet TAKE 1 TABLET BY MOUTH ONCE A DAY    TENS unit and electrodes Cmpk 1 each by Misc.(Non-Drug; Combo Route) route 3 (three) times daily as needed (for back pain).    zolpidem (AMBIEN) 10 mg Tab TAKE 1 TABLET BY MOUTH EVERY EVENING     Family History       Problem Relation (Age of Onset)    Breast cancer Paternal Aunt    Heart attacks under age 50 Father, Brother    Heart disease Mother          Tobacco Use    Smoking status: Former     Current packs/day: 0.00     Average packs/day: 0.5 packs/day for 46.9 years (23.4 ttl pk-yrs)     Types: Cigarettes, Vaping w/o nicotine     Start date: 1970     Quit date: 2017     Years since quittin.3    Smokeless tobacco: Never    Tobacco comments:     3 MG NICOTINE FOR HEART.    Substance and Sexual Activity    Alcohol use: No    Drug use: No    Sexual activity: Not Currently     Birth control/protection: None     Review of Systems   Constitutional: Positive  for malaise/fatigue.   HENT:          +jaw pain   Cardiovascular:  Positive for chest pain and dyspnea on exertion.   Respiratory:  Positive for shortness of breath.    Endocrine: Negative.    Hematologic/Lymphatic: Negative.    Skin: Negative.    Gastrointestinal:  Positive for abdominal pain (post prandial (chronic due to mesenteric ischemia)).        Epigastric     Genitourinary: Negative.    Neurological: Negative.    Psychiatric/Behavioral:  Positive for memory loss (vascular dementia).    Allergic/Immunologic: Negative.      Objective:     Vital Signs (Most Recent):  Temp: 98 °F (36.7 °C) (09/12/23 0717)  Pulse: 92 (09/12/23 0846)  Resp: 16 (09/12/23 0846)  BP: 135/65 (09/12/23 0846)  SpO2: 99 % (09/12/23 0846) Vital Signs (24h Range):  Temp:  [97.6 °F (36.4 °C)-98.2 °F (36.8 °C)] 98 °F (36.7 °C)  Pulse:  [] 92  Resp:  [16-20] 16  SpO2:  [95 %-100 %] 99 %  BP: (115-237)/(56-98) 135/65     Weight: 60 kg (132 lb 4.4 oz)  Body mass index is 24.19 kg/m².    SpO2: 99 %       No intake or output data in the 24 hours ending 09/12/23 0933    Lines/Drains/Airways       Peripheral Intravenous Line  Duration                  Peripheral IV - Single Lumen 09/12/23 0048 20 G Left Antecubital <1 day                     Physical Exam  Vitals and nursing note reviewed.   Constitutional:       General: She is not in acute distress.     Appearance: Normal appearance. She is well-developed. She is not diaphoretic.   HENT:      Head: Normocephalic and atraumatic.   Eyes:      General:         Right eye: No discharge.         Left eye: No discharge.      Pupils: Pupils are equal, round, and reactive to light.   Neck:      Thyroid: No thyromegaly.      Vascular: No JVD.      Trachea: No tracheal deviation.   Cardiovascular:      Rate and Rhythm: Normal rate and regular rhythm.      Heart sounds: Normal heart sounds, S1 normal and S2 normal. No murmur heard.  Pulmonary:      Effort: Pulmonary effort is normal. No respiratory  distress.      Breath sounds: Normal breath sounds. No wheezing or rales.   Abdominal:      General: There is no distension.      Palpations: Abdomen is soft.      Tenderness: There is no rebound.   Musculoskeletal:      Cervical back: Neck supple.      Right lower leg: No edema.      Left lower leg: No edema.   Skin:     General: Skin is warm and dry.      Findings: No erythema.   Neurological:      General: No focal deficit present.      Mental Status: She is alert and oriented to person, place, and time.   Psychiatric:         Mood and Affect: Mood normal.         Behavior: Behavior normal.         Thought Content: Thought content normal.          Significant Labs: CMP   Recent Labs   Lab 09/12/23 0048   *   K 3.9      CO2 20*   GLU 98   BUN 9   CREATININE 1.1   CALCIUM 10.0   PROT 7.5   ALBUMIN 4.0   BILITOT 0.4   ALKPHOS 78   AST 27   ALT 19   ANIONGAP 10   , CBC   Recent Labs   Lab 09/12/23 0048   WBC 10.09   HGB 12.5   HCT 36.2*      , Troponin   Recent Labs   Lab 09/12/23  0048 09/12/23  0348 09/12/23  0544   TROPONINI 0.016 0.019 0.024   , and All pertinent lab results from the last 24 hours have been reviewed.    Significant Imaging: Echocardiogram: Transthoracic echo (TTE) complete ([facility] Only):   Results for orders placed or performed during the hospital encounter of 06/14/23   Echo   Result Value Ref Range    BSA 1.63 m2    TDI SEPTAL 0.05 m/s    LV LATERAL E/E' RATIO 18.17 m/s    LV SEPTAL E/E' RATIO 21.80 m/s    LA WIDTH 3.40 cm    IVC diameter 1.06 cm    Left Ventricular Outflow Tract Mean Velocity 0.75 cm/s    Left Ventricular Outflow Tract Mean Gradient 2.68 mmHg    TDI LATERAL 0.06 m/s    PV PEAK VELOCITY 0.71 cm/s    LVIDd 4.18 3.5 - 6.0 cm    IVS 0.99 0.6 - 1.1 cm    Posterior Wall 0.99 0.6 - 1.1 cm    Ao root annulus 3.23 cm    LVIDs 2.81 2.1 - 4.0 cm    FS 33 28 - 44 %    LA volume 40.64 cm3    Sinus 3.33 cm    STJ 3.04 cm    Ascending aorta 3.02 cm    LV mass 134.31 g     LA size 2.99 cm    RVDD 2.95 cm    Left Ventricle Relative Wall Thickness 0.47 cm    AV regurgitation pressure 1/2 time [id number] ms    AV mean gradient 4 mmHg    AV valve area 2.36 cm2    AV Velocity Ratio 0.84     AV index (prosthetic) 0.82     MV valve area p 1/2 method 3.61 cm2    E/A ratio 1.06     Mean e' 0.06 m/s    E wave deceleration time 210.28 msec    IVRT 125.59 msec    LVOT diameter 1.91 cm    LVOT area 2.9 cm2    LVOT peak oz 1.20 m/s    LVOT peak VTI 31.10 cm    Ao peak oz 1.43 m/s    Ao VTI 37.7 cm    RVOT peak oz 0.68 m/s    RVOT peak VTI 18.0 cm    LVOT stroke volume 89.06 cm3    AV peak gradient 8 mmHg    PV mean gradient 1.01 mmHg    E/E' ratio 19.82 m/s    MV Peak E Oz 1.09 m/s    AR Max Oz 5.01 m/s    TR Max Oz 2.80 m/s    MV stenosis pressure 1/2 time 60.98 ms    MV Peak A Oz 1.03 m/s    LV Systolic Volume 29.76 mL    LV Systolic Volume Index 18.6 mL/m2    LV Diastolic Volume 77.84 mL    LV Diastolic Volume Index 48.65 mL/m2    LA Volume Index 25.4 mL/m2    LV Mass Index 84 g/m2    RA Major Axis 4.04 cm    Left Atrium Minor Axis 4.60 cm    Left Atrium Major Axis 4.81 cm    Triscuspid Valve Regurgitation Peak Gradient 31 mmHg    LA Volume Index (Mod) 25.7 mL/m2    LA volume (mod) 41.15 cm3    RA Width 3.22 cm    EF 60 %    Narrative    · Concentric remodeling and normal systolic function.  · The estimated ejection fraction is 60%.  · Indeterminate left ventricular diastolic function.  · Normal right ventricular size with normal right ventricular systolic   function.  · Mild tricuspid regurgitation.  · There is pulmonary hypertension.  · Mild aortic regurgitation.      , EKG: Reviewed, and X-Ray: CXR: X-Ray Chest 1 View (CXR): No results found for this visit on 09/12/23. and X-Ray Chest PA and Lateral (CXR): No results found for this visit on 09/12/23.    Assessment and Plan:   Patient who presents with CP, symptoms concerning for UA. Labile BP may also be contributing. Continue  OMT. C today, further rec's today.    * Other chest pain  -Presents with epigastric/substernal CP, concerning for UA  -Serial troponin negative  -EKG reviewed showed SR, possible anterior infarct, T wave inversions anteriorly  -Prior MPI stress test 6/23 negative for ischemia; echo 6/23 with normal EF  -Continue ASA, Plavix, CCB, statin, ARB  -LHC today by Dr. Juarez. All risks, benefits, and treatment alternatives to explained to patient in detail. All questions answered. She has agreed to proceed. Discussed with her daughter who was at bedside as well.    Mesenteric ischemia, chronic  -Continue ASA, Plavix, statin  -Will need OP intervention    Mild vascular dementia without behavioral disturbance, psychotic disturbance, mood disturbance, or anxiety  -Mgmt as per hospital medicine    COPD, moderate  -Mgmt as per hospital medicine    Essential hypertension  -BP labile  -Continue CCB, ARB  -Monitor BP trend    Hyperlipidemia  -Statin        VTE Risk Mitigation (From admission, onward)         Ordered     IP VTE LOW RISK PATIENT  Once         09/12/23 0150     Place sequential compression device  Until discontinued         09/12/23 0150                Thank you for your consult. I will follow-up with patient. Please contact us if you have any additional questions.    Vidhya Lee PA-C  Cardiology   [facility] - Emergency Dept.

## 2023-09-12 NOTE — H&P
[facility] - Emergency Dept.  [facility]  History & Physical    Patient Name: Gemma Vick  MRN: [id number]  Patient Class: OP- Observation  Admission Date: 9/12/2023  Attending Physician: Last Chopra MD   Primary Care Provider: Olga Altman MD         Patient information was obtained from patient, past medical records and ER records.     Subjective:     Principal Problem:Other chest pain    Chief Complaint:   Chief Complaint   Patient presents with    Chest Pain     Epigastric cp radiating into generalized chest and sob worsening over the past few hours; headache, neck pain        HPI: Gemma Vick is a 68 y.o. female with a PMH  has a past medical history of AAA (abdominal aortic aneurysm) (02/13/2014), Abdominal aneurysm, Acute coronary syndrome, Arthritis, BPPV (benign paroxysmal positional vertigo), Carotid artery plaque, Carotid artery stenosis and occlusion (02/13/2014), Chronic back pain, COPD (chronic obstructive pulmonary disease), Coronary artery disease, Dementia, Emphysema lung, Hyperlipidemia, Hypertension, Myocardial infarction, Neuropathy, and Personal history of COVID-19 (06/09/2021).  Presented to the ER for evaluation epigastric/substernal chest pain with associated shortness of breath, palpitations, and symptoms radiating to her jaw.  Symptom onset occurred couple hours prior to arrival to the ED. patient states that her symptoms are intermittent and feel different from when she was admitted when she had a heart attack/stent placement.  Patient no longer smokes but states that she vapes daily.  Also reports she was oxygen as needed at home while sleeping.  Patient is followed by Dr. Juarez and was lasted seen by on 6/16/23.  Options for surgical intervention/stenting for severe celiac/SMA disease was discussed.  Patient reports she is compliant with her aspirin and Plavix.  Denies any other symptoms at this time.  ER workup thus far has been unremarkable.  Troponin negative.   Patient family at bedside are in agreement with treatment plan.  Hospital Medicine consulted to admit patient for further workup.  Patient will be admitted under observation status.    PCP: Olga Altman        Past Medical History:   Diagnosis Date    AAA (abdominal aortic aneurysm) 02/13/2014    Abdominal aneurysm     3    Acute coronary syndrome     Arthritis     BPPV (benign paroxysmal positional vertigo)     Carotid artery plaque     Carotid artery stenosis and occlusion 02/13/2014    Chronic back pain     COPD (chronic obstructive pulmonary disease)     Coronary artery disease     Dementia     Emphysema lung     Hyperlipidemia     Hypertension     Myocardial infarction     x3    Neuropathy     Personal history of COVID-19 06/09/2021 11/16/2020 +Covid, recovered at home        Past Surgical History:   Procedure Laterality Date    CARDIAC CATHETERIZATION      CORONARY ANGIOPLASTY      HYSTERECTOMY  1988    TONSILLECTOMY         Review of patient's allergies indicates:  No Known Allergies    Current Facility-Administered Medications on File Prior to Encounter   Medication    [COMPLETED] mupirocin 2 % ointment     Current Outpatient Medications on File Prior to Encounter   Medication Sig    amLODIPine (NORVASC) 5 MG tablet Take 1 tablet (5 mg total) by mouth once daily.    aspirin 81 MG Chew Take 81 mg by mouth once daily.    clopidogreL (PLAVIX) 75 mg tablet TAKE 1 TABLET BY MOUTH ONCE A DAY    ezetimibe-simvastatin 10-40 mg (VYTORIN) 10-40 mg per tablet TAKE 1 TABLET BY MOUTH EVERY EVENING    memantine (NAMENDA) 10 MG Tab Take 10 mg by mouth 2 (two) times daily.    olmesartan (BENICAR) 40 MG tablet Take 40 mg by mouth.    vitamin D (VITAMIN D3) 1000 units Tab Take 1,000 Units by mouth once daily.    albuterol (PROVENTIL/VENTOLIN HFA) 90 mcg/actuation inhaler     albuterol-ipratropium (DUO-NEB) 2.5 mg-0.5 mg/3 mL nebulizer solution     bisoprolol (ZEBETA) 5 MG tablet Take 5 mg  by mouth once daily.    budesonide-glycopyr-formoterol (BREZTRI AEROSPHERE) 160-9-4.8 mcg/actuation HFAA Inhale 2 puffs into the lungs 2 (two) times a day.    COMBIVENT RESPIMAT  mcg/actuation inhaler Inhale 1 puff into the lungs every 6 (six) hours as needed for Wheezing.    dicyclomine (BENTYL) 10 MG capsule TAKE 1 CAPSULE BY MOUTH 4 TIMES DAILY BEFORE MEALS AND NIGHTLY Strength: 10 mg (Patient taking differently: 4 (four) times daily as needed. TAKE 1 CAPSULE BY MOUTH 4 TIMES DAILY BEFORE MEALS AND NIGHTLY Strength: 10 mg)    doxepin (SINEQUAN) 10 MG capsule Take 10-20 mg by mouth nightly as needed.    doxycycline (VIBRAMYCIN) 100 MG Cap Take 1 capsule (100 mg total) by mouth every 12 (twelve) hours. Take with food for 7 days    FEROSUL 325 mg (65 mg iron) Tab tablet TAKE (1) TABLET BY MOUTH 2 TIMES A DAY WITH MEALS    furosemide (LASIX) 20 MG tablet TAKE 20 MG TABLET ONCE DAILY (Patient taking differently: daily as needed. TAKE 20 MG TABLET ONCE DAILY)    hydroCHLOROthiazide (MICROZIDE) 12.5 mg capsule Take 12.5 mg by mouth.    inhalation spacing device (COMPACT SPACE CHAMBER) Use as directed for inhalation.    LIDOcaine-prilocaine (EMLA) cream     mirtazapine (REMERON) 15 MG tablet     OXYGEN-AIR DELIVERY SYSTEMS MISC 3 L by Misc.(Non-Drug; Combo Route) route every evening.    pantoprazole (PROTONIX) 40 MG tablet TAKE 1 TABLET BY MOUTH ONCE A DAY    TENS unit and electrodes Cmpk 1 each by Misc.(Non-Drug; Combo Route) route 3 (three) times daily as needed (for back pain).    zolpidem (AMBIEN) 10 mg Tab TAKE 1 TABLET BY MOUTH EVERY EVENING     Family History       Problem Relation (Age of Onset)    Breast cancer Paternal Aunt    Heart attacks under age 50 Father, Brother    Heart disease Mother          Tobacco Use    Smoking status: Former     Current packs/day: 0.00     Average packs/day: 0.5 packs/day for 46.9 years (23.4 ttl pk-yrs)     Types: Cigarettes, Vaping w/o nicotine     Start  date: 1970     Quit date: 2017     Years since quittin.3    Smokeless tobacco: Never    Tobacco comments:     3 MG NICOTINE FOR HEART.    Substance and Sexual Activity    Alcohol use: No    Drug use: No    Sexual activity: Not Currently     Birth control/protection: None     Review of Systems   Respiratory:  Positive for chest tightness and shortness of breath. Negative for cough, wheezing and stridor.    Cardiovascular:  Positive for chest pain, palpitations and leg swelling.   Gastrointestinal:  Positive for abdominal pain. Negative for abdominal distention, blood in stool, diarrhea, nausea and vomiting.   All other systems reviewed and are negative.    Objective:     Vital Signs (Most Recent):  Temp: 97.6 °F (36.4 °C) (23)  Pulse: 105 (23)  Resp: 20 (23)  BP: (!) 158/70 (23)  SpO2: 96 % (23) Vital Signs (24h Range):  Temp:  [97.6 °F (36.4 °C)-98.2 °F (36.8 °C)] 97.6 °F (36.4 °C)  Pulse:  [] 105  Resp:  [16-20] 20  SpO2:  [95 %-98 %] 96 %  BP: (158-237)/(70-98) 158/70     Weight: 60 kg (132 lb 4.4 oz)  Body mass index is 24.19 kg/m².     Physical Exam  Vitals and nursing note reviewed.   Constitutional:       General: She is awake. She is not in acute distress.     Appearance: Normal appearance. She is well-developed and well-groomed. She is not ill-appearing, toxic-appearing or diaphoretic.   HENT:      Head: Normocephalic and atraumatic.   Eyes:      Extraocular Movements: Extraocular movements intact.      Conjunctiva/sclera: Conjunctivae normal.   Cardiovascular:      Rate and Rhythm: Normal rate and regular rhythm.      Heart sounds: Normal heart sounds. No murmur heard.  Pulmonary:      Effort: Pulmonary effort is normal.      Breath sounds: Normal breath sounds. No decreased breath sounds, wheezing, rhonchi or rales.   Abdominal:      General: Bowel sounds are normal.      Palpations: Abdomen is soft.      Tenderness: There is no  abdominal tenderness.   Musculoskeletal:      Cervical back: Normal range of motion and neck supple.      Right lower leg: No edema.      Left lower leg: No edema.      Comments: 5/5 strength throughout   Skin:     General: Skin is warm and dry.      Capillary Refill: Capillary refill takes less than 2 seconds.   Neurological:      General: No focal deficit present.      Mental Status: She is alert and oriented to person, place, and time. Mental status is at baseline.      GCS: GCS eye subscore is 4. GCS verbal subscore is 5. GCS motor subscore is 6.      Cranial Nerves: Cranial nerves 2-12 are intact.      Sensory: Sensation is intact.      Motor: Motor function is intact.   Psychiatric:         Mood and Affect: Mood normal.         Speech: Speech normal.         Behavior: Behavior normal. Behavior is cooperative.        LABS:  Recent Results (from the past 24 hour(s))   CBC auto differential    Collection Time: 09/12/23 12:48 AM   Result Value Ref Range    WBC 10.09 3.90 - 12.70 K/uL    RBC 4.04 4.00 - 5.40 M/uL    Hemoglobin 12.5 12.0 - 16.0 g/dL    Hematocrit 36.2 (L) 37.0 - 48.5 %    MCV 90 82 - 98 fL    MCH 30.9 27.0 - 31.0 pg    MCHC 34.5 32.0 - 36.0 g/dL    RDW 13.1 11.5 - 14.5 %    Platelets 259 150 - 450 K/uL    MPV 9.3 9.2 - 12.9 fL    Immature Granulocytes 0.3 0.0 - 0.5 %    Gran # (ANC) 6.5 1.8 - 7.7 K/uL    Immature Grans (Abs) 0.03 0.00 - 0.04 K/uL    Lymph # 2.3 1.0 - 4.8 K/uL    Mono # 1.0 0.3 - 1.0 K/uL    Eos # 0.2 0.0 - 0.5 K/uL    Baso # 0.07 0.00 - 0.20 K/uL    nRBC 0 0 /100 WBC    Gran % 64.3 38.0 - 73.0 %    Lymph % 23.1 18.0 - 48.0 %    Mono % 9.9 4.0 - 15.0 %    Eosinophil % 1.7 0.0 - 8.0 %    Basophil % 0.7 0.0 - 1.9 %    Differential Method Automated    Comprehensive metabolic panel    Collection Time: 09/12/23 12:48 AM   Result Value Ref Range    Sodium 131 (L) 136 - 145 mmol/L    Potassium 3.9 3.5 - 5.1 mmol/L    Chloride 101 95 - 110 mmol/L    CO2 20 (L) 23 - 29 mmol/L    Glucose 98 70  - 110 mg/dL    BUN 9 8 - 23 mg/dL    Creatinine 1.1 0.5 - 1.4 mg/dL    Calcium 10.0 8.7 - 10.5 mg/dL    Total Protein 7.5 6.0 - 8.4 g/dL    Albumin 4.0 3.5 - 5.2 g/dL    Total Bilirubin 0.4 0.1 - 1.0 mg/dL    Alkaline Phosphatase 78 55 - 135 U/L    AST 27 10 - 40 U/L    ALT 19 10 - 44 U/L    eGFR 55 (A) >60 mL/min/1.73 m^2    Anion Gap 10 8 - 16 mmol/L   Troponin I #1    Collection Time: 09/12/23 12:48 AM   Result Value Ref Range    Troponin I 0.016 0.000 - 0.026 ng/mL   BNP    Collection Time: 09/12/23 12:48 AM   Result Value Ref Range    BNP 42 0 - 99 pg/mL   Urinalysis, Reflex to Urine Culture Urine, Clean Catch    Collection Time: 09/12/23  1:04 AM    Specimen: Urine   Result Value Ref Range    Specimen UA Urine, Clean Catch     Color, UA Colorless (A) Yellow, Straw, Whit    Appearance, UA Clear Clear    pH, UA 7.0 5.0 - 8.0    Specific Gravity, UA <1.005 (A) 1.005 - 1.030    Protein, UA Negative Negative    Glucose, UA Negative Negative    Ketones, UA Negative Negative    Bilirubin (UA) Negative Negative    Occult Blood UA Negative Negative    Nitrite, UA Negative Negative    Urobilinogen, UA Negative <2.0 EU/dL    Leukocytes, UA Negative Negative       RADIOLOGY  X-Ray Chest AP Portable    Result Date: 9/11/2023  EXAMINATION: XR CHEST AP PORTABLE CLINICAL HISTORY: Chest pain, unspecified TECHNIQUE: Single frontal view of the chest was performed. COMPARISON: None FINDINGS: The lungs are clear, with normal appearance of pulmonary vasculature and no pleural effusion or pneumothorax. The cardiac silhouette is normal in size. The hilar and mediastinal contours are unremarkable. Bones are intact.     No acute abnormality. Electronically signed by: Lucas Dominguez Date:    09/11/2023 Time:    22:04      EKG    MICROBIOLOGY    MDM     Amount and/or Complexity of Data Reviewed  Clinical lab tests: reviewed  Tests in the radiology section of CPT®: reviewed  Tests in the medicine section of CPT®: reviewed  Discussion of  test results with the performing providers: yes  Decide to obtain previous medical records or to obtain history from someone other than the patient: yes  Obtain history from someone other than the patient: yes  Review and summarize past medical records: yes  Discuss the patient with other providers: yes  Independent visualization of images, tracings, or specimens: yes          Assessment/Plan:     * Other chest pain  Currently asymptomatic. Cardiac workup negative thus far.   Plan:  -Tele monitoring  -Trend trops  -repeat ekg if needed  -nitrates prn  -cards consult if warranted      Epigastric abdominal pain  Currently asymptomatic. Likely referred pain from chest vs chronic mesenteric ischemia.  Plan:  -analgesics prn  -advanced imaging if warranted        Mesenteric ischemia, chronic  See above      COPD, moderate  Patient's COPD is with exacerbation noted by continued dyspnea currently.  Patient is currently off COPD Pathway. Continue scheduled inhalers duonebs prn, Steroids, Antibiotics and Supplemental oxygen and monitor respiratory status closely.             Essential hypertension  Current BP elevated likely secondary to pain and currently hospitalization increasing anxiety.  BP usually well controlled per patient with home medications.  Plan:  -Optimize pain control   -Continue home medications (norvasc, lasix,ezetimibe), titrate as needed   -Monitor BP  -Low salt/cardiac diet when not NPO  -IV hydralazine prn for SBP>160 or DBP>90           GERD (gastroesophageal reflux disease)  Chronic. Stable. Currently asymptomatic. Home medications include PPI/Antacids as needed.  Plan:  -Continue PPI/Antacids as needed         Hyperlipidemia  Patient is chronically on statin.will continue for now. Last Lipid Panel:   Lab Results   Component Value Date    CHOL 138 04/18/2023    HDL 45 04/18/2023    LDLCALC 79.0 04/18/2023    TRIG 70 04/18/2023    CHOLHDL 32.6 04/18/2023     Plan:  -Continue home medication  -low  fat/low calorie diet        Insomnia  Compliant with Ambien.  Plan:  -continue ambien      Mild vascular dementia without behavioral disturbance, psychotic disturbance, mood disturbance, or anxiety  Dementia is controlled currently. Continue home dementia meds and non-pharmacologic interventions to prevent delirium (No VS between 11PM-5AM, activity during day, opening blinds, providing glasses/hearing aids, and up in chair during daytime). Use PRN anti-psychotics to prevent behavior of self harm during sundowning, and avoid narcotics and benzos unless absolutely necessary. PRN anti-psychotics prescribed to avoid self harm behaviors.        VTE Risk Mitigation (From admission, onward)         Ordered     IP VTE LOW RISK PATIENT  Once         09/12/23 0150     Place sequential compression device  Until discontinued         09/12/23 0150              //Core Measures   -DVT proph: SCDs, currently on ASA and Plavix  -Code status Full    -Surrogate:spouse    Components of this note were documented using a voice recognition system and are subject to errors not corrected at the time the document was proof read. Please contact the author for any clarifications.       Bhavik Chopra NP  Department of Hospital Medicine  [facility] - Emergency Dept.

## 2023-09-12 NOTE — CARE UPDATE
Evaluated patient in cath lab post procedure  Tolerated well  Reports improvement in chest pain and abdominal pain    No acute distress  No respiratory distress  Normal mood and behavior  Left wrist in splint  No abdominal tenderness on palpation  No lower extremity edema     Cath report reviewed  Monitor overnight

## 2023-09-12 NOTE — HPI
Ms. Vick is a 68 year old female patient whose current medical conditions include PVD, carotid artery disease, HTN, hyperlipidemia, COPD, former smoker, vascular dementia, and mesenteric ischemia who presented to [facility] ED yesterday due to epigastric/substernal CP that onset shortly PTA. Patient reported the pain was intense and radiated to her jaw and was associated with SOB and facial numbness. She denied any associated fever, chills, palpitations, nausea, vomiting, diaphoresis, near syncope, or syncope. Initial workup in ED un-revealing with exception of uncontrolled BP (> 200 systolic), and patient was subsequently admitted for further evaluation and treatment. Cardiology consulted to assist with management. Patient seen and examined today with her daughter at bedside. Feels ok. States chest pain has resolved. Reports she has been having issues with BP at home. She is compliant with her medications. Followed routinely in clinic by Dr. Juarez. Serial troponin negative. Prior echo 6/23 showed normal EF. MPI stress test 6/23 negative for ischemia. EKG reviewed, SR with 1st degree AV block, possible anterior infarct, inverted T waves anterior leads.

## 2023-09-12 NOTE — Clinical Note
The closure device was deployed in the left radial artery. 10 cc's of air were inserted into the closure device.

## 2023-09-12 NOTE — SUBJECTIVE & OBJECTIVE
Past Medical History:   Diagnosis Date    AAA (abdominal aortic aneurysm) 02/13/2014    Abdominal aneurysm     3    Acute coronary syndrome     Arthritis     BPPV (benign paroxysmal positional vertigo)     Carotid artery plaque     Carotid artery stenosis and occlusion 02/13/2014    Chronic back pain     COPD (chronic obstructive pulmonary disease)     Coronary artery disease     Dementia     Emphysema lung     Hyperlipidemia     Hypertension     Myocardial infarction     x3    Neuropathy     Personal history of COVID-19 06/09/2021 11/16/2020 +Covid, recovered at home        Past Surgical History:   Procedure Laterality Date    CARDIAC CATHETERIZATION      CORONARY ANGIOPLASTY      HYSTERECTOMY  1988    TONSILLECTOMY         Review of patient's allergies indicates:  No Known Allergies    Current Facility-Administered Medications on File Prior to Encounter   Medication    [COMPLETED] mupirocin 2 % ointment     Current Outpatient Medications on File Prior to Encounter   Medication Sig    amLODIPine (NORVASC) 5 MG tablet Take 1 tablet (5 mg total) by mouth once daily.    aspirin 81 MG Chew Take 81 mg by mouth once daily.    clopidogreL (PLAVIX) 75 mg tablet TAKE 1 TABLET BY MOUTH ONCE A DAY    ezetimibe-simvastatin 10-40 mg (VYTORIN) 10-40 mg per tablet TAKE 1 TABLET BY MOUTH EVERY EVENING    memantine (NAMENDA) 10 MG Tab Take 10 mg by mouth 2 (two) times daily.    olmesartan (BENICAR) 40 MG tablet Take 40 mg by mouth.    vitamin D (VITAMIN D3) 1000 units Tab Take 1,000 Units by mouth once daily.    albuterol (PROVENTIL/VENTOLIN HFA) 90 mcg/actuation inhaler     albuterol-ipratropium (DUO-NEB) 2.5 mg-0.5 mg/3 mL nebulizer solution     bisoprolol (ZEBETA) 5 MG tablet Take 5 mg by mouth once daily.    budesonide-glycopyr-formoterol (BREZTRI AEROSPHERE) 160-9-4.8 mcg/actuation HFAA Inhale 2 puffs into the lungs 2 (two) times a day.    COMBIVENT RESPIMAT  mcg/actuation inhaler Inhale 1 puff into the lungs every 6  (six) hours as needed for Wheezing.    dicyclomine (BENTYL) 10 MG capsule TAKE 1 CAPSULE BY MOUTH 4 TIMES DAILY BEFORE MEALS AND NIGHTLY Strength: 10 mg (Patient taking differently: 4 (four) times daily as needed. TAKE 1 CAPSULE BY MOUTH 4 TIMES DAILY BEFORE MEALS AND NIGHTLY Strength: 10 mg)    doxepin (SINEQUAN) 10 MG capsule Take 10-20 mg by mouth nightly as needed.    doxycycline (VIBRAMYCIN) 100 MG Cap Take 1 capsule (100 mg total) by mouth every 12 (twelve) hours. Take with food for 7 days    FEROSUL 325 mg (65 mg iron) Tab tablet TAKE (1) TABLET BY MOUTH 2 TIMES A DAY WITH MEALS    furosemide (LASIX) 20 MG tablet TAKE 20 MG TABLET ONCE DAILY (Patient taking differently: daily as needed. TAKE 20 MG TABLET ONCE DAILY)    hydroCHLOROthiazide (MICROZIDE) 12.5 mg capsule Take 12.5 mg by mouth.    inhalation spacing device (COMPACT SPACE CHAMBER) Use as directed for inhalation.    LIDOcaine-prilocaine (EMLA) cream     mirtazapine (REMERON) 15 MG tablet     OXYGEN-AIR DELIVERY SYSTEMS MISC 3 L by Misc.(Non-Drug; Combo Route) route every evening.    pantoprazole (PROTONIX) 40 MG tablet TAKE 1 TABLET BY MOUTH ONCE A DAY    TENS unit and electrodes Cmpk 1 each by Misc.(Non-Drug; Combo Route) route 3 (three) times daily as needed (for back pain).    zolpidem (AMBIEN) 10 mg Tab TAKE 1 TABLET BY MOUTH EVERY EVENING     Family History       Problem Relation (Age of Onset)    Breast cancer Paternal Aunt    Heart attacks under age 50 Father, Brother    Heart disease Mother          Tobacco Use    Smoking status: Former     Current packs/day: 0.00     Average packs/day: 0.5 packs/day for 46.9 years (23.4 ttl pk-yrs)     Types: Cigarettes, Vaping w/o nicotine     Start date: 1970     Quit date: 2017     Years since quittin.3    Smokeless tobacco: Never    Tobacco comments:     3 MG NICOTINE FOR HEART.    Substance and Sexual Activity    Alcohol use: No    Drug use: No    Sexual activity: Not Currently     Birth  control/protection: None     Review of Systems   Constitutional: Positive for malaise/fatigue.   HENT:          +jaw pain   Cardiovascular:  Positive for chest pain and dyspnea on exertion.   Respiratory:  Positive for shortness of breath.    Endocrine: Negative.    Hematologic/Lymphatic: Negative.    Skin: Negative.    Gastrointestinal:  Positive for abdominal pain (post prandial (chronic due to mesenteric ischemia)).        Epigastric     Genitourinary: Negative.    Neurological: Negative.    Psychiatric/Behavioral:  Positive for memory loss (vascular dementia).    Allergic/Immunologic: Negative.      Objective:     Vital Signs (Most Recent):  Temp: 98 °F (36.7 °C) (09/12/23 0717)  Pulse: 92 (09/12/23 0846)  Resp: 16 (09/12/23 0846)  BP: 135/65 (09/12/23 0846)  SpO2: 99 % (09/12/23 0846) Vital Signs (24h Range):  Temp:  [97.6 °F (36.4 °C)-98.2 °F (36.8 °C)] 98 °F (36.7 °C)  Pulse:  [] 92  Resp:  [16-20] 16  SpO2:  [95 %-100 %] 99 %  BP: (115-237)/(56-98) 135/65     Weight: 60 kg (132 lb 4.4 oz)  Body mass index is 24.19 kg/m².    SpO2: 99 %       No intake or output data in the 24 hours ending 09/12/23 0933    Lines/Drains/Airways       Peripheral Intravenous Line  Duration                  Peripheral IV - Single Lumen 09/12/23 0048 20 G Left Antecubital <1 day                     Physical Exam  Vitals and nursing note reviewed.   Constitutional:       General: She is not in acute distress.     Appearance: Normal appearance. She is well-developed. She is not diaphoretic.   HENT:      Head: Normocephalic and atraumatic.   Eyes:      General:         Right eye: No discharge.         Left eye: No discharge.      Pupils: Pupils are equal, round, and reactive to light.   Neck:      Thyroid: No thyromegaly.      Vascular: No JVD.      Trachea: No tracheal deviation.   Cardiovascular:      Rate and Rhythm: Normal rate and regular rhythm.      Heart sounds: Normal heart sounds, S1 normal and S2 normal. No murmur  heard.  Pulmonary:      Effort: Pulmonary effort is normal. No respiratory distress.      Breath sounds: Normal breath sounds. No wheezing or rales.   Abdominal:      General: There is no distension.      Palpations: Abdomen is soft.      Tenderness: There is no rebound.   Musculoskeletal:      Cervical back: Neck supple.      Right lower leg: No edema.      Left lower leg: No edema.   Skin:     General: Skin is warm and dry.      Findings: No erythema.   Neurological:      General: No focal deficit present.      Mental Status: She is alert and oriented to person, place, and time.   Psychiatric:         Mood and Affect: Mood normal.         Behavior: Behavior normal.         Thought Content: Thought content normal.          Significant Labs: CMP   Recent Labs   Lab 09/12/23  0048   *   K 3.9      CO2 20*   GLU 98   BUN 9   CREATININE 1.1   CALCIUM 10.0   PROT 7.5   ALBUMIN 4.0   BILITOT 0.4   ALKPHOS 78   AST 27   ALT 19   ANIONGAP 10   , CBC   Recent Labs   Lab 09/12/23  0048   WBC 10.09   HGB 12.5   HCT 36.2*      , Troponin   Recent Labs   Lab 09/12/23  0048 09/12/23  0348 09/12/23  0544   TROPONINI 0.016 0.019 0.024   , and All pertinent lab results from the last 24 hours have been reviewed.    Significant Imaging: Echocardiogram: Transthoracic echo (TTE) complete (Cupid Only):   Results for orders placed or performed during the hospital encounter of 06/14/23   Echo   Result Value Ref Range    BSA 1.63 m2    TDI SEPTAL 0.05 m/s    LV LATERAL E/E' RATIO 18.17 m/s    LV SEPTAL E/E' RATIO 21.80 m/s    LA WIDTH 3.40 cm    IVC diameter 1.06 cm    Left Ventricular Outflow Tract Mean Velocity 0.75 cm/s    Left Ventricular Outflow Tract Mean Gradient 2.68 mmHg    TDI LATERAL 0.06 m/s    PV PEAK VELOCITY 0.71 cm/s    LVIDd 4.18 3.5 - 6.0 cm    IVS 0.99 0.6 - 1.1 cm    Posterior Wall 0.99 0.6 - 1.1 cm    Ao root annulus 3.23 cm    LVIDs 2.81 2.1 - 4.0 cm    FS 33 28 - 44 %    LA volume 40.64 cm3     Sinus 3.33 cm    STJ 3.04 cm    Ascending aorta 3.02 cm    LV mass 134.31 g    LA size 2.99 cm    RVDD 2.95 cm    Left Ventricle Relative Wall Thickness 0.47 cm    AV regurgitation pressure 1/2 time [id number] ms    AV mean gradient 4 mmHg    AV valve area 2.36 cm2    AV Velocity Ratio 0.84     AV index (prosthetic) 0.82     MV valve area p 1/2 method 3.61 cm2    E/A ratio 1.06     Mean e' 0.06 m/s    E wave deceleration time 210.28 msec    IVRT 125.59 msec    LVOT diameter 1.91 cm    LVOT area 2.9 cm2    LVOT peak oz 1.20 m/s    LVOT peak VTI 31.10 cm    Ao peak oz 1.43 m/s    Ao VTI 37.7 cm    RVOT peak oz 0.68 m/s    RVOT peak VTI 18.0 cm    LVOT stroke volume 89.06 cm3    AV peak gradient 8 mmHg    PV mean gradient 1.01 mmHg    E/E' ratio 19.82 m/s    MV Peak E Oz 1.09 m/s    AR Max Oz 5.01 m/s    TR Max Oz 2.80 m/s    MV stenosis pressure 1/2 time 60.98 ms    MV Peak A Oz 1.03 m/s    LV Systolic Volume 29.76 mL    LV Systolic Volume Index 18.6 mL/m2    LV Diastolic Volume 77.84 mL    LV Diastolic Volume Index 48.65 mL/m2    LA Volume Index 25.4 mL/m2    LV Mass Index 84 g/m2    RA Major Axis 4.04 cm    Left Atrium Minor Axis 4.60 cm    Left Atrium Major Axis 4.81 cm    Triscuspid Valve Regurgitation Peak Gradient 31 mmHg    LA Volume Index (Mod) 25.7 mL/m2    LA volume (mod) 41.15 cm3    RA Width 3.22 cm    EF 60 %    Narrative    · Concentric remodeling and normal systolic function.  · The estimated ejection fraction is 60%.  · Indeterminate left ventricular diastolic function.  · Normal right ventricular size with normal right ventricular systolic   function.  · Mild tricuspid regurgitation.  · There is pulmonary hypertension.  · Mild aortic regurgitation.      , EKG: Reviewed, and X-Ray: CXR: X-Ray Chest 1 View (CXR): No results found for this visit on 09/12/23. and X-Ray Chest PA and Lateral (CXR): No results found for this visit on 09/12/23.

## 2023-09-12 NOTE — ED PROVIDER NOTES
SCRIBE #1 NOTE: I, Joya Izaguirre, am scribing for, and in the presence of, Jordin Rao Jr., MD. I have scribed the entire note.       History     Chief Complaint   Patient presents with    Chest Pain     Epigastric cp radiating into generalized chest and sob worsening over the past few hours; headache, neck pain     Review of patient's allergies indicates:  No Known Allergies      History of Present Illness     HPI    9/12/2023, 1:12 AM  History obtained from the patient and daughter      History of Present Illness: Gemma Vick is a 68 y.o. female patient with a PMHx of AAA, acute coronary syndrome, arthritis, carotid artery stenosis and occlusion, COPD, coronary artery disease, emphysema, hyperlipidemia, hypertension, MI x 4, vascular dementia, and neuropathy who presents to the Emergency Department for evaluation of chest pain which onset several hours PTA. Symptoms are constant and moderate in severity. Pt's daughter states that they have been to several doctor's visits as well as running errands when pt started complaining of facial numbness and dizziness. Pt then stated she was having chest pain throughout the afternoon and started in epigastric region. She has had 2 stents procedures done. Pt is on Plavix and 81 mg ASA. She is still having chest pain at bedside along with SOB. Her last cath and stress test was a couple months ago. No further complaints or concerns at this time.       Arrival mode: Personal vehicle     PCP: Olga Altman MD        Past Medical History:  Past Medical History:   Diagnosis Date    AAA (abdominal aortic aneurysm) 02/13/2014    Abdominal aneurysm     3    Acute coronary syndrome     Arthritis     BPPV (benign paroxysmal positional vertigo)     Carotid artery plaque     Carotid artery stenosis and occlusion 02/13/2014    Chronic back pain     COPD (chronic obstructive pulmonary disease)     Coronary artery disease     Dementia     Emphysema lung     Hyperlipidemia      Hypertension     Myocardial infarction     x3    Neuropathy     Personal history of COVID-19 2021 +Covid, recovered at home        Past Surgical History:  Past Surgical History:   Procedure Laterality Date    CARDIAC CATHETERIZATION      CORONARY ANGIOPLASTY      HYSTERECTOMY  1988    TONSILLECTOMY           Family History:  Family History   Problem Relation Age of Onset    Heart disease Mother     Heart attacks under age 50 Father     Heart attacks under age 50 Brother     Breast cancer Paternal Aunt        Social History:  Social History     Tobacco Use    Smoking status: Former     Current packs/day: 0.00     Average packs/day: 0.5 packs/day for 46.9 years (23.4 ttl pk-yrs)     Types: Cigarettes, Vaping w/o nicotine     Start date: 1970     Quit date: 2017     Years since quittin.3    Smokeless tobacco: Never    Tobacco comments:     3 MG NICOTINE FOR HEART.    Substance and Sexual Activity    Alcohol use: No    Drug use: No    Sexual activity: Not Currently     Birth control/protection: None        Review of Systems     Review of Systems   Respiratory:  Positive for shortness of breath.    Cardiovascular:  Positive for chest pain.   Neurological:  Positive for dizziness and numbness (facial).      Physical Exam     Initial Vitals [235]   BP Pulse Resp Temp SpO2   (!) 206/86 97 16 97.6 °F (36.4 °C) 97 %      MAP       --          Physical Exam  Nursing Notes and Vital Signs Reviewed.  Constitutional: Patient is in no acute distress. Well-developed and well-nourished.  Head: Atraumatic. Normocephalic.  Eyes: PERRL. EOM intact. Conjunctivae are not pale. No scleral icterus.  ENT: Mucous membranes are moist. Oropharynx is clear and symmetric.    Neck: Supple. Full ROM. No lymphadenopathy.  Cardiovascular: Regular rate. Regular rhythm. No murmurs, rubs, or gallops. Distal pulses are 2+ and symmetric. Anterior chest wall tenderness.   Pulmonary/Chest: No respiratory distress.  Clear to auscultation bilaterally. No wheezing or rales.  Abdominal: Soft and non-distended.  There is no tenderness.  No rebound, guarding, or rigidity. Good bowel sounds.  Genitourinary: No CVA tenderness  Musculoskeletal: Moves all extremities. No obvious deformities. No edema. No calf tenderness.  Skin: Warm and dry.  Neurological:  Alert, awake, and appropriate.  Normal speech.  No acute focal neurological deficits are appreciated.  Psychiatric: Normal affect. Good eye contact. Appropriate in content.     ED Course   Procedures  ED Vital Signs:  Vitals:    09/12/23 1400 09/12/23 1415 09/12/23 1430 09/12/23 1445   BP: (!) 152/70 (!) 160/78 (!) 170/100 (!) 145/72   Pulse: 67 67 65 69   Resp: 17 20 16 14   Temp:       TempSrc:       SpO2: 96% (!) 94% (!) 94% 96%   Weight:       Height:        09/12/23 1500 09/12/23 1515 09/12/23 1524 09/12/23 1530   BP: (!) 181/80 (!) 158/72 (!) 170/100 (!) 153/72   Pulse: 67 67 68 73   Resp: 14 15 17 19   Temp:       TempSrc:       SpO2: (!) 94% (!) 93% (!) 93% (!) 94%   Weight:       Height:        09/12/23 1545 09/12/23 1600 09/12/23 1615 09/12/23 1630   BP:  131/60  (!) 140/65   Pulse:  76  82   Resp: 20 16 20 20   Temp:       TempSrc:       SpO2:  (!) 94%  (!) 94%   Weight:       Height:        09/12/23 1645 09/12/23 1700 09/12/23 1739   BP: (!) 141/61 134/62 129/60   Pulse: 81 78 74   Resp: 20 20 18   Temp:   98.4 °F (36.9 °C)   TempSrc:   Oral   SpO2: (!) 94% (!) 93% (!) 93%   Weight:      Height:          Abnormal Lab Results:  Labs Reviewed   CBC W/ AUTO DIFFERENTIAL - Abnormal; Notable for the following components:       Result Value    Hematocrit 36.2 (*)     All other components within normal limits   COMPREHENSIVE METABOLIC PANEL - Abnormal; Notable for the following components:    Sodium 131 (*)     CO2 20 (*)     eGFR 55 (*)     All other components within normal limits   URINALYSIS, REFLEX TO URINE CULTURE - Abnormal; Notable for the following components:    Color,  UA Colorless (*)     Specific Gravity, UA <1.005 (*)     All other components within normal limits    Narrative:     Specimen Source->Urine   TROPONIN I   B-TYPE NATRIURETIC PEPTIDE   TROPONIN I   TROPONIN I        All Lab Results:  Results for orders placed or performed during the hospital encounter of 09/12/23   CBC auto differential   Result Value Ref Range    WBC 10.09 3.90 - 12.70 K/uL    RBC 4.04 4.00 - 5.40 M/uL    Hemoglobin 12.5 12.0 - 16.0 g/dL    Hematocrit 36.2 (L) 37.0 - 48.5 %    MCV 90 82 - 98 fL    MCH 30.9 27.0 - 31.0 pg    MCHC 34.5 32.0 - 36.0 g/dL    RDW 13.1 11.5 - 14.5 %    Platelets 259 150 - 450 K/uL    MPV 9.3 9.2 - 12.9 fL    Immature Granulocytes 0.3 0.0 - 0.5 %    Gran # (ANC) 6.5 1.8 - 7.7 K/uL    Immature Grans (Abs) 0.03 0.00 - 0.04 K/uL    Lymph # 2.3 1.0 - 4.8 K/uL    Mono # 1.0 0.3 - 1.0 K/uL    Eos # 0.2 0.0 - 0.5 K/uL    Baso # 0.07 0.00 - 0.20 K/uL    nRBC 0 0 /100 WBC    Gran % 64.3 38.0 - 73.0 %    Lymph % 23.1 18.0 - 48.0 %    Mono % 9.9 4.0 - 15.0 %    Eosinophil % 1.7 0.0 - 8.0 %    Basophil % 0.7 0.0 - 1.9 %    Differential Method Automated    Comprehensive metabolic panel   Result Value Ref Range    Sodium 131 (L) 136 - 145 mmol/L    Potassium 3.9 3.5 - 5.1 mmol/L    Chloride 101 95 - 110 mmol/L    CO2 20 (L) 23 - 29 mmol/L    Glucose 98 70 - 110 mg/dL    BUN 9 8 - 23 mg/dL    Creatinine 1.1 0.5 - 1.4 mg/dL    Calcium 10.0 8.7 - 10.5 mg/dL    Total Protein 7.5 6.0 - 8.4 g/dL    Albumin 4.0 3.5 - 5.2 g/dL    Total Bilirubin 0.4 0.1 - 1.0 mg/dL    Alkaline Phosphatase 78 55 - 135 U/L    AST 27 10 - 40 U/L    ALT 19 10 - 44 U/L    eGFR 55 (A) >60 mL/min/1.73 m^2    Anion Gap 10 8 - 16 mmol/L   Troponin I #1   Result Value Ref Range    Troponin I 0.016 0.000 - 0.026 ng/mL   BNP   Result Value Ref Range    BNP 42 0 - 99 pg/mL   Urinalysis, Reflex to Urine Culture Urine, Clean Catch    Specimen: Urine   Result Value Ref Range    Specimen UA Urine, Clean Catch     Color, UA  Colorless (A) Yellow, Straw, Whit    Appearance, UA Clear Clear    pH, UA 7.0 5.0 - 8.0    Specific Gravity, UA <1.005 (A) 1.005 - 1.030    Protein, UA Negative Negative    Glucose, UA Negative Negative    Ketones, UA Negative Negative    Bilirubin (UA) Negative Negative    Occult Blood UA Negative Negative    Nitrite, UA Negative Negative    Urobilinogen, UA Negative <2.0 EU/dL    Leukocytes, UA Negative Negative   Troponin I   Result Value Ref Range    Troponin I 0.019 0.000 - 0.026 ng/mL   Troponin I   Result Value Ref Range    Troponin I 0.024 0.000 - 0.026 ng/mL   Cardiac catheterization   Result Value Ref Range    Cath EF Quantitative 60 %   ISTAT ACT-K   Result Value Ref Range    POC ACTIVATED CLOTTING TIME K 245 (H) 74 - 137 sec    Sample unknown    ISTAT ACT-K   Result Value Ref Range    POC ACTIVATED CLOTTING TIME K 263 (H) 74 - 137 sec    Sample unknown      *Note: Due to a large number of results and/or encounters for the requested time period, some results have not been displayed. A complete set of results can be found in Results Review.         Imaging Results:  Imaging Results              X-Ray Chest AP Portable (Final result)  Result time 09/11/23 22:04:20      Final result by Lucas Dominguez MD (09/11/23 22:04:20)                   Impression:      No acute abnormality.      Electronically signed by: Lucas Dominguez  Date:    09/11/2023  Time:    22:04               Narrative:    EXAMINATION:  XR CHEST AP PORTABLE    CLINICAL HISTORY:  Chest pain, unspecified    TECHNIQUE:  Single frontal view of the chest was performed.    COMPARISON:  None    FINDINGS:  The lungs are clear, with normal appearance of pulmonary vasculature and no pleural effusion or pneumothorax.    The cardiac silhouette is normal in size. The hilar and mediastinal contours are unremarkable.    Bones are intact.                                       The EKG was ordered, reviewed, and independently interpreted by the ED  provider.  Interpretation time: 21:32  Rate: 101 BPM  Rhythm: Sinus tachycardia with 1st degree A-V block  Interpretation: Anterior infarct, age undetermined. No STEMI.             The Emergency Provider reviewed the vital signs and test results, which are outlined above.     ED Discussion     1:44 AM: Discussed case with Dr. Chopra ([facility]) and he agrees with current care and management of pt and accepts admission. Cardiology will follow up with patient in the morning.   Admitting Service: [facility]  Admitting Physician: Dr. Chopra  Admit to: obs     1:44 AM: Re-evaluated pt. I have discussed test results, shared treatment plan, and the need for admission with patient and family at bedside. Pt and family express understanding at this time and agree with all information. All questions answered. Pt and family have no further questions or concerns at this time. Pt is ready for admit.         Medical Decision Making  Amount and/or Complexity of Data Reviewed  Labs: ordered. Decision-making details documented in ED Course.  Radiology: ordered and independent interpretation performed. Decision-making details documented in ED Course.  ECG/medicine tests: ordered. Decision-making details documented in ED Course.    Risk  OTC drugs.  Prescription drug management.       Additional MDM:   Heart Score:    History:          Slightly suspicious.  ECG:             Nonspecific repolarisation disturbance  Age:               >65 years  Risk factors: >= 3 risk factors or history of atherosclerotic disease  Troponin:       Less than or equal to normal limit  Heart Score = 5                ED Medication(s):  Medications   sodium chloride 0.9% flush 3 mL (has no administration in time range)   albuterol-ipratropium 2.5 mg-0.5 mg/3 mL nebulizer solution 3 mL (has no administration in time range)   melatonin tablet 6 mg (6 mg Oral Not Given 9/12/23 0236)   ondansetron injection 4 mg (has no administration in time  range)   promethazine tablet 25 mg (has no administration in time range)   polyethylene glycol packet 17 g (has no administration in time range)   acetaminophen tablet 650 mg (has no administration in time range)   simethicone chewable tablet 80 mg (has no administration in time range)   aluminum-magnesium hydroxide-simethicone 200-200-20 mg/5 mL suspension 30 mL (has no administration in time range)   acetaminophen suppository 650 mg (has no administration in time range)   morphine injection 2 mg (has no administration in time range)   naloxone 0.4 mg/mL injection 0.02 mg (has no administration in time range)   glucose chewable tablet 16 g (has no administration in time range)   glucose chewable tablet 24 g (has no administration in time range)   glucagon (human recombinant) injection 1 mg (has no administration in time range)   HYDROcodone-acetaminophen 5-325 mg per tablet 1 tablet (has no administration in time range)   dextrose 10% bolus 125 mL 125 mL (has no administration in time range)   dextrose 10% bolus 250 mL 250 mL (has no administration in time range)   zolpidem tablet 10 mg (10 mg Oral Given 9/12/23 0331)   amLODIPine tablet 5 mg (5 mg Oral Given 9/12/23 0843)   aspirin chewable tablet 81 mg (81 mg Oral Given 9/12/23 0842)   clopidogreL tablet 75 mg (75 mg Oral Given 9/12/23 0844)   furosemide tablet 20 mg (has no administration in time range)   atorvastatin tablet 20 mg (has no administration in time range)   ezetimibe tablet 10 mg (has no administration in time range)   memantine tablet 10 mg (10 mg Oral Given 9/12/23 0843)   valsartan tablet 320 mg (320 mg Oral Given 9/12/23 0842)   0.9%  NaCl infusion ( Intravenous New Bag 9/12/23 5247)   acetaminophen tablet 650 mg (has no administration in time range)   ondansetron disintegrating tablet 8 mg (has no administration in time range)   aspirin tablet 325 mg (325 mg Oral Given 9/12/23 0103)   hydrALAZINE injection 10 mg (10 mg Intravenous Given 9/12/23  0103)   hydrALAZINE injection 10 mg (10 mg Intravenous Given 9/12/23 1524)       Current Discharge Medication List                  Scribe Attestation:   Scribe #1: I performed the above scribed service and the documentation accurately describes the services I performed. I attest to the accuracy of the note.     Attending:   Physician Attestation Statement for Scribe #1: I, Jordin Rao Jr., MD, personally performed the services described in this documentation, as scribed by Joya Izaguirre, in my presence, and it is both accurate and complete.           Clinical Impression       ICD-10-CM ICD-9-CM   1. Chest pain  R07.9 786.50   2. Other chest pain  R07.89 786.59   3. Coronary artery disease involving native coronary artery of native heart with unstable angina pectoris  I25.110 414.01     411.1   4. CAD (coronary artery disease)  I25.10 414.00       Disposition:   Disposition: Placed in Observation  Condition: Jordin Cervantes Jr., MD  09/12/23 6672

## 2023-09-12 NOTE — FIRST PROVIDER EVALUATION
"Medical screening examination initiated.  I have conducted a focused provider triage encounter, findings are as follows:    Brief history of present illness:  chest pain, sob, neck pain and headache    Vitals:    09/11/23 2135   BP: (!) 206/86   BP Location: Right arm   Patient Position: Sitting   Pulse: 97   Resp: 16   Temp: 97.6 °F (36.4 °C)   TempSrc: Oral   SpO2: 97%   Height: 5' 2" (1.575 m)       Pertinent physical exam:  nad    Brief workup plan:  labs, ekg, imaging, further eval    Preliminary workup initiated; this workup will be continued and followed by the physician or advanced practice provider that is assigned to the patient when roomed.  "

## 2023-09-12 NOTE — ASSESSMENT & PLAN NOTE
Currently asymptomatic. Cardiac workup negative thus far.   Plan:  -Tele monitoring  -Trend trops  -repeat ekg if needed  -nitrates prn  -cards consult if warranted

## 2023-09-12 NOTE — DISCHARGE INSTRUCTIONS
"Post-op Heart Catheterization    1. DIET: It is advisable for you to follow a diet that limits the intake of salt, sugar, saturated fats and cholesterol.     2. DRIVING: Due to sedation you received during your procedure, DO NOT drive or operate machinery for 24 hours. Avoid making critical decisions or signing legal documents until tomorrow.    3. ACTIVITY: AVOID activities that require bending of the affected arm/wrist for 3 days and submerging the site in water for 3 days. REMOVE the dressing the day after  the procedure, you may apply a bandaid to the site if you desire. You may RESUME       your normal activities or prescribed exercise program as instructed by your physician after 3 days.                                                                                                                 4. WOUND CARE: It is not unusual to have a small amount of bruising to appear at or near the puncture site. It is also common to have a tender "knot" develop beneath the skin at the puncture site of the wrist/arm. This is usually scar tissue and is not a cause for concern or alarm. This tender knot may take several weeks to fully resolve. The bruise will usually spread over several days. However, if the lump gets bigger, call your doctor immediately.    5. DISCOMFORT: For general discomfort at the puncture site, you may take 1 or 2 Acetaminophen (Tylenol) tablets every 4 - 6 hours as needed. (Do not take more than 4000 mg a day)    6. SIGNS AND SYMPTOMS TO REPORT:  Call your physician immediately if any of the following occur:                                            1. Loss of feeling, warmth or color to the affected arm/wrist                                                                                                          2. Mild beeding from the site                 3. Pain that is sudden, sharp or persistent in the affected arm/wrist                 4. Swelling or a change in "lump" size, increased " redness or drainage at                               the puncture site                                                                               5. High fever (101 degrees or higher)    7. GO TO  THE EMERGENCY ROOM OR CALL 911 IF YOU HAVE: Chest pains or discomforts not relieved with 3 nitroglycerin doses (sublingual tablets or spray), numbness or severe pain of limb, if your limb becomes cold or discolored or if you develop uncontrolled bleeding from the puncture site (quickly apply firm, direct pressure above the site).     You have been started on new medication(s) from this hospitalization. Please take as directed and schedule hospital follow up appointment with your primary providers.    A nurse practitioner may be contacting you to assess your status post-hospitalization.

## 2023-09-12 NOTE — ASSESSMENT & PLAN NOTE
Patient's COPD is with exacerbation noted by continued dyspnea currently.  Patient is currently off COPD Pathway. Continue scheduled inhalers duonebs prn, Steroids, Antibiotics and Supplemental oxygen and monitor respiratory status closely.

## 2023-09-12 NOTE — INTERVAL H&P NOTE
The patient has been examined and the H&P has been reviewed:    I concur with the findings and no changes have occurred since H&P was written.    Procedure risks, benefits and alternative options discussed and understood by patient/family.          Active Hospital Problems    Diagnosis  POA    *Other chest pain [R07.89]  Unknown    Mesenteric ischemia, chronic [K55.1]  Yes    Mild vascular dementia without behavioral disturbance, psychotic disturbance, mood disturbance, or anxiety [F01.A0]  Yes    GERD (gastroesophageal reflux disease) [K21.9]  Yes    Epigastric abdominal pain [R10.13]  Yes    COPD, moderate [J44.9]  Yes     Breztri, combivent, Albuterol inhaler. Duo nebs if needed.  NC 3lm sleep       Hyperlipidemia [E78.5]  Yes    Insomnia [G47.00]  Yes    Essential hypertension [I10]  Yes      Resolved Hospital Problems   No resolved problems to display.

## 2023-09-12 NOTE — HPI
Gemma Vick is a 68 y.o. female with a PMH  has a past medical history of AAA (abdominal aortic aneurysm) (02/13/2014), Abdominal aneurysm, Acute coronary syndrome, Arthritis, BPPV (benign paroxysmal positional vertigo), Carotid artery plaque, Carotid artery stenosis and occlusion (02/13/2014), Chronic back pain, COPD (chronic obstructive pulmonary disease), Coronary artery disease, Dementia, Emphysema lung, Hyperlipidemia, Hypertension, Myocardial infarction, Neuropathy, and Personal history of COVID-19 (06/09/2021).  Presented to the ER for evaluation epigastric/substernal chest pain with associated shortness of breath, palpitations, and symptoms radiating to her jaw.  Symptom onset occurred couple hours prior to arrival to the ED. patient states that her symptoms are intermittent and feel different from when she was admitted when she had a heart attack/stent placement.  Patient no longer smokes but states that she vapes daily.  Also reports she was oxygen as needed at home while sleeping.  Patient is followed by Dr. Juarez and was lasted seen by on 6/16/23.  Options for surgical intervention/stenting for severe celiac/SMA disease was discussed.  Patient reports she is compliant with her aspirin and Plavix.  Denies any other symptoms at this time.  ER workup thus far has been unremarkable.  Troponin negative.  Patient family at bedside are in agreement with treatment plan.  Hospital Medicine consulted to admit patient for further workup.  Patient will be admitted under observation status.    PCP: Olga Altman

## 2023-09-12 NOTE — NURSING TRANSFER
Nursing Transfer Note      9/12/2023   5:30 Pt transferred  to [facility] via stretcher. VSS. Pt w/o complaints. L radial site WDL, no bleeding. no hematoma, moderate bruising. L wrist immobilizer secure. IVF to pump, L AC IV WDL. Family at . Care  to [facility].

## 2023-09-12 NOTE — ASSESSMENT & PLAN NOTE
Current BP elevated likely secondary to pain and currently hospitalization increasing anxiety.  BP usually well controlled per patient with home medications.  Plan:  -Optimize pain control   -Continue home medications (norvasc, lasix,ezetimibe), titrate as needed   -Monitor BP  -Low salt/cardiac diet when not NPO  -IV hydralazine prn for SBP>160 or DBP>90

## 2023-09-13 ENCOUNTER — TELEPHONE (OUTPATIENT)
Dept: CARDIOLOGY | Facility: CLINIC | Age: 68
End: 2023-09-13
Payer: MEDICARE

## 2023-09-13 ENCOUNTER — TELEPHONE (OUTPATIENT)
Dept: CARDIOLOGY | Facility: HOSPITAL | Age: 68
End: 2023-09-13
Payer: MEDICARE

## 2023-09-13 VITALS
WEIGHT: 132.25 LBS | HEIGHT: 62 IN | BODY MASS INDEX: 24.34 KG/M2 | OXYGEN SATURATION: 95 % | TEMPERATURE: 97 F | DIASTOLIC BLOOD PRESSURE: 72 MMHG | HEART RATE: 78 BPM | RESPIRATION RATE: 17 BRPM | SYSTOLIC BLOOD PRESSURE: 128 MMHG

## 2023-09-13 PROBLEM — R07.9 CHEST PAIN, UNSPECIFIED: Status: ACTIVE | Noted: 2023-09-12

## 2023-09-13 PROBLEM — R10.13 EPIGASTRIC ABDOMINAL PAIN: Status: RESOLVED | Noted: 2020-10-02 | Resolved: 2023-09-13

## 2023-09-13 PROBLEM — R07.89 OTHER CHEST PAIN: Status: RESOLVED | Noted: 2023-09-12 | Resolved: 2023-09-13

## 2023-09-13 LAB
ANION GAP SERPL CALC-SCNC: 9 MMOL/L (ref 8–16)
BASOPHILS # BLD AUTO: 0.07 K/UL (ref 0–0.2)
BASOPHILS NFR BLD: 1 % (ref 0–1.9)
BUN SERPL-MCNC: 10 MG/DL (ref 8–23)
CALCIUM SERPL-MCNC: 8.8 MG/DL (ref 8.7–10.5)
CHLORIDE SERPL-SCNC: 106 MMOL/L (ref 95–110)
CO2 SERPL-SCNC: 21 MMOL/L (ref 23–29)
CREAT SERPL-MCNC: 0.9 MG/DL (ref 0.5–1.4)
DIFFERENTIAL METHOD: ABNORMAL
EOSINOPHIL # BLD AUTO: 0.2 K/UL (ref 0–0.5)
EOSINOPHIL NFR BLD: 2.9 % (ref 0–8)
ERYTHROCYTE [DISTWIDTH] IN BLOOD BY AUTOMATED COUNT: 13.6 % (ref 11.5–14.5)
EST. GFR  (NO RACE VARIABLE): >60 ML/MIN/1.73 M^2
GLUCOSE SERPL-MCNC: 85 MG/DL (ref 70–110)
HCT VFR BLD AUTO: 33.4 % (ref 37–48.5)
HGB BLD-MCNC: 11.4 G/DL (ref 12–16)
IMM GRANULOCYTES # BLD AUTO: 0.03 K/UL (ref 0–0.04)
IMM GRANULOCYTES NFR BLD AUTO: 0.4 % (ref 0–0.5)
LYMPHOCYTES # BLD AUTO: 1.7 K/UL (ref 1–4.8)
LYMPHOCYTES NFR BLD: 22.9 % (ref 18–48)
MCH RBC QN AUTO: 30.8 PG (ref 27–31)
MCHC RBC AUTO-ENTMCNC: 34.1 G/DL (ref 32–36)
MCV RBC AUTO: 90 FL (ref 82–98)
MONOCYTES # BLD AUTO: 0.7 K/UL (ref 0.3–1)
MONOCYTES NFR BLD: 9.7 % (ref 4–15)
NEUTROPHILS # BLD AUTO: 4.6 K/UL (ref 1.8–7.7)
NEUTROPHILS NFR BLD: 63.1 % (ref 38–73)
NRBC BLD-RTO: 0 /100 WBC
PLATELET # BLD AUTO: 237 K/UL (ref 150–450)
PMV BLD AUTO: 9.8 FL (ref 9.2–12.9)
POTASSIUM SERPL-SCNC: 4.1 MMOL/L (ref 3.5–5.1)
RBC # BLD AUTO: 3.7 M/UL (ref 4–5.4)
SODIUM SERPL-SCNC: 136 MMOL/L (ref 136–145)
WBC # BLD AUTO: 7.21 K/UL (ref 3.9–12.7)

## 2023-09-13 PROCEDURE — 96361 HYDRATE IV INFUSION ADD-ON: CPT

## 2023-09-13 PROCEDURE — 25000003 PHARM REV CODE 250: Mod: HCNC | Performed by: INTERNAL MEDICINE

## 2023-09-13 PROCEDURE — G0378 HOSPITAL OBSERVATION PER HR: HCPCS | Mod: HCNC

## 2023-09-13 PROCEDURE — 36415 COLL VENOUS BLD VENIPUNCTURE: CPT | Mod: HCNC | Performed by: INTERNAL MEDICINE

## 2023-09-13 PROCEDURE — 99213 PR OFFICE/OUTPT VISIT, EST, LEVL III, 20-29 MIN: ICD-10-PCS | Mod: HCNC,,, | Performed by: PHYSICIAN ASSISTANT

## 2023-09-13 PROCEDURE — 80048 BASIC METABOLIC PNL TOTAL CA: CPT | Mod: HCNC | Performed by: INTERNAL MEDICINE

## 2023-09-13 PROCEDURE — 85025 COMPLETE CBC W/AUTO DIFF WBC: CPT | Mod: HCNC | Performed by: INTERNAL MEDICINE

## 2023-09-13 PROCEDURE — 99213 OFFICE O/P EST LOW 20 MIN: CPT | Mod: HCNC,,, | Performed by: PHYSICIAN ASSISTANT

## 2023-09-13 PROCEDURE — 94761 N-INVAS EAR/PLS OXIMETRY MLT: CPT | Mod: HCNC

## 2023-09-13 RX ORDER — CARVEDILOL 3.12 MG/1
3.12 TABLET ORAL 2 TIMES DAILY
Status: DISCONTINUED | OUTPATIENT
Start: 2023-09-13 | End: 2023-09-13 | Stop reason: HOSPADM

## 2023-09-13 RX ORDER — CARVEDILOL 3.12 MG/1
3.12 TABLET ORAL 2 TIMES DAILY
Qty: 30 TABLET | Refills: 0 | Status: SHIPPED | OUTPATIENT
Start: 2023-09-13 | End: 2023-09-18

## 2023-09-13 RX ADMIN — CARVEDILOL 3.12 MG: 3.12 TABLET, FILM COATED ORAL at 09:09

## 2023-09-13 RX ADMIN — ASPIRIN 81 MG CHEWABLE TABLET 81 MG: 81 TABLET CHEWABLE at 09:09

## 2023-09-13 RX ADMIN — MEMANTINE HYDROCHLORIDE 10 MG: 5 TABLET ORAL at 09:09

## 2023-09-13 RX ADMIN — AMLODIPINE BESYLATE 5 MG: 5 TABLET ORAL at 09:09

## 2023-09-13 RX ADMIN — VALSARTAN 320 MG: 160 TABLET, FILM COATED ORAL at 09:09

## 2023-09-13 RX ADMIN — CLOPIDOGREL BISULFATE 75 MG: 75 TABLET ORAL at 09:09

## 2023-09-13 NOTE — PROGRESS NOTES
[facility]  Cardiology  Progress Note    Patient Name: Gemma Vick  MRN: [id number]  Admission Date: 9/12/2023  Hospital Length of Stay: 0 days  Code Status: Full Code   Attending Physician: Veronica Cloud MD   Primary Care Physician: Olga Altman MD  Expected Discharge Date: 9/13/2023  Principal Problem:Chest pain, unspecified    Subjective:   HPI:  Ms. Vick is a 68 year old female patient whose current medical conditions include PVD, carotid artery disease, HTN, hyperlipidemia, COPD, former smoker, vascular dementia, and mesenteric ischemia who presented to [facility] ED yesterday due to epigastric/substernal CP that onset shortly PTA. Patient reported the pain was intense and radiated to her jaw and was associated with SOB and facial numbness. She denied any associated fever, chills, palpitations, nausea, vomiting, diaphoresis, near syncope, or syncope. Initial workup in ED un-revealing with exception of uncontrolled BP (> 200 systolic), and patient was subsequently admitted for further evaluation and treatment. Cardiology consulted to assist with management. Patient seen and examined today with her daughter at bedside. Feels ok. States chest pain has resolved. Reports she has been having issues with BP at home. She is compliant with her medications. Followed routinely in clinic by Dr. Juarez. Serial troponin negative. Prior echo 6/23 showed normal EF. MPI stress test 6/23 negative for ischemia. EKG reviewed, SR with 1st degree AV block, possible anterior infarct, inverted T waves anterior leads.        Hospital Course:   9/13/23-Patient seen and examined today, s/p [facility] yesterday with successful PCI of ISR of RCA. Feels well. No CV complaints. Denies CP/SOB. Labs stable.           Review of Systems   Constitutional: Negative.   HENT: Negative.     Eyes: Negative.    Cardiovascular: Negative.    Respiratory: Negative.     Endocrine: Negative.    Hematologic/Lymphatic: Negative.    Skin: Negative.     Musculoskeletal: Negative.    Gastrointestinal: Negative.    Genitourinary: Negative.    Neurological: Negative.    Psychiatric/Behavioral:  Positive for memory loss.    Allergic/Immunologic: Negative.      Objective:     Vital Signs (Most Recent):  Temp: 97.4 °F (36.3 °C) (09/13/23 1202)  Pulse: 78 (09/13/23 1202)  Resp: 17 (09/13/23 1202)  BP: 128/72 (09/13/23 1202)  SpO2: 95 % (09/13/23 1202) Vital Signs (24h Range):  Temp:  [97.4 °F (36.3 °C)-98.6 °F (37 °C)] 97.4 °F (36.3 °C)  Pulse:  [65-82] 78  Resp:  [14-20] 17  SpO2:  [93 %-99 %] 95 %  BP: (128-181)/() 128/72     Weight: 60 kg (132 lb 4.4 oz)  Body mass index is 24.19 kg/m².     SpO2: 95 %         Intake/Output Summary (Last 24 hours) at 9/13/2023 1410  Last data filed at 9/13/2023 0523  Gross per 24 hour   Intake 1230.74 ml   Output --   Net 1230.74 ml       Lines/Drains/Airways       None                      Physical Exam  Vitals and nursing note reviewed.   Constitutional:       General: She is not in acute distress.     Appearance: Normal appearance. She is well-developed. She is not diaphoretic.   HENT:      Head: Normocephalic and atraumatic.   Eyes:      General:         Right eye: No discharge.         Left eye: No discharge.      Pupils: Pupils are equal, round, and reactive to light.   Neck:      Thyroid: No thyromegaly.      Vascular: No JVD.      Trachea: No tracheal deviation.   Cardiovascular:      Rate and Rhythm: Normal rate and regular rhythm.      Heart sounds: Normal heart sounds, S1 normal and S2 normal. No murmur heard.  Pulmonary:      Effort: Pulmonary effort is normal. No respiratory distress.      Breath sounds: Normal breath sounds. No wheezing or rales.   Abdominal:      General: There is no distension.      Palpations: Abdomen is soft.      Tenderness: There is no rebound.   Musculoskeletal:      Cervical back: Neck supple.      Right lower leg: No edema.      Left lower leg: No edema.   Skin:     General: Skin is warm  "and dry.      Findings: No erythema.      Comments: Left radial access site C/D/I; no bleeding erythema or drainage, intact radial pulse   Neurological:      General: No focal deficit present.      Mental Status: She is alert and oriented to person, place, and time.   Psychiatric:         Mood and Affect: Mood normal.         Behavior: Behavior normal.            Significant Labs: CBC   Recent Labs   Lab 09/12/23  0048 09/13/23  0544   WBC 10.09 7.21   HGB 12.5 11.4*   HCT 36.2* 33.4*    237   , INR No results for input(s): "INR", "PROTIME" in the last 48 hours., Troponin   Recent Labs   Lab 09/12/23  0048 09/12/23  0348 09/12/23  0544   TROPONINI 0.016 0.019 0.024   , and All pertinent lab results from the last 24 hours have been reviewed.    Significant Imaging: Echocardiogram: Transthoracic echo (TTE) complete (Cupid Only):   Results for orders placed or performed during the hospital encounter of 06/14/23   Echo   Result Value Ref Range    BSA 1.63 m2    TDI SEPTAL 0.05 m/s    LV LATERAL E/E' RATIO 18.17 m/s    LV SEPTAL E/E' RATIO 21.80 m/s    LA WIDTH 3.40 cm    IVC diameter 1.06 cm    Left Ventricular Outflow Tract Mean Velocity 0.75 cm/s    Left Ventricular Outflow Tract Mean Gradient 2.68 mmHg    TDI LATERAL 0.06 m/s    PV PEAK VELOCITY 0.71 cm/s    LVIDd 4.18 3.5 - 6.0 cm    IVS 0.99 0.6 - 1.1 cm    Posterior Wall 0.99 0.6 - 1.1 cm    Ao root annulus 3.23 cm    LVIDs 2.81 2.1 - 4.0 cm    FS 33 28 - 44 %    LA volume 40.64 cm3    Sinus 3.33 cm    STJ 3.04 cm    Ascending aorta 3.02 cm    LV mass 134.31 g    LA size 2.99 cm    RVDD 2.95 cm    Left Ventricle Relative Wall Thickness 0.47 cm    AV regurgitation pressure 1/2 time [id number] ms    AV mean gradient 4 mmHg    AV valve area 2.36 cm2    AV Velocity Ratio 0.84     AV index (prosthetic) 0.82     MV valve area p 1/2 method 3.61 cm2    E/A ratio 1.06     Mean e' 0.06 m/s    E wave deceleration time 210.28 msec    IVRT 125.59 msec    LVOT " diameter 1.91 cm    LVOT area 2.9 cm2    LVOT peak oz 1.20 m/s    LVOT peak VTI 31.10 cm    Ao peak oz 1.43 m/s    Ao VTI 37.7 cm    RVOT peak oz 0.68 m/s    RVOT peak VTI 18.0 cm    LVOT stroke volume 89.06 cm3    AV peak gradient 8 mmHg    PV mean gradient 1.01 mmHg    E/E' ratio 19.82 m/s    MV Peak E Oz 1.09 m/s    AR Max Oz 5.01 m/s    TR Max Oz 2.80 m/s    MV stenosis pressure 1/2 time 60.98 ms    MV Peak A Oz 1.03 m/s    LV Systolic Volume 29.76 mL    LV Systolic Volume Index 18.6 mL/m2    LV Diastolic Volume 77.84 mL    LV Diastolic Volume Index 48.65 mL/m2    LA Volume Index 25.4 mL/m2    LV Mass Index 84 g/m2    RA Major Axis 4.04 cm    Left Atrium Minor Axis 4.60 cm    Left Atrium Major Axis 4.81 cm    Triscuspid Valve Regurgitation Peak Gradient 31 mmHg    LA Volume Index (Mod) 25.7 mL/m2    LA volume (mod) 41.15 cm3    RA Width 3.22 cm    EF 60 %    Narrative    · Concentric remodeling and normal systolic function.  · The estimated ejection fraction is 60%.  · Indeterminate left ventricular diastolic function.  · Normal right ventricular size with normal right ventricular systolic   function.  · Mild tricuspid regurgitation.  · There is pulmonary hypertension.  · Mild aortic regurgitation.      , EKG: Reviewed, and X-Ray: CXR: X-Ray Chest 1 View (CXR): No results found for this visit on 09/12/23. and X-Ray Chest PA and Lateral (CXR): No results found for this visit on 09/12/23.    Assessment and Plan:   Patient who presents with UA s/p successful PCI of RCA. Remains stable CV wise. Continue OMT. Follow-up in clinic.    * Chest pain, unspecified  -Presents with epigastric/substernal CP, concerning for UA  -Serial troponin negative  -EKG reviewed showed SR, possible anterior infarct, T wave inversions anteriorly  -Prior MPI stress test 6/23 negative for ischemia; echo 6/23 with normal EF  -Continue ASA, Plavix, CCB, statin, ARB  -LHC today by Dr. Juarez. All risks, benefits, and treatment  alternatives to explained to patient in detail. All questions answered. She has agreed to proceed. Discussed with her daughter who was at bedside as well.    9/13/23  -s/p [facility] with successful PCI of ISR of RCA  -Stable, no CP/anginal symptoms  -Continue ASA, Plavix, CCB, statin, ARB  -Coreg added  -Follow-up in clinic    Mesenteric ischemia, chronic  -Continue ASA, Plavix, statin  -Will need OP intervention    Mild vascular dementia without behavioral disturbance, psychotic disturbance, mood disturbance, or anxiety  -Mgmt as per hospital medicine    COPD, moderate  -Mgmt as per hospital medicine    Essential hypertension  -BP labile  -Continue CCB, ARB  -Monitor BP trend    9/13/23  -Coreg added    Hyperlipidemia  -Statin        VTE Risk Mitigation (From admission, onward)         Ordered     IP VTE LOW RISK PATIENT  Once         09/12/23 0150     Place sequential compression device  Until discontinued         09/12/23 0150                Vidhya Lee PA-C  Cardiology  [facility]

## 2023-09-13 NOTE — PLAN OF CARE
Patient ready for discharge, IV and telemetry removed as ordered. Discharge instructions reviewed with the patient, verbalized understanding. Once medications are delivered to the bedside patient can discharge.

## 2023-09-13 NOTE — TELEPHONE ENCOUNTER
Contacted pt and got her scheduled for a HFU w/ A. BRADLY Lee on 09/22/23 at [facility]. Pt vu w/o q/c    --- BRADLY Ibrahim 09/13/23 02:14 PM ---  Please arrange hospital f/u with me or any available provider next week.    Thanks

## 2023-09-13 NOTE — TELEPHONE ENCOUNTER
LM for pt to call us back if needed sooner appt than 9/22          ----- Message from Jeff Brush sent at 9/13/2023  2:23 PM CDT -----  Contact: [phone]  Type:  Sooner Apoointment Request    Caller is requesting a sooner appointment.  Caller declined first available appointment listed below.  Caller will not accept being placed on the waitlist and is requesting a message be sent to doctor.  Name of Caller:Gemma   When is the first available appointment?  Symptoms:1 week f/u  Would the patient rather a call back or a response via [facility]? Call back   Best Call Back Number:[phone]  Additional Information:

## 2023-09-13 NOTE — HOSPITAL COURSE
11/17/21 0915   Engagement   Intervention Group   Topic Detail OT: Education on physical wellness and cognitive well with interactive social activity (exercise dice & trivia) to increase physical wellness, cognitive wellness, memory recall, cognitive processing speed, concentration, attention to task, recovery planning, coping with stress, social engagement, and overall wellness   Attendance Did not attend      9/13  Admitted for chest pain. Pmh cad status post stent. Cardiology consulted and recommended cardiac cath. RCA stenosed requiring stent and thrombectomy. Cardiology recommending plavix x 1 year. After discussing with cardiology, ok to discharge home, as patient tolerated procedure well. New medication, coreg, delivered to bedside prior to discharge. Advised to follow up with primary cardiologist for medication(s) management.    On exam, no acute distress, no respiratory distress. Mood and behavior normal. Family at bedside. Questions answered.    Patient seen and evaluated by me. Patient was determined to be suitable for discharge. Patient deemed stable for discharge to home with nurse practitioner to visit home program.

## 2023-09-13 NOTE — DISCHARGE SUMMARY
[facility] Medicine  Discharge Summary      Patient Name: Gemma Vick  MRN: [id number]  [facility]: [id number]  Patient Class: OP- Observation  Admission Date: 9/12/2023  Hospital Length of Stay: 0 days  Discharge Date and Time:  09/13/2023 11:34 AM  Attending Physician: Veronica Cloud MD   Discharging Provider: Veronica Cloud MD  Primary Care Provider: Olga Altman MD    Primary Care Team: Networked reference to record PCT     HPI:   Gemma Vick is a 68 y.o. female with a PMH  has a past medical history of AAA (abdominal aortic aneurysm) (02/13/2014), Abdominal aneurysm, Acute coronary syndrome, Arthritis, BPPV (benign paroxysmal positional vertigo), Carotid artery plaque, Carotid artery stenosis and occlusion (02/13/2014), Chronic back pain, COPD (chronic obstructive pulmonary disease), Coronary artery disease, Dementia, Emphysema lung, Hyperlipidemia, Hypertension, Myocardial infarction, Neuropathy, and Personal history of COVID-19 (06/09/2021).  Presented to the ER for evaluation epigastric/substernal chest pain with associated shortness of breath, palpitations, and symptoms radiating to her jaw.  Symptom onset occurred couple hours prior to arrival to the ED. patient states that her symptoms are intermittent and feel different from when she was admitted when she had a heart attack/stent placement.  Patient no longer smokes but states that she vapes daily.  Also reports she was oxygen as needed at home while sleeping.  Patient is followed by Dr. Juarez and was lasted seen by on 6/16/23.  Options for surgical intervention/stenting for severe celiac/SMA disease was discussed.  Patient reports she is compliant with her aspirin and Plavix.  Denies any other symptoms at this time.  ER workup thus far has been unremarkable.  Troponin negative.  Patient family at bedside are in agreement with treatment plan.  Hospital Medicine consulted to admit patient for further workup.  Patient will be admitted under  observation status.    PCP: Olga Altman        Procedure(s) (LRB):  Left heart cath (Left)  Percutaneous coronary intervention (N/A)  Thrombectomy, Coronary  INSERTION, STENT, CORONARY ARTERY (N/A)      Hospital Course:   9/13  Admitted for chest pain. Pmh cad status post stent. Cardiology consulted and recommended cardiac cath. RCA stenosed requiring stent and thrombectomy. Cardiology recommending plavix x 1 year. After discussing with cardiology, ok to discharge home, as patient tolerated procedure well. New medication, coreg, delivered to bedside prior to discharge. Advised to follow up with primary cardiologist for medication(s) management.    On exam, no acute distress, no respiratory distress. Mood and behavior normal. Family at bedside. Questions answered.    Patient seen and evaluated by me. Patient was determined to be suitable for discharge. Patient deemed stable for discharge to home with nurse practitioner to visit home program.         Goals of Care Treatment Preferences:  Code Status: Full Code      Consults:   Consults (From admission, onward)        Status Ordering Provider     Inpatient consult to Cardiology  Once        Provider:  Albert Kam MD    Completed PRIMO CHAPIN          No new Assessment & Plan notes have been filed under this hospital service since the last note was generated.  Service: Hospital Medicine    Final Active Diagnoses:    Diagnosis Date Noted POA    Mesenteric ischemia, chronic [K55.1] 06/16/2023 Yes    Mild vascular dementia without behavioral disturbance, psychotic disturbance, mood disturbance, or anxiety [F01.A0] 03/02/2023 Yes    GERD (gastroesophageal reflux disease) [K21.9] 07/23/2021 Yes    COPD, moderate [J44.9] 09/18/2017 Yes    Hyperlipidemia [E78.5] 12/31/2013 Yes    Insomnia [G47.00] 12/31/2013 Yes    Essential hypertension [I10] 12/31/2013 Yes      Problems Resolved During this Admission:    Diagnosis Date Noted Date Resolved POA    PRINCIPAL  PROBLEM:  Other chest pain [R07.89] 09/12/2023 09/13/2023 Unknown    Epigastric abdominal pain [R10.13] 10/02/2020 09/13/2023 Yes       Discharged Condition: stable    Disposition:     Follow Up:   Follow-up Information     Olga Altman MD. Schedule an appointment as soon as possible for a visit in 3 day(s).    Specialty: Family Medicine  Why: hospital follow up  Contact information:  [address] [address] [address] [phone]             Millie Juarez MD. Schedule an appointment as soon as possible for a visit in 1 week(s).    Specialties: Interventional Cardiology, Cardiology  Why: hospital follow up  Contact information:  [address]  [place] [address] [phone]                       Patient Instructions:      Ambulatory referral/consult to Outpatient Case Management   Referral Priority: Routine Referral Type: Consultation   Referral Reason: Specialty Services Required   Number of Visits Requested: 1       Significant Diagnostic Studies: Labs:   CMP   Recent Labs   Lab 09/12/23 0048 09/13/23  0544   * 136   K 3.9 4.1    106   CO2 20* 21*   GLU 98 85   BUN 9 10   CREATININE 1.1 0.9   CALCIUM 10.0 8.8   PROT 7.5  --    ALBUMIN 4.0  --    BILITOT 0.4  --    ALKPHOS 78  --    AST 27  --    ALT 19  --    ANIONGAP 10 9   , CBC   Recent Labs   Lab 09/12/23  0048 09/13/23  0544   WBC 10.09 7.21   HGB 12.5 11.4*   HCT 36.2* 33.4*    237   , INR   Lab Results   Component Value Date    INR 1.1 12/20/2017    INR 1.1 08/10/2012   , Lipid Panel   Lab Results   Component Value Date    CHOL 138 04/18/2023    HDL 45 04/18/2023    LDLCALC 79.0 04/18/2023    TRIG 70 04/18/2023    CHOLHDL 32.6 04/18/2023   , Troponin   Recent Labs   Lab 09/12/23  0048 09/12/23  0348 09/12/23  0544   TROPONINI 0.016 0.019 0.024   , A1C:   Recent Labs   Lab 04/18/23  0953   HGBA1C 5.5    and All labs within the past 24 hours have been reviewed  Radiology: X-Ray: Chest x-ray  portable  Angiography: cardiac cath    Pending Diagnostic Studies:     None         Medications:  Reconciled Home Medications:      Medication List      ASK your doctor about these medications    albuterol 90 mcg/actuation inhaler  Commonly known as: PROVENTIL/VENTOLIN HFA     * albuterol-ipratropium 2.5 mg-0.5 mg/3 mL nebulizer solution  Commonly known as: DUO-NEB     * CombiVENT RESPIMAT  mcg/actuation inhaler  Generic drug: ipratropium-albuteroL  Inhale 1 puff into the lungs every 6 (six) hours as needed for Wheezing.     amLODIPine 5 MG tablet  Commonly known as: NORVASC  Take 1 tablet (5 mg total) by mouth once daily.     aspirin 81 MG Chew  Take 81 mg by mouth once daily.     bisoprolol 5 MG tablet  Commonly known as: ZEBETA  Take 5 mg by mouth once daily.     BREZTRI AEROSPHERE 160-9-4.8 mcg/actuation Hfaa  Generic drug: budesonide-glycopyr-formoterol  Inhale 2 puffs into the lungs 2 (two) times a day.     clopidogreL 75 mg tablet  Commonly known as: PLAVIX  TAKE 1 TABLET BY MOUTH ONCE A DAY     COMPACT SPACE CHAMBER  Generic drug: inhalation spacing device  Use as directed for inhalation.     dicyclomine 10 MG capsule  Commonly known as: BENTYL  TAKE 1 CAPSULE BY MOUTH 4 TIMES DAILY BEFORE MEALS AND NIGHTLY Strength: 10 mg     doxepin 10 MG capsule  Commonly known as: SINEQUAN  Take 10-20 mg by mouth nightly as needed.     doxycycline 100 MG Cap  Commonly known as: VIBRAMYCIN  Take 1 capsule (100 mg total) by mouth every 12 (twelve) hours. Take with food for 7 days     ezetimibe-simvastatin 10-40 mg 10-40 mg per tablet  Commonly known as: VYTORIN  TAKE 1 TABLET BY MOUTH EVERY EVENING     FeroSuL 325 mg (65 mg iron) Tab tablet  Generic drug: ferrous sulfate  TAKE (1) TABLET BY MOUTH 2 TIMES A DAY WITH MEALS     furosemide 20 MG tablet  Commonly known as: LASIX  TAKE 20 MG TABLET ONCE DAILY     hydroCHLOROthiazide 12.5 mg capsule  Commonly known as: MICROZIDE  Take 12.5 mg by mouth.      LIDOcaine-prilocaine cream  Commonly known as: EMLA     memantine 10 MG Tab  Commonly known as: NAMENDA  Take 10 mg by mouth 2 (two) times daily.     mirtazapine 15 MG tablet  Commonly known as: REMERON     olmesartan 40 MG tablet  Commonly known as: BENICAR  Take 40 mg by mouth.     OXYGEN-AIR DELIVERY SYSTEMS MISC  3 L by Misc.(Non-Drug; Combo Route) route every evening.     pantoprazole 40 MG tablet  Commonly known as: PROTONIX  TAKE 1 TABLET BY MOUTH ONCE A DAY     TENS unit and electrodes Cmpk  1 each by Misc.(Non-Drug; Combo Route) route 3 (three) times daily as needed (for back pain).     vitamin D 1000 units Tab  Commonly known as: VITAMIN D3  Take 1,000 Units by mouth once daily.     zolpidem 10 mg Tab  Commonly known as: AMBIEN  TAKE 1 TABLET BY MOUTH EVERY EVENING         * This list has 2 medication(s) that are the same as other medications prescribed for you. Read the directions carefully, and ask your doctor or other care provider to review them with you.                Indwelling Lines/Drains at time of discharge:   Lines/Drains/Airways     None                 Time spent on the discharge of patient: 51 minutes         Veronica Cloud MD  Department of Hospital Medicine  [facility]

## 2023-09-13 NOTE — PLAN OF CARE
[facility]  Discharge Final Note    Primary Care Provider: Olga Altman MD    Expected Discharge Date: 9/13/2023    Final Discharge Note (most recent)       Final Note - 09/13/23 0831          Final Note    Assessment Type Final Discharge Note     Anticipated Discharge Disposition Home or Self Care     Hospital Resources/Appts/Education Provided Appointments scheduled and added to AVS

## 2023-09-13 NOTE — SUBJECTIVE & OBJECTIVE
Review of Systems   Constitutional: Negative.   HENT: Negative.     Eyes: Negative.    Cardiovascular: Negative.    Respiratory: Negative.     Endocrine: Negative.    Hematologic/Lymphatic: Negative.    Skin: Negative.    Musculoskeletal: Negative.    Gastrointestinal: Negative.    Genitourinary: Negative.    Neurological: Negative.    Psychiatric/Behavioral:  Positive for memory loss.    Allergic/Immunologic: Negative.      Objective:     Vital Signs (Most Recent):  Temp: 97.4 °F (36.3 °C) (09/13/23 1202)  Pulse: 78 (09/13/23 1202)  Resp: 17 (09/13/23 1202)  BP: 128/72 (09/13/23 1202)  SpO2: 95 % (09/13/23 1202) Vital Signs (24h Range):  Temp:  [97.4 °F (36.3 °C)-98.6 °F (37 °C)] 97.4 °F (36.3 °C)  Pulse:  [65-82] 78  Resp:  [14-20] 17  SpO2:  [93 %-99 %] 95 %  BP: (128-181)/() 128/72     Weight: 60 kg (132 lb 4.4 oz)  Body mass index is 24.19 kg/m².     SpO2: 95 %         Intake/Output Summary (Last 24 hours) at 9/13/2023 1410  Last data filed at 9/13/2023 0523  Gross per 24 hour   Intake 1230.74 ml   Output --   Net 1230.74 ml       Lines/Drains/Airways       None                      Physical Exam  Vitals and nursing note reviewed.   Constitutional:       General: She is not in acute distress.     Appearance: Normal appearance. She is well-developed. She is not diaphoretic.   HENT:      Head: Normocephalic and atraumatic.   Eyes:      General:         Right eye: No discharge.         Left eye: No discharge.      Pupils: Pupils are equal, round, and reactive to light.   Neck:      Thyroid: No thyromegaly.      Vascular: No JVD.      Trachea: No tracheal deviation.   Cardiovascular:      Rate and Rhythm: Normal rate and regular rhythm.      Heart sounds: Normal heart sounds, S1 normal and S2 normal. No murmur heard.  Pulmonary:      Effort: Pulmonary effort is normal. No respiratory distress.      Breath sounds: Normal breath sounds. No wheezing or rales.   Abdominal:      General: There is no  "distension.      Palpations: Abdomen is soft.      Tenderness: There is no rebound.   Musculoskeletal:      Cervical back: Neck supple.      Right lower leg: No edema.      Left lower leg: No edema.   Skin:     General: Skin is warm and dry.      Findings: No erythema.      Comments: Left radial access site C/D/I; no bleeding erythema or drainage, intact radial pulse   Neurological:      General: No focal deficit present.      Mental Status: She is alert and oriented to person, place, and time.   Psychiatric:         Mood and Affect: Mood normal.         Behavior: Behavior normal.            Significant Labs: CBC   Recent Labs   Lab 09/12/23  0048 09/13/23  0544   WBC 10.09 7.21   HGB 12.5 11.4*   HCT 36.2* 33.4*    237   , INR No results for input(s): "INR", "PROTIME" in the last 48 hours., Troponin   Recent Labs   Lab 09/12/23  0048 09/12/23  0348 09/12/23  0544   TROPONINI 0.016 0.019 0.024   , and All pertinent lab results from the last 24 hours have been reviewed.    Significant Imaging: Echocardiogram: Transthoracic echo (TTE) complete (Cupid Only):   Results for orders placed or performed during the hospital encounter of 06/14/23   Echo   Result Value Ref Range    BSA 1.63 m2    TDI SEPTAL 0.05 m/s    LV LATERAL E/E' RATIO 18.17 m/s    LV SEPTAL E/E' RATIO 21.80 m/s    LA WIDTH 3.40 cm    IVC diameter 1.06 cm    Left Ventricular Outflow Tract Mean Velocity 0.75 cm/s    Left Ventricular Outflow Tract Mean Gradient 2.68 mmHg    TDI LATERAL 0.06 m/s    PV PEAK VELOCITY 0.71 cm/s    LVIDd 4.18 3.5 - 6.0 cm    IVS 0.99 0.6 - 1.1 cm    Posterior Wall 0.99 0.6 - 1.1 cm    Ao root annulus 3.23 cm    LVIDs 2.81 2.1 - 4.0 cm    FS 33 28 - 44 %    LA volume 40.64 cm3    Sinus 3.33 cm    STJ 3.04 cm    Ascending aorta 3.02 cm    LV mass 134.31 g    LA size 2.99 cm    RVDD 2.95 cm    Left Ventricle Relative Wall Thickness 0.47 cm    AV regurgitation pressure 1/2 time [id number] ms    AV mean gradient 4 " mmHg    AV valve area 2.36 cm2    AV Velocity Ratio 0.84     AV index (prosthetic) 0.82     MV valve area p 1/2 method 3.61 cm2    E/A ratio 1.06     Mean e' 0.06 m/s    E wave deceleration time 210.28 msec    IVRT 125.59 msec    LVOT diameter 1.91 cm    LVOT area 2.9 cm2    LVOT peak oz 1.20 m/s    LVOT peak VTI 31.10 cm    Ao peak oz 1.43 m/s    Ao VTI 37.7 cm    RVOT peak oz 0.68 m/s    RVOT peak VTI 18.0 cm    LVOT stroke volume 89.06 cm3    AV peak gradient 8 mmHg    PV mean gradient 1.01 mmHg    E/E' ratio 19.82 m/s    MV Peak E Oz 1.09 m/s    AR Max Oz 5.01 m/s    TR Max Oz 2.80 m/s    MV stenosis pressure 1/2 time 60.98 ms    MV Peak A Oz 1.03 m/s    LV Systolic Volume 29.76 mL    LV Systolic Volume Index 18.6 mL/m2    LV Diastolic Volume 77.84 mL    LV Diastolic Volume Index 48.65 mL/m2    LA Volume Index 25.4 mL/m2    LV Mass Index 84 g/m2    RA Major Axis 4.04 cm    Left Atrium Minor Axis 4.60 cm    Left Atrium Major Axis 4.81 cm    Triscuspid Valve Regurgitation Peak Gradient 31 mmHg    LA Volume Index (Mod) 25.7 mL/m2    LA volume (mod) 41.15 cm3    RA Width 3.22 cm    EF 60 %    Narrative    · Concentric remodeling and normal systolic function.  · The estimated ejection fraction is 60%.  · Indeterminate left ventricular diastolic function.  · Normal right ventricular size with normal right ventricular systolic   function.  · Mild tricuspid regurgitation.  · There is pulmonary hypertension.  · Mild aortic regurgitation.      , EKG: Reviewed, and X-Ray: CXR: X-Ray Chest 1 View (CXR): No results found for this visit on 09/12/23. and X-Ray Chest PA and Lateral (CXR): No results found for this visit on 09/12/23.

## 2023-09-13 NOTE — HOSPITAL COURSE
9/13/23-Patient seen and examined today, s/p C yesterday with successful PCI of ISR of RCA. Feels well. No CV complaints. Denies CP/SOB. Labs stable.

## 2023-09-14 DIAGNOSIS — I25.10 CORONARY ARTERY DISEASE INVOLVING NATIVE CORONARY ARTERY OF NATIVE HEART WITHOUT ANGINA PECTORIS: ICD-10-CM

## 2023-09-14 DIAGNOSIS — F51.04 CHRONIC INSOMNIA: ICD-10-CM

## 2023-09-14 RX ORDER — CLOPIDOGREL BISULFATE 75 MG/1
TABLET ORAL
Qty: 90 TABLET | Refills: 3 | Status: SHIPPED | OUTPATIENT
Start: 2023-09-14 | End: 2024-01-29 | Stop reason: SDUPTHER

## 2023-09-14 RX ORDER — ZOLPIDEM TARTRATE 10 MG/1
TABLET ORAL
Qty: 30 TABLET | Refills: 3 | Status: SHIPPED | OUTPATIENT
Start: 2023-09-14 | End: 2024-01-08

## 2023-09-14 NOTE — TELEPHONE ENCOUNTER
No care due was identified.  [facility] Embedded Care Due Messages. Reference number: [id number].   9/14/2023 8:42:17 AM CDT

## 2023-09-15 ENCOUNTER — NURSE TRIAGE (OUTPATIENT)
Dept: ADMINISTRATIVE | Facility: CLINIC | Age: 68
End: 2023-09-15
Payer: MEDICARE

## 2023-09-15 NOTE — TELEPHONE ENCOUNTER
Reason for Disposition   Caller has URGENT question and triager unable to answer question    Additional Information   Negative: Major abdominal surgical incision and wound gaping open with visible internal organs   Negative: Sounds like a life-threatening emergency to the triager   Negative: Bleeding from incision and won't stop after 10 minutes of direct pressure   Negative: Bleeding (more than a few drops) from incision and after tracheostomy or blood vessel surgery (e.g., carotid endarterectomy, femoral bypass graft, kidney dialysis fistula)   Negative: Bright red, wide-spread, sunburn-like rash   Negative: SEVERE pain in the incision   Negative: Incision gaping open and < 2 days (48 hours) since wound re-opened   Negative: Incision gaping open and length of opening > 2 inches (5 cm)   Negative: Patient sounds very sick or weak to the triager   Negative: Sounds like a serious complication to the triager   Negative: Fever > 100.4 F (38.0 C)   Negative: Incision looks infected (spreading redness, pain)   Negative: Red streak runs from the incision and longer than 1 inch (2.5 cm)   Negative: Pus or bad-smelling fluid draining from incision   Negative: Dressing soaked with blood or body fluid (e.g., drainage)   Negative: Raised bruise and size > 2 inches (5 cm) and expanding   Negative: Scant bleeding (e.g., few drops) from incision and after tracheostomy or blood vessel surgery (e.g., carotid endarterectomy, femoral bypass graft, kidney dialysis fistula    Protocols used: Post-Op Incision Symptoms and [id number]  pt seen on Monday. had stents placed and blood clot remd. Heart cath done thru wrist. Pt wants to know when to take splint placed in recovery off. +flatus. good uop. Eating reg diet. Offered pt info from AVS: Avoid activities that require bending of the affected arm/wrist for 3 days and submerging the site in water  for 3 days. Remove the dressing the day after the procedure, you may apply a bandaid to  the site if you desire.pt warm transferred to speak with scheduling to get pt in touch with office to clarify.

## 2023-09-18 ENCOUNTER — OFFICE VISIT (OUTPATIENT)
Dept: FAMILY MEDICINE | Facility: CLINIC | Age: 68
End: 2023-09-18
Payer: MEDICARE

## 2023-09-18 ENCOUNTER — NURSE TRIAGE (OUTPATIENT)
Dept: ADMINISTRATIVE | Facility: CLINIC | Age: 68
End: 2023-09-18
Payer: MEDICARE

## 2023-09-18 VITALS — BODY MASS INDEX: 23.93 KG/M2 | HEIGHT: 62 IN | OXYGEN SATURATION: 98 % | HEART RATE: 71 BPM | WEIGHT: 130.06 LBS

## 2023-09-18 DIAGNOSIS — Z09 HOSPITAL DISCHARGE FOLLOW-UP: Primary | ICD-10-CM

## 2023-09-18 DIAGNOSIS — I10 ESSENTIAL HYPERTENSION: ICD-10-CM

## 2023-09-18 DIAGNOSIS — Z95.5 S/P CORONARY ARTERY STENT PLACEMENT: ICD-10-CM

## 2023-09-18 PROCEDURE — 3288F PR FALLS RISK ASSESSMENT DOCUMENTED: ICD-10-PCS | Mod: HCNC,CPTII,S$GLB, | Performed by: FAMILY MEDICINE

## 2023-09-18 PROCEDURE — 3044F PR MOST RECENT HEMOGLOBIN A1C LEVEL <7.0%: ICD-10-PCS | Mod: HCNC,CPTII,S$GLB, | Performed by: FAMILY MEDICINE

## 2023-09-18 PROCEDURE — 1159F PR MEDICATION LIST DOCUMENTED IN MEDICAL RECORD: ICD-10-PCS | Mod: HCNC,CPTII,S$GLB, | Performed by: FAMILY MEDICINE

## 2023-09-18 PROCEDURE — 4010F PR ACE/ARB THEARPY RXD/TAKEN: ICD-10-PCS | Mod: HCNC,CPTII,S$GLB, | Performed by: FAMILY MEDICINE

## 2023-09-18 PROCEDURE — 99999 PR PBB SHADOW E&M-EST. PATIENT-LVL V: ICD-10-PCS | Mod: PBBFAC,HCNC,, | Performed by: FAMILY MEDICINE

## 2023-09-18 PROCEDURE — 1101F PT FALLS ASSESS-DOCD LE1/YR: CPT | Mod: HCNC,CPTII,S$GLB, | Performed by: FAMILY MEDICINE

## 2023-09-18 PROCEDURE — 1126F PR PAIN SEVERITY QUANTIFIED, NO PAIN PRESENT: ICD-10-PCS | Mod: HCNC,CPTII,S$GLB, | Performed by: FAMILY MEDICINE

## 2023-09-18 PROCEDURE — 1126F AMNT PAIN NOTED NONE PRSNT: CPT | Mod: HCNC,CPTII,S$GLB, | Performed by: FAMILY MEDICINE

## 2023-09-18 PROCEDURE — 3044F HG A1C LEVEL LT 7.0%: CPT | Mod: HCNC,CPTII,S$GLB, | Performed by: FAMILY MEDICINE

## 2023-09-18 PROCEDURE — 99213 PR OFFICE/OUTPT VISIT, EST, LEVL III, 20-29 MIN: ICD-10-PCS | Mod: HCNC,S$GLB,, | Performed by: FAMILY MEDICINE

## 2023-09-18 PROCEDURE — 3008F PR BODY MASS INDEX (BMI) DOCUMENTED: ICD-10-PCS | Mod: HCNC,CPTII,S$GLB, | Performed by: FAMILY MEDICINE

## 2023-09-18 PROCEDURE — 1101F PR PT FALLS ASSESS DOC 0-1 FALLS W/OUT INJ PAST YR: ICD-10-PCS | Mod: HCNC,CPTII,S$GLB, | Performed by: FAMILY MEDICINE

## 2023-09-18 PROCEDURE — 4010F ACE/ARB THERAPY RXD/TAKEN: CPT | Mod: HCNC,CPTII,S$GLB, | Performed by: FAMILY MEDICINE

## 2023-09-18 PROCEDURE — 99999 PR PBB SHADOW E&M-EST. PATIENT-LVL V: CPT | Mod: PBBFAC,HCNC,, | Performed by: FAMILY MEDICINE

## 2023-09-18 PROCEDURE — 1159F MED LIST DOCD IN RCRD: CPT | Mod: HCNC,CPTII,S$GLB, | Performed by: FAMILY MEDICINE

## 2023-09-18 PROCEDURE — 3288F FALL RISK ASSESSMENT DOCD: CPT | Mod: HCNC,CPTII,S$GLB, | Performed by: FAMILY MEDICINE

## 2023-09-18 PROCEDURE — 3008F BODY MASS INDEX DOCD: CPT | Mod: HCNC,CPTII,S$GLB, | Performed by: FAMILY MEDICINE

## 2023-09-18 PROCEDURE — 99213 OFFICE O/P EST LOW 20 MIN: CPT | Mod: HCNC,S$GLB,, | Performed by: FAMILY MEDICINE

## 2023-09-18 RX ORDER — CARVEDILOL 6.25 MG/1
6.25 TABLET ORAL 2 TIMES DAILY
Qty: 60 TABLET | Refills: 3 | Status: SHIPPED | OUTPATIENT
Start: 2023-09-18 | End: 2023-09-21

## 2023-09-18 NOTE — TELEPHONE ENCOUNTER
Patient states she is s/p Left Heart Cath on 9/12/23 and has had her post-discharge follow up appointment with Dr. Altman on today, 9/18/23. Patient states her dosage for Coreg was doubled at her follow up visit today from Coreg 3.125 mg twice a day to Coreg 6.25 mg twice a day. Patient states that she did not  her prescription for Coreg 6.25 mg and only has her Coreg 3.125 mg tabs available. Patient inquiring if ok to take two (2) Coreg 3.125 mg tabs for her evening dose to equal Coreg 6.25 mg.     Patient advised to take two (2) Coreg 3.125 mg tabs tonight to equal her new dosage regimen of Coreg 6.25 mg. Patient also advised to take one (1) Coreg 6.25 mg tab twice a day when she picks up her new prescription. Patient states understanding of care advice.     Reason for Disposition   Caller has medicine question only, adult not sick, AND triager answers question    Additional Information   Negative: [1] Intentional drug overdose AND [2] suicidal thoughts or ideas   Negative: Drug overdose and triager unable to answer question   Negative: Caller requesting a renewal or refill of a medicine patient is currently taking   Negative: Caller requesting information unrelated to medicine   Negative: Caller requesting information about COVID-19 Vaccine   Negative: Caller requesting information about Emergency Contraception   Negative: Caller requesting information about Combined Birth Control Pills   Negative: Caller requesting information about Progestin Birth Control Pills   Negative: Caller requesting information about Post-Op pain or medicines   Negative: Caller requesting a prescription antibiotic (such as Penicillin) for Strep throat and has a positive culture result   Negative: Caller requesting a prescription anti-viral med (such as Tamiflu) and has influenza (flu) symptoms   Negative: Immunization reaction suspected   Negative: Rash while taking a medicine or within 3 days of stopping it   Negative: [1]  Asthma and [2] having symptoms of asthma (cough, wheezing, etc.)   Negative: [1] Symptom of illness (e.g., headache, abdominal pain, earache, vomiting) AND [2] more than mild   Negative: Breastfeeding questions about mother's medicines and diet   Negative: MORE THAN A DOUBLE DOSE of a prescription or over-the-counter (OTC) drug   Negative: [1] DOUBLE DOSE (an extra dose or lesser amount) of prescription drug AND [2] any symptoms (e.g., dizziness, nausea, pain, sleepiness)   Negative: [1] DOUBLE DOSE (an extra dose or lesser amount) of over-the-counter (OTC) drug AND [2] any symptoms (e.g., dizziness, nausea, pain, sleepiness)   Negative: Took another person's prescription drug   Negative: [1] DOUBLE DOSE (an extra dose or lesser amount) of prescription drug AND [2] NO symptoms  (Exception: A double dose of antibiotics.)   Negative: Diabetes drug error or overdose (e.g., took wrong type of insulin or took extra dose)   Negative: [1] Prescription not at pharmacy AND [2] was prescribed by PCP recently (Exception: Triager has access to EMR and prescription is recorded there. Go to Home Care and confirm for pharmacy.)   Negative: [1] Pharmacy calling with prescription question AND [2] triager unable to answer question   Negative: [1] Caller has URGENT medicine question about med that PCP or specialist prescribed AND [2] triager unable to answer question   Negative: Medicine patch causing local rash or itching   Negative: [1] Caller has medicine question about med NOT prescribed by PCP AND [2] triager unable to answer question (e.g., compatibility with other med, storage)   Negative: Prescription request for new medicine (not a refill)   Negative: [1] Caller has NON-URGENT medicine question about med that PCP prescribed AND [2] triager unable to answer question   Negative: Caller wants to use a complementary or alternative medicine   Negative: [1] Prescription prescribed recently is not at pharmacy AND [2] triager has  access to patient's EMR AND [3] prescription is recorded in the EMR   Negative: [1] DOUBLE DOSE (an extra dose or lesser amount) of over-the-counter (OTC) drug AND [2] NO symptoms   Negative: [1] DOUBLE DOSE (an extra dose or lesser amount) of antibiotic drug AND [2] NO symptoms    Protocols used: Medication Question Call-A-AH

## 2023-09-18 NOTE — PROGRESS NOTES
Transitional Care Note  Subjective:       Patient ID: Gemma Vick is a 68 y.o. female.  Chief Complaint: Hospital Follow Up    Family and/or Caretaker present at visit?  No.  Diagnostic tests reviewed/disposition: I have reviewed all completed as well as pending diagnostic tests at the time of discharge.  Disease/illness education: y  Home health/community services discussion/referrals: Patient does not have home health established from hospital visit.  They do not need home health.  If needed, we will set up home health for the patient.   Establishment or re-establishment of referral orders for community resources: No other necessary community resources.   Discussion with other health care providers: No discussion with other health care providers necessary.   Patient is status post hospital discharge   She was seen with complaints of chest pain shortness of breath here   She had a left heart catheterization done that showed that she had blockage of the right RCA in the proximal portion which was about 50% this was a previous stent and another stent was put in   She had thrombus causing 90% stenosis in the midportion of the RCA, she underwent a thrombectomy and stent placement.  The throughout the course of her stay her cardiac enzymes stayed normal she is feeling okay she said her home blood pressure readings run on the high side.  Started on Plavix for 1 year.      Review of Systems   Constitutional:  Negative for activity change, chills, fatigue, fever and unexpected weight change.   HENT:  Negative for congestion, ear discharge, ear pain, hearing loss, postnasal drip and rhinorrhea.    Eyes:  Negative for pain and visual disturbance.   Respiratory:  Negative for cough, chest tightness and shortness of breath.    Cardiovascular:  Negative for chest pain and palpitations.   Gastrointestinal:  Negative for abdominal pain, diarrhea and vomiting.   Endocrine: Negative for heat intolerance.   Genitourinary:   Negative for dysuria, flank pain, frequency and hematuria.   Musculoskeletal:  Negative for back pain, gait problem and neck pain.   Skin:  Negative for color change and rash.   Neurological:  Negative for dizziness, tremors, seizures, numbness and headaches.   Psychiatric/Behavioral:  Negative for agitation, hallucinations, self-injury, sleep disturbance and suicidal ideas. The patient is not nervous/anxious.        Objective:      Physical Exam  Constitutional:       Appearance: She is well-developed.   Eyes:      Conjunctiva/sclera: Conjunctivae normal.      Pupils: Pupils are equal, round, and reactive to light.   Cardiovascular:      Rate and Rhythm: Normal rate and regular rhythm.      Heart sounds: Normal heart sounds. No murmur heard.  Pulmonary:      Effort: Pulmonary effort is normal. No respiratory distress.      Breath sounds: Normal breath sounds. No wheezing or rales.   Chest:      Chest wall: No tenderness.   Abdominal:      General: There is no distension.      Palpations: Abdomen is soft. There is no mass.      Tenderness: There is no abdominal tenderness. There is no guarding.   Musculoskeletal:         General: No tenderness.      Cervical back: Normal range of motion and neck supple.   Lymphadenopathy:      Cervical: No cervical adenopathy.   Skin:     General: Skin is warm and dry.   Neurological:      Mental Status: She is alert and oriented to person, place, and time.      Deep Tendon Reflexes: Reflexes are normal and symmetric.   Psychiatric:         Behavior: Behavior normal.         Thought Content: Thought content normal.         Judgment: Judgment normal.         Assessment:       1. Hospital discharge follow-up    2. S/P coronary artery stent placement    3. Essential hypertension        Plan:         Hospital discharge summary reviewed continue Plavix for 1 year   He to keep the blood pressure lower increase Coreg to 6.25 mg keep a log of blood pressure numbers and send us the  readings.  Follow-up in few days

## 2023-09-20 ENCOUNTER — OUTPATIENT CASE MANAGEMENT (OUTPATIENT)
Dept: ADMINISTRATIVE | Facility: OTHER | Age: 68
End: 2023-09-20
Payer: MEDICARE

## 2023-09-20 NOTE — PROGRESS NOTES
Outpatient Care Management  Patient Does Not Consent    Patient: Gemma Vick  MRN:  [id number]  Date of Service:  9/20/2023  Completed by:  Freda Severino RN    Chief Complaint   Patient presents with    OPCM Enrollment Call    Case Closure     Declined       Patient Summary           Consent Received:  Decline

## 2023-09-21 ENCOUNTER — OFFICE VISIT (OUTPATIENT)
Dept: FAMILY MEDICINE | Facility: CLINIC | Age: 68
End: 2023-09-21
Payer: MEDICARE

## 2023-09-21 ENCOUNTER — OFFICE VISIT (OUTPATIENT)
Dept: CARDIOLOGY | Facility: CLINIC | Age: 68
End: 2023-09-21
Payer: MEDICARE

## 2023-09-21 VITALS
HEART RATE: 92 BPM | OXYGEN SATURATION: 98 % | WEIGHT: 129.19 LBS | SYSTOLIC BLOOD PRESSURE: 138 MMHG | DIASTOLIC BLOOD PRESSURE: 70 MMHG | BODY MASS INDEX: 23.63 KG/M2

## 2023-09-21 VITALS
WEIGHT: 131.38 LBS | SYSTOLIC BLOOD PRESSURE: 150 MMHG | HEART RATE: 75 BPM | BODY MASS INDEX: 24.18 KG/M2 | OXYGEN SATURATION: 99 % | DIASTOLIC BLOOD PRESSURE: 70 MMHG | HEIGHT: 62 IN

## 2023-09-21 DIAGNOSIS — I10 ESSENTIAL HYPERTENSION: ICD-10-CM

## 2023-09-21 DIAGNOSIS — J44.9 COPD, MODERATE: ICD-10-CM

## 2023-09-21 DIAGNOSIS — J43.9 NOCTURNAL HYPOXEMIA DUE TO EMPHYSEMA: ICD-10-CM

## 2023-09-21 DIAGNOSIS — E78.2 MIXED HYPERLIPIDEMIA: ICD-10-CM

## 2023-09-21 DIAGNOSIS — I65.23 BILATERAL CAROTID ARTERY STENOSIS: ICD-10-CM

## 2023-09-21 DIAGNOSIS — I71.43 INFRARENAL ABDOMINAL AORTIC ANEURYSM (AAA) WITHOUT RUPTURE: ICD-10-CM

## 2023-09-21 DIAGNOSIS — R07.9 CHEST PAIN, UNSPECIFIED TYPE: ICD-10-CM

## 2023-09-21 DIAGNOSIS — I10 HYPERTENSION NOT AT GOAL: Primary | ICD-10-CM

## 2023-09-21 DIAGNOSIS — I25.110 CORONARY ARTERY DISEASE INVOLVING NATIVE CORONARY ARTERY OF NATIVE HEART WITH UNSTABLE ANGINA PECTORIS: Primary | ICD-10-CM

## 2023-09-21 DIAGNOSIS — I70.0 AORTIC ATHEROSCLEROSIS: ICD-10-CM

## 2023-09-21 DIAGNOSIS — G47.36 NOCTURNAL HYPOXEMIA DUE TO EMPHYSEMA: ICD-10-CM

## 2023-09-21 PROCEDURE — 3288F PR FALLS RISK ASSESSMENT DOCUMENTED: ICD-10-PCS | Mod: HCNC,CPTII,S$GLB, | Performed by: INTERNAL MEDICINE

## 2023-09-21 PROCEDURE — 99213 OFFICE O/P EST LOW 20 MIN: CPT | Mod: HCNC,S$GLB,, | Performed by: FAMILY MEDICINE

## 2023-09-21 PROCEDURE — 99999 PR PBB SHADOW E&M-EST. PATIENT-LVL V: CPT | Mod: PBBFAC,HCNC,, | Performed by: FAMILY MEDICINE

## 2023-09-21 PROCEDURE — 3075F SYST BP GE 130 - 139MM HG: CPT | Mod: HCNC,CPTII,S$GLB, | Performed by: FAMILY MEDICINE

## 2023-09-21 PROCEDURE — 3075F PR MOST RECENT SYSTOLIC BLOOD PRESS GE 130-139MM HG: ICD-10-PCS | Mod: HCNC,CPTII,S$GLB, | Performed by: FAMILY MEDICINE

## 2023-09-21 PROCEDURE — 1101F PR PT FALLS ASSESS DOC 0-1 FALLS W/OUT INJ PAST YR: ICD-10-PCS | Mod: HCNC,CPTII,S$GLB, | Performed by: INTERNAL MEDICINE

## 2023-09-21 PROCEDURE — 3044F HG A1C LEVEL LT 7.0%: CPT | Mod: HCNC,CPTII,S$GLB, | Performed by: INTERNAL MEDICINE

## 2023-09-21 PROCEDURE — 4010F ACE/ARB THERAPY RXD/TAKEN: CPT | Mod: HCNC,CPTII,S$GLB, | Performed by: FAMILY MEDICINE

## 2023-09-21 PROCEDURE — 1159F PR MEDICATION LIST DOCUMENTED IN MEDICAL RECORD: ICD-10-PCS | Mod: HCNC,CPTII,S$GLB, | Performed by: INTERNAL MEDICINE

## 2023-09-21 PROCEDURE — 3078F DIAST BP <80 MM HG: CPT | Mod: HCNC,CPTII,S$GLB, | Performed by: FAMILY MEDICINE

## 2023-09-21 PROCEDURE — 3288F PR FALLS RISK ASSESSMENT DOCUMENTED: ICD-10-PCS | Mod: HCNC,CPTII,S$GLB, | Performed by: FAMILY MEDICINE

## 2023-09-21 PROCEDURE — 3044F PR MOST RECENT HEMOGLOBIN A1C LEVEL <7.0%: ICD-10-PCS | Mod: HCNC,CPTII,S$GLB, | Performed by: INTERNAL MEDICINE

## 2023-09-21 PROCEDURE — 3008F PR BODY MASS INDEX (BMI) DOCUMENTED: ICD-10-PCS | Mod: HCNC,CPTII,S$GLB, | Performed by: INTERNAL MEDICINE

## 2023-09-21 PROCEDURE — 1126F AMNT PAIN NOTED NONE PRSNT: CPT | Mod: HCNC,CPTII,S$GLB, | Performed by: INTERNAL MEDICINE

## 2023-09-21 PROCEDURE — 3008F BODY MASS INDEX DOCD: CPT | Mod: HCNC,CPTII,S$GLB, | Performed by: FAMILY MEDICINE

## 2023-09-21 PROCEDURE — 1101F PR PT FALLS ASSESS DOC 0-1 FALLS W/OUT INJ PAST YR: ICD-10-PCS | Mod: HCNC,CPTII,S$GLB, | Performed by: FAMILY MEDICINE

## 2023-09-21 PROCEDURE — 1126F PR PAIN SEVERITY QUANTIFIED, NO PAIN PRESENT: ICD-10-PCS | Mod: HCNC,CPTII,S$GLB, | Performed by: FAMILY MEDICINE

## 2023-09-21 PROCEDURE — 3008F PR BODY MASS INDEX (BMI) DOCUMENTED: ICD-10-PCS | Mod: HCNC,CPTII,S$GLB, | Performed by: FAMILY MEDICINE

## 2023-09-21 PROCEDURE — 3078F PR MOST RECENT DIASTOLIC BLOOD PRESSURE < 80 MM HG: ICD-10-PCS | Mod: HCNC,CPTII,S$GLB, | Performed by: FAMILY MEDICINE

## 2023-09-21 PROCEDURE — 3077F PR MOST RECENT SYSTOLIC BLOOD PRESSURE >= 140 MM HG: ICD-10-PCS | Mod: HCNC,CPTII,S$GLB, | Performed by: INTERNAL MEDICINE

## 2023-09-21 PROCEDURE — 99214 PR OFFICE/OUTPT VISIT, EST, LEVL IV, 30-39 MIN: ICD-10-PCS | Mod: HCNC,S$GLB,, | Performed by: INTERNAL MEDICINE

## 2023-09-21 PROCEDURE — 4010F ACE/ARB THERAPY RXD/TAKEN: CPT | Mod: HCNC,CPTII,S$GLB, | Performed by: INTERNAL MEDICINE

## 2023-09-21 PROCEDURE — 99999 PR PBB SHADOW E&M-EST. PATIENT-LVL IV: CPT | Mod: PBBFAC,HCNC,, | Performed by: INTERNAL MEDICINE

## 2023-09-21 PROCEDURE — 3288F FALL RISK ASSESSMENT DOCD: CPT | Mod: HCNC,CPTII,S$GLB, | Performed by: INTERNAL MEDICINE

## 2023-09-21 PROCEDURE — 1126F PR PAIN SEVERITY QUANTIFIED, NO PAIN PRESENT: ICD-10-PCS | Mod: HCNC,CPTII,S$GLB, | Performed by: INTERNAL MEDICINE

## 2023-09-21 PROCEDURE — 4010F PR ACE/ARB THEARPY RXD/TAKEN: ICD-10-PCS | Mod: HCNC,CPTII,S$GLB, | Performed by: FAMILY MEDICINE

## 2023-09-21 PROCEDURE — 99213 PR OFFICE/OUTPT VISIT, EST, LEVL III, 20-29 MIN: ICD-10-PCS | Mod: HCNC,S$GLB,, | Performed by: FAMILY MEDICINE

## 2023-09-21 PROCEDURE — 99999 PR PBB SHADOW E&M-EST. PATIENT-LVL V: ICD-10-PCS | Mod: PBBFAC,HCNC,, | Performed by: FAMILY MEDICINE

## 2023-09-21 PROCEDURE — 99999 PR PBB SHADOW E&M-EST. PATIENT-LVL IV: ICD-10-PCS | Mod: PBBFAC,HCNC,, | Performed by: INTERNAL MEDICINE

## 2023-09-21 PROCEDURE — 1159F MED LIST DOCD IN RCRD: CPT | Mod: HCNC,CPTII,S$GLB, | Performed by: INTERNAL MEDICINE

## 2023-09-21 PROCEDURE — 3044F HG A1C LEVEL LT 7.0%: CPT | Mod: HCNC,CPTII,S$GLB, | Performed by: FAMILY MEDICINE

## 2023-09-21 PROCEDURE — 1101F PT FALLS ASSESS-DOCD LE1/YR: CPT | Mod: HCNC,CPTII,S$GLB, | Performed by: INTERNAL MEDICINE

## 2023-09-21 PROCEDURE — 1159F MED LIST DOCD IN RCRD: CPT | Mod: HCNC,CPTII,S$GLB, | Performed by: FAMILY MEDICINE

## 2023-09-21 PROCEDURE — 3078F PR MOST RECENT DIASTOLIC BLOOD PRESSURE < 80 MM HG: ICD-10-PCS | Mod: HCNC,CPTII,S$GLB, | Performed by: INTERNAL MEDICINE

## 2023-09-21 PROCEDURE — 3008F BODY MASS INDEX DOCD: CPT | Mod: HCNC,CPTII,S$GLB, | Performed by: INTERNAL MEDICINE

## 2023-09-21 PROCEDURE — 4010F PR ACE/ARB THEARPY RXD/TAKEN: ICD-10-PCS | Mod: HCNC,CPTII,S$GLB, | Performed by: INTERNAL MEDICINE

## 2023-09-21 PROCEDURE — 1101F PT FALLS ASSESS-DOCD LE1/YR: CPT | Mod: HCNC,CPTII,S$GLB, | Performed by: FAMILY MEDICINE

## 2023-09-21 PROCEDURE — 3044F PR MOST RECENT HEMOGLOBIN A1C LEVEL <7.0%: ICD-10-PCS | Mod: HCNC,CPTII,S$GLB, | Performed by: FAMILY MEDICINE

## 2023-09-21 PROCEDURE — 1126F AMNT PAIN NOTED NONE PRSNT: CPT | Mod: HCNC,CPTII,S$GLB, | Performed by: FAMILY MEDICINE

## 2023-09-21 PROCEDURE — 1159F PR MEDICATION LIST DOCUMENTED IN MEDICAL RECORD: ICD-10-PCS | Mod: HCNC,CPTII,S$GLB, | Performed by: FAMILY MEDICINE

## 2023-09-21 PROCEDURE — 3288F FALL RISK ASSESSMENT DOCD: CPT | Mod: HCNC,CPTII,S$GLB, | Performed by: FAMILY MEDICINE

## 2023-09-21 PROCEDURE — 3077F SYST BP >= 140 MM HG: CPT | Mod: HCNC,CPTII,S$GLB, | Performed by: INTERNAL MEDICINE

## 2023-09-21 PROCEDURE — 99214 OFFICE O/P EST MOD 30 MIN: CPT | Mod: HCNC,S$GLB,, | Performed by: INTERNAL MEDICINE

## 2023-09-21 PROCEDURE — 3078F DIAST BP <80 MM HG: CPT | Mod: HCNC,CPTII,S$GLB, | Performed by: INTERNAL MEDICINE

## 2023-09-21 RX ORDER — CARVEDILOL 12.5 MG/1
12.5 TABLET ORAL 2 TIMES DAILY
Qty: 60 TABLET | Refills: 3 | Status: SHIPPED | OUTPATIENT
Start: 2023-09-21 | End: 2023-12-05

## 2023-09-21 RX ORDER — AMLODIPINE BESYLATE 10 MG/1
10 TABLET ORAL DAILY
Qty: 90 TABLET | Refills: 3 | Status: SHIPPED | OUTPATIENT
Start: 2023-09-21 | End: 2023-12-05

## 2023-09-21 NOTE — PROGRESS NOTES
Chief Complaint:    Chief Complaint   Patient presents with    Follow-up     F/u       History of Present Illness:    Patient presents today for BP follow-up,    Was seen 3 days ago for hospital follow-up and noted hypertension.  Increased Coreg to 6.25 mg to help, Still has some fluctuation in readings with most being elevated.  Is on Amlodipine, Coreg, and Benicar, takes these at 10:30 am. And takes Coreg again in the evening.  Has noticed some more leg swelling and has been taking Hydrochlorothiazide.    Feels she is slowing down and not able to things anymore. Is currently on Namenda for vascular dementia and follows with neurology.    ROS:  Review of Systems   Constitutional:  Negative for appetite change, chills and fever.   HENT:  Negative for congestion, ear pain, postnasal drip, rhinorrhea, sinus pressure and sinus pain.    Eyes:  Negative for pain.   Respiratory:  Negative for cough, chest tightness and shortness of breath.    Cardiovascular:  Negative for chest pain and palpitations.   Gastrointestinal:  Negative for abdominal pain, blood in stool, constipation, diarrhea and nausea.   Genitourinary:  Negative for difficulty urinating, dysuria, flank pain and hematuria.   Musculoskeletal:  Negative for arthralgias, back pain and myalgias.   Skin:  Negative for pallor and wound.   Neurological:  Negative for dizziness, tremors, speech difficulty, light-headedness and headaches.   Psychiatric/Behavioral:  Negative for behavioral problems, dysphoric mood and sleep disturbance.    All other systems reviewed and are negative.      Past Medical History:   Diagnosis Date    AAA (abdominal aortic aneurysm) 02/13/2014    Abdominal aneurysm     3    Acute coronary syndrome     Arthritis     BPPV (benign paroxysmal positional vertigo)     Carotid artery plaque     Carotid artery stenosis and occlusion 02/13/2014    Chronic back pain     COPD (chronic obstructive pulmonary disease)     Coronary artery disease      Dementia     Emphysema lung     Hyperlipidemia     Hypertension     Myocardial infarction     x3    Neuropathy     Personal history of COVID-19 2021 +Covid, recovered at home        Social History:  Social History     Socioeconomic History    Marital status:    Tobacco Use    Smoking status: Former     Current packs/day: 0.00     Average packs/day: 0.5 packs/day for 46.9 years (23.4 ttl pk-yrs)     Types: Cigarettes, Vaping w/o nicotine     Start date: 1970     Quit date: 2017     Years since quittin.3    Smokeless tobacco: Never    Tobacco comments:     3 MG NICOTINE FOR HEART.    Substance and Sexual Activity    Alcohol use: No    Drug use: No    Sexual activity: Not Currently     Birth control/protection: None     Social Determinants of Health     Financial Resource Strain: Medium Risk (2023)    Overall Financial Resource Strain (CARDIA)     Difficulty of Paying Living Expenses: Somewhat hard   Food Insecurity: Food Insecurity Present (2023)    Hunger Vital Sign     Worried About Running Out of Food in the Last Year: Sometimes true     Ran Out of Food in the Last Year: Sometimes true   Transportation Needs: No Transportation Needs (2023)    PRAPARE - Transportation     Lack of Transportation (Medical): No     Lack of Transportation (Non-Medical): No   Physical Activity: Inactive (2023)    Exercise Vital Sign     Days of Exercise per Week: 0 days     Minutes of Exercise per Session: 10 min   Stress: No Stress Concern Present (2023)    [employer] [place] [employer] - Occupational Stress Questionnaire     Feeling of Stress : Only a little   Recent Concern: Stress - Stress Concern Present (2023)    [employer] [place] [employer] - Occupational Stress Questionnaire     Feeling of Stress : To some extent   Social Connections: Socially Integrated (2023)    Social Connection and Isolation Panel [NHANES]     Frequency of  Communication with Friends and Family: More than three times a week     Frequency of Social Gatherings with Friends and Family: Once a week     Attends Rastafarian Services: More than 4 times per year     Active Member of Clubs or Organizations: Yes     Attends Club or Organization Meetings: More than 4 times per year     Marital Status:    Housing Stability: Low Risk  (9/20/2023)    Housing Stability Vital Sign     Unable to Pay for Housing in the Last Year: No     Number of Places Lived in the Last Year: 1     Unstable Housing in the Last Year: No       Family History:   family history includes Breast cancer in her paternal aunt; Heart attacks under age 50 in her brother and father; Heart disease in her mother.    Health Maintenance   Topic Date Due    Shingles Vaccine (1 of 2) Never done    Colorectal Cancer Screening  05/17/2022    Mammogram  06/09/2022    LDCT Lung Screen  11/08/2023    Lipid Panel  04/18/2024    High Dose Statin  09/18/2024    DEXA Scan  10/20/2024    TETANUS VACCINE  11/15/2026    Hepatitis C Screening  Completed       Exam:Physical     Vital Signs  Pulse: 92  SpO2: 98 %  BP: 138/70  BP Location: Right arm  Patient Position: Sitting  Pain Score: 0-No pain  Height and Weight  Weight: 58.6 kg (129 lb 3 oz)]    Body mass index is 23.63 kg/m².    Physical Exam  Vitals and nursing note reviewed.   Constitutional:       Appearance: Normal appearance. She is not toxic-appearing.   HENT:      Head: Normocephalic and atraumatic.      Right Ear: Tympanic membrane normal.      Left Ear: Tympanic membrane normal.   Eyes:      Extraocular Movements: Extraocular movements intact.      Pupils: Pupils are equal, round, and reactive to light.   Cardiovascular:      Rate and Rhythm: Normal rate and regular rhythm.      Heart sounds: Normal heart sounds.   Pulmonary:      Effort: Pulmonary effort is normal.      Breath sounds: Normal breath sounds. No wheezing, rhonchi or rales.   Abdominal:      General:  Bowel sounds are normal. There is no distension.      Palpations: Abdomen is soft.      Tenderness: There is no abdominal tenderness.   Musculoskeletal:         General: Normal range of motion.      Cervical back: Normal range of motion.      Lumbar back: No tenderness.   Skin:     General: Skin is warm and dry.      Capillary Refill: Capillary refill takes less than 2 seconds.   Neurological:      General: No focal deficit present.      Mental Status: She is alert and oriented to person, place, and time.   Psychiatric:         Mood and Affect: Mood normal.         Behavior: Behavior normal.         Judgment: Judgment normal.           Assessment:      ICD-10-CM ICD-9-CM   1. Hypertension not at goal  I10 401.9   2. Essential hypertension  I10 401.9         Plan:  Increase Coreg to 12.5 mg.  Double up on Amlodipine until you run out and then can start Amlodipine 10 mg one pill daily.  Start taking hydrochlorothiazide 12.5 mg which patient has several at home.  Monitor blood pressure 2-3 times per day. Check two hours after taking medication. Keep below 139/89.    Follow-up 1 week with readings and machine.   No orders of the defined types were placed in this encounter.       Follow up in about 1 week (around 9/28/2023).      Olga Altman MD    Scribe Attestation:   I, Amaury Real, am scribing for, and in the presence of, Dr.Arif Altman I performed the above scribed service and the documentation accurately describes the services I performed. I attest to the accuracy of the note.    I, Dr. Olga Altman, reviewed documentation as scribed above. I performed the services described in this documentation.  I agree that the record reflects my personal performance and is accurate and complete. Olga Altman MD.  09/21/2023

## 2023-09-21 NOTE — PROGRESS NOTES
Subjective:   Patient ID:  Gemma Vick is a 68 y.o. female who presents for follow up of No chief complaint on file.      HPI  3/26/2020  A 66 yo female with pvd carotid disease htn hlp copd exsmoker on nocturnal o2 therapy wants to discuss test results. She is in digital htn has been unable to tolerate bisoprolol daily feels tired fatigued no energy she takes meds regularily tries to be compliant with salt no exercise but stays busy in the house. She takes care of her grandbabies. Has occasional leg swelling she takes lasix prn for weight change she stands on her feet a lot. She is compliant with inhalers to control shortness of breath no palpitation has chronic cough.  Her carotid u/s reviewed unchanged.abd u/s no aneurysm  Lipids on target she is well controlled on vytorin .she has difficulty with crestor and lipitor   9/24/2020  She is here for  f/u she is complaining of recurrent episodes of abdominal pain lower quadrant and got to be upper abdomen associated with nausea vomiting no post prandial pain no weight loss or food avoidance. Has no new symptoms of chest pain she is still short of breath no new palpitation tia.   Ct abdomen showed diverticulitis aaa 3.4 cm and sma stenosis.   Her ldl has increased. She has had sore muscles she stopped statins w/o improvemnet. She needs to back on statins that would explain he rincreased ldl.      3/25/2021  Here for f/u has sharp recurrent left sided occurring at rest sitting in recliner she cannot take a deep breath no exertional symptoms she takes care of her grandbabies no smoking no tia palpitation syncope near syncope. She takes  meds regularily .reveiw of her bp chart still showed elevated indices. She claims salt compliance. She could not tolerate amlodipine bid . Her lipids aRE ON TARGET.      9/30/2021    has been using o2 therapy at nite no change in symptoms her bp is elevated she takes amlodipine 5 mg at nite she is not compliant with diet. Salt  castellano.she takes care of grandkids no chest pain no cardiac symptoms. She has the urge to go to bathroom after she eats she has upperabdominal pain and feels like throwing up. Has known stenosis of the sma  She has lost weight.      11/1/2021   She is taking 7.5 mg amlodipine still needs better  salt control bp acceptable here however at home is more elevated but we have not checked her machine. She continues to have abdominal symptoms. She wants alternatives to ibesartan her pill is too big to swallow her lipids are on target. (she will check on diovan 320 and benicar 40 mg with pharmacist about size and cost).she is in digital htn her bp readings are more elevated that today clinic readings.she ahs not been compliant with diet she had fried chicken mashed potatoes french fries and   And biscuits  From [employer].   She had cta for her abdominal symptoms showing celiac stenosis and stenosis at the sma. Has also bilateral iliac stenosis greater than 50%.   Her carotid anatomy is unchanged.         12/8/2021   Today bp is controlled she ahs taken a fluid pill her bp readings at home since last visit has been 160-170 range. She si non compliant with salt she took diuretics due to leg swelling which is related to amlodipine. She has also an mason with exercise that did not suggest significant disease she continues to have symptoms suggestive of musculoskeltal on the rt and sciatica. No definite claudication no discoloration. she has no tia.      4/1/2022  Not much leg swelling has been drinking a lot of water bp fluctuates based on salt her digital htn reflects that now today is on target. Has been in after hours due abdominal pain constipation issue. Has nausea at that time . All improved still          dealing with constipation has use magnesium citrate.   She is not using diuretics except intermittently   Has question about arthritis meds she can take advised short course is ok but prolonged use is high risk of bleeding.       7/27/2022  Here for evaluation for colonoscopy. Has a positive school screening test was scheduled for colonoscopy . Has iron deficiency anemia has fatigue. She has seen vascular surgery because her colonoscopy was cancel;ed has m,oderate carotid disease. Has cta mesenteric stenosis. She has abdominal pain has improvement after taking bentyl some times she vomited digetsed food after eating no diarrhea  Has postprandial pain  opn the rt then progresses to to the bottom and lower abdomen. She is non compliant with salt that is why her bp is elevated.      12/8/2022   Here forf /u has been evaluated by ep no need for any pacer or intervention. Has been evaluated by vascular surgery no significant carotid disease. She is getting very forgetful was evaluated at [facility] she is labeled as vascular dementia  and shrinking brain. Her aaa is around 4 cm.   She had a slaty meal before coming to see me her bp is elevated she is non complaint with salt intake. She cannot remember her meds. She stopped a bp med that she does not remember the name. No cardiac symptoms.      4/25/2023 family is present they are concerned about her situation.   here for f /u has still been having abdominal pain post prandial. Has vascular dementia per neurologist opinion. She is getting forgetful. However she is not complaint with salt .charlotte ate at [employer] before she comes see em today. had back pain no tia no claudication. Had a death  in family her brother passed away. She is concerned about her heart . Has aaa around 4 cm.     6/16/2023  HERE TO DISCUSS HER CTA FINDINGS SHE CONTINUES TO HAVE POSTPRANDIAL ABDOMINAL PAIN CRAMPS SHE IS NOT ABLE TOE AT HER MEALS SHE IHAS LOST A LOT OF WEIGHT. HER CTA SHOWED SEVERE CELIAC AND SMA DISEASE. . SHE IS TRYING TO UNDERSTAND HER SITUATION CLINICALLY AND NEED FOR INTERVENTION. HER DAUGHTER WAS PRESENT TO HELP UNDERSTAND SINCE SHE HAS VASCULAR DEMENTIA. I ANSWERED ALL THEIR QUESTIONS  REVIEWED CTA PICTURES AND EXPLAINED PROCEDURE IN DETAIL WITH INDICATIONS RISK BENEFITS AND COMPLICATIONS    9/21/2023  Here fro f/u had aCS STENTED RCA DUE TO ULCERATED PLAQUE AND CLOTS HER ANGINA RESOLVED. HAS ISSUE WITH HTN SALT INTAKE FATIGUE. DR UNDERWOOD INCREASED COREG AND AMLODIPINE   HAD PUFFINESS IN LEFT RADIAL SITE   Past Medical History:   Diagnosis Date    AAA (abdominal aortic aneurysm) 02/13/2014    Abdominal aneurysm     3    Acute coronary syndrome     Arthritis     BPPV (benign paroxysmal positional vertigo)     Carotid artery plaque     Carotid artery stenosis and occlusion 02/13/2014    Chronic back pain     COPD (chronic obstructive pulmonary disease)     Coronary artery disease     Dementia     Emphysema lung     Hyperlipidemia     Hypertension     Myocardial infarction     x3    Neuropathy     Personal history of COVID-19 06/09/2021 11/16/2020 +Covid, recovered at home        Past Surgical History:   Procedure Laterality Date    CARDIAC CATHETERIZATION      CORONARY ANGIOPLASTY      CORONARY STENT PLACEMENT N/A 9/12/2023    Procedure: INSERTION, STENT, CORONARY ARTERY;  Surgeon: iMllie Juarez MD;  Location: [facility] CATH LAB;  Service: Cardiology;  Laterality: N/A;    HYSTERECTOMY  1988    LEFT HEART CATHETERIZATION Left 9/12/2023    Procedure: Left heart cath;  Surgeon: Millie Juarez MD;  Location: [facility] CATH LAB;  Service: Cardiology;  Laterality: Left;    PERCUTANEOUS CORONARY INTERVENTION, ARTERY N/A 9/12/2023    Procedure: Percutaneous coronary intervention;  Surgeon: Millie Juarez MD;  Location: [facility] CATH LAB;  Service: Cardiology;  Laterality: N/A;    THROMBECTOMY, CORONARY  9/12/2023    Procedure: Thrombectomy, Coronary;  Surgeon: Millie Juarez MD;  Location: [facility] CATH LAB;  Service: Cardiology;;    TONSILLECTOMY         Social History     Tobacco Use    Smoking status: Former     Current packs/day: 0.00     Average packs/day: 0.5 packs/day for 46.9 years (23.4 ttl pk-yrs)      Types: Cigarettes, Vaping w/o nicotine     Start date: 1970     Quit date: 2017     Years since quittin.3    Smokeless tobacco: Never    Tobacco comments:     3 MG NICOTINE FOR HEART.    Substance Use Topics    Alcohol use: No    Drug use: No       Family History   Problem Relation Age of Onset    Heart disease Mother     Heart attacks under age 50 Father     Heart attacks under age 50 Brother     Breast cancer Paternal Aunt        Current Outpatient Medications   Medication Sig    albuterol (PROVENTIL/VENTOLIN HFA) 90 mcg/actuation inhaler     albuterol-ipratropium (DUO-NEB) 2.5 mg-0.5 mg/3 mL nebulizer solution     amLODIPine (NORVASC) 10 MG tablet Take 1 tablet (10 mg total) by mouth once daily.    aspirin 81 MG Chew Take 81 mg by mouth once daily.    budesonide-glycopyr-formoterol (BREZTRI AEROSPHERE) 160-9-4.8 mcg/actuation HFAA Inhale 2 puffs into the lungs 2 (two) times a day.    carvediloL (COREG) 12.5 MG tablet Take 1 tablet (12.5 mg total) by mouth 2 (two) times daily.    clopidogreL (PLAVIX) 75 mg tablet TAKE 1 TABLET BY MOUTH ONCE A DAY    COMBIVENT RESPIMAT  mcg/actuation inhaler Inhale 1 puff into the lungs every 6 (six) hours as needed for Wheezing.    dicyclomine (BENTYL) 10 MG capsule TAKE 1 CAPSULE BY MOUTH 4 TIMES DAILY BEFORE MEALS AND NIGHTLY Strength: 10 mg (Patient taking differently: 4 (four) times daily as needed. TAKE 1 CAPSULE BY MOUTH 4 TIMES DAILY BEFORE MEALS AND NIGHTLY Strength: 10 mg)    doxepin (SINEQUAN) 10 MG capsule Take 10-20 mg by mouth nightly as needed.    ezetimibe-simvastatin 10-40 mg (VYTORIN) 10-40 mg per tablet TAKE 1 TABLET BY MOUTH EVERY EVENING    FEROSUL 325 mg (65 mg iron) Tab tablet TAKE (1) TABLET BY MOUTH 2 TIMES A DAY WITH MEALS    furosemide (LASIX) 20 MG tablet TAKE 20 MG TABLET ONCE DAILY (Patient taking differently: daily as needed. TAKE 20 MG TABLET ONCE DAILY)    inhalation spacing device (COMPACT SPACE CHAMBER) Use as directed for  inhalation.    LIDOcaine-prilocaine (EMLA) cream     memantine (NAMENDA) 10 MG Tab Take 10 mg by mouth 2 (two) times daily.    mirtazapine (REMERON) 15 MG tablet     olmesartan (BENICAR) 40 MG tablet Take 40 mg by mouth.    pantoprazole (PROTONIX) 40 MG tablet TAKE 1 TABLET BY MOUTH ONCE A DAY    vitamin D (VITAMIN D3) 1000 units Tab Take 1,000 Units by mouth once daily.    zolpidem (AMBIEN) 10 mg Tab TAKE 1 TABLET BY MOUTH NIGHTLY    hydroCHLOROthiazide (MICROZIDE) 12.5 mg capsule Take 12.5 mg by mouth. HASN'T STARTED YET    OXYGEN-AIR DELIVERY SYSTEMS MISC 3 L by Misc.(Non-Drug; Combo Route) route every evening.    TENS unit and electrodes Cmpk 1 each by Misc.(Non-Drug; Combo Route) route 3 (three) times daily as needed (for back pain).     No current facility-administered medications for this visit.     Current Outpatient Medications on File Prior to Visit   Medication Sig    albuterol (PROVENTIL/VENTOLIN HFA) 90 mcg/actuation inhaler     albuterol-ipratropium (DUO-NEB) 2.5 mg-0.5 mg/3 mL nebulizer solution     aspirin 81 MG Chew Take 81 mg by mouth once daily.    budesonide-glycopyr-formoterol (BREZTRI AEROSPHERE) 160-9-4.8 mcg/actuation HFAA Inhale 2 puffs into the lungs 2 (two) times a day.    clopidogreL (PLAVIX) 75 mg tablet TAKE 1 TABLET BY MOUTH ONCE A DAY    COMBIVENT RESPIMAT  mcg/actuation inhaler Inhale 1 puff into the lungs every 6 (six) hours as needed for Wheezing.    dicyclomine (BENTYL) 10 MG capsule TAKE 1 CAPSULE BY MOUTH 4 TIMES DAILY BEFORE MEALS AND NIGHTLY Strength: 10 mg (Patient taking differently: 4 (four) times daily as needed. TAKE 1 CAPSULE BY MOUTH 4 TIMES DAILY BEFORE MEALS AND NIGHTLY Strength: 10 mg)    doxepin (SINEQUAN) 10 MG capsule Take 10-20 mg by mouth nightly as needed.    ezetimibe-simvastatin 10-40 mg (VYTORIN) 10-40 mg per tablet TAKE 1 TABLET BY MOUTH EVERY EVENING    FEROSUL 325 mg (65 mg iron) Tab tablet TAKE (1) TABLET BY MOUTH 2 TIMES A DAY WITH MEALS     furosemide (LASIX) 20 MG tablet TAKE 20 MG TABLET ONCE DAILY (Patient taking differently: daily as needed. TAKE 20 MG TABLET ONCE DAILY)    inhalation spacing device (COMPACT SPACE CHAMBER) Use as directed for inhalation.    LIDOcaine-prilocaine (EMLA) cream     memantine (NAMENDA) 10 MG Tab Take 10 mg by mouth 2 (two) times daily.    mirtazapine (REMERON) 15 MG tablet     olmesartan (BENICAR) 40 MG tablet Take 40 mg by mouth.    pantoprazole (PROTONIX) 40 MG tablet TAKE 1 TABLET BY MOUTH ONCE A DAY    vitamin D (VITAMIN D3) 1000 units Tab Take 1,000 Units by mouth once daily.    zolpidem (AMBIEN) 10 mg Tab TAKE 1 TABLET BY MOUTH NIGHTLY    hydroCHLOROthiazide (MICROZIDE) 12.5 mg capsule Take 12.5 mg by mouth. HASN'T STARTED YET    OXYGEN-AIR DELIVERY SYSTEMS MISC 3 L by Misc.(Non-Drug; Combo Route) route every evening.    TENS unit and electrodes Cmpk 1 each by Misc.(Non-Drug; Combo Route) route 3 (three) times daily as needed (for back pain).    [DISCONTINUED] amLODIPine (NORVASC) 5 MG tablet Take 1 tablet (5 mg total) by mouth once daily.    [DISCONTINUED] carvediloL (COREG) 6.25 MG tablet Take 1 tablet (6.25 mg total) by mouth 2 (two) times daily.     No current facility-administered medications on file prior to visit.     Review of patient's allergies indicates:  No Known Allergies     Review of Systems   Constitutional: Positive for malaise/fatigue.   Eyes:  Negative for blurred vision.   Cardiovascular:  Negative for chest pain, claudication, cyanosis, dyspnea on exertion, irregular heartbeat, leg swelling, near-syncope, orthopnea, palpitations and paroxysmal nocturnal dyspnea.   Respiratory:  Negative for cough, hemoptysis and shortness of breath.    Hematologic/Lymphatic: Negative for bleeding problem. Does not bruise/bleed easily.   Skin:  Negative for dry skin and itching.   Musculoskeletal:  Negative for falls, muscle weakness and myalgias.   Gastrointestinal:  Negative for abdominal pain, diarrhea,  "heartburn, hematemesis, hematochezia and melena.   Genitourinary:  Negative for flank pain and hematuria.   Neurological:  Positive for headaches and weakness. Negative for dizziness, focal weakness, light-headedness, numbness, paresthesias and seizures.   Psychiatric/Behavioral:  Negative for altered mental status and memory loss. The patient is not nervous/anxious.    Allergic/Immunologic: Negative for hives.       Objective:   Physical Exam  Vitals:    09/21/23 1643   BP: (!) 150/70   BP Location: Right arm   Patient Position: Sitting   BP Method: Medium (Manual)   Pulse: 75   SpO2: 99%   Weight: 59.6 kg (131 lb 6.3 oz)   Height: 5' 2" (1.575 m)   Constitutional:       General: She is not in acute distress.     Appearance: Normal appearance. She is well-developed. She is not ill-appearing.   HENT:      Head: Normocephalic and atraumatic.   Eyes:      General: No scleral icterus.     Pupils: Pupils are equal, round, and reactive to light.   Neck:      Thyroid: No thyromegaly.      Vascular: Normal carotid pulses. Carotid bruit present. No hepatojugular reflux or JVD.      Trachea: No tracheal deviation.   Cardiovascular:      Rate and Rhythm: Normal rate and regular rhythm.      Pulses: Normal pulses.      Heart sounds: Murmur heard.   Harsh midsystolic murmur is present with a grade of 2/6 at the upper right sternal border radiating to the neck.   High-pitched blowing decrescendo early diastolic murmur is present with a grade of 1/4 at the upper right sternal border radiating to the apex.     No friction rub. No gallop.      Comments: Abdominal bruit.  HAS SOME PUFFINESS AT LEFT ACCESS SITE NO BRUIT NOTED.  Pulmonary:      Effort: Pulmonary effort is normal. No respiratory distress.      Breath sounds: Normal breath sounds. No wheezing, rhonchi or rales.   Chest:      Chest wall: No tenderness.   Abdominal:      General: Bowel sounds are normal. There is no abdominal bruit.      Palpations: Abdomen is soft. There " is no hepatomegaly or pulsatile mass.      Tenderness: There is no abdominal tenderness.   Musculoskeletal:      Right shoulder: No deformity.      Cervical back: Normal range of motion and neck supple.      Right lower leg: No edema.      Left lower leg: No edema.   Skin:     General: Skin is warm and dry.      Findings: No erythema or rash.      Nails: There is no clubbing.   Neurological:      Mental Status: She is alert and oriented to person, place, and time.      Cranial Nerves: No cranial nerve deficit.      Coordination: Coordination normal.   Psychiatric:         Speech: Speech normal.         Behavior: Behavior normal.      Vitals     Lab Results   Component Value Date    CHOL 138 04/18/2023    CHOL 143 12/01/2022    CHOL 164 10/25/2022      Body mass index is 24.03 kg/m².   Lab Results   Component Value Date    HGBA1C 5.5 04/18/2023      BMP  Lab Results   Component Value Date     09/13/2023    K 4.1 09/13/2023     09/13/2023    CO2 21 (L) 09/13/2023    BUN 10 09/13/2023    CREATININE 0.9 09/13/2023    CALCIUM 8.8 09/13/2023    ANIONGAP 9 09/13/2023    EGFRNORACEVR >60 09/13/2023      Lab Results   Component Value Date    HDL 45 04/18/2023    HDL 56 12/01/2022    HDL 60 10/25/2022     Lab Results   Component Value Date    LDLCALC 79.0 04/18/2023    LDLCALC 72.0 12/01/2022    LDLCALC 83.0 10/25/2022     Lab Results   Component Value Date    TRIG 70 04/18/2023    TRIG 75 12/01/2022    TRIG 105 10/25/2022     Lab Results   Component Value Date    CHOLHDL 32.6 04/18/2023    CHOLHDL 39.2 12/01/2022    CHOLHDL 36.6 10/25/2022       Chemistry        Component Value Date/Time     09/13/2023 0544    K 4.1 09/13/2023 0544     09/13/2023 0544    CO2 21 (L) 09/13/2023 0544    BUN 10 09/13/2023 0544    CREATININE 0.9 09/13/2023 0544    GLU 85 09/13/2023 0544        Component Value Date/Time    CALCIUM 8.8 09/13/2023 0544    ALKPHOS 78 09/12/2023 0048    AST 27 09/12/2023 0048    ALT 19  09/12/2023 0048    BILITOT 0.4 09/12/2023 0048    ESTGFRAFRICA >60.0 04/07/2022 0922    EGFRNONAA >60.0 04/07/2022 0922          Lab Results   Component Value Date    TSH 1.509 05/04/2018     Lab Results   Component Value Date    INR 1.1 12/20/2017    INR 1.1 08/10/2012     Lab Results   Component Value Date    WBC 7.21 09/13/2023    HGB 11.4 (L) 09/13/2023    HCT 33.4 (L) 09/13/2023    MCV 90 09/13/2023     09/13/2023     BMP  Sodium   Date Value Ref Range Status   09/13/2023 136 136 - 145 mmol/L Final     Potassium   Date Value Ref Range Status   09/13/2023 4.1 3.5 - 5.1 mmol/L Final     Chloride   Date Value Ref Range Status   09/13/2023 106 95 - 110 mmol/L Final     CO2   Date Value Ref Range Status   09/13/2023 21 (L) 23 - 29 mmol/L Final     BUN   Date Value Ref Range Status   09/13/2023 10 8 - 23 mg/dL Final     Creatinine   Date Value Ref Range Status   09/13/2023 0.9 0.5 - 1.4 mg/dL Final     Calcium   Date Value Ref Range Status   09/13/2023 8.8 8.7 - 10.5 mg/dL Final     Anion Gap   Date Value Ref Range Status   09/13/2023 9 8 - 16 mmol/L Final     eGFR if    Date Value Ref Range Status   04/07/2022 >60.0 >60 mL/min/1.73 m^2 Final     eGFR if non    Date Value Ref Range Status   04/07/2022 >60.0 >60 mL/min/1.73 m^2 Final     Comment:     Calculation used to obtain the estimated glomerular filtration  rate (eGFR) is the CKD-EPI equation.        CrCl cannot be calculated (Patient's most recent lab result is older than the maximum 7 days allowed.).    Assessment:     1. Coronary artery disease involving native coronary artery of native heart with unstable angina pectoris    2. COPD, moderate    3. Nocturnal hypoxemia due to emphysema    4. Infrarenal abdominal aortic aneurysm (AAA) without rupture    5. Aortic atherosclerosis    6. Bilateral carotid artery stenosis    7. Chest pain, unspecified type    8. Essential hypertension    9. Mixed hyperlipidemia    S/P ACS   WITH RESTENTING RCA SYMPTOMS RESOLVED   HTN UNCONTROLLED SYMPTOMATIC DISCUSSED LOW SALT DIET AND ADJUSTING MEDICAL THERAPY READING LABELS LIMITING TO 2 G SODIUM A DAY   HAS SOME PUFFINESS AT CATH ACCESS SITE NO BRUIT WILL ULTRASOUND TO MAKE SURE NO PSEUDOANEURYSM    Plan:   ULTRASOUND LEFT WRIST  1 MONTHS F/U.  LOW SALT DIET   EDUCATED ABOUT RISK FACTOR MODIFICATION HTN CONTROL HYPOTENSION AND BRADYCARDIA RELATED SYMPTOMS.

## 2023-09-22 ENCOUNTER — HOSPITAL ENCOUNTER (OUTPATIENT)
Dept: CARDIOLOGY | Facility: HOSPITAL | Age: 68
Discharge: HOME | End: 2023-09-22
Attending: INTERNAL MEDICINE
Payer: MEDICARE

## 2023-09-22 ENCOUNTER — TELEPHONE (OUTPATIENT)
Dept: CARDIOLOGY | Facility: CLINIC | Age: 68
End: 2023-09-22
Payer: MEDICARE

## 2023-09-22 VITALS
HEIGHT: 62 IN | SYSTOLIC BLOOD PRESSURE: 150 MMHG | WEIGHT: 131 LBS | BODY MASS INDEX: 24.11 KG/M2 | DIASTOLIC BLOOD PRESSURE: 70 MMHG

## 2023-09-22 DIAGNOSIS — I70.0 AORTIC ATHEROSCLEROSIS: ICD-10-CM

## 2023-09-22 DIAGNOSIS — R07.9 CHEST PAIN, UNSPECIFIED TYPE: ICD-10-CM

## 2023-09-22 DIAGNOSIS — I25.110 CORONARY ARTERY DISEASE INVOLVING NATIVE CORONARY ARTERY OF NATIVE HEART WITH UNSTABLE ANGINA PECTORIS: ICD-10-CM

## 2023-09-22 LAB
UPPER ARTERIAL LEFT ARM RADIAL SYS MAX: 72 CM/S
UPPER ARTERIAL LEFT ARM ULNAR SYS MAX: 62 CM/S

## 2023-09-22 PROCEDURE — 93931 UPPER EXTREMITY STUDY: CPT | Mod: 26,HCNC,LT, | Performed by: INTERNAL MEDICINE

## 2023-09-22 PROCEDURE — 93931 UPPER EXTREMITY STUDY: CPT | Mod: HCNC,LT

## 2023-09-22 PROCEDURE — 93931 CV US DOPPLER ARTERIAL ARM LEFT (CUPID ONLY): ICD-10-PCS | Mod: 26,HCNC,LT, | Performed by: INTERNAL MEDICINE

## 2023-09-22 NOTE — TELEPHONE ENCOUNTER
Pt was contacted about results:     No abnormality in left wrist         Pt verbalized understanding with no questions or concerns.      ----- Message from Millie Juarez MD sent at 9/22/2023  2:50 PM CDT -----  No abnormality in left wrist

## 2023-09-23 ENCOUNTER — HOSPITAL ENCOUNTER (EMERGENCY)
Facility: HOSPITAL | Age: 68
Discharge: HOME | End: 2023-09-23
Attending: EMERGENCY MEDICINE
Payer: MEDICARE

## 2023-09-23 ENCOUNTER — NURSE TRIAGE (OUTPATIENT)
Dept: ADMINISTRATIVE | Facility: CLINIC | Age: 68
End: 2023-09-23
Payer: MEDICARE

## 2023-09-23 VITALS
RESPIRATION RATE: 18 BRPM | OXYGEN SATURATION: 96 % | SYSTOLIC BLOOD PRESSURE: 142 MMHG | DIASTOLIC BLOOD PRESSURE: 67 MMHG | HEART RATE: 88 BPM | TEMPERATURE: 98 F

## 2023-09-23 DIAGNOSIS — I95.2 HYPOTENSION DUE TO DRUGS: Primary | ICD-10-CM

## 2023-09-23 DIAGNOSIS — R53.1 WEAKNESS: ICD-10-CM

## 2023-09-23 LAB
ALBUMIN SERPL BCP-MCNC: 3.3 G/DL (ref 3.5–5.2)
ALP SERPL-CCNC: 62 U/L (ref 55–135)
ALT SERPL W/O P-5'-P-CCNC: 13 U/L (ref 10–44)
ANION GAP SERPL CALC-SCNC: 11 MMOL/L (ref 8–16)
AST SERPL-CCNC: 18 U/L (ref 10–40)
BASOPHILS # BLD AUTO: 0.06 K/UL (ref 0–0.2)
BASOPHILS NFR BLD: 0.5 % (ref 0–1.9)
BILIRUB SERPL-MCNC: 0.6 MG/DL (ref 0.1–1)
BILIRUB UR QL STRIP: NEGATIVE
BUN SERPL-MCNC: 14 MG/DL (ref 8–23)
CALCIUM SERPL-MCNC: 9.1 MG/DL (ref 8.7–10.5)
CHLORIDE SERPL-SCNC: 104 MMOL/L (ref 95–110)
CLARITY UR: CLEAR
CO2 SERPL-SCNC: 21 MMOL/L (ref 23–29)
COLOR UR: YELLOW
CREAT SERPL-MCNC: 1 MG/DL (ref 0.5–1.4)
DIFFERENTIAL METHOD: ABNORMAL
EOSINOPHIL # BLD AUTO: 0.2 K/UL (ref 0–0.5)
EOSINOPHIL NFR BLD: 1.3 % (ref 0–8)
ERYTHROCYTE [DISTWIDTH] IN BLOOD BY AUTOMATED COUNT: 13.2 % (ref 11.5–14.5)
EST. GFR  (NO RACE VARIABLE): >60 ML/MIN/1.73 M^2
GLUCOSE SERPL-MCNC: 129 MG/DL (ref 70–110)
GLUCOSE UR QL STRIP: NEGATIVE
HCT VFR BLD AUTO: 33 % (ref 37–48.5)
HCV AB SERPL QL IA: NEGATIVE
HEP C VIRUS HOLD SPECIMEN: NORMAL
HGB BLD-MCNC: 11.1 G/DL (ref 12–16)
HGB UR QL STRIP: NEGATIVE
HIV 1+2 AB+HIV1 P24 AG SERPL QL IA: NEGATIVE
IMM GRANULOCYTES # BLD AUTO: 0.04 K/UL (ref 0–0.04)
IMM GRANULOCYTES NFR BLD AUTO: 0.4 % (ref 0–0.5)
KETONES UR QL STRIP: NEGATIVE
LEUKOCYTE ESTERASE UR QL STRIP: NEGATIVE
LYMPHOCYTES # BLD AUTO: 1.2 K/UL (ref 1–4.8)
LYMPHOCYTES NFR BLD: 10.7 % (ref 18–48)
MCH RBC QN AUTO: 31 PG (ref 27–31)
MCHC RBC AUTO-ENTMCNC: 33.6 G/DL (ref 32–36)
MCV RBC AUTO: 92 FL (ref 82–98)
MONOCYTES # BLD AUTO: 0.7 K/UL (ref 0.3–1)
MONOCYTES NFR BLD: 6.4 % (ref 4–15)
NEUTROPHILS # BLD AUTO: 9.1 K/UL (ref 1.8–7.7)
NEUTROPHILS NFR BLD: 80.7 % (ref 38–73)
NITRITE UR QL STRIP: NEGATIVE
NRBC BLD-RTO: 0 /100 WBC
PH UR STRIP: 7 [PH] (ref 5–8)
PLATELET # BLD AUTO: 226 K/UL (ref 150–450)
PMV BLD AUTO: 9.3 FL (ref 9.2–12.9)
POTASSIUM SERPL-SCNC: 3.7 MMOL/L (ref 3.5–5.1)
PROT SERPL-MCNC: 6.4 G/DL (ref 6–8.4)
PROT UR QL STRIP: NEGATIVE
RBC # BLD AUTO: 3.58 M/UL (ref 4–5.4)
SODIUM SERPL-SCNC: 136 MMOL/L (ref 136–145)
SP GR UR STRIP: 1.01 (ref 1–1.03)
TROPONIN I SERPL DL<=0.01 NG/ML-MCNC: 0.03 NG/ML (ref 0–0.03)
TROPONIN I SERPL DL<=0.01 NG/ML-MCNC: 0.04 NG/ML (ref 0–0.03)
URN SPEC COLLECT METH UR: NORMAL
UROBILINOGEN UR STRIP-ACNC: NEGATIVE EU/DL
WBC # BLD AUTO: 11.33 K/UL (ref 3.9–12.7)

## 2023-09-23 PROCEDURE — 86803 HEPATITIS C AB TEST: CPT | Mod: HCNC | Performed by: EMERGENCY MEDICINE

## 2023-09-23 PROCEDURE — 93010 ELECTROCARDIOGRAM REPORT: CPT | Mod: HCNC,,, | Performed by: INTERNAL MEDICINE

## 2023-09-23 PROCEDURE — 93010 EKG 12-LEAD: ICD-10-PCS | Mod: HCNC,,, | Performed by: INTERNAL MEDICINE

## 2023-09-23 PROCEDURE — 80053 COMPREHEN METABOLIC PANEL: CPT | Mod: HCNC | Performed by: EMERGENCY MEDICINE

## 2023-09-23 PROCEDURE — 87389 HIV-1 AG W/HIV-1&-2 AB AG IA: CPT | Mod: HCNC | Performed by: EMERGENCY MEDICINE

## 2023-09-23 PROCEDURE — 99284 EMERGENCY DEPT VISIT MOD MDM: CPT | Mod: HCNC

## 2023-09-23 PROCEDURE — 81003 URINALYSIS AUTO W/O SCOPE: CPT | Mod: HCNC | Performed by: EMERGENCY MEDICINE

## 2023-09-23 PROCEDURE — 93005 ELECTROCARDIOGRAM TRACING: CPT | Mod: HCNC

## 2023-09-23 PROCEDURE — 84484 ASSAY OF TROPONIN QUANT: CPT | Mod: HCNC | Performed by: EMERGENCY MEDICINE

## 2023-09-23 PROCEDURE — 85025 COMPLETE CBC W/AUTO DIFF WBC: CPT | Mod: HCNC | Performed by: EMERGENCY MEDICINE

## 2023-09-23 NOTE — ED PROVIDER NOTES
SCRIBE #1 NOTE: I, Harris Diaz, am scribing for, and in the presence of, Mayela Glez MD. I have scribed the HPI, ROS, and PE.     SCRIBE #2 NOTE: I, Jessie Winslow, am scribing for, and in the presence of,  Lakeshia Dawn MD. I have scribed the remaining portions of the note not scribed by Scribe #1.      History     Chief Complaint   Patient presents with    Hypotension     Pt. Presents to ED via [facility] due to possibly taking 2 doses of her BP meds. Initial Bp was 56/26 after 500ml of NS pt bp is 133 59      Review of patient's allergies indicates:  No Known Allergies      History of Present Illness     HPI    9/23/2023, 1:15 PM  History obtained from the patient and EMS      History of Present Illness: Gemma Vick is a 68 y.o. female patient with a PMHx of COPD, CAD, dementia, AAA, HTN, HLD, and MI who presents to the Emergency Department for evaluation of hypotension which onset 2 hours ago. Pt reports that she christine to Dr. Altman (Family Medicine) and Dr. Juarez (Cardiology) 2 days ago and her Carvedilol prescription was increased from 6.25 mg to 12.5 mg and her Norvasc prescription was increased from 5 mg to 10 mg. The pt also reports that she might have accidentally taken Norvasc yesterday morning, last night, and this morning, when she is only supposed to take it once a day. 30 minutes after she took her medications this morning sxs began. EMS reports that initial BP was 56/26 and after 500mL of NS BP was 133.59. Symptoms are episodic and moderate in severity. No mitigating or exacerbating factors reported. Associated sxs include weakness and nausea. Patient denies any F/C, cough, SOB, dizziness, CP, and all other sxs at this time. No further complaints or concerns at this time.       Arrival mode: [facility] in [place]    PCP: Olga Altman MD        Past Medical History:  Past Medical History:   Diagnosis Date    AAA (abdominal aortic aneurysm) 02/13/2014    Abdominal aneurysm     3    Acute coronary syndrome      Arthritis     BPPV (benign paroxysmal positional vertigo)     Carotid artery plaque     Carotid artery stenosis and occlusion 2014    Chronic back pain     COPD (chronic obstructive pulmonary disease)     Coronary artery disease     Dementia     Emphysema lung     Hyperlipidemia     Hypertension     Myocardial infarction     x3    Neuropathy     Personal history of COVID-19 2021 +Covid, recovered at home        Past Surgical History:  Past Surgical History:   Procedure Laterality Date    CARDIAC CATHETERIZATION      CORONARY ANGIOPLASTY      CORONARY STENT PLACEMENT N/A 2023    Procedure: INSERTION, STENT, CORONARY ARTERY;  Surgeon: Millie Juarez MD;  Location: [facility] CATH LAB;  Service: Cardiology;  Laterality: N/A;    HYSTERECTOMY  1988    LEFT HEART CATHETERIZATION Left 2023    Procedure: Left heart cath;  Surgeon: Millie Juarez MD;  Location: [facility] CATH LAB;  Service: Cardiology;  Laterality: Left;    PERCUTANEOUS CORONARY INTERVENTION, ARTERY N/A 2023    Procedure: Percutaneous coronary intervention;  Surgeon: Millie Juarez MD;  Location: [facility] CATH LAB;  Service: Cardiology;  Laterality: N/A;    THROMBECTOMY, CORONARY  2023    Procedure: Thrombectomy, Coronary;  Surgeon: Millie Juarez MD;  Location: [facility] CATH LAB;  Service: Cardiology;;    TONSILLECTOMY           Family History:  Family History   Problem Relation Age of Onset    Heart disease Mother     Heart attacks under age 50 Father     Heart attacks under age 50 Brother     Breast cancer Paternal Aunt        Social History:  Social History     Tobacco Use    Smoking status: Former     Current packs/day: 0.00     Average packs/day: 0.5 packs/day for 46.9 years (23.4 ttl pk-yrs)     Types: Cigarettes, Vaping w/o nicotine     Start date: 1970     Quit date: 2017     Years since quittin.4    Smokeless tobacco: Never    Tobacco comments:     3 MG NICOTINE FOR HEART.    Substance and Sexual  Activity    Alcohol use: No    Drug use: No    Sexual activity: Not Currently     Birth control/protection: None        Review of Systems     Review of Systems   Constitutional:  Negative for chills and fever.   Respiratory:  Negative for cough and shortness of breath.    Cardiovascular:  Negative for chest pain.   Gastrointestinal:  Positive for nausea.   Neurological:  Positive for weakness (generalized). Negative for dizziness.   All other systems reviewed and are negative.       Physical Exam     Initial Vitals [09/23/23 1259]   BP Pulse Resp Temp SpO2   (!) 133/59 (!) 56 16 97.6 °F (36.4 °C) 99 %      MAP       --          Physical Exam  Nursing Notes and Vital Signs Reviewed.  Constitutional: Patient is in no acute distress. Well-developed and well-nourished.  Head: Atraumatic. Normocephalic.  Eyes: PERRL. EOM intact. Conjunctivae are not pale. No scleral icterus.  ENT: Mucous membranes are moist. Oropharynx is clear and symmetric.    Neck: Supple. Full ROM.  Cardiovascular: Regular rate. Regular rhythm. No murmurs, rubs, or gallops. Distal pulses are 2+ and symmetric.  Pulmonary/Chest: No respiratory distress. Clear to auscultation bilaterally. No wheezing or rales.  Abdominal: Soft and non-distended. There is no tenderness. No rebound, guarding, or rigidity.  Genitourinary: No CVA tenderness  Musculoskeletal: Moves all extremities. No obvious deformities. No edema.  Skin: Warm and dry.  Neurological:  Alert, awake, and appropriate.  Normal speech.  No acute focal neurological deficits are appreciated.  Psychiatric: Normal affect. Good eye contact. Appropriate in content.     ED Course   Procedures  ED Vital Signs:  Vitals:    09/23/23 1259 09/23/23 1356 09/23/23 1358 09/23/23 1402   BP: (!) 133/59 136/63 (!) 143/63 (!) 110/53   Pulse: (!) 56 64 66 66   Resp: 16   16   Temp: 97.6 °F (36.4 °C)      TempSrc: Oral      SpO2: 99% 97% 97% 95%    09/23/23 1437 09/23/23 1500 09/23/23 1530 09/23/23 1630   BP: 135/62   (!) 154/68 (!) 115/56   Pulse: 65  73 75   Resp: 16  15 18   Temp:  97.7 °F (36.5 °C)     TempSrc:       SpO2: 98%  96% 95%    09/23/23 1700 09/23/23 1730 09/23/23 1830 09/23/23 1910   BP:  (!) 144/63 (!) 140/67 (!) 142/67   Pulse:  85 88 88   Resp:  17 18 18   Temp: 97.9 °F (36.6 °C)   98 °F (36.7 °C)   TempSrc: Oral      SpO2:  96% 97% 96%       Abnormal Lab Results:  Labs Reviewed   CBC W/ AUTO DIFFERENTIAL - Abnormal; Notable for the following components:       Result Value    RBC 3.58 (*)     Hemoglobin 11.1 (*)     Hematocrit 33.0 (*)     Gran # (ANC) 9.1 (*)     Gran % 80.7 (*)     Lymph % 10.7 (*)     All other components within normal limits   COMPREHENSIVE METABOLIC PANEL - Abnormal; Notable for the following components:    CO2 21 (*)     Glucose 129 (*)     Albumin 3.3 (*)     All other components within normal limits   TROPONIN I - Abnormal; Notable for the following components:    Troponin I 0.043 (*)     All other components within normal limits   TROPONIN I - Abnormal; Notable for the following components:    Troponin I 0.030 (*)     All other components within normal limits   HIV 1 / 2 ANTIBODY    Narrative:     Release to patient->Immediate   HEPATITIS C ANTIBODY    Narrative:     Release to patient->Immediate   HEP C VIRUS HOLD SPECIMEN    Narrative:     Release to patient->Immediate   URINALYSIS, REFLEX TO URINE CULTURE    Narrative:     Specimen Source->Urine        All Lab Results:  Results for orders placed or performed during the hospital encounter of 09/23/23   HIV 1/2 Ag/Ab (4th Gen)   Result Value Ref Range    HIV 1/2 Ag/Ab Negative Negative   Hepatitis C Antibody   Result Value Ref Range    Hepatitis C Ab Negative Negative   HCV Virus Hold Specimen   Result Value Ref Range    HEP C Virus Hold Specimen Hold for HCV sendout    CBC auto differential   Result Value Ref Range    WBC 11.33 3.90 - 12.70 K/uL    RBC 3.58 (L) 4.00 - 5.40 M/uL    Hemoglobin 11.1 (L) 12.0 - 16.0 g/dL    Hematocrit  33.0 (L) 37.0 - 48.5 %    MCV 92 82 - 98 fL    MCH 31.0 27.0 - 31.0 pg    MCHC 33.6 32.0 - 36.0 g/dL    RDW 13.2 11.5 - 14.5 %    Platelets 226 150 - 450 K/uL    MPV 9.3 9.2 - 12.9 fL    Immature Granulocytes 0.4 0.0 - 0.5 %    Gran # (ANC) 9.1 (H) 1.8 - 7.7 K/uL    Immature Grans (Abs) 0.04 0.00 - 0.04 K/uL    Lymph # 1.2 1.0 - 4.8 K/uL    Mono # 0.7 0.3 - 1.0 K/uL    Eos # 0.2 0.0 - 0.5 K/uL    Baso # 0.06 0.00 - 0.20 K/uL    nRBC 0 0 /100 WBC    Gran % 80.7 (H) 38.0 - 73.0 %    Lymph % 10.7 (L) 18.0 - 48.0 %    Mono % 6.4 4.0 - 15.0 %    Eosinophil % 1.3 0.0 - 8.0 %    Basophil % 0.5 0.0 - 1.9 %    Differential Method Automated    Comprehensive metabolic panel   Result Value Ref Range    Sodium 136 136 - 145 mmol/L    Potassium 3.7 3.5 - 5.1 mmol/L    Chloride 104 95 - 110 mmol/L    CO2 21 (L) 23 - 29 mmol/L    Glucose 129 (H) 70 - 110 mg/dL    BUN 14 8 - 23 mg/dL    Creatinine 1.0 0.5 - 1.4 mg/dL    Calcium 9.1 8.7 - 10.5 mg/dL    Total Protein 6.4 6.0 - 8.4 g/dL    Albumin 3.3 (L) 3.5 - 5.2 g/dL    Total Bilirubin 0.6 0.1 - 1.0 mg/dL    Alkaline Phosphatase 62 55 - 135 U/L    AST 18 10 - 40 U/L    ALT 13 10 - 44 U/L    eGFR >60 >60 mL/min/1.73 m^2    Anion Gap 11 8 - 16 mmol/L   Troponin I   Result Value Ref Range    Troponin I 0.043 (H) 0.000 - 0.026 ng/mL   Urinalysis, Reflex to Urine Culture Urine, Clean Catch    Specimen: Urine   Result Value Ref Range    Specimen UA Urine, Clean Catch     Color, UA Yellow Yellow, Straw, Whit    Appearance, UA Clear Clear    pH, UA 7.0 5.0 - 8.0    Specific Gravity, UA 1.015 1.005 - 1.030    Protein, UA Negative Negative    Glucose, UA Negative Negative    Ketones, UA Negative Negative    Bilirubin (UA) Negative Negative    Occult Blood UA Negative Negative    Nitrite, UA Negative Negative    Urobilinogen, UA Negative <2.0 EU/dL    Leukocytes, UA Negative Negative   Troponin I   Result Value Ref Range    Troponin I 0.030 (H) 0.000 - 0.026 ng/mL     *Note: Due to a large  number of results and/or encounters for the requested time period, some results have not been displayed. A complete set of results can be found in Results Review.        Imaging Results:  Imaging Results    None          The EKG was ordered, reviewed, and independently interpreted by the ED provider.  Interpretation time: 13:50  Rate: 61 BPM  Rhythm: Sinus rhythm with 1 st degree AV block.  Interpretation: Nonspecific T wave abnormality. No STEMI.           The Emergency Provider reviewed the vital signs and test results, which are outlined above.     ED Discussion       4:00 PM: Dr. Glez transfers care of patient to Dr. Dawn pending disposition results.     7:04 PM: Reassessed pt at this time. Discussed with pt all pertinent ED information and results. Discussed pt dx and plan of tx. Gave pt all f/u and return to the ED instructions. All questions and concerns were addressed at this time. Pt expresses understanding of information and instructions, and is comfortable with plan to discharge. Pt is stable for discharge.    I discussed with patient and/or family/caretaker that evaluation in the ED does not suggest any emergent or life threatening medical conditions requiring immediate intervention beyond what was provided in the ED, and I believe patient is safe for discharge.  Regardless, an unremarkable evaluation in the ED does not preclude the development or presence of a serious of life threatening condition. As such, patient was instructed to return immediately for any worsening or change in current symptoms.         Medical Decision Making  Amount and/or Complexity of Data Reviewed  Labs: ordered. Decision-making details documented in ED Course.  ECG/medicine tests: ordered. Decision-making details documented in ED Course.                ED Medication(s):  Medications - No data to display    Discharge Medication List as of 9/23/2023  6:53 PM           Follow-up Information       Schedule an appointment as  soon as possible for a visit  with Olga Altman MD.    Specialty: Family Medicine  Contact information:  [address] [address] [address] [phone]                                 Scribe Attestation:   Scribe #1: I performed the above scribed service and the documentation accurately describes the services I performed. I attest to the accuracy of the note.     Attending:   Physician Attestation Statement for Scribe #1: I, Mayela Glez MD, personally performed the services described in this documentation, as scribed by Harris Diaz, in my presence, and it is both accurate and complete.       Scribe Attestation:   Scribe #2: I performed the above scribed service and the documentation accurately describes the services I performed. I attest to the accuracy of the note.    Attending Attestation:           Physician Attestation for Scribe:    Physician Attestation Statement for Scribe #2: I, Lakeshia Dawn MD, reviewed documentation, as scribed by Jessie Winslow in my presence, and it is both accurate and complete. I also acknowledge and confirm the content of the note done by Scribe #1.           Clinical Impression       ICD-10-CM ICD-9-CM   1. Hypotension due to drugs  I95.2 458.8     E947.9   2. Weakness  R53.1 780.79       Disposition:   Disposition: Discharged  Condition: Stable        Lakeshia Dawn MD  09/26/23 [id number]

## 2023-09-24 NOTE — TELEPHONE ENCOUNTER
"Reason for Disposition   Major surgery in the past month    Additional Information   Negative: Started suddenly after an allergic medicine, an allergic food, or bee sting   Negative: Shock suspected (e.g., cold/pale/clammy skin, too weak to stand, low BP, rapid pulse)   Negative: Difficult to awaken or acting confused (e.g., disoriented, slurred speech)   Negative: Fainted   Negative: [1] Systolic BP < 90 AND [2] dizzy, lightheaded, or weak   Negative: Chest pain   Negative: Bleeding (e.g., vomiting blood, rectal bleeding or tarry stools, severe vaginal bleeding)(Exception: fainted from sight of small amount of blood; small cut or abrasion)   Negative: Extra heart beats or heart is beating fast  (i.e., "palpitations")   Negative: Sounds like a life-threatening emergency to the triager   Negative: [1] Systolic BP < 80 AND [2] NOT dizzy, lightheaded or weak   Negative: Abdominal pain   Negative: Fever > 100.4 F (38.0 C)    Protocols used: Low Blood Pressure-A-  Pt called re went to ED today via EMS. pt  seen for Low BP. BP came up. pt on Norvasc daily. last pm took coreg 12.5. this am took Norvasc and one dose of coreg. Pt reports she faded out..BP real low. BP 50/26 HR= 22. EMS gave IV. Pt states took corgard and nemanda. BP 1009pm 110/60 HR=61. Took BP again at 1020pm 98/59 HR=60. Pt concerned that her BP is now lower than when she was in ED. pt denies dizziness. Pt reports that she feeling dizzy this am. Spouse noticed a color change this morning and called EMS. /67 HR88 when pt left the ED. Pt states two stents placed and blood clots remd two weeks ago.  has been moving meds up and down. Pt feels norvasc taken too close together caused her BP to go down. hx dementia. Rec ED due to pts concern for BP and recent surgery. pt agrees than states she feels fine and will check her BP and if its low will call EMS.   "

## 2023-09-25 ENCOUNTER — TELEPHONE (OUTPATIENT)
Dept: CARDIOLOGY | Facility: CLINIC | Age: 68
End: 2023-09-25
Payer: MEDICARE

## 2023-09-25 NOTE — TELEPHONE ENCOUNTER
"LM for patient to call back wit family member      Cut amlodipine down to 5 mg [po dail6y      You  Millie Juarez MD 2 hours ago (8:58 AM)     ABDIRAHMAN  Please advise regarding low b/p and any medication changes      Lakeshia Hdz, RN  [facility] Staff; Larry HATHAWAY Staff 2 days ago     DIONTE  See nurse on call note. Thank you      Lakeshia Hdz RN 2 days ago     DIONTE  Reason for Disposition   Major surgery in the past month    Additional Information   Negative: Started suddenly after an allergic medicine, an allergic food, or bee sting   Negative: Shock suspected (e.g., cold/pale/clammy skin, too weak to stand, low BP, rapid pulse)   Negative: Difficult to awaken or acting confused (e.g., disoriented, slurred speech)   Negative: Fainted   Negative: [1] Systolic BP < 90 AND [2] dizzy, lightheaded, or weak   Negative: Chest pain   Negative: Bleeding (e.g., vomiting blood, rectal bleeding or tarry stools, severe vaginal bleeding)(Exception: fainted from sight of small amount of blood; small cut or abrasion)   Negative: Extra heart beats or heart is beating fast  (i.e., "palpitations")   Negative: Sounds like a life-threatening emergency to the triager   Negative: [1] Systolic BP < 80 AND [2] NOT dizzy, lightheaded or weak   Negative: Abdominal pain   Negative: Fever > 100.4 F (38.0 C)    Protocols used: Low Blood Pressure-A-AH  Pt called re went to ED today via EMS. pt  seen for Low BP. BP came up. pt on Norvasc daily. last pm took coreg 12.5. this am took Norvasc and one dose of coreg. Pt reports she faded out..BP real low. BP 50/26 HR= 22. EMS gave IV. Pt states took corgard and nemanda. BP 1009pm 110/60 HR=61. Took BP again at 1020pm 98/59 HR=60. Pt concerned that her BP is now lower than when she was in ED. pt denies dizziness. Pt reports that she feeling dizzy this am. Spouse noticed a color change this morning and called EMS. /67 HR88 when pt left the ED. Pt states two stents placed and blood clots remd " two weeks ago.  has been moving meds up and down. Pt feels norvasc taken too close together caused her BP to go down. hx dementia. Rec ED due to pts concern for BP and recent surgery. pt agrees than states she feels fine and will check her BP and if its low will call EMS.

## 2023-09-25 NOTE — TELEPHONE ENCOUNTER
Spoke to patient and she  verbalized an understanding      Cut amlodipine down to 5 mg [po dail6y       You  Millie Juarez MD 2 hours ago (8:58 AM)            ----- Message from Lisseth Brush sent at 9/25/2023 12:15 PM CDT -----  Contact: Laxmi  .Type:  Patient Returning Call    Who Called:Laxmi  Who Left Message for Patient:nurse  Does the patient know what this is regarding?:yes  Would the patient rather a call back or a response via [facility]? Call back  Best Call Back Number:[phone]  Additional Information: na        Thanks  ASHLEY

## 2023-09-26 ENCOUNTER — PATIENT MESSAGE (OUTPATIENT)
Dept: FAMILY MEDICINE | Facility: CLINIC | Age: 68
End: 2023-09-26
Payer: MEDICARE

## 2023-10-04 ENCOUNTER — OFFICE VISIT (OUTPATIENT)
Dept: FAMILY MEDICINE | Facility: CLINIC | Age: 68
End: 2023-10-04
Payer: MEDICARE

## 2023-10-04 VITALS
WEIGHT: 127.19 LBS | DIASTOLIC BLOOD PRESSURE: 68 MMHG | BODY MASS INDEX: 23.4 KG/M2 | HEART RATE: 77 BPM | OXYGEN SATURATION: 97 % | HEIGHT: 62 IN | SYSTOLIC BLOOD PRESSURE: 134 MMHG

## 2023-10-04 DIAGNOSIS — I10 PRIMARY HYPERTENSION: Primary | ICD-10-CM

## 2023-10-04 DIAGNOSIS — M54.16 LUMBAR RADICULITIS: ICD-10-CM

## 2023-10-04 DIAGNOSIS — J44.9 COPD, MODERATE: ICD-10-CM

## 2023-10-04 PROCEDURE — 3044F PR MOST RECENT HEMOGLOBIN A1C LEVEL <7.0%: ICD-10-PCS | Mod: HCNC,CPTII,S$GLB, | Performed by: FAMILY MEDICINE

## 2023-10-04 PROCEDURE — 4010F ACE/ARB THERAPY RXD/TAKEN: CPT | Mod: HCNC,CPTII,S$GLB, | Performed by: FAMILY MEDICINE

## 2023-10-04 PROCEDURE — 4010F PR ACE/ARB THEARPY RXD/TAKEN: ICD-10-PCS | Mod: HCNC,CPTII,S$GLB, | Performed by: FAMILY MEDICINE

## 2023-10-04 PROCEDURE — 3288F FALL RISK ASSESSMENT DOCD: CPT | Mod: HCNC,CPTII,S$GLB, | Performed by: FAMILY MEDICINE

## 2023-10-04 PROCEDURE — 3075F PR MOST RECENT SYSTOLIC BLOOD PRESS GE 130-139MM HG: ICD-10-PCS | Mod: HCNC,CPTII,S$GLB, | Performed by: FAMILY MEDICINE

## 2023-10-04 PROCEDURE — 1101F PT FALLS ASSESS-DOCD LE1/YR: CPT | Mod: HCNC,CPTII,S$GLB, | Performed by: FAMILY MEDICINE

## 2023-10-04 PROCEDURE — 99214 PR OFFICE/OUTPT VISIT, EST, LEVL IV, 30-39 MIN: ICD-10-PCS | Mod: HCNC,S$GLB,, | Performed by: FAMILY MEDICINE

## 2023-10-04 PROCEDURE — 1159F PR MEDICATION LIST DOCUMENTED IN MEDICAL RECORD: ICD-10-PCS | Mod: HCNC,CPTII,S$GLB, | Performed by: FAMILY MEDICINE

## 2023-10-04 PROCEDURE — 3078F DIAST BP <80 MM HG: CPT | Mod: HCNC,CPTII,S$GLB, | Performed by: FAMILY MEDICINE

## 2023-10-04 PROCEDURE — 3075F SYST BP GE 130 - 139MM HG: CPT | Mod: HCNC,CPTII,S$GLB, | Performed by: FAMILY MEDICINE

## 2023-10-04 PROCEDURE — 99999 PR PBB SHADOW E&M-EST. PATIENT-LVL IV: ICD-10-PCS | Mod: PBBFAC,HCNC,, | Performed by: FAMILY MEDICINE

## 2023-10-04 PROCEDURE — 3044F HG A1C LEVEL LT 7.0%: CPT | Mod: HCNC,CPTII,S$GLB, | Performed by: FAMILY MEDICINE

## 2023-10-04 PROCEDURE — 99999 PR PBB SHADOW E&M-EST. PATIENT-LVL IV: CPT | Mod: PBBFAC,HCNC,, | Performed by: FAMILY MEDICINE

## 2023-10-04 PROCEDURE — 1159F MED LIST DOCD IN RCRD: CPT | Mod: HCNC,CPTII,S$GLB, | Performed by: FAMILY MEDICINE

## 2023-10-04 PROCEDURE — 99214 OFFICE O/P EST MOD 30 MIN: CPT | Mod: HCNC,S$GLB,, | Performed by: FAMILY MEDICINE

## 2023-10-04 PROCEDURE — 1101F PR PT FALLS ASSESS DOC 0-1 FALLS W/OUT INJ PAST YR: ICD-10-PCS | Mod: HCNC,CPTII,S$GLB, | Performed by: FAMILY MEDICINE

## 2023-10-04 PROCEDURE — 3288F PR FALLS RISK ASSESSMENT DOCUMENTED: ICD-10-PCS | Mod: HCNC,CPTII,S$GLB, | Performed by: FAMILY MEDICINE

## 2023-10-04 PROCEDURE — 3078F PR MOST RECENT DIASTOLIC BLOOD PRESSURE < 80 MM HG: ICD-10-PCS | Mod: HCNC,CPTII,S$GLB, | Performed by: FAMILY MEDICINE

## 2023-10-04 PROCEDURE — 1125F AMNT PAIN NOTED PAIN PRSNT: CPT | Mod: HCNC,CPTII,S$GLB, | Performed by: FAMILY MEDICINE

## 2023-10-04 PROCEDURE — 3008F BODY MASS INDEX DOCD: CPT | Mod: HCNC,CPTII,S$GLB, | Performed by: FAMILY MEDICINE

## 2023-10-04 PROCEDURE — 3008F PR BODY MASS INDEX (BMI) DOCUMENTED: ICD-10-PCS | Mod: HCNC,CPTII,S$GLB, | Performed by: FAMILY MEDICINE

## 2023-10-04 PROCEDURE — 1125F PR PAIN SEVERITY QUANTIFIED, PAIN PRESENT: ICD-10-PCS | Mod: HCNC,CPTII,S$GLB, | Performed by: FAMILY MEDICINE

## 2023-10-04 RX ORDER — IPRATROPIUM BROMIDE AND ALBUTEROL 20; 100 UG/1; UG/1
1 SPRAY, METERED RESPIRATORY (INHALATION) EVERY 6 HOURS PRN
Qty: 12 G | Refills: 11 | Status: SHIPPED | OUTPATIENT
Start: 2023-10-04 | End: 2024-01-29 | Stop reason: SDUPTHER

## 2023-10-04 NOTE — PROGRESS NOTES
New Patient Chronic Pain Note (Initial Visit)    Referring Physician: Olga Altman MD    PCP: Olga Altman MD    Chief Complaint:   Chief Complaint   Patient presents with    Hip Pain     Right     Low-back Pain     Right     Foot Pain     Carlos         SUBJECTIVE:    Gemma Vick is a 68 y.o. female with past medical history significant for vascular dementia, COPD, abdominal aortic aneurysm, coronary artery disease, hypertension, hyperlipidemia, prediabetes, GERD, nicotine dependence in remission who presents to the clinic for the evaluation of lower back and leg pain.  Patient reports pain has been present in the lower back for several years and in the legs for at least 8 months.  She reports she is an avid equestrian participating in horse riding, the rodeo and numerous sports throughout her life, which may have contributed to her symptoms.  Today she reports pain which is intermittent which is rated a 3/10.  Pain at its worse is an 8/10.  Patient reports pain in a bandlike distribution in the lower back which radiates down the lateral and posterior aspect of the right lower extremity to the knee.  90% of her symptoms are focused in the axial lower back.  Pain is described as aching in nature.  She denies any left-sided radicular symptoms.  Pain is particularly exacerbated with bending over, standing and walking.  Patient reports she is able to ambulate through [employer] for 1 hour before requiring rest.  Patient does endorse associated weakness in the right lower extremities associated with her pain.  Pain has been improved with recent weight loss over the last year as well as performing physician directed physical therapy exercises at home for the last 8 weeks with mild improvement in her symptoms.  Patient has trialed gabapentin in the past with significant daytime somnolence and would like to refrain from our pharmacologic management.  Patient has not received prior intervention.    Patient denies night  fever/night sweats, urinary incontinence, bowel incontinence, significant weight loss, and loss of sensations.  Patient reports significant motor weakness.      Pain Disability Index Review:         10/5/2023     1:37 PM   Last 3 PDI Scores   Pain Disability Index (PDI) 20       Non-Pharmacologic Treatments:  Physical Therapy/Home Exercise: yes  Ice/Heat:yes  TENS: no  Acupuncture: no  Massage: no  Chiropractic: no    Other: no      Pain Medications:  - Adjuvant Medications: Mirtazapine (Remeron)  - Anti-Coagulants: Aspirin and Plavix    Pain Procedures:   None    Past Medical History:   Diagnosis Date    AAA (abdominal aortic aneurysm) 02/13/2014    Abdominal aneurysm     3    Acute coronary syndrome     Arthritis     BPPV (benign paroxysmal positional vertigo)     Carotid artery plaque     Carotid artery stenosis and occlusion 02/13/2014    Chronic back pain     COPD (chronic obstructive pulmonary disease)     Coronary artery disease     Dementia     Emphysema lung     Hyperlipidemia     Hypertension     Myocardial infarction     x3    Neuropathy     Personal history of COVID-19 06/09/2021 11/16/2020 +Covid, recovered at home      Past Surgical History:   Procedure Laterality Date    CARDIAC CATHETERIZATION      CORONARY ANGIOPLASTY      CORONARY STENT PLACEMENT N/A 9/12/2023    Procedure: INSERTION, STENT, CORONARY ARTERY;  Surgeon: Millie Juarez MD;  Location: [facility] CATH LAB;  Service: Cardiology;  Laterality: N/A;    HYSTERECTOMY  1988    LEFT HEART CATHETERIZATION Left 9/12/2023    Procedure: Left heart cath;  Surgeon: Millie Juarez MD;  Location: [facility] CATH LAB;  Service: Cardiology;  Laterality: Left;    PERCUTANEOUS CORONARY INTERVENTION, ARTERY N/A 9/12/2023    Procedure: Percutaneous coronary intervention;  Surgeon: Millie Juarez MD;  Location: [facility] CATH LAB;  Service: Cardiology;  Laterality: N/A;    THROMBECTOMY, CORONARY  9/12/2023    Procedure: Thrombectomy, Coronary;  Surgeon: Millie Juarez  MD SONYA;  Location: [facility] CATH LAB;  Service: Cardiology;;    TONSILLECTOMY       Review of patient's allergies indicates:  No Known Allergies    Current Outpatient Medications   Medication Sig    albuterol (PROVENTIL/VENTOLIN HFA) 90 mcg/actuation inhaler     albuterol-ipratropium (DUO-NEB) 2.5 mg-0.5 mg/3 mL nebulizer solution     amLODIPine (NORVASC) 10 MG tablet Take 1 tablet (10 mg total) by mouth once daily. (Patient taking differently: Take 5 mg by mouth once daily.)    aspirin 81 MG Chew Take 81 mg by mouth once daily.    budesonide-glycopyr-formoterol (BREZTRI AEROSPHERE) 160-9-4.8 mcg/actuation HFAA Inhale 2 puffs into the lungs 2 (two) times a day.    carvediloL (COREG) 12.5 MG tablet Take 1 tablet (12.5 mg total) by mouth 2 (two) times daily.    clopidogreL (PLAVIX) 75 mg tablet TAKE 1 TABLET BY MOUTH ONCE A DAY    COMBIVENT RESPIMAT  mcg/actuation inhaler Inhale 1 puff into the lungs every 6 (six) hours as needed for Wheezing.    dicyclomine (BENTYL) 10 MG capsule TAKE 1 CAPSULE BY MOUTH 4 TIMES DAILY BEFORE MEALS AND NIGHTLY Strength: 10 mg (Patient taking differently: 4 (four) times daily as needed. TAKE 1 CAPSULE BY MOUTH 4 TIMES DAILY BEFORE MEALS AND NIGHTLY Strength: 10 mg)    doxepin (SINEQUAN) 10 MG capsule Take 10-20 mg by mouth nightly as needed.    ezetimibe-simvastatin 10-40 mg (VYTORIN) 10-40 mg per tablet TAKE 1 TABLET BY MOUTH EVERY EVENING    FEROSUL 325 mg (65 mg iron) Tab tablet TAKE (1) TABLET BY MOUTH 2 TIMES A DAY WITH MEALS    furosemide (LASIX) 20 MG tablet TAKE 20 MG TABLET ONCE DAILY (Patient taking differently: daily as needed. TAKE 20 MG TABLET ONCE DAILY)    hydroCHLOROthiazide (MICROZIDE) 12.5 mg capsule Take 12.5 mg by mouth. HASN'T STARTED YET    inhalation spacing device (COMPACT SPACE CHAMBER) Use as directed for inhalation.    LIDOcaine-prilocaine (EMLA) cream     memantine (NAMENDA) 10 MG Tab Take 10 mg by mouth 2 (two) times daily.    mirtazapine (REMERON) 15 MG  "tablet     olmesartan (BENICAR) 40 MG tablet Take 40 mg by mouth.    OXYGEN-AIR DELIVERY SYSTEMS MISC 3 L by Misc.(Non-Drug; Combo Route) route every evening.    pantoprazole (PROTONIX) 40 MG tablet TAKE 1 TABLET BY MOUTH ONCE A DAY    TENS unit and electrodes Cmpk 1 each by Misc.(Non-Drug; Combo Route) route 3 (three) times daily as needed (for back pain).    vitamin D (VITAMIN D3) 1000 units Tab Take 1,000 Units by mouth once daily.    zolpidem (AMBIEN) 10 mg Tab TAKE 1 TABLET BY MOUTH NIGHTLY     No current facility-administered medications for this visit.       Review of Systems     GENERAL:  No weight loss, malaise or fevers.  HEENT:   No recent changes in vision or hearing  NECK:  Negative for lumps, no difficulty with swallowing.  RESPIRATORY:  Negative for cough, wheezing or shortness of breath, patient denies any recent URI.  CARDIOVASCULAR:  Negative for chest pain or palpitations.  GI:  Negative for abdominal discomfort, blood in stools or black stools or change in bowel habits.  MUSCULOSKELETAL:  See HPI.  SKIN:  Negative for lesions, rash, and itching.  PSYCH:  No mood disorder or recent psychosocial stressors.   HEMATOLOGY/LYMPHOLOGY:  Negative for prolonged bleeding, bruising easily or swollen nodes.    NEURO:   No history of syncope, paralysis, seizures or tremors.  All other reviewed and negative other than HPI.    OBJECTIVE:    BP (!) 152/74   Pulse 64   Resp 17   Ht 5' 2" (1.575 m)   Wt 58 kg (127 lb 13.9 oz)   BMI 23.39 kg/m²       Physical Exam    GENERAL: Well appearing, in no acute distress, alert and oriented x3.  PSYCH:  Mood and affect appropriate.  SKIN: Skin color, texture, turgor normal, no rashes or lesions.  HEAD/FACE:  Normocephalic, atraumatic. Cranial nerves grossly intact.    CV: RRR with palpation of the radial artery.  PULM: No evidence of respiratory difficulty, symmetric chest rise.  GI:  Soft and non-tender.    BACK: Straight leg raising in the sitting and supine " positions is negative to radicular pain. pain to palpation over the facet joints of the lumbar spine or spinous processes. Normal range of motion without pain reproduction.  EXTREMITIES: Peripheral joint ROM is full and pain free without obvious instability or laxity in all four extremities. No deformities, edema, or skin discoloration. Good capillary refill.  MUSCULOSKELETAL: Able to stand on heels & toes.   Shoulder, hip, and knee provocative maneuvers are negative.  There is no pain with palpation over the sacroiliac joints bilaterally.  Gaenslen's, Distraction/Compression and  FABERs test is negative.  Facet loading test is positive bilaterally.   Bilateral upper and lower extremity strength is normal and symmetric.  No atrophy or tone abnormalities are noted.    RIGHT Lower extremity: Hip flexion 5/5, Hip Abduction 5/5, Hip Adduction 5/5, Knee extension 5/5, Knee flexion 5/5, Ankle dorsiflexion5/5, Extensor hallucis longus 5/5, Ankle plantarflexion 5/5  LEFT Lower extremity:  Hip flexion 5/5, Hip Abduction 5/5,Hip Adduction 5/5, Knee extension 5/5, Knee flexion 5/5, Ankle dorsiflexion 5/5, Extensor hallucis longus 5/5, Ankle plantarflexion 5/5  -Normal testing knee (patellar) jerk and ankle (achilles) jerk    NEURO: Bilateral upper and lower extremity coordination and muscle stretch reflexes are physiologic and symmetric. No loss of sensation is noted.  GAIT: normal.    Imagin23    X-Ray Lumbar Complete Including Flex And Ext    Narrative  EXAM: XR LUMBAR SPINE 5 VIEW WITH FLEX AND EXT    CLINICAL HISTORY: Pain in unspecified hip    TECHNIQUE: 7 views of the lumbar spine including flexion and extension views.    FINDINGS: There appear to be 6, nonrib-bearing lumbar-type vertebral bodies.  Grade 1 L5-L6 anterolisthesis.  No significant change in the degree of listhesis on flexion and extension views.  Lumbar vertebral body heights appear maintained.  Visualized pars interarticularis appear intact.   L5-L6 and L6-S1 prominent facet arthropathy.  L5-L6 and L6 S1 disc is remaining intervertebral disc spaces appear largely maintained.  No evidence of an acute fracture.    There is a distal abdominal aortic aneurysm with atherosclerotic calcification.          ASSESSMENT: 68 y.o. year old female with lower back and right leg pain, consistent with     1. Lumbar spondylosis  Case Request-RAD/Other Procedure Area: janell  L3, 4, 5 MBB with local    Ambulatory referral/consult to Physical/Occupational Therapy    CANCELED: Ambulatory referral/consult to Physical/Occupational Therapy    CANCELED: Ambulatory referral/consult to Physical/Occupational Therapy      2. Lumbar radiculopathy  Ambulatory referral/consult to Pain Clinic    Ambulatory referral/consult to Physical/Occupational Therapy    CANCELED: Ambulatory referral/consult to Physical/Occupational Therapy    CANCELED: Ambulatory referral/consult to Physical/Occupational Therapy      3. Lumbar facet arthropathy  Case Request-RAD/Other Procedure Area: janell  L3, 4, 5 MBB with local    Ambulatory referral/consult to Physical/Occupational Therapy    CANCELED: Ambulatory referral/consult to Physical/Occupational Therapy    CANCELED: Ambulatory referral/consult to Physical/Occupational Therapy      4. Dorsalgia, unspecified  MRI Lumbar Spine Without Contrast    Case Request-RAD/Other Procedure Area: janell  L3, 4, 5 MBB with local            PLAN:   - Interventions:  Schedule for bilateral L3-5 lumbar medial branch block to see if this helps with axial back pain. Explained the risks and benefits of the procedure in detail with the patient today in clinic along with alternative treatment options, and the patient elected to pursue the intervention at this time.      - Anticoagulation use: Yes no anticoagulation  Per BRONSON guidelines, for secondary prophylaxis (CAD status post PTCA), patient can continue aspirin for lumbar medial branch block.     report:  Reviewed and  consistent with medication use as prescribed.    - Medications:  -patient would like to refrain from pharmacologic management at this time secondary to medical comorbidities    - Therapy:   We discussed initiating physical therapy to help manage the patient/s painful condition. The patient was counseled that muscle strengthening will improve the long term prognosis in regards to pain and may also help increase range of motion and mobility. They were told that one of the goals of physical therapy is that they learn how to do the exercises so that they can do them independently at home daily upon completion. The patient's questions were answered and they were agreeable to this course. A referral for physical therapy:[facility] in [place] was provided to the patient.    - Imaging: Reviewed available imaging with patient and answered any questions they had regarding study.  MRI lumbar spine to better evaluate pain    - Follow up visit: return to clinic in 4-6 weeks      The above plan and management options were discussed at length with patient. Patient is in agreement with the above and verbalized understanding.    - I discussed the goals of interventional chronic pain management with the patient on today's visit. We discussed a multimodal and systematic approach to pain.  This includes diagnostic and therapeutic injections, adjuvant pharmacologic treatment, physical therapy, and at times psychiatry.  I emphasized the importance of regular exercise, core strengthening and stretching, diet and weight loss as a cornerstone of long-term pain management.    - This condition does not require this patient to take time off of work, and the primary goal of our Pain Management services is to improve the patient's functional capacity.  - Patient Questions: Answered all of the patient's questions regarding diagnoses, therapy, treatment and next steps        Lydia Roberts MD  Interventional Pain Management  [facility]  [place]    Disclaimer:  This note was prepared using voice recognition system and is likely to have sound alike errors that may have been overlooked even after proof reading.  Please call me with any questions

## 2023-10-04 NOTE — PROGRESS NOTES
Chief Complaint:    Chief Complaint   Patient presents with    Hospital Follow Up       History of Present Illness:    Patient presents today for ER follow-up,    Pt presented to ER on 09/27 due to hypotension. States that she accidentally took her amlodipine medication to many times resulting in her BP to drop to 56/26. She was given 500 ml of NS and her blood pressure stabilized. Since she denies any more episodes.   After ER visit she decreased back to original dosages with amlodipine 5 mg, coreg to 6.25 mg, benicar stayed, and hydrochlorothiazide as needed.    She brings in her readings today, she has been taking 3-4 readings per day before and after taking medications. Her readings fluctuate on the borderline. A lot readings increase in evening time.  Blood pressure today stable 134/68. Brought her machine today which showed 139/69.    She does note some kerri on her L side of chest above breast. Denies any pain or increase in size for years.     Feels she is slowing down and not able to things anymore. Is currently on Namenda for vascular dementia and follows with neurology.    ROS:  Review of Systems   Constitutional:  Negative for appetite change, chills and fever.   HENT:  Negative for congestion, ear pain, postnasal drip, rhinorrhea, sinus pressure and sinus pain.    Eyes:  Negative for pain.   Respiratory:  Negative for cough, chest tightness and shortness of breath.    Cardiovascular:  Negative for chest pain and palpitations.   Gastrointestinal:  Negative for abdominal pain, blood in stool, constipation, diarrhea and nausea.   Genitourinary:  Negative for difficulty urinating, dysuria, flank pain and hematuria.   Musculoskeletal:  Negative for arthralgias, back pain and myalgias.   Skin:  Negative for pallor and wound.   Neurological:  Negative for dizziness, tremors, speech difficulty, light-headedness and headaches.   Psychiatric/Behavioral:  Negative for behavioral problems, dysphoric mood and sleep  disturbance.    All other systems reviewed and are negative.      Past Medical History:   Diagnosis Date    AAA (abdominal aortic aneurysm) 2014    Abdominal aneurysm     3    Acute coronary syndrome     Arthritis     BPPV (benign paroxysmal positional vertigo)     Carotid artery plaque     Carotid artery stenosis and occlusion 2014    Chronic back pain     COPD (chronic obstructive pulmonary disease)     Coronary artery disease     Dementia     Emphysema lung     Hyperlipidemia     Hypertension     Myocardial infarction     x3    Neuropathy     Personal history of COVID-19 2021 +Covid, recovered at home        Social History:  Social History     Socioeconomic History    Marital status:    Tobacco Use    Smoking status: Former     Current packs/day: 0.00     Average packs/day: 0.5 packs/day for 46.9 years (23.4 ttl pk-yrs)     Types: Cigarettes, Vaping w/o nicotine     Start date: 1970     Quit date: 2017     Years since quittin.4    Smokeless tobacco: Never    Tobacco comments:     3 MG NICOTINE FOR HEART.    Substance and Sexual Activity    Alcohol use: No    Drug use: No    Sexual activity: Not Currently     Birth control/protection: None     Social Determinants of Health     Financial Resource Strain: Medium Risk (10/1/2023)    Overall Financial Resource Strain (CARDIA)     Difficulty of Paying Living Expenses: Somewhat hard   Food Insecurity: Food Insecurity Present (10/1/2023)    Hunger Vital Sign     Worried About Running Out of Food in the Last Year: Sometimes true     Ran Out of Food in the Last Year: Sometimes true   Transportation Needs: No Transportation Needs (10/1/2023)    PRAPARE - Transportation     Lack of Transportation (Medical): No     Lack of Transportation (Non-Medical): No   Physical Activity: Inactive (10/1/2023)    Exercise Vital Sign     Days of Exercise per Week: 0 days     Minutes of Exercise per Session: 0 min   Stress: No Stress Concern  "Present (10/1/2023)    [employer] - Occupational Stress Questionnaire     Feeling of Stress : Only a little   Recent Concern: Stress - Stress Concern Present (9/14/2023)    [employer] - Occupational Stress Questionnaire     Feeling of Stress : To some extent   Social Connections: Moderately Integrated (10/1/2023)    Social Connection and Isolation Panel [NHANES]     Frequency of Communication with Friends and Family: More than three times a week     Frequency of Social Gatherings with Friends and Family: More than three times a week     Attends Yazdanism Services: More than 4 times per year     Active Member of Clubs or Organizations: Yes     Attends Club or Organization Meetings: More than 4 times per year     Marital Status:    Housing Stability: Low Risk  (10/1/2023)    Housing Stability Vital Sign     Unable to Pay for Housing in the Last Year: No     Number of Places Lived in the Last Year: 1     Unstable Housing in the Last Year: No       Family History:   family history includes Breast cancer in her paternal aunt; Heart attacks under age 50 in her brother and father; Heart disease in her mother.    Health Maintenance   Topic Date Due    Shingles Vaccine (1 of 2) Never done    Colorectal Cancer Screening  05/17/2022    Mammogram  06/09/2022    LDCT Lung Screen  11/08/2023    Lipid Panel  04/18/2024    High Dose Statin  10/04/2024    DEXA Scan  10/20/2024    TETANUS VACCINE  11/15/2026    Hepatitis C Screening  Completed       Exam:Physical     Vital Signs  Pulse: 77  SpO2: 97 %  BP: 134/68  Pain Score:   2  Pain Loc: Back  Height and Weight  Height: 5' 2" (157.5 cm)  Weight: 57.7 kg (127 lb 3.3 oz)  BSA (Calculated - sq m): 1.59 sq meters  BMI (Calculated): 23.3  Weight in (lb) to have BMI = 25: 136.4]    Body mass index is 23.27 kg/m².    Physical Exam  Vitals and nursing note reviewed.   Constitutional:       Appearance: Normal appearance. " She is not toxic-appearing.   HENT:      Head: Normocephalic and atraumatic.      Right Ear: Tympanic membrane normal.      Left Ear: Tympanic membrane normal.   Eyes:      Extraocular Movements: Extraocular movements intact.      Pupils: Pupils are equal, round, and reactive to light.   Cardiovascular:      Rate and Rhythm: Normal rate and regular rhythm.      Heart sounds: Normal heart sounds.   Pulmonary:      Effort: Pulmonary effort is normal.      Breath sounds: Normal breath sounds. No wheezing, rhonchi or rales.   Abdominal:      General: Bowel sounds are normal. There is no distension.      Palpations: Abdomen is soft.      Tenderness: There is no abdominal tenderness.   Musculoskeletal:         General: Normal range of motion.      Cervical back: Normal range of motion.      Lumbar back: No tenderness.   Skin:     General: Skin is warm and dry.      Capillary Refill: Capillary refill takes less than 2 seconds.   Neurological:      General: No focal deficit present.      Mental Status: She is alert and oriented to person, place, and time.   Psychiatric:         Mood and Affect: Mood normal.         Behavior: Behavior normal.         Judgment: Judgment normal.           Assessment:      ICD-10-CM ICD-9-CM   1. Primary hypertension  I10 401.9   2. COPD, moderate  J44.9 496   3. Lumbar radiculitis  M54.16 724.4         Plan:  When taking evening readings if elevated then you may take full dose of amlodipine blood pressure medication.  Continue monitoring blood pressure. Keep < 140/90.   COPD stable on combivent inhaler.  Continue with pain management appt.   No orders of the defined types were placed in this encounter.       Current Outpatient Medications   Medication Sig Dispense Refill    albuterol (PROVENTIL/VENTOLIN HFA) 90 mcg/actuation inhaler       albuterol-ipratropium (DUO-NEB) 2.5 mg-0.5 mg/3 mL nebulizer solution       amLODIPine (NORVASC) 10 MG tablet Take 1 tablet (10 mg total) by mouth once  daily. (Patient taking differently: Take 5 mg by mouth once daily.) 90 tablet 3    aspirin 81 MG Chew Take 81 mg by mouth once daily.      budesonide-glycopyr-formoterol (BREZTRI AEROSPHERE) 160-9-4.8 mcg/actuation HFAA Inhale 2 puffs into the lungs 2 (two) times a day. 32.1 g 3    carvediloL (COREG) 12.5 MG tablet Take 1 tablet (12.5 mg total) by mouth 2 (two) times daily. 60 tablet 3    clopidogreL (PLAVIX) 75 mg tablet TAKE 1 TABLET BY MOUTH ONCE A DAY 90 tablet 3    dicyclomine (BENTYL) 10 MG capsule TAKE 1 CAPSULE BY MOUTH 4 TIMES DAILY BEFORE MEALS AND NIGHTLY Strength: 10 mg (Patient taking differently: 4 (four) times daily as needed. TAKE 1 CAPSULE BY MOUTH 4 TIMES DAILY BEFORE MEALS AND NIGHTLY Strength: 10 mg) 120 capsule 2    doxepin (SINEQUAN) 10 MG capsule Take 10-20 mg by mouth nightly as needed.      ezetimibe-simvastatin 10-40 mg (VYTORIN) 10-40 mg per tablet TAKE 1 TABLET BY MOUTH EVERY EVENING 90 tablet 2    FEROSUL 325 mg (65 mg iron) Tab tablet TAKE (1) TABLET BY MOUTH 2 TIMES A DAY WITH MEALS      furosemide (LASIX) 20 MG tablet TAKE 20 MG TABLET ONCE DAILY (Patient taking differently: daily as needed. TAKE 20 MG TABLET ONCE DAILY) 90 tablet 1    hydroCHLOROthiazide (MICROZIDE) 12.5 mg capsule Take 12.5 mg by mouth. HASN'T STARTED YET      inhalation spacing device (COMPACT SPACE CHAMBER) Use as directed for inhalation. 1 each 2    LIDOcaine-prilocaine (EMLA) cream       memantine (NAMENDA) 10 MG Tab Take 10 mg by mouth 2 (two) times daily.      mirtazapine (REMERON) 15 MG tablet       olmesartan (BENICAR) 40 MG tablet Take 40 mg by mouth.      OXYGEN-AIR DELIVERY SYSTEMS MISC 3 L by Misc.(Non-Drug; Combo Route) route every evening.      pantoprazole (PROTONIX) 40 MG tablet TAKE 1 TABLET BY MOUTH ONCE A DAY 90 tablet 3    TENS unit and electrodes Cmpk 1 each by Misc.(Non-Drug; Combo Route) route 3 (three) times daily as needed (for back pain). 1 each 5    vitamin D (VITAMIN D3) 1000 units Tab  Take 1,000 Units by mouth once daily.      zolpidem (AMBIEN) 10 mg Tab TAKE 1 TABLET BY MOUTH NIGHTLY 30 tablet 3    COMBIVENT RESPIMAT  mcg/actuation inhaler Inhale 1 puff into the lungs every 6 (six) hours as needed for Wheezing. 12 g 11     No current facility-administered medications for this visit.      Medications Discontinued During This Encounter   Medication Reason    COMBIVENT RESPIMAT  mcg/actuation inhaler Reorder        No follow-ups on file.      Olga Altman MD    Scribe Attestation:   I, Amaury Real, am scribing for, and in the presence of, Dr.Arif Altman I performed the above scribed service and the documentation accurately describes the services I performed. I attest to the accuracy of the note.    I, Dr. Olga Altman, reviewed documentation as scribed above. I performed the services described in this documentation.  I agree that the record reflects my personal performance and is accurate and complete. Olga Altman MD.  10/04/2023

## 2023-10-05 ENCOUNTER — PATIENT OUTREACH (OUTPATIENT)
Dept: ADMINISTRATIVE | Facility: HOSPITAL | Age: 68
End: 2023-10-05
Payer: MEDICARE

## 2023-10-05 ENCOUNTER — OFFICE VISIT (OUTPATIENT)
Dept: PAIN MEDICINE | Facility: CLINIC | Age: 68
End: 2023-10-05
Payer: MEDICARE

## 2023-10-05 VITALS
HEIGHT: 62 IN | RESPIRATION RATE: 17 BRPM | WEIGHT: 127.88 LBS | BODY MASS INDEX: 23.53 KG/M2 | SYSTOLIC BLOOD PRESSURE: 152 MMHG | DIASTOLIC BLOOD PRESSURE: 74 MMHG | HEART RATE: 64 BPM

## 2023-10-05 DIAGNOSIS — M47.816 LUMBAR FACET ARTHROPATHY: ICD-10-CM

## 2023-10-05 DIAGNOSIS — M54.9 DORSALGIA, UNSPECIFIED: ICD-10-CM

## 2023-10-05 DIAGNOSIS — M54.16 LUMBAR RADICULOPATHY: ICD-10-CM

## 2023-10-05 DIAGNOSIS — M47.816 LUMBAR SPONDYLOSIS: Primary | ICD-10-CM

## 2023-10-05 PROCEDURE — 99999 PR PBB SHADOW E&M-EST. PATIENT-LVL V: CPT | Mod: PBBFAC,HCNC,, | Performed by: ANESTHESIOLOGY

## 2023-10-05 PROCEDURE — 1159F PR MEDICATION LIST DOCUMENTED IN MEDICAL RECORD: ICD-10-PCS | Mod: HCNC,CPTII,S$GLB, | Performed by: ANESTHESIOLOGY

## 2023-10-05 PROCEDURE — 3044F PR MOST RECENT HEMOGLOBIN A1C LEVEL <7.0%: ICD-10-PCS | Mod: HCNC,CPTII,S$GLB, | Performed by: ANESTHESIOLOGY

## 2023-10-05 PROCEDURE — 99204 PR OFFICE/OUTPT VISIT, NEW, LEVL IV, 45-59 MIN: ICD-10-PCS | Mod: HCNC,S$GLB,, | Performed by: ANESTHESIOLOGY

## 2023-10-05 PROCEDURE — 3008F BODY MASS INDEX DOCD: CPT | Mod: HCNC,CPTII,S$GLB, | Performed by: ANESTHESIOLOGY

## 2023-10-05 PROCEDURE — 3077F SYST BP >= 140 MM HG: CPT | Mod: HCNC,CPTII,S$GLB, | Performed by: ANESTHESIOLOGY

## 2023-10-05 PROCEDURE — 1159F MED LIST DOCD IN RCRD: CPT | Mod: HCNC,CPTII,S$GLB, | Performed by: ANESTHESIOLOGY

## 2023-10-05 PROCEDURE — 4010F PR ACE/ARB THEARPY RXD/TAKEN: ICD-10-PCS | Mod: HCNC,CPTII,S$GLB, | Performed by: ANESTHESIOLOGY

## 2023-10-05 PROCEDURE — 99204 OFFICE O/P NEW MOD 45 MIN: CPT | Mod: HCNC,S$GLB,, | Performed by: ANESTHESIOLOGY

## 2023-10-05 PROCEDURE — 3078F DIAST BP <80 MM HG: CPT | Mod: HCNC,CPTII,S$GLB, | Performed by: ANESTHESIOLOGY

## 2023-10-05 PROCEDURE — 4010F ACE/ARB THERAPY RXD/TAKEN: CPT | Mod: HCNC,CPTII,S$GLB, | Performed by: ANESTHESIOLOGY

## 2023-10-05 PROCEDURE — 1125F AMNT PAIN NOTED PAIN PRSNT: CPT | Mod: HCNC,CPTII,S$GLB, | Performed by: ANESTHESIOLOGY

## 2023-10-05 PROCEDURE — 99999 PR PBB SHADOW E&M-EST. PATIENT-LVL V: ICD-10-PCS | Mod: PBBFAC,HCNC,, | Performed by: ANESTHESIOLOGY

## 2023-10-05 PROCEDURE — 3044F HG A1C LEVEL LT 7.0%: CPT | Mod: HCNC,CPTII,S$GLB, | Performed by: ANESTHESIOLOGY

## 2023-10-05 PROCEDURE — 1101F PR PT FALLS ASSESS DOC 0-1 FALLS W/OUT INJ PAST YR: ICD-10-PCS | Mod: HCNC,CPTII,S$GLB, | Performed by: ANESTHESIOLOGY

## 2023-10-05 PROCEDURE — 3078F PR MOST RECENT DIASTOLIC BLOOD PRESSURE < 80 MM HG: ICD-10-PCS | Mod: HCNC,CPTII,S$GLB, | Performed by: ANESTHESIOLOGY

## 2023-10-05 PROCEDURE — 3077F PR MOST RECENT SYSTOLIC BLOOD PRESSURE >= 140 MM HG: ICD-10-PCS | Mod: HCNC,CPTII,S$GLB, | Performed by: ANESTHESIOLOGY

## 2023-10-05 PROCEDURE — 1160F RVW MEDS BY RX/DR IN RCRD: CPT | Mod: HCNC,CPTII,S$GLB, | Performed by: ANESTHESIOLOGY

## 2023-10-05 PROCEDURE — 3288F FALL RISK ASSESSMENT DOCD: CPT | Mod: HCNC,CPTII,S$GLB, | Performed by: ANESTHESIOLOGY

## 2023-10-05 PROCEDURE — 1160F PR REVIEW ALL MEDS BY PRESCRIBER/CLIN PHARMACIST DOCUMENTED: ICD-10-PCS | Mod: HCNC,CPTII,S$GLB, | Performed by: ANESTHESIOLOGY

## 2023-10-05 PROCEDURE — 1125F PR PAIN SEVERITY QUANTIFIED, PAIN PRESENT: ICD-10-PCS | Mod: HCNC,CPTII,S$GLB, | Performed by: ANESTHESIOLOGY

## 2023-10-05 PROCEDURE — 3008F PR BODY MASS INDEX (BMI) DOCUMENTED: ICD-10-PCS | Mod: HCNC,CPTII,S$GLB, | Performed by: ANESTHESIOLOGY

## 2023-10-05 PROCEDURE — 1101F PT FALLS ASSESS-DOCD LE1/YR: CPT | Mod: HCNC,CPTII,S$GLB, | Performed by: ANESTHESIOLOGY

## 2023-10-05 PROCEDURE — 3288F PR FALLS RISK ASSESSMENT DOCUMENTED: ICD-10-PCS | Mod: HCNC,CPTII,S$GLB, | Performed by: ANESTHESIOLOGY

## 2023-10-20 ENCOUNTER — OFFICE VISIT (OUTPATIENT)
Dept: PAIN MEDICINE | Facility: CLINIC | Age: 68
End: 2023-10-20
Payer: MEDICARE

## 2023-10-20 VITALS
HEIGHT: 62 IN | BODY MASS INDEX: 23.64 KG/M2 | HEART RATE: 62 BPM | SYSTOLIC BLOOD PRESSURE: 130 MMHG | WEIGHT: 128.44 LBS | DIASTOLIC BLOOD PRESSURE: 80 MMHG

## 2023-10-20 DIAGNOSIS — M54.16 LUMBAR RADICULOPATHY: Primary | ICD-10-CM

## 2023-10-20 DIAGNOSIS — M47.816 LUMBAR SPONDYLOSIS: ICD-10-CM

## 2023-10-20 DIAGNOSIS — M54.16 LUMBAR RADICULOPATHY, CHRONIC: ICD-10-CM

## 2023-10-20 DIAGNOSIS — M54.9 DORSALGIA, UNSPECIFIED: ICD-10-CM

## 2023-10-20 PROCEDURE — 99999 PR PBB SHADOW E&M-EST. PATIENT-LVL V: ICD-10-PCS | Mod: PBBFAC,HCNC,, | Performed by: PHYSICIAN ASSISTANT

## 2023-10-20 PROCEDURE — 99213 PR OFFICE/OUTPT VISIT, EST, LEVL III, 20-29 MIN: ICD-10-PCS | Mod: HCNC,S$GLB,, | Performed by: PHYSICIAN ASSISTANT

## 2023-10-20 PROCEDURE — 99213 OFFICE O/P EST LOW 20 MIN: CPT | Mod: HCNC,S$GLB,, | Performed by: PHYSICIAN ASSISTANT

## 2023-10-20 PROCEDURE — 4010F PR ACE/ARB THEARPY RXD/TAKEN: ICD-10-PCS | Mod: HCNC,CPTII,S$GLB, | Performed by: PHYSICIAN ASSISTANT

## 2023-10-20 PROCEDURE — 3288F PR FALLS RISK ASSESSMENT DOCUMENTED: ICD-10-PCS | Mod: HCNC,CPTII,S$GLB, | Performed by: PHYSICIAN ASSISTANT

## 2023-10-20 PROCEDURE — 99999 PR PBB SHADOW E&M-EST. PATIENT-LVL V: CPT | Mod: PBBFAC,HCNC,, | Performed by: PHYSICIAN ASSISTANT

## 2023-10-20 PROCEDURE — 3075F SYST BP GE 130 - 139MM HG: CPT | Mod: HCNC,CPTII,S$GLB, | Performed by: PHYSICIAN ASSISTANT

## 2023-10-20 PROCEDURE — 4010F ACE/ARB THERAPY RXD/TAKEN: CPT | Mod: HCNC,CPTII,S$GLB, | Performed by: PHYSICIAN ASSISTANT

## 2023-10-20 PROCEDURE — 3075F PR MOST RECENT SYSTOLIC BLOOD PRESS GE 130-139MM HG: ICD-10-PCS | Mod: HCNC,CPTII,S$GLB, | Performed by: PHYSICIAN ASSISTANT

## 2023-10-20 PROCEDURE — 3079F DIAST BP 80-89 MM HG: CPT | Mod: HCNC,CPTII,S$GLB, | Performed by: PHYSICIAN ASSISTANT

## 2023-10-20 PROCEDURE — 1125F PR PAIN SEVERITY QUANTIFIED, PAIN PRESENT: ICD-10-PCS | Mod: HCNC,CPTII,S$GLB, | Performed by: PHYSICIAN ASSISTANT

## 2023-10-20 PROCEDURE — 1101F PR PT FALLS ASSESS DOC 0-1 FALLS W/OUT INJ PAST YR: ICD-10-PCS | Mod: HCNC,CPTII,S$GLB, | Performed by: PHYSICIAN ASSISTANT

## 2023-10-20 PROCEDURE — 1101F PT FALLS ASSESS-DOCD LE1/YR: CPT | Mod: HCNC,CPTII,S$GLB, | Performed by: PHYSICIAN ASSISTANT

## 2023-10-20 PROCEDURE — 3288F FALL RISK ASSESSMENT DOCD: CPT | Mod: HCNC,CPTII,S$GLB, | Performed by: PHYSICIAN ASSISTANT

## 2023-10-20 PROCEDURE — 3044F HG A1C LEVEL LT 7.0%: CPT | Mod: HCNC,CPTII,S$GLB, | Performed by: PHYSICIAN ASSISTANT

## 2023-10-20 PROCEDURE — 3008F PR BODY MASS INDEX (BMI) DOCUMENTED: ICD-10-PCS | Mod: HCNC,CPTII,S$GLB, | Performed by: PHYSICIAN ASSISTANT

## 2023-10-20 PROCEDURE — 1159F PR MEDICATION LIST DOCUMENTED IN MEDICAL RECORD: ICD-10-PCS | Mod: HCNC,CPTII,S$GLB, | Performed by: PHYSICIAN ASSISTANT

## 2023-10-20 PROCEDURE — 3008F BODY MASS INDEX DOCD: CPT | Mod: HCNC,CPTII,S$GLB, | Performed by: PHYSICIAN ASSISTANT

## 2023-10-20 PROCEDURE — 1159F MED LIST DOCD IN RCRD: CPT | Mod: HCNC,CPTII,S$GLB, | Performed by: PHYSICIAN ASSISTANT

## 2023-10-20 PROCEDURE — 3079F PR MOST RECENT DIASTOLIC BLOOD PRESSURE 80-89 MM HG: ICD-10-PCS | Mod: HCNC,CPTII,S$GLB, | Performed by: PHYSICIAN ASSISTANT

## 2023-10-20 PROCEDURE — 1125F AMNT PAIN NOTED PAIN PRSNT: CPT | Mod: HCNC,CPTII,S$GLB, | Performed by: PHYSICIAN ASSISTANT

## 2023-10-20 PROCEDURE — 3044F PR MOST RECENT HEMOGLOBIN A1C LEVEL <7.0%: ICD-10-PCS | Mod: HCNC,CPTII,S$GLB, | Performed by: PHYSICIAN ASSISTANT

## 2023-10-20 NOTE — PROGRESS NOTES
"  Established Patient Chronic Pain Note (Follow up Visit)    Referring Physician: No ref. provider found    PCP: Olga Altman MD    Chief Complaint:   Chief Complaint   Patient presents with    Low-back Pain     Patient has pain in lower back right side that radiates into right buttock and into right leg just above knee.  Pain scale 8/10 with soreness from Physical therapy        SUBJECTIVE:  Interval History (10/20/2023):  Patient Gemma Vick presents today for follow-up visit.  Patient was last seen on 10/5/2023. At that visit, the plan was to complete MRI of lumbar spine and review treatment options after study. Patient reports pain as 8/10 today.  Patient states she cannot do steroid injections because it significantly elevates her BP and was told by her cardiologist "No steroid injections."   Since her last office visit she was not able to get her MRI completed.   Patient localizes her pain to R lower back, buttocks, hips and radiates down back of her leg. Pain stops on the side of her knee. The more active she is the more pain she experiences. Patient does not have pain with her LLE.   Currently she is in physical therapy ([facility]) and continues efforts to  lose weight in order to help her condition.  Patient also has a history of emphysema. Quit smoking 10-11 years ago and requires oxygen at night.  Patient has tried several pain medication and Gabapentin. She discontinued due to SE.      Initial HPI (10/5/2023):  Gemma Vick is a 68 y.o. female with past medical history significant for vascular dementia, COPD, abdominal aortic aneurysm, coronary artery disease, hypertension, hyperlipidemia, prediabetes, GERD, nicotine dependence in remission who presents to the clinic for the evaluation of lower back and leg pain.  Patient reports pain has been present in the lower back for several years and in the legs for at least 8 months.  She reports she is an avid equestrian participating in horse riding, the " agustino and numerous sports throughout her life, which may have contributed to her symptoms.  Today she reports pain which is intermittent which is rated a 3/10.  Pain at its worse is an 8/10.  Patient reports pain in a bandlike distribution in the lower back which radiates down the lateral and posterior aspect of the right lower extremity to the knee.  90% of her symptoms are focused in the axial lower back.  Pain is described as aching in nature.  She denies any left-sided radicular symptoms.  Pain is particularly exacerbated with bending over, standing and walking.  Patient reports she is able to ambulate through [id number] for 1 hour before requiring rest.  Patient does endorse associated weakness in the right lower extremities associated with her pain.  Pain has been improved with recent weight loss over the last year as well as performing physician directed physical therapy exercises at home for the last 8 weeks with mild improvement in her symptoms.  Patient has trialed gabapentin in the past with significant daytime somnolence and would like to refrain from our pharmacologic management.  Patient has not received prior intervention.    Patient denies night fever/night sweats, urinary incontinence, bowel incontinence, significant weight loss, and loss of sensations.  Patient reports significant motor weakness.      Pain Disability Index Review:         10/20/2023    10:50 AM 10/5/2023     1:37 PM   Last 3 PDI Scores   Pain Disability Index (PDI) 56 20       Non-Pharmacologic Treatments:  Physical Therapy/Home Exercise: yes  Ice/Heat:yes  TENS: no  Acupuncture: no  Massage: no  Chiropractic: no    Other: no      Pain Medications:  - Adjuvant Medications: Mirtazapine (Remeron)  - Anti-Coagulants: Aspirin and Plavix    Pain Procedures:   None    Past Medical History:   Diagnosis Date    AAA (abdominal aortic aneurysm) 02/13/2014    Abdominal aneurysm     3    Acute coronary syndrome     Arthritis     BPPV (benign  paroxysmal positional vertigo)     Carotid artery plaque     Carotid artery stenosis and occlusion 02/13/2014    Chronic back pain     COPD (chronic obstructive pulmonary disease)     Coronary artery disease     Dementia     Emphysema lung     Hyperlipidemia     Hypertension     Myocardial infarction     x3    Neuropathy     Personal history of COVID-19 06/09/2021 11/16/2020 +Covid, recovered at home      Past Surgical History:   Procedure Laterality Date    CARDIAC CATHETERIZATION      CORONARY ANGIOPLASTY      CORONARY STENT PLACEMENT N/A 9/12/2023    Procedure: INSERTION, STENT, CORONARY ARTERY;  Surgeon: Millie Juarez MD;  Location: [facility] CATH LAB;  Service: Cardiology;  Laterality: N/A;    HYSTERECTOMY  1988    LEFT HEART CATHETERIZATION Left 9/12/2023    Procedure: Left heart cath;  Surgeon: Millie Juarez MD;  Location: [facility] CATH LAB;  Service: Cardiology;  Laterality: Left;    PERCUTANEOUS CORONARY INTERVENTION, ARTERY N/A 9/12/2023    Procedure: Percutaneous coronary intervention;  Surgeon: Millie Juarez MD;  Location: [facility] CATH LAB;  Service: Cardiology;  Laterality: N/A;    THROMBECTOMY, CORONARY  9/12/2023    Procedure: Thrombectomy, Coronary;  Surgeon: Millie Juarez MD;  Location: [facility] CATH LAB;  Service: Cardiology;;    TONSILLECTOMY       Review of patient's allergies indicates:  No Known Allergies    Current Outpatient Medications   Medication Sig    albuterol (PROVENTIL/VENTOLIN HFA) 90 mcg/actuation inhaler     albuterol-ipratropium (DUO-NEB) 2.5 mg-0.5 mg/3 mL nebulizer solution     amLODIPine (NORVASC) 10 MG tablet Take 1 tablet (10 mg total) by mouth once daily. (Patient taking differently: Take 5 mg by mouth once daily.)    aspirin 81 MG Chew Take 81 mg by mouth once daily.    budesonide-glycopyr-formoterol (BREZTRI AEROSPHERE) 160-9-4.8 mcg/actuation HFAA Inhale 2 puffs into the lungs 2 (two) times a day.    carvediloL (COREG) 12.5 MG tablet Take 1 tablet (12.5 mg total) by mouth  2 (two) times daily.    clopidogreL (PLAVIX) 75 mg tablet TAKE 1 TABLET BY MOUTH ONCE A DAY    COMBIVENT RESPIMAT  mcg/actuation inhaler Inhale 1 puff into the lungs every 6 (six) hours as needed for Wheezing.    dicyclomine (BENTYL) 10 MG capsule TAKE 1 CAPSULE BY MOUTH 4 TIMES DAILY BEFORE MEALS AND NIGHTLY Strength: 10 mg (Patient taking differently: 4 (four) times daily as needed. TAKE 1 CAPSULE BY MOUTH 4 TIMES DAILY BEFORE MEALS AND NIGHTLY Strength: 10 mg)    doxepin (SINEQUAN) 10 MG capsule Take 10-20 mg by mouth nightly as needed.    ezetimibe-simvastatin 10-40 mg (VYTORIN) 10-40 mg per tablet TAKE 1 TABLET BY MOUTH EVERY EVENING    FEROSUL 325 mg (65 mg iron) Tab tablet TAKE (1) TABLET BY MOUTH 2 TIMES A DAY WITH MEALS    furosemide (LASIX) 20 MG tablet TAKE 20 MG TABLET ONCE DAILY (Patient taking differently: daily as needed. TAKE 20 MG TABLET ONCE DAILY)    hydroCHLOROthiazide (MICROZIDE) 12.5 mg capsule Take 12.5 mg by mouth. HASN'T STARTED YET    inhalation spacing device (COMPACT SPACE CHAMBER) Use as directed for inhalation.    LIDOcaine-prilocaine (EMLA) cream     memantine (NAMENDA) 10 MG Tab Take 10 mg by mouth 2 (two) times daily.    olmesartan (BENICAR) 40 MG tablet Take 40 mg by mouth.    OXYGEN-AIR DELIVERY SYSTEMS MISC 3 L by Misc.(Non-Drug; Combo Route) route every evening.    pantoprazole (PROTONIX) 40 MG tablet TAKE 1 TABLET BY MOUTH ONCE A DAY    TENS unit and electrodes Cmpk 1 each by Misc.(Non-Drug; Combo Route) route 3 (three) times daily as needed (for back pain).    vitamin D (VITAMIN D3) 1000 units Tab Take 1,000 Units by mouth once daily.    zolpidem (AMBIEN) 10 mg Tab TAKE 1 TABLET BY MOUTH NIGHTLY    mirtazapine (REMERON) 15 MG tablet      No current facility-administered medications for this visit.       Review of Systems     GENERAL:  No weight loss, malaise or fevers.  HEENT:   No recent changes in vision or hearing  NECK:  Negative for lumps, no difficulty with  "swallowing.  RESPIRATORY:  Negative for cough, wheezing or shortness of breath, patient denies any recent URI.  CARDIOVASCULAR:  Negative for chest pain or palpitations.  GI:  Negative for abdominal discomfort, blood in stools or black stools or change in bowel habits.  MUSCULOSKELETAL:  See HPI.  SKIN:  Negative for lesions, rash, and itching.  PSYCH:  No mood disorder or recent psychosocial stressors.   HEMATOLOGY/LYMPHOLOGY:  Negative for prolonged bleeding, bruising easily or swollen nodes.    NEURO:   No history of syncope, paralysis, seizures or tremors.  All other reviewed and negative other than HPI.    OBJECTIVE:    /80   Pulse 62   Ht 5' 2" (1.575 m)   Wt 58.3 kg (128 lb 6.7 oz)   BMI 23.49 kg/m²       Physical Exam    GENERAL: Well appearing, in no acute distress, alert and oriented x3.  PSYCH:  Mood and affect appropriate.  SKIN: Skin color, texture, turgor normal, no rashes or lesions.  HEAD/FACE:  Normocephalic, atraumatic. Cranial nerves grossly intact.    PULM: No evidence of respiratory difficulty, symmetric chest rise.  GI:  Soft and non-tender.    BACK: Straight leg raising in the sitting and supine positions is positive to radicular pain on the Right side. There is mild pain to palpation over the facet joints of the lumbar spine or spinous processes. Patient has pain with Extension and lateral rotation. TTP over R lumbar paraspinal jaun L4-S1.  EXTREMITIES: Peripheral joint ROM is full and pain free without obvious instability or laxity in all four extremities. No deformities, edema, or skin discoloration. Good capillary refill.  MUSCULOSKELETAL: Able to stand on heels & toes.   Shoulder, hip, and knee provocative maneuvers are negative.  There is no pain with palpation over the sacroiliac joints bilaterally.  FABERs test is positive on the right.  Facet loading test is positive bilaterally.   Bilateral upper and lower extremity strength is normal and symmetric.  No atrophy or tone " abnormalities are noted.    RIGHT Lower extremity: Hip flexion 5/5, Hip Abduction 5/5, Hip Adduction 5/5, Knee extension 5/5, Knee flexion 5/5, Ankle dorsiflexion5/5, Extensor hallucis longus 5/5, Ankle plantarflexion 5/5  LEFT Lower extremity:  Hip flexion 5/5, Hip Abduction 5/5,Hip Adduction 5/5, Knee extension 5/5, Knee flexion 5/5, Ankle dorsiflexion 5/5, Extensor hallucis longus 5/5, Ankle plantarflexion 5/5  -Normal testing knee (patellar) jerk and ankle (achilles) jerk    NEURO: Bilateral upper and lower extremity coordination and muscle stretch reflexes are physiologic and symmetric. No loss of sensation is noted.  GAIT: normal.    Imaging (Reviewed on 10/20/2023):  08/02/23    X-Ray Lumbar Complete Including Flex And Ext    Narrative  EXAM: XR LUMBAR SPINE 5 VIEW WITH FLEX AND EXT    CLINICAL HISTORY: Pain in unspecified hip    TECHNIQUE: 7 views of the lumbar spine including flexion and extension views.    FINDINGS: There appear to be 6, nonrib-bearing lumbar-type vertebral bodies.  Grade 1 L5-L6 anterolisthesis.  No significant change in the degree of listhesis on flexion and extension views.  Lumbar vertebral body heights appear maintained.  Visualized pars interarticularis appear intact.  L5-L6 and L6-S1 prominent facet arthropathy.  L5-L6 and L6 S1 disc is remaining intervertebral disc spaces appear largely maintained.  No evidence of an acute fracture.    There is a distal abdominal aortic aneurysm with atherosclerotic calcification.          ASSESSMENT: 68 y.o. year old female with lower back and right leg pain, consistent with     1. Lumbar radiculopathy        2. Lumbar spondylosis        3. Dorsalgia, unspecified                PLAN:   - Interventions:  Consider Lumbar GERRI after MRI is completed to help with radicular symptoms of the RLE.  Consider bilateral L3-5 lumbar medial branch block to see if this helps with axial back pain in the future. Explained the risks and benefits of the procedure in  detail with the patient today in clinic along with alternative treatment options, and the patient elected to hold on intervention at this time.      - Anticoagulation use: Yes  anticoagulation, Plavix.  Per BRONSON guidelines, for secondary prophylaxis (CAD status post PTCA), patient can continue aspirin for lumbar medial branch block.     report:  Reviewed and consistent with medication use as prescribed.      - Medications:  -patient would like to refrain from pharmacologic management at this time secondary to medical co -morbidities      - Therapy:   We discussed continuing physical therapy at [facility] to help manage the patient/s painful condition. The patient was counseled that muscle strengthening will improve the long term prognosis in regards to pain and may also help increase range of motion and mobility. They were told that one of the goals of physical therapy is that they learn how to do the exercises so that they can do them independently at home daily upon completion. The patient's questions were answered and they were agreeable to this course.    - Imaging: Reviewed available imaging with patient and answered any questions they had regarding study.  MRI lumbar spine to better evaluate pain    - Follow up visit: return to clinic to discuss and review findings of MRI lumbar spine.    The above plan and management options were discussed at length with patient. Patient is in agreement with the above and verbalized understanding.    - I discussed the goals of interventional chronic pain management with the patient on today's visit. We discussed a multimodal and systematic approach to pain.  This includes diagnostic and therapeutic injections, adjuvant pharmacologic treatment, physical therapy, and at times psychiatry.  I emphasized the importance of regular exercise, core strengthening and stretching, diet and weight loss as a cornerstone of long-term pain management.    - This condition does  not require this patient to take time off of work, and the primary goal of our Pain Management services is to improve the patient's functional capacity.  - Patient Questions: Answered all of the patient's questions regarding diagnoses, therapy, treatment and next steps        Anup Kamara PA-C  Interventional Pain Management  [facility]    Disclaimer:  This note was prepared using voice recognition system and is likely to have sound alike errors that may have been overlooked even after proof reading.  Please call me with any questions

## 2023-10-23 ENCOUNTER — TELEPHONE (OUTPATIENT)
Dept: PAIN MEDICINE | Facility: CLINIC | Age: 68
End: 2023-10-23
Payer: MEDICARE

## 2023-10-23 NOTE — TELEPHONE ENCOUNTER
----- Message from [place] Carlos sent at 10/23/2023  8:47 AM CDT -----  Name of Who is Calling:BHARAT MONTANA [[id number]]        What is the request in detail:Pt called in to inform the office of her appt 10/31 Tues @ [facility].Please advise thank you       Can the clinic reply by [facility]:NO        What Number to Call Back if not in [address]:.Telephone Information:  Mobile          [phone]

## 2023-10-23 NOTE — TELEPHONE ENCOUNTER
Reached out to patient to schedule appointment from messages. Apt has been made.   Pt understand. All questions answered.     Mihir Allen  Medical Assistant

## 2023-11-02 NOTE — PROGRESS NOTES
Chief Complaint:    Chief Complaint   Patient presents with    Foot Pain     coldness       History of Present Illness:    Presents today she says her grandchild stepped on a foot few weeks back ever since she has had pain in the left foot when she tries to walk.    She says her feet stay cold a lot.  She has to wears socks sometimes they do turn purple.    Blood pressure is elevated today home does run high.  She has also signed a for digital hypertension    ROS:  Review of Systems   Constitutional: Negative for activity change, chills, fatigue, fever and unexpected weight change.   HENT: Negative for congestion, ear discharge, ear pain, hearing loss, postnasal drip and rhinorrhea.    Eyes: Negative for pain and visual disturbance.   Respiratory: Negative for cough, chest tightness and shortness of breath.    Cardiovascular: Negative for chest pain and palpitations.   Gastrointestinal: Negative for abdominal pain, diarrhea and vomiting.   Endocrine: Negative for heat intolerance.   Genitourinary: Negative for dysuria, flank pain, frequency and hematuria.   Musculoskeletal: Negative for back pain, gait problem and neck pain.   Skin: Negative for color change and rash.   Neurological: Negative for dizziness, tremors, seizures, numbness and headaches.   Psychiatric/Behavioral: Negative for agitation, hallucinations, self-injury, sleep disturbance and suicidal ideas. The patient is not nervous/anxious.        Past Medical History:   Diagnosis Date    AAA (abdominal aortic aneurysm) 2/13/2014    Abdominal aneurysm     3    Acute coronary syndrome     Arthritis     BPPV (benign paroxysmal positional vertigo)     Carotid artery plaque     Carotid artery stenosis and occlusion 2/13/2014    Chronic back pain     COPD (chronic obstructive pulmonary disease)     Coronary artery disease     Emphysema lung     Hyperlipidemia     Hypertension     Myocardial infarction     x3    Neuropathy        Social  History:  Social History     Socioeconomic History    Marital status:      Spouse name: Not on file    Number of children: Not on file    Years of education: Not on file    Highest education level: Not on file   Occupational History    Not on file   Social Needs    Financial resource strain: Hard    Food insecurity     Worry: Sometimes true     Inability: Sometimes true    Transportation needs     Medical: No     Non-medical: No   Tobacco Use    Smoking status: Former Smoker     Packs/day: 0.50     Years: 44.00     Pack years: 22.00     Types: Cigarettes, Vaping w/o nicotine     Start date: 6/24/1970     Quit date: 05/2017     Years since quitting: 3.2    Smokeless tobacco: Never Used    Tobacco comment: 3 MG NICOTINE FOR HEART.    Substance and Sexual Activity    Alcohol use: No     Frequency: Never    Drug use: No    Sexual activity: Not Currently     Birth control/protection: None   Lifestyle    Physical activity     Days per week: 3 days     Minutes per session: 20 min    Stress: Rather much   Relationships    Social connections     Talks on phone: Three times a week     Gets together: Twice a week     Attends Shinto service: Not on file     Active member of club or organization: Yes     Attends meetings of clubs or organizations: 1 to 4 times per year     Relationship status:    Other Topics Concern    Not on file   Social History Narrative    Not on file       Family History:   family history includes Breast cancer in her paternal aunt; Heart attacks under age 50 in her brother and father; Heart disease in her mother.    Health Maintenance   Topic Date Due    DEXA SCAN  02/22/1995    Pneumococcal Vaccine (65+ Low/Medium Risk) (1 of 2 - PCV13) 02/22/2020    Mammogram  03/10/2021    Lipid Panel  03/13/2021    Aspirin/Antiplatelet Therapy  07/30/2021    TETANUS VACCINE  11/15/2026    Hepatitis C Screening  Completed       Physical Exam:    Vital Signs  Temp: 99.1 °F  (37.3 °C)  Temp src: Temporal  Pulse: 65  SpO2: 98 %  BP: (!) 158/84  Pain Score:   6  Pain Loc: Foot  Height and Weight  Weight: 76.3 kg (168 lb 3.4 oz)]    Body mass index is 30.77 kg/m².    Physical Exam  Constitutional:       Appearance: She is well-developed.   Eyes:      Conjunctiva/sclera: Conjunctivae normal.      Pupils: Pupils are equal, round, and reactive to light.   Neck:      Musculoskeletal: Normal range of motion and neck supple.   Cardiovascular:      Rate and Rhythm: Normal rate and regular rhythm.      Pulses:           Dorsalis pedis pulses are 2+ on the right side and 2+ on the left side.        Posterior tibial pulses are 2+ on the right side and 2+ on the left side.      Heart sounds: Normal heart sounds. No murmur.   Pulmonary:      Effort: Pulmonary effort is normal. No respiratory distress.      Breath sounds: Normal breath sounds. No wheezing or rales.   Chest:      Chest wall: No tenderness.   Abdominal:      General: There is no distension.      Palpations: Abdomen is soft. There is no mass.      Tenderness: There is no abdominal tenderness. There is no guarding.   Musculoskeletal:         General: No tenderness.        Feet:    Lymphadenopathy:      Cervical: No cervical adenopathy.   Skin:     General: Skin is warm and dry.   Neurological:      Mental Status: She is alert and oriented to person, place, and time.      Deep Tendon Reflexes: Reflexes are normal and symmetric.   Psychiatric:         Behavior: Behavior normal.         Thought Content: Thought content normal.         Judgment: Judgment normal.           Assessment:      ICD-10-CM ICD-9-CM   1. Left foot pain  M79.672 729.5   2. Essential hypertension  I10 401.9   3. Cold hands and feet without peripheral vascular disease  R20.8 782.9         Plan:        Will x-ray the foot recommend a walking boot  Increase irbesartan to 300 mg and add amlodipine 5 mg monitor blood pressure carefully do a virtual visit in 2 weeks  She may  have a milder form of Raynaud's phenomena, need to keep the feet wrapped up and warm as much as possible        Orders Placed This Encounter   Procedures    X-Ray Foot Complete Left       Current Outpatient Medications   Medication Sig Dispense Refill    albuterol (PROVENTIL/VENTOLIN HFA) 90 mcg/actuation inhaler INHALE 2 PUFFS INTO THE LUNGS EVERY 6 HOURS AS NEEDED 8.5 g 3    albuterol-ipratropium (DUO-NEB) 2.5 mg-0.5 mg/3 mL nebulizer solution USE ONE VIAL IN NEBULIZER TWICE DAILY 270 mL 11    aspirin 81 MG Chew Take 81 mg by mouth once daily.      bisoprolol (ZEBETA) 5 MG tablet Take 1 tablet (5 mg total) by mouth once daily. 90 tablet 1    clopidogreL (PLAVIX) 75 mg tablet Take 1 tablet (75 mg total) by mouth once daily. 90 tablet 1    COMBIVENT RESPIMAT  mcg/actuation inhaler Inhale 2 puffs into the lungs every 6 (six) hours as needed for Wheezing or Shortness of Breath. 4 g 11    DULoxetine (CYMBALTA) 60 MG capsule TAKE (1) CAPSULE BY MOUTH DAILY 90 capsule 1    estrogens, conjugated, (PREMARIN) 0.3 MG tablet Take 1 tablet (0.3 mg total) by mouth once daily. 9 tablet 1    ezetimibe (ZETIA) 10 mg tablet Take 1 tablet (10 mg total) by mouth once daily. 90 tablet 3    furosemide (LASIX) 20 MG tablet Take 1 tablet (20 mg total) by mouth daily as needed. For leg swelling as needed. Or weight gain of 3 pounds in 1 day or 5 lbs in 1 week. 30 tablet 6    irbesartan (AVAPRO) 300 MG tablet Take 1 tablet (300 mg total) by mouth every evening. 30 tablet 3    mometasone-formoterol (DULERA) 200-5 mcg/actuation inhaler INHALE 2 PUFFS 2 TIMES DAILY 13 g 11    OXYGEN-AIR DELIVERY SYSTEMS MISC 2 L by Misc.(Non-Drug; Combo Route) route every evening.      pantoprazole (PROTONIX) 40 MG tablet Take 1 tablet (40 mg total) by mouth once daily. 30 tablet 5    zolpidem (AMBIEN) 5 MG Tab Take 1 tablet (5 mg total) by mouth nightly. 30 tablet 5    amLODIPine (NORVASC) 5 MG tablet Take 1 tablet (5 mg total) by  mouth once daily. 30 tablet 11    fluticasone propionate (FLONASE) 50 mcg/actuation nasal spray 2 sprays (100 mcg total) by Each Nostril route once daily. 16 g 11    simvastatin (ZOCOR) 40 MG tablet Take 1 tablet (40 mg total) by mouth every evening. 90 tablet 3     No current facility-administered medications for this visit.        Medications Discontinued During This Encounter   Medication Reason    irbesartan (AVAPRO) 150 MG tablet        Follow up for in 2 wks with Dr. Altman for blood pressure check.      Olga Altman MD                 DVT ppx: lovenox  DIET: DASH  DISPO: [facility]    On admission unable to go to [facility] due to fall risk - evaluated by PT - patient able to ambulate without assistance. Patient reports at times he feels weak and falls, no preceding symptoms, loss of consciousness, bowel/bladder incontinence, dizziness. Low suspicion for cardio/neurogenic syncope. Given unremarkable neuro exam, neg CTH/orthostats, able to ambulate with PT no further inpatient work up indicated. Medically optimized for dc to [facility].

## 2023-11-10 ENCOUNTER — TELEPHONE (OUTPATIENT)
Dept: FAMILY MEDICINE | Facility: CLINIC | Age: 68
End: 2023-11-10
Payer: MEDICARE

## 2023-11-10 ENCOUNTER — OFFICE VISIT (OUTPATIENT)
Dept: PAIN MEDICINE | Facility: CLINIC | Age: 68
End: 2023-11-10
Payer: MEDICARE

## 2023-11-10 ENCOUNTER — PATIENT MESSAGE (OUTPATIENT)
Dept: FAMILY MEDICINE | Facility: CLINIC | Age: 68
End: 2023-11-10
Payer: MEDICARE

## 2023-11-10 VITALS
HEART RATE: 59 BPM | DIASTOLIC BLOOD PRESSURE: 63 MMHG | WEIGHT: 130.38 LBS | SYSTOLIC BLOOD PRESSURE: 137 MMHG | HEIGHT: 62 IN | BODY MASS INDEX: 23.99 KG/M2

## 2023-11-10 DIAGNOSIS — M47.816 LUMBAR SPONDYLOSIS: ICD-10-CM

## 2023-11-10 DIAGNOSIS — M54.16 LUMBAR RADICULOPATHY: Primary | ICD-10-CM

## 2023-11-10 DIAGNOSIS — M48.061 SPINAL STENOSIS OF LUMBAR REGION, UNSPECIFIED WHETHER NEUROGENIC CLAUDICATION PRESENT: ICD-10-CM

## 2023-11-10 PROCEDURE — 1101F PT FALLS ASSESS-DOCD LE1/YR: CPT | Mod: HCNC,CPTII,S$GLB, | Performed by: PHYSICIAN ASSISTANT

## 2023-11-10 PROCEDURE — 1159F PR MEDICATION LIST DOCUMENTED IN MEDICAL RECORD: ICD-10-PCS | Mod: HCNC,CPTII,S$GLB, | Performed by: PHYSICIAN ASSISTANT

## 2023-11-10 PROCEDURE — 1101F PR PT FALLS ASSESS DOC 0-1 FALLS W/OUT INJ PAST YR: ICD-10-PCS | Mod: HCNC,CPTII,S$GLB, | Performed by: PHYSICIAN ASSISTANT

## 2023-11-10 PROCEDURE — 3044F HG A1C LEVEL LT 7.0%: CPT | Mod: HCNC,CPTII,S$GLB, | Performed by: PHYSICIAN ASSISTANT

## 2023-11-10 PROCEDURE — 3075F PR MOST RECENT SYSTOLIC BLOOD PRESS GE 130-139MM HG: ICD-10-PCS | Mod: HCNC,CPTII,S$GLB, | Performed by: PHYSICIAN ASSISTANT

## 2023-11-10 PROCEDURE — 3008F PR BODY MASS INDEX (BMI) DOCUMENTED: ICD-10-PCS | Mod: HCNC,CPTII,S$GLB, | Performed by: PHYSICIAN ASSISTANT

## 2023-11-10 PROCEDURE — 1159F MED LIST DOCD IN RCRD: CPT | Mod: HCNC,CPTII,S$GLB, | Performed by: PHYSICIAN ASSISTANT

## 2023-11-10 PROCEDURE — 3078F DIAST BP <80 MM HG: CPT | Mod: HCNC,CPTII,S$GLB, | Performed by: PHYSICIAN ASSISTANT

## 2023-11-10 PROCEDURE — 3008F BODY MASS INDEX DOCD: CPT | Mod: HCNC,CPTII,S$GLB, | Performed by: PHYSICIAN ASSISTANT

## 2023-11-10 PROCEDURE — 99999 PR PBB SHADOW E&M-EST. PATIENT-LVL IV: CPT | Mod: PBBFAC,HCNC,, | Performed by: PHYSICIAN ASSISTANT

## 2023-11-10 PROCEDURE — 4010F PR ACE/ARB THEARPY RXD/TAKEN: ICD-10-PCS | Mod: HCNC,CPTII,S$GLB, | Performed by: PHYSICIAN ASSISTANT

## 2023-11-10 PROCEDURE — 3288F PR FALLS RISK ASSESSMENT DOCUMENTED: ICD-10-PCS | Mod: HCNC,CPTII,S$GLB, | Performed by: PHYSICIAN ASSISTANT

## 2023-11-10 PROCEDURE — 1125F PR PAIN SEVERITY QUANTIFIED, PAIN PRESENT: ICD-10-PCS | Mod: HCNC,CPTII,S$GLB, | Performed by: PHYSICIAN ASSISTANT

## 2023-11-10 PROCEDURE — 3075F SYST BP GE 130 - 139MM HG: CPT | Mod: HCNC,CPTII,S$GLB, | Performed by: PHYSICIAN ASSISTANT

## 2023-11-10 PROCEDURE — 4010F ACE/ARB THERAPY RXD/TAKEN: CPT | Mod: HCNC,CPTII,S$GLB, | Performed by: PHYSICIAN ASSISTANT

## 2023-11-10 PROCEDURE — 99999 PR PBB SHADOW E&M-EST. PATIENT-LVL IV: ICD-10-PCS | Mod: PBBFAC,HCNC,, | Performed by: PHYSICIAN ASSISTANT

## 2023-11-10 PROCEDURE — 99213 PR OFFICE/OUTPT VISIT, EST, LEVL III, 20-29 MIN: ICD-10-PCS | Mod: HCNC,S$GLB,, | Performed by: PHYSICIAN ASSISTANT

## 2023-11-10 PROCEDURE — 3078F PR MOST RECENT DIASTOLIC BLOOD PRESSURE < 80 MM HG: ICD-10-PCS | Mod: HCNC,CPTII,S$GLB, | Performed by: PHYSICIAN ASSISTANT

## 2023-11-10 PROCEDURE — 3288F FALL RISK ASSESSMENT DOCD: CPT | Mod: HCNC,CPTII,S$GLB, | Performed by: PHYSICIAN ASSISTANT

## 2023-11-10 PROCEDURE — 99213 OFFICE O/P EST LOW 20 MIN: CPT | Mod: HCNC,S$GLB,, | Performed by: PHYSICIAN ASSISTANT

## 2023-11-10 PROCEDURE — 3044F PR MOST RECENT HEMOGLOBIN A1C LEVEL <7.0%: ICD-10-PCS | Mod: HCNC,CPTII,S$GLB, | Performed by: PHYSICIAN ASSISTANT

## 2023-11-10 PROCEDURE — 1125F AMNT PAIN NOTED PAIN PRSNT: CPT | Mod: HCNC,CPTII,S$GLB, | Performed by: PHYSICIAN ASSISTANT

## 2023-11-10 RX ORDER — AZITHROMYCIN 250 MG/1
TABLET, FILM COATED ORAL
Qty: 6 TABLET | Refills: 0 | Status: SHIPPED | OUTPATIENT
Start: 2023-11-10 | End: 2023-12-05

## 2023-11-10 NOTE — H&P (VIEW-ONLY)
"  Established Patient Chronic Pain Note (Follow up Visit)    Referring Physician: No ref. provider found    PCP: Olga Altman MD    Chief Complaint:   RLE pain     SUBJECTIVE:  Interval History (11/10/2023):   Gemma Vick presents today for follow-up visit.  Patient was last seen on 10/20/2023. At that visit, the plan was to complete MRI of lumbar spine. She currently c/o pain in R hip and  Patient reports pain as 8/10 today. She denies significant changes since her last office visit. Does not have pain involving her L side or LLE. Reports pain usually stops below R buttock.      Interval History (10/20/2023): Gemma Vick presents today for follow-up visit.  Patient was last seen on 10/5/2023. At that visit, the plan was to complete MRI of lumbar spine and review treatment options after study. Patient reports pain as 8/10 today.  Patient states she cannot do steroid injections because it significantly elevates her BP and was told by her cardiologist "No steroid injections."   Since her last office visit she was not able to get her MRI completed.   Patient localizes her pain to R lower back, buttocks, hips and radiates down back of her leg. Pain stops on the side of her knee. The more active she is the more pain she experiences. Patient does not have pain with her LLE.   Currently she is in physical therapy ([facility]) and continues efforts to  lose weight in order to help her condition.  Patient also has a history of emphysema. Quit smoking 10-11 years ago and requires oxygen at night.  Patient has tried several pain medication and Gabapentin. She discontinued due to SE.      Initial HPI (10/5/2023):  Gemma Vick is a 68 y.o. female with past medical history significant for vascular dementia, COPD, abdominal aortic aneurysm, coronary artery disease, hypertension, hyperlipidemia, prediabetes, GERD, nicotine dependence in remission who presents to the clinic for the evaluation of lower back and leg pain.  " Patient reports pain has been present in the lower back for several years and in the legs for at least 8 months.  She reports she is an avid equestrian participating in horse riding, the rodeo and numerous sports throughout her life, which may have contributed to her symptoms.  Today she reports pain which is intermittent which is rated a 3/10.  Pain at its worse is an 8/10.  Patient reports pain in a bandlike distribution in the lower back which radiates down the lateral and posterior aspect of the right lower extremity to the knee.  90% of her symptoms are focused in the axial lower back.  Pain is described as aching in nature.  She denies any left-sided radicular symptoms.  Pain is particularly exacerbated with bending over, standing and walking.  Patient reports she is able to ambulate through [employer] for 1 hour before requiring rest.  Patient does endorse associated weakness in the right lower extremities associated with her pain.  Pain has been improved with recent weight loss over the last year as well as performing physician directed physical therapy exercises at home for the last 8 weeks with mild improvement in her symptoms.  Patient has trialed gabapentin in the past with significant daytime somnolence and would like to refrain from our pharmacologic management.  Patient has not received prior intervention.    Patient denies night fever/night sweats, urinary incontinence, bowel incontinence, significant weight loss, and loss of sensations.  Patient reports significant motor weakness.      Pain Disability Index Review:         11/10/2023     1:14 PM 10/20/2023    10:50 AM 10/5/2023     1:37 PM   Last 3 PDI Scores   Pain Disability Index (PDI) 56 56 20       Non-Pharmacologic Treatments:  Physical Therapy/Home Exercise: yes  Ice/Heat:yes  TENS: no  Acupuncture: no  Massage: no  Chiropractic: no    Other: no      Pain Medications:  - Adjuvant Medications: Mirtazapine (Remeron)  - Anti-Coagulants: Aspirin  and Plavix    Pain Procedures:   None    Past Medical History:   Diagnosis Date    AAA (abdominal aortic aneurysm) 02/13/2014    Abdominal aneurysm     3    Acute coronary syndrome     Arthritis     BPPV (benign paroxysmal positional vertigo)     Carotid artery plaque     Carotid artery stenosis and occlusion 02/13/2014    Chronic back pain     COPD (chronic obstructive pulmonary disease)     Coronary artery disease     Dementia     Emphysema lung     Hyperlipidemia     Hypertension     Myocardial infarction     x3    Neuropathy     Personal history of COVID-19 06/09/2021 11/16/2020 +Covid, recovered at home      Past Surgical History:   Procedure Laterality Date    CARDIAC CATHETERIZATION      CORONARY ANGIOPLASTY      CORONARY STENT PLACEMENT N/A 9/12/2023    Procedure: INSERTION, STENT, CORONARY ARTERY;  Surgeon: Millie Juarez MD;  Location: [facility] CATH [facility];  Service: Cardiology;  Laterality: N/A;    HYSTERECTOMY  1988    LEFT HEART CATHETERIZATION Left 9/12/2023    Procedure: Left heart cath;  Surgeon: Millie Juarez MD;  Location: [facility] CATH [facility];  Service: Cardiology;  Laterality: Left;    PERCUTANEOUS CORONARY INTERVENTION, ARTERY N/A 9/12/2023    Procedure: Percutaneous coronary intervention;  Surgeon: Millie Juarez MD;  Location: [facility] CATH [facility];  Service: Cardiology;  Laterality: N/A;    THROMBECTOMY, CORONARY  9/12/2023    Procedure: Thrombectomy, Coronary;  Surgeon: Millie Juarez MD;  Location: [facility] CATH [facility];  Service: Cardiology;;    TONSILLECTOMY       Review of patient's allergies indicates:  No Known Allergies    Current Outpatient Medications   Medication Sig    albuterol (PROVENTIL/VENTOLIN HFA) 90 mcg/actuation inhaler     albuterol-ipratropium (DUO-NEB) 2.5 mg-0.5 mg/3 mL nebulizer solution     amLODIPine (NORVASC) 10 MG tablet Take 1 tablet (10 mg total) by mouth once daily. (Patient taking differently: Take 5 mg by mouth once daily.)    aspirin 81 MG Chew Take 81 mg by mouth once daily.     budesonide-glycopyr-formoterol (BREZTRI AEROSPHERE) 160-9-4.8 mcg/actuation HFAA Inhale 2 puffs into the lungs 2 (two) times a day.    carvediloL (COREG) 12.5 MG tablet Take 1 tablet (12.5 mg total) by mouth 2 (two) times daily.    clopidogreL (PLAVIX) 75 mg tablet TAKE 1 TABLET BY MOUTH ONCE A DAY    COMBIVENT RESPIMAT  mcg/actuation inhaler Inhale 1 puff into the lungs every 6 (six) hours as needed for Wheezing.    dicyclomine (BENTYL) 10 MG capsule TAKE 1 CAPSULE BY MOUTH 4 TIMES DAILY BEFORE MEALS AND NIGHTLY Strength: 10 mg (Patient taking differently: 4 (four) times daily as needed. TAKE 1 CAPSULE BY MOUTH 4 TIMES DAILY BEFORE MEALS AND NIGHTLY Strength: 10 mg)    doxepin (SINEQUAN) 10 MG capsule Take 10-20 mg by mouth nightly as needed.    ezetimibe-simvastatin 10-40 mg (VYTORIN) 10-40 mg per tablet TAKE 1 TABLET BY MOUTH EVERY EVENING    FEROSUL 325 mg (65 mg iron) Tab tablet TAKE (1) TABLET BY MOUTH 2 TIMES A DAY WITH MEALS    furosemide (LASIX) 20 MG tablet TAKE 20 MG TABLET ONCE DAILY (Patient taking differently: daily as needed. TAKE 20 MG TABLET ONCE DAILY)    hydroCHLOROthiazide (MICROZIDE) 12.5 mg capsule Take 12.5 mg by mouth. HASN'T STARTED YET    inhalation spacing device (COMPACT SPACE CHAMBER) Use as directed for inhalation.    memantine (NAMENDA) 10 MG Tab Take 10 mg by mouth 2 (two) times daily.    olmesartan (BENICAR) 40 MG tablet Take 40 mg by mouth.    OXYGEN-AIR DELIVERY SYSTEMS MISC 3 L by Misc.(Non-Drug; Combo Route) route every evening.    pantoprazole (PROTONIX) 40 MG tablet TAKE 1 TABLET BY MOUTH ONCE A DAY    TENS unit and electrodes Cmpk 1 each by Misc.(Non-Drug; Combo Route) route 3 (three) times daily as needed (for back pain).    vitamin D (VITAMIN D3) 1000 units Tab Take 1,000 Units by mouth once daily.    zolpidem (AMBIEN) 10 mg Tab TAKE 1 TABLET BY MOUTH NIGHTLY    LIDOcaine-prilocaine (EMLA) cream     mirtazapine (REMERON) 15 MG tablet      No current  "facility-administered medications for this visit.       Review of Systems     GENERAL:  No weight loss, malaise or fevers.  HEENT:   No recent changes in vision or hearing  NECK:  Negative for lumps, no difficulty with swallowing.  RESPIRATORY:  Negative for cough, wheezing or shortness of breath, patient denies any recent URI.  CARDIOVASCULAR:  Negative for chest pain or palpitations.  GI:  Negative for abdominal discomfort, blood in stools or black stools or change in bowel habits.  MUSCULOSKELETAL:  See HPI.  SKIN:  Negative for lesions, rash, and itching.  PSYCH:  No mood disorder or recent psychosocial stressors.   HEMATOLOGY/LYMPHOLOGY:  Negative for prolonged bleeding, bruising easily or swollen nodes.    NEURO:   No history of syncope, paralysis, seizures or tremors.  All other reviewed and negative other than HPI.    OBJECTIVE:    /63   Pulse (!) 59   Ht 5' 2" (1.575 m)   Wt 59.1 kg (130 lb 6.4 oz)   BMI 23.85 kg/m²       Physical Exam    GENERAL: Well appearing, in no acute distress, alert and oriented x3.  PSYCH:  Mood and affect appropriate.  SKIN: Skin color, texture, turgor normal, no rashes or lesions.  HEAD/FACE:  Normocephalic, atraumatic. Cranial nerves grossly intact.    PULM: No evidence of respiratory difficulty, symmetric chest rise.  GI:  Soft and non-tender.    BACK: Straight leg raising in the sitting and supine positions is positive to radicular pain on the Right side. There is mild pain to palpation over the facet joints of the lumbar spine or spinous processes. Patient has pain with Extension and lateral rotation. TTP over R lumbar paraspinal jaun L4-S1.  EXTREMITIES: Peripheral joint ROM is full and pain free without obvious instability or laxity in all four extremities. No deformities, edema, or skin discoloration. Good capillary refill.  MUSCULOSKELETAL: Able to stand on heels & toes.   Shoulder, hip, and knee provocative maneuvers are negative.  There is no pain with " palpation over the sacroiliac joints bilaterally.  FABERs test is positive on the right.  Facet loading test is positive bilaterally.   Bilateral upper and lower extremity strength is normal and symmetric.  No atrophy or tone abnormalities are noted.    RIGHT Lower extremity: Hip flexion 5/5, Hip Abduction 5/5, Hip Adduction 5/5, Knee extension 5/5, Knee flexion 5/5, Ankle dorsiflexion5/5, Extensor hallucis longus 5/5, Ankle plantarflexion 5/5  LEFT Lower extremity:  Hip flexion 5/5, Hip Abduction 5/5,Hip Adduction 5/5, Knee extension 5/5, Knee flexion 5/5, Ankle dorsiflexion 5/5, Extensor hallucis longus 5/5, Ankle plantarflexion 5/5  -Normal testing knee (patellar) jerk and ankle (achilles) jerk    NEURO: Bilateral upper and lower extremity coordination and muscle stretch reflexes are physiologic and symmetric. No loss of sensation is noted.  GAIT: normal.    Imaging (Reviewed on 11/10/2023):    MRI  Lumbar Spine 10/31/23- full report in [facility]  Fusiform aneurysmal dilation of abdom aorta measuring up to 3.8 cm diameter. Surgical consult and/or close follow up with US or CT recommended.  Severe multilevel facet arthropathy with degen grade 1 spondylolisthesis of L4 on L5.   Hypertrophic facet changes with multilevel degen disc changes result in spinal canal stenosis at L3/4 and L4/5.   Broad based disc herniation lateralizing to the left at L2/3, causing L lateral recess and mild left foraminal stenosis.  Broad based posterior disc herniation at L3/4 contributes to spinal canal narrowing and lateral recess and left foraminal narrowing.    08/02/23    X-Ray Lumbar Complete Including Flex And Ext    Narrative  EXAM: XR LUMBAR SPINE 5 VIEW WITH FLEX AND EXT    CLINICAL HISTORY: Pain in unspecified hip    TECHNIQUE: 7 views of the lumbar spine including flexion and extension views.    FINDINGS: There appear to be 6, nonrib-bearing lumbar-type vertebral bodies.  Grade 1 L5-L6 anterolisthesis.  No significant change in  the degree of listhesis on flexion and extension views.  Lumbar vertebral body heights appear maintained.  Visualized pars interarticularis appear intact.  L5-L6 and L6-S1 prominent facet arthropathy.  L5-L6 and L6 S1 disc is remaining intervertebral disc spaces appear largely maintained.  No evidence of an acute fracture.    There is a distal abdominal aortic aneurysm with atherosclerotic calcification.          ASSESSMENT: 68 y.o. year old female with lower back and right leg pain, consistent with     1. Lumbar radiculopathy  Case Request-RAD/Other Procedure Area: Injection,steroid,epidural,transforaminal approach- right side, L4/5 and L5/S1      2. Lumbar spondylosis  Case Request-RAD/Other Procedure Area: Injection,steroid,epidural,transforaminal approach- right side, L4/5 and L5/S1      3. Spinal stenosis of lumbar region, unspecified whether neurogenic claudication present  Case Request-RAD/Other Procedure Area: Injection,steroid,epidural,transforaminal approach- right side, L4/5 and L5/S1                PLAN:   - Interventions:  Will schedule the patient for TF Epidural steroid injection at L4/5 and L5/S1 on the right side to help with back pain and radicular symptoms of RLE. Explained the risks and benefits of the procedure in detail with the patient today in clinic along with alternative treatment options, and the patient elected to hold on intervention at this time.      - Anticoagulation use: Yes  anticoagulation, Plavix. Will obtain clearance from Dr. Juarez.  Per BRONSON guidelines, for secondary prophylaxis (CAD status post PTCA), patient can continue aspirin for lumbar medial branch block.     report:  Reviewed and consistent with medication use as prescribed.      - Medications:  -patient would like to refrain from pharmacologic management at this time secondary to medical co -morbidities      - Therapy:   We discussed continuing physical therapy at [facility] to help manage the patient/s  painful condition. The patient was counseled that muscle strengthening will improve the long term prognosis in regards to pain and may also help increase range of motion and mobility. They were told that one of the goals of physical therapy is that they learn how to do the exercises so that they can do them independently at home daily upon completion. The patient's questions were answered and they were agreeable to this course.    - Imaging: Reviewed available imaging with patient and answered any questions they had regarding study.  MRI lumbar spine reviewed and discussed with the patient and significant other today.     - Follow up visit: return to clinic 4 weeks post procedure.    The above plan and management options were discussed at length with patient. Patient is in agreement with the above and verbalized understanding.    - I discussed the goals of interventional chronic pain management with the patient on today's visit. We discussed a multimodal and systematic approach to pain.  This includes diagnostic and therapeutic injections, adjuvant pharmacologic treatment, physical therapy, and at times psychiatry.  I emphasized the importance of regular exercise, core strengthening and stretching, diet and weight loss as a cornerstone of long-term pain management.    - This condition does not require this patient to take time off of work, and the primary goal of our Pain Management services is to improve the patient's functional capacity.  - Patient Questions: Answered all of the patient's questions regarding diagnoses, therapy, treatment and next steps        Anup Kamara PA-C  Interventional Pain Management  [facility]    Disclaimer:  This note was prepared using voice recognition system and is likely to have sound alike errors that may have been overlooked even after proof reading.  Please call me with any questions

## 2023-11-10 NOTE — TELEPHONE ENCOUNTER
----- Message from Liz Cisneros sent at 11/10/2023 11:38 AM CST -----  Contact: Gemma Menendez is calling to speak to the nurse regarding coughing up yellowish green mucous, she stated if she have to be seen she will come in and she can even see the other doctor as well. Please give her a call back at [phone]    Thanks  LJ

## 2023-11-10 NOTE — PROGRESS NOTES
"  Established Patient Chronic Pain Note (Follow up Visit)    Referring Physician: No ref. provider found    PCP: Olga Altman MD    Chief Complaint:   RLE pain     SUBJECTIVE:  Interval History (11/10/2023):   Gemma Vick presents today for follow-up visit.  Patient was last seen on 10/20/2023. At that visit, the plan was to complete MRI of lumbar spine. She currently c/o pain in R hip and  Patient reports pain as 8/10 today. She denies significant changes since her last office visit. Does not have pain involving her L side or LLE. Reports pain usually stops below R buttock.      Interval History (10/20/2023): Gemma Vick presents today for follow-up visit.  Patient was last seen on 10/5/2023. At that visit, the plan was to complete MRI of lumbar spine and review treatment options after study. Patient reports pain as 8/10 today.  Patient states she cannot do steroid injections because it significantly elevates her BP and was told by her cardiologist "No steroid injections."   Since her last office visit she was not able to get her MRI completed.   Patient localizes her pain to R lower back, buttocks, hips and radiates down back of her leg. Pain stops on the side of her knee. The more active she is the more pain she experiences. Patient does not have pain with her LLE.   Currently she is in physical therapy ([facility]) and continues efforts to  lose weight in order to help her condition.  Patient also has a history of emphysema. Quit smoking 10-11 years ago and requires oxygen at night.  Patient has tried several pain medication and Gabapentin. She discontinued due to SE.      Initial HPI (10/5/2023):  Gemma Vick is a 68 y.o. female with past medical history significant for vascular dementia, COPD, abdominal aortic aneurysm, coronary artery disease, hypertension, hyperlipidemia, prediabetes, GERD, nicotine dependence in remission who presents to the clinic for the evaluation of lower back and leg pain.  " Patient reports pain has been present in the lower back for several years and in the legs for at least 8 months.  She reports she is an avid equestrian participating in horse riding, the rodeo and numerous sports throughout her life, which may have contributed to her symptoms.  Today she reports pain which is intermittent which is rated a 3/10.  Pain at its worse is an 8/10.  Patient reports pain in a bandlike distribution in the lower back which radiates down the lateral and posterior aspect of the right lower extremity to the knee.  90% of her symptoms are focused in the axial lower back.  Pain is described as aching in nature.  She denies any left-sided radicular symptoms.  Pain is particularly exacerbated with bending over, standing and walking.  Patient reports she is able to ambulate through [employer] for 1 hour before requiring rest.  Patient does endorse associated weakness in the right lower extremities associated with her pain.  Pain has been improved with recent weight loss over the last year as well as performing physician directed physical therapy exercises at home for the last 8 weeks with mild improvement in her symptoms.  Patient has trialed gabapentin in the past with significant daytime somnolence and would like to refrain from our pharmacologic management.  Patient has not received prior intervention.    Patient denies night fever/night sweats, urinary incontinence, bowel incontinence, significant weight loss, and loss of sensations.  Patient reports significant motor weakness.      Pain Disability Index Review:         11/10/2023     1:14 PM 10/20/2023    10:50 AM 10/5/2023     1:37 PM   Last 3 PDI Scores   Pain Disability Index (PDI) 56 56 20       Non-Pharmacologic Treatments:  Physical Therapy/Home Exercise: yes  Ice/Heat:yes  TENS: no  Acupuncture: no  Massage: no  Chiropractic: no    Other: no      Pain Medications:  - Adjuvant Medications: Mirtazapine (Remeron)  - Anti-Coagulants: Aspirin  and Plavix    Pain Procedures:   None    Past Medical History:   Diagnosis Date    AAA (abdominal aortic aneurysm) 02/13/2014    Abdominal aneurysm     3    Acute coronary syndrome     Arthritis     BPPV (benign paroxysmal positional vertigo)     Carotid artery plaque     Carotid artery stenosis and occlusion 02/13/2014    Chronic back pain     COPD (chronic obstructive pulmonary disease)     Coronary artery disease     Dementia     Emphysema lung     Hyperlipidemia     Hypertension     Myocardial infarction     x3    Neuropathy     Personal history of COVID-19 06/09/2021 11/16/2020 +Covid, recovered at home      Past Surgical History:   Procedure Laterality Date    CARDIAC CATHETERIZATION      CORONARY ANGIOPLASTY      CORONARY STENT PLACEMENT N/A 9/12/2023    Procedure: INSERTION, STENT, CORONARY ARTERY;  Surgeon: Millie Juarez MD;  Location: [facility] CATH LAB;  Service: Cardiology;  Laterality: N/A;    HYSTERECTOMY  1988    LEFT HEART CATHETERIZATION Left 9/12/2023    Procedure: Left heart cath;  Surgeon: Millie Juarez MD;  Location: [facility] CATH LAB;  Service: Cardiology;  Laterality: Left;    PERCUTANEOUS CORONARY INTERVENTION, ARTERY N/A 9/12/2023    Procedure: Percutaneous coronary intervention;  Surgeon: Millie Juarez MD;  Location: [facility] CATH LAB;  Service: Cardiology;  Laterality: N/A;    THROMBECTOMY, CORONARY  9/12/2023    Procedure: Thrombectomy, Coronary;  Surgeon: Millie Juarez MD;  Location: [facility] CATH LAB;  Service: Cardiology;;    TONSILLECTOMY       Review of patient's allergies indicates:  No Known Allergies    Current Outpatient Medications   Medication Sig    albuterol (PROVENTIL/VENTOLIN HFA) 90 mcg/actuation inhaler     albuterol-ipratropium (DUO-NEB) 2.5 mg-0.5 mg/3 mL nebulizer solution     amLODIPine (NORVASC) 10 MG tablet Take 1 tablet (10 mg total) by mouth once daily. (Patient taking differently: Take 5 mg by mouth once daily.)    aspirin 81 MG Chew Take 81 mg by mouth once daily.     budesonide-glycopyr-formoterol (BREZTRI AEROSPHERE) 160-9-4.8 mcg/actuation HFAA Inhale 2 puffs into the lungs 2 (two) times a day.    carvediloL (COREG) 12.5 MG tablet Take 1 tablet (12.5 mg total) by mouth 2 (two) times daily.    clopidogreL (PLAVIX) 75 mg tablet TAKE 1 TABLET BY MOUTH ONCE A DAY    COMBIVENT RESPIMAT  mcg/actuation inhaler Inhale 1 puff into the lungs every 6 (six) hours as needed for Wheezing.    dicyclomine (BENTYL) 10 MG capsule TAKE 1 CAPSULE BY MOUTH 4 TIMES DAILY BEFORE MEALS AND NIGHTLY Strength: 10 mg (Patient taking differently: 4 (four) times daily as needed. TAKE 1 CAPSULE BY MOUTH 4 TIMES DAILY BEFORE MEALS AND NIGHTLY Strength: 10 mg)    doxepin (SINEQUAN) 10 MG capsule Take 10-20 mg by mouth nightly as needed.    ezetimibe-simvastatin 10-40 mg (VYTORIN) 10-40 mg per tablet TAKE 1 TABLET BY MOUTH EVERY EVENING    FEROSUL 325 mg (65 mg iron) Tab tablet TAKE (1) TABLET BY MOUTH 2 TIMES A DAY WITH MEALS    furosemide (LASIX) 20 MG tablet TAKE 20 MG TABLET ONCE DAILY (Patient taking differently: daily as needed. TAKE 20 MG TABLET ONCE DAILY)    hydroCHLOROthiazide (MICROZIDE) 12.5 mg capsule Take 12.5 mg by mouth. HASN'T STARTED YET    inhalation spacing device (COMPACT SPACE CHAMBER) Use as directed for inhalation.    memantine (NAMENDA) 10 MG Tab Take 10 mg by mouth 2 (two) times daily.    olmesartan (BENICAR) 40 MG tablet Take 40 mg by mouth.    OXYGEN-AIR DELIVERY SYSTEMS MISC 3 L by Misc.(Non-Drug; Combo Route) route every evening.    pantoprazole (PROTONIX) 40 MG tablet TAKE 1 TABLET BY MOUTH ONCE A DAY    TENS unit and electrodes Cmpk 1 each by Misc.(Non-Drug; Combo Route) route 3 (three) times daily as needed (for back pain).    vitamin D (VITAMIN D3) 1000 units Tab Take 1,000 Units by mouth once daily.    zolpidem (AMBIEN) 10 mg Tab TAKE 1 TABLET BY MOUTH NIGHTLY    LIDOcaine-prilocaine (EMLA) cream     mirtazapine (REMERON) 15 MG tablet      No current  "facility-administered medications for this visit.       Review of Systems     GENERAL:  No weight loss, malaise or fevers.  HEENT:   No recent changes in vision or hearing  NECK:  Negative for lumps, no difficulty with swallowing.  RESPIRATORY:  Negative for cough, wheezing or shortness of breath, patient denies any recent URI.  CARDIOVASCULAR:  Negative for chest pain or palpitations.  GI:  Negative for abdominal discomfort, blood in stools or black stools or change in bowel habits.  MUSCULOSKELETAL:  See HPI.  SKIN:  Negative for lesions, rash, and itching.  PSYCH:  No mood disorder or recent psychosocial stressors.   HEMATOLOGY/LYMPHOLOGY:  Negative for prolonged bleeding, bruising easily or swollen nodes.    NEURO:   No history of syncope, paralysis, seizures or tremors.  All other reviewed and negative other than HPI.    OBJECTIVE:    /63   Pulse (!) 59   Ht 5' 2" (1.575 m)   Wt 59.1 kg (130 lb 6.4 oz)   BMI 23.85 kg/m²       Physical Exam    GENERAL: Well appearing, in no acute distress, alert and oriented x3.  PSYCH:  Mood and affect appropriate.  SKIN: Skin color, texture, turgor normal, no rashes or lesions.  HEAD/FACE:  Normocephalic, atraumatic. Cranial nerves grossly intact.    PULM: No evidence of respiratory difficulty, symmetric chest rise.  GI:  Soft and non-tender.    BACK: Straight leg raising in the sitting and supine positions is positive to radicular pain on the Right side. There is mild pain to palpation over the facet joints of the lumbar spine or spinous processes. Patient has pain with Extension and lateral rotation. TTP over R lumbar paraspinal jaun L4-S1.  EXTREMITIES: Peripheral joint ROM is full and pain free without obvious instability or laxity in all four extremities. No deformities, edema, or skin discoloration. Good capillary refill.  MUSCULOSKELETAL: Able to stand on heels & toes.   Shoulder, hip, and knee provocative maneuvers are negative.  There is no pain with " palpation over the sacroiliac joints bilaterally.  FABERs test is positive on the right.  Facet loading test is positive bilaterally.   Bilateral upper and lower extremity strength is normal and symmetric.  No atrophy or tone abnormalities are noted.    RIGHT Lower extremity: Hip flexion 5/5, Hip Abduction 5/5, Hip Adduction 5/5, Knee extension 5/5, Knee flexion 5/5, Ankle dorsiflexion5/5, Extensor hallucis longus 5/5, Ankle plantarflexion 5/5  LEFT Lower extremity:  Hip flexion 5/5, Hip Abduction 5/5,Hip Adduction 5/5, Knee extension 5/5, Knee flexion 5/5, Ankle dorsiflexion 5/5, Extensor hallucis longus 5/5, Ankle plantarflexion 5/5  -Normal testing knee (patellar) jerk and ankle (achilles) jerk    NEURO: Bilateral upper and lower extremity coordination and muscle stretch reflexes are physiologic and symmetric. No loss of sensation is noted.  GAIT: normal.    Imaging (Reviewed on 11/10/2023):    MRI  Lumbar Spine 10/31/23- full report in [facility]  Fusiform aneurysmal dilation of abdom aorta measuring up to 3.8 cm diameter. Surgical consult and/or close follow up with US or CT recommended.  Severe multilevel facet arthropathy with degen grade 1 spondylolisthesis of L4 on L5.   Hypertrophic facet changes with multilevel degen disc changes result in spinal canal stenosis at L3/4 and L4/5.   Broad based disc herniation lateralizing to the left at L2/3, causing L lateral recess and mild left foraminal stenosis.  Broad based posterior disc herniation at L3/4 contributes to spinal canal narrowing and lateral recess and left foraminal narrowing.    08/02/23    X-Ray Lumbar Complete Including Flex And Ext    Narrative  EXAM: XR LUMBAR SPINE 5 VIEW WITH FLEX AND EXT    CLINICAL HISTORY: Pain in unspecified hip    TECHNIQUE: 7 views of the lumbar spine including flexion and extension views.    FINDINGS: There appear to be 6, nonrib-bearing lumbar-type vertebral bodies.  Grade 1 L5-L6 anterolisthesis.  No significant change in  the degree of listhesis on flexion and extension views.  Lumbar vertebral body heights appear maintained.  Visualized pars interarticularis appear intact.  L5-L6 and L6-S1 prominent facet arthropathy.  L5-L6 and L6 S1 disc is remaining intervertebral disc spaces appear largely maintained.  No evidence of an acute fracture.    There is a distal abdominal aortic aneurysm with atherosclerotic calcification.          ASSESSMENT: 68 y.o. year old female with lower back and right leg pain, consistent with     1. Lumbar radiculopathy  Case Request-RAD/Other Procedure Area: Injection,steroid,epidural,transforaminal approach- right side, L4/5 and L5/S1      2. Lumbar spondylosis  Case Request-RAD/Other Procedure Area: Injection,steroid,epidural,transforaminal approach- right side, L4/5 and L5/S1      3. Spinal stenosis of lumbar region, unspecified whether neurogenic claudication present  Case Request-RAD/Other Procedure Area: Injection,steroid,epidural,transforaminal approach- right side, L4/5 and L5/S1                PLAN:   - Interventions:  Will schedule the patient for TF Epidural steroid injection at L4/5 and L5/S1 on the right side to help with back pain and radicular symptoms of RLE. Explained the risks and benefits of the procedure in detail with the patient today in clinic along with alternative treatment options, and the patient elected to hold on intervention at this time.      - Anticoagulation use: Yes  anticoagulation, Plavix. Will obtain clearance from Dr. Juarez.  Per BRONSON guidelines, for secondary prophylaxis (CAD status post PTCA), patient can continue aspirin for lumbar medial branch block.     report:  Reviewed and consistent with medication use as prescribed.      - Medications:  -patient would like to refrain from pharmacologic management at this time secondary to medical co -morbidities      - Therapy:   We discussed continuing physical therapy at [facility] to help manage the patient/s  painful condition. The patient was counseled that muscle strengthening will improve the long term prognosis in regards to pain and may also help increase range of motion and mobility. They were told that one of the goals of physical therapy is that they learn how to do the exercises so that they can do them independently at home daily upon completion. The patient's questions were answered and they were agreeable to this course.    - Imaging: Reviewed available imaging with patient and answered any questions they had regarding study.  MRI lumbar spine reviewed and discussed with the patient and significant other today.     - Follow up visit: return to clinic 4 weeks post procedure.    The above plan and management options were discussed at length with patient. Patient is in agreement with the above and verbalized understanding.    - I discussed the goals of interventional chronic pain management with the patient on today's visit. We discussed a multimodal and systematic approach to pain.  This includes diagnostic and therapeutic injections, adjuvant pharmacologic treatment, physical therapy, and at times psychiatry.  I emphasized the importance of regular exercise, core strengthening and stretching, diet and weight loss as a cornerstone of long-term pain management.    - This condition does not require this patient to take time off of work, and the primary goal of our Pain Management services is to improve the patient's functional capacity.  - Patient Questions: Answered all of the patient's questions regarding diagnoses, therapy, treatment and next steps        Anup Kamara PA-C  Interventional Pain Management  [facility]    Disclaimer:  This note was prepared using voice recognition system and is likely to have sound alike errors that may have been overlooked even after proof reading.  Please call me with any questions

## 2023-11-13 ENCOUNTER — TELEPHONE (OUTPATIENT)
Dept: PAIN MEDICINE | Facility: CLINIC | Age: 68
End: 2023-11-13
Payer: MEDICARE

## 2023-11-13 NOTE — TELEPHONE ENCOUNTER
----- Message from Millie Juarez MD sent at 11/13/2023 11:43 AM CST -----  Regarding: RE: Cardiac Clearance  Yes it will elevate her bp. But there is risk benefit equation to be factored in the decision.  ----- Message -----  From: Kimberly Walters MA  Sent: 11/13/2023  10:07 AM CST  To: Millie Juarez MD  Subject: FW: Cardiac Clearance                            Good morning Katiuska Chow PA-C asked me to double check with you to make sure you are ok with pt having steroids. Per pt she was told be you that steroids would cause her blood pressure to be elevated. Please let us know.  .Kimberly Walters [facility] Surgery Scheduler    ----- Message -----  From: Millie Juarez MD  Sent: 11/10/2023   2:56 PM CST  To: Kimberly Walters MA  Subject: RE: Cardiac Clearance                            OK TO PROCEED AS NEEDED.  ----- Message -----  From: Kimberly Walters MA  Sent: 11/10/2023   1:56 PM CST  To: Millie Juarez MD  Subject: Cardiac Clearance                                Dr. Juarez:    Gemma Vick was seen in office with complaints of severe low back pain. Dr. Roberts would like to perform lumbar epidural, and Gemma Vick would like to proceed. Dr. Roberts is requesting for clearance to hold clopidogrel (Plavix) 5 days prior to procedure. Patient Gemma Vick can resume medication following the procedure. Please let us know if it is ok to proceed with procedure.    .Kimberly Walters [facility]    [facility] Surgery Scheduler

## 2023-11-16 RX ORDER — OLMESARTAN MEDOXOMIL 40 MG/1
40 TABLET ORAL
Qty: 90 TABLET | Refills: 3 | Status: SHIPPED | OUTPATIENT
Start: 2023-11-16 | End: 2024-01-29 | Stop reason: SDUPTHER

## 2023-11-21 NOTE — PRE-PROCEDURE INSTRUCTIONS
Spoke with patient regarding procedure scheduled on 12/1     Arrival time 0945     Has patient been sick with fever or on antibiotics within the last 7 days? No     Does the patient have any open wounds, sores or rashes? No     Does the patient have any recent fractures? no     Has patient received a vaccination within the last 7 days? No     Received the COVID vaccination?      Has the patient stopped all medications as directed? plavix 5 days    Does patient have a pacemaker, defibrillator, or implantable stimulator? No     Does the patient have a ride to and from procedure and someone reliable to remain with patient?       Is the patient diabetic? no     Does the patient have sleep apnea? Or use O2 at home? 3l/nc     Is the patient receiving sedation?      Is the patient instructed to remain NPO beginning at midnight the night before their procedure? yes     Procedure location confirmed with patient? Yes     Covid- Denies signs/symptoms. Instructed to notify PAT/MD if any changes.

## 2023-11-30 ENCOUNTER — NURSE TRIAGE (OUTPATIENT)
Dept: ADMINISTRATIVE | Facility: CLINIC | Age: 68
End: 2023-11-30
Payer: MEDICARE

## 2023-12-01 ENCOUNTER — HOSPITAL ENCOUNTER (OUTPATIENT)
Facility: HOSPITAL | Age: 68
Discharge: HOME | End: 2023-12-01
Attending: ANESTHESIOLOGY | Admitting: ANESTHESIOLOGY
Payer: MEDICARE

## 2023-12-01 VITALS
DIASTOLIC BLOOD PRESSURE: 63 MMHG | SYSTOLIC BLOOD PRESSURE: 121 MMHG | HEART RATE: 62 BPM | WEIGHT: 124.69 LBS | BODY MASS INDEX: 22.95 KG/M2 | OXYGEN SATURATION: 97 % | HEIGHT: 62 IN | TEMPERATURE: 99 F | RESPIRATION RATE: 19 BRPM

## 2023-12-01 DIAGNOSIS — M54.16 LUMBAR RADICULOPATHY: ICD-10-CM

## 2023-12-01 PROCEDURE — 25500020 PHARM REV CODE 255: Mod: HCNC | Performed by: ANESTHESIOLOGY

## 2023-12-01 PROCEDURE — 64483 NJX AA&/STRD TFRM EPI L/S 1: CPT | Mod: HCNC,RT | Performed by: ANESTHESIOLOGY

## 2023-12-01 PROCEDURE — 64484 NJX AA&/STRD TFRM EPI L/S EA: CPT | Mod: HCNC,RT,, | Performed by: ANESTHESIOLOGY

## 2023-12-01 PROCEDURE — 64483 NJX AA&/STRD TFRM EPI L/S 1: CPT | Mod: HCNC,RT,, | Performed by: ANESTHESIOLOGY

## 2023-12-01 PROCEDURE — 64484 PRA INJECT ANES/STEROID FORAMEN LUMBAR/SACRAL W IMG GUIDE ,EA ADD LEVEL: ICD-10-PCS | Mod: HCNC,RT,, | Performed by: ANESTHESIOLOGY

## 2023-12-01 PROCEDURE — 63600175 PHARM REV CODE 636 W HCPCS: Mod: HCNC | Performed by: ANESTHESIOLOGY

## 2023-12-01 PROCEDURE — 64484 NJX AA&/STRD TFRM EPI L/S EA: CPT | Mod: HCNC,RT | Performed by: ANESTHESIOLOGY

## 2023-12-01 PROCEDURE — 64483 PR EPIDURAL INJ, ANES/STEROID, TRANSFORAMINAL, LUMB/SACR, SNGL LEVL: ICD-10-PCS | Mod: HCNC,RT,, | Performed by: ANESTHESIOLOGY

## 2023-12-01 PROCEDURE — 25000003 PHARM REV CODE 250: Mod: HCNC | Performed by: ANESTHESIOLOGY

## 2023-12-01 RX ORDER — INDOMETHACIN 25 MG/1
CAPSULE ORAL
Status: DISCONTINUED | OUTPATIENT
Start: 2023-12-01 | End: 2023-12-01 | Stop reason: HOSPADM

## 2023-12-01 RX ORDER — DEXAMETHASONE SODIUM PHOSPHATE 10 MG/ML
INJECTION INTRAMUSCULAR; INTRAVENOUS
Status: DISCONTINUED | OUTPATIENT
Start: 2023-12-01 | End: 2023-12-01 | Stop reason: HOSPADM

## 2023-12-01 RX ORDER — BUPIVACAINE HYDROCHLORIDE 2.5 MG/ML
INJECTION, SOLUTION EPIDURAL; INFILTRATION; INTRACAUDAL
Status: DISCONTINUED | OUTPATIENT
Start: 2023-12-01 | End: 2023-12-01 | Stop reason: HOSPADM

## 2023-12-01 RX ORDER — MIDAZOLAM HYDROCHLORIDE 1 MG/ML
INJECTION, SOLUTION INTRAMUSCULAR; INTRAVENOUS
Status: DISCONTINUED | OUTPATIENT
Start: 2023-12-01 | End: 2023-12-01 | Stop reason: HOSPADM

## 2023-12-01 RX ORDER — FENTANYL CITRATE 50 UG/ML
INJECTION, SOLUTION INTRAMUSCULAR; INTRAVENOUS
Status: DISCONTINUED | OUTPATIENT
Start: 2023-12-01 | End: 2023-12-01 | Stop reason: HOSPADM

## 2023-12-01 NOTE — DISCHARGE INSTRUCTIONS

## 2023-12-01 NOTE — OP NOTE
Gemma Vick  68 y.o. female      Vitals:    12/01/23 1052   BP: (!) 160/70   Pulse: 61   Resp: 15   Temp:      Procedure Date: 12/01/2023        INFORMED CONSENT: The procedure, risks, benefits and options were discussed with patient. There are no contraindications to the procedure. The patient expressed understanding and agreed to proceed. The personnel performing the procedure was discussed. I verify that I personally obtained consent prior to the start of the procedure and the signed consent can be found on the patient's chart.       Anesthesia:   Conscious sedation provided by M.D    The patient was monitored with continuous pulse oximetry, EKG, and intermittent blood pressure monitors.  The patient was hemodynamically stable throughout the entire process was responsive to voice, and breathing spontaneously.  Supplemental O2 was provided at 2L/min via nasal cannula.  Patient was comfortable for the duration of the procedure. (See nurse documentation and case log for sedation time)    There was a total of 2mg IV Midazolam and 50mcg Fentanyl titrated for the procedure    Pre Procedure diagnosis: Lumbar radiculopathy [M54.16]  Lumbar spondylosis [M47.816]  Spinal stenosis of lumbar region, unspecified whether neurogenic claudication present [M48.061]  Post-Procedure diagnosis: SAME     Complications: None    Specimens: None      DESCRIPTION OF PROCEDURE: The patient was brought to the procedure room. IV access was obtained prior to the procedure. The patient was positioned prone on the fluoroscopy table. Continuous hemodynamic monitoring was initiated including blood pressure, EKG, and pulse oximetry. . The skin was prepped with chlorhexidine and draped in a sterile fashion.      The  L4/5 and  L5/S1 transforaminal spaces were identified with fluoroscopy in the  AP, oblique, and lateral views.  A 22 gauge spinal quinke needle was then advanced into the area of the trans foraminal spaces right with confirmation  of proper needle position using AP, oblique, and lateral fluoroscopic views. Once the needle tip was in the area of the transforaminal space, and there was no blood, CSF or paraesthesias,  1.5 mL of Omnipaque 300mg/ml was injected on right for a total of 3mL.  Fluoroscopic imaging in the AP and lateral views revealed a clear outline of the spinal nerve with proximal spread of agent through the neural foramen into the epidural space. A total combination of 2mL of Bupivacaine and 10 mg dexamethasone was injected on each side and at each level with displacement of the contrast dye confirming that the medication went into the area of the transforaminal spaces right. A sterile bandaid was applied.   Patient tolerated the procedure well.    Patient was taken back to the recovery room for further observation.     The patient was discharged to home in stable condition

## 2023-12-01 NOTE — TELEPHONE ENCOUNTER
Pt scheduled for GERRI tomorrow, unsure of time. Pt informed of scheduled & arrival times. No further assistance needed at this time.     Reason for Disposition   Question about upcoming scheduled test, no triage required and triager able to answer question    Protocols used: Information Only Call - No Triage-A-

## 2023-12-01 NOTE — DISCHARGE SUMMARY
Discharge Note  Short Stay      SUMMARY     Admit Date: 12/1/2023    Attending Physician: Lydia Roberts MD        Discharge Physician: Lydia Roberts MD        Discharge Date: 12/1/2023 10:56 AM    Procedure(s) (LRB):  Injection,steroid,epidural,transforaminal approach- right side, L4/5 and L5/S1 (Right)    Final Diagnosis: Lumbar radiculopathy [M54.16]  Lumbar spondylosis [M47.816]  Spinal stenosis of lumbar region, unspecified whether neurogenic claudication present [M48.061]    Disposition: Home or self care    Patient Instructions:   Current Discharge Medication List        CONTINUE these medications which have NOT CHANGED    Details   amLODIPine (NORVASC) 10 MG tablet Take 1 tablet (10 mg total) by mouth once daily.  Qty: 90 tablet, Refills: 3    Comments: .  Associated Diagnoses: Essential hypertension      aspirin 81 MG Chew Take 81 mg by mouth once daily.      olmesartan (BENICAR) 40 MG tablet TAKE 1 TABLET BY MOUTH ONCE A DAY  Qty: 90 tablet, Refills: 3    Comments: .      zolpidem (AMBIEN) 10 mg Tab TAKE 1 TABLET BY MOUTH NIGHTLY  Qty: 30 tablet, Refills: 3    Associated Diagnoses: Chronic insomnia      albuterol (PROVENTIL/VENTOLIN HFA) 90 mcg/actuation inhaler       albuterol-ipratropium (DUO-NEB) 2.5 mg-0.5 mg/3 mL nebulizer solution       azithromycin (Z-SHAKEEL) 250 MG tablet Take 2 tablets by mouth on day 1; Take 1 tablet by mouth on days 2-5  Qty: 6 tablet, Refills: 0      budesonide-glycopyr-formoterol (BREZTRI AEROSPHERE) 160-9-4.8 mcg/actuation HFAA Inhale 2 puffs into the lungs 2 (two) times a day.  Qty: 32.1 g, Refills: 3    Comments: Patient request 90 day supply  Associated Diagnoses: COPD, moderate      carvediloL (COREG) 12.5 MG tablet Take 1 tablet (12.5 mg total) by mouth 2 (two) times daily.  Qty: 60 tablet, Refills: 3    Comments: .      clopidogreL (PLAVIX) 75 mg tablet TAKE 1 TABLET BY MOUTH ONCE A DAY  Qty: 90 tablet, Refills: 3    Associated Diagnoses: Coronary artery disease involving  native coronary artery of native heart without angina pectoris      COMBIVENT RESPIMAT  mcg/actuation inhaler Inhale 1 puff into the lungs every 6 (six) hours as needed for Wheezing.  Qty: 12 g, Refills: 11    Associated Diagnoses: COPD, moderate      dicyclomine (BENTYL) 10 MG capsule TAKE 1 CAPSULE BY MOUTH 4 TIMES DAILY BEFORE MEALS AND NIGHTLY Strength: 10 mg  Qty: 120 capsule, Refills: 2      doxepin (SINEQUAN) 10 MG capsule Take 10-20 mg by mouth nightly as needed.      ezetimibe-simvastatin 10-40 mg (VYTORIN) 10-40 mg per tablet TAKE 1 TABLET BY MOUTH EVERY EVENING  Qty: 90 tablet, Refills: 2      FEROSUL 325 mg (65 mg iron) Tab tablet TAKE (1) TABLET BY MOUTH 2 TIMES A DAY WITH MEALS      furosemide (LASIX) 20 MG tablet TAKE 20 MG TABLET ONCE DAILY  Qty: 90 tablet, Refills: 1    Associated Diagnoses: Essential hypertension      hydroCHLOROthiazide (MICROZIDE) 12.5 mg capsule Take 12.5 mg by mouth. HASN'T STARTED YET      inhalation spacing device (COMPACT SPACE CHAMBER) Use as directed for inhalation.  Qty: 1 each, Refills: 2    Associated Diagnoses: COPD, moderate      LIDOcaine-prilocaine (EMLA) cream       memantine (NAMENDA) 10 MG Tab Take 10 mg by mouth 2 (two) times daily.      mirtazapine (REMERON) 15 MG tablet       OXYGEN-AIR DELIVERY SYSTEMS MISC 3 L by Misc.(Non-Drug; Combo Route) route every evening.      pantoprazole (PROTONIX) 40 MG tablet TAKE 1 TABLET BY MOUTH ONCE A DAY  Qty: 90 tablet, Refills: 3      TENS unit and electrodes Cmpk 1 each by Misc.(Non-Drug; Combo Route) route 3 (three) times daily as needed (for back pain).  Qty: 1 each, Refills: 5      vitamin D (VITAMIN D3) 1000 units Tab Take 1,000 Units by mouth once daily.                 Discharge Diagnosis: Lumbar radiculopathy [M54.16]  Lumbar spondylosis [M47.816]  Spinal stenosis of lumbar region, unspecified whether neurogenic claudication present [M48.061]  Condition on Discharge: Stable with no complications to procedure    Diet on Discharge: Same as before.  Activity: as per instruction sheet.  Discharge to: Home with a responsible adult.  Follow up: 2-4 weeks       Please call the office at [phone] if you experience any weakness or loss of sensation, fever > 101.5, pain uncontrolled with oral medications, persistent nausea/vomiting/or diarrhea, redness or drainage from the incisions, or any other worrisome concerns. If physician on call was not reached or could not communicate with our office for any reason please go to the nearest emergency department

## 2023-12-05 ENCOUNTER — HOSPITAL ENCOUNTER (OUTPATIENT)
Dept: CARDIOLOGY | Facility: HOSPITAL | Age: 68
Discharge: HOME | End: 2023-12-05
Payer: MEDICARE

## 2023-12-05 ENCOUNTER — PATIENT OUTREACH (OUTPATIENT)
Dept: ADMINISTRATIVE | Facility: HOSPITAL | Age: 68
End: 2023-12-05
Payer: MEDICARE

## 2023-12-05 ENCOUNTER — OFFICE VISIT (OUTPATIENT)
Dept: CARDIOLOGY | Facility: CLINIC | Age: 68
End: 2023-12-05
Payer: MEDICARE

## 2023-12-05 VITALS
HEART RATE: 70 BPM | DIASTOLIC BLOOD PRESSURE: 76 MMHG | BODY MASS INDEX: 23.24 KG/M2 | SYSTOLIC BLOOD PRESSURE: 144 MMHG | OXYGEN SATURATION: 98 % | HEIGHT: 62 IN | WEIGHT: 126.31 LBS

## 2023-12-05 DIAGNOSIS — R07.9 CHEST PAIN, UNSPECIFIED TYPE: ICD-10-CM

## 2023-12-05 DIAGNOSIS — J43.9 NOCTURNAL HYPOXEMIA DUE TO EMPHYSEMA: ICD-10-CM

## 2023-12-05 DIAGNOSIS — R10.13 EPIGASTRIC DISCOMFORT: ICD-10-CM

## 2023-12-05 DIAGNOSIS — G47.36 NOCTURNAL HYPOXEMIA DUE TO EMPHYSEMA: ICD-10-CM

## 2023-12-05 DIAGNOSIS — E78.2 MIXED HYPERLIPIDEMIA: ICD-10-CM

## 2023-12-05 DIAGNOSIS — J44.9 COPD, MODERATE: ICD-10-CM

## 2023-12-05 DIAGNOSIS — I10 ESSENTIAL HYPERTENSION: ICD-10-CM

## 2023-12-05 DIAGNOSIS — I70.0 AORTIC ATHEROSCLEROSIS: ICD-10-CM

## 2023-12-05 DIAGNOSIS — K55.1 MESENTERIC ISCHEMIA, CHRONIC: ICD-10-CM

## 2023-12-05 DIAGNOSIS — I25.110 CORONARY ARTERY DISEASE INVOLVING NATIVE CORONARY ARTERY OF NATIVE HEART WITH UNSTABLE ANGINA PECTORIS: Primary | ICD-10-CM

## 2023-12-05 PROCEDURE — 1101F PR PT FALLS ASSESS DOC 0-1 FALLS W/OUT INJ PAST YR: ICD-10-PCS | Mod: HCNC,CPTII,S$GLB, | Performed by: PHYSICIAN ASSISTANT

## 2023-12-05 PROCEDURE — 3044F PR MOST RECENT HEMOGLOBIN A1C LEVEL <7.0%: ICD-10-PCS | Mod: HCNC,CPTII,S$GLB, | Performed by: PHYSICIAN ASSISTANT

## 2023-12-05 PROCEDURE — 1159F MED LIST DOCD IN RCRD: CPT | Mod: HCNC,CPTII,S$GLB, | Performed by: PHYSICIAN ASSISTANT

## 2023-12-05 PROCEDURE — 99214 PR OFFICE/OUTPT VISIT, EST, LEVL IV, 30-39 MIN: ICD-10-PCS | Mod: HCNC,S$GLB,, | Performed by: PHYSICIAN ASSISTANT

## 2023-12-05 PROCEDURE — 1125F AMNT PAIN NOTED PAIN PRSNT: CPT | Mod: HCNC,CPTII,S$GLB, | Performed by: PHYSICIAN ASSISTANT

## 2023-12-05 PROCEDURE — 99999 PR PBB SHADOW E&M-EST. PATIENT-LVL V: ICD-10-PCS | Mod: PBBFAC,HCNC,, | Performed by: PHYSICIAN ASSISTANT

## 2023-12-05 PROCEDURE — 93005 ELECTROCARDIOGRAM TRACING: CPT | Mod: HCNC

## 2023-12-05 PROCEDURE — 3077F SYST BP >= 140 MM HG: CPT | Mod: HCNC,CPTII,S$GLB, | Performed by: PHYSICIAN ASSISTANT

## 2023-12-05 PROCEDURE — 3008F PR BODY MASS INDEX (BMI) DOCUMENTED: ICD-10-PCS | Mod: HCNC,CPTII,S$GLB, | Performed by: PHYSICIAN ASSISTANT

## 2023-12-05 PROCEDURE — 4010F PR ACE/ARB THEARPY RXD/TAKEN: ICD-10-PCS | Mod: HCNC,CPTII,S$GLB, | Performed by: PHYSICIAN ASSISTANT

## 2023-12-05 PROCEDURE — 1125F PR PAIN SEVERITY QUANTIFIED, PAIN PRESENT: ICD-10-PCS | Mod: HCNC,CPTII,S$GLB, | Performed by: PHYSICIAN ASSISTANT

## 2023-12-05 PROCEDURE — 1160F PR REVIEW ALL MEDS BY PRESCRIBER/CLIN PHARMACIST DOCUMENTED: ICD-10-PCS | Mod: HCNC,CPTII,S$GLB, | Performed by: PHYSICIAN ASSISTANT

## 2023-12-05 PROCEDURE — 99999 PR PBB SHADOW E&M-EST. PATIENT-LVL V: CPT | Mod: PBBFAC,HCNC,, | Performed by: PHYSICIAN ASSISTANT

## 2023-12-05 PROCEDURE — 1160F RVW MEDS BY RX/DR IN RCRD: CPT | Mod: HCNC,CPTII,S$GLB, | Performed by: PHYSICIAN ASSISTANT

## 2023-12-05 PROCEDURE — 4010F ACE/ARB THERAPY RXD/TAKEN: CPT | Mod: HCNC,CPTII,S$GLB, | Performed by: PHYSICIAN ASSISTANT

## 2023-12-05 PROCEDURE — 93010 ELECTROCARDIOGRAM REPORT: CPT | Mod: HCNC,,, | Performed by: INTERNAL MEDICINE

## 2023-12-05 PROCEDURE — 3078F DIAST BP <80 MM HG: CPT | Mod: HCNC,CPTII,S$GLB, | Performed by: PHYSICIAN ASSISTANT

## 2023-12-05 PROCEDURE — 3288F FALL RISK ASSESSMENT DOCD: CPT | Mod: HCNC,CPTII,S$GLB, | Performed by: PHYSICIAN ASSISTANT

## 2023-12-05 PROCEDURE — 3288F PR FALLS RISK ASSESSMENT DOCUMENTED: ICD-10-PCS | Mod: HCNC,CPTII,S$GLB, | Performed by: PHYSICIAN ASSISTANT

## 2023-12-05 PROCEDURE — 93010 EKG 12-LEAD: ICD-10-PCS | Mod: HCNC,,, | Performed by: INTERNAL MEDICINE

## 2023-12-05 PROCEDURE — 3008F BODY MASS INDEX DOCD: CPT | Mod: HCNC,CPTII,S$GLB, | Performed by: PHYSICIAN ASSISTANT

## 2023-12-05 PROCEDURE — 1159F PR MEDICATION LIST DOCUMENTED IN MEDICAL RECORD: ICD-10-PCS | Mod: HCNC,CPTII,S$GLB, | Performed by: PHYSICIAN ASSISTANT

## 2023-12-05 PROCEDURE — 3044F HG A1C LEVEL LT 7.0%: CPT | Mod: HCNC,CPTII,S$GLB, | Performed by: PHYSICIAN ASSISTANT

## 2023-12-05 PROCEDURE — 99214 OFFICE O/P EST MOD 30 MIN: CPT | Mod: HCNC,S$GLB,, | Performed by: PHYSICIAN ASSISTANT

## 2023-12-05 PROCEDURE — 1101F PT FALLS ASSESS-DOCD LE1/YR: CPT | Mod: HCNC,CPTII,S$GLB, | Performed by: PHYSICIAN ASSISTANT

## 2023-12-05 PROCEDURE — 3077F PR MOST RECENT SYSTOLIC BLOOD PRESSURE >= 140 MM HG: ICD-10-PCS | Mod: HCNC,CPTII,S$GLB, | Performed by: PHYSICIAN ASSISTANT

## 2023-12-05 PROCEDURE — 3078F PR MOST RECENT DIASTOLIC BLOOD PRESSURE < 80 MM HG: ICD-10-PCS | Mod: HCNC,CPTII,S$GLB, | Performed by: PHYSICIAN ASSISTANT

## 2023-12-05 RX ORDER — CITALOPRAM 10 MG/1
10 TABLET ORAL
COMMUNITY
Start: 2023-12-04 | End: 2024-01-10

## 2023-12-05 RX ORDER — AMLODIPINE BESYLATE 5 MG/1
5 TABLET ORAL DAILY
Qty: 30 TABLET | Refills: 11 | Status: SHIPPED | OUTPATIENT
Start: 2023-12-05 | End: 2024-01-29 | Stop reason: SDUPTHER

## 2023-12-05 RX ORDER — CARVEDILOL 6.25 MG/1
6.25 TABLET ORAL 2 TIMES DAILY WITH MEALS
Qty: 60 TABLET | Refills: 11 | Status: SHIPPED | OUTPATIENT
Start: 2023-12-05 | End: 2024-01-29 | Stop reason: SDUPTHER

## 2023-12-05 RX ORDER — DICLOFENAC SODIUM 10 MG/G
GEL TOPICAL 4 TIMES DAILY
COMMUNITY
Start: 2023-11-27 | End: 2024-01-29 | Stop reason: SDUPTHER

## 2023-12-05 NOTE — PROGRESS NOTES
Subjective:   Patient ID:  Gemma Vick is a 68 y.o. female who presents for follow-up of No chief complaint on file.      HPI  Ms. Vick is a 68 year old female patient whose current medical conditions include PVD, carotid artery disease, HTN, hyperlipidemia, COPD, former smoker, vascular dementia, mesenteric ischemia, and CAD s/p prior RCA intervention on 9/12/23 who presents today for follow-up. Patient previously seen by Dr. Juarez and advised to f/u for BP check. She returns today, accompanied by family .Feels ok. Complains of epigastric tightness/fullness/pressure. Ongoing over the past 4-5 days. No real associated symptoms. Patient has difficulty quanitfying symptoms and explaining them more in depth given her dementia. She does not believe it feels similar to her angina pain. Endorses chronic CASTANEDA, relieved by inhalers. Denies any PND, orthopnea, weight gain. Does have some mild dependent edema. No LH, dizziness, near syncope, or syncope. BP borderline however patient did not take some of her medications this AM. She did not bring a log and states she is having issues with her BP cuff transmitting (enrolled in digital HTN program). Trying to be mindful of her salt intake. Recently  had GERRI but still having lower back pain/mobility issues. She is compliant with her medications, did resume ASA/Plavix post procedure.     EKG today reviewed, no acute ischemic changes.   Review of Systems   Constitutional: Positive for malaise/fatigue. Negative for chills, decreased appetite and fever.   HENT:  Negative for congestion, hoarse voice and sore throat.    Eyes:  Negative for blurred vision and discharge.   Cardiovascular:  Positive for chest pain and leg swelling (mild). Negative for claudication, cyanosis, dyspnea on exertion, irregular heartbeat, near-syncope, orthopnea, palpitations and paroxysmal nocturnal dyspnea.   Respiratory:  Negative for cough, hemoptysis, shortness of breath, snoring, sputum production  "and wheezing.    Endocrine: Negative for cold intolerance and heat intolerance.   Hematologic/Lymphatic: Negative for bleeding problem. Does not bruise/bleed easily.   Skin:  Negative for rash.   Musculoskeletal:  Positive for arthritis, back pain and joint pain. Negative for joint swelling, muscle cramps, muscle weakness and myalgias.   Gastrointestinal:  Positive for abdominal pain (epigastric). Negative for constipation, diarrhea, heartburn, melena and nausea.   Genitourinary:  Negative for hematuria.   Neurological:  Negative for dizziness, focal weakness, headaches, light-headedness, loss of balance, numbness, paresthesias, seizures and weakness.   Psychiatric/Behavioral:  Positive for memory loss. The patient does not have insomnia.    Allergic/Immunologic: Negative for hives.     BP (!) 144/76 (BP Location: Left arm, Patient Position: Sitting, BP Method: Medium (Automatic))   Pulse 70   Ht 5' 2" (1.575 m)   Wt 57.3 kg (126 lb 5.2 oz)   SpO2 98%   BMI 23.10 kg/m²     Objective:   Physical Exam  Vitals and nursing note reviewed.   Constitutional:       General: She is not in acute distress.     Appearance: She is well-developed. She is not diaphoretic.   HENT:      Head: Normocephalic and atraumatic.   Eyes:      General:         Right eye: No discharge.         Left eye: No discharge.      Pupils: Pupils are equal, round, and reactive to light.   Cardiovascular:      Rate and Rhythm: Normal rate and regular rhythm.      Heart sounds: Normal heart sounds, S1 normal and S2 normal. No murmur heard.  Pulmonary:      Effort: Pulmonary effort is normal. No respiratory distress.      Breath sounds: Normal breath sounds. No wheezing or rales.   Abdominal:      General: There is no distension.      Tenderness: There is abdominal tenderness (epigastric). There is no rebound.   Musculoskeletal:      Right lower leg: Edema present.      Left lower leg: Edema present.   Skin:     General: Skin is warm and dry.      " Findings: No erythema.   Neurological:      General: No focal deficit present.      Mental Status: She is alert and oriented to person, place, and time.   Psychiatric:         Mood and Affect: Mood normal.         Behavior: Behavior normal.       TTE 6/23 Results  Summary    Concentric remodeling and normal systolic function.  The estimated ejection fraction is 60%.  Indeterminate left ventricular diastolic function.  Normal right ventricular size with normal right ventricular systolic function.  Mild tricuspid regurgitation.  There is pulmonary hypertension.  Mild aortic regurgitation.  Assessment:      1. Coronary artery disease involving native coronary artery of native heart with unstable angina pectoris    2. COPD, moderate    3. Nocturnal hypoxemia due to emphysema    4. Aortic atherosclerosis    5. Essential hypertension    6. Mixed hyperlipidemia    7. Mesenteric ischemia, chronic    8. Chest pain, unspecified type    9. Epigastric discomfort      Patient presents for f/u. BP borderline but she did not take some of her meds this AM. She is enrolled with digital HTN program but having technical issues, will stop by [facility] today. CV wise, seems stable. She is having some epigastric pain/? Chest fullness although this is difficult to discern given her dementia. EKG without acute ischemic changes. Check trop, amylase, lipase, BMP. Will bring back for close f/u for symptom/BP check.  Plan:   -Amylase, lipase, trop, BMP with phone review  -Continue same CV meds/mgmt  -Cardiac low salt diet  -Visit [facility] today to discuss digital med HTN issues  -F/u 2-3 weeks for symptom reassessment/BP check

## 2023-12-06 ENCOUNTER — TELEPHONE (OUTPATIENT)
Dept: CARDIOLOGY | Facility: CLINIC | Age: 68
End: 2023-12-06
Payer: MEDICARE

## 2023-12-06 NOTE — TELEPHONE ENCOUNTER
Please phone patient. Labs reviewed. Troponin negative, no evidence of MI. Amylase WNL however lipase mildly elevated. I have sent message to Dr. Altman to make sure nothing else needs to be done from that standpoint. BMP reviewed, her creatinine is on higher side.    Please make sure she is eating and drinking appropriately. Needs to increase water intake over next 2-3 days.    Needs f/u in 2-3 weeks for reassessment of symptoms.     Thanks

## 2023-12-06 NOTE — TELEPHONE ENCOUNTER
Spoke w/ pt. She verbalized understanding of the results. She also stated that she will increase her water intake. She has a f/u already scheduled with Tanisha on 12/29 at 11am.

## 2023-12-08 ENCOUNTER — TELEPHONE (OUTPATIENT)
Dept: PAIN MEDICINE | Facility: CLINIC | Age: 68
End: 2023-12-08
Payer: MEDICARE

## 2023-12-08 ENCOUNTER — TELEPHONE (OUTPATIENT)
Dept: FAMILY MEDICINE | Facility: CLINIC | Age: 68
End: 2023-12-08
Payer: MEDICARE

## 2023-12-08 ENCOUNTER — PATIENT MESSAGE (OUTPATIENT)
Dept: FAMILY MEDICINE | Facility: CLINIC | Age: 68
End: 2023-12-08
Payer: MEDICARE

## 2023-12-08 DIAGNOSIS — Z12.31 ENCOUNTER FOR SCREENING MAMMOGRAM FOR BREAST CANCER: Primary | ICD-10-CM

## 2023-12-08 NOTE — TELEPHONE ENCOUNTER
----- Message from Candis Colón sent at 12/8/2023 11:40 AM CST -----  Contact: [phone]  Patient is calling in regards to seeing if she can schedule an virtual visit for today. Please call pt back at [phone]. Thanks KB

## 2023-12-08 NOTE — TELEPHONE ENCOUNTER
Called pt and informed her that she have an appt on 01/11/2024 as a virtual visit. Pt informed me that she wanted to start physical therapy for pain. I advised pt that I would message ms. Kamara to place orders for physical therapy. Pt verbalized understanding.    Albert ROY

## 2023-12-08 NOTE — TELEPHONE ENCOUNTER
----- Message from Candis Colón sent at 12/8/2023 11:37 AM CST -----  Contact: [phone]  Type:  Mammogram    Caller is requesting to schedule their annual mammogram appointment.  Order is not listed in EPIC.  Please enter order and contact patient to schedule.  Name of Caller:Gemma   Where would they like the mammogram performed[facility]  Would the patient rather a call back or a response via [facility]? Call back   Best Call Back Number:[phone]   Additional Information: n/a      Thanks KB

## 2023-12-09 NOTE — TELEPHONE ENCOUNTER
No care due was identified.  [facility] Embedded Care Due Messages. Reference number: [id number].   12/09/2023 10:57:17 AM CST

## 2023-12-11 ENCOUNTER — TELEPHONE (OUTPATIENT)
Dept: PAIN MEDICINE | Facility: CLINIC | Age: 68
End: 2023-12-11
Payer: MEDICARE

## 2023-12-11 ENCOUNTER — TELEPHONE (OUTPATIENT)
Dept: FAMILY MEDICINE | Facility: CLINIC | Age: 68
End: 2023-12-11
Payer: MEDICARE

## 2023-12-11 ENCOUNTER — PATIENT MESSAGE (OUTPATIENT)
Dept: FAMILY MEDICINE | Facility: CLINIC | Age: 68
End: 2023-12-11
Payer: MEDICARE

## 2023-12-11 RX ORDER — PANTOPRAZOLE SODIUM 40 MG/1
TABLET, DELAYED RELEASE ORAL
Qty: 90 TABLET | Refills: 3 | Status: SHIPPED | OUTPATIENT
Start: 2023-12-11 | End: 2024-01-29 | Stop reason: SDUPTHER

## 2023-12-11 NOTE — TELEPHONE ENCOUNTER
----- Message from Criselda Hilliard sent at 12/11/2023  2:33 PM CST -----  Contact: pt @ [phone]  1MEDICALADVICE     Patient is calling for Medical Advice regarding:    Pt think she need her 6 mo blood work done and also check the one that was done in December. Also would to come in to gt her weight check, and update of stomach medication.     Would like response via [facility]:  portal     Comments:  Please send message through portal to advise.

## 2023-12-11 NOTE — TELEPHONE ENCOUNTER
Refill Decision Note   Gemma Nicanor  is requesting a refill authorization.  Brief Assessment and Rationale for Refill:  Approve     Medication Therapy Plan:         Comments:     Note composed:10:23 AM 12/11/2023

## 2023-12-11 NOTE — TELEPHONE ENCOUNTER
----- Message from Ehsan Ramirez sent at 12/11/2023  9:26 AM CST -----  Contact: self  Pt is asking for an return call in reference to questions she has about apt she thought was scheduled for today for injection follow up ,please call back at .[phone] Thx CJ

## 2023-12-11 NOTE — TELEPHONE ENCOUNTER
Called pt and informed her that her appt is on 01/11/2024. Pt verbalized understanding. Pt stated she wanted to go to physical therapy for her pain I informed pt that I would message Ms. Kamara . Pt verbalized understanding.    Albert ROY

## 2023-12-14 ENCOUNTER — LAB VISIT (OUTPATIENT)
Dept: LAB | Facility: HOSPITAL | Age: 68
End: 2023-12-14
Attending: FAMILY MEDICINE
Payer: MEDICARE

## 2023-12-14 ENCOUNTER — OFFICE VISIT (OUTPATIENT)
Dept: FAMILY MEDICINE | Facility: CLINIC | Age: 68
End: 2023-12-14
Payer: MEDICARE

## 2023-12-14 VITALS
HEART RATE: 63 BPM | OXYGEN SATURATION: 98 % | HEIGHT: 62 IN | DIASTOLIC BLOOD PRESSURE: 68 MMHG | RESPIRATION RATE: 18 BRPM | SYSTOLIC BLOOD PRESSURE: 123 MMHG | BODY MASS INDEX: 23 KG/M2 | WEIGHT: 125 LBS

## 2023-12-14 DIAGNOSIS — I10 ESSENTIAL HYPERTENSION: ICD-10-CM

## 2023-12-14 DIAGNOSIS — R10.13 EPIGASTRIC ABDOMINAL PAIN: Primary | ICD-10-CM

## 2023-12-14 DIAGNOSIS — R73.03 PREDIABETES: ICD-10-CM

## 2023-12-14 LAB
ESTIMATED AVG GLUCOSE: 105 MG/DL (ref 68–131)
HBA1C MFR BLD: 5.3 % (ref 4–5.6)

## 2023-12-14 PROCEDURE — 99214 PR OFFICE/OUTPT VISIT, EST, LEVL IV, 30-39 MIN: ICD-10-PCS | Mod: HCNC,S$GLB,, | Performed by: FAMILY MEDICINE

## 2023-12-14 PROCEDURE — 1125F AMNT PAIN NOTED PAIN PRSNT: CPT | Mod: HCNC,CPTII,S$GLB, | Performed by: FAMILY MEDICINE

## 2023-12-14 PROCEDURE — 1101F PT FALLS ASSESS-DOCD LE1/YR: CPT | Mod: HCNC,CPTII,S$GLB, | Performed by: FAMILY MEDICINE

## 2023-12-14 PROCEDURE — 3044F PR MOST RECENT HEMOGLOBIN A1C LEVEL <7.0%: ICD-10-PCS | Mod: HCNC,CPTII,S$GLB, | Performed by: FAMILY MEDICINE

## 2023-12-14 PROCEDURE — 99999 PR PBB SHADOW E&M-EST. PATIENT-LVL V: CPT | Mod: PBBFAC,HCNC,, | Performed by: FAMILY MEDICINE

## 2023-12-14 PROCEDURE — 3078F PR MOST RECENT DIASTOLIC BLOOD PRESSURE < 80 MM HG: ICD-10-PCS | Mod: HCNC,CPTII,S$GLB, | Performed by: FAMILY MEDICINE

## 2023-12-14 PROCEDURE — 3078F DIAST BP <80 MM HG: CPT | Mod: HCNC,CPTII,S$GLB, | Performed by: FAMILY MEDICINE

## 2023-12-14 PROCEDURE — 4010F PR ACE/ARB THEARPY RXD/TAKEN: ICD-10-PCS | Mod: HCNC,CPTII,S$GLB, | Performed by: FAMILY MEDICINE

## 2023-12-14 PROCEDURE — 3044F HG A1C LEVEL LT 7.0%: CPT | Mod: HCNC,CPTII,S$GLB, | Performed by: FAMILY MEDICINE

## 2023-12-14 PROCEDURE — 1159F MED LIST DOCD IN RCRD: CPT | Mod: HCNC,CPTII,S$GLB, | Performed by: FAMILY MEDICINE

## 2023-12-14 PROCEDURE — 3288F PR FALLS RISK ASSESSMENT DOCUMENTED: ICD-10-PCS | Mod: HCNC,CPTII,S$GLB, | Performed by: FAMILY MEDICINE

## 2023-12-14 PROCEDURE — 3288F FALL RISK ASSESSMENT DOCD: CPT | Mod: HCNC,CPTII,S$GLB, | Performed by: FAMILY MEDICINE

## 2023-12-14 PROCEDURE — 1101F PR PT FALLS ASSESS DOC 0-1 FALLS W/OUT INJ PAST YR: ICD-10-PCS | Mod: HCNC,CPTII,S$GLB, | Performed by: FAMILY MEDICINE

## 2023-12-14 PROCEDURE — 1125F PR PAIN SEVERITY QUANTIFIED, PAIN PRESENT: ICD-10-PCS | Mod: HCNC,CPTII,S$GLB, | Performed by: FAMILY MEDICINE

## 2023-12-14 PROCEDURE — 3074F PR MOST RECENT SYSTOLIC BLOOD PRESSURE < 130 MM HG: ICD-10-PCS | Mod: HCNC,CPTII,S$GLB, | Performed by: FAMILY MEDICINE

## 2023-12-14 PROCEDURE — 83036 HEMOGLOBIN GLYCOSYLATED A1C: CPT | Mod: HCNC | Performed by: FAMILY MEDICINE

## 2023-12-14 PROCEDURE — 3008F PR BODY MASS INDEX (BMI) DOCUMENTED: ICD-10-PCS | Mod: HCNC,CPTII,S$GLB, | Performed by: FAMILY MEDICINE

## 2023-12-14 PROCEDURE — 99999 PR PBB SHADOW E&M-EST. PATIENT-LVL V: ICD-10-PCS | Mod: PBBFAC,HCNC,, | Performed by: FAMILY MEDICINE

## 2023-12-14 PROCEDURE — 3008F BODY MASS INDEX DOCD: CPT | Mod: HCNC,CPTII,S$GLB, | Performed by: FAMILY MEDICINE

## 2023-12-14 PROCEDURE — 99214 OFFICE O/P EST MOD 30 MIN: CPT | Mod: HCNC,S$GLB,, | Performed by: FAMILY MEDICINE

## 2023-12-14 PROCEDURE — 4010F ACE/ARB THERAPY RXD/TAKEN: CPT | Mod: HCNC,CPTII,S$GLB, | Performed by: FAMILY MEDICINE

## 2023-12-14 PROCEDURE — 3074F SYST BP LT 130 MM HG: CPT | Mod: HCNC,CPTII,S$GLB, | Performed by: FAMILY MEDICINE

## 2023-12-14 PROCEDURE — 36415 COLL VENOUS BLD VENIPUNCTURE: CPT | Mod: HCNC,PO | Performed by: FAMILY MEDICINE

## 2023-12-14 PROCEDURE — 1159F PR MEDICATION LIST DOCUMENTED IN MEDICAL RECORD: ICD-10-PCS | Mod: HCNC,CPTII,S$GLB, | Performed by: FAMILY MEDICINE

## 2023-12-14 RX ORDER — PNV NO.95/FERROUS FUM/FOLIC AC 28MG-0.8MG
100 TABLET ORAL DAILY
COMMUNITY

## 2023-12-14 NOTE — PROGRESS NOTES
Chief Complaint:    Chief Complaint   Patient presents with    Follow-up       History of Present Illness:  Patient with AAA, HTN, HLD, neuropathy, COPD, CAD, dementia presents today for a six month follow up.    Doing well    Patient says she is been diagnosed with vascular dementia currently taking Namenda. Says it is intermittent and has not been as bad.     Was having some abd pain when following with cardiology. Lipase came back slightly elevated. Abd pain has subsided for the time being. States this pain is intermittent. Does currently take protonix.     She has a 4 cm size abdominal aneurysm following with vascular surgery on a regular basis     Carotid stenosis seeing Dr. Goodrich neurology    Underlying mesenteric artery stenosis but asymptomatic    Coronary artery disease history of MI stable.     Recent GERRI injections.     Positive cologuard.         ROS:  Review of Systems   Constitutional:  Negative for appetite change, chills and fever.   HENT:  Negative for congestion, ear pain, postnasal drip, rhinorrhea, sinus pressure and sinus pain.    Eyes:  Negative for pain.   Respiratory:  Negative for cough, chest tightness and shortness of breath.    Cardiovascular:  Negative for chest pain and palpitations.   Gastrointestinal:  Positive for abdominal pain. Negative for blood in stool, constipation, diarrhea and nausea.   Genitourinary:  Negative for difficulty urinating, dysuria, flank pain and hematuria.   Musculoskeletal:  Negative for arthralgias and myalgias.   Skin:  Negative for pallor and wound.   Neurological:  Negative for dizziness, tremors, speech difficulty, light-headedness and headaches.   Psychiatric/Behavioral:  Negative for behavioral problems, dysphoric mood and sleep disturbance. The patient is not nervous/anxious.    All other systems reviewed and are negative.      Past Medical History:   Diagnosis Date    AAA (abdominal aortic aneurysm) 02/13/2014    Abdominal aneurysm     3    Acute  coronary syndrome     Arthritis     BPPV (benign paroxysmal positional vertigo)     Carotid artery plaque     Carotid artery stenosis and occlusion 2014    Chronic back pain     COPD (chronic obstructive pulmonary disease)     Coronary artery disease     Dementia     Emphysema lung     Hyperlipidemia     Hypertension     Myocardial infarction     x3    Neuropathy     Personal history of COVID-19 2021 +Covid, recovered at home        Social History:  Social History     Socioeconomic History    Marital status:    Tobacco Use    Smoking status: Former     Current packs/day: 0.00     Average packs/day: 0.5 packs/day for 46.9 years (23.4 ttl pk-yrs)     Types: Cigarettes, Vaping w/o nicotine     Start date: 1970     Quit date: 2017     Years since quittin.6    Smokeless tobacco: Never    Tobacco comments:     3 MG NICOTINE FOR HEART.    Substance and Sexual Activity    Alcohol use: No    Drug use: No    Sexual activity: Not Currently     Birth control/protection: None     Social Determinants of Health     Financial Resource Strain: Medium Risk (2023)    Overall Financial Resource Strain (CARDIA)     Difficulty of Paying Living Expenses: Somewhat hard   Food Insecurity: Food Insecurity Present (2023)    Hunger Vital Sign     Worried About Running Out of Food in the Last Year: Sometimes true     Ran Out of Food in the Last Year: Sometimes true   Transportation Needs: No Transportation Needs (2023)    PRAPARE - Transportation     Lack of Transportation (Medical): No     Lack of Transportation (Non-Medical): No   Physical Activity: Inactive (2023)    Exercise Vital Sign     Days of Exercise per Week: 0 days     Minutes of Exercise per Session: 0 min   Stress: Stress Concern Present (2023)    [employer] - Occupational Stress Questionnaire     Feeling of Stress : Rather much   Social Connections: Moderately Integrated  "(12/4/2023)    Social Connection and Isolation Panel [NHANES]     Frequency of Communication with Friends and Family: More than three times a week     Frequency of Social Gatherings with Friends and Family: Three times a week     Attends Buddhist Services: More than 4 times per year     Active Member of Clubs or Organizations: Yes     Attends Club or Organization Meetings: More than 4 times per year     Marital Status:    Housing Stability: Low Risk  (12/4/2023)    Housing Stability Vital Sign     Unable to Pay for Housing in the Last Year: No     Number of Places Lived in the Last Year: 1     Unstable Housing in the Last Year: No       Family History:   family history includes Breast cancer in her paternal aunt; Heart attacks under age 50 in her brother and father; Heart disease in her mother.    Health Maintenance   Topic Date Due    Shingles Vaccine (1 of 2) Never done    Colorectal Cancer Screening  05/17/2022    Mammogram  06/09/2022    LDCT Lung Screen  11/08/2023    Lipid Panel  04/18/2024    DEXA Scan  10/20/2024    High Dose Statin  12/14/2024    TETANUS VACCINE  11/15/2026    Hepatitis C Screening  Completed       Physical Exam:    Vital Signs  Pulse: 63  Resp: 18  SpO2: 98 %  BP: 123/68  BP Location: Left arm  Pain Score:   4  Pain Loc: Back  Height and Weight  Height: 5' 2" (157.5 cm)  Weight: 56.7 kg (125 lb)  BSA (Calculated - sq m): 1.57 sq meters  BMI (Calculated): 22.9  Weight in (lb) to have BMI = 25: 136.4]    Body mass index is 22.86 kg/m².    Physical Exam  Vitals and nursing note reviewed.   Constitutional:       Appearance: Normal appearance. She is not toxic-appearing.   HENT:      Head: Normocephalic and atraumatic.      Right Ear: Tympanic membrane normal.      Left Ear: Tympanic membrane normal.   Eyes:      Extraocular Movements: Extraocular movements intact.      Pupils: Pupils are equal, round, and reactive to light.   Cardiovascular:      Rate and Rhythm: Normal rate and " regular rhythm.      Heart sounds: Normal heart sounds.   Pulmonary:      Effort: Pulmonary effort is normal.      Breath sounds: Normal breath sounds. No wheezing, rhonchi or rales.   Abdominal:      General: Bowel sounds are normal. There is no distension.      Palpations: Abdomen is soft.      Tenderness: There is no abdominal tenderness.   Musculoskeletal:         General: Normal range of motion.      Cervical back: Normal range of motion.   Skin:     General: Skin is warm and dry.      Capillary Refill: Capillary refill takes less than 2 seconds.   Neurological:      General: No focal deficit present.      Mental Status: She is alert and oriented to person, place, and time.   Psychiatric:         Mood and Affect: Mood normal.         Behavior: Behavior normal.         Judgment: Judgment normal.           Assessment:      ICD-10-CM ICD-9-CM   1. Epigastric abdominal pain  R10.13 789.06   2. Prediabetes  R73.03 790.29   3. Essential hypertension  I10 401.9       Plan:     Continue current meds and plan.  Order EGD for epigastric abd pain.  Continue Protonix  Refusing colonoscopy again    Patient had the above-mentioned medical problem pretty stable   She does not drive. Continue Namenda   Continue follow with vascular surgery   Refused colon cancer screening. Discussed risks and benefits. Patient understands.     See labs below.    Follow up 6 months.     Orders Placed This Encounter   Procedures    Hemoglobin A1C    Ambulatory referral/consult to Endo Procedure      Current Outpatient Medications   Medication Sig Dispense Refill    albuterol (PROVENTIL/VENTOLIN HFA) 90 mcg/actuation inhaler       albuterol-ipratropium (DUO-NEB) 2.5 mg-0.5 mg/3 mL nebulizer solution       amLODIPine (NORVASC) 5 MG tablet Take 1 tablet (5 mg total) by mouth once daily. 30 tablet 11    aspirin 81 MG Chew Take 81 mg by mouth once daily.      budesonide-glycopyr-formoterol (BREZTRI AEROSPHERE) 160-9-4.8 mcg/actuation HFAA  Inhale 2 puffs into the lungs 2 (two) times a day. 32.1 g 3    carvediloL (COREG) 6.25 MG tablet Take 1 tablet (6.25 mg total) by mouth 2 (two) times daily with meals. 60 tablet 11    clopidogreL (PLAVIX) 75 mg tablet TAKE 1 TABLET BY MOUTH ONCE A DAY 90 tablet 3    COMBIVENT RESPIMAT  mcg/actuation inhaler Inhale 1 puff into the lungs every 6 (six) hours as needed for Wheezing. 12 g 11    cyanocobalamin (VITAMIN B-12) 100 MCG tablet Take 100 mcg by mouth once daily.      diclofenac sodium (VOLTAREN) 1 % Gel Apply topically 4 (four) times daily.      ezetimibe-simvastatin 10-40 mg (VYTORIN) 10-40 mg per tablet TAKE 1 TABLET BY MOUTH EVERY EVENING 90 tablet 2    FEROSUL 325 mg (65 mg iron) Tab tablet TAKE (1) TABLET BY MOUTH 2 TIMES A DAY WITH MEALS      inhalation spacing device (COMPACT SPACE CHAMBER) Use as directed for inhalation. 1 each 2    LIDOcaine-prilocaine (EMLA) cream       memantine (NAMENDA) 10 MG Tab Take 10 mg by mouth 2 (two) times daily.      olmesartan (BENICAR) 40 MG tablet TAKE 1 TABLET BY MOUTH ONCE A DAY 90 tablet 3    OXYGEN-AIR DELIVERY SYSTEMS MISC 3 L by Misc.(Non-Drug; Combo Route) route every evening.      pantoprazole (PROTONIX) 40 MG tablet TAKE 1 TABLET BY MOUTH ONCE A DAY 90 tablet 3    vitamin D (VITAMIN D3) 1000 units Tab Take 1,000 Units by mouth once daily.      zolpidem (AMBIEN) 10 mg Tab TAKE 1 TABLET BY MOUTH NIGHTLY 30 tablet 3    citalopram (CELEXA) 10 MG tablet Take 10 mg by mouth.      dicyclomine (BENTYL) 10 MG capsule TAKE 1 CAPSULE BY MOUTH 4 TIMES DAILY BEFORE MEALS AND NIGHTLY Strength: 10 mg (Patient not taking: Reported on 12/14/2023) 120 capsule 2    furosemide (LASIX) 20 MG tablet TAKE 20 MG TABLET ONCE DAILY (Patient not taking: Reported on 12/14/2023) 90 tablet 1    hydroCHLOROthiazide (MICROZIDE) 12.5 mg capsule Take 12.5 mg by mouth. HASN'T STARTED YET       No current facility-administered medications for this visit.       There are no discontinued  medications.      No follow-ups on file.      Olga Altman MD  Scribe Attestation:   I, Amaury Real, am scribing for, and in the presence of, Dr.Arif Altman I performed the above scribed service and the documentation accurately describes the services I performed. I attest to the accuracy of the note.    I, Dr. Olga Altman, reviewed documentation as scribed above. I performed the services described in this documentation.  I agree that the record reflects my personal performance and is accurate and complete. Olga Altman MD.  12/14/2023     negative...

## 2023-12-19 ENCOUNTER — TELEPHONE (OUTPATIENT)
Dept: PAIN MEDICINE | Facility: CLINIC | Age: 68
End: 2023-12-19
Payer: MEDICARE

## 2023-12-19 NOTE — TELEPHONE ENCOUNTER
Called pt and informed pt that I have messaged the provider about [facility] physical therapy. Pt verbalized understanding.    Albert CARLIN

## 2023-12-19 NOTE — TELEPHONE ENCOUNTER
----- Message from Brigitte Crawford sent at 12/19/2023  2:44 PM CST -----  Contact: Gemma Joyner is calling is in regards to getting a [facility]. Please call back at [phone]                    Thanks  KT

## 2023-12-20 ENCOUNTER — TELEPHONE (OUTPATIENT)
Dept: PAIN MEDICINE | Facility: CLINIC | Age: 68
End: 2023-12-20
Payer: MEDICARE

## 2023-12-20 DIAGNOSIS — M54.16 LUMBAR RADICULOPATHY: Primary | ICD-10-CM

## 2023-12-20 DIAGNOSIS — M48.061 SPINAL STENOSIS OF LUMBAR REGION, UNSPECIFIED WHETHER NEUROGENIC CLAUDICATION PRESENT: ICD-10-CM

## 2023-12-20 DIAGNOSIS — M47.816 LUMBAR SPONDYLOSIS: ICD-10-CM

## 2023-12-20 NOTE — TELEPHONE ENCOUNTER
----- Message from Albert Ko MA sent at 12/19/2023  3:03 PM CST -----  Contact: Gemma Patten Pt would like to referrals sent over to [facility] stated in the last office note on 11/10/2023. Can you please assist with this matter. Thanks  ----- Message -----  From: Brigitte Crawford  Sent: 12/19/2023   2:46 PM CST  To: Asad ALLRED Staff    Pt is calling is in regards to getting a referral sent over to [facility]. Please call back at [phone]                    Thanks  KT

## 2023-12-28 ENCOUNTER — OFFICE VISIT (OUTPATIENT)
Dept: PAIN MEDICINE | Facility: CLINIC | Age: 68
End: 2023-12-28
Payer: MEDICARE

## 2023-12-28 DIAGNOSIS — M54.16 LUMBAR RADICULOPATHY: Primary | ICD-10-CM

## 2023-12-28 DIAGNOSIS — M47.816 LUMBAR SPONDYLOSIS: ICD-10-CM

## 2023-12-28 DIAGNOSIS — M48.061 SPINAL STENOSIS OF LUMBAR REGION, UNSPECIFIED WHETHER NEUROGENIC CLAUDICATION PRESENT: ICD-10-CM

## 2023-12-28 PROCEDURE — 4010F PR ACE/ARB THEARPY RXD/TAKEN: ICD-10-PCS | Mod: HCNC,CPTII,95, | Performed by: PHYSICIAN ASSISTANT

## 2023-12-28 PROCEDURE — 99214 OFFICE O/P EST MOD 30 MIN: CPT | Mod: HCNC,95,, | Performed by: PHYSICIAN ASSISTANT

## 2023-12-28 PROCEDURE — 4010F ACE/ARB THERAPY RXD/TAKEN: CPT | Mod: HCNC,CPTII,95, | Performed by: PHYSICIAN ASSISTANT

## 2023-12-28 PROCEDURE — 3044F HG A1C LEVEL LT 7.0%: CPT | Mod: HCNC,CPTII,95, | Performed by: PHYSICIAN ASSISTANT

## 2023-12-28 PROCEDURE — 3044F PR MOST RECENT HEMOGLOBIN A1C LEVEL <7.0%: ICD-10-PCS | Mod: HCNC,CPTII,95, | Performed by: PHYSICIAN ASSISTANT

## 2023-12-28 PROCEDURE — 99214 PR OFFICE/OUTPT VISIT, EST, LEVL IV, 30-39 MIN: ICD-10-PCS | Mod: HCNC,95,, | Performed by: PHYSICIAN ASSISTANT

## 2023-12-28 RX ORDER — PREGABALIN 75 MG/1
75 CAPSULE ORAL 2 TIMES DAILY
Qty: 60 CAPSULE | Refills: 1 | Status: SHIPPED | OUTPATIENT
Start: 2023-12-28 | End: 2024-02-13

## 2023-12-28 NOTE — PROGRESS NOTES
"Established Patient Chronic Pain Note (Telemedicine Visit)    The patient location is: home  The chief complaint leading to consultation is: injection follow up  Visit type: audiovisual    Face to Face time with patient: 20-25  minutes of total time spent on the encounter, which includes face to face time and non-face to face time preparing to see the patient (eg, review of tests), Obtaining and/or reviewing separately obtained history, Documenting clinical information in the electronic or other health record, Independently interpreting results (not separately reported) and communicating results to the patient/family/caregiver, or Care coordination (not separately reported).   Each patient to whom he or she provides medical services by telemedicine is:  (1) informed of the relationship between the physician and patient and the respective role of any other health care provider with respect to management of the patient; and (2) notified that he or she may decline to receive medical services by telemedicine and may withdraw from such care at any time.    Referring Physician: No ref. provider found    PCP: Olga Altman MD    Chief Complaint:   RLE pain     SUBJECTIVE:    Interval History (12/28/2023):  Gemma Vick presents today for follow-up visit.  she underwent a TF Epidural steroid injection at L4/5 and L5/S1 on the right side on 12/1/23.  The patient reports that she feels worse following the procedure.  The patient reports 70% relief for one day only.  Patient reports pain as 8/10 today. " I can feel a vibration in my R heel". This started three days ago.  The patient has pain radiating down RLE ( from buttock down to her knee) and also posterior calf.  Denies pain of LLE. Her leg pain is worse than back pain at this time.  She starts outpatient physical therapy next Thursday.      Interval History (11/10/2023):   Gemma Vick presents today for follow-up visit.  Patient was last seen on 10/20/2023. At " "that visit, the plan was to complete MRI of lumbar spine. She currently c/o pain in R hip and  Patient reports pain as 8/10 today. She denies significant changes since her last office visit. Does not have pain involving her L side or LLE. Reports pain usually stops below R buttock.    Interval History (10/20/2023): Gemma Vick presents today for follow-up visit.  Patient was last seen on 10/5/2023. At that visit, the plan was to complete MRI of lumbar spine and review treatment options after study. Patient reports pain as 8/10 today.  Patient states she cannot do steroid injections because it significantly elevates her BP and was told by her cardiologist "No steroid injections."   Since her last office visit she was not able to get her MRI completed.   Patient localizes her pain to R lower back, buttocks, hips and radiates down back of her leg. Pain stops on the side of her knee. The more active she is the more pain she experiences. Patient does not have pain with her LLE.   Currently she is in physical therapy ([facility]) and continues efforts to  lose weight in order to help her condition.  Patient also has a history of emphysema. Quit smoking 10-11 years ago and requires oxygen at night.  Patient has tried several pain medication and Gabapentin. She discontinued due to SE.      Initial HPI (10/5/2023):  Gemma Vick is a 68 y.o. female with past medical history significant for vascular dementia, COPD, abdominal aortic aneurysm, coronary artery disease, hypertension, hyperlipidemia, prediabetes, GERD, nicotine dependence in remission who presents to the clinic for the evaluation of lower back and leg pain.  Patient reports pain has been present in the lower back for several years and in the legs for at least 8 months.  She reports she is an avid equestrian participating in horse riding, the [employer] and numerous sports throughout her life, which may have contributed to her symptoms.  Today she reports pain which is " intermittent which is rated a 3/10.  Pain at its worse is an 8/10.  Patient reports pain in a bandlike distribution in the lower back which radiates down the lateral and posterior aspect of the right lower extremity to the knee.  90% of her symptoms are focused in the axial lower back.  Pain is described as aching in nature.  She denies any left-sided radicular symptoms.  Pain is particularly exacerbated with bending over, standing and walking.  Patient reports she is able to ambulate through [place] for 1 hour before requiring rest.  Patient does endorse associated weakness in the right lower extremities associated with her pain.  Pain has been improved with recent weight loss over the last year as well as performing physician directed physical therapy exercises at home for the last 8 weeks with mild improvement in her symptoms.  Patient has trialed gabapentin in the past with significant daytime somnolence and would like to refrain from our pharmacologic management.  Patient has not received prior intervention.    Patient denies night fever/night sweats, urinary incontinence, bowel incontinence, significant weight loss, and loss of sensations.  Patient reports significant motor weakness.      Pain Disability Index Review:         11/10/2023     1:14 PM 10/20/2023    10:50 AM 10/5/2023     1:37 PM   Last 3 PDI Scores   Pain Disability Index (PDI) 56 56 20       Non-Pharmacologic Treatments:  Physical Therapy/Home Exercise: yes  Ice/Heat:yes  TENS: no  Acupuncture: no  Massage: no  Chiropractic: no    Other: no      Pain Medications:  - Adjuvant Medications: Mirtazapine (Remeron)  - Anti-Coagulants: Aspirin and Plavix    Pain Procedures:   - 12/01/2023: R sided TFESI L4/5 and L5/S1  with 70% relief for one day    Past Medical History:   Diagnosis Date    AAA (abdominal aortic aneurysm) 02/13/2014    Abdominal aneurysm     3    Acute coronary syndrome     Arthritis     BPPV (benign paroxysmal positional vertigo)      Carotid artery plaque     Carotid artery stenosis and occlusion 02/13/2014    Chronic back pain     COPD (chronic obstructive pulmonary disease)     Coronary artery disease     Dementia     Emphysema lung     Hyperlipidemia     Hypertension     Myocardial infarction     x3    Neuropathy     Personal history of COVID-19 06/09/2021 11/16/2020 +Covid, recovered at home      Past Surgical History:   Procedure Laterality Date    CARDIAC CATHETERIZATION      CORONARY ANGIOPLASTY      CORONARY STENT PLACEMENT N/A 9/12/2023    Procedure: INSERTION, STENT, CORONARY ARTERY;  Surgeon: Millie Juarez MD;  Location: [facility] CATH LAB;  Service: Cardiology;  Laterality: N/A;    HYSTERECTOMY  1988    LEFT HEART CATHETERIZATION Left 9/12/2023    Procedure: Left heart cath;  Surgeon: Millie Juarez MD;  Location: [facility] CATH LAB;  Service: Cardiology;  Laterality: Left;    PERCUTANEOUS CORONARY INTERVENTION, ARTERY N/A 9/12/2023    Procedure: Percutaneous coronary intervention;  Surgeon: Millie Juarez MD;  Location: [facility] CATH LAB;  Service: Cardiology;  Laterality: N/A;    THROMBECTOMY, CORONARY  9/12/2023    Procedure: Thrombectomy, Coronary;  Surgeon: Millie Juarez MD;  Location: [facility] CATH LAB;  Service: Cardiology;;    TONSILLECTOMY      TRANSFORAMINAL EPIDURAL INJECTION OF STEROID Right 12/1/2023    Procedure: Injection,steroid,epidural,transforaminal approach- right side, L4/5 and L5/S1;  Surgeon: Lydia Roberts MD;  Location: [facility] PAIN MGT;  Service: Pain Management;  Laterality: Right;     Review of patient's allergies indicates:  No Known Allergies    Current Outpatient Medications   Medication Sig    albuterol (PROVENTIL/VENTOLIN HFA) 90 mcg/actuation inhaler     albuterol-ipratropium (DUO-NEB) 2.5 mg-0.5 mg/3 mL nebulizer solution     amLODIPine (NORVASC) 5 MG tablet Take 1 tablet (5 mg total) by mouth once daily.    aspirin 81 MG Chew Take 81 mg by mouth once daily.    budesonide-glycopyr-formoterol (BREZTRI  AEROSPHERE) 160-9-4.8 mcg/actuation HFAA Inhale 2 puffs into the lungs 2 (two) times a day.    carvediloL (COREG) 6.25 MG tablet Take 1 tablet (6.25 mg total) by mouth 2 (two) times daily with meals.    citalopram (CELEXA) 10 MG tablet Take 10 mg by mouth.    clopidogreL (PLAVIX) 75 mg tablet TAKE 1 TABLET BY MOUTH ONCE A DAY    COMBIVENT RESPIMAT  mcg/actuation inhaler Inhale 1 puff into the lungs every 6 (six) hours as needed for Wheezing.    cyanocobalamin (VITAMIN B-12) 100 MCG tablet Take 100 mcg by mouth once daily.    diclofenac sodium (VOLTAREN) 1 % Gel Apply topically 4 (four) times daily.    dicyclomine (BENTYL) 10 MG capsule TAKE 1 CAPSULE BY MOUTH 4 TIMES DAILY BEFORE MEALS AND NIGHTLY Strength: 10 mg (Patient not taking: Reported on 12/14/2023)    ezetimibe-simvastatin 10-40 mg (VYTORIN) 10-40 mg per tablet TAKE 1 TABLET BY MOUTH EVERY EVENING    FEROSUL 325 mg (65 mg iron) Tab tablet TAKE (1) TABLET BY MOUTH 2 TIMES A DAY WITH MEALS    furosemide (LASIX) 20 MG tablet TAKE 20 MG TABLET ONCE DAILY (Patient not taking: Reported on 12/14/2023)    hydroCHLOROthiazide (MICROZIDE) 12.5 mg capsule Take 12.5 mg by mouth. HASN'T STARTED YET    inhalation spacing device (COMPACT SPACE CHAMBER) Use as directed for inhalation.    LIDOcaine-prilocaine (EMLA) cream     memantine (NAMENDA) 10 MG Tab Take 10 mg by mouth 2 (two) times daily.    olmesartan (BENICAR) 40 MG tablet TAKE 1 TABLET BY MOUTH ONCE A DAY    OXYGEN-AIR DELIVERY SYSTEMS MISC 3 L by Misc.(Non-Drug; Combo Route) route every evening.    pantoprazole (PROTONIX) 40 MG tablet TAKE 1 TABLET BY MOUTH ONCE A DAY    vitamin D (VITAMIN D3) 1000 units Tab Take 1,000 Units by mouth once daily.    zolpidem (AMBIEN) 10 mg Tab TAKE 1 TABLET BY MOUTH NIGHTLY     No current facility-administered medications for this visit.       Review of Systems     GENERAL:  No weight loss, malaise or fevers.  HEENT:   No recent changes in vision or hearing  NECK:  Negative  for lumps, no difficulty with swallowing.  RESPIRATORY:  Negative for cough, wheezing or shortness of breath, patient denies any recent URI.  CARDIOVASCULAR:  Negative for chest pain or palpitations.  GI:  Negative for abdominal discomfort, blood in stools or black stools or change in bowel habits.  MUSCULOSKELETAL:  See HPI.  SKIN:  Negative for lesions, rash, and itching.  PSYCH:  No mood disorder or recent psychosocial stressors.   HEMATOLOGY/LYMPHOLOGY:  Negative for prolonged bleeding, bruising easily or swollen nodes.    NEURO:   No history of syncope, paralysis, seizures or tremors.  All other reviewed and negative other than HPI.    OBJECTIVE:    Telemedicine Exam  There were no vitals filed for this visit.  There is no height or weight on file to calculate BMI.   (reviewed on 12/28/2023)     GENERAL: Well appearing, in no acute distress, alert and oriented x3.  Cooperative.  PSYCH:  Mood and affect appropriate.  SKIN: Skin color & texture with no obvious abnormalities.    HEAD/FACE:  Normocephalic, atraumatic.    PULM:  No difficulty breathing. No nasal flaring. No obvious wheezing.  EXTREMITIES: No obvious deformities. Moving all extremities well, appears to have symmetric strength throughout.  MUSCULOSKELETAL: No obvious atrophy abnormalities are noted.   NEURO: No obvious neurologic deficit.   GAIT: sitting.     Physical Exam: last in clinic visit:    Physical Exam    GENERAL: Well appearing, in no acute distress, alert and oriented x3.  PSYCH:  Mood and affect appropriate.  SKIN: Skin color, texture, turgor normal, no rashes or lesions.  HEAD/FACE:  Normocephalic, atraumatic. Cranial nerves grossly intact.    PULM: No evidence of respiratory difficulty, symmetric chest rise.  GI:  Soft and non-tender.    BACK: Straight leg raising in the sitting and supine positions is positive to radicular pain on the Right side. There is mild pain to palpation over the facet joints of the lumbar spine or spinous  processes. Patient has pain with Extension and lateral rotation. TTP over R lumbar paraspinal jaun L4-S1.  EXTREMITIES: Peripheral joint ROM is full and pain free without obvious instability or laxity in all four extremities. No deformities, edema, or skin discoloration. Good capillary refill.  MUSCULOSKELETAL: Able to stand on heels & toes.   Shoulder, hip, and knee provocative maneuvers are negative.  There is no pain with palpation over the sacroiliac joints bilaterally.  FABERs test is positive on the right.  Facet loading test is positive bilaterally.   Bilateral upper and lower extremity strength is normal and symmetric.  No atrophy or tone abnormalities are noted.    RIGHT Lower extremity: Hip flexion 5/5, Hip Abduction 5/5, Hip Adduction 5/5, Knee extension 5/5, Knee flexion 5/5, Ankle dorsiflexion5/5, Extensor hallucis longus 5/5, Ankle plantarflexion 5/5  LEFT Lower extremity:  Hip flexion 5/5, Hip Abduction 5/5,Hip Adduction 5/5, Knee extension 5/5, Knee flexion 5/5, Ankle dorsiflexion 5/5, Extensor hallucis longus 5/5, Ankle plantarflexion 5/5  -Normal testing knee (patellar) jerk and ankle (achilles) jerk    NEURO: Bilateral upper and lower extremity coordination and muscle stretch reflexes are physiologic and symmetric. No loss of sensation is noted.  GAIT: normal.    Imaging (Reviewed on 12/28/2023):    MRI  Lumbar Spine 10/31/23-             08/02/23    X-Ray Lumbar Complete Including Flex And Ext    Narrative  EXAM: XR LUMBAR SPINE 5 VIEW WITH FLEX AND EXT  CLINICAL HISTORY: Pain in unspecified hip  TECHNIQUE: 7 views of the lumbar spine including flexion and extension views.    FINDINGS: There appear to be 6, nonrib-bearing lumbar-type vertebral bodies.  Grade 1 L5-L6 anterolisthesis.  No significant change in the degree of listhesis on flexion and extension views.  Lumbar vertebral body heights appear maintained.  Visualized pars interarticularis appear intact.  L5-L6 and L6-S1 prominent facet  arthropathy.  L5-L6 and L6 S1 disc is remaining intervertebral disc spaces appear largely maintained.  No evidence of an acute fracture.  There is a distal abdominal aortic aneurysm with atherosclerotic calcification.    ASSESSMENT: 68 y.o. year old female with lower back and right leg pain, consistent with     1. Lumbar radiculopathy  pregabalin (LYRICA) 75 MG capsule    CANE FOR HOME USE    Back/Cervical Brace For Home Use    Case Request-RAD/Other Procedure Area: L5/S1 IL Epidural Steroid Injection with R paramedian approach      2. Lumbar spondylosis  pregabalin (LYRICA) 75 MG capsule    CANE FOR HOME USE    Back/Cervical Brace For Home Use    Case Request-RAD/Other Procedure Area: L5/S1 IL Epidural Steroid Injection with R paramedian approach      3. Spinal stenosis of lumbar region, unspecified whether neurogenic claudication present  pregabalin (LYRICA) 75 MG capsule    CANE FOR HOME USE    Back/Cervical Brace For Home Use    Case Request-RAD/Other Procedure Area: L5/S1 IL Epidural Steroid Injection with R paramedian approach                PLAN:   - Interventions:  Schedule Lumbar IL Epidural steroid injection L5/S1 with R paramedian approach to help with back pain and radicular symptoms of RLE. Explained the risks and benefits of the procedure in detail with the patient today in clinic along with alternative treatment options, and the patient elected to hold on intervention at this time.      - Anticoagulation use: Yes  anticoagulation, Plavix. Will obtain clearance from Dr. Juarez.     report:  Reviewed and consistent with medication use as prescribed.      - Medications:  - - Start Lyrica 75mg BID.  Consider further Increase if no improvement after 1-3 weeks.  Patient informed not to stop taking if she does not find significant pain relief. We previously discussed potential side effects of this medication, which may include drowsiness,dizziness, dry mouth, constipation, or peripheral edema.    - - Can  take Tylenol (acetaminophen) 1000mg (two tablets of 500mg) 3x per day as needed for pain (to take up to max dose of 3000mg per day).    - Therapy:   We discussed starting physical therapy at [facility] to help manage the patient/s painful condition. The patient was counseled that muscle strengthening will improve the long term prognosis in regards to pain and may also help increase range of motion and mobility. They were told that one of the goals of physical therapy is that they learn how to do the exercises so that they can do them independently at home daily upon completion. The patient's questions were answered and they were agreeable to this course. She has an evaluation scheduled for next Thursday.    - DME: LSO and Quad cane ordered today to help with stability, provide comfort and support.    - Imaging: Reviewed available imaging with patient and answered any questions they had regarding study.      - Consults/Referrals: Consider referral to neurosurgery. Patient wants to hold off at this time.    - Follow up visit: return to clinic 4 weeks post procedure.    The above plan and management options were discussed at length with patient. Patient is in agreement with the above and verbalized understanding.    - I discussed the goals of interventional chronic pain management with the patient on today's visit. We discussed a multimodal and systematic approach to pain.  This includes diagnostic and therapeutic injections, adjuvant pharmacologic treatment, physical therapy, and at times psychiatry.  I emphasized the importance of regular exercise, core strengthening and stretching, diet and weight loss as a cornerstone of long-term pain management.    - This condition does not require this patient to take time off of work, and the primary goal of our Pain Management services is to improve the patient's functional capacity.  - Patient Questions: Answered all of the patient's questions regarding diagnoses,  therapy, treatment and next steps        Anup Kamara PA-C  Interventional Pain Management  [facility]    Disclaimer:  This note was prepared using voice recognition system and is likely to have sound alike errors that may have been overlooked even after proof reading.  Please call me with any questions

## 2024-01-02 ENCOUNTER — E-CONSULT (OUTPATIENT)
Dept: CARDIOLOGY | Facility: HOSPITAL | Age: 69
End: 2024-01-02
Payer: MEDICARE

## 2024-01-02 DIAGNOSIS — I71.43 INFRARENAL ABDOMINAL AORTIC ANEURYSM (AAA) WITHOUT RUPTURE: ICD-10-CM

## 2024-01-02 DIAGNOSIS — K55.1 MESENTERIC ISCHEMIA, CHRONIC: ICD-10-CM

## 2024-01-02 DIAGNOSIS — Z79.01 ANTICOAGULATED: Primary | ICD-10-CM

## 2024-01-02 DIAGNOSIS — I25.110 CORONARY ARTERY DISEASE INVOLVING NATIVE CORONARY ARTERY OF NATIVE HEART WITH UNSTABLE ANGINA PECTORIS: Primary | ICD-10-CM

## 2024-01-02 DIAGNOSIS — J44.9 COPD, MODERATE: ICD-10-CM

## 2024-01-02 PROCEDURE — 99451 NTRPROF PH1/NTRNET/EHR 5/>: CPT | Mod: ,,, | Performed by: INTERNAL MEDICINE

## 2024-01-03 ENCOUNTER — PATIENT MESSAGE (OUTPATIENT)
Dept: PAIN MEDICINE | Facility: CLINIC | Age: 69
End: 2024-01-03
Payer: MEDICARE

## 2024-01-03 NOTE — CONSULTS
[facility]  Response for E-Consult     Patient Name: Gemma Vick  MRN: [id number]  Primary Care Provider: Olga Altman MD   Requesting Provider: Lydia Roberts MD  Consults    Recommendation: OK TO STOP PLAVIX 5 DAYS PRIOR TO PROCEDURE AND RESUME ASAP AFTERWARDS.    Contingency: NONE    Total time of Consultation: 5 minute    I did not speak to the requesting provider verbally about this.     *This eConsult is based on the clinical data available to me and is furnished without benefit of a physical examination. The eConsult will need to be interpreted in light of any clinical issues or changes in patient status not available to me at the time of filing this eConsults. Significant changes in patient condition or level of acuity should result in immediate formal consultation and reevaluation. Please alert me if you have further questions.    Thank you for this eConsult referral.     Stone Juarez MD  [facility]

## 2024-01-05 DIAGNOSIS — F51.04 CHRONIC INSOMNIA: ICD-10-CM

## 2024-01-05 NOTE — TELEPHONE ENCOUNTER
No care due was identified.  [facility] Embedded Care Due Messages. Reference number: [id number].   1/05/2024 2:58:16 PM CST

## 2024-01-08 RX ORDER — ZOLPIDEM TARTRATE 10 MG/1
TABLET ORAL
Qty: 30 TABLET | Refills: 3 | Status: SHIPPED | OUTPATIENT
Start: 2024-01-08 | End: 2024-01-29 | Stop reason: SDUPTHER

## 2024-01-09 ENCOUNTER — PATIENT MESSAGE (OUTPATIENT)
Dept: ADMINISTRATIVE | Facility: OTHER | Age: 69
End: 2024-01-09
Payer: MEDICARE

## 2024-01-10 ENCOUNTER — PATIENT MESSAGE (OUTPATIENT)
Dept: PAIN MEDICINE | Facility: HOSPITAL | Age: 69
End: 2024-01-10
Payer: MEDICARE

## 2024-01-10 ENCOUNTER — OFFICE VISIT (OUTPATIENT)
Dept: CARDIOLOGY | Facility: CLINIC | Age: 69
End: 2024-01-10
Payer: MEDICARE

## 2024-01-10 ENCOUNTER — LAB VISIT (OUTPATIENT)
Dept: LAB | Facility: HOSPITAL | Age: 69
End: 2024-01-10
Attending: FAMILY MEDICINE
Payer: MEDICARE

## 2024-01-10 VITALS
DIASTOLIC BLOOD PRESSURE: 73 MMHG | HEIGHT: 62 IN | BODY MASS INDEX: 23.21 KG/M2 | SYSTOLIC BLOOD PRESSURE: 143 MMHG | OXYGEN SATURATION: 98 % | HEART RATE: 60 BPM | WEIGHT: 126.13 LBS

## 2024-01-10 DIAGNOSIS — I10 ESSENTIAL HYPERTENSION: ICD-10-CM

## 2024-01-10 DIAGNOSIS — I70.223 ATHEROSCLEROSIS OF NATIVE ARTERIES OF EXTREMITIES WITH REST PAIN, BILATERAL LEGS: Primary | ICD-10-CM

## 2024-01-10 DIAGNOSIS — I65.23 BILATERAL CAROTID ARTERY STENOSIS: ICD-10-CM

## 2024-01-10 DIAGNOSIS — I25.118 CORONARY ARTERY DISEASE OF NATIVE ARTERY OF NATIVE HEART WITH STABLE ANGINA PECTORIS: ICD-10-CM

## 2024-01-10 DIAGNOSIS — I49.5 SINUS NODE DYSFUNCTION: ICD-10-CM

## 2024-01-10 DIAGNOSIS — I70.0 AORTIC ATHEROSCLEROSIS: ICD-10-CM

## 2024-01-10 DIAGNOSIS — I71.43 INFRARENAL ABDOMINAL AORTIC ANEURYSM (AAA) WITHOUT RUPTURE: ICD-10-CM

## 2024-01-10 DIAGNOSIS — E78.2 MIXED HYPERLIPIDEMIA: ICD-10-CM

## 2024-01-10 LAB
ANION GAP SERPL CALC-SCNC: 8 MMOL/L (ref 8–16)
BUN SERPL-MCNC: 13 MG/DL (ref 8–23)
CALCIUM SERPL-MCNC: 9.3 MG/DL (ref 8.7–10.5)
CHLORIDE SERPL-SCNC: 99 MMOL/L (ref 95–110)
CO2 SERPL-SCNC: 28 MMOL/L (ref 23–29)
CREAT SERPL-MCNC: 0.8 MG/DL (ref 0.5–1.4)
EST. GFR  (NO RACE VARIABLE): >60 ML/MIN/1.73 M^2
GLUCOSE SERPL-MCNC: 86 MG/DL (ref 70–110)
POTASSIUM SERPL-SCNC: 4 MMOL/L (ref 3.5–5.1)
SODIUM SERPL-SCNC: 135 MMOL/L (ref 136–145)

## 2024-01-10 PROCEDURE — 99214 OFFICE O/P EST MOD 30 MIN: CPT | Mod: HCNC,S$GLB,,

## 2024-01-10 PROCEDURE — 3008F BODY MASS INDEX DOCD: CPT | Mod: HCNC,CPTII,S$GLB,

## 2024-01-10 PROCEDURE — 1125F AMNT PAIN NOTED PAIN PRSNT: CPT | Mod: HCNC,CPTII,S$GLB,

## 2024-01-10 PROCEDURE — 3077F SYST BP >= 140 MM HG: CPT | Mod: HCNC,CPTII,S$GLB,

## 2024-01-10 PROCEDURE — 99999 PR PBB SHADOW E&M-EST. PATIENT-LVL IV: CPT | Mod: PBBFAC,HCNC,,

## 2024-01-10 PROCEDURE — 1101F PT FALLS ASSESS-DOCD LE1/YR: CPT | Mod: HCNC,CPTII,S$GLB,

## 2024-01-10 PROCEDURE — 3078F DIAST BP <80 MM HG: CPT | Mod: HCNC,CPTII,S$GLB,

## 2024-01-10 PROCEDURE — 1159F MED LIST DOCD IN RCRD: CPT | Mod: HCNC,CPTII,S$GLB,

## 2024-01-10 PROCEDURE — 36415 COLL VENOUS BLD VENIPUNCTURE: CPT | Mod: HCNC | Performed by: FAMILY MEDICINE

## 2024-01-10 PROCEDURE — 3288F FALL RISK ASSESSMENT DOCD: CPT | Mod: HCNC,CPTII,S$GLB,

## 2024-01-10 PROCEDURE — 80048 BASIC METABOLIC PNL TOTAL CA: CPT | Mod: HCNC | Performed by: FAMILY MEDICINE

## 2024-01-10 PROCEDURE — 1160F RVW MEDS BY RX/DR IN RCRD: CPT | Mod: HCNC,CPTII,S$GLB,

## 2024-01-10 NOTE — PROGRESS NOTES
Subjective:   Patient ID:  Gemma Vick is a 68 y.o. female who presents for evaluation of No chief complaint on file.      HPI Ms. Vick is a 68 year old female patient whose current medical conditions include PVD, carotid artery disease, HTN, hyperlipidemia, COPD, former smoker, vascular dementia, mesenteric ischemia, and CAD s/p prior RCA intervention on 9/12/23, enrolled in dig HTN program. Here today for CV follow up, recently seen by Vidhya Lee PA-C for BP f/u c/p epigastric discomfort, chronic CASTANEDA, here today denies any CP, angina or anginal equivalent at this time. Home BP fluctuating 120-130s systolic occ 160s    Past Medical History:   Diagnosis Date    AAA (abdominal aortic aneurysm) 02/13/2014    Abdominal aneurysm     3    Acute coronary syndrome     Arthritis     BPPV (benign paroxysmal positional vertigo)     Carotid artery plaque     Carotid artery stenosis and occlusion 02/13/2014    Chronic back pain     COPD (chronic obstructive pulmonary disease)     Coronary artery disease     Dementia     Emphysema lung     Hyperlipidemia     Hypertension     Myocardial infarction     x3    Neuropathy     Personal history of COVID-19 06/09/2021 11/16/2020 +Covid, recovered at home        Past Surgical History:   Procedure Laterality Date    CARDIAC CATHETERIZATION      CORONARY ANGIOPLASTY      CORONARY STENT PLACEMENT N/A 9/12/2023    Procedure: INSERTION, STENT, CORONARY ARTERY;  Surgeon: Millie Juarez MD;  Location: [facility] CATH LAB;  Service: Cardiology;  Laterality: N/A;    HYSTERECTOMY  1988    LEFT HEART CATHETERIZATION Left 9/12/2023    Procedure: Left heart cath;  Surgeon: Millie Juarez MD;  Location: [facility] CATH LAB;  Service: Cardiology;  Laterality: Left;    PERCUTANEOUS CORONARY INTERVENTION, ARTERY N/A 9/12/2023    Procedure: Percutaneous coronary intervention;  Surgeon: Millie Juarez MD;  Location: [facility] CATH LAB;  Service: Cardiology;  Laterality: N/A;    THROMBECTOMY, CORONARY   2023    Procedure: Thrombectomy, Coronary;  Surgeon: Millie Juarez MD;  Location: [facility] CATH LAB;  Service: Cardiology;;    TONSILLECTOMY      TRANSFORAMINAL EPIDURAL INJECTION OF STEROID Right 2023    Procedure: Injection,steroid,epidural,transforaminal approach- right side, L4/5 and L5/S1;  Surgeon: Lydia Roberts MD;  Location: [facility] PAIN MGT;  Service: Pain Management;  Laterality: Right;       Social History     Tobacco Use    Smoking status: Former     Current packs/day: 0.00     Average packs/day: 0.5 packs/day for 46.9 years (23.4 ttl pk-yrs)     Types: Cigarettes, Vaping w/o nicotine     Start date: 1970     Quit date: 2017     Years since quittin.6    Smokeless tobacco: Never    Tobacco comments:     3 MG NICOTINE FOR HEART.    Substance Use Topics    Alcohol use: No    Drug use: No       Family History   Problem Relation Age of Onset    Heart disease Mother     Heart attacks under age 50 Father     Heart attacks under age 50 Brother     Breast cancer Paternal Aunt        Current Outpatient Medications on File Prior to Visit   Medication Sig Dispense Refill    albuterol-ipratropium (DUO-NEB) 2.5 mg-0.5 mg/3 mL nebulizer solution       amLODIPine (NORVASC) 5 MG tablet Take 1 tablet (5 mg total) by mouth once daily. 30 tablet 11    aspirin 81 MG Chew Take 81 mg by mouth once daily.      budesonide-glycopyr-formoterol (BREZTRI AEROSPHERE) 160-9-4.8 mcg/actuation HFAA Inhale 2 puffs into the lungs 2 (two) times a day. 32.1 g 3    carvediloL (COREG) 6.25 MG tablet Take 1 tablet (6.25 mg total) by mouth 2 (two) times daily with meals. 60 tablet 11    clopidogreL (PLAVIX) 75 mg tablet TAKE 1 TABLET BY MOUTH ONCE A DAY 90 tablet 3    COMBIVENT RESPIMAT  mcg/actuation inhaler Inhale 1 puff into the lungs every 6 (six) hours as needed for Wheezing. 12 g 11    diclofenac sodium (VOLTAREN) 1 % Gel Apply topically 4 (four) times daily.      dicyclomine (BENTYL) 10 MG capsule TAKE 1 CAPSULE  BY MOUTH 4 TIMES DAILY BEFORE MEALS AND NIGHTLY Strength: 10 mg (Patient taking differently: Take 10 mg by mouth. PRN) 120 capsule 2    ezetimibe-simvastatin 10-40 mg (VYTORIN) 10-40 mg per tablet TAKE 1 TABLET BY MOUTH EVERY EVENING 90 tablet 2    furosemide (LASIX) 20 MG tablet TAKE 20 MG TABLET ONCE DAILY (Patient taking differently: Take 20 mg by mouth. PRN) 90 tablet 1    hydroCHLOROthiazide (MICROZIDE) 12.5 mg capsule Take 12.5 mg by mouth. HASN'T STARTED YET      inhalation spacing device (COMPACT SPACE CHAMBER) Use as directed for inhalation. 1 each 2    LIDOcaine-prilocaine (EMLA) cream       memantine (NAMENDA) 10 MG Tab Take 10 mg by mouth 2 (two) times daily.      olmesartan (BENICAR) 40 MG tablet TAKE 1 TABLET BY MOUTH ONCE A DAY 90 tablet 3    OXYGEN-AIR DELIVERY SYSTEMS MISC 3 L by Misc.(Non-Drug; Combo Route) route every evening.      pantoprazole (PROTONIX) 40 MG tablet TAKE 1 TABLET BY MOUTH ONCE A DAY 90 tablet 3    vitamin D (VITAMIN D3) 1000 units Tab Take 1,000 Units by mouth once daily.      zolpidem (AMBIEN) 10 mg Tab TAKE 1 TABLET BY MOUTH NIGHTLY 30 tablet 3    albuterol (PROVENTIL/VENTOLIN HFA) 90 mcg/actuation inhaler       cyanocobalamin (VITAMIN B-12) 100 MCG tablet Take 100 mcg by mouth once daily.      FEROSUL 325 mg (65 mg iron) Tab tablet TAKE (1) TABLET BY MOUTH 2 TIMES A DAY WITH MEALS      pregabalin (LYRICA) 75 MG capsule Take 1 capsule (75 mg total) by mouth 2 (two) times daily. (Patient not taking: Reported on 1/10/2024) 60 capsule 1    [DISCONTINUED] citalopram (CELEXA) 10 MG tablet Take 10 mg by mouth.       No current facility-administered medications on file prior to visit.      Wt Readings from Last 3 Encounters:   01/10/24 57.2 kg (126 lb 1.7 oz)   12/14/23 56.7 kg (125 lb)   12/05/23 57.3 kg (126 lb 5.2 oz)     Temp Readings from Last 3 Encounters:   12/01/23 98.5 °F (36.9 °C) (Temporal)   09/23/23 98 °F (36.7 °C)   09/13/23 97.4 °F (36.3 °C) (Oral)     BP Readings from  Last 3 Encounters:   01/10/24 (!) 143/73   12/14/23 123/68   12/05/23 (!) 144/76     Pulse Readings from Last 3 Encounters:   01/10/24 60   12/14/23 63   12/05/23 70        Review of Systems   Constitutional: Positive for malaise/fatigue.   HENT: Negative.     Eyes: Negative.    Cardiovascular: Negative.    Respiratory: Negative.     Skin: Negative.    Musculoskeletal:  Positive for arthritis, back pain, muscle cramps and myalgias.   Gastrointestinal: Negative.    Genitourinary: Negative.    Neurological:  Positive for weakness.   Psychiatric/Behavioral: Negative.         Objective:   Physical Exam  Vitals and nursing note reviewed.   Constitutional:       Appearance: Normal appearance.   HENT:      Head: Normocephalic.   Eyes:      Pupils: Pupils are equal, round, and reactive to light.   Cardiovascular:      Rate and Rhythm: Normal rate and regular rhythm.      Heart sounds: Normal heart sounds, S1 normal and S2 normal. No murmur heard.     No S3 or S4 sounds.   Pulmonary:      Effort: Pulmonary effort is normal.      Breath sounds: Normal breath sounds.   Abdominal:      General: Bowel sounds are normal.      Palpations: Abdomen is soft.   Musculoskeletal:         General: Normal range of motion.      Cervical back: Normal range of motion.   Skin:     Capillary Refill: Capillary refill takes less than 2 seconds.   Neurological:      General: No focal deficit present.      Mental Status: She is alert and oriented to person, place, and time.   Psychiatric:         Mood and Affect: Mood normal.         Behavior: Behavior normal.         Thought Content: Thought content normal.         Lab Results   Component Value Date    CHOL 138 04/18/2023    CHOL 143 12/01/2022    CHOL 164 10/25/2022     Lab Results   Component Value Date    HDL 45 04/18/2023    HDL 56 12/01/2022    HDL 60 10/25/2022     Lab Results   Component Value Date    LDLCALC 79.0 04/18/2023    LDLCALC 72.0 12/01/2022    LDLCALC 83.0 10/25/2022     Lab  Results   Component Value Date    TRIG 70 04/18/2023    TRIG 75 12/01/2022    TRIG 105 10/25/2022     Lab Results   Component Value Date    CHOLHDL 32.6 04/18/2023    CHOLHDL 39.2 12/01/2022    CHOLHDL 36.6 10/25/2022       Chemistry        Component Value Date/Time     12/05/2023 1504    K 4.1 12/05/2023 1504    CL 99 12/05/2023 1504    CO2 26 12/05/2023 1504    BUN 18 12/05/2023 1504    CREATININE 1.4 12/05/2023 1504     (H) 12/05/2023 1504        Component Value Date/Time    CALCIUM 9.3 12/05/2023 1504    ALKPHOS 62 09/23/2023 1408    AST 18 09/23/2023 1408    ALT 13 09/23/2023 1408    BILITOT 0.6 09/23/2023 1408    ESTGFRAFRICA >60.0 04/07/2022 0922    EGFRNONAA >60.0 04/07/2022 0922          Lab Results   Component Value Date    TSH 1.509 05/04/2018     Lab Results   Component Value Date    INR 1.1 12/20/2017    INR 1.1 08/10/2012     @RESUFAST(WBC,HGB,HCT,MCV,PLT)  @LABRCNTIP(BNP,BNPTRIAGEBLO)@  CrCl cannot be calculated (Patient's most recent lab result is older than the maximum 7 days allowed.).     Results for orders placed during the hospital encounter of 06/14/23    Echo    Interpretation Summary  · Concentric remodeling and normal systolic function.  · The estimated ejection fraction is 60%.  · Indeterminate left ventricular diastolic function.  · Normal right ventricular size with normal right ventricular systolic function.  · Mild tricuspid regurgitation.  · There is pulmonary hypertension.  · Mild aortic regurgitation.     Results for orders placed during the hospital encounter of 06/14/23    Nuclear Stress - Cardiology Interpreted    Interpretation Summary    Normal myocardial perfusion scan. There is no evidence of myocardial ischemia or infarction.    The gated perfusion images showed an ejection fraction of 75% at rest. The gated perfusion images showed an ejection fraction of 72% post stress.    There is normal wall motion at rest and post stress.    LV cavity size is normal at rest  and normal at stress.    The ECG portion of the study is negative for ischemia.    The patient reported no chest pain during the stress test.     Assessment:      1. Atherosclerosis of native arteries of extremities with rest pain, bilateral legs    2. Sinus node dysfunction    3. Aortic atherosclerosis    4. Coronary artery disease of native artery of native heart with stable angina pectoris    5. Mixed hyperlipidemia    6. Essential hypertension    7. Bilateral carotid artery stenosis    8. Infrarenal abdominal aortic aneurysm (AAA) without rupture        Plan:   Atherosclerosis of native arteries of extremities with rest pain, bilateral legs    Sinus node dysfunction    Aortic atherosclerosis    Coronary artery disease of native artery of native heart with stable angina pectoris    Mixed hyperlipidemia    Essential hypertension    Bilateral carotid artery stenosis    Infrarenal abdominal aortic aneurysm (AAA) without rupture      Move Amlodipine to evening  Cont profile BP  Cont current CV medications  RF modifications  Daily exercise as tolerated  Low Na low fat diet    RTC in 4m or sooner if needed    Courtney Guillot, FNP-C [facility], Cardiology

## 2024-01-11 ENCOUNTER — PATIENT MESSAGE (OUTPATIENT)
Dept: FAMILY MEDICINE | Facility: CLINIC | Age: 69
End: 2024-01-11
Payer: MEDICARE

## 2024-01-17 ENCOUNTER — TELEPHONE (OUTPATIENT)
Dept: PAIN MEDICINE | Facility: CLINIC | Age: 69
End: 2024-01-17
Payer: MEDICARE

## 2024-01-17 NOTE — TELEPHONE ENCOUNTER
----- Message from Frederick Buenrostro sent at 1/17/2024 10:35 AM CST -----  Contact: Gemma Joyner is calling regarding instructions on Plavix and aspirin for procedure on Friday 1/19/24. Pt can be reached at [phone].      Thanks  MANDY

## 2024-01-19 ENCOUNTER — TELEPHONE (OUTPATIENT)
Dept: ORTHOPEDICS | Facility: CLINIC | Age: 69
End: 2024-01-19
Payer: MEDICARE

## 2024-01-19 NOTE — TELEPHONE ENCOUNTER
----- Message from Candis Colón sent at 1/19/2024  9:32 AM CST -----  Contact: [phone]  Patient is calling in regards to canceling her procedure appt scheduled for 1/19. Please call pt back at [phone]. Thanks KB

## 2024-01-19 NOTE — TELEPHONE ENCOUNTER
Contacted pt. Pt would like to cancel procedure with dr. Roberts scheduled today d/t fever and sore throat. Instructed pt to call back once feeling better to reschedule. Pt was able to verbalize all understanding. All questions answered.//lp

## 2024-01-29 ENCOUNTER — OFFICE VISIT (OUTPATIENT)
Dept: FAMILY MEDICINE | Facility: CLINIC | Age: 69
End: 2024-01-29
Payer: MEDICARE

## 2024-01-29 ENCOUNTER — PATIENT OUTREACH (OUTPATIENT)
Dept: ADMINISTRATIVE | Facility: HOSPITAL | Age: 69
End: 2024-01-29
Payer: MEDICARE

## 2024-01-29 VITALS
BODY MASS INDEX: 23.08 KG/M2 | WEIGHT: 126.19 LBS | SYSTOLIC BLOOD PRESSURE: 118 MMHG | HEART RATE: 97 BPM | OXYGEN SATURATION: 97 % | DIASTOLIC BLOOD PRESSURE: 62 MMHG

## 2024-01-29 DIAGNOSIS — I25.110 CORONARY ARTERY DISEASE INVOLVING NATIVE CORONARY ARTERY OF NATIVE HEART WITH UNSTABLE ANGINA PECTORIS: ICD-10-CM

## 2024-01-29 DIAGNOSIS — I10 ESSENTIAL HYPERTENSION: Primary | ICD-10-CM

## 2024-01-29 DIAGNOSIS — G47.00 INSOMNIA, UNSPECIFIED TYPE: ICD-10-CM

## 2024-01-29 DIAGNOSIS — J44.9 COPD, MODERATE: ICD-10-CM

## 2024-01-29 DIAGNOSIS — F51.04 CHRONIC INSOMNIA: ICD-10-CM

## 2024-01-29 DIAGNOSIS — R19.5 POSITIVE COLORECTAL CANCER SCREENING USING COLOGUARD TEST: ICD-10-CM

## 2024-01-29 DIAGNOSIS — R10.10 PAIN LOCALIZED TO UPPER ABDOMEN: ICD-10-CM

## 2024-01-29 DIAGNOSIS — I25.10 CORONARY ARTERY DISEASE INVOLVING NATIVE CORONARY ARTERY OF NATIVE HEART WITHOUT ANGINA PECTORIS: ICD-10-CM

## 2024-01-29 PROCEDURE — 3008F BODY MASS INDEX DOCD: CPT | Mod: HCNC,CPTII,S$GLB, | Performed by: FAMILY MEDICINE

## 2024-01-29 PROCEDURE — 99999 PR PBB SHADOW E&M-EST. PATIENT-LVL III: CPT | Mod: PBBFAC,HCNC,, | Performed by: FAMILY MEDICINE

## 2024-01-29 PROCEDURE — 3078F DIAST BP <80 MM HG: CPT | Mod: HCNC,CPTII,S$GLB, | Performed by: FAMILY MEDICINE

## 2024-01-29 PROCEDURE — 99214 OFFICE O/P EST MOD 30 MIN: CPT | Mod: HCNC,S$GLB,, | Performed by: FAMILY MEDICINE

## 2024-01-29 PROCEDURE — 3074F SYST BP LT 130 MM HG: CPT | Mod: HCNC,CPTII,S$GLB, | Performed by: FAMILY MEDICINE

## 2024-01-29 PROCEDURE — 1126F AMNT PAIN NOTED NONE PRSNT: CPT | Mod: HCNC,CPTII,S$GLB, | Performed by: FAMILY MEDICINE

## 2024-01-29 PROCEDURE — 3288F FALL RISK ASSESSMENT DOCD: CPT | Mod: HCNC,CPTII,S$GLB, | Performed by: FAMILY MEDICINE

## 2024-01-29 PROCEDURE — 1101F PT FALLS ASSESS-DOCD LE1/YR: CPT | Mod: HCNC,CPTII,S$GLB, | Performed by: FAMILY MEDICINE

## 2024-01-29 RX ORDER — PANTOPRAZOLE SODIUM 40 MG/1
40 TABLET, DELAYED RELEASE ORAL DAILY
Qty: 90 TABLET | Refills: 3 | Status: SHIPPED | OUTPATIENT
Start: 2024-01-29

## 2024-01-29 RX ORDER — IPRATROPIUM BROMIDE AND ALBUTEROL 20; 100 UG/1; UG/1
1 SPRAY, METERED RESPIRATORY (INHALATION) EVERY 6 HOURS PRN
Qty: 12 G | Refills: 11 | Status: SHIPPED | OUTPATIENT
Start: 2024-01-29

## 2024-01-29 RX ORDER — FERROUS SULFATE TAB 325 MG (65 MG ELEMENTAL FE) 325 (65 FE) MG
325 TAB ORAL 2 TIMES DAILY
Qty: 60 TABLET | Refills: 12 | Status: ON HOLD | OUTPATIENT
Start: 2024-01-29 | End: 2024-03-28 | Stop reason: HOSPADM

## 2024-01-29 RX ORDER — DICLOFENAC SODIUM 10 MG/G
GEL TOPICAL 4 TIMES DAILY
Qty: 150 G | Refills: 1 | Status: SHIPPED | OUTPATIENT
Start: 2024-01-29

## 2024-01-29 RX ORDER — ZOLPIDEM TARTRATE 10 MG/1
10 TABLET ORAL NIGHTLY
Qty: 30 TABLET | Refills: 5 | Status: SHIPPED | OUTPATIENT
Start: 2024-02-05

## 2024-01-29 RX ORDER — FUROSEMIDE 20 MG/1
20 TABLET ORAL DAILY
Qty: 90 TABLET | Refills: 2 | Status: ON HOLD | OUTPATIENT
Start: 2024-01-29 | End: 2024-03-28 | Stop reason: HOSPADM

## 2024-01-29 RX ORDER — CLOPIDOGREL BISULFATE 75 MG/1
75 TABLET ORAL DAILY
Qty: 90 TABLET | Refills: 3 | Status: SHIPPED | OUTPATIENT
Start: 2024-01-29

## 2024-01-29 RX ORDER — SUCRALFATE 1 G/1
1 TABLET ORAL
Qty: 120 TABLET | Refills: 12 | Status: SHIPPED | OUTPATIENT
Start: 2024-01-29 | End: 2024-03-14

## 2024-01-29 RX ORDER — MEMANTINE HYDROCHLORIDE 10 MG/1
10 TABLET ORAL 2 TIMES DAILY
Qty: 180 TABLET | Refills: 12 | Status: SHIPPED | OUTPATIENT
Start: 2024-01-29

## 2024-01-29 RX ORDER — EZETIMIBE AND SIMVASTATIN 10; 40 MG/1; MG/1
1 TABLET ORAL NIGHTLY
Qty: 90 TABLET | Refills: 2 | Status: SHIPPED | OUTPATIENT
Start: 2024-01-29

## 2024-01-29 RX ORDER — AMLODIPINE BESYLATE 5 MG/1
5 TABLET ORAL DAILY
Qty: 30 TABLET | Refills: 11 | Status: SHIPPED | OUTPATIENT
Start: 2024-01-29 | End: 2025-01-28

## 2024-01-29 RX ORDER — IPRATROPIUM BROMIDE AND ALBUTEROL SULFATE 2.5; .5 MG/3ML; MG/3ML
3 SOLUTION RESPIRATORY (INHALATION) EVERY 6 HOURS PRN
Qty: 75 ML | Refills: 12 | Status: SHIPPED | OUTPATIENT
Start: 2024-01-29

## 2024-01-29 RX ORDER — BUDESONIDE, GLYCOPYRROLATE, AND FORMOTEROL FUMARATE 160; 9; 4.8 UG/1; UG/1; UG/1
2 AEROSOL, METERED RESPIRATORY (INHALATION) 2 TIMES DAILY
Qty: 32.1 G | Refills: 3 | Status: SHIPPED | OUTPATIENT
Start: 2024-01-29

## 2024-01-29 RX ORDER — OLMESARTAN MEDOXOMIL 40 MG/1
40 TABLET ORAL DAILY
Qty: 90 TABLET | Refills: 3 | Status: SHIPPED | OUTPATIENT
Start: 2024-01-29

## 2024-01-29 RX ORDER — HYDROCHLOROTHIAZIDE 12.5 MG/1
12.5 CAPSULE ORAL DAILY
Qty: 90 CAPSULE | Refills: 3 | Status: ON HOLD | OUTPATIENT
Start: 2024-01-29 | End: 2024-03-28 | Stop reason: HOSPADM

## 2024-01-29 RX ORDER — CARVEDILOL 6.25 MG/1
6.25 TABLET ORAL 2 TIMES DAILY WITH MEALS
Qty: 60 TABLET | Refills: 11 | Status: SHIPPED | OUTPATIENT
Start: 2024-01-29 | End: 2025-01-28

## 2024-01-29 RX ORDER — DICYCLOMINE HYDROCHLORIDE 10 MG/1
10 CAPSULE ORAL 3 TIMES DAILY
Qty: 90 CAPSULE | Refills: 2 | Status: ON HOLD | OUTPATIENT
Start: 2024-01-29 | End: 2024-03-28 | Stop reason: HOSPADM

## 2024-01-29 RX ORDER — ALBUTEROL SULFATE 90 UG/1
2 AEROSOL, METERED RESPIRATORY (INHALATION) EVERY 4 HOURS PRN
Qty: 12 G | Refills: 12 | Status: SHIPPED | OUTPATIENT
Start: 2024-01-29 | End: 2024-04-02

## 2024-01-29 NOTE — PROGRESS NOTES
Chief Complaint:    Chief Complaint   Patient presents with    Medication Refill       History of Present Illness:  Patient with AAA, HTN, HLD, neuropathy, COPD, CAD, dementia presents today for a med refill,    Doing well    Patient says she is been diagnosed with vascular dementia currently taking Namenda. Says it is intermittent and has not been as bad.     Intermittent abd pain. Does currently take protonix. Pain is worsened after eating    Vision changes, has had recent eye exam.     She has a 4 cm size abdominal aneurysm following with vascular surgery on a regular basis     Carotid stenosis seeing Dr. Goodrich neurology  Underlying mesenteric artery stenosis but asymptomatic  Coronary artery disease history of MI stable.     Received GERRI injections. Says it has not helped    Positive cologuard, refuses colonoscopy    ROS:  Review of Systems   Constitutional:  Negative for appetite change, chills and fever.   HENT:  Negative for congestion, ear pain, postnasal drip, rhinorrhea, sinus pressure and sinus pain.    Eyes:  Negative for pain.   Respiratory:  Negative for cough, chest tightness and shortness of breath.    Cardiovascular:  Negative for chest pain and palpitations.   Gastrointestinal:  Positive for abdominal pain. Negative for blood in stool, constipation, diarrhea and nausea.   Genitourinary:  Negative for difficulty urinating, dysuria, flank pain and hematuria.   Musculoskeletal:  Negative for arthralgias and myalgias.   Skin:  Negative for pallor and wound.   Neurological:  Negative for dizziness, tremors, speech difficulty, light-headedness and headaches.   Psychiatric/Behavioral:  Negative for behavioral problems, dysphoric mood and sleep disturbance. The patient is not nervous/anxious.    All other systems reviewed and are negative.      Past Medical History:   Diagnosis Date    AAA (abdominal aortic aneurysm) 02/13/2014    Abdominal aneurysm     3    Acute coronary syndrome     Arthritis     BPPV  (benign paroxysmal positional vertigo)     Carotid artery plaque     Carotid artery stenosis and occlusion 2014    Chronic back pain     COPD (chronic obstructive pulmonary disease)     Coronary artery disease     Dementia     Emphysema lung     Hyperlipidemia     Hypertension     Myocardial infarction     x3    Neuropathy     Personal history of COVID-19 2021 +Covid, recovered at home        Social History:  Social History     Socioeconomic History    Marital status:    Tobacco Use    Smoking status: Former     Current packs/day: 0.00     Average packs/day: 0.5 packs/day for 46.9 years (23.4 ttl pk-yrs)     Types: Cigarettes, Vaping w/o nicotine     Start date: 1970     Quit date: 2017     Years since quittin.7    Smokeless tobacco: Never    Tobacco comments:     3 MG NICOTINE FOR HEART.    Substance and Sexual Activity    Alcohol use: No    Drug use: No    Sexual activity: Not Currently     Birth control/protection: None     Social Determinants of Health     Financial Resource Strain: Medium Risk (2023)    Overall Financial Resource Strain (CARDIA)     Difficulty of Paying Living Expenses: Somewhat hard   Food Insecurity: Food Insecurity Present (2023)    Hunger Vital Sign     Worried About Running Out of Food in the Last Year: Sometimes true     Ran Out of Food in the Last Year: Sometimes true   Transportation Needs: No Transportation Needs (2023)    PRAPARE - Transportation     Lack of Transportation (Medical): No     Lack of Transportation (Non-Medical): No   Physical Activity: Inactive (2023)    Exercise Vital Sign     Days of Exercise per Week: 0 days     Minutes of Exercise per Session: 0 min   Stress: Stress Concern Present (2023)    [employer] - Occupational Stress Questionnaire     Feeling of Stress : Rather much   Social Connections: Moderately Integrated (2023)    Social Connection and Isolation  Panel [NHANES]     Frequency of Communication with Friends and Family: More than three times a week     Frequency of Social Gatherings with Friends and Family: Three times a week     Attends Hinduism Services: More than 4 times per year     Active Member of Clubs or Organizations: Yes     Attends Club or Organization Meetings: More than 4 times per year     Marital Status:    Housing Stability: Low Risk  (12/4/2023)    Housing Stability Vital Sign     Unable to Pay for Housing in the Last Year: No     Number of Places Lived in the Last Year: 1     Unstable Housing in the Last Year: No       Family History:   family history includes Breast cancer in her paternal aunt; Heart attacks under age 50 in her brother and father; Heart disease in her mother.    Health Maintenance   Topic Date Due    Shingles Vaccine (1 of 2) Never done    Colorectal Cancer Screening  05/17/2022    Mammogram  06/09/2022    LDCT Lung Screen  11/08/2023    Lipid Panel  04/18/2024    DEXA Scan  10/20/2024    High Dose Statin  01/10/2025    TETANUS VACCINE  11/15/2026    Hepatitis C Screening  Completed       Physical Exam:    Vital Signs  Pulse: 97  SpO2: 97 %  BP: 118/62  Pain Score: 0-No pain  Height and Weight  Weight: 57.2 kg (126 lb 3.4 oz)]    Body mass index is 23.08 kg/m².    Physical Exam  Vitals and nursing note reviewed.   Constitutional:       Appearance: Normal appearance. She is not toxic-appearing.   HENT:      Head: Normocephalic and atraumatic.      Right Ear: Tympanic membrane normal.      Left Ear: Tympanic membrane normal.   Eyes:      Extraocular Movements: Extraocular movements intact.      Pupils: Pupils are equal, round, and reactive to light.   Cardiovascular:      Rate and Rhythm: Normal rate and regular rhythm.      Heart sounds: Normal heart sounds.   Pulmonary:      Effort: Pulmonary effort is normal.      Breath sounds: Normal breath sounds. No wheezing, rhonchi or rales.   Abdominal:      General: Bowel  sounds are normal. There is no distension.      Palpations: Abdomen is soft.      Tenderness: There is no abdominal tenderness.   Musculoskeletal:         General: Normal range of motion.      Cervical back: Normal range of motion.   Skin:     General: Skin is warm and dry.      Capillary Refill: Capillary refill takes less than 2 seconds.   Neurological:      General: No focal deficit present.      Mental Status: She is alert and oriented to person, place, and time.   Psychiatric:         Mood and Affect: Mood normal.         Behavior: Behavior normal.         Judgment: Judgment normal.           Assessment:      ICD-10-CM ICD-9-CM   1. Essential hypertension  I10 401.9   2. Insomnia, unspecified type  G47.00 780.52   3. COPD, moderate  J44.9 496   4. Coronary artery disease involving native coronary artery of native heart with unstable angina pectoris  I25.110 414.01     411.1   5. Coronary artery disease involving native coronary artery of native heart without angina pectoris  I25.10 414.01   6. Chronic insomnia  F51.04 780.52   7. Positive colorectal cancer screening using Cologuard test  R19.5 787.7   8. Pain localized to upper abdomen  R10.10 789.09         Plan:     Continue current meds and plan.    Start Carafate for upper abd pain after eating. Take 1 hour prior to eating. Allow 2-3 weeks, if pain does not improve then follow up.    Patient had the above-mentioned medical problem pretty stable   She does not drive. Continue Namenda   Continue follow with vascular surgery   Positive cologuard  Continues to refuse colon cancer screening Discussed risks and benefits. Patient understands.   Schedule mammogram. Order low dose CT lung scan for cancer screening.     See labs below.    Follow up 6 months.     No orders of the defined types were placed in this encounter.    Current Outpatient Medications   Medication Sig Dispense Refill    aspirin 81 MG Chew Take 81 mg by mouth once daily.      cyanocobalamin  (VITAMIN B-12) 100 MCG tablet Take 100 mcg by mouth once daily.      inhalation spacing device (COMPACT SPACE CHAMBER) Use as directed for inhalation. 1 each 2    LIDOcaine-prilocaine (EMLA) cream       OXYGEN-AIR DELIVERY SYSTEMS MISC 3 L by Misc.(Non-Drug; Combo Route) route every evening.      pregabalin (LYRICA) 75 MG capsule Take 1 capsule (75 mg total) by mouth 2 (two) times daily. 60 capsule 1    vitamin D (VITAMIN D3) 1000 units Tab Take 1,000 Units by mouth once daily.      albuterol (PROVENTIL/VENTOLIN HFA) 90 mcg/actuation inhaler Inhale 2 puffs into the lungs every 4 (four) hours as needed for Wheezing. 12 g 12    albuterol-ipratropium (DUO-NEB) 2.5 mg-0.5 mg/3 mL nebulizer solution Take 3 mLs by nebulization every 6 (six) hours as needed for Wheezing. 75 mL 12    amLODIPine (NORVASC) 5 MG tablet Take 1 tablet (5 mg total) by mouth once daily. 30 tablet 11    budesonide-glycopyr-formoterol (BREZTRI AEROSPHERE) 160-9-4.8 mcg/actuation HFAA Inhale 2 puffs into the lungs 2 (two) times a day. 32.1 g 3    carvediloL (COREG) 6.25 MG tablet Take 1 tablet (6.25 mg total) by mouth 2 (two) times daily with meals. 60 tablet 11    clopidogreL (PLAVIX) 75 mg tablet Take 1 tablet (75 mg total) by mouth once daily. 90 tablet 3    COMBIVENT RESPIMAT  mcg/actuation inhaler Inhale 1 puff into the lungs every 6 (six) hours as needed for Wheezing. 12 g 11    diclofenac sodium (VOLTAREN) 1 % Gel Apply topically 4 (four) times daily. 150 g 1    dicyclomine (BENTYL) 10 MG capsule Take 1 capsule (10 mg total) by mouth 3 (three) times daily. PRN 90 capsule 2    ezetimibe-simvastatin 10-40 mg (VYTORIN) 10-40 mg per tablet Take 1 tablet by mouth every evening. 90 tablet 2    FEROSUL 325 mg (65 mg iron) Tab tablet Take 1 tablet (325 mg total) by mouth 2 (two) times daily. 60 tablet 12    furosemide (LASIX) 20 MG tablet Take 1 tablet (20 mg total) by mouth once daily. PRN 90 tablet 2    hydroCHLOROthiazide (MICROZIDE) 12.5 mg  capsule Take 1 capsule (12.5 mg total) by mouth once daily. HASN'T STARTED YET 90 capsule 3    memantine (NAMENDA) 10 MG Tab Take 1 tablet (10 mg total) by mouth 2 (two) times daily. 180 tablet 12    olmesartan (BENICAR) 40 MG tablet Take 1 tablet (40 mg total) by mouth once daily. 90 tablet 3    pantoprazole (PROTONIX) 40 MG tablet Take 1 tablet (40 mg total) by mouth once daily. 90 tablet 3    sucralfate (CARAFATE) 1 gram tablet Take 1 tablet (1 g total) by mouth 4 (four) times daily before meals and nightly. 120 tablet 12    [START ON 2/5/2024] zolpidem (AMBIEN) 10 mg Tab Take 1 tablet (10 mg total) by mouth every evening. 30 tablet 5     No current facility-administered medications for this visit.       Medications Discontinued During This Encounter   Medication Reason    albuterol-ipratropium (DUO-NEB) 2.5 mg-0.5 mg/3 mL nebulizer solution Reorder    budesonide-glycopyr-formoterol (BREZTRI AEROSPHERE) 160-9-4.8 mcg/actuation HFAA Reorder    furosemide (LASIX) 20 MG tablet Reorder    FEROSUL 325 mg (65 mg iron) Tab tablet Reorder    albuterol (PROVENTIL/VENTOLIN HFA) 90 mcg/actuation inhaler Reorder    ezetimibe-simvastatin 10-40 mg (VYTORIN) 10-40 mg per tablet Reorder    hydroCHLOROthiazide (MICROZIDE) 12.5 mg capsule Reorder    memantine (NAMENDA) 10 MG Tab Reorder    clopidogreL (PLAVIX) 75 mg tablet Reorder    COMBIVENT RESPIMAT  mcg/actuation inhaler Reorder    olmesartan (BENICAR) 40 MG tablet Reorder    diclofenac sodium (VOLTAREN) 1 % Gel Reorder    carvediloL (COREG) 6.25 MG tablet Reorder    amLODIPine (NORVASC) 5 MG tablet Reorder    pantoprazole (PROTONIX) 40 MG tablet Reorder    zolpidem (AMBIEN) 10 mg Tab Reorder    dicyclomine (BENTYL) 10 MG capsule Reorder         Follow up in about 6 months (around 7/29/2024).      Olga Altman MD  Scribe Attestation:   Amaury ABRAHAM, am scribing for, and in the presence of, Dr.Arif Altman I performed the above scribed service and the documentation  accurately describes the services I performed. I attest to the accuracy of the note.    I, Dr. Olga Altman, reviewed documentation as scribed above. I performed the services described in this documentation.  I agree that the record reflects my personal performance and is accurate and complete. Olga Altman MD.  01/29/2024

## 2024-01-29 NOTE — PATIENT INSTRUCTIONS
Sandor Menendez,     If you are due for any health screening(s) below please notify me so we can arrange them to be ordered and scheduled. Most healthy patients at your age complete them, but you are free to accept or refuse.     If you can't do it, I'll definitely understand. If you can, I'd certainly appreciate it!    Tests to Keep You Healthy    Mammogram: ORDERED BUT NOT SCHEDULED  Colon Cancer Screening: DUE  Last Blood Pressure <= 139/89 (1/29/2024): NO      Schedule your breast cancer screening today     Breast cancer is the second most common cancer in women,  and the second leading cause of death from cancer. Mammograms can detect breast cancer early, which significantly increases the chances of curing the cancer.       Our records indicate that you may be overdue for breast cancer screening. Cancer screenings save lives, so schedule yours today to stay healthy.     If you recently had a mammogram performed outside of [facility], please let your Health care team know so that they can update your health record.        Its time for your colon cancer screening     Colorectal cancer is one of the leading causes of cancer death for men and women but it doesnt have to be. Screenings can prevent colorectal cancer or find it early enough to treat and cure the disease.     Our records indicate that you may be overdue for colon cancer screening. A colonoscopy or stool screening test can help identify patients at risk for developing colon cancer. Cancer screenings save lives, so schedule yours today to stay healthy.     A colonoscopy is the preferred test for detecting colon cancer. It is needed only once every 10 years if results are negative. While you are sedated, a flexible, lighted tube with a tiny camera is inserted into the rectum and advanced through the colon to look for cancers.     An alternative screening test that is used at home and returned to the lab may also be used. It detects hidden blood in  bowel movements which could indicate cancer in the colon. If results are positive, you will need a colonoscopy to determine if the blood is a sign of cancer. This type of follow up (diagnostic) colonoscopy usually requires additional copays as required by your insurance provider.     If you recently had your colon cancer screening performed outside of [facility], please let your Health care team know so that they can update your health record. Please contact your PCP if you have any questions.

## 2024-01-31 ENCOUNTER — PATIENT MESSAGE (OUTPATIENT)
Dept: FAMILY MEDICINE | Facility: CLINIC | Age: 69
End: 2024-01-31
Payer: MEDICARE

## 2024-01-31 ENCOUNTER — TELEPHONE (OUTPATIENT)
Dept: FAMILY MEDICINE | Facility: CLINIC | Age: 69
End: 2024-01-31
Payer: MEDICARE

## 2024-01-31 DIAGNOSIS — R10.13 EPIGASTRIC ABDOMINAL PAIN: Primary | ICD-10-CM

## 2024-01-31 NOTE — TELEPHONE ENCOUNTER
----- Message from Vitaliy Bernabe sent at 1/31/2024 11:51 AM CST -----  Contact: BHARAT Diaz is calling regarding concerns, reports having severe pain at the top of her stomach and acid reflux after taking new medication. Please call pt back at [phone]            Thanks

## 2024-01-31 NOTE — TELEPHONE ENCOUNTER
Unless the pain is worsening please allow at least a couple of weeks before letting us know that the medicines not working.

## 2024-02-01 ENCOUNTER — HOSPITAL ENCOUNTER (OUTPATIENT)
Dept: PREADMISSION TESTING | Facility: HOSPITAL | Age: 69
Discharge: HOME | End: 2024-02-01
Attending: FAMILY MEDICINE
Payer: MEDICARE

## 2024-02-01 DIAGNOSIS — R10.13 EPIGASTRIC ABDOMINAL PAIN: ICD-10-CM

## 2024-02-02 DIAGNOSIS — J44.9 COPD, MODERATE: ICD-10-CM

## 2024-02-05 RX ORDER — INHALER, ASSIST DEVICES
SPACER (EA) MISCELLANEOUS
Qty: 1 EACH | Refills: 2 | Status: SHIPPED | OUTPATIENT
Start: 2024-02-05

## 2024-02-13 ENCOUNTER — OFFICE VISIT (OUTPATIENT)
Dept: PAIN MEDICINE | Facility: CLINIC | Age: 69
End: 2024-02-13
Payer: MEDICARE

## 2024-02-13 DIAGNOSIS — M54.9 DORSALGIA, UNSPECIFIED: ICD-10-CM

## 2024-02-13 DIAGNOSIS — M53.3 SACROILIAC JOINT PAIN: Primary | ICD-10-CM

## 2024-02-13 DIAGNOSIS — M54.16 LUMBAR RADICULOPATHY, CHRONIC: ICD-10-CM

## 2024-02-13 DIAGNOSIS — M25.551 RIGHT HIP PAIN: ICD-10-CM

## 2024-02-13 PROCEDURE — 99212 OFFICE O/P EST SF 10 MIN: CPT | Mod: HCNC,95,, | Performed by: PHYSICIAN ASSISTANT

## 2024-02-13 PROCEDURE — 4010F ACE/ARB THERAPY RXD/TAKEN: CPT | Mod: HCNC,CPTII,95, | Performed by: PHYSICIAN ASSISTANT

## 2024-02-13 NOTE — PROGRESS NOTES
"Established Patient Chronic Pain Note (Telemedicine Visit)    The patient location is: home  The chief complaint leading to consultation is: i follow up  Visit type: audiovisual    Face to Face time with patient: 10-12 minutes of total time spent on the encounter, which includes face to face time and non-face to face time preparing to see the patient (eg, review of tests), Obtaining and/or reviewing separately obtained history, Documenting clinical information in the electronic or other health record, Independently interpreting results (not separately reported) and communicating results to the patient/family/caregiver, or Care coordination (not separately reported).   Each patient to whom he or she provides medical services by telemedicine is:  (1) informed of the relationship between the physician and patient and the respective role of any other health care provider with respect to management of the patient; and (2) notified that he or she may decline to receive medical services by telemedicine and may withdraw from such care at any time.    Referring Physician: No ref. provider found    PCP: Olga Altman MD      SUBJECTIVE:    Interval History (2/13/2024): Patient  presents today for follow-up visit.  she was initially scheduled for a  Lumbar IL Epidural steroid injection L5/S1 with R paramedian approach on 1/19/24, however patient did not proceed. States she "didn't expect it to work."   Patient reports pain as 4/10 today. Pain worsens throughout the day and evening. She continues home exercises. Localizes majority of pain to R hip and SIJ area.  Pain radiates from R lower back down side and back of leg (buttock--> to knee).   Discontinued Lyrica; states it was not effective. Has been using Voltaren gel and Equate roll on gel.    Interval History (12/28/23):  Gemma Vick presents today for follow-up visit.  she underwent a TF Epidural steroid injection at L4/5 and L5/S1 on the right side on 12/1/23.  The " "patient reports that she feels worse following the procedure.  The patient reports 70% relief for one day only.  Patient reports pain as 8/10 today. " I can feel a vibration in my R heel". This started three days ago.  The patient has pain radiating down RLE ( from buttock down to her knee) and also posterior calf.  Denies pain of LLE. Her leg pain is worse than back pain at this time.  She starts outpatient physical therapy next Thursday.      Interval History (11/10/2023):   Gemma Vick presents today for follow-up visit.  Patient was last seen on 10/20/2023. At that visit, the plan was to complete MRI of lumbar spine. She currently c/o pain in R hip and  Patient reports pain as 8/10 today. She denies significant changes since her last office visit. Does not have pain involving her L side or LLE. Reports pain usually stops below R buttock.    Interval History (10/20/2023): Gemma Vick presents today for follow-up visit.  Patient was last seen on 10/5/2023. At that visit, the plan was to complete MRI of lumbar spine and review treatment options after study. Patient reports pain as 8/10 today.  Patient states she cannot do steroid injections because it significantly elevates her BP and was told by her cardiologist "No steroid injections."   Since her last office visit she was not able to get her MRI completed.   Patient localizes her pain to R lower back, buttocks, hips and radiates down back of her leg. Pain stops on the side of her knee. The more active she is the more pain she experiences. Patient does not have pain with her LLE.   Currently she is in physical therapy ([facility]) and continues efforts to  lose weight in order to help her condition.  Patient also has a history of emphysema. Quit smoking 10-11 years ago and requires oxygen at night.  Patient has tried several pain medication and Gabapentin. She discontinued due to SE.      Initial HPI (10/5/2023):  Gemma Vick is a 68 y.o. female with past " medical history significant for vascular dementia, COPD, abdominal aortic aneurysm, coronary artery disease, hypertension, hyperlipidemia, prediabetes, GERD, nicotine dependence in remission who presents to the clinic for the evaluation of lower back and leg pain.  Patient reports pain has been present in the lower back for several years and in the legs for at least 8 months.  She reports she is an avid equestrian participating in horse riding, the rodeo and numerous sports throughout her life, which may have contributed to her symptoms.  Today she reports pain which is intermittent which is rated a 3/10.  Pain at its worse is an 8/10.  Patient reports pain in a bandlike distribution in the lower back which radiates down the lateral and posterior aspect of the right lower extremity to the knee.  90% of her symptoms are focused in the axial lower back.  Pain is described as aching in nature.  She denies any left-sided radicular symptoms.  Pain is particularly exacerbated with bending over, standing and walking.  Patient reports she is able to ambulate through [place] for 1 hour before requiring rest.  Patient does endorse associated weakness in the right lower extremities associated with her pain.  Pain has been improved with recent weight loss over the last year as well as performing physician directed physical therapy exercises at home for the last 8 weeks with mild improvement in her symptoms.  Patient has trialed gabapentin in the past with significant daytime somnolence and would like to refrain from our pharmacologic management.  Patient has not received prior intervention.    Patient denies night fever/night sweats, urinary incontinence, bowel incontinence, significant weight loss, and loss of sensations.  Patient reports significant motor weakness.      Pain Disability Index Review:         11/10/2023     1:14 PM 10/20/2023    10:50 AM 10/5/2023     1:37 PM   Last 3 PDI Scores   Pain Disability Index (PDI)  56 56 20       Non-Pharmacologic Treatments:  Physical Therapy/Home Exercise: yes  Ice/Heat:yes  TENS: no  Acupuncture: no  Massage: no  Chiropractic: no    Other: no      Pain Medications:  - Adjuvant Medications: Mirtazapine (Remeron)  - Anti-Coagulants: Aspirin and Plavix    Pain Procedures:   - 12/01/2023: R sided TFESI L4/5 and L5/S1  with 70% relief for one day    Past Medical History:   Diagnosis Date    AAA (abdominal aortic aneurysm) 02/13/2014    Abdominal aneurysm     3    Acute coronary syndrome     Arthritis     BPPV (benign paroxysmal positional vertigo)     Carotid artery plaque     Carotid artery stenosis and occlusion 02/13/2014    Chronic back pain     COPD (chronic obstructive pulmonary disease)     Coronary artery disease     Dementia     Emphysema lung     Hyperlipidemia     Hypertension     Myocardial infarction     x3    Neuropathy     Personal history of COVID-19 06/09/2021 11/16/2020 +Covid, recovered at home      Past Surgical History:   Procedure Laterality Date    CARDIAC CATHETERIZATION      CORONARY ANGIOPLASTY      CORONARY STENT PLACEMENT N/A 9/12/2023    Procedure: INSERTION, STENT, CORONARY ARTERY;  Surgeon: Millie Juarez MD;  Location: [facility];  Service: Cardiology;  Laterality: N/A;    HYSTERECTOMY  1988    LEFT HEART CATHETERIZATION Left 9/12/2023    Procedure: Left heart cath;  Surgeon: Millie Juarez MD;  Location: [facility];  Service: Cardiology;  Laterality: Left;    PERCUTANEOUS CORONARY INTERVENTION, ARTERY N/A 9/12/2023    Procedure: Percutaneous coronary intervention;  Surgeon: Millie Juarez MD;  Location: [facility];  Service: Cardiology;  Laterality: N/A;    THROMBECTOMY, CORONARY  9/12/2023    Procedure: Thrombectomy, Coronary;  Surgeon: Millie Juarez MD;  Location: [facility];  Service: Cardiology;;    TONSILLECTOMY      TRANSFORAMINAL EPIDURAL INJECTION OF STEROID Right 12/1/2023    Procedure: Injection,steroid,epidural,transforaminal  approach- right side, L4/5 and L5/S1;  Surgeon: Lydia Roberts MD;  Location: [facility];  Service: Pain Management;  Laterality: Right;     Review of patient's allergies indicates:  No Known Allergies    Current Outpatient Medications   Medication Sig    albuterol (PROVENTIL/VENTOLIN HFA) 90 mcg/actuation inhaler Inhale 2 puffs into the lungs every 4 (four) hours as needed for Wheezing.    albuterol-ipratropium (DUO-NEB) 2.5 mg-0.5 mg/3 mL nebulizer solution Take 3 mLs by nebulization every 6 (six) hours as needed for Wheezing.    amLODIPine (NORVASC) 5 MG tablet Take 1 tablet (5 mg total) by mouth once daily.    aspirin 81 MG Chew Take 81 mg by mouth once daily.    budesonide-glycopyr-formoterol (BREZTRI AEROSPHERE) 160-9-4.8 mcg/actuation HFAA Inhale 2 puffs into the lungs 2 (two) times a day.    carvediloL (COREG) 6.25 MG tablet Take 1 tablet (6.25 mg total) by mouth 2 (two) times daily with meals.    clopidogreL (PLAVIX) 75 mg tablet Take 1 tablet (75 mg total) by mouth once daily.    COMBIVENT RESPIMAT  mcg/actuation inhaler Inhale 1 puff into the lungs every 6 (six) hours as needed for Wheezing.    cyanocobalamin (VITAMIN B-12) 100 MCG tablet Take 100 mcg by mouth once daily.    diclofenac sodium (VOLTAREN) 1 % Gel Apply topically 4 (four) times daily.    dicyclomine (BENTYL) 10 MG capsule Take 1 capsule (10 mg total) by mouth 3 (three) times daily. PRN    ezetimibe-simvastatin 10-40 mg (VYTORIN) 10-40 mg per tablet Take 1 tablet by mouth every evening.    FEROSUL 325 mg (65 mg iron) Tab tablet Take 1 tablet (325 mg total) by mouth 2 (two) times daily.    furosemide (LASIX) 20 MG tablet Take 1 tablet (20 mg total) by mouth once daily. PRN    hydroCHLOROthiazide (MICROZIDE) 12.5 mg capsule Take 1 capsule (12.5 mg total) by mouth once daily. HASN'T STARTED YET    inhalation spacing device (COMPACT SPACE CHAMBER) USE AS DIRECTED    LIDOcaine-prilocaine (EMLA) cream     memantine (NAMENDA) 10 MG Tab  Take 1 tablet (10 mg total) by mouth 2 (two) times daily.    olmesartan (BENICAR) 40 MG tablet Take 1 tablet (40 mg total) by mouth once daily.    OXYGEN-AIR DELIVERY SYSTEMS MISC 3 L by Misc.(Non-Drug; Combo Route) route every evening.    pantoprazole (PROTONIX) 40 MG tablet Take 1 tablet (40 mg total) by mouth once daily.    pregabalin (LYRICA) 75 MG capsule Take 1 capsule (75 mg total) by mouth 2 (two) times daily.    sucralfate (CARAFATE) 1 gram tablet Take 1 tablet (1 g total) by mouth 4 (four) times daily before meals and nightly.    vitamin D (VITAMIN D3) 1000 units Tab Take 1,000 Units by mouth once daily.    zolpidem (AMBIEN) 10 mg Tab Take 1 tablet (10 mg total) by mouth every evening.     No current facility-administered medications for this visit.       Review of Systems     GENERAL:  No weight loss, malaise or fevers.  HEENT:   No recent changes in vision or hearing  NECK:  Negative for lumps, no difficulty with swallowing.  RESPIRATORY:  Negative for cough, wheezing or shortness of breath, patient denies any recent URI.  CARDIOVASCULAR:  Negative for chest pain or palpitations.  GI:  Negative for abdominal discomfort, blood in stools or black stools or change in bowel habits.  MUSCULOSKELETAL:  See HPI.  SKIN:  Negative for lesions, rash, and itching.  PSYCH:  No mood disorder or recent psychosocial stressors.   HEMATOLOGY/LYMPHOLOGY:  Negative for prolonged bleeding, bruising easily or swollen nodes.    NEURO:   No history of syncope, paralysis, seizures or tremors.  All other reviewed and negative other than HPI.    OBJECTIVE:    Telemedicine Exam  There were no vitals filed for this visit.  There is no height or weight on file to calculate BMI.   (reviewed on 2/13/2024)     GENERAL: Well appearing, in no acute distress, alert and oriented x3.  Cooperative.  PSYCH:  Mood and affect appropriate.  SKIN: Skin color & texture with no obvious abnormalities.    HEAD/FACE:  Normocephalic, atraumatic.     PULM:  No difficulty breathing. No nasal flaring. No obvious wheezing.  EXTREMITIES: No obvious deformities. Moving all extremities well, appears to have symmetric strength throughout.  MUSCULOSKELETAL: No obvious atrophy abnormalities are noted.   NEURO: No obvious neurologic deficit.   GAIT: sitting.     Physical Exam: last in clinic visit:    Physical Exam    GENERAL: Well appearing, in no acute distress, alert and oriented x3.  PSYCH:  Mood and affect appropriate.  SKIN: Skin color, texture, turgor normal, no rashes or lesions.  HEAD/FACE:  Normocephalic, atraumatic. Cranial nerves grossly intact.    PULM: No evidence of respiratory difficulty, symmetric chest rise.  GI:  Soft and non-tender.    BACK: Straight leg raising in the sitting and supine positions is positive to radicular pain on the Right side. There is mild pain to palpation over the facet joints of the lumbar spine or spinous processes. Patient has pain with Extension and lateral rotation. TTP over R lumbar paraspinal jaun L4-S1.  EXTREMITIES: Peripheral joint ROM is full and pain free without obvious instability or laxity in all four extremities. No deformities, edema, or skin discoloration. Good capillary refill.  MUSCULOSKELETAL: Able to stand on heels & toes.   Shoulder, hip, and knee provocative maneuvers are negative.  There is no pain with palpation over the sacroiliac joints bilaterally.  FABERs test is positive on the right.  Facet loading test is positive bilaterally.   Bilateral upper and lower extremity strength is normal and symmetric.  No atrophy or tone abnormalities are noted.    RIGHT Lower extremity: Hip flexion 5/5, Hip Abduction 5/5, Hip Adduction 5/5, Knee extension 5/5, Knee flexion 5/5, Ankle dorsiflexion5/5, Extensor hallucis longus 5/5, Ankle plantarflexion 5/5  LEFT Lower extremity:  Hip flexion 5/5, Hip Abduction 5/5,Hip Adduction 5/5, Knee extension 5/5, Knee flexion 5/5, Ankle dorsiflexion 5/5, Extensor hallucis longus  5/5, Ankle plantarflexion 5/5  -Normal testing knee (patellar) jerk and ankle (achilles) jerk    NEURO: Bilateral upper and lower extremity coordination and muscle stretch reflexes are physiologic and symmetric. No loss of sensation is noted.  GAIT: normal.    Imaging (Reviewed on 2/13/2024):    MRI  Lumbar Spine 10/31/23-             08/02/23    X-Ray Lumbar Complete Including Flex And Ext    Narrative  EXAM: XR LUMBAR SPINE 5 VIEW WITH FLEX AND EXT  CLINICAL HISTORY: Pain in unspecified hip  TECHNIQUE: 7 views of the lumbar spine including flexion and extension views.    FINDINGS: There appear to be 6, nonrib-bearing lumbar-type vertebral bodies.  Grade 1 L5-L6 anterolisthesis.  No significant change in the degree of listhesis on flexion and extension views.  Lumbar vertebral body heights appear maintained.  Visualized pars interarticularis appear intact.  L5-L6 and L6-S1 prominent facet arthropathy.  L5-L6 and L6 S1 disc is remaining intervertebral disc spaces appear largely maintained.  No evidence of an acute fracture.  There is a distal abdominal aortic aneurysm with atherosclerotic calcification.    ASSESSMENT: 68 y.o. year old female with lower back and right leg pain, consistent with     1. Sacroiliac joint pain        2. Lumbar radiculopathy, chronic        3. Dorsalgia, unspecified        4. Right hip pain            PLAN:   - Interventions:  None at this time. Patient advised to consider Right sided GT bursa and SI joint injections.   ---States she will have to think about it and discuss with other providers (PCP, Dr. Juarez).   - Anticoagulation use: Yes  anticoagulation, Plavix.      report:  Reviewed and consistent with medication use as prescribed.      - Medications:  - - No longer taking Lyrica 75mg BID.  Patient states it was not effective.    - - Can take Tylenol (acetaminophen) 1000mg (two tablets of 500mg) 3x per day as needed for pain (to take up to max dose of 3000mg per day).  -   Continue  Voltaren gel and Equate roll-on gel as needed.    - Therapy: Continue home exercises as tolerated.    - Imaging: Reviewed available imaging with patient and answered any questions they had regarding study.      - Consults/Referrals: Consider referral to neurosurgery. Patient wants to hold off at this time; she does not want to consider surgical intervention.    - Follow up visit: return to clinic as needed.    The above plan and management options were discussed at length with patient. Patient is in agreement with the above and verbalized understanding.    - I discussed the goals of interventional chronic pain management with the patient on today's visit. We discussed a multimodal and systematic approach to pain.  This includes diagnostic and therapeutic injections, adjuvant pharmacologic treatment, physical therapy, and at times psychiatry.  I emphasized the importance of regular exercise, core strengthening and stretching, diet and weight loss as a cornerstone of long-term pain management.    - This condition does not require this patient to take time off of work, and the primary goal of our Pain Management services is to improve the patient's functional capacity.  - Patient Questions: Answered all of the patient's questions regarding diagnoses, therapy, treatment and next steps        Anup Kamara PA-C  Interventional Pain Management  [facility]

## 2024-02-29 DIAGNOSIS — M48.061 SPINAL STENOSIS OF LUMBAR REGION, UNSPECIFIED WHETHER NEUROGENIC CLAUDICATION PRESENT: ICD-10-CM

## 2024-02-29 DIAGNOSIS — M54.16 LUMBAR RADICULOPATHY: ICD-10-CM

## 2024-02-29 DIAGNOSIS — M47.816 LUMBAR SPONDYLOSIS: ICD-10-CM

## 2024-02-29 RX ORDER — PREGABALIN 75 MG/1
75 CAPSULE ORAL 2 TIMES DAILY
Qty: 60 CAPSULE | Refills: 1 | OUTPATIENT
Start: 2024-02-29

## 2024-02-29 NOTE — TELEPHONE ENCOUNTER
Called pt to see if she needed to get a refill on her pregablin but pt informed me that it wasn't working for her. I informed her that I would refused the med refill.    Albert CARLIN

## 2024-03-04 ENCOUNTER — TELEPHONE (OUTPATIENT)
Dept: CARDIOLOGY | Facility: CLINIC | Age: 69
End: 2024-03-04
Payer: MEDICARE

## 2024-03-04 ENCOUNTER — OFFICE VISIT (OUTPATIENT)
Dept: GASTROENTEROLOGY | Facility: CLINIC | Age: 69
End: 2024-03-04
Payer: MEDICARE

## 2024-03-04 VITALS
DIASTOLIC BLOOD PRESSURE: 74 MMHG | SYSTOLIC BLOOD PRESSURE: 123 MMHG | HEIGHT: 62 IN | BODY MASS INDEX: 24.06 KG/M2 | HEART RATE: 63 BPM | WEIGHT: 130.75 LBS

## 2024-03-04 DIAGNOSIS — K59.04 CHRONIC IDIOPATHIC CONSTIPATION: ICD-10-CM

## 2024-03-04 DIAGNOSIS — Z12.11 COLON CANCER SCREENING: ICD-10-CM

## 2024-03-04 DIAGNOSIS — K21.9 GASTROESOPHAGEAL REFLUX DISEASE, UNSPECIFIED WHETHER ESOPHAGITIS PRESENT: Primary | ICD-10-CM

## 2024-03-04 PROCEDURE — 3074F SYST BP LT 130 MM HG: CPT | Mod: HCNC,CPTII,S$GLB, | Performed by: NURSE PRACTITIONER

## 2024-03-04 PROCEDURE — 99214 OFFICE O/P EST MOD 30 MIN: CPT | Mod: HCNC,S$GLB,, | Performed by: NURSE PRACTITIONER

## 2024-03-04 PROCEDURE — 3008F BODY MASS INDEX DOCD: CPT | Mod: HCNC,CPTII,S$GLB, | Performed by: NURSE PRACTITIONER

## 2024-03-04 PROCEDURE — 99999 PR PBB SHADOW E&M-EST. PATIENT-LVL V: CPT | Mod: PBBFAC,HCNC,, | Performed by: NURSE PRACTITIONER

## 2024-03-04 PROCEDURE — 1101F PT FALLS ASSESS-DOCD LE1/YR: CPT | Mod: HCNC,CPTII,S$GLB, | Performed by: NURSE PRACTITIONER

## 2024-03-04 PROCEDURE — 1125F AMNT PAIN NOTED PAIN PRSNT: CPT | Mod: HCNC,CPTII,S$GLB, | Performed by: NURSE PRACTITIONER

## 2024-03-04 PROCEDURE — 3078F DIAST BP <80 MM HG: CPT | Mod: HCNC,CPTII,S$GLB, | Performed by: NURSE PRACTITIONER

## 2024-03-04 PROCEDURE — 4010F ACE/ARB THERAPY RXD/TAKEN: CPT | Mod: HCNC,CPTII,S$GLB, | Performed by: NURSE PRACTITIONER

## 2024-03-04 PROCEDURE — 1159F MED LIST DOCD IN RCRD: CPT | Mod: HCNC,CPTII,S$GLB, | Performed by: NURSE PRACTITIONER

## 2024-03-04 PROCEDURE — 1160F RVW MEDS BY RX/DR IN RCRD: CPT | Mod: HCNC,CPTII,S$GLB, | Performed by: NURSE PRACTITIONER

## 2024-03-04 PROCEDURE — 3288F FALL RISK ASSESSMENT DOCD: CPT | Mod: HCNC,CPTII,S$GLB, | Performed by: NURSE PRACTITIONER

## 2024-03-04 RX ORDER — FAMOTIDINE 20 MG/1
20 TABLET, FILM COATED ORAL 2 TIMES DAILY PRN
Qty: 60 TABLET | Refills: 11 | Status: SHIPPED | OUTPATIENT
Start: 2024-03-04 | End: 2025-03-04

## 2024-03-04 NOTE — TELEPHONE ENCOUNTER
Patient would like to raise the Amlodipine medication due to her dementia. The patient would like advice on the heart medications. She stated she does not have high blood pressure, but thinks the medication would help with blood flow of her brain.

## 2024-03-04 NOTE — PROGRESS NOTES
Clinic Consult:  [facility] Consultation Note    Reason for Consult:  The primary encounter diagnosis was Gastroesophageal reflux disease, unspecified whether esophagitis present. Diagnoses of Colon cancer screening and Chronic idiopathic constipation were also pertinent to this visit.    PCP: Olga Altman   [id number] [id number] / [facility] [id number]    HPI:  This is a 69 y.o. female here for evaluation.   Previously saw BRADLY Goodman.   Positive cologuard. Does have significant cardiac and pulmonary risks so never completed EGD and colonoscopy.   She does have GERD. She is on pantoprazole daily. Occasional breakthrough symptoms. Will take OTC antacid with resolution.   Has mild constipation that is controlled on daily Senokot.     Review of Systems   Constitutional:  Negative for fever and weight loss.   HENT:  Negative for sore throat.    Respiratory:  Positive for shortness of breath. Negative for cough and wheezing.    Cardiovascular:  Negative for chest pain and palpitations.   Gastrointestinal:  Positive for abdominal pain, constipation and heartburn. Negative for blood in stool, diarrhea, melena, nausea and vomiting.   Genitourinary:  Negative for dysuria and frequency.   Skin:  Negative for itching and rash.       Medical History:  has a past medical history of AAA (abdominal aortic aneurysm) (02/13/2014), Abdominal aneurysm, Acute coronary syndrome, Arthritis, BPPV (benign paroxysmal positional vertigo), Carotid artery plaque, Carotid artery stenosis and occlusion (02/13/2014), Chronic back pain, COPD (chronic obstructive pulmonary disease), Coronary artery disease, Dementia, Emphysema lung, Hyperlipidemia, Hypertension, Myocardial infarction, Neuropathy, and Personal history of COVID-19 (06/09/2021).    Surgical History:  has a past surgical history that includes Cardiac catheterization; Coronary angioplasty; Hysterectomy (1988); Tonsillectomy; Left heart catheterization (Left,  9/12/2023); percutaneous coronary intervention, artery (N/A, 9/12/2023); thrombectomy, coronary (9/12/2023); Coronary stent placement (N/A, 9/12/2023); and Transforaminal epidural injection of steroid (Right, 12/1/2023).    Family History: family history includes Breast cancer in her paternal aunt; Heart attacks under age 50 in her brother and father; Heart disease in her mother..     Social History:  reports that she quit smoking about 6 years ago. Her smoking use included cigarettes and vaping w/o nicotine. She started smoking about 53 years ago. She has a 23.4 pack-year smoking history. She has never used smokeless tobacco. She reports that she does not drink alcohol and does not use drugs.    Allergies: Reviewed    Home Medications:   Current Outpatient Medications on File Prior to Visit   Medication Sig Dispense Refill    albuterol (PROVENTIL/VENTOLIN HFA) 90 mcg/actuation inhaler Inhale 2 puffs into the lungs every 4 (four) hours as needed for Wheezing. 12 g 12    albuterol-ipratropium (DUO-NEB) 2.5 mg-0.5 mg/3 mL nebulizer solution Take 3 mLs by nebulization every 6 (six) hours as needed for Wheezing. 75 mL 12    amLODIPine (NORVASC) 5 MG tablet Take 1 tablet (5 mg total) by mouth once daily. 30 tablet 11    aspirin 81 MG Chew Take 81 mg by mouth once daily.      budesonide-glycopyr-formoterol (BREZTRI AEROSPHERE) 160-9-4.8 mcg/actuation HFAA Inhale 2 puffs into the lungs 2 (two) times a day. 32.1 g 3    carvediloL (COREG) 6.25 MG tablet Take 1 tablet (6.25 mg total) by mouth 2 (two) times daily with meals. 60 tablet 11    clopidogreL (PLAVIX) 75 mg tablet Take 1 tablet (75 mg total) by mouth once daily. 90 tablet 3    COMBIVENT RESPIMAT  mcg/actuation inhaler Inhale 1 puff into the lungs every 6 (six) hours as needed for Wheezing. 12 g 11    cyanocobalamin (VITAMIN B-12) 100 MCG tablet Take 100 mcg by mouth once daily.      diclofenac sodium (VOLTAREN) 1 % Gel Apply topically 4 (four) times daily. 150  "g 1    dicyclomine (BENTYL) 10 MG capsule Take 1 capsule (10 mg total) by mouth 3 (three) times daily. PRN 90 capsule 2    ezetimibe-simvastatin 10-40 mg (VYTORIN) 10-40 mg per tablet Take 1 tablet by mouth every evening. 90 tablet 2    FEROSUL 325 mg (65 mg iron) Tab tablet Take 1 tablet (325 mg total) by mouth 2 (two) times daily. 60 tablet 12    furosemide (LASIX) 20 MG tablet Take 1 tablet (20 mg total) by mouth once daily. PRN 90 tablet 2    hydroCHLOROthiazide (MICROZIDE) 12.5 mg capsule Take 1 capsule (12.5 mg total) by mouth once daily. HASN'T STARTED YET 90 capsule 3    inhalation spacing device (COMPACT SPACE CHAMBER) USE AS DIRECTED 1 each 2    LIDOcaine-prilocaine (EMLA) cream       memantine (NAMENDA) 10 MG Tab Take 1 tablet (10 mg total) by mouth 2 (two) times daily. 180 tablet 12    olmesartan (BENICAR) 40 MG tablet Take 1 tablet (40 mg total) by mouth once daily. 90 tablet 3    OXYGEN-AIR DELIVERY SYSTEMS MISC 3 L by Misc.(Non-Drug; Combo Route) route every evening.      pantoprazole (PROTONIX) 40 MG tablet Take 1 tablet (40 mg total) by mouth once daily. 90 tablet 3    sucralfate (CARAFATE) 1 gram tablet Take 1 tablet (1 g total) by mouth 4 (four) times daily before meals and nightly. 120 tablet 12    vitamin D (VITAMIN D3) 1000 units Tab Take 1,000 Units by mouth once daily.      zolpidem (AMBIEN) 10 mg Tab Take 1 tablet (10 mg total) by mouth every evening. 30 tablet 5     No current facility-administered medications on file prior to visit.       Physical Exam:  /74 (BP Location: Left arm, Patient Position: Sitting, BP Method: Medium (Automatic))   Pulse 63   Ht 5' 2" (1.575 m)   Wt 59.3 kg (130 lb 11.7 oz)   BMI 23.91 kg/m²   Body mass index is 23.91 kg/m².  Physical Exam  Constitutional:       General: She is not in acute distress.  HENT:      Head: Normocephalic.   Neurological:      General: No focal deficit present.      Mental Status: She is alert.   Psychiatric:         Mood and " Affect: Mood normal.         Judgment: Judgment normal.         Labs: Pertinent labs reviewed.    Assessment:  1. Gastroesophageal reflux disease, unspecified whether esophagitis present    2. Colon cancer screening    3. Chronic idiopathic constipation    Declines colonoscopy and EGD. Understand risks and benefits of colonoscopy when someone has positive cologuard.     Recommendations:   - continue pantoprazole daily  - can use Pepcid PRN for heartburn.   - continue Senakot     Gastroesophageal reflux disease, unspecified whether esophagitis present  -     famotidine (PEPCID) 20 MG tablet; Take 1 tablet (20 mg total) by mouth 2 (two) times daily as needed for Heartburn.  Dispense: 60 tablet; Refill: 11    Colon cancer screening  -     Cologuard Screening (Multitarget Stool DNA); Future; Expected date: 03/04/2024    Chronic idiopathic constipation    Follow up to be determined after results/ procedure(s).    Thank you so much for allowing me to participate in the care of ENOC Molina

## 2024-03-04 NOTE — PATIENT INSTRUCTIONS
- continue pantoprazole every day.   - discontinue sucralfate   - if needing something for heartburn in addition to pantoprazole, Pepcid 20 mg as needed.

## 2024-03-05 NOTE — TELEPHONE ENCOUNTER
Patient contacted and was advised Dr. Juarez   do not think it needs to be increased unless her bp is uncontrolled   Previous  message:     Patient would like to raise the Amlodipine medication due to her dementia. The patient would like advice on the heart medications. She stated she does not have high blood pressure, but thinks the medication would help with blood flow of her brain.        The patient was asked to do a blood pressure log until her next appointment on 04/04/2024. The patient stated understanding with no questions

## 2024-03-08 ENCOUNTER — TELEPHONE (OUTPATIENT)
Dept: NEUROLOGY | Facility: CLINIC | Age: 69
End: 2024-03-08
Payer: MEDICARE

## 2024-03-08 NOTE — TELEPHONE ENCOUNTER
----- Message from Khushi Dominique sent at 3/8/2024  9:36 AM CST -----  Contact: Gemma Vick  Patient is trying to schedule appointment due to Memory loss. There wasn't anything that I could schedule. I did inform the patient that it could be a couple of months. Please callback [phone]

## 2024-03-08 NOTE — TELEPHONE ENCOUNTER
I spoke with patient in regards to scheduling an appointment. Patient has been scheduled with DRAKE Alonso and added to the waiting list.

## 2024-03-12 ENCOUNTER — PATIENT MESSAGE (OUTPATIENT)
Dept: FAMILY MEDICINE | Facility: CLINIC | Age: 69
End: 2024-03-12
Payer: MEDICARE

## 2024-03-14 ENCOUNTER — PATIENT MESSAGE (OUTPATIENT)
Dept: PULMONOLOGY | Facility: CLINIC | Age: 69
End: 2024-03-14
Payer: MEDICARE

## 2024-03-14 ENCOUNTER — OFFICE VISIT (OUTPATIENT)
Dept: FAMILY MEDICINE | Facility: CLINIC | Age: 69
End: 2024-03-14
Payer: MEDICARE

## 2024-03-14 VITALS
HEART RATE: 68 BPM | OXYGEN SATURATION: 99 % | SYSTOLIC BLOOD PRESSURE: 138 MMHG | BODY MASS INDEX: 23.9 KG/M2 | RESPIRATION RATE: 18 BRPM | WEIGHT: 129.88 LBS | HEIGHT: 62 IN | DIASTOLIC BLOOD PRESSURE: 80 MMHG

## 2024-03-14 DIAGNOSIS — I71.43 INFRARENAL ABDOMINAL AORTIC ANEURYSM (AAA) WITHOUT RUPTURE: ICD-10-CM

## 2024-03-14 DIAGNOSIS — Z00.00 ENCOUNTER FOR PREVENTIVE HEALTH EXAMINATION: Primary | ICD-10-CM

## 2024-03-14 DIAGNOSIS — D69.2 OTHER NONTHROMBOCYTOPENIC PURPURA: ICD-10-CM

## 2024-03-14 DIAGNOSIS — F01.B0 MODERATE VASCULAR DEMENTIA WITHOUT BEHAVIORAL DISTURBANCE, PSYCHOTIC DISTURBANCE, MOOD DISTURBANCE, OR ANXIETY: ICD-10-CM

## 2024-03-14 DIAGNOSIS — I70.0 AORTIC ATHEROSCLEROSIS: ICD-10-CM

## 2024-03-14 DIAGNOSIS — J44.9 COPD, MODERATE: ICD-10-CM

## 2024-03-14 DIAGNOSIS — I10 ESSENTIAL HYPERTENSION: ICD-10-CM

## 2024-03-14 DIAGNOSIS — Z78.0 ENCOUNTER FOR OSTEOPOROSIS SCREENING IN ASYMPTOMATIC POSTMENOPAUSAL PATIENT: ICD-10-CM

## 2024-03-14 DIAGNOSIS — E78.2 MIXED HYPERLIPIDEMIA: ICD-10-CM

## 2024-03-14 DIAGNOSIS — K21.9 GASTROESOPHAGEAL REFLUX DISEASE, UNSPECIFIED WHETHER ESOPHAGITIS PRESENT: ICD-10-CM

## 2024-03-14 DIAGNOSIS — I70.223 ATHEROSCLEROSIS OF NATIVE ARTERIES OF EXTREMITIES WITH REST PAIN, BILATERAL LEGS: ICD-10-CM

## 2024-03-14 DIAGNOSIS — M85.80 OSTEOPENIA, UNSPECIFIED LOCATION: ICD-10-CM

## 2024-03-14 DIAGNOSIS — F34.89 OTHER SPECIFIED PERSISTENT MOOD DISORDERS: ICD-10-CM

## 2024-03-14 DIAGNOSIS — Z13.820 ENCOUNTER FOR OSTEOPOROSIS SCREENING IN ASYMPTOMATIC POSTMENOPAUSAL PATIENT: ICD-10-CM

## 2024-03-14 PROBLEM — F01.A0 MILD VASCULAR DEMENTIA WITHOUT BEHAVIORAL DISTURBANCE, PSYCHOTIC DISTURBANCE, MOOD DISTURBANCE, OR ANXIETY: Status: RESOLVED | Noted: 2023-03-02 | Resolved: 2024-03-14

## 2024-03-14 PROBLEM — Z01.810 PREOP CARDIOVASCULAR EXAM: Status: RESOLVED | Noted: 2023-02-13 | Resolved: 2024-03-14

## 2024-03-14 PROCEDURE — 1101F PT FALLS ASSESS-DOCD LE1/YR: CPT | Mod: HCNC,CPTII,S$GLB, | Performed by: NURSE PRACTITIONER

## 2024-03-14 PROCEDURE — 3288F FALL RISK ASSESSMENT DOCD: CPT | Mod: HCNC,CPTII,S$GLB, | Performed by: NURSE PRACTITIONER

## 2024-03-14 PROCEDURE — 1125F AMNT PAIN NOTED PAIN PRSNT: CPT | Mod: HCNC,CPTII,S$GLB, | Performed by: NURSE PRACTITIONER

## 2024-03-14 PROCEDURE — 1170F FXNL STATUS ASSESSED: CPT | Mod: HCNC,CPTII,S$GLB, | Performed by: NURSE PRACTITIONER

## 2024-03-14 PROCEDURE — 1159F MED LIST DOCD IN RCRD: CPT | Mod: HCNC,CPTII,S$GLB, | Performed by: NURSE PRACTITIONER

## 2024-03-14 PROCEDURE — 1160F RVW MEDS BY RX/DR IN RCRD: CPT | Mod: HCNC,CPTII,S$GLB, | Performed by: NURSE PRACTITIONER

## 2024-03-14 PROCEDURE — 3075F SYST BP GE 130 - 139MM HG: CPT | Mod: HCNC,CPTII,S$GLB, | Performed by: NURSE PRACTITIONER

## 2024-03-14 PROCEDURE — G0439 PPPS, SUBSEQ VISIT: HCPCS | Mod: HCNC,S$GLB,, | Performed by: NURSE PRACTITIONER

## 2024-03-14 PROCEDURE — 99999 PR PBB SHADOW E&M-EST. PATIENT-LVL V: CPT | Mod: PBBFAC,HCNC,, | Performed by: NURSE PRACTITIONER

## 2024-03-14 PROCEDURE — 4010F ACE/ARB THERAPY RXD/TAKEN: CPT | Mod: HCNC,CPTII,S$GLB, | Performed by: NURSE PRACTITIONER

## 2024-03-14 PROCEDURE — G9919 SCRN ND POS ND PROV OF REC: HCPCS | Mod: HCNC,CPTII,S$GLB, | Performed by: NURSE PRACTITIONER

## 2024-03-14 PROCEDURE — 3079F DIAST BP 80-89 MM HG: CPT | Mod: HCNC,CPTII,S$GLB, | Performed by: NURSE PRACTITIONER

## 2024-03-14 NOTE — PROGRESS NOTES
"  Gemma Vick presented for a  Medicare AWV and comprehensive Health Risk Assessment today. The following components were reviewed and updated:    Medical history  Family History  Social history  Allergies and Current Medications  Health Risk Assessment  Health Maintenance  Care Team     Patient screened moderate and/or high risk for one or more social determinants of health (SDOH). Patient connected to community resources through the ED Navigator.      ** See Completed Assessments for Annual Wellness Visit within the encounter summary.**         The following assessments were completed:  Living Situation  CAGE  Depression Screening  Timed Get Up and Go  Whisper Test  Cognitive Function Screening    Nutrition Screening  ADL Screening  PAQ Screening        Opioid documentation:      Patient does not have a current opioid prescription.        Vitals:    03/14/24 1057   BP: 138/80   Pulse: 68   Resp: 18   SpO2: 99%   Weight: 58.9 kg (129 lb 13.6 oz)   Height: 5' 2" (1.575 m)     Body mass index is 23.75 kg/m².  Physical Exam  Constitutional:       General: She is not in acute distress.     Appearance: Normal appearance. She is well-developed. She is not ill-appearing, toxic-appearing or diaphoretic.   HENT:      Head: Normocephalic and atraumatic.      Right Ear: External ear normal.      Left Ear: External ear normal.      Nose: Nose normal.      Mouth/Throat:      Mouth: Mucous membranes are moist.   Eyes:      General: No scleral icterus.        Right eye: No discharge.         Left eye: No discharge.      Conjunctiva/sclera: Conjunctivae normal.   Neck:      Thyroid: No thyromegaly.      Vascular: No carotid bruit.      Trachea: No tracheal deviation.   Cardiovascular:      Rate and Rhythm: Normal rate and regular rhythm.      Pulses: Normal pulses.      Heart sounds: Normal heart sounds. No murmur heard.     No friction rub. No gallop.   Pulmonary:      Effort: Pulmonary effort is normal. No respiratory " distress.      Breath sounds: Normal breath sounds. No stridor. No wheezing, rhonchi or rales.   Chest:      Chest wall: No tenderness.   Abdominal:      General: Bowel sounds are normal. There is no distension.      Palpations: Abdomen is soft. There is no mass.      Tenderness: There is no abdominal tenderness. There is no guarding or rebound.      Hernia: No hernia is present.   Musculoskeletal:         General: No tenderness. Normal range of motion.      Cervical back: Normal range of motion and neck supple. No edema or tenderness. Normal range of motion.   Lymphadenopathy:      Head:      Right side of head: No tonsillar adenopathy.      Left side of head: No tonsillar adenopathy.      Cervical: No cervical adenopathy.      Upper Body:      Right upper body: No supraclavicular adenopathy.      Left upper body: No supraclavicular adenopathy.   Skin:     General: Skin is warm and dry.      Findings: No lesion or rash.      Comments: Resolving bruises to extremities   Neurological:      Mental Status: She is alert and oriented to person, place, and time.      Deep Tendon Reflexes: Reflexes are normal and symmetric.   Psychiatric:         Mood and Affect: Mood normal.         Behavior: Behavior normal.               Diagnoses and health risks identified today and associated recommendations/orders:    1. Encounter for preventive health examination  Screenings performed, as noted above.  Personal preventative testing needs reviewed.      2. Osteopenia, unspecified location  Monitored, stable, will have DEXA soon, will schedule    3. Encounter for osteoporosis screening in asymptomatic postmenopausal patient  Monitored, stable, will have DEXA soon, will schedule  - DXA Bone Density Axial Skeleton 1 or more sites; Future    4. Aortic atherosclerosis  Monitored, stable, follow up with pcp    5. Atherosclerosis of native arteries of extremities with rest pain, bilateral legs  Monitored, stable, on Vytorin, cont tx    6.  Infrarenal abdominal aortic aneurysm (AAA) without rupture  Monitored by vascular q 6 months, follow up as directed    7. COPD, moderate  Treated with inhalers, stable, cont tx    8. Gastroesophageal reflux disease, unspecified whether esophagitis present  Treated with PPI, stable, cont tx    9. Other nonthrombocytopenic purpura  Monitored, stable, wear long sleeves    10. Other specified persistent mood disorders  Monitored, stable, denies depression follow up with pcp    11. Moderate vascular dementia without behavioral disturbance, psychotic disturbance, mood disturbance, or anxiety  Treated with Namenda, stable, cont tx    12. Essential hypertension  Treated with Norvasc, Olmartan, stable, cont tx    13. Mixed hyperlipidemia  Treated with Vytorin, stable, cont tx    States she will call to schedule her mammogram and dexa scan, sees pulmonary for the LDCT      Provided Gemma with a 5-10 year written screening schedule and personal prevention plan. Recommendations were developed using the USPSTF age appropriate recommendations. Education, counseling, and referrals were provided as needed. After Visit Summary printed and given to patient which includes a list of additional screenings\tests needed.    No follow-ups on file.    Jim Thakur, DRAKE  I offered to discuss advanced care planning, including how to pick a person who would make decisions for you if you were unable to make them for yourself, called a health care power of , and what kind of decisions you might make such as use of life sustaining treatments such as ventilators and tube feeding when faced with a life limiting illness recorded on a living will that they will need to know. (How you want to be cared for as you near the end of your natural life)     X Patient is interested in learning more about how to make advanced directives.  I provided them paperwork and offered to discuss this with them.

## 2024-03-14 NOTE — PATIENT INSTRUCTIONS
Counseling and Referral of Other Preventative  (Italic type indicates deductible and co-insurance are waived)    Patient Name: Gemma Vick  Today's Date: 3/14/2024    Health Maintenance       Date Due Completion Date    COVID-19 Vaccine (1) Never done ---    Shingles Vaccine (1 of 2) Never done ---    Pneumococcal Vaccines (Age 65+) (2 of 2 - PCV) 09/27/2013 9/27/2012    RSV Vaccine (Age 60+ and Pregnant patients) (1 - 1-dose 60+ series) Never done ---    Colorectal Cancer Screening 05/17/2022 5/16/2022    Mammogram 06/09/2022 6/9/2021    LDCT Lung Screen 11/08/2023 11/8/2022    Influenza Vaccine (1) 06/30/2024 (Originally 9/1/2023) 10/14/2015    Lipid Panel 04/18/2024 4/18/2023    Override on 12/31/2013: Done    DEXA Scan 10/20/2024 10/20/2021    Hemoglobin A1c (Prediabetes) 12/14/2024 12/14/2023    High Dose Statin 03/04/2025 3/4/2024    TETANUS VACCINE 11/15/2026 11/15/2016        Orders Placed This Encounter   Procedures    DXA Bone Density Axial Skeleton 1 or more sites     The following information is provided to all patients.  This information is to help you find resources for any of the problems found today that may be affecting your health:                  Living healthy guide: www.[facility].[place].gov      Understanding Diabetes: www.diabetes.org      Eating healthy: www.cdc.gov/healthyweight      CDC home safety checklist: www.cdc.gov/steadi/patient.html      Agency on Aging: www.goea.[place].[facility]      Alcoholics anonymous (AA): www.aa.org      Physical Activity: www.cristy.nih.gov/gw0qqhl      Tobacco use: www.quitwithusla.org

## 2024-03-20 ENCOUNTER — NURSE TRIAGE (OUTPATIENT)
Dept: ADMINISTRATIVE | Facility: CLINIC | Age: 69
End: 2024-03-20
Payer: MEDICARE

## 2024-03-20 NOTE — TELEPHONE ENCOUNTER
[place]    PCP:  Dr. Olga Altman    C/O T: 96.3 Ax, mid abdominal pain rated 2-3/10, and she reports staying cold.  Denies signs of infection, cough, runny nose, N/V/D, blood in stool/urine, and constipation.  She is taking B-12 and iron.  H/O Diverticulitis and GERD.  Per protocol, care advised is see in office today.  NT unable to schedule with PCP/Dept.  Pt VU and refused care advised.  Care advice reinforced but she continues refuse care advice.  Message routed high priority to PCP.  Advised to call for worsening/questions/concerns.  VU.    Reason for Disposition   Age > 60 years    Additional Information   Negative: Passed out (i.e., fainted, collapsed and was not responding)   Negative: Shock suspected (e.g., cold/pale/clammy skin, too weak to stand, low BP, rapid pulse)   Negative: Sounds like a life-threatening emergency to the triager   Negative: SEVERE abdominal pain (e.g., excruciating)   Negative: Vomiting red blood or black (coffee ground) material   Negative: Blood in bowel movements  (Exception: Blood on surface of BM with constipation.)   Negative: Black or tarry bowel movements  (Exception: Chronic-unchanged black-grey BMs AND is taking iron pills or Pepto-Bismol.)   Negative: MILD TO MODERATE constant pain lasting > 2 hours   Negative: Vomiting bile (green color)   Negative: Patient sounds very sick or weak to the triager   Negative: Vomiting and abdomen looks much more swollen than usual   Negative: White of the eyes have turned yellow (i.e., jaundice)   Negative: Blood in urine (red, pink, or tea-colored)   Negative: Fever > 103 F (39.4 C)   Negative: Fever > 101 F (38.3 C) and over 60 years of age   Negative: Fever > 100.0 F (37.8 C) and has diabetes mellitus or a weak immune system (e.g., HIV positive, cancer chemotherapy, organ transplant, splenectomy, chronic steroids)   Negative: Fever > 100.0 F (37.8 C) and bedridden (e.g., CVA, chronic illness, recovering from surgery)   Negative: Pregnant or  could be pregnant (i.e., missed last menstrual period)   Negative: MODERATE pain (e.g., interferes with normal activities that comes and goes (cramps) lasts > 24 hours  (Exception: Pain with Vomiting or Diarrhea - see that Protocol.)   Negative: Unusual vaginal discharge    Protocols used: Abdominal Pain - Female-A-OH

## 2024-03-25 ENCOUNTER — HOSPITAL ENCOUNTER (INPATIENT)
Facility: HOSPITAL | Age: 69
LOS: 3 days | Discharge: HOME HEALTH | DRG: 872 | End: 2024-03-28
Attending: EMERGENCY MEDICINE | Admitting: INTERNAL MEDICINE
Payer: MEDICARE

## 2024-03-25 DIAGNOSIS — R65.20 SEPSIS WITH ACUTE ORGAN DYSFUNCTION WITHOUT SEPTIC SHOCK, DUE TO UNSPECIFIED ORGANISM, UNSPECIFIED ORGAN DYSFUNCTION TYPE: Primary | ICD-10-CM

## 2024-03-25 DIAGNOSIS — T68.XXXA HYPOTHERMIA, INITIAL ENCOUNTER: ICD-10-CM

## 2024-03-25 DIAGNOSIS — E87.1 HYPONATREMIA: ICD-10-CM

## 2024-03-25 DIAGNOSIS — R53.1 WEAKNESS: ICD-10-CM

## 2024-03-25 DIAGNOSIS — I10 ESSENTIAL HYPERTENSION: ICD-10-CM

## 2024-03-25 DIAGNOSIS — E78.2 MIXED HYPERLIPIDEMIA: ICD-10-CM

## 2024-03-25 DIAGNOSIS — I25.118 CORONARY ARTERY DISEASE OF NATIVE ARTERY OF NATIVE HEART WITH STABLE ANGINA PECTORIS: ICD-10-CM

## 2024-03-25 DIAGNOSIS — K55.1 MESENTERIC ISCHEMIA, CHRONIC: ICD-10-CM

## 2024-03-25 DIAGNOSIS — A41.9 SEPSIS WITH ACUTE ORGAN DYSFUNCTION WITHOUT SEPTIC SHOCK, DUE TO UNSPECIFIED ORGANISM, UNSPECIFIED ORGAN DYSFUNCTION TYPE: Primary | ICD-10-CM

## 2024-03-25 DIAGNOSIS — K52.9 COLITIS: ICD-10-CM

## 2024-03-25 DIAGNOSIS — R73.9 HYPERGLYCEMIA: ICD-10-CM

## 2024-03-25 PROBLEM — J44.9 COPD WITHOUT EXACERBATION: Status: ACTIVE | Noted: 2024-03-25

## 2024-03-25 LAB
ALBUMIN SERPL BCP-MCNC: 3.8 G/DL (ref 3.5–5.2)
ALP SERPL-CCNC: 104 U/L (ref 55–135)
ALT SERPL W/O P-5'-P-CCNC: 17 U/L (ref 10–44)
ANION GAP SERPL CALC-SCNC: 11 MMOL/L (ref 8–16)
AST SERPL-CCNC: 23 U/L (ref 10–40)
BASOPHILS # BLD AUTO: 0.08 K/UL (ref 0–0.2)
BASOPHILS NFR BLD: 0.6 % (ref 0–1.9)
BILIRUB SERPL-MCNC: 0.8 MG/DL (ref 0.1–1)
BILIRUB UR QL STRIP: NEGATIVE
BUN SERPL-MCNC: 17 MG/DL (ref 8–23)
CALCIUM SERPL-MCNC: 9.4 MG/DL (ref 8.7–10.5)
CHLORIDE SERPL-SCNC: 89 MMOL/L (ref 95–110)
CLARITY UR: CLEAR
CO2 SERPL-SCNC: 21 MMOL/L (ref 23–29)
COLOR UR: YELLOW
CREAT SERPL-MCNC: 1.3 MG/DL (ref 0.5–1.4)
DIFFERENTIAL METHOD BLD: ABNORMAL
EOSINOPHIL # BLD AUTO: 0.2 K/UL (ref 0–0.5)
EOSINOPHIL NFR BLD: 1.4 % (ref 0–8)
ERYTHROCYTE [DISTWIDTH] IN BLOOD BY AUTOMATED COUNT: 11.8 % (ref 11.5–14.5)
EST. GFR  (NO RACE VARIABLE): 45 ML/MIN/1.73 M^2
GLUCOSE SERPL-MCNC: 228 MG/DL (ref 70–110)
GLUCOSE UR QL STRIP: ABNORMAL
HCT VFR BLD AUTO: 35.1 % (ref 37–48.5)
HGB BLD-MCNC: 12.3 G/DL (ref 12–16)
HGB UR QL STRIP: NEGATIVE
IMM GRANULOCYTES # BLD AUTO: 0.07 K/UL (ref 0–0.04)
IMM GRANULOCYTES NFR BLD AUTO: 0.5 % (ref 0–0.5)
INFLUENZA A, MOLECULAR: NEGATIVE
INFLUENZA B, MOLECULAR: NEGATIVE
KETONES UR QL STRIP: NEGATIVE
LACTATE SERPL-SCNC: 1 MMOL/L (ref 0.5–2.2)
LEUKOCYTE ESTERASE UR QL STRIP: NEGATIVE
LIPASE SERPL-CCNC: 22 U/L (ref 4–60)
LYMPHOCYTES # BLD AUTO: 1.4 K/UL (ref 1–4.8)
LYMPHOCYTES NFR BLD: 9.6 % (ref 18–48)
MAGNESIUM SERPL-MCNC: 2.1 MG/DL (ref 1.6–2.6)
MCH RBC QN AUTO: 30.4 PG (ref 27–31)
MCHC RBC AUTO-ENTMCNC: 35 G/DL (ref 32–36)
MCV RBC AUTO: 87 FL (ref 82–98)
MONOCYTES # BLD AUTO: 1.3 K/UL (ref 0.3–1)
MONOCYTES NFR BLD: 9.3 % (ref 4–15)
NEUTROPHILS # BLD AUTO: 11 K/UL (ref 1.8–7.7)
NEUTROPHILS NFR BLD: 78.6 % (ref 38–73)
NITRITE UR QL STRIP: NEGATIVE
NRBC BLD-RTO: 0 /100 WBC
PH UR STRIP: 7 [PH] (ref 5–8)
PLATELET # BLD AUTO: 255 K/UL (ref 150–450)
PMV BLD AUTO: 9.4 FL (ref 9.2–12.9)
POCT GLUCOSE: 124 MG/DL (ref 70–110)
POCT GLUCOSE: 224 MG/DL (ref 70–110)
POTASSIUM SERPL-SCNC: 3.6 MMOL/L (ref 3.5–5.1)
PROCALCITONIN SERPL IA-MCNC: <0.02 NG/ML
PROT SERPL-MCNC: 7.3 G/DL (ref 6–8.4)
PROT UR QL STRIP: NEGATIVE
RBC # BLD AUTO: 4.04 M/UL (ref 4–5.4)
SARS-COV-2 RDRP RESP QL NAA+PROBE: NEGATIVE
SODIUM SERPL-SCNC: 121 MMOL/L (ref 136–145)
SP GR UR STRIP: 1.02 (ref 1–1.03)
SPECIMEN SOURCE: NORMAL
TROPONIN I SERPL DL<=0.01 NG/ML-MCNC: <0.006 NG/ML (ref 0–0.03)
TSH SERPL DL<=0.005 MIU/L-ACNC: 3.02 UIU/ML (ref 0.4–4)
URN SPEC COLLECT METH UR: ABNORMAL
UROBILINOGEN UR STRIP-ACNC: NEGATIVE EU/DL
WBC # BLD AUTO: 14.02 K/UL (ref 3.9–12.7)

## 2024-03-25 PROCEDURE — 84300 ASSAY OF URINE SODIUM: CPT | Mod: HCNC

## 2024-03-25 PROCEDURE — 25000003 PHARM REV CODE 250: Mod: HCNC

## 2024-03-25 PROCEDURE — 87040 BLOOD CULTURE FOR BACTERIA: CPT | Mod: 59,HCNC | Performed by: EMERGENCY MEDICINE

## 2024-03-25 PROCEDURE — 87502 INFLUENZA DNA AMP PROBE: CPT | Mod: HCNC | Performed by: EMERGENCY MEDICINE

## 2024-03-25 PROCEDURE — 63600175 PHARM REV CODE 636 W HCPCS: Mod: HCNC

## 2024-03-25 PROCEDURE — 81003 URINALYSIS AUTO W/O SCOPE: CPT | Mod: HCNC | Performed by: EMERGENCY MEDICINE

## 2024-03-25 PROCEDURE — 84443 ASSAY THYROID STIM HORMONE: CPT | Mod: HCNC | Performed by: EMERGENCY MEDICINE

## 2024-03-25 PROCEDURE — 80053 COMPREHEN METABOLIC PANEL: CPT | Mod: HCNC | Performed by: EMERGENCY MEDICINE

## 2024-03-25 PROCEDURE — 84145 PROCALCITONIN (PCT): CPT | Mod: HCNC | Performed by: EMERGENCY MEDICINE

## 2024-03-25 PROCEDURE — 99285 EMERGENCY DEPT VISIT HI MDM: CPT | Mod: 25,HCNC

## 2024-03-25 PROCEDURE — 20000000 HC ICU ROOM: Mod: HCNC

## 2024-03-25 PROCEDURE — 25500020 PHARM REV CODE 255: Mod: HCNC | Performed by: EMERGENCY MEDICINE

## 2024-03-25 PROCEDURE — 85025 COMPLETE CBC W/AUTO DIFF WBC: CPT | Mod: HCNC | Performed by: EMERGENCY MEDICINE

## 2024-03-25 PROCEDURE — U0002 COVID-19 LAB TEST NON-CDC: HCPCS | Mod: HCNC | Performed by: EMERGENCY MEDICINE

## 2024-03-25 PROCEDURE — 83935 ASSAY OF URINE OSMOLALITY: CPT | Mod: HCNC

## 2024-03-25 PROCEDURE — 93005 ELECTROCARDIOGRAM TRACING: CPT | Mod: HCNC

## 2024-03-25 PROCEDURE — 84484 ASSAY OF TROPONIN QUANT: CPT | Mod: HCNC | Performed by: EMERGENCY MEDICINE

## 2024-03-25 PROCEDURE — 83735 ASSAY OF MAGNESIUM: CPT | Mod: HCNC | Performed by: EMERGENCY MEDICINE

## 2024-03-25 PROCEDURE — 93010 ELECTROCARDIOGRAM REPORT: CPT | Mod: HCNC,,, | Performed by: STUDENT IN AN ORGANIZED HEALTH CARE EDUCATION/TRAINING PROGRAM

## 2024-03-25 PROCEDURE — 63600175 PHARM REV CODE 636 W HCPCS: Mod: JZ,JG,HCNC | Performed by: EMERGENCY MEDICINE

## 2024-03-25 PROCEDURE — 83690 ASSAY OF LIPASE: CPT | Mod: HCNC | Performed by: EMERGENCY MEDICINE

## 2024-03-25 PROCEDURE — 82962 GLUCOSE BLOOD TEST: CPT | Mod: HCNC

## 2024-03-25 PROCEDURE — 83605 ASSAY OF LACTIC ACID: CPT | Mod: HCNC | Performed by: EMERGENCY MEDICINE

## 2024-03-25 PROCEDURE — 96365 THER/PROPH/DIAG IV INF INIT: CPT | Mod: HCNC

## 2024-03-25 PROCEDURE — 82570 ASSAY OF URINE CREATININE: CPT | Mod: HCNC

## 2024-03-25 PROCEDURE — 25000003 PHARM REV CODE 250: Mod: HCNC | Performed by: EMERGENCY MEDICINE

## 2024-03-25 PROCEDURE — 84295 ASSAY OF SERUM SODIUM: CPT | Mod: HCNC

## 2024-03-25 RX ORDER — PANTOPRAZOLE SODIUM 40 MG/10ML
40 INJECTION, POWDER, LYOPHILIZED, FOR SOLUTION INTRAVENOUS DAILY
Status: DISCONTINUED | OUTPATIENT
Start: 2024-03-26 | End: 2024-03-27

## 2024-03-25 RX ORDER — ACETAMINOPHEN 325 MG/1
650 TABLET ORAL EVERY 4 HOURS PRN
Status: DISCONTINUED | OUTPATIENT
Start: 2024-03-25 | End: 2024-03-28 | Stop reason: HOSPADM

## 2024-03-25 RX ORDER — MUPIROCIN 20 MG/G
OINTMENT TOPICAL 2 TIMES DAILY
Status: DISCONTINUED | OUTPATIENT
Start: 2024-03-26 | End: 2024-03-28 | Stop reason: HOSPADM

## 2024-03-25 RX ORDER — ALUMINUM HYDROXIDE, MAGNESIUM HYDROXIDE, AND SIMETHICONE 1200; 120; 1200 MG/30ML; MG/30ML; MG/30ML
30 SUSPENSION ORAL ONCE
Status: COMPLETED | OUTPATIENT
Start: 2024-03-25 | End: 2024-03-25

## 2024-03-25 RX ORDER — POTASSIUM CHLORIDE 7.45 MG/ML
10 INJECTION INTRAVENOUS
Status: DISPENSED | OUTPATIENT
Start: 2024-03-25 | End: 2024-03-26

## 2024-03-25 RX ORDER — GLUCAGON 1 MG
1 KIT INJECTION
Status: DISCONTINUED | OUTPATIENT
Start: 2024-03-25 | End: 2024-03-28 | Stop reason: HOSPADM

## 2024-03-25 RX ORDER — ONDANSETRON HYDROCHLORIDE 2 MG/ML
8 INJECTION, SOLUTION INTRAVENOUS EVERY 6 HOURS PRN
Status: DISCONTINUED | OUTPATIENT
Start: 2024-03-25 | End: 2024-03-28 | Stop reason: HOSPADM

## 2024-03-25 RX ORDER — ARFORMOTEROL TARTRATE 15 UG/2ML
15 SOLUTION RESPIRATORY (INHALATION) 2 TIMES DAILY
Status: DISCONTINUED | OUTPATIENT
Start: 2024-03-26 | End: 2024-03-28 | Stop reason: HOSPADM

## 2024-03-25 RX ORDER — DICYCLOMINE HYDROCHLORIDE 10 MG/ML
10 INJECTION INTRAMUSCULAR EVERY 6 HOURS PRN
Status: DISCONTINUED | OUTPATIENT
Start: 2024-03-25 | End: 2024-03-28 | Stop reason: HOSPADM

## 2024-03-25 RX ORDER — LIDOCAINE HYDROCHLORIDE 20 MG/ML
15 SOLUTION OROPHARYNGEAL ONCE
Status: COMPLETED | OUTPATIENT
Start: 2024-03-25 | End: 2024-03-25

## 2024-03-25 RX ORDER — BUDESONIDE 0.5 MG/2ML
0.5 INHALANT ORAL EVERY 12 HOURS
Status: DISCONTINUED | OUTPATIENT
Start: 2024-03-26 | End: 2024-03-28 | Stop reason: HOSPADM

## 2024-03-25 RX ORDER — METRONIDAZOLE 500 MG/100ML
500 INJECTION, SOLUTION INTRAVENOUS
Status: DISCONTINUED | OUTPATIENT
Start: 2024-03-25 | End: 2024-03-26

## 2024-03-25 RX ORDER — CHLORHEXIDINE GLUCONATE ORAL RINSE 1.2 MG/ML
15 SOLUTION DENTAL 2 TIMES DAILY
Status: DISCONTINUED | OUTPATIENT
Start: 2024-03-25 | End: 2024-03-26

## 2024-03-25 RX ORDER — IPRATROPIUM BROMIDE AND ALBUTEROL SULFATE 2.5; .5 MG/3ML; MG/3ML
3 SOLUTION RESPIRATORY (INHALATION)
Status: DISCONTINUED | OUTPATIENT
Start: 2024-03-26 | End: 2024-03-26

## 2024-03-25 RX ORDER — INSULIN ASPART 100 [IU]/ML
0-10 INJECTION, SOLUTION INTRAVENOUS; SUBCUTANEOUS EVERY 6 HOURS PRN
Status: DISCONTINUED | OUTPATIENT
Start: 2024-03-25 | End: 2024-03-28 | Stop reason: HOSPADM

## 2024-03-25 RX ORDER — SODIUM CHLORIDE 9 MG/ML
INJECTION, SOLUTION INTRAVENOUS CONTINUOUS
Status: DISCONTINUED | OUTPATIENT
Start: 2024-03-25 | End: 2024-03-28 | Stop reason: HOSPADM

## 2024-03-25 RX ORDER — GLYCERIN 1 G/1
1 SUPPOSITORY RECTAL ONCE
Status: COMPLETED | OUTPATIENT
Start: 2024-03-25 | End: 2024-03-25

## 2024-03-25 RX ORDER — MUPIROCIN 20 MG/G
OINTMENT TOPICAL 2 TIMES DAILY
Status: DISCONTINUED | OUTPATIENT
Start: 2024-03-25 | End: 2024-03-25

## 2024-03-25 RX ORDER — ENOXAPARIN SODIUM 100 MG/ML
40 INJECTION SUBCUTANEOUS EVERY 24 HOURS
Status: DISCONTINUED | OUTPATIENT
Start: 2024-03-25 | End: 2024-03-28 | Stop reason: HOSPADM

## 2024-03-25 RX ADMIN — ONDANSETRON 8 MG: 2 INJECTION INTRAMUSCULAR; INTRAVENOUS at 10:03

## 2024-03-25 RX ADMIN — ALUMINUM HYDROXIDE, MAGNESIUM HYDROXIDE, AND DIMETHICONE 30 ML: 200; 20; 200 SUSPENSION ORAL at 10:03

## 2024-03-25 RX ADMIN — DICYCLOMINE HYDROCHLORIDE 10 MG: 20 INJECTION, SOLUTION INTRAMUSCULAR at 10:03

## 2024-03-25 RX ADMIN — SODIUM CHLORIDE: 9 INJECTION, SOLUTION INTRAVENOUS at 10:03

## 2024-03-25 RX ADMIN — CEFEPIME 2 G: 2 INJECTION, POWDER, FOR SOLUTION INTRAVENOUS at 07:03

## 2024-03-25 RX ADMIN — ENOXAPARIN SODIUM 40 MG: 40 INJECTION SUBCUTANEOUS at 10:03

## 2024-03-25 RX ADMIN — METRONIDAZOLE 500 MG: 5 INJECTION, SOLUTION INTRAVENOUS at 10:03

## 2024-03-25 RX ADMIN — GLYCERIN 1 SUPPOSITORY: 2 SUPPOSITORY RECTAL at 08:03

## 2024-03-25 RX ADMIN — LIDOCAINE HYDROCHLORIDE 15 ML: 20 SOLUTION ORAL at 10:03

## 2024-03-25 RX ADMIN — POTASSIUM CHLORIDE 10 MEQ: 7.46 INJECTION, SOLUTION INTRAVENOUS at 10:03

## 2024-03-25 RX ADMIN — IOHEXOL 100 ML: 350 INJECTION, SOLUTION INTRAVENOUS at 08:03

## 2024-03-25 RX ADMIN — SODIUM CHLORIDE 1503 ML: 9 INJECTION, SOLUTION INTRAVENOUS at 07:03

## 2024-03-25 NOTE — ED PROVIDER NOTES
SCRIBE #1 NOTE: I, Eric Cagle, am scribing for, and in the presence of, Melony Van DO. I have scribed the entire note.       History     Chief Complaint   Patient presents with    Fatigue     Pt reports fatigue and weakness. Pt had a fall 2 days ago. Bruising reported to HEATHER Jacobsen. Pt has hx of triple A. Pt is on thinner, has 4 stents     Review of patient's allergies indicates:  No Known Allergies      History of Present Illness     HPI    3/25/2024, 6:31 PM  History obtained from the patient and caregiver       History of Present Illness: Gemma Vick is a 69 y.o. female patient with a PMHx of AAA, COPD, HTN, CAD (s/p 4 stent placements; on Plavix and 81 mg ASA daily) who presents to the Emergency Department for evaluation of fatigue and generalized weakness which onset gradually at 4 PM today. Caregiver reports patient was in the bathroom with N/V, generalized weakness, and pale skin at 4 PM today which prompted her ED visit. Patient reports having blood in her stool due to a hemorrhoid and constipation. Caregiver also reports patient had a fall 2 days ago and was started on Robaxin.  Associated sxs include generalized abdominal pain. Patient denies any fever, cough, SOB, CP, dysuria, dizziness, and lightheadedness. No further complaints or concerns at this time.       Arrival mode: EMS    PCP: Olga Altman MD        Past Medical History:  Past Medical History:   Diagnosis Date    AAA (abdominal aortic aneurysm) 02/13/2014    Abdominal aneurysm     3    Acute coronary syndrome     Arthritis     BPPV (benign paroxysmal positional vertigo)     Carotid artery plaque     Carotid artery stenosis and occlusion 02/13/2014    Chronic back pain     COPD (chronic obstructive pulmonary disease)     Coronary artery disease     Dementia     Emphysema lung     Hyperlipidemia     Hypertension     Myocardial infarction     x3    Neuropathy     Personal history of COVID-19 06/09/2021 11/16/2020 +Covid, recovered at  home        Past Surgical History:  Past Surgical History:   Procedure Laterality Date    CARDIAC CATHETERIZATION      CORONARY ANGIOPLASTY      CORONARY STENT PLACEMENT N/A 2023    Procedure: INSERTION, STENT, CORONARY ARTERY;  Surgeon: Millie Juarez MD;  Location: [facility] CATH LAB;  Service: Cardiology;  Laterality: N/A;    HYSTERECTOMY  1988    LEFT HEART CATHETERIZATION Left 2023    Procedure: Left heart cath;  Surgeon: Millie Juarez MD;  Location: [facility] CATH LAB;  Service: Cardiology;  Laterality: Left;    PERCUTANEOUS CORONARY INTERVENTION, ARTERY N/A 2023    Procedure: Percutaneous coronary intervention;  Surgeon: Millie Juarez MD;  Location: [facility] CATH LAB;  Service: Cardiology;  Laterality: N/A;    THROMBECTOMY, CORONARY  2023    Procedure: Thrombectomy, Coronary;  Surgeon: Millie Juarez MD;  Location: [facility] CATH LAB;  Service: Cardiology;;    TONSILLECTOMY      TRANSFORAMINAL EPIDURAL INJECTION OF STEROID Right 2023    Procedure: Injection,steroid,epidural,transforaminal approach- right side, L4/5 and L5/S1;  Surgeon: Lydia Roberts MD;  Location: [facility] PAIN MGT;  Service: Pain Management;  Laterality: Right;         Family History:  Family History   Problem Relation Age of Onset    Heart disease Mother     Heart attacks under age 50 Father     Heart attacks under age 50 Brother     Breast cancer Paternal Aunt        Social History:  Social History     Tobacco Use    Smoking status: Former     Current packs/day: 0.00     Average packs/day: 0.5 packs/day for 46.9 years (23.4 ttl pk-yrs)     Types: Cigarettes, Vaping w/o nicotine     Start date: 1970     Quit date: 2017     Years since quittin.9    Smokeless tobacco: Never    Tobacco comments:     3 MG NICOTINE FOR HEART.    Substance and Sexual Activity    Alcohol use: No    Drug use: No    Sexual activity: Not Currently     Birth control/protection: None        Review of Systems       Review of Systems    Constitutional:  Negative for fever.   Respiratory:  Negative for cough and shortness of breath.    Cardiovascular:  Negative for chest pain.   Gastrointestinal:  Positive for abdominal pain (generalized), blood in stool (d/t hemorrhoid), nausea and vomiting.   Genitourinary:  Negative for dysuria.   Skin:  Positive for pallor.   Neurological:  Positive for weakness (generalized). Negative for dizziness and light-headedness.        Physical Exam     Initial Vitals   BP Pulse Resp Temp SpO2   03/25/24 1733 03/25/24 1733 03/25/24 1733 03/25/24 1829 03/25/24 1733   (!) 127/54 61 16 (!) 93 °F (33.9 °C) 99 %      MAP       --                 Physical Exam   Nursing Notes and Vital Signs Reviewed.  Constitutional: Patient is in mild distress. Patient is ill-appearing.  Head: Atraumatic. Normocephalic.  Eyes: EOM intact. No scleral icterus.  ENT: Mucous membranes are dry  Neck: Supple. Full ROM.  No rigidity.  Cardiovascular: Regular rate. Regular rhythm. No murmurs, rubs, or gallops. Distal pulses are 2+ and symmetric.  Pulmonary/Chest: No respiratory distress. Clear to auscultation bilaterally. No wheezing or rales.  Abdominal: Soft and non-distended.  There is LUQ, RUQ, and epigastric tenderness.  No rebound, guarding, or rigidity.  Rectal: Female chaperone present for the duration of the rectal exam. There is no tenderness. There is an anal fissure at the 12 o'clock position. Normal sphincter tone.  Musculoskeletal: Moves all extremities. No obvious deformities. No edema. No calf tenderness.  Skin: Dry. Ecchymosis to the left lower back and left hip. Skin was pale and cold to touch.  Neurological:  Alert and awake.  Normal speech.  No acute focal neurological deficits are appreciated.  Psychiatric: Normal affect. Good eye contact. Appropriate in content.     ED Course   Critical Care    Date/Time: 3/25/2024 9:11 PM    Performed by: Melony Van DO  Authorized by: Melony Van,   Direct patient critical  care time: 20 minutes  Additional history critical care time: 10 minutes  Ordering / reviewing critical care time: 5 minutes  Documentation critical care time: 5 minutes  Consulting other physicians critical care time: 5 minutes  Total critical care time (exclusive of procedural time) : 45 minutes  Critical care time was exclusive of separately billable procedures and treating other patients and teaching time.  Critical care was necessary to treat or prevent imminent or life-threatening deterioration of the following conditions: sepsis.  Critical care was time spent personally by me on the following activities: development of treatment plan with patient or surrogate, blood draw for specimens, discussions with consultants, interpretation of cardiac output measurements, evaluation of patient's response to treatment, ordering and performing treatments and interventions, examination of patient, obtaining history from patient or surrogate, ordering and review of laboratory studies, ordering and review of radiographic studies, pulse oximetry, re-evaluation of patient's condition and review of old charts.        ED Vital Signs:  Vitals:    03/25/24 1733 03/25/24 1747 03/25/24 1829 03/25/24 2030   BP: (!) 127/54      Pulse: 61      Resp: 16      Temp:   (!) 93 °F (33.9 °C) (!) 92.9 °F (33.8 °C)   TempSrc:   Rectal Rectal   SpO2: 99%      Weight:  58 kg (127 lb 13.9 oz)      03/25/24 2034 03/25/24 2036 03/25/24 2101 03/25/24 2132   BP: 134/62   (!) 119/57   Pulse: 75  77 78   Resp: 19  19 20   Temp:   (!) 93.6 °F (34.2 °C) (!) 93 °F (33.9 °C)   TempSrc:  Rectal     SpO2: 95%  97% 95%   Weight:        03/25/24 2156   BP:    Pulse: 77   Resp: 20   Temp: (!) 94.3 °F (34.6 °C)   TempSrc:    SpO2: 95%   Weight:        Abnormal Lab Results:  Labs Reviewed   CBC W/ AUTO DIFFERENTIAL - Abnormal; Notable for the following components:       Result Value    WBC 14.02 (*)     Hematocrit 35.1 (*)     Gran # (ANC) 11.0 (*)     Immature  Grans (Abs) 0.07 (*)     Mono # 1.3 (*)     Gran % 78.6 (*)     Lymph % 9.6 (*)     All other components within normal limits   COMPREHENSIVE METABOLIC PANEL - Abnormal; Notable for the following components:    Sodium 121 (*)     Chloride 89 (*)     CO2 21 (*)     Glucose 228 (*)     eGFR 45 (*)     All other components within normal limits   URINALYSIS, REFLEX TO URINE CULTURE - Abnormal; Notable for the following components:    Glucose, UA 1+ (*)     All other components within normal limits    Narrative:     Specimen Source->Urine   POCT GLUCOSE - Abnormal; Notable for the following components:    POCT Glucose 224 (*)     All other components within normal limits   INFLUENZA A & B BY MOLECULAR   CULTURE, BLOOD   CULTURE, BLOOD   CULTURE, STOOL   LACTIC ACID, PLASMA   MAGNESIUM   LIPASE   TROPONIN I   PROCALCITONIN   SARS-COV-2 RNA AMPLIFICATION, QUAL   TSH   OCCULT BLOOD X 1, STOOL   WBC, STOOL   ROTAVIRUS ANTIGEN, STOOL   GIARDIA/CRYPTOSPORIDIUM (EIA)   STOOL EXAM-OVA,CYSTS,PARASITES        All Lab Results:  Results for orders placed or performed during the hospital encounter of 03/25/24   Influenza A & B by Molecular    Specimen: Nasopharyngeal Swab   Result Value Ref Range    Influenza A, Molecular Negative Negative    Influenza B, Molecular Negative Negative    Flu A & B Source NP    CBC auto differential   Result Value Ref Range    WBC 14.02 (H) 3.90 - 12.70 K/uL    RBC 4.04 4.00 - 5.40 M/uL    Hemoglobin 12.3 12.0 - 16.0 g/dL    Hematocrit 35.1 (L) 37.0 - 48.5 %    MCV 87 82 - 98 fL    MCH 30.4 27.0 - 31.0 pg    MCHC 35.0 32.0 - 36.0 g/dL    RDW 11.8 11.5 - 14.5 %    Platelets 255 150 - 450 K/uL    MPV 9.4 9.2 - 12.9 fL    Immature Granulocytes 0.5 0.0 - 0.5 %    Gran # (ANC) 11.0 (H) 1.8 - 7.7 K/uL    Immature Grans (Abs) 0.07 (H) 0.00 - 0.04 K/uL    Lymph # 1.4 1.0 - 4.8 K/uL    Mono # 1.3 (H) 0.3 - 1.0 K/uL    Eos # 0.2 0.0 - 0.5 K/uL    Baso # 0.08 0.00 - 0.20 K/uL    nRBC 0 0 /100 WBC    Gran % 78.6  (H) 38.0 - 73.0 %    Lymph % 9.6 (L) 18.0 - 48.0 %    Mono % 9.3 4.0 - 15.0 %    Eosinophil % 1.4 0.0 - 8.0 %    Basophil % 0.6 0.0 - 1.9 %    Differential Method Automated    Comprehensive metabolic panel   Result Value Ref Range    Sodium 121 (L) 136 - 145 mmol/L    Potassium 3.6 3.5 - 5.1 mmol/L    Chloride 89 (L) 95 - 110 mmol/L    CO2 21 (L) 23 - 29 mmol/L    Glucose 228 (H) 70 - 110 mg/dL    BUN 17 8 - 23 mg/dL    Creatinine 1.3 0.5 - 1.4 mg/dL    Calcium 9.4 8.7 - 10.5 mg/dL    Total Protein 7.3 6.0 - 8.4 g/dL    Albumin 3.8 3.5 - 5.2 g/dL    Total Bilirubin 0.8 0.1 - 1.0 mg/dL    Alkaline Phosphatase 104 55 - 135 U/L    AST 23 10 - 40 U/L    ALT 17 10 - 44 U/L    eGFR 45 (A) >60 mL/min/1.73 m^2    Anion Gap 11 8 - 16 mmol/L   Lactic acid, plasma #1   Result Value Ref Range    Lactate (Lactic Acid) 1.0 0.5 - 2.2 mmol/L   Urinalysis, Reflex to Urine Culture Urine, Catheterized    Specimen: Urine   Result Value Ref Range    Specimen UA Urine, Catheterized     Color, UA Yellow Yellow, Straw, Whit    Appearance, UA Clear Clear    pH, UA 7.0 5.0 - 8.0    Specific Gravity, UA 1.020 1.005 - 1.030    Protein, UA Negative Negative    Glucose, UA 1+ (A) Negative    Ketones, UA Negative Negative    Bilirubin (UA) Negative Negative    Occult Blood UA Negative Negative    Nitrite, UA Negative Negative    Urobilinogen, UA Negative <2.0 EU/dL    Leukocytes, UA Negative Negative   Magnesium   Result Value Ref Range    Magnesium 2.1 1.6 - 2.6 mg/dL   Lipase   Result Value Ref Range    Lipase 22 4 - 60 U/L   Troponin I   Result Value Ref Range    Troponin I <0.006 0.000 - 0.026 ng/mL   Procalcitonin   Result Value Ref Range    Procalcitonin <0.02 <0.25 ng/mL   COVID-19 Rapid Screening   Result Value Ref Range    SARS-CoV-2 RNA, Amplification, Qual Negative Negative   TSH   Result Value Ref Range    TSH 3.016 0.400 - 4.000 uIU/mL   POCT glucose   Result Value Ref Range    POCT Glucose 224 (H) 70 - 110 mg/dL     *Note: Due to a  large number of results and/or encounters for the requested time period, some results have not been displayed. A complete set of results can be found in Results Review.         Imaging Results:  Imaging Results               CT Chest Abdomen Pelvis With IV Contrast (XPD) NO Oral Contrast (Final result)  Result time 03/25/24 20:25:09      Final result by Gurpreet Lopez MD (03/25/24 20:25:09)                   Impression:      Colonic wall thickening over a moderate length of the descending colon with local inflammatory change and small volume adjacent free fluid favored to be reactive.  Findings are concerning for infectious or inflammatory colitis.  Correlation is advised.    Complete findings as above.    This report was flagged in Epic as abnormal.    All CT scans at this facility are performed  using dose modulation techniques as appropriate to performed exam including the following:  automated exposure control; adjustment of mA and/or kV according to the patients size (this includes techniques or standardized protocols for targeted exams where dose is matched to indication/reason for exam: i.e. extremities or head);  iterative reconstruction technique.      Electronically signed by: Gurpreet Lopez  Date:    03/25/2024  Time:    20:25               Narrative:    EXAMINATION:  CT CHEST ABDOMEN PELVIS WITH IV CONTRAST (XPD)    CLINICAL HISTORY:  SBO;    TECHNIQUE:  Axial images of the chest, abdomen, and pelvis were acquired  after the use of 100 cc Sory398 IV contrast.  Coronal and sagittal reconstructions were also obtained    COMPARISON:  Multiple priors.    FINDINGS:  Thoracic soft tissues: No significant abnormality.    Aorta: Moderate aortic atherosclerosis.  No thoracic aortic aneurysm    Heart: Normal in size. No pericardial effusion.    Kandy/Mediastinum: No significant lymphadenopathy    Lungs: Moderate panlobular emphysema.  No pleural fluid or pneumothorax.  No acute alveolar opacities.  Motion  degrades fine parenchymal evaluation.    Liver: Normal in size and attenuation, with no focal hepatic lesions.    Gallbladder: Gallbladder surgically absent.  Bile ducts within normal limits.    Bile Ducts: No evidence of dilated ducts.    Pancreas: No mass or peripancreatic fat stranding.    Spleen: Unremarkable.    Adrenals: Unremarkable.    Kidneys/ Ureters: Normal in size and location. No hydronephrosis or nephrolithiasis. No ureteral dilatation. Left renal simple cyst which requires no dedicated follow-up.    Bladder: No evidence of wall thickening.    Reproductive organs: Unremarkable.    GI Tract/Mesentery: Colonic wall thickening over a moderate length segment of the descending colon with local inflammatory change and small volume adjacent free fluid favored to be reactive.  Findings are concerning for infectious or inflammatory colitis.  Diverticulosis noted.  No evidence of small-bowel obstruction.  The appendix is not well visualized    Peritoneal Space: No generalized ascites.  No free air.    Retroperitoneum:  No significant adenopathy.    Abdominal wall:  Unremarkable.    Vasculature: Infrarenal aortic aneurysm measuring up to 3.9 cm.  This is stable from the prior.  Moderate atherosclerosis.  Moderate atherosclerosis of the bilateral iliac vasculature.    Bones: No acute fracture. Age-appropriate degenerative changes.                                       CT Head Without Contrast (Final result)  Result time 03/25/24 20:08:38      Final result by Lucas Dominguez MD (03/25/24 20:08:38)                   Impression:      No acute abnormality.    Atrophy and chronic white matter changes.    All CT scans   are performed using dose optimization techniques including the following: automated exposure control; adjustment of the mA and/or kV; use of iterative reconstruction technique.  Dose modulation was employed for ALARA by means of: Automated exposure control; adjustment of the mA and/or kV according to  patient size (this includes techniques or standardized protocols for targeted exams where dose is matched to indication/reason for exam; i.e. extremities or head); and/or use of iterative reconstructive technique.      Electronically signed by: Lucas Dominguez  Date:    03/25/2024  Time:    20:08               Narrative:    EXAMINATION:  CT HEAD WITHOUT CONTRAST    CLINICAL HISTORY:  Head trauma, minor (Age >= 65y);    TECHNIQUE:  Low dose axial CT images obtained throughout the head without intravenous contrast. Sagittal and coronal reconstructions were performed.    COMPARISON:  None.    FINDINGS:  Atrophy and chronic white matter changes.    Ventricles and sulci are normal in size for age without evidence of hydrocephalus. No extra-axial blood or fluid collections.    No parenchymal mass, hemorrhage, edema or major vascular distribution infarct.    Skull/extracranial contents (limited evaluation): No fracture. Mastoid air cells and paranasal sinuses are essentially clear.                                       X-Ray Hip 2 or 3 views Left (with Pelvis when performed) (Final result)  Result time 03/25/24 19:04:15      Final result by Lucas Dominguez MD (03/25/24 19:04:15)                   Impression:      As above      Electronically signed by: Lucas Dominguez  Date:    03/25/2024  Time:    19:04               Narrative:    EXAMINATION:  XR HIP WITH PELVIS WHEN PERFORMED, 2 OR 3 VIEWS LEFT    CLINICAL HISTORY:  fall;    TECHNIQUE:  AP view of the pelvis and frog leg lateral view of the left hip were performed.    COMPARISON:  None    FINDINGS:  No fracture or dislocation.  Decreased bone mineral density.  Mild degenerative joint disease with                                       X-Ray Chest AP Portable (Final result)  Result time 03/25/24 19:04:36      Final result by Gurpreet Lopez MD (03/25/24 19:04:36)                   Impression:      No acute abnormality.      Electronically signed by: Gurpreet  Jessica  Date:    03/25/2024  Time:    19:04               Narrative:    EXAMINATION:  XR CHEST AP PORTABLE    CLINICAL HISTORY:  Sepsis;    TECHNIQUE:  Single frontal view of the chest was performed.    COMPARISON:  Multiple priors.    FINDINGS:  The lungs are clear, with normal appearance of pulmonary vasculature and no pleural effusion or pneumothorax.    The cardiac silhouette is normal in size. The hilar and mediastinal contours are unremarkable.    Bones are intact.                                     The EKG was ordered, reviewed, and independently interpreted by the ED provider.  Interpretation time: 18:56  Rate: 66 BPM  Rhythm:  Sinus rhythm with 1st degree AV block.   Interpretation: Septal infarct. MS interval is 330 ms. No STEMI.  When compared to EKG from 05 December 2023, there are no significant changes.      The Emergency Provider reviewed the vital signs and test results, which are outlined above.     ED Discussion       ED Course as of 03/25/24 2202   Mon Mar 25, 2024   2057 69-year-old female presents with hypothermia and sepsis likely secondary to colitis seen on CT chest, abdomen, and pelvis.  She reports a fall previously from weakness.  X-ray shows no hip fracture or pelvic fracture.  CT head negative for intracranial hemorrhage.  Chest x-ray is negative for pneumonia.  UA shows no signs of pneumonia.  UA noninfected.  Temperature 92.9° F.  Placed on a Mirna Hugger and warmed sepsis fluids.  Treated with cefepime and Flagyl.  No other source of fever noted.  CBC shows leukocytosis with left shift.  No signs of severe sepsis or septic shock.  Procalcitonin and lactic acid are normal.  CMP shows hyperglycemia with normal anion gap and hyponatremia.  Influenza and COVID are negative.  The remainder of the electrolytes including potassium and magnesium are normal.  Lipase is negative for pancreatitis.  TSH is normal.  Blood cultures are pending.    Differential diagnosis includes is not limited to  pneumonia, UTI, COVID, influenza, colitis, intracranial hemorrhage, hip fracture, pancreatitis, COVID, influenza [NF]   2137 Corrected sodium 123 to 124. [NF]      ED Course User Index  [NF] Melony Van,      Medical Decision Making  Amount and/or Complexity of Data Reviewed  Independent Historian: caregiver  Labs: ordered. Decision-making details documented in ED Course.  Radiology: ordered. Decision-making details documented in ED Course.  ECG/medicine tests: ordered and independent interpretation performed. Decision-making details documented in ED Course.  Discussion of management or test interpretation with external provider(s):     9:25 PM: Discussed case with Blanca Flores NP ([facility]). Dr. Osman agrees with current care and management of pt and accepts admission.   Admitting Service: [facility]  Admitting Physician: Dr. Osman  Admit to: ICU    9:25 PM: Re-evaluated pt. I have discussed test results, shared treatment plan, and the need for admission with patient and family at bedside. Pt and family express understanding at this time and agree with all information. All questions answered. Pt and family have no further questions or concerns at this time. Pt is ready for admit.      Risk  OTC drugs.  Prescription drug management.  Decision regarding hospitalization.                 ED Medication(s):  Medications   metronidazole IVPB 500 mg (has no administration in time range)   enoxaparin injection 40 mg (has no administration in time range)   acetaminophen tablet 650 mg (has no administration in time range)   ondansetron injection 8 mg (has no administration in time range)   albuterol-ipratropium 2.5 mg-0.5 mg/3 mL nebulizer solution 3 mL (has no administration in time range)   mupirocin 2 % ointment (has no administration in time range)   dicyclomine injection 10 mg (has no administration in time range)   0.9%  NaCl infusion (has no administration in time range)   ceFEPIme  (MAXIPIME) 1 g in dextrose 5 % in water (D5W) 100 mL IVPB (MB+) (has no administration in time range)   sodium chloride 0.9% bolus 1,503 mL 1,503 mL (0 mLs Intravenous Stopped 3/25/24 2015)   ceFEPIme (MAXIPIME) 2 g in dextrose 5 % in water (D5W) 100 mL IVPB (MB+) (0 g Intravenous Stopped 3/25/24 2015)   glycerin adult suppository 1 suppository (1 suppository Rectal Given 3/25/24 2015)   iohexoL (OMNIPAQUE 350) injection 100 mL (100 mLs Intravenous Given 3/25/24 2012)       New Prescriptions    No medications on file               Scribe Attestation:   Scribe #1: I performed the above scribed service and the documentation accurately describes the services I performed. I attest to the accuracy of the note.     Attending:   Physician Attestation Statement for Scribe #1: I, Melony Van DO, personally performed the services described in this documentation, as scribed by Eric Cagle, in my presence, and it is both accurate and complete.           Clinical Impression       ICD-10-CM ICD-9-CM   1. Sepsis with acute organ dysfunction without septic shock, due to unspecified organism, unspecified organ dysfunction type  A41.9 038.9    R65.20 995.92   2. Weakness  R53.1 780.79   3. Hypothermia, initial encounter  T68.XXXA 991.6   4. Hyperglycemia  R73.9 790.29   5. Hyponatremia  E87.1 276.1   6. Colitis  K52.9 558.9       Disposition:   Disposition: Admitted  Condition: Serious         Melony Van DO  03/25/24 2202

## 2024-03-26 DIAGNOSIS — J44.9 COPD, MODERATE: Primary | ICD-10-CM

## 2024-03-26 LAB
ALBUMIN SERPL BCP-MCNC: 3.2 G/DL (ref 3.5–5.2)
ALP SERPL-CCNC: 183 U/L (ref 55–135)
ALT SERPL W/O P-5'-P-CCNC: 14 U/L (ref 10–44)
ANION GAP SERPL CALC-SCNC: 11 MMOL/L (ref 8–16)
AST SERPL-CCNC: 22 U/L (ref 10–40)
BASOPHILS # BLD AUTO: 0.04 K/UL (ref 0–0.2)
BASOPHILS NFR BLD: 0.3 % (ref 0–1.9)
BILIRUB SERPL-MCNC: 1 MG/DL (ref 0.1–1)
BUN SERPL-MCNC: 14 MG/DL (ref 8–23)
CALCIUM SERPL-MCNC: 8.9 MG/DL (ref 8.7–10.5)
CHLORIDE SERPL-SCNC: 97 MMOL/L (ref 95–110)
CO2 SERPL-SCNC: 18 MMOL/L (ref 23–29)
CORTIS SERPL-MCNC: 31.3 UG/DL
CREAT SERPL-MCNC: 0.9 MG/DL (ref 0.5–1.4)
CREAT UR-MCNC: 39.1 MG/DL (ref 15–325)
DIFFERENTIAL METHOD BLD: ABNORMAL
EOSINOPHIL # BLD AUTO: 0.2 K/UL (ref 0–0.5)
EOSINOPHIL NFR BLD: 1.3 % (ref 0–8)
ERYTHROCYTE [DISTWIDTH] IN BLOOD BY AUTOMATED COUNT: 11.6 % (ref 11.5–14.5)
EST. GFR  (NO RACE VARIABLE): >60 ML/MIN/1.73 M^2
ESTIMATED AVG GLUCOSE: 103 MG/DL (ref 68–131)
GLUCOSE SERPL-MCNC: 125 MG/DL (ref 70–110)
HBA1C MFR BLD: 5.2 % (ref 4–5.6)
HCT VFR BLD AUTO: 35 % (ref 37–48.5)
HGB BLD-MCNC: 12.4 G/DL (ref 12–16)
IMM GRANULOCYTES # BLD AUTO: 0.04 K/UL (ref 0–0.04)
IMM GRANULOCYTES NFR BLD AUTO: 0.3 % (ref 0–0.5)
LACTATE SERPL-SCNC: 1.2 MMOL/L (ref 0.5–2.2)
LYMPHOCYTES # BLD AUTO: 0.2 K/UL (ref 1–4.8)
LYMPHOCYTES NFR BLD: 1.8 % (ref 18–48)
MAGNESIUM SERPL-MCNC: 1.6 MG/DL (ref 1.6–2.6)
MCH RBC QN AUTO: 31.2 PG (ref 27–31)
MCHC RBC AUTO-ENTMCNC: 35.4 G/DL (ref 32–36)
MCV RBC AUTO: 88 FL (ref 82–98)
MONOCYTES # BLD AUTO: 0.7 K/UL (ref 0.3–1)
MONOCYTES NFR BLD: 5.3 % (ref 4–15)
NEUTROPHILS # BLD AUTO: 11.4 K/UL (ref 1.8–7.7)
NEUTROPHILS NFR BLD: 91 % (ref 38–73)
NRBC BLD-RTO: 0 /100 WBC
OB PNL STL: POSITIVE
OSMOLALITY SERPL: 261 MOSM/KG (ref 275–295)
OSMOLALITY UR: 391 MOSM/KG (ref 50–1200)
PHOSPHATE SERPL-MCNC: 2 MG/DL (ref 2.7–4.5)
PLATELET # BLD AUTO: 223 K/UL (ref 150–450)
PMV BLD AUTO: 9.6 FL (ref 9.2–12.9)
POCT GLUCOSE: 126 MG/DL (ref 70–110)
POCT GLUCOSE: 130 MG/DL (ref 70–110)
POTASSIUM SERPL-SCNC: 3.3 MMOL/L (ref 3.5–5.1)
PROT SERPL-MCNC: 6.1 G/DL (ref 6–8.4)
RBC # BLD AUTO: 3.98 M/UL (ref 4–5.4)
RV AG STL QL IA.RAPID: NEGATIVE
SODIUM SERPL-SCNC: 124 MMOL/L (ref 136–145)
SODIUM SERPL-SCNC: 124 MMOL/L (ref 136–145)
SODIUM SERPL-SCNC: 125 MMOL/L (ref 136–145)
SODIUM SERPL-SCNC: 126 MMOL/L (ref 136–145)
SODIUM SERPL-SCNC: 126 MMOL/L (ref 136–145)
SODIUM UR-SCNC: 32 MMOL/L (ref 20–250)
WBC # BLD AUTO: 12.55 K/UL (ref 3.9–12.7)

## 2024-03-26 PROCEDURE — 63600175 PHARM REV CODE 636 W HCPCS: Mod: JZ,JG,HCNC | Performed by: EMERGENCY MEDICINE

## 2024-03-26 PROCEDURE — 82533 TOTAL CORTISOL: CPT | Mod: HCNC

## 2024-03-26 PROCEDURE — 85025 COMPLETE CBC W/AUTO DIFF WBC: CPT | Mod: HCNC

## 2024-03-26 PROCEDURE — 82272 OCCULT BLD FECES 1-3 TESTS: CPT | Mod: HCNC

## 2024-03-26 PROCEDURE — 89055 LEUKOCYTE ASSESSMENT FECAL: CPT | Mod: HCNC

## 2024-03-26 PROCEDURE — 63600175 PHARM REV CODE 636 W HCPCS: Mod: HCNC | Performed by: INTERNAL MEDICINE

## 2024-03-26 PROCEDURE — 87449 NOS EACH ORGANISM AG IA: CPT | Mod: 59,HCNC | Performed by: INTERNAL MEDICINE

## 2024-03-26 PROCEDURE — 87328 CRYPTOSPORIDIUM AG IA: CPT | Mod: HCNC

## 2024-03-26 PROCEDURE — 36415 COLL VENOUS BLD VENIPUNCTURE: CPT | Mod: HCNC,XB

## 2024-03-26 PROCEDURE — 84295 ASSAY OF SERUM SODIUM: CPT | Mod: HCNC

## 2024-03-26 PROCEDURE — 99900035 HC TECH TIME PER 15 MIN (STAT): Mod: HCNC

## 2024-03-26 PROCEDURE — 87427 SHIGA-LIKE TOXIN AG IA: CPT | Mod: HCNC

## 2024-03-26 PROCEDURE — 94640 AIRWAY INHALATION TREATMENT: CPT | Mod: HCNC

## 2024-03-26 PROCEDURE — 83735 ASSAY OF MAGNESIUM: CPT | Mod: HCNC

## 2024-03-26 PROCEDURE — 83036 HEMOGLOBIN GLYCOSYLATED A1C: CPT | Mod: HCNC

## 2024-03-26 PROCEDURE — 25000003 PHARM REV CODE 250: Mod: HCNC | Performed by: INTERNAL MEDICINE

## 2024-03-26 PROCEDURE — 25000003 PHARM REV CODE 250: Mod: HCNC

## 2024-03-26 PROCEDURE — 87209 SMEAR COMPLEX STAIN: CPT | Mod: HCNC

## 2024-03-26 PROCEDURE — 27000221 HC OXYGEN, UP TO 24 HOURS: Mod: HCNC

## 2024-03-26 PROCEDURE — 25000242 PHARM REV CODE 250 ALT 637 W/ HCPCS: Mod: HCNC

## 2024-03-26 PROCEDURE — 63600175 PHARM REV CODE 636 W HCPCS: Mod: HCNC

## 2024-03-26 PROCEDURE — 27000207 HC ISOLATION: Mod: HCNC

## 2024-03-26 PROCEDURE — 87081 CULTURE SCREEN ONLY: CPT | Mod: HCNC

## 2024-03-26 PROCEDURE — 83605 ASSAY OF LACTIC ACID: CPT | Mod: HCNC

## 2024-03-26 PROCEDURE — 83930 ASSAY OF BLOOD OSMOLALITY: CPT | Mod: HCNC

## 2024-03-26 PROCEDURE — 20000000 HC ICU ROOM: Mod: HCNC

## 2024-03-26 PROCEDURE — 94761 N-INVAS EAR/PLS OXIMETRY MLT: CPT | Mod: HCNC

## 2024-03-26 PROCEDURE — 87045 FECES CULTURE AEROBIC BACT: CPT | Mod: 59,HCNC | Performed by: INTERNAL MEDICINE

## 2024-03-26 PROCEDURE — 87425 ROTAVIRUS AG IA: CPT | Mod: HCNC

## 2024-03-26 PROCEDURE — 87449 NOS EACH ORGANISM AG IA: CPT | Mod: HCNC | Performed by: INTERNAL MEDICINE

## 2024-03-26 PROCEDURE — 84100 ASSAY OF PHOSPHORUS: CPT | Mod: HCNC

## 2024-03-26 PROCEDURE — 87046 STOOL CULTR AEROBIC BACT EA: CPT | Mod: HCNC | Performed by: INTERNAL MEDICINE

## 2024-03-26 PROCEDURE — C9113 INJ PANTOPRAZOLE SODIUM, VIA: HCPCS | Mod: HCNC

## 2024-03-26 PROCEDURE — 87045 FECES CULTURE AEROBIC BACT: CPT | Mod: HCNC

## 2024-03-26 PROCEDURE — 80053 COMPREHEN METABOLIC PANEL: CPT | Mod: HCNC

## 2024-03-26 PROCEDURE — 87427 SHIGA-LIKE TOXIN AG IA: CPT | Mod: 59,HCNC | Performed by: INTERNAL MEDICINE

## 2024-03-26 RX ORDER — IPRATROPIUM BROMIDE AND ALBUTEROL SULFATE 2.5; .5 MG/3ML; MG/3ML
3 SOLUTION RESPIRATORY (INHALATION) EVERY 6 HOURS PRN
Status: DISCONTINUED | OUTPATIENT
Start: 2024-03-26 | End: 2024-03-28 | Stop reason: HOSPADM

## 2024-03-26 RX ORDER — HALOPERIDOL 5 MG/ML
2 INJECTION INTRAMUSCULAR ONCE
Status: COMPLETED | OUTPATIENT
Start: 2024-03-26 | End: 2024-03-26

## 2024-03-26 RX ORDER — METRONIDAZOLE 500 MG/1
500 TABLET ORAL EVERY 8 HOURS
Status: DISCONTINUED | OUTPATIENT
Start: 2024-03-26 | End: 2024-03-28

## 2024-03-26 RX ORDER — MAGNESIUM SULFATE HEPTAHYDRATE 40 MG/ML
2 INJECTION, SOLUTION INTRAVENOUS ONCE
Status: COMPLETED | OUTPATIENT
Start: 2024-03-26 | End: 2024-03-26

## 2024-03-26 RX ORDER — TRAZODONE HYDROCHLORIDE 50 MG/1
50 TABLET ORAL NIGHTLY PRN
Status: DISCONTINUED | OUTPATIENT
Start: 2024-03-26 | End: 2024-03-28 | Stop reason: HOSPADM

## 2024-03-26 RX ADMIN — MAGNESIUM SULFATE HEPTAHYDRATE 2 G: 40 INJECTION, SOLUTION INTRAVENOUS at 11:03

## 2024-03-26 RX ADMIN — BUDESONIDE INHALATION 0.5 MG: 0.5 SUSPENSION RESPIRATORY (INHALATION) at 07:03

## 2024-03-26 RX ADMIN — DICYCLOMINE HYDROCHLORIDE 10 MG: 20 INJECTION, SOLUTION INTRAMUSCULAR at 08:03

## 2024-03-26 RX ADMIN — ACETAMINOPHEN 650 MG: 325 TABLET ORAL at 12:03

## 2024-03-26 RX ADMIN — METRONIDAZOLE 500 MG: 5 INJECTION, SOLUTION INTRAVENOUS at 03:03

## 2024-03-26 RX ADMIN — POTASSIUM CHLORIDE 10 MEQ: 7.46 INJECTION, SOLUTION INTRAVENOUS at 12:03

## 2024-03-26 RX ADMIN — MUPIROCIN: 20 OINTMENT TOPICAL at 09:03

## 2024-03-26 RX ADMIN — POTASSIUM PHOSPHATE, MONOBASIC POTASSIUM PHOSPHATE, DIBASIC 30 MMOL: 224; 236 INJECTION, SOLUTION, CONCENTRATE INTRAVENOUS at 09:03

## 2024-03-26 RX ADMIN — METRONIDAZOLE 500 MG: 5 INJECTION, SOLUTION INTRAVENOUS at 05:03

## 2024-03-26 RX ADMIN — METRONIDAZOLE 500 MG: 500 TABLET ORAL at 09:03

## 2024-03-26 RX ADMIN — TRAZODONE HYDROCHLORIDE 50 MG: 50 TABLET ORAL at 12:03

## 2024-03-26 RX ADMIN — ENOXAPARIN SODIUM 40 MG: 40 INJECTION SUBCUTANEOUS at 05:03

## 2024-03-26 RX ADMIN — CHLORHEXIDINE GLUCONATE 0.12% ORAL RINSE 15 ML: 1.2 LIQUID ORAL at 12:03

## 2024-03-26 RX ADMIN — ARFORMOTEROL TARTRATE 15 MCG: 15 SOLUTION RESPIRATORY (INHALATION) at 07:03

## 2024-03-26 RX ADMIN — CEFEPIME 1 G: 1 INJECTION, POWDER, FOR SOLUTION INTRAMUSCULAR; INTRAVENOUS at 09:03

## 2024-03-26 RX ADMIN — MUPIROCIN: 20 OINTMENT TOPICAL at 08:03

## 2024-03-26 RX ADMIN — IPRATROPIUM BROMIDE AND ALBUTEROL SULFATE 3 ML: 2.5; .5 SOLUTION RESPIRATORY (INHALATION) at 12:03

## 2024-03-26 RX ADMIN — HALOPERIDOL LACTATE 2 MG: 5 INJECTION, SOLUTION INTRAMUSCULAR at 01:03

## 2024-03-26 RX ADMIN — SODIUM CHLORIDE: 9 INJECTION, SOLUTION INTRAVENOUS at 08:03

## 2024-03-26 RX ADMIN — CEFEPIME 1 G: 1 INJECTION, POWDER, FOR SOLUTION INTRAMUSCULAR; INTRAVENOUS at 08:03

## 2024-03-26 RX ADMIN — PANTOPRAZOLE SODIUM 40 MG: 40 INJECTION, POWDER, FOR SOLUTION INTRAVENOUS at 08:03

## 2024-03-26 NOTE — ASSESSMENT & PLAN NOTE
- Patient with dementia with likely etiology of vascular dementia. Dementia is mild. The patient does not have signs of behavioral disturbance. Home dementia medications are Held or Continued: held.until PO intake resumes.   - Continue non-pharmacologic interventions to prevent delirium (No VS between 11PM-5AM, activity during day, opening blinds, providing glasses/hearing aids, and up in chair during daytime). Will avoid narcotics and benzos unless absolutely necessary. PRN anti-psychotics are not prescribed to avoid self harm behaviors.  - Given dose of IV Haldol overnight

## 2024-03-26 NOTE — ASSESSMENT & PLAN NOTE
- pre-diabetes hx  - HgbA1C pending  - [facility] w/monitoring for glucose control

## 2024-03-26 NOTE — ASSESSMENT & PLAN NOTE
- Patient with known CAD s/p stent placement, which is controlled Will continue ASA, Plavix, and Statin when resumes PO intake and monitor for S/Sx of angina/ACS. Continue to monitor on telemetry.

## 2024-03-26 NOTE — PLAN OF CARE
Pt w/3 liquid BMs this shift; only able to collect enough stool for part of ordered stool labs. 1 episode of emesis after PO Tylenol. C/o of abd cramping. Pt able to relax and sleep b/t care after IV Haldol. Denies nausea this AM. NSR q/1st deg AVB on monitor. Tmax 99.9. Good UOP per cho. IVF and IV abx per orders. Q6 Na improving. KCl riders completed. Pt turns independently in bed, but refusing to turn off of her right side; reinforced education; heels floated. Fall precautions in place, bed alarm on. POC discussed w/pt, GABY.

## 2024-03-26 NOTE — HPI
Gemma Vick is a 69-year-old female with a past history significant for COPD, former smoker, CAD s/p stents, KALLIE, vascular dementia, Alzheimer's, AAA-stable on CT, HTN, HLD, GERD, diverticulitis/osis, chronic mesenteric ischemia, GERD, DDD, osteoarthritis, lumbar radiculopathy, who presented to the ED c/o diffuse abdominal pain, nausea, generalized weakness onset today. Reports poor oral intake for several days. States she fell several days ago and also has left hip pain, and was started on Robaxin. Last BM yesterday, small, soft/formed, dark, but has been dark since starting iron supplements. She also endorses oliguria w/dark urine, hemorrhoids, hx of constipation and feels like she needs an enema. Reports several episodes of vomiting today, stating it was more reflux and some bile, but very small volume. Patient denies fever, chills, cough, congestion, dizziness, dyspnea, chest discomfort, dysuria, hematuria, myalgias. Patient denies sick contacts, potential of contaminated food or water. ED workup with noted leukocytosis, hyponatremia-corrected 123, hyperglycemia. CT c/a/p shows colonic wall thickening/inflammation of descending colon consistent with colitis, surgically absent gall bladder. CXR and L hip XR w/NAF. CT head w/NAF. UA w/no evidence of infection or ketonuria. Lactate, procalcitonin, troponin, TSH, lipase, LFT's WNL. Flu/COVID negative. She was found to have a rectal temp of 92 and was placed on Mirna hugger. She was given warmed IVF, antibiotics, glycerin suppository, has cho w/bg urine, adequate output noted in bag. Critical care was consulted for admission and management due to hypothermia requiring active re-warming.

## 2024-03-26 NOTE — EICU
68 yo female ex smoker w/ PMHx COPD, CAD s/p PCI, Vascular dementia, Alzheimer's, AAA-stable on CT, HTN, HL, Hx Diverticulitis/osis, chronic mesenteric ischemia who presented to the ED c/o diffuse abdominal pain, nausea, generalized weakness onset today.     CT c/a/p shows colonic wall thickening/inflammation of descending colon consistent with colitis, surgically absent gall bladder.     Hyperglycemia  Hypothermia    WBC 14  Na 121  LA 1.0  Cr 1.3    Now on cefepime and flagyl s/p cx sent. Consider vanco IV if +ve or high risk for MRSA.   Agree w/ IV hydration w/ NS @ 100ml/hr    Inhalers for COPD.  F/up UOP  Please watch for s/s fluid overload.     Lovenox for DVT prophy.     Detailed H&P noted in the chart.   Please call us for further assistance.

## 2024-03-26 NOTE — ASSESSMENT & PLAN NOTE
Patient has hyponatremia which is uncontrolled, likely progressing for >48 hr. We will aim to correct the sodium by 4-6mEq in 24 hours. We will monitor sodium Every 6 hours. The hyponatremia is due to Dehydration/hypovolemia. We will obtain the following studies: urine sodium, urine creatinine, urine osmolality. We will treat the hyponatremia with IV fluids as follows: NS. The patient's sodium results have been reviewed and are listed below. Corrected Na 123.   Recent Labs   Lab 03/25/24  1847   *

## 2024-03-26 NOTE — ASSESSMENT & PLAN NOTE
- Hypothermia has improved; WBC 12.55; blood cx w/ no growth  - Stool studies pending; C.diff cx unable to be obtained due to consistency of stool; no diarrhea over past week  - Continue abx, follow cultures and adjust as indicated; avoid anti-motility agents for now  - NPO for now, advance diet as tolerated

## 2024-03-26 NOTE — ASSESSMENT & PLAN NOTE
- hx of, but no evidence on CT of acute  - reports of being high anesthesia risk and unable to have colonoscopy.   - last Cologard was 5/2022 and marked as positive, w/repeat pending

## 2024-03-26 NOTE — ASSESSMENT & PLAN NOTE
- Patient has hyponatremia which is uncontrolled, likely progressing for >48 hr. We will aim to correct the sodium by 4-6mEq in 24 hours. We will monitor sodium Every 6 hours. The hyponatremia is due to Dehydration/hypovolemia. We will obtain the following studies: urine sodium, urine creatinine, urine osmolality. We will treat the hyponatremia with IV fluids as follows: NS. The patient's sodium results have been reviewed and are listed below. Corrected Na 123.   Recent Labs   Lab 03/26/24  0615   *   - Na is improving; continue serial checks q6h  - Possible transfer to floor today

## 2024-03-26 NOTE — H&P
[facility] - Intensive Care ([facility])  Critical Care Medicine  History & Physical    Patient Name: Gemma Vick  MRN: [id number]  Admission Date: 3/25/2024  Hospital Length of Stay: 0 days  Code Status: Full Code  Attending Physician: Jannette Osman MD   Primary Care Provider: Olga Altman MD   Principal Problem: Colitis presumed infectious    Subjective:     HPI:  Gemma Vick is a 69-year-old female with a past history significant for COPD, former smoker, CAD s/p stents, KALLIE, vascular dementia, Alzheimer's, AAA-stable on CT, HTN, HLD, GERD, diverticulitis/osis, chronic mesenteric ischemia, GERD, DDD, osteoarthritis, lumbar radiculopathy, who presented to the ED c/o diffuse abdominal pain, nausea, generalized weakness onset today. Reports poor oral intake for several days. States she fell several days ago and also has left hip pain, and was started on Robaxin. Last BM yesterday, small, soft/formed, dark, but has been dark since starting iron supplements. She also endorses oliguria w/dark urine, hemorrhoids, hx of constipation and feels like she needs an enema. Reports several episodes of vomiting today, stating it was more reflux and some bile, but very small volume. Patient denies fever, chills, cough, congestion, dizziness, dyspnea, chest discomfort, dysuria, hematuria, myalgias. Patient denies sick contacts, potential of contaminated food or water.     ED workup with noted leukocytosis, hyponatremia-corrected 123, hyperglycemia. CT c/a/p shows colonic wall thickening/inflammation of descending colon consistent with colitis, surgically absent gall bladder. CXR and L hip XR w/NAF. CT head w/NAF. UA w/no evidence of infection or ketonuria. Lactate, procalcitonin, troponin, TSH, lipase, LFT's WNL. Flu/COVID negative. She was found to have a rectal temp of 92 and was placed on Mirna hugger. She was given warmed IVF, antibiotics, glycerin suppository, has cho w/bg urine, adequate output noted in bag.  Critical care was consulted for admission and management due to hypothermia requiring active re-warming.     Hospital/ICU Course:  No notes on file     Past Medical History:   Diagnosis Date    AAA (abdominal aortic aneurysm) 02/13/2014    Abdominal aneurysm     3    Acute coronary syndrome     Arthritis     BPPV (benign paroxysmal positional vertigo)     Carotid artery plaque     Carotid artery stenosis and occlusion 02/13/2014    Chronic back pain     COPD (chronic obstructive pulmonary disease)     Coronary artery disease     Dementia     Emphysema lung     Hyperlipidemia     Hypertension     Myocardial infarction     x3    Neuropathy     Personal history of COVID-19 06/09/2021 11/16/2020 +Covid, recovered at home        Past Surgical History:   Procedure Laterality Date    CARDIAC CATHETERIZATION      CORONARY ANGIOPLASTY      CORONARY STENT PLACEMENT N/A 9/12/2023    Procedure: INSERTION, STENT, CORONARY ARTERY;  Surgeon: Millie Juarez MD;  Location: [facility] CATH LAB;  Service: Cardiology;  Laterality: N/A;    HYSTERECTOMY  1988    LEFT HEART CATHETERIZATION Left 9/12/2023    Procedure: Left heart cath;  Surgeon: Millie Juarez MD;  Location: [facility] CATH LAB;  Service: Cardiology;  Laterality: Left;    PERCUTANEOUS CORONARY INTERVENTION, ARTERY N/A 9/12/2023    Procedure: Percutaneous coronary intervention;  Surgeon: Millie Juarez MD;  Location: [facility] CATH LAB;  Service: Cardiology;  Laterality: N/A;    THROMBECTOMY, CORONARY  9/12/2023    Procedure: Thrombectomy, Coronary;  Surgeon: Millie Juarez MD;  Location: [facility] CATH LAB;  Service: Cardiology;;    TONSILLECTOMY      TRANSFORAMINAL EPIDURAL INJECTION OF STEROID Right 12/1/2023    Procedure: Injection,steroid,epidural,transforaminal approach- right side, L4/5 and L5/S1;  Surgeon: Lydia Roberts MD;  Location: [facility] [place];  Service: Pain Management;  Laterality: Right;       Review of patient's allergies indicates:  No Known Allergies    Family  History       Problem Relation (Age of Onset)    Breast cancer Paternal Aunt    Heart attacks under age 50 Father, Brother    Heart disease Mother          Tobacco Use    Smoking status: Former     Current packs/day: 0.00     Average packs/day: 0.5 packs/day for 46.9 years (23.4 ttl pk-yrs)     Types: Cigarettes, Vaping w/o nicotine     Start date: 1970     Quit date: 2017     Years since quittin.9    Smokeless tobacco: Never    Tobacco comments:     3 MG NICOTINE FOR HEART.    Substance and Sexual Activity    Alcohol use: No    Drug use: No    Sexual activity: Not Currently     Birth control/protection: None         Review of Systems   Constitutional:  Positive for activity change and appetite change. Negative for chills, diaphoresis, fatigue, fever and unexpected weight change.   HENT: Negative.     Eyes: Negative.    Respiratory: Negative.     Cardiovascular: Negative.    Gastrointestinal:  Positive for abdominal pain and nausea. Negative for abdominal distention, blood in stool, constipation, diarrhea and vomiting.   Endocrine: Negative.    Genitourinary:  Positive for decreased urine volume.   Musculoskeletal:  Positive for arthralgias.   Skin: Negative.    Allergic/Immunologic: Negative.    Neurological:  Negative for syncope, light-headedness and headaches.   Hematological: Negative.    Psychiatric/Behavioral: Negative.       Objective:     Vital Signs (Most Recent):  Temp: (!) 94.5 °F (34.7 °C) (24)  Pulse: 78 (24)  Resp: (!) 21 (24)  BP: (!) 129/59 (24)  SpO2: 96 % (24) Vital Signs (24h Range):  Temp:  [92.9 °F (33.8 °C)-94.5 °F (34.7 °C)] 94.5 °F (34.7 °C)  Pulse:  [61-78] 78  Resp:  [16-21] 21  SpO2:  [95 %-99 %] 96 %  BP: (119-134)/(54-62) 129/59     Weight: 58 kg (127 lb 13.9 oz)  Body mass index is 23.39 kg/m².      Intake/Output Summary (Last 24 hours) at 3/25/2024 2226  Last data filed at 3/25/2024 2015  Gross per 24 hour   Intake  1603 ml   Output --   Net 1603 ml        Physical Exam  Vitals and nursing note reviewed.   Constitutional:       Appearance: She is ill-appearing.   HENT:      Head: Normocephalic and atraumatic.      Mouth/Throat:      Mouth: Mucous membranes are dry.      Pharynx: Oropharynx is clear.   Eyes:      Extraocular Movements: Extraocular movements intact.      Pupils: Pupils are equal, round, and reactive to light.   Cardiovascular:      Rate and Rhythm: Normal rate and regular rhythm.      Pulses: Normal pulses.      Heart sounds: Normal heart sounds.   Pulmonary:      Effort: Pulmonary effort is normal.      Breath sounds: Normal breath sounds.   Abdominal:      General: Bowel sounds are normal. There is no distension.      Palpations: Abdomen is soft.      Tenderness: There is abdominal tenderness. There is no right CVA tenderness, left CVA tenderness or guarding.   Musculoskeletal:         General: Tenderness and signs of injury present.      Cervical back: Normal range of motion and neck supple.      Comments: Left hip pain 2/2 fall several days prior, no deformity or crepitus   Skin:     General: Skin is warm and dry.      Capillary Refill: Capillary refill takes less than 2 seconds.   Neurological:      General: No focal deficit present.      Mental Status: She is alert and oriented to person, place, and time.   Psychiatric:         Behavior: Behavior is cooperative.        Vents:       Lines/Drains/Airways       Drain  Duration                  Urethral Catheter 03/25/24 2045 Temperature probe 16 Fr. <1 day              Peripheral Intravenous Line  Duration                  Peripheral IV - Single Lumen 03/25/24 1900 20 G Right Antecubital <1 day         Peripheral IV - Single Lumen 03/25/24 2000 20 G Anterior;Left Forearm <1 day                    Significant Labs:    CBC/Anemia Profile:  Recent Labs   Lab 03/25/24  1847   WBC 14.02*   HGB 12.3   HCT 35.1*      MCV 87   RDW 11.8         Chemistries:  Recent Labs   Lab 03/25/24  1847   *   K 3.6   CL 89*   CO2 21*   BUN 17   CREATININE 1.3   CALCIUM 9.4   ALBUMIN 3.8   PROT 7.3   BILITOT 0.8   ALKPHOS 104   ALT 17   AST 23   MG 2.1       Lactic Acid:   Recent Labs   Lab 03/25/24 1847   LACTATE 1.0     Troponin:   Recent Labs   Lab 03/25/24 1847   TROPONINI <0.006     All pertinent labs within the past 24 hours have been reviewed.    Significant Imaging:   I have reviewed all pertinent imaging results/findings within the past 24 hours.  Assessment/Plan:     Neuro  Moderate vascular dementia without behavioral disturbance, psychotic disturbance, mood disturbance, or anxiety  Patient with dementia with likely etiology of vascular dementia. Dementia is mild. The patient does not have signs of behavioral disturbance. Home dementia medications are Held or Continued: held.until PO intake resumes. Continue non-pharmacologic interventions to prevent delirium (No VS between 11PM-5AM, activity during day, opening blinds, providing glasses/hearing aids, and up in chair during daytime). Will avoid narcotics and benzos unless absolutely necessary. PRN anti-psychotics are not prescribed to avoid self harm behaviors.    Pulmonary  COPD without exacerbation  Patient's COPD is controlled currently.  Patient is currently off COPD Pathway. Continue scheduled inhalers and monitor respiratory status closely.   - goal SpO2>88%    Cardiac/Vascular  AAA (abdominal aortic aneurysm)  - stable on CT, 3.9 cm  - avoid hypertensive states    Coronary artery disease of native artery of native heart with stable angina pectoris  Patient with known CAD s/p stent placement, which is controlled Will continue ASA, Plavix, and Statin when resumes PO intake and monitor for S/Sx of angina/ACS. Continue to monitor on telemetry.     Essential hypertension  - holding home meds for now  - resume as needed    Hyperlipidemia  - statin when able to take  PO      Renal/  Hyponatremia  Patient has hyponatremia which is uncontrolled, likely progressing for >48 hr. We will aim to correct the sodium by 4-6mEq in 24 hours. We will monitor sodium Every 6 hours. The hyponatremia is due to Dehydration/hypovolemia. We will obtain the following studies: urine sodium, urine creatinine, urine osmolality. We will treat the hyponatremia with IV fluids as follows: NS. The patient's sodium results have been reviewed and are listed below. Corrected Na 123.   Recent Labs   Lab 03/25/24  1847   *       ID  Sepsis  This patient does have evidence of infective focus  My overall impression is sepsis.  Source: Abdominal  Antibiotics given-   Antibiotics (72h ago, onward)      Start     Stop Route Frequency Ordered    03/26/24 0900  mupirocin 2 % ointment         03/31/24 0859 Nasl 2 times daily 03/25/24 2210    03/26/24 0800  ceFEPIme (MAXIPIME) 1 g in dextrose 5 % in water (D5W) 100 mL IVPB (MB+)         -- IV Every 12 hours (non-standard times) 03/25/24 2159 03/25/24 2200  metronidazole IVPB 500 mg         -- IV Every 8 hours (non-standard times) 03/25/24 2054          Latest lactate reviewed-  Recent Labs   Lab 03/25/24  1847   LACTATE 1.0     Organ dysfunction indicated by  hypothermia    Fluid challenge was administered in ED.     Post- resuscitation assessment Yes Perfusion exam was performed within 6 hours of septic shock presentation after bolus shows Adequate tissue perfusion assessed by non-invasive monitoring       Will Not start Pressors- Levophed for MAP of 65  Source control achieved by: antibiotics    Endocrine  Hyperglycemia  - pre-diabetes hx  - HgbA1C pending  - SSI w/monitoring for glucose control    GI  * Colitis presumed infectious  - blood cx pending  - stool studies ordered  - continue abx, follow cultures and adjust as indicated  - NPO for now, advance diet as tolerated     GERD (gastroesophageal reflux disease)  - continue protonix daily  - hold  NSAID's    Diverticulitis  - hx of, but no evidence on CT of acute  - reports of being high anesthesia risk and unable to have colonoscopy.   - last Cologard was 5/2022 and marked as positive, w/repeat pending      Palliative Care  History of fall  - NAF on imaging  - pain control offered, refused narcotics, tylenol prn ordered        Critical Care Daily Checklist:    A: Awake: RASS Goal/Actual Goal:    Actual:     B: Spontaneous Breathing Trial Performed?     C: SAT & SBT Coordinated?  N/a                      D: Delirium: CAM-ICU     E: Early Mobility Performed? Yes   F: Feeding Goal:    Status:     Current Diet Order   Procedures    Diet NPO      AS: Analgesia/Sedation Bentyl, tyelnol   T: Thromboembolic Prophylaxis Lovenox   H: HOB > 300 Yes   U: Stress Ulcer Prophylaxis (if needed) Protonix   G: Glucose Control SSI   B: Bowel Function     I: Indwelling Catheter (Lines & Joe) Necessity Reviewed   D: De-escalation of Antimicrobials/Pharmacotherapies Reviewed    Plan for the day/ETD admission    Code Status:  Family/Goals of Care: Full Code  Discharge home     Critical Care Time: 45 minutes  Critical secondary to high risk monitoring. Patient has a condition that poses threat to life and bodily function.;    Critical care was time spent personally by me on the following activities: development of treatment plan with patient or surrogate and bedside caregivers, discussions with consultants, evaluation of patient's response to treatment, examination of patient, ordering and performing treatments and interventions, ordering and review of laboratory studies, ordering and review of radiographic studies, pulse oximetry, re-evaluation of patient's condition. This critical care time did not overlap with that of any other provider or involve time for any procedures.     Blanca Flores NP  Critical Care Medicine  [facility] - Intensive Care ([facility])

## 2024-03-26 NOTE — ASSESSMENT & PLAN NOTE
Patient with known CAD s/p stent placement, which is controlled Will continue ASA, Plavix, and Statin when resumes PO intake and monitor for S/Sx of angina/ACS. Continue to monitor on telemetry.

## 2024-03-26 NOTE — ASSESSMENT & PLAN NOTE
Patient with dementia with likely etiology of vascular dementia. Dementia is mild. The patient does not have signs of behavioral disturbance. Home dementia medications are Held or Continued: held.until PO intake resumes. Continue non-pharmacologic interventions to prevent delirium (No VS between 11PM-5AM, activity during day, opening blinds, providing glasses/hearing aids, and up in chair during daytime). Will avoid narcotics and benzos unless absolutely necessary. PRN anti-psychotics are not prescribed to avoid self harm behaviors.

## 2024-03-26 NOTE — PHARMACY MED REC
"Admission Medication History     The home medication history was taken by Meaghan Talbot.    You may go to "Admission" then "Reconcile Home Medications" tabs to review and/or act upon these items.     The home medication list has been updated by the Pharmacy department.   Please read ALL comments highlighted in yellow.   Please address this information as you see fit.    Feel free to contact us if you have any questions or require assistance.        Medications listed below were obtained from: Patient/family and Analytic software- [employer]  (Not in a hospital admission)      LAST [facility] REC COMPLETED:         Meaghan Talbot  [id number]    Current Outpatient Medications on File Prior to Encounter   Medication Sig Dispense Refill Last Dose    albuterol (PROVENTIL/VENTOLIN HFA) 90 mcg/actuation inhaler Inhale 2 puffs into the lungs every 4 (four) hours as needed for Wheezing. 12 g 12 Past Week    albuterol-ipratropium (DUO-NEB) 2.5 mg-0.5 mg/3 mL nebulizer solution Take 3 mLs by nebulization every 6 (six) hours as needed for Wheezing. 75 mL 12 Past Week    amLODIPine (NORVASC) 5 MG tablet Take 1 tablet (5 mg total) by mouth once daily. 30 tablet 11 3/25/2024    aspirin 81 MG Chew Take 81 mg by mouth once daily.   3/25/2024    budesonide-glycopyr-formoterol (BREZTRI AEROSPHERE) 160-9-4.8 mcg/actuation HFAA Inhale 2 puffs into the lungs 2 (two) times a day. 32.1 g 3 Past Week    carvediloL (COREG) 6.25 MG tablet Take 1 tablet (6.25 mg total) by mouth 2 (two) times daily with meals. 60 tablet 11 3/25/2024    clopidogreL (PLAVIX) 75 mg tablet Take 1 tablet (75 mg total) by mouth once daily. 90 tablet 3 3/25/2024    COMBIVENT RESPIMAT  mcg/actuation inhaler Inhale 1 puff into the lungs every 6 (six) hours as needed for Wheezing. 12 g 11 Past Week    cyanocobalamin (VITAMIN B-12) 100 MCG tablet Take 100 mcg by mouth once daily.   3/25/2024    diclofenac sodium (VOLTAREN) 1 % Gel Apply topically 4 (four) times daily. " 150 g 1 3/25/2024    ezetimibe-simvastatin 10-40 mg (VYTORIN) 10-40 mg per tablet Take 1 tablet by mouth every evening. 90 tablet 2 Past Week    famotidine (PEPCID) 20 MG tablet Take 1 tablet (20 mg total) by mouth 2 (two) times daily as needed for Heartburn. 60 tablet 11 3/25/2024    hydroCHLOROthiazide (MICROZIDE) 12.5 mg capsule Take 1 capsule (12.5 mg total) by mouth once daily. HASN'T STARTED YET 90 capsule 3 3/25/2024    inhalation spacing device (COMPACT SPACE CHAMBER) USE AS DIRECTED 1 each 2 Past Week    memantine (NAMENDA) 10 MG Tab Take 1 tablet (10 mg total) by mouth 2 (two) times daily. 180 tablet 12 3/25/2024    olmesartan (BENICAR) 40 MG tablet Take 1 tablet (40 mg total) by mouth once daily. 90 tablet 3 3/25/2024    OXYGEN-AIR DELIVERY SYSTEMS MISC 3 L by Misc.(Non-Drug; Combo Route) route every evening.   3/25/2024    pantoprazole (PROTONIX) 40 MG tablet Take 1 tablet (40 mg total) by mouth once daily. 90 tablet 3 3/25/2024    vitamin D (VITAMIN D3) 1000 units Tab Take 1,000 Units by mouth once daily.   3/25/2024    zolpidem (AMBIEN) 10 mg Tab Take 1 tablet (10 mg total) by mouth every evening. 30 tablet 5 3/24/2024    dicyclomine (BENTYL) 10 MG capsule Take 1 capsule (10 mg total) by mouth 3 (three) times daily. PRN (Patient not taking: Reported on 3/25/2024) 90 capsule 2 Not Taking    FEROSUL 325 mg (65 mg iron) Tab tablet Take 1 tablet (325 mg total) by mouth 2 (two) times daily. (Patient not taking: Reported on 3/25/2024) 60 tablet 12 Not Taking    furosemide (LASIX) 20 MG tablet Take 1 tablet (20 mg total) by mouth once daily. PRN (Patient not taking: Reported on 3/25/2024) 90 tablet 2 Not Taking                           .

## 2024-03-26 NOTE — ASSESSMENT & PLAN NOTE
This patient does have evidence of infective focus  My overall impression is sepsis.  Source: Abdominal  Antibiotics given-   Antibiotics (72h ago, onward)      Start     Stop Route Frequency Ordered    03/26/24 0900  mupirocin 2 % ointment         03/31/24 0859 Nasl 2 times daily 03/25/24 2210    03/26/24 0800  ceFEPIme (MAXIPIME) 1 g in dextrose 5 % in water (D5W) 100 mL IVPB (MB+)         -- IV Every 12 hours (non-standard times) 03/25/24 2159 03/25/24 2200  metronidazole IVPB 500 mg         -- IV Every 8 hours (non-standard times) 03/25/24 2054          Latest lactate reviewed-  Recent Labs   Lab 03/25/24  1847   LACTATE 1.0     Organ dysfunction indicated by  hypothermia    Fluid challenge was administered in ED.     Post- resuscitation assessment Yes Perfusion exam was performed within 6 hours of septic shock presentation after bolus shows Adequate tissue perfusion assessed by non-invasive monitoring       Will Not start Pressors- Levophed for MAP of 65  Source control achieved by: antibiotics

## 2024-03-26 NOTE — PROGRESS NOTES
[facility] ([facility])  Critical Care Medicine  Progress Note    Patient Name: Gemma Vick  MRN: [id number]  Admission Date: 3/25/2024  Hospital Length of Stay: 1 days  Code Status: Full Code  Attending Provider: Jannette Osman MD  Primary Care Provider: Olga Altman MD   Principal Problem: Colitis presumed infectious  Chief Complaint: Abdominal pain     Subjective:     HPI:  Gemma Vick is a 69-year-old female with a past history significant for COPD, former smoker, CAD s/p stents, KALLIE, vascular dementia, Alzheimer's, AAA-stable on CT, HTN, HLD, GERD, diverticulitis/osis, chronic mesenteric ischemia, GERD, DDD, osteoarthritis, lumbar radiculopathy, who presented to the ED c/o diffuse abdominal pain, nausea, generalized weakness onset today. Reports poor oral intake for several days. States she fell several days ago and also has left hip pain, and was started on Robaxin. Last BM yesterday, small, soft/formed, dark, but has been dark since starting iron supplements. She also endorses oliguria w/dark urine, hemorrhoids, hx of constipation and feels like she needs an enema. Reports several episodes of vomiting today, stating it was more reflux and some bile, but very small volume. Patient denies fever, chills, cough, congestion, dizziness, dyspnea, chest discomfort, dysuria, hematuria, myalgias. Patient denies sick contacts, potential of contaminated food or water.     ED workup with noted leukocytosis, hyponatremia-corrected 123, hyperglycemia. CT c/a/p shows colonic wall thickening/inflammation of descending colon consistent with colitis, surgically absent gall bladder. CXR and L hip XR w/NAF. CT head w/NAF. UA w/no evidence of infection or ketonuria. Lactate, procalcitonin, troponin, TSH, lipase, LFT's WNL. Flu/COVID negative. She was found to have a rectal temp of 92 and was placed on Mirna hugger. She was given warmed IVF, antibiotics, glycerin suppository, has cho w/bg urine, adequate  output noted in bag. Critical care was consulted for admission and management due to hypothermia requiring active re-warming.     Hospital/ICU Course:  3/26 - AM Na improved to 126. Stool cx pending. Receiving IVF. Temp improved.   Objective:     Vital Signs (Most Recent):  Temp: 99.9 °F (37.7 °C) (03/26/24 0600)  Pulse: 82 (03/26/24 0755)  Resp: 20 (03/26/24 0755)  BP: (!) 151/63 (03/26/24 0600)  SpO2: 98 % (03/26/24 0755) Vital Signs (24h Range):  Temp:  [92.9 °F (33.8 °C)-99.9 °F (37.7 °C)] 99.9 °F (37.7 °C)  Pulse:  [61-86] 82  Resp:  [14-23] 20  SpO2:  [95 %-99 %] 98 %  BP: (104-151)/(54-65) 151/63     Weight: 59.6 kg (131 lb 6.3 oz)  Body mass index is 24.03 kg/m².  Intake/Output Summary (Last 24 hours) at 3/26/2024 1114  Last data filed at 3/26/2024 0653  Gross per 24 hour   Intake 2953.44 ml   Output 1940 ml   Net 1013.44 ml     Physical Exam  Constitutional:       General: She is not in acute distress.     Appearance: She is ill-appearing.   HENT:      Head: Normocephalic.   Eyes:      General: No scleral icterus.  Cardiovascular:      Rate and Rhythm: Normal rate and regular rhythm.      Pulses: Normal pulses.   Abdominal:      General: Abdomen is flat. Bowel sounds are normal. There is no distension.      Palpations: Abdomen is soft.      Tenderness: There is generalized abdominal tenderness. There is guarding.   Musculoskeletal:      Cervical back: Normal range of motion.      Right lower leg: No edema.      Left lower leg: No edema.   Skin:     General: Skin is warm.      Capillary Refill: Capillary refill takes 2 to 3 seconds.      Coloration: Skin is not jaundiced.   Neurological:      Mental Status: She is oriented to person, place, and time.   Psychiatric:         Mood and Affect: Mood normal.      Review of Systems   Constitutional:  Positive for fatigue. Negative for fever.   Respiratory:  Negative for chest tightness and shortness of breath.    Cardiovascular:  Negative for chest pain and  palpitations.   Gastrointestinal:  Positive for abdominal pain and nausea. Negative for diarrhea.   Genitourinary:  Negative for dysuria.   Musculoskeletal:  Negative for myalgias.   Neurological:  Negative for dizziness, light-headedness and headaches.   Psychiatric/Behavioral:  Negative for agitation and confusion.      Vents:  Oxygen Concentration (%): 32 (03/26/24 0755)    Lines/Drains/Airways       Drain  Duration                  Urethral Catheter 03/25/24 2045 Temperature probe 16 Fr. <1 day              Peripheral Intravenous Line  Duration                  Peripheral IV - Single Lumen 03/25/24 1900 20 G Right Antecubital <1 day         Peripheral IV - Single Lumen 03/25/24 2000 20 G Anterior;Left Forearm <1 day                  Significant Labs:    CBC/Anemia Profile:  Recent Labs   Lab 03/25/24 1847 03/26/24  0400 03/26/24  0450   WBC 14.02* 12.55  --    HGB 12.3 12.4  --    HCT 35.1* 35.0*  --     223  --    MCV 87 88  --    RDW 11.8 11.6  --    OCCULTBLOOD  --   --  Positive*     Chemistries:  Recent Labs   Lab 03/25/24 1847 03/25/24  2359 03/26/24  0400 03/26/24  0615   * 125* 126* 126*   K 3.6  --  3.3*  --    CL 89*  --  97  --    CO2 21*  --  18*  --    BUN 17  --  14  --    CREATININE 1.3  --  0.9  --    CALCIUM 9.4  --  8.9  --    ALBUMIN 3.8  --  3.2*  --    PROT 7.3  --  6.1  --    BILITOT 0.8  --  1.0  --    ALKPHOS 104  --  183*  --    ALT 17  --  14  --    AST 23  --  22  --    MG 2.1  --  1.6  --    PHOS  --   --  2.0*  --      Blood Culture:   Recent Labs   Lab 03/25/24 1848 03/25/24  1942   LABBLOO No Growth to date No Growth to date     Lactic Acid:   Recent Labs   Lab 03/25/24 1847   LACTATE 1.0     POCT Glucose:   Recent Labs   Lab 03/25/24  1827 03/25/24  2345   POCTGLUCOSE 224* 124*     Significant Imaging:  I have reviewed all pertinent imaging results/findings within the past 24 hours.  I have reviewed and interpreted all pertinent imaging results/findings within the  past 24 hours.    Imaging Results               CT Chest Abdomen Pelvis With IV Contrast (XPD) NO Oral Contrast (Final result)  Result time 03/25/24 20:25:09      Final result by Gurpreet Lopez MD (03/25/24 20:25:09)                   Impression:      Colonic wall thickening over a moderate length of the descending colon with local inflammatory change and small volume adjacent free fluid favored to be reactive.  Findings are concerning for infectious or inflammatory colitis.  Correlation is advised.    Complete findings as above.    This report was flagged in Epic as abnormal.    All CT scans at this facility are performed  using dose modulation techniques as appropriate to performed exam including the following:  automated exposure control; adjustment of mA and/or kV according to the patients size (this includes techniques or standardized protocols for targeted exams where dose is matched to indication/reason for exam: i.e. extremities or head);  iterative reconstruction technique.      Electronically signed by: Gurpreet Lopez  Date:    03/25/2024  Time:    20:25               Narrative:    EXAMINATION:  CT CHEST ABDOMEN PELVIS WITH IV CONTRAST (XPD)    CLINICAL HISTORY:  SBO;    TECHNIQUE:  Axial images of the chest, abdomen, and pelvis were acquired  after the use of 100 cc [id number] IV contrast.  Coronal and sagittal reconstructions were also obtained    COMPARISON:  Multiple priors.    FINDINGS:  Thoracic soft tissues: No significant abnormality.    Aorta: Moderate aortic atherosclerosis.  No thoracic aortic aneurysm    Heart: Normal in size. No pericardial effusion.    Kandy/Mediastinum: No significant lymphadenopathy    Lungs: Moderate panlobular emphysema.  No pleural fluid or pneumothorax.  No acute alveolar opacities.  Motion degrades fine parenchymal evaluation.    Liver: Normal in size and attenuation, with no focal hepatic lesions.    Gallbladder: Gallbladder surgically absent.  Bile ducts within normal  limits.    Bile Ducts: No evidence of dilated ducts.    Pancreas: No mass or peripancreatic fat stranding.    Spleen: Unremarkable.    Adrenals: Unremarkable.    Kidneys/ Ureters: Normal in size and location. No hydronephrosis or nephrolithiasis. No ureteral dilatation. Left renal simple cyst which requires no dedicated follow-up.    Bladder: No evidence of wall thickening.    Reproductive organs: Unremarkable.    GI Tract/Mesentery: Colonic wall thickening over a moderate length segment of the descending colon with local inflammatory change and small volume adjacent free fluid favored to be reactive.  Findings are concerning for infectious or inflammatory colitis.  Diverticulosis noted.  No evidence of small-bowel obstruction.  The appendix is not well visualized    Peritoneal Space: No generalized ascites.  No free air.    Retroperitoneum:  No significant adenopathy.    Abdominal wall:  Unremarkable.    Vasculature: Infrarenal aortic aneurysm measuring up to 3.9 cm.  This is stable from the prior.  Moderate atherosclerosis.  Moderate atherosclerosis of the bilateral iliac vasculature.    Bones: No acute fracture. Age-appropriate degenerative changes.                               Assessment/Plan:     Neuro  Moderate vascular dementia without behavioral disturbance, psychotic disturbance, mood disturbance, or anxiety  - Patient with dementia with likely etiology of vascular dementia. Dementia is mild. The patient does not have signs of behavioral disturbance. Home dementia medications are Held or Continued: held.until PO intake resumes.   - Continue non-pharmacologic interventions to prevent delirium (No VS between 11PM-5AM, activity during day, opening blinds, providing glasses/hearing aids, and up in chair during daytime). Will avoid narcotics and benzos unless absolutely necessary. PRN anti-psychotics are not prescribed to avoid self harm behaviors.  - Given dose of IV Haldol overnight     Pulmonary  COPD  without exacerbation  - Patient's COPD is controlled currently.  Patient is currently off COPD Pathway. Continue scheduled inhalers and monitor respiratory status closely.   - Goal SpO2>88%    Cardiac/Vascular  AAA (abdominal aortic aneurysm)  - Stable on CT, 3.9 cm  - Avoid hypertensive states    Coronary artery disease of native artery of native heart with stable angina pectoris  - Patient with known CAD s/p stent placement, which is controlled Will continue ASA, Plavix, and Statin when resumes PO intake and monitor for S/Sx of angina/ACS. Continue to monitor on telemetry.     Essential hypertension  - Holding home meds for now; will resume as able to tolerate po meds  - Use prn anti-hypertensive agents as needed    Hyperlipidemia  - Statin when able to take PO    Renal/  Hyponatremia  - Patient has hyponatremia which is uncontrolled, likely progressing for >48 hr. We will aim to correct the sodium by 4-6mEq in 24 hours. We will monitor sodium Every 6 hours. The hyponatremia is due to Dehydration/hypovolemia. We will obtain the following studies: urine sodium, urine creatinine, urine osmolality. We will treat the hyponatremia with IV fluids as follows: NS. The patient's sodium results have been reviewed and are listed below. Corrected Na 123.   Recent Labs   Lab 03/26/24  0615   *   - Na is improving; continue serial checks q6h  - Possible transfer to floor today    ID  Sepsis  - This patient does have evidence of infective focus  - Source: Abdominal  Antibiotics (72h ago, onward)      Start     Stop Route Frequency Ordered    03/26/24 0900  mupirocin 2 % ointment         03/31/24 0859 Nasl 2 times daily 03/25/24 2210 03/26/24 0800  ceFEPIme (MAXIPIME) 1 g in dextrose 5 % in water (D5W) 100 mL IVPB (MB+)         -- IV Every 12 hours (non-standard times) 03/25/24 2159 03/25/24 2200  metronidazole IVPB 500 mg         -- IV Every 8 hours (non-standard times) 03/25/24 2054   - Organ dysfunction indicated  by  hypothermia  - Fluid challenge was administered in ED. Has received over 4L since admission  - BP has been stable; no need for pressors at this time  - Post- resuscitation assessment Yes Perfusion exam was performed within 6 hours of septic shock presentation after bolus shows Adequate tissue perfusion assessed by non-invasive monitoring   - Continue IVF and ABX therapy; will continue to follow cultures and de-escalate as indicated    Endocrine  Hyperglycemia  - History of pre-diabetes  - HgbA1C still pending  - Moderate correctional SSI     GI  * Colitis presumed infectious  - Hypothermia has improved; WBC 12.55; blood cx w/ no growth  - Stool studies pending; C.diff cx unable to be obtained due to consistency of stool; no diarrhea over past week  - Continue abx, follow cultures and adjust as indicated; avoid anti-motility agents for now  - NPO for now, advance diet as tolerated     GERD (gastroesophageal reflux disease)  - Continue protonix daily  - Hold NSAID's    Diverticulitis  - Hx of, but no evidence on CT of acute  - Reports of being high anesthesia risk and unable to have colonoscopy.   - Last Cologard was 5/2022 and marked as positive, w/repeat pending    Palliative Care  History of fall  - NAF on imaging  - Pain control offered, refused narcotics, tylenol prn ordered  - PT/OT    Dante Thompson PA-C  Critical Care Medicine  [facility] ([facility])

## 2024-03-26 NOTE — ASSESSMENT & PLAN NOTE
Patient's COPD is controlled currently.  Patient is currently off COPD Pathway. Continue scheduled inhalers and monitor respiratory status closely.   - goal SpO2>88%

## 2024-03-26 NOTE — ASSESSMENT & PLAN NOTE
- Patient's COPD is controlled currently.  Patient is currently off COPD Pathway. Continue scheduled inhalers and monitor respiratory status closely.   - Goal SpO2>88%

## 2024-03-26 NOTE — ASSESSMENT & PLAN NOTE
- Holding home meds for now; will resume as able to tolerate po meds  - Use prn anti-hypertensive agents as needed

## 2024-03-26 NOTE — PROGRESS NOTES
Patient arrived to ICU bed 14 via stretcher from ER accompanied by nurse Brooke RN. Transferred to bed. Heart monitor in place, vital signs taken. IVF and IV KCl continued. Patient is aaox4 but forgetful, c/o abd cramping, intermittent nausea. Reviewed PRN meds w/pt and next avail times; pt VU. Full HTT assessment as charted.   Patient has been orientated to room, call light, fall precautions, and board. Pt's  and granddaughter updated at bs. No questions or concerns at this time.

## 2024-03-26 NOTE — SUBJECTIVE & OBJECTIVE
Objective:     Vital Signs (Most Recent):  Temp: 99.9 °F (37.7 °C) (03/26/24 0600)  Pulse: 82 (03/26/24 0755)  Resp: 20 (03/26/24 0755)  BP: (!) 151/63 (03/26/24 0600)  SpO2: 98 % (03/26/24 0755) Vital Signs (24h Range):  Temp:  [92.9 °F (33.8 °C)-99.9 °F (37.7 °C)] 99.9 °F (37.7 °C)  Pulse:  [61-86] 82  Resp:  [14-23] 20  SpO2:  [95 %-99 %] 98 %  BP: (104-151)/(54-65) 151/63     Weight: 59.6 kg (131 lb 6.3 oz)  Body mass index is 24.03 kg/m².  Intake/Output Summary (Last 24 hours) at 3/26/2024 1114  Last data filed at 3/26/2024 0653  Gross per 24 hour   Intake 2953.44 ml   Output 1940 ml   Net 1013.44 ml     Physical Exam  Constitutional:       General: She is not in acute distress.     Appearance: She is ill-appearing.   HENT:      Head: Normocephalic.   Eyes:      General: No scleral icterus.  Cardiovascular:      Rate and Rhythm: Normal rate and regular rhythm.      Pulses: Normal pulses.   Abdominal:      General: Abdomen is flat. Bowel sounds are normal. There is no distension.      Palpations: Abdomen is soft.      Tenderness: There is generalized abdominal tenderness. There is guarding.   Musculoskeletal:      Cervical back: Normal range of motion.      Right lower leg: No edema.      Left lower leg: No edema.   Skin:     General: Skin is warm.      Capillary Refill: Capillary refill takes 2 to 3 seconds.      Coloration: Skin is not jaundiced.   Neurological:      Mental Status: She is oriented to person, place, and time.   Psychiatric:         Mood and Affect: Mood normal.      Review of Systems   Constitutional:  Positive for fatigue. Negative for fever.   Respiratory:  Negative for chest tightness and shortness of breath.    Cardiovascular:  Negative for chest pain and palpitations.   Gastrointestinal:  Positive for abdominal pain and nausea. Negative for diarrhea.   Genitourinary:  Negative for dysuria.   Musculoskeletal:  Negative for myalgias.   Neurological:  Negative for dizziness,  light-headedness and headaches.   Psychiatric/Behavioral:  Negative for agitation and confusion.      Vents:  Oxygen Concentration (%): 32 (03/26/24 0755)    Lines/Drains/Airways       Drain  Duration                  Urethral Catheter 03/25/24 2045 Temperature probe 16 Fr. <1 day              Peripheral Intravenous Line  Duration                  Peripheral IV - Single Lumen 03/25/24 1900 20 G Right Antecubital <1 day         Peripheral IV - Single Lumen 03/25/24 2000 20 G Anterior;Left Forearm <1 day                  Significant Labs:    CBC/Anemia Profile:  Recent Labs   Lab 03/25/24 1847 03/26/24  0400 03/26/24  0450   WBC 14.02* 12.55  --    HGB 12.3 12.4  --    HCT 35.1* 35.0*  --     223  --    MCV 87 88  --    RDW 11.8 11.6  --    OCCULTBLOOD  --   --  Positive*     Chemistries:  Recent Labs   Lab 03/25/24 1847 03/25/24  2359 03/26/24  0400 03/26/24  0615   * 125* 126* 126*   K 3.6  --  3.3*  --    CL 89*  --  97  --    CO2 21*  --  18*  --    BUN 17  --  14  --    CREATININE 1.3  --  0.9  --    CALCIUM 9.4  --  8.9  --    ALBUMIN 3.8  --  3.2*  --    PROT 7.3  --  6.1  --    BILITOT 0.8  --  1.0  --    ALKPHOS 104  --  183*  --    ALT 17  --  14  --    AST 23  --  22  --    MG 2.1  --  1.6  --    PHOS  --   --  2.0*  --      Blood Culture:   Recent Labs   Lab 03/25/24 1848 03/25/24  1942   LABBLOO No Growth to date No Growth to date     Lactic Acid:   Recent Labs   Lab 03/25/24 1847   LACTATE 1.0     POCT Glucose:   Recent Labs   Lab 03/25/24 1827 03/25/24  2345   POCTGLUCOSE 224* 124*     Significant Imaging:  I have reviewed all pertinent imaging results/findings within the past 24 hours.  I have reviewed and interpreted all pertinent imaging results/findings within the past 24 hours.    Imaging Results               CT Chest Abdomen Pelvis With IV Contrast (XPD) NO Oral Contrast (Final result)  Result time 03/25/24 20:25:09      Final result by Gurpreet Lopez MD (03/25/24 20:25:09)                    Impression:      Colonic wall thickening over a moderate length of the descending colon with local inflammatory change and small volume adjacent free fluid favored to be reactive.  Findings are concerning for infectious or inflammatory colitis.  Correlation is advised.    Complete findings as above.    This report was flagged in Epic as abnormal.    All CT scans at this facility are performed  using dose modulation techniques as appropriate to performed exam including the following:  automated exposure control; adjustment of mA and/or kV according to the patients size (this includes techniques or standardized protocols for targeted exams where dose is matched to indication/reason for exam: i.e. extremities or head);  iterative reconstruction technique.      Electronically signed by: Gurpreet Lopez  Date:    03/25/2024  Time:    20:25               Narrative:    EXAMINATION:  CT CHEST ABDOMEN PELVIS WITH IV CONTRAST (XPD)    CLINICAL HISTORY:  SBO;    TECHNIQUE:  Axial images of the chest, abdomen, and pelvis were acquired  after the use of 100 cc [id number] IV contrast.  Coronal and sagittal reconstructions were also obtained    COMPARISON:  Multiple priors.    FINDINGS:  Thoracic soft tissues: No significant abnormality.    Aorta: Moderate aortic atherosclerosis.  No thoracic aortic aneurysm    Heart: Normal in size. No pericardial effusion.    Kandy/Mediastinum: No significant lymphadenopathy    Lungs: Moderate panlobular emphysema.  No pleural fluid or pneumothorax.  No acute alveolar opacities.  Motion degrades fine parenchymal evaluation.    Liver: Normal in size and attenuation, with no focal hepatic lesions.    Gallbladder: Gallbladder surgically absent.  Bile ducts within normal limits.    Bile Ducts: No evidence of dilated ducts.    Pancreas: No mass or peripancreatic fat stranding.    Spleen: Unremarkable.    Adrenals: Unremarkable.    Kidneys/ Ureters: Normal in size and location. No  hydronephrosis or nephrolithiasis. No ureteral dilatation. Left renal simple cyst which requires no dedicated follow-up.    Bladder: No evidence of wall thickening.    Reproductive organs: Unremarkable.    GI Tract/Mesentery: Colonic wall thickening over a moderate length segment of the descending colon with local inflammatory change and small volume adjacent free fluid favored to be reactive.  Findings are concerning for infectious or inflammatory colitis.  Diverticulosis noted.  No evidence of small-bowel obstruction.  The appendix is not well visualized    Peritoneal Space: No generalized ascites.  No free air.    Retroperitoneum:  No significant adenopathy.    Abdominal wall:  Unremarkable.    Vasculature: Infrarenal aortic aneurysm measuring up to 3.9 cm.  This is stable from the prior.  Moderate atherosclerosis.  Moderate atherosclerosis of the bilateral iliac vasculature.    Bones: No acute fracture. Age-appropriate degenerative changes.

## 2024-03-26 NOTE — ASSESSMENT & PLAN NOTE
- blood cx pending  - stool studies ordered  - continue abx, follow cultures and adjust as indicated  - NPO for now, advance diet as tolerated    Left voicemail informing patient of the Psych Encounter form needing to be signed as a requirement from the insurance company for billing purposes. Patient can access form via [employer] and sign electronically.     Please make patient aware this form must be signed for each visit as a requirement to continue future visits with provider.

## 2024-03-26 NOTE — HOSPITAL COURSE
3/26 - AM Na improved to 126. Stool cx pending. Receiving IVF. Temp improved.   3/27 - Mild improvement in abdominal pain this am. NAEON. BP elevated this morning, restarted on home norvasc/coreg. GI/cards consulted. Na improved 125.

## 2024-03-26 NOTE — PLAN OF CARE
Pt AAOx4; forgetful. VSS. NS @100 mL/hr. 3L on NC. Adequate UOP and multiple semi-formed BM's for shift. NPO except water with meds. Reinforced teaching on fluid restriction. PRN bentyl given for abdominal cramping. Fall precautions in place, heels floated, pt turns independently. POC reviewed with spouse at bedside.

## 2024-03-26 NOTE — SUBJECTIVE & OBJECTIVE
Past Medical History:   Diagnosis Date    AAA (abdominal aortic aneurysm) 02/13/2014    Abdominal aneurysm     3    Acute coronary syndrome     Arthritis     BPPV (benign paroxysmal positional vertigo)     Carotid artery plaque     Carotid artery stenosis and occlusion 02/13/2014    Chronic back pain     COPD (chronic obstructive pulmonary disease)     Coronary artery disease     Dementia     Emphysema lung     Hyperlipidemia     Hypertension     Myocardial infarction     x3    Neuropathy     Personal history of COVID-19 06/09/2021 11/16/2020 +Covid, recovered at home        Past Surgical History:   Procedure Laterality Date    CARDIAC CATHETERIZATION      CORONARY ANGIOPLASTY      CORONARY STENT PLACEMENT N/A 9/12/2023    Procedure: INSERTION, STENT, CORONARY ARTERY;  Surgeon: Millie Juarez MD;  Location: [facility] CATH LAB;  Service: Cardiology;  Laterality: N/A;    HYSTERECTOMY  1988    LEFT HEART CATHETERIZATION Left 9/12/2023    Procedure: Left heart cath;  Surgeon: Millie Juarez MD;  Location: [facility] CATH LAB;  Service: Cardiology;  Laterality: Left;    PERCUTANEOUS CORONARY INTERVENTION, ARTERY N/A 9/12/2023    Procedure: Percutaneous coronary intervention;  Surgeon: Millie Juarez MD;  Location: [facility] CATH LAB;  Service: Cardiology;  Laterality: N/A;    THROMBECTOMY, CORONARY  9/12/2023    Procedure: Thrombectomy, Coronary;  Surgeon: Millie Juarez MD;  Location: [facility] CATH LAB;  Service: Cardiology;;    TONSILLECTOMY      TRANSFORAMINAL EPIDURAL INJECTION OF STEROID Right 12/1/2023    Procedure: Injection,steroid,epidural,transforaminal approach- right side, L4/5 and L5/S1;  Surgeon: Lydia Roberts MD;  Location: [facility];  Service: Pain Management;  Laterality: Right;       Review of patient's allergies indicates:  No Known Allergies    Family History       Problem Relation (Age of Onset)    Breast cancer Paternal Aunt    Heart attacks under age 50 Father, Brother    Heart disease Mother           Tobacco Use    Smoking status: Former     Current packs/day: 0.00     Average packs/day: 0.5 packs/day for 46.9 years (23.4 ttl pk-yrs)     Types: Cigarettes, Vaping w/o nicotine     Start date: 1970     Quit date: 2017     Years since quittin.9    Smokeless tobacco: Never    Tobacco comments:     3 MG NICOTINE FOR HEART.    Substance and Sexual Activity    Alcohol use: No    Drug use: No    Sexual activity: Not Currently     Birth control/protection: None         Review of Systems   Constitutional:  Positive for activity change and appetite change. Negative for chills, diaphoresis, fatigue, fever and unexpected weight change.   HENT: Negative.     Eyes: Negative.    Respiratory: Negative.     Cardiovascular: Negative.    Gastrointestinal:  Positive for abdominal pain and nausea. Negative for abdominal distention, blood in stool, constipation, diarrhea and vomiting.   Endocrine: Negative.    Genitourinary:  Positive for decreased urine volume.   Musculoskeletal:  Positive for arthralgias.   Skin: Negative.    Allergic/Immunologic: Negative.    Neurological:  Negative for syncope, light-headedness and headaches.   Hematological: Negative.    Psychiatric/Behavioral: Negative.       Objective:     Vital Signs (Most Recent):  Temp: (!) 94.5 °F (34.7 °C) (24)  Pulse: 78 (24)  Resp: (!) 21 (24)  BP: (!) 129/59 (24)  SpO2: 96 % (24) Vital Signs (24h Range):  Temp:  [92.9 °F (33.8 °C)-94.5 °F (34.7 °C)] 94.5 °F (34.7 °C)  Pulse:  [61-78] 78  Resp:  [16-21] 21  SpO2:  [95 %-99 %] 96 %  BP: (119-134)/(54-62) 129/59     Weight: 58 kg (127 lb 13.9 oz)  Body mass index is 23.39 kg/m².      Intake/Output Summary (Last 24 hours) at 3/25/2024 2226  Last data filed at 3/25/2024 2015  Gross per 24 hour   Intake 1603 ml   Output --   Net 1603 ml        Physical Exam  Vitals and nursing note reviewed.   Constitutional:       Appearance: She is ill-appearing.   HENT:       Head: Normocephalic and atraumatic.      Mouth/Throat:      Mouth: Mucous membranes are dry.      Pharynx: Oropharynx is clear.   Eyes:      Extraocular Movements: Extraocular movements intact.      Pupils: Pupils are equal, round, and reactive to light.   Cardiovascular:      Rate and Rhythm: Normal rate and regular rhythm.      Pulses: Normal pulses.      Heart sounds: Normal heart sounds.   Pulmonary:      Effort: Pulmonary effort is normal.      Breath sounds: Normal breath sounds.   Abdominal:      General: Bowel sounds are normal. There is no distension.      Palpations: Abdomen is soft.      Tenderness: There is abdominal tenderness. There is no right CVA tenderness, left CVA tenderness or guarding.   Musculoskeletal:         General: Tenderness and signs of injury present.      Cervical back: Normal range of motion and neck supple.      Comments: Left hip pain 2/2 fall several days prior, no deformity or crepitus   Skin:     General: Skin is warm and dry.      Capillary Refill: Capillary refill takes less than 2 seconds.   Neurological:      General: No focal deficit present.      Mental Status: She is alert and oriented to person, place, and time.   Psychiatric:         Behavior: Behavior is cooperative.        Vents:       Lines/Drains/Airways       Drain  Duration                  Urethral Catheter 03/25/24 2045 Temperature probe 16 Fr. <1 day              Peripheral Intravenous Line  Duration                  Peripheral IV - Single Lumen 03/25/24 1900 20 G Right Antecubital <1 day         Peripheral IV - Single Lumen 03/25/24 2000 20 G Anterior;Left Forearm <1 day                    Significant Labs:    CBC/Anemia Profile:  Recent Labs   Lab 03/25/24  1847   WBC 14.02*   HGB 12.3   HCT 35.1*      MCV 87   RDW 11.8        Chemistries:  Recent Labs   Lab 03/25/24  1847   *   K 3.6   CL 89*   CO2 21*   BUN 17   CREATININE 1.3   CALCIUM 9.4   ALBUMIN 3.8   PROT 7.3   BILITOT 0.8   ALKPHOS  104   ALT 17   AST 23   MG 2.1       Lactic Acid:   Recent Labs   Lab 03/25/24  1847   LACTATE 1.0     Troponin:   Recent Labs   Lab 03/25/24  1847   TROPONINI <0.006     All pertinent labs within the past 24 hours have been reviewed.    Significant Imaging:   I have reviewed all pertinent imaging results/findings within the past 24 hours.

## 2024-03-26 NOTE — PROGRESS NOTES
Pharmacist Renal Dose Adjustment Note    Gemma Vick is a 69 y.o. female being treated with the medication cefepime.    Patient Data:    Vital Signs (Most Recent):  Temp: (!) 94.3 °F (34.6 °C) (03/25/24 2156)  Pulse: 77 (03/25/24 2156)  Resp: 20 (03/25/24 2156)  BP: (!) 119/57 (03/25/24 2132)  SpO2: 95 % (03/25/24 2156) Vital Signs (72h Range):  Temp:  [92.9 °F (33.8 °C)-94.3 °F (34.6 °C)]   Pulse:  [61-78]   Resp:  [16-20]   BP: (119-134)/(54-62)   SpO2:  [95 %-99 %]      Recent Labs   Lab 03/25/24 1847   CREATININE 1.3     Serum creatinine: 1.3 mg/dL 03/25/24 1847  Estimated creatinine clearance: 32.3 mL/min    Cefepime 1 g IV every 8 hours will be changed to cefepime 1 g IV every 12 hours for the treatment of intra-abdominal infection with CrCl 30-60 ml/min.    Pharmacist's Name: Wally Conteh  Pharmacist's Extension: [phone]

## 2024-03-26 NOTE — PLAN OF CARE
[facility] - Intensive Care (Hospital)  Initial Discharge Assessment       Primary Care Provider: Olga Altman MD    Admission Diagnosis: Colitis [K52.9]  Hyponatremia [E87.1]  Weakness [R53.1]  Hyperglycemia [R73.9]  Hypothermia, initial encounter [T68.XXXA]  Sepsis with acute organ dysfunction without septic shock, due to unspecified organism, unspecified organ dysfunction type [A41.9, R65.20]    Admission Date: 3/25/2024  Expected Discharge Date:     Transition of Care Barriers: None    Payor: HU[employer] MANAGED MEDICARE / Plan: [employer] HMO PPO SPECIAL NEEDS / Product Type: Medicare Advantage /     Extended Emergency Contact Information  Primary Emergency Contact: Uzma Dominique  Address: [address] [place] [address]  Mobile Phone: [phone]  Relation: Grandchild  Secondary Emergency Contact: Haseeb Vick  Mobile Phone: [phone]  Relation: Spouse    Discharge Plan A: Home with family         [facility] [address] [address] [place] [address]  [address] [address] [address]  Phone: [phone] Fax: [phone]      Initial Assessment (most recent)       Adult Discharge Assessment - 03/26/24 1008          Discharge Assessment    Assessment Type Discharge Planning Assessment     Confirmed/corrected address, phone number and insurance Yes     Confirmed Demographics Correct on Facesheet     Source of Information patient     When was your last doctors appointment? 03/14/24     Communicated ZIGGY with patient/caregiver Date not available/Unable to determine     Reason For Admission Colitis presumed infectious     People in Home spouse     Facility Arrived From: home     Do you expect to return to your current living situation? Yes     Do you have help at home or someone to help you manage your care at home? Yes     Who are your caregiver(s) and their phone number(s)? Haseeb Leo     Prior to hospitilization cognitive status:  Alert/Oriented     Current cognitive status: Alert/Oriented     Walking or Climbing Stairs Difficulty yes     Walking or Climbing Stairs ambulation difficulty, requires equipment     Mobility Management can, rolling walker     Dressing/Bathing Difficulty no     Home Accessibility wheelchair accessible     Equipment Currently Used at Home cane, straight;walker, rolling;oxygen     Readmission within 30 days? No     Patient currently being followed by outpatient case management? No     Do you currently have service(s) that help you manage your care at home? No     Do you take prescription medications? Yes     Do you have prescription coverage? Yes     Coverage MCR     Do you have any problems affording any of your prescribed medications? No     Is the patient taking medications as prescribed? yes     Who is going to help you get home at discharge? Spouse, Haseeb     How do you get to doctors appointments? family or friend will provide     Are you on dialysis? No     Do you take coumadin? No     Discharge Plan A Home with family     DME Needed Upon Discharge  none     Discharge Plan discussed with: Patient     Transition of Care Barriers None                   Anticipated DC dispo: Home with family   Prior Level of Function: independent with ADLs, uses a cane and walker intermittently for ambulation   People in home: spouse, Haseeb and sisterKaye      Comments:  CM met with patient at bedside to introduce role and discuss discharge planning. Spouse will be help at home and can provide transport at time of discharge. Patient does not use any DME at home. Confirmed demographics, insurance, and emergency contacts. CM discharge needs depends on hospital progress. CM will continue following to assist with other needs.

## 2024-03-26 NOTE — ASSESSMENT & PLAN NOTE
- This patient does have evidence of infective focus  - Source: Abdominal  Antibiotics (72h ago, onward)      Start     Stop Route Frequency Ordered    03/26/24 0900  mupirocin 2 % ointment         03/31/24 0859 Nasl 2 times daily 03/25/24 2210 03/26/24 0800  ceFEPIme (MAXIPIME) 1 g in dextrose 5 % in water (D5W) 100 mL IVPB (MB+)         -- IV Every 12 hours (non-standard times) 03/25/24 2159 03/25/24 2200  metronidazole IVPB 500 mg         -- IV Every 8 hours (non-standard times) 03/25/24 2054   - Organ dysfunction indicated by  hypothermia  - Fluid challenge was administered in ED. Has received over 4L since admission  - BP has been stable; no need for pressors at this time  - Post- resuscitation assessment Yes Perfusion exam was performed within 6 hours of septic shock presentation after bolus shows Adequate tissue perfusion assessed by non-invasive monitoring   - Continue IVF and ABX therapy; will continue to follow cultures and de-escalate as indicated

## 2024-03-27 LAB
ALBUMIN SERPL BCP-MCNC: 2.6 G/DL (ref 3.5–5.2)
ALP SERPL-CCNC: 139 U/L (ref 55–135)
ALT SERPL W/O P-5'-P-CCNC: 12 U/L (ref 10–44)
ANION GAP SERPL CALC-SCNC: 7 MMOL/L (ref 8–16)
ANISOCYTOSIS BLD QL SMEAR: SLIGHT
AST SERPL-CCNC: 26 U/L (ref 10–40)
BASOPHILS # BLD AUTO: 0.05 K/UL (ref 0–0.2)
BASOPHILS NFR BLD: 0.3 % (ref 0–1.9)
BILIRUB SERPL-MCNC: 0.7 MG/DL (ref 0.1–1)
BUN SERPL-MCNC: 12 MG/DL (ref 8–23)
CALCIUM SERPL-MCNC: 8.1 MG/DL (ref 8.7–10.5)
CHLORIDE SERPL-SCNC: 98 MMOL/L (ref 95–110)
CO2 SERPL-SCNC: 20 MMOL/L (ref 23–29)
CREAT SERPL-MCNC: 0.7 MG/DL (ref 0.5–1.4)
CRYPTOSP AG STL QL IA: NEGATIVE
DACRYOCYTES BLD QL SMEAR: ABNORMAL
DIFFERENTIAL METHOD BLD: ABNORMAL
E COLI SXT1 STL QL IA: NEGATIVE
E COLI SXT2 STL QL IA: NEGATIVE
EOSINOPHIL # BLD AUTO: 0.1 K/UL (ref 0–0.5)
EOSINOPHIL NFR BLD: 0.8 % (ref 0–8)
ERYTHROCYTE [DISTWIDTH] IN BLOOD BY AUTOMATED COUNT: 12.3 % (ref 11.5–14.5)
EST. GFR  (NO RACE VARIABLE): >60 ML/MIN/1.73 M^2
G LAMBLIA AG STL QL IA: NEGATIVE
GLUCOSE SERPL-MCNC: 99 MG/DL (ref 70–110)
HCT VFR BLD AUTO: 28.9 % (ref 37–48.5)
HGB BLD-MCNC: 10.2 G/DL (ref 12–16)
IMM GRANULOCYTES # BLD AUTO: 0.67 K/UL (ref 0–0.04)
IMM GRANULOCYTES NFR BLD AUTO: 4.2 % (ref 0–0.5)
LACTATE SERPL-SCNC: 1.3 MMOL/L (ref 0.5–2.2)
LYMPHOCYTES # BLD AUTO: 0.7 K/UL (ref 1–4.8)
LYMPHOCYTES NFR BLD: 4.2 % (ref 18–48)
MAGNESIUM SERPL-MCNC: 2 MG/DL (ref 1.6–2.6)
MCH RBC QN AUTO: 30.6 PG (ref 27–31)
MCHC RBC AUTO-ENTMCNC: 35.3 G/DL (ref 32–36)
MCV RBC AUTO: 87 FL (ref 82–98)
MONOCYTES # BLD AUTO: 1.5 K/UL (ref 0.3–1)
MONOCYTES NFR BLD: 9.7 % (ref 4–15)
NEUTROPHILS # BLD AUTO: 12.9 K/UL (ref 1.8–7.7)
NEUTROPHILS NFR BLD: 80.8 % (ref 38–73)
NRBC BLD-RTO: 0 /100 WBC
OHS QRS DURATION: 94 MS
OHS QTC CALCULATION: 457 MS
PHOSPHATE SERPL-MCNC: 2.3 MG/DL (ref 2.7–4.5)
PLATELET # BLD AUTO: 198 K/UL (ref 150–450)
PLATELET BLD QL SMEAR: ABNORMAL
PMV BLD AUTO: 9.8 FL (ref 9.2–12.9)
POCT GLUCOSE: 100 MG/DL (ref 70–110)
POCT GLUCOSE: 101 MG/DL (ref 70–110)
POCT GLUCOSE: 113 MG/DL (ref 70–110)
POTASSIUM SERPL-SCNC: 3.8 MMOL/L (ref 3.5–5.1)
PROT SERPL-MCNC: 4.9 G/DL (ref 6–8.4)
RBC # BLD AUTO: 3.33 M/UL (ref 4–5.4)
SCHISTOCYTES BLD QL SMEAR: PRESENT
SODIUM SERPL-SCNC: 124 MMOL/L (ref 136–145)
SODIUM SERPL-SCNC: 125 MMOL/L (ref 136–145)
SODIUM SERPL-SCNC: 126 MMOL/L (ref 136–145)
WBC # BLD AUTO: 15.9 K/UL (ref 3.9–12.7)
WBC #/AREA STL HPF: NORMAL /[HPF]

## 2024-03-27 PROCEDURE — 63600175 PHARM REV CODE 636 W HCPCS: Mod: HCNC

## 2024-03-27 PROCEDURE — 84100 ASSAY OF PHOSPHORUS: CPT | Mod: HCNC

## 2024-03-27 PROCEDURE — 63600175 PHARM REV CODE 636 W HCPCS: Mod: HCNC | Performed by: INTERNAL MEDICINE

## 2024-03-27 PROCEDURE — 36415 COLL VENOUS BLD VENIPUNCTURE: CPT | Mod: HCNC

## 2024-03-27 PROCEDURE — 99291 CRITICAL CARE FIRST HOUR: CPT | Mod: HCNC,,, | Performed by: INTERNAL MEDICINE

## 2024-03-27 PROCEDURE — 94761 N-INVAS EAR/PLS OXIMETRY MLT: CPT | Mod: HCNC

## 2024-03-27 PROCEDURE — C9113 INJ PANTOPRAZOLE SODIUM, VIA: HCPCS | Mod: HCNC

## 2024-03-27 PROCEDURE — 27000207 HC ISOLATION: Mod: HCNC

## 2024-03-27 PROCEDURE — 27000221 HC OXYGEN, UP TO 24 HOURS: Mod: HCNC

## 2024-03-27 PROCEDURE — 80053 COMPREHEN METABOLIC PANEL: CPT | Mod: HCNC

## 2024-03-27 PROCEDURE — 25000003 PHARM REV CODE 250: Mod: HCNC

## 2024-03-27 PROCEDURE — 94640 AIRWAY INHALATION TREATMENT: CPT | Mod: HCNC

## 2024-03-27 PROCEDURE — 83605 ASSAY OF LACTIC ACID: CPT | Mod: HCNC | Performed by: PHYSICIAN ASSISTANT

## 2024-03-27 PROCEDURE — 25000003 PHARM REV CODE 250: Mod: HCNC | Performed by: INTERNAL MEDICINE

## 2024-03-27 PROCEDURE — 84295 ASSAY OF SERUM SODIUM: CPT | Mod: HCNC

## 2024-03-27 PROCEDURE — 99900035 HC TECH TIME PER 15 MIN (STAT): Mod: HCNC

## 2024-03-27 PROCEDURE — 83935 ASSAY OF URINE OSMOLALITY: CPT | Mod: HCNC | Performed by: INTERNAL MEDICINE

## 2024-03-27 PROCEDURE — 36415 COLL VENOUS BLD VENIPUNCTURE: CPT | Mod: HCNC,XB

## 2024-03-27 PROCEDURE — 83735 ASSAY OF MAGNESIUM: CPT | Mod: HCNC

## 2024-03-27 PROCEDURE — 84300 ASSAY OF URINE SODIUM: CPT | Mod: HCNC | Performed by: INTERNAL MEDICINE

## 2024-03-27 PROCEDURE — 99222 1ST HOSP IP/OBS MODERATE 55: CPT | Mod: HCNC,,, | Performed by: PHYSICIAN ASSISTANT

## 2024-03-27 PROCEDURE — 85025 COMPLETE CBC W/AUTO DIFF WBC: CPT | Mod: HCNC

## 2024-03-27 PROCEDURE — 25000242 PHARM REV CODE 250 ALT 637 W/ HCPCS: Mod: HCNC

## 2024-03-27 PROCEDURE — 11000001 HC ACUTE MED/SURG PRIVATE ROOM: Mod: HCNC

## 2024-03-27 PROCEDURE — 84295 ASSAY OF SERUM SODIUM: CPT | Mod: 91,HCNC

## 2024-03-27 RX ORDER — ATORVASTATIN CALCIUM 10 MG/1
20 TABLET, FILM COATED ORAL DAILY
Status: DISCONTINUED | OUTPATIENT
Start: 2024-03-27 | End: 2024-03-28 | Stop reason: HOSPADM

## 2024-03-27 RX ORDER — HYDRALAZINE HYDROCHLORIDE 20 MG/ML
10 INJECTION INTRAMUSCULAR; INTRAVENOUS EVERY 8 HOURS PRN
Status: DISCONTINUED | OUTPATIENT
Start: 2024-03-27 | End: 2024-03-28 | Stop reason: HOSPADM

## 2024-03-27 RX ORDER — CARVEDILOL 6.25 MG/1
6.25 TABLET ORAL 2 TIMES DAILY WITH MEALS
Status: DISCONTINUED | OUTPATIENT
Start: 2024-03-27 | End: 2024-03-28 | Stop reason: HOSPADM

## 2024-03-27 RX ORDER — PANTOPRAZOLE SODIUM 40 MG/1
40 TABLET, DELAYED RELEASE ORAL DAILY
Status: DISCONTINUED | OUTPATIENT
Start: 2024-03-28 | End: 2024-03-28 | Stop reason: HOSPADM

## 2024-03-27 RX ORDER — AMLODIPINE BESYLATE 5 MG/1
5 TABLET ORAL DAILY
Status: DISCONTINUED | OUTPATIENT
Start: 2024-03-27 | End: 2024-03-28 | Stop reason: HOSPADM

## 2024-03-27 RX ORDER — MEMANTINE HYDROCHLORIDE 10 MG/1
10 TABLET ORAL 2 TIMES DAILY
Status: DISCONTINUED | OUTPATIENT
Start: 2024-03-27 | End: 2024-03-28 | Stop reason: HOSPADM

## 2024-03-27 RX ADMIN — METRONIDAZOLE 500 MG: 500 TABLET ORAL at 06:03

## 2024-03-27 RX ADMIN — CEFEPIME 1 G: 1 INJECTION, POWDER, FOR SOLUTION INTRAMUSCULAR; INTRAVENOUS at 08:03

## 2024-03-27 RX ADMIN — METRONIDAZOLE 500 MG: 500 TABLET ORAL at 02:03

## 2024-03-27 RX ADMIN — ATORVASTATIN CALCIUM 20 MG: 10 TABLET, FILM COATED ORAL at 03:03

## 2024-03-27 RX ADMIN — CARVEDILOL 6.25 MG: 6.25 TABLET, FILM COATED ORAL at 10:03

## 2024-03-27 RX ADMIN — BUDESONIDE INHALATION 0.5 MG: 0.5 SUSPENSION RESPIRATORY (INHALATION) at 07:03

## 2024-03-27 RX ADMIN — METRONIDAZOLE 500 MG: 500 TABLET ORAL at 09:03

## 2024-03-27 RX ADMIN — ARFORMOTEROL TARTRATE 15 MCG: 15 SOLUTION RESPIRATORY (INHALATION) at 07:03

## 2024-03-27 RX ADMIN — AMLODIPINE BESYLATE 5 MG: 5 TABLET ORAL at 10:03

## 2024-03-27 RX ADMIN — HYDRALAZINE HYDROCHLORIDE 10 MG: 20 INJECTION, SOLUTION INTRAMUSCULAR; INTRAVENOUS at 04:03

## 2024-03-27 RX ADMIN — SODIUM CHLORIDE: 9 INJECTION, SOLUTION INTRAVENOUS at 08:03

## 2024-03-27 RX ADMIN — PANTOPRAZOLE SODIUM 40 MG: 40 INJECTION, POWDER, FOR SOLUTION INTRAVENOUS at 09:03

## 2024-03-27 RX ADMIN — MEMANTINE 10 MG: 10 TABLET ORAL at 08:03

## 2024-03-27 RX ADMIN — HYDRALAZINE HYDROCHLORIDE 10 MG: 20 INJECTION, SOLUTION INTRAMUSCULAR; INTRAVENOUS at 10:03

## 2024-03-27 RX ADMIN — SODIUM CHLORIDE: 9 INJECTION, SOLUTION INTRAVENOUS at 01:03

## 2024-03-27 RX ADMIN — CARVEDILOL 6.25 MG: 6.25 TABLET, FILM COATED ORAL at 04:03

## 2024-03-27 RX ADMIN — ENOXAPARIN SODIUM 40 MG: 40 INJECTION SUBCUTANEOUS at 04:03

## 2024-03-27 RX ADMIN — MUPIROCIN: 20 OINTMENT TOPICAL at 08:03

## 2024-03-27 RX ADMIN — MUPIROCIN: 20 OINTMENT TOPICAL at 09:03

## 2024-03-27 NOTE — ASSESSMENT & PLAN NOTE
- Patient has hyponatremia which is uncontrolled, likely progressing for >48 hr. We will aim to correct the sodium by 4-6mEq in 24 hours. We will monitor sodium Every 6 hours. The hyponatremia is due to Dehydration/hypovolemia. We will obtain the following studies: urine sodium, urine creatinine, urine osmolality. We will treat the hyponatremia with IV fluids as follows: NS. The patient's sodium results have been reviewed and are listed below.    Recent Labs   Lab 03/26/24  0615   *   - Na is improving; continue serial checks q6h  - Stable, will transfer to floor

## 2024-03-27 NOTE — PROGRESS NOTES
"Care assumed. Pt oriented but delayed with answers. VSS. Pt " tremors" noted. Pain stated to lower back/abd. Loose dark maroon stool . C-diff in progress. Call light in hand and POC with ICU team.   "

## 2024-03-27 NOTE — ASSESSMENT & PLAN NOTE
Patient with colitis on CT. Differential to include ischemia vs infection vs new onset IBD vs other.   She has a history of significant atherosclerosis and chronic mesenteric ischemia. Lactic acid was normal on admit but Cardiology repeating. Cardiology has been consulted.   Will await stool results but low concern given lack of diarrhea prior to being given a suppository.   Continue antibiotics for now.   Antiemetics and pain management as needed per ICU team.   No plans for colonoscopy at this time. Can consider if clinical course not improving and no Plavix for five days. Discussed with family. They had concerns about sedation. Reviewed risks vs benefits. Could also consider unsedated flex sig if they continue to have reservations.   Will follow.

## 2024-03-27 NOTE — SUBJECTIVE & OBJECTIVE
Review of Systems   Constitutional: Positive for malaise/fatigue.   Eyes: Negative.    Cardiovascular: Negative.    Respiratory: Negative.     Endocrine: Negative.    Hematologic/Lymphatic: Negative.    Skin: Negative.    Musculoskeletal:  Positive for arthritis and joint pain.   Gastrointestinal:  Positive for abdominal pain, nausea and vomiting.   Genitourinary: Negative.    Neurological:  Positive for tremors.   Psychiatric/Behavioral:  Positive for memory loss.    Allergic/Immunologic: Negative.      Objective:     Vital Signs (Most Recent):  Temp: 99 °F (37.2 °C) (03/27/24 1300)  Pulse: 97 (03/27/24 1300)  Resp: (!) 21 (03/27/24 1300)  BP: (!) 156/109 (03/27/24 1300)  SpO2: 95 % (03/27/24 1300) Vital Signs (24h Range):  Temp:  [97.5 °F (36.4 °C)-99.9 °F (37.7 °C)] 99 °F (37.2 °C)  Pulse:  [] 97  Resp:  [16-35] 21  SpO2:  [91 %-98 %] 95 %  BP: (129-189)/() 156/109     Weight: 59.6 kg (131 lb 6.3 oz)  Body mass index is 24.03 kg/m².     SpO2: 95 %         Intake/Output Summary (Last 24 hours) at 3/27/2024 1520  Last data filed at 3/27/2024 1213  Gross per 24 hour   Intake 2461.87 ml   Output 420 ml   Net 2041.87 ml       Lines/Drains/Airways       Drain  Duration                  Urethral Catheter 03/25/24 2045 Temperature probe 16 Fr. 1 day              Peripheral Intravenous Line  Duration                  Peripheral IV - Single Lumen 03/25/24 1900 20 G Right Antecubital 1 day                       Physical Exam  Vitals and nursing note reviewed.   Constitutional:       Appearance: She is ill-appearing.      Comments: On supplemental O2 via NC   HENT:      Head: Normocephalic and atraumatic.   Eyes:      Pupils: Pupils are equal, round, and reactive to light.   Cardiovascular:      Rate and Rhythm: Normal rate and regular rhythm.      Heart sounds: S1 normal and S2 normal. No murmur heard.  Pulmonary:      Effort: Pulmonary effort is normal.      Breath sounds: Normal breath sounds.   Abdominal:       Tenderness: There is abdominal tenderness (lower abdominal).   Musculoskeletal:      Right lower leg: No edema.      Left lower leg: No edema.   Neurological:      General: No focal deficit present.      Comments: Awake, oriented, mildly forgetful   Psychiatric:         Mood and Affect: Mood normal.            Significant Labs: CMP   Recent Labs   Lab 03/25/24 1847 03/25/24  2359 03/26/24  0400 03/26/24  0615 03/26/24  2348 03/27/24  0627 03/27/24  1051   *   < > 126*   < > 125* 125*  125* 124*   K 3.6  --  3.3*  --   --  3.8  --    CL 89*  --  97  --   --  98  --    CO2 21*  --  18*  --   --  20*  --    *  --  125*  --   --  99  --    BUN 17  --  14  --   --  12  --    CREATININE 1.3  --  0.9  --   --  0.7  --    CALCIUM 9.4  --  8.9  --   --  8.1*  --    PROT 7.3  --  6.1  --   --  4.9*  --    ALBUMIN 3.8  --  3.2*  --   --  2.6*  --    BILITOT 0.8  --  1.0  --   --  0.7  --    ALKPHOS 104  --  183*  --   --  139*  --    AST 23  --  22  --   --  26  --    ALT 17  --  14  --   --  12  --    ANIONGAP 11  --  11  --   --  7*  --     < > = values in this interval not displayed.   , CBC   Recent Labs   Lab 03/25/24 1847 03/26/24 0400 03/27/24  0627   WBC 14.02* 12.55 15.90*   HGB 12.3 12.4 10.2*   HCT 35.1* 35.0* 28.9*    223 198   , Troponin   Recent Labs   Lab 03/25/24 1847   TROPONINI <0.006   , and All pertinent lab results from the last 24 hours have been reviewed.    Significant Imaging: Echocardiogram: Transthoracic echo (TTE) complete (Cupid Only):   Results for orders placed or performed during the hospital encounter of 06/14/23   Echo   Result Value Ref Range    BSA 1.63 m2    TDI SEPTAL 0.05 m/s    LV LATERAL E/E' RATIO 18.17 m/s    LV SEPTAL E/E' RATIO 21.80 m/s    LA WIDTH 3.40 cm    IVC diameter 1.06 cm    Left Ventricular Outflow Tract Mean Velocity 0.75 cm/s    Left Ventricular Outflow Tract Mean Gradient 2.68 mmHg    TDI LATERAL 0.06 m/s    PV PEAK VELOCITY 0.71 cm/s     LVIDd 4.18 3.5 - 6.0 cm    IVS 0.99 0.6 - 1.1 cm    Posterior Wall 0.99 0.6 - 1.1 cm    Ao root annulus 3.23 cm    LVIDs 2.81 2.1 - 4.0 cm    FS 33 28 - 44 %    LA volume 40.64 cm3    Sinus 3.33 cm    STJ 3.04 cm    Ascending aorta 3.02 cm    LV mass 134.31 g    LA size 2.99 cm    RVDD 2.95 cm    Left Ventricle Relative Wall Thickness 0.47 cm    AV regurgitation pressure 1/2 time [id number] ms    AV mean gradient 4 mmHg    AV valve area 2.36 cm2    AV Velocity Ratio 0.84     AV index (prosthetic) 0.82     MV valve area p 1/2 method 3.61 cm2    E/A ratio 1.06     Mean e' 0.06 m/s    E wave deceleration time 210.28 msec    IVRT 125.59 msec    LVOT diameter 1.91 cm    LVOT area 2.9 cm2    LVOT peak oz 1.20 m/s    LVOT peak VTI 31.10 cm    Ao peak oz 1.43 m/s    Ao VTI 37.7 cm    RVOT peak oz 0.68 m/s    RVOT peak VTI 18.0 cm    LVOT stroke volume 89.06 cm3    AV peak gradient 8 mmHg    PV mean gradient 1.01 mmHg    E/E' ratio 19.82 m/s    MV Peak E Oz 1.09 m/s    AR Max Oz 5.01 m/s    TR Max Oz 2.80 m/s    MV stenosis pressure 1/2 time 60.98 ms    MV Peak A Oz 1.03 m/s    LV Systolic Volume 29.76 mL    LV Systolic Volume Index 18.6 mL/m2    LV Diastolic Volume 77.84 mL    LV Diastolic Volume Index 48.65 mL/m2    LA Volume Index 25.4 mL/m2    LV Mass Index 84 g/m2    RA Major Axis 4.04 cm    Left Atrium Minor Axis 4.60 cm    Left Atrium Major Axis 4.81 cm    Triscuspid Valve Regurgitation Peak Gradient 31 mmHg    LA Volume Index (Mod) 25.7 mL/m2    LA volume (mod) 41.15 cm3    RA Width 3.22 cm    EF 60 %    Narrative    · Concentric remodeling and normal systolic function.  · The estimated ejection fraction is 60%.  · Indeterminate left ventricular diastolic function.  · Normal right ventricular size with normal right ventricular systolic   function.  · Mild tricuspid regurgitation.  · There is pulmonary hypertension.  · Mild aortic regurgitation.       and EKG: Reviewed

## 2024-03-27 NOTE — ASSESSMENT & PLAN NOTE
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

## 2024-03-27 NOTE — HPI
Ms. Vick is a 69 year old female patient whose current medical conditions include COPD, former tobacco abuse, CAD s/p prior stent (most recent PCI of RCA 9/23), HTN, hyperlipidemia, GERD, chronic mesenteric ischemia, vascular dementia, and AAA who presented to [facility] ED on 3/25/24 due to abdominal pain that onset the day prior. Associated symptoms included nausea, weakness, fatigue, constipation, and decreased oral intake. She denied any associated raffy chest pain, SOB, palpitations, near syncope, or syncope. Initial workup in ED revealed leukocytosis, hyponatremia, and hypothermia. CT of chest/abd/pelvis showed findings consistent with colitis and patient was subsequently admitted to ICU for further evaluation and treatment. Cardiology consulted to assist with management. Patient seen and examined today with family at bedside. Feels ok. States abdominal pain has improved. Received an enema and had successful BM's. No CP or SOB. She has known mesenteric ischemia/SMA stenosis. Previously saw vascular last year and medical management/monitoring was recommended as it was felt vessels were small/high risk of complications with intervention. She is followed in clinic by Dr. Juarez and mesenteric angiogram had been re-discussed. She is compliant with her medications. Chart reviewed. LA remains WNL. GI on board, patient previously declined EGD/colonoscopy as OP due to sedation concerns.

## 2024-03-27 NOTE — ASSESSMENT & PLAN NOTE
-GI on board  -Infectious vs Ischemic, LA remains normal  -Patient has known chronic mesenteric ischemia  -Would benefit from GI evaluation-colonoscopy/EGD   -Will consider mesenteric angio if she fails to improve  -Continue same mgmt in interim

## 2024-03-27 NOTE — HOSPITAL COURSE
A patient was admitted to the ICU on March 25th with severe hyponatremia, with a sodium level of 119, hypothermia at 92°F, and severe constipation. The patient underwent rewarming, and her sodium level improved to 126. She also experienced a large bowel movement and has had multiple bowel movements since then. Due to these improvements, she is now being downgraded to telemetry under hospital medicine and is resting comfortably. The hyponatremia was likely secondary to the patient's misuse of HCTZ (hydrochlorothiazide), which was prescribed by her cardiologist for as-needed use. However, the patient, misunderstanding the instructions and influenced by online information, began taking it daily. Her sodium levels have stabilized since discontinuing the medication, and repeat labs are scheduled with her primary care provider within 1-2 weeks of discharge.    Further investigations revealed inflammation in the descending colon, suggestive of either infectious or inflammatory colitis, with ischemic changes also considered due to the patient's history of mesenteric ischemia and recent symptoms of dizziness post HCTZ administration. Despite these concerns, her lactate levels have remained normal, and stool studies have been negative, although she has been receiving antibiotics. The plan is to continue antibiotics for 7-10 days. Discharged with additional 5 days of therapy after receiving 4 days of abx here. There are no immediate plans for endoscopy due to recent use of Plavix, but outpatient follow-up will be considered for a colonoscopy, given the patient's high sedation risk history. No further inpatient gastrointestinal recommendations are provided at this time, but arrangements for outpatient follow-up will be made. Cardiology has noted clinical improvement and cardiovascular stability, advising the continuation of optimal medical therapy with a follow-up in clinic to possibly discuss a mesenteric angiogram.    BP (!)  "155/70 (Patient Position: Lying)   Pulse 77   Temp 97.6 °F (36.4 °C) (Oral)   Resp 20   Ht 5' 2" (1.575 m)   Wt 59.6 kg (131 lb 6.3 oz)   SpO2 96%   BMI 24.03 kg/m²   PHYSICAL EXAM  Vitals Reviewed  GEN: No acute distress, pleasant, body habitus normal  HEENT: atraumatic and normocephalic  CARDS: regular rate and rhythm, no m/g, pulses palpable in LE  PULM: breathing comfortably on room air, chest symmetric, nonlabored, no abnormal breath sounds on auscultation  ABD: nontender, nondistended, soft, no organomegaly, BS+  Neuro: Alert and oriented x3, CN's I-IX grossly intact, sensation and motor intact; follows directions and answers questions appropriately    "

## 2024-03-27 NOTE — ASSESSMENT & PLAN NOTE
- Patient with dementia with likely etiology of vascular dementia. Dementia is mild. The patient does not have signs of behavioral disturbance.   - Continue non-pharmacologic interventions to prevent delirium (No VS between 11PM-5AM, activity during day, opening blinds, providing glasses/hearing aids, and up in chair during daytime). Will avoid narcotics and benzos unless absolutely necessary. PRN anti-psychotics are not prescribed to avoid self harm behaviors.  - Restarted on home Memantine  - Mild essential like tremor noted on exam today with UE. Not present while sleeping; not sure of etiology, poss. Psychogenic? No inducing meds noted

## 2024-03-27 NOTE — CONSULTS
[facility] - Intensive Care ([facility])  [facility] Medicine  Consult Note    Patient Name: Gemma Vick  MRN: [id number]  Admission Date: 3/25/2024  Hospital Length of Stay: 2 days  Attending Physician: Morales Brunner MD   Primary Care Provider: Ogla Altman MD           Patient information was obtained from past medical records, ER records, and primary team.     Consults  Subjective:     Principal Problem: Colitis    Chief Complaint:   Chief Complaint   Patient presents with    Fatigue     Pt reports fatigue and weakness. Pt had a fall 2 days ago. Bruising reported to HEATHER Jacobsen. Pt has hx of triple A. Pt is on thinner, has 4 stents        HPI: Gemma Vick is a 69-year-old female with a past history significant for COPD, former smoker, CAD s/p stents, KALLIE, vascular dementia, Alzheimer's, AAA-stable on CT, HTN, HLD, GERD, diverticulitis/osis, chronic mesenteric ischemia, GERD, DDD, osteoarthritis, lumbar radiculopathy, who presented to the ED c/o diffuse abdominal pain, nausea, generalized weakness onset today. Reports poor oral intake for several days. States she fell several days ago and also has left hip pain, and was started on Robaxin. Last BM yesterday, small, soft/formed, dark, but has been dark since starting iron supplements. She also endorses oliguria w/dark urine, hemorrhoids, hx of constipation and feels like she needs an enema. Reports several episodes of vomiting today, stating it was more reflux and some bile, but very small volume. Patient denies fever, chills, cough, congestion, dizziness, dyspnea, chest discomfort, dysuria, hematuria, myalgias. Patient denies sick contacts, potential of contaminated food or water.      ED workup with noted leukocytosis, hyponatremia-corrected 123, hyperglycemia. CT c/a/p shows colonic wall thickening/inflammation of descending colon consistent with colitis, surgically absent gall bladder. CXR and L hip XR w/NAF. CT head w/NAF. UA w/no evidence of infection or  ketonuria. Lactate, procalcitonin, troponin, TSH, lipase, LFT's WNL. Flu/COVID negative. She was found to have a rectal temp of 92 and was placed on Mirna hugger. She was given warmed IVF, antibiotics, glycerin suppository, has cho w/bg urine, adequate output noted in bag. Critical care was consulted for admission and management due to hypothermia requiring active re-warming.     Interval History: pt admitted to ICU on 3/25 with severe Hyponatremia- 119 and Hypothermia 92 F and ch severe Constipation- admitted to ICU for Rewarming and Na improved to 126, she also had large BM and has had multiple BMs all day- now being downgraded to Tele under Hosp med. Pt resting comfortably. Hyponatremia possibly sec to HCTZ with excess free water intake.       Review of Systems   Constitutional:  Positive for appetite change.   Respiratory:  Negative for chest tightness and shortness of breath.    Cardiovascular:  Negative for chest pain and palpitations.   Gastrointestinal:  Positive for diarrhea and nausea. Negative for abdominal pain and vomiting.   Genitourinary:  Negative for difficulty urinating and dysuria.   Neurological:  Positive for weakness.   Psychiatric/Behavioral:  Negative for agitation and confusion.      Objective:     Vital Signs (Most Recent):  Temp: 99 °F (37.2 °C) (03/27/24 1300)  Pulse: 97 (03/27/24 1300)  Resp: (!) 21 (03/27/24 1300)  BP: (!) 156/109 (03/27/24 1300)  SpO2: 95 % (03/27/24 1300) Vital Signs (24h Range):  Temp:  [97.5 °F (36.4 °C)-99.9 °F (37.7 °C)] 99 °F (37.2 °C)  Pulse:  [] 97  Resp:  [16-35] 21  SpO2:  [91 %-97 %] 95 %  BP: (129-189)/() 156/109     Weight: 59.6 kg (131 lb 6.3 oz)  Body mass index is 24.03 kg/m².    Intake/Output Summary (Last 24 hours) at 3/27/2024 1825  Last data filed at 3/27/2024 1213  Gross per 24 hour   Intake 2461.87 ml   Output 420 ml   Net 2041.87 ml         Physical Exam  Constitutional:       General: She is not in acute distress.     Appearance:  She is ill-appearing.   HENT:      Head: Normocephalic.      Mouth/Throat:      Mouth: Mucous membranes are dry.   Eyes:      General: No scleral icterus.     Extraocular Movements: Extraocular movements intact.   Cardiovascular:      Rate and Rhythm: Normal rate and regular rhythm.      Pulses: Normal pulses.   Pulmonary:      Effort: Pulmonary effort is normal. No respiratory distress.      Breath sounds: No wheezing.   Abdominal:      General: Bowel sounds are normal. There is no distension.      Palpations: Abdomen is soft.      Tenderness: There is abdominal tenderness. There is no guarding.   Musculoskeletal:      Cervical back: Normal range of motion.   Skin:     General: Skin is warm.      Coloration: Skin is not jaundiced.   Neurological:      Mental Status: She is oriented to person, place, and time.   Psychiatric:         Mood and Affect: Mood normal.             Significant Labs: All pertinent labs within the past 24 hours have been reviewed.  Blood Culture:   Recent Labs   Lab 03/25/24 1848 03/25/24 1942   LABBLOO No Growth to date  No Growth to date No Growth to date  No Growth to date     BMP:   Recent Labs   Lab 03/27/24  0627 03/27/24  1051 03/27/24  1612   GLU 99  --   --    *  125*   < > 126*   K 3.8  --   --    CL 98  --   --    CO2 20*  --   --    BUN 12  --   --    CREATININE 0.7  --   --    CALCIUM 8.1*  --   --    MG 2.0  --   --     < > = values in this interval not displayed.     CBC:   Recent Labs   Lab 03/25/24 1847 03/26/24 0400 03/27/24  0627   WBC 14.02* 12.55 15.90*   HGB 12.3 12.4 10.2*   HCT 35.1* 35.0* 28.9*    223 198     CMP:   Recent Labs   Lab 03/25/24 1847 03/25/24 2359 03/26/24  0400 03/26/24  0615 03/27/24  0627 03/27/24  1051 03/27/24  1612   *   < > 126*   < > 125*  125* 124* 126*   K 3.6  --  3.3*  --  3.8  --   --    CL 89*  --  97  --  98  --   --    CO2 21*  --  18*  --  20*  --   --    *  --  125*  --  99  --   --    BUN 17  --  14   --  12  --   --    CREATININE 1.3  --  0.9  --  0.7  --   --    CALCIUM 9.4  --  8.9  --  8.1*  --   --    PROT 7.3  --  6.1  --  4.9*  --   --    ALBUMIN 3.8  --  3.2*  --  2.6*  --   --    BILITOT 0.8  --  1.0  --  0.7  --   --    ALKPHOS 104  --  183*  --  139*  --   --    AST 23  --  22  --  26  --   --    ALT 17  --  14  --  12  --   --    ANIONGAP 11  --  11  --  7*  --   --     < > = values in this interval not displayed.     Cardiac Markers:   Coagulation:   Lactic Acid:   Recent Labs   Lab 03/25/24  1847 03/26/24  1759 03/27/24  1051   LACTATE 1.0 1.2 1.3     Lipase:   Recent Labs   Lab 03/25/24 1847   LIPASE 22     Lipid Panel:   Magnesium:   Recent Labs   Lab 03/25/24  1847 03/26/24  0400 03/27/24  0627   MG 2.1 1.6 2.0     POCT Glucose:   Recent Labs   Lab 03/26/24  1826 03/26/24  2348 03/27/24  1557   POCTGLUCOSE 126* 113* 101     Troponin:   Recent Labs   Lab 03/25/24 1847   TROPONINI <0.006     TSH:   Recent Labs   Lab 03/25/24  1847   TSH 3.016     Urine Culture:     Significant Imaging: I have reviewed all pertinent imaging results/findings within the past 24 hours.  Assessment/Plan:     * Colitis  Getting Cefepime.   Bowels moving well  Cont present care      Hyponatremia  Patient has hyponatremia which is controlled,We will aim to correct the sodium by 4-6mEq in 24 hours. We will monitor sodium Daily. The hyponatremia is due to Dehydration/hypovolemia, SIADH secondary to medications induced from thiazides, and polydipsia. We will obtain the following studies: Urine sodium, urine osmolality, serum osmolality or TSH, T4. We will treat the hyponatremia with IV fluids as follows: NS. The patient's sodium results have been reviewed and are listed below.  Recent Labs   Lab 03/27/24  1612   *       Mesenteric ischemia, chronic  stable      AAA (abdominal aortic aneurysm)  Stable, with possible mesenteric ischemia      Coronary artery disease of native artery of native heart with stable angina  pectoris  Patient with known CAD s/p stent placement and CABG, which is controlled Will continue ASA, Plavix, and Statin and monitor for S/Sx of angina/ACS. Continue to monitor on telemetry.     Hyperlipidemia  Cont Lipitor        VTE Risk Mitigation (From admission, onward)           Ordered     enoxaparin injection 40 mg  Daily         03/25/24 2153     IP VTE HIGH RISK PATIENT  Once         03/25/24 2153     Place sequential compression device  Until discontinued         03/25/24 2153                      Thank you for your consult. I will follow-up with patient. Please contact us if you have any additional questions.    Morales Brunner MD  Department of Hospital Medicine   [facility] - Intensive Care ([facility])

## 2024-03-27 NOTE — CONSULTS
"[facility] - Intensive Care (Hospital)  Gastroenterology  Consult Note    Patient Name: Gemma Vick  MRN: [id number]  Admission Date: 3/25/2024  Hospital Length of Stay: 2 days  Code Status: Full Code   Attending Provider: Jannette Osman MD   Consulting Provider: Boom Kirkpatrick PA-C  Primary Care Physician: Olga Altman MD  Principal Problem:Colitis    Inpatient consult to Gastroenterology  Consult performed by: Boom Kirkpatrick PA-C  Consult ordered by: Dante Thompson PA-C  Reason for consult: Colitis on CT scan        Subjective:     HPI:  Patient presented to the ER with weakness, nausea, vomiting and constipation. She was hypothermic; sepsis work up started and admitted to ICU. History primarily obtained from the patient's family, medical record and medical staff. WBC count 14, Hgb 12.3, [id number], Sodium 125, potassium 3.6, BUN, creatinine and LFTs normal. CXR and UA unremarkable. CT scan w/ cont showed colonic wall thickening over a moderate length segment of the descending colon with local inflammatory change and small volume adjacent free fluid favored to be reactive.  Findings are concerning for infectious or inflammatory colitis. She was started on Flagyl and Cefepime. Stool studies pending. Family said diarrhea started here after she was given a suppository in the ER. She reportedly had some blood in her stool which she attributed to hemorrhoids. ER provider documented an anal fissure on rectal exam. She has never had a colonoscopy due to comorbid conditions.     Prior work up by Dr. Juarez showed "significant blockages in the gut vessels explaining abdominal pain weight loss." Lactic acid this admit is normal. No hypotension on admit. She takes Plavix daily.     Past Medical History:   Diagnosis Date    AAA (abdominal aortic aneurysm) 02/13/2014    Abdominal aneurysm     3    Acute coronary syndrome     Arthritis     BPPV (benign paroxysmal positional vertigo)     Carotid artery plaque     Carotid " artery stenosis and occlusion 02/13/2014    Chronic back pain     COPD (chronic obstructive pulmonary disease)     Coronary artery disease     Dementia     Emphysema lung     Hyperlipidemia     Hypertension     Myocardial infarction     x3    Neuropathy     Personal history of COVID-19 06/09/2021 11/16/2020 +Covid, recovered at home        Past Surgical History:   Procedure Laterality Date    CARDIAC CATHETERIZATION      CORONARY ANGIOPLASTY      CORONARY STENT PLACEMENT N/A 9/12/2023    Procedure: INSERTION, STENT, CORONARY ARTERY;  Surgeon: Millie Juarez MD;  Location: [facility] CATH LAB;  Service: Cardiology;  Laterality: N/A;    HYSTERECTOMY  1988    LEFT HEART CATHETERIZATION Left 9/12/2023    Procedure: Left heart cath;  Surgeon: Millie Juarez MD;  Location: [facility] CATH LAB;  Service: Cardiology;  Laterality: Left;    PERCUTANEOUS CORONARY INTERVENTION, ARTERY N/A 9/12/2023    Procedure: Percutaneous coronary intervention;  Surgeon: Millie Juarez MD;  Location: [facility] CATH LAB;  Service: Cardiology;  Laterality: N/A;    THROMBECTOMY, CORONARY  9/12/2023    Procedure: Thrombectomy, Coronary;  Surgeon: Millie Juarez MD;  Location: [facility] CATH LAB;  Service: Cardiology;;    TONSILLECTOMY      TRANSFORAMINAL EPIDURAL INJECTION OF STEROID Right 12/1/2023    Procedure: Injection,steroid,epidural,transforaminal approach- right side, L4/5 and L5/S1;  Surgeon: Lydia Roberts MD;  Location: [facility] [place];  Service: Pain Management;  Laterality: Right;       Review of patient's allergies indicates:  No Known Allergies  Family History       Problem Relation (Age of Onset)    Breast cancer Paternal Aunt    Heart attacks under age 50 Father, Brother    Heart disease Mother          Tobacco Use    Smoking status: Former     Current packs/day: 0.00     Average packs/day: 0.5 packs/day for 46.9 years (23.4 ttl pk-yrs)     Types: Cigarettes, Vaping w/o nicotine     Start date: 6/24/1970     Quit date: 05/2017     Years  since quittin.9    Smokeless tobacco: Never    Tobacco comments:     3 MG NICOTINE FOR HEART.    Substance and Sexual Activity    Alcohol use: No    Drug use: No    Sexual activity: Not Currently     Birth control/protection: None     Review of Systems   Unable to perform ROS: Dementia   Constitutional:  Negative for fever.   Respiratory:  Negative for shortness of breath.    Cardiovascular:  Negative for chest pain.   Gastrointestinal:  Positive for abdominal pain, blood in stool, constipation (stools were reported a small) and vomiting (non-bloody per family).   Neurological:  Positive for tremors.     Objective:     Vital Signs (Most Recent):  Temp: 99.5 °F (37.5 °C) (24)  Pulse: 91 (24)  Resp: 20 (24)  BP: (!) 161/95 (24)  SpO2: 96 % (24) Vital Signs (24h Range):  Temp:  [98.4 °F (36.9 °C)-99.9 °F (37.7 °C)] 99.5 °F (37.5 °C)  Pulse:  [69-96] 91  Resp:  [16-35] 20  SpO2:  [91 %-99 %] 96 %  BP: (129-177)/() 161/95     Weight: 59.6 kg (131 lb 6.3 oz) (24 2330)  Body mass index is 24.03 kg/m².      Intake/Output Summary (Last 24 hours) at 3/27/2024 1043  Last data filed at 3/27/2024 0701  Gross per 24 hour   Intake 2461.87 ml   Output 585 ml   Net 1876.87 ml       Lines/Drains/Airways       Drain  Duration                  Urethral Catheter 245 Temperature probe 16 Fr. 1 day              Peripheral Intravenous Line  Duration                  Peripheral IV - Single Lumen 24 1900 20 G Right Antecubital 1 day                     Physical Exam  Constitutional:       General: She is not in acute distress.     Appearance: She is well-developed.   HENT:      Head: Normocephalic and atraumatic.   Eyes:      Extraocular Movements: Extraocular movements intact.   Cardiovascular:      Rate and Rhythm: Normal rate and regular rhythm.      Heart sounds: No murmur heard.  Pulmonary:      Effort: Pulmonary effort is normal. No respiratory  "distress.      Breath sounds: Normal breath sounds. No wheezing.   Abdominal:      General: Bowel sounds are normal. There is abdominal bruit. There is no distension.      Palpations: Abdomen is soft. There is no mass.      Tenderness: There is abdominal tenderness (primarily lower abdome and left side). There is no guarding or rebound.   Musculoskeletal:      Cervical back: Normal range of motion and neck supple.      Right lower leg: No edema.      Left lower leg: No edema.   Skin:     General: Skin is warm and dry.      Findings: No rash.   Neurological:      Mental Status: She is oriented to person, place, and time and easily aroused.      Cranial Nerves: No cranial nerve deficit.   Psychiatric:         Behavior: Behavior normal.          Significant Labs:  CBC:   Recent Labs   Lab 03/25/24  1847 03/26/24  0400 03/27/24  0627   WBC 14.02* 12.55 15.90*   HGB 12.3 12.4 10.2*   HCT 35.1* 35.0* 28.9*    223 198     CMP:   Recent Labs   Lab 03/27/24  0627   GLU 99   CALCIUM 8.1*   ALBUMIN 2.6*   PROT 4.9*   *  125*   K 3.8   CO2 20*   CL 98   BUN 12   CREATININE 0.7   ALKPHOS 139*   ALT 12   AST 26   BILITOT 0.7     Coagulation: No results for input(s): "PT", "INR", "APTT" in the last 48 hours.    Significant Imaging:  Imaging results within the past 24 hours have been reviewed.  Assessment/Plan:     GI  * Colitis  Patient with colitis on CT. Differential to include ischemia vs infection vs new onset IBD vs other.   She has a history of significant atherosclerosis and chronic mesenteric ischemia. Lactic acid was normal on admit but Cardiology repeating. Cardiology has been consulted.   Will await stool results but low concern given lack of diarrhea prior to being given a suppository.   Continue antibiotics for now.   Antiemetics and pain management as needed per ICU team.   No plans for colonoscopy at this time. Can consider if clinical course not improving and no Plavix for five days. Discussed with " family. They had concerns about sedation. Reviewed risks vs benefits. Could also consider unsedated flex sig if they continue to have reservations.   Will follow.     Mesenteric ischemia, chronic  Cardiology has been consulted. Dr. Juarez is her primary Cardiologist and willing to consider angio depending on clinical course.         Thank you for your consult. I will follow-up with patient. Please contact us if you have any additional questions.    Boom Kirkpatrick PA-C  Gastroenterology  [facility] - Intensive Care ([facility])

## 2024-03-27 NOTE — ASSESSMENT & PLAN NOTE
- No clear source  - Organ dysfunction indicated by  hypothermia  - Fluid challenge was administered in ED. Has received over 4L since admission  - BP has been stable; no need for pressors at this time  - Continue IVF and ABX therapy; will continue to follow cultures and de-escalate as indicated

## 2024-03-27 NOTE — PLAN OF CARE
POC reviewed with pt and family. Pt remains aaox4 and vss throughout shift. Continuing to trend sodiums. Pt has had multiple loose bowel movements for me. Weaned oxygen down to 1L and tolerating. Urine output minimal. Report to be given to day shift RN who will assume care.

## 2024-03-27 NOTE — ASSESSMENT & PLAN NOTE
- Patient with known CAD s/p stent placement, which is controlled Will continue ASA, Plavix, and Statin when resumes PO intake and monitor for S/Sx of angina/ACS. Continue to monitor on telemetry.   - Seen by cards today; continue current regimen, no acute interventions needed

## 2024-03-27 NOTE — SUBJECTIVE & OBJECTIVE
Objective:     Vital Signs (Most Recent):  Temp: 99 °F (37.2 °C) (03/27/24 1300)  Pulse: 97 (03/27/24 1300)  Resp: (!) 21 (03/27/24 1300)  BP: (!) 156/109 (03/27/24 1300)  SpO2: 95 % (03/27/24 1300) Vital Signs (24h Range):  Temp:  [97.5 °F (36.4 °C)-99.9 °F (37.7 °C)] 99 °F (37.2 °C)  Pulse:  [] 97  Resp:  [16-35] 21  SpO2:  [91 %-98 %] 95 %  BP: (129-189)/() 156/109     Weight: 59.6 kg (131 lb 6.3 oz)  Body mass index is 24.03 kg/m².  Intake/Output Summary (Last 24 hours) at 3/27/2024 1406  Last data filed at 3/27/2024 1213  Gross per 24 hour   Intake 2461.87 ml   Output 460 ml   Net 2001.87 ml     Physical Exam  Constitutional:       General: She is not in acute distress.     Appearance: She is ill-appearing.   HENT:      Head: Normocephalic.      Mouth/Throat:      Mouth: Mucous membranes are dry.   Eyes:      General: No scleral icterus.     Extraocular Movements: Extraocular movements intact.   Cardiovascular:      Rate and Rhythm: Normal rate and regular rhythm.      Pulses: Normal pulses.   Pulmonary:      Effort: Pulmonary effort is normal. No respiratory distress.      Breath sounds: No wheezing.   Abdominal:      General: Bowel sounds are normal. There is no distension.      Palpations: Abdomen is soft.      Tenderness: There is abdominal tenderness. There is no guarding.   Musculoskeletal:      Cervical back: Normal range of motion.   Skin:     General: Skin is warm.      Coloration: Skin is not jaundiced.   Neurological:      Mental Status: She is oriented to person, place, and time.   Psychiatric:         Mood and Affect: Mood normal.     Review of Systems   Constitutional:  Positive for appetite change.   Respiratory:  Negative for chest tightness and shortness of breath.    Cardiovascular:  Negative for chest pain and palpitations.   Gastrointestinal:  Positive for abdominal pain, diarrhea and nausea. Negative for vomiting.   Genitourinary:  Negative for difficulty urinating and  dysuria.   Neurological:  Positive for weakness.   Psychiatric/Behavioral:  Negative for agitation and confusion.      Vents:  Oxygen Concentration (%): 24 (03/27/24 0710)    Lines/Drains/Airways       Drain  Duration                  Urethral Catheter 03/25/24 2045 Temperature probe 16 Fr. 1 day              Peripheral Intravenous Line  Duration                  Peripheral IV - Single Lumen 03/25/24 1900 20 G Right Antecubital 1 day                  Significant Labs:    CBC/Anemia Profile:  Recent Labs   Lab 03/25/24  1847 03/26/24  0400 03/26/24  0450 03/27/24  0627   WBC 14.02* 12.55  --  15.90*   HGB 12.3 12.4  --  10.2*   HCT 35.1* 35.0*  --  28.9*    223  --  198   MCV 87 88  --  87   RDW 11.8 11.6  --  12.3   OCCULTBLOOD  --   --  Positive*  --      Chemistries:  Recent Labs   Lab 03/25/24  1847 03/25/24  2359 03/26/24  0400 03/26/24  0615 03/26/24  2348 03/27/24  0627 03/27/24  1051   *   < > 126*   < > 125* 125*  125* 124*   K 3.6  --  3.3*  --   --  3.8  --    CL 89*  --  97  --   --  98  --    CO2 21*  --  18*  --   --  20*  --    BUN 17  --  14  --   --  12  --    CREATININE 1.3  --  0.9  --   --  0.7  --    CALCIUM 9.4  --  8.9  --   --  8.1*  --    ALBUMIN 3.8  --  3.2*  --   --  2.6*  --    PROT 7.3  --  6.1  --   --  4.9*  --    BILITOT 0.8  --  1.0  --   --  0.7  --    ALKPHOS 104  --  183*  --   --  139*  --    ALT 17  --  14  --   --  12  --    AST 23  --  22  --   --  26  --    MG 2.1  --  1.6  --   --  2.0  --    PHOS  --   --  2.0*  --   --   --  2.3*    < > = values in this interval not displayed.     Lactic Acid:   Recent Labs   Lab 03/25/24  1847 03/26/24  1759 03/27/24  1051   LACTATE 1.0 1.2 1.3     Significant Imaging:  I have reviewed all pertinent imaging results/findings within the past 24 hours.  I have reviewed and interpreted all pertinent imaging results/findings within the past 24 hours.

## 2024-03-27 NOTE — ASSESSMENT & PLAN NOTE
-Previously evaluated by vascular---monitoring/med mgmt recommended as vessels were felt to be small/high risk of complications  -Dr. Juarez willing to proceed with mesenteric angio if warranted but will observe for now  -Difficult to elucidate true etiology of patient's abdominal pain---has mixed picture

## 2024-03-27 NOTE — PROGRESS NOTES
[facility] ([facility])  Critical Care Medicine  Progress Note    Patient Name: Gemma Vick  MRN: [id number]  Admission Date: 3/25/2024  Hospital Length of Stay: 2 days  Code Status: Full Code  Attending Provider: Jannette Osman MD  Primary Care Provider: Olga Altman MD   Principal Problem: Colitis    Subjective:     HPI:  Gemma Vick is a 69-year-old female with a past history significant for COPD, former smoker, CAD s/p stents, KALLIE, vascular dementia, Alzheimer's, AAA-stable on CT, HTN, HLD, GERD, diverticulitis/osis, chronic mesenteric ischemia, GERD, DDD, osteoarthritis, lumbar radiculopathy, who presented to the ED c/o diffuse abdominal pain, nausea, generalized weakness onset today. Reports poor oral intake for several days. States she fell several days ago and also has left hip pain, and was started on Robaxin. Last BM yesterday, small, soft/formed, dark, but has been dark since starting iron supplements. She also endorses oliguria w/dark urine, hemorrhoids, hx of constipation and feels like she needs an enema. Reports several episodes of vomiting today, stating it was more reflux and some bile, but very small volume. Patient denies fever, chills, cough, congestion, dizziness, dyspnea, chest discomfort, dysuria, hematuria, myalgias. Patient denies sick contacts, potential of contaminated food or water. ED workup with noted leukocytosis, hyponatremia-corrected 123, hyperglycemia. CT c/a/p shows colonic wall thickening/inflammation of descending colon consistent with colitis, surgically absent gall bladder. CXR and L hip XR w/NAF. CT head w/NAF. UA w/no evidence of infection or ketonuria. Lactate, procalcitonin, troponin, TSH, lipase, LFT's WNL. Flu/COVID negative. She was found to have a rectal temp of 92 and was placed on Mirna hugger. She was given warmed IVF, antibiotics, glycerin suppository, has cho w/bg urine, adequate output noted in bag. Critical care was consulted for  admission and management due to hypothermia requiring active re-warming.     Hospital/ICU Course:  3/26 - AM Na improved to 126. Stool cx pending. Receiving IVF. Temp improved.   3/27 - Mild improvement in abdominal pain this am. NAEON. BP elevated this morning, restarted on home norvasc/coreg. GI/cards consulted. Na improved 125.    Objective:     Vital Signs (Most Recent):  Temp: 99 °F (37.2 °C) (03/27/24 1300)  Pulse: 97 (03/27/24 1300)  Resp: (!) 21 (03/27/24 1300)  BP: (!) 156/109 (03/27/24 1300)  SpO2: 95 % (03/27/24 1300) Vital Signs (24h Range):  Temp:  [97.5 °F (36.4 °C)-99.9 °F (37.7 °C)] 99 °F (37.2 °C)  Pulse:  [] 97  Resp:  [16-35] 21  SpO2:  [91 %-98 %] 95 %  BP: (129-189)/() 156/109     Weight: 59.6 kg (131 lb 6.3 oz)  Body mass index is 24.03 kg/m².  Intake/Output Summary (Last 24 hours) at 3/27/2024 1406  Last data filed at 3/27/2024 1213  Gross per 24 hour   Intake 2461.87 ml   Output 460 ml   Net 2001.87 ml     Physical Exam  Constitutional:       General: She is not in acute distress.     Appearance: She is ill-appearing.   HENT:      Head: Normocephalic.      Mouth/Throat:      Mouth: Mucous membranes are dry.   Eyes:      General: No scleral icterus.     Extraocular Movements: Extraocular movements intact.   Cardiovascular:      Rate and Rhythm: Normal rate and regular rhythm.      Pulses: Normal pulses.   Pulmonary:      Effort: Pulmonary effort is normal. No respiratory distress.      Breath sounds: No wheezing.   Abdominal:      General: Bowel sounds are normal. There is no distension.      Palpations: Abdomen is soft.      Tenderness: There is abdominal tenderness. There is no guarding.   Musculoskeletal:      Cervical back: Normal range of motion.   Skin:     General: Skin is warm.      Coloration: Skin is not jaundiced.   Neurological:      Mental Status: She is oriented to person, place, and time.   Psychiatric:         Mood and Affect: Mood normal.     Review of Systems    Constitutional:  Positive for appetite change.   Respiratory:  Negative for chest tightness and shortness of breath.    Cardiovascular:  Negative for chest pain and palpitations.   Gastrointestinal:  Positive for abdominal pain, diarrhea and nausea. Negative for vomiting.   Genitourinary:  Negative for difficulty urinating and dysuria.   Neurological:  Positive for weakness.   Psychiatric/Behavioral:  Negative for agitation and confusion.      Vents:  Oxygen Concentration (%): 24 (03/27/24 0710)    Lines/Drains/Airways       Drain  Duration                  Urethral Catheter 03/25/24 2045 Temperature probe 16 Fr. 1 day              Peripheral Intravenous Line  Duration                  Peripheral IV - Single Lumen 03/25/24 1900 20 G Right Antecubital 1 day                  Significant Labs:    CBC/Anemia Profile:  Recent Labs   Lab 03/25/24  1847 03/26/24  0400 03/26/24  0450 03/27/24  0627   WBC 14.02* 12.55  --  15.90*   HGB 12.3 12.4  --  10.2*   HCT 35.1* 35.0*  --  28.9*    223  --  198   MCV 87 88  --  87   RDW 11.8 11.6  --  12.3   OCCULTBLOOD  --   --  Positive*  --      Chemistries:  Recent Labs   Lab 03/25/24  1847 03/25/24  2359 03/26/24  0400 03/26/24  0615 03/26/24  2348 03/27/24  0627 03/27/24  1051   *   < > 126*   < > 125* 125*  125* 124*   K 3.6  --  3.3*  --   --  3.8  --    CL 89*  --  97  --   --  98  --    CO2 21*  --  18*  --   --  20*  --    BUN 17  --  14  --   --  12  --    CREATININE 1.3  --  0.9  --   --  0.7  --    CALCIUM 9.4  --  8.9  --   --  8.1*  --    ALBUMIN 3.8  --  3.2*  --   --  2.6*  --    PROT 7.3  --  6.1  --   --  4.9*  --    BILITOT 0.8  --  1.0  --   --  0.7  --    ALKPHOS 104  --  183*  --   --  139*  --    ALT 17  --  14  --   --  12  --    AST 23  --  22  --   --  26  --    MG 2.1  --  1.6  --   --  2.0  --    PHOS  --   --  2.0*  --   --   --  2.3*    < > = values in this interval not displayed.     Lactic Acid:   Recent Labs   Lab 03/25/24  8688  03/26/24  1759 03/27/24  1051   LACTATE 1.0 1.2 1.3     Significant Imaging:  I have reviewed all pertinent imaging results/findings within the past 24 hours.  I have reviewed and interpreted all pertinent imaging results/findings within the past 24 hours.    Assessment/Plan:     Neuro  Moderate vascular dementia without behavioral disturbance, psychotic disturbance, mood disturbance, or anxiety  - Patient with dementia with likely etiology of vascular dementia. Dementia is mild. The patient does not have signs of behavioral disturbance.   - Continue non-pharmacologic interventions to prevent delirium (No VS between 11PM-5AM, activity during day, opening blinds, providing glasses/hearing aids, and up in chair during daytime). Will avoid narcotics and benzos unless absolutely necessary. PRN anti-psychotics are not prescribed to avoid self harm behaviors.  - Restarted on home Memantine  - Mild essential like tremor noted on exam today with UE. Not present while sleeping; not sure of etiology, poss. Psychogenic? No inducing meds noted    Pulmonary  COPD without exacerbation  - Patient's COPD is controlled currently.  Patient is currently off COPD Pathway. Continue scheduled inhalers and monitor respiratory status closely.   - Goal SpO2>88%    Cardiac/Vascular  AAA (abdominal aortic aneurysm)  - Stable on CT, 3.9 cm  - Avoid hypertensive states; restarted on home anti-hypertensive agents    Coronary artery disease of native artery of native heart with stable angina pectoris  - Patient with known CAD s/p stent placement, which is controlled Will continue ASA, Plavix, and Statin when resumes PO intake and monitor for S/Sx of angina/ACS. Continue to monitor on telemetry.   - Seen by cards today; continue current regimen, no acute interventions needed    Essential hypertension  - Resumed home norvasc/coreg today  - Use prn anti-hypertensive agents as needed    Hyperlipidemia  - Resume statin    Renal/  Hyponatremia  -  Patient has hyponatremia which is uncontrolled, likely progressing for >48 hr. We will aim to correct the sodium by 4-6mEq in 24 hours. We will monitor sodium Every 6 hours. The hyponatremia is due to Dehydration/hypovolemia. We will obtain the following studies: urine sodium, urine creatinine, urine osmolality. We will treat the hyponatremia with IV fluids as follows: NS. The patient's sodium results have been reviewed and are listed below.    Recent Labs   Lab 03/26/24  0615   *   - Na is improving; continue serial checks q6h  - Stable, will transfer to floor     ID  Sepsis  - No clear source  - Organ dysfunction indicated by  hypothermia  - Fluid challenge was administered in ED. Has received over 4L since admission  - BP has been stable; no need for pressors at this time  - Continue IVF and ABX therapy; will continue to follow cultures and de-escalate as indicated    Endocrine  Hyperglycemia  - History of pre-diabetes  - HgbA1C 5.2  - Moderate correctional SSI     GI  * Colitis  - Hypothermia has improved; WBC 15.9; blood cx w/ no growth  - Stool studies pending; C.diff pending; placed on special precautions  - Continue abx, follow cultures and adjust as indicated; avoid anti-motility agents for now  - Seen by GI, possibly inflammatory vs. Infectious vs. IBD; declined colonoscopy recently OP; no acute interventions from GI at this time.   - Will advance diet    Mesenteric ischemia, chronic  - Has seen vascular surgery years ago for this issue  - SMA stenosis on CT   - Recommend f/u with vascular op     GERD (gastroesophageal reflux disease)  - Continue protonix daily  - Hold NSAID's    Palliative Care  History of fall  - NAF on imaging  - Pain control offered, refused narcotics, tylenol prn ordered  - PT/OT    Dante Thompson PA-C  Critical Care Medicine  [facility] ([facility])

## 2024-03-27 NOTE — SUBJECTIVE & OBJECTIVE
Past Medical History:   Diagnosis Date    AAA (abdominal aortic aneurysm) 02/13/2014    Abdominal aneurysm     3    Acute coronary syndrome     Arthritis     BPPV (benign paroxysmal positional vertigo)     Carotid artery plaque     Carotid artery stenosis and occlusion 02/13/2014    Chronic back pain     COPD (chronic obstructive pulmonary disease)     Coronary artery disease     Dementia     Emphysema lung     Hyperlipidemia     Hypertension     Myocardial infarction     x3    Neuropathy     Personal history of COVID-19 06/09/2021 11/16/2020 +Covid, recovered at home        Past Surgical History:   Procedure Laterality Date    CARDIAC CATHETERIZATION      CORONARY ANGIOPLASTY      CORONARY STENT PLACEMENT N/A 9/12/2023    Procedure: INSERTION, STENT, CORONARY ARTERY;  Surgeon: Millie Juarez MD;  Location: [facility] CATH LAB;  Service: Cardiology;  Laterality: N/A;    HYSTERECTOMY  1988    LEFT HEART CATHETERIZATION Left 9/12/2023    Procedure: Left heart cath;  Surgeon: Millie Juarez MD;  Location: [facility] CATH LAB;  Service: Cardiology;  Laterality: Left;    PERCUTANEOUS CORONARY INTERVENTION, ARTERY N/A 9/12/2023    Procedure: Percutaneous coronary intervention;  Surgeon: Millie Juarez MD;  Location: [facility] CATH LAB;  Service: Cardiology;  Laterality: N/A;    THROMBECTOMY, CORONARY  9/12/2023    Procedure: Thrombectomy, Coronary;  Surgeon: Millie Juarez MD;  Location: [facility] CATH LAB;  Service: Cardiology;;    TONSILLECTOMY      TRANSFORAMINAL EPIDURAL INJECTION OF STEROID Right 12/1/2023    Procedure: Injection,steroid,epidural,transforaminal approach- right side, L4/5 and L5/S1;  Surgeon: Lydia Roberts MD;  Location: [facility];  Service: Pain Management;  Laterality: Right;       Review of patient's allergies indicates:  No Known Allergies  Family History       Problem Relation (Age of Onset)    Breast cancer Paternal Aunt    Heart attacks under age 50 Father, Brother    Heart disease Mother           Tobacco Use    Smoking status: Former     Current packs/day: 0.00     Average packs/day: 0.5 packs/day for 46.9 years (23.4 ttl pk-yrs)     Types: Cigarettes, Vaping w/o nicotine     Start date: 1970     Quit date: 2017     Years since quittin.9    Smokeless tobacco: Never    Tobacco comments:     3 MG NICOTINE FOR HEART.    Substance and Sexual Activity    Alcohol use: No    Drug use: No    Sexual activity: Not Currently     Birth control/protection: None     Review of Systems   Unable to perform ROS: Dementia   Constitutional:  Negative for fever.   Respiratory:  Negative for shortness of breath.    Cardiovascular:  Negative for chest pain.   Gastrointestinal:  Positive for abdominal pain, blood in stool, constipation (stools were reported a small) and vomiting (non-bloody per family).   Neurological:  Positive for tremors.     Objective:     Vital Signs (Most Recent):  Temp: 99.5 °F (37.5 °C) (24 0600)  Pulse: 91 (24 0711)  Resp: 20 (24 0711)  BP: (!) 161/95 (24 0600)  SpO2: 96 % (24 0711) Vital Signs (24h Range):  Temp:  [98.4 °F (36.9 °C)-99.9 °F (37.7 °C)] 99.5 °F (37.5 °C)  Pulse:  [69-96] 91  Resp:  [16-35] 20  SpO2:  [91 %-99 %] 96 %  BP: (129-177)/() 161/95     Weight: 59.6 kg (131 lb 6.3 oz) (24 2330)  Body mass index is 24.03 kg/m².      Intake/Output Summary (Last 24 hours) at 3/27/2024 1043  Last data filed at 3/27/2024 0701  Gross per 24 hour   Intake 2461.87 ml   Output 585 ml   Net 1876.87 ml       Lines/Drains/Airways       Drain  Duration                  Urethral Catheter 24 2045 Temperature probe 16 Fr. 1 day              Peripheral Intravenous Line  Duration                  Peripheral IV - Single Lumen 24 1900 20 G Right Antecubital 1 day                     Physical Exam  Constitutional:       General: She is not in acute distress.     Appearance: She is well-developed.   HENT:      Head: Normocephalic and atraumatic.  "  Eyes:      Extraocular Movements: Extraocular movements intact.   Cardiovascular:      Rate and Rhythm: Normal rate and regular rhythm.      Heart sounds: No murmur heard.  Pulmonary:      Effort: Pulmonary effort is normal. No respiratory distress.      Breath sounds: Normal breath sounds. No wheezing.   Abdominal:      General: Bowel sounds are normal. There is abdominal bruit. There is no distension.      Palpations: Abdomen is soft. There is no mass.      Tenderness: There is abdominal tenderness (primarily lower abdome and left side). There is no guarding or rebound.   Musculoskeletal:      Cervical back: Normal range of motion and neck supple.      Right lower leg: No edema.      Left lower leg: No edema.   Skin:     General: Skin is warm and dry.      Findings: No rash.   Neurological:      Mental Status: She is oriented to person, place, and time and easily aroused.      Cranial Nerves: No cranial nerve deficit.   Psychiatric:         Behavior: Behavior normal.          Significant Labs:  CBC:   Recent Labs   Lab 03/25/24  1847 03/26/24  0400 03/27/24  0627   WBC 14.02* 12.55 15.90*   HGB 12.3 12.4 10.2*   HCT 35.1* 35.0* 28.9*    223 198     CMP:   Recent Labs   Lab 03/27/24  0627   GLU 99   CALCIUM 8.1*   ALBUMIN 2.6*   PROT 4.9*   *  125*   K 3.8   CO2 20*   CL 98   BUN 12   CREATININE 0.7   ALKPHOS 139*   ALT 12   AST 26   BILITOT 0.7     Coagulation: No results for input(s): "PT", "INR", "APTT" in the last 48 hours.    Significant Imaging:  Imaging results within the past 24 hours have been reviewed.  "

## 2024-03-27 NOTE — SUBJECTIVE & OBJECTIVE
Interval History: pt admitted to ICU on 3/25 with severe Hyponatremia- 119 and Hypothermia 92 F and ch severe Constipation- admitted to ICU for Rewarming and Na improved to 126, she also had large BM and has had multiple BMs all day- now being downgraded to Tele under Hosp med. Pt resting comfortably. Hyponatremia possibly sec to HCTZ with excess free water intake.       Review of Systems   Constitutional:  Positive for appetite change.   Respiratory:  Negative for chest tightness and shortness of breath.    Cardiovascular:  Negative for chest pain and palpitations.   Gastrointestinal:  Positive for diarrhea and nausea. Negative for abdominal pain and vomiting.   Genitourinary:  Negative for difficulty urinating and dysuria.   Neurological:  Positive for weakness.   Psychiatric/Behavioral:  Negative for agitation and confusion.      Objective:     Vital Signs (Most Recent):  Temp: 99 °F (37.2 °C) (03/27/24 1300)  Pulse: 97 (03/27/24 1300)  Resp: (!) 21 (03/27/24 1300)  BP: (!) 156/109 (03/27/24 1300)  SpO2: 95 % (03/27/24 1300) Vital Signs (24h Range):  Temp:  [97.5 °F (36.4 °C)-99.9 °F (37.7 °C)] 99 °F (37.2 °C)  Pulse:  [] 97  Resp:  [16-35] 21  SpO2:  [91 %-97 %] 95 %  BP: (129-189)/() 156/109     Weight: 59.6 kg (131 lb 6.3 oz)  Body mass index is 24.03 kg/m².    Intake/Output Summary (Last 24 hours) at 3/27/2024 1825  Last data filed at 3/27/2024 1213  Gross per 24 hour   Intake 2461.87 ml   Output 420 ml   Net 2041.87 ml         Physical Exam  Constitutional:       General: She is not in acute distress.     Appearance: She is ill-appearing.   HENT:      Head: Normocephalic.      Mouth/Throat:      Mouth: Mucous membranes are dry.   Eyes:      General: No scleral icterus.     Extraocular Movements: Extraocular movements intact.   Cardiovascular:      Rate and Rhythm: Normal rate and regular rhythm.      Pulses: Normal pulses.   Pulmonary:      Effort: Pulmonary effort is normal. No respiratory  distress.      Breath sounds: No wheezing.   Abdominal:      General: Bowel sounds are normal. There is no distension.      Palpations: Abdomen is soft.      Tenderness: There is abdominal tenderness. There is no guarding.   Musculoskeletal:      Cervical back: Normal range of motion.   Skin:     General: Skin is warm.      Coloration: Skin is not jaundiced.   Neurological:      Mental Status: She is oriented to person, place, and time.   Psychiatric:         Mood and Affect: Mood normal.             Significant Labs: All pertinent labs within the past 24 hours have been reviewed.  Blood Culture:   Recent Labs   Lab 03/25/24 1848 03/25/24 1942   LABBLOO No Growth to date  No Growth to date No Growth to date  No Growth to date     BMP:   Recent Labs   Lab 03/27/24  0627 03/27/24  1051 03/27/24  1612   GLU 99  --   --    *  125*   < > 126*   K 3.8  --   --    CL 98  --   --    CO2 20*  --   --    BUN 12  --   --    CREATININE 0.7  --   --    CALCIUM 8.1*  --   --    MG 2.0  --   --     < > = values in this interval not displayed.     CBC:   Recent Labs   Lab 03/25/24 1847 03/26/24  0400 03/27/24  0627   WBC 14.02* 12.55 15.90*   HGB 12.3 12.4 10.2*   HCT 35.1* 35.0* 28.9*    223 198     CMP:   Recent Labs   Lab 03/25/24  1847 03/25/24 2359 03/26/24  0400 03/26/24  0615 03/27/24  0627 03/27/24  1051 03/27/24  1612   *   < > 126*   < > 125*  125* 124* 126*   K 3.6  --  3.3*  --  3.8  --   --    CL 89*  --  97  --  98  --   --    CO2 21*  --  18*  --  20*  --   --    *  --  125*  --  99  --   --    BUN 17  --  14  --  12  --   --    CREATININE 1.3  --  0.9  --  0.7  --   --    CALCIUM 9.4  --  8.9  --  8.1*  --   --    PROT 7.3  --  6.1  --  4.9*  --   --    ALBUMIN 3.8  --  3.2*  --  2.6*  --   --    BILITOT 0.8  --  1.0  --  0.7  --   --    ALKPHOS 104  --  183*  --  139*  --   --    AST 23  --  22  --  26  --   --    ALT 17  --  14  --  12  --   --    ANIONGAP 11  --  11  --  7*  --    --     < > = values in this interval not displayed.     Cardiac Markers:   Coagulation:   Lactic Acid:   Recent Labs   Lab 03/25/24  1847 03/26/24  1759 03/27/24  1051   LACTATE 1.0 1.2 1.3     Lipase:   Recent Labs   Lab 03/25/24  1847   LIPASE 22     Lipid Panel:   Magnesium:   Recent Labs   Lab 03/25/24  1847 03/26/24  0400 03/27/24  0627   MG 2.1 1.6 2.0     POCT Glucose:   Recent Labs   Lab 03/26/24  1826 03/26/24  2348 03/27/24  1557   POCTGLUCOSE 126* 113* 101     Troponin:   Recent Labs   Lab 03/25/24  1847   TROPONINI <0.006     TSH:   Recent Labs   Lab 03/25/24  1847   TSH 3.016     Urine Culture:     Significant Imaging: I have reviewed all pertinent imaging results/findings within the past 24 hours.

## 2024-03-27 NOTE — ASSESSMENT & PLAN NOTE
Cardiology has been consulted. Dr. Juarez is her primary Cardiologist and willing to consider angio depending on clinical course.

## 2024-03-27 NOTE — ASSESSMENT & PLAN NOTE
- Hypothermia has improved; WBC 15.9; blood cx w/ no growth  - Stool studies pending; C.diff pending; placed on special precautions  - Continue abx, follow cultures and adjust as indicated; avoid anti-motility agents for now  - Seen by GI, possibly inflammatory vs. Infectious vs. IBD; declined colonoscopy recently OP; no acute interventions from GI at this time.   - Will advance diet

## 2024-03-27 NOTE — HPI
"Patient presented to the ER with weakness, nausea, vomiting and constipation. She was hypothermic; sepsis work up started and admitted to ICU. History primarily obtained from the patient's family, medical record and medical staff. WBC count 14, Hgb 12.3, [id number], Sodium 125, potassium 3.6, BUN, creatinine and LFTs normal. CXR and UA unremarkable. CT scan w/ cont showed colonic wall thickening over a moderate length segment of the descending colon with local inflammatory change and small volume adjacent free fluid favored to be reactive.  Findings are concerning for infectious or inflammatory colitis. She was started on Flagyl and Cefepime. Stool studies pending. Family said diarrhea started here after she was given a suppository in the ER. She reportedly had some blood in her stool which she attributed to hemorrhoids. ER provider documented an anal fissure on rectal exam. She has never had a colonoscopy due to comorbid conditions.     Prior work up by Dr. Juarez showed "significant blockages in the gut vessels explaining abdominal pain weight loss." Lactic acid this admit is normal. No hypotension on admit. She takes Plavix daily.   "

## 2024-03-27 NOTE — ASSESSMENT & PLAN NOTE
Patient has hyponatremia which is controlled,We will aim to correct the sodium by 4-6mEq in 24 hours. We will monitor sodium Daily. The hyponatremia is due to Dehydration/hypovolemia, SIADH secondary to medications induced from thiazides, and polydipsia. We will obtain the following studies: Urine sodium, urine osmolality, serum osmolality or TSH, T4. We will treat the hyponatremia with IV fluids as follows: NS. The patient's sodium results have been reviewed and are listed below.  Recent Labs   Lab 03/27/24  1612   *

## 2024-03-27 NOTE — CONSULTS
[facility] (Hospital)  Cardiology  Consult Note    Patient Name: Gemma Vick  MRN: [id number]  Admission Date: 3/25/2024  Hospital Length of Stay: 2 days  Code Status: Full Code   Attending Provider: Jannette Osman MD   Consulting Provider: Vidhya Lee PA-C  Primary Care Physician: Olga Altman MD  Principal Problem:Colitis    Patient information was obtained from patient, past medical records, and ER records.     Inpatient consult to Cardiology  Consult performed by: Vidhya Lee PA-C  Consult ordered by: Dante Thompson PA-C        Subjective:     Chief Complaint:  Abd pain    HPI:   Ms. Vick is a 69 year old female patient whose current medical conditions include COPD, former tobacco abuse, CAD s/p prior stent (most recent PCI of RCA 9/23), HTN, hyperlipidemia, GERD, chronic mesenteric ischemia, vascular dementia, and AAA who presented to [facility] ED on 3/25/24 due to abdominal pain that onset the day prior. Associated symptoms included nausea, weakness, fatigue, constipation, and decreased oral intake. She denied any associated raffy chest pain, SOB, palpitations, near syncope, or syncope. Initial workup in ED revealed leukocytosis, hyponatremia, and hypothermia. CT of chest/abd/pelvis showed findings consistent with colitis and patient was subsequently admitted to ICU for further evaluation and treatment. Cardiology consulted to assist with management. Patient seen and examined today with family at bedside. Feels ok. States abdominal pain has improved. Received an enema and had successful BM's. No CP or SOB. She has known mesenteric ischemia/SMA stenosis. Previously saw vascular last year and medical management/monitoring was recommended as it was felt vessels were small/high risk of complications with intervention. She is followed in clinic by Dr. Jaurez and mesenteric angiogram had been re-discussed. She is compliant with her medications. Chart reviewed. LA remains WNL. GI on  board, patient previously declined EGD/colonoscopy as OP due to sedation concerns.       Review of Systems   Constitutional: Positive for malaise/fatigue.   Eyes: Negative.    Cardiovascular: Negative.    Respiratory: Negative.     Endocrine: Negative.    Hematologic/Lymphatic: Negative.    Skin: Negative.    Musculoskeletal:  Positive for arthritis and joint pain.   Gastrointestinal:  Positive for abdominal pain, nausea and vomiting.   Genitourinary: Negative.    Neurological:  Positive for tremors.   Psychiatric/Behavioral:  Positive for memory loss.    Allergic/Immunologic: Negative.      Objective:     Vital Signs (Most Recent):  Temp: 99 °F (37.2 °C) (03/27/24 1300)  Pulse: 97 (03/27/24 1300)  Resp: (!) 21 (03/27/24 1300)  BP: (!) 156/109 (03/27/24 1300)  SpO2: 95 % (03/27/24 1300) Vital Signs (24h Range):  Temp:  [97.5 °F (36.4 °C)-99.9 °F (37.7 °C)] 99 °F (37.2 °C)  Pulse:  [] 97  Resp:  [16-35] 21  SpO2:  [91 %-98 %] 95 %  BP: (129-189)/() 156/109     Weight: 59.6 kg (131 lb 6.3 oz)  Body mass index is 24.03 kg/m².     SpO2: 95 %         Intake/Output Summary (Last 24 hours) at 3/27/2024 1520  Last data filed at 3/27/2024 1213  Gross per 24 hour   Intake 2461.87 ml   Output 420 ml   Net 2041.87 ml       Lines/Drains/Airways       Drain  Duration                  Urethral Catheter 03/25/24 2045 Temperature probe 16 Fr. 1 day              Peripheral Intravenous Line  Duration                  Peripheral IV - Single Lumen 03/25/24 1900 20 G Right Antecubital 1 day                       Physical Exam  Vitals and nursing note reviewed.   Constitutional:       Appearance: She is ill-appearing.      Comments: On supplemental O2 via NC   HENT:      Head: Normocephalic and atraumatic.   Eyes:      Pupils: Pupils are equal, round, and reactive to light.   Cardiovascular:      Rate and Rhythm: Normal rate and regular rhythm.      Heart sounds: S1 normal and S2 normal. No murmur heard.  Pulmonary:       Effort: Pulmonary effort is normal.      Breath sounds: Normal breath sounds.   Abdominal:      Tenderness: There is abdominal tenderness (lower abdominal).   Musculoskeletal:      Right lower leg: No edema.      Left lower leg: No edema.   Neurological:      General: No focal deficit present.      Comments: Awake, oriented, mildly forgetful   Psychiatric:         Mood and Affect: Mood normal.            Significant Labs: CMP   Recent Labs   Lab 03/25/24 1847 03/25/24  2359 03/26/24  0400 03/26/24  0615 03/26/24  2348 03/27/24  0627 03/27/24  1051   *   < > 126*   < > 125* 125*  125* 124*   K 3.6  --  3.3*  --   --  3.8  --    CL 89*  --  97  --   --  98  --    CO2 21*  --  18*  --   --  20*  --    *  --  125*  --   --  99  --    BUN 17  --  14  --   --  12  --    CREATININE 1.3  --  0.9  --   --  0.7  --    CALCIUM 9.4  --  8.9  --   --  8.1*  --    PROT 7.3  --  6.1  --   --  4.9*  --    ALBUMIN 3.8  --  3.2*  --   --  2.6*  --    BILITOT 0.8  --  1.0  --   --  0.7  --    ALKPHOS 104  --  183*  --   --  139*  --    AST 23  --  22  --   --  26  --    ALT 17  --  14  --   --  12  --    ANIONGAP 11  --  11  --   --  7*  --     < > = values in this interval not displayed.   , CBC   Recent Labs   Lab 03/25/24 1847 03/26/24 0400 03/27/24  0627   WBC 14.02* 12.55 15.90*   HGB 12.3 12.4 10.2*   HCT 35.1* 35.0* 28.9*    223 198   , Troponin   Recent Labs   Lab 03/25/24 1847   TROPONINI <0.006   , and All pertinent lab results from the last 24 hours have been reviewed.    Significant Imaging: Echocardiogram: Transthoracic echo (TTE) complete ([facility] Only):   Results for orders placed or performed during the hospital encounter of 06/14/23   Echo   Result Value Ref Range    BSA 1.63 m2    TDI SEPTAL 0.05 m/s    LV LATERAL E/E' RATIO 18.17 m/s    LV SEPTAL E/E' RATIO 21.80 m/s    LA WIDTH 3.40 cm    IVC diameter 1.06 cm    Left Ventricular Outflow Tract Mean Velocity 0.75 cm/s    Left Ventricular  Outflow Tract Mean Gradient 2.68 mmHg    TDI LATERAL 0.06 m/s    PV PEAK VELOCITY 0.71 cm/s    LVIDd 4.18 3.5 - 6.0 cm    IVS 0.99 0.6 - 1.1 cm    Posterior Wall 0.99 0.6 - 1.1 cm    Ao root annulus 3.23 cm    LVIDs 2.81 2.1 - 4.0 cm    FS 33 28 - 44 %    LA volume 40.64 cm3    Sinus 3.33 cm    STJ 3.04 cm    Ascending aorta 3.02 cm    LV mass 134.31 g    LA size 2.99 cm    RVDD 2.95 cm    Left Ventricle Relative Wall Thickness 0.47 cm    AV regurgitation pressure 1/2 time [id number] ms    AV mean gradient 4 mmHg    AV valve area 2.36 cm2    AV Velocity Ratio 0.84     AV index (prosthetic) 0.82     MV valve area p 1/2 method 3.61 cm2    E/A ratio 1.06     Mean e' 0.06 m/s    E wave deceleration time 210.28 msec    IVRT 125.59 msec    LVOT diameter 1.91 cm    LVOT area 2.9 cm2    LVOT peak oz 1.20 m/s    LVOT peak VTI 31.10 cm    Ao peak oz 1.43 m/s    Ao VTI 37.7 cm    RVOT peak oz 0.68 m/s    RVOT peak VTI 18.0 cm    LVOT stroke volume 89.06 cm3    AV peak gradient 8 mmHg    PV mean gradient 1.01 mmHg    E/E' ratio 19.82 m/s    MV Peak E Oz 1.09 m/s    AR Max Oz 5.01 m/s    TR Max Oz 2.80 m/s    MV stenosis pressure 1/2 time 60.98 ms    MV Peak A Oz 1.03 m/s    LV Systolic Volume 29.76 mL    LV Systolic Volume Index 18.6 mL/m2    LV Diastolic Volume 77.84 mL    LV Diastolic Volume Index 48.65 mL/m2    LA Volume Index 25.4 mL/m2    LV Mass Index 84 g/m2    RA Major Axis 4.04 cm    Left Atrium Minor Axis 4.60 cm    Left Atrium Major Axis 4.81 cm    Triscuspid Valve Regurgitation Peak Gradient 31 mmHg    LA Volume Index (Mod) 25.7 mL/m2    LA volume (mod) 41.15 cm3    RA Width 3.22 cm    EF 60 %    Narrative    · Concentric remodeling and normal systolic function.  · The estimated ejection fraction is 60%.  · Indeterminate left ventricular diastolic function.  · Normal right ventricular size with normal right ventricular systolic   function.  · Mild tricuspid regurgitation.  · There is pulmonary  hypertension.  · Mild aortic regurgitation.       and EKG: Reviewed  Assessment and Plan:   Patient who presents with colitis. She has known chronic mesenteric ischemia. LA remains normal. Abd pain improved today. Continue same mgmt. Would benefit from GI workup in future, if agreeable. Will consider mesenteric angiogram if she fails to imrpove.     * Colitis  -GI on board  -Infectious vs Ischemic, LA remains normal  -Patient has known chronic mesenteric ischemia  -Would benefit from GI evaluation-colonoscopy/EGD   -Will consider mesenteric angio if she fails to improve  -Continue same mgmt in interim    Sepsis  -Mgmt as per primary team  -Continue abx    Hyponatremia  -HCTZ held  -Monitor    Mesenteric ischemia, chronic  -Previously evaluated by vascular---monitoring/med mgmt recommended as vessels were felt to be small/high risk of complications  -Dr. Juarez willing to proceed with mesenteric angio if warranted but will observe for now  -Difficult to elucidate true etiology of patient's abdominal pain---has mixed picture    AAA (abdominal aortic aneurysm)  Stable    Coronary artery disease of native artery of native heart with stable angina pectoris  -Stable, no angina  -Resume home meds/OMT    Essential hypertension  -Resume home meds        VTE Risk Mitigation (From admission, onward)           Ordered     enoxaparin injection 40 mg  Daily         03/25/24 2153     IP VTE HIGH RISK PATIENT  Once         03/25/24 2153     Place sequential compression device  Until discontinued         03/25/24 2153                    Thank you for your consult. I will follow-up with patient. Please contact us if you have any additional questions.    Vidhya Lee PA-C  Cardiology   [facility] ([facility])

## 2024-03-28 ENCOUNTER — TELEPHONE (OUTPATIENT)
Dept: GASTROENTEROLOGY | Facility: CLINIC | Age: 69
End: 2024-03-28
Payer: MEDICARE

## 2024-03-28 VITALS
TEMPERATURE: 98 F | WEIGHT: 131.38 LBS | SYSTOLIC BLOOD PRESSURE: 155 MMHG | HEIGHT: 62 IN | RESPIRATION RATE: 20 BRPM | DIASTOLIC BLOOD PRESSURE: 70 MMHG | HEART RATE: 77 BPM | OXYGEN SATURATION: 96 % | BODY MASS INDEX: 24.18 KG/M2

## 2024-03-28 PROBLEM — R53.1 WEAKNESS: Status: ACTIVE | Noted: 2024-03-28

## 2024-03-28 PROBLEM — T68.XXXA HYPOTHERMIA: Status: ACTIVE | Noted: 2024-03-28

## 2024-03-28 LAB
ALBUMIN SERPL BCP-MCNC: 2.3 G/DL (ref 3.5–5.2)
ALP SERPL-CCNC: 107 U/L (ref 55–135)
ALT SERPL W/O P-5'-P-CCNC: 12 U/L (ref 10–44)
ANION GAP SERPL CALC-SCNC: 7 MMOL/L (ref 8–16)
ANISOCYTOSIS BLD QL SMEAR: SLIGHT
AST SERPL-CCNC: 20 U/L (ref 10–40)
BACTERIA STL CULT: NORMAL
BASOPHILS # BLD AUTO: 0.07 K/UL (ref 0–0.2)
BASOPHILS NFR BLD: 0.5 % (ref 0–1.9)
BILIRUB SERPL-MCNC: 0.5 MG/DL (ref 0.1–1)
BUN SERPL-MCNC: 14 MG/DL (ref 8–23)
BURR CELLS BLD QL SMEAR: ABNORMAL
C DIFF GDH STL QL: NEGATIVE
C DIFF TOX A+B STL QL IA: NEGATIVE
CALCIUM SERPL-MCNC: 8.1 MG/DL (ref 8.7–10.5)
CHLORIDE SERPL-SCNC: 102 MMOL/L (ref 95–110)
CO2 SERPL-SCNC: 17 MMOL/L (ref 23–29)
CREAT SERPL-MCNC: 0.6 MG/DL (ref 0.5–1.4)
DIFFERENTIAL METHOD BLD: ABNORMAL
EOSINOPHIL # BLD AUTO: 0 K/UL (ref 0–0.5)
EOSINOPHIL NFR BLD: 0.1 % (ref 0–8)
ERYTHROCYTE [DISTWIDTH] IN BLOOD BY AUTOMATED COUNT: 12.7 % (ref 11.5–14.5)
EST. GFR  (NO RACE VARIABLE): >60 ML/MIN/1.73 M^2
GLUCOSE SERPL-MCNC: 103 MG/DL (ref 70–110)
HCT VFR BLD AUTO: 29.3 % (ref 37–48.5)
HGB BLD-MCNC: 10.2 G/DL (ref 12–16)
IMM GRANULOCYTES # BLD AUTO: 0.16 K/UL (ref 0–0.04)
IMM GRANULOCYTES NFR BLD AUTO: 1 % (ref 0–0.5)
LYMPHOCYTES # BLD AUTO: 0.6 K/UL (ref 1–4.8)
LYMPHOCYTES NFR BLD: 3.7 % (ref 18–48)
MAGNESIUM SERPL-MCNC: 1.9 MG/DL (ref 1.6–2.6)
MCH RBC QN AUTO: 30.6 PG (ref 27–31)
MCHC RBC AUTO-ENTMCNC: 34.8 G/DL (ref 32–36)
MCV RBC AUTO: 88 FL (ref 82–98)
MONOCYTES # BLD AUTO: 1.1 K/UL (ref 0.3–1)
MONOCYTES NFR BLD: 7.3 % (ref 4–15)
MRSA SPEC QL CULT: NORMAL
NEUTROPHILS # BLD AUTO: 13.3 K/UL (ref 1.8–7.7)
NEUTROPHILS NFR BLD: 87.4 % (ref 38–73)
NRBC BLD-RTO: 0 /100 WBC
OSMOLALITY UR: 522 MOSM/KG (ref 50–1200)
OVALOCYTES BLD QL SMEAR: ABNORMAL
PLATELET # BLD AUTO: 193 K/UL (ref 150–450)
PLATELET BLD QL SMEAR: ABNORMAL
PMV BLD AUTO: 9.4 FL (ref 9.2–12.9)
POCT GLUCOSE: 166 MG/DL (ref 70–110)
POCT GLUCOSE: 93 MG/DL (ref 70–110)
POIKILOCYTOSIS BLD QL SMEAR: SLIGHT
POTASSIUM SERPL-SCNC: 3.5 MMOL/L (ref 3.5–5.1)
PROT SERPL-MCNC: 4.9 G/DL (ref 6–8.4)
RBC # BLD AUTO: 3.33 M/UL (ref 4–5.4)
SODIUM SERPL-SCNC: 126 MMOL/L (ref 136–145)
SODIUM SERPL-SCNC: 126 MMOL/L (ref 136–145)
SODIUM SERPL-SCNC: 128 MMOL/L (ref 136–145)
SODIUM SERPL-SCNC: 129 MMOL/L (ref 136–145)
SODIUM UR-SCNC: 36 MMOL/L (ref 20–250)
WBC # BLD AUTO: 15.25 K/UL (ref 3.9–12.7)

## 2024-03-28 PROCEDURE — 25000242 PHARM REV CODE 250 ALT 637 W/ HCPCS: Mod: HCNC

## 2024-03-28 PROCEDURE — 99233 SBSQ HOSP IP/OBS HIGH 50: CPT | Mod: HCNC,,, | Performed by: INTERNAL MEDICINE

## 2024-03-28 PROCEDURE — 83735 ASSAY OF MAGNESIUM: CPT | Mod: HCNC

## 2024-03-28 PROCEDURE — 63600175 PHARM REV CODE 636 W HCPCS: Mod: HCNC

## 2024-03-28 PROCEDURE — 99291 CRITICAL CARE FIRST HOUR: CPT | Mod: HCNC,,, | Performed by: INTERNAL MEDICINE

## 2024-03-28 PROCEDURE — 99900035 HC TECH TIME PER 15 MIN (STAT): Mod: HCNC

## 2024-03-28 PROCEDURE — 84295 ASSAY OF SERUM SODIUM: CPT | Mod: 91,HCNC

## 2024-03-28 PROCEDURE — 97162 PT EVAL MOD COMPLEX 30 MIN: CPT | Mod: HCNC

## 2024-03-28 PROCEDURE — 99232 SBSQ HOSP IP/OBS MODERATE 35: CPT | Mod: HCNC,,, | Performed by: PHYSICIAN ASSISTANT

## 2024-03-28 PROCEDURE — 25000003 PHARM REV CODE 250: Mod: HCNC

## 2024-03-28 PROCEDURE — 63600175 PHARM REV CODE 636 W HCPCS: Mod: HCNC | Performed by: INTERNAL MEDICINE

## 2024-03-28 PROCEDURE — 84295 ASSAY OF SERUM SODIUM: CPT | Mod: HCNC

## 2024-03-28 PROCEDURE — 25000003 PHARM REV CODE 250: Mod: HCNC | Performed by: INTERNAL MEDICINE

## 2024-03-28 PROCEDURE — 97116 GAIT TRAINING THERAPY: CPT | Mod: HCNC

## 2024-03-28 PROCEDURE — 85025 COMPLETE CBC W/AUTO DIFF WBC: CPT | Mod: HCNC

## 2024-03-28 PROCEDURE — 94761 N-INVAS EAR/PLS OXIMETRY MLT: CPT | Mod: HCNC

## 2024-03-28 PROCEDURE — 94618 PULMONARY STRESS TESTING: CPT | Mod: HCNC

## 2024-03-28 PROCEDURE — 94640 AIRWAY INHALATION TREATMENT: CPT | Mod: HCNC

## 2024-03-28 PROCEDURE — 36415 COLL VENOUS BLD VENIPUNCTURE: CPT | Mod: HCNC,XB

## 2024-03-28 PROCEDURE — 97530 THERAPEUTIC ACTIVITIES: CPT | Mod: HCNC

## 2024-03-28 PROCEDURE — 80053 COMPREHEN METABOLIC PANEL: CPT | Mod: HCNC

## 2024-03-28 PROCEDURE — 97165 OT EVAL LOW COMPLEX 30 MIN: CPT | Mod: HCNC

## 2024-03-28 PROCEDURE — 27000221 HC OXYGEN, UP TO 24 HOURS: Mod: HCNC

## 2024-03-28 RX ORDER — METRONIDAZOLE 500 MG/1
500 TABLET ORAL 3 TIMES DAILY
Qty: 15 TABLET | Refills: 0 | Status: SHIPPED | OUTPATIENT
Start: 2024-03-28 | End: 2024-04-02

## 2024-03-28 RX ORDER — CEFDINIR 300 MG/1
300 CAPSULE ORAL 2 TIMES DAILY
Qty: 10 CAPSULE | Refills: 0 | Status: SHIPPED | OUTPATIENT
Start: 2024-03-28 | End: 2024-04-02

## 2024-03-28 RX ADMIN — BUDESONIDE INHALATION 0.5 MG: 0.5 SUSPENSION RESPIRATORY (INHALATION) at 08:03

## 2024-03-28 RX ADMIN — METRONIDAZOLE 500 MG: 500 TABLET ORAL at 05:03

## 2024-03-28 RX ADMIN — CARVEDILOL 6.25 MG: 6.25 TABLET, FILM COATED ORAL at 09:03

## 2024-03-28 RX ADMIN — AMLODIPINE BESYLATE 5 MG: 5 TABLET ORAL at 09:03

## 2024-03-28 RX ADMIN — MEMANTINE 10 MG: 10 TABLET ORAL at 09:03

## 2024-03-28 RX ADMIN — CEFEPIME 1 G: 1 INJECTION, POWDER, FOR SOLUTION INTRAMUSCULAR; INTRAVENOUS at 09:03

## 2024-03-28 RX ADMIN — ARFORMOTEROL TARTRATE 15 MCG: 15 SOLUTION RESPIRATORY (INHALATION) at 08:03

## 2024-03-28 RX ADMIN — SODIUM CHLORIDE: 9 INJECTION, SOLUTION INTRAVENOUS at 05:03

## 2024-03-28 RX ADMIN — PANTOPRAZOLE SODIUM 40 MG: 40 TABLET, DELAYED RELEASE ORAL at 09:03

## 2024-03-28 RX ADMIN — MUPIROCIN: 20 OINTMENT TOPICAL at 09:03

## 2024-03-28 RX ADMIN — ATORVASTATIN CALCIUM 20 MG: 10 TABLET, FILM COATED ORAL at 09:03

## 2024-03-28 NOTE — PT/OT/SLP EVAL
Occupational Therapy Evaluation and Treatment    Name: Gemma Vick  MRN: [id number]  Admitting Diagnosis: Colitis  Recent Surgery: * No surgery found *      Recommendations:     Discharge Recommendations: Low Intensity Therapy  Level of Assistance Recommended: Intermittent assistance  Discharge Equipment Recommendations: none  Barriers to discharge:      Assessment:     Gemma Vick is a 69 y.o. female with a medical diagnosis of Colitis. She presents with performance deficits affecting function including weakness, impaired functional mobility, decreased safety awareness, impaired endurance, gait instability, impaired balance, impaired self care skills, decreased lower extremity function, decreased upper extremity function, decreased ROM.     Rehab Prognosis: ; patient would benefit from acute OT services to address these deficits and reach maximum level of function.    Plan:     Patient to be seen 2 x/week to address the above listed problems via self-care/home management, therapeutic activities, therapeutic exercises  Plan of Care Expires: 04/11/24  Plan of Care Reviewed with: patient, grandchild(renato)    Subjective     Chief Complaint:   DEBILITY AND GENERALIZED WEAKNESS  Patient Comments/Goals:   GET STRONGER  Pain/Comfort:  Pain Rating 1: 0/10    Patients cultural, spiritual, Orthodox conflicts given the current situation:      Social History:  Living Environment: Patient lives alone in a mobile home with number of outside stair(s): 4  Prior Level of Function: Prior to admission, patient was modified independent  Roles and Routines: Patient was not driving and not working prior to admission.  Equipment Used at Home: none  DME owned (not currently used): none  Assistance Upon Discharge: family    Objective:     Communicated with  NURSE DIMAS prior to session. Patient found sitting edge of bed with telemetry, peripheral IV upon OT entry to room.    General Precautions: Standard, fall   Orthopedic  Precautions: N/A   Braces:      Respiratory Status: Room air    Occupational Performance    Gait belt applied - Yes      Functional Mobility/Transfers:  Sit <> Stand Transfer with contact guard assistance with rolling walker  Bed <> Chair Transfer using Step Transfer technique with contact guard assistance with rolling walker  Functional Mobility: PT AMBULATED 120 FEET WITH CGA AND ROLLING WALKER    Activities of Daily Living:  Upper Body Dressing: minimum assistance  Lower Body Dressing: contact guard assistance BLADIMIR/ DOFF SOCKS    Cognitive/Visual Perceptual:  Cognitive/Psychosocial Skills:    -     Oriented to: Person, Place, Time, Situation  -     Follows Commands/attention: Follows multistep  commands  -     Communication: clear/fluent  -     Memory: No Deficits noted  -     Safety awareness/insight to disability: impaired    Physical Exam:  Upper Extremity Range of Motion:     -       Right Upper Extremity: WFL  -       Left Upper Extremity: WFL  Upper Extremity Strength:    -       Right Upper Extremity: MMT3+/5 GROSSLY  -       Left Upper Extremity: MMT: 3+/5 GROSSLY   Strength:    -       Right Upper Extremity: MMT: 3+/5 GROSSLY  -       Left Upper Extremity: MMT: 3+/5 GROSSLY    AMPAC 6 Click ADL:  AMPAC Total Score: 22    Treatment & Education:  Therapist provided facilitation and instruction of proper body mechanics, energy conservation, and fall prevention strategies during tasks listed above  Patient educated on role of OT, POC, and goals for therapy  Patient educated on importance of OOB activities with staff member assistance and sitting OOB majority of the day      Patient clear to stand pivot transfer with RN/PCT, assist xSTEP<PIVOT WITH ROLLING WALKER AND CGA .    Patient left sitting edge of bed with all lines intact, call button in reach, RN notified, and family present.    GOALS:   Multidisciplinary Problems       Occupational Therapy Goals          Problem: Occupational Therapy    Goal  Priority Disciplines Outcome Interventions   Occupational Therapy Goal     OT, PT/OT     Description: O.T. GOALS TO BE MET BY 4-11-24  MOD (I) WITH TOILET TRANSFER  MOD (I) WITH LE DRESSING  MOD (I) WITH LE DRESSING                       History:     Past Medical History:   Diagnosis Date    AAA (abdominal aortic aneurysm) 02/13/2014    Abdominal aneurysm     3    Acute coronary syndrome     Arthritis     BPPV (benign paroxysmal positional vertigo)     Carotid artery plaque     Carotid artery stenosis and occlusion 02/13/2014    Chronic back pain     COPD (chronic obstructive pulmonary disease)     Coronary artery disease     Dementia     Emphysema lung     Hyperlipidemia     Hypertension     Myocardial infarction     x3    Neuropathy     Personal history of COVID-19 06/09/2021 11/16/2020 +Covid, recovered at home          Past Surgical History:   Procedure Laterality Date    CARDIAC CATHETERIZATION      CORONARY ANGIOPLASTY      CORONARY STENT PLACEMENT N/A 9/12/2023    Procedure: INSERTION, STENT, CORONARY ARTERY;  Surgeon: Millie Juarez MD;  Location: [facility] CATH LAB;  Service: Cardiology;  Laterality: N/A;    HYSTERECTOMY  1988    LEFT HEART CATHETERIZATION Left 9/12/2023    Procedure: Left heart cath;  Surgeon: Millie Juarez MD;  Location: [facility] CATH LAB;  Service: Cardiology;  Laterality: Left;    PERCUTANEOUS CORONARY INTERVENTION, ARTERY N/A 9/12/2023    Procedure: Percutaneous coronary intervention;  Surgeon: Millie Juarez MD;  Location: [facility] CATH LAB;  Service: Cardiology;  Laterality: N/A;    THROMBECTOMY, CORONARY  9/12/2023    Procedure: Thrombectomy, Coronary;  Surgeon: Millie Juarez MD;  Location: [facility] CATH LAB;  Service: Cardiology;;    TONSILLECTOMY      TRANSFORAMINAL EPIDURAL INJECTION OF STEROID Right 12/1/2023    Procedure: Injection,steroid,epidural,transforaminal approach- right side, L4/5 and L5/S1;  Surgeon: Lydia Roberts MD;  Location: [facility] PAIN MGT;  Service: Pain Management;   Laterality: Right;       Time Tracking:     OT Date of Treatment: 03/28/24  OT Start Time: 1521  OT Stop Time: 1544  OT Total Time (min): 23 min    Billable Minutes: Evaluation 15 MINUTES and Therapeutic Activity 10 MINUTES    3/28/2024

## 2024-03-28 NOTE — SUBJECTIVE & OBJECTIVE
Interval History: Pt was seen and examined this am in ICU Labs and meds reviewed. Discussed with other providers. No new events, still having diarrhea. Pt received higher rates of NS IVF's overnight    Review of patient's allergies indicates:  No Known Allergies  Current Facility-Administered Medications   Medication Frequency    0.9%  NaCl infusion Continuous    acetaminophen tablet 650 mg Q4H PRN    albuterol-ipratropium 2.5 mg-0.5 mg/3 mL nebulizer solution 3 mL Q6H PRN    amLODIPine tablet 5 mg Daily    arformoteroL nebulizer solution 15 mcg BID    atorvastatin tablet 20 mg Daily    budesonide nebulizer solution 0.5 mg Q12H    carvediloL tablet 6.25 mg BID WM    dextrose 10% bolus 125 mL 125 mL PRN    dextrose 10% bolus 250 mL 250 mL PRN    dicyclomine injection 10 mg Q6H PRN    enoxaparin injection 40 mg Daily    glucagon (human recombinant) injection 1 mg PRN    hydrALAZINE injection 10 mg Q8H PRN    influenza 65up-adj (QUADRIVALENT ADJUVANTED PF) vaccine 0.5 mL vaccine x 1 dose    insulin aspart U-100 pen 0-10 Units Q6H PRN    memantine tablet 10 mg BID    mupirocin 2 % ointment BID    ondansetron injection 8 mg Q6H PRN    pantoprazole EC tablet 40 mg Daily    pneumoc 20-tyrell conj-dip cr(PF) (PREVNAR-20 (PF)) injection Syrg 0.5 mL vaccine x 1 dose    traZODone tablet 50 mg Nightly PRN     Current Outpatient Medications   Medication    albuterol (PROVENTIL/VENTOLIN HFA) 90 mcg/actuation inhaler    albuterol-ipratropium (DUO-NEB) 2.5 mg-0.5 mg/3 mL nebulizer solution    amLODIPine (NORVASC) 5 MG tablet    aspirin 81 MG Chew    budesonide-glycopyr-formoterol (BREZTRI AEROSPHERE) 160-9-4.8 mcg/actuation HFAA    carvediloL (COREG) 6.25 MG tablet    clopidogreL (PLAVIX) 75 mg tablet    COMBIVENT RESPIMAT  mcg/actuation inhaler    cyanocobalamin (VITAMIN B-12) 100 MCG tablet    diclofenac sodium (VOLTAREN) 1 % Gel    ezetimibe-simvastatin 10-40 mg (VYTORIN) 10-40 mg per tablet    famotidine (PEPCID) 20 MG  tablet    inhalation spacing device (COMPACT SPACE CHAMBER)    memantine (NAMENDA) 10 MG Tab    olmesartan (BENICAR) 40 MG tablet    OXYGEN-AIR DELIVERY SYSTEMS MISC    pantoprazole (PROTONIX) 40 MG tablet    vitamin D (VITAMIN D3) 1000 units Tab    zolpidem (AMBIEN) 10 mg Tab    cefdinir (OMNICEF) 300 MG capsule    metroNIDAZOLE (FLAGYL) 500 MG tablet       Objective:     Vital Signs (Most Recent):  Temp: 97.6 °F (36.4 °C) (03/28/24 1247)  Pulse: 77 (03/28/24 1521)  Resp: 20 (03/28/24 1247)  BP: (!) 155/70 (03/28/24 1247)  SpO2: 96 % (03/28/24 1247) Vital Signs (24h Range):  Temp:  [97.6 °F (36.4 °C)-99 °F (37.2 °C)] 97.6 °F (36.4 °C)  Pulse:  [75-98] 77  Resp:  [15-29] 20  SpO2:  [93 %-98 %] 96 %  BP: (141-174)/(62-98) 155/70     Weight: 59.6 kg (131 lb 6.3 oz) (03/25/24 2330)  Body mass index is 24.03 kg/m².  Body surface area is 1.61 meters squared.    I/O last 3 completed shifts:  In: 3789.1 [I.V.:3591.8; IV Piggyback:197.3]  Out: 1190 [Urine:1190]     Physical Exam  Vitals and nursing note reviewed.   Constitutional:       Appearance: Normal appearance.   Cardiovascular:      Rate and Rhythm: Normal rate and regular rhythm.      Pulses: Normal pulses.      Heart sounds: Normal heart sounds.   Pulmonary:      Effort: Pulmonary effort is normal.      Breath sounds: Normal breath sounds.   Abdominal:      Palpations: Abdomen is soft.      Tenderness: There is no abdominal tenderness.   Musculoskeletal:      Right lower leg: No edema.      Left lower leg: No edema.   Neurological:      Mental Status: She is alert and oriented to person, place, and time.   Psychiatric:         Behavior: Behavior normal.          Significant Labs: reviewed  BMP  Lab Results   Component Value Date     (L) 03/28/2024    K 3.5 03/28/2024     03/28/2024    CO2 17 (L) 03/28/2024    BUN 14 03/28/2024    CREATININE 0.6 03/28/2024    CALCIUM 8.1 (L) 03/28/2024    ANIONGAP 7 (L) 03/28/2024    EGFRNORACEVR >60 03/28/2024     Lab  Results   Component Value Date    WBC 15.25 (H) 03/28/2024    HGB 10.2 (L) 03/28/2024    HCT 29.3 (L) 03/28/2024    MCV 88 03/28/2024     03/28/2024       Significant Imaging: reviewed

## 2024-03-28 NOTE — TELEPHONE ENCOUNTER
----- Message from Venus Yip MD sent at 3/28/2024  3:52 PM CDT -----  Regarding: Clinic F/u  Hi,     Please schedule patient for hospital f/u within 2 weeks with [facility] or any other available staff. Thanks

## 2024-03-28 NOTE — ASSESSMENT & PLAN NOTE
70 y/o female with GI issues and hyponatremia:     Hyponatremia  Hyponatremia is not acute. Chronic  No indication to correct rapidly  Pt has significant GI losses  On exam, pt has mild hypovolemia  Urine specific gravity is high and urine Na is low, c/w volume deficit induced hyponatremia  Cr is normal  Mild diastolic CHF, but no signs of fluid overload  No h/o of liver disease  TSH normal     Pt is receiving NS IVF's at 100 ml/hr, increase rate to 150 ml/her  Monitor fluid status closely (h/o of mild diastolic CHF)  No indications for giving tolvaptan or lasix     Colitis  H/o of diarrhea  Unsure what vomiting was due to           Plans and recommendations:  As discussed above  Total critical care time spent 70 minutes including time needed to review the records, the   patient evaluation, documentation, face-to-face discussion with the patient,   more than 50% of the time was spent on coordination of care and counseling.    Level V visit.

## 2024-03-28 NOTE — MEDICAL/APP STUDENT
[facility] - Telemetry ([facility])  Gastroenterology  Progress Note    Patient Name: Gemma Vick  MRN: [id number]  Patient Class: IP- Inpatient   Admission Date: 3/25/2024  Length of Stay: 3 days  Attending Physician: Mert Bah MD  Consulting Physician: Venus Yip MD  Primary Care Provider: Olga Altman MD    Subjective:      Interval History: No acute overnight events. Patient stepped down from ICU. Continues to have 5-6 loose non-bloody stools a day. Continues to have abdominal pain and tremors. Sodium is trending up.     Review of Systems   Constitutional:         See Interval History for daily ROS.      Objective:     Vital Signs (Most Recent):   Temp: 98.1 °F (36.7 °C) (03/28 0505)  Pulse: 85 (03/28 0830)  Resp: 18 (03/28 0830)  BP: 161/73 (03/28 0500)  SpO2: 97 % (03/28 0830)     Vital Signs (24h Range):   Temp:  [97.5 °F (36.4 °C)-99.3 °F (37.4 °C)]   Pulse:  [79-99]   Resp:  [15-29]   BP: (141-181)/()   SpO2:  [92 %-98 %]       Weight: 59.6 kg (131 lb 6.3 oz) (03/25/24 2330)  Body mass index is 24.03 kg/m².      Intake/Output Summary (Last 24 hours) at 3/28/2024 1049  Last data filed at 3/28/2024 0600  Gross per 24 hour   Intake 2621.98 ml   Output 855 ml   Net 1766.98 ml      Physical Exam  Constitutional:       General: She is not in acute distress.  HENT:      Head: Normocephalic and atraumatic.   Eyes:      Extraocular Movements: Extraocular movements intact.      Conjunctiva/sclera: Conjunctivae normal.      Pupils: Pupils are equal, round, and reactive to light.   Cardiovascular:      Rate and Rhythm: Normal rate and regular rhythm.      Pulses: Normal pulses.      Heart sounds: Normal heart sounds. No murmur heard.  Pulmonary:      Effort: Pulmonary effort is normal. No respiratory distress.      Breath sounds: Normal breath sounds.   Abdominal:      General: Abdomen is flat. Bowel sounds are normal. There is abdominal bruit. There is no distension.      Palpations: Abdomen is soft.  There is no mass.      Tenderness: There is generalized abdominal tenderness. There is no guarding or rebound.   Musculoskeletal:      Cervical back: Normal range of motion and neck supple.      Right lower leg: No edema.      Left lower leg: No edema.   Skin:     General: Skin is warm and dry.   Neurological:      General: No focal deficit present.      Mental Status: She is alert and oriented to person, place, and time.      Cranial Nerves: No cranial nerve deficit.   Psychiatric:         Behavior: Behavior normal.     Significant Labs:   CBC  Lab Results   Component Value Date    WBC 15.25 (H) 03/28/2024    HGB 10.2 (L) 03/28/2024    HCT 29.3 (L) 03/28/2024    MCV 88 03/28/2024     03/28/2024     CMP  Sodium   Date Value Ref Range Status   03/28/2024 128 (L) 136 - 145 mmol/L Final     Potassium   Date Value Ref Range Status   03/28/2024 3.5 3.5 - 5.1 mmol/L Final     Chloride   Date Value Ref Range Status   03/28/2024 102 95 - 110 mmol/L Final     CO2   Date Value Ref Range Status   03/28/2024 17 (L) 23 - 29 mmol/L Final     Glucose   Date Value Ref Range Status   03/28/2024 103 70 - 110 mg/dL Final     BUN   Date Value Ref Range Status   03/28/2024 14 8 - 23 mg/dL Final     Creatinine   Date Value Ref Range Status   03/28/2024 0.6 0.5 - 1.4 mg/dL Final     Calcium   Date Value Ref Range Status   03/28/2024 8.1 (L) 8.7 - 10.5 mg/dL Final     Total Protein   Date Value Ref Range Status   03/28/2024 4.9 (L) 6.0 - 8.4 g/dL Final     Albumin   Date Value Ref Range Status   03/28/2024 2.3 (L) 3.5 - 5.2 g/dL Final     Total Bilirubin   Date Value Ref Range Status   03/28/2024 0.5 0.1 - 1.0 mg/dL Final     Comment:     For infants and newborns, interpretation of results should be based  on gestational age, weight and in agreement with clinical  observations.    Premature Infant recommended reference ranges:  Up to 24 hours.............<8.0 mg/dL  Up to 48 hours............<12.0 mg/dL  3-5  "days..................<15.0 mg/dL  6-29 days.................<15.0 mg/dL       Alkaline Phosphatase   Date Value Ref Range Status   03/28/2024 107 55 - 135 U/L Final     AST   Date Value Ref Range Status   03/28/2024 20 10 - 40 U/L Final     ALT   Date Value Ref Range Status   03/28/2024 12 10 - 44 U/L Final     Anion Gap   Date Value Ref Range Status   03/28/2024 7 (L) 8 - 16 mmol/L Final     eGFR   Date Value Ref Range Status   03/28/2024 >60 >60 mL/min/1.73 m^2 Final      Coagulation: No results for input(s): "PT", "INR", "APTT" in the last 48 hours.     Significant Imaging:  Imaging results within the past 24 hours have been reviewed.    Assessment/Plan:     GI  * Colitis  Patient with colitis on CT. Differential to include ischemia vs infection vs new onset IBD vs other.   She has a history of significant atherosclerosis and chronic mesenteric ischemia.   Lactic acid was normal on admit and continues to be within normal range.  E. coli 0157 antigen Negative. Stool culture no growth to date.  Continue antibiotics for now.   No plans for colonoscopy at this time. Likely would perform colonoscopy as outpatient. Can consider if clinical course not improving and no Plavix for five days.   Discussed with family. They had concerns about sedation. Reviewed risks vs benefits. Could also consider unsedated flex sig if they continue to have reservations.   Will follow.      Mesenteric ischemia, chronic  Cardiology has been consulted. Dr. Juarez is her primary Cardiologist and willing to consider angio depending on clinical course.      Active Diagnoses:    Diagnosis Date Noted POA    PRINCIPAL PROBLEM:  Colitis [K52.9] 03/25/2024 Yes    COPD without exacerbation [J44.9] 03/25/2024 Yes    Hyponatremia [E87.1] 03/25/2024 Yes    Sepsis [A41.9] 03/25/2024 Yes    Hyperglycemia [R73.9] 03/25/2024 Yes    Moderate vascular dementia without behavioral disturbance, psychotic disturbance, mood disturbance, or anxiety [F01.B0] " 03/14/2024 Yes    Mesenteric ischemia, chronic [K55.1] 06/16/2023 Yes    History of fall [Z91.81] 05/10/2022 Not Applicable    GERD (gastroesophageal reflux disease) [K21.9] 07/23/2021 Yes    AAA (abdominal aortic aneurysm) [I71.40] 02/13/2014 Yes    Essential hypertension [I10] 12/31/2013 Yes    Coronary artery disease of native artery of native heart with stable angina pectoris [I25.118] 12/31/2013 Yes    Hyperlipidemia [E78.5] 12/31/2013 Yes      Problems Resolved During this Admission:     VTE Risk Mitigation (From admission, onward)           Ordered     enoxaparin injection 40 mg  Daily         03/25/24 2153     IP VTE HIGH RISK PATIENT  Once         03/25/24 2153     Place sequential compression device  Until discontinued         03/25/24 2153                       Gricelda CHAN  Gastroenterology  [facility] - Telemetry ([facility])

## 2024-03-28 NOTE — PLAN OF CARE
PT EVAL complete. Required CGA for STS, ambulated 120ft CGA using RW. Recommending low intensity therapy and use of RW upon d/c.

## 2024-03-28 NOTE — SUBJECTIVE & OBJECTIVE
Subjective:     Interval History: Patient s/e at bedside. Remains on antibiotics. Sodium improving. Moved out of ICU to floor and stable. Still with some diarrhea. Stool studies negative so far.     Review of Systems   Constitutional:  Negative for appetite change, fever and unexpected weight change.   HENT:  Negative for postnasal drip, rhinorrhea, sneezing, sore throat and trouble swallowing.    Eyes:  Negative for visual disturbance.   Respiratory:  Negative for cough, shortness of breath and wheezing.    Cardiovascular:  Negative for chest pain, palpitations and leg swelling.   Gastrointestinal:  Positive for diarrhea. Negative for abdominal pain, blood in stool, constipation, nausea and vomiting.   Genitourinary:  Negative for dysuria.   Musculoskeletal:  Negative for arthralgias, joint swelling and myalgias.   Skin:  Negative for color change, pallor and rash.   Neurological:  Negative for weakness, light-headedness, numbness and headaches.   Hematological:  Negative for adenopathy. Does not bruise/bleed easily.   Psychiatric/Behavioral:  Negative for agitation.      Objective:     Vital Signs (Most Recent):  Temp: 97.6 °F (36.4 °C) (03/28/24 1247)  Pulse: 78 (03/28/24 1355)  Resp: 20 (03/28/24 1247)  BP: (!) 155/70 (03/28/24 1247)  SpO2: 96 % (03/28/24 1247) Vital Signs (24h Range):  Temp:  [97.6 °F (36.4 °C)-99.3 °F (37.4 °C)] 97.6 °F (36.4 °C)  Pulse:  [75-98] 78  Resp:  [15-29] 20  SpO2:  [93 %-98 %] 96 %  BP: (141-181)/(62-98) 155/70     Weight: 59.6 kg (131 lb 6.3 oz) (03/25/24 2330)  Body mass index is 24.03 kg/m².      Intake/Output Summary (Last 24 hours) at 3/28/2024 1403  Last data filed at 3/28/2024 0600  Gross per 24 hour   Intake 2621.98 ml   Output 800 ml   Net 1821.98 ml       Lines/Drains/Airways       Peripheral Intravenous Line  Duration                  Peripheral IV - Single Lumen 03/25/24 1900 20 G Right Antecubital 2 days         Peripheral IV - Single Lumen 03/28/24 1155 20 G  Anterior;Left Upper Arm <1 day                     Physical Exam  Vitals reviewed.   Constitutional:       General: She is not in acute distress.     Appearance: She is not diaphoretic.   HENT:      Head: Normocephalic and atraumatic.      Mouth/Throat:      Pharynx: No oropharyngeal exudate.   Eyes:      General: No scleral icterus.        Right eye: No discharge.         Left eye: No discharge.      Conjunctiva/sclera: Conjunctivae normal.      Pupils: Pupils are equal, round, and reactive to light.   Cardiovascular:      Rate and Rhythm: Normal rate and regular rhythm.      Heart sounds: Normal heart sounds. No murmur heard.     No friction rub. No gallop.   Pulmonary:      Effort: Pulmonary effort is normal. No respiratory distress.      Breath sounds: Normal breath sounds. No stridor. No wheezing or rales.   Abdominal:      General: Bowel sounds are normal. There is no distension.      Palpations: Abdomen is soft. There is no mass.      Tenderness: There is no abdominal tenderness. There is no guarding.   Musculoskeletal:         General: Normal range of motion.      Cervical back: Normal range of motion.   Skin:     General: Skin is warm and dry.      Coloration: Skin is not pale.      Findings: No erythema or rash.   Neurological:      Mental Status: She is alert and oriented to person, place, and time.          Significant Labs:  All pertinent lab results from the last 24 hours have been reviewed.      Significant Imaging:  Imaging results within the past 24 hours have been reviewed.

## 2024-03-28 NOTE — PLAN OF CARE
A214/A214 YANA Vick is a 69 y.o.female admitted on 3/25/2024 for Colitis   Code Status: Full Code MRN: [id number]   Review of patient's allergies indicates:  No Known Allergies  Past Medical History:   Diagnosis Date    AAA (abdominal aortic aneurysm) 02/13/2014    Abdominal aneurysm     3    Acute coronary syndrome     Arthritis     BPPV (benign paroxysmal positional vertigo)     Carotid artery plaque     Carotid artery stenosis and occlusion 02/13/2014    Chronic back pain     COPD (chronic obstructive pulmonary disease)     Coronary artery disease     Dementia     Emphysema lung     Hyperlipidemia     Hypertension     Myocardial infarction     x3    Neuropathy     Personal history of COVID-19 06/09/2021 11/16/2020 +Covid, recovered at home       PRN meds    acetaminophen, 650 mg, Q4H PRN  albuterol-ipratropium, 3 mL, Q6H PRN  dextrose 10%, 12.5 g, PRN  dextrose 10%, 25 g, PRN  dicyclomine, 10 mg, Q6H PRN  glucagon (human recombinant), 1 mg, PRN  hydrALAZINE, 10 mg, Q8H PRN  influenza 65up-adj, 0.5 mL, vaccine x 1 dose  insulin aspart U-100, 0-10 Units, Q6H PRN  ondansetron, 8 mg, Q6H PRN  pneumoc 20-tyrell conj-dip cr(PF), 0.5 mL, vaccine x 1 dose  traZODone, 50 mg, Nightly PRN      Chart check completed. Will continue plan of care.      Orientation: oriented x 4  Sanders Coma Scale Score: 15     Lead Monitored: Lead II Rhythm: normal sinus rhythm    Cardiac/Telemetry Box Number: [id number]  VTE Required Core Measure: Pharmacological prophylaxis initiated/maintained Last Bowel Movement: 03/27/24  Diet Adult Regular (IDDSI Level 7) Standard Tray  Voiding Characteristics: urethral catheter (bladder)  Terry Score: 15  Fall Risk Score: 19  Accucheck []   Freq?      Lines/Drains/Airways       Peripheral Intravenous Line  Duration                  Peripheral IV - Single Lumen 03/25/24 1900 20 G Right Antecubital 2 days         Peripheral IV - Single Lumen 03/28/24 1155 20 G Anterior;Left Upper Arm <1 day

## 2024-03-28 NOTE — TELEPHONE ENCOUNTER
Cristel Vick informed of appointment scheduled with Adria on 05/02/24 at 1:30 for hospital f/u; granddaughter verbalized understanding

## 2024-03-28 NOTE — ASSESSMENT & PLAN NOTE
- CT with findings of descending colon inflammation most consistent with infectious vs inflammatory colitis. Ischemic changes also in differential given patient's history of mesenteric ischemia  - Lactate has remained normal  - Stool studies negative thus far but patient has been on antibiotics  - Would continue with antibiotics   - No immediate plans for endoscopy given recent Plavix use  - Pending improvement, likely will do outpatient f/u for consideration of colonoscopy. Of note, patient has previously been told she was high risk for sedation

## 2024-03-28 NOTE — RESPIRATORY THERAPY
Home Oxygen Evaluation - [facility] - Cardiopulmonary Department      Date Performed: 3/28/2024      1) Patient's Home O2 Sat on room air, while at rest: Room Air SpO2 At Rest: 93 %        If O2 sats on room air at rest are 88% or below, patient qualifies.  Document O2 liter flow needed in Step 2.  If O2 sats are 89% or above, complete Step 3.        2)  If patient is not ambulated and O2 sats are <88%, what is the O2 liter flow required to meet ordered saturation?      If O2 sats on room air while exercising remain 89% or above patient does not qualify, no further testing needed Document N/A in step 3. If O2 sats on room air while exercising are 88% or below, continue to Step 4.    3) Patient's O2 Sat on room air while exercising: Room Air SpO2 During Ambulation: 91 %        4) Patient's O2 Sat while exercising on O2:   at           (Must show improvement from #4 for patients to qualify)

## 2024-03-28 NOTE — HPI
Thank you for referring the pt to us. H/o and chart were reviewed. Pt was seen and examined in ICU. Pt is a 70 y/o female with h/o of colitis was admitted for fatigue and GI losses, both vomiting and diarrhea. Pt's s Na was in mid 120's, which has not changed since admit. Per RN, pt has had several watery BM's today. Pt is receiving NS IVF's. Pt has a h/o of mild diastolic CHF, but denies SOB or leg swelling. S Cr is normal. No h/o of thyroid or liver disease. Pt is not receiving any diuretics or meds that could cause hyponatremia.

## 2024-03-28 NOTE — PLAN OF CARE
[facility] - Telemetry (Hospital)  Discharge Final Note    Primary Care Provider: Olga Altman MD    Expected Discharge Date: 3/28/2024    Final Discharge Note (most recent)       Final Note - 03/28/24 1622          Final Note    Assessment Type Final Discharge Note (P)      Anticipated Discharge Disposition Home-Health Care Svc (P)         Post-Acute Status    Post-Acute Authorization Home Health (P)      Home Health Status Set-up Complete/Auth obtained (P)    [facility]    Discharge Delays None known at this time (P)                      Important Message from Medicare             Contact Info       Olga Altman MD   Specialty: Family Medicine   Relationship: PCP - General  Hypertension Digital Medicine Responsible Provider    [id number] [place] [id number]   Phone: [phone]       Next Steps: Schedule an appointment as soon as possible for a visit    Millie Juarez MD   Specialty: Interventional Cardiology, Cardiology    [address]  [place] [address]   Phone: [phone]       Next Steps: Schedule an appointment as soon as possible for a visit    [facility] [place]   Specialty: Home Health Services, Home Therapy Services, Home Living Aide Services    [address] [facility] [address]  [facility] [address]   Phone: [phone]       Next Steps: Follow up    Instructions: Home Health: Agency will call and schedule an appointment to visit on Monday, April 1, 2024

## 2024-03-28 NOTE — ASSESSMENT & PLAN NOTE
-GI on board  -Infectious vs Ischemic, LA remains normal  -Patient has known chronic mesenteric ischemia  -Would benefit from GI evaluation-colonoscopy/EGD   -Will consider mesenteric angio if she fails to improve  -Continue same mgmt in interim    3/28/24  -Follow-up in CV clinic

## 2024-03-28 NOTE — ASSESSMENT & PLAN NOTE
Hyponatremia is not acute. Chronic  No indication to correct rapidly  Pt has significant GI losses  On exam, pt has mild hypovolemia  Urine specific gravity is high and urine Na is low, c/w volume deficit induced hyponatremia    Pt is receiving NS IVF's at 100 ml/hr, increase rate to 150 ml/her  Monitor fluid status closely (h/o of mild diastolic CHF)  No indications for giving tolvaptan or lasix

## 2024-03-28 NOTE — PLAN OF CARE
See eval for details. Pt displayed deficits with functional mobility/ transfers, deficits with adl's skills also decrease b ue strength/endurance. Recommendation: HOME HEALTH O.T. FOR SAFETY AWARENESS

## 2024-03-28 NOTE — HOSPITAL COURSE
3/28/24-Patient seen and examined today with family present. Feels/looks better. Abd pain improved. No CP or SOB. Na trending up. Discussed with patient/family, will have her f/u as OP and discuss mesenteric angiogram at that time since she is clinically improving.

## 2024-03-28 NOTE — PROGRESS NOTES
[facility]  Gastroenterology  Progress Note    Patient Name: Gemma Vick  MRN: [id number]  Admission Date: 3/25/2024  Hospital Length of Stay: 3 days  Code Status: Full Code   Attending Provider: Mert Bah MD  Consulting Provider: Venus Yip MD  Primary Care Physician: Olga Altman MD  Principal Problem: Colitis        Subjective:     Interval History: Patient s/e at bedside. Remains on antibiotics. Sodium improving. Moved out of ICU to floor and stable. Still with some diarrhea. Stool studies negative so far.     Review of Systems   Constitutional:  Negative for appetite change, fever and unexpected weight change.   HENT:  Negative for postnasal drip, rhinorrhea, sneezing, sore throat and trouble swallowing.    Eyes:  Negative for visual disturbance.   Respiratory:  Negative for cough, shortness of breath and wheezing.    Cardiovascular:  Negative for chest pain, palpitations and leg swelling.   Gastrointestinal:  Positive for diarrhea. Negative for abdominal pain, blood in stool, constipation, nausea and vomiting.   Genitourinary:  Negative for dysuria.   Musculoskeletal:  Negative for arthralgias, joint swelling and myalgias.   Skin:  Negative for color change, pallor and rash.   Neurological:  Negative for weakness, light-headedness, numbness and headaches.   Hematological:  Negative for adenopathy. Does not bruise/bleed easily.   Psychiatric/Behavioral:  Negative for agitation.      Objective:     Vital Signs (Most Recent):  Temp: 97.6 °F (36.4 °C) (03/28/24 1247)  Pulse: 78 (03/28/24 1355)  Resp: 20 (03/28/24 1247)  BP: (!) 155/70 (03/28/24 1247)  SpO2: 96 % (03/28/24 1247) Vital Signs (24h Range):  Temp:  [97.6 °F (36.4 °C)-99.3 °F (37.4 °C)] 97.6 °F (36.4 °C)  Pulse:  [75-98] 78  Resp:  [15-29] 20  SpO2:  [93 %-98 %] 96 %  BP: (141-181)/(62-98) 155/70     Weight: 59.6 kg (131 lb 6.3 oz) (03/25/24 2330)  Body mass index is 24.03 kg/m².      Intake/Output Summary (Last 24  hours) at 3/28/2024 1403  Last data filed at 3/28/2024 0600  Gross per 24 hour   Intake 2621.98 ml   Output 800 ml   Net 1821.98 ml       Lines/Drains/Airways       Peripheral Intravenous Line  Duration                  Peripheral IV - Single Lumen 03/25/24 1900 20 G Right Antecubital 2 days         Peripheral IV - Single Lumen 03/28/24 1155 20 G Anterior;Left Upper Arm <1 day                     Physical Exam  Vitals reviewed.   Constitutional:       General: She is not in acute distress.     Appearance: She is not diaphoretic.   HENT:      Head: Normocephalic and atraumatic.      Mouth/Throat:      Pharynx: No oropharyngeal exudate.   Eyes:      General: No scleral icterus.        Right eye: No discharge.         Left eye: No discharge.      Conjunctiva/sclera: Conjunctivae normal.      Pupils: Pupils are equal, round, and reactive to light.   Cardiovascular:      Rate and Rhythm: Normal rate and regular rhythm.      Heart sounds: Normal heart sounds. No murmur heard.     No friction rub. No gallop.   Pulmonary:      Effort: Pulmonary effort is normal. No respiratory distress.      Breath sounds: Normal breath sounds. No stridor. No wheezing or rales.   Abdominal:      General: Bowel sounds are normal. There is no distension.      Palpations: Abdomen is soft. There is no mass.      Tenderness: There is no abdominal tenderness. There is no guarding.   Musculoskeletal:         General: Normal range of motion.      Cervical back: Normal range of motion.   Skin:     General: Skin is warm and dry.      Coloration: Skin is not pale.      Findings: No erythema or rash.   Neurological:      Mental Status: She is alert and oriented to person, place, and time.          Significant Labs:  All pertinent lab results from the last 24 hours have been reviewed.      Significant Imaging:  Imaging results within the past 24 hours have been reviewed.  Assessment/Plan:     GI  * Colitis  - CT with findings of descending colon  inflammation most consistent with infectious vs inflammatory colitis. Ischemic changes high in the differential given patient's history of mesenteric ischemia and recent HCTZ use resulting in dizziness PTA  - Lactate has remained normal  - Stool studies negative thus far but patient has been on antibiotics  - Would continue with antibiotics for 7-10 days  - No immediate plans for endoscopy given recent Plavix use  - Pending improvement/stability, likely will do outpatient follow-up for discussion/consideration of colonoscopy given that she has historically been deemed high risk for sedation  - No further inpatient GI recs at this time. Will send staff message to arrange for outpatient f/u. Please call with any additional questions/concerns.       Thank you for your consult. I will sign off. Please contact us if you have any additional questions.    Venus Yip MD  Gastroenterology  [facility] - Telemetry ([facility]

## 2024-03-28 NOTE — CONSULTS
[facility] ([facility])  Nephrology  Consult Note      Patient Name: Gemma Vick  MRN: [id number]  Admission Date: 3/25/2024  Hospital Length of Stay: 2 days  Attending Provider: Morales Brunner MD   Primary Care Physician: Olga Altman MD  Principal Problem:Colitis    Reason for consult: Hyponatremia    Consults  Subjective:     HPI: Thank you for referring the pt to us. H/o and chart were reviewed. Pt was seen and examined in ICU. Pt is a 70 y/o female with h/o of colitis was admitted for fatigue and GI losses, both vomiting and diarrhea. Pt's s Na was in mid 120's, which has not changed since admit. Per RN, pt has had several watery BM's today. Pt is receiving NS IVF's. Pt has a h/o of mild diastolic CHF, but denies SOB or leg swelling. S Cr is normal. No h/o of thyroid or liver disease. Pt is not receiving any diuretics or meds that could cause hyponatremia.     Past Medical History:   Diagnosis Date    AAA (abdominal aortic aneurysm) 02/13/2014    Abdominal aneurysm     3    Acute coronary syndrome     Arthritis     BPPV (benign paroxysmal positional vertigo)     Carotid artery plaque     Carotid artery stenosis and occlusion 02/13/2014    Chronic back pain     COPD (chronic obstructive pulmonary disease)     Coronary artery disease     Dementia     Emphysema lung     Hyperlipidemia     Hypertension     Myocardial infarction     x3    Neuropathy     Personal history of COVID-19 06/09/2021 11/16/2020 +Covid, recovered at home        Past Surgical History:   Procedure Laterality Date    CARDIAC CATHETERIZATION      CORONARY ANGIOPLASTY      CORONARY STENT PLACEMENT N/A 9/12/2023    Procedure: INSERTION, STENT, CORONARY ARTERY;  Surgeon: Millie Juarez MD;  Location: [facility] CATH LAB;  Service: Cardiology;  Laterality: N/A;    HYSTERECTOMY  1988    LEFT HEART CATHETERIZATION Left 9/12/2023    Procedure: Left heart cath;  Surgeon: Millie Juarez MD;  Location: [facility] CATH LAB;  Service: Cardiology;   Laterality: Left;    PERCUTANEOUS CORONARY INTERVENTION, ARTERY N/A 9/12/2023    Procedure: Percutaneous coronary intervention;  Surgeon: Millie Juarez MD;  Location: [facility] CATH LAB;  Service: Cardiology;  Laterality: N/A;    THROMBECTOMY, CORONARY  9/12/2023    Procedure: Thrombectomy, Coronary;  Surgeon: Millie Juarez MD;  Location: [facility] CATH LAB;  Service: Cardiology;;    TONSILLECTOMY      TRANSFORAMINAL EPIDURAL INJECTION OF STEROID Right 12/1/2023    Procedure: Injection,steroid,epidural,transforaminal approach- right side, L4/5 and L5/S1;  Surgeon: Lydia Roberts MD;  Location: [facility] PAIN MGT;  Service: Pain Management;  Laterality: Right;       Review of patient's allergies indicates:  No Known Allergies  Current Facility-Administered Medications   Medication Frequency    0.9%  NaCl infusion Continuous    acetaminophen tablet 650 mg Q4H PRN    albuterol-ipratropium 2.5 mg-0.5 mg/3 mL nebulizer solution 3 mL Q6H PRN    amLODIPine tablet 5 mg Daily    arformoteroL nebulizer solution 15 mcg BID    atorvastatin tablet 20 mg Daily    budesonide nebulizer solution 0.5 mg Q12H    carvediloL tablet 6.25 mg BID WM    ceFEPIme (MAXIPIME) 1 g in sodium chloride 0.9 % 100 mL IVPB (MB+) Q12H    dextrose 10% bolus 125 mL 125 mL PRN    dextrose 10% bolus 250 mL 250 mL PRN    dicyclomine injection 10 mg Q6H PRN    enoxaparin injection 40 mg Daily    glucagon (human recombinant) injection 1 mg PRN    hydrALAZINE injection 10 mg Q8H PRN    influenza 65up-adj (QUADRIVALENT ADJUVANTED PF) vaccine 0.5 mL vaccine x 1 dose    insulin aspart U-100 pen 0-10 Units Q6H PRN    memantine tablet 10 mg BID    metroNIDAZOLE tablet 500 mg Q8H    mupirocin 2 % ointment BID    ondansetron injection 8 mg Q6H PRN    [START ON 3/28/2024] pantoprazole EC tablet 40 mg Daily    pneumoc 20-tyrell conj-dip cr(PF) (PREVNAR-20 (PF)) injection Syrg 0.5 mL vaccine x 1 dose    traZODone tablet 50 mg Nightly PRN     Family History       Problem Relation  (Age of Onset)    Breast cancer Paternal Aunt    Heart attacks under age 50 Father, Brother    Heart disease Mother          Tobacco Use    Smoking status: Former     Current packs/day: 0.00     Average packs/day: 0.5 packs/day for 46.9 years (23.4 ttl pk-yrs)     Types: Cigarettes, Vaping w/o nicotine     Start date: 1970     Quit date: 2017     Years since quittin.9    Smokeless tobacco: Never    Tobacco comments:     3 MG NICOTINE FOR HEART.    Substance and Sexual Activity    Alcohol use: No    Drug use: No    Sexual activity: Not Currently     Birth control/protection: None     Review of Systems   Constitutional:  Positive for fatigue.   HENT: Negative.     Respiratory: Negative.  Negative for shortness of breath.    Cardiovascular: Negative.  Negative for leg swelling.   Gastrointestinal:  Positive for diarrhea and vomiting. Negative for abdominal pain.   Genitourinary: Negative.    Neurological: Negative.    Psychiatric/Behavioral: Negative.       Objective:     Vital Signs (Most Recent):  Temp: 99 °F (37.2 °C) (24)  Pulse: 93 (24)  Resp: 20 (24)  BP: (!) 150/70 (24)  SpO2: 95 % (24) Vital Signs (24h Range):  Temp:  [97.5 °F (36.4 °C)-99.9 °F (37.7 °C)] 99 °F (37.2 °C)  Pulse:  [] 93  Resp:  [16-35] 20  SpO2:  [91 %-97 %] 95 %  BP: (129-189)/() 150/70     Weight: 59.6 kg (131 lb 6.3 oz) (24 2330)  Body mass index is 24.03 kg/m².  Body surface area is 1.61 meters squared.    I/O last 3 completed shifts:  In: 3810.5 [I.V.:3711.4; IV Piggyback:99.1]  Out: 1310 [Urine:1310]     Physical Exam  Vitals and nursing note reviewed.   Constitutional:       General: She is not in acute distress.     Appearance: Normal appearance. She is ill-appearing.   Cardiovascular:      Rate and Rhythm: Normal rate and regular rhythm.   Pulmonary:      Effort: Pulmonary effort is normal.      Breath sounds: Normal breath sounds. No rales.    Abdominal:      Palpations: Abdomen is soft.      Tenderness: There is no abdominal tenderness.   Musculoskeletal:      Right lower leg: No edema.      Left lower leg: Edema present.   Skin:     General: Skin is warm and dry.   Neurological:      Mental Status: She is alert and oriented to person, place, and time.   Psychiatric:         Behavior: Behavior normal.          Significant Labs: reviewed  BMP  Lab Results   Component Value Date     (L) 03/27/2024    K 3.8 03/27/2024    CL 98 03/27/2024    CO2 20 (L) 03/27/2024    BUN 12 03/27/2024    CREATININE 0.7 03/27/2024    CALCIUM 8.1 (L) 03/27/2024    ANIONGAP 7 (L) 03/27/2024    EGFRNORACEVR >60 03/27/2024     Urine Na 32  Urine osm pending   U/a: specific gravity 1.020    Significant Imaging: reviewed, no pulm edema on CXR, hyperexpanded lungs c/w h/o of smoking      Assessment/Plan:     70 y/o female with GI issues and hyponatremia:    Hyponatremia  Hyponatremia is not acute. Chronic  No indication to correct rapidly  Pt has significant GI losses  On exam, pt has mild hypovolemia  Urine specific gravity is high and urine Na is low, c/w volume deficit induced hyponatremia  Cr is normal  Mild diastolic CHF, but no signs of fluid overload  No h/o of liver disease  TSH normal    Pt is receiving NS IVF's at 100 ml/hr, increase rate to 150 ml/her  Monitor fluid status closely (h/o of mild diastolic CHF)  No indications for giving tolvaptan or lasix    Colitis  H/o of diarrhea  Unsure what vomiting was due to        Plans and recommendations:  As discussed above  Total critical care time spent 70 minutes including time needed to review the records, the   patient evaluation, documentation, face-to-face discussion with the patient,   more than 50% of the time was spent on coordination of care and counseling.    Level V visit.    Thank you for your consult.     Jose Alvarez MD   Nephrology  [facility] - Intensive Care ([facility])

## 2024-03-28 NOTE — SUBJECTIVE & OBJECTIVE
Past Medical History:   Diagnosis Date    AAA (abdominal aortic aneurysm) 02/13/2014    Abdominal aneurysm     3    Acute coronary syndrome     Arthritis     BPPV (benign paroxysmal positional vertigo)     Carotid artery plaque     Carotid artery stenosis and occlusion 02/13/2014    Chronic back pain     COPD (chronic obstructive pulmonary disease)     Coronary artery disease     Dementia     Emphysema lung     Hyperlipidemia     Hypertension     Myocardial infarction     x3    Neuropathy     Personal history of COVID-19 06/09/2021 11/16/2020 +Covid, recovered at home        Past Surgical History:   Procedure Laterality Date    CARDIAC CATHETERIZATION      CORONARY ANGIOPLASTY      CORONARY STENT PLACEMENT N/A 9/12/2023    Procedure: INSERTION, STENT, CORONARY ARTERY;  Surgeon: Millie Juarez MD;  Location: [facility] CATH LAB;  Service: Cardiology;  Laterality: N/A;    HYSTERECTOMY  1988    LEFT HEART CATHETERIZATION Left 9/12/2023    Procedure: Left heart cath;  Surgeon: Millie Juarez MD;  Location: [facility] CATH LAB;  Service: Cardiology;  Laterality: Left;    PERCUTANEOUS CORONARY INTERVENTION, ARTERY N/A 9/12/2023    Procedure: Percutaneous coronary intervention;  Surgeon: Millie Juarez MD;  Location: [facility] CATH LAB;  Service: Cardiology;  Laterality: N/A;    THROMBECTOMY, CORONARY  9/12/2023    Procedure: Thrombectomy, Coronary;  Surgeon: Millie Juarez MD;  Location: [facility] CATH LAB;  Service: Cardiology;;    TONSILLECTOMY      TRANSFORAMINAL EPIDURAL INJECTION OF STEROID Right 12/1/2023    Procedure: Injection,steroid,epidural,transforaminal approach- right side, L4/5 and L5/S1;  Surgeon: Lydia Roberts MD;  Location: [facility];  Service: Pain Management;  Laterality: Right;       Review of patient's allergies indicates:  No Known Allergies  Current Facility-Administered Medications   Medication Frequency    0.9%  NaCl infusion Continuous    acetaminophen tablet 650 mg Q4H PRN    albuterol-ipratropium  2.5 mg-0.5 mg/3 mL nebulizer solution 3 mL Q6H PRN    amLODIPine tablet 5 mg Daily    arformoteroL nebulizer solution 15 mcg BID    atorvastatin tablet 20 mg Daily    budesonide nebulizer solution 0.5 mg Q12H    carvediloL tablet 6.25 mg BID WM    ceFEPIme (MAXIPIME) 1 g in sodium chloride 0.9 % 100 mL IVPB (MB+) Q12H    dextrose 10% bolus 125 mL 125 mL PRN    dextrose 10% bolus 250 mL 250 mL PRN    dicyclomine injection 10 mg Q6H PRN    enoxaparin injection 40 mg Daily    glucagon (human recombinant) injection 1 mg PRN    hydrALAZINE injection 10 mg Q8H PRN    influenza 65up-adj (QUADRIVALENT ADJUVANTED PF) vaccine 0.5 mL vaccine x 1 dose    insulin aspart U-100 pen 0-10 Units Q6H PRN    memantine tablet 10 mg BID    metroNIDAZOLE tablet 500 mg Q8H    mupirocin 2 % ointment BID    ondansetron injection 8 mg Q6H PRN    [START ON 3/28/2024] pantoprazole EC tablet 40 mg Daily    pneumoc 20-tyrell conj-dip cr(PF) (PREVNAR-20 (PF)) injection Syrg 0.5 mL vaccine x 1 dose    traZODone tablet 50 mg Nightly PRN     Family History       Problem Relation (Age of Onset)    Breast cancer Paternal Aunt    Heart attacks under age 50 Father, Brother    Heart disease Mother          Tobacco Use    Smoking status: Former     Current packs/day: 0.00     Average packs/day: 0.5 packs/day for 46.9 years (23.4 ttl pk-yrs)     Types: Cigarettes, Vaping w/o nicotine     Start date: 1970     Quit date: 2017     Years since quittin.9    Smokeless tobacco: Never    Tobacco comments:     3 MG NICOTINE FOR HEART.    Substance and Sexual Activity    Alcohol use: No    Drug use: No    Sexual activity: Not Currently     Birth control/protection: None     Review of Systems   Constitutional:  Positive for fatigue.   HENT: Negative.     Respiratory: Negative.  Negative for shortness of breath.    Cardiovascular: Negative.  Negative for leg swelling.   Gastrointestinal:  Positive for diarrhea and vomiting. Negative for abdominal pain.    Genitourinary: Negative.    Neurological: Negative.    Psychiatric/Behavioral: Negative.       Objective:     Vital Signs (Most Recent):  Temp: 99 °F (37.2 °C) (03/27/24 2000)  Pulse: 93 (03/27/24 2000)  Resp: 20 (03/27/24 2000)  BP: (!) 150/70 (03/27/24 2000)  SpO2: 95 % (03/27/24 2000) Vital Signs (24h Range):  Temp:  [97.5 °F (36.4 °C)-99.9 °F (37.7 °C)] 99 °F (37.2 °C)  Pulse:  [] 93  Resp:  [16-35] 20  SpO2:  [91 %-97 %] 95 %  BP: (129-189)/() 150/70     Weight: 59.6 kg (131 lb 6.3 oz) (03/25/24 2330)  Body mass index is 24.03 kg/m².  Body surface area is 1.61 meters squared.    I/O last 3 completed shifts:  In: 3810.5 [I.V.:3711.4; IV Piggyback:99.1]  Out: 1310 [Urine:1310]     Physical Exam  Vitals and nursing note reviewed.   Constitutional:       General: She is not in acute distress.     Appearance: Normal appearance. She is ill-appearing.   Cardiovascular:      Rate and Rhythm: Normal rate and regular rhythm.   Pulmonary:      Effort: Pulmonary effort is normal.      Breath sounds: Normal breath sounds. No rales.   Abdominal:      Palpations: Abdomen is soft.      Tenderness: There is no abdominal tenderness.   Musculoskeletal:      Right lower leg: No edema.      Left lower leg: Edema present.   Skin:     General: Skin is warm and dry.   Neurological:      Mental Status: She is alert and oriented to person, place, and time.   Psychiatric:         Behavior: Behavior normal.          Significant Labs: reviewed  BMP  Lab Results   Component Value Date     (L) 03/27/2024    K 3.8 03/27/2024    CL 98 03/27/2024    CO2 20 (L) 03/27/2024    BUN 12 03/27/2024    CREATININE 0.7 03/27/2024    CALCIUM 8.1 (L) 03/27/2024    ANIONGAP 7 (L) 03/27/2024    EGFRNORACEVR >60 03/27/2024     Urine Na 32  Urine osm pending   U/a: specific gravity 1.020    Significant Imaging: reviewed, no pulm edema on CXR, hyperexpanded lungs c/w h/o of smoking

## 2024-03-28 NOTE — DISCHARGE SUMMARY
[facility]  Discharge Summary      Patient Name: Gemma Vick  MRN: [id number]  [facility]: [id number]  Patient Class: IP- Inpatient  Admission Date: 3/25/2024  Hospital Length of Stay: 3 days  Discharge Date and Time:  03/28/2024 4:31 PM  Attending Physician: Mert Bah MD   Discharging Provider: Mert Bah MD  Primary Care Provider: Olga Altman MD    Primary Care Team: [facility] MEDICINE C    HPI:   Gemma Vick is a 69-year-old female with a past history significant for COPD, former smoker, CAD s/p stents, KALLIE, vascular dementia, Alzheimer's, AAA-stable on CT, HTN, HLD, GERD, diverticulitis/osis, chronic mesenteric ischemia, GERD, DDD, osteoarthritis, lumbar radiculopathy, who presented to the ED c/o diffuse abdominal pain, nausea, generalized weakness onset today. Reports poor oral intake for several days. States she fell several days ago and also has left hip pain, and was started on Robaxin. Last BM yesterday, small, soft/formed, dark, but has been dark since starting iron supplements. She also endorses oliguria w/dark urine, hemorrhoids, hx of constipation and feels like she needs an enema. Reports several episodes of vomiting today, stating it was more reflux and some bile, but very small volume. Patient denies fever, chills, cough, congestion, dizziness, dyspnea, chest discomfort, dysuria, hematuria, myalgias. Patient denies sick contacts, potential of contaminated food or water.      ED workup with noted leukocytosis, hyponatremia-corrected 123, hyperglycemia. CT c/a/p shows colonic wall thickening/inflammation of descending colon consistent with colitis, surgically absent gall bladder. CXR and L hip XR w/NAF. CT head w/NAF. UA w/no evidence of infection or ketonuria. Lactate, procalcitonin, troponin, TSH, lipase, LFT's WNL. Flu/COVID negative. She was found to have a rectal temp of 92 and was placed on Mirna hugger. She was given warmed IVF, antibiotics,  glycerin suppository, has cho w/bg urine, adequate output noted in bag. Critical care was consulted for admission and management due to hypothermia requiring active re-warming.     * No surgery found *      Hospital Course:   A patient was admitted to the ICU on March 25th with severe hyponatremia, with a sodium level of 119, hypothermia at 92°F, and severe constipation. The patient underwent rewarming, and her sodium level improved to 126. She also experienced a large bowel movement and has had multiple bowel movements since then. Due to these improvements, she is now being downgraded to telemetry under hospital medicine and is resting comfortably. The hyponatremia was likely secondary to the patient's misuse of HCTZ (hydrochlorothiazide), which was prescribed by her cardiologist for as-needed use. However, the patient, misunderstanding the instructions and influenced by online information, began taking it daily. Her sodium levels have stabilized since discontinuing the medication, and repeat labs are scheduled with her primary care provider within 1-2 weeks of discharge.    Further investigations revealed inflammation in the descending colon, suggestive of either infectious or inflammatory colitis, with ischemic changes also considered due to the patient's history of mesenteric ischemia and recent symptoms of dizziness post HCTZ administration. Despite these concerns, her lactate levels have remained normal, and stool studies have been negative, although she has been receiving antibiotics. The plan is to continue antibiotics for 7-10 days. Discharged with additional 5 days of therapy after receiving 4 days of abx here. There are no immediate plans for endoscopy due to recent use of Plavix, but outpatient follow-up will be considered for a colonoscopy, given the patient's high sedation risk history. No further inpatient gastrointestinal recommendations are provided at this time, but arrangements for outpatient  "follow-up will be made. Cardiology has noted clinical improvement and cardiovascular stability, advising the continuation of optimal medical therapy with a follow-up in clinic to possibly discuss a mesenteric angiogram.    BP (!) 155/70 (Patient Position: Lying)   Pulse 77   Temp 97.6 °F (36.4 °C) (Oral)   Resp 20   Ht 5' 2" (1.575 m)   Wt 59.6 kg (131 lb 6.3 oz)   SpO2 96%   BMI 24.03 kg/m²   PHYSICAL EXAM  Vitals Reviewed  GEN: No acute distress, pleasant, body habitus normal  HEENT: atraumatic and normocephalic  CARDS: regular rate and rhythm, no m/g, pulses palpable in LE  PULM: breathing comfortably on room air, chest symmetric, nonlabored, no abnormal breath sounds on auscultation  ABD: nontender, nondistended, soft, no organomegaly, BS+  Neuro: Alert and oriented x3, CN's I-IX grossly intact, sensation and motor intact; follows directions and answers questions appropriately       Goals of Care Treatment Preferences:  Code Status: Full Code      Consults:   Consults (From admission, onward)          Status Ordering Provider     Inpatient consult to Nephrology  Once        Provider:  Jose Alvarez MD    Acknowledged RAYNE MORRIS     Inpatient consult to Hospitalist  Once        Provider:  (Not yet assigned)    Acknowledged CADEN JIMENEZ     Inpatient consult to Cardiology  Once        Provider:  (Not yet assigned)    Completed CADEN JIMENEZ     Inpatient consult to Gastroenterology  Once        Provider:  Venus Yip MD    Completed CADEN JIMENEZ            No new Assessment & Plan notes have been filed under this hospital service since the last note was generated.  Service: Hospital Medicine    Final Active Diagnoses:    Diagnosis Date Noted POA    PRINCIPAL PROBLEM:  Colitis [K52.9] 03/25/2024 Yes    Hypothermia [T68.XXXA] 03/28/2024 Yes    Weakness [R53.1] 03/28/2024 Yes    COPD without exacerbation [J44.9] 03/25/2024 Yes    Hyponatremia [E87.1] 03/25/2024 Yes    Sepsis [A41.9] 03/25/2024 " Yes    Hyperglycemia [R73.9] 03/25/2024 Yes    Moderate vascular dementia without behavioral disturbance, psychotic disturbance, mood disturbance, or anxiety [F01.B0] 03/14/2024 Yes    Mesenteric ischemia, chronic [K55.1] 06/16/2023 Yes    History of fall [Z91.81] 05/10/2022 Not Applicable    GERD (gastroesophageal reflux disease) [K21.9] 07/23/2021 Yes    AAA (abdominal aortic aneurysm) [I71.40] 02/13/2014 Yes    Essential hypertension [I10] 12/31/2013 Yes    Coronary artery disease of native artery of native heart with stable angina pectoris [I25.118] 12/31/2013 Yes    Hyperlipidemia [E78.5] 12/31/2013 Yes      Problems Resolved During this Admission:       Discharged Condition: good    [place] [facility]    [place] Up:   Follow-up Information       Olga Altman MD. Schedule an appointment as soon as possible for a visit.    Specialty: Family Medicine  Contact information:  [address] [address] [id number] [phone]               Millie Juarez MD. Schedule an appointment as soon as possible for a visit.    Specialties: Interventional Cardiology, Cardiology  Contact information:  [address]  [place] [address] [phone]               [facility] Follow up.    Specialties: Home Health Services, Home Therapy Services, Home Living Aide Services  Why: Home Health: Agency will call and schedule an appointment to visit on Monday, April 1, 2024  Contact information:  [address] [facility] [id number] [phone]                         Patient Instructions:      Ambulatory referral/consult to Home Health   Standing Status: Future   Referral Priority: Routine Referral Type: Home Health   Referral Reason: Specialty Services Required   Requested Specialty: Home Health Services   Number of Visits Requested: 1     Ambulatory referral/consult to [facility]   Standing Status: Future   Referral Priority:  "Routine Referral Type: Consultation   Referral Reason: Specialty Services Required   Number of Visits Requested: 1       Significant Diagnostic Studies: Labs: All labs within the past 24 hours have been reviewed  BMP:   Recent Labs   Lab 03/28/24 0334 03/28/24 0758 03/28/24  1542     --   --    *   < > 129*   K 3.5  --   --      --   --    CO2 17*  --   --    BUN 14  --   --    CREATININE 0.6  --   --    CALCIUM 8.1*  --   --    MG 1.9  --   --     < > = values in this interval not displayed.     CBC:   Recent Labs   Lab 03/27/24 0627 03/28/24 0334   WBC 15.90* 15.25*   HGB 10.2* 10.2*   HCT 28.9* 29.3*    193     CMP:   Recent Labs   Lab 03/27/24 0627 03/27/24  1051 03/28/24 0334 03/28/24 0758 03/28/24  1542   *  125*   < > 126* 128* 129*   K 3.8  --  3.5  --   --    CL 98  --  102  --   --    CO2 20*  --  17*  --   --    GLU 99  --  103  --   --    BUN 12  --  14  --   --    CREATININE 0.7  --  0.6  --   --    CALCIUM 8.1*  --  8.1*  --   --    PROT 4.9*  --  4.9*  --   --    ALBUMIN 2.6*  --  2.3*  --   --    BILITOT 0.7  --  0.5  --   --    ALKPHOS 139*  --  107  --   --    AST 26  --  20  --   --    ALT 12  --  12  --   --    ANIONGAP 7*  --  7*  --   --     < > = values in this interval not displayed.     Cardiac Markers: No results for input(s): "CKMB", "MYOGLOBIN", "BNP", "TROPISTAT" in the last 48 hours.  Coagulation: No results for input(s): "PT", "INR", "APTT" in the last 48 hours.  Lactic Acid:   Recent Labs   Lab 03/26/24  1759 03/27/24  1051   LACTATE 1.2 1.3     Magnesium:   Recent Labs   Lab 03/27/24  0627 03/28/24  0334   MG 2.0 1.9     Troponin: No results for input(s): "TROPONINI", "TROPONINIHS" in the last 48 hours.  TSH:   Recent Labs   Lab 03/25/24  1847   TSH 3.016     Urine Studies:   No results for input(s): "COLORU", "APPEARANCEUA", "PHUR", "SPECGRAV", "PROTEINUA", "GLUCUA", "KETONESU", "BILIRUBINUA", "OCCULTUA", "NITRITE", "UROBILINOGEN", " ""LEUKOCYTESUR", "RBCUA", "WBCUA", "BACTERIA", "SQUAMEPITHEL", "HYALINECASTS" in the last 48 hours.    Invalid input(s): "WRIGHTSUR"    Pending Diagnostic Studies:       Procedure Component Value Units Date/Time    Stool Exam-Ova,Cysts,Parasites [[id number]] Collected: 03/26/24 0913    Order Status: Sent Lab Status: In process Updated: 03/26/24 1438    Specimen: Stool            Medications:  Reconciled Home Medications:      Medication List        START taking these medications      cefdinir 300 MG capsule  Commonly known as: OMNICEF  Take 1 capsule (300 mg total) by mouth 2 (two) times daily. for 5 days     metroNIDAZOLE 500 MG tablet  Commonly known as: FLAGYL  Take 1 tablet (500 mg total) by mouth 3 (three) times daily. for 5 days            CONTINUE taking these medications      albuterol 90 mcg/actuation inhaler  Commonly known as: PROVENTIL/VENTOLIN HFA  Inhale 2 puffs into the lungs every 4 (four) hours as needed for Wheezing.     * albuterol-ipratropium 2.5 mg-0.5 mg/3 mL nebulizer solution  Commonly known as: DUO-NEB  Take 3 mLs by nebulization every 6 (six) hours as needed for Wheezing.     * CombiVENT RESPIMAT  mcg/actuation inhaler  Generic drug: ipratropium-albuteroL  Inhale 1 puff into the lungs every 6 (six) hours as needed for Wheezing.     amLODIPine 5 MG tablet  Commonly known as: NORVASC  Take 1 tablet (5 mg total) by mouth once daily.     aspirin 81 MG Chew  Take 81 mg by mouth once daily.     BREZTRI AEROSPHERE 160-9-4.8 mcg/actuation Hfaa  Generic drug: budesonide-glycopyr-formoterol  Inhale 2 puffs into the lungs 2 (two) times a day.     carvediloL 6.25 MG tablet  Commonly known as: COREG  Take 1 tablet (6.25 mg total) by mouth 2 (two) times daily with meals.     clopidogreL 75 mg tablet  Commonly known as: PLAVIX  Take 1 tablet (75 mg total) by mouth once daily.     COMPACT SPACE CHAMBER  Generic drug: inhalation spacing device  USE AS DIRECTED     cyanocobalamin 100 MCG " tablet  Commonly known as: VITAMIN B-12  Take 100 mcg by mouth once daily.     diclofenac sodium 1 % Gel  Commonly known as: VOLTAREN  Apply topically 4 (four) times daily.     ezetimibe-simvastatin 10-40 mg 10-40 mg per tablet  Commonly known as: VYTORIN  Take 1 tablet by mouth every evening.     famotidine 20 MG tablet  Commonly known as: PEPCID  Take 1 tablet (20 mg total) by mouth 2 (two) times daily as needed for Heartburn.     memantine 10 MG Tab  Commonly known as: NAMENDA  Take 1 tablet (10 mg total) by mouth 2 (two) times daily.     olmesartan 40 MG tablet  Commonly known as: BENICAR  Take 1 tablet (40 mg total) by mouth once daily.     OXYGEN-AIR DELIVERY SYSTEMS MISC  3 L by Misc.(Non-Drug; Combo Route) route every evening.     pantoprazole 40 MG tablet  Commonly known as: PROTONIX  Take 1 tablet (40 mg total) by mouth once daily.     vitamin D 1000 units Tab  Commonly known as: VITAMIN D3  Take 1,000 Units by mouth once daily.     zolpidem 10 mg Tab  Commonly known as: AMBIEN  Take 1 tablet (10 mg total) by mouth every evening.           * This list has 2 medication(s) that are the same as other medications prescribed for you. Read the directions carefully, and ask your doctor or other care provider to review them with you.                STOP taking these medications      dicyclomine 10 MG capsule  Commonly known as: BENTYL     FeroSuL 325 mg (65 mg iron) Tab tablet  Generic drug: ferrous sulfate     furosemide 20 MG tablet  Commonly known as: LASIX     hydroCHLOROthiazide 12.5 mg capsule  Commonly known as: MICROZIDE              Indwelling Lines/Drains at time of discharge:   Lines/Drains/Airways       None                    Time spent on the discharge of patient: 35 minutes  of time spent on discharge including examining patient, providing discharge instructions, arranging follow-up and documentation.           Mert Bah MD  Department of Hospital Medicine  [facility] Tele[facility] ([facility])

## 2024-03-28 NOTE — PROGRESS NOTES
[facility] ([facility])  Cardiology  Progress Note    Patient Name: Gemma Vick  MRN: [id number]  Admission Date: 3/25/2024  Hospital Length of Stay: 3 days  Code Status: Full Code   Attending Physician: Mert Bah MD   Primary Care Physician: Olga Altman MD  Expected Discharge Date:   Principal Problem:Colitis    Subjective:   HPI:  Ms. Vick is a 69 year old female patient whose current medical conditions include COPD, former tobacco abuse, CAD s/p prior stent (most recent PCI of RCA 9/23), HTN, hyperlipidemia, GERD, chronic mesenteric ischemia, vascular dementia, and AAA who presented to [facility] ED on 3/25/24 due to abdominal pain that onset the day prior. Associated symptoms included nausea, weakness, fatigue, constipation, and decreased oral intake. She denied any associated raffy chest pain, SOB, palpitations, near syncope, or syncope. Initial workup in ED revealed leukocytosis, hyponatremia, and hypothermia. CT of chest/abd/pelvis showed findings consistent with colitis and patient was subsequently admitted to ICU for further evaluation and treatment. Cardiology consulted to assist with management. Patient seen and examined today with family at bedside. Feels ok. States abdominal pain has improved. Received an enema and had successful BM's. No CP or SOB. She has known mesenteric ischemia/SMA stenosis. Previously saw vascular last year and medical management/monitoring was recommended as it was felt vessels were small/high risk of complications with intervention. She is followed in clinic by Dr. Juarez and mesenteric angiogram had been re-discussed. She is compliant with her medications. Chart reviewed. LA remains WNL. GI on board, patient previously declined EGD/colonoscopy as OP due to sedation concerns.        Hospital Course:   3/28/24-Patient seen and examined today with family present. Feels/looks better. Abd pain improved. No CP or SOB. Na trending up. Discussed with patient/family,  will have her f/u as OP and discuss mesenteric angiogram at that time since she is clinically improving.         Review of Systems   Constitutional: Positive for malaise/fatigue.   HENT: Negative.     Eyes: Negative.    Cardiovascular: Negative.    Respiratory: Negative.     Endocrine: Negative.    Hematologic/Lymphatic: Bruises/bleeds easily.   Skin: Negative.    Musculoskeletal:  Positive for arthritis and joint pain.   Gastrointestinal:  Positive for abdominal pain (improved).   Genitourinary: Negative.    Neurological:  Positive for tremors.   Psychiatric/Behavioral: Negative.     Allergic/Immunologic: Negative.      Objective:     Vital Signs (Most Recent):  Temp: 97.6 °F (36.4 °C) (03/28/24 1247)  Pulse: 78 (03/28/24 1355)  Resp: 20 (03/28/24 1247)  BP: (!) 155/70 (03/28/24 1247)  SpO2: 96 % (03/28/24 1247) Vital Signs (24h Range):  Temp:  [97.6 °F (36.4 °C)-99.3 °F (37.4 °C)] 97.6 °F (36.4 °C)  Pulse:  [75-98] 78  Resp:  [15-29] 20  SpO2:  [93 %-98 %] 96 %  BP: (141-181)/(62-98) 155/70     Weight: 59.6 kg (131 lb 6.3 oz)  Body mass index is 24.03 kg/m².     SpO2: 96 %         Intake/Output Summary (Last 24 hours) at 3/28/2024 1410  Last data filed at 3/28/2024 0600  Gross per 24 hour   Intake 2621.98 ml   Output 800 ml   Net 1821.98 ml       Lines/Drains/Airways       Peripheral Intravenous Line  Duration                  Peripheral IV - Single Lumen 03/25/24 1900 20 G Right Antecubital 2 days         Peripheral IV - Single Lumen 03/28/24 1155 20 G Anterior;Left Upper Arm <1 day                       Physical Exam  Vitals and nursing note reviewed.   Constitutional:       General: She is not in acute distress.     Appearance: She is well-developed. She is ill-appearing. She is not diaphoretic.      Comments: ON supplemental O2 via NC   HENT:      Head: Normocephalic and atraumatic.   Eyes:      General:         Right eye: No discharge.         Left eye: No discharge.      Pupils: Pupils are equal, round, and  "reactive to light.   Cardiovascular:      Rate and Rhythm: Normal rate and regular rhythm.      Heart sounds: Normal heart sounds, S1 normal and S2 normal. No murmur heard.  Pulmonary:      Effort: Pulmonary effort is normal. No respiratory distress.      Breath sounds: Normal breath sounds.   Abdominal:      General: There is no distension.      Tenderness: There is abdominal tenderness (mild generalized).   Musculoskeletal:      Right lower leg: No edema.      Left lower leg: No edema.   Skin:     General: Skin is warm and dry.      Findings: No erythema.   Neurological:      Mental Status: She is alert.      Comments: Oriented to person, place, mildly confused   Psychiatric:         Mood and Affect: Mood normal.         Behavior: Behavior normal.            Significant Labs: CMP   Recent Labs   Lab 03/27/24  0627 03/27/24  1051 03/28/24  0040 03/28/24  0334 03/28/24  0758   *  125*   < > 126* 126* 128*   K 3.8  --   --  3.5  --    CL 98  --   --  102  --    CO2 20*  --   --  17*  --    GLU 99  --   --  103  --    BUN 12  --   --  14  --    CREATININE 0.7  --   --  0.6  --    CALCIUM 8.1*  --   --  8.1*  --    PROT 4.9*  --   --  4.9*  --    ALBUMIN 2.6*  --   --  2.3*  --    BILITOT 0.7  --   --  0.5  --    ALKPHOS 139*  --   --  107  --    AST 26  --   --  20  --    ALT 12  --   --  12  --    ANIONGAP 7*  --   --  7*  --     < > = values in this interval not displayed.   , CBC   Recent Labs   Lab 03/27/24  0627 03/28/24  0334   WBC 15.90* 15.25*   HGB 10.2* 10.2*   HCT 28.9* 29.3*    193   , Troponin No results for input(s): "TROPONINI" in the last 48 hours., and All pertinent lab results from the last 24 hours have been reviewed.    Significant Imaging: Echocardiogram: Transthoracic echo (TTE) complete ([facility] Only):   Results for orders placed or performed during the hospital encounter of 06/14/23   Echo   Result Value Ref Range    BSA 1.63 m2    TDI SEPTAL 0.05 m/s    LV LATERAL E/E' RATIO 18.17 " m/s    LV SEPTAL E/E' RATIO 21.80 m/s    LA WIDTH 3.40 cm    IVC diameter 1.06 cm    Left Ventricular Outflow Tract Mean Velocity 0.75 cm/s    Left Ventricular Outflow Tract Mean Gradient 2.68 mmHg    TDI LATERAL 0.06 m/s    PV PEAK VELOCITY 0.71 cm/s    LVIDd 4.18 3.5 - 6.0 cm    IVS 0.99 0.6 - 1.1 cm    Posterior Wall 0.99 0.6 - 1.1 cm    Ao root annulus 3.23 cm    LVIDs 2.81 2.1 - 4.0 cm    FS 33 28 - 44 %    LA volume 40.64 cm3    Sinus 3.33 cm    STJ 3.04 cm    Ascending aorta 3.02 cm    LV mass 134.31 g    LA size 2.99 cm    RVDD 2.95 cm    Left Ventricle Relative Wall Thickness 0.47 cm    AV regurgitation pressure 1/2 time [id number] ms    AV mean gradient 4 mmHg    AV valve area 2.36 cm2    AV Velocity Ratio 0.84     AV index (prosthetic) 0.82     MV valve area p 1/2 method 3.61 cm2    E/A ratio 1.06     Mean e' 0.06 m/s    E wave deceleration time 210.28 msec    IVRT 125.59 msec    LVOT diameter 1.91 cm    LVOT area 2.9 cm2    LVOT peak oz 1.20 m/s    LVOT peak VTI 31.10 cm    Ao peak oz 1.43 m/s    Ao VTI 37.7 cm    RVOT peak oz 0.68 m/s    RVOT peak VTI 18.0 cm    LVOT stroke volume 89.06 cm3    AV peak gradient 8 mmHg    PV mean gradient 1.01 mmHg    E/E' ratio 19.82 m/s    MV Peak E Oz 1.09 m/s    AR Max Oz 5.01 m/s    TR Max Oz 2.80 m/s    MV stenosis pressure 1/2 time 60.98 ms    MV Peak A Oz 1.03 m/s    LV Systolic Volume 29.76 mL    LV Systolic Volume Index 18.6 mL/m2    LV Diastolic Volume 77.84 mL    LV Diastolic Volume Index 48.65 mL/m2    LA Volume Index 25.4 mL/m2    LV Mass Index 84 g/m2    RA Major Axis 4.04 cm    Left Atrium Minor Axis 4.60 cm    Left Atrium Major Axis 4.81 cm    Triscuspid Valve Regurgitation Peak Gradient 31 mmHg    LA Volume Index (Mod) 25.7 mL/m2    LA volume (mod) 41.15 cm3    RA Width 3.22 cm    EF 60 %    Narrative    · Concentric remodeling and normal systolic function.  · The estimated ejection fraction is 60%.  · Indeterminate left ventricular  diastolic function.  · Normal right ventricular size with normal right ventricular systolic   function.  · Mild tricuspid regurgitation.  · There is pulmonary hypertension.  · Mild aortic regurgitation.      , EKG: Reviewed, and X-Ray: CXR: X-Ray Chest 1 View (CXR): No results found for this visit on 03/25/24. and X-Ray Chest PA and Lateral (CXR): No results found for this visit on 03/25/24.  Assessment and Plan:   Patient with known mesenteric ischemia who presents with colitis. Clinically improving. Stable CV wise. Continue OMT. Follow-up in clinic, can discuss mesenteric angiogram as OP.    * Colitis  -GI on board  -Infectious vs Ischemic, LA remains normal  -Patient has known chronic mesenteric ischemia  -Would benefit from GI evaluation-colonoscopy/EGD   -Will consider mesenteric angio if she fails to improve  -Continue same mgmt in interim    3/28/24  -Follow-up in CV clinic    Sepsis  -Mgmt as per primary team  -Continue abx    Hyponatremia  -HCTZ held  -Monitor    Mesenteric ischemia, chronic  -Previously evaluated by vascular---monitoring/med mgmt recommended as vessels were felt to be small/high risk of complications  -Dr. Juarez willing to proceed with mesenteric angio if warranted but will observe for now  -Difficult to elucidate true etiology of patient's abdominal pain---has mixed picture    3/28/24  -Clinically improving  -Follow-up in clinic to discuss mesenteric angiogram    AAA (abdominal aortic aneurysm)  Stable    Coronary artery disease of native artery of native heart with stable angina pectoris  -Stable, no angina  -Resume home meds/OMT    Essential hypertension  -Resume home meds        VTE Risk Mitigation (From admission, onward)           Ordered     enoxaparin injection 40 mg  Daily         03/25/24 2153     IP VTE HIGH RISK PATIENT  Once         03/25/24 2153     Place sequential compression device  Until discontinued         03/25/24 2153                    Vidhya Lee,  MELLY  Cardiology  [facility] - Telemetry ([facility])

## 2024-03-28 NOTE — PLAN OF CARE
Problem: Infection  Goal: Absence of Infection Signs and Symptoms  Outcome: Ongoing, Progressing     Problem: Adult Inpatient Plan of Care  Goal: Plan of Care Review  Outcome: Ongoing, Progressing  Goal: Patient-Specific Goal (Individualized)  Outcome: Ongoing, Progressing  Goal: Absence of Hospital-Acquired Illness or Injury  Outcome: Ongoing, Progressing  Goal: Optimal Comfort and Wellbeing  Outcome: Ongoing, Progressing  Goal: Readiness for Transition of Care  Outcome: Ongoing, Progressing     Problem: Adjustment to Illness (Sepsis/Septic Shock)  Goal: Optimal Coping  Outcome: Ongoing, Progressing     Problem: Bleeding (Sepsis/Septic Shock)  Goal: Absence of Bleeding  Outcome: Ongoing, Progressing     Problem: Glycemic Control Impaired (Sepsis/Septic Shock)  Goal: Blood Glucose Level Within Desired Range  Outcome: Ongoing, Progressing     Problem: Infection Progression (Sepsis/Septic Shock)  Goal: Absence of Infection Signs and Symptoms  Outcome: Ongoing, Progressing     Problem: Nutrition Impaired (Sepsis/Septic Shock)  Goal: Optimal Nutrition Intake  Outcome: Ongoing, Progressing     Problem: Skin Injury Risk Increased  Goal: Skin Health and Integrity  Outcome: Ongoing, Progressing     Problem: Nausea and Vomiting  Goal: Fluid and Electrolyte Balance  Outcome: Ongoing, Progressing     Patient remained free of falls/injury/trauma throughout shift. Patient AAOx4 with intermittent confusion regarding situation. Neuro status unchanged. VSS. No complaints of chest pain or SOB. Patient continues to void per the cho catheter in place. IVF increased as ordered by Clarissa GARAY. Patient continues to have several loose stools throughout night. Fall risk precautions reviewed and maintained with patient. POC reviewed with patient. Patient verbalized understanding. Will continue to monitor.

## 2024-03-28 NOTE — PROGRESS NOTES
[facility] - Tele[facility] ([facility])  Nephrology  Progress Note    Patient Name: Gemma Vick  MRN: [id number]  Admission Date: 3/25/2024  Hospital Length of Stay: 3 days  Attending Provider: No att. providers found   Primary Care Physician: Olga Altman MD  Principal Problem:Colitis    Subjective:     HPI: Thank you for referring the pt to us. H/o and chart were reviewed. Pt was seen and examined in ICU. Pt is a 68 y/o female with h/o of colitis was admitted for fatigue and GI losses, both vomiting and diarrhea. Pt's s Na was in mid 120's, which has not changed since admit. Per RN, pt has had several watery BM's today. Pt is receiving NS IVF's. Pt has a h/o of mild diastolic CHF, but denies SOB or leg swelling. S Cr is normal. No h/o of thyroid or liver disease. Pt is not receiving any diuretics or meds that could cause hyponatremia.     Interval History: Pt was seen and examined this am in ICU Labs and meds reviewed. Discussed with other providers. No new events, still having diarrhea. Pt received higher rates of NS IVF's overnight    Review of patient's allergies indicates:  No Known Allergies  Current Facility-Administered Medications   Medication Frequency    0.9%  NaCl infusion Continuous    acetaminophen tablet 650 mg Q4H PRN    albuterol-ipratropium 2.5 mg-0.5 mg/3 mL nebulizer solution 3 mL Q6H PRN    amLODIPine tablet 5 mg Daily    arformoteroL nebulizer solution 15 mcg BID    atorvastatin tablet 20 mg Daily    budesonide nebulizer solution 0.5 mg Q12H    carvediloL tablet 6.25 mg BID WM    dextrose 10% bolus 125 mL 125 mL PRN    dextrose 10% bolus 250 mL 250 mL PRN    dicyclomine injection 10 mg Q6H PRN    enoxaparin injection 40 mg Daily    glucagon (human recombinant) injection 1 mg PRN    hydrALAZINE injection 10 mg Q8H PRN    influenza 65up-adj (QUADRIVALENT ADJUVANTED PF) vaccine 0.5 mL vaccine x 1 dose    insulin aspart U-100 pen 0-10 Units Q6H PRN    memantine tablet 10 mg BID    mupirocin 2 % ointment  BID    ondansetron injection 8 mg Q6H PRN    pantoprazole EC tablet 40 mg Daily    pneumoc 20-tyrell conj-dip cr(PF) (PREVNAR-20 (PF)) injection Syrg 0.5 mL vaccine x 1 dose    traZODone tablet 50 mg Nightly PRN     Current Outpatient Medications   Medication    albuterol (PROVENTIL/VENTOLIN HFA) 90 mcg/actuation inhaler    albuterol-ipratropium (DUO-NEB) 2.5 mg-0.5 mg/3 mL nebulizer solution    amLODIPine (NORVASC) 5 MG tablet    aspirin 81 MG Chew    budesonide-glycopyr-formoterol (BREZTRI AEROSPHERE) 160-9-4.8 mcg/actuation HFAA    carvediloL (COREG) 6.25 MG tablet    clopidogreL (PLAVIX) 75 mg tablet    COMBIVENT RESPIMAT  mcg/actuation inhaler    cyanocobalamin (VITAMIN B-12) 100 MCG tablet    diclofenac sodium (VOLTAREN) 1 % Gel    ezetimibe-simvastatin 10-40 mg (VYTORIN) 10-40 mg per tablet    famotidine (PEPCID) 20 MG tablet    inhalation spacing device (COMPACT SPACE CHAMBER)    memantine (NAMENDA) 10 MG Tab    olmesartan (BENICAR) 40 MG tablet    OXYGEN-AIR DELIVERY SYSTEMS MISC    pantoprazole (PROTONIX) 40 MG tablet    vitamin D (VITAMIN D3) 1000 units Tab    zolpidem (AMBIEN) 10 mg Tab    cefdinir (OMNICEF) 300 MG capsule    metroNIDAZOLE (FLAGYL) 500 MG tablet       Objective:     Vital Signs (Most Recent):  Temp: 97.6 °F (36.4 °C) (03/28/24 1247)  Pulse: 77 (03/28/24 1521)  Resp: 20 (03/28/24 1247)  BP: (!) 155/70 (03/28/24 1247)  SpO2: 96 % (03/28/24 1247) Vital Signs (24h Range):  Temp:  [97.6 °F (36.4 °C)-99 °F (37.2 °C)] 97.6 °F (36.4 °C)  Pulse:  [75-98] 77  Resp:  [15-29] 20  SpO2:  [93 %-98 %] 96 %  BP: (141-174)/(62-98) 155/70     Weight: 59.6 kg (131 lb 6.3 oz) (03/25/24 2330)  Body mass index is 24.03 kg/m².  Body surface area is 1.61 meters squared.    I/O last 3 completed shifts:  In: 3789.1 [I.V.:3591.8; IV Piggyback:197.3]  Out: 1190 [Urine:1190]     Physical Exam  Vitals and nursing note reviewed.   Constitutional:       Appearance: Normal appearance.   Cardiovascular:      Rate and  Rhythm: Normal rate and regular rhythm.      Pulses: Normal pulses.      Heart sounds: Normal heart sounds.   Pulmonary:      Effort: Pulmonary effort is normal.      Breath sounds: Normal breath sounds.   Abdominal:      Palpations: Abdomen is soft.      Tenderness: There is no abdominal tenderness.   Musculoskeletal:      Right lower leg: No edema.      Left lower leg: No edema.   Neurological:      Mental Status: She is alert and oriented to person, place, and time.   Psychiatric:         Behavior: Behavior normal.          Significant Labs: reviewed  BMP  Lab Results   Component Value Date     (L) 03/28/2024    K 3.5 03/28/2024     03/28/2024    CO2 17 (L) 03/28/2024    BUN 14 03/28/2024    CREATININE 0.6 03/28/2024    CALCIUM 8.1 (L) 03/28/2024    ANIONGAP 7 (L) 03/28/2024    EGFRNORACEVR >60 03/28/2024     Lab Results   Component Value Date    WBC 15.25 (H) 03/28/2024    HGB 10.2 (L) 03/28/2024    HCT 29.3 (L) 03/28/2024    MCV 88 03/28/2024     03/28/2024       Significant Imaging: reviewed  Assessment/Plan:     68 y/o female with GI issues and hyponatremia:     Hyponatremia  S Na stable and has improved.  Good response to higher rate of crystalloid IVF's  Hyponatremia due to GI losses    Hyponatremia not acute. Chronic  No indication to correct rapidly  Pt has had significant GI losses  Mild hypovolemia  Urine specific gravity is high and urine Na is low, c/w volume deficit induced hyponatremia  Cr is normal  Mild diastolic CHF, but no signs of fluid overload  No h/o of liver disease  TSH normal     Pt received NS IVF's at 150 ml/hr  Continue to provide nutrition and fluid replacement IV or PO  No indications for giving tolvaptan or lasix     Colitis  H/o of diarrhea  Unsure what vomiting was due to           Plans and recommendations:  As discussed above  Total critical care time spent 40 minutes including time needed to review the records, the   patient evaluation, documentation,  face-to-face discussion with the patient,   more than 50% of the time was spent on coordination of care and counseling.    Level V visit.      Jose Alvarez MD  Nephrology  [facility] ([facility])

## 2024-03-28 NOTE — ASSESSMENT & PLAN NOTE
-Previously evaluated by vascular---monitoring/med mgmt recommended as vessels were felt to be small/high risk of complications  -Dr. Juarez willing to proceed with mesenteric angio if warranted but will observe for now  -Difficult to elucidate true etiology of patient's abdominal pain---has mixed picture    3/28/24  -Clinically improving  -Follow-up in clinic to discuss mesenteric angiogram

## 2024-03-28 NOTE — PT/OT/SLP EVAL
Physical Therapy Evaluation and Treatment    Patient Name: Gemma Vick   MRN: [id number]  Recent Surgery: * No surgery found *      Recommendations:     Discharge Recommendations: Low Intensity Therapy   Discharge Equipment Recommendations: none (use of pt's RW)  Barriers to discharge: None    Assessment:     Gemma Vick is a 69 y.o. female admitted with a medical diagnosis of Colitis. She presents with the following impairments/functional limitations: weakness, impaired endurance, impaired functional mobility, gait instability, impaired balance, pain, decreased safety awareness, decreased lower extremity function.    Rehab Prognosis: Good; patient would benefit from acute PT services to address these deficits and reach maximum level of function.    Plan:     During this hospitalization, patient to be seen 3 x/week to address the above listed problems via gait training, therapeutic activities, therapeutic exercises    Plan of Care Expires: 04/11/24    Subjective     Chief Complaint: Pt is motivated to participate  Patient Comments/Goals: none stated  Pain/Comfort:  Pain Rating 1: 0/10    Social History:  Living Environment: Patient lives with their spouse and daughter in a mobile home with number of outside stair(s): 3 with rail. Pt reports her daughter is disable and she is the caregiver. Granddaughter lives next door.   Prior Level of Function: Prior to admission, patient was modified independent, not driving and retired, and ambulated household and community distances using no AD, required assistance at time for bathing.   Equipment Used at Home: none  DME owned (not currently used): rolling walker, quad cane, and shower chair  Assistance Upon Discharge: family    Objective:     Communicated with nurse Rios and epic chart review prior to session. Patient found sitting on couch in room with peripheral IV, telemetry upon PT entry to room.    General Precautions: Standard, fall   Orthopedic Precautions: N/A  "  Braces: N/A    Respiratory Status: Room air    Exams:  Cognition: Patient is oriented to Person, Place, Time, Situation  RLE ROM: WFL  RLE Strength:  Grossly 4-/5  LLE ROM: WFL  LLE Strength:  Grossly 4-/5  Postural Exam: Patient presented with the following abnormalities:    -       Rounded shoulders  -       Forward head  Sensation:    -       Intact  Skin Integrity/Edema:     -       Skin integrity: Visible skin intact    Functional Mobility:  Gait belt applied - Yes  Bed Mobility  Seated on couch at start of session and returned to EOB  Transfers  Sit to Stand: contact guard assistance with rolling walker  Couch to Bed: contact guard assistance with rolling walker using Step Transfer  Gait  Patient ambulated 120ft with rolling walker and contact guard assistance. Patient demonstrates occasional unsteady gait. No c/o dizziness or SOB, no gross LOB. All lines remained intact throughout ambulation trail. Verbal cuing to stand upright.   Balance  Sitting: stand by assistance  Standing: contact guard assistance    Therapeutic Activities and Exercises:   Pt educated on role of PT in acute care and POC. Educated on importance of OOB activities, activity pacing, and HEP (marching/hip flex, hip abd, heel slides/LAQ, quad sets, ankle pumps) in order to maintain/regain strength. Encouraged to sit up in chair for all meals. Educated on proper use of RW for safety and to reduce risk of falling. Pt would benefit from use of RW at home. Educated on "call don't fall" policy and increased risk of falling due to weakness, instructed to utilize call bell for assistance with all transfers. Pt agreeable to all requests.    AM-PAC 6 CLICK MOBILITY  Total Score:12    Patient left sitting edge of bed with all lines intact, call button in reach, and RN and family present.    GOALS:   Multidisciplinary Problems       Physical Therapy Goals          Problem: Physical Therapy    Goal Priority Disciplines Outcome Goal Variances " Interventions   Physical Therapy Goal     PT, PT/OT      Description: Goals to be met by 4/11/24.  1. Pt will complete bed mobility MOD I.  2. Pt will complete sit to stand MOD I.  3. Pt will ambulate 200ft MOD I using RW.  4. Pt will increase AMPAC score by 2 points to progress functional mobility.                       History:     Past Medical History:   Diagnosis Date    AAA (abdominal aortic aneurysm) 02/13/2014    Abdominal aneurysm     3    Acute coronary syndrome     Arthritis     BPPV (benign paroxysmal positional vertigo)     Carotid artery plaque     Carotid artery stenosis and occlusion 02/13/2014    Chronic back pain     COPD (chronic obstructive pulmonary disease)     Coronary artery disease     Dementia     Emphysema lung     Hyperlipidemia     Hypertension     Myocardial infarction     x3    Neuropathy     Personal history of COVID-19 06/09/2021 11/16/2020 +Covid, recovered at home        Past Surgical History:   Procedure Laterality Date    CARDIAC CATHETERIZATION      CORONARY ANGIOPLASTY      CORONARY STENT PLACEMENT N/A 9/12/2023    Procedure: INSERTION, STENT, CORONARY ARTERY;  Surgeon: Millie Juarez MD;  Location: [facility] CATH LAB;  Service: Cardiology;  Laterality: N/A;    HYSTERECTOMY  1988    LEFT HEART CATHETERIZATION Left 9/12/2023    Procedure: Left heart cath;  Surgeon: Millie Juarez MD;  Location: [facility] CATH LAB;  Service: Cardiology;  Laterality: Left;    PERCUTANEOUS CORONARY INTERVENTION, ARTERY N/A 9/12/2023    Procedure: Percutaneous coronary intervention;  Surgeon: Millie Juarez MD;  Location: [facility] CATH LAB;  Service: Cardiology;  Laterality: N/A;    THROMBECTOMY, CORONARY  9/12/2023    Procedure: Thrombectomy, Coronary;  Surgeon: Millie Juarez MD;  Location: [facility] CATH LAB;  Service: Cardiology;;    TONSILLECTOMY      TRANSFORAMINAL EPIDURAL INJECTION OF STEROID Right 12/1/2023    Procedure: Injection,steroid,epidural,transforaminal approach- right side, L4/5 and L5/S1;   Surgeon: Lydia Roberts MD;  Location: [facility];  Service: Pain Management;  Laterality: Right;       Time Tracking:     PT Received On: 04/11/24  PT Start Time: 1525  PT Stop Time: 1550  PT Total Time (min): 25 min     Billable Minutes: Evaluation 15min and Gait Training 10min    3/28/2024

## 2024-03-28 NOTE — SUBJECTIVE & OBJECTIVE
Review of Systems   Constitutional: Positive for malaise/fatigue.   HENT: Negative.     Eyes: Negative.    Cardiovascular: Negative.    Respiratory: Negative.     Endocrine: Negative.    Hematologic/Lymphatic: Bruises/bleeds easily.   Skin: Negative.    Musculoskeletal:  Positive for arthritis and joint pain.   Gastrointestinal:  Positive for abdominal pain (improved).   Genitourinary: Negative.    Neurological:  Positive for tremors.   Psychiatric/Behavioral: Negative.     Allergic/Immunologic: Negative.      Objective:     Vital Signs (Most Recent):  Temp: 97.6 °F (36.4 °C) (03/28/24 1247)  Pulse: 78 (03/28/24 1355)  Resp: 20 (03/28/24 1247)  BP: (!) 155/70 (03/28/24 1247)  SpO2: 96 % (03/28/24 1247) Vital Signs (24h Range):  Temp:  [97.6 °F (36.4 °C)-99.3 °F (37.4 °C)] 97.6 °F (36.4 °C)  Pulse:  [75-98] 78  Resp:  [15-29] 20  SpO2:  [93 %-98 %] 96 %  BP: (141-181)/(62-98) 155/70     Weight: 59.6 kg (131 lb 6.3 oz)  Body mass index is 24.03 kg/m².     SpO2: 96 %         Intake/Output Summary (Last 24 hours) at 3/28/2024 1410  Last data filed at 3/28/2024 0600  Gross per 24 hour   Intake 2621.98 ml   Output 800 ml   Net 1821.98 ml       Lines/Drains/Airways       Peripheral Intravenous Line  Duration                  Peripheral IV - Single Lumen 03/25/24 1900 20 G Right Antecubital 2 days         Peripheral IV - Single Lumen 03/28/24 1155 20 G Anterior;Left Upper Arm <1 day                       Physical Exam  Vitals and nursing note reviewed.   Constitutional:       General: She is not in acute distress.     Appearance: She is well-developed. She is ill-appearing. She is not diaphoretic.      Comments: ON supplemental O2 via NC   HENT:      Head: Normocephalic and atraumatic.   Eyes:      General:         Right eye: No discharge.         Left eye: No discharge.      Pupils: Pupils are equal, round, and reactive to light.   Cardiovascular:      Rate and Rhythm: Normal rate and regular rhythm.      Heart sounds:  "Normal heart sounds, S1 normal and S2 normal. No murmur heard.  Pulmonary:      Effort: Pulmonary effort is normal. No respiratory distress.      Breath sounds: Normal breath sounds.   Abdominal:      General: There is no distension.      Tenderness: There is abdominal tenderness (mild generalized).   Musculoskeletal:      Right lower leg: No edema.      Left lower leg: No edema.   Skin:     General: Skin is warm and dry.      Findings: No erythema.   Neurological:      Mental Status: She is alert.      Comments: Oriented to person, place, mildly confused   Psychiatric:         Mood and Affect: Mood normal.         Behavior: Behavior normal.            Significant Labs: CMP   Recent Labs   Lab 03/27/24  0627 03/27/24  1051 03/28/24  0040 03/28/24  0334 03/28/24  0758   *  125*   < > 126* 126* 128*   K 3.8  --   --  3.5  --    CL 98  --   --  102  --    CO2 20*  --   --  17*  --    GLU 99  --   --  103  --    BUN 12  --   --  14  --    CREATININE 0.7  --   --  0.6  --    CALCIUM 8.1*  --   --  8.1*  --    PROT 4.9*  --   --  4.9*  --    ALBUMIN 2.6*  --   --  2.3*  --    BILITOT 0.7  --   --  0.5  --    ALKPHOS 139*  --   --  107  --    AST 26  --   --  20  --    ALT 12  --   --  12  --    ANIONGAP 7*  --   --  7*  --     < > = values in this interval not displayed.   , CBC   Recent Labs   Lab 03/27/24 0627 03/28/24  0334   WBC 15.90* 15.25*   HGB 10.2* 10.2*   HCT 28.9* 29.3*    193   , Troponin No results for input(s): "TROPONINI" in the last 48 hours., and All pertinent lab results from the last 24 hours have been reviewed.    Significant Imaging: Echocardiogram: Transthoracic echo (TTE) complete ([facility] Only):   Results for orders placed or performed during the hospital encounter of 06/14/23   Echo   Result Value Ref Range    BSA 1.63 m2    TDI SEPTAL 0.05 m/s    LV LATERAL E/E' RATIO 18.17 m/s    LV SEPTAL E/E' RATIO 21.80 m/s    LA WIDTH 3.40 cm    IVC diameter 1.06 cm    Left Ventricular Outflow " Tract Mean Velocity 0.75 cm/s    Left Ventricular Outflow Tract Mean Gradient 2.68 mmHg    TDI LATERAL 0.06 m/s    PV PEAK VELOCITY 0.71 cm/s    LVIDd 4.18 3.5 - 6.0 cm    IVS 0.99 0.6 - 1.1 cm    Posterior Wall 0.99 0.6 - 1.1 cm    Ao root annulus 3.23 cm    LVIDs 2.81 2.1 - 4.0 cm    FS 33 28 - 44 %    LA volume 40.64 cm3    Sinus 3.33 cm    STJ 3.04 cm    Ascending aorta 3.02 cm    LV mass 134.31 g    LA size 2.99 cm    RVDD 2.95 cm    Left Ventricle Relative Wall Thickness 0.47 cm    AV regurgitation pressure 1/2 time [id number] ms    AV mean gradient 4 mmHg    AV valve area 2.36 cm2    AV Velocity Ratio 0.84     AV index (prosthetic) 0.82     MV valve area p 1/2 method 3.61 cm2    E/A ratio 1.06     Mean e' 0.06 m/s    E wave deceleration time 210.28 msec    IVRT 125.59 msec    LVOT diameter 1.91 cm    LVOT area 2.9 cm2    LVOT peak oz 1.20 m/s    LVOT peak VTI 31.10 cm    Ao peak oz 1.43 m/s    Ao VTI 37.7 cm    RVOT peak oz 0.68 m/s    RVOT peak VTI 18.0 cm    LVOT stroke volume 89.06 cm3    AV peak gradient 8 mmHg    PV mean gradient 1.01 mmHg    E/E' ratio 19.82 m/s    MV Peak E Oz 1.09 m/s    AR Max Oz 5.01 m/s    TR Max Oz 2.80 m/s    MV stenosis pressure 1/2 time 60.98 ms    MV Peak A Oz 1.03 m/s    LV Systolic Volume 29.76 mL    LV Systolic Volume Index 18.6 mL/m2    LV Diastolic Volume 77.84 mL    LV Diastolic Volume Index 48.65 mL/m2    LA Volume Index 25.4 mL/m2    LV Mass Index 84 g/m2    RA Major Axis 4.04 cm    Left Atrium Minor Axis 4.60 cm    Left Atrium Major Axis 4.81 cm    Triscuspid Valve Regurgitation Peak Gradient 31 mmHg    LA Volume Index (Mod) 25.7 mL/m2    LA volume (mod) 41.15 cm3    RA Width 3.22 cm    EF 60 %    Narrative    · Concentric remodeling and normal systolic function.  · The estimated ejection fraction is 60%.  · Indeterminate left ventricular diastolic function.  · Normal right ventricular size with normal right ventricular systolic   function.  · Mild  tricuspid regurgitation.  · There is pulmonary hypertension.  · Mild aortic regurgitation.      , EKG: Reviewed, and X-Ray: CXR: X-Ray Chest 1 View (CXR): No results found for this visit on 03/25/24. and X-Ray Chest PA and Lateral (CXR): No results found for this visit on 03/25/24.

## 2024-03-29 LAB — O+P STL MICRO: NORMAL

## 2024-03-31 LAB
BACTERIA BLD CULT: NORMAL
BACTERIA BLD CULT: NORMAL

## 2024-04-01 ENCOUNTER — PATIENT OUTREACH (OUTPATIENT)
Dept: ADMINISTRATIVE | Facility: CLINIC | Age: 69
End: 2024-04-01
Payer: MEDICARE

## 2024-04-01 ENCOUNTER — TELEPHONE (OUTPATIENT)
Dept: FAMILY MEDICINE | Facility: CLINIC | Age: 69
End: 2024-04-01
Payer: MEDICARE

## 2024-04-01 ENCOUNTER — NURSE TRIAGE (OUTPATIENT)
Dept: ADMINISTRATIVE | Facility: CLINIC | Age: 69
End: 2024-04-01
Payer: MEDICARE

## 2024-04-01 NOTE — TELEPHONE ENCOUNTER
Spoke with pt who states recently discharged from hospital. States she has been having diarrhea since being in hospital. States that 2 days ago she did have 3 bloody stools. States that she does have Hx of hemorrhoids. States she has not had bloody stool since then. Advised to be seen in ED/UC. Pt. declined dispo    Reason for Disposition   SEVERE diarrhea (e.g., 7 or more times / day more than normal) and age > 60 years    Additional Information   Negative: Passed out (i.e., fainted, collapsed and was not responding)   Negative: Shock suspected (e.g., cold/pale/clammy skin, too weak to stand, low BP, rapid pulse)   Negative: Shock suspected (e.g., cold/pale/clammy skin, too weak to stand, low BP, rapid pulse)   Negative: Difficult to awaken or acting confused (e.g., disoriented, slurred speech)   Negative: Sounds like a life-threatening emergency to the triager   Negative: SEVERE abdominal pain (e.g., excruciating) and present > 1 hour   Negative: SEVERE abdominal pain and age > 60 years   Negative: Bloody, black, or tarry bowel movements  (Exception: Chronic-unchanged black-grey bowel movements and is taking iron pills or Pepto-Bismol.)    Protocols used: Rectal Bleeding-A-OH, Diarrhea-A-OH

## 2024-04-01 NOTE — PLAN OF CARE
Patient is calling to request a refill Adderall 10 mg  Patient has 1 pill left and [place] town in the [place] so patient needs to pick this up today    Shauna Bryant   Plan of care reviewed with patient with verbal understanding. Chart and orders reviewed.  Pt remains free from falls this shift, Safety precautions in place.   Pt refused accuchecks  Pt currently resting comfortably in bed. Hourly rounding with bed in lowest position, side rails up, call light in reach.  Will continue to monitor until end of shift.       Problem: Adult Inpatient Plan of Care  Goal: Plan of Care Review  Flowsheets (Taken 9/13/2023 0456)  Plan of Care Reviewed With: patient  Goal: Patient-Specific Goal (Individualized)  Flowsheets (Taken 9/13/2023 0456)  Anxieties, Fears or Concerns: worried about bleeding  Individualized Care Needs: continue to check her puncture site at the L. wrist     Problem: Fall Injury Risk  Goal: Absence of Fall and Fall-Related Injury  Outcome: Ongoing, Progressing

## 2024-04-01 NOTE — PHYSICIAN QUERY
PT Name: Gemma Vick  MR #: [id number]     DOCUMENTATION CLARIFICATION     CDS: Brandy Capley, RN  Email: [email or web address]    This form is a permanent document in the medical record.     Query Date: April 1, 2024    By submitting this query, we are merely seeking further clarification of documentation.  Please utilize your independent clinical judgment when addressing the question(s) below.  The Medical Record contains the following:  Indicators Supporting Clinical Findings Location in Medical Record   X HR         RR          BP        Temp Vital Signs (24h Range):  Temp:  [92.9 °F (33.8 °C)-94.5 °F (34.7 °C)] 94.5 °F (34.7 °C)  Pulse:  [61-78] 78  Resp:  [16-21] 21  SpO2:  [95 %-99 %] 96 %  BP: (119-134)/(54-62) 129/59    Vital Signs (24h Range):  Temp:  [92.9 °F (33.8 °C)-99.9 °F (37.7 °C)] 99.9 °F (37.7 °C)  Pulse:  [61-86] 82  Resp:  [14-23] 20  SpO2:  [95 %-99 %] 98 %  BP: (104-151)/(54-65) 151/63    Vital Signs (24h Range):  Temp:  [97.5 °F (36.4 °C)-99.9 °F (37.7 °C)] 99 °F (37.2 °C)  Pulse:  [] 97  Resp:  [16-35] 21  SpO2:  [91 %-98 %] 95 %  BP: (129-189)/() 156/109   H&P 3/25              Pulmonology progress notes 3/26              Pulmonology progress notes 3/27   X Lactic Acid          Procalcitonin  03/25/24 18:47 03/26/24 17:59   Lactic Acid Level 1.0 1.2   Procalcitonin <0.02       Labs 3/25-3/26   X WBC           Bands          CRP     03/25/24 18:47 03/26/24 04:00 03/27/24 06:27 03/28/24 03:34   WBC 14.02 (H) 12.55 15.90 (H) 15.25 (H)      Labs 3/25-3/28   X Culture(s) No growth after 5 days     No Salmonella,Shigella,Vibrio,Campylobacter,Yersinia isolated   Shiga Toxin 1 E.coli Negative  Shiga Toxin 2 E.coli Negative  C. diff Antigen Negative Negative  C difficile Toxins A+B, EIA Negative    Blood cultures 3/25    Stool cultures 3/26    AMS, Confusion, LOC, etc.     X Organ Dysfunction/Failure Organ dysfunction indicated by  hypothermia    H&P 3/25   X Bacteremia or Sepsis /  Septic Sepsis H&P 3/25   X Known or Suspected Source of Infection documented Source: Abdominal   Colitis presumed infectious    No clear source    H&P 3/25      Pulmonology progress notes 3/27    (Failed) Outpatient Treatment     X Medication Fluid challenge was administered in ED. Has received over 4L since admission     ceFEPIme (MAXIPIME) 2 g in dextrose 5 % in water (D5W) 100 mL IVPB (MB+)  Dose: 2 g  Freq: ED 1 Time Route: IV  Indications of Use: Sepsis of Unknown Source  Start: 03/25/24 1845 End: 03/25/24 2015    ceFEPIme (MAXIPIME) 1 g in dextrose 5 % in water (D5W) 100 mL IVPB (MB+)  Dose: 1 g  Freq: Every 12 hours (non-standard times) Route: IV  Indications of Use: Intra-abdominal  Start: 03/26/24 0800 End: 03/26/24 1445    ceFEPIme (MAXIPIME) 1 g in sodium chloride 0.9 % 100 mL IVPB (MB+)  Dose: 1 g  Freq: Every 12 hours (non-standard times) Route: IV  Indications of Use: Intra-abdominal  Start: 03/26/24 2000 End: 03/28/24 1114    metronidazole IVPB 500 mg  Dose: 500 mg  Freq: Every 8 hours (non-standard times) Route: IV  Indications of Use: Intra-abdominal  Start: 03/25/24 2200 End: 03/26/24 1540    metroNIDAZOLE tablet 500 mg  Dose: 500 mg  Freq: Every 8 hours Route: Oral  Indications of Use: Intra-abdominal  Start: 03/26/24 2200 End: 03/28/24 1114   Pulmonology progress notes 3/26    MAR 3/25-3/28    Treatment     X Other Further investigations revealed inflammation in the descending colon, suggestive of either infectious or inflammatory colitis, with ischemic changes also considered due to the patient's history of mesenteric ischemia and recent symptoms of dizziness post HCTZ administration.     Stool studies negative thus far but patient has been on antibiotics      03/25/24 18:47 03/26/24 04:00 03/27/24 06:27 03/28/24 03:34   BUN 17 14 12 14   Creatinine 1.3 0.9 0.7 0.6   eGFR 45 ! >60 >60 >60      Discharge summary 3/28        GI progress notes 3/28    Labs 3/25-3/28        Provider, please clarify  infectious process:    [  x ] Sepsis due to unknown organism     [   ] Severe Sepsis with Acute Organ Dysfunction/Failure (please specify organ dysfunction/failure): _______     [   ] SIRS without infectious etiology     [   ] SIRS with infection but without Sepsis     [   ] Other Infectious Disease (please specify): __________     [  ] Clinically Undetermined           Please document in your progress notes daily for the duration of treatment until resolved and include in your discharge summary.

## 2024-04-01 NOTE — TELEPHONE ENCOUNTER
----- Message from Ana Fine sent at 4/1/2024  8:54 AM CDT -----  Contact: Ms. Dahl Phone# [phone]  Ms. Dahl a nurse at [facility] would like to put in a Verbal order for a  for the patient.

## 2024-04-01 NOTE — PROGRESS NOTES
C3 nurse spoke with Gemma Vick  for a TCC post hospital discharge follow up call. The patient has a scheduled [facility] appointment with Olga Altman MD on 4/2/2024 at 1 PM.

## 2024-04-02 ENCOUNTER — OFFICE VISIT (OUTPATIENT)
Dept: FAMILY MEDICINE | Facility: CLINIC | Age: 69
End: 2024-04-02
Payer: MEDICARE

## 2024-04-02 ENCOUNTER — LAB VISIT (OUTPATIENT)
Dept: LAB | Facility: HOSPITAL | Age: 69
End: 2024-04-02
Attending: FAMILY MEDICINE
Payer: MEDICARE

## 2024-04-02 VITALS
SYSTOLIC BLOOD PRESSURE: 130 MMHG | OXYGEN SATURATION: 95 % | DIASTOLIC BLOOD PRESSURE: 70 MMHG | BODY MASS INDEX: 23.65 KG/M2 | WEIGHT: 129.31 LBS | HEART RATE: 69 BPM

## 2024-04-02 DIAGNOSIS — Z09 HOSPITAL DISCHARGE FOLLOW-UP: Primary | ICD-10-CM

## 2024-04-02 DIAGNOSIS — K52.9 COLITIS: ICD-10-CM

## 2024-04-02 DIAGNOSIS — E87.1 HYPONATREMIA: ICD-10-CM

## 2024-04-02 DIAGNOSIS — I10 ESSENTIAL HYPERTENSION: ICD-10-CM

## 2024-04-02 DIAGNOSIS — Z09 HOSPITAL DISCHARGE FOLLOW-UP: ICD-10-CM

## 2024-04-02 DIAGNOSIS — R47.9 SPEECH DISTURBANCE, UNSPECIFIED TYPE: ICD-10-CM

## 2024-04-02 LAB
ALBUMIN SERPL BCP-MCNC: 2.9 G/DL (ref 3.5–5.2)
ALP SERPL-CCNC: 67 U/L (ref 55–135)
ALT SERPL W/O P-5'-P-CCNC: 10 U/L (ref 10–44)
ANION GAP SERPL CALC-SCNC: 10 MMOL/L (ref 8–16)
AST SERPL-CCNC: 15 U/L (ref 10–40)
BASOPHILS # BLD AUTO: 0.06 K/UL (ref 0–0.2)
BASOPHILS NFR BLD: 0.7 % (ref 0–1.9)
BILIRUB SERPL-MCNC: 0.4 MG/DL (ref 0.1–1)
BUN SERPL-MCNC: 5 MG/DL (ref 8–23)
CALCIUM SERPL-MCNC: 8.5 MG/DL (ref 8.7–10.5)
CHLORIDE SERPL-SCNC: 97 MMOL/L (ref 95–110)
CO2 SERPL-SCNC: 26 MMOL/L (ref 23–29)
CREAT SERPL-MCNC: 0.6 MG/DL (ref 0.5–1.4)
DIFFERENTIAL METHOD BLD: ABNORMAL
EOSINOPHIL # BLD AUTO: 0.2 K/UL (ref 0–0.5)
EOSINOPHIL NFR BLD: 2.2 % (ref 0–8)
ERYTHROCYTE [DISTWIDTH] IN BLOOD BY AUTOMATED COUNT: 13.6 % (ref 11.5–14.5)
EST. GFR  (NO RACE VARIABLE): >60 ML/MIN/1.73 M^2
GLUCOSE SERPL-MCNC: 88 MG/DL (ref 70–110)
HCT VFR BLD AUTO: 31.6 % (ref 37–48.5)
HGB BLD-MCNC: 10.6 G/DL (ref 12–16)
IMM GRANULOCYTES # BLD AUTO: 0.05 K/UL (ref 0–0.04)
IMM GRANULOCYTES NFR BLD AUTO: 0.6 % (ref 0–0.5)
LYMPHOCYTES # BLD AUTO: 1.5 K/UL (ref 1–4.8)
LYMPHOCYTES NFR BLD: 17.7 % (ref 18–48)
MCH RBC QN AUTO: 30.2 PG (ref 27–31)
MCHC RBC AUTO-ENTMCNC: 33.5 G/DL (ref 32–36)
MCV RBC AUTO: 90 FL (ref 82–98)
MONOCYTES # BLD AUTO: 1 K/UL (ref 0.3–1)
MONOCYTES NFR BLD: 12.5 % (ref 4–15)
NEUTROPHILS # BLD AUTO: 5.5 K/UL (ref 1.8–7.7)
NEUTROPHILS NFR BLD: 66.3 % (ref 38–73)
NRBC BLD-RTO: 0 /100 WBC
PLATELET # BLD AUTO: 359 K/UL (ref 150–450)
PMV BLD AUTO: 9.5 FL (ref 9.2–12.9)
POTASSIUM SERPL-SCNC: 3.1 MMOL/L (ref 3.5–5.1)
PROT SERPL-MCNC: 5.4 G/DL (ref 6–8.4)
RBC # BLD AUTO: 3.51 M/UL (ref 4–5.4)
SODIUM SERPL-SCNC: 133 MMOL/L (ref 136–145)
WBC # BLD AUTO: 8.32 K/UL (ref 3.9–12.7)
WBC NRBC COR # BLD: ABNORMAL K/UL

## 2024-04-02 PROCEDURE — 36415 COLL VENOUS BLD VENIPUNCTURE: CPT | Mod: HCNC,PO | Performed by: FAMILY MEDICINE

## 2024-04-02 PROCEDURE — 3044F HG A1C LEVEL LT 7.0%: CPT | Mod: HCNC,CPTII,S$GLB, | Performed by: FAMILY MEDICINE

## 2024-04-02 PROCEDURE — 1159F MED LIST DOCD IN RCRD: CPT | Mod: HCNC,CPTII,S$GLB, | Performed by: FAMILY MEDICINE

## 2024-04-02 PROCEDURE — 1111F DSCHRG MED/CURRENT MED MERGE: CPT | Mod: HCNC,CPTII,S$GLB, | Performed by: FAMILY MEDICINE

## 2024-04-02 PROCEDURE — 4010F ACE/ARB THERAPY RXD/TAKEN: CPT | Mod: HCNC,CPTII,S$GLB, | Performed by: FAMILY MEDICINE

## 2024-04-02 PROCEDURE — 99495 TRANSJ CARE MGMT MOD F2F 14D: CPT | Mod: HCNC,S$GLB,, | Performed by: FAMILY MEDICINE

## 2024-04-02 PROCEDURE — 80053 COMPREHEN METABOLIC PANEL: CPT | Mod: HCNC | Performed by: FAMILY MEDICINE

## 2024-04-02 PROCEDURE — 99999 PR PBB SHADOW E&M-EST. PATIENT-LVL IV: CPT | Mod: PBBFAC,HCNC,, | Performed by: FAMILY MEDICINE

## 2024-04-02 PROCEDURE — 85025 COMPLETE CBC W/AUTO DIFF WBC: CPT | Mod: HCNC | Performed by: FAMILY MEDICINE

## 2024-04-02 PROCEDURE — 3075F SYST BP GE 130 - 139MM HG: CPT | Mod: HCNC,CPTII,S$GLB, | Performed by: FAMILY MEDICINE

## 2024-04-02 PROCEDURE — 3078F DIAST BP <80 MM HG: CPT | Mod: HCNC,CPTII,S$GLB, | Performed by: FAMILY MEDICINE

## 2024-04-02 NOTE — PROGRESS NOTES
Chief Complaint:  No chief complaint on file.      History of Present Illness:    Patient presents today for hospital follow up,    Family and/or Caretaker present at visit?  Yes.  Diagnostic tests reviewed/disposition: I have reviewed all completed as well as pending diagnostic tests at the time of discharge.  Disease/illness education: y  Home health/community services discussion/referrals: Patient does not have home health established from hospital visit.  They do not need home health.  If needed, we will set up home health for the patient.   Establishment or re-establishment of referral orders for community resources: No other necessary community resources.   Discussion with other health care providers: No discussion with other health care providers necessary.     Presented to ER for abd pain, nausea, and weakness. She was found to be severely hyponatremic and body temp of 92 degrees F.  Determined to be from taking HCTZ daily instead of prn. Further workup revealed inflammatory colitis.   Sodium upon discharge 129.    She has home health set up, says her blood pressure at home runs borderline normal and may sometimes fluctuate.   She does mention some trouble with speech when this all started. Says she has dry mouth and feels liker her tongue is thick, trouble with pronouncing B's and S's      Hospital Course:   A patient was admitted to the ICU on March 25th with severe hyponatremia, with a sodium level of 119, hypothermia at 92°F, and severe constipation. The patient underwent rewarming, and her sodium level improved to 126. She also experienced a large bowel movement and has had multiple bowel movements since then. Due to these improvements, she is now being downgraded to telemetry under hospital medicine and is resting comfortably. The hyponatremia was likely secondary to the patient's misuse of HCTZ (hydrochlorothiazide), which was prescribed by her cardiologist for as-needed use. However, the patient,  misunderstanding the instructions and influenced by online information, began taking it daily. Her sodium levels have stabilized since discontinuing the medication, and repeat labs are scheduled with her primary care provider within 1-2 weeks of discharge.     Further investigations revealed inflammation in the descending colon, suggestive of either infectious or inflammatory colitis, with ischemic changes also considered due to the patient's history of mesenteric ischemia and recent symptoms of dizziness post HCTZ administration. Despite these concerns, her lactate levels have remained normal, and stool studies have been negative, although she has been receiving antibiotics. The plan is to continue antibiotics for 7-10 days. Discharged with additional 5 days of therapy after receiving 4 days of abx here. There are no immediate plans for endoscopy due to recent use of Plavix, but outpatient follow-up will be considered for a colonoscopy, given the patient's high sedation risk history. No further inpatient gastrointestinal recommendations are provided at this time, but arrangements for outpatient follow-up will be made. Cardiology has noted clinical improvement and cardiovascular stability, advising the continuation of optimal medical therapy with a follow-up in clinic to possibly discuss a mesenteric angiogram.    ROS:  Review of Systems   Constitutional:  Negative for appetite change, chills and fever.   HENT:  Negative for congestion, ear pain, postnasal drip, rhinorrhea, sinus pressure and sinus pain.    Eyes:  Negative for pain.   Respiratory:  Negative for cough, chest tightness and shortness of breath.    Cardiovascular:  Negative for chest pain and palpitations.   Gastrointestinal:  Negative for abdominal pain, blood in stool, constipation, diarrhea and nausea.   Genitourinary:  Negative for difficulty urinating, dysuria, flank pain and hematuria.   Musculoskeletal:  Negative for arthralgias, back pain and  myalgias.   Skin:  Negative for pallor and wound.   Neurological:  Negative for dizziness, tremors, speech difficulty, light-headedness and headaches.   Psychiatric/Behavioral:  Negative for behavioral problems, dysphoric mood and sleep disturbance.    All other systems reviewed and are negative.      Past Medical History:   Diagnosis Date    AAA (abdominal aortic aneurysm) 2014    Abdominal aneurysm     3    Acute coronary syndrome     Arthritis     BPPV (benign paroxysmal positional vertigo)     Carotid artery plaque     Carotid artery stenosis and occlusion 2014    Chronic back pain     COPD (chronic obstructive pulmonary disease)     Coronary artery disease     Dementia     Emphysema lung     Hyperlipidemia     Hypertension     Myocardial infarction     x3    Neuropathy     Personal history of COVID-19 2021 +Covid, recovered at home        Social History:  Social History     Socioeconomic History    Marital status:    Tobacco Use    Smoking status: Former     Current packs/day: 0.00     Average packs/day: 0.5 packs/day for 46.9 years (23.4 ttl pk-yrs)     Types: Cigarettes, Vaping w/o nicotine     Start date: 1970     Quit date: 2017     Years since quittin.9    Smokeless tobacco: Never    Tobacco comments:     3 MG NICOTINE FOR HEART.    Substance and Sexual Activity    Alcohol use: No    Drug use: No    Sexual activity: Not Currently     Birth control/protection: None     Social Determinants of Health     Financial Resource Strain: Medium Risk (3/14/2024)    Overall Financial Resource Strain (CARDIA)     Difficulty of Paying Living Expenses: Somewhat hard   Food Insecurity: No Food Insecurity (3/14/2024)    Hunger Vital Sign     Worried About Running Out of Food in the Last Year: Never true     Ran Out of Food in the Last Year: Never true   Transportation Needs: No Transportation Needs (3/14/2024)    PRAPARE - Transportation     Lack of Transportation  (Medical): No     Lack of Transportation (Non-Medical): No   Physical Activity: Inactive (3/14/2024)    Exercise Vital Sign     Days of Exercise per Week: 0 days     Minutes of Exercise per Session: 0 min   Stress: No Stress Concern Present (3/14/2024)    [employer] [employer] - Occupational Stress Questionnaire     Feeling of Stress : Only a little   Social Connections: Moderately Integrated (3/14/2024)    Social Connection and Isolation Panel [NHANES]     Frequency of Communication with Friends and Family: More than three times a week     Frequency of Social Gatherings with Friends and Family: More than three times a week     Attends Denominational Services: More than 4 times per year     Active Member of Clubs or Organizations: Yes     Attends Club or Organization Meetings: More than 4 times per year     Marital Status:    Housing Stability: Low Risk  (3/14/2024)    Housing Stability Vital Sign     Unable to Pay for Housing in the Last Year: No     Number of Places Lived in the Last Year: 1     Unstable Housing in the Last Year: No       Family History:   family history includes Breast cancer in her paternal aunt; Heart attacks under age 50 in her brother and father; Heart disease in her mother.    Health Maintenance   Topic Date Due    Shingles Vaccine (1 of 2) Never done    Colorectal Cancer Screening  05/17/2022    Mammogram  06/09/2022    LDCT Lung Screen  11/08/2023    Lipid Panel  04/18/2024    DEXA Scan  10/20/2024    High Dose Statin  04/02/2025    TETANUS VACCINE  11/15/2026    Hepatitis C Screening  Completed       Exam:Physical     Vital Signs  Pulse: 69  SpO2: 95 %  BP: 130/70  BP Location: Left arm  Patient Position: Sitting  Height and Weight  Weight: 58.7 kg (129 lb 4.8 oz)]    Body mass index is 23.65 kg/m².    Physical Exam  Vitals and nursing note reviewed.   Constitutional:       Appearance: Normal appearance. She is not toxic-appearing.   HENT:      Head: Normocephalic  and atraumatic.      Right Ear: Tympanic membrane normal.      Left Ear: Tympanic membrane normal.   Eyes:      Extraocular Movements: Extraocular movements intact.      Pupils: Pupils are equal, round, and reactive to light.   Cardiovascular:      Rate and Rhythm: Normal rate and regular rhythm.      Heart sounds: Normal heart sounds.   Pulmonary:      Effort: Pulmonary effort is normal.      Breath sounds: Normal breath sounds. No wheezing, rhonchi or rales.   Abdominal:      General: Bowel sounds are normal. There is no distension.      Palpations: Abdomen is soft.      Tenderness: There is no abdominal tenderness.   Musculoskeletal:         General: Normal range of motion.      Cervical back: Normal range of motion.      Lumbar back: No tenderness.   Skin:     General: Skin is warm and dry.      Capillary Refill: Capillary refill takes less than 2 seconds.   Neurological:      General: No focal deficit present.      Mental Status: She is alert and oriented to person, place, and time.   Psychiatric:         Mood and Affect: Mood normal.         Behavior: Behavior normal.         Judgment: Judgment normal.           Assessment:      ICD-10-CM ICD-9-CM   1. Hospital discharge follow-up  Z09 V67.59   2. Colitis  K52.9 558.9   3. Hyponatremia  E87.1 276.1   4. Essential hypertension  I10 401.9   5. Speech disturbance, unspecified type  R47.9 784.59         Plan:  Discussed and reviewed hospital visit.   After done with antibiotics start taking probiotics.   Refer to gastroenterology for Colitis and possible follow up colonoscopy  Refer to speech therapy for speech disturbance.   See labs below.     Orders Placed This Encounter   Procedures    CBC Auto Differential    Comprehensive Metabolic Panel    Ambulatory referral/consult to Gastroenterology    Ambulatory referral/consult to Speech Therapy         No follow-ups on file.      Olga Altman MD    Scribe Attestation:   Amaury ABRAHAM am scribing for, and in the  presence of, Dr.Arif Altman I performed the above scribed service and the documentation accurately describes the services I performed. I attest to the accuracy of the note.    I, Dr. Olga Altman, reviewed documentation as scribed above. I performed the services described in this documentation.  I agree that the record reflects my personal performance and is accurate and complete. Olga Altman MD.  04/02/2024

## 2024-04-03 ENCOUNTER — PATIENT MESSAGE (OUTPATIENT)
Dept: PULMONOLOGY | Facility: CLINIC | Age: 69
End: 2024-04-03
Payer: MEDICARE

## 2024-04-04 ENCOUNTER — APPOINTMENT (OUTPATIENT)
Dept: RADIOLOGY | Facility: HOSPITAL | Age: 69
End: 2024-04-04
Attending: NURSE PRACTITIONER
Payer: MEDICARE

## 2024-04-04 ENCOUNTER — OFFICE VISIT (OUTPATIENT)
Dept: CARDIOLOGY | Facility: CLINIC | Age: 69
End: 2024-04-04
Payer: MEDICARE

## 2024-04-04 ENCOUNTER — PATIENT MESSAGE (OUTPATIENT)
Dept: FAMILY MEDICINE | Facility: CLINIC | Age: 69
End: 2024-04-04
Payer: MEDICARE

## 2024-04-04 VITALS
WEIGHT: 126.75 LBS | OXYGEN SATURATION: 94 % | DIASTOLIC BLOOD PRESSURE: 58 MMHG | HEIGHT: 62 IN | BODY MASS INDEX: 23.32 KG/M2 | HEART RATE: 74 BPM | SYSTOLIC BLOOD PRESSURE: 142 MMHG

## 2024-04-04 DIAGNOSIS — R53.1 WEAKNESS: ICD-10-CM

## 2024-04-04 DIAGNOSIS — F01.B0 MODERATE VASCULAR DEMENTIA WITHOUT BEHAVIORAL DISTURBANCE, PSYCHOTIC DISTURBANCE, MOOD DISTURBANCE, OR ANXIETY: ICD-10-CM

## 2024-04-04 DIAGNOSIS — I65.23 BILATERAL CAROTID ARTERY STENOSIS: ICD-10-CM

## 2024-04-04 DIAGNOSIS — I25.118 CORONARY ARTERY DISEASE OF NATIVE ARTERY OF NATIVE HEART WITH STABLE ANGINA PECTORIS: ICD-10-CM

## 2024-04-04 DIAGNOSIS — E87.1 HYPONATREMIA: ICD-10-CM

## 2024-04-04 DIAGNOSIS — H81.10 BENIGN PAROXYSMAL POSITIONAL VERTIGO, UNSPECIFIED LATERALITY: ICD-10-CM

## 2024-04-04 DIAGNOSIS — I10 ESSENTIAL HYPERTENSION: ICD-10-CM

## 2024-04-04 DIAGNOSIS — E78.2 MIXED HYPERLIPIDEMIA: ICD-10-CM

## 2024-04-04 DIAGNOSIS — J44.9 COPD WITHOUT EXACERBATION: ICD-10-CM

## 2024-04-04 DIAGNOSIS — I71.43 INFRARENAL ABDOMINAL AORTIC ANEURYSM (AAA) WITHOUT RUPTURE: ICD-10-CM

## 2024-04-04 DIAGNOSIS — F17.211 CIGARETTE NICOTINE DEPENDENCE IN REMISSION: ICD-10-CM

## 2024-04-04 DIAGNOSIS — R73.03 PREDIABETES: ICD-10-CM

## 2024-04-04 DIAGNOSIS — I70.223 ATHEROSCLEROSIS OF NATIVE ARTERIES OF EXTREMITIES WITH REST PAIN, BILATERAL LEGS: ICD-10-CM

## 2024-04-04 DIAGNOSIS — E87.6 HYPOKALEMIA: Primary | ICD-10-CM

## 2024-04-04 DIAGNOSIS — Z78.0 ENCOUNTER FOR OSTEOPOROSIS SCREENING IN ASYMPTOMATIC POSTMENOPAUSAL PATIENT: ICD-10-CM

## 2024-04-04 DIAGNOSIS — Z13.820 ENCOUNTER FOR OSTEOPOROSIS SCREENING IN ASYMPTOMATIC POSTMENOPAUSAL PATIENT: ICD-10-CM

## 2024-04-04 DIAGNOSIS — K52.9 COLITIS: ICD-10-CM

## 2024-04-04 DIAGNOSIS — K21.9 GASTROESOPHAGEAL REFLUX DISEASE, UNSPECIFIED WHETHER ESOPHAGITIS PRESENT: ICD-10-CM

## 2024-04-04 DIAGNOSIS — I70.0 AORTIC ATHEROSCLEROSIS: Primary | ICD-10-CM

## 2024-04-04 PROCEDURE — 1125F AMNT PAIN NOTED PAIN PRSNT: CPT | Mod: HCNC,CPTII,S$GLB, | Performed by: INTERNAL MEDICINE

## 2024-04-04 PROCEDURE — 1111F DSCHRG MED/CURRENT MED MERGE: CPT | Mod: HCNC,CPTII,S$GLB, | Performed by: INTERNAL MEDICINE

## 2024-04-04 PROCEDURE — 3008F BODY MASS INDEX DOCD: CPT | Mod: HCNC,CPTII,S$GLB, | Performed by: INTERNAL MEDICINE

## 2024-04-04 PROCEDURE — 1160F RVW MEDS BY RX/DR IN RCRD: CPT | Mod: HCNC,CPTII,S$GLB, | Performed by: INTERNAL MEDICINE

## 2024-04-04 PROCEDURE — 1159F MED LIST DOCD IN RCRD: CPT | Mod: HCNC,CPTII,S$GLB, | Performed by: INTERNAL MEDICINE

## 2024-04-04 PROCEDURE — 1100F PTFALLS ASSESS-DOCD GE2>/YR: CPT | Mod: HCNC,CPTII,S$GLB, | Performed by: INTERNAL MEDICINE

## 2024-04-04 PROCEDURE — 4010F ACE/ARB THERAPY RXD/TAKEN: CPT | Mod: HCNC,CPTII,S$GLB, | Performed by: INTERNAL MEDICINE

## 2024-04-04 PROCEDURE — 3288F FALL RISK ASSESSMENT DOCD: CPT | Mod: HCNC,CPTII,S$GLB, | Performed by: INTERNAL MEDICINE

## 2024-04-04 PROCEDURE — 3075F SYST BP GE 130 - 139MM HG: CPT | Mod: HCNC,CPTII,S$GLB, | Performed by: INTERNAL MEDICINE

## 2024-04-04 PROCEDURE — 77080 DXA BONE DENSITY AXIAL: CPT | Mod: TC,HCNC

## 2024-04-04 PROCEDURE — 3044F HG A1C LEVEL LT 7.0%: CPT | Mod: HCNC,CPTII,S$GLB, | Performed by: INTERNAL MEDICINE

## 2024-04-04 PROCEDURE — 99215 OFFICE O/P EST HI 40 MIN: CPT | Mod: HCNC,S$GLB,, | Performed by: INTERNAL MEDICINE

## 2024-04-04 PROCEDURE — 99999 PR PBB SHADOW E&M-EST. PATIENT-LVL IV: CPT | Mod: PBBFAC,HCNC,, | Performed by: INTERNAL MEDICINE

## 2024-04-04 PROCEDURE — 77080 DXA BONE DENSITY AXIAL: CPT | Mod: 26,HCNC,, | Performed by: RADIOLOGY

## 2024-04-04 PROCEDURE — 3078F DIAST BP <80 MM HG: CPT | Mod: HCNC,CPTII,S$GLB, | Performed by: INTERNAL MEDICINE

## 2024-04-04 RX ORDER — POTASSIUM CHLORIDE 20 MEQ/1
20 TABLET, EXTENDED RELEASE ORAL DAILY
Qty: 30 TABLET | Refills: 0 | Status: SHIPPED | OUTPATIENT
Start: 2024-04-04 | End: 2024-05-04

## 2024-04-04 NOTE — PROGRESS NOTES
Subjective:   Patient ID:  Gemma Vick is a 69 y.o. female who presents for follow up of No chief complaint on file.      HPI  3/26/2020  A 64 yo female with pvd carotid disease htn hlp copd exsmoker on nocturnal o2 therapy wants to discuss test results. She is in digital htn has been unable to tolerate bisoprolol daily feels tired fatigued no energy she takes meds regularily tries to be compliant with salt no exercise but stays busy in the house. She takes care of her grandbabies. Has occasional leg swelling she takes lasix prn for weight change she stands on her feet a lot. She is compliant with inhalers to control shortness of breath no palpitation has chronic cough.  Her carotid u/s reviewed unchanged.abd u/s no aneurysm  Lipids on target she is well controlled on vytorin .she has difficulty with crestor and lipitor   9/24/2020  She is here for  f/u she is complaining of recurrent episodes of abdominal pain lower quadrant and got to be upper abdomen associated with nausea vomiting no post prandial pain no weight loss or food avoidance. Has no new symptoms of chest pain she is still short of breath no new palpitation tia.   Ct abdomen showed diverticulitis aaa 3.4 cm and sma stenosis.   Her ldl has increased. She has had sore muscles she stopped statins w/o improvemnet. She needs to back on statins that would explain he rincreased ldl.      3/25/2021  Here for f/u has sharp recurrent left sided occurring at rest sitting in recliner she cannot take a deep breath no exertional symptoms she takes care of her grandbabies no smoking no tia palpitation syncope near syncope. She takes  meds regularily .reveiw of her bp chart still showed elevated indices. She claims salt compliance. She could not tolerate amlodipine bid . Her lipids aRE ON TARGET.      9/30/2021    has been using o2 therapy at nite no change in symptoms her bp is elevated she takes amlodipine 5 mg at nite she is not compliant with diet. Salt  castellano.she takes care of grandkids no chest pain no cardiac symptoms. She has the urge to go to bathroom after she eats she has upperabdominal pain and feels like throwing up. Has known stenosis of the sma  She has lost weight.      11/1/2021   She is taking 7.5 mg amlodipine still needs better  salt control bp acceptable here however at home is more elevated but we have not checked her machine. She continues to have abdominal symptoms. She wants alternatives to ibesartan her pill is too big to swallow her lipids are on target. (she will check on diovan 320 and benicar 40 mg with pharmacist about size and cost).she is in digital htn her bp readings are more elevated that today clinic readings.she ahs not been compliant with diet she had fried chicken mashed potatoes french fries and   And biscuits  From [employer].   She had cta for her abdominal symptoms showing celiac stenosis and stenosis at the sma. Has also bilateral iliac stenosis greater than 50%.   Her carotid anatomy is unchanged.         12/8/2021   Today bp is controlled she ahs taken a fluid pill her bp readings at home since last visit has been 160-170 range. She si non compliant with salt she took diuretics due to leg swelling which is related to amlodipine. She has also an mason with exercise that did not suggest significant disease she continues to have symptoms suggestive of musculoskeltal on the rt and sciatica. No definite claudication no discoloration. she has no tia.      4/1/2022  Not much leg swelling has been drinking a lot of water bp fluctuates based on salt her digital htn reflects that now today is on target. Has been in after hours due abdominal pain constipation issue. Has nausea at that time . All improved still          dealing with constipation has use magnesium citrate.   She is not using diuretics except intermittently   Has question about arthritis meds she can take advised short course is ok but prolonged use is high risk of bleeding.       7/27/2022  Here for evaluation for colonoscopy. Has a positive school screening test was scheduled for colonoscopy . Has iron deficiency anemia has fatigue. She has seen vascular surgery because her colonoscopy was cancel;ed has m,oderate carotid disease. Has cta mesenteric stenosis. She has abdominal pain has improvement after taking bentyl some times she vomited digetsed food after eating no diarrhea  Has postprandial pain  opn the rt then progresses to to the bottom and lower abdomen. She is non compliant with salt that is why her bp is elevated.      12/8/2022   Here forf /u has been evaluated by ep no need for any pacer or intervention. Has been evaluated by vascular surgery no significant carotid disease. She is getting very forgetful was evaluated at [facility] for dementia she is labeled as vascular dementia  and shrinking brain. Her aaa is around 4 cm.   She had a slaty meal before coming to see me her bp is elevated she is non complaint with salt intake. She cannot remember her meds. She stopped a bp med that she does not remember the name. No cardiac symptoms.      4/25/2023 family is present they are concerned about her situation.   here for f /u has still been having abdominal pain post prandial. Has vascular dementia per neurologist opinion. She is getting forgetful. However she is not complaint with salt .charlotte ate at [employer] before she comes see em today. had back pain no tia no claudication. Had a death  in family her brother passed away. She is concerned about her heart . Has aaa around 4 cm.     6/16/2023  HERE TO DISCUSS HER CTA FINDINGS SHE CONTINUES TO HAVE POSTPRANDIAL ABDOMINAL PAIN CRAMPS SHE IS NOT ABLE TOE AT HER MEALS SHE IHAS LOST A LOT OF WEIGHT. HER CTA SHOWED SEVERE CELIAC AND SMA DISEASE. . SHE IS TRYING TO UNDERSTAND HER SITUATION CLINICALLY AND NEED FOR INTERVENTION. HER DAUGHTER WAS PRESENT TO HELP UNDERSTAND SINCE SHE HAS VASCULAR DEMENTIA. I ANSWERED ALL THEIR QUESTIONS  REVIEWED CTA PICTURES AND EXPLAINED PROCEDURE IN DETAIL WITH INDICATIONS RISK BENEFITS AND COMPLICATIONS     9/21/2023  Here fro f/u had aCS STENTED RCA DUE TO ULCERATED PLAQUE AND CLOTS HER ANGINA RESOLVED. HAS ISSUE WITH HTN SALT INTAKE FATIGUE. DR UNDERWOOD INCREASED COREG AND AMLODIPINE   HAD PUFFINESS IN LEFT RADIAL SITE       Hospital dc 3/28/2024   Gemma Vick is a 69-year-old female with a past history significant for COPD, former smoker, CAD s/p stents, KALLIE, vascular dementia, Alzheimer's, AAA-stable on CT, HTN, HLD, GERD, diverticulitis/osis, chronic mesenteric ischemia, GERD, DDD, osteoarthritis, lumbar radiculopathy, who presented to the ED c/o diffuse abdominal pain, nausea, generalized weakness onset today. Reports poor oral intake for several days. States she fell several days ago and also has left hip pain, and was started on Robaxin. Last BM yesterday, small, soft/formed, dark, but has been dark since starting iron supplements. She also endorses oliguria w/dark urine, hemorrhoids, hx of constipation and feels like she needs an enema. Reports several episodes of vomiting today, stating it was more reflux and some bile, but very small volume. Patient denies fever, chills, cough, congestion, dizziness, dyspnea, chest discomfort, dysuria, hematuria, myalgias. Patient denies sick contacts, potential of contaminated food or water.      ED workup with noted leukocytosis, hyponatremia-corrected 123, hyperglycemia. CT c/a/p shows colonic wall thickening/inflammation of descending colon consistent with colitis, surgically absent gall bladder. CXR and L hip XR w/NAF. CT head w/NAF. UA w/no evidence of infection or ketonuria. Lactate, procalcitonin, troponin, TSH, lipase, LFT's WNL. Flu/COVID negative. She was found to have a rectal temp of 92 and was placed on Mirna hugger. She was given warmed IVF, antibiotics, glycerin suppository, has cho w/bg urine, adequate output noted in bag. Critical care was  consulted for admission and management due to hypothermia requiring active re-warming.      * No surgery found *       Hospital Course:   A patient was admitted to the ICU on March 25th with severe hyponatremia, with a sodium level of 119, hypothermia at 92°F, and severe constipation. The patient underwent rewarming, and her sodium level improved to 126. She also experienced a large bowel movement and has had multiple bowel movements since then. Due to these improvements, she is now being downgraded to telemetry under hospital medicine and is resting comfortably. The hyponatremia was likely secondary to the patient's misuse of HCTZ (hydrochlorothiazide), which was prescribed by her cardiologist for as-needed use. However, the patient, misunderstanding the instructions and influenced by online information, began taking it daily. Her sodium levels have stabilized since discontinuing the medication, and repeat labs are scheduled with her primary care provider within 1-2 weeks of discharge.     Further investigations revealed inflammation in the descending colon, suggestive of either infectious or inflammatory colitis, with ischemic changes also considered due to the patient's history of mesenteric ischemia and recent symptoms of dizziness post HCTZ administration. Despite these concerns, her lactate levels have remained normal, and stool studies have been negative, although she has been receiving antibiotics. The plan is to continue antibiotics for 7-10 days. Discharged with additional 5 days of therapy after receiving 4 days of abx here. There are no immediate plans for endoscopy due to recent use of Plavix, but outpatient follow-up will be considered for a colonoscopy, given the patient's high sedation risk history. No further inpatient gastrointestinal recommendations are provided at this time, but arrangements for outpatient follow-up will be made. Cardiology has noted clinical improvement and cardiovascular  "stability, advising the continuation of optimal medical therapy with a follow-up in clinic to possibly discuss a mesenteric angiogram.     BP (!) 155/70 (Patient Position: Lying)   Pulse 77   Temp 97.6 °F (36.4 °C) (Oral)   Resp 20   Ht 5' 2" (1.575 m)   Wt 59.6 kg (131 lb 6.3 oz)   SpO2 96%   BMI 24.03 kg/m²   PHYSICAL EXAM  Vitals Reviewed  GEN: No acute distress, pleasant, body habitus normal  HEENT: atraumatic and normocephalic  CARDS: regular rate and rhythm, no m/g, pulses palpable in LE  PULM: breathing comfortably on room air, chest symmetric, nonlabored, no abnormal breath sounds on auscultation  ABD: nontender, nondistended, soft, no organomegaly, BS+  Neuro: Alert and oriented x3, CN's I-IX grossly intact, sensation and motor intact; follows directions and answers questions appropriately     Feeling weak dizzy hwer diastolic is low she has been getting pt at home her appetite is staring to improve. Denies abdominal symptoms.   Past Medical History:   Diagnosis Date    AAA (abdominal aortic aneurysm) 02/13/2014    Abdominal aneurysm     3    Acute coronary syndrome     Arthritis     BPPV (benign paroxysmal positional vertigo)     Carotid artery plaque     Carotid artery stenosis and occlusion 02/13/2014    Chronic back pain     COPD (chronic obstructive pulmonary disease)     Coronary artery disease     Dementia     Emphysema lung     Hyperlipidemia     Hypertension     Myocardial infarction     x3    Neuropathy     Personal history of COVID-19 06/09/2021 11/16/2020 +Covid, recovered at home        Past Surgical History:   Procedure Laterality Date    CARDIAC CATHETERIZATION      CORONARY ANGIOPLASTY      CORONARY STENT PLACEMENT N/A 9/12/2023    Procedure: INSERTION, STENT, CORONARY ARTERY;  Surgeon: Millie Juarez MD;  Location: [facility] CATH LAB;  Service: Cardiology;  Laterality: N/A;    HYSTERECTOMY  1988    LEFT HEART CATHETERIZATION Left 9/12/2023    Procedure: Left heart cath;  Surgeon: " Millie Juarez MD;  Location: [facility] CATH LAB;  Service: Cardiology;  Laterality: Left;    PERCUTANEOUS CORONARY INTERVENTION, ARTERY N/A 2023    Procedure: Percutaneous coronary intervention;  Surgeon: Millie Juarez MD;  Location: [facility] CATH LAB;  Service: Cardiology;  Laterality: N/A;    THROMBECTOMY, CORONARY  2023    Procedure: Thrombectomy, Coronary;  Surgeon: Millie Juarez MD;  Location: [facility] CATH LAB;  Service: Cardiology;;    TONSILLECTOMY      TRANSFORAMINAL EPIDURAL INJECTION OF STEROID Right 2023    Procedure: Injection,steroid,epidural,transforaminal approach- right side, L4/5 and L5/S1;  Surgeon: Lydia Roberts MD;  Location: [facility] PAIN [facility];  Service: Pain Management;  Laterality: Right;       Social History     Tobacco Use    Smoking status: Former     Current packs/day: 0.00     Average packs/day: 0.5 packs/day for 46.9 years (23.4 ttl pk-yrs)     Types: Cigarettes, Vaping w/o nicotine     Start date: 1970     Quit date: 2017     Years since quittin.9    Smokeless tobacco: Never    Tobacco comments:     3 MG NICOTINE FOR HEART.    Substance Use Topics    Alcohol use: No    Drug use: No       Family History   Problem Relation Age of Onset    Heart disease Mother     Heart attacks under age 50 Father     Heart attacks under age 50 Brother     Breast cancer Paternal Aunt        Current Outpatient Medications   Medication Sig    albuterol-ipratropium (DUO-NEB) 2.5 mg-0.5 mg/3 mL nebulizer solution Take 3 mLs by nebulization every 6 (six) hours as needed for Wheezing.    amLODIPine (NORVASC) 5 MG tablet Take 1 tablet (5 mg total) by mouth once daily.    aspirin 81 MG Chew Take 81 mg by mouth once daily.    budesonide-glycopyr-formoterol (BREZTRI AEROSPHERE) 160-9-4.8 mcg/actuation HFAA Inhale 2 puffs into the lungs 2 (two) times a day.    carvediloL (COREG) 6.25 MG tablet Take 1 tablet (6.25 mg total) by mouth 2 (two) times daily with meals.    clopidogreL (PLAVIX) 75 mg  tablet Take 1 tablet (75 mg total) by mouth once daily.    COMBIVENT RESPIMAT  mcg/actuation inhaler Inhale 1 puff into the lungs every 6 (six) hours as needed for Wheezing.    diclofenac sodium (VOLTAREN) 1 % Gel Apply topically 4 (four) times daily.    ezetimibe-simvastatin 10-40 mg (VYTORIN) 10-40 mg per tablet Take 1 tablet by mouth every evening.    famotidine (PEPCID) 20 MG tablet Take 1 tablet (20 mg total) by mouth 2 (two) times daily as needed for Heartburn.    inhalation spacing device (COMPACT SPACE CHAMBER) USE AS DIRECTED    memantine (NAMENDA) 10 MG Tab Take 1 tablet (10 mg total) by mouth 2 (two) times daily.    olmesartan (BENICAR) 40 MG tablet Take 1 tablet (40 mg total) by mouth once daily.    OXYGEN-AIR DELIVERY SYSTEMS MISC 3 L by Misc.(Non-Drug; Combo Route) route every evening.    pantoprazole (PROTONIX) 40 MG tablet Take 1 tablet (40 mg total) by mouth once daily.    vitamin D (VITAMIN D3) 1000 units Tab Take 1,000 Units by mouth once daily.    zolpidem (AMBIEN) 10 mg Tab Take 1 tablet (10 mg total) by mouth every evening.    cyanocobalamin (VITAMIN B-12) 100 MCG tablet Take 100 mcg by mouth once daily.     No current facility-administered medications for this visit.     Current Outpatient Medications on File Prior to Visit   Medication Sig    albuterol-ipratropium (DUO-NEB) 2.5 mg-0.5 mg/3 mL nebulizer solution Take 3 mLs by nebulization every 6 (six) hours as needed for Wheezing.    amLODIPine (NORVASC) 5 MG tablet Take 1 tablet (5 mg total) by mouth once daily.    aspirin 81 MG Chew Take 81 mg by mouth once daily.    budesonide-glycopyr-formoterol (BREZTRI AEROSPHERE) 160-9-4.8 mcg/actuation HFAA Inhale 2 puffs into the lungs 2 (two) times a day.    carvediloL (COREG) 6.25 MG tablet Take 1 tablet (6.25 mg total) by mouth 2 (two) times daily with meals.    clopidogreL (PLAVIX) 75 mg tablet Take 1 tablet (75 mg total) by mouth once daily.    COMBIVENT RESPIMAT  mcg/actuation  inhaler Inhale 1 puff into the lungs every 6 (six) hours as needed for Wheezing.    diclofenac sodium (VOLTAREN) 1 % Gel Apply topically 4 (four) times daily.    ezetimibe-simvastatin 10-40 mg (VYTORIN) 10-40 mg per tablet Take 1 tablet by mouth every evening.    famotidine (PEPCID) 20 MG tablet Take 1 tablet (20 mg total) by mouth 2 (two) times daily as needed for Heartburn.    inhalation spacing device (COMPACT SPACE CHAMBER) USE AS DIRECTED    memantine (NAMENDA) 10 MG Tab Take 1 tablet (10 mg total) by mouth 2 (two) times daily.    olmesartan (BENICAR) 40 MG tablet Take 1 tablet (40 mg total) by mouth once daily.    OXYGEN-AIR DELIVERY SYSTEMS MISC 3 L by Misc.(Non-Drug; Combo Route) route every evening.    pantoprazole (PROTONIX) 40 MG tablet Take 1 tablet (40 mg total) by mouth once daily.    vitamin D (VITAMIN D3) 1000 units Tab Take 1,000 Units by mouth once daily.    zolpidem (AMBIEN) 10 mg Tab Take 1 tablet (10 mg total) by mouth every evening.    cyanocobalamin (VITAMIN B-12) 100 MCG tablet Take 100 mcg by mouth once daily.     No current facility-administered medications on file prior to visit.     Review of patient's allergies indicates:  No Known Allergies   Review of Systems   Constitutional: Positive for malaise/fatigue. Negative for diaphoresis and weight gain.   HENT:  Negative for hoarse voice.    Eyes:  Negative for double vision and visual disturbance.   Cardiovascular:  Negative for chest pain, claudication, cyanosis, dyspnea on exertion, irregular heartbeat, leg swelling, near-syncope, orthopnea, palpitations, paroxysmal nocturnal dyspnea and syncope.   Respiratory:  Negative for cough, hemoptysis, shortness of breath and snoring.    Hematologic/Lymphatic: Negative for bleeding problem. Does not bruise/bleed easily.   Skin:  Negative for color change and poor wound healing.   Musculoskeletal:  Negative for muscle cramps, muscle weakness and myalgias.   Gastrointestinal:  Negative for bloating,  abdominal pain, change in bowel habit, diarrhea, heartburn, hematemesis, hematochezia, melena and nausea.   Neurological:  Positive for dizziness and light-headedness. Negative for excessive daytime sleepiness, headaches, loss of balance, numbness and weakness.   Psychiatric/Behavioral:  Negative for memory loss. The patient does not have insomnia.    Allergic/Immunologic: Negative for hives.       Objective:   Physical Exam  Constitutional:       General: She is not in acute distress.     Appearance: Normal appearance. She is well-developed. She is not ill-appearing.   HENT:      Head: Normocephalic and atraumatic.   Eyes:      General: No scleral icterus.     Pupils: Pupils are equal, round, and reactive to light.   Neck:      Thyroid: No thyromegaly.      Vascular: Normal carotid pulses. Carotid bruit present. No hepatojugular reflux or JVD.      Trachea: No tracheal deviation.   Cardiovascular:      Rate and Rhythm: Normal rate and regular rhythm.      Pulses:           Carotid pulses are 2+ on the right side with bruit and 2+ on the left side with bruit.       Radial pulses are 2+ on the right side and 2+ on the left side.        Dorsalis pedis pulses are 1+ on the right side and 1+ on the left side.        Posterior tibial pulses are 1+ on the right side and 1+ on the left side.      Heart sounds: Murmur heard.      Harsh midsystolic murmur is present with a grade of 2/6 at the upper right sternal border radiating to the neck.      High-pitched blowing decrescendo early diastolic murmur is present with a grade of 1/4 at the upper right sternal border radiating to the apex.      No friction rub. No gallop.      Comments: Abdominal bruits noted.  Pulmonary:      Effort: Pulmonary effort is normal. No respiratory distress.      Breath sounds: Normal breath sounds. No wheezing, rhonchi or rales.   Chest:      Chest wall: No tenderness.   Abdominal:      General: Bowel sounds are normal. There is no abdominal  "bruit.      Palpations: Abdomen is soft. There is no hepatomegaly or pulsatile mass.      Tenderness: There is no abdominal tenderness.   Musculoskeletal:      Right shoulder: No deformity.      Cervical back: Normal range of motion and neck supple.   Skin:     General: Skin is warm and dry.      Findings: No erythema or rash.      Nails: There is no clubbing.   Neurological:      Mental Status: She is alert and oriented to person, place, and time.      Cranial Nerves: No cranial nerve deficit.      Coordination: Coordination normal.   Psychiatric:         Speech: Speech normal.         Behavior: Behavior normal.       Vitals:    04/04/24 1236 04/04/24 1237   BP: 132/60 (!) 142/58   BP Location: Left arm Right arm   Patient Position: Sitting Sitting   BP Method: Large (Manual) Large (Manual)   Pulse: 74    SpO2: (!) 94%    Weight: 57.5 kg (126 lb 12.2 oz)    Height: 5' 2" (1.575 m)      Lab Results   Component Value Date    CHOL 138 04/18/2023    CHOL 143 12/01/2022    CHOL 164 10/25/2022      Body mass index is 23.19 kg/m².   Lab Results   Component Value Date    HGBA1C 5.2 03/26/2024      BMP  Lab Results   Component Value Date     (L) 04/02/2024    K 3.1 (L) 04/02/2024    CL 97 04/02/2024    CO2 26 04/02/2024    BUN 5 (L) 04/02/2024    CREATININE 0.6 04/02/2024    CALCIUM 8.5 (L) 04/02/2024    ANIONGAP 10 04/02/2024    EGFRNORACEVR >60.0 04/02/2024      Lab Results   Component Value Date    HDL 45 04/18/2023    HDL 56 12/01/2022    HDL 60 10/25/2022     Lab Results   Component Value Date    LDLCALC 79.0 04/18/2023    LDLCALC 72.0 12/01/2022    LDLCALC 83.0 10/25/2022     Lab Results   Component Value Date    TRIG 70 04/18/2023    TRIG 75 12/01/2022    TRIG 105 10/25/2022     Lab Results   Component Value Date    CHOLHDL 32.6 04/18/2023    CHOLHDL 39.2 12/01/2022    CHOLHDL 36.6 10/25/2022       Chemistry        Component Value Date/Time     (L) 04/02/2024 1352    K 3.1 (L) 04/02/2024 1352    CL 97 " 04/02/2024 1352    CO2 26 04/02/2024 1352    BUN 5 (L) 04/02/2024 1352    CREATININE 0.6 04/02/2024 1352    GLU 88 04/02/2024 1352        Component Value Date/Time    CALCIUM 8.5 (L) 04/02/2024 1352    ALKPHOS 67 04/02/2024 1352    AST 15 04/02/2024 1352    ALT 10 04/02/2024 1352    BILITOT 0.4 04/02/2024 1352    ESTGFRAFRICA >60.0 04/07/2022 0922    EGFRNONAA >60.0 04/07/2022 0922          Lab Results   Component Value Date    TSH 3.016 03/25/2024     Lab Results   Component Value Date    INR 1.1 12/20/2017    INR 1.1 08/10/2012     Lab Results   Component Value Date    WBC 8.32 04/02/2024    HGB 10.6 (L) 04/02/2024    HCT 31.6 (L) 04/02/2024    MCV 90 04/02/2024     04/02/2024     BMP  Sodium   Date Value Ref Range Status   04/02/2024 133 (L) 136 - 145 mmol/L Final     Potassium   Date Value Ref Range Status   04/02/2024 3.1 (L) 3.5 - 5.1 mmol/L Final     Chloride   Date Value Ref Range Status   04/02/2024 97 95 - 110 mmol/L Final     CO2   Date Value Ref Range Status   04/02/2024 26 23 - 29 mmol/L Final     BUN   Date Value Ref Range Status   04/02/2024 5 (L) 8 - 23 mg/dL Final     Creatinine   Date Value Ref Range Status   04/02/2024 0.6 0.5 - 1.4 mg/dL Final     Calcium   Date Value Ref Range Status   04/02/2024 8.5 (L) 8.7 - 10.5 mg/dL Final     Anion Gap   Date Value Ref Range Status   04/02/2024 10 8 - 16 mmol/L Final     eGFR if    Date Value Ref Range Status   04/07/2022 >60.0 >60 mL/min/1.73 m^2 Final     eGFR if non    Date Value Ref Range Status   04/07/2022 >60.0 >60 mL/min/1.73 m^2 Final     Comment:     Calculation used to obtain the estimated glomerular filtration  rate (eGFR) is the CKD-EPI equation.        Estimated Creatinine Clearance: 70 mL/min (based on SCr of 0.6 mg/dL).    Assessment:     1. Aortic atherosclerosis    2. Mixed hyperlipidemia    3. Essential hypertension    4. Coronary artery disease of native artery of native heart with stable angina  pectoris    5. Prediabetes    6. Benign paroxysmal positional vertigo, unspecified laterality    7. Bilateral carotid artery stenosis    8. Infrarenal abdominal aortic aneurysm (AAA) without rupture    9. Cigarette nicotine dependence in remission    10. Gastroesophageal reflux disease, unspecified whether esophagitis present    11. Atherosclerosis of native arteries of extremities with rest pain, bilateral legs    12. Moderate vascular dementia without behavioral disturbance, psychotic disturbance, mood disturbance, or anxiety    13. COPD without exacerbation    14. Hyponatremia    15. Colitis    16. Weakness      Reviewed hospital saty and events discussed mesenteric ischemia abdominal pain weight loss indication for colonoscopy risk involved and benefits. The family and patient understand. I think due to recurrent abdominal symptoms colitis a definite diagnosis is needed in order to decide on addressing mesenteric ischemia I tghink despite the risk of sedation and dementia this may a much needed procedure for diagnostic purposes.   On the other hands I think she is deconditioned from the hospital stay and dizzy from the bp being a little low for her will drop amlodipine dose down until eating habits and diet improves than will need increased back to 5 mg .  Or even higher   Continue physical therapy.   Aaa 3.9 cm by ct unchanged will observe.  Plan:   3 months f/u

## 2024-04-04 NOTE — TELEPHONE ENCOUNTER
Care Due:                  Date            Visit Type   Department     Provider  --------------------------------------------------------------------------------                                [facility]  Last Visit: 04-      FOLLOW UP    MEDICINE       Olga Altman  Next Visit: None Scheduled  None         None Found                                                            Last  Test          Frequency    Reason                     Performed    Due Date  --------------------------------------------------------------------------------    Lipid Panel.  12 months..  ezetimibe-simvastatin....  04- 04-    [facility] Embedded Care Due Messages. Reference number: [id number].   4/04/2024 8:27:34 AM CDT

## 2024-04-17 ENCOUNTER — PATIENT MESSAGE (OUTPATIENT)
Dept: FAMILY MEDICINE | Facility: CLINIC | Age: 69
End: 2024-04-17

## 2024-04-18 ENCOUNTER — OFFICE VISIT (OUTPATIENT)
Dept: FAMILY MEDICINE | Facility: CLINIC | Age: 69
End: 2024-04-18
Payer: MEDICARE

## 2024-04-18 VITALS
SYSTOLIC BLOOD PRESSURE: 138 MMHG | BODY MASS INDEX: 22.74 KG/M2 | WEIGHT: 124.31 LBS | HEART RATE: 71 BPM | OXYGEN SATURATION: 96 % | DIASTOLIC BLOOD PRESSURE: 70 MMHG

## 2024-04-18 DIAGNOSIS — G89.29 CHRONIC BILATERAL LOW BACK PAIN WITHOUT SCIATICA: ICD-10-CM

## 2024-04-18 DIAGNOSIS — R26.9 GAIT DISTURBANCE: Primary | ICD-10-CM

## 2024-04-18 DIAGNOSIS — M54.50 CHRONIC BILATERAL LOW BACK PAIN WITHOUT SCIATICA: ICD-10-CM

## 2024-04-18 DIAGNOSIS — M54.9 UPPER BACK PAIN: ICD-10-CM

## 2024-04-18 DIAGNOSIS — R53.81 DEBILITY: ICD-10-CM

## 2024-04-18 PROCEDURE — 1111F DSCHRG MED/CURRENT MED MERGE: CPT | Mod: HCNC,CPTII,S$GLB, | Performed by: FAMILY MEDICINE

## 2024-04-18 PROCEDURE — 3008F BODY MASS INDEX DOCD: CPT | Mod: HCNC,CPTII,S$GLB, | Performed by: FAMILY MEDICINE

## 2024-04-18 PROCEDURE — 3078F DIAST BP <80 MM HG: CPT | Mod: HCNC,CPTII,S$GLB, | Performed by: FAMILY MEDICINE

## 2024-04-18 PROCEDURE — 99214 OFFICE O/P EST MOD 30 MIN: CPT | Mod: HCNC,S$GLB,, | Performed by: FAMILY MEDICINE

## 2024-04-18 PROCEDURE — 1159F MED LIST DOCD IN RCRD: CPT | Mod: HCNC,CPTII,S$GLB, | Performed by: FAMILY MEDICINE

## 2024-04-18 PROCEDURE — 3075F SYST BP GE 130 - 139MM HG: CPT | Mod: HCNC,CPTII,S$GLB, | Performed by: FAMILY MEDICINE

## 2024-04-18 PROCEDURE — 4010F ACE/ARB THERAPY RXD/TAKEN: CPT | Mod: HCNC,CPTII,S$GLB, | Performed by: FAMILY MEDICINE

## 2024-04-18 PROCEDURE — 3044F HG A1C LEVEL LT 7.0%: CPT | Mod: HCNC,CPTII,S$GLB, | Performed by: FAMILY MEDICINE

## 2024-04-18 PROCEDURE — 99999 PR PBB SHADOW E&M-EST. PATIENT-LVL III: CPT | Mod: PBBFAC,HCNC,, | Performed by: FAMILY MEDICINE

## 2024-04-18 NOTE — PROGRESS NOTES
Chief Complaint:  No chief complaint on file.      History of Present Illness:    Patient presents today for follow up,    Blood pressure elevated today but improving after recheck, says at home 130/70. Only checks when home health comes twice weekly    Neck and Back pain, herniated discs. She has pain running down her entire spine. Denies pain radiating into legs.   Left rib cage pain, has noticed a lump to area of pain. She did suffer a fall and landed on her left side.     When she tries to walk without any support she has a slow abnormal gait with staggering steps.     Still dealing with abd pain with decreased shaka, nausea and vomiting. Denies diarrhea. She says she believes the vomit is more related to her acid reflux. She is currently taking probiotics.     Potassium low, will recheck BMP.      ROS:  Review of Systems   Constitutional:  Negative for appetite change, chills and fever.   HENT:  Negative for congestion, ear pain, postnasal drip, rhinorrhea, sinus pressure and sinus pain.    Eyes:  Negative for pain.   Respiratory:  Negative for cough, chest tightness and shortness of breath.    Cardiovascular:  Negative for chest pain and palpitations.   Gastrointestinal:  Negative for abdominal pain, blood in stool, constipation, diarrhea and nausea.   Genitourinary:  Negative for difficulty urinating, dysuria, flank pain and hematuria.   Musculoskeletal:  Positive for arthralgias, back pain, gait problem, myalgias and neck pain.   Skin:  Negative for pallor and wound.   Neurological:  Positive for weakness. Negative for dizziness, tremors, speech difficulty, light-headedness and headaches.   Psychiatric/Behavioral:  Negative for behavioral problems, dysphoric mood and sleep disturbance.    All other systems reviewed and are negative.      Past Medical History:   Diagnosis Date    AAA (abdominal aortic aneurysm) 02/13/2014    Abdominal aneurysm     3    Acute coronary syndrome     Arthritis     BPPV (benign  paroxysmal positional vertigo)     Carotid artery plaque     Carotid artery stenosis and occlusion 2014    Chronic back pain     COPD (chronic obstructive pulmonary disease)     Coronary artery disease     Dementia     Emphysema lung     Hyperlipidemia     Hypertension     Myocardial infarction     x3    Neuropathy     Personal history of COVID-19 2021 +Covid, recovered at home        Social History:  Social History     Socioeconomic History    Marital status:    Tobacco Use    Smoking status: Former     Current packs/day: 0.00     Average packs/day: 0.5 packs/day for 46.9 years (23.4 ttl pk-yrs)     Types: Cigarettes, Vaping w/o nicotine     Start date: 1970     Quit date: 2017     Years since quittin.9    Smokeless tobacco: Never    Tobacco comments:     3 MG NICOTINE FOR HEART.    Substance and Sexual Activity    Alcohol use: No    Drug use: No    Sexual activity: Not Currently     Birth control/protection: None     Social Determinants of Health     Financial Resource Strain: Medium Risk (3/14/2024)    Overall Financial Resource Strain (CARDIA)     Difficulty of Paying Living Expenses: Somewhat hard   Food Insecurity: No Food Insecurity (3/14/2024)    Hunger Vital Sign     Worried About Running Out of Food in the Last Year: Never true     Ran Out of Food in the Last Year: Never true   Transportation Needs: No Transportation Needs (3/14/2024)    PRAPARE - Transportation     Lack of Transportation (Medical): No     Lack of Transportation (Non-Medical): No   Physical Activity: Inactive (3/14/2024)    Exercise Vital Sign     Days of Exercise per Week: 0 days     Minutes of Exercise per Session: 0 min   Stress: No Stress Concern Present (3/14/2024)    [employer] [place] [employer] - Occupational Stress Questionnaire     Feeling of Stress : Only a little   Social Connections: Moderately Integrated (3/14/2024)    Social Connection and Isolation Panel [NHANES]      Frequency of Communication with Friends and Family: More than three times a week     Frequency of Social Gatherings with Friends and Family: More than three times a week     Attends Restoration Services: More than 4 times per year     Active Member of Clubs or Organizations: Yes     Attends Club or Organization Meetings: More than 4 times per year     Marital Status:    Housing Stability: Low Risk  (3/14/2024)    Housing Stability Vital Sign     Unable to Pay for Housing in the Last Year: No     Number of Places Lived in the Last Year: 1     Unstable Housing in the Last Year: No       Family History:   family history includes Breast cancer in her paternal aunt; Heart attacks under age 50 in her brother and father; Heart disease in her mother.    Health Maintenance   Topic Date Due    Shingles Vaccine (1 of 2) Never done    Colorectal Cancer Screening  05/17/2022    Mammogram  06/09/2022    LDCT Lung Screen  11/08/2023    Lipid Panel  04/18/2024    High Dose Statin  04/18/2025    TETANUS VACCINE  11/15/2026    DEXA Scan  04/04/2027    Hepatitis C Screening  Completed       Exam:Physical     Vital Signs  Pulse: 71  SpO2: 96 %  BP: (!) 160/70  Height and Weight  Weight: 56.4 kg (124 lb 5.4 oz)]    Body mass index is 22.74 kg/m².    Physical Exam  Vitals and nursing note reviewed.   Constitutional:       Appearance: Normal appearance. She is not toxic-appearing.   HENT:      Head: Normocephalic and atraumatic.      Right Ear: Tympanic membrane normal.      Left Ear: Tympanic membrane normal.   Eyes:      Extraocular Movements: Extraocular movements intact.      Pupils: Pupils are equal, round, and reactive to light.   Cardiovascular:      Rate and Rhythm: Normal rate and regular rhythm.      Heart sounds: Normal heart sounds.   Pulmonary:      Effort: Pulmonary effort is normal.      Breath sounds: Normal breath sounds. No wheezing, rhonchi or rales.   Abdominal:      General: Bowel sounds are normal. There is no  distension.      Palpations: Abdomen is soft.      Tenderness: There is no abdominal tenderness.   Musculoskeletal:         General: Normal range of motion.      Right shoulder: Tenderness present.      Left shoulder: Tenderness present.      Cervical back: Normal range of motion. Tenderness present.      Thoracic back: Tenderness present.      Lumbar back: Tenderness present.   Skin:     General: Skin is warm and dry.      Capillary Refill: Capillary refill takes less than 2 seconds.   Neurological:      General: No focal deficit present.      Mental Status: She is alert and oriented to person, place, and time.      Gait: Gait abnormal.   Psychiatric:         Mood and Affect: Mood normal.         Behavior: Behavior normal.         Judgment: Judgment normal.           Assessment:      ICD-10-CM ICD-9-CM   1. Gait disturbance  R26.9 781.2   2. Upper back pain  M54.9 724.5   3. Chronic bilateral low back pain without sciatica  M54.50 724.2    G89.29 338.29   4. Debility  R53.81 799.3           Plan:  Pt has a very unsteady gait due to debility putting her at a higher fall risk, believe she would best benefit from a ambulating support device such as a walker or wheelchair.     Patient has a mobility limitation due to the weakness that impairs her ability to participate in mobility related activities of daily living like toileting feeding dressing grooming and bathing in customary locations in the house.  This limitation can not be sufficiently resolved by the use of cane or walker although cane a walker will also be helpful.  But more useful be a manual wheelchair which will significantly improve patient's ability to participate in activities of daily living and patient will use it on a regular basis in the home.  She has expresses willingness to use a manual wheelchair.  She has a caregiver who is willing to assist in the use of the wheelchair.  Keep appointment with gastro she will need a EGD and a  colonoscopy.  Recommend to try and drink 2 ensure daily.   Check BMP today.     No orders of the defined types were placed in this encounter.        No follow-ups on file.      Olga Altman MD    Scribe Attestation:   I, Amaury Real, am scribing for, and in the presence of, Dr.Arif Altman I performed the above scribed service and the documentation accurately describes the services I performed. I attest to the accuracy of the note.    I, Dr. Olga Altman, reviewed documentation as scribed above. I performed the services described in this documentation.  I agree that the record reflects my personal performance and is accurate and complete. Olga Altman MD.  04/18/2024

## 2024-04-19 ENCOUNTER — EXTERNAL HOME HEALTH (OUTPATIENT)
Dept: HOME HEALTH SERVICES | Facility: HOSPITAL | Age: 69
End: 2024-04-19
Payer: MEDICARE

## 2024-04-22 ENCOUNTER — TELEPHONE (OUTPATIENT)
Dept: CARDIOLOGY | Facility: CLINIC | Age: 69
End: 2024-04-22
Payer: MEDICARE

## 2024-04-22 RX ORDER — DICYCLOMINE HYDROCHLORIDE 10 MG/1
10 CAPSULE ORAL
Qty: 90 CAPSULE | Refills: 2 | Status: SHIPPED | OUTPATIENT
Start: 2024-04-22

## 2024-04-22 NOTE — TELEPHONE ENCOUNTER
No care due was identified.  [facility] Embedded Care Due Messages. Reference number: [id number].   4/22/2024 8:45:52 AM CDT

## 2024-04-22 NOTE — TELEPHONE ENCOUNTER
Patient stated her blood pressure is getting too low and it make her feel fatigue and lethargy /63.  She taking amlodipine 5mg. Coreg 6.25mg and benicar 40mg in the morning and coreg 6.25 before bed.    Please advise

## 2024-04-22 NOTE — TELEPHONE ENCOUNTER
----- Message from Nilda Taveras sent at 4/22/2024  2:56 PM CDT -----  Contact: self  [phone]  Patient called in this afternoon requesting a call back to discuss the blood pressure medication she is taking. She is wanting to see which one can possible be reduce. Please call back  [phone]. Thanks braulio

## 2024-04-23 ENCOUNTER — PATIENT MESSAGE (OUTPATIENT)
Dept: CARDIOLOGY | Facility: HOSPITAL | Age: 69
End: 2024-04-23
Payer: MEDICARE

## 2024-04-23 ENCOUNTER — TELEPHONE (OUTPATIENT)
Dept: FAMILY MEDICINE | Facility: CLINIC | Age: 69
End: 2024-04-23
Payer: MEDICARE

## 2024-04-23 DIAGNOSIS — J44.9 COPD, MODERATE: Primary | ICD-10-CM

## 2024-04-23 DIAGNOSIS — H81.10 BENIGN PAROXYSMAL POSITIONAL VERTIGO, UNSPECIFIED LATERALITY: ICD-10-CM

## 2024-04-24 ENCOUNTER — LAB VISIT (OUTPATIENT)
Dept: LAB | Facility: HOSPITAL | Age: 69
End: 2024-04-24
Attending: FAMILY MEDICINE
Payer: MEDICARE

## 2024-04-24 DIAGNOSIS — E87.6 HYPOKALEMIA: ICD-10-CM

## 2024-04-24 LAB
ANION GAP SERPL CALC-SCNC: 7 MMOL/L (ref 8–16)
BUN SERPL-MCNC: 13 MG/DL (ref 8–23)
CALCIUM SERPL-MCNC: 9.7 MG/DL (ref 8.7–10.5)
CHLORIDE SERPL-SCNC: 100 MMOL/L (ref 95–110)
CO2 SERPL-SCNC: 26 MMOL/L (ref 23–29)
CREAT SERPL-MCNC: 0.9 MG/DL (ref 0.5–1.4)
EST. GFR  (NO RACE VARIABLE): >60 ML/MIN/1.73 M^2
GLUCOSE SERPL-MCNC: 88 MG/DL (ref 70–110)
POTASSIUM SERPL-SCNC: 4.2 MMOL/L (ref 3.5–5.1)
SODIUM SERPL-SCNC: 133 MMOL/L (ref 136–145)

## 2024-04-24 PROCEDURE — 36415 COLL VENOUS BLD VENIPUNCTURE: CPT | Mod: HCNC,PO | Performed by: FAMILY MEDICINE

## 2024-04-24 PROCEDURE — 80048 BASIC METABOLIC PNL TOTAL CA: CPT | Mod: HCNC | Performed by: FAMILY MEDICINE

## 2024-04-30 ENCOUNTER — PATIENT MESSAGE (OUTPATIENT)
Dept: FAMILY MEDICINE | Facility: CLINIC | Age: 69
End: 2024-04-30
Payer: MEDICARE

## 2024-05-08 ENCOUNTER — OFFICE VISIT (OUTPATIENT)
Dept: FAMILY MEDICINE | Facility: CLINIC | Age: 69
End: 2024-05-08
Payer: MEDICARE

## 2024-05-08 VITALS — WEIGHT: 123.88 LBS | BODY MASS INDEX: 22.8 KG/M2 | HEIGHT: 62 IN

## 2024-05-08 DIAGNOSIS — F03.90 DEMENTIA, UNSPECIFIED DEMENTIA SEVERITY, UNSPECIFIED DEMENTIA TYPE, UNSPECIFIED WHETHER BEHAVIORAL, PSYCHOTIC, OR MOOD DISTURBANCE OR ANXIETY: Primary | ICD-10-CM

## 2024-05-08 DIAGNOSIS — R63.4 WEIGHT LOSS: ICD-10-CM

## 2024-05-08 DIAGNOSIS — J44.9 COPD WITHOUT EXACERBATION: ICD-10-CM

## 2024-05-08 DIAGNOSIS — I10 ESSENTIAL HYPERTENSION: ICD-10-CM

## 2024-05-08 PROCEDURE — 4010F ACE/ARB THERAPY RXD/TAKEN: CPT | Mod: HCNC,CPTII,S$GLB, | Performed by: FAMILY MEDICINE

## 2024-05-08 PROCEDURE — 3044F HG A1C LEVEL LT 7.0%: CPT | Mod: HCNC,CPTII,S$GLB, | Performed by: FAMILY MEDICINE

## 2024-05-08 PROCEDURE — 3008F BODY MASS INDEX DOCD: CPT | Mod: HCNC,CPTII,S$GLB, | Performed by: FAMILY MEDICINE

## 2024-05-08 PROCEDURE — 99214 OFFICE O/P EST MOD 30 MIN: CPT | Mod: HCNC,S$GLB,, | Performed by: FAMILY MEDICINE

## 2024-05-08 PROCEDURE — 99999 PR PBB SHADOW E&M-EST. PATIENT-LVL II: CPT | Mod: PBBFAC,HCNC,, | Performed by: FAMILY MEDICINE

## 2024-05-08 NOTE — PROGRESS NOTES
Chief Complaint:    Chief Complaint   Patient presents with    Follow-up     Blood pressure problems, losing weight , more confused       History of Present Illness:    Patient presents today for follow up,    Blood pressure fluctuating. Only checks when home health comes twice weekly.    Decreased shaka, says she is just not hungry. She has followed with Dr. Juarez (cardio) who mentioned mesenteric ischemia abdominal pain weight loss indication for colonoscopy.  However due to patient's dementia that was postponed it out although she did undergo a CTA and was scheduled for angiogram    Vascular dementia currently taking Namenda. Says it has worsened. MRI brain from a few years ago showed changes are more vascular related.    Follows with Dr. Goodrich (neuro), she says that she does not qualify for infusions but does not remember for what.     Neck and Back pain, herniated discs. She has pain running down her entire spine. Denies pain radiating into legs.     When she tries to walk without any support she has a slow abnormal gait with staggering steps.     She is requesting some help with activities of daily living, somebody to give her medicines somebody to help her bathe take care of her.      ROS:  Review of Systems   Constitutional:  Negative for appetite change, chills and fever.   HENT:  Negative for congestion, ear pain, postnasal drip, rhinorrhea, sinus pressure and sinus pain.    Eyes:  Negative for pain.   Respiratory:  Negative for cough, chest tightness and shortness of breath.    Cardiovascular:  Negative for chest pain and palpitations.   Gastrointestinal:  Negative for abdominal pain, blood in stool, constipation, diarrhea and nausea.   Genitourinary:  Negative for difficulty urinating, dysuria, flank pain and hematuria.   Musculoskeletal:  Positive for arthralgias, back pain, gait problem, myalgias and neck pain.   Skin:  Negative for pallor and wound.   Neurological:  Positive for weakness. Negative for  dizziness, tremors, speech difficulty, light-headedness and headaches.   Psychiatric/Behavioral:  Negative for behavioral problems, dysphoric mood and sleep disturbance.    All other systems reviewed and are negative.      Past Medical History:   Diagnosis Date    AAA (abdominal aortic aneurysm) 2014    Abdominal aneurysm     3    Acute coronary syndrome     Arthritis     BPPV (benign paroxysmal positional vertigo)     Carotid artery plaque     Carotid artery stenosis and occlusion 2014    Chronic back pain     COPD (chronic obstructive pulmonary disease)     Coronary artery disease     Dementia     Emphysema lung     Hyperlipidemia     Hypertension     Myocardial infarction     x3    Neuropathy     Personal history of COVID-19 2021 +Covid, recovered at home        Social History:  Social History     Socioeconomic History    Marital status:    Tobacco Use    Smoking status: Former     Current packs/day: 0.00     Average packs/day: 0.5 packs/day for 46.9 years (23.4 ttl pk-yrs)     Types: Cigarettes, Vaping w/o nicotine     Start date: 1970     Quit date: 2017     Years since quittin.0    Smokeless tobacco: Never    Tobacco comments:     3 MG NICOTINE FOR HEART.    Substance and Sexual Activity    Alcohol use: No    Drug use: No    Sexual activity: Not Currently     Birth control/protection: None     Social Determinants of Health     Financial Resource Strain: Medium Risk (3/14/2024)    Overall Financial Resource Strain (CARDIA)     Difficulty of Paying Living Expenses: Somewhat hard   Food Insecurity: No Food Insecurity (3/14/2024)    Hunger Vital Sign     Worried About Running Out of Food in the Last Year: Never true     Ran Out of Food in the Last Year: Never true   Transportation Needs: No Transportation Needs (3/14/2024)    PRAPARE - Transportation     Lack of Transportation (Medical): No     Lack of Transportation (Non-Medical): No   Physical Activity: Inactive  "(3/14/2024)    Exercise Vital Sign     Days of Exercise per Week: 0 days     Minutes of Exercise per Session: 0 min   Stress: No Stress Concern Present (3/14/2024)    [employer] [place] [employer] - Occupational Stress Questionnaire     Feeling of Stress : Only a little   Housing Stability: Low Risk  (3/14/2024)    Housing Stability Vital Sign     Unable to Pay for Housing in the Last Year: No     Number of Places Lived in the Last Year: 1     Unstable Housing in the Last Year: No       Family History:   family history includes Breast cancer in her paternal aunt; Heart attacks under age 50 in her brother and father; Heart disease in her mother.    Health Maintenance   Topic Date Due    Shingles Vaccine (1 of 2) Never done    Colorectal Cancer Screening  05/17/2022    Mammogram  06/09/2022    LDCT Lung Screen  11/08/2023    Lipid Panel  04/18/2024    High Dose Statin  04/18/2025    TETANUS VACCINE  11/15/2026    DEXA Scan  04/04/2027    Hepatitis C Screening  Completed       Exam:Physical     Height and Weight  Height: 5' 2" (157.5 cm)  Weight: 56.2 kg (123 lb 14.4 oz)  BSA (Calculated - sq m): 1.57 sq meters  BMI (Calculated): 22.7  Weight in (lb) to have BMI = 25: 136.4]    Body mass index is 22.66 kg/m².    Physical Exam  Vitals and nursing note reviewed.   Constitutional:       Appearance: Normal appearance. She is not toxic-appearing.   HENT:      Head: Normocephalic and atraumatic.      Right Ear: Tympanic membrane normal.      Left Ear: Tympanic membrane normal.   Eyes:      Extraocular Movements: Extraocular movements intact.      Pupils: Pupils are equal, round, and reactive to light.   Cardiovascular:      Rate and Rhythm: Normal rate and regular rhythm.      Heart sounds: Normal heart sounds.   Pulmonary:      Effort: Pulmonary effort is normal.      Breath sounds: Normal breath sounds. No wheezing, rhonchi or rales.   Abdominal:      General: Bowel sounds are normal. There is no distension.     "  Palpations: Abdomen is soft.      Tenderness: There is no abdominal tenderness.   Musculoskeletal:         General: Normal range of motion.      Right shoulder: Tenderness present.      Left shoulder: Tenderness present.      Cervical back: Normal range of motion. Tenderness present.      Thoracic back: Tenderness present.      Lumbar back: Tenderness present.   Skin:     General: Skin is warm and dry.      Capillary Refill: Capillary refill takes less than 2 seconds.   Neurological:      General: No focal deficit present.      Mental Status: She is alert and oriented to person, place, and time.      Gait: Gait abnormal.   Psychiatric:         Mood and Affect: Mood normal.         Behavior: Behavior normal.         Judgment: Judgment normal.           Assessment:      ICD-10-CM ICD-9-CM   1. Dementia, unspecified dementia severity, unspecified dementia type, unspecified whether behavioral, psychotic, or mood disturbance or anxiety  F03.90 294.20   2. Weight loss  R63.4 783.21   3. Essential hypertension  I10 401.9   4. COPD without exacerbation  J44.9 496             Plan:    Given her underlying worsening status and underlying dementia and poor functional status she could definitely benefit from help with her activities of daily living bathing clothing and help with medication.  Will get records from a neurologist     Monitor weight and vital signs carefully  If blood pressure drops below 120 systolic then do not take the amlodipine. If it continues to be low then ok to hold Coreg as well.     No orders of the defined types were placed in this encounter.        No follow-ups on file.      Olga Altman MD    Scribe Attestation:   IAmaury, am scribing for, and in the presence of, Dr.Arif Altman I performed the above scribed service and the documentation accurately describes the services I performed. I attest to the accuracy of the note.    I, Dr. Olga Altman, reviewed documentation as scribed above. I  performed the services described in this documentation.  I agree that the record reflects my personal performance and is accurate and complete. Olga Altman MD.  05/08/2024

## 2024-05-15 ENCOUNTER — TELEPHONE (OUTPATIENT)
Dept: FAMILY MEDICINE | Facility: CLINIC | Age: 69
End: 2024-05-15
Payer: MEDICARE

## 2024-05-15 NOTE — TELEPHONE ENCOUNTER
----- Message from Kilo Irby sent at 5/15/2024 11:47 AM CDT -----  Contact: cherrie  The pt is calling because she sees that appt was canceled for her and she wants to know if that needs to be rescheduled and she also wanted a status on her medical records that she requested. Please give a call back at [phone]

## 2024-05-21 ENCOUNTER — OFFICE VISIT (OUTPATIENT)
Dept: PULMONOLOGY | Facility: CLINIC | Age: 69
End: 2024-05-21
Payer: MEDICARE

## 2024-05-21 ENCOUNTER — CLINICAL SUPPORT (OUTPATIENT)
Dept: PULMONOLOGY | Facility: CLINIC | Age: 69
End: 2024-05-21
Payer: MEDICARE

## 2024-05-21 ENCOUNTER — TELEPHONE (OUTPATIENT)
Dept: PULMONOLOGY | Facility: CLINIC | Age: 69
End: 2024-05-21

## 2024-05-21 ENCOUNTER — HOSPITAL ENCOUNTER (OUTPATIENT)
Dept: RADIOLOGY | Facility: HOSPITAL | Age: 69
Discharge: HOME | End: 2024-05-21
Attending: NURSE PRACTITIONER
Payer: MEDICARE

## 2024-05-21 VITALS
HEIGHT: 62 IN | BODY MASS INDEX: 23 KG/M2 | HEART RATE: 56 BPM | SYSTOLIC BLOOD PRESSURE: 140 MMHG | RESPIRATION RATE: 9 BRPM | WEIGHT: 125 LBS | DIASTOLIC BLOOD PRESSURE: 60 MMHG | OXYGEN SATURATION: 94 %

## 2024-05-21 DIAGNOSIS — K21.9 GASTROESOPHAGEAL REFLUX DISEASE, UNSPECIFIED WHETHER ESOPHAGITIS PRESENT: ICD-10-CM

## 2024-05-21 DIAGNOSIS — F17.211 CIGARETTE NICOTINE DEPENDENCE IN REMISSION: Primary | ICD-10-CM

## 2024-05-21 DIAGNOSIS — R73.03 PREDIABETES: ICD-10-CM

## 2024-05-21 DIAGNOSIS — J44.9 COPD, SEVERE: ICD-10-CM

## 2024-05-21 DIAGNOSIS — J44.9 COPD, MODERATE: Primary | ICD-10-CM

## 2024-05-21 DIAGNOSIS — I10 ESSENTIAL HYPERTENSION: ICD-10-CM

## 2024-05-21 DIAGNOSIS — G47.00 INSOMNIA, UNSPECIFIED TYPE: ICD-10-CM

## 2024-05-21 DIAGNOSIS — G47.36 NOCTURNAL HYPOXEMIA DUE TO EMPHYSEMA: ICD-10-CM

## 2024-05-21 DIAGNOSIS — J43.9 NOCTURNAL HYPOXEMIA DUE TO EMPHYSEMA: ICD-10-CM

## 2024-05-21 DIAGNOSIS — F17.211 CIGARETTE NICOTINE DEPENDENCE IN REMISSION: ICD-10-CM

## 2024-05-21 DIAGNOSIS — I70.0 AORTIC ATHEROSCLEROSIS: ICD-10-CM

## 2024-05-21 DIAGNOSIS — J44.9 COPD, MODERATE: ICD-10-CM

## 2024-05-21 PROCEDURE — 3077F SYST BP >= 140 MM HG: CPT | Mod: HCNC,CPTII,S$GLB, | Performed by: NURSE PRACTITIONER

## 2024-05-21 PROCEDURE — 1160F RVW MEDS BY RX/DR IN RCRD: CPT | Mod: HCNC,CPTII,S$GLB, | Performed by: NURSE PRACTITIONER

## 2024-05-21 PROCEDURE — 94060 EVALUATION OF WHEEZING: CPT | Mod: HCNC,S$GLB,, | Performed by: INTERNAL MEDICINE

## 2024-05-21 PROCEDURE — 3008F BODY MASS INDEX DOCD: CPT | Mod: HCNC,CPTII,S$GLB, | Performed by: NURSE PRACTITIONER

## 2024-05-21 PROCEDURE — 71271 CT THORAX LUNG CANCER SCR C-: CPT | Mod: TC,HCNC

## 2024-05-21 PROCEDURE — 4010F ACE/ARB THERAPY RXD/TAKEN: CPT | Mod: HCNC,CPTII,S$GLB, | Performed by: NURSE PRACTITIONER

## 2024-05-21 PROCEDURE — 99999 PR PBB SHADOW E&M-EST. PATIENT-LVL IV: CPT | Mod: PBBFAC,HCNC,, | Performed by: NURSE PRACTITIONER

## 2024-05-21 PROCEDURE — 3044F HG A1C LEVEL LT 7.0%: CPT | Mod: HCNC,CPTII,S$GLB, | Performed by: NURSE PRACTITIONER

## 2024-05-21 PROCEDURE — 3288F FALL RISK ASSESSMENT DOCD: CPT | Mod: HCNC,CPTII,S$GLB, | Performed by: NURSE PRACTITIONER

## 2024-05-21 PROCEDURE — 94729 DIFFUSING CAPACITY: CPT | Mod: HCNC,S$GLB,, | Performed by: INTERNAL MEDICINE

## 2024-05-21 PROCEDURE — 1100F PTFALLS ASSESS-DOCD GE2>/YR: CPT | Mod: HCNC,CPTII,S$GLB, | Performed by: NURSE PRACTITIONER

## 2024-05-21 PROCEDURE — 3078F DIAST BP <80 MM HG: CPT | Mod: HCNC,CPTII,S$GLB, | Performed by: NURSE PRACTITIONER

## 2024-05-21 PROCEDURE — 1159F MED LIST DOCD IN RCRD: CPT | Mod: HCNC,CPTII,S$GLB, | Performed by: NURSE PRACTITIONER

## 2024-05-21 PROCEDURE — 71271 CT THORAX LUNG CANCER SCR C-: CPT | Mod: 26,HCNC,, | Performed by: RADIOLOGY

## 2024-05-21 PROCEDURE — 94726 PLETHYSMOGRAPHY LUNG VOLUMES: CPT | Mod: HCNC,S$GLB,, | Performed by: INTERNAL MEDICINE

## 2024-05-21 PROCEDURE — 99203 OFFICE O/P NEW LOW 30 MIN: CPT | Mod: 25,HCNC,S$GLB, | Performed by: NURSE PRACTITIONER

## 2024-05-21 NOTE — PROGRESS NOTES
Chief Complaint   Patient presents with    COPD       5/21/2024 Jaycob Hairzenia Vick presents for 1 year follow up on COPD/former smoker review CPFT/LDCT.    23 pack year former smoker. Quit May 2017.   5/21/2024 LDCT benign findings, repeat LDCT 1 year, May 2025.     Presents stable on current regimen. No COPD exacerbations.     On Breztri 2 puffs twice daily. Rare use combivent inhaler or Duo nebs.   On NC 3 L nightly benefits with adherence.   12/12/2022 Overnight oximetry on NC 3 L normal oxygenation  11/05/2015 PSG Normal Apnea hypopnea index.     Remains active around her home.   No longer caring for grand children in her home. She has great grandchildren that live next door, assist with care when they are not in school.   Currently one daughter still resides with patient.     6/12/2023 Jaycob JUAN   Gemma Vick present for follow up pulmonary clinic COPD, former smoker review CPFT/LDCT.   6/12/2023 chest xray no acute findings.   Presents stable.  Patient reports COPD well controlled on Breztri triple therapy.   Current treatment: Breztri, Combivent. Duo nebs.   On NC 3 L nightly benefits with adherence.   12/12/2022 Overnight oximetry on NC 3 L normal oxygenation  No obstructive sleep apnea.  11/05/2015 PSG Normal Apnea hypopnea index. No GALLO.  Quit cigarette smoking in May 2017.   Still cares for her 3 great grandchildren in her home.     12/12/2022 Jaycob Hairzenia Vick is a 69 y.o. 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     12/12/2022 patient picked up Overnight oximetry testing equipment to testing on NC 3l during sleep.     11/05/2015 PSG Normal Apnea hypopnea index. No GALLO.      Followed in pulmonary for COPD and nocturnal hypoxemia.     Last seen in pulmonary 6/13/2022 Dr. Anand     6/09/2021 Overnight Oxygen Saturation Study: Overnight  "oxygen saturation study is abnormal with O2 saturation less than 88%.   Time with SpO2<89: 2:10:40, 26.3%of the study period. Lowest oxygen saturation recorded was 74% .    Presents stable. No acute flare. Significant improved on Breztri ICS/LABA/LAMA.    Current regimen: controller Breztri triple therapy ICS/LABA/LAMA controller with chamber.  Rescue combivent respimat and Albuterol inhaler     Quit cigarette smoking in May 2017.      She uses 1 pillow at night. Patient currently is on oxygen at 3 L/min per nasal cannula. at night. No obstructive sleep apnea.   No constitutional symptoms, denies fever, chills, anorexia, or weight loss.    Continues to use nicotine in form of vapor 3 mg typically every 2-3 days.   Cares for 12, 11, 7 grand children and two 2 yr old great grand babies. Only exercise limitations if walking long distances.      Previous Report Reviewed: lab reports, office notes and radiology reports     The following portions of the patient's history were reviewed and updated as appropriate: allergies, current medications, past family history, past medical history, past social history, past surgical history and problem list.    Review of Systems  A comprehensive review of systems was negative except for: Respiratory: positive for cough and wheezing     Objective:      BP (!) 140/60   Pulse (!) 56   Resp (!) 9   Ht 5' 1.5" (1.562 m)   Wt 56.7 kg (125 lb)   SpO2 (!) 94%   BMI 23.24 kg/m²   General appearance: alert, appears stated age and cooperative  Head: Normocephalic, without obvious abnormality, atraumatic  Eyes: negative  Ears: normal TM's and external ear canals both ears  Nose: Nares normal. Septum midline. Mucosa normal. No drainage or sinus tenderness.  Throat: lips, mucosa, and tongue normal; teeth and gums normal  Neck: no adenopathy and no carotid bruit  Lungs: clear to auscultation bilaterally and no wheezing, no rales.   Abdomen: normal findings: soft, non-tender  Extremities: " extremities normal, atraumatic, no cyanosis or edema  Pulses: 2+ and symmetric  Skin: Skin color, texture, turgor normal. No rashes or lesions  Lymph nodes: Cervical, supraclavicular, and axillary nodes normal.  Neurologic: Grossly normal    Diagnostic Review    5/21/2024 CPFT Spirometry shows obstruction. Lung volume determination shows mild restriction is also present. Overall there is severe ventilatory impairment. Spirometry remains unimproved following bronchodilator. Airway mechanics are abnormal showing increased airway resistance and decreased conductance. DLCO is severely decreased. MVV is severely decreased.    6/13/2022 Spirometry shows moderate-severe obstruction. Spirometry remains unimproved following bronchodilator     CT Chest Lung Screening Low Dose  Narrative: Exam:  CT CHEST LUNG SCREENING LOW DOSE    History: Greater than 20 pack-year history of smoking remission    COMPARISON:  CT chest, 03/25/2024, CT chest low dose 11/08/2022.    TECHNIQUE:  Axial CT imaging was performed of the chest utilizing a low-dose protocol.    Computed tomography dose index (CTDI):  2.39 mGy  Dose-length product (DLP):  80.08 mGy-cm    FINDINGS:  Moderate bilateral emphysema.  No suspicious lung nodules.  Focal scarring at the right base.  Severe atherosclerosis of the thoracic aorta.  Coronary artery calcifications are present.  No infiltrate or consolidation.  Impression: No suspicious lung nodules.    Lung-RADS Catagory:  1 - Negative - Continue annual screening with LDCT in 12 months.    Clinically significant non lung cancer finding:  S - Significant.    Prior Lung Cancer Modifier:  No history of prior lung cancer.    All CT scans at [this location] are performed using dose modulation techniques as appropriate to a performed exam including the following: Automated exposure control; adjustment of the mA and/or kV according to patient size (this includes techniques or standardized protocols for targeted exams where  dose is matched to indication / reason for exam; i.e. extremities or head); use of iterative reconstruction technique.    Finalized on: 5/21/2024 2:16 PM By:  Zheng Temple MD  [facility]# [id number]      2024-05-21 14:18:[id number]    [facility]      12/13/2022 Overnight Oxygen Saturation Study:  Interpretation:  Conditions of testing: Stable outpatient.  3 L nasal cannula  Overnight oxygen saturuation study revealed transient hypoxemia (oxygen saturation less than 89%). Time with SpO2<89: 0:00:16, 0.0% of study period. Lowest oxygen saturation recorded as 83% .  Clinicial correlation suggested.     Alvin Anand M.D      Medicare Criteria Comments:   Oximetry test results suggest the patient does not fall under Medicare Group 1 Criteria for oxygen prescription.   (Arterial oxygen saturation at or below 88% for at least 5 minutes taken during sleep).       Spirometry 6/9/2021 moderate obstruction FEV1 59.2%.    Spirometry 7/31/2018 FEV1 67% predicted, improved compared to prior.     Pulmonary function tests: 9/18/2017 FEV1: 1.18  (51 % predicted), FVC:  2.32 (78 % predicted), FEV1/FVC:  51 (65 % predicted).  Post bronchodilator: FEV1: 1.41  (62 % predicted), FVC:  2.64 (88 % predicted), FEV1/FVC:  54 (69 % predicted).   Moderate obstructive airflow defect  Positive bronchodilator response with 20% increase in FEV1 post bronchodilator.   FEV1 improved by 14% compared to tomer 12/15/2016.       6/9/2021 Overnight Oxygen Saturation Study: abnormal, 6/9/2021 NC 3 lm sleep.     INTERPRETATION:   Conditions of Test: Noted above in report     Interpretation of results of overnight oxygen saturation study.   This was a technically  adequate study. Overnight oxygen   saturation study is abnormal with O2 saturation less than 88%.   Time with SpO2<89: 2:10:40, 26.3%of the study period. Lowest   oxygen saturation recorded was 74% .   Clinical correlation suggested.   Andres Morales MD     Medicare Criteria Comments:   Overnight Oximetry test results  suggest the patient does fall   under Medicare Group 1 Criteria and would be eligible for oxygen   prescription.    (Arterial oxygen saturation at or below 88% for at least 5   minutes taken during sleep on stable outpatient)      10/29/2015 PSG Normal Apnea hypopnea index. AHI 0.0. No GALLO. The low SpO2 was 86%. Period with saturations below 88% was 39.9 minutes. Supplementary oxygen indicated with sleep.       Assessment:          1. Cigarette nicotine dependence in remission  CT Chest Lung Screening Low Dose      2. COPD, severe  Spirometry with/without bronchodilator      3. Aortic atherosclerosis        4. Essential hypertension        5. Gastroesophageal reflux disease, unspecified whether esophagitis present        6. Insomnia, unspecified type        7. Nocturnal hypoxemia due to emphysema        8. Prediabetes            Plan:     Problem List Items Addressed This Visit       Prediabetes     Stable and controlled. Continue current treatment plan as previously prescribed with your PCP.   Hemoglobin A1C   Date Value Ref Range Status   03/26/2024 5.2 4.0 - 5.6 % Final     Comment:     ADA Screening Guidelines:  5.7-6.4%  Consistent with prediabetes  >or=6.5%  Consistent with diabetes    High levels of fetal hemoglobin interfere with the HbA1C  assay. Heterozygous hemoglobin variants (HbS, HgC, etc)do  not significantly interfere with this assay.   However, presence of multiple variants may affect accuracy.     12/14/2023 5.3 4.0 - 5.6 % Final     Comment:     ADA Screening Guidelines:  5.7-6.4%  Consistent with prediabetes  >or=6.5%  Consistent with diabetes    High levels of fetal hemoglobin interfere with the HbA1C  assay. Heterozygous hemoglobin variants (HbS, HgC, etc)do  not significantly interfere with this assay.   However, presence of multiple variants may affect accuracy.     04/18/2023 5.5 4.0 - 5.6 % Final     Comment:     ADA Screening Guidelines:  5.7-6.4%  Consistent with prediabetes  >or=6.5%   Consistent with diabetes    High levels of fetal hemoglobin interfere with the HbA1C  assay. Heterozygous hemoglobin variants (HbS, HgC, etc)do  not significantly interfere with this assay.   However, presence of multiple variants may affect accuracy.                Nocturnal hypoxemia due to emphysema     6/9/2021 abnormal overnight oximetry requalifed to continue home O2. 6/14/2021 orders NC 3lm sleep.   10/29/2015 PSG Normal Apnea hypopnea index. AHI 0.0. No GALLO. The low SpO2 was 86%. Period with saturations below 88% was 39.9 minutes. Supplementary oxygen indicated with sleep.   On NC 3 L sleep with benefit and adherence             Insomnia     Stable and controlled on Ambien 10 mg. Continue current treatment plan as previously prescribed with your PCP.            GERD (gastroesophageal reflux disease)     Stable continued management with primary care provider           Essential hypertension     Stable and controlled. Continue current treatment plan as previously prescribed with your cardiologist and [facility]             COPD, severe     Controlled on Breztri, combivent, Albuterol inhaler. Duo nebs if needed.  NC 3lm sleep with benefit and adherence.   The current medical regimen is effective;  continue present plan and medications.  Follow up 1 year tomer/LDCT           Relevant Orders    Spirometry with/without bronchodilator    Cigarette nicotine dependence in remission - Primary     5/21/2024 LDCT benign findings, repeat LDCT 1 year, May 2025.          Relevant Orders    CT Chest Lung Screening Low Dose    Aortic atherosclerosis     Follow heart healthy low fat diet. regular exercise.            I spent a total of 38 minutes on the day of the visit.  This includes face to face time and non-face to face time preparing to see the patient (eg, review of tests), obtaining and/or reviewing separately obtained history, documenting clinical information in the electronic or other health record,  independently interpreting results and communicating results to the patient/family/caregiver, or care coordinator.       Discussed diagnosis, its evaluation, treatment and usual course.  All questions answered.      Follow up in about 1 year (around 5/21/2025) for COPD spirometry/LDCT .

## 2024-05-21 NOTE — ASSESSMENT & PLAN NOTE
Stable and controlled on Ambien 10 mg. Continue current treatment plan as previously prescribed with your PCP.

## 2024-05-21 NOTE — ASSESSMENT & PLAN NOTE
6/9/2021 abnormal overnight oximetry requalifed to continue home O2. 6/14/2021 orders NC 3lm sleep.   10/29/2015 PSG Normal Apnea hypopnea index. AHI 0.0. No GALLO. The low SpO2 was 86%. Period with saturations below 88% was 39.9 minutes. Supplementary oxygen indicated with sleep.   On NC 3 L sleep with benefit and adherence

## 2024-05-21 NOTE — ASSESSMENT & PLAN NOTE
Controlled on Breztri, combivent, Albuterol inhaler. Duo nebs if needed.  NC 3lm sleep with benefit and adherence.   The current medical regimen is effective;  continue present plan and medications.  Follow up 1 year tomer/LDCT

## 2024-05-21 NOTE — ASSESSMENT & PLAN NOTE
Stable and controlled. Continue current treatment plan as previously prescribed with your PCP.   Hemoglobin A1C   Date Value Ref Range Status   03/26/2024 5.2 4.0 - 5.6 % Final     Comment:     ADA Screening Guidelines:  5.7-6.4%  Consistent with prediabetes  >or=6.5%  Consistent with diabetes    High levels of fetal hemoglobin interfere with the HbA1C  assay. Heterozygous hemoglobin variants (HbS, HgC, etc)do  not significantly interfere with this assay.   However, presence of multiple variants may affect accuracy.     12/14/2023 5.3 4.0 - 5.6 % Final     Comment:     ADA Screening Guidelines:  5.7-6.4%  Consistent with prediabetes  >or=6.5%  Consistent with diabetes    High levels of fetal hemoglobin interfere with the HbA1C  assay. Heterozygous hemoglobin variants (HbS, HgC, etc)do  not significantly interfere with this assay.   However, presence of multiple variants may affect accuracy.     04/18/2023 5.5 4.0 - 5.6 % Final     Comment:     ADA Screening Guidelines:  5.7-6.4%  Consistent with prediabetes  >or=6.5%  Consistent with diabetes    High levels of fetal hemoglobin interfere with the HbA1C  assay. Heterozygous hemoglobin variants (HbS, HgC, etc)do  not significantly interfere with this assay.   However, presence of multiple variants may affect accuracy.

## 2024-05-23 ENCOUNTER — PATIENT MESSAGE (OUTPATIENT)
Dept: ADMINISTRATIVE | Facility: OTHER | Age: 69
End: 2024-05-23
Payer: MEDICARE

## 2024-05-23 ENCOUNTER — CLINICAL SUPPORT (OUTPATIENT)
Dept: PULMONOLOGY | Facility: CLINIC | Age: 69
End: 2024-05-23
Payer: MEDICARE

## 2024-05-23 VITALS — BODY MASS INDEX: 23.24 KG/M2 | RESPIRATION RATE: 16 BRPM | HEIGHT: 62 IN

## 2024-05-23 DIAGNOSIS — J44.9 COPD, MODERATE: ICD-10-CM

## 2024-05-23 NOTE — PROGRESS NOTES
Pulmonary Disease Management  [facility]  Initial Visit- Virtual Visit   Chronic Care Management    Gemma Vick  was seen 5/23/2024  10:00 AM in the Pulmonary Disease management clinic for evaluation, education, reinforcement of self management techniques and exacerbation action plan.    The patient location is:    [address] [place] [id number]  Visit type: Virtual visit with synchronous audio and video     Jim Metzger    Past Medical History:   Diagnosis Date    AAA (abdominal aortic aneurysm) 02/13/2014    Abdominal aneurysm     3    Acute coronary syndrome     Arthritis     BPPV (benign paroxysmal positional vertigo)     Carotid artery plaque     Carotid artery stenosis and occlusion 02/13/2014    Chronic back pain     COPD (chronic obstructive pulmonary disease)     Coronary artery disease     Dementia     Emphysema lung     Hyperlipidemia     Hypertension     Myocardial infarction     x3    Neuropathy     Personal history of COVID-19 06/09/2021 11/16/2020 +Covid, recovered at home        Patient's Medications   New Prescriptions    No medications on file   Previous Medications    ALBUTEROL-IPRATROPIUM (DUO-NEB) 2.5 MG-0.5 MG/3 ML NEBULIZER SOLUTION    Take 3 mLs by nebulization every 6 (six) hours as needed for Wheezing.    AMLODIPINE (NORVASC) 5 MG TABLET    Take 1 tablet (5 mg total) by mouth once daily.    ASPIRIN 81 MG CHEW    Take 81 mg by mouth once daily.    BUDESONIDE-GLYCOPYR-FORMOTEROL (BREZTRI AEROSPHERE) 160-9-4.8 MCG/ACTUATION HFAA    Inhale 2 puffs into the lungs 2 (two) times a day.    CARVEDILOL (COREG) 6.25 MG TABLET    Take 1 tablet (6.25 mg total) by mouth 2 (two) times daily with meals.    CLOPIDOGREL (PLAVIX) 75 MG TABLET    Take 1 tablet (75 mg total) by mouth once daily.    COMBIVENT RESPIMAT  MCG/ACTUATION INHALER    Inhale 1 puff into the lungs every 6 (six) hours as needed for Wheezing.    CYANOCOBALAMIN (VITAMIN B-12) 100 MCG TABLET    Take 100  "mcg by mouth once daily.    DICLOFENAC SODIUM (VOLTAREN) 1 % GEL    Apply topically 4 (four) times daily.    DICYCLOMINE (BENTYL) 10 MG CAPSULE    TAKE ONE CAPSULE BY MOUTH THREE TIMES DAILY AS NEEDED    EZETIMIBE-SIMVASTATIN 10-40 MG (VYTORIN) 10-40 MG PER TABLET    Take 1 tablet by mouth every evening.    FAMOTIDINE (PEPCID) 20 MG TABLET    Take 1 tablet (20 mg total) by mouth 2 (two) times daily as needed for Heartburn.    INHALATION SPACING DEVICE (COMPACT SPACE CHAMBER)    USE AS DIRECTED    MEMANTINE (NAMENDA) 10 MG TAB    Take 1 tablet (10 mg total) by mouth 2 (two) times daily.    OLMESARTAN (BENICAR) 40 MG TABLET    Take 1 tablet (40 mg total) by mouth once daily.    OXYGEN-AIR DELIVERY SYSTEMS MISC    3 L by Misc.(Non-Drug; Combo Route) route every evening.    PANTOPRAZOLE (PROTONIX) 40 MG TABLET    Take 1 tablet (40 mg total) by mouth once daily.    VITAMIN D (VITAMIN D3) 1000 UNITS TAB    Take 1,000 Units by mouth once daily.    ZOLPIDEM (AMBIEN) 10 MG TAB    Take 1 tablet (10 mg total) by mouth every evening.   Modified Medications    No medications on file   Discontinued Medications    No medications on file       Office Spirometry Results:         5/23/2024    10:00 AM 5/21/2024     1:40 PM 5/8/2024     4:02 PM 4/18/2024     4:35 PM 4/4/2024    12:36 PM 4/2/2024     1:19 PM 3/28/2024    12:47 PM   Pulmonary Function Tests   SpO2  94 %  96 % 94 % 95 % 96 %   Height 5' 1.5" (1.562 m) 5' 1.5" (1.562 m) 5' 2" (1.575 m)  5' 2" (1.575 m)     Weight  56.7 kg (125 lb) 56.2 kg (123 lb 14.4 oz) 56.4 kg (124 lb 5.4 oz) 57.5 kg (126 lb 12.2 oz) 58.7 kg (129 lb 4.8 oz)    BMI (Calculated)  23.2 22.7  23.2           5/23/2024    10:00 AM   Pulmonary Studies Review   Height 5' 1.5" (1.562 m)   Predicted Distance 475.73       Educational assessment:   [x]            Good  []            Fair  []            Poor    Readiness to learn:   [x]            Good  []            Fair  []            Poor    Vision Status:   [x] "            Good  []            Fair  []            Poor    Reading Ability:  [x]            Good  []            Fair  []            Poor    Knowledge of condition:   [x]            Good  []            Fair  []            Poor    Language Barriers:   []            Good  []            Fair  []            Poor  [x]            None    Cognitive/ Physical Barriers:   []            Good  []            Fair  []            Poor  [x]            None    Learning best by:                       [x]            Seeing  []            Hearing  []            Reading                         [x]            Doing    Describe any barrier /Limitation or financial implications of care choices identified     []            Financial  []            Emotional  []            Education  []            Vision/Hearing  []            Physical  [x]            None  []                TOPIC /CONTENT FOR TODAY:    [x]            MDI with or without spacer  []            Dry powder inhaler  []            Acapella   []           Peak Flow meter  [x]            COPD action plan  [x]            Nebulizer use  [x]            Oxygen use safety  []            Breathing and cough techniques  [x]            Energy conservation  [x]            Infection prevention  []           OTHER________________________        Learner:    []            Patient   []            Caregiver    Method:    [x]            Verbal explanation  [x]            Audio visual    [x]            Literature  [x]            Teach back      Evaluation:    [x]            Teach back  [x]            Demonstrate  [x]            Follow up phone call    []            2 weeks     [x]            4 weeks   [x]            PRN      Therapist comments:   Patient was seen today to review respiratory medication purpose and proper technique for use of inhalers. Patient practiced proper technique using MDI with valved holding chamber (spacer) and DPI inhalers. Patient demonstrated understanding. Literature was  given to patient.    Educated patient on the importance of utilizing supplemental oxygen when prescribed. Reviewed strategies for maximizing energy. Patient verbalized understanding. Literature given to patient.     Patient currently vapes daily as an alternative to smoking cigarettes. Patient smokes the vape approximately 4x daily. Educated patient on the risks associated with vaping. Discussed the benefits of cessation. Patient stated understanding.      Asthma trigger checklist was verbally reviewed and literature given to patient.     Infection prevention was discussed. Patient was reminded to get pneumonia and RSV vaccines. Hand hygiene and cleaning of respiratory equipment was also discussed. Patient verbalized understanding.      COPD action plan was reviewed and literature was given to patient. Patient verbalized understanding.     Plan:  Monthly Pulmonary Disease Management Questionnaire  Follow-up PDM appointment scheduled for 1/21/25  Reinforce education  Meds: Breztri, Combivent, Duoneb   DME Needs: [facility]  Action Plan: COPD  Immunization: Pneumococcal- DUE, Flu-current   Next Provider Visit: 5/21/25  Next Spirometry/CPFT: 5/21/25  Approximate time spent with patient: 45 minutes

## 2024-05-23 NOTE — PATIENT INSTRUCTIONS
What is COPD?  COPD stands for chronic obstructive pulmonary disease. It means the airways in your lungs are blocked (obstructed). Because of this, it is hard to breathe. You may have trouble with daily activities because of shortness of breath. Over time the shortness of breath usually worsens making it more and more difficult to take care of yourself and take part in activities. Chronic bronchitis and emphysema are two common types of COPD.  What happens in chronic bronchitis?  The cells in the airways make more mucus than normal. The mucus builds up, narrowing the airways. This means less air travels into and out of the lungs. The lining of the airways may also become inflamed (swollen) and causes the airways to narrow even more.            What happens in emphysema?  The small airways are damaged and lose their stretchiness. The airways collapse when you exhale, causing air to get trapped in the air sacs. This means that less oxygen enters the blood vessels and less oxygen is delivered to all of the cells of your body. This makes it hard to breathe.         Damage to cilia  Cilia are small hairs that line and protect the airways. Smoking damages the cilia. Damaged cilia can't sweep mucus and particles away. Some of the cilia are destroyed. This damage worsens COPD.      How did I get COPD?  Most people get COPD from smoking. Cigarette smoke damages lungs, which can develop into COPD over many years.  How COPD affects you  COPD makes you work harder to breathe. Air may get trapped in the lungs, which prevents your lungs from filling completely with fresh oxygen-filled air when you inhale (breathe in). It's harder to take deep breaths especially when you are active and start breathing faster. Over time, your lungs may become enlarged filling the lung with air that does not transfer oxygen into the blood. These problems cause you to have shortness of breath (also called dyspnea). Wheezing (hoarse, whistling  breathing), chronic cough, and fatigue (feeling tired and worn out) are also common.   © 1175-4576 [employer]. [address] [place] [address], [place] [address]. All rights reserved. This information is not intended as a substitute for professional medical care. Always follow your healthcare professional's instructions.          Using an Inhaler with a Spacer  To control asthma, you need to use your medicines the right way. Some medicines are inhaled using a device called a metered-dose inhaler (MDI). Metered-dose inhalers deliver medicine with a fine spray. You may be asked to use a spacer (holding tube) with your inhaler. The spacer helps make sure all the medicine you need goes into your lungs.   Steps for using an inhaler with a spacer  Step 1:  Remove the caps from the inhaler and spacer.  Shake the inhaler well and attach the spacer. If the inhaler is being used for the first time or has not been used for a while, prime it as directed by the product maker.  Step 2:  Breathe out normally.  Put the spacer between your teeth. Close your lips tightly around it.  Keep your chin up.  Step 3:  Spray 1 puff into the spacer by pressing down on the inhaler.  Then breathe in through your mouth as slowly and deeply as you can. This should take about 5-10 seconds. If you breathe too quickly, you may hear a whistling sound in certain spacers.  Step 4:  Take the spacer out of your mouth.  Hold your breath for a count of 10.  Then hold your lips together and slowly breathe out through your mouth.         Step-by-Step     If youre prescribed more than 1 puff of medicine at a time, wait at least 30 seconds between puffs. This number may be different for different medicines. Shake the inhaler again. Then repeat steps 2 to 4.   [address]  Shortness of Breath: Maximizing Your Energy    Fear of shortness of breath may stop you from being as active as you once were. You don't have to live this way. Managing your time and pacing  yourself can help you conserve energy and do more. It's even OK if you're short of breath sometimes. You can learn to work through this without limiting your activities.  Manage your time  Shortness of breath can make everyday tasks take longer. This means there's less time to do the things you enjoy. You can help prevent this by managing your time. Try these tips:  Plan ahead so your tasks are spaced throughout the day. As you plan, keep in mind the times of day you tend to have the most energy.  Do only one thing at a time. Finish one task before starting another.  Gather everything you need before you start a task. This cuts out unneeded steps while you're working.  Think about what you really need to do. Be realistic about what you can get done in a day.      Balance activity and rest  When you're tired, your activities will take longer. Fatigue also makes you more likely to get an infection. Plan your day so that your tasks are spaced throughout the day. To have more energy:  Stop and rest when you need to. Don't wait until you're overtired.  Switch back and forth between hard tasks and easy ones.  Give yourself plenty of time for each task, so you don't have to hurry.  Take 20- to 30-minute rest breaks after meals and throughout the day.  If an activity takes a lot of energy, break it into smaller parts. For instance, fold the laundry first. Then take a break before putting it away.  Try not to exert yourself in extreme cold or heat.       Find ways to conserve energy  Conserving your energy can help you stay active and breathe better. The way you use your body during a task can help you conserve energy. Think of ways to make things easier, and take your time to ease shortness of breath.  For some tasks, you can also use special aids designed to reduce the amount of energy needed. Here are some tips:  Sit whenever possible, and keep your arms close to your body. Use slow, smooth motions.  Sit to dress and  undress, shave, brush your teeth, and comb your hair. Use a long-handled reacher to pull on socks and shoes, and long-handled items like shoe horns.  Sit on a bench to shower. Use warm water, not hot. (Steam can make it harder to breathe.) Dry off by wrapping yourself in a terrycloth robe.  Use energy-saving appliances, such as an electric can opener, a power toothbrush, and a .  Use a cart with wheels to move groceries, laundry, dishes, and other items around the house. Some carts have seats so you can rest when you need to.  Use lightweight, nonstick pots and pans to cook. Soak dirty dishes instead of scrubbing them. Air-dry dishes, or use a .  Mix, cook, serve, and store foods in the same dish.  Keep the things you use most at waist level, so you can get them without reaching or bending.  Use steps slowly, pausing at each step. If you have steps outside or in your home, think about adding ramps or stair lifts.  Think about ways that others can help you. You might get help from friends, family members, or home health aides.      Remember to breathe  Sometimes people with an illness or condition that affects the lungs try to rush through tasks so they won't get short of breath. This uses more energy and can actually increase shortness of breath. Instead, slow down and pace your breathing. These tips may help:  Move slowly during tasks that take a lot of effort, such as climbing stairs or pushing a shopping cart.  Use pursed-lip and diaphragmatic breathing while you go about a task.  Breathe out (exhale) when you exert effort. For example, breathe out as you lift up a grocery bag. Once you're holding the bag, breathe in. Ask the  at the [employer] to pack your grocery bags so they are light and easy to carry.  Focus on taking slow, deep breaths. If your breathing is shallow, you don't take in as much air.  Remember that it's OK to be short of breath. Just pace yourself and do pursed-lip  breathing.      Talk with your healthcare provider about:  If you should use supplemental oxygen  If you need a referral to occupational and physical therapy. Therapists can help you with exercise and daily activities, and how to make things easier.      Pursed-lip breathing  This type of breathing helps you exhale better. Breathing this way during activity will help you reduce shortness of breath:  Relax your neck and shoulder muscles. Breathe in (inhale) slowly through your nose for 2 counts or more.  Pucker your lips as if you are going to blow out a candle. Breathe out slowly and gently through your lips for at least twice as long as you inhaled.ACTION PLAN    GREEN ZONE  My sputum is clear/white/usual color and easily cleared.  My breathing is no harder than usual.  I can do my usual activities.  I can think clearly.   Take your usual medicines, including oxygen, as you are told to do so by your health care provider.   YELLOW ZONE  My sputum has change (color, thickness, amount).  I am more short of breath than usual.  I cough or wheeze more.  I weigh more and my legs/feet swell.  I cannot do my usual activities without resting.   Call your health care provider. You will probably be told to begin taking an antibiotic and prednisone. Have your pharmacy phone number available.   RED ZONE  I cannot cough out my sputum.  I am much more short of breath than normal.  I need to sit up to breathe  I cannot do my usual activities.  I am unable to speak more than one or two words at a time.  I am confused.   Call your health care provider. You may be asked to come in to be seen, told to go to the emergency room, or told to call [id number].

## 2024-05-31 ENCOUNTER — PATIENT MESSAGE (OUTPATIENT)
Dept: CARDIOLOGY | Facility: CLINIC | Age: 69
End: 2024-05-31
Payer: MEDICARE

## 2024-06-13 ENCOUNTER — OFFICE VISIT (OUTPATIENT)
Dept: NEUROLOGY | Facility: CLINIC | Age: 69
End: 2024-06-13
Payer: MEDICARE

## 2024-06-13 ENCOUNTER — LAB VISIT (OUTPATIENT)
Dept: LAB | Facility: HOSPITAL | Age: 69
End: 2024-06-13
Attending: NURSE PRACTITIONER
Payer: MEDICARE

## 2024-06-13 VITALS
HEART RATE: 60 BPM | WEIGHT: 129.88 LBS | RESPIRATION RATE: 16 BRPM | BODY MASS INDEX: 25.5 KG/M2 | DIASTOLIC BLOOD PRESSURE: 62 MMHG | HEIGHT: 60 IN | SYSTOLIC BLOOD PRESSURE: 112 MMHG

## 2024-06-13 DIAGNOSIS — M54.16 LUMBAR RADICULOPATHY: ICD-10-CM

## 2024-06-13 DIAGNOSIS — F01.B0 MODERATE VASCULAR DEMENTIA WITHOUT BEHAVIORAL DISTURBANCE, PSYCHOTIC DISTURBANCE, MOOD DISTURBANCE, OR ANXIETY: ICD-10-CM

## 2024-06-13 DIAGNOSIS — R41.82 ALTERED MENTAL STATUS, UNSPECIFIED ALTERED MENTAL STATUS TYPE: ICD-10-CM

## 2024-06-13 DIAGNOSIS — E78.2 MIXED HYPERLIPIDEMIA: ICD-10-CM

## 2024-06-13 DIAGNOSIS — R68.89 FORGETFULNESS: ICD-10-CM

## 2024-06-13 DIAGNOSIS — R26.9 UNSPECIFIED ABNORMALITIES OF GAIT AND MOBILITY: ICD-10-CM

## 2024-06-13 DIAGNOSIS — M51.36 DDD (DEGENERATIVE DISC DISEASE), LUMBAR: ICD-10-CM

## 2024-06-13 DIAGNOSIS — J44.9 COPD, SEVERE: ICD-10-CM

## 2024-06-13 DIAGNOSIS — F17.211 CIGARETTE NICOTINE DEPENDENCE IN REMISSION: ICD-10-CM

## 2024-06-13 DIAGNOSIS — F01.B0 MODERATE VASCULAR DEMENTIA WITHOUT BEHAVIORAL DISTURBANCE, PSYCHOTIC DISTURBANCE, MOOD DISTURBANCE, OR ANXIETY: Primary | ICD-10-CM

## 2024-06-13 DIAGNOSIS — Z91.81 HISTORY OF FALL: ICD-10-CM

## 2024-06-13 DIAGNOSIS — I25.118 CORONARY ARTERY DISEASE OF NATIVE ARTERY OF NATIVE HEART WITH STABLE ANGINA PECTORIS: ICD-10-CM

## 2024-06-13 DIAGNOSIS — F34.89 OTHER SPECIFIED PERSISTENT MOOD DISORDERS: ICD-10-CM

## 2024-06-13 DIAGNOSIS — I65.23 BILATERAL CAROTID ARTERY STENOSIS: ICD-10-CM

## 2024-06-13 DIAGNOSIS — G31.84 MILD COGNITIVE IMPAIRMENT: ICD-10-CM

## 2024-06-13 DIAGNOSIS — H81.10 BENIGN PAROXYSMAL POSITIONAL VERTIGO, UNSPECIFIED LATERALITY: ICD-10-CM

## 2024-06-13 DIAGNOSIS — G47.00 INSOMNIA, UNSPECIFIED TYPE: ICD-10-CM

## 2024-06-13 DIAGNOSIS — I10 ESSENTIAL HYPERTENSION: ICD-10-CM

## 2024-06-13 DIAGNOSIS — F32.A DEPRESSION, UNSPECIFIED DEPRESSION TYPE: ICD-10-CM

## 2024-06-13 LAB
FOLATE SERPL-MCNC: 3.5 NG/ML (ref 4–24)
HCYS SERPL-SCNC: 9.9 UMOL/L (ref 4–15.5)
HIV 1+2 AB+HIV1 P24 AG SERPL QL IA: NORMAL
TREPONEMA PALLIDUM IGG+IGM AB [PRESENCE] IN SERUM OR PLASMA BY IMMUNOASSAY: NONREACTIVE
TSH SERPL DL<=0.005 MIU/L-ACNC: 2.75 UIU/ML (ref 0.4–4)
VIT B12 SERPL-MCNC: >2000 PG/ML (ref 210–950)

## 2024-06-13 PROCEDURE — 99999 PR PBB SHADOW E&M-EST. PATIENT-LVL V: CPT | Mod: PBBFAC,HCNC,, | Performed by: NURSE PRACTITIONER

## 2024-06-13 PROCEDURE — 84443 ASSAY THYROID STIM HORMONE: CPT | Mod: HCNC | Performed by: NURSE PRACTITIONER

## 2024-06-13 PROCEDURE — 86593 SYPHILIS TEST NON-TREP QUANT: CPT | Mod: HCNC | Performed by: NURSE PRACTITIONER

## 2024-06-13 PROCEDURE — 87389 HIV-1 AG W/HIV-1&-2 AB AG IA: CPT | Mod: HCNC | Performed by: NURSE PRACTITIONER

## 2024-06-13 PROCEDURE — 36415 COLL VENOUS BLD VENIPUNCTURE: CPT | Mod: HCNC | Performed by: NURSE PRACTITIONER

## 2024-06-13 PROCEDURE — 86038 ANTINUCLEAR ANTIBODIES: CPT | Mod: HCNC | Performed by: NURSE PRACTITIONER

## 2024-06-13 PROCEDURE — 82607 VITAMIN B-12: CPT | Mod: HCNC | Performed by: NURSE PRACTITIONER

## 2024-06-13 PROCEDURE — 82746 ASSAY OF FOLIC ACID SERUM: CPT | Mod: HCNC | Performed by: NURSE PRACTITIONER

## 2024-06-13 PROCEDURE — 83090 ASSAY OF HOMOCYSTEINE: CPT | Mod: HCNC | Performed by: NURSE PRACTITIONER

## 2024-06-13 NOTE — PROGRESS NOTES
Subjective:       Patient ID: Gemma Vick is a 69 y.o. female.    Chief Complaint: Memory Loss (/)          HPIThe patient is here for memory loss evaluation.     The patient is presenting with memory changes that began in 2017-year . The patient is accompanied by granddaughter. [place] [facility] [place] She carries Dx of Moderate Vascular Dementia. The main problems the patient has are related to forgetful, changes in appetite. For example, the patient would repeat the same question repeatedly. The patient excessively forgets where placed certain things, sometimes she is able to recover, she has misplaced wallet, phone and watch. The patient not forgetting names. The patient is no longer driving and grand daughter reports she did have episode that she had went to the store. The patient is not losing personal items and does not put them in odd places. Patient has decreased activities. No confusion around and inside the house. No trouble remembering the date and time. Patient granddaughter gives her medications and appointments The patient is not aware of major holidays. Patient is aware political changes. The patient granddaughter is handling finances. The patient is still independent with ADLs. Increased agitation. tools. No history of strokes. No history of headaches. No history of hypothyroidism. No history of alcoholism (young onset or old onset). No history of B12 deficiency, COPD wear O2 3 L nc at night. No history of depression. No history of bipolar disorder. No history of Untreated Syphilis.  No history of HIV infection. No toxic exposures.  No history of traumatic brain injury. No tremors or abnormal movements. Patient has chronic back pain, reports intolerance to pain medications. No falls or instability. Has some urgency with urination. Patient has sleep disturbance. Decreased appetite. No family history of dementia. Patient was diginose with Moderate Vascular dementia with  out behavioral disturbance psychoactive disturbance mood disturbances or anxiety Patient is currently taking Namenda 10 mg po BID.              Review of Systems   Constitutional:  Positive for activity change.   HENT: Negative.     Eyes: Negative.    Respiratory:  Positive for choking and shortness of breath.    Gastrointestinal: Negative.    Endocrine: Negative.    Genitourinary: Negative.    Musculoskeletal:  Positive for arthralgias, back pain, myalgias and neck pain.   Skin: Negative.    Allergic/Immunologic: Negative.    Neurological:  Positive for numbness.   Hematological: Negative.    Psychiatric/Behavioral:  Positive for confusion, decreased concentration and dysphoric mood. Negative for agitation, behavioral problems, hallucinations and suicidal ideas. The patient is nervous/anxious.                  Current Outpatient Medications:     albuterol-ipratropium (DUO-NEB) 2.5 mg-0.5 mg/3 mL nebulizer solution, Take 3 mLs by nebulization every 6 (six) hours as needed for Wheezing., Disp: 75 mL, Rfl: 12    amLODIPine (NORVASC) 5 MG tablet, Take 1 tablet (5 mg total) by mouth once daily., Disp: 30 tablet, Rfl: 11    aspirin 81 MG Chew, Take 81 mg by mouth once daily., Disp: , Rfl:     budesonide-glycopyr-formoterol (BREZTRI AEROSPHERE) 160-9-4.8 mcg/actuation HFAA, Inhale 2 puffs into the lungs 2 (two) times a day., Disp: 32.1 g, Rfl: 3    carvediloL (COREG) 6.25 MG tablet, Take 1 tablet (6.25 mg total) by mouth 2 (two) times daily with meals., Disp: 60 tablet, Rfl: 11    clopidogreL (PLAVIX) 75 mg tablet, Take 1 tablet (75 mg total) by mouth once daily., Disp: 90 tablet, Rfl: 3    COMBIVENT RESPIMAT  mcg/actuation inhaler, Inhale 1 puff into the lungs every 6 (six) hours as needed for Wheezing., Disp: 12 g, Rfl: 11    cyanocobalamin (VITAMIN B-12) 100 MCG tablet, Take 100 mcg by mouth once daily., Disp: , Rfl:     diclofenac sodium (VOLTAREN) 1 % Gel, Apply topically 4 (four) times daily., Disp: 150 g,  Rfl: 1    dicyclomine (BENTYL) 10 MG capsule, TAKE ONE CAPSULE BY MOUTH THREE TIMES DAILY AS NEEDED, Disp: 90 capsule, Rfl: 2    ezetimibe-simvastatin 10-40 mg (VYTORIN) 10-40 mg per tablet, Take 1 tablet by mouth every evening., Disp: 90 tablet, Rfl: 2    famotidine (PEPCID) 20 MG tablet, Take 1 tablet (20 mg total) by mouth 2 (two) times daily as needed for Heartburn., Disp: 60 tablet, Rfl: 11    inhalation spacing device (COMPACT SPACE CHAMBER), USE AS DIRECTED, Disp: 1 each, Rfl: 2    memantine (NAMENDA) 10 MG Tab, Take 1 tablet (10 mg total) by mouth 2 (two) times daily., Disp: 180 tablet, Rfl: 12    olmesartan (BENICAR) 40 MG tablet, Take 1 tablet (40 mg total) by mouth once daily., Disp: 90 tablet, Rfl: 3    OXYGEN-AIR DELIVERY SYSTEMS MISC, 3 L by Misc.(Non-Drug; Combo Route) route every evening., Disp: , Rfl:     pantoprazole (PROTONIX) 40 MG tablet, Take 1 tablet (40 mg total) by mouth once daily., Disp: 90 tablet, Rfl: 3    vitamin D (VITAMIN D3) 1000 units Tab, Take 1,000 Units by mouth once daily., Disp: , Rfl:     zolpidem (AMBIEN) 10 mg Tab, Take 1 tablet (10 mg total) by mouth every evening., Disp: 30 tablet, Rfl: 5  Past Medical History:   Diagnosis Date    AAA (abdominal aortic aneurysm) 02/13/2014    Abdominal aneurysm     3    Acute coronary syndrome     Arthritis     BPPV (benign paroxysmal positional vertigo)     Carotid artery plaque     Carotid artery stenosis and occlusion 02/13/2014    Chronic back pain     COPD (chronic obstructive pulmonary disease)     Coronary artery disease     Dementia     Emphysema lung     Hyperlipidemia     Hypertension     Myocardial infarction     x3    Neuropathy     Personal history of COVID-19 06/09/2021 11/16/2020 +Covid, recovered at home      Past Surgical History:   Procedure Laterality Date    CARDIAC CATHETERIZATION      CORONARY ANGIOPLASTY      CORONARY STENT PLACEMENT N/A 9/12/2023    Procedure: INSERTION, STENT, CORONARY ARTERY;  Surgeon: Larry  Millie HASSAN MD;  Location: [facility] CATH LAB;  Service: Cardiology;  Laterality: N/A;    HYSTERECTOMY  1988    LEFT HEART CATHETERIZATION Left 2023    Procedure: Left heart cath;  Surgeon: Millie Juarez MD;  Location: [facility] CATH LAB;  Service: Cardiology;  Laterality: Left;    PERCUTANEOUS CORONARY INTERVENTION, ARTERY N/A 2023    Procedure: Percutaneous coronary intervention;  Surgeon: Millie Juarez MD;  Location: [facility] CATH LAB;  Service: Cardiology;  Laterality: N/A;    THROMBECTOMY, CORONARY  2023    Procedure: Thrombectomy, Coronary;  Surgeon: Millie Juarez MD;  Location: [facility] CATH LAB;  Service: Cardiology;;    TONSILLECTOMY      TRANSFORAMINAL EPIDURAL INJECTION OF STEROID Right 2023    Procedure: Injection,steroid,epidural,transforaminal approach- right side, L4/5 and L5/S1;  Surgeon: Lydia Roberts MD;  Location: [facility] PAIN MGT;  Service: Pain Management;  Laterality: Right;     Social History     Socioeconomic History    Marital status:    Tobacco Use    Smoking status: Former     Current packs/day: 0.00     Average packs/day: 0.5 packs/day for 46.9 years (23.4 ttl pk-yrs)     Types: Cigarettes, Vaping with nicotine     Start date: 1970     Quit date: 2017     Years since quittin.1    Smokeless tobacco: Never    Tobacco comments:     3 MG NICOTINE FOR HEART.    Substance and Sexual Activity    Alcohol use: No    Drug use: No    Sexual activity: Not Currently     Birth control/protection: None     Social Determinants of Health     Financial Resource Strain: Medium Risk (3/14/2024)    Overall Financial Resource Strain (CARDIA)     Difficulty of Paying Living Expenses: Somewhat hard   Food Insecurity: No Food Insecurity (3/14/2024)    Hunger Vital Sign     Worried About Running Out of Food in the Last Year: Never true     Ran Out of Food in the Last Year: Never true   Transportation Needs: No Transportation Needs (3/14/2024)    PRAPARE - Transportation     Lack of  Transportation (Medical): No     Lack of Transportation (Non-Medical): No   Physical Activity: Inactive (3/14/2024)    Exercise Vital Sign     Days of Exercise per Week: 0 days     Minutes of Exercise per Session: 0 min   Stress: No Stress Concern Present (3/14/2024)    [employer] [place] [employer] - Occupational Stress Questionnaire     Feeling of Stress : Only a little   Housing Stability: Low Risk  (3/14/2024)    Housing Stability Vital Sign     Unable to Pay for Housing in the Last Year: No     Number of Places Lived in the Last Year: 1     Unstable Housing in the Last Year: No             Past/Current Medical/Surgical History, Past/Current Social History, Past/Current Family History and Past/Current Medications were reviewed in detail.        Objective:           VITAL SIGNS WERE REVIEWED      GENERAL APPEARANCE:     The patient looks comfortable.    BMI 25.36 KG    No signs of respiratory distress.    Normal breathing pattern.    No dysmorphic features    Normal eye contact.     GENERAL MEDICAL EXAM:    HEENT:  Head is atraumatic normocephalic.     No tender temporal arteries. Fundoscopic (Ophthalmoscopic) exam showed no disc edema.      Neck and Axillae: No JVD. No visible lesions.    No carotid bruits. No thyromegaly. No lymphadenopathy.    Cardiopulmonary: No cyanosis. No tachypnea. Normal respiratory effort.    Gastrointestinal/Urogenital:  No jaundice. No stomas or lesions. No visible hernias. No catheters.     Abdomen is soft non-tender. No masses or organomegaly.    Skin, Hair and Nails: No pathognonomic skin rash. No neurofibromatosis. No visible lesions.No stigmata of autoimmune disease. No clubbing.    Skin is warm and moist. No palpable masses.    Limbs: No varicose veins. No visible swelling.    No palpable edema. Pulses are symmetric. Pedal pulses are palpable.      Muskoskeletal: No visible deformities.No visible lesions.    No spine tenderness. No signs of longstanding neuropathy. No  dislocations or fractures.            Neurologic Exam     Mental Status   Oriented to person, place, and time.   Registration: recalls 3 of 3 objects. Recall at 5 minutes: recalls 3 of 3 objects.   Attention: decreased. Concentration: normal.   Speech: speech is normal and not slurred   Level of consciousness: alert  Knowledge: good and consistent with education. Unable to perform simple calculations (unable to do math).   Able to name object. Able to read. Able to repeat. Able to write. Normal comprehension.     Cranial Nerves     CN II   Visual fields full to confrontation.   Visual acuity: normal  Right visual field deficit: none  Left visual field deficit: none     CN III, IV, VI   Pupils are equal, round, and reactive to light.  Extraocular motions are normal.   Right pupil: Size: 2 mm. Shape: regular. Reactivity: brisk. Consensual response: intact. Accommodation: intact.   Left pupil: Size: 2 mm. Shape: regular. Reactivity: brisk. Consensual response: intact. Accommodation: intact.   CN III: no CN III palsy  CN VI: no CN VI palsy  Nystagmus: none   Diplopia: none  Ophthalmoparesis: none  Upgaze: normal  Downgaze: normal  Conjugate gaze: present  Vestibulo-ocular reflex: present    CN V   Facial sensation intact.   Right facial sensation deficit: none  Left facial sensation deficit: none    CN VII   Right facial weakness: none  Left facial weakness: none  Left taste: normal    CN VIII   CN VIII normal.   Hearing: intact  Right Rinne: AC > BC  Left Rinne: AC > BC  Qureshi: does not lateralize     CN IX, X   CN IX normal.   CN X normal.   Palate: symmetric    CN XI   CN XI normal.   Right sternocleidomastoid strength: normal  Left sternocleidomastoid strength: normal  Right trapezius strength: normal  Left trapezius strength: normal    CN XII   CN XII normal.   Tongue: not atrophic  Fasciculations: absent  Tongue deviation: none    Motor Exam   Muscle bulk: normal  Overall muscle tone: normal  Right arm tone:  normal  Left arm tone: normal  Right arm pronator drift: absent  Left arm pronator drift: absent  Right leg tone: normal  Left leg tone: normal    Strength   Strength 5/5 throughout.   Right neck flexion: 5/5  Left neck flexion: 5/5  Right neck extension: 5/5  Left neck extension: 5/5  Right deltoid: 5/5  Left deltoid: 5/5  Right biceps: 5/5  Left biceps: 5/5  Right triceps: 5/5  Left triceps: 5/5  Right wrist flexion: 5/5  Left wrist flexion: 5/5  Right wrist extension: 5/5  Left wrist extension: 5/5  Right interossei: 5/  Left interossei: 5/  Right iliopsoas: 5/  Left iliopsoas: /  Right quadriceps:   Left quadriceps:   Right hamstrin/5  Left hamstrin/5  Right glutei:   Left glutei:   Right anterior tibial:   Left anterior tibial:   Right posterior tibial:   Left posterior tibial:   Right peroneal:   Left peroneal:   Right gastroc: 5/  Left gastroc:     Sensory Exam   Right arm light touch: normal  Left arm light touch: normal  Right leg light touch: decreased from toes  Left leg light touch: decreased from toes  Right arm vibration: normal  Left arm vibration: normal  Right leg vibration: decreased from toes  Left leg vibration: decreased from toes  Proprioception normal.   Right arm proprioception: normal  Left arm proprioception: normal  Right leg proprioception: normal  Left leg proprioception: normal  Pinprick normal.   Right arm pinprick: normal  Left arm pinprick: normal  Right leg pinprick: normal  Left leg pinprick: normal  Sensory deficit distribution on left: lateral cutaneous thigh  Graphesthesia: normal  Stereognosis: normal    Gait, Coordination, and Reflexes     Gait  Gait: wide-based    Coordination   Romberg: negative  Finger to nose coordination: normal    Tremor   Intention tremor: absent  Action tremor: right arm    Reflexes   Right brachioradialis: 2+  Left brachioradialis: 2+  Right biceps: 2+  Left biceps: 2+  Right triceps: 2+  Left triceps:  2+  Right patellar: 2+  Left patellar: 2+  Right achilles: 1+  Left achilles: 1+  Right : 0  Left : 0  Right plantar: normal  Left plantar: normal  Right Contreras: absent  Left Contreras: absent  Right ankle clonus: absent  Left ankle clonus: absent  Right pendular knee jerk: absent  Left pendular knee jerk: absent        JUN COGNITIVE ASSESSMENT (MOCA) TOTAL SCORE         NORMAL-MILD NCD 26-30    HS Graduate     DATE 06-        TOTAL SCORE 26/30       VISUOSPATIAL EXECUTIVE (5) 4       NAMING (3) 3       ATTENTION (6) 6       LANGUAGE (3) 2       ABSTRACTION(2) 1       RECALL (5)  1 WITH CUE 3       ORIENTATION (6) 6           Lab Results   Component Value Date    WBC 8.32 04/02/2024    HGB 10.6 (L) 04/02/2024    HCT 31.6 (L) 04/02/2024    MCV 90 04/02/2024     04/02/2024     Sodium   Date Value Ref Range Status   04/24/2024 133 (L) 136 - 145 mmol/L Final     Potassium   Date Value Ref Range Status   04/24/2024 4.2 3.5 - 5.1 mmol/L Final     Chloride   Date Value Ref Range Status   04/24/2024 100 95 - 110 mmol/L Final     CO2   Date Value Ref Range Status   04/24/2024 26 23 - 29 mmol/L Final     Glucose   Date Value Ref Range Status   04/24/2024 88 70 - 110 mg/dL Final     BUN   Date Value Ref Range Status   04/24/2024 13 8 - 23 mg/dL Final     Creatinine   Date Value Ref Range Status   04/24/2024 0.9 0.5 - 1.4 mg/dL Final     Calcium   Date Value Ref Range Status   04/24/2024 9.7 8.7 - 10.5 mg/dL Final     Total Protein   Date Value Ref Range Status   04/02/2024 5.4 (L) 6.0 - 8.4 g/dL Final     Albumin   Date Value Ref Range Status   04/02/2024 2.9 (L) 3.5 - 5.2 g/dL Final     Total Bilirubin   Date Value Ref Range Status   04/02/2024 0.4 0.1 - 1.0 mg/dL Final     Comment:     For infants and newborns, interpretation of results should be based  on gestational age, weight and in agreement with clinical  observations.    Premature Infant recommended reference ranges:  Up to 24  hours.............<8.0 mg/dL  Up to 48 hours............<12.0 mg/dL  3-5 days..................<15.0 mg/dL  6-29 days.................<15.0 mg/dL       Alkaline Phosphatase   Date Value Ref Range Status   04/02/2024 67 55 - 135 U/L Final     AST   Date Value Ref Range Status   04/02/2024 15 10 - 40 U/L Final     ALT   Date Value Ref Range Status   04/02/2024 10 10 - 44 U/L Final     Anion Gap   Date Value Ref Range Status   04/24/2024 7 (L) 8 - 16 mmol/L Final     eGFR if    Date Value Ref Range Status   04/07/2022 >60.0 >60 mL/min/1.73 m^2 Final     eGFR if non    Date Value Ref Range Status   04/07/2022 >60.0 >60 mL/min/1.73 m^2 Final     Comment:     Calculation used to obtain the estimated glomerular filtration  rate (eGFR) is the CKD-EPI equation.        Lab Results   Component Value Date    IQUZBERA52 >2000 (H) 06/13/2024     Lab Results   Component Value Date    TSH 2.747 06/13/2024   LABORATORY EVALUATION:    06-    VITAMIN B12 >2000^, FA LEVEL 3.5 LOW, ANDREA NEG, HC LEVEL 9.9 NL, HIV -VE, TSH LEVEL 2.747 NL, RPR -VE        RADIOLOGY EVALUATION:    03-    CT BRAIN WO    No acute abnormality.     Atrophy and chronic white matter changes.      Reviewed the neuroimaging independently       Assessment:       1. Moderate vascular dementia without behavioral disturbance, psychotic disturbance, mood disturbance, or anxiety    2. Mild cognitive impairment    3. Essential hypertension    4. Mixed hyperlipidemia    5. COPD, severe    6. Benign paroxysmal positional vertigo, unspecified laterality    7. Depression, unspecified depression type    8. Forgetfulness    9. Lumbar radiculopathy    10. Other specified persistent mood disorders    11. Altered mental status, unspecified altered mental status type    12. Unspecified abnormalities of gait and mobility    13. DDD (degenerative disc disease), lumbar    14. Coronary artery disease of native artery of native heart with  stable angina pectoris    15. Bilateral carotid artery stenosis    16. Cigarette nicotine dependence in remission    17. History of fall    18. Insomnia, unspecified type        Plan:         MILD COGNITIVE IMPAIRMENT VS MODERATE VASCULAR DEMENTIA WITHOUT BEHAVIOR DISTURBANCES/DEPRESSION/FORGETFULNESS/CHRONIC BACK PAIN/ FORMER SMOKER/INSOMNIA       EVALUATION     Brain MRI.    TSH-T4, FA, B12, HIV, RPR.    Comprehensive Neuropsychological Testing     Request Records from [facility] [place] prior Memory work, progress note, and evaluations.     Explained to the patient and family that medications are intended to slow down the progression and not stop it or reverse the disease.The disease is relentless, progressive and so far cannot be controlled.     I counseled the patient and family that the rate of progression is extremely variable and the average (10 years) is an inaccurate measure.     NO DMA recommended at this time    Recommend optimization of MDD/TANK    Refer to PT to evaluate and treat (massage and dry needling)       DISEASE-MODIFYING AGENTS     Explained to the patient and family that medications are intended to slow down the progression (in the early stages of the disease) and not stop it or reverse the disease. The disease is relentless, progressive and so far, cannot be controlled. Progression includes Cognitive decline, Behavioral disturbances, and Motor decline as well.     I counseled the patient and family that the rate of progression is extremely variable, and the average (10 years) is an inaccurate measure.     CLASSES:    NMDA RECEPTOR ANTAGONISTS    Continue memantine/Namenda 10 mg BID for now     SYMPTOMATIC MANAGEMENT-BEHAVIORAL SYMPTOMS AND NON-COGNITIVE SYMPTOMS       ANTIDEPRESSANTS CLASS MEDICATIONS          HOME CARE     Falling Down Precautions. Gait is affected by the disease as well.     Avoid driving and access to firearms     Help with finances and decision making.    Join support  group.    Proofing the house and use labeling.    Avoid antihistamines and anticholinergics.    Avoid changing routine.    Use written reminders.    Avoid multitasking.    Healthy diet, exercise (physical and cognitive).    Good sleep hygiene.        HERE ARE 10 WAYS TO SLOW DOWN THE PROGRESSION OF DEMENTIA        1.Be physically active.    2. Avoid smoking and alcohol consumption.    3. Track your numbers. Keep your blood pressure, cholesterol, blood sugar and weight within recommended ranges.    4. Stay socially connected.    5. Make healthy food choices. Eat a well-balance and healthy diet that rich in cereals, fish, legumes, and vegetables.    6. Reduce stress.     7. Challenge your brain by trying something new, playing games, or learning a new language.    8. Take care of your hearing. Avoid being continuously exposed to loud sounds and wear a hearing aid if hearing does become a problem.    9. Lower risk of falls. Consider installing handrails on all stairs and grab bars in bathrooms.    10. Reduce your exposure to air pollution, such as exposure to exhaust in heavy traffic.         CAREGIVERS COUNSELING     Recommend reading the following books:     The 36-Hour Day and there are many online and printed resources to help caregivers as well.     Alzheimer's Through the Stages.     Dementia with G.R.A.C.E.            PREVENTION OF DELIRIUM       1. Good hydration and avoid electrolyte imbalance  2. Recognize and treat infections immediately especially UTI.  3. Bladder emptying and prevent constipation.   4. Provide stimulating activities and familiar objects  5. Use eyeglasses and hearing aids if needed.   6. Use simple and regular communication about people, current place, and time  7. Mobility and range-of-motion exercises  8. Reduce noise, lighting and avoid sleep interruptions  9. Non-narcotic pain management.  10.Nondrug treatment for sleep problems or anxiety  11. Avoid antihistamines.  12. Avoid  narcotics.  13. Avoid benzodiazepines.           SLEEP HYGIENE     Poor sleep has a negative effect on cognition. Several strategies have been shown to improve sleep:     Caffeine intake in the afternoon and evening, as well as stuffing oneself at supper, can decrease the quality of restful sleep throughout the night.   Bedtime and wake-up times should be consistent every night and morning so the body becomes used to a single routine, even on the weekends.  Engage in daily physical activity, but not 2-3 hours before bedtime.   No technology use (television, computer, iPad) 1-2 hours before bed.   Have a wind down routine (e.g., soft lights in the house, bath before bed, reduced fluid intake, songs, reading, less noise) to promote sleep readiness.   Visit the www.sleepfoundation.org for more strategies.      COGNITIVE HYGIENE     Engage in regular exercise, which increases alertness and arousal and can improve attention and focus.  Consider lower impact exercises, such as yoga or light walking.  Get a good nights sleep, as this can enhance alertness and cognition.  Eat healthy foods and balanced meals. It is notable that research indicates certain nutrients may aid in brain function, such as B vitamins (especially B6, B12, and folic acid), antioxidants (such as vitamins C and E, and beta carotene), and Omega-3 fatty acids. Talk with your physician or nutritionist about whats right for you.   Keep your brain active. Find activities to stay mentally active, such as reading, games (cards, checkers), puzzles (crosswords, Sudoku, jig saw), crafts (models, woodworking), gardening, or participating in activities in the community.  Stay socially engaged. Continue staying active with your family and friends.      FUTURE PLANNING     The patient and caregivers should consider formal arrangements to allow a designated person to make medical and financial decisions for the pt, should he/she become unable to do so.  Options  to consider include designating a healthcare proxy, medical and/or financial power of , and completing advanced directives for healthcare decisions and estate planning (e.g., finalizing a will).  If cost is prohibitive, [facility] Legal Services (https://UpMo.org/) provides free  for individuals with low income.       ADDITIONAL RESOURCES     [facility] (www.http://alzbr.org) have good local caregiver and patient resources.   Consider resources for support through the [facility] [facility] (http://goea.[place].gov/), [place] Chapter of the Alzheimers Association (www.alz.org/[facility] [place]/), the [employer]iver [place] (www.caregiver.org), and the American Psychological Association (http://www.apa.org/pi/about/publications/caregivers/consumers/index.aspxconsumers/index.aspx).  For More Information About Hallucinations, Delusions, and Paranoia in Alzheimer's [facility] Alzheimer's and related Dementias Education and Referral (ADEAR) Center 1-135.858.6856 (toll-free) adear@cristy.nih.gov www.cristy.nih.gov/alzheimers The [employer] [place] [employer]'s ADEAR Center offers information and free print publications about Alzheimer's disease and related dementias for families, caregivers, and health professionals. ADEAR Center staff answer telephone, email, and written requests and make referrals to local and national resources  MEDICAL/SURGICAL COMORBIDITIES     All relevant medical comorbidities noted and managed by primary care physician and medical care team.          MISCELLANEOUS MEDICAL PROBLEMS       HEALTHY LIFESTYLE AND PREVENTATIVE CARE    Encouraged the patient to adhere to the age-appropriate health maintenance guidelines including screening tests and vaccinations.     Discussed the overall importance of healthy lifestyle, optimal weight, exercise, healthy diet, good sleep hygiene and avoiding drugs including smoking, alcohol and recreational drugs.  The patient verbalized full understanding.       Advised the patient to follow COVID-19 prevention measures.       I spent 130 minutes more than face to face with the patient    Time spent in counseling and coordination of care including discussions etiology of diagnosis, pathogenesis of diagnosis, prognosis of diagnosis,, diagnostic results, impression and recommendations, diagnostic studies, management, risks and benefits of treatment, instructions of disease self-management, treatment instructions, follow up requirements, patient and family counseling/involvement in care compliance with treatment regimen. All of the patient's questions were answered during this discussion.       Morris Guillen, MSN NP      Collaborating Provider: Millie Dang MD, FAAN Neurologist/Epileptologist

## 2024-06-14 LAB — ANA SER QL IF: NORMAL

## 2024-06-14 NOTE — PROGRESS NOTES
LABORATORY EVALUATION:    06-    VITAMIN B12 >2000^, FA LEVEL 3.5 LOW, ANDREA NEG, HC LEVEL 9.9 NL, HIV -VE, TSH LEVEL 2.747 NL, RPR -VE,

## 2024-06-22 ENCOUNTER — PATIENT MESSAGE (OUTPATIENT)
Dept: ADMINISTRATIVE | Facility: OTHER | Age: 69
End: 2024-06-22
Payer: MEDICARE

## 2024-06-23 ENCOUNTER — PATIENT MESSAGE (OUTPATIENT)
Dept: ADMINISTRATIVE | Facility: OTHER | Age: 69
End: 2024-06-23
Payer: MEDICARE

## 2024-06-24 ENCOUNTER — PATIENT OUTREACH (OUTPATIENT)
Dept: PULMONOLOGY | Facility: CLINIC | Age: 69
End: 2024-06-24
Payer: MEDICARE

## 2024-06-24 PROBLEM — A41.9 SEPSIS: Status: RESOLVED | Noted: 2024-03-25 | Resolved: 2024-06-24

## 2024-06-24 NOTE — TELEPHONE ENCOUNTER
Chronic Disease Management  Called patient in regard to abnormal Pulmonary Disease Management Questionnaire:     Patient will return call to PDM.

## 2024-06-27 ENCOUNTER — HOSPITAL ENCOUNTER (OUTPATIENT)
Dept: RADIOLOGY | Facility: HOSPITAL | Age: 69
Discharge: HOME | End: 2024-06-27
Attending: NURSE PRACTITIONER
Payer: MEDICARE

## 2024-06-27 DIAGNOSIS — R68.89 FORGETFULNESS: ICD-10-CM

## 2024-06-27 DIAGNOSIS — F01.B0 MODERATE VASCULAR DEMENTIA WITHOUT BEHAVIORAL DISTURBANCE, PSYCHOTIC DISTURBANCE, MOOD DISTURBANCE, OR ANXIETY: ICD-10-CM

## 2024-06-27 PROCEDURE — 70551 MRI BRAIN STEM W/O DYE: CPT | Mod: TC,HCNC

## 2024-06-27 PROCEDURE — 70551 MRI BRAIN STEM W/O DYE: CPT | Mod: 26,HCNC,, | Performed by: STUDENT IN AN ORGANIZED HEALTH CARE EDUCATION/TRAINING PROGRAM

## 2024-06-29 ENCOUNTER — DOCUMENT SCAN (OUTPATIENT)
Dept: HOME HEALTH SERVICES | Facility: HOSPITAL | Age: 69
End: 2024-06-29
Payer: MEDICARE

## 2024-07-01 ENCOUNTER — TELEPHONE (OUTPATIENT)
Dept: NEUROLOGY | Facility: CLINIC | Age: 69
End: 2024-07-01
Payer: MEDICARE

## 2024-07-01 NOTE — TELEPHONE ENCOUNTER
----- Message from Celeste Guillen NP sent at 6/30/2024 10:36 PM CDT -----  MRI BRAIN WO:    06-    Mild global cortical atrophy without lobar predominance.     Nonspecific white matter changes likely related to chronic microvascular ischemia.     Small focus of susceptibility artifact in the right parietotemporal lobe likely related to remote microhemorrhage.    No acute findings recommend control and management of B/p that could cause micro-hemorrhage

## 2024-07-07 NOTE — TELEPHONE ENCOUNTER
Care Due:                  Date            Visit Type   Department     Provider  --------------------------------------------------------------------------------                                EP -                              PRIMARY      [facility]  Last Visit: 05-      CARE ([facility])   MEDICINE       Olga Altman  Next Visit: None Scheduled  None         None Found                                                            Last  Test          Frequency    Reason                     Performed    Due Date  --------------------------------------------------------------------------------    Lipid Panel.  12 months..  ezetimibe-simvastatin....  04- 04-    [facility] Embedded Care Due Messages. Reference number: [id number].   7/07/2024 8:04:13 AM CDT

## 2024-07-08 RX ORDER — DICYCLOMINE HYDROCHLORIDE 10 MG/1
10 CAPSULE ORAL
Qty: 90 CAPSULE | Refills: 2 | Status: SHIPPED | OUTPATIENT
Start: 2024-07-08

## 2024-07-09 ENCOUNTER — HOSPITAL ENCOUNTER (OUTPATIENT)
Dept: CARDIOLOGY | Facility: HOSPITAL | Age: 69
Discharge: HOME | End: 2024-07-09
Attending: INTERNAL MEDICINE
Payer: MEDICARE

## 2024-07-09 ENCOUNTER — OFFICE VISIT (OUTPATIENT)
Dept: CARDIOLOGY | Facility: CLINIC | Age: 69
End: 2024-07-09
Payer: MEDICARE

## 2024-07-09 VITALS
DIASTOLIC BLOOD PRESSURE: 60 MMHG | WEIGHT: 131.31 LBS | SYSTOLIC BLOOD PRESSURE: 160 MMHG | BODY MASS INDEX: 24.16 KG/M2 | HEIGHT: 62 IN | HEART RATE: 78 BPM

## 2024-07-09 DIAGNOSIS — I25.118 CORONARY ARTERY DISEASE OF NATIVE ARTERY OF NATIVE HEART WITH STABLE ANGINA PECTORIS: Primary | ICD-10-CM

## 2024-07-09 DIAGNOSIS — K55.1 MESENTERIC ISCHEMIA, CHRONIC: ICD-10-CM

## 2024-07-09 DIAGNOSIS — Z91.81 HISTORY OF FALL: ICD-10-CM

## 2024-07-09 DIAGNOSIS — I10 ESSENTIAL HYPERTENSION: ICD-10-CM

## 2024-07-09 DIAGNOSIS — R42 DIZZINESS: ICD-10-CM

## 2024-07-09 DIAGNOSIS — M25.551 PAIN OF RIGHT HIP: ICD-10-CM

## 2024-07-09 DIAGNOSIS — I25.118 CORONARY ARTERY DISEASE OF NATIVE ARTERY OF NATIVE HEART WITH STABLE ANGINA PECTORIS: ICD-10-CM

## 2024-07-09 DIAGNOSIS — I65.23 BILATERAL CAROTID ARTERY STENOSIS: ICD-10-CM

## 2024-07-09 DIAGNOSIS — H81.10 BENIGN PAROXYSMAL POSITIONAL VERTIGO, UNSPECIFIED LATERALITY: ICD-10-CM

## 2024-07-09 DIAGNOSIS — R73.03 PREDIABETES: ICD-10-CM

## 2024-07-09 DIAGNOSIS — R06.02 SHORTNESS OF BREATH: ICD-10-CM

## 2024-07-09 DIAGNOSIS — R73.9 HYPERGLYCEMIA: ICD-10-CM

## 2024-07-09 DIAGNOSIS — R07.9 CHEST PAIN, UNSPECIFIED TYPE: ICD-10-CM

## 2024-07-09 DIAGNOSIS — K21.9 GASTROESOPHAGEAL REFLUX DISEASE, UNSPECIFIED WHETHER ESOPHAGITIS PRESENT: ICD-10-CM

## 2024-07-09 DIAGNOSIS — F17.211 CIGARETTE NICOTINE DEPENDENCE IN REMISSION: ICD-10-CM

## 2024-07-09 DIAGNOSIS — I49.5 SINUS NODE DYSFUNCTION: ICD-10-CM

## 2024-07-09 DIAGNOSIS — J43.9 NOCTURNAL HYPOXEMIA DUE TO EMPHYSEMA: ICD-10-CM

## 2024-07-09 DIAGNOSIS — E78.2 MIXED HYPERLIPIDEMIA: ICD-10-CM

## 2024-07-09 DIAGNOSIS — I70.0 AORTIC ATHEROSCLEROSIS: ICD-10-CM

## 2024-07-09 DIAGNOSIS — I71.43 INFRARENAL ABDOMINAL AORTIC ANEURYSM (AAA) WITHOUT RUPTURE: ICD-10-CM

## 2024-07-09 DIAGNOSIS — F01.B0 MODERATE VASCULAR DEMENTIA WITHOUT BEHAVIORAL DISTURBANCE, PSYCHOTIC DISTURBANCE, MOOD DISTURBANCE, OR ANXIETY: ICD-10-CM

## 2024-07-09 DIAGNOSIS — I70.223 ATHEROSCLEROSIS OF NATIVE ARTERIES OF EXTREMITIES WITH REST PAIN, BILATERAL LEGS: ICD-10-CM

## 2024-07-09 DIAGNOSIS — J44.9 COPD, SEVERE: ICD-10-CM

## 2024-07-09 DIAGNOSIS — G47.36 NOCTURNAL HYPOXEMIA DUE TO EMPHYSEMA: ICD-10-CM

## 2024-07-09 DIAGNOSIS — G31.84 MILD COGNITIVE IMPAIRMENT: ICD-10-CM

## 2024-07-09 PROCEDURE — 3077F SYST BP >= 140 MM HG: CPT | Mod: HCNC,CPTII,S$GLB, | Performed by: INTERNAL MEDICINE

## 2024-07-09 PROCEDURE — 1125F AMNT PAIN NOTED PAIN PRSNT: CPT | Mod: HCNC,CPTII,S$GLB, | Performed by: INTERNAL MEDICINE

## 2024-07-09 PROCEDURE — 1101F PT FALLS ASSESS-DOCD LE1/YR: CPT | Mod: HCNC,CPTII,S$GLB, | Performed by: INTERNAL MEDICINE

## 2024-07-09 PROCEDURE — 93005 ELECTROCARDIOGRAM TRACING: CPT | Mod: HCNC

## 2024-07-09 PROCEDURE — 93010 ELECTROCARDIOGRAM REPORT: CPT | Mod: HCNC,,, | Performed by: INTERNAL MEDICINE

## 2024-07-09 PROCEDURE — 99999 PR PBB SHADOW E&M-EST. PATIENT-LVL III: CPT | Mod: PBBFAC,HCNC,, | Performed by: INTERNAL MEDICINE

## 2024-07-09 PROCEDURE — 3008F BODY MASS INDEX DOCD: CPT | Mod: HCNC,CPTII,S$GLB, | Performed by: INTERNAL MEDICINE

## 2024-07-09 PROCEDURE — 3078F DIAST BP <80 MM HG: CPT | Mod: HCNC,CPTII,S$GLB, | Performed by: INTERNAL MEDICINE

## 2024-07-09 PROCEDURE — 4010F ACE/ARB THERAPY RXD/TAKEN: CPT | Mod: HCNC,CPTII,S$GLB, | Performed by: INTERNAL MEDICINE

## 2024-07-09 PROCEDURE — 1160F RVW MEDS BY RX/DR IN RCRD: CPT | Mod: HCNC,CPTII,S$GLB, | Performed by: INTERNAL MEDICINE

## 2024-07-09 PROCEDURE — 3044F HG A1C LEVEL LT 7.0%: CPT | Mod: HCNC,CPTII,S$GLB, | Performed by: INTERNAL MEDICINE

## 2024-07-09 PROCEDURE — 1159F MED LIST DOCD IN RCRD: CPT | Mod: HCNC,CPTII,S$GLB, | Performed by: INTERNAL MEDICINE

## 2024-07-09 PROCEDURE — 99215 OFFICE O/P EST HI 40 MIN: CPT | Mod: HCNC,25,S$GLB, | Performed by: INTERNAL MEDICINE

## 2024-07-09 PROCEDURE — 3288F FALL RISK ASSESSMENT DOCD: CPT | Mod: HCNC,CPTII,S$GLB, | Performed by: INTERNAL MEDICINE

## 2024-07-09 RX ORDER — HYDROCHLOROTHIAZIDE 12.5 MG/1
12.5 CAPSULE ORAL
COMMUNITY
Start: 2024-04-23

## 2024-07-09 RX ORDER — FUROSEMIDE 20 MG/1
20 TABLET ORAL DAILY PRN
COMMUNITY
Start: 2024-04-25

## 2024-07-09 NOTE — PROGRESS NOTES
Subjective:   Patient ID:  Gemma Vick is a 69 y.o. female who presents for follow up of Shortness of Breath and Blood Pressure Check (Has been fluctuating )      HPI  3/26/2020  A 64 yo female with pvd carotid disease htn hlp copd exsmoker on nocturnal o2 therapy wants to discuss test results. She is in digital htn has been unable to tolerate bisoprolol daily feels tired fatigued no energy she takes meds regularily tries to be compliant with salt no exercise but stays busy in the house. She takes care of her grandbabies. Has occasional leg swelling she takes lasix prn for weight change she stands on her feet a lot. She is compliant with inhalers to control shortness of breath no palpitation has chronic cough.  Her carotid u/s reviewed unchanged.abd u/s no aneurysm  Lipids on target she is well controlled on vytorin .she has difficulty with crestor and lipitor   9/24/2020  She is here for  f/u she is complaining of recurrent episodes of abdominal pain lower quadrant and got to be upper abdomen associated with nausea vomiting no post prandial pain no weight loss or food avoidance. Has no new symptoms of chest pain she is still short of breath no new palpitation tia.   Ct abdomen showed diverticulitis aaa 3.4 cm and sma stenosis.   Her ldl has increased. She has had sore muscles she stopped statins w/o improvemnet. She needs to back on statins that would explain he rincreased ldl.      3/25/2021  Here for f/u has sharp recurrent left sided occurring at rest sitting in recliner she cannot take a deep breath no exertional symptoms she takes care of her grandbabies no smoking no tia palpitation syncope near syncope. She takes  meds regularily .reveiw of her bp chart still showed elevated indices. She claims salt compliance. She could not tolerate amlodipine bid . Her lipids aRE ON TARGET.      9/30/2021    has been using o2 therapy at nite no change in symptoms her bp is elevated she takes amlodipine 5 mg at nite she  is not compliant with diet. Salt wise.she takes care of grandkids no chest pain no cardiac symptoms. She has the urge to go to bathroom after she eats she has upperabdominal pain and feels like throwing up. Has known stenosis of the sma  She has lost weight.      11/1/2021   She is taking 7.5 mg amlodipine still needs better  salt control bp acceptable here however at home is more elevated but we have not checked her machine. She continues to have abdominal symptoms. She wants alternatives to ibesartan her pill is too big to swallow her lipids are on target. (she will check on diovan 320 and benicar 40 mg with pharmacist about size and cost).she is in digital htn her bp readings are more elevated that today clinic readings.she ahs not been compliant with diet she had fried chicken mashed potatoes french fries and   And biscuits  From [employer].   She had cta for her abdominal symptoms showing celiac stenosis and stenosis at the sma. Has also bilateral iliac stenosis greater than 50%.   Her carotid anatomy is unchanged.         12/8/2021   Today bp is controlled she ahs taken a fluid pill her bp readings at home since last visit has been 160-170 range. She si non compliant with salt she took diuretics due to leg swelling which is related to amlodipine. She has also an mason with exercise that did not suggest significant disease she continues to have symptoms suggestive of musculoskeltal on the rt and sciatica. No definite claudication no discoloration. she has no tia.      4/1/2022  Not much leg swelling has been drinking a lot of water bp fluctuates based on salt her digital htn reflects that now today is on target. Has been in after hours due abdominal pain constipation issue. Has nausea at that time . All improved still          dealing with constipation has use magnesium citrate.   She is not using diuretics except intermittently   Has question about arthritis meds she can take advised short course is ok but  prolonged use is high risk of bleeding.      7/27/2022  Here for evaluation for colonoscopy. Has a positive school screening test was scheduled for colonoscopy . Has iron deficiency anemia has fatigue. She has seen vascular surgery because her colonoscopy was cancel;ed has m,oderate carotid disease. Has cta mesenteric stenosis. She has abdominal pain has improvement after taking bentyl some times she vomited digetsed food after eating no diarrhea  Has postprandial pain  opn the rt then progresses to to the bottom and lower abdomen. She is non compliant with salt that is why her bp is elevated.      12/8/2022   Here forf /u has been evaluated by ep no need for any pacer or intervention. Has been evaluated by vascular surgery no significant carotid disease. She is getting very forgetful was evaluated at [facility] for dementia she is labeled as vascular dementia  and shrinking brain. Her aaa is around 4 cm.   She had a slaty meal before coming to see me her bp is elevated she is non complaint with salt intake. She cannot remember her meds. She stopped a bp med that she does not remember the name. No cardiac symptoms.      4/25/2023 family is present they are concerned about her situation.   here for f /u has still been having abdominal pain post prandial. Has vascular dementia per neurologist opinion. She is getting forgetful. However she is not complaint with salt .charlotte ate at [employer] before she comes see em today. had back pain no tia no claudication. Had a death  in family her brother passed away. She is concerned about her heart . Has aaa around 4 cm.     6/16/2023  HERE TO DISCUSS HER CTA FINDINGS SHE CONTINUES TO HAVE POSTPRANDIAL ABDOMINAL PAIN CRAMPS SHE IS NOT ABLE TOE AT HER MEALS SHE IHAS LOST A LOT OF WEIGHT. HER CTA SHOWED SEVERE CELIAC AND SMA DISEASE. . SHE IS TRYING TO UNDERSTAND HER SITUATION CLINICALLY AND NEED FOR INTERVENTION. HER DAUGHTER WAS PRESENT TO HELP UNDERSTAND SINCE SHE HAS VASCULAR  DEMENTIA. I ANSWERED ALL THEIR QUESTIONS REVIEWED CTA PICTURES AND EXPLAINED PROCEDURE IN DETAIL WITH INDICATIONS RISK BENEFITS AND COMPLICATIONS     9/21/2023  Here fro f/u had aCS STENTED RCA DUE TO ULCERATED PLAQUE AND CLOTS HER ANGINA RESOLVED. HAS ISSUE WITH HTN SALT INTAKE FATIGUE. DR UNDERWOOD INCREASED COREG AND AMLODIPINE   HAD PUFFINESS IN LEFT RADIAL SITE         Hospital dc 3/28/2024   Gemma Vick is a 69-year-old female with a past history significant for COPD, former smoker, CAD s/p stents, KALLIE, vascular dementia, Alzheimer's, AAA-stable on CT, HTN, HLD, GERD, diverticulitis/osis, chronic mesenteric ischemia, GERD, DDD, osteoarthritis, lumbar radiculopathy, who presented to the ED c/o diffuse abdominal pain, nausea, generalized weakness onset today. Reports poor oral intake for several days. States she fell several days ago and also has left hip pain, and was started on Robaxin. Last BM yesterday, small, soft/formed, dark, but has been dark since starting iron supplements. She also endorses oliguria w/dark urine, hemorrhoids, hx of constipation and feels like she needs an enema. Reports several episodes of vomiting today, stating it was more reflux and some bile, but very small volume. Patient denies fever, chills, cough, congestion, dizziness, dyspnea, chest discomfort, dysuria, hematuria, myalgias. Patient denies sick contacts, potential of contaminated food or water.      ED workup with noted leukocytosis, hyponatremia-corrected 123, hyperglycemia. CT c/a/p shows colonic wall thickening/inflammation of descending colon consistent with colitis, surgically absent gall bladder. CXR and L hip XR w/NAF. CT head w/NAF. UA w/no evidence of infection or ketonuria. Lactate, procalcitonin, troponin, TSH, lipase, LFT's WNL. Flu/COVID negative. She was found to have a rectal temp of 92 and was placed on Mirna hugger. She was given warmed IVF, antibiotics, glycerin suppository, has cho w/bg urine, adequate  output noted in bag. Critical care was consulted for admission and management due to hypothermia requiring active re-warming.      * No surgery found *       Hospital Course:   A patient was admitted to the ICU on March 25th with severe hyponatremia, with a sodium level of 119, hypothermia at 92°F, and severe constipation. The patient underwent rewarming, and her sodium level improved to 126. She also experienced a large bowel movement and has had multiple bowel movements since then. Due to these improvements, she is now being downgraded to telemetry under hospital medicine and is resting comfortably. The hyponatremia was likely secondary to the patient's misuse of HCTZ (hydrochlorothiazide), which was prescribed by her cardiologist for as-needed use. However, the patient, misunderstanding the instructions and influenced by online information, began taking it daily. Her sodium levels have stabilized since discontinuing the medication, and repeat labs are scheduled with her primary care provider within 1-2 weeks of discharge.     Further investigations revealed inflammation in the descending colon, suggestive of either infectious or inflammatory colitis, with ischemic changes also considered due to the patient's history of mesenteric ischemia and recent symptoms of dizziness post HCTZ administration. Despite these concerns, her lactate levels have remained normal, and stool studies have been negative, although she has been receiving antibiotics. The plan is to continue antibiotics for 7-10 days. Discharged with additional 5 days of therapy after receiving 4 days of abx here. There are no immediate plans for endoscopy due to recent use of Plavix, but outpatient follow-up will be considered for a colonoscopy, given the patient's high sedation risk history. No further inpatient gastrointestinal recommendations are provided at this time, but arrangements for outpatient follow-up will be made. Cardiology has noted  "clinical improvement and cardiovascular stability, advising the continuation of optimal medical therapy with a follow-up in clinic to possibly discuss a mesenteric angiogram.     BP (!) 155/70 (Patient Position: Lying)   Pulse 77   Temp 97.6 °F (36.4 °C) (Oral)   Resp 20   Ht 5' 2" (1.575 m)   Wt 59.6 kg (131 lb 6.3 oz)   SpO2 96%   BMI 24.03 kg/m²   PHYSICAL EXAM  Vitals Reviewed  GEN: No acute distress, pleasant, body habitus normal  HEENT: atraumatic and normocephalic  CARDS: regular rate and rhythm, no m/g, pulses palpable in LE  PULM: breathing comfortably on room air, chest symmetric, nonlabored, no abnormal breath sounds on auscultation  ABD: nontender, nondistended, soft, no organomegaly, BS+  Neuro: Alert and oriented x3, CN's I-IX grossly intact, sensation and motor intact; follows directions and answers questions appropriately     Feeling weak dizzy hwer diastolic is low she has been getting pt at home her appetite is staring to improve. Denies abdominal symptoms.     7/9/2024  Here for f/u still issues with fall she slips. She is compliant with meds and diet. Is in digital htn program. Has shortness of breath evaluated with pulmonary.still ahs some low heart rate. Tries to be compliant with med. Her shortness of breath occurs at rest watching tv. Has no cardiac awareness with it.   Has hip pain limiting her after the fall she is using a walker.   Past Medical History:   Diagnosis Date    AAA (abdominal aortic aneurysm) 02/13/2014    Abdominal aneurysm     3    Acute coronary syndrome     Arthritis     BPPV (benign paroxysmal positional vertigo)     Carotid artery plaque     Carotid artery stenosis and occlusion 02/13/2014    Chronic back pain     COPD (chronic obstructive pulmonary disease)     Coronary artery disease     Dementia     Emphysema lung     Hyperlipidemia     Hypertension     Myocardial infarction     x3    Neuropathy     Personal history of COVID-19 06/09/2021 11/16/2020 +Covid, " recovered at home        Past Surgical History:   Procedure Laterality Date    CARDIAC CATHETERIZATION      CORONARY ANGIOPLASTY      CORONARY STENT PLACEMENT N/A 2023    Procedure: INSERTION, STENT, CORONARY ARTERY;  Surgeon: Millie Juarez MD;  Location: [facility] CATH LAB;  Service: Cardiology;  Laterality: N/A;    HYSTERECTOMY  1988    LEFT HEART CATHETERIZATION Left 2023    Procedure: Left heart cath;  Surgeon: Millie Juarez MD;  Location: [facility] CATH LAB;  Service: Cardiology;  Laterality: Left;    PERCUTANEOUS CORONARY INTERVENTION, ARTERY N/A 2023    Procedure: Percutaneous coronary intervention;  Surgeon: Millie Juarez MD;  Location: [facility] CATH LAB;  Service: Cardiology;  Laterality: N/A;    THROMBECTOMY, CORONARY  2023    Procedure: Thrombectomy, Coronary;  Surgeon: Millie Juarez MD;  Location: [facility] CATH LAB;  Service: Cardiology;;    TONSILLECTOMY      TRANSFORAMINAL EPIDURAL INJECTION OF STEROID Right 2023    Procedure: Injection,steroid,epidural,transforaminal approach- right side, L4/5 and L5/S1;  Surgeon: Lydia Roberts MD;  Location: [facility] PAIN MGT;  Service: Pain Management;  Laterality: Right;       Social History     Tobacco Use    Smoking status: Former     Current packs/day: 0.00     Average packs/day: 0.5 packs/day for 46.9 years (23.4 ttl pk-yrs)     Types: Cigarettes, Vaping with nicotine     Start date: 1970     Quit date: 2017     Years since quittin.1    Smokeless tobacco: Never    Tobacco comments:     3 MG NICOTINE FOR HEART.    Substance Use Topics    Alcohol use: No    Drug use: No       Family History   Problem Relation Name Age of Onset    Heart disease Mother      Heart attacks under age 50 Father      Heart attacks under age 50 Brother          HA at 32    Heart attack Brother          HA at 70    Breast cancer Paternal Aunt         Current Outpatient Medications   Medication Sig    albuterol-ipratropium (DUO-NEB) 2.5 mg-0.5 mg/3 mL nebulizer  solution Take 3 mLs by nebulization every 6 (six) hours as needed for Wheezing.    amLODIPine (NORVASC) 5 MG tablet Take 1 tablet (5 mg total) by mouth once daily.    aspirin 81 MG Chew Take 81 mg by mouth once daily.    budesonide-glycopyr-formoterol (BREZTRI AEROSPHERE) 160-9-4.8 mcg/actuation HFAA Inhale 2 puffs into the lungs 2 (two) times a day.    carvediloL (COREG) 6.25 MG tablet Take 1 tablet (6.25 mg total) by mouth 2 (two) times daily with meals.    clopidogreL (PLAVIX) 75 mg tablet Take 1 tablet (75 mg total) by mouth once daily.    COMBIVENT RESPIMAT  mcg/actuation inhaler Inhale 1 puff into the lungs every 6 (six) hours as needed for Wheezing.    cyanocobalamin (VITAMIN B-12) 100 MCG tablet Take 100 mcg by mouth once daily.    diclofenac sodium (VOLTAREN) 1 % Gel Apply topically 4 (four) times daily.    dicyclomine (BENTYL) 10 MG capsule TAKE ONE CAPSULE BY MOUTH THREE TIMES DAILY AS NEEDED    ezetimibe-simvastatin 10-40 mg (VYTORIN) 10-40 mg per tablet Take 1 tablet by mouth every evening.    famotidine (PEPCID) 20 MG tablet Take 1 tablet (20 mg total) by mouth 2 (two) times daily as needed for Heartburn.    furosemide (LASIX) 20 MG tablet Take 20 mg by mouth daily as needed.    hydroCHLOROthiazide (MICROZIDE) 12.5 mg capsule Take 12.5 mg by mouth as needed.    memantine (NAMENDA) 10 MG Tab Take 1 tablet (10 mg total) by mouth 2 (two) times daily.    olmesartan (BENICAR) 40 MG tablet Take 1 tablet (40 mg total) by mouth once daily.    OXYGEN-AIR DELIVERY SYSTEMS MISC 3 L by Misc.(Non-Drug; Combo Route) route every evening.    pantoprazole (PROTONIX) 40 MG tablet Take 1 tablet (40 mg total) by mouth once daily.    vitamin D (VITAMIN D3) 1000 units Tab Take 1,000 Units by mouth once daily.    zolpidem (AMBIEN) 10 mg Tab Take 1 tablet (10 mg total) by mouth every evening.    inhalation spacing device (COMPACT SPACE CHAMBER) USE AS DIRECTED     No current facility-administered medications for this  visit.     Current Outpatient Medications on File Prior to Visit   Medication Sig    albuterol-ipratropium (DUO-NEB) 2.5 mg-0.5 mg/3 mL nebulizer solution Take 3 mLs by nebulization every 6 (six) hours as needed for Wheezing.    amLODIPine (NORVASC) 5 MG tablet Take 1 tablet (5 mg total) by mouth once daily.    aspirin 81 MG Chew Take 81 mg by mouth once daily.    budesonide-glycopyr-formoterol (BREZTRI AEROSPHERE) 160-9-4.8 mcg/actuation HFAA Inhale 2 puffs into the lungs 2 (two) times a day.    carvediloL (COREG) 6.25 MG tablet Take 1 tablet (6.25 mg total) by mouth 2 (two) times daily with meals.    clopidogreL (PLAVIX) 75 mg tablet Take 1 tablet (75 mg total) by mouth once daily.    COMBIVENT RESPIMAT  mcg/actuation inhaler Inhale 1 puff into the lungs every 6 (six) hours as needed for Wheezing.    cyanocobalamin (VITAMIN B-12) 100 MCG tablet Take 100 mcg by mouth once daily.    diclofenac sodium (VOLTAREN) 1 % Gel Apply topically 4 (four) times daily.    dicyclomine (BENTYL) 10 MG capsule TAKE ONE CAPSULE BY MOUTH THREE TIMES DAILY AS NEEDED    ezetimibe-simvastatin 10-40 mg (VYTORIN) 10-40 mg per tablet Take 1 tablet by mouth every evening.    famotidine (PEPCID) 20 MG tablet Take 1 tablet (20 mg total) by mouth 2 (two) times daily as needed for Heartburn.    furosemide (LASIX) 20 MG tablet Take 20 mg by mouth daily as needed.    hydroCHLOROthiazide (MICROZIDE) 12.5 mg capsule Take 12.5 mg by mouth as needed.    memantine (NAMENDA) 10 MG Tab Take 1 tablet (10 mg total) by mouth 2 (two) times daily.    olmesartan (BENICAR) 40 MG tablet Take 1 tablet (40 mg total) by mouth once daily.    OXYGEN-AIR DELIVERY SYSTEMS MISC 3 L by Misc.(Non-Drug; Combo Route) route every evening.    pantoprazole (PROTONIX) 40 MG tablet Take 1 tablet (40 mg total) by mouth once daily.    vitamin D (VITAMIN D3) 1000 units Tab Take 1,000 Units by mouth once daily.    zolpidem (AMBIEN) 10 mg Tab Take 1 tablet (10 mg total) by  "mouth every evening.    inhalation spacing device (COMPACT SPACE CHAMBER) USE AS DIRECTED     No current facility-administered medications on file prior to visit.     Review of patient's allergies indicates:  No Known Allergies   Review of Systems   Constitutional: Negative for diaphoresis, malaise/fatigue and weight gain.   HENT:  Negative for hoarse voice.    Eyes:  Negative for double vision and visual disturbance.   Cardiovascular:  Negative for chest pain, claudication, cyanosis, dyspnea on exertion, irregular heartbeat, leg swelling, near-syncope, orthopnea, palpitations, paroxysmal nocturnal dyspnea and syncope.   Respiratory:  Positive for shortness of breath. Negative for cough, hemoptysis and snoring.    Hematologic/Lymphatic: Negative for bleeding problem. Does not bruise/bleed easily.   Skin:  Negative for color change and poor wound healing.   Musculoskeletal:  Positive for arthritis, falls and joint pain. Negative for muscle cramps, muscle weakness and myalgias.   Gastrointestinal:  Negative for bloating, abdominal pain, change in bowel habit, diarrhea, heartburn, hematemesis, hematochezia, melena and nausea.   Neurological:  Negative for excessive daytime sleepiness, dizziness, headaches, light-headedness, loss of balance, numbness and weakness.   Psychiatric/Behavioral:  Negative for memory loss. The patient does not have insomnia.    Allergic/Immunologic: Negative for hives.       Objective:   Physical Exam  Vitals:    07/09/24 1629 07/09/24 1630   BP: (!) 150/58 (!) 160/60   BP Location: Right arm Left arm   Patient Position: Sitting Sitting   BP Method: Small (Manual) Small (Manual)   Pulse: 78    Weight: 59.5 kg (131 lb 4.5 oz)    Height: 5' 1.5" (1.562 m)    Constitutional:       General: She is not in acute distress.     Appearance: Normal appearance. She is well-developed. She is not ill-appearing.   HENT:      Head: Normocephalic and atraumatic.   Eyes:      General: No scleral icterus.     " Pupils: Pupils are equal, round, and reactive to light.   Neck:      Thyroid: No thyromegaly.      Vascular: Normal carotid pulses. Carotid bruit present. No hepatojugular reflux or JVD.      Trachea: No tracheal deviation.   Cardiovascular:      Rate and Rhythm: Normal rate and regular rhythm.      Pulses:           Carotid pulses are 2+ on the right side with bruit and 2+ on the left side with bruit.       Radial pulses are 2+ on the right side and 2+ on the left side.        Dorsalis pedis pulses are 1+ on the right side and 1+ on the left side.        Posterior tibial pulses are 1+ on the right side and 1+ on the left side.      Heart sounds: Murmur heard.      Harsh midsystolic murmur is present with a grade of 2/6 at the upper right sternal border radiating to the neck.      High-pitched blowing decrescendo early diastolic murmur is present with a grade of 1/4 at the upper right sternal border radiating to the apex.      No friction rub. No gallop.      Comments: Abdominal bruits noted.  Pulmonary:      Effort: Pulmonary effort is normal. No respiratory distress.      Breath sounds: Normal breath sounds. No wheezing, rhonchi or rales.   Chest:      Chest wall: No tenderness.   Abdominal:      General: Bowel sounds are normal. There is no abdominal bruit.      Palpations: Abdomen is soft. There is no hepatomegaly or pulsatile mass.      Tenderness: There is no abdominal tenderness.   Musculoskeletal:      Right shoulder: No deformity.      Cervical back: Normal range of motion and neck supple.   Skin:     General: Skin is warm and dry.      Findings: No erythema or rash.      Nails: There is no clubbing.   Neurological:      Mental Status: She is alert and oriented to person, place, and time.      Cranial Nerves: No cranial nerve deficit.      Coordination: Coordination normal.   Psychiatric:         Speech: Speech normal.         Behavior: Behavior normal.              Lab Results   Component Value Date     CHOL 138 04/18/2023    CHOL 143 12/01/2022    CHOL 164 10/25/2022      Body mass index is 24.4 kg/m².   Lab Results   Component Value Date    HGBA1C 5.2 03/26/2024      BMP  Lab Results   Component Value Date     (L) 04/24/2024    K 4.2 04/24/2024     04/24/2024    CO2 26 04/24/2024    BUN 13 04/24/2024    CREATININE 0.9 04/24/2024    CALCIUM 9.7 04/24/2024    ANIONGAP 7 (L) 04/24/2024    EGFRNORACEVR >60.0 04/24/2024      Lab Results   Component Value Date    HDL 45 04/18/2023    HDL 56 12/01/2022    HDL 60 10/25/2022     Lab Results   Component Value Date    LDLCALC 79.0 04/18/2023    LDLCALC 72.0 12/01/2022    LDLCALC 83.0 10/25/2022     Lab Results   Component Value Date    TRIG 70 04/18/2023    TRIG 75 12/01/2022    TRIG 105 10/25/2022     Lab Results   Component Value Date    CHOLHDL 32.6 04/18/2023    CHOLHDL 39.2 12/01/2022    CHOLHDL 36.6 10/25/2022       Chemistry        Component Value Date/Time     (L) 04/24/2024 1910    K 4.2 04/24/2024 1910     04/24/2024 1910    CO2 26 04/24/2024 1910    BUN 13 04/24/2024 1910    CREATININE 0.9 04/24/2024 1910    GLU 88 04/24/2024 1910        Component Value Date/Time    CALCIUM 9.7 04/24/2024 1910    ALKPHOS 67 04/02/2024 1352    AST 15 04/02/2024 1352    ALT 10 04/02/2024 1352    BILITOT 0.4 04/02/2024 1352    ESTGFRAFRICA >60.0 04/07/2022 0922    EGFRNONAA >60.0 04/07/2022 0922          Lab Results   Component Value Date    TSH 2.747 06/13/2024     Lab Results   Component Value Date    INR 1.1 12/20/2017    INR 1.1 08/10/2012     Lab Results   Component Value Date    WBC 8.32 04/02/2024    HGB 10.6 (L) 04/02/2024    HCT 31.6 (L) 04/02/2024    MCV 90 04/02/2024     04/02/2024     BMP  Sodium   Date Value Ref Range Status   04/24/2024 133 (L) 136 - 145 mmol/L Final     Potassium   Date Value Ref Range Status   04/24/2024 4.2 3.5 - 5.1 mmol/L Final     Chloride   Date Value Ref Range Status   04/24/2024 100 95 - 110 mmol/L Final     CO2    Date Value Ref Range Status   04/24/2024 26 23 - 29 mmol/L Final     BUN   Date Value Ref Range Status   04/24/2024 13 8 - 23 mg/dL Final     Creatinine   Date Value Ref Range Status   04/24/2024 0.9 0.5 - 1.4 mg/dL Final     Calcium   Date Value Ref Range Status   04/24/2024 9.7 8.7 - 10.5 mg/dL Final     Anion Gap   Date Value Ref Range Status   04/24/2024 7 (L) 8 - 16 mmol/L Final     eGFR if    Date Value Ref Range Status   04/07/2022 >60.0 >60 mL/min/1.73 m^2 Final     eGFR if non    Date Value Ref Range Status   04/07/2022 >60.0 >60 mL/min/1.73 m^2 Final     Comment:     Calculation used to obtain the estimated glomerular filtration  rate (eGFR) is the CKD-EPI equation.        CrCl cannot be calculated (Patient's most recent lab result is older than the maximum 7 days allowed.).    Assessment:     1. Coronary artery disease of native artery of native heart with stable angina pectoris    2. Mixed hyperlipidemia    3. Essential hypertension    4. Prediabetes    5. Benign paroxysmal positional vertigo, unspecified laterality    6. Bilateral carotid artery stenosis    7. Infrarenal abdominal aortic aneurysm (AAA) without rupture    8. Dizziness    9. Sinus node dysfunction    10. Nocturnal hypoxemia due to emphysema    11. COPD, severe    12. Cigarette nicotine dependence in remission    13. Gastroesophageal reflux disease, unspecified whether esophagitis present    14. History of fall    15. Aortic atherosclerosis    16. Mesenteric ischemia, chronic    17. Chest pain, unspecified type    18. Atherosclerosis of native arteries of extremities with rest pain, bilateral legs    19. Moderate vascular dementia without behavioral disturbance, psychotic disturbance, mood disturbance, or anxiety    20. Hyperglycemia    21. Mild cognitive impairment      Only status change is really her shortness of breath that needs to make sure she has no restenosis of her stents with ehr known cad and  intervention.  Htn labile did not take olmesartan today continue same meds and continue digital htn monitoring  Hlp on target continue same.  Pvd asymptomatic continue same therapy  Hip pain from fal;l she is limping using walker needs ortho eval.  Bruising secondary to antiplatelets will observe bleeding precaution.  Copd with nocturnal hypoxia continue o2 therapy.  Had a fall secondary to a tripping discussed fall precautions no cardiac etiology   Has intermittent sinus bradycardia asymptomatic observe EKG no  high grade block.  Plan:   Needs to see ortho  Echo cardiolite  Phone review  F/u as scheduled.  Hip xray

## 2024-07-10 ENCOUNTER — HOSPITAL ENCOUNTER (OUTPATIENT)
Dept: RADIOLOGY | Facility: HOSPITAL | Age: 69
Discharge: HOME | End: 2024-07-10
Attending: INTERNAL MEDICINE
Payer: MEDICARE

## 2024-07-10 ENCOUNTER — PATIENT MESSAGE (OUTPATIENT)
Dept: ORTHOPEDICS | Facility: CLINIC | Age: 69
End: 2024-07-10
Payer: MEDICARE

## 2024-07-10 ENCOUNTER — TELEPHONE (OUTPATIENT)
Dept: ORTHOPEDICS | Facility: CLINIC | Age: 69
End: 2024-07-10
Payer: MEDICARE

## 2024-07-10 DIAGNOSIS — I25.118 CORONARY ARTERY DISEASE OF NATIVE ARTERY OF NATIVE HEART WITH STABLE ANGINA PECTORIS: ICD-10-CM

## 2024-07-10 DIAGNOSIS — E78.2 MIXED HYPERLIPIDEMIA: Primary | ICD-10-CM

## 2024-07-10 DIAGNOSIS — I10 ESSENTIAL HYPERTENSION: ICD-10-CM

## 2024-07-10 DIAGNOSIS — M25.551 PAIN OF RIGHT HIP: ICD-10-CM

## 2024-07-10 LAB
OHS QRS DURATION: 82 MS
OHS QTC CALCULATION: 420 MS

## 2024-07-10 PROCEDURE — 73502 X-RAY EXAM HIP UNI 2-3 VIEWS: CPT | Mod: TC,HCNC,PO,RT

## 2024-07-10 PROCEDURE — 73502 X-RAY EXAM HIP UNI 2-3 VIEWS: CPT | Mod: 26,HCNC,RT, | Performed by: RADIOLOGY

## 2024-07-10 NOTE — TELEPHONE ENCOUNTER
Called the patient and left 2 voice also sent a Xoft message. Informed the patient that I needed to get them reschedule due to the fact that they scheduled their self incorrectly. Sent a Xoft message letting the patient know that I got them reschedule to see Dr. White's PA Mrs. Jacqui Nazario on 08/12/24 at 9:00am. Asked the patient to give the office a call back if they had any questions.

## 2024-07-12 ENCOUNTER — TELEPHONE (OUTPATIENT)
Dept: CARDIOLOGY | Facility: CLINIC | Age: 69
End: 2024-07-12
Payer: MEDICARE

## 2024-07-12 NOTE — TELEPHONE ENCOUNTER
Called and advised the patient of her test results and she voiced understanding and had no questions.       ----- Message from Millie Juarez MD sent at 7/11/2024  7:15 PM CDT -----  NO FRACTURE IN HIP   HAS ARTHRITIS.IN HIP

## 2024-07-18 ENCOUNTER — PATIENT MESSAGE (OUTPATIENT)
Dept: CARDIOLOGY | Facility: HOSPITAL | Age: 69
End: 2024-07-18
Payer: MEDICARE

## 2024-07-22 ENCOUNTER — PATIENT MESSAGE (OUTPATIENT)
Dept: CARDIOLOGY | Facility: HOSPITAL | Age: 69
End: 2024-07-22
Payer: MEDICARE

## 2024-07-22 ENCOUNTER — PATIENT MESSAGE (OUTPATIENT)
Dept: ADMINISTRATIVE | Facility: OTHER | Age: 69
End: 2024-07-22
Payer: MEDICARE

## 2024-07-23 ENCOUNTER — PATIENT OUTREACH (OUTPATIENT)
Dept: PULMONOLOGY | Facility: CLINIC | Age: 69
End: 2024-07-23
Payer: MEDICARE

## 2024-07-23 NOTE — TELEPHONE ENCOUNTER
Chronic Disease Management  Called patient in regard to abnormal Pulmonary Disease Management Questionnaire:     Patient reports an increase in sputum production and coughing episodes. Patient does not feel that Janelltri is controlling her symptoms at this time.  Patient reports taking Combivent rescue inhaler approximately 6 times daily.  Patient scheduled to see Dr. Morales on tomorrow. Encouraged patient to keep appointment.  Understanding was stated.

## 2024-07-24 ENCOUNTER — PATIENT MESSAGE (OUTPATIENT)
Dept: CARDIOLOGY | Facility: HOSPITAL | Age: 69
End: 2024-07-24
Payer: MEDICARE

## 2024-07-24 ENCOUNTER — OFFICE VISIT (OUTPATIENT)
Dept: PULMONOLOGY | Facility: CLINIC | Age: 69
End: 2024-07-24
Payer: MEDICARE

## 2024-07-24 VITALS
HEIGHT: 61 IN | BODY MASS INDEX: 25.05 KG/M2 | OXYGEN SATURATION: 97 % | WEIGHT: 132.69 LBS | SYSTOLIC BLOOD PRESSURE: 148 MMHG | HEART RATE: 64 BPM | RESPIRATION RATE: 21 BRPM | DIASTOLIC BLOOD PRESSURE: 86 MMHG

## 2024-07-24 DIAGNOSIS — R09.02 EXERCISE HYPOXEMIA: ICD-10-CM

## 2024-07-24 DIAGNOSIS — J44.9 COPD, MODERATE: ICD-10-CM

## 2024-07-24 DIAGNOSIS — F17.211 CIGARETTE NICOTINE DEPENDENCE IN REMISSION: Primary | ICD-10-CM

## 2024-07-24 PROCEDURE — 1125F AMNT PAIN NOTED PAIN PRSNT: CPT | Mod: HCNC,CPTII,S$GLB, | Performed by: INTERNAL MEDICINE

## 2024-07-24 PROCEDURE — 3044F HG A1C LEVEL LT 7.0%: CPT | Mod: HCNC,CPTII,S$GLB, | Performed by: INTERNAL MEDICINE

## 2024-07-24 PROCEDURE — 99214 OFFICE O/P EST MOD 30 MIN: CPT | Mod: 25,HCNC,S$GLB, | Performed by: INTERNAL MEDICINE

## 2024-07-24 PROCEDURE — 3008F BODY MASS INDEX DOCD: CPT | Mod: HCNC,CPTII,S$GLB, | Performed by: INTERNAL MEDICINE

## 2024-07-24 PROCEDURE — 1159F MED LIST DOCD IN RCRD: CPT | Mod: HCNC,CPTII,S$GLB, | Performed by: INTERNAL MEDICINE

## 2024-07-24 PROCEDURE — 3288F FALL RISK ASSESSMENT DOCD: CPT | Mod: HCNC,CPTII,S$GLB, | Performed by: INTERNAL MEDICINE

## 2024-07-24 PROCEDURE — 1100F PTFALLS ASSESS-DOCD GE2>/YR: CPT | Mod: HCNC,CPTII,S$GLB, | Performed by: INTERNAL MEDICINE

## 2024-07-24 PROCEDURE — 3079F DIAST BP 80-89 MM HG: CPT | Mod: HCNC,CPTII,S$GLB, | Performed by: INTERNAL MEDICINE

## 2024-07-24 PROCEDURE — 3077F SYST BP >= 140 MM HG: CPT | Mod: HCNC,CPTII,S$GLB, | Performed by: INTERNAL MEDICINE

## 2024-07-24 PROCEDURE — 4010F ACE/ARB THERAPY RXD/TAKEN: CPT | Mod: HCNC,CPTII,S$GLB, | Performed by: INTERNAL MEDICINE

## 2024-07-24 PROCEDURE — 99999 PR PBB SHADOW E&M-EST. PATIENT-LVL V: CPT | Mod: PBBFAC,HCNC,, | Performed by: INTERNAL MEDICINE

## 2024-07-24 RX ORDER — BUDESONIDE, GLYCOPYRROLATE, AND FORMOTEROL FUMARATE 160; 9; 4.8 UG/1; UG/1; UG/1
2 AEROSOL, METERED RESPIRATORY (INHALATION) 2 TIMES DAILY
Qty: 32.1 G | Refills: 3 | Status: SHIPPED | OUTPATIENT
Start: 2024-07-24

## 2024-07-24 RX ORDER — IPRATROPIUM BROMIDE AND ALBUTEROL 20; 100 UG/1; UG/1
1 SPRAY, METERED RESPIRATORY (INHALATION) EVERY 6 HOURS PRN
Qty: 12 G | Refills: 11 | Status: SHIPPED | OUTPATIENT
Start: 2024-07-24

## 2024-07-24 NOTE — PROGRESS NOTES
Subjective:     Patient ID: Gemma Vick is a 69 y.o. female.    Chief Complaint:      HPI    COPD  She presents for evaluation and treatment of COPD. The patient is not currently have symptoms / an exacerbation. The patient has COPD for approximately 10 years. On oxygen at night Symptoms in previous episodes have included dyspnea, cough, wheezing, increased sputum, and colored sputum, and typically last 2 weeks. Previous episodes have been exacerbated by any exercise. Current treatment includes albuterol nebulizer, which generally provides some relief of symptoms. She uses 0 pillows at night. Patient currently is on oxygen at 2 L/min per nasal cannula..=- at night The patient is having no constitutional symptoms, denying fever, chills, anorexia, or weight loss. The patient has not been hospitalized for this condition before. She has a history of 47 pack years. The patient is experiencing exercise intolerance (difficulty walking 3 blocks on flat ground).    Remains active around her home.   No longer caring for grand children in her home. She has great grandchildren that live next door, assist with care when they are not in school.   Currently one daughter still resides with patient.       Past Medical History:   Diagnosis Date    AAA (abdominal aortic aneurysm) 02/13/2014    Abdominal aneurysm     3    Acute coronary syndrome     Arthritis     BPPV (benign paroxysmal positional vertigo)     Carotid artery plaque     Carotid artery stenosis and occlusion 02/13/2014    Chronic back pain     COPD (chronic obstructive pulmonary disease)     Coronary artery disease     Dementia     Emphysema lung     Hyperlipidemia     Hypertension     Myocardial infarction     x3    Neuropathy     Personal history of COVID-19 06/09/2021 11/16/2020 +Covid, recovered at home      Past Surgical History:   Procedure Laterality Date    CARDIAC CATHETERIZATION      CORONARY ANGIOPLASTY      CORONARY STENT PLACEMENT N/A 9/12/2023     Procedure: INSERTION, STENT, CORONARY ARTERY;  Surgeon: Millie Juarez MD;  Location: [facility] CATH LAB;  Service: Cardiology;  Laterality: N/A;    HYSTERECTOMY  1988    LEFT HEART CATHETERIZATION Left 9/12/2023    Procedure: Left heart cath;  Surgeon: Millie Juarez MD;  Location: [facility] CATH LAB;  Service: Cardiology;  Laterality: Left;    PERCUTANEOUS CORONARY INTERVENTION, ARTERY N/A 9/12/2023    Procedure: Percutaneous coronary intervention;  Surgeon: Millie Juarez MD;  Location: [facility] CATH LAB;  Service: Cardiology;  Laterality: N/A;    THROMBECTOMY, CORONARY  9/12/2023    Procedure: Thrombectomy, Coronary;  Surgeon: Millie Juarez MD;  Location: [facility] CATH LAB;  Service: Cardiology;;    TONSILLECTOMY      TRANSFORAMINAL EPIDURAL INJECTION OF STEROID Right 12/1/2023    Procedure: Injection,steroid,epidural,transforaminal approach- right side, L4/5 and L5/S1;  Surgeon: Lydia Roberts MD;  Location: [facility] PAIN MGT;  Service: Pain Management;  Laterality: Right;     Review of patient's allergies indicates:  No Known Allergies  Current Outpatient Medications on File Prior to Visit   Medication Sig Dispense Refill    albuterol-ipratropium (DUO-NEB) 2.5 mg-0.5 mg/3 mL nebulizer solution Take 3 mLs by nebulization every 6 (six) hours as needed for Wheezing. 75 mL 12    amLODIPine (NORVASC) 5 MG tablet Take 1 tablet (5 mg total) by mouth once daily. 30 tablet 11    aspirin 81 MG Chew Take 81 mg by mouth once daily.      carvediloL (COREG) 6.25 MG tablet Take 1 tablet (6.25 mg total) by mouth 2 (two) times daily with meals. 60 tablet 11    clopidogreL (PLAVIX) 75 mg tablet Take 1 tablet (75 mg total) by mouth once daily. 90 tablet 3    cyanocobalamin (VITAMIN B-12) 100 MCG tablet Take 100 mcg by mouth once daily.      diclofenac sodium (VOLTAREN) 1 % Gel Apply topically 4 (four) times daily. 150 g 1    dicyclomine (BENTYL) 10 MG capsule TAKE ONE CAPSULE BY MOUTH THREE TIMES DAILY AS NEEDED 90 capsule 2     ezetimibe-simvastatin 10-40 mg (VYTORIN) 10-40 mg per tablet Take 1 tablet by mouth every evening. 90 tablet 2    famotidine (PEPCID) 20 MG tablet Take 1 tablet (20 mg total) by mouth 2 (two) times daily as needed for Heartburn. 60 tablet 11    furosemide (LASIX) 20 MG tablet Take 20 mg by mouth daily as needed.      hydroCHLOROthiazide (MICROZIDE) 12.5 mg capsule Take 12.5 mg by mouth as needed.      inhalation spacing device (COMPACT SPACE CHAMBER) USE AS DIRECTED 1 each 2    memantine (NAMENDA) 10 MG Tab Take 1 tablet (10 mg total) by mouth 2 (two) times daily. 180 tablet 12    olmesartan (BENICAR) 40 MG tablet Take 1 tablet (40 mg total) by mouth once daily. 90 tablet 3    OXYGEN-AIR DELIVERY SYSTEMS MISC 3 L by Misc.(Non-Drug; Combo Route) route every evening.      pantoprazole (PROTONIX) 40 MG tablet Take 1 tablet (40 mg total) by mouth once daily. 90 tablet 3    vitamin D (VITAMIN D3) 1000 units Tab Take 1,000 Units by mouth once daily.      zolpidem (AMBIEN) 10 mg Tab Take 1 tablet (10 mg total) by mouth every evening. 30 tablet 5    [DISCONTINUED] budesonide-glycopyr-formoterol (BREZTRI AEROSPHERE) 160-9-4.8 mcg/actuation HFAA Inhale 2 puffs into the lungs 2 (two) times a day. 32.1 g 3    [DISCONTINUED] COMBIVENT RESPIMAT  mcg/actuation inhaler Inhale 1 puff into the lungs every 6 (six) hours as needed for Wheezing. 12 g 11     No current facility-administered medications on file prior to visit.     Social History     Socioeconomic History    Marital status:    Tobacco Use    Smoking status: Former     Current packs/day: 0.00     Average packs/day: 0.5 packs/day for 46.9 years (23.4 ttl pk-yrs)     Types: Cigarettes, Vaping with nicotine     Start date: 1970     Quit date: 2017     Years since quittin.2    Smokeless tobacco: Never    Tobacco comments:     3 MG NICOTINE FOR HEART.    Substance and Sexual Activity    Alcohol use: No    Drug use: No    Sexual activity: Not Currently      Birth control/protection: None     Social Determinants of Health     Financial Resource Strain: Medium Risk (3/14/2024)    Overall Financial Resource Strain (CARDIA)     Difficulty of Paying Living Expenses: Somewhat hard   Food Insecurity: No Food Insecurity (3/14/2024)    Hunger Vital Sign     Worried About Running Out of Food in the Last Year: Never true     Ran Out of Food in the Last Year: Never true   Transportation Needs: No Transportation Needs (3/14/2024)    PRAPARE - Transportation     Lack of Transportation (Medical): No     Lack of Transportation (Non-Medical): No   Physical Activity: Inactive (3/14/2024)    Exercise Vital Sign     Days of Exercise per Week: 0 days     Minutes of Exercise per Session: 0 min   Stress: No Stress Concern Present (3/14/2024)    [employer] - Occupational Stress Questionnaire     Feeling of Stress : Only a little   Housing Stability: Low Risk  (3/14/2024)    Housing Stability Vital Sign     Unable to Pay for Housing in the Last Year: No     Number of Places Lived in the Last Year: 1     Unstable Housing in the Last Year: No     Family History   Problem Relation Name Age of Onset    Heart disease Mother      Heart attacks under age 50 Father      Heart attacks under age 50 Brother          HA at 32    Heart attack Brother          HA at 70    Breast cancer Paternal Aunt         Review of Systems   Constitutional:  Positive for fatigue. Negative for fever.   HENT:  Positive for postnasal drip, rhinorrhea and congestion.    Eyes:  Negative for redness and itching.   Respiratory:  Positive for cough, sputum production, shortness of breath, dyspnea on extertion, use of rescue inhaler and Paroxysmal Nocturnal Dyspnea.    Cardiovascular:  Negative for chest pain, palpitations and leg swelling.   Genitourinary:  Negative for difficulty urinating and hematuria.   Endocrine:  Negative for cold intolerance and heat intolerance.    Skin:  Negative for rash.  "  Gastrointestinal: Negative.  Negative for nausea and abdominal pain.        No GI complaints   Neurological:  Negative for dizziness, syncope, weakness and light-headedness.   Hematological:  Negative for adenopathy. Does not bruise/bleed easily.   Psychiatric/Behavioral:  Negative for sleep disturbance. The patient is not nervous/anxious.        Objective:      BP (!) 148/86   Pulse 64   Resp (!) 21   Ht 5' 1" (1.549 m)   Wt 60.2 kg (132 lb 11.5 oz)   SpO2 97%   BMI 25.08 kg/m²   Physical Exam  Vitals and nursing note reviewed.   Constitutional:       Appearance: She is well-developed.   HENT:      Head: Normocephalic and atraumatic.      Nose: Nose normal.      Mouth/Throat:      Pharynx: No oropharyngeal exudate.   Eyes:      Conjunctiva/sclera: Conjunctivae normal.      Pupils: Pupils are equal, round, and reactive to light.   Neck:      Thyroid: No thyromegaly.      Vascular: No JVD.      Trachea: No tracheal deviation.   Cardiovascular:      Rate and Rhythm: Normal rate and regular rhythm.      Heart sounds: Normal heart sounds.   Pulmonary:      Effort: Pulmonary effort is normal. No respiratory distress.      Breath sounds: Examination of the right-lower field reveals wheezing. Examination of the left-lower field reveals wheezing. Decreased breath sounds and wheezing present. No rhonchi or rales.   Chest:      Chest wall: No tenderness.   Abdominal:      General: Bowel sounds are normal.      Palpations: Abdomen is soft.   Musculoskeletal:         General: Normal range of motion.      Cervical back: Neck supple.   Lymphadenopathy:      Cervical: No cervical adenopathy.   Skin:     General: Skin is warm and dry.   Neurological:      Mental Status: She is alert and oriented to person, place, and time.       Personal Diagnostic Review  CT reviewed  CT Chest Lung Screening Low Dose  Order: [id number]  Status: Final result       Visible to patient: Yes (seen)       Next appt: 08/12/2024 at 09:00 AM in " "Orthopedics ([facility])       Dx: Cigarette nicotine dependence in issac...    0 Result Notes       1 Patient Communication       1  Topic  Assessment    Negative   Details    Reading Physician Reading Date Result Priority   Melo Temple MD (Timothy)  [phone] 5/21/2024      Narrative & Impression  Exam:  CT CHEST LUNG SCREENING LOW DOSE     History: Greater than 20 pack-year history of smoking remission     COMPARISON:  CT chest, 03/25/2024, CT chest low dose 11/08/2022.     TECHNIQUE:  Axial CT imaging was performed of the chest utilizing a low-dose protocol.     Computed tomography dose index (CTDI):  2.39 mGy  Dose-length product (DLP):  80.08 mGy-cm     FINDINGS:  Moderate bilateral emphysema.  No suspicious lung nodules.  Focal scarring at the right base.  Severe atherosclerosis of the thoracic aorta.  Coronary artery calcifications are present.  No infiltrate or consolidation.        Impression:     No suspicious lung nodules.        Lung-RADS Catagory:  1 - Negative - Continue annual screening with LDCT in 12 months.     Clinically significant non lung cancer finding:  S - Significant.     Prior Lung Cancer Modifier:  No history of prior lung cancer.           All CT scans at [this location] are performed using dose modulation techniques as appropriate to a performed exam including the following: Automated exposure control; adjustment of the mA and/or kV according to patient size (this includes techniques or standardized protocols for targeted exams where dose is matched to indication / reason for exam; i.e. extremities or head); use of iterative reconstruction technique.     Finalized on: 5/21/2024 2:16 PM By:  Zheng Temple MD  [facility]# [id number]      2024-05-21 14:18:24.173    [facility]             Exam Ended: 05/21/24 13:38 CDT Last Resulted: 05/21/24 14:16 CDT                      7/24/2024     1:08 PM   Pulmonary Studies Review   SpO2 97 %   Height 5' 1" (1.549 m)   Weight 60.2 kg (132 lb 11.5 " "oz)   BMI (Calculated) 25.1   Predicted Distance 319.11   Predicted Distance Meters (Calculated) 457.16 meters       X-Ray Hip 2 or 3 views Right with Pelvis when performed  Narrative: EXAM: XR HIP WITH PELVIS WHEN PERFORMED 2 OR 3 VIEWS RIGHT    CLINICAL HISTORY: Right hip pain    FINDINGS:  A frontal view of the pelvis including both hips and additional frontal and probably views of the right hip are submitted.  No fracture, dislocation or focal bone lesions are identified except for incidental bone sclerosis at the pubic symphysis felt to be associated with a history of multiple childbirths.  Generalized bone demineralization is also visible consistent with patient age.  Mild degenerative joint space narrowing and osteophyte formation is visible at both hips.  Impression: 1.  No evidence of acute or recent injury.  2.  Mild chronic osteoarthritic changes at both hips.  3.  Bone demineralization.    Finalized on: 7/10/2024 11:26 AM By:  Jesus Cid  [facility]# [id number]      2024-07-10 11:28:30.902    [facility]      Office Spirometry Results:         7/24/2024     1:08 PM 7/9/2024     4:29 PM 6/13/2024    10:18 AM 5/23/2024    10:00 AM 5/21/2024     1:40 PM 5/8/2024     4:02 PM 4/18/2024     4:35 PM   Pulmonary Function Tests   SpO2 97 %    94 %  96 %   Height 5' 1" (1.549 m) 5' 1.5" (1.562 m) 5' (1.524 m) 5' 1.5" (1.562 m) 5' 1.5" (1.562 m) 5' 2" (1.575 m)    Weight 60.2 kg (132 lb 11.5 oz) 59.5 kg (131 lb 4.5 oz) 58.9 kg (129 lb 13.6 oz)  56.7 kg (125 lb) 56.2 kg (123 lb 14.4 oz) 56.4 kg (124 lb 5.4 oz)   BMI (Calculated) 25.1 24.4 25.4  23.2 22.7          7/24/2024     1:08 PM   Pulmonary Studies Review   SpO2 97 %   Height 5' 1" (1.549 m)   Weight 60.2 kg (132 lb 11.5 oz)   BMI (Calculated) 25.1   Predicted Distance 319.11   Predicted Distance Meters (Calculated) 457.16 meters           No results found for this or any previous visit (from the past 336 hour(s)).    Assessment:            Cigarette nicotine " dependence in remission  -     CT Chest Lung Screening Low Dose; Future; Expected date: 07/24/2024    COPD, moderate  -     budesonide-glycopyr-formoterol (BREZTRI AEROSPHERE) 160-9-4.8 mcg/actuation HFAA; Inhale 2 puffs into the lungs 2 (two) times a day.  Dispense: 32.1 g; Refill: 3  -     COMBIVENT RESPIMAT  mcg/actuation inhaler; Inhale 1 puff into the lungs every 6 (six) hours as needed for Wheezing.  Dispense: 12 g; Refill: 11  -     CT Chest Lung Screening Low Dose; Future; Expected date: 07/24/2024  -     Six Minute Walk Test to qualify for Home Oxygen; Future    Exercise hypoxemia  -     Six Minute Walk Test to qualify for Home Oxygen; Future          Outpatient Encounter Medications as of 7/24/2024   Medication Sig Dispense Refill    albuterol-ipratropium (DUO-NEB) 2.5 mg-0.5 mg/3 mL nebulizer solution Take 3 mLs by nebulization every 6 (six) hours as needed for Wheezing. 75 mL 12    amLODIPine (NORVASC) 5 MG tablet Take 1 tablet (5 mg total) by mouth once daily. 30 tablet 11    aspirin 81 MG Chew Take 81 mg by mouth once daily.      carvediloL (COREG) 6.25 MG tablet Take 1 tablet (6.25 mg total) by mouth 2 (two) times daily with meals. 60 tablet 11    clopidogreL (PLAVIX) 75 mg tablet Take 1 tablet (75 mg total) by mouth once daily. 90 tablet 3    cyanocobalamin (VITAMIN B-12) 100 MCG tablet Take 100 mcg by mouth once daily.      diclofenac sodium (VOLTAREN) 1 % Gel Apply topically 4 (four) times daily. 150 g 1    dicyclomine (BENTYL) 10 MG capsule TAKE ONE CAPSULE BY MOUTH THREE TIMES DAILY AS NEEDED 90 capsule 2    ezetimibe-simvastatin 10-40 mg (VYTORIN) 10-40 mg per tablet Take 1 tablet by mouth every evening. 90 tablet 2    famotidine (PEPCID) 20 MG tablet Take 1 tablet (20 mg total) by mouth 2 (two) times daily as needed for Heartburn. 60 tablet 11    furosemide (LASIX) 20 MG tablet Take 20 mg by mouth daily as needed.      hydroCHLOROthiazide (MICROZIDE) 12.5 mg capsule Take 12.5 mg by mouth  as needed.      inhalation spacing device (COMPACT SPACE CHAMBER) USE AS DIRECTED 1 each 2    memantine (NAMENDA) 10 MG Tab Take 1 tablet (10 mg total) by mouth 2 (two) times daily. 180 tablet 12    olmesartan (BENICAR) 40 MG tablet Take 1 tablet (40 mg total) by mouth once daily. 90 tablet 3    OXYGEN-AIR DELIVERY SYSTEMS MISC 3 L by Misc.(Non-Drug; Combo Route) route every evening.      pantoprazole (PROTONIX) 40 MG tablet Take 1 tablet (40 mg total) by mouth once daily. 90 tablet 3    vitamin D (VITAMIN D3) 1000 units Tab Take 1,000 Units by mouth once daily.      zolpidem (AMBIEN) 10 mg Tab Take 1 tablet (10 mg total) by mouth every evening. 30 tablet 5    [DISCONTINUED] budesonide-glycopyr-formoterol (BREZTRI AEROSPHERE) 160-9-4.8 mcg/actuation HFAA Inhale 2 puffs into the lungs 2 (two) times a day. 32.1 g 3    [DISCONTINUED] COMBIVENT RESPIMAT  mcg/actuation inhaler Inhale 1 puff into the lungs every 6 (six) hours as needed for Wheezing. 12 g 11    budesonide-glycopyr-formoterol (BREZTRI AEROSPHERE) 160-9-4.8 mcg/actuation HFAA Inhale 2 puffs into the lungs 2 (two) times a day. 32.1 g 3    COMBIVENT RESPIMAT  mcg/actuation inhaler Inhale 1 puff into the lungs every 6 (six) hours as needed for Wheezing. 12 g 11     No facility-administered encounter medications on file as of 7/24/2024.     Plan:       Requested Prescriptions     Signed Prescriptions Disp Refills    budesonide-glycopyr-formoterol (BREZTRI AEROSPHERE) 160-9-4.8 mcg/actuation HFAA 32.1 g 3     Sig: Inhale 2 puffs into the lungs 2 (two) times a day.    COMBIVENT RESPIMAT  mcg/actuation inhaler 12 g 11     Sig: Inhale 1 puff into the lungs every 6 (six) hours as needed for Wheezing.     Problem List Items Addressed This Visit       Cigarette nicotine dependence in remission - Primary    Overview     5/21/2024 LDCT benign findings, repeat LDCT 1 year, May 2025.          Relevant Orders    CT Chest Lung Screening Low Dose     Other  Visit Diagnoses       COPD, moderate        Relevant Medications    budesonide-glycopyr-formoterol (BREZTRI AEROSPHERE) 160-9-4.8 mcg/actuation HFAA    COMBIVENT RESPIMAT  mcg/actuation inhaler    Other Relevant Orders    CT Chest Lung Screening Low Dose    Six Minute Walk Test to qualify for Home Oxygen    Exercise hypoxemia        Relevant Orders    Six Minute Walk Test to qualify for Home Oxygen               Follow up in about 10 months (around 5/24/2025) for CT - on return visit, 6 min walk - on return, Follow up with NP.    MEDICAL DECISION MAKING: Moderate to high complexity.  Overall, the multiple problems listed are of moderate to high severity that may impact quality of life and activities of daily living. Side effects of medications, treatment plan as well as options and alternatives reviewed and discussed with patient. There was counseling of patient concerning these issues.    Total time spent in counseling and coordination of care - \36  minutes of total time spent on the encounter, which includes face to face time and non-face to face time preparing to see the patient (eg, review of tests), Obtaining and/or reviewing separately obtained history, Documenting clinical information in the electronic or other health record, Independently interpreting results (not separately reported) and communicating results to the patient/family/caregiver, or Care coordination (not separately reported).    Time was used in discussion of prognosis, risks, benefits of treatment, instructions and compliance with regimen . Discussion with other physicians and/or health care providers - home health or for use of durable medical equipment (oxygen, nebulizers, CPAP, BiPAP) occurred.

## 2024-07-26 ENCOUNTER — TELEPHONE (OUTPATIENT)
Dept: CARDIOLOGY | Facility: HOSPITAL | Age: 69
End: 2024-07-26
Payer: MEDICARE

## 2024-07-29 ENCOUNTER — E-VISIT (OUTPATIENT)
Dept: FAMILY MEDICINE | Facility: CLINIC | Age: 69
End: 2024-07-29
Payer: MEDICARE

## 2024-07-29 DIAGNOSIS — B96.89 ACUTE BACTERIAL SINUSITIS: Primary | ICD-10-CM

## 2024-07-29 DIAGNOSIS — J01.90 ACUTE BACTERIAL SINUSITIS: Primary | ICD-10-CM

## 2024-07-29 RX ORDER — BENZONATATE 200 MG/1
200 CAPSULE ORAL 3 TIMES DAILY PRN
Qty: 30 CAPSULE | Refills: 0 | Status: SHIPPED | OUTPATIENT
Start: 2024-07-29 | End: 2024-08-08

## 2024-07-29 RX ORDER — AZITHROMYCIN 250 MG/1
TABLET, FILM COATED ORAL
Qty: 6 TABLET | Refills: 0 | Status: SHIPPED | OUTPATIENT
Start: 2024-07-29

## 2024-08-09 ENCOUNTER — TELEPHONE (OUTPATIENT)
Dept: CARDIOLOGY | Facility: HOSPITAL | Age: 69
End: 2024-08-09
Payer: MEDICARE

## 2024-08-12 RX ORDER — ALBUTEROL SULFATE 90 UG/1
INHALANT RESPIRATORY (INHALATION)
Qty: 6.7 G | Refills: 12 | OUTPATIENT
Start: 2024-08-12

## 2024-08-12 NOTE — TELEPHONE ENCOUNTER
No care due was identified.  [facility] Embedded Care Due Messages. Reference number: [id number].   8/12/2024 8:57:06 AM CDT

## 2024-08-12 NOTE — TELEPHONE ENCOUNTER
Refill Decision Note   Gemma Vick  is requesting a refill authorization.  Brief Assessment and Rationale for Refill:  Quick Discontinue     Medication Therapy Plan: The original prescription was discontinued on 4/2/2024 by Lorie Foley MA      Comments:     Note composed:1:23 PM 08/12/2024             [General Appearance - Alert] : alert [General Appearance - In No Acute Distress] : in no acute distress [General Appearance - Well Nourished] : well nourished [General Appearance - Well-Appearing] : healthy appearing [Extraocular Movements] : extraocular movements were intact [Sclera] : the sclera and conjunctiva were normal [Normal Oral Mucosa] : normal oral mucosa [No Oral Pallor] : no oral pallor [Neck Appearance] : the appearance of the neck was normal [Neck Cervical Mass (___cm)] : no neck mass was observed [Thyroid Diffuse Enlargement] : the thyroid was not enlarged [Respiration, Rhythm And Depth] : normal respiratory rhythm and effort [Auscultation Breath Sounds / Voice Sounds] : lungs were clear to auscultation bilaterally [Exaggerated Use Of Accessory Muscles For Inspiration] : no accessory muscle use [Heart Rate And Rhythm] : heart rate was normal and rhythm regular [Heart Sounds] : normal S1 and S2 [Murmurs] : no murmurs [Bowel Sounds] : normal bowel sounds [Abdomen Soft] : soft [Abdomen Tenderness] : non-tender [Abdomen Mass (___ Cm)] : no abdominal mass palpated [Involuntary Movements] : no involuntary movements were seen [Skin Color & Pigmentation] : normal skin color and pigmentation [Skin Turgor] : normal skin turgor [Skin Lesions] : no skin lesions [] : no rash [Cranial Nerves] : cranial nerves 2-12 were intact [Oriented To Time, Place, And Person] : oriented to person, place, and time [Impaired Insight] : insight and judgment were intact [Affect] : the affect was normal [FreeTextEntry1] : mildy ataxic

## 2024-08-14 DIAGNOSIS — F51.04 CHRONIC INSOMNIA: ICD-10-CM

## 2024-08-14 RX ORDER — ZOLPIDEM TARTRATE 10 MG/1
10 TABLET ORAL NIGHTLY
Qty: 30 TABLET | Refills: 5 | Status: SHIPPED | OUTPATIENT
Start: 2024-08-14

## 2024-08-14 NOTE — TELEPHONE ENCOUNTER
No care due was identified.  [facility] Embedded Care Due Messages. Reference number: [id number].   8/14/2024 9:28:27 AM CDT

## 2024-08-26 ENCOUNTER — PATIENT MESSAGE (OUTPATIENT)
Dept: FAMILY MEDICINE | Facility: CLINIC | Age: 69
End: 2024-08-26
Payer: MEDICARE

## 2024-08-26 ENCOUNTER — NURSE TRIAGE (OUTPATIENT)
Dept: ADMINISTRATIVE | Facility: CLINIC | Age: 69
End: 2024-08-26
Payer: MEDICARE

## 2024-08-26 NOTE — TELEPHONE ENCOUNTER
"Pt states she has bronchitis and uses inhalers and still reports " I just feel dry even though I'm drinking water constantly my nose is dry everything is dry do you think a humidifier will help?" Pt denies sob. Informed pt would send message to pcp and get call back by nurse within the hour.   Reason for Disposition   Nursing judgment    Protocols used: Information Only Call - No Triage-A-OH    "

## 2024-08-27 ENCOUNTER — HOSPITAL ENCOUNTER (OUTPATIENT)
Dept: CARDIOLOGY | Facility: HOSPITAL | Age: 69
Discharge: HOME | End: 2024-08-27
Attending: INTERNAL MEDICINE
Payer: MEDICARE

## 2024-08-27 ENCOUNTER — HOSPITAL ENCOUNTER (OUTPATIENT)
Dept: PULMONOLOGY | Facility: HOSPITAL | Age: 69
Discharge: HOME | End: 2024-08-27
Attending: INTERNAL MEDICINE
Payer: MEDICARE

## 2024-08-27 ENCOUNTER — TELEPHONE (OUTPATIENT)
Dept: CARDIOLOGY | Facility: CLINIC | Age: 69
End: 2024-08-27
Payer: MEDICARE

## 2024-08-27 ENCOUNTER — HOSPITAL ENCOUNTER (OUTPATIENT)
Dept: RADIOLOGY | Facility: HOSPITAL | Age: 69
Discharge: HOME | End: 2024-08-27
Attending: INTERNAL MEDICINE
Payer: MEDICARE

## 2024-08-27 VITALS
HEART RATE: 69 BPM | HEIGHT: 61 IN | DIASTOLIC BLOOD PRESSURE: 75 MMHG | SYSTOLIC BLOOD PRESSURE: 164 MMHG | BODY MASS INDEX: 24.92 KG/M2 | WEIGHT: 132 LBS

## 2024-08-27 VITALS
WEIGHT: 132 LBS | HEART RATE: 70 BPM | BODY MASS INDEX: 24.92 KG/M2 | DIASTOLIC BLOOD PRESSURE: 79 MMHG | SYSTOLIC BLOOD PRESSURE: 200 MMHG | HEIGHT: 61 IN

## 2024-08-27 DIAGNOSIS — R06.02 SHORTNESS OF BREATH: ICD-10-CM

## 2024-08-27 DIAGNOSIS — I25.118 CORONARY ARTERY DISEASE OF NATIVE ARTERY OF NATIVE HEART WITH STABLE ANGINA PECTORIS: ICD-10-CM

## 2024-08-27 LAB
AORTIC ROOT ANNULUS: 3.47 CM
ASCENDING AORTA: 2.72 CM
AV INDEX (PROSTH): 0.83
AV MEAN GRADIENT: 4 MMHG
AV PEAK GRADIENT: 8 MMHG
AV REGURGITATION PRESSURE HALF TIME: 624.41 MS
AV VALVE AREA BY VELOCITY RATIO: 2.47 CM²
AV VALVE AREA: 2.48 CM²
AV VELOCITY RATIO: 0.83
BSA FOR ECHO PROCEDURE: 1.61 M2
CV ECHO LV RWT: 0.53 CM
CV STRESS BASE HR: 67 BPM
DIASTOLIC BLOOD PRESSURE: 79 MMHG
DOP CALC AO PEAK VEL: 1.45 M/S
DOP CALC AO VTI: 34.2 CM
DOP CALC LVOT AREA: 3 CM2
DOP CALC LVOT DIAMETER: 1.95 CM
DOP CALC LVOT PEAK VEL: 1.2 M/S
DOP CALC LVOT STROKE VOLUME: 84.77 CM3
DOP CALC RVOT PEAK VEL: 0.75 M/S
DOP CALC RVOT VTI: 18.8 CM
DOP CALCLVOT PEAK VEL VTI: 28.4 CM
E WAVE DECELERATION TIME: 88.72 MSEC
E/A RATIO: 0.75
E/E' RATIO: 14.33 M/S
ECHO LV POSTERIOR WALL: 1.21 CM (ref 0.6–1.1)
EJECTION FRACTION- HIGH: 73 %
END DIASTOLIC INDEX-HIGH: 165 ML/M2
END DIASTOLIC INDEX-LOW: 101 ML/M2
END SYSTOLIC INDEX-HIGH: 64 ML/M2
END SYSTOLIC INDEX-LOW: 28 ML/M2
FRACTIONAL SHORTENING: 36 % (ref 28–44)
INTERVENTRICULAR SEPTUM: 1.18 CM (ref 0.6–1.1)
IVC DIAMETER: 1.67 CM
IVRT: 68.51 MSEC
LA MAJOR: 4.7 CM
LA MINOR: 4.66 CM
LA WIDTH: 2.9 CM
LEFT ATRIUM SIZE: 2.94 CM
LEFT ATRIUM VOLUME INDEX: 21.5 ML/M2
LEFT ATRIUM VOLUME: 33.92 CM3
LEFT INTERNAL DIMENSION IN SYSTOLE: 2.9 CM (ref 2.1–4)
LEFT VENTRICLE DIASTOLIC VOLUME INDEX: 60.34 ML/M2
LEFT VENTRICLE DIASTOLIC VOLUME: 95.34 ML
LEFT VENTRICLE MASS INDEX: 127 G/M2
LEFT VENTRICLE SYSTOLIC VOLUME INDEX: 20.4 ML/M2
LEFT VENTRICLE SYSTOLIC VOLUME: 32.18 ML
LEFT VENTRICULAR INTERNAL DIMENSION IN DIASTOLE: 4.56 CM (ref 3.5–6)
LEFT VENTRICULAR MASS: 201.02 G
LV LATERAL E/E' RATIO: 14.33 M/S
LV SEPTAL E/E' RATIO: 14.33 M/S
LVED V (TEICH): 95.34 ML
LVES V (TEICH): 32.18 ML
LVOT MG: 3.51 MMHG
LVOT MV: 0.91 CM/S
MV PEAK A VEL: 1.14 M/S
MV PEAK E VEL: 0.86 M/S
MV STENOSIS PRESSURE HALF TIME: 25.73 MS
MV VALVE AREA P 1/2 METHOD: 8.55 CM2
NUC REST EJECTION FRACTION: 84
NUC STRESS EJECTION FRACTION: 89 %
OHS CV CPX 85 PERCENT MAX PREDICTED HEART RATE MALE: 128
OHS CV CPX MAX PREDICTED HEART RATE: 151
OHS CV CPX PATIENT IS FEMALE: 1
OHS CV CPX PATIENT IS MALE: 0
OHS CV CPX PEAK DIASTOLIC BLOOD PRESSURE: 79 MMHG
OHS CV CPX PEAK HEAR RATE: 88 BPM
OHS CV CPX PEAK RATE PRESSURE PRODUCT: NORMAL
OHS CV CPX PEAK SYSTOLIC BLOOD PRESSURE: 200 MMHG
OHS CV CPX PERCENT MAX PREDICTED HEART RATE ACHIEVED: 61
OHS CV CPX RATE PRESSURE PRODUCT PRESENTING: NORMAL
PISA AR MAX VEL: 5.02 M/S
PISA MRMAX VEL: 4.63 M/S
PISA TR MAX VEL: 2.03 M/S
PV MEAN GRADIENT: 1 MMHG
RA MAJOR: 3.65 CM
RA PRESSURE ESTIMATED: 3 MMHG
RA WIDTH: 2.4 CM
RETIRED EF AND QEF - SEE NOTES: 59 %
RV TB RVSP: 5 MMHG
STJ: 2.78 CM
SYSTOLIC BLOOD PRESSURE: 200 MMHG
TDI LATERAL: 0.06 M/S
TDI SEPTAL: 0.06 M/S
TDI: 0.06 M/S
TR MAX PG: 16 MMHG
TRICUSPID ANNULAR PLANE SYSTOLIC EXCURSION: 1.81 CM
TV REST PULMONARY ARTERY PRESSURE: 19 MMHG
Z-SCORE OF LEFT VENTRICULAR DIMENSION IN END DIASTOLE: 0.08
Z-SCORE OF LEFT VENTRICULAR DIMENSION IN END SYSTOLE: 0.26

## 2024-08-27 PROCEDURE — A9502 TC99M TETROFOSMIN: HCPCS | Mod: HCNC | Performed by: INTERNAL MEDICINE

## 2024-08-27 PROCEDURE — 93017 CV STRESS TEST TRACING ONLY: CPT | Mod: HCNC

## 2024-08-27 PROCEDURE — 93306 TTE W/DOPPLER COMPLETE: CPT | Mod: 26,HCNC,, | Performed by: INTERNAL MEDICINE

## 2024-08-27 PROCEDURE — 78452 HT MUSCLE IMAGE SPECT MULT: CPT | Mod: HCNC

## 2024-08-27 PROCEDURE — 63600175 PHARM REV CODE 636 W HCPCS: Mod: HCNC | Performed by: INTERNAL MEDICINE

## 2024-08-27 PROCEDURE — 93306 TTE W/DOPPLER COMPLETE: CPT | Mod: HCNC

## 2024-08-27 RX ORDER — REGADENOSON 0.08 MG/ML
0.4 INJECTION, SOLUTION INTRAVENOUS ONCE
Status: COMPLETED | OUTPATIENT
Start: 2024-08-27 | End: 2024-08-27

## 2024-08-27 RX ADMIN — REGADENOSON 0.4 MG: 0.08 INJECTION, SOLUTION INTRAVENOUS at 11:08

## 2024-08-27 RX ADMIN — TETROFOSMIN 10 MILLICURIE: 1.38 INJECTION, POWDER, LYOPHILIZED, FOR SOLUTION INTRAVENOUS at 10:08

## 2024-08-27 RX ADMIN — TETROFOSMIN 30.4 MILLICURIE: 1.38 INJECTION, POWDER, LYOPHILIZED, FOR SOLUTION INTRAVENOUS at 11:08

## 2024-08-27 NOTE — TELEPHONE ENCOUNTER
Called [phone] and spoke with patient and advised test results and she voiced understanding.     ----- Message from Millie Juarez MD sent at 8/27/2024 11:58 AM CDT -----  Has normal heart function   Mild leak in heart valve and mild narrowing  nothing to do except control risk factors

## 2024-08-28 ENCOUNTER — TELEPHONE (OUTPATIENT)
Dept: CARDIOLOGY | Facility: CLINIC | Age: 69
End: 2024-08-28
Payer: MEDICARE

## 2024-08-28 NOTE — TELEPHONE ENCOUNTER
Called [phone] and went to voice mail.  [facility]       ----- Message from Millie Juarez MD sent at 8/27/2024  5:52 PM CDT -----  Stress test negative

## 2024-09-04 ENCOUNTER — PATIENT OUTREACH (OUTPATIENT)
Dept: PULMONOLOGY | Facility: CLINIC | Age: 69
End: 2024-09-04
Payer: MEDICARE

## 2024-09-04 NOTE — TELEPHONE ENCOUNTER
Chronic Disease Management  Called patient to complete Pulmonary Disease Management Questionnaire.  Patient states sometimes she forgets to take breztri in the mornings. Advised her to set alarm to remind her to take Breztri. She stated understanding.  She states she takes neb treatments in the morning and evenings due to the humidity and heat.    Time  spent with patient:  Minutes

## 2024-09-13 DIAGNOSIS — J44.9 COPD, MODERATE: ICD-10-CM

## 2024-09-13 RX ORDER — IPRATROPIUM BROMIDE AND ALBUTEROL 20; 100 UG/1; UG/1
1 SPRAY, METERED RESPIRATORY (INHALATION) EVERY 6 HOURS PRN
Qty: 12 G | Refills: 11 | Status: SHIPPED | OUTPATIENT
Start: 2024-09-13

## 2024-09-18 RX ORDER — IPRATROPIUM BROMIDE AND ALBUTEROL SULFATE 2.5; .5 MG/3ML; MG/3ML
3 SOLUTION RESPIRATORY (INHALATION) EVERY 6 HOURS PRN
Qty: 75 ML | Refills: 12 | Status: SHIPPED | OUTPATIENT
Start: 2024-09-18

## 2024-09-18 RX ORDER — DICLOFENAC SODIUM 10 MG/G
GEL TOPICAL 4 TIMES DAILY
Qty: 150 G | Refills: 1 | Status: SHIPPED | OUTPATIENT
Start: 2024-09-18

## 2024-09-18 NOTE — TELEPHONE ENCOUNTER
Care Due:                  Date            Visit Type   Department     Provider  --------------------------------------------------------------------------------                                EP -                              PRIMARY      [facility]  Last Visit: 05-      CARE ([facility])   MEDICINE       Olga Altman  Next Visit: None Scheduled  None         None Found                                                            Last  Test          Frequency    Reason                     Performed    Due Date  --------------------------------------------------------------------------------    Lipid Panel.  12 months..  ezetimibe-simvastatin....  04- 04-    [facility] Embedded Care Due Messages. Reference number: [id number].   9/17/2024 8:53:38 PM CDT

## 2024-09-19 DIAGNOSIS — J44.9 COPD, MODERATE: Primary | ICD-10-CM

## 2024-09-20 ENCOUNTER — PATIENT MESSAGE (OUTPATIENT)
Dept: ADMINISTRATIVE | Facility: OTHER | Age: 69
End: 2024-09-20
Payer: MEDICARE

## 2024-09-23 ENCOUNTER — PATIENT OUTREACH (OUTPATIENT)
Dept: PULMONOLOGY | Facility: CLINIC | Age: 69
End: 2024-09-23
Payer: MEDICARE

## 2024-09-26 RX ORDER — DICYCLOMINE HYDROCHLORIDE 10 MG/1
10 CAPSULE ORAL
Qty: 90 CAPSULE | Refills: 2 | Status: SHIPPED | OUTPATIENT
Start: 2024-09-26

## 2024-09-26 NOTE — TELEPHONE ENCOUNTER
No care due was identified.  [facility] Embedded Care Due Messages. Reference number: [id number].   9/26/2024 8:03:03 AM CDT

## 2024-10-10 ENCOUNTER — HOSPITAL ENCOUNTER (EMERGENCY)
Facility: HOSPITAL | Age: 69
Discharge: HOME | End: 2024-10-10
Attending: EMERGENCY MEDICINE
Payer: MEDICARE

## 2024-10-10 VITALS
HEIGHT: 61 IN | RESPIRATION RATE: 18 BRPM | TEMPERATURE: 99 F | HEART RATE: 68 BPM | OXYGEN SATURATION: 97 % | DIASTOLIC BLOOD PRESSURE: 76 MMHG | BODY MASS INDEX: 25.49 KG/M2 | SYSTOLIC BLOOD PRESSURE: 128 MMHG | WEIGHT: 135 LBS

## 2024-10-10 DIAGNOSIS — R55 SYNCOPE: ICD-10-CM

## 2024-10-10 DIAGNOSIS — I95.1 ORTHOSTATIC SYNCOPE: Primary | ICD-10-CM

## 2024-10-10 LAB
ALBUMIN SERPL BCP-MCNC: 3.7 G/DL (ref 3.5–5.2)
ALP SERPL-CCNC: 54 U/L (ref 55–135)
ALT SERPL W/O P-5'-P-CCNC: 22 U/L (ref 10–44)
ANION GAP SERPL CALC-SCNC: 10 MMOL/L (ref 8–16)
AST SERPL-CCNC: 27 U/L (ref 10–40)
BASOPHILS # BLD AUTO: 0.06 K/UL (ref 0–0.2)
BASOPHILS NFR BLD: 0.7 % (ref 0–1.9)
BILIRUB SERPL-MCNC: 0.6 MG/DL (ref 0.1–1)
BNP SERPL-MCNC: 166 PG/ML (ref 0–99)
BUN SERPL-MCNC: 13 MG/DL (ref 8–23)
CALCIUM SERPL-MCNC: 9.5 MG/DL (ref 8.7–10.5)
CHLORIDE SERPL-SCNC: 105 MMOL/L (ref 95–110)
CO2 SERPL-SCNC: 22 MMOL/L (ref 23–29)
CREAT SERPL-MCNC: 0.8 MG/DL (ref 0.5–1.4)
DIFFERENTIAL METHOD BLD: ABNORMAL
EOSINOPHIL # BLD AUTO: 0.2 K/UL (ref 0–0.5)
EOSINOPHIL NFR BLD: 2.2 % (ref 0–8)
ERYTHROCYTE [DISTWIDTH] IN BLOOD BY AUTOMATED COUNT: 13.1 % (ref 11.5–14.5)
EST. GFR  (NO RACE VARIABLE): >60 ML/MIN/1.73 M^2
GLUCOSE SERPL-MCNC: 110 MG/DL (ref 70–110)
HCT VFR BLD AUTO: 36.1 % (ref 37–48.5)
HGB BLD-MCNC: 12.2 G/DL (ref 12–16)
IMM GRANULOCYTES # BLD AUTO: 0.03 K/UL (ref 0–0.04)
IMM GRANULOCYTES NFR BLD AUTO: 0.3 % (ref 0–0.5)
LYMPHOCYTES # BLD AUTO: 1.2 K/UL (ref 1–4.8)
LYMPHOCYTES NFR BLD: 13.1 % (ref 18–48)
MCH RBC QN AUTO: 31.6 PG (ref 27–31)
MCHC RBC AUTO-ENTMCNC: 33.8 G/DL (ref 32–36)
MCV RBC AUTO: 94 FL (ref 82–98)
MONOCYTES # BLD AUTO: 0.9 K/UL (ref 0.3–1)
MONOCYTES NFR BLD: 9.3 % (ref 4–15)
NEUTROPHILS # BLD AUTO: 6.8 K/UL (ref 1.8–7.7)
NEUTROPHILS NFR BLD: 74.4 % (ref 38–73)
NRBC BLD-RTO: 0 /100 WBC
PLATELET # BLD AUTO: 236 K/UL (ref 150–450)
PMV BLD AUTO: 9.2 FL (ref 9.2–12.9)
POTASSIUM SERPL-SCNC: 3.9 MMOL/L (ref 3.5–5.1)
PROT SERPL-MCNC: 7 G/DL (ref 6–8.4)
RBC # BLD AUTO: 3.86 M/UL (ref 4–5.4)
SODIUM SERPL-SCNC: 137 MMOL/L (ref 136–145)
TROPONIN I SERPL DL<=0.01 NG/ML-MCNC: <0.006 NG/ML (ref 0–0.03)
WBC # BLD AUTO: 9.15 K/UL (ref 3.9–12.7)

## 2024-10-10 PROCEDURE — 25000003 PHARM REV CODE 250: Mod: HCNC | Performed by: EMERGENCY MEDICINE

## 2024-10-10 PROCEDURE — 93005 ELECTROCARDIOGRAM TRACING: CPT | Mod: HCNC

## 2024-10-10 PROCEDURE — 85025 COMPLETE CBC W/AUTO DIFF WBC: CPT | Mod: HCNC | Performed by: EMERGENCY MEDICINE

## 2024-10-10 PROCEDURE — 84484 ASSAY OF TROPONIN QUANT: CPT | Mod: HCNC | Performed by: EMERGENCY MEDICINE

## 2024-10-10 PROCEDURE — 96360 HYDRATION IV INFUSION INIT: CPT | Mod: HCNC

## 2024-10-10 PROCEDURE — 99285 EMERGENCY DEPT VISIT HI MDM: CPT | Mod: 25,HCNC

## 2024-10-10 PROCEDURE — 93010 ELECTROCARDIOGRAM REPORT: CPT | Mod: HCNC,,, | Performed by: STUDENT IN AN ORGANIZED HEALTH CARE EDUCATION/TRAINING PROGRAM

## 2024-10-10 PROCEDURE — 80053 COMPREHEN METABOLIC PANEL: CPT | Mod: HCNC | Performed by: EMERGENCY MEDICINE

## 2024-10-10 PROCEDURE — 83880 ASSAY OF NATRIURETIC PEPTIDE: CPT | Mod: HCNC | Performed by: EMERGENCY MEDICINE

## 2024-10-10 RX ADMIN — SODIUM CHLORIDE 1000 ML: 9 INJECTION, SOLUTION INTRAVENOUS at 03:10

## 2024-10-10 NOTE — ED PROVIDER NOTES
SCRIBE #1 NOTE: I, Ingrid Reyna, am scribing for, and in the presence of, Stephane Saucedo Jr., MD. I have scribed the HPI, ROS, and PE.      History     Chief Complaint   Patient presents with    Loss of Consciousness     Pt brought in by [facility] for syncopal episode on toilet. Also c/o abd pain. +orthostatics on scene.  given pta     Review of patient's allergies indicates:  No Known Allergies      History of Present Illness     HPI    10/10/2024, 1:58 PM  [facility] the patient[facility]      History of Present Illness: Gemma Vick is a 69 y.o. female patient with a PMHx of MI, CAD, HTN, COPD, AAA, HLD, and dementia who presents to the Emergency Department for evaluation of syncopal episode which onset gradually PTA. Pt was on toilet and began feeling light-headed and diaphoretic. Per son, pt was found leaned against the wall while still sitting on toilet. Per EMS, pt's initial BP was 149 systolic and dropped down to 109 systolic. Symptoms are constant and moderate in severity. No mitigating or exacerbating factors reported. Patient denies any CP, SOB, and all other sxs at this time. No prior Tx. Pt states that she vapes but denies tobacco usage. No further complaints or concerns at this time.       Arrival mode: [facility]    PCP: Olga Altman MD        Past Medical History:  Past Medical History:   Diagnosis Date    AAA (abdominal aortic aneurysm) 02/13/2014    Abdominal aneurysm     3    Acute coronary syndrome     Arthritis     BPPV (benign paroxysmal positional vertigo)     Carotid artery plaque     Carotid artery stenosis and occlusion 02/13/2014    Chronic back pain     COPD (chronic obstructive pulmonary disease)     Coronary artery disease     Dementia     Emphysema lung     Hyperlipidemia     Hypertension     Myocardial infarction     x3    Neuropathy     Personal history of COVID-19 06/09/2021 11/16/2020 +Covid, recovered at home        Past Surgical History:  Past Surgical  History:   Procedure Laterality Date    CARDIAC CATHETERIZATION      CORONARY ANGIOPLASTY      CORONARY STENT PLACEMENT N/A 2023    Procedure: INSERTION, STENT, CORONARY ARTERY;  Surgeon: Millie Juarez MD;  Location: [facility] CATH LAB;  Service: Cardiology;  Laterality: N/A;    HYSTERECTOMY  1988    LEFT HEART CATHETERIZATION Left 2023    Procedure: Left heart cath;  Surgeon: Millie Juarez MD;  Location: [facility] CATH LAB;  Service: Cardiology;  Laterality: Left;    PERCUTANEOUS CORONARY INTERVENTION, ARTERY N/A 2023    Procedure: Percutaneous coronary intervention;  Surgeon: Millie Juarez MD;  Location: [facility] CATH LAB;  Service: Cardiology;  Laterality: N/A;    THROMBECTOMY, CORONARY  2023    Procedure: Thrombectomy, Coronary;  Surgeon: Millie Juarez MD;  Location: [facility] CATH LAB;  Service: Cardiology;;    TONSILLECTOMY      TRANSFORAMINAL EPIDURAL INJECTION OF STEROID Right 2023    Procedure: Injection,steroid,epidural,transforaminal approach- right side, L4/5 and L5/S1;  Surgeon: Lydia Roberts MD;  Location: [facility]IN MGT;  Service: Pain Management;  Laterality: Right;         Family History:  Family History   Problem Relation Name Age of Onset    Heart disease Mother      Heart attacks under age 50 Father      Heart attacks under age 50 Brother          HA at 32    Heart attack Brother          HA at 70    Breast cancer Paternal Aunt         Social History:  Social History     Tobacco Use    Smoking status: Former     Current packs/day: 0.00     Average packs/day: 0.5 packs/day for 46.9 years (23.4 ttl pk-yrs)     Types: Cigarettes, Vaping with nicotine     Start date: 1970     Quit date: 2017     Years since quittin.4    Smokeless tobacco: Never    Tobacco comments:     3 MG NICOTINE FOR HEART.    Substance and Sexual Activity    Alcohol use: No    Drug use: No    Sexual activity: Not Currently     Birth control/protection: None        Review of Systems     Review of  Systems   Constitutional:  Positive for diaphoresis. Negative for fever.   HENT:  Negative for sore throat.    Respiratory:  Negative for shortness of breath.    Cardiovascular:  Negative for chest pain.   Gastrointestinal:  Negative for nausea.   Genitourinary:  Negative for dysuria.   Musculoskeletal:  Negative for back pain.   Skin:  Negative for rash.   Neurological:  Positive for syncope and light-headedness. Negative for weakness.   Hematological:  Does not bruise/bleed easily.   All other systems reviewed and are negative.       Physical Exam     Initial Vitals   BP Pulse Resp Temp SpO2   10/10/24 1343 10/10/24 1340 10/10/24 1340 10/10/24 1346 10/10/24 1340   (!) 159/69 61 19 98.2 °F (36.8 °C) 97 %      MAP       --                 Physical Exam  Nursing Notes and Vital Signs Reviewed.  Constitutional: Patient is in no acute distress. Well-developed and well-nourished.  Head: Atraumatic. Normocephalic.  Eyes:  EOM intact.  No scleral icterus.  ENT: Mucous membranes are moist.  Nares clear   Neck:  Full ROM. No JVD.  Cardiovascular: Regular rate. Regular rhythm No murmurs, rubs, or gallops. Distal pulses are 2+ and symmetric  Pulmonary/Chest: No respiratory distress. Clear to auscultation bilaterally. No wheezing or rales.  Equal chest wall rise bilaterally  Abdominal: Soft and non-distended.  There is no tenderness.  No rebound, guarding, or rigidity. Good bowel sounds.  Genitourinary: No CVA tenderness.  No suprapubic tenderness  Musculoskeletal: Moves all extremities. No obvious deformities.  5 x 5 strength in all extremities   Skin: Warm and dry.  Neurological:  Alert, awake, and appropriate.  Normal speech.  No acute focal neurological deficits are appreciated.  Two through 12 intact bilaterally.  Psychiatric: Normal affect. Good eye contact. Appropriate in content.     ED Course   Procedures  ED Vital Signs:  Vitals:    10/10/24 1340 10/10/24 1343 10/10/24 1346 10/10/24 1500   BP:  (!) 159/69  130/63    Pulse: 61   (!) 139   Resp: 19   18   Temp:   98.2 °F (36.8 °C) 99.2 °F (37.3 °C)   TempSrc:   Oral Axillary   SpO2: 97%   97%       Abnormal Lab Results:  Labs Reviewed   CBC W/ AUTO DIFFERENTIAL - Abnormal       Result Value    WBC 9.15      RBC 3.86 (*)     Hemoglobin 12.2      Hematocrit 36.1 (*)     MCV 94      MCH 31.6 (*)     MCHC 33.8      RDW 13.1      Platelets 236      MPV 9.2      Immature Granulocytes 0.3      Gran # (ANC) 6.8      Immature Grans (Abs) 0.03      Lymph # 1.2      Mono # 0.9      Eos # 0.2      Baso # 0.06      nRBC 0      Gran % 74.4 (*)     Lymph % 13.1 (*)     Mono % 9.3      Eosinophil % 2.2      Basophil % 0.7      Differential Method Automated     COMPREHENSIVE METABOLIC PANEL - Abnormal    Sodium 137      Potassium 3.9      Chloride 105      CO2 22 (*)     Glucose 110      BUN 13      Creatinine 0.8      Calcium 9.5      Total Protein 7.0      Albumin 3.7      Total Bilirubin 0.6      Alkaline Phosphatase 54 (*)     AST 27      ALT 22      eGFR >60      Anion Gap 10     B-TYPE NATRIURETIC PEPTIDE - Abnormal     (*)    TROPONIN I    Troponin I <0.006          All Lab Results:  Results for orders placed or performed during the hospital encounter of 10/10/24   EKG 12-lead    Collection Time: 10/10/24  3:06 PM   Result Value Ref Range    QRS Duration 92 ms    [facility] QTC Calculation 423 ms   CBC auto differential    Collection Time: 10/10/24  3:19 PM   Result Value Ref Range    WBC 9.15 3.90 - 12.70 K/uL    RBC 3.86 (L) 4.00 - 5.40 M/uL    Hemoglobin 12.2 12.0 - 16.0 g/dL    Hematocrit 36.1 (L) 37.0 - 48.5 %    MCV 94 82 - 98 fL    MCH 31.6 (H) 27.0 - 31.0 pg    MCHC 33.8 32.0 - 36.0 g/dL    RDW 13.1 11.5 - 14.5 %    Platelets 236 150 - 450 K/uL    MPV 9.2 9.2 - 12.9 fL    Immature Granulocytes 0.3 0.0 - 0.5 %    Gran # (ANC) 6.8 1.8 - 7.7 K/uL    Immature Grans (Abs) 0.03 0.00 - 0.04 K/uL    Lymph # 1.2 1.0 - 4.8 K/uL    Mono # 0.9 0.3 - 1.0 K/uL    Eos # 0.2 0.0 - 0.5 K/uL     Baso # 0.06 0.00 - 0.20 K/uL    nRBC 0 0 /100 WBC    Gran % 74.4 (H) 38.0 - 73.0 %    Lymph % 13.1 (L) 18.0 - 48.0 %    Mono % 9.3 4.0 - 15.0 %    Eosinophil % 2.2 0.0 - 8.0 %    Basophil % 0.7 0.0 - 1.9 %    Differential Method Automated    Comprehensive metabolic panel    Collection Time: 10/10/24  3:19 PM   Result Value Ref Range    Sodium 137 136 - 145 mmol/L    Potassium 3.9 3.5 - 5.1 mmol/L    Chloride 105 95 - 110 mmol/L    CO2 22 (L) 23 - 29 mmol/L    Glucose 110 70 - 110 mg/dL    BUN 13 8 - 23 mg/dL    Creatinine 0.8 0.5 - 1.4 mg/dL    Calcium 9.5 8.7 - 10.5 mg/dL    Total Protein 7.0 6.0 - 8.4 g/dL    Albumin 3.7 3.5 - 5.2 g/dL    Total Bilirubin 0.6 0.1 - 1.0 mg/dL    Alkaline Phosphatase 54 (L) 55 - 135 U/L    AST 27 10 - 40 U/L    ALT 22 10 - 44 U/L    eGFR >60 >60 mL/min/1.73 m^2    Anion Gap 10 8 - 16 mmol/L   Brain natriuretic peptide    Collection Time: 10/10/24  3:19 PM   Result Value Ref Range     (H) 0 - 99 pg/mL   Troponin I    Collection Time: 10/10/24  3:19 PM   Result Value Ref Range    Troponin I <0.006 0.000 - 0.026 ng/mL     *Note: Due to a large number of results and/or encounters for the requested time period, some results have not been displayed. A complete set of results can be found in Results Review.         Imaging Results:  Imaging Results              X-Ray Chest AP Portable (Final result)  Result time 10/10/24 14:50:10      Final result by Chava Braga MD (10/10/24 14:50:10)                   Impression:      1.  Negative for acute process involving the chest.    2.  Stable findings as noted above.      Electronically signed by: Chava Braga MD  Date:    10/10/2024  Time:    14:50               Narrative:    EXAMINATION:  XR CHEST AP PORTABLE    CLINICAL HISTORY:  syncope;    COMPARISON:  Multiple studies dating back to March 7, 2022.    FINDINGS:  EKG leads overlie the chest, which is rotated to the left.  The lungs are clear. The cardiac silhouette size is normal.  The trachea is midline and the mediastinal width is normal. Negative for focal infiltrate, effusion or pneumothorax. Pulmonary vasculature is normal. Negative for osseous abnormalities. Ectatic and tortuous aorta with aortic arch calcifications, degenerative changes of the spine and both shoulder girdles and mild convex right curvature of the midthoracic spine again seen.  Evidence for old granulomatous disease.                                       The EKG was ordered, reviewed, and independently interpreted by the ED provider.  Interpretation time: 15:06  Rate: 71 BPM  Rhythm: Sinus rhythm with 1st degree A-V block  Interpretation: Septal infarct (cited on or before 25-MAR-2024). No STEMI.             The Emergency Provider reviewed the vital signs and test results, which are outlined above.     ED Discussion     4:00 PM: Dr. Saucedo transfers care of patient to Dr. Burgos pending further treatment           Medical Decision Making  Differential diagnosis: Syncope, orthostatic syncope, hypotension, dysrhythmia, cardiac event    Patient was evaluated history physical examination.  Patient was syncopal episode while on the toilet.  She was orthostatic in the field he was receiving fluids.  Patient was had no chest pain.  Cardiac workup is benign.  She was not meet admission criteria in his [place] syncope rules in his stable safe for discharge with close follow-up.  Discussed all findings with the patient was well as plan of care.  She verbalized agreement and understanding with all    Amount and/or Complexity of Data Reviewed  Labs: ordered. Decision-making details documented in ED Course.     Details: Negative troponin.  Her CBC shows hemoglobin 12.2 and hematocrit 36.1.  CMP is benign.  Troponin is undetectable.  Radiology: ordered. Decision-making details documented in ED Course.     Details: Chest x-ray clear  ECG/medicine tests: ordered and independent interpretation performed. Decision-making details  documented in ED Course.     Details: Normal sinus rhythm.  No STEMI    Risk  OTC drugs.  Prescription drug management.  Decision regarding hospitalization.                ED Medication(s):  Medications   sodium chloride 0.9% bolus 1,000 mL 1,000 mL (1,000 mLs Intravenous New Bag 10/10/24 1520)       New Prescriptions    No medications on file        Follow-up Information       Olga Altman MD.    Specialty: Family Medicine  Contact information:  [address] [address] [address] [phone]                                 Scribe Attestation:   Scribe #1: I performed the above scribed service and the documentation accurately describes the services I performed. I attest to the accuracy of the note.     Attending:   Physician Attestation Statement for Scribe #1: I, Stephane Saucedo Jr., MD, personally performed the services described in this documentation, as scribed by Ingrid Reyna, in my presence, and it is both accurate and complete.       Scribe Attestation:   Scribe #2: I performed the above scribed service and the documentation accurately describes the services I performed. I attest to the accuracy of the note.    Attending Attestation:           Physician Attestation for Scribe:    Physician Attestation Statement for Scribe #2: I, Nayan Burgos DO, reviewed documentation, as scribed by Humberto Frias in my presence, and it is both accurate and complete. I also acknowledge and confirm the content of the note done by Scribe #1.           Clinical Impression       ICD-10-CM ICD-9-CM   1. Orthostatic syncope  I95.1 458.0   2. Syncope  R55 780.2               Stephane Saucedo Jr., MD  10/10/24 1607

## 2024-10-11 ENCOUNTER — PATIENT OUTREACH (OUTPATIENT)
Dept: EMERGENCY MEDICINE | Facility: HOSPITAL | Age: 69
End: 2024-10-11
Payer: MEDICARE

## 2024-10-11 NOTE — PROGRESS NOTES
Joya Liu  ED Navigator  Emergency Department    [place] [facility] [place]  Role: Community Health Worker    Date: 10/11/2024  Patient Name: Gemma Vick  MRN: [id number]  PCP: Olga Altman MD    Assessment:     Gemma Vick is a 69 y.o. female who has presented to ED for syncope; orthostatic syncope. Patient has visited the ED 1 times in the past 3 months. Patient did not contact PCP.     ED Navigator Initial Assessment    ED Navigator Enrollment Documentation  Consent to Services  Does patient consent to completing the assessment?: Yes  Contact  Method of Initial Contact: Phone  Transportation  Does the patient have issues with Transportation?: No  Does the patient have transportation to and from healthcare appointments?: Yes  Insurance Coverage  Do you have coverage/adequate coverage?: Yes  Type/kind of coverage: [employer]  Is patient able to afford co-pays/deductibles?: Yes  Is patient able to afford HME or supplies?: Yes  Does patient have an established [facility] PCP?: Yes  Able to access?: Yes  Does the patient have a lack of adequate coverage?: No  Specialist Appointment  Did the patient come to the ED to see a specialist?: No  Does the patient have a pending specialist referral?: No  Does the patient have a specialist appointment made?: No  PCP Follow Up Appointment  Has the patient had an appointment with a primary care provider in the past year?: Yes  Approximate date: 7/29/24  Provider: Olga Altman MD  Does the patient have a follow up appontment with a PCP?: Yes  Upcoming appointment date: 10/16/24  Provider: Olga Altman MD  When was the last time you saw your PCP?: 7/29/24 (Comment: Virtual appt)  Medications  Is patient able to afford medication?: Yes  Is patient unable to get medication due to lack of transportation?: No  Psychological  Does the patient have psycho-social concerns?: Yes  What concerns does the patient have?: Other (see comments) (Comment: Hx of other persistent mood  disorders)  Food  Does the patient have concerns about food?: No  Communication/Education  Does the patient have limited English proficiency/English not primary language?: No  Does patient have low literacy and/or low health literacy?: Yes  Does patient have concerns with care?: No  Does patient have dissatisfaction with care?: No  Other Financial Concerns  Does the patient have immediate financial distress?: No  Does the patient have general financial concerns?: No  Other Social Barriers/Concerns  Does the patient have any additional barriers or concerns?: Other (see comments) (Comment: Chrronic Conditions)  Primary Barrier  Barriers identified: Cognitive barrier (health literacy, language and communication, etc.), Psychological barrier (mistrust, anxiety, etc.)  Root Cause of ED Utilization: Chronic Conditions  Plan to address Chronic Conditions: Remind patient of upcoming PCP/specialist appointment within 24 to 48 hours before scheduled appt  Next steps: Provided Education, Scheduled Appointment/Referral  Scheduled Appointment Date: 10/16/24  Appointment Reminder Date: 10/14/24  Was education/educational materials provided surrounding PCP services/creating a medical home?: Yes Was education verbal or written?: Written     Was education/educational materials provided surrounding low cost, healthy foods?: Yes Was education verbal or written?: Written     Was education/educational materials provided surrounding other items? If so, use comment to explain.: Yes Was education verbal or written?: Written   Additional Documentation: Pt was seen in the ED on 10/10/24 for syncope; orthostatic syncope. ED Navigator spoke with pt for Post ED visit follow up navigation to assist with scheduling a 7-day Post ED visit follow up appt. Pt states that she had not yet contacted pcp to schedule and accepted scheduling assistance. I was unable to schedule appt for pt and sent a staff request for assistance to pcp.  Pt was scheduled a  7-day Post ED visit follolw up appt on 10/16/24 w/pcp. Pt does not have transportation issues and has no additional needs at this time. Closing Encounter.     Joya Liu               Social History     Socioeconomic History    Marital status:    Tobacco Use    Smoking status: Former     Current packs/day: 0.00     Average packs/day: 0.5 packs/day for 46.9 years (23.4 ttl pk-yrs)     Types: Cigarettes, Vaping with nicotine     Start date: 1970     Quit date: 2017     Years since quittin.4    Smokeless tobacco: Never    Tobacco comments:     3 MG NICOTINE FOR HEART.    Substance and Sexual Activity    Alcohol use: No    Drug use: No    Sexual activity: Not Currently     Birth control/protection: None     Social Drivers of Health     Financial Resource Strain: Low Risk  (10/11/2024)    Overall Financial Resource Strain (CARDIA)     Difficulty of Paying Living Expenses: Not hard at all   Food Insecurity: No Food Insecurity (10/11/2024)    Hunger Vital Sign     Worried About Running Out of Food in the Last Year: Never true     Ran Out of Food in the Last Year: Never true   Transportation Needs: No Transportation Needs (10/11/2024)    PRAPARE - Transportation     Lack of Transportation (Medical): No     Lack of Transportation (Non-Medical): No   Physical Activity: Inactive (3/14/2024)    Exercise Vital Sign     Days of Exercise per Week: 0 days     Minutes of Exercise per Session: 0 min   Stress: Stress Concern Present (10/11/2024)    [employer] - Occupational Stress Questionnaire     Feeling of Stress : To some extent   Housing Stability: Low Risk  (10/11/2024)    Housing Stability Vital Sign     Unable to Pay for Housing in the Last Year: No     Homeless in the Last Year: No       Plan:   Pt was seen in the ED on 10/10/24 for syncope; orthostatic syncope. ED Navigator spoke with pt for Post ED visit follow up navigation to assist with scheduling a 7-day Post ED  visit follow up appt. Pt states that she had not yet contacted pcp to schedule and accepted scheduling assistance. I was unable to schedule appt for pt and sent a staff request for assistance to pcp.  Pt was scheduled a 7-day Post ED visit follolw up appt on 10/16/24 w/pcp. Pt does not have transportation issues and has no additional needs at this time. Closing Encounter.     Joya Liu      Appointment made with: Olga Altman MD

## 2024-10-12 LAB
OHS QRS DURATION: 92 MS
OHS QTC CALCULATION: 423 MS

## 2024-10-15 ENCOUNTER — PATIENT OUTREACH (OUTPATIENT)
Dept: EMERGENCY MEDICINE | Facility: HOSPITAL | Age: 69
End: 2024-10-15
Payer: MEDICARE

## 2024-10-16 ENCOUNTER — OFFICE VISIT (OUTPATIENT)
Dept: FAMILY MEDICINE | Facility: CLINIC | Age: 69
End: 2024-10-16
Payer: MEDICARE

## 2024-10-16 VITALS
SYSTOLIC BLOOD PRESSURE: 134 MMHG | BODY MASS INDEX: 25.28 KG/M2 | WEIGHT: 133.81 LBS | DIASTOLIC BLOOD PRESSURE: 70 MMHG | HEART RATE: 52 BPM | OXYGEN SATURATION: 98 %

## 2024-10-16 DIAGNOSIS — R55 SYNCOPE, UNSPECIFIED SYNCOPE TYPE: Primary | ICD-10-CM

## 2024-10-16 DIAGNOSIS — Z12.31 BREAST CANCER SCREENING BY MAMMOGRAM: ICD-10-CM

## 2024-10-16 DIAGNOSIS — R23.2 HOT FLASHES: ICD-10-CM

## 2024-10-16 PROCEDURE — 3008F BODY MASS INDEX DOCD: CPT | Mod: HCNC,CPTII,S$GLB, | Performed by: FAMILY MEDICINE

## 2024-10-16 PROCEDURE — 4010F ACE/ARB THERAPY RXD/TAKEN: CPT | Mod: HCNC,CPTII,S$GLB, | Performed by: FAMILY MEDICINE

## 2024-10-16 PROCEDURE — 99214 OFFICE O/P EST MOD 30 MIN: CPT | Mod: HCNC,S$GLB,, | Performed by: FAMILY MEDICINE

## 2024-10-16 PROCEDURE — 1159F MED LIST DOCD IN RCRD: CPT | Mod: HCNC,CPTII,S$GLB, | Performed by: FAMILY MEDICINE

## 2024-10-16 PROCEDURE — 3075F SYST BP GE 130 - 139MM HG: CPT | Mod: HCNC,CPTII,S$GLB, | Performed by: FAMILY MEDICINE

## 2024-10-16 PROCEDURE — 3044F HG A1C LEVEL LT 7.0%: CPT | Mod: HCNC,CPTII,S$GLB, | Performed by: FAMILY MEDICINE

## 2024-10-16 PROCEDURE — 99999 PR PBB SHADOW E&M-EST. PATIENT-LVL IV: CPT | Mod: PBBFAC,HCNC,, | Performed by: FAMILY MEDICINE

## 2024-10-16 PROCEDURE — 3078F DIAST BP <80 MM HG: CPT | Mod: HCNC,CPTII,S$GLB, | Performed by: FAMILY MEDICINE

## 2024-10-16 RX ORDER — ESTRADIOL 0.5 MG/1
0.5 TABLET ORAL DAILY
Qty: 30 TABLET | Refills: 3 | Status: SHIPPED | OUTPATIENT
Start: 2024-10-16 | End: 2025-10-16

## 2024-10-16 NOTE — PROGRESS NOTES
Chief Complaint:  No chief complaint on file.      History of Present Illness:    History of Present Illness    Patient presents today for follow up after a fainting episode. She experienced a fainting episode at home lasting 30-60 seconds. Prior to losing consciousness, she had abdominal pain and felt hot. She denies dizziness or vertigo before the event. After regaining consciousness, she denies weakness or slurred speech. The episode was preceded by her requesting the air conditioner be turned on. Emergency services were called, and she received IV fluids en route to the hospital. She sustained a skin tear from being carried after losing consciousness. She reports a previous fainting episode long ago, which led to her starting fluid pills. At the hospital, she underwent extensive testing including labs and an EKG, all reported as normal. She was diagnosed with dehydration, with other conditions including heart attack ruled out. She recalls a similar dehydration incident in the past associated with starting diuretic medication. She reports feeling tired despite normal test results. She has had hot flashes again after discontinuing a low dose of estrogen 1-2 years ago. She describes ongoing vision issues, including difficulty focusing and feeling slightly dizzy, but denies vertigo. She has a history of hemorrhoids and hysterectomy. She recently underwent a stress test and echocardiogram, with the latter reported as normal. She takes Senokot nightly for hemorrhoid management, a B complex vitamin supplement, and Namenda twice daily. She is due for a mammogram and reports difficulty scheduling the appointment. She is also in need of an eye checkup and confirms having an eye doctor.      ROS:  General: denies fever, denies chills, admits fatigue, denies weight gain, denies weight loss, denies loss of appetite, denies night sweats  Eyes: admits vision changes, denies blurry vision, denies eye pain, denies eye  discharge  ENT: denies ear pain, denies hearing loss, denies tinnitus, denies nasal congestion, denies sore throat  Cardiovascular: denies chest pain, denies palpitations, denies lower extremity edema  Respiratory: denies cough, denies shortness of breath, denies wheezing, denies sputum production  Endocrine: denies polyuria, denies polydipsia, denies heat intolerance, denies cold intolerance  Gastrointestinal: denies abdominal pain, denies heartburn, denies nausea, denies vomiting, denies diarrhea, denies constipation, denies blood in stool  Genitourinary: denies dysuria, denies urgency, denies frequency, denies hematuria, denies nocturia, denies incontinence  Heme & Lymphatic: denies easy or excessive bleeding, denies easy bruising, denies swollen lymph nodes  Musculoskeletal: denies muscle pain, denies back pain, denies joint pain, denies joint swelling  Skin: denies rash, denies lesion, denies itching, denies skin texture changes, denies skin color changes  Neurological: denies headache, denies dizziness, denies numbness, denies tingling, denies seizure activity, denies speech difficulty, denies memory loss, denies confusion  Psychiatric: denies anxiety, denies depression, denies sleep difficulty           Past Medical History:   Diagnosis Date    AAA (abdominal aortic aneurysm) 02/13/2014    Abdominal aneurysm     3    Acute coronary syndrome     Arthritis     BPPV (benign paroxysmal positional vertigo)     Carotid artery plaque     Carotid artery stenosis and occlusion 02/13/2014    Chronic back pain     COPD (chronic obstructive pulmonary disease)     Coronary artery disease     Dementia     Emphysema lung     Hyperlipidemia     Hypertension     Myocardial infarction     x3    Neuropathy     Personal history of COVID-19 06/09/2021 11/16/2020 +Covid, recovered at home        Social History:  Social History     Socioeconomic History    Marital status:    Tobacco Use    Smoking status: Former      Current packs/day: 0.00     Average packs/day: 0.5 packs/day for 46.9 years (23.4 ttl pk-yrs)     Types: Cigarettes, Vaping with nicotine     Start date: 1970     Quit date: 2017     Years since quittin.4    Smokeless tobacco: Never    Tobacco comments:     3 MG NICOTINE FOR HEART.    Substance and Sexual Activity    Alcohol use: No    Drug use: No    Sexual activity: Not Currently     Birth control/protection: None     Social Drivers of Health     Financial Resource Strain: Low Risk  (10/11/2024)    Overall Financial Resource Strain (CARDIA)     Difficulty of Paying Living Expenses: Not hard at all   Food Insecurity: No Food Insecurity (10/11/2024)    Hunger Vital Sign     Worried About Running Out of Food in the Last Year: Never true     Ran Out of Food in the Last Year: Never true   Transportation Needs: No Transportation Needs (10/11/2024)    PRAPARE - Transportation     Lack of Transportation (Medical): No     Lack of Transportation (Non-Medical): No   Physical Activity: Inactive (3/14/2024)    Exercise Vital Sign     Days of Exercise per Week: 0 days     Minutes of Exercise per Session: 0 min   Stress: Stress Concern Present (10/11/2024)    [employer] - Occupational Stress Questionnaire     Feeling of Stress : To some extent   Housing Stability: Low Risk  (10/11/2024)    Housing Stability Vital Sign     Unable to Pay for Housing in the Last Year: No     Homeless in the Last Year: No       Family History:   family history includes Breast cancer in her paternal aunt; Heart attack in her brother; Heart attacks under age 50 in her brother and father; Heart disease in her mother.    Health Maintenance   Topic Date Due    Shingles Vaccine (1 of 2) Never done    Colorectal Cancer Screening  2022    Mammogram  2022    Lipid Panel  2024    LDCT Lung Screen  2025    TETANUS VACCINE  11/15/2026    DEXA Scan  2027    Hepatitis C Screening  Completed        Exam:Physical     Vital Signs  Pulse: (!) 52  SpO2: 98 %  BP: 134/70  Height and Weight  Weight: 60.7 kg (133 lb 13.1 oz)]    Body mass index is 25.28 kg/m².    Physical Exam    General: Well-developed. Well-nourished. No acute distress.  Eyes: EOMI. Sclerae anicteric.  HENT: Normocephalic. Atraumatic. Nares patent. Moist oral mucosa.  Cardiovascular: Regular rate. Regular rhythm. No murmurs. No rubs. No gallops. Normal S1, S2.  Respiratory: Normal respiratory effort. Clear to auscultation bilaterally. No rales. No rhonchi. No wheezing.  Musculoskeletal: No  obvious deformity.  Extremities: No lower extremity edema.  Neurological: Alert & oriented x3. No slurred speech. Normal gait.  Psychiatric: Normal mood. Normal affect. Good insight. Good judgment.  Skin: Warm. Dry. No rash. Skin abrasion healing well.           Assessment:        ICD-10-CM ICD-9-CM   1. Syncope, unspecified syncope type  R55 780.2   2. Hot flashes  R23.2 782.62   3. Breast cancer screening by mammogram  Z12.31 V76.12         Plan:    Assessment & Plan    MEDICAL DECISION MAKING:  - Considered potential cardiac arrhythmia as cause of patient's syncopal episode, though unable to confirm due to transient nature of event  - Evaluated recent cardiac workup including stress test and echocardiogram, which were normal  - Assessed for neurological deficits post-syncope, finding none  - Reviewed hospital workup, which ruled out acute cardiac events and confirmed dehydration as likely cause  - Considered resuming hormone therapy for management of menopausal symptoms    PATIENT EDUCATION:  - Explained importance of regular mammograms, especially when on estrogen therapy due to potential increased risk of estrogen-sensitive breast cancers    ACTION ITEMS/LIFESTYLE:  - Patient to schedule eye exam to evaluate reported vision issues  - Patient to ensure regular mammogram screenings are completed    MEDICATIONS:  - Started estradiol 0.5 mg tablet daily  for management of menopausal symptoms    FOLLOW UP:  - Contact the office if another syncopal episode occurs to consider cardiac monitoring  - Follow up with Dr. Garcia next month as scheduled         Diagnoses and all orders for this visit:    Syncope, unspecified syncope type    Hot flashes    Breast cancer screening by mammogram  -     Mammo Digital Screening Bilat; Future    Other orders  -     estradioL (ESTRACE) 0.5 MG tablet; Take 1 tablet (0.5 mg total) by mouth once daily.        No follow-ups on file.      Olga Altman MD

## 2024-10-17 DIAGNOSIS — I10 ESSENTIAL (PRIMARY) HYPERTENSION: ICD-10-CM

## 2024-10-17 RX ORDER — FUROSEMIDE 20 MG/1
20 TABLET ORAL
Qty: 90 TABLET | Refills: 2 | Status: SHIPPED | OUTPATIENT
Start: 2024-10-17

## 2024-10-17 NOTE — TELEPHONE ENCOUNTER
No care due was identified.  [facility] Embedded Care Due Messages. Reference number: [id number].   10/17/2024 8:42:11 AM CDT

## 2024-10-17 NOTE — TELEPHONE ENCOUNTER
Refill Routing Note   Medication(s) are not appropriate for processing by [facility] for the following reason(s):        Outside of protocol    [facility] action(s):  Route      Medication Therapy Plan: PRN USAGE(OP)      Appointments  past 12m or future 3m with PCP    Date Provider   Last Visit   10/16/2024 Olga Altman MD   Next Visit   Visit date not found Olga Altman MD   ED visits in past 90 days: 1        Note composed:9:40 AM 10/17/2024

## 2024-11-14 ENCOUNTER — HOSPITAL ENCOUNTER (EMERGENCY)
Facility: HOSPITAL | Age: 69
Discharge: HOME | End: 2024-11-15
Attending: EMERGENCY MEDICINE
Payer: MEDICARE

## 2024-11-14 DIAGNOSIS — I71.43 INFRARENAL ABDOMINAL AORTIC ANEURYSM (AAA) WITHOUT RUPTURE: ICD-10-CM

## 2024-11-14 DIAGNOSIS — E87.1 CHRONIC HYPONATREMIA: ICD-10-CM

## 2024-11-14 DIAGNOSIS — R79.89 ELEVATED D-DIMER: ICD-10-CM

## 2024-11-14 DIAGNOSIS — R06.02 SOB (SHORTNESS OF BREATH): ICD-10-CM

## 2024-11-14 DIAGNOSIS — R20.2 PARESTHESIAS: ICD-10-CM

## 2024-11-14 DIAGNOSIS — R07.9 CHEST PAIN: Primary | ICD-10-CM

## 2024-11-14 DIAGNOSIS — I10 PRIMARY HYPERTENSION: ICD-10-CM

## 2024-11-14 LAB
ALBUMIN SERPL BCP-MCNC: 3.9 G/DL (ref 3.5–5.2)
ALP SERPL-CCNC: 59 U/L (ref 40–150)
ALT SERPL W/O P-5'-P-CCNC: 25 U/L (ref 10–44)
ANION GAP SERPL CALC-SCNC: 12 MMOL/L (ref 8–16)
AST SERPL-CCNC: 28 U/L (ref 10–40)
BASOPHILS # BLD AUTO: 0.04 K/UL (ref 0–0.2)
BASOPHILS NFR BLD: 0.6 % (ref 0–1.9)
BILIRUB SERPL-MCNC: 0.7 MG/DL (ref 0.1–1)
BILIRUB UR QL STRIP: NEGATIVE
BNP SERPL-MCNC: 55 PG/ML (ref 0–99)
BUN SERPL-MCNC: 13 MG/DL (ref 8–23)
CALCIUM SERPL-MCNC: 9.2 MG/DL (ref 8.7–10.5)
CHLORIDE SERPL-SCNC: 100 MMOL/L (ref 95–110)
CLARITY UR: CLEAR
CO2 SERPL-SCNC: 19 MMOL/L (ref 23–29)
COLOR UR: COLORLESS
CREAT SERPL-MCNC: 0.8 MG/DL (ref 0.5–1.4)
D DIMER PPP IA.FEU-MCNC: 1.15 MG/L FEU
DIFFERENTIAL METHOD BLD: ABNORMAL
EOSINOPHIL # BLD AUTO: 0.2 K/UL (ref 0–0.5)
EOSINOPHIL NFR BLD: 3.7 % (ref 0–8)
ERYTHROCYTE [DISTWIDTH] IN BLOOD BY AUTOMATED COUNT: 12.3 % (ref 11.5–14.5)
EST. GFR  (NO RACE VARIABLE): >60 ML/MIN/1.73 M^2
GLUCOSE SERPL-MCNC: 116 MG/DL (ref 70–110)
GLUCOSE UR QL STRIP: NEGATIVE
HCT VFR BLD AUTO: 36.6 % (ref 37–48.5)
HGB BLD-MCNC: 12.4 G/DL (ref 12–16)
HGB UR QL STRIP: NEGATIVE
IMM GRANULOCYTES # BLD AUTO: 0.01 K/UL (ref 0–0.04)
IMM GRANULOCYTES NFR BLD AUTO: 0.2 % (ref 0–0.5)
KETONES UR QL STRIP: NEGATIVE
LEUKOCYTE ESTERASE UR QL STRIP: NEGATIVE
LYMPHOCYTES # BLD AUTO: 1.4 K/UL (ref 1–4.8)
LYMPHOCYTES NFR BLD: 22.7 % (ref 18–48)
MCH RBC QN AUTO: 31.6 PG (ref 27–31)
MCHC RBC AUTO-ENTMCNC: 33.9 G/DL (ref 32–36)
MCV RBC AUTO: 93 FL (ref 82–98)
MONOCYTES # BLD AUTO: 0.7 K/UL (ref 0.3–1)
MONOCYTES NFR BLD: 11.1 % (ref 4–15)
NEUTROPHILS # BLD AUTO: 3.8 K/UL (ref 1.8–7.7)
NEUTROPHILS NFR BLD: 61.7 % (ref 38–73)
NITRITE UR QL STRIP: NEGATIVE
NRBC BLD-RTO: 0 /100 WBC
PH UR STRIP: 7 [PH] (ref 5–8)
PLATELET # BLD AUTO: 229 K/UL (ref 150–450)
PMV BLD AUTO: 9.2 FL (ref 9.2–12.9)
POTASSIUM SERPL-SCNC: 3.8 MMOL/L (ref 3.5–5.1)
PROT SERPL-MCNC: 7.1 G/DL (ref 6–8.4)
PROT UR QL STRIP: NEGATIVE
RBC # BLD AUTO: 3.92 M/UL (ref 4–5.4)
SODIUM SERPL-SCNC: 131 MMOL/L (ref 136–145)
SP GR UR STRIP: 1 (ref 1–1.03)
TROPONIN I SERPL DL<=0.01 NG/ML-MCNC: 0.01 NG/ML (ref 0–0.03)
URN SPEC COLLECT METH UR: ABNORMAL
UROBILINOGEN UR STRIP-ACNC: NEGATIVE EU/DL
WBC # BLD AUTO: 6.22 K/UL (ref 3.9–12.7)

## 2024-11-14 PROCEDURE — 85379 FIBRIN DEGRADATION QUANT: CPT | Mod: HCNC | Performed by: EMERGENCY MEDICINE

## 2024-11-14 PROCEDURE — 85025 COMPLETE CBC W/AUTO DIFF WBC: CPT | Mod: HCNC | Performed by: EMERGENCY MEDICINE

## 2024-11-14 PROCEDURE — 93005 ELECTROCARDIOGRAM TRACING: CPT | Mod: HCNC

## 2024-11-14 PROCEDURE — 99285 EMERGENCY DEPT VISIT HI MDM: CPT | Mod: 25,HCNC

## 2024-11-14 PROCEDURE — 93010 ELECTROCARDIOGRAM REPORT: CPT | Mod: HCNC,,, | Performed by: INTERNAL MEDICINE

## 2024-11-14 PROCEDURE — 84484 ASSAY OF TROPONIN QUANT: CPT | Mod: HCNC | Performed by: EMERGENCY MEDICINE

## 2024-11-14 PROCEDURE — 80053 COMPREHEN METABOLIC PANEL: CPT | Mod: HCNC | Performed by: EMERGENCY MEDICINE

## 2024-11-14 PROCEDURE — 83880 ASSAY OF NATRIURETIC PEPTIDE: CPT | Mod: HCNC | Performed by: EMERGENCY MEDICINE

## 2024-11-14 PROCEDURE — 81003 URINALYSIS AUTO W/O SCOPE: CPT | Mod: HCNC | Performed by: EMERGENCY MEDICINE

## 2024-11-15 VITALS
RESPIRATION RATE: 19 BRPM | TEMPERATURE: 98 F | DIASTOLIC BLOOD PRESSURE: 78 MMHG | WEIGHT: 146.81 LBS | HEART RATE: 74 BPM | BODY MASS INDEX: 27.74 KG/M2 | SYSTOLIC BLOOD PRESSURE: 165 MMHG | OXYGEN SATURATION: 95 %

## 2024-11-15 LAB
OHS QRS DURATION: 86 MS
OHS QTC CALCULATION: 424 MS
TROPONIN I SERPL DL<=0.01 NG/ML-MCNC: <0.006 NG/ML (ref 0–0.03)

## 2024-11-15 PROCEDURE — 25500020 PHARM REV CODE 255: Mod: HCNC | Performed by: EMERGENCY MEDICINE

## 2024-11-15 PROCEDURE — 84484 ASSAY OF TROPONIN QUANT: CPT | Mod: HCNC | Performed by: EMERGENCY MEDICINE

## 2024-11-15 RX ADMIN — IOHEXOL 100 ML: 350 INJECTION, SOLUTION INTRAVENOUS at 01:11

## 2024-11-15 NOTE — ED PROVIDER NOTES
"SCRIBE #1 NOTE: I, Debby Winslow, am scribing for, and in the presence of, Melony Van DO. I have scribed the entire note.       History     Chief Complaint   Patient presents with    Hypertension    Headache     [facility] reports that pt was HTN at home systolic was 200, pt has cardiac hx and c/o head throbbing. States that she is compliant with BP meds. [facility] stated that pt was orthostatic when they sat her up on the stretcher     Review of patient's allergies indicates:  No Known Allergies      History of Present Illness     HPI    11/14/2024, 10:49 PM  History obtained from the patient  Additional history obtained from the patient's granddaughter at bedside      History of Present Illness: Gemma Vick is a 69 y.o. female patient with a PMHx of dementia, abdominal aortic aneurysm, acute coronary syndrome, MI, CAD, COPD, emphysema, HTN, HLD,  who presents to the Emergency Department via [facility] for evaluation of hypertensive exacerbation which onset today. The patient reports feeling "pulsating" in her head, but notably denies any headache. Her granddaughter report that she had an episode of SOB and chest tightness while they were at the [place] this afternoon, which resolved spontaneously. Her SOB and chest tightness returned this evening. The patient uses 3L O2 at night. No mitigating or exacerbating factors reported. Associated sxs include urinary frequency as well as numbness and paresthesia to the bilateral hands and feet. Patient denies any nausea, vomiting, lightheadedness, dizziness, palpitations, and diaphoresis. No prior Tx reported. No further complaints or concerns at this time.       Arrival mode: [facility]    PCP: Olga Altman MD        Past Medical History:  Past Medical History:   Diagnosis Date    AAA (abdominal aortic aneurysm) 02/13/2014    Abdominal aneurysm     3    Acute coronary syndrome     Arthritis     BPPV (benign paroxysmal positional vertigo)     Carotid artery plaque     Carotid artery " Patient ID: Oskar Salgado  : [place], [employer] (Optometry)  Abhishek Dietz stenosis and occlusion 02/13/2014    Chronic back pain     COPD (chronic obstructive pulmonary disease)     Coronary artery disease     Dementia     Emphysema lung     Hyperlipidemia     Hypertension     Myocardial infarction     x3    Neuropathy     Personal history of COVID-19 06/09/2021 11/16/2020 +Covid, recovered at home        Past Surgical History:  Past Surgical History:   Procedure Laterality Date    CARDIAC CATHETERIZATION      CORONARY ANGIOPLASTY      CORONARY STENT PLACEMENT N/A 9/12/2023    Procedure: INSERTION, STENT, CORONARY ARTERY;  Surgeon: Millie Juarez MD;  Location: [facility] CATH LAB;  Service: Cardiology;  Laterality: N/A;    HYSTERECTOMY  1988    LEFT HEART CATHETERIZATION Left 9/12/2023    Procedure: Left heart cath;  Surgeon: Millie Juarez MD;  Location: [facility] CATH LAB;  Service: Cardiology;  Laterality: Left;    PERCUTANEOUS CORONARY INTERVENTION, ARTERY N/A 9/12/2023    Procedure: Percutaneous coronary intervention;  Surgeon: Millie Juarez MD;  Location: [facility] CATH LAB;  Service: Cardiology;  Laterality: N/A;    THROMBECTOMY, CORONARY  9/12/2023    Procedure: Thrombectomy, Coronary;  Surgeon: Millie Juarez MD;  Location: [facility] CATH LAB;  Service: Cardiology;;    TONSILLECTOMY      TRANSFORAMINAL EPIDURAL INJECTION OF STEROID Right 12/1/2023    Procedure: Injection,steroid,epidural,transforaminal approach- right side, L4/5 and L5/S1;  Surgeon: Lydia Roberts MD;  Location: [facility] PAIN T;  Service: Pain Management;  Laterality: Right;         Family History:  Family History   Problem Relation Name Age of Onset    Heart disease Mother      Heart attacks under age 50 Father      Heart attacks under age 50 Brother          HA at 32    Heart attack Brother          HA at 70    Breast cancer Paternal Aunt         Social History:  Social History     Tobacco Use    Smoking status: Former     Current packs/day: 0.00     Average packs/day: 0.5 packs/day for 46.9 years (23.4 ttl pk-yrs)      Types: Cigarettes, Vaping with nicotine     Start date: 1970     Quit date: 2017     Years since quittin.5    Smokeless tobacco: Never    Tobacco comments:     3 MG NICOTINE FOR HEART.    Substance and Sexual Activity    Alcohol use: No    Drug use: No    Sexual activity: Not Currently     Birth control/protection: None        Review of Systems     Review of Systems   Constitutional:  Negative for diaphoresis.   Respiratory:  Positive for chest tightness and shortness of breath.    Gastrointestinal:  Negative for nausea and vomiting.   Genitourinary:  Positive for frequency.   Neurological:  Positive for numbness (bilateral hands and feet). Negative for dizziness, light-headedness and headaches.      Physical Exam     Initial Vitals [24]   BP Pulse Resp Temp SpO2   (!) 181/82 77 18 97.7 °F (36.5 °C) 97 %      MAP       --          Physical Exam  Nursing Notes and Vital Signs Reviewed.  Constitutional: Patient is in no acute distress. Well-developed and well-nourished.  Head: Atraumatic. Normocephalic.  Eyes: PERRL. EOM intact. Conjunctivae are not pale. No scleral icterus.  ENT: Mucous membranes are moist. Oropharynx is clear and symmetric.    Neck: Supple. Full ROM.   Cardiovascular: Regular rate. Regular rhythm. No murmurs, rubs, or gallops. Pulmonary/Chest: No respiratory distress. Clear to auscultation bilaterally. No wheezing or rales.  Abdominal: Soft and non-distended.  There is no tenderness.  No rebound, guarding, or rigidity.   Musculoskeletal: Moves all extremities. No obvious deformities. No edema. No calf tenderness.  Skin: Warm and dry.  Neurological:  Alert, awake, and appropriate.  Normal speech.  No acute focal neurological deficits are appreciated.  Psychiatric: Normal affect. Good eye contact. Appropriate in content.     ED Course   Procedures  ED Vital Signs:  Vitals:    24 2225 24 2243 24 2254 24 2307   BP: (!) 181/82   (!) 175/78   Pulse: 77  78 75  Resp: 18   14   Temp: 97.7 °F (36.5 °C)      TempSrc: Oral      SpO2: 97%   95%   Weight:  66.6 kg (146 lb 13.2 oz)      11/14/24 2330 11/15/24 0045 11/15/24 0205 11/15/24 0248   BP: (!) 176/77 (!) 191/80 (!) 151/65    Pulse: 73 75 75 76   Resp: 20 17 (!) 24 19   Temp:       TempSrc:       SpO2: 95% 96% 95% 96%   Weight:        11/15/24 0403   BP: (!) 165/78   Pulse: 74   Resp:    Temp:    TempSrc:    SpO2: 95%   Weight:        Abnormal Lab Results:  Labs Reviewed   CBC W/ AUTO DIFFERENTIAL - Abnormal       Result Value    WBC 6.22      RBC 3.92 (*)     Hemoglobin 12.4      Hematocrit 36.6 (*)     MCV 93      MCH 31.6 (*)     MCHC 33.9      RDW 12.3      Platelets 229      MPV 9.2      Immature Granulocytes 0.2      Gran # (ANC) 3.8      Immature Grans (Abs) 0.01      Lymph # 1.4      Mono # 0.7      Eos # 0.2      Baso # 0.04      nRBC 0      Gran % 61.7      Lymph % 22.7      Mono % 11.1      Eosinophil % 3.7      Basophil % 0.6      Differential Method Automated     COMPREHENSIVE METABOLIC PANEL - Abnormal    Sodium 131 (*)     Potassium 3.8      Chloride 100      CO2 19 (*)     Glucose 116 (*)     BUN 13      Creatinine 0.8      Calcium 9.2      Total Protein 7.1      Albumin 3.9      Total Bilirubin 0.7      Alkaline Phosphatase 59      AST 28      ALT 25      eGFR >60      Anion Gap 12     D DIMER, QUANTITATIVE - Abnormal    D-Dimer 1.15 (*)    URINALYSIS, REFLEX TO URINE CULTURE - Abnormal    Specimen UA Urine, Clean Catch      Color, UA Colorless (*)     Appearance, UA Clear      pH, UA 7.0      Specific Gravity, UA 1.005      Protein, UA Negative      Glucose, UA Negative      Ketones, UA Negative      Bilirubin (UA) Negative      Occult Blood UA Negative      Nitrite, UA Negative      Urobilinogen, UA Negative      Leukocytes, UA Negative      Narrative:     Specimen Source->Urine   TROPONIN I    Troponin I 0.010     B-TYPE NATRIURETIC PEPTIDE    BNP 55     TROPONIN I    Troponin I <0.006          All  [facility] LABORATORY DRAW STATION [facility] MARCOS [facility]   2/7/2025  8:30 AM Jeremie Amos APRN [facility]       Lab Results:  Results for orders placed or performed during the hospital encounter of 11/14/24   CBC auto differential    Collection Time: 11/14/24 11:02 PM   Result Value Ref Range    WBC 6.22 3.90 - 12.70 K/uL    RBC 3.92 (L) 4.00 - 5.40 M/uL    Hemoglobin 12.4 12.0 - 16.0 g/dL    Hematocrit 36.6 (L) 37.0 - 48.5 %    MCV 93 82 - 98 fL    MCH 31.6 (H) 27.0 - 31.0 pg    MCHC 33.9 32.0 - 36.0 g/dL    RDW 12.3 11.5 - 14.5 %    Platelets 229 150 - 450 K/uL    MPV 9.2 9.2 - 12.9 fL    Immature Granulocytes 0.2 0.0 - 0.5 %    Gran # (ANC) 3.8 1.8 - 7.7 K/uL    Immature Grans (Abs) 0.01 0.00 - 0.04 K/uL    Lymph # 1.4 1.0 - 4.8 K/uL    Mono # 0.7 0.3 - 1.0 K/uL    Eos # 0.2 0.0 - 0.5 K/uL    Baso # 0.04 0.00 - 0.20 K/uL    nRBC 0 0 /100 WBC    Gran % 61.7 38.0 - 73.0 %    Lymph % 22.7 18.0 - 48.0 %    Mono % 11.1 4.0 - 15.0 %    Eosinophil % 3.7 0.0 - 8.0 %    Basophil % 0.6 0.0 - 1.9 %    Differential Method Automated    Comprehensive metabolic panel    Collection Time: 11/14/24 11:02 PM   Result Value Ref Range    Sodium 131 (L) 136 - 145 mmol/L    Potassium 3.8 3.5 - 5.1 mmol/L    Chloride 100 95 - 110 mmol/L    CO2 19 (L) 23 - 29 mmol/L    Glucose 116 (H) 70 - 110 mg/dL    BUN 13 8 - 23 mg/dL    Creatinine 0.8 0.5 - 1.4 mg/dL    Calcium 9.2 8.7 - 10.5 mg/dL    Total Protein 7.1 6.0 - 8.4 g/dL    Albumin 3.9 3.5 - 5.2 g/dL    Total Bilirubin 0.7 0.1 - 1.0 mg/dL    Alkaline Phosphatase 59 40 - 150 U/L    AST 28 10 - 40 U/L    ALT 25 10 - 44 U/L    eGFR >60 >60 mL/min/1.73 m^2    Anion Gap 12 8 - 16 mmol/L   Troponin I #1    Collection Time: 11/14/24 11:02 PM   Result Value Ref Range    Troponin I 0.010 0.000 - 0.026 ng/mL   BNP    Collection Time: 11/14/24 11:02 PM   Result Value Ref Range    BNP 55 0 - 99 pg/mL   D dimer, quantitative    Collection Time: 11/14/24 11:02 PM   Result Value Ref Range    D-Dimer 1.15 (H) <0.50 mg/L FEU   Urinalysis, Reflex to Urine Culture Urine, Clean Catch    Collection Time: 11/14/24  11:09 PM    Specimen: Urine, Clean Catch   Result Value Ref Range    Specimen UA Urine, Clean Catch     Color, UA Colorless (A) Yellow, Straw, Whit    Appearance, UA Clear Clear    pH, UA 7.0 5.0 - 8.0    Specific Gravity, UA 1.005 1.005 - 1.030    Protein, UA Negative Negative    Glucose, UA Negative Negative    Ketones, UA Negative Negative    Bilirubin (UA) Negative Negative    Occult Blood UA Negative Negative    Nitrite, UA Negative Negative    Urobilinogen, UA Negative <2.0 EU/dL    Leukocytes, UA Negative Negative   Troponin I #2    Collection Time: 11/15/24  2:53 AM   Result Value Ref Range    Troponin I <0.006 0.000 - 0.026 ng/mL     *Note: Due to a large number of results and/or encounters for the requested time period, some results have not been displayed. A complete set of results can be found in Results Review.         Imaging Results:  Imaging Results              CTA Chest Non-Coronary (PE Studies) (Preliminary result)  Result time 11/15/24 01:53:25      Wet Read by Melony Van DO (11/15/24 01:53:25, [facility] Emergency Medicine)    Stat rad Jane Freeman MD  Partially seen infrarenal AAA measuring at least 3.6 cm.  Aortic diameter 3.1 cm just below the renal arteries.  No evidence of PE.                                         US Lower Extremity Veins Bilateral (In process)                      CT Head Without Contrast (Preliminary result)  Result time 11/15/24 00:22:14      Wet Read by Melony Van DO (11/15/24 00:22:14, [facility] Emergency Dept., Emergency Medicine)    Stat rad Jane Freeman MD  No acute findings in the head/brain.                                     X-Ray Chest AP Portable (In process)                   Type of Interpretation: Outside Written Report.  Radiology Procedure Done: Head CT without contrast.  Interpretation:   No acute findings in the head/brain.   Radiologist: Jane Freeman MD  11/15/2024 00:19     Type of Interpretation: Outside  Written Report.  Radiology Procedure Done: Chest CT with Contrast.  Interpretation:   1. Partly seen infrarenal AAA measuring at least 3.6 cm. Aortic diameter 3.1 cm just below the renal arteries.   2. No evidence of PE.  Radiologist: Jane Freeman MD  11/15/2024 01:48            The EKG was ordered, reviewed, and independently interpreted by the ED provider.  Interpretation time: 23:20  Rate: 72 BPM  Rhythm: Sinus rhythm with 1st degree AV block  Interpretation: Left axis deviation. Minimal voltage criteria for LVH, may be normal variant. Septal infarct, age undetermined. Q waves. Inferior infarct, age undetermined. T wave inversion. No STEMI.  WV interval: 294 ms  Similar to AUG 2023.           The Emergency Provider reviewed the vital signs and test results, which are outlined above.     ED Discussion     3:49 AM: Reassessed pt at this time. Discussed with pt all pertinent ED information and results. Discussed pt dx and plan of tx. Gave pt all f/u and return to the ED instructions. All questions and concerns were addressed at this time. Pt expresses understanding of information and instructions, and is comfortable with plan to discharge. Pt is stable for discharge.    I discussed with patient and/or family/caretaker that evaluation in the ED does not suggest any emergent or life threatening medical conditions requiring immediate intervention beyond what was provided in the ED, and I believe patient is safe for discharge.  Regardless, an unremarkable evaluation in the ED does not preclude the development or presence of a serious of life threatening condition. As such, patient was instructed to return immediately for any worsening or change in current symptoms.      ED Course as of 11/15/24 0541   Thu Nov 14, 2024 2232 Reviewed hospital workup from October, which ruled out acute cardiac events and confirmed dehydration as likely cause.  PCP recently restarted her estradiol. [NF]   Fri Nov 15, 2024   0153 AAA  was seen at 3.9 cm previously in March. [NF]   1152 69-year-old female presents with chest tightness and shortness of breath.  EKG shows no acute ischemic changes when compared to previous and troponin x2 negative.  Chest x-ray is unremarkable and BNP is negative for CHF.  D-dimer elevated however CTA chest shows no signs of pulmonary embolism.  She has a known stable AAA.  Ultrasound negative for DVT.  She also reports throbbing in her head in her head CT is unremarkable.  Normal CBC and CMP.  UA negative for infection.  I suspect is most likely blood pressure related as her symptoms improved when her blood pressure improved. [NF]   0540 Ddx:  Intracranial hemorrhage, ACS, CHF, PE, DVT, UTI, acute electrolyte derangement, SULTANA. [NF]      ED Course User Index  [NF] Melony Van, DO     Medical Decision Making  Amount and/or Complexity of Data Reviewed  Independent Historian: caregiver     Details: Additional history provided by the patient's granddaughter.  External Data Reviewed: notes.     Details: [facility] Aug 2023  ·  The Mid RCA lesion was 90% stenosed with 0% stenosis post-intervention.  ·  The Prox RCA lesion was 50% stenosed with 0% stenosis post-intervention.  ·  The ejection fraction was calculated to be 60%.  ·  The pre-procedure left ventricular end diastolic pressure was 27.  ·  The post-procedure left ventricular end diastolic pressure was 29.  ·  Mid RCA lesion: A .  ·  Mid RCA lesion: A .  ·  Mid RCA lesion: A SYS STENT MEGATRON [id number] stent was successfully placed at 16 SNEHA for 20 sec.  ·  Mid RCA lesion: A .  ·  Mid RCA lesion: A .  ·  Prox RCA lesion: A SYS STENT MEGATRON [id number] stent was successfully placed at 12 SNEHA for 15 sec.  ·  Prox RCA lesion: A .  ·  Prox RCA lesion: A .  ·  The estimated blood loss was none.  ·  There was single vessel coronary artery disease.  ·  The PCI was successful.    Labs: ordered. Decision-making details documented in ED Course.  Radiology: ordered and independent  interpretation performed. Decision-making details documented in ED Course.  ECG/medicine tests: ordered and independent interpretation performed. Decision-making details documented in ED Course.    Risk  Prescription drug management.                ED Medication(s):  Medications   iohexoL (OMNIPAQUE 350) injection 100 mL (100 mLs Intravenous Given 11/15/24 0114)       Discharge Medication List as of 11/15/2024  3:49 AM           Follow-up Information       Olga Altman MD On 11/18/2024.    Specialty: Family Medicine  Contact information:  [address] [address] [address] [phone]               Millie Juarez MD On 11/18/2024.    Specialties: Interventional Cardiology, Cardiology  Contact information:  [address] [facility] [address] [phone]               [facility] - Emergency Dept..    Specialty: Emergency Medicine  Why: As needed, If symptoms worsen  Contact information:  [address] [facility] [address] [phone]                               Scribe Attestation:   Scribe #1: I performed the above scribed service and the documentation accurately describes the services I performed. I attest to the accuracy of the note.     Attending:   Physician Attestation Statement for Scribe #1: I, Melony Van DO, personally performed the services described in this documentation, as scribed by Debby Winslow, in my presence, and it is both accurate and complete.           Clinical Impression       ICD-10-CM ICD-9-CM   1. Chest pain  R07.9 786.50   2. Elevated d-dimer  R79.89 790.92   3. Chronic hyponatremia  E87.1 276.1   4. Primary hypertension  I10 401.9   5. SOB (shortness of breath)  R06.02 786.05   6. Infrarenal abdominal aortic aneurysm (AAA) without rupture  I71.43 441.4   7. Paresthesias  R20.2 782.0       Disposition:   Disposition: Discharged  Condition: Stable         Melony Van DO  11/15/24 0533

## 2024-11-16 ENCOUNTER — PATIENT OUTREACH (OUTPATIENT)
Dept: EMERGENCY MEDICINE | Facility: HOSPITAL | Age: 69
End: 2024-11-16

## 2024-11-18 ENCOUNTER — TELEPHONE (OUTPATIENT)
Dept: CARDIOLOGY | Facility: CLINIC | Age: 69
End: 2024-11-18
Payer: MEDICARE

## 2024-11-18 NOTE — TELEPHONE ENCOUNTER
Called and spoke with .  I offered him several options and he declined all.  He will have the patient call me back.     ----- Message from Karen sent at 11/18/2024 11:34 AM CST -----  Contact: [phone] Patient  Caller is requesting an earlier appointment then we can schedule.  Caller is requesting a message be sent to the provider.    If this is for urgent care symptoms, did you offer other providers at this location, providers at other locations, or [facility]? (yes, no, n/a):      If this is for the patients physical, did you offer to schedule next available and put on wait list, or to see NP or PA for their physical?  (yes, no, n/a):      When is the next available appointment with their provider:  01/2025    Reason for the appointment:  Hospital F/U pt has a elevated D dimer, hyponatremia    Patient preference of timeframe to be scheduled:  ASAP    Would the patient like a call back, or a response through their [facility] portal?:   Call Back Please. Thank you    Comments:

## 2024-11-19 ENCOUNTER — OFFICE VISIT (OUTPATIENT)
Dept: CARDIOLOGY | Facility: CLINIC | Age: 69
End: 2024-11-19
Payer: MEDICARE

## 2024-11-19 VITALS
SYSTOLIC BLOOD PRESSURE: 150 MMHG | OXYGEN SATURATION: 96 % | DIASTOLIC BLOOD PRESSURE: 83 MMHG | BODY MASS INDEX: 25.53 KG/M2 | HEART RATE: 63 BPM | WEIGHT: 135.13 LBS

## 2024-11-19 DIAGNOSIS — Z87.891 EX-SMOKER: ICD-10-CM

## 2024-11-19 DIAGNOSIS — Z72.0 VAPES NICOTINE CONTAINING SUBSTANCE: ICD-10-CM

## 2024-11-19 DIAGNOSIS — G47.36 NOCTURNAL HYPOXEMIA DUE TO EMPHYSEMA: ICD-10-CM

## 2024-11-19 DIAGNOSIS — I65.22 STENOSIS OF LEFT CAROTID ARTERY: Primary | ICD-10-CM

## 2024-11-19 DIAGNOSIS — I10 ESSENTIAL HYPERTENSION: ICD-10-CM

## 2024-11-19 DIAGNOSIS — J43.9 NOCTURNAL HYPOXEMIA DUE TO EMPHYSEMA: ICD-10-CM

## 2024-11-19 DIAGNOSIS — E78.2 MIXED HYPERLIPIDEMIA: ICD-10-CM

## 2024-11-19 DIAGNOSIS — Z95.5 S/P CORONARY ARTERY STENT PLACEMENT: ICD-10-CM

## 2024-11-19 DIAGNOSIS — I71.43 INFRARENAL ABDOMINAL AORTIC ANEURYSM (AAA) WITHOUT RUPTURE: ICD-10-CM

## 2024-11-19 PROCEDURE — 1101F PT FALLS ASSESS-DOCD LE1/YR: CPT | Mod: HCNC,CPTII,S$GLB, | Performed by: INTERNAL MEDICINE

## 2024-11-19 PROCEDURE — 99214 OFFICE O/P EST MOD 30 MIN: CPT | Mod: HCNC,S$GLB,, | Performed by: INTERNAL MEDICINE

## 2024-11-19 PROCEDURE — 3044F HG A1C LEVEL LT 7.0%: CPT | Mod: HCNC,CPTII,S$GLB, | Performed by: INTERNAL MEDICINE

## 2024-11-19 PROCEDURE — 3077F SYST BP >= 140 MM HG: CPT | Mod: HCNC,CPTII,S$GLB, | Performed by: INTERNAL MEDICINE

## 2024-11-19 PROCEDURE — 99999 PR PBB SHADOW E&M-EST. PATIENT-LVL III: CPT | Mod: PBBFAC,HCNC,, | Performed by: INTERNAL MEDICINE

## 2024-11-19 PROCEDURE — 3079F DIAST BP 80-89 MM HG: CPT | Mod: HCNC,CPTII,S$GLB, | Performed by: INTERNAL MEDICINE

## 2024-11-19 PROCEDURE — 3288F FALL RISK ASSESSMENT DOCD: CPT | Mod: HCNC,CPTII,S$GLB, | Performed by: INTERNAL MEDICINE

## 2024-11-19 PROCEDURE — 1126F AMNT PAIN NOTED NONE PRSNT: CPT | Mod: HCNC,CPTII,S$GLB, | Performed by: INTERNAL MEDICINE

## 2024-11-19 PROCEDURE — 4010F ACE/ARB THERAPY RXD/TAKEN: CPT | Mod: HCNC,CPTII,S$GLB, | Performed by: INTERNAL MEDICINE

## 2024-11-19 PROCEDURE — 3008F BODY MASS INDEX DOCD: CPT | Mod: HCNC,CPTII,S$GLB, | Performed by: INTERNAL MEDICINE

## 2024-11-19 RX ORDER — AMLODIPINE BESYLATE 10 MG/1
10 TABLET ORAL DAILY
Qty: 30 TABLET | Refills: 11 | Status: SHIPPED | OUTPATIENT
Start: 2024-11-19 | End: 2025-11-19

## 2024-11-19 RX ORDER — CLONIDINE HYDROCHLORIDE 0.1 MG/1
0.1 TABLET ORAL DAILY PRN
Qty: 30 TABLET | Refills: 11 | Status: SHIPPED | OUTPATIENT
Start: 2024-11-19 | End: 2025-11-19

## 2024-11-19 NOTE — PROGRESS NOTES
Subjective:   Patient ID:  Gemma Vick is a 69 y.o. female who presents for evaluation of Fatigue and Shortness of Breath      HPI  11.19.2024   69-year-old female, ex-smoker currently vapes down to 3 mL from 20/1.    Recently admitted to the hospital with generalized symptoms of fatigue.  Some atypical symptoms.  Her troponins were normal.  EKG was nonischemic.    She had a CTA and a lower extremity duplex given mild elevated D-dimer which was negative.    She has a chronic hyponatremia.  Follows with Nephrology.    Blood pressure was elevated up to 180.    Her daughter today states that her pressure sometimes goes higher and lower.    No exertional angina.  She has wheezing on exam however    Past Medical History:   Diagnosis Date    AAA (abdominal aortic aneurysm) 02/13/2014    Abdominal aneurysm     3    Acute coronary syndrome     Arthritis     BPPV (benign paroxysmal positional vertigo)     Carotid artery plaque     Carotid artery stenosis and occlusion 02/13/2014    Chronic back pain     COPD (chronic obstructive pulmonary disease)     Coronary artery disease     Dementia     Emphysema lung     Hyperlipidemia     Hypertension     Myocardial infarction     x3    Neuropathy     Personal history of COVID-19 06/09/2021 11/16/2020 +Covid, recovered at home        Past Surgical History:   Procedure Laterality Date    CARDIAC CATHETERIZATION      CORONARY ANGIOPLASTY      CORONARY STENT PLACEMENT N/A 9/12/2023    Procedure: INSERTION, STENT, CORONARY ARTERY;  Surgeon: Millie Juarez MD;  Location: [facility] CATH LAB;  Service: Cardiology;  Laterality: N/A;    HYSTERECTOMY  1988    LEFT HEART CATHETERIZATION Left 9/12/2023    Procedure: Left heart cath;  Surgeon: Millie Juarez MD;  Location: [facility] CATH LAB;  Service: Cardiology;  Laterality: Left;    PERCUTANEOUS CORONARY INTERVENTION, ARTERY N/A 9/12/2023    Procedure: Percutaneous coronary intervention;  Surgeon: Millie Juarez MD;  Location: [facility] CATH  LAB;  Service: Cardiology;  Laterality: N/A;    THROMBECTOMY, CORONARY  2023    Procedure: Thrombectomy, Coronary;  Surgeon: Millie Juarez MD;  Location: [facility] CATH LAB;  Service: Cardiology;;    TONSILLECTOMY      TRANSFORAMINAL EPIDURAL INJECTION OF STEROID Right 2023    Procedure: Injection,steroid,epidural,transforaminal approach- right side, L4/5 and L5/S1;  Surgeon: Lydia Roberts MD;  Location: [facility] PAIN MGT;  Service: Pain Management;  Laterality: Right;       Social History     Tobacco Use    Smoking status: Former     Current packs/day: 0.00     Average packs/day: 0.5 packs/day for 46.9 years (23.4 ttl pk-yrs)     Types: Cigarettes, Vaping with nicotine     Start date: 1970     Quit date: 2017     Years since quittin.5    Smokeless tobacco: Never    Tobacco comments:     3 MG NICOTINE FOR HEART.    Substance Use Topics    Alcohol use: No    Drug use: No       Family History   Problem Relation Name Age of Onset    Heart disease Mother      Heart attacks under age 50 Father      Heart attacks under age 50 Brother          HA at 32    Heart attack Brother          HA at 70    Breast cancer Paternal Aunt         Review of Systems   Cardiovascular:  Negative for chest pain, dyspnea on exertion, palpitations and syncope.   Genitourinary: Negative.    Neurological: Negative.        Current Outpatient Medications on File Prior to Visit   Medication Sig    albuterol-ipratropium (DUO-NEB) 2.5 mg-0.5 mg/3 mL nebulizer solution Take 3 mLs by nebulization every 6 (six) hours as needed for Wheezing.    aspirin 81 MG Chew Take 81 mg by mouth once daily.    azithromycin (Z-SHAKEEL) 250 MG tablet Take 2 tablets by mouth on day 1; Take 1 tablet by mouth on days 2-5    budesonide-glycopyr-formoterol (BREZTRI AEROSPHERE) 160-9-4.8 mcg/actuation HFAA Inhale 2 puffs into the lungs 2 (two) times a day.    carvediloL (COREG) 6.25 MG tablet Take 1 tablet (6.25 mg total) by mouth 2 (two) times daily with meals.     clopidogreL (PLAVIX) 75 mg tablet Take 1 tablet (75 mg total) by mouth once daily.    COMBIVENT RESPIMAT  mcg/actuation inhaler Inhale 1 puff into the lungs every 6 (six) hours as needed for Wheezing.    cyanocobalamin (VITAMIN B-12) 100 MCG tablet Take 100 mcg by mouth once daily.    diclofenac sodium (VOLTAREN) 1 % Gel Apply topically 4 (four) times daily.    dicyclomine (BENTYL) 10 MG capsule TAKE ONE CAPSULE BY MOUTH THREE TIMES DAILY AS NEEDED    estradioL (ESTRACE) 0.5 MG tablet Take 1 tablet (0.5 mg total) by mouth once daily.    ezetimibe-simvastatin 10-40 mg (VYTORIN) 10-40 mg per tablet Take 1 tablet by mouth every evening.    famotidine (PEPCID) 20 MG tablet Take 1 tablet (20 mg total) by mouth 2 (two) times daily as needed for Heartburn.    furosemide (LASIX) 20 MG tablet TAKE 1 TABLET BY MOUTH ONCE DAILY AS NEEDED    hydroCHLOROthiazide (MICROZIDE) 12.5 mg capsule Take 12.5 mg by mouth as needed.    inhalation spacing device (COMPACT SPACE CHAMBER) USE AS DIRECTED    memantine (NAMENDA) 10 MG Tab Take 1 tablet (10 mg total) by mouth 2 (two) times daily.    olmesartan (BENICAR) 40 MG tablet Take 1 tablet (40 mg total) by mouth once daily.    OXYGEN-AIR DELIVERY SYSTEMS MISC 3 L by Misc.(Non-Drug; Combo Route) route every evening.    pantoprazole (PROTONIX) 40 MG tablet Take 1 tablet (40 mg total) by mouth once daily.    vitamin D (VITAMIN D3) 1000 units Tab Take 1,000 Units by mouth once daily.    zolpidem (AMBIEN) 10 mg Tab TAKE 1 TABLET BY MOUTH EVERY EVENING    [DISCONTINUED] amLODIPine (NORVASC) 5 MG tablet Take 1 tablet (5 mg total) by mouth once daily.     No current facility-administered medications on file prior to visit.       Objective:   Objective:  Wt Readings from Last 3 Encounters:   11/19/24 61.3 kg (135 lb 2.3 oz)   11/14/24 66.6 kg (146 lb 13.2 oz)   10/16/24 60.7 kg (133 lb 13.1 oz)     Temp Readings from Last 3 Encounters:   11/14/24 97.7 °F (36.5 °C) (Oral)   10/10/24 98.8 °F  (37.1 °C) (Oral)   03/28/24 97.6 °F (36.4 °C) (Oral)     BP Readings from Last 3 Encounters:   11/19/24 (!) 150/83   11/15/24 (!) 165/78   10/16/24 134/70     Pulse Readings from Last 3 Encounters:   11/19/24 63   11/15/24 74   10/16/24 (!) 52       Physical Exam  Vitals reviewed.   Constitutional:       Appearance: She is well-developed.   Neck:      Vascular: No carotid bruit.   Cardiovascular:      Rate and Rhythm: Normal rate and regular rhythm.      Pulses: Intact distal pulses.      Heart sounds: Normal heart sounds. No murmur heard.  Pulmonary:      Breath sounds: Wheezing present.   Neurological:      Mental Status: She is oriented to person, place, and time.         Lab Results   Component Value Date    CHOL 138 04/18/2023    CHOL 143 12/01/2022    CHOL 164 10/25/2022     Lab Results   Component Value Date    HDL 45 04/18/2023    HDL 56 12/01/2022    HDL 60 10/25/2022     Lab Results   Component Value Date    LDLCALC 79.0 04/18/2023    LDLCALC 72.0 12/01/2022    LDLCALC 83.0 10/25/2022     Lab Results   Component Value Date    TRIG 70 04/18/2023    TRIG 75 12/01/2022    TRIG 105 10/25/2022     Lab Results   Component Value Date    CHOLHDL 32.6 04/18/2023    CHOLHDL 39.2 12/01/2022    CHOLHDL 36.6 10/25/2022       Chemistry        Component Value Date/Time     (L) 11/14/2024 2302    K 3.8 11/14/2024 2302     11/14/2024 2302    CO2 19 (L) 11/14/2024 2302    BUN 13 11/14/2024 2302    CREATININE 0.8 11/14/2024 2302     (H) 11/14/2024 2302        Component Value Date/Time    CALCIUM 9.2 11/14/2024 2302    ALKPHOS 59 11/14/2024 2302    AST 28 11/14/2024 2302    ALT 25 11/14/2024 2302    BILITOT 0.7 11/14/2024 2302    ESTGFRAFRICA >60.0 04/07/2022 0922    EGFRNONAA >60.0 04/07/2022 0922          Lab Results   Component Value Date    TSH 2.747 06/13/2024     Lab Results   Component Value Date    INR 1.1 12/20/2017    INR 1.1 08/10/2012     Lab Results   Component Value Date    WBC 6.22 11/14/2024     HGB 12.4 11/14/2024    HCT 36.6 (L) 11/14/2024    MCV 93 11/14/2024     11/14/2024     BNP  @LABRCNTIP(BNP,BNPTRIAGEBLO)@  Estimated Creatinine Clearance: 55.7 mL/min (based on SCr of 0.8 mg/dL).     Imaging:  ======    No results found for this or any previous visit.    No results found for this or any previous visit.    Results for orders placed during the hospital encounter of 06/12/23    X-Ray Chest PA And Lateral    Narrative  EXAM: XR CHEST PA AND LATERAL    CLINICAL HISTORY:  COPD.    TECHNIQUE: 2 view chest x-ray.    COMPARISON: 04/26/2023.    FINDINGS: The heart is normal in size.  Aorta is atherosclerotic.  Calcified granuloma left upper lobe.  Suspect underlying emphysematous change.  No acute infiltrates.    Impression  No acute findings.    Finalized on: 6/12/2023 10:53 AM By:  Angel Starks MD  [facility]# [id number]      2023-06-12 10:55:46.144    [facility]    No results found for this or any previous visit.    No valid procedures specified.    Results for orders placed during the hospital encounter of 09/12/23    Cardiac catheterization    Conclusion    The Mid RCA lesion was 90% stenosed with 0% stenosis post-intervention.    The Prox RCA lesion was 50% stenosed with 0% stenosis post-intervention.    The ejection fraction was calculated to be 60%.    The pre-procedure left ventricular end diastolic pressure was 27.    The post-procedure left ventricular end diastolic pressure was 29.    Mid RCA lesion: A .    Mid RCA lesion: A .    Mid RCA lesion: A SYS STENT MEGATRON 28X4MM stent was successfully placed at 16 SNEHA for 20 sec.    Mid RCA lesion: A .    Mid RCA lesion: A .    Prox RCA lesion: A SYS STENT MEGATRON 24X4MM stent was successfully placed at 12 SNEHA for 15 sec.    Prox RCA lesion: A .    Prox RCA lesion: A .    The estimated blood loss was none.    There was single vessel coronary artery disease.    The PCI was successful.    The procedure log was documented by Documenter: Sybil Pfeiffer RN;  Lyn Mancuso and verified by Stone Juarez MD.    Date: 9/12/2023  Time: 1:18 PM      Results for orders placed during the hospital encounter of 08/27/24    Nuclear Stress - Cardiology Interpreted    Interpretation Summary    Normal myocardial perfusion scan. There is no evidence of myocardial ischemia or infarction.    The gated perfusion images showed an ejection fraction of 84% at rest. The gated perfusion images showed an ejection fraction of 89% post stress. Normal ejection fraction is greater than 59%.    The ECG portion of the study is negative for ischemia.    The patient reported no chest pain during the stress test.    There were no arrhythmias during stress.      Results for orders placed during the hospital encounter of 08/27/24    Echo    Interpretation Summary    Left Ventricle: The left ventricle is normal in size. Mildly increased wall thickness. There is mild concentric hypertrophy. There is normal systolic function with a visually estimated ejection fraction of 60 - 65%. Grade I diastolic dysfunction.    Right Ventricle: Normal right ventricular cavity size. Wall thickness is normal. Systolic function is normal.    Aortic Valve: There is mild aortic valve sclerosis. There is mild aortic regurgitation.    Mitral Valve: There is mild regurgitation with a posterolateral eccentriccally directed jet.    Pulmonary Artery: The estimated pulmonary artery systolic pressure is 19 mmHg.    IVC/SVC: Normal venous pressure at 3 mmHg.      Diagnostic Results:  ECG: Reviewed    The ASCVD Risk score (Marcell DK, et al., 2019) failed to calculate for the following reasons:    Risk score cannot be calculated because patient has a medical history suggesting prior/existing ASCVD        Assessment and Plan:   Stenosis of left carotid artery  -     CV Ultrasound Bilateral Doppler Carotid; Future    Essential hypertension  -     amLODIPine (NORVASC) 10 MG tablet; Take 1 tablet (10 mg total) by mouth once daily.   Dispense: 30 tablet; Refill: 11  -     cloNIDine (CATAPRES) 0.1 MG tablet; Take 1 tablet (0.1 mg total) by mouth daily as needed (if Blood pressure above 170/90).  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  We will increase her amlodipine.    We will prescribe clonidine p.r.n..  For labile hypertension.    Normal stress test August 2024.    On statins.    Repeat carotid duplex history of carotid stenosis.  Last CTA was 2015 with 60% left carotid stenosis.    Vaping cessation.          Reviewed all tests and above medical conditions with patient in detail and formulated treatment plan.  Risk factor modification discussed.   Cardiac low salt diet discussed.  Maintaining healthy weight and weight loss goals were discussed in clinic.    Follow up in  6 months With regular cardiac provider Dr. Juarez

## 2024-11-21 ENCOUNTER — OFFICE VISIT (OUTPATIENT)
Dept: FAMILY MEDICINE | Facility: CLINIC | Age: 69
End: 2024-11-21
Payer: MEDICARE

## 2024-11-21 VITALS
SYSTOLIC BLOOD PRESSURE: 138 MMHG | HEART RATE: 58 BPM | DIASTOLIC BLOOD PRESSURE: 82 MMHG | OXYGEN SATURATION: 96 % | BODY MASS INDEX: 25.66 KG/M2 | WEIGHT: 135.81 LBS

## 2024-11-21 DIAGNOSIS — J01.90 ACUTE BACTERIAL SINUSITIS: ICD-10-CM

## 2024-11-21 DIAGNOSIS — B96.89 ACUTE BACTERIAL SINUSITIS: ICD-10-CM

## 2024-11-21 DIAGNOSIS — Z12.11 COLON CANCER SCREENING: ICD-10-CM

## 2024-11-21 DIAGNOSIS — R05.9 COUGH, UNSPECIFIED TYPE: Primary | ICD-10-CM

## 2024-11-21 LAB
CTP QC/QA: YES
SARS-COV-2 RDRP RESP QL NAA+PROBE: NEGATIVE

## 2024-11-21 PROCEDURE — 99999 PR PBB SHADOW E&M-EST. PATIENT-LVL III: CPT | Mod: PBBFAC,HCNC,, | Performed by: FAMILY MEDICINE

## 2024-11-21 RX ORDER — AMOXICILLIN AND CLAVULANATE POTASSIUM 875; 125 MG/1; MG/1
1 TABLET, FILM COATED ORAL EVERY 12 HOURS
Qty: 14 TABLET | Refills: 0 | Status: SHIPPED | OUTPATIENT
Start: 2024-11-21 | End: 2024-11-28

## 2024-11-21 NOTE — PROGRESS NOTES
Chief Complaint:    Chief Complaint   Patient presents with    Cough       History of Present Illness:    History of Present Illness    Patient presents today for wheezing and green mucus production. She reports wheezing in and out of her lungs, as noted by her cardiologist during a recent visit. She has had green mucus production and mentions having a sinus drip. She denies needing cough medicine or experiencing fever. She reports a recent hospitalization for dehydration, during which she stayed for several hours. A CT of her lungs was performed during this visit. A CT of her lungs detected an aneurysm, which has decreased in size to 3.6 cm with weight loss. She is currently under the care of vascular specialist Dr. Porter Dickens for management of the aneurysm. Her regular cardiologist is Dr. Garcia. She is due for a colonoscopy but expresses a preference for Cologuard testing, which she last completed a few years ago. She has a mammogram scheduled for December. She denies any current GI symptoms or concerns that would suggest colorectal cancer. She reports having hemorrhoids and expresses concern about potential exacerbation during a colonoscopy procedure. She denies experiencing any problems or symptoms related to her colon health.      ROS:  General: denies fever, denies chills, denies fatigue, denies weight gain, denies weight loss, denies loss of appetite  Eyes: denies vision changes, denies blurry vision, denies eye pain, denies eye discharge  ENT: denies ear pain, denies hearing loss, denies tinnitus, denies nasal congestion, denies sore throat  Cardiovascular: denies chest pain, denies palpitations, denies lower extremity edema  Respiratory: denies cough, denies shortness of breath, admits wheezing, denies sputum production  Endocrine: denies polyuria, denies polydipsia, denies heat intolerance, denies cold intolerance  Gastrointestinal: denies abdominal pain, denies heartburn, denies nausea, denies  vomiting, denies diarrhea, denies constipation, denies blood in stool, denies change in bowel habits  Genitourinary: denies dysuria, denies urgency, denies frequency, denies hematuria, denies nocturia, denies incontinence  Heme & Lymphatic: denies easy or excessive bleeding, denies easy bruising, denies swollen lymph nodes  Musculoskeletal: denies muscle pain, denies back pain, denies joint pain, denies joint swelling  Skin: denies rash, denies lesion, denies itching, denies skin texture changes, denies skin color changes  Neurological: denies headache, denies dizziness, denies numbness, denies tingling, denies seizure activity, denies speech difficulty, denies memory loss, denies confusion  Psychiatric: denies anxiety, denies depression, denies sleep difficulty           Past Medical History:   Diagnosis Date    AAA (abdominal aortic aneurysm) 2014    Abdominal aneurysm     3    Acute coronary syndrome     Arthritis     BPPV (benign paroxysmal positional vertigo)     Carotid artery plaque     Carotid artery stenosis and occlusion 2014    Chronic back pain     COPD (chronic obstructive pulmonary disease)     Coronary artery disease     Dementia     Emphysema lung     Hyperlipidemia     Hypertension     Myocardial infarction     x3    Neuropathy     Personal history of COVID-19 2021 +Covid, recovered at home        Social History:  Social History     Socioeconomic History    Marital status:    Tobacco Use    Smoking status: Former     Current packs/day: 0.00     Average packs/day: 0.5 packs/day for 46.9 years (23.4 ttl pk-yrs)     Types: Cigarettes, Vaping with nicotine     Start date: 1970     Quit date: 2017     Years since quittin.5    Smokeless tobacco: Never    Tobacco comments:     3 MG NICOTINE FOR HEART.    Substance and Sexual Activity    Alcohol use: No    Drug use: No    Sexual activity: Not Currently     Birth control/protection: None     Social Drivers  of Health     Financial Resource Strain: Low Risk  (10/11/2024)    Overall Financial Resource Strain (CARDIA)     Difficulty of Paying Living Expenses: Not hard at all   Food Insecurity: No Food Insecurity (10/11/2024)    Hunger Vital Sign     Worried About Running Out of Food in the Last Year: Never true     Ran Out of Food in the Last Year: Never true   Transportation Needs: No Transportation Needs (10/11/2024)    PRAPARE - Transportation     Lack of Transportation (Medical): No     Lack of Transportation (Non-Medical): No   Physical Activity: Inactive (3/14/2024)    Exercise Vital Sign     Days of Exercise per Week: 0 days     Minutes of Exercise per Session: 0 min   Stress: Stress Concern Present (10/11/2024)    [employer] - Occupational Stress Questionnaire     Feeling of Stress : To some extent   Housing Stability: Low Risk  (10/11/2024)    Housing Stability Vital Sign     Unable to Pay for Housing in the Last Year: No     Homeless in the Last Year: No       Family History:   family history includes Breast cancer in her paternal aunt; Heart attack in her brother; Heart attacks under age 50 in her brother and father; Heart disease in her mother.    Health Maintenance   Topic Date Due    Shingles Vaccine (1 of 2) Never done    Colorectal Cancer Screening  05/17/2022    Mammogram  06/09/2022    Lipid Panel  04/18/2024    LDCT Lung Screen  11/15/2025    TETANUS VACCINE  11/15/2026    DEXA Scan  04/04/2027    Hepatitis C Screening  Completed       Exam:Physical     Vital Signs  Pulse: (!) 58  SpO2: 96 %  BP: 138/82  BP Location: Left arm  Patient Position: Sitting  Pain Score: 0-No pain  Height and Weight  Weight: 61.6 kg (135 lb 12.9 oz)]    Body mass index is 25.66 kg/m².    Physical Exam    General: Well-developed. Well-nourished. No acute distress.  Eyes: EOMI. Sclerae anicteric.  HENT: Normocephalic. Atraumatic. Nares patent. Moist oral mucosa.  Cardiovascular: Regular rate.  Regular rhythm. No murmurs. No rubs. No gallops. Normal S1, S2.  Respiratory: Normal respiratory effort. Clear to auscultation bilaterally. No rales. No rhonchi. No wheezing.  Musculoskeletal: No  obvious deformity.  Extremities: No lower extremity edema.  Neurological: Alert & oriented x3. No slurred speech. Normal gait.  Psychiatric: Normal mood. Normal affect. Good insight. Good judgment.  Skin: Warm. Dry. No rash.           Assessment:        ICD-10-CM ICD-9-CM   1. Cough, unspecified type  R05.9 786.2   2. Acute bacterial sinusitis  J01.90 461.9    B96.89    3. Colon cancer screening  Z12.11 V76.51         Plan:    Assessment & Plan    MEDICAL DECISION MAKING:  - Assessed recent hospitalization for dehydration and follow-up with cardiologist  - Evaluated wheezing and green mucus production, considering possible respiratory infection  - Noted negative COVID test result  - Reviewed abdominal aortic aneurysm status, currently monitored by Dr. Porter Dickens (vascular specialist)  - Considered preventive care needs, including colonoscopy and mammogram    PATIENT EDUCATION:  - Explained importance of colonoscopy for early cancer detection, even in absence of symptoms    ACTION ITEMS/LIFESTYLE:  - Patient to take yogurt with prescribed antibiotic    MEDICATIONS:  - Started Augmentin    ORDERS:  - Cologuard test ordered to be sent to patient's home    FOLLOW UP:  - Contact the office when Cologuard test is received for instructions on completing and sending to designated location  - Follow up on scheduled mammogram in December         Gemma was seen today for cough.    Diagnoses and all orders for this visit:    Cough, unspecified type  -     POCT COVID-19 Rapid Screening    Acute bacterial sinusitis    Colon cancer screening  -     Cologuard Screening (Multitarget Stool DNA); Future  -     Cologuard Screening (Multitarget Stool DNA)    Other orders  -     amoxicillin-clavulanate 875-125mg (AUGMENTIN) 875-125 mg per  tablet; Take 1 tablet by mouth every 12 (twelve) hours. for 7 days        No follow-ups on file.      Olga Altman MD

## 2024-11-25 ENCOUNTER — HOSPITAL ENCOUNTER (OUTPATIENT)
Dept: CARDIOLOGY | Facility: HOSPITAL | Age: 69
Discharge: HOME | End: 2024-11-25
Attending: INTERNAL MEDICINE
Payer: MEDICARE

## 2024-11-25 VITALS — BODY MASS INDEX: 25.49 KG/M2 | HEIGHT: 61 IN | WEIGHT: 135 LBS

## 2024-11-25 DIAGNOSIS — I65.22 STENOSIS OF LEFT CAROTID ARTERY: ICD-10-CM

## 2024-11-25 LAB
LEFT ARM DIASTOLIC BLOOD PRESSURE: 76 MMHG
LEFT ARM SYSTOLIC BLOOD PRESSURE: 124 MMHG
LEFT CBA DIAS: 17 CM/S
LEFT CBA SYS: 79 CM/S
LEFT CCA DIST DIAS: 16 CM/S
LEFT CCA DIST SYS: 68 CM/S
LEFT CCA MID DIAS: 18 CM/S
LEFT CCA MID SYS: 72 CM/S
LEFT CCA PROX DIAS: 13 CM/S
LEFT CCA PROX SYS: 65 CM/S
LEFT ECA DIAS: 11 CM/S
LEFT ECA SYS: 145 CM/S
LEFT ICA DIST DIAS: 21 CM/S
LEFT ICA DIST SYS: 95 CM/S
LEFT ICA MID DIAS: 23 CM/S
LEFT ICA MID SYS: 143 CM/S
LEFT ICA PROX DIAS: 18 CM/S
LEFT ICA PROX SYS: 87 CM/S
LEFT VERTEBRAL DIAS: 14 CM/S
LEFT VERTEBRAL SYS: 65 CM/S
OHS CV CAROTID RIGHT ICA EDV HIGHEST: 24
OHS CV CAROTID ULTRASOUND LEFT ICA/CCA RATIO: 2.1
OHS CV CAROTID ULTRASOUND RIGHT ICA/CCA RATIO: 2.83
OHS CV PV CAROTID LEFT HIGHEST CCA: 72
OHS CV PV CAROTID LEFT HIGHEST ICA: 143
OHS CV PV CAROTID RIGHT HIGHEST CCA: 91
OHS CV PV CAROTID RIGHT HIGHEST ICA: 164
OHS CV US CAROTID LEFT HIGHEST EDV: 23
RIGHT ARM DIASTOLIC BLOOD PRESSURE: 70 MMHG
RIGHT ARM SYSTOLIC BLOOD PRESSURE: 129 MMHG
RIGHT CBA DIAS: 13 CM/S
RIGHT CBA SYS: 72 CM/S
RIGHT CCA DIST DIAS: 10 CM/S
RIGHT CCA DIST SYS: 58 CM/S
RIGHT CCA MID DIAS: 9 CM/S
RIGHT CCA MID SYS: 63 CM/S
RIGHT CCA PROX DIAS: 18 CM/S
RIGHT CCA PROX SYS: 91 CM/S
RIGHT ECA DIAS: 9 CM/S
RIGHT ECA SYS: 120 CM/S
RIGHT ICA DIST DIAS: 24 CM/S
RIGHT ICA DIST SYS: 130 CM/S
RIGHT ICA MID DIAS: 22 CM/S
RIGHT ICA MID SYS: 164 CM/S
RIGHT ICA PROX DIAS: 17 CM/S
RIGHT ICA PROX SYS: 118 CM/S

## 2024-11-25 PROCEDURE — 93880 EXTRACRANIAL BILAT STUDY: CPT | Mod: 26,HCNC,, | Performed by: INTERNAL MEDICINE

## 2024-11-25 PROCEDURE — 93880 EXTRACRANIAL BILAT STUDY: CPT | Mod: HCNC

## 2024-11-26 ENCOUNTER — PATIENT MESSAGE (OUTPATIENT)
Dept: ADMINISTRATIVE | Facility: OTHER | Age: 69
End: 2024-11-26
Payer: MEDICARE

## 2024-11-26 ENCOUNTER — OFFICE VISIT (OUTPATIENT)
Dept: PULMONOLOGY | Facility: CLINIC | Age: 69
End: 2024-11-26
Payer: MEDICARE

## 2024-11-26 ENCOUNTER — PATIENT OUTREACH (OUTPATIENT)
Dept: PULMONOLOGY | Facility: CLINIC | Age: 69
End: 2024-11-26
Payer: MEDICARE

## 2024-11-26 ENCOUNTER — EVALUATION (OUTPATIENT)
Dept: NEUROLOGY | Facility: CLINIC | Age: 69
End: 2024-11-26
Payer: MEDICARE

## 2024-11-26 ENCOUNTER — OFFICE VISIT (OUTPATIENT)
Dept: NEUROLOGY | Facility: CLINIC | Age: 69
End: 2024-11-26
Payer: MEDICARE

## 2024-11-26 VITALS
SYSTOLIC BLOOD PRESSURE: 120 MMHG | RESPIRATION RATE: 20 BRPM | BODY MASS INDEX: 25.51 KG/M2 | HEIGHT: 61 IN | DIASTOLIC BLOOD PRESSURE: 60 MMHG | OXYGEN SATURATION: 97 % | WEIGHT: 135.13 LBS

## 2024-11-26 DIAGNOSIS — J43.9 NOCTURNAL HYPOXEMIA DUE TO EMPHYSEMA: Primary | ICD-10-CM

## 2024-11-26 DIAGNOSIS — F17.211 CIGARETTE NICOTINE DEPENDENCE IN REMISSION: ICD-10-CM

## 2024-11-26 DIAGNOSIS — G31.84 MILD COGNITIVE IMPAIRMENT: ICD-10-CM

## 2024-11-26 DIAGNOSIS — J44.9 COPD, SEVERE: ICD-10-CM

## 2024-11-26 DIAGNOSIS — G47.36 NOCTURNAL HYPOXEMIA DUE TO EMPHYSEMA: Primary | ICD-10-CM

## 2024-11-26 DIAGNOSIS — F01.B0 MODERATE VASCULAR DEMENTIA WITHOUT BEHAVIORAL DISTURBANCE, PSYCHOTIC DISTURBANCE, MOOD DISTURBANCE, OR ANXIETY: ICD-10-CM

## 2024-11-26 DIAGNOSIS — R68.89 FORGETFULNESS: ICD-10-CM

## 2024-11-26 DIAGNOSIS — J43.1 PANLOBULAR EMPHYSEMA: ICD-10-CM

## 2024-11-26 DIAGNOSIS — R09.02 EXERCISE HYPOXEMIA: ICD-10-CM

## 2024-11-26 DIAGNOSIS — J44.9 COPD, MODERATE: ICD-10-CM

## 2024-11-26 DIAGNOSIS — J44.1 COPD EXACERBATION: ICD-10-CM

## 2024-11-26 DIAGNOSIS — F03.A0 MILD MAJOR NEUROCOGNITIVE DISORDER DUE TO MULTIPLE ETIOLOGIES WITHOUT BEHAVIORAL DISTURBANCE: Primary | ICD-10-CM

## 2024-11-26 PROBLEM — I50.32 CHRONIC DIASTOLIC HEART FAILURE: Status: ACTIVE | Noted: 2024-04-01

## 2024-11-26 PROBLEM — Z99.81 DEPENDENCE ON SUPPLEMENTAL OXYGEN: Status: ACTIVE | Noted: 2024-04-01

## 2024-11-26 PROBLEM — I71.40 ABDOMINAL AORTIC ANEURYSM, WITHOUT RUPTURE, UNSPECIFIED: Status: ACTIVE | Noted: 2024-04-01

## 2024-11-26 PROBLEM — Z87.891 PERSONAL HISTORY OF NICOTINE DEPENDENCE: Status: ACTIVE | Noted: 2024-04-01

## 2024-11-26 PROBLEM — E87.1 HYPO-OSMOLALITY AND HYPONATREMIA: Status: ACTIVE | Noted: 2024-03-29

## 2024-11-26 PROBLEM — Z86.16 PERSONAL HISTORY OF COVID-19: Status: ACTIVE | Noted: 2024-04-01

## 2024-11-26 PROCEDURE — 99999 PR PBB SHADOW E&M-EST. PATIENT-LVL IV: CPT | Mod: PBBFAC,HCNC,, | Performed by: INTERNAL MEDICINE

## 2024-11-26 PROCEDURE — 99999 PR PBB SHADOW E&M-EST. PATIENT-LVL I: CPT | Mod: PBBFAC,HCNC,, | Performed by: STUDENT IN AN ORGANIZED HEALTH CARE EDUCATION/TRAINING PROGRAM

## 2024-11-26 PROCEDURE — 99999 PR PBB SHADOW E&M-EST. PATIENT-LVL I: CPT | Mod: PBBFAC,HCNC,,

## 2024-11-26 RX ORDER — METHYLPREDNISOLONE 4 MG/1
TABLET ORAL
Qty: 1 EACH | Refills: 0 | Status: SHIPPED | OUTPATIENT
Start: 2024-11-26

## 2024-11-26 RX ORDER — AZITHROMYCIN 250 MG/1
TABLET, FILM COATED ORAL
Qty: 6 TABLET | Refills: 0 | Status: SHIPPED | OUTPATIENT
Start: 2024-11-26

## 2024-11-26 RX ORDER — BUDESONIDE, GLYCOPYRROLATE, AND FORMOTEROL FUMARATE 160; 9; 4.8 UG/1; UG/1; UG/1
2 AEROSOL, METERED RESPIRATORY (INHALATION) 2 TIMES DAILY
Qty: 32.1 G | Refills: 3 | Status: SHIPPED | OUTPATIENT
Start: 2024-11-26

## 2024-11-26 RX ORDER — IPRATROPIUM BROMIDE AND ALBUTEROL 20; 100 UG/1; UG/1
2 SPRAY, METERED RESPIRATORY (INHALATION) EVERY 6 HOURS PRN
Qty: 12 G | Refills: 3 | Status: SHIPPED | OUTPATIENT
Start: 2024-11-26

## 2024-11-26 RX ORDER — IPRATROPIUM BROMIDE AND ALBUTEROL SULFATE 2.5; .5 MG/3ML; MG/3ML
3 SOLUTION RESPIRATORY (INHALATION) EVERY 6 HOURS PRN
Qty: 360 ML | Refills: 12 | Status: SHIPPED | OUTPATIENT
Start: 2024-11-26

## 2024-11-26 RX ORDER — CYPROHEPTADINE HYDROCHLORIDE 4 MG/1
4 TABLET ORAL 2 TIMES DAILY
COMMUNITY
Start: 2024-11-11

## 2024-11-26 NOTE — PROGRESS NOTES
Subjective:     Patient ID: Gemma Vick is a 69 y.o. female.    Chief Complaint:      HPI  69 y.o. with Chronic Obstructive Pulmonary Disease and recent Emergency Room visit for wheezing and shortness of breath . Recently took Augmentin, still coughing up phlegm    COPD  She presents for evaluation and treatment of COPD. The patient is currently have symptoms / an exacerbation. The patient has COPD for approximately 12 years. On oxygen at night . Symptoms in previous episodes have included dyspnea, cough, wheezing, increased sputum, and colored sputum, and typically last 2 weeks. Previous episodes have been exacerbated by any exercise. Current treatment includes albuterol nebulizer, which generally provides some relief of symptoms. She uses 0 pillows at night. Patient currently is on oxygen at 2 L/min per nasal cannula..=- at night The patient is having no constitutional symptoms, denying fever, chills, anorexia, or weight loss. The patient has not been hospitalized for this condition before. She has a history of 47 pack years. The patient is experiencing exercise intolerance (difficulty walking 3 blocks on flat ground).    Remains active around her home.   No longer caring for grand children in her home. She has great grandchildren that live next door, assist with care when they are not in school.   Currently one daughter still resides with patient.       Past Medical History:   Diagnosis Date    AAA (abdominal aortic aneurysm) 02/13/2014    Abdominal aneurysm     3    Acute coronary syndrome     Arthritis     BPPV (benign paroxysmal positional vertigo)     Carotid artery plaque     Carotid artery stenosis and occlusion 02/13/2014    Chronic back pain     COPD (chronic obstructive pulmonary disease)     Coronary artery disease     Dementia     Emphysema lung     Hyperlipidemia     Hypertension     Myocardial infarction     x3    Neuropathy     Personal history of COVID-19 06/09/2021 11/16/2020 +Covid,  recovered at home      Past Surgical History:   Procedure Laterality Date    CARDIAC CATHETERIZATION      CORONARY ANGIOPLASTY      CORONARY STENT PLACEMENT N/A 9/12/2023    Procedure: INSERTION, STENT, CORONARY ARTERY;  Surgeon: Millie Juarez MD;  Location: [facility] CATH LAB;  Service: Cardiology;  Laterality: N/A;    HYSTERECTOMY  1988    LEFT HEART CATHETERIZATION Left 9/12/2023    Procedure: Left heart cath;  Surgeon: Millie Juarez MD;  Location: [facility] CATH LAB;  Service: Cardiology;  Laterality: Left;    PERCUTANEOUS CORONARY INTERVENTION, ARTERY N/A 9/12/2023    Procedure: Percutaneous coronary intervention;  Surgeon: Millie Juarez MD;  Location: [facility] CATH LAB;  Service: Cardiology;  Laterality: N/A;    THROMBECTOMY, CORONARY  9/12/2023    Procedure: Thrombectomy, Coronary;  Surgeon: Millie Juarez MD;  Location: [facility] CATH LAB;  Service: Cardiology;;    TONSILLECTOMY      TRANSFORAMINAL EPIDURAL INJECTION OF STEROID Right 12/1/2023    Procedure: Injection,steroid,epidural,transforaminal approach- right side, L4/5 and L5/S1;  Surgeon: Lydia Roberts MD;  Location: [facility] PAIN MGT;  Service: Pain Management;  Laterality: Right;     Review of patient's allergies indicates:  No Known Allergies  Current Outpatient Medications on File Prior to Visit   Medication Sig Dispense Refill    amLODIPine (NORVASC) 10 MG tablet Take 1 tablet (10 mg total) by mouth once daily. 30 tablet 11    aspirin 81 MG Chew Take 81 mg by mouth once daily.      carvediloL (COREG) 6.25 MG tablet Take 1 tablet (6.25 mg total) by mouth 2 (two) times daily with meals. 60 tablet 11    cloNIDine (CATAPRES) 0.1 MG tablet Take 1 tablet (0.1 mg total) by mouth daily as needed (if Blood pressure above 170/90). 30 tablet 11    clopidogreL (PLAVIX) 75 mg tablet Take 1 tablet (75 mg total) by mouth once daily. 90 tablet 3    cyanocobalamin (VITAMIN B-12) 100 MCG tablet Take 100 mcg by mouth once daily.      cyproheptadine (PERIACTIN) 4 mg tablet  Take 4 mg by mouth 2 (two) times daily.      diclofenac sodium (VOLTAREN) 1 % Gel Apply topically 4 (four) times daily. 150 g 1    dicyclomine (BENTYL) 10 MG capsule TAKE ONE CAPSULE BY MOUTH THREE TIMES DAILY AS NEEDED 90 capsule 2    estradioL (ESTRACE) 0.5 MG tablet Take 1 tablet (0.5 mg total) by mouth once daily. 30 tablet 3    ezetimibe-simvastatin 10-40 mg (VYTORIN) 10-40 mg per tablet Take 1 tablet by mouth every evening. 90 tablet 2    famotidine (PEPCID) 20 MG tablet Take 1 tablet (20 mg total) by mouth 2 (two) times daily as needed for Heartburn. 60 tablet 11    furosemide (LASIX) 20 MG tablet TAKE 1 TABLET BY MOUTH ONCE DAILY AS NEEDED 90 tablet 2    hydroCHLOROthiazide (MICROZIDE) 12.5 mg capsule Take 12.5 mg by mouth as needed.      inhalation spacing device (COMPACT SPACE CHAMBER) USE AS DIRECTED 1 each 2    memantine (NAMENDA) 10 MG Tab Take 1 tablet (10 mg total) by mouth 2 (two) times daily. 180 tablet 12    olmesartan (BENICAR) 40 MG tablet Take 1 tablet (40 mg total) by mouth once daily. 90 tablet 3    OXYGEN-AIR DELIVERY SYSTEMS MISC 3 L by Misc.(Non-Drug; Combo Route) route every evening.      pantoprazole (PROTONIX) 40 MG tablet Take 1 tablet (40 mg total) by mouth once daily. 90 tablet 3    vitamin D (VITAMIN D3) 1000 units Tab Take 1,000 Units by mouth once daily.      zolpidem (AMBIEN) 10 mg Tab TAKE 1 TABLET BY MOUTH EVERY EVENING 30 tablet 5    [DISCONTINUED] albuterol-ipratropium (DUO-NEB) 2.5 mg-0.5 mg/3 mL nebulizer solution Take 3 mLs by nebulization every 6 (six) hours as needed for Wheezing. 75 mL 12    [DISCONTINUED] amoxicillin-clavulanate 875-125mg (AUGMENTIN) 875-125 mg per tablet Take 1 tablet by mouth every 12 (twelve) hours. for 7 days 14 tablet 0    [DISCONTINUED] azithromycin (Z-SHAKEEL) 250 MG tablet Take 2 tablets by mouth on day 1; Take 1 tablet by mouth on days 2-5 6 tablet 0    [DISCONTINUED] budesonide-glycopyr-formoterol (BREZTRI AEROSPHERE) 160-9-4.8 mcg/actuation HFAA  Inhale 2 puffs into the lungs 2 (two) times a day. 32.1 g 3    [DISCONTINUED] COMBIVENT RESPIMAT  mcg/actuation inhaler Inhale 1 puff into the lungs every 6 (six) hours as needed for Wheezing. 12 g 11     No current facility-administered medications on file prior to visit.     Social History     Socioeconomic History    Marital status:    Tobacco Use    Smoking status: Former     Current packs/day: 0.00     Average packs/day: 0.5 packs/day for 46.9 years (23.4 ttl pk-yrs)     Types: Cigarettes, Vaping with nicotine     Start date: 1970     Quit date: 2017     Years since quittin.5    Smokeless tobacco: Never    Tobacco comments:     3 MG NICOTINE FOR HEART.    Substance and Sexual Activity    Alcohol use: No    Drug use: No    Sexual activity: Not Currently     Birth control/protection: None     Social Drivers of Health     Financial Resource Strain: Low Risk  (10/11/2024)    Overall Financial Resource Strain (CARDIA)     Difficulty of Paying Living Expenses: Not hard at all   Food Insecurity: No Food Insecurity (10/11/2024)    Hunger Vital Sign     Worried About Running Out of Food in the Last Year: Never true     Ran Out of Food in the Last Year: Never true   Transportation Needs: No Transportation Needs (10/11/2024)    PRAPARE - Transportation     Lack of Transportation (Medical): No     Lack of Transportation (Non-Medical): No   Physical Activity: Inactive (3/14/2024)    Exercise Vital Sign     Days of Exercise per Week: 0 days     Minutes of Exercise per Session: 0 min   Stress: Stress Concern Present (10/11/2024)    [employer] [place] [employer] - Occupational Stress Questionnaire     Feeling of Stress : To some extent   Housing Stability: Low Risk  (10/11/2024)    Housing Stability Vital Sign     Unable to Pay for Housing in the Last Year: No     Homeless in the Last Year: No     Family History   Problem Relation Name Age of Onset    Heart disease Mother      Heart attacks  "under age 50 Father      Heart attacks under age 50 Brother          HA at 32    Heart attack Brother          HA at 70    Breast cancer Paternal Aunt         Review of Systems   Constitutional:  Positive for fatigue. Negative for fever.   HENT:  Positive for postnasal drip, rhinorrhea and congestion.    Eyes:  Negative for redness and itching.   Respiratory:  Positive for cough, sputum production, shortness of breath, dyspnea on extertion, use of rescue inhaler and Paroxysmal Nocturnal Dyspnea.    Cardiovascular:  Negative for chest pain, palpitations and leg swelling.   Genitourinary:  Negative for difficulty urinating and hematuria.   Endocrine:  Negative for cold intolerance and heat intolerance.    Skin:  Negative for rash.   Gastrointestinal: Negative.  Negative for nausea and abdominal pain.        No GI complaints   Neurological:  Negative for dizziness, syncope, weakness and light-headedness.   Hematological:  Negative for adenopathy. Does not bruise/bleed easily.   Psychiatric/Behavioral:  Negative for sleep disturbance. The patient is not nervous/anxious.        Objective:      /60 (Patient Position: Sitting)   Resp 20   Ht 5' 1" (1.549 m)   Wt 61.3 kg (135 lb 2.3 oz)   SpO2 97%   BMI 25.53 kg/m²   Physical Exam  Vitals and nursing note reviewed.   Constitutional:       Appearance: She is well-developed.   HENT:      Head: Normocephalic and atraumatic.      Nose: Nose normal.      Mouth/Throat:      Pharynx: No oropharyngeal exudate.   Eyes:      Conjunctiva/sclera: Conjunctivae normal.      Pupils: Pupils are equal, round, and reactive to light.   Neck:      Thyroid: No thyromegaly.      Vascular: No JVD.      Trachea: No tracheal deviation.   Cardiovascular:      Rate and Rhythm: Normal rate and regular rhythm.      Heart sounds: Normal heart sounds.   Pulmonary:      Effort: Pulmonary effort is normal. No respiratory distress.      Breath sounds: Examination of the right-lower field reveals " wheezing. Examination of the left-lower field reveals wheezing. Decreased breath sounds and wheezing present. No rhonchi or rales.   Chest:      Chest wall: No tenderness.   Abdominal:      General: Bowel sounds are normal.      Palpations: Abdomen is soft.   Musculoskeletal:         General: Normal range of motion.      Cervical back: Neck supple.   Lymphadenopathy:      Cervical: No cervical adenopathy.   Skin:     General: Skin is warm and dry.   Neurological:      Mental Status: She is alert and oriented to person, place, and time.       Personal Diagnostic Review  CT reviewed  CT Chest Lung Screening Low Dose  Order: [id number]  Status: Final result       Visible to patient: Yes (seen)       Next appt: 08/12/2024 at 09:00 AM in Orthopedics (BRAD[facility] Scott)       Dx: Cigarette nicotine dependence in issac...    0 Result Notes       1 Patient Communication       1  Topic  Assessment    Negative   Details    Reading Physician Reading Date Result Priority   Melo Temple MD (Timothy)  [phone] 5/21/2024      Narrative & Impression  Exam:  CT CHEST LUNG SCREENING LOW DOSE     History: Greater than 20 pack-year history of smoking remission     COMPARISON:  CT chest, 03/25/2024, CT chest low dose 11/08/2022.     TECHNIQUE:  Axial CT imaging was performed of the chest utilizing a low-dose protocol.     Computed tomography dose index (CTDI):  2.39 mGy  Dose-length product (DLP):  80.08 mGy-cm     FINDINGS:  Moderate bilateral emphysema.  No suspicious lung nodules.  Focal scarring at the right base.  Severe atherosclerosis of the thoracic aorta.  Coronary artery calcifications are present.  No infiltrate or consolidation.        Impression:     No suspicious lung nodules.        Lung-RADS Catagory:  1 - Negative - Continue annual screening with LDCT in 12 months.     Clinically significant non lung cancer finding:  S - Significant.     Prior Lung Cancer Modifier:  No history of prior lung cancer.            All CT scans at [this location] are performed using dose modulation techniques as appropriate to a performed exam including the following: Automated exposure control; adjustment of the mA and/or kV according to patient size (this includes techniques or standardized protocols for targeted exams where dose is matched to indication / reason for exam; i.e. extremities or head); use of iterative reconstruction technique.     Finalized on: 5/21/2024 2:16 PM By:  Zheng Temple MD  [facility]# [id number]      2024-05-21 14:18:[id number]    [facility]             Exam Ended: 05/21/24 13:38 CDT Last Resulted: 05/21/24 14:16 CDT                CTA Chest Non-Coronary (PE Studies)  Order: [id number]  Status: Final result       Visible to patient: Yes (seen)       Next appt: 12/05/2024 at 02:40 PM in Cardiology (Stone Juarez MD)    0 Result Notes       1 HM Topic  Details    Reading Physician Reading Date Result Priority   Jaya Betancourt MD  [phone]  [phone] 11/15/2024 STAT     Narrative & Impression  EXAMINATION:  CTA CHEST NON CORONARY (PE STUDIES)     CLINICAL HISTORY:  Chest pain and shortness of breath     TECHNIQUE:  After the intravenous administration of 100 cc of Omni 350 nonionic contrast using CT pulmonary angio technique, 1.25  Mm axial images were acquired using helical CT technique from the lung apices through costophrenic sulci.  Sagittal coronal and oblique MIPS were also submitted for interpretation.     COMPARISON:  Chest x-ray dated 11/14/2024, 05/09/2023     FINDINGS:  -Pulmonary arteries: Pulmonary arteries are well opacified.  No evidence of pulmonary embolism.  No evidence of pulmonary hypertension.  No right heart strain is identified with RV/LV ratio < 0.9.     -Lungs: No nodules or infiltrates.  Moderate emphysematous changes throughout the lungs greatest within the upper lobes.  Calcified granuloma left upper lobe.     -Pleura: No thickening or fluid.     -Mediastinum/[place]:No significant adenopathy    "  -Axilla: No adenopathy.     -Thyroid: Normal lower gland.     -Heart/Aorta: Heart size is normal.  Moderate  coronary artery disease.  No pericardial effusion. Aorta normal caliber.   Aorta demonstrates severe atherosclerotic disease.     -Bones/Chest Wall: Intact with moderate multilevel degenerative change     -Upper Abdomen: Partially visualized infrarenal abdominal aortic aneurysm measuring up to 3.4 cm. Small bilateral Bochdalek hernias containing fat.        Impression:     No evidence of pulmonary embolism.     Partially visualized infrarenal abdominal aortic aneurysm which was previously imaged on 05/09/2023.     See additional findings above     All CT scans at this facility use dose modulation, iterative reconstruction and/or weight based dosing when appropriate to reduce radiation dose to as low as reasonably achievable.        Electronically signed by:Jaya Betancourt MD  Date:                                            11/15/2024  Time:                                           07:58         11/26/2024     4:24 PM   Pulmonary Studies Review   SpO2 97 %   Height 5' 1" (1.549 m)   Weight 61.3 kg (135 lb 2.3 oz)   BMI (Calculated) 25.5   Predicted Distance 316.61   Predicted Distance Meters (Calculated) 454.64 meters       CV Ultrasound Bilateral Doppler Carotid    There is 40-49% right Internal Carotid Stenosis.    There is 40-49% left Internal Carotid Stenosis.    Known right vertebral artery occluded.      Office Spirometry Results:         11/26/2024     4:24 PM 11/25/2024     1:03 PM 11/21/2024     3:14 PM 11/19/2024     2:50 PM 11/15/2024     4:03 AM 11/15/2024     2:48 AM 11/15/2024     2:05 AM   Pulmonary Function Tests   SpO2 97 %  96 % 96 % 95 % 96 % 95 %   Height 5' 1" (1.549 m) 5' 1" (1.549 m)        Weight 61.3 kg (135 lb 2.3 oz) 61.2 kg (135 lb) 61.6 kg (135 lb 12.9 oz) 61.3 kg (135 lb 2.3 oz)      BMI (Calculated) 25.5 25.5              11/26/2024     4:24 PM   Pulmonary Studies Review   SpO2 " "97 %   Height 5' 1" (1.549 m)   Weight 61.3 kg (135 lb 2.3 oz)   BMI (Calculated) 25.5   Predicted Distance 316.61   Predicted Distance Meters (Calculated) 454.64 meters           Recent Results (from the past 2 weeks)   CBC auto differential    Collection Time: 11/14/24 11:02 PM   Result Value Ref Range    WBC 6.22 3.90 - 12.70 K/uL    Hemoglobin 12.4 12.0 - 16.0 g/dL    Hematocrit 36.6 (L) 37.0 - 48.5 %    Platelets 229 150 - 450 K/uL       Assessment:            Nocturnal hypoxemia due to emphysema    COPD, severe  -     budesonide-glycopyr-formoterol (BREZTRI AEROSPHERE) 160-9-4.8 mcg/actuation HFAA; Inhale 2 puffs into the lungs 2 (two) times a day.  Dispense: 32.1 g; Refill: 3  -     COMBIVENT RESPIMAT  mcg/actuation inhaler; Inhale 2 puffs into the lungs every 6 (six) hours as needed for Wheezing or Shortness of Breath.  Dispense: 12 g; Refill: 3  -     albuterol-ipratropium (DUO-NEB) 2.5 mg-0.5 mg/3 mL nebulizer solution; Take 3 mLs by nebulization every 6 (six) hours as needed for Wheezing.  Dispense: 360 mL; Refill: 12    Panlobular emphysema  -     budesonide-glycopyr-formoterol (BREZTRI AEROSPHERE) 160-9-4.8 mcg/actuation HFAA; Inhale 2 puffs into the lungs 2 (two) times a day.  Dispense: 32.1 g; Refill: 3  -     COMBIVENT RESPIMAT  mcg/actuation inhaler; Inhale 2 puffs into the lungs every 6 (six) hours as needed for Wheezing or Shortness of Breath.  Dispense: 12 g; Refill: 3  -     albuterol-ipratropium (DUO-NEB) 2.5 mg-0.5 mg/3 mL nebulizer solution; Take 3 mLs by nebulization every 6 (six) hours as needed for Wheezing.  Dispense: 360 mL; Refill: 12  -     Six Minute Walk Test to qualify for Home Oxygen; Future    Cigarette nicotine dependence in remission    COPD, moderate    Exercise hypoxemia  -     Six Minute Walk Test to qualify for Home Oxygen; Future            Outpatient Encounter Medications as of 11/26/2024   Medication Sig Dispense Refill    amLODIPine (NORVASC) 10 MG tablet " Take 1 tablet (10 mg total) by mouth once daily. 30 tablet 11    aspirin 81 MG Chew Take 81 mg by mouth once daily.      carvediloL (COREG) 6.25 MG tablet Take 1 tablet (6.25 mg total) by mouth 2 (two) times daily with meals. 60 tablet 11    cloNIDine (CATAPRES) 0.1 MG tablet Take 1 tablet (0.1 mg total) by mouth daily as needed (if Blood pressure above 170/90). 30 tablet 11    clopidogreL (PLAVIX) 75 mg tablet Take 1 tablet (75 mg total) by mouth once daily. 90 tablet 3    cyanocobalamin (VITAMIN B-12) 100 MCG tablet Take 100 mcg by mouth once daily.      cyproheptadine (PERIACTIN) 4 mg tablet Take 4 mg by mouth 2 (two) times daily.      diclofenac sodium (VOLTAREN) 1 % Gel Apply topically 4 (four) times daily. 150 g 1    dicyclomine (BENTYL) 10 MG capsule TAKE ONE CAPSULE BY MOUTH THREE TIMES DAILY AS NEEDED 90 capsule 2    estradioL (ESTRACE) 0.5 MG tablet Take 1 tablet (0.5 mg total) by mouth once daily. 30 tablet 3    ezetimibe-simvastatin 10-40 mg (VYTORIN) 10-40 mg per tablet Take 1 tablet by mouth every evening. 90 tablet 2    famotidine (PEPCID) 20 MG tablet Take 1 tablet (20 mg total) by mouth 2 (two) times daily as needed for Heartburn. 60 tablet 11    furosemide (LASIX) 20 MG tablet TAKE 1 TABLET BY MOUTH ONCE DAILY AS NEEDED 90 tablet 2    hydroCHLOROthiazide (MICROZIDE) 12.5 mg capsule Take 12.5 mg by mouth as needed.      inhalation spacing device (COMPACT SPACE CHAMBER) USE AS DIRECTED 1 each 2    memantine (NAMENDA) 10 MG Tab Take 1 tablet (10 mg total) by mouth 2 (two) times daily. 180 tablet 12    olmesartan (BENICAR) 40 MG tablet Take 1 tablet (40 mg total) by mouth once daily. 90 tablet 3    OXYGEN-AIR DELIVERY SYSTEMS MISC 3 L by Misc.(Non-Drug; Combo Route) route every evening.      pantoprazole (PROTONIX) 40 MG tablet Take 1 tablet (40 mg total) by mouth once daily. 90 tablet 3    vitamin D (VITAMIN D3) 1000 units Tab Take 1,000 Units by mouth once daily.      zolpidem (AMBIEN) 10 mg Tab TAKE  1 TABLET BY MOUTH EVERY EVENING 30 tablet 5    [DISCONTINUED] albuterol-ipratropium (DUO-NEB) 2.5 mg-0.5 mg/3 mL nebulizer solution Take 3 mLs by nebulization every 6 (six) hours as needed for Wheezing. 75 mL 12    [DISCONTINUED] amoxicillin-clavulanate 875-125mg (AUGMENTIN) 875-125 mg per tablet Take 1 tablet by mouth every 12 (twelve) hours. for 7 days 14 tablet 0    [DISCONTINUED] azithromycin (Z-SHAKEEL) 250 MG tablet Take 2 tablets by mouth on day 1; Take 1 tablet by mouth on days 2-5 6 tablet 0    [DISCONTINUED] budesonide-glycopyr-formoterol (BREZTRI AEROSPHERE) 160-9-4.8 mcg/actuation HFAA Inhale 2 puffs into the lungs 2 (two) times a day. 32.1 g 3    [DISCONTINUED] COMBIVENT RESPIMAT  mcg/actuation inhaler Inhale 1 puff into the lungs every 6 (six) hours as needed for Wheezing. 12 g 11    albuterol-ipratropium (DUO-NEB) 2.5 mg-0.5 mg/3 mL nebulizer solution Take 3 mLs by nebulization every 6 (six) hours as needed for Wheezing. 360 mL 12    budesonide-glycopyr-formoterol (BREZTRI AEROSPHERE) 160-9-4.8 mcg/actuation HFAA Inhale 2 puffs into the lungs 2 (two) times a day. 32.1 g 3    COMBIVENT RESPIMAT  mcg/actuation inhaler Inhale 2 puffs into the lungs every 6 (six) hours as needed for Wheezing or Shortness of Breath. 12 g 3     No facility-administered encounter medications on file as of 11/26/2024.     Plan:       Requested Prescriptions     Signed Prescriptions Disp Refills    budesonide-glycopyr-formoterol (BREZTRI AEROSPHERE) 160-9-4.8 mcg/actuation HFAA 32.1 g 3     Sig: Inhale 2 puffs into the lungs 2 (two) times a day.    COMBIVENT RESPIMAT  mcg/actuation inhaler 12 g 3     Sig: Inhale 2 puffs into the lungs every 6 (six) hours as needed for Wheezing or Shortness of Breath.    albuterol-ipratropium (DUO-NEB) 2.5 mg-0.5 mg/3 mL nebulizer solution 360 mL 12     Sig: Take 3 mLs by nebulization every 6 (six) hours as needed for Wheezing.     Problem List Items Addressed This Visit        Cigarette nicotine dependence in remission    Overview     5/21/2024 LDCT benign findings, repeat LDCT 1 year, May 2025.          COPD, severe    Overview     Breztri, combivent, Albuterol inhaler. Duo nebs if needed.  NC 3lm sleep          Relevant Medications    budesonide-glycopyr-formoterol (BREZTRI AEROSPHERE) 160-9-4.8 mcg/actuation HFAA    COMBIVENT RESPIMAT  mcg/actuation inhaler    albuterol-ipratropium (DUO-NEB) 2.5 mg-0.5 mg/3 mL nebulizer solution    Emphysema, unspecified    Relevant Medications    budesonide-glycopyr-formoterol (BREZTRI AEROSPHERE) 160-9-4.8 mcg/actuation HFAA    COMBIVENT RESPIMAT  mcg/actuation inhaler    albuterol-ipratropium (DUO-NEB) 2.5 mg-0.5 mg/3 mL nebulizer solution    Other Relevant Orders    Six Minute Walk Test to qualify for Home Oxygen    Nocturnal hypoxemia due to emphysema - Primary    Overview     6/9/2021 abnormal overnight oximetry requalifed to continue home O2. 6/14/2021 orders NC 3lm sleep.   10/29/2015 PSG Normal Apnea hypopnea index. AHI 0.0. No GALLO. The low SpO2 was 86%. Period with saturations below 88% was 39.9 minutes. Supplementary oxygen indicated with sleep.   On NC 3 L sleep with benefit            Other Visit Diagnoses       COPD, moderate        Exercise hypoxemia        Relevant Orders    Six Minute Walk Test to qualify for Home Oxygen                 Follow up in about 6 months (around 5/26/2025) for CT - on return visit, Follow up with NP, 6 min walk - on return.    MEDICAL DECISION MAKING: Moderate to high complexity.  Overall, the multiple problems listed are of moderate to high severity that may impact quality of life and activities of daily living. Side effects of medications, treatment plan as well as options and alternatives reviewed and discussed with patient. There was counseling of patient concerning these issues.    Total time spent in counseling and coordination of care - 34  minutes of total time spent on the encounter,  which includes face to face time and non-face to face time preparing to see the patient (eg, review of tests), Obtaining and/or reviewing separately obtained history, Documenting clinical information in the electronic or other health record, Independently interpreting results (not separately reported) and communicating results to the patient/family/caregiver, or Care coordination (not separately reported).    Time was used in discussion of prognosis, risks, benefits of treatment, instructions and compliance with regimen . Discussion with other physicians and/or health care providers - home health or for use of durable medical equipment (oxygen, nebulizers, CPAP, BiPAP) occurred.

## 2024-11-26 NOTE — PROGRESS NOTES
NEUROPSYCHOLOGY CONSULT    Referral Information  NAME:  Gemma Vick DATE OF SERVICE: 2024   MRN#:  [id number] EDUCATION: 12   AGE: 69 y.o. HANDEDNESS: Right    : 1955 RACE: White   SEX: Female REFERRAL: Celeste Guillen NP;  Neurology, [facility]     Evaluation methods: I had the pleasure of seeing Gemma Vick on 2024 in person at the [facility], Department of Neurology. Data sources for the below report include review of the available medical record, an interview with the patient, a collateral interview with their granddaughter with their expressed consent, and administration of a series of neuropsychological tests listed in the Results section of this report. At the outset of the appointment, the undersigned explained the rationale for the evaluation along with the limits of confidentiality; and verbal informed consent for this evaluation was obtained.    The chief complaint/medical necessity leading to consultation/medical necessity is: cognitive decline    NEUROPSYCHOLOGICAL EVALUATION - CONFIDENTIAL    SUMMARY/TREATMENT PLAN   Summary of History:  Ms. Vick is a 69 y.o., right-handed, White, female with 12 years of formal education. She was referred by her neurology nurse practitioner due to cognitive concerns in the context of a prior diagnosis of vascular dementia on the basis of a prior outside evaluation, and borderline-normal performance on cognitive screening. Review of a past neuropsychology note from Dr. Cosme Ybarra on 2024 reveals concern for co-occurring Alzheimer's and cerebrovascular disease given imaging and positive ATN biomarker profile, and a level of cognitive impairment within the range of MCI or mild dementia based on testing completed 2024; however tests and scores are not available for review. Cognitive screening completed by her referring neurology team on 2024 was  at cutoff  (MoCA = 26/30). Most-recent neurologic exam  completed on 06/13/2024 was documented as notable for bilaterally reduced vibration and touch sensation in her legs; reduced achilles reflexes bilaterally, and reduced  strength bilaterally. Most-recent brain MRI completed on 06/27/2024 documented mild generalized global cortical atrophy, score 1 bilateral medial temporal lobe atrophy, and chronic ischemic changes. Most-recent laboratory studies drawn on 11/14/2024 were notable for mildly-low sodium (131) generally-consistent with her history and were otherwise non-contributory.     During interview, Ms. Vick and her grand-daughter reported the insidious onset and overall gradually-worsening course of cognitive concerns with some day-to-day and intra-day variability vs. possible cognitive fluctuations. Ms. Vick and her grand-daughter reported cognitive challenges across all domains queried with the exception of visuospatial challenges. They emphasized changes in memory, executive functions, and language. Functionally, Ms. Vick remains independent for select basic daily living skills and appointment management; however, she otherwise requires assistance with daily living activities and has fallen victim to scam attempts; attributed to cognitive decline.    Emotionally, Ms. Vick denied current clinically-significant mood concerns and hallucinations. She and her grand-daughter discussed disinhibition, agitation, and increased irritability.Ms. Vick and her daughter denied REM sleep behavioral disturbances.     Test Results:    [place] Findings:   Ms. Vick is a woman of estimated average baseline cognitive functioning on the basis of demographic data and her performance on a single-word reading task.  Performances in the following areas were within normal limits, suggesting intact cognitive functioning in relevant domains:  Auditory attention  Expressive language  Receptive language  Visuospatial skills  Information processing speed (normal-range  weakness)  Memory for verbal information presented as a story  Performances in the following areas were below normal limits, suggesting weakness or decline in relevant domains:  Learning, recall, and recognition for visual information  Learning of rote verbal information with adequate recall, retention and recognition.   Set-shifting/ multitasking   Working memory (variable)  Bilateral left-hand slower than right-hand fine motor dexterity (affected by pain)  Ms. Vick's grand-daughter endorsed clinically-significant declines in ADL functioning across all domains on a standard caregiver questionnaire: self care, household care, employment & recreation, shopping & money, travel, and communication  On screening measures of depression and anxiety, Ms. Vick endorsed mild clinically significant depression and moderate anxiety.  Ms. Vick's granddaughter endorsed the following neuropsychiatric symptoms on a standard inventory: Agitation, apathy, and appetite changes.     Data Synthesis: Cognitive test data suggest frontal-subcortical and right-hemisphere mesial temporal systems dysfunction.     Diagnostic Considerations: Ms. Vick has evidence for clinically significant cognitive decline relative to her baseline. In this context, there is evidence for functional decline due to her cognitive changes on the basis of interview and report of her caregiver on a standard inventory. As such, she qualifies for a major neurocognitive disorder diagnosis. Her current level of cognitive and daily living skills dysfunction are consistent with mild stage at this time. There is no clear behavioral disturbance; although non-pharmacologic interventions to manage mild irritability/ agitation and apathy may prevent or delay the need for future pharmacological management of these concerns. Although depression and anxiety symptoms were endorsed on screeners, she did not discuss clinically-significant symptoms of a psychological disorder.      Etiologic Considerations: Ms. Vick's cognitive test performances and above history are consistent with prior suspicion for co-occurring Alzheimer's and cerebrovascular disease. Although possible fluctuations were reported, symptoms concerning for a separate process from those noted above were not evident.     Diagnoses  1. Mild major neurocognitive disorder due to multiple etiologies without behavioral disturbance            Provider Recommendations:   Ms. Vick will be encouraged to follow up with her referring provider in order to integrate these findings into the overall context of her clinical care, and to implement any of the below recommendations as appropriate.     Assessment results support Ms. Vick's pharmacologic plan for management of cognitive decline.     Consider reinforcing the below behavioral interventions prior to pharmacologic strategies to manage subtle symptoms of irritability and agitation.     A referral for dementia care management services is indicated. I will discuss this with Ms. Vick and her family during feedback.     Repeat assessment may be accomplished in 12 months, in order to assess for changes over time and update her treatment plan. Scores from this assessment should be used as a baseline for comparison if future testing is accomplished.       Patient Recommendations:  The next step in your care is to follow up with your referring provider in order to help with continued management of your care. The below recommendations may help you and your family compensate for your difficulties and better understand the reasons for your cognitive changes.     Your care team's medication plan for cognitive decline appears appropriate given this assessment.     Continued supervision and assistance with daily living skills will be important to maintain your health and safety. Continue to rely on your caregivers for assistance with finances, medication management, transportation,  cooking, and shopping. Do not answer phone calls from numbers you do not recognize.     There are steps you can take to improve your long-term brain health and reduce your risk for cognitive decline in the future. I encourage you to follow the recommendations in your daily life:  Engage in physical activity (i.e., something that gets your heart rate up) totaling at least 15-30 minutes per day. Regular exercise is very important for both long-term health and stress management. Walking, hiking, elliptical, stationary biking, dancing, and yoga are all good options.  Work closely with your doctor(s) to manage any vascular conditions such as high blood pressure, high cholesterol, and diabetes. Follow the management guidelines from the American Heart Association at www.heart.org.  Remain mentally engaged by keeping socially active, reading, learning new skills, playing games, or participating in a hobby.  Get a good night's sleep by avoiding screens 30-60 minutes before bed and sticking to a regular sleep schedule.  Eat a balanced, heart-healthy diet that is low in salt, saturated fats, and added sugar. The Mediterranean diet has been shown to be especially healthy; studies show that it may reduce the risk of developing dementia and slow the rate of age-related cognitive decline.      You reported difficulty falling asleep. The below recommendations may be particularly helpful in improving your sleep:  Avoid watching TV, eating, and discussing emotional issues in bed. The bed should be used for sleep and sex only. If not, we can associate the bed with other activities and it often becomes difficult to fall asleep.  Minimize noise, light, and temperature extremes during sleep with ear plugs, window blinds, or an electric blanket or air conditioner. Even the slightest nighttime noises or luminescent lights can disrupt the quality of your sleep. Try to keep your bedroom at a comfortable temperature -- not too hot (above 75  "degrees) or too cold (below 54 degrees).  Try to go to bed and wake up at the same time every day. A strict routine can help encourage stability in your circadian rhythm. Get out of bed even if you're still tired - sleeping in much later will make it harder to fall asleep the next night, and the cycle will continue. You may need to run a "sleep deficit" for a day to help you fall asleep more easily.    Do not watch TV in bed, and do not fall asleep to TV. Many people say leaving the TV on helps them fall asleep. However, the flickering "blue light" released by screens  is absorbed even through closed eyelids, and suppresses melatonin release in your brain. This can cause us to linger in the lightest stages of sleep, and miss out on more restorative, deeper sleep. A better option might be a white noise machine or shaka without any light.   Try not to drink fluids after 8 p.m. This may reduce awakenings due to urination.  Avoid naps, but if you do nap, make it no more than about 25 minutes about eight hours after you awake. But if you have problems falling asleep, then no naps for you.  Do not expose your self to bright light if you need to get up at night. Use a small night-light instead. Blue light can be particularly detrimental, including the light from your cellphone, TV, or computer screen.  Nicotine is a stimulant and should be avoided particularly near bedtime and upon night awakenings. Having a smoke before bed, although it may feel relaxing, is actually putting a stimulant into your bloodstream.  Caffeine is also a stimulant and is present in coffee (100-200 mg), soda (50-75 mg), tea (50-75 mg), and various over-the-counter medications. Caffeine should be discontinued at least four to six hours before bedtime. If you consume large amounts of caffeine and you cut your self off too quickly, beware; you may get headaches that could keep you awake. Sometimes people feel they are "immune" to the effects of " "caffeine, and that a warm cup of coffee before bed actually helps them relax. But similar to nicotine, despite that it may feel relaxing in the moment, you are still consuming a potent stimulant, and it will act on your nervous system throughout the night causing restless and disrupted sleep even if you don't notice feeling jittery at bedtime.   Avoid alcohol in the evenings. Although alcohol is a depressant and may help you fall asleep, the subsequent metabolism that clears it from your body when you are sleeping causes a withdrawal syndrome. This withdrawal causes awakenings and is often associated with nightmares and sweats.  A light snack may be sleep-inducing, but a heavy meal too close to bedtime interferes with sleep. Stay away from protein and stick to carbohydrates or dairy products. Milk contains the amino acid L-tryptophan, which has been shown in research to help people go to sleep. So milk and cookies or crackers (without chocolate) may be useful and taste good as well.  Be active during the day. Get regular exercise, help burn off excess energy, and promote more sound sleep at night.   Try to get outside into the sunlight at least once per day (with sunscreen, of course!). Your circadian rhythm is primarily regulated by the light coming through your eyes, so making sure it's fully dark at night and making sure you get some sunlight during the day can reinforce your circadian rhythm.   Use mindfulness or meditation strategies to quiet a busy mind. Many people report having difficulty sleeping due to being unable "turn off" their minds. Practicing mindfulness exercises before bed - like Progressive Muscle Relaxation or a body scan - can help promote relaxation and aid in anxiety reduction. Apps such as farmhopping and Calm can be useful, and there are many freely available mindfulness videos on YouTube. If anxiety continues to interfere with your sleep, seeing a behavioral health professional to learn " "anxiety reduction strategies can be helpful.   If you are still struggling with sleep, or struggling to implement any of these tips, behavioral health professionals who are specially trained in sleep medicine can be helpful. Modalities such as Cognitive Behavioral Therapy for Insomnia (CBT-I) can be particularly useful.     Repeat assessment may be accomplished in 12-18 months in order to aid in tracking and to update your treatment plan. At that time scores from this assessment should be used as a baseline for comparison.     The below behavioral strategies, community resources, and prevention strategies may be useful for you and your family members/ caregivers:    Behavioral Management Strategies:  Remember that you cannot reason with acute psychosis or confusion. Unless there is an immediate safety issue, there is no need to challenge or correct the person.  For example, if Ms. Vick is hallucinating, do not argue her that what they are seeing is not real. If they are expressing paranoia, empathize gently with their fear, and attempt to redirect them to a different activity.   If the Ms. Vick is particularly stuck on a topic or activity, as long as the activity is safe and is not worsening their agitation, you don't need to intervene.   Communicate calmly, simply, and concisely  Reassure Ms. Vick that they are safe and you are here to help  Try not to express irritation or anger  Speak quietly and calmly, do not shout or threaten Ms. Vick. Avoid provocation.   Establish verbal contact and provide orientation and reassurance.  Attempt to identify Ms. Vick's wants and feelings, listen to what they are saying, and reflect those wants and feelings back to them.  Dont use sarcasm as a weapon   Set clear limits on behavior in a calm voice ("You cannot hit the nurse.")  Offer choices and optimism ("Which toothpaste would you like to use today?")  Attempt to redirect Ms. Vick to an alternative behavior ("Can " "you come help me with this?)  Avoid saying things like, "We already went over this!" "You can't keep doing this." "I already explained this to you"  Instead, simply nod calmly and acknowledge what Ms. Vick is saying ("Yes, that makes sense."), and then request their help or company in a different behavior.   Having a mental list of activities Ms. Vick enjoys can be helpful with redirection. For example, do they enjoy going for walks? Eating a piece of candy?   If redirection becomes challenging, you can place objects that your family member enjoys strategically in the home in order to use for distraction. This is particularly useful if there are items that they often talk about or frequently ask you questions about; or if they are visually striking. Once you focus the person on this object, talk about it briefly until they are calm and then attempt to redirect to a new behavior.   Sometimes activities which are repetitive can be calming, particularly if there is a comforting sensory element. For example, pt may enjoy folding soft towels or laundry.  Consider using music they know and enjoy such as their favorite songs and artists.  Limit overstimulation  Decrease distractions by turning off the TV, computer, any fluorescent lights that hum, etc.  Ask any casual visitors to leave--the fewer people the better  If the person is very agitated, avoid touching them, and respect their personal space  Sit down and ask the person to sit down also    Preventative strategies:  Try to help the patient develop a routine and stick to it  Address all immediate safety issues  Does Ms. Vick still have access to the car and keys? Take the keys away, or disconnect the car battery.  Are they wandering at night? Install locks on the outside doors. A keypad lock works well. Alarms on bedroom doors can also be helpful.   Are they turning on the stove and risking a fire? If you cannot limit their access to the kitchen, you may need " "to unplug dangerous devices any time you are not in the room.   If Ms. Vick is exhibiting poor judgment throughout the day, they likely need someone supervising them at all times.   Do they still have access to significant financial assets? Limit their access to accounts, and consult with a  if necessary.   Identify situations that seem to trigger agitation or a problematic behavior, and modify the person's environment to minimize or eliminate that trigger.   For example, is Ms. Patels behavior worse if they don't get a good night's sleep? Or if they don't eat or drink enough? If so, prioritize those activities and build behavior plans to support them.  Do they get upset about not being allowed to answer the phone when it rings? Put all of the phones in the house on "silent."   Do they become paranoid that certain family members are trying to take advantage of them? Limit contact with the targeted family member as much as possible, and re-introduce them slowly after some time has passed.   Encourage independence in daily living whenever safely possible  Encourage collaborative decision-making regarding her care as well as daily activities  Encourage regular physical exercise  Address issues that may be impacting sleep   Ex: Urology consult for frequent nighttime urination, address chronic pain that may be interfering with sleep, moving to a different room if her roommate snores loudly, make sure the room is a comfortable temperature and she has adequate pillows and blankets  Ensure she gets out into the sunlight at least once per day to encourage regular circadian cycle  No caffeine, sugar, or large meals within two hours of bedtime   Make sure room at night is dark and quiet and minimize nighttime interruptions from staff or caregivers unless medically necessary   Try to help pt go to sleep and wake up at the same time every day  Monitor closely for worsening psychiatric symptoms (insomnia, social " withdrawal, deterioration of personal hygiene, hostility, confusing or nonsensical speech, paranoia, hallucinations) and intervene promptly. Assess for possible antecedents (possible causes in their environment that came before the symptom), modify environment appropriately.      Sleep Hygiene: Poor sleep has a negative effect on cognition. Several strategies have been shown to improve sleep:   Caffeine intake in the afternoon and evening, as well as stuffing oneself at supper, can decrease the quality of restful sleep throughout the night.   Bedtime and wake-up times should be consistent every night and morning so the body becomes used to a single routine, even on the weekends.  Engage in daily physical activity, but not 2-3 hours before bedtime.   No technology use (television, computer, iPad) 1-2 hours before bed.   Have a wind down routine (e.g., soft lights in the house, bath before bed, reduced fluid intake, songs, reading, less noise) to promote sleep readiness.   Visit the www.sleepfoundation.org for more strategies.     Practice good cognitive/brain health hygiene:  Engage in regular exercise, which increases alertness and arousal and can improve attention and focus.  Consider lower impact exercises, such as yoga or light walking.  Get a good nights sleep, as this can enhance alertness and cognition.  Eat healthy foods and balanced meals. It is notable that research indicates certain nutrients may aid in brain function, such as B vitamins (especially B6, B12, and folic acid), antioxidants (such as vitamins C and E, and beta carotene), and Omega-3 fatty acids. Talk with your physician or nutritionist about whats right for you.   Keep your brain active. Find activities to stay mentally active, such as reading, games (cards, checkers), puzzles (crosswords, Sudoku, jig saw), crafts (models, woodworking), gardening, or participating in activities in the community.  Stay socially engaged. Continue staying  active with your family and friends.    Resources:   Alzheimer's Services of the [facility] (www.http://alzbr.org) have good local caregiver and patient resources.   Consider resources for support through the [facility] Office of Elderly Affairs (http://goea.[place].gov/), [place] Chapter of the Alzheimers Association (www.alz.org/[place]/), the Family Caregiver [place] (www.caregiver.org), and the American Psychological Association (http://www.apa.org/pi/about/publications/caregivers/consumers/index.aspxconsumers/index.aspx).    Prepare for the future:  The pt and caregivers should consider formal arrangements to allow a designated person to make medical and financial decisions for the pt, should he/she become unable to do so.  Options to consider include designating a healthcare proxy, medical and/or financial power of , and completing advanced directives for healthcare decisions and estate planning (e.g., finalizing a will).  If cost is prohibitive, [facility] [employer] (https://Anavex.org/) provides free  for individuals with low income.        Ms. Vick will be provided the results of the evaluation.     Thank you for allowing me to participate in Ms. Soriano care.  If you have any questions, please contact me at [phone].        _____________________  Omega Dominique, Ph.D.  Neuropsychologist  Department of Neuropsychology  [facility], [place]    REVIEW OF SYSTEMS    COGNITIVE     Attention/Working Memory: Patient reports losing train of thought and difficulty staying on topic.     Executive Functioning: Patient reports difficulty organizing, difficulty multitasking and poor problem solving. Patient denies impulsivity.     Processing Speed: Patient reports slowed thinking and difficulty following fast conversations.     Language: Patient reports word finding problems and trouble reading. Patient denies paraphasias, receptive language difficulty or changes to  speech pattern.       Comments: She reported it is hard to recognize words in spite of her ability to perceive the letters and in spite of her optometrist stating vision is adequately corrected.    Visuospatial: Patient denies getting lost in familiar areas or misjudging distance.     Learning and Memory:  Patient reports short-term memory loss, repeating self, rapid forgetting and misplacing or losing things. Patient denies long-term memory loss or not recognizing familiar people.     Onset: Patient reports onset of symptoms were gradual.    Course: Course of symptoms have been gradually worsening  Some days however she is far worse and at other times she is better. Her grand-daughter discussed possible cognitive fluctuations during the day..    BEHAVIORAL: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

## 2024-11-26 NOTE — TELEPHONE ENCOUNTER
Chronic Disease Management  Called patient to complete Pulmonary Disease Management Questionnaire.  Patient has appointment with Dr. Morales at 4pm  Time  spent with patient:  Minutes

## 2024-11-27 NOTE — PROGRESS NOTES
Ms. Vick was seen during this appointment for the testing component of her neuropsychological evaluation. Please see my visit note from this same date of service for documentation of her encounter.        _____________________  Omega Dominique, Ph.D.  Neuropsychologist  Department of Neuropsychology  [facility], [place]

## 2024-12-05 ENCOUNTER — OFFICE VISIT (OUTPATIENT)
Dept: CARDIOLOGY | Facility: CLINIC | Age: 69
End: 2024-12-05
Payer: MEDICARE

## 2024-12-05 VITALS
BODY MASS INDEX: 25.72 KG/M2 | WEIGHT: 136.13 LBS | SYSTOLIC BLOOD PRESSURE: 128 MMHG | OXYGEN SATURATION: 100 % | DIASTOLIC BLOOD PRESSURE: 70 MMHG | HEART RATE: 72 BPM

## 2024-12-05 DIAGNOSIS — J44.9 COPD, SEVERE: ICD-10-CM

## 2024-12-05 DIAGNOSIS — E78.2 MIXED HYPERLIPIDEMIA: ICD-10-CM

## 2024-12-05 DIAGNOSIS — J43.9 NOCTURNAL HYPOXEMIA DUE TO EMPHYSEMA: ICD-10-CM

## 2024-12-05 DIAGNOSIS — I65.23 BILATERAL CAROTID ARTERY STENOSIS: ICD-10-CM

## 2024-12-05 DIAGNOSIS — F17.211 CIGARETTE NICOTINE DEPENDENCE IN REMISSION: ICD-10-CM

## 2024-12-05 DIAGNOSIS — I70.0 AORTIC ATHEROSCLEROSIS: ICD-10-CM

## 2024-12-05 DIAGNOSIS — H81.10 BENIGN PAROXYSMAL POSITIONAL VERTIGO, UNSPECIFIED LATERALITY: ICD-10-CM

## 2024-12-05 DIAGNOSIS — I10 ESSENTIAL HYPERTENSION: ICD-10-CM

## 2024-12-05 DIAGNOSIS — I49.5 SINUS NODE DYSFUNCTION: ICD-10-CM

## 2024-12-05 DIAGNOSIS — K21.9 GASTROESOPHAGEAL REFLUX DISEASE, UNSPECIFIED WHETHER ESOPHAGITIS PRESENT: ICD-10-CM

## 2024-12-05 DIAGNOSIS — I50.32 CHRONIC DIASTOLIC HEART FAILURE: ICD-10-CM

## 2024-12-05 DIAGNOSIS — I70.223 ATHEROSCLEROSIS OF NATIVE ARTERIES OF EXTREMITIES WITH REST PAIN, BILATERAL LEGS: ICD-10-CM

## 2024-12-05 DIAGNOSIS — I25.118 CORONARY ARTERY DISEASE OF NATIVE ARTERY OF NATIVE HEART WITH STABLE ANGINA PECTORIS: Primary | ICD-10-CM

## 2024-12-05 DIAGNOSIS — F01.B0 MODERATE VASCULAR DEMENTIA WITHOUT BEHAVIORAL DISTURBANCE, PSYCHOTIC DISTURBANCE, MOOD DISTURBANCE, OR ANXIETY: ICD-10-CM

## 2024-12-05 DIAGNOSIS — F01.A0 MILD VASCULAR DEMENTIA WITHOUT BEHAVIORAL DISTURBANCE, PSYCHOTIC DISTURBANCE, MOOD DISTURBANCE, OR ANXIETY: ICD-10-CM

## 2024-12-05 DIAGNOSIS — I71.43 INFRARENAL ABDOMINAL AORTIC ANEURYSM (AAA) WITHOUT RUPTURE: ICD-10-CM

## 2024-12-05 DIAGNOSIS — G47.36 NOCTURNAL HYPOXEMIA DUE TO EMPHYSEMA: ICD-10-CM

## 2024-12-05 DIAGNOSIS — K55.1 MESENTERIC ISCHEMIA, CHRONIC: ICD-10-CM

## 2024-12-05 DIAGNOSIS — R73.03 PREDIABETES: ICD-10-CM

## 2024-12-05 PROCEDURE — 99999 PR PBB SHADOW E&M-EST. PATIENT-LVL V: CPT | Mod: PBBFAC,HCNC,, | Performed by: INTERNAL MEDICINE

## 2024-12-05 NOTE — PROGRESS NOTES
Subjective:   Patient ID:  Gemma Vick is a 69 y.o. female who presents for follow up of Medication Management (Has questions about increasing dosage. )      HPI  3/26/2020  A 66 yo female with pvd carotid disease htn hlp copd exsmoker on nocturnal o2 therapy wants to discuss test results. She is in digital htn has been unable to tolerate bisoprolol daily feels tired fatigued no energy she takes meds regularily tries to be compliant with salt no exercise but stays busy in the house. She takes care of her grandbabies. Has occasional leg swelling she takes lasix prn for weight change she stands on her feet a lot. She is compliant with inhalers to control shortness of breath no palpitation has chronic cough.  Her carotid u/s reviewed unchanged.abd u/s no aneurysm  Lipids on target she is well controlled on vytorin .she has difficulty with crestor and lipitor   9/24/2020  She is here for  f/u she is complaining of recurrent episodes of abdominal pain lower quadrant and got to be upper abdomen associated with nausea vomiting no post prandial pain no weight loss or food avoidance. Has no new symptoms of chest pain she is still short of breath no new palpitation tia.   Ct abdomen showed diverticulitis aaa 3.4 cm and sma stenosis.   Her ldl has increased. She has had sore muscles she stopped statins w/o improvemnet. She needs to back on statins that would explain he rincreased ldl.      3/25/2021  Here for f/u has sharp recurrent left sided occurring at rest sitting in recliner she cannot take a deep breath no exertional symptoms she takes care of her grandbabies no smoking no tia palpitation syncope near syncope. She takes  meds regularily .reveiw of her bp chart still showed elevated indices. She claims salt compliance. She could not tolerate amlodipine bid . Her lipids aRE ON TARGET.      9/30/2021    has been using o2 [facility] at [facility] no change in symptoms her bp is elevated she takes amlodipine 5 mg at nite she is  not compliant with diet. Salt wise.she takes care of grandkids no chest pain no cardiac symptoms. She has the urge to go to bathroom after she eats she has upperabdominal pain and feels like throwing up. Has known stenosis of the sma  She has lost weight.      11/1/2021   She is taking 7.5 mg amlodipine still needs better  salt control bp acceptable here however at home is more elevated but we have not checked her machine. She continues to have abdominal symptoms. She wants alternatives to ibesartan her pill is too big to swallow her lipids are on target. (she will check on diovan 320 and benicar 40 mg with pharmacist about size and cost).she is in digital htn her bp readings are more elevated that today clinic readings.she ahs not been compliant with diet she had fried chicken mashed potatoes french fries and   And biscuits  From [employer].   She had cta for her abdominal symptoms showing celiac stenosis and stenosis at the sma. Has also bilateral iliac stenosis greater than 50%.   Her carotid anatomy is unchanged.         12/8/2021   Today bp is controlled she ahs taken a fluid pill her bp readings at home since last visit has been 160-170 range. She si non compliant with salt she took diuretics due to leg swelling which is related to amlodipine. She has also an mason with exercise that did not suggest significant disease she continues to have symptoms suggestive of musculoskeltal on the rt and sciatica. No definite claudication no discoloration. she has no tia.      4/1/2022  Not much leg swelling has been drinking a lot of water bp fluctuates based on salt her digital htn reflects that now today is on target. Has been in after hours due abdominal pain constipation issue. Has nausea at that time . All improved still          dealing with constipation has use magnesium citrate.   She is not using diuretics except intermittently   Has question about arthritis meds she can take advised short course is ok but prolonged  use is high risk of bleeding.      7/27/2022  Here for evaluation for colonoscopy. Has a positive school screening test was scheduled for colonoscopy . Has iron deficiency anemia has fatigue. She has seen vascular surgery because her colonoscopy was cancel;ed has m,oderate carotid disease. Has cta mesenteric stenosis. She has abdominal pain has improvement after taking bentyl some times she vomited digetsed food after eating no diarrhea  Has postprandial pain  opn the rt then progresses to to the bottom and lower abdomen. She is non compliant with salt that is why her bp is elevated.      12/8/2022   Here forf /u has been evaluated by ep no need for any pacer or intervention. Has been evaluated by vascular surgery no significant carotid disease. She is getting very forgetful was evaluated at [facility] she is labeled as vascular dementia  and shrinking brain. Her aaa is around 4 cm.   She had a slaty meal before coming to see me her bp is elevated she is non complaint with salt intake. She cannot remember her meds. She stopped a bp med that she does not remember the name. No cardiac symptoms.      4/25/2023 family is present they are concerned about her situation.   here for f /u has still been having abdominal pain post prandial. Has vascular dementia per neurologist opinion. She is getting forgetful. However she is not complaint with salt .charlotte ate at [employer] before she comes see em today. had back pain no tia no claudication. Had a death  in family her brother passed away. She is concerned about her heart . Has aaa around 4 cm.     6/16/2023  HERE TO DISCUSS HER CTA FINDINGS SHE CONTINUES TO HAVE POSTPRANDIAL ABDOMINAL PAIN CRAMPS SHE IS NOT ABLE TOE AT HER MEALS SHE IHAS LOST A LOT OF WEIGHT. HER CTA SHOWED SEVERE CELIAC AND SMA DISEASE. . SHE IS TRYING TO UNDERSTAND HER SITUATION CLINICALLY AND NEED FOR INTERVENTION. HER DAUGHTER WAS PRESENT TO HELP UNDERSTAND SINCE SHE HAS VASCULAR DEMENTIA.  I ANSWERED ALL THEIR QUESTIONS REVIEWED CTA PICTURES AND EXPLAINED PROCEDURE IN DETAIL WITH INDICATIONS RISK BENEFITS AND COMPLICATIONS     9/21/2023  Here fro f/u had aCS STENTED RCA DUE TO ULCERATED PLAQUE AND CLOTS HER ANGINA RESOLVED. HAS ISSUE WITH HTN SALT INTAKE FATIGUE. DR UNDERWOOD INCREASED COREG AND AMLODIPINE   HAD PUFFINESS IN LEFT RADIAL SITE         Hospital dc 3/28/2024   Gemma Vick is a 69-year-old female with a past history significant for COPD, former smoker, CAD s/p stents, KALLIE, vascular dementia, Alzheimer's, AAA-stable on CT, HTN, HLD, GERD, diverticulitis/osis, chronic mesenteric ischemia, GERD, DDD, osteoarthritis, lumbar radiculopathy, who presented to the ED c/o diffuse abdominal pain, nausea, generalized weakness onset today. Reports poor oral intake for several days. States she fell several days ago and also has left hip pain, and was started on Robaxin. Last BM yesterday, small, soft/formed, dark, but has been dark since starting iron supplements. She also endorses oliguria w/dark urine, hemorrhoids, hx of constipation and feels like she needs an enema. Reports several episodes of vomiting today, stating it was more reflux and some bile, but very small volume. Patient denies fever, chills, cough, congestion, dizziness, dyspnea, chest discomfort, dysuria, hematuria, myalgias. Patient denies sick contacts, potential of contaminated food or water.      ED workup with noted leukocytosis, hyponatremia-corrected 123, hyperglycemia. CT c/a/p shows colonic wall thickening/inflammation of descending colon consistent with colitis, surgically absent gall bladder. CXR and L hip XR w/NAF. CT head w/NAF. UA w/no evidence of infection or ketonuria. Lactate, procalcitonin, troponin, TSH, lipase, LFT's WNL. Flu/COVID negative. She was found to have a rectal temp of 92 and was placed on Mirna hugger. She was given warmed IVF, antibiotics, glycerin suppository, has cho w/bg urine, adequate output  noted in bag. Critical care was consulted for admission and management due to hypothermia requiring active re-warming.      * No surgery found *       Hospital Course:   A patient was admitted to the ICU on March 25th with severe hyponatremia, with a sodium level of 119, hypothermia at 92°F, and severe constipation. The patient underwent rewarming, and her sodium level improved to 126. She also experienced a large bowel movement and has had multiple bowel movements since then. Due to these improvements, she is now being downgraded to telemetry under hospital medicine and is resting comfortably. The hyponatremia was likely secondary to the patient's misuse of HCTZ (hydrochlorothiazide), which was prescribed by her cardiologist for as-needed use. However, the patient, misunderstanding the instructions and influenced by online information, began taking it daily. Her sodium levels have stabilized since discontinuing the medication, and repeat labs are scheduled with her primary care provider within 1-2 weeks of discharge.     Further investigations revealed inflammation in the descending colon, suggestive of either infectious or inflammatory colitis, with ischemic changes also considered due to the patient's history of mesenteric ischemia and recent symptoms of dizziness post HCTZ administration. Despite these concerns, her lactate levels have remained normal, and stool studies have been negative, although she has been receiving antibiotics. The plan is to continue antibiotics for 7-10 days. Discharged with additional 5 days of therapy after receiving 4 days of abx here. There are no immediate plans for endoscopy due to recent use of Plavix, but outpatient follow-up will be considered for a colonoscopy, given the patient's high sedation risk history. No further inpatient gastrointestinal recommendations are provided at this time, but arrangements for outpatient follow-up will be made. Cardiology has noted clinical  "improvement and cardiovascular stability, advising the continuation of optimal medical therapy with a follow-up in clinic to possibly discuss a mesenteric angiogram.     BP (!) 155/70 (Patient Position: Lying)   Pulse 77   Temp 97.6 °F (36.4 °C) (Oral)   Resp 20   Ht 5' 2" (1.575 m)   Wt 59.6 kg (131 lb 6.3 oz)   SpO2 96%   BMI 24.03 kg/m²   PHYSICAL EXAM  Vitals Reviewed  GEN: No acute distress, pleasant, body habitus normal  HEENT: atraumatic and normocephalic  CARDS: regular rate and rhythm, no m/g, pulses palpable in LE  PULM: breathing comfortably on room air, chest symmetric, nonlabored, no abnormal breath sounds on auscultation  ABD: nontender, nondistended, soft, no organomegaly, BS+  Neuro: Alert and oriented x3, CN's I-IX grossly intact, sensation and motor intact; follows directions and answers questions appropriately     Feeling weak dizzy hwer diastolic is low she has been getting pt at home her appetite is staring to improve. Denies abdominal symptoms.     7/9/2024  Here for f/u still issues with fall she slips. She is compliant with meds and diet. Is in digital htn program. Has shortness of breath evaluated with pulmonary.still ahs some low heart rate. Tries to be compliant with med. Her shortness of breath occurs at rest watching tv. Has no cardiac awareness with it.   Has hip pain limiting her after the fall she is using a walker.     11.19.2024 DR STATON   69-year-old female, ex-smoker currently vapes down to 3 mL from 20/1.    Recently admitted to the hospital with generalized symptoms of fatigue.  Some atypical symptoms.  Her troponins were normal.  EKG was nonischemic.    She had a CTA and a lower extremity duplex given mild elevated D-dimer which was negative.    She has a chronic hyponatremia.  Follows with Nephrology.    Blood pressure was elevated up to 180.    Her daughter today states that her pressure sometimes goes higher and lower.    No exertional angina.  She has wheezing on " exam however    12/5/2024   Has not increased amlodipine to 10 mg she is on 5 mg po daily.she is on coreg and olmesartan . No further chest pain . She is short of breath no change form baseline.   Digital htn showed reasonable control. She claims compliance with salt. Carotid blockage is in the 40% range  lost weight lipids on target .  Past Medical History:   Diagnosis Date    AAA (abdominal aortic aneurysm) 02/13/2014    Abdominal aneurysm     3    Acute coronary syndrome     Arthritis     BPPV (benign paroxysmal positional vertigo)     Carotid artery plaque     Carotid artery stenosis and occlusion 02/13/2014    Chronic back pain     COPD (chronic obstructive pulmonary disease)     Coronary artery disease     Dementia     Emphysema lung     Hyperlipidemia     Hypertension     Myocardial infarction     x3    Neuropathy     Personal history of COVID-19 06/09/2021 11/16/2020 +Covid, recovered at home        Past Surgical History:   Procedure Laterality Date    CARDIAC CATHETERIZATION      CORONARY ANGIOPLASTY      CORONARY STENT PLACEMENT N/A 9/12/2023    Procedure: INSERTION, STENT, CORONARY ARTERY;  Surgeon: Millie Juarez MD;  Location: [facility] CATH LAB;  Service: Cardiology;  Laterality: N/A;    HYSTERECTOMY  1988    LEFT HEART CATHETERIZATION Left 9/12/2023    Procedure: Left heart cath;  Surgeon: Millie Juarez MD;  Location: [facility] CATH LAB;  Service: Cardiology;  Laterality: Left;    PERCUTANEOUS CORONARY INTERVENTION, ARTERY N/A 9/12/2023    Procedure: Percutaneous coronary intervention;  Surgeon: Millie Juarez MD;  Location: [facility] CATH LAB;  Service: Cardiology;  Laterality: N/A;    THROMBECTOMY, CORONARY  9/12/2023    Procedure: Thrombectomy, Coronary;  Surgeon: Millie Juarez MD;  Location: [facility] CATH LAB;  Service: Cardiology;;    TONSILLECTOMY      TRANSFORAMINAL EPIDURAL INJECTION OF STEROID Right 12/1/2023    Procedure: Injection,steroid,epidural,transforaminal approach- right side, L4/5 and  L5/S1;  Surgeon: Lydia Roberts MD;  [facility];  Service: Pain Management;  Laterality: Right;       Social History     Tobacco Use    Smoking status: Former     Current packs/day: 0.00     Average packs/day: 0.5 packs/day for 46.9 years (23.4 ttl pk-yrs)     Types: Cigarettes, Vaping with nicotine     Start date: 1970     Quit date: 2017     Years since quittin.6    Smokeless tobacco: Never    Tobacco comments:     3 MG NICOTINE FOR HEART.    Substance Use Topics    Alcohol use: No    Drug use: No       Family History   Problem Relation Name Age of Onset    Heart disease Mother      Heart attacks under age 50 Father      Heart attacks under age 50 Brother          HA at 32    Heart attack Brother          HA at 70    Breast cancer Paternal Aunt         Current Outpatient Medications   Medication Sig    albuterol-ipratropium (DUO-NEB) 2.5 mg-0.5 mg/3 mL nebulizer solution Take 3 mLs by nebulization every 6 (six) hours as needed for Wheezing.    amLODIPine (NORVASC) 10 MG tablet Take 1 tablet (10 mg total) by mouth once daily.    aspirin 81 MG Chew Take 81 mg by mouth once daily.    budesonide-glycopyr-formoterol (BREZTRI AEROSPHERE) 160-9-4.8 mcg/actuation HFAA Inhale 2 puffs into the lungs 2 (two) times a day.    carvediloL (COREG) 6.25 MG tablet Take 1 tablet (6.25 mg total) by mouth 2 (two) times daily with meals.    cloNIDine (CATAPRES) 0.1 MG tablet Take 1 tablet (0.1 mg total) by mouth daily as needed (if Blood pressure above 170/90).    clopidogreL (PLAVIX) 75 mg tablet Take 1 tablet (75 mg total) by mouth once daily.    COMBIVENT RESPIMAT  mcg/actuation inhaler Inhale 2 puffs into the lungs every 6 (six) hours as needed for Wheezing or Shortness of Breath.    cyanocobalamin (VITAMIN B-12) 100 MCG tablet Take 100 mcg by mouth once daily.    diclofenac sodium (VOLTAREN) 1 % Gel Apply topically 4 (four) times daily.    dicyclomine (BENTYL) 10 MG capsule TAKE ONE CAPSULE BY MOUTH  THREE TIMES DAILY AS NEEDED    ezetimibe-simvastatin 10-40 mg (VYTORIN) 10-40 mg per tablet Take 1 tablet by mouth every evening.    famotidine (PEPCID) 20 MG tablet Take 1 tablet (20 mg total) by mouth 2 (two) times daily as needed for Heartburn.    furosemide (LASIX) 20 MG tablet TAKE 1 TABLET BY MOUTH ONCE DAILY AS NEEDED    hydroCHLOROthiazide (MICROZIDE) 12.5 mg capsule Take 12.5 mg by mouth as needed.    inhalation spacing device (COMPACT SPACE CHAMBER) USE AS DIRECTED    memantine (NAMENDA) 10 MG Tab Take 1 tablet (10 mg total) by mouth 2 (two) times daily.    olmesartan (BENICAR) 40 MG tablet Take 1 tablet (40 mg total) by mouth once daily.    OXYGEN-AIR DELIVERY SYSTEMS MISC 3 L by Misc.(Non-Drug; Combo Route) route every evening.    pantoprazole (PROTONIX) 40 MG tablet Take 1 tablet (40 mg total) by mouth once daily.    vitamin D (VITAMIN D3) 1000 units Tab Take 1,000 Units by mouth once daily.    zolpidem (AMBIEN) 10 mg Tab TAKE 1 TABLET BY MOUTH EVERY EVENING    azithromycin (ZITHROMAX Z-SHAKEEL) 250 MG tablet 500 mg on day 1 (two tablets) followed by 250 mg once daily on days 2-5 (Patient not taking: Reported on 12/5/2024)    cyproheptadine (PERIACTIN) 4 mg tablet Take 4 mg by mouth 2 (two) times daily. (Patient not taking: Reported on 12/5/2024)    estradioL (ESTRACE) 0.5 MG tablet Take 1 tablet (0.5 mg total) by mouth once daily. (Patient not taking: Reported on 12/5/2024)     No current facility-administered medications for this visit.     Current Outpatient Medications on File Prior to Visit   Medication Sig    albuterol-ipratropium (DUO-NEB) 2.5 mg-0.5 mg/3 mL nebulizer solution Take 3 mLs by nebulization every 6 (six) hours as needed for Wheezing.    amLODIPine (NORVASC) 10 MG tablet Take 1 tablet (10 mg total) by mouth once daily.    aspirin 81 MG Chew Take 81 mg by mouth once daily.    budesonide-glycopyr-formoterol (BREZTRI AEROSPHERE) 160-9-4.8 mcg/actuation HFAA Inhale 2 puffs into the lungs 2  (two) times a day.    carvediloL (COREG) 6.25 MG tablet Take 1 tablet (6.25 mg total) by mouth 2 (two) times daily with meals.    cloNIDine (CATAPRES) 0.1 MG tablet Take 1 tablet (0.1 mg total) by mouth daily as needed (if Blood pressure above 170/90).    clopidogreL (PLAVIX) 75 mg tablet Take 1 tablet (75 mg total) by mouth once daily.    COMBIVENT RESPIMAT  mcg/actuation inhaler Inhale 2 puffs into the lungs every 6 (six) hours as needed for Wheezing or Shortness of Breath.    cyanocobalamin (VITAMIN B-12) 100 MCG tablet Take 100 mcg by mouth once daily.    diclofenac sodium (VOLTAREN) 1 % Gel Apply topically 4 (four) times daily.    dicyclomine (BENTYL) 10 MG capsule TAKE ONE CAPSULE BY MOUTH THREE TIMES DAILY AS NEEDED    ezetimibe-simvastatin 10-40 mg (VYTORIN) 10-40 mg per tablet Take 1 tablet by mouth every evening.    famotidine (PEPCID) 20 MG tablet Take 1 tablet (20 mg total) by mouth 2 (two) times daily as needed for Heartburn.    furosemide (LASIX) 20 MG tablet TAKE 1 TABLET BY MOUTH ONCE DAILY AS NEEDED    hydroCHLOROthiazide (MICROZIDE) 12.5 mg capsule Take 12.5 mg by mouth as needed.    inhalation spacing device (COMPACT SPACE CHAMBER) USE AS DIRECTED    memantine (NAMENDA) 10 MG Tab Take 1 tablet (10 mg total) by mouth 2 (two) times daily.    olmesartan (BENICAR) 40 MG tablet Take 1 tablet (40 mg total) by mouth once daily.    OXYGEN-AIR DELIVERY SYSTEMS MISC 3 L by Misc.(Non-Drug; Combo Route) route every evening.    pantoprazole (PROTONIX) 40 MG tablet Take 1 tablet (40 mg total) by mouth once daily.    vitamin D (VITAMIN D3) 1000 units Tab Take 1,000 Units by mouth once daily.    zolpidem (AMBIEN) 10 mg Tab TAKE 1 TABLET BY MOUTH EVERY EVENING    azithromycin (ZITHROMAX Z-SHAKEEL) 250 MG tablet 500 mg on day 1 (two tablets) followed by 250 mg once daily on days 2-5 (Patient not taking: Reported on 12/5/2024)    cyproheptadine (PERIACTIN) 4 mg tablet Take 4 mg by mouth 2 (two) times daily.  (Patient not taking: Reported on 12/5/2024)    estradioL (ESTRACE) 0.5 MG tablet Take 1 tablet (0.5 mg total) by mouth once daily. (Patient not taking: Reported on 12/5/2024)    [DISCONTINUED] methylPREDNISolone (MEDROL DOSEPACK) 4 mg tablet use as directed (Patient not taking: Reported on 12/5/2024)     No current facility-administered medications on file prior to visit.       ROS    Objective:   Physical Exam  Vitals:    12/05/24 1514 12/05/24 1517   BP: 138/70 128/70   BP Location: Right arm Left arm   Patient Position: Sitting Sitting   Pulse: 72    SpO2: 100%    Weight: 61.7 kg (136 lb 2.1 oz)      Lab Results   Component Value Date    CHOL 138 04/18/2023    CHOL 143 12/01/2022    CHOL 164 10/25/2022      Body mass index is 25.72 kg/m².   Lab Results   Component Value Date    HGBA1C 5.2 03/26/2024      BMP  Lab Results   Component Value Date     (L) 11/14/2024    K 3.8 11/14/2024     11/14/2024    CO2 19 (L) 11/14/2024    BUN 13 11/14/2024    CREATININE 0.8 11/14/2024    CALCIUM 9.2 11/14/2024    ANIONGAP 12 11/14/2024    EGFRNORACEVR >60 11/14/2024      Lab Results   Component Value Date    HDL 45 04/18/2023    HDL 56 12/01/2022    HDL 60 10/25/2022     Lab Results   Component Value Date    LDLCALC 79.0 04/18/2023    LDLCALC 72.0 12/01/2022    LDLCALC 83.0 10/25/2022     Lab Results   Component Value Date    TRIG 70 04/18/2023    TRIG 75 12/01/2022    TRIG 105 10/25/2022     Lab Results   Component Value Date    CHOLHDL 32.6 04/18/2023    CHOLHDL 39.2 12/01/2022    CHOLHDL 36.6 10/25/2022       Chemistry        Component Value Date/Time     (L) 11/14/2024 2302    K 3.8 11/14/2024 2302     11/14/2024 2302    CO2 19 (L) 11/14/2024 2302    BUN 13 11/14/2024 2302    CREATININE 0.8 11/14/2024 2302     (H) 11/14/2024 2302        Component Value Date/Time    CALCIUM 9.2 11/14/2024 2302    ALKPHOS 59 11/14/2024 2302    AST 28 11/14/2024 2302    ALT 25 11/14/2024 2302    BILITOT 0.7  11/14/2024 2302    ESTGFRAFRICA >60.0 04/07/2022 0922    EGFRNONAA >60.0 04/07/2022 0922          Lab Results   Component Value Date    TSH 2.747 06/13/2024     Lab Results   Component Value Date    INR 1.1 12/20/2017    INR 1.1 08/10/2012     Lab Results   Component Value Date    WBC 6.22 11/14/2024    HGB 12.4 11/14/2024    HCT 36.6 (L) 11/14/2024    MCV 93 11/14/2024     11/14/2024     BMP  Sodium   Date Value Ref Range Status   11/14/2024 131 (L) 136 - 145 mmol/L Final     Potassium   Date Value Ref Range Status   11/14/2024 3.8 3.5 - 5.1 mmol/L Final     Chloride   Date Value Ref Range Status   11/14/2024 100 95 - 110 mmol/L Final     CO2   Date Value Ref Range Status   11/14/2024 19 (L) 23 - 29 mmol/L Final     BUN   Date Value Ref Range Status   11/14/2024 13 8 - 23 mg/dL Final     Creatinine   Date Value Ref Range Status   11/14/2024 0.8 0.5 - 1.4 mg/dL Final     Calcium   Date Value Ref Range Status   11/14/2024 9.2 8.7 - 10.5 mg/dL Final     Anion Gap   Date Value Ref Range Status   11/14/2024 12 8 - 16 mmol/L Final     eGFR if    Date Value Ref Range Status   04/07/2022 >60.0 >60 mL/min/1.73 m^2 Final     eGFR if non    Date Value Ref Range Status   04/07/2022 >60.0 >60 mL/min/1.73 m^2 Final     Comment:     Calculation used to obtain the estimated glomerular filtration  rate (eGFR) is the CKD-EPI equation.        CrCl cannot be calculated (Patient's most recent lab result is older than the maximum 7 days allowed.).  Interpretation Summary  Show Result Comparison     There is 40-49% right Internal Carotid Stenosis.    There is 40-49% left Internal Carotid Stenosis.    Known right vertebral artery occluded.  Assessment:     1. Coronary artery disease of native artery of native heart with stable angina pectoris    2. Mixed hyperlipidemia    3. Essential hypertension    4. Prediabetes    5. Benign paroxysmal positional vertigo, unspecified laterality    6. Bilateral  carotid artery stenosis    7. Infrarenal abdominal aortic aneurysm (AAA) without rupture    8. Sinus node dysfunction    9. Nocturnal hypoxemia due to emphysema    10. Cigarette nicotine dependence in remission    11. COPD, severe    12. Gastroesophageal reflux disease, unspecified whether esophagitis present    13. Mild vascular dementia without behavioral disturbance, psychotic disturbance, mood disturbance, or anxiety    14. Aortic atherosclerosis    15. Mesenteric ischemia, chronic    16. Atherosclerosis of native arteries of extremities with rest pain, bilateral legs    17. Moderate vascular dementia without behavioral disturbance, psychotic disturbance, mood disturbance, or anxiety    18. Chronic diastolic heart failure      Asymptomatic complaint htn improved she is asymptomatic cardiovascular wise. Carotid disease stable   Aaa stable will repeat labs  Plan:   Continue current therapy  Cardiac low salt diet.  Risk factor modification and excercise program.  F/u in 6 months with lipid cmp a1c

## 2024-12-09 RX ORDER — EZETIMIBE AND SIMVASTATIN 10; 40 MG/1; MG/1
1 TABLET ORAL NIGHTLY
Qty: 90 TABLET | Refills: 0 | Status: SHIPPED | OUTPATIENT
Start: 2024-12-09

## 2024-12-09 NOTE — TELEPHONE ENCOUNTER
Care Due:                  Date            Visit Type   Department     Provider  --------------------------------------------------------------------------------                                EP -                              PRIMARY      [facility]  Last Visit: 11-      CARE ([facility])   MEDICINE       Olga Altman  Next Visit: None Scheduled  None         None Found                                                            Last  Test          Frequency    Reason                     Performed    Due Date  --------------------------------------------------------------------------------    Lipid Panel.  12 months..  ezetimibe-simvastatin....  04- 04-    Health system Embedded Care Due Messages. Reference number: [id number].   12/09/2024 8:53:31 AM CST

## 2024-12-10 NOTE — TELEPHONE ENCOUNTER
Refill Routing Note   Medication(s) are not appropriate for processing by [facility] for the following reason(s):        Required labs outdated    [facility] action(s):  Defer     Requires labs : Yes             Appointments  past 12m or future 3m with PCP    Date Provider   Last Visit   11/21/2024 Olga Altman MD   Next Visit   Visit date not found Olga Altman MD   ED visits in past 90 days: 2        Note composed:7:29 PM 12/09/2024

## 2024-12-13 ENCOUNTER — HOSPITAL ENCOUNTER (OUTPATIENT)
Dept: RADIOLOGY | Facility: HOSPITAL | Age: 69
Discharge: HOME | End: 2024-12-13
Attending: FAMILY MEDICINE
Payer: MEDICARE

## 2024-12-13 VITALS — WEIGHT: 136 LBS | BODY MASS INDEX: 25.68 KG/M2 | HEIGHT: 61 IN

## 2024-12-13 DIAGNOSIS — Z12.31 BREAST CANCER SCREENING BY MAMMOGRAM: ICD-10-CM

## 2024-12-13 PROCEDURE — 77067 SCR MAMMO BI INCL CAD: CPT | Mod: TC,HCNC

## 2024-12-13 PROCEDURE — 77063 BREAST TOMOSYNTHESIS BI: CPT | Mod: 26,HCNC,, | Performed by: RADIOLOGY

## 2024-12-13 PROCEDURE — 77067 SCR MAMMO BI INCL CAD: CPT | Mod: 26,HCNC,, | Performed by: RADIOLOGY

## 2024-12-16 ENCOUNTER — OFFICE VISIT (OUTPATIENT)
Dept: NEUROLOGY | Facility: CLINIC | Age: 69
End: 2024-12-16
Payer: MEDICARE

## 2024-12-16 DIAGNOSIS — F03.A0 MILD MAJOR NEUROCOGNITIVE DISORDER DUE TO MULTIPLE ETIOLOGIES WITHOUT BEHAVIORAL DISTURBANCE: Primary | ICD-10-CM

## 2024-12-16 PROCEDURE — 99999 PR PBB SHADOW E&M-EST. PATIENT-LVL I: CPT | Mod: PBBFAC,HCNC,, | Performed by: STUDENT IN AN ORGANIZED HEALTH CARE EDUCATION/TRAINING PROGRAM

## 2024-12-16 NOTE — PROGRESS NOTES
Ms. Vick and her grand-daughter attended a in-person feedback session to discuss the results from her recent neuropsychological testing (see report dated 11/26/2024). We discussed her test results, diagnoses, and my recommendations. Ms. Vick and her grand-daughter voiced their understanding of the information provided, and voiced their intention to follow through on the recommendations provided. All questions were answered to their satisfaction and Ms. Vick was provided with a copy of her report. she was encouraged to contact our office with any future questions or concerns.         Billing Documentation     Time on review of neuropsychological test data and relevant records, data interpretation, providing feedback about these results, and writing/ documentation: 48 minutes; [id number]          _____________________  Omega Dominique, Ph.D.  Neuropsychologist  Department of Neuropsychology  [facility], [place]

## 2024-12-23 RX ORDER — DICYCLOMINE HYDROCHLORIDE 10 MG/1
10 CAPSULE ORAL
Qty: 90 CAPSULE | Refills: 2 | Status: SHIPPED | OUTPATIENT
Start: 2024-12-23

## 2024-12-23 NOTE — TELEPHONE ENCOUNTER
No care due was identified.  [facility] Embedded Care Due Messages. Reference number: [id number].   12/23/2024 9:14:23 AM CST

## 2025-01-01 ENCOUNTER — PATIENT MESSAGE (OUTPATIENT)
Dept: ADMINISTRATIVE | Facility: OTHER | Age: 70
End: 2025-01-01
Payer: MEDICARE

## 2025-01-04 NOTE — TELEPHONE ENCOUNTER
----- Message from Ely Baca sent at 3/6/2023  4:12 PM CST -----  Contact: Ana Perez with [facility] is calling to speak with nurse regarding orders. Reports the office was supposed to place orders for tens unit and [facility] has not received the order yet. Please give Ana a call back at [phone] ext [phone].     
----- Message from Jimena Oh sent at 3/6/2023 12:42 PM CST -----  Contact: self/[phone]  Patient is calling to consult with nurse regarding a order for Tens Machine. Please call her back at  [phone]. Thanks/ar    
Dar on recording at [facility] for Ana to call back and leave their fax number so the order can be sent to her.   
Will fax order to [employer]   
Attending Attestation (For Attendings USE Only)...

## 2025-01-16 ENCOUNTER — TELEPHONE (OUTPATIENT)
Dept: PULMONOLOGY | Facility: CLINIC | Age: 70
End: 2025-01-16
Payer: MEDICARE

## 2025-01-16 NOTE — TELEPHONE ENCOUNTER
----- Message from Nurse Hamm sent at 1/16/2025 11:49 AM CST -----  Contact: Gemma    ----- Message -----  From: Amelia Smith  Sent: 1/16/2025  11:42 AM CST  To: [facility] Clinical Staff    Patient needs reschedule appointment due to change to in person. Please call patient at [phone]. Thanks!

## 2025-01-18 ENCOUNTER — PATIENT MESSAGE (OUTPATIENT)
Dept: ADMINISTRATIVE | Facility: OTHER | Age: 70
End: 2025-01-18
Payer: MEDICARE

## 2025-01-28 ENCOUNTER — ON-DEMAND VIRTUAL (OUTPATIENT)
Dept: URGENT CARE | Facility: CLINIC | Age: 70
End: 2025-01-28
Payer: MEDICARE

## 2025-01-28 ENCOUNTER — NURSE TRIAGE (OUTPATIENT)
Dept: ADMINISTRATIVE | Facility: CLINIC | Age: 70
End: 2025-01-28
Payer: MEDICARE

## 2025-01-28 DIAGNOSIS — R11.0 NAUSEA: Primary | ICD-10-CM

## 2025-01-28 RX ORDER — PROMETHAZINE HYDROCHLORIDE 25 MG/1
25 TABLET ORAL EVERY 6 HOURS PRN
Qty: 15 TABLET | Refills: 0 | Status: SHIPPED | OUTPATIENT
Start: 2025-01-28

## 2025-01-28 NOTE — TELEPHONE ENCOUNTER
Pt called and stated that she has a stomach virus. She has nausea. Some vomiting. Daughter gave her a Zofran 8 mg and it has not helped at all. This has been going on since yesterday. Drinking Gatorade and 7-up and some crackers. No abd. Pain. Feeling very weak. No fever that pt is aware of. Dispo-see pt today or tomorrow in office. Pt requested a ODVV. Apnt scheduled for pt, pt having issues staying logged into her [employer]. Junaid. Pt. Instructed to call back with any further concerns     Reason for Disposition   Patient wants to be seen    Additional Information   Negative: Shock suspected (e.g., cold/pale/clammy skin, too weak to stand, low BP, rapid pulse)   Negative: Unable to walk, or can only walk with assistance (e.g., requires support)   Negative: Difficulty breathing   Negative: Insulin-dependent diabetes (Type I) and glucose > 400 mg/dL (22 mmol/L)   Negative: Drinking very little and dehydration suspected (e.g., no urine > 12 hours, very dry mouth, very lightheaded)   Negative: Patient sounds very sick or weak to the triager   Negative: Fever > 104 F  (40 C)   Negative: Fever > 101 F  (38.3 C) and over 60 years of age   Negative: Fever > 100 F  (37.8 C) and bedridden (e.g., CVA, chronic illness, recovering from surgery)   Negative: Fever > 100 F  (37.8 C) and diabetes mellitus or weak immune system (e.g., HIV positive, cancer chemo, splenectomy, chronic steroids)   Negative: Taking any of the following medications: digoxin (Lanoxin), lithium, theophylline, phenytoin (Dilantin)   Negative: Yellowish color of the skin or white of the eye (i.e., jaundice)   Negative: Fever present > 3 days (72 hours)    Protocols used: Nausea-A-OH

## 2025-01-28 NOTE — PROGRESS NOTES
Subjective:      Patient ID: Gemma Vick is a 69 y.o. female.    Vitals:  vitals were not taken for this visit.     Chief Complaint: GI Problem (Nausea.  No fever. Intermittent abdominal pain, nothing consistent.  Has had gallbladder removed.  Took zofran 8mg without any relief.  Has not vomited, has not had diarrhea.  Symptoms began yesterday.)      Visit Type: TELE AUDIOVISUAL    Past Medical History:   Diagnosis Date    AAA (abdominal aortic aneurysm) 02/13/2014    Abdominal aneurysm     3    Acute coronary syndrome     Arthritis     BPPV (benign paroxysmal positional vertigo)     Carotid artery plaque     Carotid artery stenosis and occlusion 02/13/2014    Chronic back pain     COPD (chronic obstructive pulmonary disease)     Coronary artery disease     Dementia     Emphysema lung     Hyperlipidemia     Hypertension     Myocardial infarction     x3    Neuropathy     Personal history of COVID-19 06/09/2021 11/16/2020 +Covid, recovered at home      Past Surgical History:   Procedure Laterality Date    CARDIAC CATHETERIZATION      CORONARY ANGIOPLASTY      CORONARY STENT PLACEMENT N/A 9/12/2023    Procedure: INSERTION, STENT, CORONARY ARTERY;  Surgeon: Millie Juarez MD;  Location: [facility] CATH LAB;  Service: Cardiology;  Laterality: N/A;    HYSTERECTOMY  1988    LEFT HEART CATHETERIZATION Left 9/12/2023    Procedure: Left heart cath;  Surgeon: Millie Juarez MD;  Location: [facility] CATH LAB;  Service: Cardiology;  Laterality: Left;    PERCUTANEOUS CORONARY INTERVENTION, ARTERY N/A 9/12/2023    Procedure: Percutaneous coronary intervention;  Surgeon: Millie Juarez MD;  Location: [facility] CATH LAB;  Service: Cardiology;  Laterality: N/A;    THROMBECTOMY, CORONARY  9/12/2023    Procedure: Thrombectomy, Coronary;  Surgeon: Millie Juarez MD;  Location: [facility] CATH LAB;  Service: Cardiology;;    TONSILLECTOMY      TRANSFORAMINAL EPIDURAL INJECTION OF STEROID Right 12/1/2023    Procedure:  Injection,steroid,epidural,transforaminal approach- right side, L4/5 and L5/S1;  Surgeon: Lydia Roberts MD;  Location: [facility];  Service: Pain Management;  Laterality: Right;     Review of patient's allergies indicates:  No Known Allergies  Current Outpatient Medications on File Prior to Visit   Medication Sig Dispense Refill    albuterol-ipratropium (DUO-NEB) 2.5 mg-0.5 mg/3 mL nebulizer solution Take 3 mLs by nebulization every 6 (six) hours as needed for Wheezing. 360 mL 12    amLODIPine (NORVASC) 10 MG tablet Take 1 tablet (10 mg total) by mouth once daily. 30 tablet 11    aspirin 81 MG Chew Take 81 mg by mouth once daily.      azithromycin (ZITHROMAX Z-SHAKEEL) 250 MG tablet 500 mg on day 1 (two tablets) followed by 250 mg once daily on days 2-5 (Patient not taking: Reported on 12/5/2024) 6 tablet 0    budesonide-glycopyr-formoterol (BREZTRI AEROSPHERE) 160-9-4.8 mcg/actuation HFAA Inhale 2 puffs into the lungs 2 (two) times a day. 32.1 g 3    carvediloL (COREG) 6.25 MG tablet Take 1 tablet (6.25 mg total) by mouth 2 (two) times daily with meals. 60 tablet 11    cloNIDine (CATAPRES) 0.1 MG tablet Take 1 tablet (0.1 mg total) by mouth daily as needed (if Blood pressure above 170/90). 30 tablet 11    clopidogreL (PLAVIX) 75 mg tablet Take 1 tablet (75 mg total) by mouth once daily. 90 tablet 3    COMBIVENT RESPIMAT  mcg/actuation inhaler Inhale 2 puffs into the lungs every 6 (six) hours as needed for Wheezing or Shortness of Breath. 12 g 3    cyanocobalamin (VITAMIN B-12) 100 MCG tablet Take 100 mcg by mouth once daily.      cyproheptadine (PERIACTIN) 4 mg tablet Take 4 mg by mouth 2 (two) times daily. (Patient not taking: Reported on 12/5/2024)      diclofenac sodium (VOLTAREN) 1 % Gel Apply topically 4 (four) times daily. 150 g 1    dicyclomine (BENTYL) 10 MG capsule TAKE ONE CAPSULE BY MOUTH THREE TIMES DAILY AS NEEDED 90 capsule 2    estradioL (ESTRACE) 0.5 MG tablet Take 1 tablet (0.5 mg total) by  mouth once daily. (Patient not taking: Reported on 12/5/2024) 30 tablet 3    ezetimibe-simvastatin 10-40 mg (VYTORIN) 10-40 mg per tablet TAKE 1 TABLET BY MOUTH EVERY EVENING 90 tablet 0    famotidine (PEPCID) 20 MG tablet Take 1 tablet (20 mg total) by mouth 2 (two) times daily as needed for Heartburn. 60 tablet 11    furosemide (LASIX) 20 MG tablet TAKE 1 TABLET BY MOUTH ONCE DAILY AS NEEDED 90 tablet 2    hydroCHLOROthiazide (MICROZIDE) 12.5 mg capsule Take 12.5 mg by mouth as needed.      inhalation spacing device (COMPACT SPACE CHAMBER) USE AS DIRECTED 1 each 2    memantine (NAMENDA) 10 MG Tab Take 1 tablet (10 mg total) by mouth 2 (two) times daily. 180 tablet 12    olmesartan (BENICAR) 40 MG tablet Take 1 tablet (40 mg total) by mouth once daily. 90 tablet 3    OXYGEN-AIR DELIVERY SYSTEMS MISC 3 L by Misc.(Non-Drug; Combo Route) route every evening.      pantoprazole (PROTONIX) 40 MG tablet Take 1 tablet (40 mg total) by mouth once daily. 90 tablet 3    vitamin D (VITAMIN D3) 1000 units Tab Take 1,000 Units by mouth once daily.      zolpidem (AMBIEN) 10 mg Tab TAKE 1 TABLET BY MOUTH EVERY EVENING 30 tablet 5     No current facility-administered medications on file prior to visit.     Family History   Problem Relation Name Age of Onset    Heart disease Mother      Heart attacks under age 50 Father      Heart attacks under age 50 Brother          HA at 32    Heart attack Brother          HA at 70    Breast cancer Paternal Aunt         Medications Ordered                [id number] - [facility] [address] [address] AT [facility] [address] [address] [address] [address]    Telephone: [phone]   Fax: [phone]   Hours: Not open 24 hours                         E-Prescribed (1 of 1)              promethazine (PHENERGAN) 25 MG tablet    Sig: Take 1 tablet (25 mg total) by mouth every 6 (six) hours as needed for Nausea.       Start: 1/28/25     Quantity: 15  tablet Refills: 0                           Oh[facility] Peq Odvv Intake    1/28/2025  4:17 PM CST - Filed by Patient   What is your current physical address in the event of a medical emergency?    Are you able to take your vital signs? Yes   Systolic Blood Pressure:    Diastolic Blood Pressure:    Weight: 130   Height: 5   Pulse:    Temperature:    Respiration rate:    Pulse Oxygen: 98   Please attach any relevant images or files    Is your employer contracted with [facility]? No         HPI     GI Problem     Additional comments: Nausea.  No fever. Intermittent abdominal pain, nothing consistent.  Has had gallbladder removed.  Took zofran 8mg without any relief.  Has not vomited, has not had diarrhea.  Symptoms began yesterday.     Two patient identifiers were used-name was repeated verbally as well as date of birth.  The patient was located in their home in the [facility].          Constitution: Positive for chills and fatigue.   Gastrointestinal:  Positive for nausea.        Objective:   The physical exam was conducted virtually.  Physical Exam   Constitutional: She is oriented to person, place, and time. No distress.      Comments:Appears uncomfortable     HENT:   Head: Normocephalic and atraumatic.   Neck: Neck supple.   Pulmonary/Chest: Effort normal. No respiratory distress.   Musculoskeletal: Normal range of motion.         General: Normal range of motion.   Neurological: no focal deficit. She is alert and oriented to person, place, and time.   Skin: Skin is not pale.   Psychiatric: Her behavior is normal. Mood, judgment and thought content normal.       Assessment:     1. Nausea        Plan:       Nausea    Other orders  -     promethazine (PHENERGAN) 25 MG tablet; Take 1 tablet (25 mg total) by mouth every 6 (six) hours as needed for Nausea.  Dispense: 15 tablet; Refill: 0    Frequent small sips fluids, preferably with electrolytes such as gatorade or pedialyte.  Ice chips, popsicles.  Go to the  ER for further evaluation if you are unable to tolerate fluids >6 hr.    You must understand that you've received a virtual Care treatment only and that you may be released before all your medical problems are known or treated. You, the patient, will arrange for follow up care as instructed.  If your condition worsens we recommend that you receive another evaluation at an urgent care in person, the emergency room or contact your primary medical clinics after hours call service to discuss your concerns.              Present with the patient at the time of consultation: TELEMED PRESENT WITH PATIENT: None

## 2025-01-29 ENCOUNTER — PATIENT OUTREACH (OUTPATIENT)
Dept: PULMONOLOGY | Facility: CLINIC | Age: 70
End: 2025-01-29
Payer: MEDICARE

## 2025-01-29 NOTE — TELEPHONE ENCOUNTER
Chronic Disease Management  Called patient in regard to abnormal Pulmonary Disease Management Questionnaire:   Patient reports shortness of breath since experiencing a stomach virus. Patient reports oxygen saturation has been 94-95%. Patient complains of weakness associated with recent virus.  Advised patient to contact PDM if further needs arise.

## 2025-01-30 DIAGNOSIS — F51.04 CHRONIC INSOMNIA: ICD-10-CM

## 2025-01-30 DIAGNOSIS — I10 ESSENTIAL HYPERTENSION: ICD-10-CM

## 2025-01-30 RX ORDER — ESTRADIOL 0.5 MG/1
0.5 TABLET ORAL
Qty: 90 TABLET | Refills: 3 | Status: SHIPPED | OUTPATIENT
Start: 2025-01-30

## 2025-01-30 RX ORDER — ZOLPIDEM TARTRATE 10 MG/1
10 TABLET ORAL NIGHTLY
Qty: 30 TABLET | Refills: 3 | Status: SHIPPED | OUTPATIENT
Start: 2025-01-30

## 2025-01-30 RX ORDER — AMLODIPINE BESYLATE 5 MG/1
5 TABLET ORAL
Qty: 30 TABLET | Refills: 11 | OUTPATIENT
Start: 2025-01-30

## 2025-01-30 NOTE — TELEPHONE ENCOUNTER
Refill Routing Note   Medication(s) are not appropriate for processing by [facility] for the following reason(s):        Outside of protocol    [facility] action(s):  Approve  Route             Appointments  past 12m or future 3m with PCP    Date Provider   Last Visit   11/21/2024 Olga Altman MD   Next Visit   Visit date not found Olga Altman MD   ED visits in past 90 days: 1        Note composed:3:51 PM 01/30/2025

## 2025-01-30 NOTE — TELEPHONE ENCOUNTER
No care due was identified.  [facility] Embedded Care Due Messages. Reference number: [id number].   1/30/2025 8:36:42 AM CST

## 2025-01-30 NOTE — TELEPHONE ENCOUNTER
No care due was identified.  [facility] Embedded Care Due Messages. Reference number: [id number].   1/30/2025 4:04:04 PM CST

## 2025-01-30 NOTE — TELEPHONE ENCOUNTER
Refill Decision Note   Gemma Vick  is requesting a refill authorization.  Brief Assessment and Rationale for Refill:  Quick Discontinue     Medication Therapy Plan: Dose increased on 11/19/24 by Briseida Anand MD      Comments:     Note composed:4:46 PM 01/30/2025

## 2025-01-31 DIAGNOSIS — I10 ESSENTIAL HYPERTENSION: ICD-10-CM

## 2025-01-31 RX ORDER — AMLODIPINE BESYLATE 5 MG/1
5 TABLET ORAL
Qty: 90 TABLET | Refills: 3 | Status: SHIPPED | OUTPATIENT
Start: 2025-01-31

## 2025-01-31 NOTE — TELEPHONE ENCOUNTER
No care due was identified.  [facility] Embedded Care Due Messages. Reference number: [id number].   1/31/2025 8:50:44 AM CST

## 2025-01-31 NOTE — TELEPHONE ENCOUNTER
Refill Routing Note   Medication(s) are not appropriate for processing by [facility] for the following reason(s):        No active prescription written by provider    [facility] action(s):  Defer        Medication Therapy Plan: Per note from Dec 2024. Called patient to follow up with blood pressure management. Patient did not increase amlodipine to 5mg BID, blood pressure was controlled at yesterday's Cardiology appointment and was instructed to continue 5mg daily.      Appointments  past 12m or future 3m with PCP    Date Provider   Last Visit   11/21/2024 Olga Altman MD   Next Visit   Visit date not found Olga Altman MD   ED visits in past 90 days: 1        Note composed:10:19 AM 01/31/2025

## 2025-02-04 DIAGNOSIS — I25.110 CORONARY ARTERY DISEASE INVOLVING NATIVE CORONARY ARTERY OF NATIVE HEART WITH UNSTABLE ANGINA PECTORIS: ICD-10-CM

## 2025-02-04 RX ORDER — OLMESARTAN MEDOXOMIL 40 MG/1
40 TABLET ORAL
Qty: 90 TABLET | Refills: 3 | Status: SHIPPED | OUTPATIENT
Start: 2025-02-04

## 2025-02-04 RX ORDER — CARVEDILOL 6.25 MG/1
6.25 TABLET ORAL 2 TIMES DAILY WITH MEALS
Qty: 180 TABLET | Refills: 3 | Status: ON HOLD | OUTPATIENT
Start: 2025-02-04 | End: 2025-03-01 | Stop reason: HOSPADM

## 2025-02-04 NOTE — TELEPHONE ENCOUNTER
Refill Routing Note   Medication(s) are not appropriate for processing by [facility] for the following reason(s):        Drug-disease interaction    [facility] action(s):  Defer      Medication Therapy Plan: Drug-Disease: carvediloL and Sinus node dysfunction    Pharmacist review requested: Yes     Appointments  past 12m or future 3m with PCP    Date Provider   Last Visit   11/21/2024 Olga Altman MD   Next Visit   Visit date not found Olga Altman MD   ED visits in past 90 days: 1        Note composed:10:29 AM 02/04/2025

## 2025-02-04 NOTE — TELEPHONE ENCOUNTER
No care due was identified.  [facility] Embedded Care Due Messages. Reference number: [id number].   2/04/2025 8:04:02 AM CST

## 2025-02-04 NOTE — TELEPHONE ENCOUNTER
Refill Routing Note   Medication(s) are not appropriate for processing by [facility] for the following reason(s):             [facility] action(s):  Approve  Defer      Medication Therapy Plan: hyponatremia    Pharmacist review requested: Yes     Appointments  past 12m or future 3m with PCP    Date Provider   Last Visit   11/21/2024 Olga Altman MD   Next Visit   Visit date not found Olga Altman MD   ED visits in past 90 days: 1        Note composed:11:20 AM 02/04/2025

## 2025-02-17 ENCOUNTER — PATIENT MESSAGE (OUTPATIENT)
Dept: PULMONOLOGY | Facility: CLINIC | Age: 70
End: 2025-02-17
Payer: MEDICARE

## 2025-02-17 ENCOUNTER — PATIENT MESSAGE (OUTPATIENT)
Dept: ADMINISTRATIVE | Facility: OTHER | Age: 70
End: 2025-02-17
Payer: MEDICARE

## 2025-02-18 ENCOUNTER — PATIENT OUTREACH (OUTPATIENT)
Dept: PULMONOLOGY | Facility: CLINIC | Age: 70
End: 2025-02-18
Payer: MEDICARE

## 2025-02-22 ENCOUNTER — HOSPITAL ENCOUNTER (EMERGENCY)
Facility: HOSPITAL | Age: 70
Discharge: HOME | End: 2025-02-22
Attending: EMERGENCY MEDICINE
Payer: MEDICARE

## 2025-02-22 VITALS
TEMPERATURE: 98 F | SYSTOLIC BLOOD PRESSURE: 143 MMHG | DIASTOLIC BLOOD PRESSURE: 62 MMHG | HEART RATE: 69 BPM | OXYGEN SATURATION: 99 % | RESPIRATION RATE: 16 BRPM

## 2025-02-22 DIAGNOSIS — R55 SYNCOPE: ICD-10-CM

## 2025-02-22 DIAGNOSIS — R55 VASOVAGAL SYNCOPE: Primary | ICD-10-CM

## 2025-02-22 LAB
ALBUMIN SERPL BCP-MCNC: 3.5 G/DL (ref 3.5–5.2)
ALP SERPL-CCNC: 57 U/L (ref 40–150)
ALT SERPL W/O P-5'-P-CCNC: 20 U/L (ref 10–44)
ANION GAP SERPL CALC-SCNC: 10 MMOL/L (ref 8–16)
AST SERPL-CCNC: 25 U/L (ref 10–40)
BASOPHILS # BLD AUTO: 0.06 K/UL (ref 0–0.2)
BASOPHILS NFR BLD: 0.7 % (ref 0–1.9)
BILIRUB SERPL-MCNC: 0.5 MG/DL (ref 0.1–1)
BNP SERPL-MCNC: 94 PG/ML (ref 0–99)
BUN SERPL-MCNC: 10 MG/DL (ref 8–23)
CALCIUM SERPL-MCNC: 9.2 MG/DL (ref 8.7–10.5)
CHLORIDE SERPL-SCNC: 102 MMOL/L (ref 95–110)
CO2 SERPL-SCNC: 22 MMOL/L (ref 23–29)
CREAT SERPL-MCNC: 0.9 MG/DL (ref 0.5–1.4)
DIFFERENTIAL METHOD BLD: ABNORMAL
EOSINOPHIL # BLD AUTO: 0.3 K/UL (ref 0–0.5)
EOSINOPHIL NFR BLD: 3.2 % (ref 0–8)
ERYTHROCYTE [DISTWIDTH] IN BLOOD BY AUTOMATED COUNT: 13.6 % (ref 11.5–14.5)
EST. GFR  (NO RACE VARIABLE): >60 ML/MIN/1.73 M^2
GLUCOSE SERPL-MCNC: 100 MG/DL (ref 70–110)
HCT VFR BLD AUTO: 34.4 % (ref 37–48.5)
HGB BLD-MCNC: 11.2 G/DL (ref 12–16)
IMM GRANULOCYTES # BLD AUTO: 0.03 K/UL (ref 0–0.04)
IMM GRANULOCYTES NFR BLD AUTO: 0.3 % (ref 0–0.5)
LYMPHOCYTES # BLD AUTO: 1.4 K/UL (ref 1–4.8)
LYMPHOCYTES NFR BLD: 15.3 % (ref 18–48)
MCH RBC QN AUTO: 31.1 PG (ref 27–31)
MCHC RBC AUTO-ENTMCNC: 32.6 G/DL (ref 32–36)
MCV RBC AUTO: 96 FL (ref 82–98)
MONOCYTES # BLD AUTO: 1 K/UL (ref 0.3–1)
MONOCYTES NFR BLD: 10.7 % (ref 4–15)
NEUTROPHILS # BLD AUTO: 6.2 K/UL (ref 1.8–7.7)
NEUTROPHILS NFR BLD: 69.8 % (ref 38–73)
NRBC BLD-RTO: 0 /100 WBC
PLATELET # BLD AUTO: 297 K/UL (ref 150–450)
PMV BLD AUTO: 9.2 FL (ref 9.2–12.9)
POTASSIUM SERPL-SCNC: 4.2 MMOL/L (ref 3.5–5.1)
PROT SERPL-MCNC: 6.8 G/DL (ref 6–8.4)
RBC # BLD AUTO: 3.6 M/UL (ref 4–5.4)
SODIUM SERPL-SCNC: 134 MMOL/L (ref 136–145)
TROPONIN I SERPL DL<=0.01 NG/ML-MCNC: 0.03 NG/ML (ref 0–0.03)
WBC # BLD AUTO: 8.85 K/UL (ref 3.9–12.7)

## 2025-02-22 PROCEDURE — 93010 ELECTROCARDIOGRAM REPORT: CPT | Mod: ,,, | Performed by: INTERNAL MEDICINE

## 2025-02-22 PROCEDURE — 84484 ASSAY OF TROPONIN QUANT: CPT | Performed by: EMERGENCY MEDICINE

## 2025-02-22 PROCEDURE — 93005 ELECTROCARDIOGRAM TRACING: CPT

## 2025-02-22 PROCEDURE — 85025 COMPLETE CBC W/AUTO DIFF WBC: CPT | Performed by: EMERGENCY MEDICINE

## 2025-02-22 PROCEDURE — 99284 EMERGENCY DEPT VISIT MOD MDM: CPT | Mod: 25

## 2025-02-22 PROCEDURE — 80053 COMPREHEN METABOLIC PANEL: CPT | Performed by: EMERGENCY MEDICINE

## 2025-02-22 PROCEDURE — 83880 ASSAY OF NATRIURETIC PEPTIDE: CPT | Performed by: EMERGENCY MEDICINE

## 2025-02-22 NOTE — ED PROVIDER NOTES
SCRIBE #1 NOTE: I, Sergio Meng, am scribing for, and in the presence of, Stephane Saucedo Jr., MD. I have scribed the entire note.       History     Chief Complaint   Patient presents with    Loss of Consciousness     Pt had syncopal episode while using the bathroom just PTA. She denies hitting head. +Plavix. GCS 15. No complaints of pain, CP, SOB, weakness, dizziness. .     Review of patient's allergies indicates:  No Known Allergies      History of Present Illness     HPI    2/22/2025, 2:27 PM  History obtained from the patient      History of Present Illness: Gemma Vick is a 70 y.o. female patient with a PMHx of MI, CAD, HTN, emphysema, AAA, and COPD who presents to the Emergency Department for evaluation of a suspected syncopal episode which onset while attempting to pass stool. Pt's daughter states the pt clenched both hands and her denture teeth started to fall out. Pt's daughter states that the pt turned a dark-grey during the episode as well. Pt takes Plavix. Pt had stents placed over 10 years ago and two recently in November of 2024. All sxs are resolved at this time. Patient denies any abd pain, CP, weakness, SOB, and all other sxs at this time. No prior Tx. No further complaints or concerns at this time.       Arrival mode: Ambulance Service    PCP: Olga Altman MD        Past Medical History:  Past Medical History:   Diagnosis Date    AAA (abdominal aortic aneurysm) 02/13/2014    Abdominal aneurysm     3    Acute coronary syndrome     Arthritis     BPPV (benign paroxysmal positional vertigo)     Carotid artery plaque     Carotid artery stenosis and occlusion 02/13/2014    Chronic back pain     COPD (chronic obstructive pulmonary disease)     Coronary artery disease     Dementia     Emphysema lung     Hyperlipidemia     Hypertension     Myocardial infarction     x3    Neuropathy     Personal history of COVID-19 06/09/2021 11/16/2020 +Covid, recovered at home        Past Surgical  History:  Past Surgical History:   Procedure Laterality Date    CARDIAC CATHETERIZATION      CORONARY ANGIOPLASTY      CORONARY STENT PLACEMENT N/A 2023    Procedure: INSERTION, STENT, CORONARY ARTERY;  Surgeon: Millie Juarez MD;  Location: [facility] CATH LAB;  Service: Cardiology;  Laterality: N/A;    HYSTERECTOMY  1988    LEFT HEART CATHETERIZATION Left 2023    Procedure: Left heart cath;  Surgeon: Millie Juarze MD;  Location: [facility] CATH LAB;  Service: Cardiology;  Laterality: Left;    PERCUTANEOUS CORONARY INTERVENTION, ARTERY N/A 2023    Procedure: Percutaneous coronary intervention;  Surgeon: Millie Juarez MD;  Location: [facility] CATH LAB;  Service: Cardiology;  Laterality: N/A;    THROMBECTOMY, CORONARY  2023    Procedure: Thrombectomy, Coronary;  Surgeon: Millie Juarez MD;  Location: [facility] CATH LAB;  Service: Cardiology;;    TONSILLECTOMY      TRANSFORAMINAL EPIDURAL INJECTION OF STEROID Right 2023    Procedure: Injection,steroid,epidural,transforaminal approach- right side, L4/5 and L5/S1;  Surgeon: Lydia Roberts MD;  Location: [facility] PAIN MGT;  Service: Pain Management;  Laterality: Right;         Family History:  Family History   Problem Relation Name Age of Onset    Heart disease Mother      Heart attacks under age 50 Father      Heart attacks under age 50 Brother          HA at 32    Heart attack Brother          HA at 70    Breast cancer Paternal Aunt         Social History:  Social History     Tobacco Use    Smoking status: Former     Current packs/day: 0.00     Average packs/day: 0.5 packs/day for 46.9 years (23.4 ttl pk-yrs)     Types: Cigarettes, Vaping with nicotine     Start date: 1970     Quit date: 2017     Years since quittin.8    Smokeless tobacco: Never    Tobacco comments:     3 MG NICOTINE FOR HEART.    Substance and Sexual Activity    Alcohol use: No    Drug use: No    Sexual activity: Not Currently     Birth control/protection: None        Review of  Systems     Review of Systems   Constitutional:  Negative for chills and fever.   HENT:  Negative for congestion and sore throat.         (-) head trauma   Respiratory:  Negative for cough and shortness of breath.    Cardiovascular:  Negative for chest pain.   Gastrointestinal:  Negative for abdominal pain, nausea and vomiting.   Genitourinary:  Negative for dysuria.   Musculoskeletal:  Negative for back pain.   Skin:  Negative for color change and rash.        (+) dark-greyish color with syncope, resolved   Neurological:  Positive for syncope (possible). Negative for dizziness, weakness, light-headedness and headaches.   Hematological:  Does not bruise/bleed easily.   All other systems reviewed and are negative.       Physical Exam     Initial Vitals   BP Pulse Resp Temp SpO2   02/22/25 1322 02/22/25 1322 02/22/25 1322 02/22/25 1347 02/22/25 1322   (!) 163/69 63 18 97.6 °F (36.4 °C) 98 %      MAP       --                 Physical Exam  Nursing Notes and Vital Signs Reviewed.  Constitutional: Patient is in no acute distress. Well-developed and well-nourished.  Head: Atraumatic. Normocephalic.  Eyes:  EOM intact.  No scleral icterus.  ENT: Mucous membranes are moist.  Nares clear   Neck:  Full ROM. No JVD.  Cardiovascular: Regular rate. Regular rhythm No murmurs, rubs, or gallops. Distal pulses are 2+ and symmetric  Pulmonary/Chest: No respiratory distress. Clear to auscultation bilaterally. No wheezing or rales.  Equal chest wall rise bilaterally  Abdominal: Soft and non-distended.  There is no tenderness.  No rebound, guarding, or rigidity. Good bowel sounds.  Genitourinary: No CVA tenderness.  No suprapubic tenderness  Musculoskeletal: Moves all extremities. No obvious deformities.  5 x 5 strength in all extremities   Skin: Warm and dry.  Neurological:  Alert, awake, and appropriate.  Normal speech.  No acute focal neurological deficits are appreciated.  Two through 12 intact bilaterally.  Psychiatric: Normal  affect. Good eye contact. Appropriate in content.       ED Course   Procedures  ED Vital Signs:  Vitals:    02/22/25 1322 02/22/25 1347 02/22/25 1420 02/22/25 1432   BP: (!) 163/69 (!) 152/70  (!) 164/67   Pulse: 63 65 70 68   Resp: 18   19   Temp:  97.6 °F (36.4 °C)     TempSrc:  Oral     SpO2: 98% 100%  98%    02/22/25 1504   BP: (!) 143/62   Pulse: 69   Resp: 16   Temp:    TempSrc:    SpO2: 99%       Abnormal Lab Results:  Labs Reviewed   CBC W/ AUTO DIFFERENTIAL - Abnormal       Result Value    WBC 8.85      RBC 3.60 (*)     Hemoglobin 11.2 (*)     Hematocrit 34.4 (*)     MCV 96      MCH 31.1 (*)     MCHC 32.6      RDW 13.6      Platelets 297      MPV 9.2      Immature Granulocytes 0.3      Gran # (ANC) 6.2      Immature Grans (Abs) 0.03      Lymph # 1.4      Mono # 1.0      Eos # 0.3      Baso # 0.06      nRBC 0      Gran % 69.8      Lymph % 15.3 (*)     Mono % 10.7      Eosinophil % 3.2      Basophil % 0.7      Differential Method Automated     COMPREHENSIVE METABOLIC PANEL - Abnormal    Sodium 134 (*)     Potassium 4.2      Chloride 102      CO2 22 (*)     Glucose 100      BUN 10      Creatinine 0.9      Calcium 9.2      Total Protein 6.8      Albumin 3.5      Total Bilirubin 0.5      Alkaline Phosphatase 57      AST 25      ALT 20      eGFR >60      Anion Gap 10     TROPONIN I    Troponin I 0.025     B-TYPE NATRIURETIC PEPTIDE    BNP 94          All Lab Results:  Results for orders placed or performed during the hospital encounter of 02/22/25   CBC auto differential    Collection Time: 02/22/25  2:19 PM   Result Value Ref Range    WBC 8.85 3.90 - 12.70 K/uL    RBC 3.60 (L) 4.00 - 5.40 M/uL    Hemoglobin 11.2 (L) 12.0 - 16.0 g/dL    Hematocrit 34.4 (L) 37.0 - 48.5 %    MCV 96 82 - 98 fL    MCH 31.1 (H) 27.0 - 31.0 pg    MCHC 32.6 32.0 - 36.0 g/dL    RDW 13.6 11.5 - 14.5 %    Platelets 297 150 - 450 K/uL    MPV 9.2 9.2 - 12.9 fL    Immature Granulocytes 0.3 0.0 - 0.5 %    Gran # (ANC) 6.2 1.8 - 7.7 K/uL     Immature Grans (Abs) 0.03 0.00 - 0.04 K/uL    Lymph # 1.4 1.0 - 4.8 K/uL    Mono # 1.0 0.3 - 1.0 K/uL    Eos # 0.3 0.0 - 0.5 K/uL    Baso # 0.06 0.00 - 0.20 K/uL    nRBC 0 0 /100 WBC    Gran % 69.8 38.0 - 73.0 %    Lymph % 15.3 (L) 18.0 - 48.0 %    Mono % 10.7 4.0 - 15.0 %    Eosinophil % 3.2 0.0 - 8.0 %    Basophil % 0.7 0.0 - 1.9 %    Differential Method Automated    Comprehensive metabolic panel    Collection Time: 02/22/25  2:19 PM   Result Value Ref Range    Sodium 134 (L) 136 - 145 mmol/L    Potassium 4.2 3.5 - 5.1 mmol/L    Chloride 102 95 - 110 mmol/L    CO2 22 (L) 23 - 29 mmol/L    Glucose 100 70 - 110 mg/dL    BUN 10 8 - 23 mg/dL    Creatinine 0.9 0.5 - 1.4 mg/dL    Calcium 9.2 8.7 - 10.5 mg/dL    Total Protein 6.8 6.0 - 8.4 g/dL    Albumin 3.5 3.5 - 5.2 g/dL    Total Bilirubin 0.5 0.1 - 1.0 mg/dL    Alkaline Phosphatase 57 40 - 150 U/L    AST 25 10 - 40 U/L    ALT 20 10 - 44 U/L    eGFR >60 >60 mL/min/1.73 m^2    Anion Gap 10 8 - 16 mmol/L   Troponin I    Collection Time: 02/22/25  2:19 PM   Result Value Ref Range    Troponin I 0.025 0.000 - 0.026 ng/mL   Brain natriuretic peptide    Collection Time: 02/22/25  2:19 PM   Result Value Ref Range    BNP 94 0 - 99 pg/mL     *Note: Due to a large number of results and/or encounters for the requested time period, some results have not been displayed. A complete set of results can be found in Results Review.         Imaging Results:  Imaging Results    None          The EKG was ordered, reviewed, and independently interpreted by the ED provider.  Interpretation time: 14:16  Rate: 66 BPM  Rhythm: Sinus rhythm with 1st degree AV block  Interpretation: Low voltage QRS. Septal infarct, age undetermined. No STEMI.             The Emergency Provider reviewed the vital signs and test results, which are outlined above.     ED Discussion       3:01 PM: Reassessed pt at this time.  Discussed with pt all pertinent ED information and results. Discussed pt dx and plan of tx.  Gave pt all f/u and return to the ED instructions. All questions and concerns were addressed at this time. Pt expresses understanding of information and instructions, and is comfortable with plan to discharge. Pt is stable for discharge.    I discussed with patient and/or family/caretaker that evaluation in the ED does not suggest any emergent or life threatening medical conditions requiring immediate intervention beyond what was provided in the ED, and I believe patient is safe for discharge.  Regardless, an unremarkable evaluation in the ED does not preclude the development or presence of a serious of life threatening condition. As such, patient was instructed to return immediately for any worsening or change in current symptoms.     ED Course as of 02/23/25 0610   Sat Feb 22, 2025   1437 Cardiac monitor interpretation  Independent interpretation  Indication:  Syncope  Normal sinus rhythm.  Rate 82 No STEMI [RT]      ED Course User Index  [RT] Stephane Saucedo Jr., MD     Medical Decision Making  Differential diagnosis:  Syncope, vasovagal syncope, STEMI, NSTEMI, cardiac ischemia, orthostatic syncope    , stranding of the toilet the patient has syncopal episode.  She has a similar history in the past.  Cardiac workup was negative with a normal EKG.  Patient was stable safe for discharge in my opinion she has not meet criteria for admission on [place] syncope rules    Amount and/or Complexity of Data Reviewed  Labs: ordered. Decision-making details documented in ED Course.     Details: Troponin normal.  CMP benign.  BNP is normal.  CBC shows a hemoglobin 11.2  ECG/medicine tests: ordered and independent interpretation performed. Decision-making details documented in ED Course.     Details: That has STEMI    Risk  OTC drugs.  Prescription drug management.  Decision regarding hospitalization.                ED Medication(s):  Medications - No data to display    Discharge Medication List as of 2/22/2025  3:04  PM           Follow-up Information       Olga Altman MD.    Specialty: Family Medicine  Contact information:  [address] [address] [address] [phone]                                 Scribe Attestation:   Scribe #1: I performed the above scribed service and the documentation accurately describes the services I performed. I attest to the accuracy of the note.     Attending:   Physician Attestation Statement for Scribe #1: I, Stephane Saucedo Jr., MD, personally performed the services described in this documentation, as scribed by Sergio Meng, in my presence, and it is both accurate and complete.           Clinical Impression       ICD-10-CM ICD-9-CM   1. Vasovagal syncope  R55 780.2   2. Syncope  R55 780.2       Disposition:   Disposition: Discharged  Condition: Stable        Stephane Saucedo Jr., MD  02/23/25 0610

## 2025-02-23 LAB
OHS QRS DURATION: 82 MS
OHS QTC CALCULATION: 404 MS

## 2025-02-24 ENCOUNTER — PATIENT OUTREACH (OUTPATIENT)
Facility: OTHER | Age: 70
End: 2025-02-24
Payer: MEDICARE

## 2025-02-25 ENCOUNTER — TELEPHONE (OUTPATIENT)
Dept: FAMILY MEDICINE | Facility: CLINIC | Age: 70
End: 2025-02-25
Payer: MEDICARE

## 2025-02-25 ENCOUNTER — TELEPHONE (OUTPATIENT)
Dept: CARDIOLOGY | Facility: CLINIC | Age: 70
End: 2025-02-25
Payer: MEDICARE

## 2025-02-25 ENCOUNTER — PATIENT MESSAGE (OUTPATIENT)
Dept: FAMILY MEDICINE | Facility: CLINIC | Age: 70
End: 2025-02-25
Payer: MEDICARE

## 2025-02-25 NOTE — PROGRESS NOTES
Patient was seen in the ED on 2/22/25. Phoned patient to assist with Post ED Discharge Navigation. Patient agreed to assistance scheduling a PCP follow-up appointment. In Basket message sent to PCP staff for scheduling assistance.  Rashid Connor

## 2025-02-25 NOTE — TELEPHONE ENCOUNTER
----- Message from Rashid Connor sent at 2/25/2025 12:22 PM CST -----  Regarding: ED F/U  Good morning/afternoon,This pt was discharged from the ED on 2/22/25 and has orders to follow up with Dr Altman. In compliance with Medicare 2+ Chronic Condition patient measure mandates, this patient requires a follow up appt within 7 days of ED visit d/c date. I am requesting scheduling assistance as you are booked, and I am unable to schedule pt an appt within 7 days. Thank you for your assistance. Please advise of appt date and time so that I can set an appt reminder and confirm with patient.Rashid Connor

## 2025-02-25 NOTE — TELEPHONE ENCOUNTER
Called pt and advised I do not have anything sooner and she will need to keep the appt with [facility].  I did add her to the cancellation list and she should f/u with pcp in meantime.   ----- Message from [employer] sent at 2/25/2025  2:02 PM CST -----  Contact: self  ..Type:  Sooner Apoointment RequestCaller is requesting a sooner appointment.  Caller declined first available appointment listed below.  Caller will not accept being placed on the waitlist and is requesting a message be sent to doctor.Name of Caller:.Gemma S Vera is the first available appointment?May Symptoms:no symptoms, er follow up Would the patient rather a call back or a response via [facility]? Call back Best Call Back Number:.[phone] (home) Additional Information:  pt has an apt scheduled on 03/27 to see np but is requesting to see her provider.

## 2025-02-26 ENCOUNTER — OFFICE VISIT (OUTPATIENT)
Dept: FAMILY MEDICINE | Facility: CLINIC | Age: 70
End: 2025-02-26
Payer: MEDICARE

## 2025-02-26 ENCOUNTER — HOSPITAL ENCOUNTER (INPATIENT)
Facility: HOSPITAL | Age: 70
LOS: 2 days | Discharge: HOME HEALTH | DRG: 312 | End: 2025-03-01
Attending: FAMILY MEDICINE | Admitting: STUDENT IN AN ORGANIZED HEALTH CARE EDUCATION/TRAINING PROGRAM
Payer: MEDICARE

## 2025-02-26 VITALS
WEIGHT: 138 LBS | OXYGEN SATURATION: 96 % | HEART RATE: 52 BPM | BODY MASS INDEX: 26.06 KG/M2 | HEIGHT: 61 IN | TEMPERATURE: 97 F | SYSTOLIC BLOOD PRESSURE: 90 MMHG | DIASTOLIC BLOOD PRESSURE: 42 MMHG

## 2025-02-26 DIAGNOSIS — I10 HYPERTENSION, UNSPECIFIED TYPE: ICD-10-CM

## 2025-02-26 DIAGNOSIS — E87.1 HYPONATREMIA: ICD-10-CM

## 2025-02-26 DIAGNOSIS — I25.10 ASCVD (ARTERIOSCLEROTIC CARDIOVASCULAR DISEASE): ICD-10-CM

## 2025-02-26 DIAGNOSIS — R55 VASOVAGAL SYNCOPE: ICD-10-CM

## 2025-02-26 DIAGNOSIS — R55 SYNCOPE AND COLLAPSE: Primary | ICD-10-CM

## 2025-02-26 DIAGNOSIS — I95.1 ORTHOSTATIC HYPOTENSION: Primary | ICD-10-CM

## 2025-02-26 DIAGNOSIS — R55 SYNCOPE: ICD-10-CM

## 2025-02-26 DIAGNOSIS — I10 ESSENTIAL HYPERTENSION: ICD-10-CM

## 2025-02-26 DIAGNOSIS — R00.1 BRADYCARDIA: ICD-10-CM

## 2025-02-26 DIAGNOSIS — R55 SYNCOPE AND COLLAPSE: ICD-10-CM

## 2025-02-26 DIAGNOSIS — J44.9 CHRONIC OBSTRUCTIVE PULMONARY DISEASE, UNSPECIFIED COPD TYPE: ICD-10-CM

## 2025-02-26 DIAGNOSIS — R07.9 CHEST PAIN: ICD-10-CM

## 2025-02-26 DIAGNOSIS — R55 SYNCOPE, CARDIOGENIC: ICD-10-CM

## 2025-02-26 DIAGNOSIS — R00.1 BRADYCARDIA WITH 31-40 BEATS PER MINUTE: ICD-10-CM

## 2025-02-26 LAB
ALBUMIN SERPL BCP-MCNC: 3.3 G/DL (ref 3.5–5.2)
ALP SERPL-CCNC: 61 U/L (ref 40–150)
ALT SERPL W/O P-5'-P-CCNC: 16 U/L (ref 10–44)
ANION GAP SERPL CALC-SCNC: 7 MMOL/L (ref 8–16)
AST SERPL-CCNC: 19 U/L (ref 10–40)
BASOPHILS # BLD AUTO: 0.06 K/UL (ref 0–0.2)
BASOPHILS NFR BLD: 0.7 % (ref 0–1.9)
BILIRUB SERPL-MCNC: 0.4 MG/DL (ref 0.1–1)
BILIRUB UR QL STRIP: NEGATIVE
BNP SERPL-MCNC: 163 PG/ML (ref 0–99)
BUN SERPL-MCNC: 14 MG/DL (ref 8–23)
CALCIUM SERPL-MCNC: 8.9 MG/DL (ref 8.7–10.5)
CHLORIDE SERPL-SCNC: 101 MMOL/L (ref 95–110)
CLARITY UR: CLEAR
CO2 SERPL-SCNC: 22 MMOL/L (ref 23–29)
COLOR UR: YELLOW
CREAT SERPL-MCNC: 0.8 MG/DL (ref 0.5–1.4)
DIFFERENTIAL METHOD BLD: ABNORMAL
EOSINOPHIL # BLD AUTO: 0.2 K/UL (ref 0–0.5)
EOSINOPHIL NFR BLD: 2.3 % (ref 0–8)
ERYTHROCYTE [DISTWIDTH] IN BLOOD BY AUTOMATED COUNT: 13.5 % (ref 11.5–14.5)
EST. GFR  (NO RACE VARIABLE): >60 ML/MIN/1.73 M^2
GLUCOSE SERPL-MCNC: 111 MG/DL (ref 70–110)
GLUCOSE UR QL STRIP: NEGATIVE
HCT VFR BLD AUTO: 32.1 % (ref 37–48.5)
HCV AB SERPL QL IA: NEGATIVE
HEP C VIRUS HOLD SPECIMEN: NORMAL
HGB BLD-MCNC: 11 G/DL (ref 12–16)
HGB UR QL STRIP: NEGATIVE
HIV 1+2 AB+HIV1 P24 AG SERPL QL IA: NEGATIVE
IMM GRANULOCYTES # BLD AUTO: 0.02 K/UL (ref 0–0.04)
IMM GRANULOCYTES NFR BLD AUTO: 0.2 % (ref 0–0.5)
KETONES UR QL STRIP: NEGATIVE
LEUKOCYTE ESTERASE UR QL STRIP: NEGATIVE
LYMPHOCYTES # BLD AUTO: 0.9 K/UL (ref 1–4.8)
LYMPHOCYTES NFR BLD: 11 % (ref 18–48)
MAGNESIUM SERPL-MCNC: 2.7 MG/DL (ref 1.6–2.6)
MCH RBC QN AUTO: 31.3 PG (ref 27–31)
MCHC RBC AUTO-ENTMCNC: 34.3 G/DL (ref 32–36)
MCV RBC AUTO: 92 FL (ref 82–98)
MONOCYTES # BLD AUTO: 0.6 K/UL (ref 0.3–1)
MONOCYTES NFR BLD: 7.4 % (ref 4–15)
NEUTROPHILS # BLD AUTO: 6.5 K/UL (ref 1.8–7.7)
NEUTROPHILS NFR BLD: 78.4 % (ref 38–73)
NITRITE UR QL STRIP: NEGATIVE
NRBC BLD-RTO: 0 /100 WBC
PH UR STRIP: 7 [PH] (ref 5–8)
PLATELET # BLD AUTO: 261 K/UL (ref 150–450)
PMV BLD AUTO: 9.3 FL (ref 9.2–12.9)
POCT GLUCOSE: 118 MG/DL (ref 70–110)
POTASSIUM SERPL-SCNC: 3.9 MMOL/L (ref 3.5–5.1)
PROT SERPL-MCNC: 6.6 G/DL (ref 6–8.4)
PROT UR QL STRIP: NEGATIVE
RBC # BLD AUTO: 3.51 M/UL (ref 4–5.4)
SODIUM SERPL-SCNC: 130 MMOL/L (ref 136–145)
SP GR UR STRIP: 1.01 (ref 1–1.03)
TROPONIN I SERPL DL<=0.01 NG/ML-MCNC: <0.006 NG/ML (ref 0–0.03)
TSH SERPL DL<=0.005 MIU/L-ACNC: 1.89 UIU/ML (ref 0.4–4)
URN SPEC COLLECT METH UR: NORMAL
UROBILINOGEN UR STRIP-ACNC: NEGATIVE EU/DL
WBC # BLD AUTO: 8.35 K/UL (ref 3.9–12.7)

## 2025-02-26 PROCEDURE — G0378 HOSPITAL OBSERVATION PER HR: HCPCS

## 2025-02-26 PROCEDURE — 25500020 PHARM REV CODE 255: Performed by: STUDENT IN AN ORGANIZED HEALTH CARE EDUCATION/TRAINING PROGRAM

## 2025-02-26 PROCEDURE — 4010F ACE/ARB THERAPY RXD/TAKEN: CPT | Mod: CPTII,S$GLB,, | Performed by: FAMILY MEDICINE

## 2025-02-26 PROCEDURE — 93010 ELECTROCARDIOGRAM REPORT: CPT | Mod: ,,, | Performed by: INTERNAL MEDICINE

## 2025-02-26 PROCEDURE — 25000003 PHARM REV CODE 250: Performed by: STUDENT IN AN ORGANIZED HEALTH CARE EDUCATION/TRAINING PROGRAM

## 2025-02-26 PROCEDURE — 87389 HIV-1 AG W/HIV-1&-2 AB AG IA: CPT | Performed by: EMERGENCY MEDICINE

## 2025-02-26 PROCEDURE — 3078F DIAST BP <80 MM HG: CPT | Mod: CPTII,S$GLB,, | Performed by: FAMILY MEDICINE

## 2025-02-26 PROCEDURE — 85025 COMPLETE CBC W/AUTO DIFF WBC: CPT | Performed by: EMERGENCY MEDICINE

## 2025-02-26 PROCEDURE — 3008F BODY MASS INDEX DOCD: CPT | Mod: CPTII,S$GLB,, | Performed by: FAMILY MEDICINE

## 2025-02-26 PROCEDURE — 84443 ASSAY THYROID STIM HORMONE: CPT | Performed by: EMERGENCY MEDICINE

## 2025-02-26 PROCEDURE — 93005 ELECTROCARDIOGRAM TRACING: CPT | Performed by: INTERNAL MEDICINE

## 2025-02-26 PROCEDURE — 96360 HYDRATION IV INFUSION INIT: CPT

## 2025-02-26 PROCEDURE — 84484 ASSAY OF TROPONIN QUANT: CPT | Performed by: EMERGENCY MEDICINE

## 2025-02-26 PROCEDURE — 99285 EMERGENCY DEPT VISIT HI MDM: CPT | Mod: 25

## 2025-02-26 PROCEDURE — 94640 AIRWAY INHALATION TREATMENT: CPT | Mod: XB

## 2025-02-26 PROCEDURE — 1126F AMNT PAIN NOTED NONE PRSNT: CPT | Mod: CPTII,S$GLB,, | Performed by: FAMILY MEDICINE

## 2025-02-26 PROCEDURE — 99214 OFFICE O/P EST MOD 30 MIN: CPT | Mod: S$GLB,,, | Performed by: FAMILY MEDICINE

## 2025-02-26 PROCEDURE — 82962 GLUCOSE BLOOD TEST: CPT

## 2025-02-26 PROCEDURE — 99999 PR PBB SHADOW E&M-EST. PATIENT-LVL III: CPT | Mod: PBBFAC,,, | Performed by: FAMILY MEDICINE

## 2025-02-26 PROCEDURE — 86803 HEPATITIS C AB TEST: CPT | Performed by: EMERGENCY MEDICINE

## 2025-02-26 PROCEDURE — 96361 HYDRATE IV INFUSION ADD-ON: CPT

## 2025-02-26 PROCEDURE — 80053 COMPREHEN METABOLIC PANEL: CPT | Performed by: EMERGENCY MEDICINE

## 2025-02-26 PROCEDURE — 81003 URINALYSIS AUTO W/O SCOPE: CPT | Performed by: FAMILY MEDICINE

## 2025-02-26 PROCEDURE — 83735 ASSAY OF MAGNESIUM: CPT | Performed by: EMERGENCY MEDICINE

## 2025-02-26 PROCEDURE — 25000242 PHARM REV CODE 250 ALT 637 W/ HCPCS: Performed by: STUDENT IN AN ORGANIZED HEALTH CARE EDUCATION/TRAINING PROGRAM

## 2025-02-26 PROCEDURE — 3074F SYST BP LT 130 MM HG: CPT | Mod: CPTII,S$GLB,, | Performed by: FAMILY MEDICINE

## 2025-02-26 PROCEDURE — 83880 ASSAY OF NATRIURETIC PEPTIDE: CPT | Performed by: EMERGENCY MEDICINE

## 2025-02-26 RX ORDER — LABETALOL HYDROCHLORIDE 5 MG/ML
10 INJECTION, SOLUTION INTRAVENOUS EVERY 6 HOURS PRN
Status: DISCONTINUED | OUTPATIENT
Start: 2025-02-26 | End: 2025-03-01 | Stop reason: HOSPADM

## 2025-02-26 RX ORDER — HYDROXYZINE HYDROCHLORIDE 25 MG/1
25 TABLET, FILM COATED ORAL 3 TIMES DAILY PRN
Status: DISCONTINUED | OUTPATIENT
Start: 2025-02-26 | End: 2025-03-01 | Stop reason: HOSPADM

## 2025-02-26 RX ORDER — ACETAMINOPHEN 500 MG
1000 TABLET ORAL EVERY 8 HOURS PRN
Status: DISCONTINUED | OUTPATIENT
Start: 2025-02-26 | End: 2025-02-27

## 2025-02-26 RX ORDER — IPRATROPIUM BROMIDE 0.5 MG/2.5ML
0.5 SOLUTION RESPIRATORY (INHALATION) EVERY 6 HOURS
Status: DISCONTINUED | OUTPATIENT
Start: 2025-02-26 | End: 2025-02-27

## 2025-02-26 RX ORDER — PANTOPRAZOLE SODIUM 40 MG/1
40 TABLET, DELAYED RELEASE ORAL DAILY
Status: DISCONTINUED | OUTPATIENT
Start: 2025-02-27 | End: 2025-03-01 | Stop reason: HOSPADM

## 2025-02-26 RX ORDER — ALUMINUM HYDROXIDE, MAGNESIUM HYDROXIDE, AND SIMETHICONE 1200; 120; 1200 MG/30ML; MG/30ML; MG/30ML
30 SUSPENSION ORAL EVERY 6 HOURS PRN
Status: DISCONTINUED | OUTPATIENT
Start: 2025-02-26 | End: 2025-02-27

## 2025-02-26 RX ORDER — SODIUM CHLORIDE 9 MG/ML
INJECTION, SOLUTION INTRAVENOUS CONTINUOUS
Status: DISCONTINUED | OUTPATIENT
Start: 2025-02-26 | End: 2025-03-01

## 2025-02-26 RX ORDER — NAPROXEN SODIUM 220 MG/1
81 TABLET, FILM COATED ORAL DAILY
Status: DISCONTINUED | OUTPATIENT
Start: 2025-02-27 | End: 2025-03-01 | Stop reason: HOSPADM

## 2025-02-26 RX ORDER — CHOLECALCIFEROL (VITAMIN D3) 25 MCG
1000 TABLET ORAL DAILY
Status: DISCONTINUED | OUTPATIENT
Start: 2025-02-27 | End: 2025-03-01 | Stop reason: HOSPADM

## 2025-02-26 RX ORDER — PNV NO.95/FERROUS FUM/FOLIC AC 28MG-0.8MG
100 TABLET ORAL DAILY
Status: DISCONTINUED | OUTPATIENT
Start: 2025-02-27 | End: 2025-03-01 | Stop reason: HOSPADM

## 2025-02-26 RX ORDER — HYDRALAZINE HYDROCHLORIDE 20 MG/ML
10 INJECTION INTRAMUSCULAR; INTRAVENOUS EVERY 8 HOURS PRN
Status: DISCONTINUED | OUTPATIENT
Start: 2025-02-26 | End: 2025-02-27

## 2025-02-26 RX ORDER — BUDESONIDE 0.5 MG/2ML
0.5 INHALANT ORAL EVERY 12 HOURS
Status: DISCONTINUED | OUTPATIENT
Start: 2025-02-26 | End: 2025-03-01 | Stop reason: HOSPADM

## 2025-02-26 RX ORDER — TALC
6 POWDER (GRAM) TOPICAL NIGHTLY PRN
Status: DISCONTINUED | OUTPATIENT
Start: 2025-02-26 | End: 2025-02-27

## 2025-02-26 RX ORDER — MEMANTINE HYDROCHLORIDE 10 MG/1
10 TABLET ORAL 2 TIMES DAILY
Status: DISCONTINUED | OUTPATIENT
Start: 2025-02-26 | End: 2025-03-01 | Stop reason: HOSPADM

## 2025-02-26 RX ORDER — CLOPIDOGREL BISULFATE 75 MG/1
75 TABLET ORAL DAILY
Status: DISCONTINUED | OUTPATIENT
Start: 2025-02-27 | End: 2025-03-01 | Stop reason: HOSPADM

## 2025-02-26 RX ORDER — ARFORMOTEROL TARTRATE 15 UG/2ML
15 SOLUTION RESPIRATORY (INHALATION) 2 TIMES DAILY
Status: DISCONTINUED | OUTPATIENT
Start: 2025-02-26 | End: 2025-03-01 | Stop reason: HOSPADM

## 2025-02-26 RX ADMIN — BUDESONIDE INHALATION 0.5 MG: 0.5 SUSPENSION RESPIRATORY (INHALATION) at 08:02

## 2025-02-26 RX ADMIN — SODIUM CHLORIDE: 9 INJECTION, SOLUTION INTRAVENOUS at 09:02

## 2025-02-26 RX ADMIN — MEMANTINE 10 MG: 10 TABLET ORAL at 09:02

## 2025-02-26 RX ADMIN — Medication 6 MG: at 10:02

## 2025-02-26 RX ADMIN — ARFORMOTEROL TARTRATE 15 MCG: 15 SOLUTION RESPIRATORY (INHALATION) at 08:02

## 2025-02-26 RX ADMIN — ACETAMINOPHEN 1000 MG: 500 TABLET ORAL at 10:02

## 2025-02-26 RX ADMIN — IOHEXOL 100 ML: 350 INJECTION, SOLUTION INTRAVENOUS at 08:02

## 2025-02-26 RX ADMIN — IPRATROPIUM BROMIDE 0.5 MG: 0.5 SOLUTION RESPIRATORY (INHALATION) at 08:02

## 2025-02-26 RX ADMIN — HYDROXYZINE HYDROCHLORIDE 25 MG: 25 TABLET, FILM COATED ORAL at 10:02

## 2025-02-26 NOTE — ED PROVIDER NOTES
SCRIBE #1 NOTE: I, Andi West, am scribing for, and in the presence of, Adryan Lin MD. I have scribed the entire note.       History     Chief Complaint   Patient presents with    Loss of Consciousness     [facility] reports syncope while at the Dr. Office. Denies any trauma, pt.was laid down by staff. She was found to be bradycardic by staff in the 30's immediately after the event.      Review of patient's allergies indicates:  No Known Allergies      History of Present Illness     HPI    2/26/2025, 3:31 PM  History obtained from the patient, medical records, and [facility]      History of Present Illness: Gemma Vick is a 70 y.o. female patient with a PMHx of MI, CAD, hypertension, COPD, chronic back pain, BPPV, neuropathy, hyperlipidemia, AAA, and dementia who presents to the Emergency Department for evaluation of syncope which happened today. [facility] reports the pt had a syncopal episode while at the Dr. Office and denies any trauma. Pt was found to be bradycardic by staff in the 30's immediately after the event. This is the pt's second syncopal episode since 4 days ago. Pt states that she was weak, fatigued, coughing, and cold before her appointment today. Pt states that she has been gradually becoming weaker since the first episode. Pt currently feels better in the ED. Pt vapes. Symptoms are constant and moderate in severity. No mitigating or exacerbating factors reported. Associated sxs include weakness, fatigue, chills, and cough. Patient denies any CP, chest tightness, or palpitations. No prior Tx specified.  No further complaints or concerns at this time.       Arrival mode: hospitals    PCP: Olga Altman MD        Past Medical History:  Past Medical History:   Diagnosis Date    AAA (abdominal aortic aneurysm) 02/13/2014    Abdominal aneurysm     3    Acute coronary syndrome     Arthritis     BPPV (benign paroxysmal positional vertigo)     Carotid artery plaque     Carotid artery stenosis and occlusion  02/13/2014    Chronic back pain     COPD (chronic obstructive pulmonary disease)     Coronary artery disease     Dementia     Emphysema lung     Hyperlipidemia     Hypertension     Myocardial infarction     x3    Neuropathy     Personal history of COVID-19 06/09/2021 11/16/2020 +Covid, recovered at home        Past Surgical History:  Past Surgical History:   Procedure Laterality Date    CARDIAC CATHETERIZATION      CORONARY ANGIOPLASTY      CORONARY STENT PLACEMENT N/A 9/12/2023    Procedure: INSERTION, STENT, CORONARY ARTERY;  Surgeon: Millie Juarez MD;  Location: [facility] CATH LAB;  Service: Cardiology;  Laterality: N/A;    HYSTERECTOMY  1988    LEFT HEART CATHETERIZATION Left 9/12/2023    Procedure: Left heart cath;  Surgeon: Millie Juarez MD;  Location: [facility] CATH LAB;  Service: Cardiology;  Laterality: Left;    PERCUTANEOUS CORONARY INTERVENTION, ARTERY N/A 9/12/2023    Procedure: Percutaneous coronary intervention;  Surgeon: Millie Juarez MD;  Location: [facility] CATH LAB;  Service: Cardiology;  Laterality: N/A;    THROMBECTOMY, CORONARY  9/12/2023    Procedure: Thrombectomy, Coronary;  Surgeon: Millie Juarez MD;  Location: [facility] CATH LAB;  Service: Cardiology;;    TONSILLECTOMY      TRANSFORAMINAL EPIDURAL INJECTION OF STEROID Right 12/1/2023    Procedure: Injection,steroid,epidural,transforaminal approach- right side, L4/5 and L5/S1;  Surgeon: Lydia Roberts MD;  Location: [facility];  Service: Pain Management;  Laterality: Right;         Family History:  Family History   Problem Relation Name Age of Onset    Heart disease Mother      Heart attacks under age 50 Father      Heart attacks under age 50 Brother          HA at 32    Heart attack Brother          HA at 70    Breast cancer Paternal Aunt         Social History:  Social History     Tobacco Use    Smoking status: Former     Current packs/day: 0.00     Average packs/day: 0.5 packs/day for 46.9 years (23.4 ttl pk-yrs)     Types: Cigarettes,  Vaping with nicotine     Start date: 1970     Quit date: 2017     Years since quittin.8    Smokeless tobacco: Never    Tobacco comments:     3 MG NICOTINE FOR HEART.    Substance and Sexual Activity    Alcohol use: No    Drug use: No    Sexual activity: Not Currently     Birth control/protection: None        Review of Systems     Review of Systems   Constitutional:  Positive for chills and fatigue.   Respiratory:  Positive for cough. Negative for chest tightness.    Cardiovascular:  Negative for chest pain and palpitations.   Neurological:  Positive for syncope and weakness.   All other systems reviewed and are negative.       Physical Exam     Initial Vitals [25 1525]   BP Pulse Resp Temp SpO2   (!) 140/90 63 18 97.7 °F (36.5 °C) 96 %      MAP       --          Physical Exam  Nursing Notes and Vital Signs Reviewed.  Constitutional: Patient is in no acute distress. Well-developed and well-nourished.  Head: Atraumatic. Normocephalic.  Eyes: PERRL. EOM intact. Conjunctivae are not pale. No scleral icterus.  ENT: Mucous membranes are moist. Oropharynx is clear and symmetric.    Neck: Supple. Full ROM. No lymphadenopathy.  Cardiovascular: Regular rate. Regular rhythm. No murmurs, rubs, or gallops. Distal pulses are 2+ and symmetric.  Pulmonary/Chest: No respiratory distress. Clear to auscultation bilaterally. No wheezing or rales.  Abdominal: Soft and non-distended.  There is no tenderness.  No rebound, guarding, or rigidity. Good bowel sounds.  Genitourinary: No CVA tenderness.  Musculoskeletal: Moves all extremities. No obvious deformities. No edema. No calf tenderness.  Skin: Warm and dry.  Neurological:  Alert, awake, and appropriate.  Normal speech.  No acute focal neurological deficits are appreciated.  Psychiatric: Normal affect. Good eye contact. Appropriate in content.     ED Course   Procedures  ED Vital Signs:  Vitals:    25 1525 25 1600 25 1615 25 1745   BP: (!)  140/90 (!) 173/77 (!) 165/67 (!) 169/77   Pulse: 63 66 73 74   Resp: 18 19  (!) 23   Temp: 97.7 °F (36.5 °C)      TempSrc: Oral      SpO2: 96% 96% 98% 96%    02/26/25 2002 02/26/25 2008 02/26/25 2030 02/26/25 2031   BP:   (!) 184/79 (!) 173/81   Pulse: 86 81 79 83   Resp: (!) 22 16 (!) 22 20   Temp:       TempSrc:       SpO2: 100% 100% 97% 97%    02/26/25 2032   BP: 131/63   Pulse: 85   Resp: (!) 21   Temp:    TempSrc:    SpO2: 98%       Abnormal Lab Results:  Labs Reviewed   CBC W/ AUTO DIFFERENTIAL - Abnormal       Result Value    WBC 8.35      RBC 3.51 (*)     Hemoglobin 11.0 (*)     Hematocrit 32.1 (*)     MCV 92      MCH 31.3 (*)     MCHC 34.3      RDW 13.5      Platelets 261      MPV 9.3      Immature Granulocytes 0.2      Gran # (ANC) 6.5      Immature Grans (Abs) 0.02      Lymph # 0.9 (*)     Mono # 0.6      Eos # 0.2      Baso # 0.06      nRBC 0      Gran % 78.4 (*)     Lymph % 11.0 (*)     Mono % 7.4      Eosinophil % 2.3      Basophil % 0.7      Differential Method Automated     COMPREHENSIVE METABOLIC PANEL - Abnormal    Sodium 130 (*)     Potassium 3.9      Chloride 101      CO2 22 (*)     Glucose 111 (*)     BUN 14      Creatinine 0.8      Calcium 8.9      Total Protein 6.6      Albumin 3.3 (*)     Total Bilirubin 0.4      Alkaline Phosphatase 61      AST 19      ALT 16      eGFR >60      Anion Gap 7 (*)    B-TYPE NATRIURETIC PEPTIDE - Abnormal     (*)    MAGNESIUM - Abnormal    Magnesium 2.7 (*)    POCT GLUCOSE - Abnormal    POCT Glucose 118 (*)    TROPONIN I    Troponin I <0.006     TSH    TSH 1.894     URINALYSIS, REFLEX TO URINE CULTURE    Specimen UA Urine, Clean Catch      Color, UA Yellow      Appearance, UA Clear      pH, UA 7.0      Specific Gravity, UA 1.010      Protein, UA Negative      Glucose, UA Negative      Ketones, UA Negative      Bilirubin (UA) Negative      Occult Blood UA Negative      Nitrite, UA Negative      Urobilinogen, UA Negative      Leukocytes, UA Negative       Narrative:     Specimen Source->Urine   HEPATITIS C ANTIBODY   HEP C VIRUS HOLD SPECIMEN   HIV 1 / 2 ANTIBODY   POCT GLUCOSE MONITORING CONTINUOUS        All Lab Results:  Results for orders placed or performed during the hospital encounter of 02/26/25   POCT glucose    Collection Time: 02/26/25  4:00 PM   Result Value Ref Range    POCT Glucose 118 (H) 70 - 110 mg/dL   CBC auto differential    Collection Time: 02/26/25  5:49 PM   Result Value Ref Range    WBC 8.35 3.90 - 12.70 K/uL    RBC 3.51 (L) 4.00 - 5.40 M/uL    Hemoglobin 11.0 (L) 12.0 - 16.0 g/dL    Hematocrit 32.1 (L) 37.0 - 48.5 %    MCV 92 82 - 98 fL    MCH 31.3 (H) 27.0 - 31.0 pg    MCHC 34.3 32.0 - 36.0 g/dL    RDW 13.5 11.5 - 14.5 %    Platelets 261 150 - 450 K/uL    MPV 9.3 9.2 - 12.9 fL    Immature Granulocytes 0.2 0.0 - 0.5 %    Gran # (ANC) 6.5 1.8 - 7.7 K/uL    Immature Grans (Abs) 0.02 0.00 - 0.04 K/uL    Lymph # 0.9 (L) 1.0 - 4.8 K/uL    Mono # 0.6 0.3 - 1.0 K/uL    Eos # 0.2 0.0 - 0.5 K/uL    Baso # 0.06 0.00 - 0.20 K/uL    nRBC 0 0 /100 WBC    Gran % 78.4 (H) 38.0 - 73.0 %    Lymph % 11.0 (L) 18.0 - 48.0 %    Mono % 7.4 4.0 - 15.0 %    Eosinophil % 2.3 0.0 - 8.0 %    Basophil % 0.7 0.0 - 1.9 %    Differential Method Automated    Comprehensive metabolic panel    Collection Time: 02/26/25  5:49 PM   Result Value Ref Range    Sodium 130 (L) 136 - 145 mmol/L    Potassium 3.9 3.5 - 5.1 mmol/L    Chloride 101 95 - 110 mmol/L    CO2 22 (L) 23 - 29 mmol/L    Glucose 111 (H) 70 - 110 mg/dL    BUN 14 8 - 23 mg/dL    Creatinine 0.8 0.5 - 1.4 mg/dL    Calcium 8.9 8.7 - 10.5 mg/dL    Total Protein 6.6 6.0 - 8.4 g/dL    Albumin 3.3 (L) 3.5 - 5.2 g/dL    Total Bilirubin 0.4 0.1 - 1.0 mg/dL    Alkaline Phosphatase 61 40 - 150 U/L    AST 19 10 - 40 U/L    ALT 16 10 - 44 U/L    eGFR >60 >60 mL/min/1.73 m^2    Anion Gap 7 (L) 8 - 16 mmol/L   Troponin I #1    Collection Time: 02/26/25  5:49 PM   Result Value Ref Range    Troponin I <0.006 0.000 - 0.026 ng/mL   BNP     Collection Time: 02/26/25  5:49 PM   Result Value Ref Range     (H) 0 - 99 pg/mL   Magnesium    Collection Time: 02/26/25  5:49 PM   Result Value Ref Range    Magnesium 2.7 (H) 1.6 - 2.6 mg/dL   TSH    Collection Time: 02/26/25  5:49 PM   Result Value Ref Range    TSH 1.894 0.400 - 4.000 uIU/mL   Urinalysis, Reflex to Urine Culture Urine, Clean Catch    Collection Time: 02/26/25  5:51 PM    Specimen: Urine, Clean Catch   Result Value Ref Range    Specimen UA Urine, Clean Catch     Color, UA Yellow Yellow, Straw, Whit    Appearance, UA Clear Clear    pH, UA 7.0 5.0 - 8.0    Specific Gravity, UA 1.010 1.005 - 1.030    Protein, UA Negative Negative    Glucose, UA Negative Negative    Ketones, UA Negative Negative    Bilirubin (UA) Negative Negative    Occult Blood UA Negative Negative    Nitrite, UA Negative Negative    Urobilinogen, UA Negative <2.0 EU/dL    Leukocytes, UA Negative Negative     *Note: Due to a large number of results and/or encounters for the requested time period, some results have not been displayed. A complete set of results can be found in Results Review.       Imaging Results:  Imaging Results              CTA Abdomen and Pelvis (In process)                      US Carotid Bilateral (In process)                      CT Head Without Contrast (Final result)  Result time 02/26/25 19:54:53      Final result by Angel Denny Jr., MD (02/26/25 19:54:53)                   Narrative:    EXAM:  CT HEAD WITHOUT CONTRAST    CLINICAL HISTORY:   Dizziness.    COMPARISON:   11/14/2024.    TECHNIQUE: Multiple sequential axial images from base to vertex without intravenous contrast.    FINDINGS:  Ventricles and basal cisterns are normal.  No evidence of hemorrhage, mass effect or midline shift.  No cerebral or cerebral parenchymal abnormalities.  Calvarium is intact.  Mucosal thickening of the ethmoid sinuses.  Mastoid air cells are clear. Periventricular and deep white matter changes consistent  with small vessel ischemic change.  No extra-axial acute fluid collection.    IMPRESSION:  1.  No evidence of acute intracranial process.      All CT scans at [this location] are performed using dose modulation techniques as appropriate to a performed exam including the following: automated exposure control; adjustment of the mA and/or kV according to patient size (this includes techniques or standardized protocols for targeted exams where dose is matched to indication / reason for exam; i.e. extremities or head); use of iterative reconstruction technique.      Finalized on: 2/26/2025 7:54 PM By:  Angel Denny MD  [facility]# [id number]      2025-02-26 19:56:58.673     [facility]                                     X-Ray Chest AP Portable (Final result)  Result time 02/26/25 16:38:00      Final result by [facility]o [facility]), MD (02/26/25 16:38:00)                   Impression:      Clear lungs.      Electronically signed by: Melo Temple MD  Date:    02/26/2025  Time:    16:38               Narrative:    EXAMINATION:  XR CHEST AP PORTABLE    CLINICAL HISTORY:  <Diagnosis>, Chest Pain;    COMPARISON:  Chest, 11/14/2024.    FINDINGS:  One view.    Atherosclerosis thoracic aorta.  Heart size is normal. The lung fields are clear. No acute cardiopulmonary infiltrate.                                       The EKG was ordered, reviewed, and independently interpreted by the ED provider.  Interpretation time: 2:30 PM  Rate: 44 BPM  Rhythm:  Marked sinus bradycardia  with marked sinus arrhythmia with 1st degree A-V block.   Interpretation: Septal infarct, age undetermined. No STEMI.      The Emergency Provider reviewed the vital signs and test results, which are outlined above.     ED Discussion     6:58 PM: Discussed case with Mert Bah MD (Hospital Medicine).   agrees with current care and management of pt and accepts admission.   Admitting Service: Hospital Medicine  Admitting Physician: Dr. Bah  Admit to:  Obs/Tele    7:03 PM: Re-evaluated pt.  I have discussed test results, shared treatment plan, and the need for admission with patient/family/caretaker at bedside. Pt and/or family/caretaker express understanding at this time and agree with all information. All questions answered. Pt/caretaker/family member(s) have no further questions or concerns at this time. Pt is ready for admit.         6:41 PM: Discussed pt's case with Selwyn Baltazar MD (Cardiology) who recommends a cards consult order if admitted and Cards team will evaluate and examine the pt on rounds tomorrow and leave any necessary recommendations.        Medical Decision Making  70-year-old white female with a history of a syncopal episode today.  Patient was seen on 02/22/2025 for identical episode.  Workup was unremarkable.  She is discharged with a diagnosis of vasovagal syncope.  Patient had another episode today while at her PCP's office.  She states that she has been having weak spells at home.  Feeling fatigued and tired.  Today she had a syncopal episode at her PCP's office.  No head injury.  Denies chest pain or palpitations.    Workup today is unremarkable for ACS.  She does have a sodium of 130.  This may be contributing to her weakness and syncopal episodes.    I would like to get the opinion of her cardiologist and consider admission for further workup and correction of hyponatremia.    Amount and/or Complexity of Data Reviewed  Labs: ordered. Decision-making details documented in ED Course.  Radiology: ordered. Decision-making details documented in ED Course.  ECG/medicine tests: ordered and independent interpretation performed. Decision-making details documented in ED Course.                ED Medication(s):  Medications   aspirin chewable tablet 81 mg (has no administration in time range)   clopidogreL tablet 75 mg (has no administration in time range)   cyanocobalamin tablet 100 mcg (has no administration in time range)   memantine tablet  10 mg (has no administration in time range)   pantoprazole EC tablet 40 mg (has no administration in time range)   vitamin D 1000 units tablet 1,000 Units (has no administration in time range)   acetaminophen tablet 1,000 mg (has no administration in time range)   hydrALAZINE injection 10 mg (has no administration in time range)   hydrOXYzine HCL tablet 25 mg (has no administration in time range)   melatonin tablet 6 mg (has no administration in time range)   aluminum-magnesium hydroxide-simethicone 200-200-20 mg/5 mL suspension 30 mL (has no administration in time range)   0.9% NaCl infusion (has no administration in time range)   budesonide nebulizer solution 0.5 mg (0.5 mg Nebulization Given 2/26/25 2002)   ipratropium 0.02 % nebulizer solution 0.5 mg (0.5 mg Nebulization Given 2/26/25 2002)   arformoteroL nebulizer solution 15 mcg (15 mcg Nebulization Given 2/26/25 2002)   labetaloL injection 10 mg (has no administration in time range)   iohexoL (OMNIPAQUE 350) injection 100 mL (100 mLs Intravenous Given 2/26/25 2045)       New Prescriptions    No medications on file               Scribe Attestation:   Scribe #1: I performed the above scribed service and the documentation accurately describes the services I performed. I attest to the accuracy of the note.     Attending:   Physician Attestation Statement for Scribe #1: I, Adryan Lin MD, personally performed the services described in this documentation, as scribed by Andi West, in my presence, and it is both accurate and complete.           Clinical Impression       ICD-10-CM ICD-9-CM   1. Vasovagal syncope  R55 780.2   2. Syncope  R55 780.2   3. Hypertension, unspecified type  I10 401.9   4. Chronic obstructive pulmonary disease, unspecified COPD type  J44.9 496   5. ASCVD (arteriosclerotic cardiovascular disease)  I25.10 429.2     440.9   6. Hyponatremia  E87.1 276.1   7. Syncope and collapse  R55 780.2   8. Syncope, cardiogenic  R55 780.2        Disposition:   Disposition: Admitted  Condition: Balwinder Stapleton MD  02/26/25 2051

## 2025-02-26 NOTE — PROGRESS NOTES
Chief Complaint:    Chief Complaint   Patient presents with    Follow-up     ER f/u passed out , low sodium and potassium        History of Present Illness:    Patient presents today    Patient was scheduled to see us this afternoon outpatient visit while in the office she just collapsed and passed out.  According to  this has happened few times the last time she was seen ED with similar complaints and was told that this is vasovagal.    ROS:  Review of Systems   Unable to perform ROS: Patient unresponsive       Past Medical History:   Diagnosis Date    AAA (abdominal aortic aneurysm) 2014    Abdominal aneurysm     3    Acute coronary syndrome     Arthritis     BPPV (benign paroxysmal positional vertigo)     Carotid artery plaque     Carotid artery stenosis and occlusion 2014    Chronic back pain     COPD (chronic obstructive pulmonary disease)     Coronary artery disease     Dementia     Emphysema lung     Hyperlipidemia     Hypertension     Myocardial infarction     x3    Neuropathy     Personal history of COVID-19 2021 +Covid, recovered at home        Social History:  Social History     Socioeconomic History    Marital status:    Tobacco Use    Smoking status: Former     Current packs/day: 0.00     Average packs/day: 0.5 packs/day for 46.9 years (23.4 ttl pk-yrs)     Types: Cigarettes, Vaping with nicotine     Start date: 1970     Quit date: 2017     Years since quittin.8    Smokeless tobacco: Never    Tobacco comments:     3 MG NICOTINE FOR HEART.    Substance and Sexual Activity    Alcohol use: No    Drug use: No    Sexual activity: Not Currently     Birth control/protection: None     Social Drivers of Health     Financial Resource Strain: Medium Risk (2025)    Overall Financial Resource Strain (CARDIA)     Difficulty of Paying Living Expenses: Somewhat hard   Food Insecurity: Food Insecurity Present (2025)    Hunger Vital Sign     Worried  "About Running Out of Food in the Last Year: Sometimes true     Ran Out of Food in the Last Year: Sometimes true   Transportation Needs: No Transportation Needs (2/26/2025)    PRAPARE - Transportation     Lack of Transportation (Medical): No     Lack of Transportation (Non-Medical): No   Physical Activity: Insufficiently Active (2/26/2025)    Exercise Vital Sign     Days of Exercise per Week: 1 day     Minutes of Exercise per Session: 20 min   Stress: Stress Concern Present (2/26/2025)    [employer] [employer] - Occupational Stress Questionnaire     Feeling of Stress : Very much   Housing Stability: Low Risk  (2/26/2025)    Housing Stability Vital Sign     Unable to Pay for Housing in the Last Year: No     Number of Times Moved in the Last Year: 0     Homeless in the Last Year: No       Family History:   family history includes Breast cancer in her paternal aunt; Heart attack in her brother; Heart attacks under age 50 in her brother and father; Heart disease in her mother.    Health Maintenance   Topic Date Due    Shingles Vaccine (1 of 2) Never done    Pneumococcal Vaccines (Age 50+) (2 of 2 - PCV) 09/27/2013    RSV Vaccine (Age 60+ and Pregnant patients) (1 - Risk 60-74 years 1-dose series) Never done    Colorectal Cancer Screening  05/17/2022    Lipid Panel  04/18/2024    COVID-19 Vaccine (1 - 2024-25 season) Never done    Hemoglobin A1c (Prediabetes)  03/26/2025    LDCT Lung Screen  11/15/2025    Mammogram  12/13/2025    TETANUS VACCINE  11/15/2026    DEXA Scan  04/04/2027    Hepatitis C Screening  Completed    Influenza Vaccine  Addressed       Exam:Physical     Vital Signs  Temp: 97.1 °F (36.2 °C)  Temp Source: Tympanic  Pulse: (!) 32  SpO2: 96 %  BP: (!) 90/42  BP Location: Left arm  Patient Position: Sitting  Pain Score: 0-No pain  Height and Weight  Height: 5' 1" (154.9 cm)  Weight: 62.6 kg (138 lb 0.1 oz)  BSA (Calculated - sq m): 1.64 sq meters  BMI (Calculated): 26.1  Weight in (lb) to " have BMI = 25: 132]    Body mass index is 26.08 kg/m².    Physical Exam  HENT:      Head: Normocephalic and atraumatic.   Cardiovascular:      Rate and Rhythm: Bradycardia present.   Pulmonary:      Effort: Pulmonary effort is normal.      Breath sounds: Normal breath sounds.   Abdominal:      Palpations: Abdomen is soft.   Skin:     Comments: Cold clammy   Neurological:      Comments: Passed out           Assessment:      ICD-10-CM ICD-9-CM   1. Syncope and collapse  R55 780.2   2. Bradycardia with 31-40 beats per minute  R00.1 427.89         Plan:    Patient extremely bradycardic for several minutes her pulse was in the low 30s.  She was responding poorly to comments, low systolic blood pressure, normal respiration cold and clammy.  Patient laid on the table, in a few minutes the pulse picked up to 50.  Patient is still feeling weak and drowsy alert.    EMS was called, patient will be transferred to the ED.        Recurrent syncopal episode secondary symptomatic bradycardic spells,  EKG shows first-degree AV block with bradycardia.    ED will be notified        No follow-ups on file.      Olga Altman MD

## 2025-02-27 LAB
AORTIC ROOT ANNULUS: 3.64 CM
ASCENDING AORTA: 3.25 CM
AV INDEX (PROSTH): 0.84
AV MEAN GRADIENT: 3 MMHG
AV PEAK GRADIENT: 5 MMHG
AV REGURGITATION PRESSURE HALF TIME: 630 MS
AV VALVE AREA BY VELOCITY RATIO: 2.6 CM²
AV VALVE AREA: 2.4 CM²
AV VELOCITY RATIO: 0.91
BSA FOR ECHO PROCEDURE: 1.65 M2
CV ECHO LV RWT: 0.73 CM
DOP CALC AO PEAK VEL: 1.1 M/S
DOP CALC AO VTI: 29.5 CM
DOP CALC LVOT AREA: 2.8 CM2
DOP CALC LVOT DIAMETER: 1.9 CM
DOP CALC LVOT PEAK VEL: 1 M/S
DOP CALC LVOT STROKE VOLUME: 70.6 CM3
DOP CALC RVOT PEAK VEL: 0.8 M/S
DOP CALC RVOT VTI: 18.5 CM
DOP CALCLVOT PEAK VEL VTI: 24.9 CM
E WAVE DECELERATION TIME: 194 MSEC
E/A RATIO: 1.19
E/E' RATIO: 13 M/S
ECHO LV POSTERIOR WALL: 1.2 CM (ref 0.6–1.1)
EJECTION FRACTION: 60 %
FRACTIONAL SHORTENING: 30.3 % (ref 28–44)
INTERVENTRICULAR SEPTUM: 1.2 CM (ref 0.6–1.1)
IVC DIAMETER: 2.14 CM
IVRT: 83 MSEC
LA MAJOR: 3.9 CM
LA MINOR: 3.5 CM
LA WIDTH: 2.9 CM
LEFT ATRIUM SIZE: 2.5 CM
LEFT ATRIUM VOLUME INDEX: 14 ML/M2
LEFT ATRIUM VOLUME: 23 CM3
LEFT INTERNAL DIMENSION IN SYSTOLE: 2.3 CM (ref 2.1–4)
LEFT VENTRICLE DIASTOLIC VOLUME INDEX: 26.38 ML/M2
LEFT VENTRICLE DIASTOLIC VOLUME: 43 ML
LEFT VENTRICLE MASS INDEX: 76.6 G/M2
LEFT VENTRICLE SYSTOLIC VOLUME INDEX: 11 ML/M2
LEFT VENTRICLE SYSTOLIC VOLUME: 18 ML
LEFT VENTRICULAR INTERNAL DIMENSION IN DIASTOLE: 3.3 CM (ref 3.5–6)
LEFT VENTRICULAR MASS: 124.8 G
LV LATERAL E/E' RATIO: 13.4 M/S
LV SEPTAL E/E' RATIO: 12.1 M/S
LVED V (TEICH): 42.84 ML
LVES V (TEICH): 18.13 ML
LVOT MG: 2.68 MMHG
LVOT MV: 0.79 CM/S
MV PEAK A VEL: 1.02 M/S
MV PEAK E VEL: 1.21 M/S
MV STENOSIS PRESSURE HALF TIME: 56.25 MS
MV VALVE AREA P 1/2 METHOD: 3.91 CM2
OHS CV RV/LV RATIO: 0.82 CM
OHS QRS DURATION: 84 MS
OHS QRS DURATION: 88 MS
OHS QRS DURATION: 88 MS
OHS QTC CALCULATION: 389 MS
OHS QTC CALCULATION: 421 MS
OHS QTC CALCULATION: 429 MS
PISA AR MAX VEL: 5.02 M/S
PISA MRMAX VEL: 4.91 M/S
PISA TR MAX VEL: 2.7 M/S
PV MEAN GRADIENT: 2 MMHG
RA MAJOR: 4.14 CM
RA PRESSURE ESTIMATED: 3 MMHG
RA WIDTH: 2.5 CM
RIGHT VENTRICLE DIASTOLIC BASEL DIMENSION: 2.7 CM
RV TB RVSP: 6 MMHG
STJ: 3.4 CM
TDI LATERAL: 0.09 M/S
TDI SEPTAL: 0.1 M/S
TDI: 0.1 M/S
TRICUSPID ANNULAR PLANE SYSTOLIC EXCURSION: 1.92 CM
TROPONIN I SERPL DL<=0.01 NG/ML-MCNC: <0.006 NG/ML (ref 0–0.03)
TV REST PULMONARY ARTERY PRESSURE: 32 MMHG
Z-SCORE OF LEFT VENTRICULAR DIMENSION IN END DIASTOLE: -3.32
Z-SCORE OF LEFT VENTRICULAR DIMENSION IN END SYSTOLE: -1.7

## 2025-02-27 PROCEDURE — 84484 ASSAY OF TROPONIN QUANT: CPT | Performed by: STUDENT IN AN ORGANIZED HEALTH CARE EDUCATION/TRAINING PROGRAM

## 2025-02-27 PROCEDURE — 99900035 HC TECH TIME PER 15 MIN (STAT)

## 2025-02-27 PROCEDURE — 25000003 PHARM REV CODE 250: Performed by: STUDENT IN AN ORGANIZED HEALTH CARE EDUCATION/TRAINING PROGRAM

## 2025-02-27 PROCEDURE — 96361 HYDRATE IV INFUSION ADD-ON: CPT

## 2025-02-27 PROCEDURE — 25000242 PHARM REV CODE 250 ALT 637 W/ HCPCS: Performed by: STUDENT IN AN ORGANIZED HEALTH CARE EDUCATION/TRAINING PROGRAM

## 2025-02-27 PROCEDURE — 94761 N-INVAS EAR/PLS OXIMETRY MLT: CPT

## 2025-02-27 PROCEDURE — 21400001 HC TELEMETRY ROOM

## 2025-02-27 PROCEDURE — 94640 AIRWAY INHALATION TREATMENT: CPT

## 2025-02-27 PROCEDURE — 27000221 HC OXYGEN, UP TO 24 HOURS

## 2025-02-27 PROCEDURE — 25000003 PHARM REV CODE 250: Performed by: NURSE PRACTITIONER

## 2025-02-27 PROCEDURE — 36415 COLL VENOUS BLD VENIPUNCTURE: CPT | Performed by: STUDENT IN AN ORGANIZED HEALTH CARE EDUCATION/TRAINING PROGRAM

## 2025-02-27 PROCEDURE — 93005 ELECTROCARDIOGRAM TRACING: CPT

## 2025-02-27 PROCEDURE — 93010 ELECTROCARDIOGRAM REPORT: CPT | Mod: ,,, | Performed by: INTERNAL MEDICINE

## 2025-02-27 RX ORDER — LIDOCAINE HYDROCHLORIDE 20 MG/ML
15 SOLUTION OROPHARYNGEAL ONCE
Status: COMPLETED | OUTPATIENT
Start: 2025-02-27 | End: 2025-02-27

## 2025-02-27 RX ORDER — KETOROLAC TROMETHAMINE 30 MG/ML
15 INJECTION, SOLUTION INTRAMUSCULAR; INTRAVENOUS ONCE
Status: DISCONTINUED | OUTPATIENT
Start: 2025-02-27 | End: 2025-02-27

## 2025-02-27 RX ORDER — ARFORMOTEROL TARTRATE 15 UG/2ML
15 SOLUTION RESPIRATORY (INHALATION) 2 TIMES DAILY
Status: DISCONTINUED | OUTPATIENT
Start: 2025-02-27 | End: 2025-02-27

## 2025-02-27 RX ORDER — ALUMINUM HYDROXIDE, MAGNESIUM HYDROXIDE, AND SIMETHICONE 1200; 120; 1200 MG/30ML; MG/30ML; MG/30ML
30 SUSPENSION ORAL ONCE
Status: COMPLETED | OUTPATIENT
Start: 2025-02-27 | End: 2025-02-27

## 2025-02-27 RX ORDER — IPRATROPIUM BROMIDE AND ALBUTEROL SULFATE 2.5; .5 MG/3ML; MG/3ML
3 SOLUTION RESPIRATORY (INHALATION) EVERY 6 HOURS PRN
Status: DISCONTINUED | OUTPATIENT
Start: 2025-02-27 | End: 2025-03-01 | Stop reason: HOSPADM

## 2025-02-27 RX ORDER — AMLODIPINE BESYLATE 5 MG/1
5 TABLET ORAL DAILY
Status: DISCONTINUED | OUTPATIENT
Start: 2025-02-27 | End: 2025-02-28

## 2025-02-27 RX ORDER — BUDESONIDE 0.5 MG/2ML
0.5 INHALANT ORAL EVERY 12 HOURS
Status: DISCONTINUED | OUTPATIENT
Start: 2025-02-27 | End: 2025-02-27

## 2025-02-27 RX ORDER — ACETAMINOPHEN 500 MG
1000 TABLET ORAL EVERY 8 HOURS
Status: DISCONTINUED | OUTPATIENT
Start: 2025-02-27 | End: 2025-02-27

## 2025-02-27 RX ORDER — HYDRALAZINE HYDROCHLORIDE 20 MG/ML
5 INJECTION INTRAMUSCULAR; INTRAVENOUS EVERY 8 HOURS PRN
Status: DISCONTINUED | OUTPATIENT
Start: 2025-02-27 | End: 2025-03-01 | Stop reason: HOSPADM

## 2025-02-27 RX ORDER — DOCUSATE SODIUM 100 MG/1
100 CAPSULE, LIQUID FILLED ORAL 2 TIMES DAILY
Status: DISCONTINUED | OUTPATIENT
Start: 2025-02-27 | End: 2025-03-01 | Stop reason: HOSPADM

## 2025-02-27 RX ORDER — HYDROCODONE BITARTRATE AND ACETAMINOPHEN 5; 325 MG/1; MG/1
1 TABLET ORAL EVERY 6 HOURS PRN
Refills: 0 | Status: DISCONTINUED | OUTPATIENT
Start: 2025-02-27 | End: 2025-03-01 | Stop reason: HOSPADM

## 2025-02-27 RX ORDER — ACETAMINOPHEN 325 MG/1
650 TABLET ORAL EVERY 6 HOURS PRN
Status: DISCONTINUED | OUTPATIENT
Start: 2025-02-27 | End: 2025-03-01 | Stop reason: HOSPADM

## 2025-02-27 RX ORDER — ZOLPIDEM TARTRATE 5 MG/1
10 TABLET ORAL NIGHTLY PRN
Status: DISCONTINUED | OUTPATIENT
Start: 2025-02-27 | End: 2025-03-01 | Stop reason: HOSPADM

## 2025-02-27 RX ORDER — ACETAMINOPHEN 325 MG/1
650 TABLET ORAL EVERY 6 HOURS PRN
Status: DISCONTINUED | OUTPATIENT
Start: 2025-02-27 | End: 2025-02-27

## 2025-02-27 RX ORDER — NITROGLYCERIN 0.4 MG/1
0.4 TABLET SUBLINGUAL EVERY 5 MIN PRN
Status: DISCONTINUED | OUTPATIENT
Start: 2025-02-27 | End: 2025-03-01 | Stop reason: HOSPADM

## 2025-02-27 RX ORDER — IBUPROFEN 400 MG/1
800 TABLET ORAL EVERY 6 HOURS PRN
Status: DISCONTINUED | OUTPATIENT
Start: 2025-02-27 | End: 2025-03-01 | Stop reason: HOSPADM

## 2025-02-27 RX ADMIN — ARFORMOTEROL TARTRATE 15 MCG: 15 SOLUTION RESPIRATORY (INHALATION) at 07:02

## 2025-02-27 RX ADMIN — CLOPIDOGREL BISULFATE 75 MG: 75 TABLET ORAL at 09:02

## 2025-02-27 RX ADMIN — MEMANTINE 10 MG: 10 TABLET ORAL at 10:02

## 2025-02-27 RX ADMIN — ACETAMINOPHEN 650 MG: 325 TABLET ORAL at 09:02

## 2025-02-27 RX ADMIN — PANTOPRAZOLE SODIUM 40 MG: 40 TABLET, DELAYED RELEASE ORAL at 09:02

## 2025-02-27 RX ADMIN — BUDESONIDE INHALATION 0.5 MG: 0.5 SUSPENSION RESPIRATORY (INHALATION) at 07:02

## 2025-02-27 RX ADMIN — MEMANTINE 10 MG: 10 TABLET ORAL at 09:02

## 2025-02-27 RX ADMIN — ACETAMINOPHEN 650 MG: 325 TABLET ORAL at 05:02

## 2025-02-27 RX ADMIN — ALUMINUM HYDROXIDE, MAGNESIUM HYDROXIDE, AND DIMETHICONE 30 ML: 200; 20; 200 SUSPENSION ORAL at 10:02

## 2025-02-27 RX ADMIN — SODIUM CHLORIDE: 9 INJECTION, SOLUTION INTRAVENOUS at 11:02

## 2025-02-27 RX ADMIN — ASPIRIN 81 MG: 81 TABLET, CHEWABLE ORAL at 09:02

## 2025-02-27 RX ADMIN — NITROGLYCERIN 0.4 MG: 0.4 TABLET SUBLINGUAL at 04:02

## 2025-02-27 RX ADMIN — DOCUSATE SODIUM 100 MG: 100 CAPSULE, LIQUID FILLED ORAL at 10:02

## 2025-02-27 RX ADMIN — Medication 1000 UNITS: at 09:02

## 2025-02-27 RX ADMIN — ACETAMINOPHEN 1000 MG: 500 TABLET ORAL at 06:02

## 2025-02-27 RX ADMIN — VITAM B12 100 MCG: 100 TAB at 09:02

## 2025-02-27 RX ADMIN — LIDOCAINE HYDROCHLORIDE 15 ML: 20 SOLUTION ORAL at 10:02

## 2025-02-27 RX ADMIN — ZOLPIDEM TARTRATE 10 MG: 5 TABLET, FILM COATED ORAL at 10:02

## 2025-02-27 RX ADMIN — AMLODIPINE BESYLATE 5 MG: 5 TABLET ORAL at 03:02

## 2025-02-27 NOTE — ASSESSMENT & PLAN NOTE
Last imaging in 2023. If there's been any change in aneurysm size, this could affect afterload which could indirectly contribute to patient's acute presentation. Will obtain CTA abdomen/pelvis to assess. Abdominal exam is benign.

## 2025-02-27 NOTE — PLAN OF CARE
Problem: Adult Inpatient Plan of Care  Goal: Plan of Care Review  2/27/2025 1736 by Marisabel Henderson RN  Outcome: Progressing  2/27/2025 1735 by Marisabel Henderson RN  Outcome: Progressing  Goal: Patient-Specific Goal (Individualized)  2/27/2025 1736 by Marisabel Henderson RN  Outcome: Progressing  2/27/2025 1735 by Marisabel Henderson RN  Outcome: Progressing  Goal: Absence of Hospital-Acquired Illness or Injury  2/27/2025 1736 by Marisabel Henderson RN  Outcome: Progressing  2/27/2025 1735 by Marisabel Henderson RN  Outcome: Progressing  Goal: Optimal Comfort and Wellbeing  2/27/2025 1736 by Marisabel Henderson RN  Outcome: Progressing  2/27/2025 1735 by Marisabel Henderson RN  Outcome: Progressing  Goal: Readiness for Transition of Care  2/27/2025 1736 by Marisbael Henderson RN  Outcome: Progressing  2/27/2025 1735 by Marisabel Henderson RN  Outcome: Progressing     Problem: Fall Injury Risk  Goal: Absence of Fall and Fall-Related Injury  2/27/2025 1736 by Marisabel Henderson RN  Outcome: Progressing  2/27/2025 1735 by Marisabel Henderson RN  Outcome: Progressing     Problem: Syncope  Goal: Absence of Syncopal Symptoms  2/27/2025 1736 by Marisabel Henderson RN  Outcome: Progressing  2/27/2025 1735 by Marisabel Henderson RN  Outcome: Progressing

## 2025-02-27 NOTE — ASSESSMENT & PLAN NOTE
--Head CT: pending  --Echo, US b/l carotid ordered   --AM [facility], TSH/T4 ordered for further work up  --holding home antihypertensives and sedating medications  --PT/OT consulted for eval, post acute therapy recs pending  --fall precautions, neuro checks  --admit to telemetry for cardiac monitoring

## 2025-02-27 NOTE — ASSESSMENT & PLAN NOTE
--Last ECHO from AUG 2024 confirm EF 65 %  --BNP elevated, but stable compared to previous on file  --no overt edema on exam, no evidence of vascular congestion on CXR  --no aggressive iv diuresis at this time  --monitor electrolytes, UOP  --strict I&O and daily weights ordered

## 2025-02-27 NOTE — TELEPHONE ENCOUNTER
No care due was identified.  [facility] Embedded Care Due Messages. Reference number: [id number].   2/27/2025 8:42:12 AM CST

## 2025-02-27 NOTE — NURSING
Pt reported sudden onset of numbness and tingling in her hands along with chest tightness. Pupils and equal and reactive, smile is symmetrical, radial pulses +2 and normal rhythm, NSR on monitor, breath sounds clear and slightly diminished. Pt was able to squeeze RN's hands and push/pull against them with strong effort. Cap refill less than 3 seconds in BL hands. AAO x4. MD notified.

## 2025-02-27 NOTE — HPI
Gemma Vick is a 70-year-old woman with a past medical history of myocardial infarction, coronary artery disease, hypertension, chronic obstructive pulmonary disease, chronic back pain, benign paroxysmal positional vertigo, neuropathy, hyperlipidemia, abdominal aortic aneurysm, and dementia without cognitive or behavioral disturbances who presented to the ER with syncope. She experienced a syncopal episode at the doctors office earlier today and denies any trauma. Immediately after the event, she was noted to have a heart rate in the thirties. This is her second syncopal episode in four days. She reports gradually worsening weakness since the first episode, along with cough, chills, and fatigue leading up to her appointment. She also describes vaping as a method to reduce cigarette use. She denies any chest pain, chest tightness, or palpitations. Of note, patient's daughter reports symptoms are typically associated with patient's heart rate dropping to the low 30s and even once reaching high 20s. Denies any new medications started.     Upon arrival in the ER, initial vital signs were /90, pulse 63, RR 18, temp 97.7°F, and SpO2 96%. Abnormal labs included a BNP of 163 and sodium of 130, with other labs grossly within acceptable limits; pertinent negatives included normal TSH, normal urinalysis, negative troponin, and stable H&H. No EKG findings were provided, while CXR was unremarkable and head CT and CTA of the abdomen remain pending. Cardiology was contacted by the ER physician and recommended a consult order if admitted, planning to evaluate the patient on rounds tomorrow. Hospital Medicine called for admission.

## 2025-02-27 NOTE — PLAN OF CARE
Inpatient Upgrade Note    Gemma Vick has warranted treatment spanning two or more midnights of hospital level care for the management of  syncope . She continues to require IV fluids, daily labs, and monitoring of vital signs. Her condition is also complicated by the following comorbidities: Coronary Artery Disease and Hypertension.

## 2025-02-27 NOTE — ASSESSMENT & PLAN NOTE
--home medications include:  Norvasc, clonidine, Coreg, olmesartan  --holding home medications given patient's presenting symptom of syncope  --slowly restart as tolerated   --PRN IV hydralazine and IV labetalol for breakthrough  --Q4HVS

## 2025-02-27 NOTE — PLAN OF CARE
Problem: Adult Inpatient Plan of Care  Goal: Plan of Care Review  Outcome: Progressing  Goal: Patient-Specific Goal (Individualized)  Outcome: Progressing  Goal: Absence of Hospital-Acquired Illness or Injury  Outcome: Progressing  Goal: Optimal Comfort and Wellbeing  Outcome: Progressing  Goal: Readiness for Transition of Care  Outcome: Progressing     Problem: Fall Injury Risk  Goal: Absence of Fall and Fall-Related Injury  Outcome: Progressing     Problem: Syncope  Goal: Absence of Syncopal Symptoms  Outcome: Progressing     Discussed admission to floor. All admission records updated by RN. Medication reconciliation to be performed. Cardiology to consult in morning.

## 2025-02-27 NOTE — SUBJECTIVE & OBJECTIVE
Past Medical History:   Diagnosis Date    AAA (abdominal aortic aneurysm) 02/13/2014    Abdominal aneurysm     3    Acute coronary syndrome     Arthritis     BPPV (benign paroxysmal positional vertigo)     Carotid artery plaque     Carotid artery stenosis and occlusion 02/13/2014    Chronic back pain     COPD (chronic obstructive pulmonary disease)     Coronary artery disease     Dementia     Emphysema lung     Hyperlipidemia     Hypertension     Myocardial infarction     x3    Neuropathy     Personal history of COVID-19 06/09/2021 11/16/2020 +Covid, recovered at home        Past Surgical History:   Procedure Laterality Date    CARDIAC CATHETERIZATION      CORONARY ANGIOPLASTY      CORONARY STENT PLACEMENT N/A 9/12/2023    Procedure: INSERTION, STENT, CORONARY ARTERY;  Surgeon: Millie Juarez MD;  Location: [facility] CATH LAB;  Service: Cardiology;  Laterality: N/A;    HYSTERECTOMY  1988    LEFT HEART CATHETERIZATION Left 9/12/2023    Procedure: Left heart cath;  Surgeon: Millie Juarez MD;  Location: [facility] CATH LAB;  Service: Cardiology;  Laterality: Left;    PERCUTANEOUS CORONARY INTERVENTION, ARTERY N/A 9/12/2023    Procedure: Percutaneous coronary intervention;  Surgeon: Millie Juarez MD;  Location: [facility] CATH LAB;  Service: Cardiology;  Laterality: N/A;    THROMBECTOMY, CORONARY  9/12/2023    Procedure: Thrombectomy, Coronary;  Surgeon: Millie Juarez MD;  Location: [facility] CATH LAB;  Service: Cardiology;;    TONSILLECTOMY      TRANSFORAMINAL EPIDURAL INJECTION OF STEROID Right 12/1/2023    Procedure: Injection,steroid,epidural,transforaminal approach- right side, L4/5 and L5/S1;  Surgeon: Lydia Roberts MD;  Location: [facility];  Service: Pain Management;  Laterality: Right;       Review of patient's allergies indicates:  No Known Allergies    No current facility-administered medications on file prior to encounter.     Current Outpatient Medications on File Prior to Encounter   Medication Sig     albuterol-ipratropium (DUO-NEB) 2.5 mg-0.5 mg/3 mL nebulizer solution Take 3 mLs by nebulization every 6 (six) hours as needed for Wheezing.    amLODIPine (NORVASC) 10 MG tablet Take 1 tablet (10 mg total) by mouth once daily.    amLODIPine (NORVASC) 5 MG tablet TAKE 1 TABLET BY MOUTH ONCE DAILY    aspirin 81 MG Chew Take 81 mg by mouth once daily.    budesonide-glycopyr-formoterol (BREZTRI AEROSPHERE) 160-9-4.8 mcg/actuation HFAA Inhale 2 puffs into the lungs 2 (two) times a day.    carvediloL (COREG) 6.25 MG tablet TAKE 1 TABLET BY MOUTH TWICE DAILY WITH MEALS    cloNIDine (CATAPRES) 0.1 MG tablet Take 1 tablet (0.1 mg total) by mouth daily as needed (if Blood pressure above 170/90).    clopidogreL (PLAVIX) 75 mg tablet TAKE 1 TABLET BY MOUTH ONCE DAILY    COMBIVENT RESPIMAT  mcg/actuation inhaler Inhale 2 puffs into the lungs every 6 (six) hours as needed for Wheezing or Shortness of Breath.    cyanocobalamin (VITAMIN B-12) 100 MCG tablet Take 100 mcg by mouth once daily.    diclofenac sodium (VOLTAREN) 1 % Gel Apply topically 4 (four) times daily.    dicyclomine (BENTYL) 10 MG capsule TAKE ONE CAPSULE BY MOUTH THREE TIMES DAILY AS NEEDED    estradioL (ESTRACE) 0.5 MG tablet TAKE 1 TABLET BY MOUTH ONCE DAILY    ezetimibe-simvastatin 10-40 mg (VYTORIN) 10-40 mg per tablet TAKE 1 TABLET BY MOUTH EVERY EVENING    famotidine (PEPCID) 20 MG tablet Take 1 tablet (20 mg total) by mouth 2 (two) times daily as needed for Heartburn.    furosemide (LASIX) 20 MG tablet TAKE 1 TABLET BY MOUTH ONCE DAILY AS NEEDED    inhalation spacing device (COMPACT SPACE CHAMBER) USE AS DIRECTED    memantine (NAMENDA) 10 MG Tab Take 1 tablet (10 mg total) by mouth 2 (two) times daily.    olmesartan (BENICAR) 40 MG tablet TAKE 1 TABLET BY MOUTH ONCE DAILY    OXYGEN-AIR DELIVERY SYSTEMS MISC 3 L by Misc.(Non-Drug; Combo Route) route every evening.    pantoprazole (PROTONIX) 40 MG tablet Take 1 tablet (40 mg total) by mouth once daily.     vitamin D (VITAMIN D3) 1000 units Tab Take 1,000 Units by mouth once daily.    zolpidem (AMBIEN) 10 mg Tab TAKE 1 TABLET BY MOUTH EVERY EVENING    [DISCONTINUED] cyproheptadine (PERIACTIN) 4 mg tablet Take 4 mg by mouth 2 (two) times daily. (Patient not taking: Reported on 2024)    [DISCONTINUED] hydroCHLOROthiazide (MICROZIDE) 12.5 mg capsule Take 12.5 mg by mouth as needed.    [DISCONTINUED] promethazine (PHENERGAN) 25 MG tablet Take 1 tablet (25 mg total) by mouth every 6 (six) hours as needed for Nausea.     Family History       Problem Relation (Age of Onset)    Breast cancer Paternal Aunt    Heart attack Brother    Heart attacks under age 50 Father, Brother    Heart disease Mother          Tobacco Use    Smoking status: Former     Current packs/day: 0.00     Average packs/day: 0.5 packs/day for 46.9 years (23.4 ttl pk-yrs)     Types: Cigarettes, Vaping with nicotine     Start date: 1970     Quit date: 2017     Years since quittin.8    Smokeless tobacco: Never    Tobacco comments:     3 MG NICOTINE FOR HEART.    Substance and Sexual Activity    Alcohol use: No    Drug use: No    Sexual activity: Not Currently     Birth control/protection: None     Review of Systems   Constitutional:  Positive for fatigue. Negative for chills and fever.   HENT:  Negative for congestion, postnasal drip, rhinorrhea and sore throat.    Eyes:  Negative for pain and visual disturbance.   Respiratory:  Negative for cough, chest tightness, shortness of breath and wheezing.    Cardiovascular:  Positive for palpitations. Negative for chest pain and leg swelling.   Gastrointestinal:  Negative for abdominal distention, abdominal pain, constipation, diarrhea, nausea and vomiting.   Genitourinary:  Negative for difficulty urinating, flank pain and hematuria.   Musculoskeletal:  Negative for arthralgias, back pain, gait problem and joint swelling.   Skin:  Negative for pallor and rash.   Neurological:  Positive for  dizziness and syncope. Negative for weakness.   Psychiatric/Behavioral:  Negative for confusion, decreased concentration and suicidal ideas.      Objective:     Vital Signs (Most Recent):  Temp: 97.7 °F (36.5 °C) (02/26/25 1525)  Pulse: 81 (02/26/25 2008)  Resp: 16 (02/26/25 2008)  BP: (!) 169/77 (02/26/25 1745)  SpO2: 100 % (02/26/25 2008) Vital Signs (24h Range):  Temp:  [97.1 °F (36.2 °C)-97.7 °F (36.5 °C)] 97.7 °F (36.5 °C)  Pulse:  [32-86] 81  Resp:  [16-23] 16  SpO2:  [96 %-100 %] 100 %  BP: ()/(42-90) 169/77        There is no height or weight on file to calculate BMI.     Physical Exam  Vitals reviewed.   Constitutional:       General: She is not in acute distress.     Appearance: Normal appearance. She is normal weight. She is not ill-appearing or toxic-appearing.   HENT:      Head: Normocephalic and atraumatic.      Nose: Nose normal. No congestion or rhinorrhea.   Eyes:      Extraocular Movements: Extraocular movements intact.      Conjunctiva/sclera: Conjunctivae normal.      Pupils: Pupils are equal, round, and reactive to light.   Cardiovascular:      Rate and Rhythm: Normal rate and regular rhythm.      Pulses: Normal pulses.      Heart sounds: No murmur heard.  Pulmonary:      Effort: Pulmonary effort is normal. No respiratory distress.      Breath sounds: Normal breath sounds. No wheezing.   Abdominal:      General: Abdomen is flat. Bowel sounds are normal. There is no distension.      Palpations: Abdomen is soft.      Tenderness: There is no abdominal tenderness. There is no guarding or rebound.   Musculoskeletal:         General: No swelling, tenderness or deformity. Normal range of motion.      Cervical back: Normal range of motion and neck supple. No rigidity.   Skin:     General: Skin is warm and dry.      Capillary Refill: Capillary refill takes less than 2 seconds.      Coloration: Skin is not pale.   Neurological:      General: No focal deficit present.      Mental Status: She is alert  "and oriented to person, place, and time. Mental status is at baseline.      Motor: No weakness.      Gait: Gait normal.   Psychiatric:         Mood and Affect: Mood normal.         Behavior: Behavior normal.         Thought Content: Thought content normal.              CRANIAL NERVES     CN III, IV, VI   Pupils are equal, round, and reactive to light.       Significant Labs: All pertinent labs within the past 24 hours have been reviewed.  BMP:   Recent Labs   Lab 02/26/25  1749   *   *   K 3.9      CO2 22*   BUN 14   CREATININE 0.8   CALCIUM 8.9   MG 2.7*     CBC:   Recent Labs   Lab 02/26/25 1749   WBC 8.35   HGB 11.0*   HCT 32.1*        CMP:   Recent Labs   Lab 02/26/25  1749   *   K 3.9      CO2 22*   *   BUN 14   CREATININE 0.8   CALCIUM 8.9   PROT 6.6   ALBUMIN 3.3*   BILITOT 0.4   ALKPHOS 61   AST 19   ALT 16   ANIONGAP 7*     Cardiac Markers:   Recent Labs   Lab 02/26/25  1749   *     Coagulation: No results for input(s): "PT", "INR", "APTT" in the last 48 hours.  Lactic Acid: No results for input(s): "LACTATE" in the last 48 hours.  Magnesium:   Recent Labs   Lab 02/26/25  1749   MG 2.7*     Troponin:   Recent Labs   Lab 02/26/25 1749   TROPONINI <0.006     TSH:   Recent Labs   Lab 02/26/25  1749   TSH 1.894     Urine Studies:   Recent Labs   Lab 02/26/25  1751   COLORU Yellow   APPEARANCEUA Clear   PHUR 7.0   SPECGRAV 1.010   PROTEINUA Negative   GLUCUA Negative   KETONESU Negative   BILIRUBINUA Negative   OCCULTUA Negative   NITRITE Negative   UROBILINOGEN Negative   LEUKOCYTESUR Negative       Significant Imaging: I have reviewed all pertinent imaging results/findings within the past 24 hours.    Imaging Results              US Carotid Bilateral (In process)                      CT Head Without Contrast (Final result)  Result time 02/26/25 19:54:53      Final result by Angel Denny Jr., MD (02/26/25 19:54:53)                   Narrative:    EXAM:  " CT HEAD WITHOUT CONTRAST    CLINICAL HISTORY:   Dizziness.    COMPARISON:   11/14/2024.    TECHNIQUE: Multiple sequential axial images from base to vertex without intravenous contrast.    FINDINGS:  Ventricles and basal cisterns are normal.  No evidence of hemorrhage, mass effect or midline shift.  No cerebral or cerebral parenchymal abnormalities.  Calvarium is intact.  Mucosal thickening of the ethmoid sinuses.  Mastoid air cells are clear. Periventricular and deep white matter changes consistent with small vessel ischemic change.  No extra-axial acute fluid collection.    IMPRESSION:  1.  No evidence of acute intracranial process.      All CT scans at [this location] are performed using dose modulation techniques as appropriate to a performed exam including the following: automated exposure control; adjustment of the mA and/or kV according to patient size (this includes techniques or standardized protocols for targeted exams where dose is matched to indication / reason for exam; i.e. extremities or head); use of iterative reconstruction technique.      Finalized on: 2/26/2025 7:54 PM By:  Angel Denny MD  [facility]# [id number]      2025-02-26 19:56:58.673     [facility]                                     X-Ray Chest AP Portable (Final result)  Result time 02/26/25 16:38:00      Final result by Melo Temple ([facility]), MD (02/26/25 16:38:00)                   Impression:      Clear lungs.      Electronically signed by: Melo Temple MD  Date:    02/26/2025  Time:    16:38               Narrative:    EXAMINATION:  XR CHEST AP PORTABLE    CLINICAL HISTORY:  <Diagnosis>, Chest Pain;    COMPARISON:  Chest, 11/14/2024.    FINDINGS:  One view.    Atherosclerosis thoracic aorta.  Heart size is normal. The lung fields are clear. No acute cardiopulmonary infiltrate.

## 2025-02-27 NOTE — ASSESSMENT & PLAN NOTE
--stable on room air, not on home O2  --continue home maintenance therapy  --Monitor clinically for respiratory distress, Q4H O2 sats   --Encourage light physical activity to maintain lung function   --follows pulmonology OP, f/u at discharge

## 2025-02-27 NOTE — H&P
Formerly [facility] Emergency Dept.  [facility]  History & Physical    Patient Name: Gemma Vick  MRN: [id number]  Patient Class: OP- Observation  Admission Date: 2/26/2025  Admitting Physician: Mert Bah MD   Primary Care Provider: Olga Altman MD         Patient information was obtained from patient, relative(s), past medical records, and ER records.     Subjective:     Principal Problem:Syncope and collapse    Chief Complaint:   Chief Complaint   Patient presents with    Loss of Consciousness     AASI reports syncope while at the Dr. Office. Denies any trauma, pt.was laid down by staff. She was found to be bradycardic by staff in the 30's immediately after the event.         HPI: Gemma Vick is a 70-year-old woman with a past medical history of myocardial infarction, coronary artery disease, hypertension, chronic obstructive pulmonary disease, chronic back pain, benign paroxysmal positional vertigo, neuropathy, hyperlipidemia, abdominal aortic aneurysm, and dementia without cognitive or behavioral disturbances who presented to the ER with syncope. She experienced a syncopal episode at the doctors office earlier today and denies any trauma. Immediately after the event, she was noted to have a heart rate in the thirties. This is her second syncopal episode in four days. She reports gradually worsening weakness since the first episode, along with cough, chills, and fatigue leading up to her appointment. She also describes vaping as a method to reduce cigarette use. She denies any chest pain, chest tightness, or palpitations. Of note, patient's daughter reports symptoms are typically associated with patient's heart rate dropping to the low 30s and even once reaching high 20s. Denies any new medications started.     Upon arrival in the ER, initial vital signs were /90, pulse 63, RR 18, temp 97.7°F, and SpO2 96%. Abnormal labs included a BNP of 163 and sodium of 130, with other labs grossly within  acceptable limits; pertinent negatives included normal TSH, normal urinalysis, negative troponin, and stable H&H. No EKG findings were provided, while CXR was unremarkable and head CT and CTA of the abdomen remain pending. Cardiology was contacted by the ER physician and recommended a consult order if admitted, planning to evaluate the patient on rounds tomorrow. Hospital Medicine called for admission.    Past Medical History:   Diagnosis Date    AAA (abdominal aortic aneurysm) 02/13/2014    Abdominal aneurysm     3    Acute coronary syndrome     Arthritis     BPPV (benign paroxysmal positional vertigo)     Carotid artery plaque     Carotid artery stenosis and occlusion 02/13/2014    Chronic back pain     COPD (chronic obstructive pulmonary disease)     Coronary artery disease     Dementia     Emphysema lung     Hyperlipidemia     Hypertension     Myocardial infarction     x3    Neuropathy     Personal history of COVID-19 06/09/2021 11/16/2020 +Covid, recovered at home        Past Surgical History:   Procedure Laterality Date    CARDIAC CATHETERIZATION      CORONARY ANGIOPLASTY      CORONARY STENT PLACEMENT N/A 9/12/2023    Procedure: INSERTION, STENT, CORONARY ARTERY;  Surgeon: Millie Juarez MD;  Location: [facility] CATH LAB;  Service: Cardiology;  Laterality: N/A;    HYSTERECTOMY  1988    LEFT HEART CATHETERIZATION Left 9/12/2023    Procedure: Left heart cath;  Surgeon: Millie Juarez MD;  Location: [facility] CATH LAB;  Service: Cardiology;  Laterality: Left;    PERCUTANEOUS CORONARY INTERVENTION, ARTERY N/A 9/12/2023    Procedure: Percutaneous coronary intervention;  Surgeon: Millie Juarez MD;  Location: [facility] CATH LAB;  Service: Cardiology;  Laterality: N/A;    THROMBECTOMY, CORONARY  9/12/2023    Procedure: Thrombectomy, Coronary;  Surgeon: Millie Juarez MD;  Location: [facility] CATH LAB;  Service: Cardiology;;    TONSILLECTOMY      TRANSFORAMINAL EPIDURAL INJECTION OF STEROID Right 12/1/2023    Procedure:  Injection,steroid,epidural,transforaminal approach- right side, L4/5 and L5/S1;  Surgeon: Lydia Roberts MD;  Location: [facility];  Service: Pain Management;  Laterality: Right;       Review of patient's allergies indicates:  No Known Allergies    No current facility-administered medications on file prior to encounter.     Current Outpatient Medications on File Prior to Encounter   Medication Sig    albuterol-ipratropium (DUO-NEB) 2.5 mg-0.5 mg/3 mL nebulizer solution Take 3 mLs by nebulization every 6 (six) hours as needed for Wheezing.    amLODIPine (NORVASC) 10 MG tablet Take 1 tablet (10 mg total) by mouth once daily.    amLODIPine (NORVASC) 5 MG tablet TAKE 1 TABLET BY MOUTH ONCE DAILY    aspirin 81 MG Chew Take 81 mg by mouth once daily.    budesonide-glycopyr-formoterol (BREZTRI AEROSPHERE) 160-9-4.8 mcg/actuation HFAA Inhale 2 puffs into the lungs 2 (two) times a day.    carvediloL (COREG) 6.25 MG tablet TAKE 1 TABLET BY MOUTH TWICE DAILY WITH MEALS    cloNIDine (CATAPRES) 0.1 MG tablet Take 1 tablet (0.1 mg total) by mouth daily as needed (if Blood pressure above 170/90).    clopidogreL (PLAVIX) 75 mg tablet TAKE 1 TABLET BY MOUTH ONCE DAILY    COMBIVENT RESPIMAT  mcg/actuation inhaler Inhale 2 puffs into the lungs every 6 (six) hours as needed for Wheezing or Shortness of Breath.    cyanocobalamin (VITAMIN B-12) 100 MCG tablet Take 100 mcg by mouth once daily.    diclofenac sodium (VOLTAREN) 1 % Gel Apply topically 4 (four) times daily.    dicyclomine (BENTYL) 10 MG capsule TAKE ONE CAPSULE BY MOUTH THREE TIMES DAILY AS NEEDED    estradioL (ESTRACE) 0.5 MG tablet TAKE 1 TABLET BY MOUTH ONCE DAILY    ezetimibe-simvastatin 10-40 mg (VYTORIN) 10-40 mg per tablet TAKE 1 TABLET BY MOUTH EVERY EVENING    famotidine (PEPCID) 20 MG tablet Take 1 tablet (20 mg total) by mouth 2 (two) times daily as needed for Heartburn.    furosemide (LASIX) 20 MG tablet TAKE 1 TABLET BY MOUTH ONCE DAILY AS NEEDED     inhalation spacing device (COMPACT SPACE CHAMBER) USE AS DIRECTED    memantine (NAMENDA) 10 MG Tab Take 1 tablet (10 mg total) by mouth 2 (two) times daily.    olmesartan (BENICAR) 40 MG tablet TAKE 1 TABLET BY MOUTH ONCE DAILY    OXYGEN-AIR DELIVERY SYSTEMS MISC 3 L by Misc.(Non-Drug; Combo Route) route every evening.    pantoprazole (PROTONIX) 40 MG tablet Take 1 tablet (40 mg total) by mouth once daily.    vitamin D (VITAMIN D3) 1000 units Tab Take 1,000 Units by mouth once daily.    zolpidem (AMBIEN) 10 mg Tab TAKE 1 TABLET BY MOUTH EVERY EVENING    [DISCONTINUED] cyproheptadine (PERIACTIN) 4 mg tablet Take 4 mg by mouth 2 (two) times daily. (Patient not taking: Reported on 2024)    [DISCONTINUED] hydroCHLOROthiazide (MICROZIDE) 12.5 mg capsule Take 12.5 mg by mouth as needed.    [DISCONTINUED] promethazine (PHENERGAN) 25 MG tablet Take 1 tablet (25 mg total) by mouth every 6 (six) hours as needed for Nausea.     Family History       Problem Relation (Age of Onset)    Breast cancer Paternal Aunt    Heart attack Brother    Heart attacks under age 50 Father, Brother    Heart disease Mother          Tobacco Use    Smoking status: Former     Current packs/day: 0.00     Average packs/day: 0.5 packs/day for 46.9 years (23.4 ttl pk-yrs)     Types: Cigarettes, Vaping with nicotine     Start date: 1970     Quit date: 2017     Years since quittin.8    Smokeless tobacco: Never    Tobacco comments:     3 MG NICOTINE FOR HEART.    Substance and Sexual Activity    Alcohol use: No    Drug use: No    Sexual activity: Not Currently     Birth control/protection: None     Review of Systems   Constitutional:  Positive for fatigue. Negative for chills and fever.   HENT:  Negative for congestion, postnasal drip, rhinorrhea and sore throat.    Eyes:  Negative for pain and visual disturbance.   Respiratory:  Negative for cough, chest tightness, shortness of breath and wheezing.    Cardiovascular:  Positive for  palpitations. Negative for chest pain and leg swelling.   Gastrointestinal:  Negative for abdominal distention, abdominal pain, constipation, diarrhea, nausea and vomiting.   Genitourinary:  Negative for difficulty urinating, flank pain and hematuria.   Musculoskeletal:  Negative for arthralgias, back pain, gait problem and joint swelling.   Skin:  Negative for pallor and rash.   Neurological:  Positive for dizziness and syncope. Negative for weakness.   Psychiatric/Behavioral:  Negative for confusion, decreased concentration and suicidal ideas.      Objective:     Vital Signs (Most Recent):  Temp: 97.7 °F (36.5 °C) (02/26/25 1525)  Pulse: 81 (02/26/25 2008)  Resp: 16 (02/26/25 2008)  BP: (!) 169/77 (02/26/25 1745)  SpO2: 100 % (02/26/25 2008) Vital Signs (24h Range):  Temp:  [97.1 °F (36.2 °C)-97.7 °F (36.5 °C)] 97.7 °F (36.5 °C)  Pulse:  [32-86] 81  Resp:  [16-23] 16  SpO2:  [96 %-100 %] 100 %  BP: ()/(42-90) 169/77        There is no height or weight on file to calculate BMI.     Physical Exam  Vitals reviewed.   Constitutional:       General: She is not in acute distress.     Appearance: Normal appearance. She is normal weight. She is not ill-appearing or toxic-appearing.   HENT:      Head: Normocephalic and atraumatic.      Nose: Nose normal. No congestion or rhinorrhea.   Eyes:      Extraocular Movements: Extraocular movements intact.      Conjunctiva/sclera: Conjunctivae normal.      Pupils: Pupils are equal, round, and reactive to light.   Cardiovascular:      Rate and Rhythm: Normal rate and regular rhythm.      Pulses: Normal pulses.      Heart sounds: No murmur heard.  Pulmonary:      Effort: Pulmonary effort is normal. No respiratory distress.      Breath sounds: Normal breath sounds. No wheezing.   Abdominal:      General: Abdomen is flat. Bowel sounds are normal. There is no distension.      Palpations: Abdomen is soft.      Tenderness: There is no abdominal tenderness. There is no guarding or  "rebound.   Musculoskeletal:         General: No swelling, tenderness or deformity. Normal range of motion.      Cervical back: Normal range of motion and neck supple. No rigidity.   Skin:     General: Skin is warm and dry.      Capillary Refill: Capillary refill takes less than 2 seconds.      Coloration: Skin is not pale.   Neurological:      General: No focal deficit present.      Mental Status: She is alert and oriented to person, place, and time. Mental status is at baseline.      Motor: No weakness.      Gait: Gait normal.   Psychiatric:         Mood and Affect: Mood normal.         Behavior: Behavior normal.         Thought Content: Thought content normal.              CRANIAL NERVES     CN III, IV, VI   Pupils are equal, round, and reactive to light.       Significant Labs: All pertinent labs within the past 24 hours have been reviewed.  BMP:   Recent Labs   Lab 02/26/25  1749   *   *   K 3.9      CO2 22*   BUN 14   CREATININE 0.8   CALCIUM 8.9   MG 2.7*     CBC:   Recent Labs   Lab 02/26/25 1749   WBC 8.35   HGB 11.0*   HCT 32.1*        CMP:   Recent Labs   Lab 02/26/25  1749   *   K 3.9      CO2 22*   *   BUN 14   CREATININE 0.8   CALCIUM 8.9   PROT 6.6   ALBUMIN 3.3*   BILITOT 0.4   ALKPHOS 61   AST 19   ALT 16   ANIONGAP 7*     Cardiac Markers:   Recent Labs   Lab 02/26/25  1749   *     Coagulation: No results for input(s): "PT", "INR", "APTT" in the last 48 hours.  Lactic Acid: No results for input(s): "LACTATE" in the last 48 hours.  Magnesium:   Recent Labs   Lab 02/26/25  1749   MG 2.7*     Troponin:   Recent Labs   Lab 02/26/25  1749   TROPONINI <0.006     TSH:   Recent Labs   Lab 02/26/25  1749   TSH 1.894     Urine Studies:   Recent Labs   Lab 02/26/25  1751   COLORU Yellow   APPEARANCEUA Clear   PHUR 7.0   SPECGRAV 1.010   PROTEINUA Negative   GLUCUA Negative   KETONESU Negative   BILIRUBINUA Negative   OCCULTUA Negative   NITRITE Negative "   UROBILINOGEN Negative   LEUKOCYTESUR Negative       Significant Imaging: I have reviewed all pertinent imaging results/findings within the past 24 hours.    Imaging Results              US Carotid Bilateral (In process)                      CT Head Without Contrast (Final result)  Result time 02/26/25 19:54:53      Final result by Angel Denny Jr., MD (02/26/25 19:54:53)                   Narrative:    EXAM:  CT HEAD WITHOUT CONTRAST    CLINICAL HISTORY:   Dizziness.    COMPARISON:   11/14/2024.    TECHNIQUE: Multiple sequential axial images from base to vertex without intravenous contrast.    FINDINGS:  Ventricles and basal cisterns are normal.  No evidence of hemorrhage, mass effect or midline shift.  No cerebral or cerebral parenchymal abnormalities.  Calvarium is intact.  Mucosal thickening of the ethmoid sinuses.  Mastoid air cells are clear. Periventricular and deep white matter changes consistent with small vessel ischemic change.  No extra-axial acute fluid collection.    IMPRESSION:  1.  No evidence of acute intracranial process.      All CT scans at [this location] are performed using dose modulation techniques as appropriate to a performed exam including the following: automated exposure control; adjustment of the mA and/or kV according to patient size (this includes techniques or standardized protocols for targeted exams where dose is matched to indication / reason for exam; i.e. extremities or head); use of iterative reconstruction technique.      Finalized on: 2/26/2025 7:54 PM By:  Angel Denny MD  [facility]# [id number]      2025-02-26 19:56:[id number]     [facility]                                     X-Ray Chest AP Portable (Final result)  Result time 02/26/25 16:38:00      Final result by Melo Temple MD (Timothy) (02/26/25 16:38:00)                   Impression:      Clear lungs.      Electronically signed by: Melo Temple MD  Date:    02/26/2025  Time:    16:38               Narrative:     EXAMINATION:  XR CHEST AP PORTABLE    CLINICAL HISTORY:  <Diagnosis>, Chest Pain;    COMPARISON:  Chest, 11/14/2024.    FINDINGS:  One view.    Atherosclerosis thoracic aorta.  Heart size is normal. The lung fields are clear. No acute cardiopulmonary infiltrate.                                      Assessment/Plan:     * Syncope and collapse  --Head CT: pending  --Echo, US b/l carotid ordered   --AM Orthost[facility], TSH/T4 ordered for further work up  --holding home antihypertensives and sedating medications  --PT/OT consulted for eval, post acute therapy recs pending  --fall precautions, neuro checks  --admit to telemetry for cardiac monitoring        AAA (abdominal aortic aneurysm)  Last imaging in 2023. If there's been any change in aneurysm size, this could affect afterload which could indirectly contribute to patient's acute presentation. Will obtain CTA abdomen/pelvis to assess. Abdominal exam is benign.       Chronic diastolic heart failure  --Last ECHO from AUG 2024 confirm EF 65 %  --BNP elevated, but stable compared to previous on file  --no overt edema on exam, no evidence of vascular congestion on CXR  --no aggressive iv diuresis at this time  --monitor electrolytes, UOP  --strict I&O and daily weights ordered      Vascular dementia, moderate, without behavioral disturbance, psychotic disturbance, mood disturbance, and anxiety  --mild, cognition intact, oriented x4  --continue home memantine  --neuro checks    GERD (gastroesophageal reflux disease)  --stable  --continue home PPI therapy      COPD, severe  --stable on room air, not on home O2  --continue home maintenance therapy  --Monitor clinically for respiratory distress, Q4H O2 sats   --Encourage light physical activity to maintain lung function   --follows pulmonology OP, f/u at discharge    Coronary artery disease of native artery of native heart with stable angina pectoris  --stable, no active chest pain at this time  --troponin negative x1    --continue home statin and aspirin therapy  --monitor clinically, PRN EKG      Essential hypertension  --home medications include:  Norvasc, clonidine, Coreg, olmesartan  --holding home medications given patient's presenting symptom of syncope  --slowly restart as tolerated   --PRN IV hydralazine and IV labetalol for breakthrough  --Q4HVS      Hyperlipidemia  --continue statin therapy on admission        VTE Risk Mitigation (From admission, onward)      None               On 02/26/2025, patient should be placed in hospital observation services under my care.             Mert Bah MD  Department of Hospital Medicine  [facility] - Emergency Dept.

## 2025-02-28 PROBLEM — I95.1 ORTHOSTATIC HYPOTENSION: Status: ACTIVE | Noted: 2025-02-28

## 2025-02-28 LAB
ALBUMIN SERPL BCP-MCNC: 3.1 G/DL (ref 3.5–5.2)
ALP SERPL-CCNC: 53 U/L (ref 40–150)
ALT SERPL W/O P-5'-P-CCNC: 12 U/L (ref 10–44)
ANION GAP SERPL CALC-SCNC: 7 MMOL/L (ref 8–16)
AST SERPL-CCNC: 18 U/L (ref 10–40)
BASOPHILS # BLD AUTO: 0.06 K/UL (ref 0–0.2)
BASOPHILS NFR BLD: 1.1 % (ref 0–1.9)
BILIRUB SERPL-MCNC: 0.4 MG/DL (ref 0.1–1)
BUN SERPL-MCNC: 6 MG/DL (ref 8–23)
CALCIUM SERPL-MCNC: 8.9 MG/DL (ref 8.7–10.5)
CHLORIDE SERPL-SCNC: 106 MMOL/L (ref 95–110)
CO2 SERPL-SCNC: 23 MMOL/L (ref 23–29)
CREAT SERPL-MCNC: 0.7 MG/DL (ref 0.5–1.4)
DIFFERENTIAL METHOD BLD: ABNORMAL
EJECTION FRACTION- HIGH: 73 %
END DIASTOLIC INDEX-HIGH: 165 ML/M2
END DIASTOLIC INDEX-LOW: 101 ML/M2
END SYSTOLIC INDEX-HIGH: 64 ML/M2
END SYSTOLIC INDEX-LOW: 28 ML/M2
EOSINOPHIL # BLD AUTO: 0.4 K/UL (ref 0–0.5)
EOSINOPHIL NFR BLD: 6.7 % (ref 0–8)
ERYTHROCYTE [DISTWIDTH] IN BLOOD BY AUTOMATED COUNT: 13.8 % (ref 11.5–14.5)
EST. GFR  (NO RACE VARIABLE): >60 ML/MIN/1.73 M^2
GLUCOSE SERPL-MCNC: 84 MG/DL (ref 70–110)
HCT VFR BLD AUTO: 30.3 % (ref 37–48.5)
HGB BLD-MCNC: 10 G/DL (ref 12–16)
IMM GRANULOCYTES # BLD AUTO: 0.01 K/UL (ref 0–0.04)
IMM GRANULOCYTES NFR BLD AUTO: 0.2 % (ref 0–0.5)
LYMPHOCYTES # BLD AUTO: 1.6 K/UL (ref 1–4.8)
LYMPHOCYTES NFR BLD: 29.1 % (ref 18–48)
MAGNESIUM SERPL-MCNC: 1.9 MG/DL (ref 1.6–2.6)
MCH RBC QN AUTO: 30.6 PG (ref 27–31)
MCHC RBC AUTO-ENTMCNC: 33 G/DL (ref 32–36)
MCV RBC AUTO: 93 FL (ref 82–98)
MONOCYTES # BLD AUTO: 0.8 K/UL (ref 0.3–1)
MONOCYTES NFR BLD: 15 % (ref 4–15)
NEUTROPHILS # BLD AUTO: 2.6 K/UL (ref 1.8–7.7)
NEUTROPHILS NFR BLD: 47.9 % (ref 38–73)
NRBC BLD-RTO: 0 /100 WBC
NUC REST EJECTION FRACTION: 90
NUC STRESS EJECTION FRACTION: 95 %
OHS CV CPX 85 PERCENT MAX PREDICTED HEART RATE MALE: 128
OHS CV CPX MAX PREDICTED HEART RATE: 150
OHS CV CPX PATIENT IS FEMALE: 1
OHS CV CPX PATIENT IS MALE: 0
OHS CV INITIAL DOSE: 10 MCG/KG/MIN
OHS CV PEAK DOSE: 33.7 MCG/KG/MIN
PHOSPHATE SERPL-MCNC: 2.9 MG/DL (ref 2.7–4.5)
PLATELET # BLD AUTO: 254 K/UL (ref 150–450)
PMV BLD AUTO: 9.3 FL (ref 9.2–12.9)
POTASSIUM SERPL-SCNC: 3.9 MMOL/L (ref 3.5–5.1)
PROT SERPL-MCNC: 6.1 G/DL (ref 6–8.4)
RBC # BLD AUTO: 3.27 M/UL (ref 4–5.4)
RETIRED EF AND QEF - SEE NOTES: 59 %
SODIUM SERPL-SCNC: 136 MMOL/L (ref 136–145)
WBC # BLD AUTO: 5.39 K/UL (ref 3.9–12.7)

## 2025-02-28 PROCEDURE — 84100 ASSAY OF PHOSPHORUS: CPT | Performed by: STUDENT IN AN ORGANIZED HEALTH CARE EDUCATION/TRAINING PROGRAM

## 2025-02-28 PROCEDURE — 25000003 PHARM REV CODE 250: Performed by: NURSE PRACTITIONER

## 2025-02-28 PROCEDURE — 99222 1ST HOSP IP/OBS MODERATE 55: CPT | Mod: 25,ICN,, | Performed by: INTERNAL MEDICINE

## 2025-02-28 PROCEDURE — 83735 ASSAY OF MAGNESIUM: CPT | Performed by: STUDENT IN AN ORGANIZED HEALTH CARE EDUCATION/TRAINING PROGRAM

## 2025-02-28 PROCEDURE — 94761 N-INVAS EAR/PLS OXIMETRY MLT: CPT

## 2025-02-28 PROCEDURE — 36415 COLL VENOUS BLD VENIPUNCTURE: CPT | Performed by: STUDENT IN AN ORGANIZED HEALTH CARE EDUCATION/TRAINING PROGRAM

## 2025-02-28 PROCEDURE — A9502 TC99M TETROFOSMIN: HCPCS | Performed by: STUDENT IN AN ORGANIZED HEALTH CARE EDUCATION/TRAINING PROGRAM

## 2025-02-28 PROCEDURE — 21400001 HC TELEMETRY ROOM

## 2025-02-28 PROCEDURE — 25000003 PHARM REV CODE 250: Performed by: STUDENT IN AN ORGANIZED HEALTH CARE EDUCATION/TRAINING PROGRAM

## 2025-02-28 PROCEDURE — 80053 COMPREHEN METABOLIC PANEL: CPT | Performed by: STUDENT IN AN ORGANIZED HEALTH CARE EDUCATION/TRAINING PROGRAM

## 2025-02-28 PROCEDURE — 25000242 PHARM REV CODE 250 ALT 637 W/ HCPCS: Performed by: STUDENT IN AN ORGANIZED HEALTH CARE EDUCATION/TRAINING PROGRAM

## 2025-02-28 PROCEDURE — 99900035 HC TECH TIME PER 15 MIN (STAT)

## 2025-02-28 PROCEDURE — 63600175 PHARM REV CODE 636 W HCPCS: Performed by: STUDENT IN AN ORGANIZED HEALTH CARE EDUCATION/TRAINING PROGRAM

## 2025-02-28 PROCEDURE — 27000221 HC OXYGEN, UP TO 24 HOURS

## 2025-02-28 PROCEDURE — 94640 AIRWAY INHALATION TREATMENT: CPT

## 2025-02-28 PROCEDURE — 85025 COMPLETE CBC W/AUTO DIFF WBC: CPT | Performed by: STUDENT IN AN ORGANIZED HEALTH CARE EDUCATION/TRAINING PROGRAM

## 2025-02-28 RX ORDER — AMLODIPINE BESYLATE 5 MG/1
5 TABLET ORAL 2 TIMES DAILY
Status: DISCONTINUED | OUTPATIENT
Start: 2025-02-28 | End: 2025-02-28

## 2025-02-28 RX ORDER — AMLODIPINE BESYLATE 5 MG/1
5 TABLET ORAL DAILY
Status: DISCONTINUED | OUTPATIENT
Start: 2025-03-01 | End: 2025-03-01 | Stop reason: HOSPADM

## 2025-02-28 RX ORDER — PANTOPRAZOLE SODIUM 40 MG/1
40 TABLET, DELAYED RELEASE ORAL
Qty: 90 TABLET | Refills: 3 | Status: SHIPPED | OUTPATIENT
Start: 2025-02-28

## 2025-02-28 RX ORDER — REGADENOSON 0.08 MG/ML
0.4 INJECTION, SOLUTION INTRAVENOUS ONCE
Status: COMPLETED | OUTPATIENT
Start: 2025-02-28 | End: 2025-02-28

## 2025-02-28 RX ORDER — LOSARTAN POTASSIUM 25 MG/1
25 TABLET ORAL ONCE
Status: COMPLETED | OUTPATIENT
Start: 2025-02-28 | End: 2025-02-28

## 2025-02-28 RX ORDER — CLONIDINE HYDROCHLORIDE 0.1 MG/1
0.1 TABLET ORAL EVERY 6 HOURS PRN
Status: DISCONTINUED | OUTPATIENT
Start: 2025-02-28 | End: 2025-03-01 | Stop reason: HOSPADM

## 2025-02-28 RX ADMIN — TETROFOSMIN 10 MILLICURIE: 1.38 INJECTION, POWDER, LYOPHILIZED, FOR SOLUTION INTRAVENOUS at 11:02

## 2025-02-28 RX ADMIN — TETROFOSMIN 33.7 MILLICURIE: 1.38 INJECTION, POWDER, LYOPHILIZED, FOR SOLUTION INTRAVENOUS at 12:02

## 2025-02-28 RX ADMIN — ARFORMOTEROL TARTRATE 15 MCG: 15 SOLUTION RESPIRATORY (INHALATION) at 06:02

## 2025-02-28 RX ADMIN — SODIUM CHLORIDE: 9 INJECTION, SOLUTION INTRAVENOUS at 02:02

## 2025-02-28 RX ADMIN — DOCUSATE SODIUM 100 MG: 100 CAPSULE, LIQUID FILLED ORAL at 09:02

## 2025-02-28 RX ADMIN — ZOLPIDEM TARTRATE 10 MG: 5 TABLET, FILM COATED ORAL at 09:02

## 2025-02-28 RX ADMIN — BUDESONIDE INHALATION 0.5 MG: 0.5 SUSPENSION RESPIRATORY (INHALATION) at 06:02

## 2025-02-28 RX ADMIN — LOSARTAN POTASSIUM 25 MG: 25 TABLET, FILM COATED ORAL at 09:02

## 2025-02-28 RX ADMIN — SODIUM CHLORIDE: 9 INJECTION, SOLUTION INTRAVENOUS at 05:02

## 2025-02-28 RX ADMIN — ASPIRIN 81 MG: 81 TABLET, CHEWABLE ORAL at 08:02

## 2025-02-28 RX ADMIN — BUDESONIDE INHALATION 0.5 MG: 0.5 SUSPENSION RESPIRATORY (INHALATION) at 07:02

## 2025-02-28 RX ADMIN — MEMANTINE 10 MG: 10 TABLET ORAL at 08:02

## 2025-02-28 RX ADMIN — CLOPIDOGREL BISULFATE 75 MG: 75 TABLET ORAL at 08:02

## 2025-02-28 RX ADMIN — PANTOPRAZOLE SODIUM 40 MG: 40 TABLET, DELAYED RELEASE ORAL at 08:02

## 2025-02-28 RX ADMIN — MEMANTINE 10 MG: 10 TABLET ORAL at 09:02

## 2025-02-28 RX ADMIN — AMLODIPINE BESYLATE 5 MG: 5 TABLET ORAL at 09:02

## 2025-02-28 RX ADMIN — ARFORMOTEROL TARTRATE 15 MCG: 15 SOLUTION RESPIRATORY (INHALATION) at 07:02

## 2025-02-28 RX ADMIN — VITAM B12 100 MCG: 100 TAB at 08:02

## 2025-02-28 RX ADMIN — Medication 1000 UNITS: at 08:02

## 2025-02-28 RX ADMIN — REGADENOSON 0.4 MG: 0.08 INJECTION, SOLUTION INTRAVENOUS at 12:02

## 2025-02-28 NOTE — CONSULTS
[facility] ([facility])  Cardiology  Consult Note    Patient Name: Gemma Vick  MRN: [id number]  Admission Date: 2/26/2025  Hospital Length of Stay: 1 days  Code Status: Full Code   Attending Provider: Eliseo Yip DO   Consulting Provider: Jeremy Santana MD  Primary Care Physician: Olga Altman MD  Principal Problem:Syncope and collapse    Patient information was obtained from patient and ER records.     Inpatient consult to Cardiology  Consult performed by: Jeremy Santana MD  Consult ordered by: Eliseo Yip DO        Subjective:     Chief Complaint:  syncope     HPI:   70-year-old woman with a past medical history of  coronary artery disease ( hx of PCI) , hypertension, chronic obstructive pulmonary disease, , hyperlipidemia, abdominal aortic aneurysm   admitted for syncope.He was found to be bradycardic , 40s. Pt is on coreg. Pt has atypical CP, relieved with certain positions.EKG repeat post bradycardia nml 80s.  Cardiac enzymes are normal.    Past Medical History:   Diagnosis Date    AAA (abdominal aortic aneurysm) 02/13/2014    Abdominal aneurysm     3    Acute coronary syndrome     Arthritis     BPPV (benign paroxysmal positional vertigo)     Carotid artery plaque     Carotid artery stenosis and occlusion 02/13/2014    Chronic back pain     COPD (chronic obstructive pulmonary disease)     Coronary artery disease     Dementia     Emphysema lung     Hyperlipidemia     Hypertension     Myocardial infarction     x3    Neuropathy     Personal history of COVID-19 06/09/2021 11/16/2020 +Covid, recovered at home        Past Surgical History:   Procedure Laterality Date    CARDIAC CATHETERIZATION      CORONARY ANGIOPLASTY      CORONARY STENT PLACEMENT N/A 9/12/2023    Procedure: INSERTION, STENT, CORONARY ARTERY;  Surgeon: Millie Juarez MD;  Location: [facility] CATH LAB;  Service: Cardiology;  Laterality: N/A;    HYSTERECTOMY  1988    LEFT HEART CATHETERIZATION Left 9/12/2023     Procedure: Left heart cath;  Surgeon: Millie Juarez MD;  Location: [facility] CATH LAB;  Service: Cardiology;  Laterality: Left;    PERCUTANEOUS CORONARY INTERVENTION, ARTERY N/A 9/12/2023    Procedure: Percutaneous coronary intervention;  Surgeon: Millie Juarez MD;  Location: [facility] CATH LAB;  Service: Cardiology;  Laterality: N/A;    THROMBECTOMY, CORONARY  9/12/2023    Procedure: Thrombectomy, Coronary;  Surgeon: Millie Juarez MD;  Location: [facility] CATH LAB;  Service: Cardiology;;    TONSILLECTOMY      TRANSFORAMINAL EPIDURAL INJECTION OF STEROID Right 12/1/2023    Procedure: Injection,steroid,epidural,transforaminal approach- right side, L4/5 and L5/S1;  Surgeon: Lydia Roberts MD;  Location: [facility];  Service: Pain Management;  Laterality: Right;       Review of patient's allergies indicates:  No Known Allergies    No current facility-administered medications on file prior to encounter.     Current Outpatient Medications on File Prior to Encounter   Medication Sig    albuterol-ipratropium (DUO-NEB) 2.5 mg-0.5 mg/3 mL nebulizer solution Take 3 mLs by nebulization every 6 (six) hours as needed for Wheezing.    amLODIPine (NORVASC) 10 MG tablet Take 1 tablet (10 mg total) by mouth once daily.    amLODIPine (NORVASC) 5 MG tablet TAKE 1 TABLET BY MOUTH ONCE DAILY    aspirin 81 MG Chew Take 81 mg by mouth once daily.    budesonide-glycopyr-formoterol (BREZTRI AEROSPHERE) 160-9-4.8 mcg/actuation HFAA Inhale 2 puffs into the lungs 2 (two) times a day.    carvediloL (COREG) 6.25 MG tablet TAKE 1 TABLET BY MOUTH TWICE DAILY WITH MEALS    cloNIDine (CATAPRES) 0.1 MG tablet Take 1 tablet (0.1 mg total) by mouth daily as needed (if Blood pressure above 170/90).    clopidogreL (PLAVIX) 75 mg tablet TAKE 1 TABLET BY MOUTH ONCE DAILY    COMBIVENT RESPIMAT  mcg/actuation inhaler Inhale 2 puffs into the lungs every 6 (six) hours as needed for Wheezing or Shortness of Breath.    cyanocobalamin (VITAMIN B-12) 100 MCG  tablet Take 100 mcg by mouth once daily.    diclofenac sodium (VOLTAREN) 1 % Gel Apply topically 4 (four) times daily.    dicyclomine (BENTYL) 10 MG capsule TAKE ONE CAPSULE BY MOUTH THREE TIMES DAILY AS NEEDED    estradioL (ESTRACE) 0.5 MG tablet TAKE 1 TABLET BY MOUTH ONCE DAILY    ezetimibe-simvastatin 10-40 mg (VYTORIN) 10-40 mg per tablet TAKE 1 TABLET BY MOUTH EVERY EVENING    famotidine (PEPCID) 20 MG tablet Take 1 tablet (20 mg total) by mouth 2 (two) times daily as needed for Heartburn.    furosemide (LASIX) 20 MG tablet TAKE 1 TABLET BY MOUTH ONCE DAILY AS NEEDED    inhalation spacing device (COMPACT SPACE CHAMBER) USE AS DIRECTED    memantine (NAMENDA) 10 MG Tab Take 1 tablet (10 mg total) by mouth 2 (two) times daily.    olmesartan (BENICAR) 40 MG tablet TAKE 1 TABLET BY MOUTH ONCE DAILY    OXYGEN-AIR DELIVERY SYSTEMS MISC 3 L by Misc.(Non-Drug; Combo Route) route every evening.    pantoprazole (PROTONIX) 40 MG tablet Take 1 tablet (40 mg total) by mouth once daily.    vitamin D (VITAMIN D3) 1000 units Tab Take 1,000 Units by mouth once daily.    zolpidem (AMBIEN) 10 mg Tab TAKE 1 TABLET BY MOUTH EVERY EVENING     Family History       Problem Relation (Age of Onset)    Breast cancer Paternal Aunt    Heart attack Brother    Heart attacks under age 50 Father, Brother    Heart disease Mother          Tobacco Use    Smoking status: Former     Current packs/day: 0.00     Average packs/day: 0.5 packs/day for 46.9 years (23.4 ttl pk-yrs)     Types: Cigarettes, Vaping with nicotine     Start date: 1970     Quit date: 2017     Years since quittin.8    Smokeless tobacco: Never    Tobacco comments:     3 MG NICOTINE FOR HEART.    Substance and Sexual Activity    Alcohol use: No    Drug use: No    Sexual activity: Not Currently     Birth control/protection: None     Review of Systems   Constitutional: Negative. Negative for weight gain.   HENT: Negative.     Eyes: Negative.    Cardiovascular: Negative.   Negative for chest pain, leg swelling and palpitations.   Respiratory: Negative.  Negative for shortness of breath.    Endocrine: Negative.    Hematologic/Lymphatic: Negative.    Skin: Negative.    Musculoskeletal:  Negative for muscle weakness.   Gastrointestinal: Negative.    Genitourinary: Negative.    Neurological: Negative.  Negative for dizziness.   Psychiatric/Behavioral: Negative.     Allergic/Immunologic: Negative.    All other systems reviewed and are negative.    Objective:     Vital Signs (Most Recent):  Temp: 98.3 °F (36.8 °C) (02/28/25 0750)  Pulse: 72 (02/28/25 0803)  Resp: 19 (02/28/25 0750)  BP: 134/60 (02/28/25 0750)  SpO2: (!) 92 % (02/28/25 0750) Vital Signs (24h Range):  Temp:  [97.6 °F (36.4 °C)-98.4 °F (36.9 °C)] 98.3 °F (36.8 °C)  Pulse:  [51-90] 72  Resp:  [16-20] 19  SpO2:  [91 %-99 %] 92 %  BP: (107-192)/(57-81) 134/60     Weight: 72.4 kg (159 lb 9.8 oz)  Body mass index is 29.19 kg/m².    SpO2: (!) 92 %         Intake/Output Summary (Last 24 hours) at 2/28/2025 0853  Last data filed at 2/28/2025 0636  Gross per 24 hour   Intake 2346.38 ml   Output --   Net 2346.38 ml       Lines/Drains/Airways       Peripheral Intravenous Line  Duration                  Peripheral IV - Single Lumen 02/26/25 1859 18 G Anterior;Left;Proximal Forearm 1 day                     Physical Exam  Vitals and nursing note reviewed.   Constitutional:       Appearance: She is well-developed.   HENT:      Head: Normocephalic and atraumatic.   Eyes:      Conjunctiva/sclera: Conjunctivae normal.      Pupils: Pupils are equal, round, and reactive to light.   Cardiovascular:      Rate and Rhythm: Normal rate and regular rhythm.      Pulses: Intact distal pulses.      Heart sounds: Normal heart sounds.   Pulmonary:      Effort: Pulmonary effort is normal.      Breath sounds: Normal breath sounds.   Abdominal:      General: Bowel sounds are normal.      Palpations: Abdomen is soft.   Musculoskeletal:         General: Normal  range of motion.      Cervical back: Normal range of motion and neck supple.   Skin:     General: Skin is warm and dry.   Neurological:      Mental Status: She is alert and oriented to person, place, and time.          Significant Labs: All pertinent lab results from the last 24 hours have been reviewed.    Significant Imaging: X-Ray: CXR: X-Ray Chest 1 View (CXR): No results found for this visit on 02/26/25.  Assessment and Plan:     * Syncope and collapse  70-year-old woman with a past medical history of  coronary artery disease ( hx of PCI) , hypertension, chronic obstructive pulmonary disease, , hyperlipidemia, abdominal aortic aneurysm   admitted for syncope.He was found to be bradycardic , 40s. Pt is on coreg. Pt has atypical CP, relieved with certain positions.EKG repeat post bradycardia nml 80s.  Cardiac enzymes are normal.      Stop b blockers, continue to monitor HR. CP atypical, nuclear stress test today.        VTE Risk Mitigation (From admission, onward)      None            Thank you for your consult. I will follow-up with patient. Please contact us if you have any additional questions.    Jeremy Santana MD  Cardiology   [facility] ([facility])

## 2025-02-28 NOTE — SUBJECTIVE & OBJECTIVE
Interval History: No acute events overnight, afebrile, hemodynamically stable. Multiple orthostatic vitals concerning for orthostatic hypotension, started on IV fluid resuscitation and adjusting home antihypertensives.  Carotid ultrasound negative.  CTA noted stable abdominal aortic aneurysm and redemonstration of known SMA short occlusion and aortic calcification, patient follows with Vascular surgery and Cardiology outpatient. MRI Head and cervical spine pending for evaluation of bilateral parasthesias.  Cardiology consulted for evaluation of recurrent chest pain in setting of history of prior known coronary artery disease status post stent placement.       Objective:     Vital Signs (Most Recent):  Temp: 97.6 °F (36.4 °C) (02/27/25 1949)  Pulse: 78 (02/27/25 1957)  Resp: 20 (02/27/25 1957)  BP: (!) 180/79 (02/27/25 1951)  SpO2: 97 % (02/27/25 1957) Vital Signs (24h Range):  Temp:  [97.6 °F (36.4 °C)-98.4 °F (36.9 °C)] 97.6 °F (36.4 °C)  Pulse:  [65-80] 78  Resp:  [17-20] 20  SpO2:  [93 %-100 %] 97 %  BP: (139-192)/(64-81) 180/79     Weight: 62.6 kg (138 lb)  Body mass index is 25.24 kg/m².  No intake or output data in the 24 hours ending 02/27/25 7493      Physical Exam  Vitals and nursing note reviewed.   Constitutional:       General: She is not in acute distress.     Appearance: She is not ill-appearing, toxic-appearing or diaphoretic.   HENT:      Head: Normocephalic and atraumatic.      Mouth/Throat:      Mouth: Mucous membranes are moist.   Eyes:      General: No scleral icterus.        Right eye: No discharge.         Left eye: No discharge.   Cardiovascular:      Rate and Rhythm: Normal rate and regular rhythm.      Heart sounds: Normal heart sounds.   Pulmonary:      Effort: Pulmonary effort is normal. No respiratory distress.      Breath sounds: Normal breath sounds. No wheezing, rhonchi or rales.   Abdominal:      General: Bowel sounds are normal. There is no distension.      Palpations: Abdomen is  soft.      Tenderness: There is no abdominal tenderness. There is no guarding.      Hernia: No hernia is present.   Musculoskeletal:      Cervical back: No rigidity.      Right lower leg: No edema.      Left lower leg: No edema.   Skin:     General: Skin is warm and dry.      Coloration: Skin is not jaundiced.   Neurological:      Mental Status: She is alert and oriented to person, place, and time. Mental status is at baseline.   Psychiatric:         Mood and Affect: Mood normal.         Behavior: Behavior normal.             Significant Labs: All pertinent labs within the past 24 hours have been reviewed.   Recent Labs   Lab 02/22/25  1419 02/26/25  1749   * 130*   K 4.2 3.9    101   CO2 22* 22*   BUN 10 14   CREATININE 0.9 0.8    111*   ANIONGAP 10 7*     Recent Labs   Lab 02/26/25  1749   MG 2.7*     Recent Labs   Lab 02/22/25  1419 02/26/25  1749   AST 25 19   ALT 20 16   ALKPHOS 57 61   BILITOT 0.5 0.4   ALBUMIN 3.5 3.3*     POCT Glucose:   Recent Labs   Lab 02/26/25  1600   POCTGLUCOSE 118*    Recent Labs   Lab 02/22/25  1419 02/26/25  1749   WBC 8.85 8.35   HGB 11.2* 11.0*   HCT 34.4* 32.1*    261   GRAN 69.8  6.2 78.4*  6.5                     Significant Imaging:  I have reviewed all pertinent imaging results/findings within the past 24 hours.   X-Ray Chest AP Portable   Final Result      Clear lungs.         Electronically signed by: Melo Temple MD   Date:    02/27/2025   Time:    20:11      CTA Abdomen and Pelvis   Final Result       Stable AAA and other findings as detailed above      All CT scans at [this location] are performed using dose modulation techniques as appropriate to a performed exam including the following: automated exposure control; adjustment of the mA and/or kV according to patient size (this includes techniques or standardized protocols for targeted exams where dose is matched to indication / reason for exam; i.e. extremities or head); use of iterative  reconstruction technique.      Finalized on: 2/26/2025 9:13 PM By:  Magdalena Gallardo MD   [facility]# [id number]      2025-02-26 21:16:02.961     [facility]      US Carotid Bilateral   Final Result      1. Normal velocities and ratios in the extracranial right carotid system.  Mild to moderate partially calcified plaque is present. Peak systolic velocity in the right ICA is 155 cm/sec. No significant stenosis.      2. Normal velocities and ratios in the extracranial left carotid system. Mild partially calcified plaque is present. Peak systolic velocity in the left ICA is 154 cm/sec. No significant stenosis.      3. Normal antegrade flow in vertebral arteries bilaterally.      Measurements are based on NASCET criteria.      Stenosis of  % - validated velocity measurements with angiographic measurements, velocity criteria are extrapolated from diameter data as defined by the Society of Radiologists in Ultrasound Consensus Conference Radiology 2003; 229;340-346.         Electronically signed by: Melo Temple MD   Date:    02/26/2025   Time:    21:45      CT Head Without Contrast   Final Result      X-Ray Chest AP Portable   Final Result      Clear lungs.         Electronically signed by: Melo Temple MD   Date:    02/26/2025   Time:    16:38      MRI Brain Without Contrast    (Results Pending)   MRI Cervical Spine Without Contrast    (Results Pending)       Inpatient Medications:  Continuous Infusions:   0.9% NaCl   Intravenous Continuous 75 mL/hr at 02/27/25 1107 New Bag at 02/27/25 1107       Scheduled Meds:   amLODIPine  5 mg Oral Daily    arformoteroL  15 mcg Nebulization BID    aspirin  81 mg Oral Daily    budesonide  0.5 mg Nebulization Q12H    clopidogreL  75 mg Oral Daily    cyanocobalamin  100 mcg Oral Daily    docusate sodium  100 mg Oral BID    memantine  10 mg Oral BID    pantoprazole  40 mg Oral Daily    vitamin D  1,000 Units Oral Daily       PRN Meds:  Current Facility-Administered Medications:      acetaminophen, 650 mg, Oral, Q6H PRN    albuterol-ipratropium, 3 mL, Nebulization, Q6H PRN    hydrALAZINE, 5 mg, Intravenous, Q8H PRN    HYDROcodone-acetaminophen, 1 tablet, Oral, Q6H PRN    hydrOXYzine HCL, 25 mg, Oral, TID PRN    ibuprofen, 800 mg, Oral, Q6H PRN    labetaloL, 10 mg, Intravenous, Q6H PRN    nitroGLYCERIN, 0.4 mg, Sublingual, Q5 Min PRN    zolpidem, 10 mg, Oral, Nightly PRN

## 2025-02-28 NOTE — HPI
70-year-old woman with a past medical history of  coronary artery disease ( hx of PCI) , hypertension, chronic obstructive pulmonary disease, , hyperlipidemia, abdominal aortic aneurysm   admitted for syncope.He was found to be bradycardic , 40s. Pt is on coreg. Pt has atypical CP, relieved with certain positions.EKG repeat post bradycardia nml 80s.  Cardiac enzymes are normal.

## 2025-02-28 NOTE — HOSPITAL COURSE
Admitted to Hospital Medicine for evaluation of syncope concerns.  Multiple orthostatic vitals concerning for orthostatic hypotension, started on IV fluid resuscitation and adjusting home antihypertensives.  Carotid ultrasound negative.  CTA noted stable abdominal aortic aneurysm and redemonstration of known SMA short occlusion and aortic calcification, patient follows with Vascular surgery and Cardiology outpatient. MRI brain nonacute and MRI cervical spine with multilevel degenerative changes.  Bilateral upper extremity paresthesias self-resolved with alleviation in blood pressure.  Cardiology consulted for evaluation of recurrent chest pain in setting of history of prior known coronary artery disease status post stent placement.  Cardiology consult recommendations for discontinuing carvedilol due to orthostatic hypotension concerns.  Evaluation of chest pain with nuclear test test nonacute.  Collateral obtained per patient and family, history of of recurrent orthostatic syncope/presyncope concerns somewhat correlates with initiation of carvedilol in the past.  Extensive counseling provided on lifestyle modifications for orthostatic hypotension.  Counseled on following up with Cardiology for Holter monitor outpatient.  Adjusted home antihypertensive for more optimal blood pressure control. Per NIH data, (https://pubmed.ncbi.nlm.nih.gov/02058478/), risk of orthostatic hypotension with ?-Blockers > Nitrates > Diuretics >  ?-Blockers > Calcium channel blockers> Clonidine > ACE-inhibitors, Angiotensin II receptor blockers. Memantine also has some risk. After shared decision-making with family/patient will plan for Discontinuing Carvediolol, Continue amlodipine at recently lowered 5mg dose, Continue Olmesartan, Continue Clonidine PRN. Continue digital HTN program.  Close followup with Cardiology and Cardiac monitor evaluation. Counseled on deferring Memantine dosing adjustments to Neurology followup     Discharge plan  of care was discussed at length with patient and family at bedside including patient's need for close outpatient follow-up.  Counseled on outpatient Cardiology, Neurology follow up for orthostatic hypotension, inpatient testing and imaging findings.  Counseled on follow up with already established vascular surgeon for follow up of AAA, SMA stenosis concerns.  Counseled on trial of vitamin-D/calcium supplementation given concerns for degenerative disc disease on imaging.  Instructed on PCP follow up within 1 week with repeat lab work to ensure normalization, follow up of pending labs, or any further evaluation as necessitated. Counseled on frequent home BP monitoring while resuming home BP meds.  Extensively counseled on lifestyle modifications for orthostatic hypotension. All questions and concerns were answered. Return precautions provided.  Patient instructed to return to the hospital or seek medical care if baseline status should suddenly change, return of symptoms occurs, or for any new or concerning symptoms that arise.  Patient was in agreement with plan of care going forward. Patient continued to remain afebrile, hemodynamically stable, able to tolerate diet, and ambulate without any significant concerns. Patient seen and examined on the day of discharge. Patient deemed stable for discharge.

## 2025-02-28 NOTE — PLAN OF CARE
POC reviewed w/ patient. Pt verbalizes understanding of plan  Chart/Orders reviewed  All safety precautions in place; No falls this shift  Pt resting in bed  Pain controlled  Continuous rounding c bed in lowest position, side rails up, call light in reach  Will continue to monitor until the end of shift  Problem: Adult Inpatient Plan of Care  Goal: Plan of Care Review  Flowsheets (Taken 2/28/2025 0244)  Plan of Care Reviewed With: patient  Goal: Patient-Specific Goal (Individualized)  Flowsheets (Taken 2/28/2025 0244)  Individualized Care Needs: none  Anxieties, Fears or Concerns: none  Patient/Family-Specific Goals (Include Timeframe): none  Goal: Absence of Hospital-Acquired Illness or Injury  Outcome: Progressing

## 2025-02-28 NOTE — PROGRESS NOTES
[facility] Medicine  Progress Note    Patient Name: Gemma Vick  MRN: [id number]  Patient Class: IP- Inpatient   Admission Date: 2/26/2025  Length of Stay: 0 days  Attending Physician: Eliseo Yip DO  Primary Care Provider: Olga Altman MD        Subjective     Principal Problem:Syncope and collapse        HPI:  Gemma Vick is a 70-year-old woman with a past medical history of myocardial infarction, coronary artery disease, hypertension, chronic obstructive pulmonary disease, chronic back pain, benign paroxysmal positional vertigo, neuropathy, hyperlipidemia, abdominal aortic aneurysm, and dementia without cognitive or behavioral disturbances who presented to the ER with syncope. She experienced a syncopal episode at the doctors office earlier today and denies any trauma. Immediately after the event, she was noted to have a heart rate in the thirties. This is her second syncopal episode in four days. She reports gradually worsening weakness since the first episode, along with cough, chills, and fatigue leading up to her appointment. She also describes vaping as a method to reduce cigarette use. She denies any chest pain, chest tightness, or palpitations. Of note, patient's daughter reports symptoms are typically associated with patient's heart rate dropping to the low 30s and even once reaching high 20s. Denies any new medications started.     Upon arrival in the ER, initial vital signs were /90, pulse 63, RR 18, temp 97.7°F, and SpO2 96%. Abnormal labs included a BNP of 163 and sodium of 130, with other labs grossly within acceptable limits; pertinent negatives included normal TSH, normal urinalysis, negative troponin, and stable H&H. No EKG findings were provided, while CXR was unremarkable and head CT and CTA of the abdomen remain pending. Cardiology was contacted by the ER physician and recommended a consult order if admitted, planning to evaluate the patient on  rounds tomorrow. Hospital Medicine called for admission.    Overview/Hospital Course:  Admitted to Hospital Medicine for evaluation of syncope concerns.  Multiple orthostatic vitals concerning for orthostatic hypotension, started on IV fluid resuscitation and adjusting home antihypertensives.  Carotid ultrasound negative.  CTA noted stable abdominal aortic aneurysm and redemonstration of known SMA short occlusion and aortic calcification, patient follows with Vascular surgery and Cardiology outpatient. MRI Head and cervical spine pending for evaluation of bilateral parasthesias.  Cardiology consulted for evaluation of recurrent chest pain in setting of history of prior known coronary artery disease status post stent placement.     Interval History: No acute events overnight, afebrile, hemodynamically stable. Multiple orthostatic vitals concerning for orthostatic hypotension, started on IV fluid resuscitation and adjusting home antihypertensives.  Carotid ultrasound negative.  CTA noted stable abdominal aortic aneurysm and redemonstration of known SMA short occlusion and aortic calcification, patient follows with Vascular surgery and Cardiology outpatient. MRI Head and cervical spine pending for evaluation of bilateral parasthesias.  Cardiology consulted for evaluation of recurrent chest pain in setting of history of prior known coronary artery disease status post stent placement.       Objective:     Vital Signs (Most Recent):  Temp: 97.6 °F (36.4 °C) (02/27/25 1949)  Pulse: 78 (02/27/25 1957)  Resp: 20 (02/27/25 1957)  BP: (!) 180/79 (02/27/25 1951)  SpO2: 97 % (02/27/25 1957) Vital Signs (24h Range):  Temp:  [97.6 °F (36.4 °C)-98.4 °F (36.9 °C)] 97.6 °F (36.4 °C)  Pulse:  [65-80] 78  Resp:  [17-20] 20  SpO2:  [93 %-100 %] 97 %  BP: (139-192)/(64-81) 180/79     Weight: 62.6 kg (138 lb)  Body mass index is 25.24 kg/m².  No intake or output data in the 24 hours ending 02/27/25 8714      Physical Exam  Vitals and  nursing note reviewed.   Constitutional:       General: She is not in acute distress.     Appearance: She is not ill-appearing, toxic-appearing or diaphoretic.   HENT:      Head: Normocephalic and atraumatic.      Mouth/Throat:      Mouth: Mucous membranes are moist.   Eyes:      General: No scleral icterus.        Right eye: No discharge.         Left eye: No discharge.   Cardiovascular:      Rate and Rhythm: Normal rate and regular rhythm.      Heart sounds: Normal heart sounds.   Pulmonary:      Effort: Pulmonary effort is normal. No respiratory distress.      Breath sounds: Normal breath sounds. No wheezing, rhonchi or rales.   Abdominal:      General: Bowel sounds are normal. There is no distension.      Palpations: Abdomen is soft.      Tenderness: There is no abdominal tenderness. There is no guarding.      Hernia: No hernia is present.   Musculoskeletal:      Cervical back: No rigidity.      Right lower leg: No edema.      Left lower leg: No edema.   Skin:     General: Skin is warm and dry.      Coloration: Skin is not jaundiced.   Neurological:      Mental Status: She is alert and oriented to person, place, and time. Mental status is at baseline.   Psychiatric:         Mood and Affect: Mood normal.         Behavior: Behavior normal.             Significant Labs: All pertinent labs within the past 24 hours have been reviewed.   Recent Labs   Lab 02/22/25  1419 02/26/25  1749   * 130*   K 4.2 3.9    101   CO2 22* 22*   BUN 10 14   CREATININE 0.9 0.8    111*   ANIONGAP 10 7*     Recent Labs   Lab 02/26/25  1749   MG 2.7*     Recent Labs   Lab 02/22/25  1419 02/26/25  1749   AST 25 19   ALT 20 16   ALKPHOS 57 61   BILITOT 0.5 0.4   ALBUMIN 3.5 3.3*     POCT Glucose:   Recent Labs   Lab 02/26/25  1600   POCTGLUCOSE 118*    Recent Labs   Lab 02/22/25  1419 02/26/25  1749   WBC 8.85 8.35   HGB 11.2* 11.0*   HCT 34.4* 32.1*    261   GRAN 69.8  6.2 78.4*  6.5                      Significant Imaging:  I have reviewed all pertinent imaging results/findings within the past 24 hours.   X-Ray Chest AP Portable   Final Result      Clear lungs.         Electronically signed by: Melo Temple MD   Date:    02/27/2025   Time:    20:11      CTA Abdomen and Pelvis   Final Result       Stable AAA and other findings as detailed above      All CT scans at [this location] are performed using dose modulation techniques as appropriate to a performed exam including the following: automated exposure control; adjustment of the mA and/or kV according to patient size (this includes techniques or standardized protocols for targeted exams where dose is matched to indication / reason for exam; i.e. extremities or head); use of iterative reconstruction technique.      Finalized on: 2/26/2025 9:13 PM By:  Magdalena Gallardo MD   [facility]# [id number]      2025-02-26 21:16:02.961     [facility]      US Carotid Bilateral   Final Result      1. Normal velocities and ratios in the extracranial right carotid system.  Mild to moderate partially calcified plaque is present. Peak systolic velocity in the right ICA is 155 cm/sec. No significant stenosis.      2. Normal velocities and ratios in the extracranial left carotid system. Mild partially calcified plaque is present. Peak systolic velocity in the left ICA is 154 cm/sec. No significant stenosis.      3. Normal antegrade flow in vertebral arteries bilaterally.      Measurements are based on NASCET criteria.      Stenosis of  % - validated velocity measurements with angiographic measurements, velocity criteria are extrapolated from diameter data as defined by the Society of Radiologists in Ultrasound Consensus Conference Radiology 2003; 229;340-346.         Electronically signed by: Melo Temple MD   Date:    02/26/2025   Time:    21:45      CT Head Without Contrast   Final Result      X-Ray Chest AP Portable   Final Result      Clear lungs.         Electronically signed  by: Melo Temple MD   Date:    02/26/2025   Time:    16:38      MRI Brain Without Contrast    (Results Pending)   MRI Cervical Spine Without Contrast    (Results Pending)       Inpatient Medications:  Continuous Infusions:   0.9% NaCl   Intravenous Continuous 75 mL/hr at 02/27/25 1107 New Bag at 02/27/25 1107       Scheduled Meds:   amLODIPine  5 mg Oral Daily    arformoteroL  15 mcg Nebulization BID    aspirin  81 mg Oral Daily    budesonide  0.5 mg Nebulization Q12H    clopidogreL  75 mg Oral Daily    cyanocobalamin  100 mcg Oral Daily    docusate sodium  100 mg Oral BID    memantine  10 mg Oral BID    pantoprazole  40 mg Oral Daily    vitamin D  1,000 Units Oral Daily       PRN Meds:  Current Facility-Administered Medications:     acetaminophen, 650 mg, Oral, Q6H PRN    albuterol-ipratropium, 3 mL, Nebulization, Q6H PRN    hydrALAZINE, 5 mg, Intravenous, Q8H PRN    HYDROcodone-acetaminophen, 1 tablet, Oral, Q6H PRN    hydrOXYzine HCL, 25 mg, Oral, TID PRN    ibuprofen, 800 mg, Oral, Q6H PRN    labetaloL, 10 mg, Intravenous, Q6H PRN    nitroGLYCERIN, 0.4 mg, Sublingual, Q5 Min PRN    zolpidem, 10 mg, Oral, Nightly PRN          Assessment and Plan     * Syncope and collapse  --Head CT: nonacute  --Carotid ultrasound negative.  CTA noted stable abdominal aortic aneurysm and redomenstration of known SMA short occlusion and aortic calcification, patient follows with Vascular surgery and Cardiology outpatient. MRI Head and cervical spine pending for evaluation of bilateral parasthesias.  --Multiple orthostatic vitals concerning for orthostatic hypotension, started on IV fluid resuscitation and adjusting home antihypertensives.        -Cardiology consulted for evaluation of recurrent chest pain in setting of history of prior known coronary artery disease status post stent placement.   --PT/OT consulted for eval, post acute therapy recs pending  --fall precautions, neuro checks  --admit to telemetry for cardiac  monitoring        Chronic diastolic heart failure  --Last ECHO from AUG 2024 confirm EF 65 %  --BNP elevated, but stable compared to previous on file  --no overt edema on exam, no evidence of vascular congestion on CXR  --no aggressive iv diuresis at this time  --monitor electrolytes, UOP  --strict I&O and daily weights ordered      Vascular dementia, moderate, without behavioral disturbance, psychotic disturbance, mood disturbance, and anxiety  --mild, cognition intact, oriented x4  --continue home memantine  --neuro checks    GERD (gastroesophageal reflux disease)  --stable  --continue home PPI therapy      COPD, severe  --stable on room air, not on home O2  --continue home maintenance therapy  --Monitor clinically for respiratory distress, Q4H O2 sats   --Encourage light physical activity to maintain lung function   --follows pulmonology OP, f/u at discharge    AAA (abdominal aortic aneurysm)  Last imaging in 2023. If there's been any change in aneurysm size, this could affect afterload which could indirectly contribute to patient's acute presentation.  -CTA noted stable abdominal aortic aneurysm and redomenstration of known SMA short occlusion and aortic calcification, patient follows with Vascular surgery and Cardiology outpatient.     Insomnia  Continue home zolpidem      Coronary artery disease of native artery of native heart with stable angina pectoris  --continue home statin and aspirin therapy  --monitor clinically, PRN EKG  --Cardiology consulted for evaluation of recurrent chest pain in setting of history of prior known coronary artery disease status post stent placement.     Essential hypertension  --home medications include:  Norvasc, clonidine, Coreg, olmesartan  --cautiously resuming home medications given patient's presenting symptom of syncope concerning for orthostatic hypotension  --PRN IV hydralazine and IV labetalol for breakthrough  --Q4HVS      Hyperlipidemia  --continue statin therapy on  admission        VTE Risk Mitigation (From admission, onward)      None            Discharge Planning   ZIGGY:      Code Status: Full Code   Medical Readiness for Discharge Date:                  Eliseo Yip DO  Department of Hospital Medicine   '[facility]    Voice recognition software was used in the creation of this note/communication and any sound-alike/typographical errors which may have occurred despite initial review prior to signing should be taken in context when interpreting.  If such errors prevent a clear understanding of the note/communication, please contact the provider/office for clarification.

## 2025-02-28 NOTE — ASSESSMENT & PLAN NOTE
--continue home statin and aspirin therapy  --monitor clinically, PRN EKG  --Cardiology consulted for evaluation of recurrent chest pain in setting of history of prior known coronary artery disease status post stent placement.

## 2025-02-28 NOTE — SUBJECTIVE & OBJECTIVE
Past Medical History:   Diagnosis Date    AAA (abdominal aortic aneurysm) 02/13/2014    Abdominal aneurysm     3    Acute coronary syndrome     Arthritis     BPPV (benign paroxysmal positional vertigo)     Carotid artery plaque     Carotid artery stenosis and occlusion 02/13/2014    Chronic back pain     COPD (chronic obstructive pulmonary disease)     Coronary artery disease     Dementia     Emphysema lung     Hyperlipidemia     Hypertension     Myocardial infarction     x3    Neuropathy     Personal history of COVID-19 06/09/2021 11/16/2020 +Covid, recovered at home        Past Surgical History:   Procedure Laterality Date    CARDIAC CATHETERIZATION      CORONARY ANGIOPLASTY      CORONARY STENT PLACEMENT N/A 9/12/2023    Procedure: INSERTION, STENT, CORONARY ARTERY;  Surgeon: Millie Juarez MD;  Location: [facility] CATH LAB;  Service: Cardiology;  Laterality: N/A;    HYSTERECTOMY  1988    LEFT HEART CATHETERIZATION Left 9/12/2023    Procedure: Left heart cath;  Surgeon: Millie Juarez MD;  Location: [facility] CATH LAB;  Service: Cardiology;  Laterality: Left;    PERCUTANEOUS CORONARY INTERVENTION, ARTERY N/A 9/12/2023    Procedure: Percutaneous coronary intervention;  Surgeon: Millie Juarez MD;  Location: [facility] CATH LAB;  Service: Cardiology;  Laterality: N/A;    THROMBECTOMY, CORONARY  9/12/2023    Procedure: Thrombectomy, Coronary;  Surgeon: Millie Juarez MD;  Location: [facility] CATH LAB;  Service: Cardiology;;    TONSILLECTOMY      TRANSFORAMINAL EPIDURAL INJECTION OF STEROID Right 12/1/2023    Procedure: Injection,steroid,epidural,transforaminal approach- right side, L4/5 and L5/S1;  Surgeon: Lydia Roberts MD;  Location: [facility];  Service: Pain Management;  Laterality: Right;       Review of patient's allergies indicates:  No Known Allergies    No current facility-administered medications on file prior to encounter.     Current Outpatient Medications on File Prior to Encounter   Medication Sig     albuterol-ipratropium (DUO-NEB) 2.5 mg-0.5 mg/3 mL nebulizer solution Take 3 mLs by nebulization every 6 (six) hours as needed for Wheezing.    amLODIPine (NORVASC) 10 MG tablet Take 1 tablet (10 mg total) by mouth once daily.    amLODIPine (NORVASC) 5 MG tablet TAKE 1 TABLET BY MOUTH ONCE DAILY    aspirin 81 MG Chew Take 81 mg by mouth once daily.    budesonide-glycopyr-formoterol (BREZTRI AEROSPHERE) 160-9-4.8 mcg/actuation HFAA Inhale 2 puffs into the lungs 2 (two) times a day.    carvediloL (COREG) 6.25 MG tablet TAKE 1 TABLET BY MOUTH TWICE DAILY WITH MEALS    cloNIDine (CATAPRES) 0.1 MG tablet Take 1 tablet (0.1 mg total) by mouth daily as needed (if Blood pressure above 170/90).    clopidogreL (PLAVIX) 75 mg tablet TAKE 1 TABLET BY MOUTH ONCE DAILY    COMBIVENT RESPIMAT  mcg/actuation inhaler Inhale 2 puffs into the lungs every 6 (six) hours as needed for Wheezing or Shortness of Breath.    cyanocobalamin (VITAMIN B-12) 100 MCG tablet Take 100 mcg by mouth once daily.    diclofenac sodium (VOLTAREN) 1 % Gel Apply topically 4 (four) times daily.    dicyclomine (BENTYL) 10 MG capsule TAKE ONE CAPSULE BY MOUTH THREE TIMES DAILY AS NEEDED    estradioL (ESTRACE) 0.5 MG tablet TAKE 1 TABLET BY MOUTH ONCE DAILY    ezetimibe-simvastatin 10-40 mg (VYTORIN) 10-40 mg per tablet TAKE 1 TABLET BY MOUTH EVERY EVENING    famotidine (PEPCID) 20 MG tablet Take 1 tablet (20 mg total) by mouth 2 (two) times daily as needed for Heartburn.    furosemide (LASIX) 20 MG tablet TAKE 1 TABLET BY MOUTH ONCE DAILY AS NEEDED    inhalation spacing device (COMPACT SPACE CHAMBER) USE AS DIRECTED    memantine (NAMENDA) 10 MG Tab Take 1 tablet (10 mg total) by mouth 2 (two) times daily.    olmesartan (BENICAR) 40 MG tablet TAKE 1 TABLET BY MOUTH ONCE DAILY    OXYGEN-AIR DELIVERY SYSTEMS MISC 3 L by Misc.(Non-Drug; Combo Route) route every evening.    pantoprazole (PROTONIX) 40 MG tablet Take 1 tablet (40 mg total) by mouth once daily.     vitamin D (VITAMIN D3) 1000 units Tab Take 1,000 Units by mouth once daily.    zolpidem (AMBIEN) 10 mg Tab TAKE 1 TABLET BY MOUTH EVERY EVENING     Family History       Problem Relation (Age of Onset)    Breast cancer Paternal Aunt    Heart attack Brother    Heart attacks under age 50 Father, Brother    Heart disease Mother          Tobacco Use    Smoking status: Former     Current packs/day: 0.00     Average packs/day: 0.5 packs/day for 46.9 years (23.4 ttl pk-yrs)     Types: Cigarettes, Vaping with nicotine     Start date: 1970     Quit date: 2017     Years since quittin.8    Smokeless tobacco: Never    Tobacco comments:     3 MG NICOTINE FOR HEART.    Substance and Sexual Activity    Alcohol use: No    Drug use: No    Sexual activity: Not Currently     Birth control/protection: None     Review of Systems   Constitutional: Negative. Negative for weight gain.   HENT: Negative.     Eyes: Negative.    Cardiovascular: Negative.  Negative for chest pain, leg swelling and palpitations.   Respiratory: Negative.  Negative for shortness of breath.    Endocrine: Negative.    Hematologic/Lymphatic: Negative.    Skin: Negative.    Musculoskeletal:  Negative for muscle weakness.   Gastrointestinal: Negative.    Genitourinary: Negative.    Neurological: Negative.  Negative for dizziness.   Psychiatric/Behavioral: Negative.     Allergic/Immunologic: Negative.    All other systems reviewed and are negative.    Objective:     Vital Signs (Most Recent):  Temp: 98.3 °F (36.8 °C) (25 0750)  Pulse: 72 (25 0803)  Resp: 19 (25 0750)  BP: 134/60 (25 0750)  SpO2: (!) 92 % (25 0750) Vital Signs (24h Range):  Temp:  [97.6 °F (36.4 °C)-98.4 °F (36.9 °C)] 98.3 °F (36.8 °C)  Pulse:  [51-90] 72  Resp:  [16-20] 19  SpO2:  [91 %-99 %] 92 %  BP: (107-192)/(57-81) 134/60     Weight: 72.4 kg (159 lb 9.8 oz)  Body mass index is 29.19 kg/m².    SpO2: (!) 92 %         Intake/Output Summary (Last 24 hours) at  2/28/2025 0853  Last data filed at 2/28/2025 0636  Gross per 24 hour   Intake 2346.38 ml   Output --   Net 2346.38 ml       Lines/Drains/Airways       Peripheral Intravenous Line  Duration                  Peripheral IV - Single Lumen 02/26/25 1859 18 G Anterior;Left;Proximal Forearm 1 day                     Physical Exam  Vitals and nursing note reviewed.   Constitutional:       Appearance: She is well-developed.   HENT:      Head: Normocephalic and atraumatic.   Eyes:      Conjunctiva/sclera: Conjunctivae normal.      Pupils: Pupils are equal, round, and reactive to light.   Cardiovascular:      Rate and Rhythm: Normal rate and regular rhythm.      Pulses: Intact distal pulses.      Heart sounds: Normal heart sounds.   Pulmonary:      Effort: Pulmonary effort is normal.      Breath sounds: Normal breath sounds.   Abdominal:      General: Bowel sounds are normal.      Palpations: Abdomen is soft.   Musculoskeletal:         General: Normal range of motion.      Cervical back: Normal range of motion and neck supple.   Skin:     General: Skin is warm and dry.   Neurological:      Mental Status: She is alert and oriented to person, place, and time.          Significant Labs: All pertinent lab results from the last 24 hours have been reviewed.    Significant Imaging: X-Ray: CXR: X-Ray Chest 1 View (CXR): No results found for this visit on 02/26/25.

## 2025-02-28 NOTE — ASSESSMENT & PLAN NOTE
Last imaging in 2023. If there's been any change in aneurysm size, this could affect afterload which could indirectly contribute to patient's acute presentation.  -CTA noted stable abdominal aortic aneurysm and redomenstration of known SMA short occlusion and aortic calcification, patient follows with Vascular surgery and Cardiology outpatient.

## 2025-02-28 NOTE — ASSESSMENT & PLAN NOTE
--home medications include:  Norvasc, clonidine, Coreg, olmesartan  --cautiously resuming home medications given patient's presenting symptom of syncope concerning for orthostatic hypotension  --PRN IV hydralazine and IV labetalol for breakthrough  --Q4HVS

## 2025-02-28 NOTE — ASSESSMENT & PLAN NOTE
70-year-old woman with a past medical history of  coronary artery disease ( hx of PCI) , hypertension, chronic obstructive pulmonary disease, , hyperlipidemia, abdominal aortic aneurysm   admitted for syncope.He was found to be bradycardic , 40s. Pt is on coreg. Pt has atypical CP, relieved with certain positions.EKG repeat post bradycardia nml 80s.  Cardiac enzymes are normal.      Stop b blockers, continue to monitor HR. CP atypical, nuclear stress test today.

## 2025-02-28 NOTE — ASSESSMENT & PLAN NOTE
--Head CT: nonacute  --Carotid ultrasound negative.  CTA noted stable abdominal aortic aneurysm and redomenstration of known SMA short occlusion and aortic calcification, patient follows with Vascular surgery and Cardiology outpatient. MRI Head and cervical spine pending for evaluation of bilateral parasthesias.  --Multiple orthostatic vitals concerning for orthostatic hypotension, started on IV fluid resuscitation and adjusting home antihypertensives.        -Cardiology consulted for evaluation of recurrent chest pain in setting of history of prior known coronary artery disease status post stent placement.   --PT/OT consulted for eval, post acute therapy recs pending  --fall precautions, neuro checks  --admit to telemetry for cardiac monitoring

## 2025-02-28 NOTE — PLAN OF CARE
[facility] (Hospital)  Initial Discharge Assessment       Primary Care Provider: Olga Altman MD    Admission Diagnosis: Syncope and collapse [R55]  ASCVD (arteriosclerotic cardiovascular disease) [I25.10]  Vasovagal syncope [R55]  Hyponatremia [E87.1]  Syncope [R55]  Syncope, cardiogenic [R55]  Chronic obstructive pulmonary disease, unspecified COPD type [J44.9]  Hypertension, unspecified type [I10]    Admission Date: 2/26/2025  Expected Discharge Date:     Transition of Care Barriers: None    Payor: [employer] [facility] / Plan: [employer] / Product Type: Medicare Advantage /     Extended Emergency Contact Information  Primary Emergency Contact: TripUzma  Address: [address] [place] [address]  Mobile Phone: [phone]  Relation: Grandchild  Secondary Emergency Contact: Haseeb Vick  Mobile Phone: [phone]  Relation: Spouse    Discharge Plan A: Home with family         [facility] [address] [address]  [address] [address] [address]  Phone: [phone] Fax: [phone]      Initial Assessment (most recent)       Adult Discharge Assessment - 02/28/25 1200          Discharge Assessment    Assessment Type Discharge Planning Assessment     Confirmed/corrected address, phone number and insurance Yes     Confirmed Demographics Correct on Facesheet     Source of Information patient;family     Communicated ZIGGY with patient/caregiver Date not available/Unable to determine     Reason For Admission Syncope and collapse     People in Home spouse     Facility Arrived From: Home     Do you expect to return to your current living situation? Yes     Do you have help at home or someone to help you manage your care at home? Yes     Who are your caregiver(s) and their phone number(s)? Spouse     Prior to hospitilization cognitive status: Alert/Oriented     Current cognitive status:  Alert/Oriented     Walking or Climbing Stairs Difficulty yes     Walking or Climbing Stairs ambulation difficulty, requires equipment     Mobility Management rollator PRN use     Equipment Currently Used at Home rollator;oxygen   Home O2 at night only    Readmission within 30 days? No     Patient currently being followed by outpatient case management? No     Do you currently have service(s) that help you manage your care at home? No     Do you take prescription medications? Yes     Do you have prescription coverage? Yes     Do you have any problems affording any of your prescribed medications? No     Is the patient taking medications as prescribed? yes     Who is going to help you get home at discharge? Spouse     How do you get to doctors appointments? family or friend will provide     Are you on dialysis? No     Do you take coumadin? No     Discharge Plan A Home with family     DME Needed Upon Discharge  none     Discharge Plan discussed with: Patient   Uzma Chandra    Transition of Care Barriers None                   Anticipated DC dispo: Home; home with family   Prior Level of Function: Lives at home with spouse, indpt with Ad[facility] has rollator and night home O2 (3L)   PCP: MD Anupama      Comments: No expressed needs at this time. Pt reports having plenty of support at DC.     Pt's whiteboard updated with CM contact and anticipated discharge disposition. SW to remain available as needed.

## 2025-03-01 VITALS
HEART RATE: 76 BPM | RESPIRATION RATE: 18 BRPM | TEMPERATURE: 98 F | BODY MASS INDEX: 30.02 KG/M2 | HEIGHT: 61 IN | OXYGEN SATURATION: 96 % | DIASTOLIC BLOOD PRESSURE: 63 MMHG | SYSTOLIC BLOOD PRESSURE: 138 MMHG | WEIGHT: 159 LBS

## 2025-03-01 DIAGNOSIS — R55 SYNCOPE, UNSPECIFIED SYNCOPE TYPE: Primary | ICD-10-CM

## 2025-03-01 PROBLEM — R00.1 BRADYCARDIA: Status: ACTIVE | Noted: 2025-03-01

## 2025-03-01 LAB
ALBUMIN SERPL BCP-MCNC: 3.1 G/DL (ref 3.5–5.2)
ALP SERPL-CCNC: 55 U/L (ref 40–150)
ALT SERPL W/O P-5'-P-CCNC: 14 U/L (ref 10–44)
ANION GAP SERPL CALC-SCNC: 7 MMOL/L (ref 8–16)
AST SERPL-CCNC: 16 U/L (ref 10–40)
BASOPHILS # BLD AUTO: 0.07 K/UL (ref 0–0.2)
BASOPHILS NFR BLD: 1.3 % (ref 0–1.9)
BILIRUB SERPL-MCNC: 0.4 MG/DL (ref 0.1–1)
BUN SERPL-MCNC: 12 MG/DL (ref 8–23)
CALCIUM SERPL-MCNC: 8.8 MG/DL (ref 8.7–10.5)
CHLORIDE SERPL-SCNC: 106 MMOL/L (ref 95–110)
CO2 SERPL-SCNC: 23 MMOL/L (ref 23–29)
CREAT SERPL-MCNC: 0.8 MG/DL (ref 0.5–1.4)
DIFFERENTIAL METHOD BLD: ABNORMAL
EOSINOPHIL # BLD AUTO: 0.5 K/UL (ref 0–0.5)
EOSINOPHIL NFR BLD: 8.4 % (ref 0–8)
ERYTHROCYTE [DISTWIDTH] IN BLOOD BY AUTOMATED COUNT: 14.1 % (ref 11.5–14.5)
EST. GFR  (NO RACE VARIABLE): >60 ML/MIN/1.73 M^2
GLUCOSE SERPL-MCNC: 83 MG/DL (ref 70–110)
HCT VFR BLD AUTO: 30.1 % (ref 37–48.5)
HGB BLD-MCNC: 9.8 G/DL (ref 12–16)
IMM GRANULOCYTES # BLD AUTO: 0.02 K/UL (ref 0–0.04)
IMM GRANULOCYTES NFR BLD AUTO: 0.4 % (ref 0–0.5)
LYMPHOCYTES # BLD AUTO: 1.8 K/UL (ref 1–4.8)
LYMPHOCYTES NFR BLD: 31.7 % (ref 18–48)
MAGNESIUM SERPL-MCNC: 1.8 MG/DL (ref 1.6–2.6)
MCH RBC QN AUTO: 30.4 PG (ref 27–31)
MCHC RBC AUTO-ENTMCNC: 32.6 G/DL (ref 32–36)
MCV RBC AUTO: 94 FL (ref 82–98)
MONOCYTES # BLD AUTO: 0.9 K/UL (ref 0.3–1)
MONOCYTES NFR BLD: 15.2 % (ref 4–15)
NEUTROPHILS # BLD AUTO: 2.4 K/UL (ref 1.8–7.7)
NEUTROPHILS NFR BLD: 43 % (ref 38–73)
NRBC BLD-RTO: 0 /100 WBC
PHOSPHATE SERPL-MCNC: 3.4 MG/DL (ref 2.7–4.5)
PLATELET # BLD AUTO: 253 K/UL (ref 150–450)
PMV BLD AUTO: 9.4 FL (ref 9.2–12.9)
POTASSIUM SERPL-SCNC: 4.1 MMOL/L (ref 3.5–5.1)
PROT SERPL-MCNC: 5.6 G/DL (ref 6–8.4)
RBC # BLD AUTO: 3.22 M/UL (ref 4–5.4)
SODIUM SERPL-SCNC: 136 MMOL/L (ref 136–145)
WBC # BLD AUTO: 5.58 K/UL (ref 3.9–12.7)

## 2025-03-01 PROCEDURE — 94640 AIRWAY INHALATION TREATMENT: CPT

## 2025-03-01 PROCEDURE — 80053 COMPREHEN METABOLIC PANEL: CPT | Performed by: STUDENT IN AN ORGANIZED HEALTH CARE EDUCATION/TRAINING PROGRAM

## 2025-03-01 PROCEDURE — 85025 COMPLETE CBC W/AUTO DIFF WBC: CPT | Performed by: STUDENT IN AN ORGANIZED HEALTH CARE EDUCATION/TRAINING PROGRAM

## 2025-03-01 PROCEDURE — 25000242 PHARM REV CODE 250 ALT 637 W/ HCPCS: Performed by: STUDENT IN AN ORGANIZED HEALTH CARE EDUCATION/TRAINING PROGRAM

## 2025-03-01 PROCEDURE — 99900035 HC TECH TIME PER 15 MIN (STAT)

## 2025-03-01 PROCEDURE — 25000003 PHARM REV CODE 250: Performed by: STUDENT IN AN ORGANIZED HEALTH CARE EDUCATION/TRAINING PROGRAM

## 2025-03-01 PROCEDURE — 94761 N-INVAS EAR/PLS OXIMETRY MLT: CPT

## 2025-03-01 PROCEDURE — 99233 SBSQ HOSP IP/OBS HIGH 50: CPT | Mod: ,,, | Performed by: INTERNAL MEDICINE

## 2025-03-01 PROCEDURE — 27000221 HC OXYGEN, UP TO 24 HOURS

## 2025-03-01 PROCEDURE — 25000003 PHARM REV CODE 250: Performed by: NURSE PRACTITIONER

## 2025-03-01 PROCEDURE — 83735 ASSAY OF MAGNESIUM: CPT | Performed by: STUDENT IN AN ORGANIZED HEALTH CARE EDUCATION/TRAINING PROGRAM

## 2025-03-01 PROCEDURE — 36415 COLL VENOUS BLD VENIPUNCTURE: CPT | Performed by: STUDENT IN AN ORGANIZED HEALTH CARE EDUCATION/TRAINING PROGRAM

## 2025-03-01 PROCEDURE — 84100 ASSAY OF PHOSPHORUS: CPT | Performed by: STUDENT IN AN ORGANIZED HEALTH CARE EDUCATION/TRAINING PROGRAM

## 2025-03-01 RX ORDER — LOSARTAN POTASSIUM 50 MG/1
50 TABLET ORAL DAILY
Status: DISCONTINUED | OUTPATIENT
Start: 2025-03-01 | End: 2025-03-01 | Stop reason: HOSPADM

## 2025-03-01 RX ADMIN — SODIUM CHLORIDE: 9 INJECTION, SOLUTION INTRAVENOUS at 06:03

## 2025-03-01 RX ADMIN — PANTOPRAZOLE SODIUM 40 MG: 40 TABLET, DELAYED RELEASE ORAL at 08:03

## 2025-03-01 RX ADMIN — AMLODIPINE BESYLATE 5 MG: 5 TABLET ORAL at 08:03

## 2025-03-01 RX ADMIN — VITAM B12 100 MCG: 100 TAB at 08:03

## 2025-03-01 RX ADMIN — ARFORMOTEROL TARTRATE 15 MCG: 15 SOLUTION RESPIRATORY (INHALATION) at 07:03

## 2025-03-01 RX ADMIN — MEMANTINE 10 MG: 10 TABLET ORAL at 08:03

## 2025-03-01 RX ADMIN — ASPIRIN 81 MG: 81 TABLET, CHEWABLE ORAL at 08:03

## 2025-03-01 RX ADMIN — DOCUSATE SODIUM 100 MG: 100 CAPSULE, LIQUID FILLED ORAL at 08:03

## 2025-03-01 RX ADMIN — CLOPIDOGREL BISULFATE 75 MG: 75 TABLET ORAL at 08:03

## 2025-03-01 RX ADMIN — BUDESONIDE INHALATION 0.5 MG: 0.5 SUSPENSION RESPIRATORY (INHALATION) at 07:03

## 2025-03-01 RX ADMIN — LOSARTAN POTASSIUM 50 MG: 50 TABLET, FILM COATED ORAL at 11:03

## 2025-03-01 RX ADMIN — Medication 1000 UNITS: at 08:03

## 2025-03-01 NOTE — ASSESSMENT & PLAN NOTE
--home medications include:  Norvasc, clonidine, Coreg, olmesartan  --cautiously resuming home medications given patient's presenting symptom of syncope concerning for orthostatic hypotension  --PRN IV hydralazine and IV labetalol for breakthrough  --Q4HVS    --Cardiology consult recommendations for discontinuing carvedilol due to orthostatic hypotension concerns.  Evaluation of chest pain with nuclear test test nonacute.  --Collateral obtained per patient and family, history of of recurrent orthostatic syncope/presyncope concerns somewhat correlates with initiation of carvedilol in the past.    --Extensive counseling provided on lifestyle modifications for orthostatic hypotension.    --Counseled on following up with Cardiology for Holter monitor outpatient.    --Adjusting home antihypertensive for more optimal blood pressure control. Per NIH data, (https://pubmed.ncbi.nlm.nih.gov/59790824/), risk of orthostatic hypotension with ?-Blockers > Nitrates > Diuretics >  ?-Blockers > Calcium channel blockers, Clonidine > ACE-inhibitors, Angiotensin II receptor blockers. Memantine also has some risk.   --After share decision-making with family/patient will plan for Discontinuing Carvediolol, Continue amlodipine at recently lowered 5mg dose, Continue Olmesartan, Continue Clonidine PRN. Continue digital HTN program.  Close followup with Cardiology and Cardiac monitor evaluation. Counseled on deferring Memantine dosing to Neurology followup

## 2025-03-01 NOTE — PROGRESS NOTES
[facility] ([facility]  Cardiology  Progress Note    Patient Name: Gemma Vick  MRN: [id number]  Admission Date: 2/26/2025  Hospital Length of Stay: 2 days  Code Status: Full Code   Attending Physician: No att. providers found   Primary Care Physician: Olga Altman MD  Expected Discharge Date: 3/1/2025  Principal Problem:Syncope and collapse    Subjective:     Hospital Course:   3.1.2025  Ambulating fine.    Heart rates in the 70s at rest.            Review of Systems   Cardiovascular:  Negative for chest pain.     Objective:     Vital Signs (Most Recent):  Temp: 97.5 °F (36.4 °C) (03/01/25 0827)  Pulse: 76 (03/01/25 1111)  Resp: 18 (03/01/25 0827)  BP: 138/63 (03/01/25 0827)  SpO2: 96 % (03/01/25 0827) Vital Signs (24h Range):  Temp:  [97.5 °F (36.4 °C)-98.7 °F (37.1 °C)] 97.5 °F (36.4 °C)  Pulse:  [70-92] 76  Resp:  [16-19] 18  SpO2:  [90 %-98 %] 96 %  BP: (138-177)/(63-79) 138/63     Weight: 72.1 kg (159 lb)  Body mass index is 30.04 kg/m².     SpO2: 96 %       No intake or output data in the 24 hours ending 03/01/25 1345    Lines/Drains/Airways       None                      Physical Exam  Vitals reviewed.   Constitutional:       Appearance: She is well-developed.   Neck:      Vascular: No carotid bruit.   Cardiovascular:      Rate and Rhythm: Normal rate and regular rhythm.      Pulses: Intact distal pulses.      Heart sounds: Normal heart sounds. No murmur heard.  Pulmonary:      Breath sounds: Normal breath sounds.   Neurological:      Mental Status: She is oriented to person, place, and time.            Significant Labs: All pertinent lab results from the last 24 hours have been reviewed. and   Recent Lab Results         03/01/25  0513        Albumin 3.1       ALP 55       ALT 14       Anion Gap 7       AST 16       Baso # 0.07       Basophil % 1.3       BILIRUBIN TOTAL 0.4  Comment: For infants and newborns, interpretation of results should be based  on gestational age, weight and in agreement  with clinical  observations.    Premature Infant recommended reference ranges:  Up to 24 hours.............<8.0 mg/dL  Up to 48 hours............<12.0 mg/dL  3-5 days..................<15.0 mg/dL  6-29 days.................<15.0 mg/dL         BUN 12       Calcium 8.8       Chloride 106       CO2 23       Creatinine 0.8       Differential Method Automated       eGFR >60       Eos # 0.5       Eos % 8.4       Glucose 83       Gran # (ANC) 2.4       Gran % 43.0       Hematocrit 30.1       Hemoglobin 9.8       Immature Grans (Abs) 0.02  Comment: Mild elevation in immature granulocytes is non specific and   can be seen in a variety of conditions including stress response,   acute inflammation, trauma and pregnancy. Correlation with other   laboratory and clinical findings is essential.         Immature Granulocytes 0.4       Lymph # 1.8       Lymph % 31.7       Magnesium  1.8       MCH 30.4       MCHC 32.6       MCV 94       Mono # 0.9       Mono % 15.2       MPV 9.4       nRBC 0       Phosphorus Level 3.4       Platelet Count 253       Potassium 4.1       PROTEIN TOTAL 5.6       RBC 3.22       RDW 14.1       Sodium 136       WBC 5.58               Significant Imaging: Echocardiogram: Transthoracic echo (TTE) complete ([facility] Only):   Results for orders placed or performed during the hospital encounter of 02/26/25   Echo   Result Value Ref Range    BSA 1.65 m2    LA WIDTH 2.9 cm    LVOT stroke volume 70.6 cm3    LVIDd 3.3 (A) 3.5 - 6.0 cm    LV Systolic Volume 18 mL    LV Systolic Volume Index 11.0 mL/m2    LVIDs 2.3 2.1 - 4.0 cm    LV Diastolic Volume 43 mL    LV Diastolic Volume Index 26.38 mL/m2    Left Ventricular End Systolic Volume by Teichholz Method 18.13 mL    Left Ventricular End Diastolic Volume by Teichholz Method 42.84 mL    IVS 1.2 (A) 0.6 - 1.1 cm    LVOT diameter 1.9 cm    LVOT area 2.8 cm2    FS 30.3 28 - 44 %    Left Ventricle Relative Wall Thickness 0.73 cm    PW 1.2 (A) 0.6 - 1.1 cm    LV mass 124.8 g     LV Mass Index 76.6 g/m2    MV Peak E Oz 1.21 m/s    TDI LATERAL 0.09 m/s    TDI SEPTAL 0.10 m/s    E/E' ratio 13 m/s    MV Peak A Oz 1.02 m/s    TR Max Oz 2.7 m/s    E/A ratio 1.19     IVRT 83 msec    E wave deceleration time 194 msec    LV SEPTAL E/E' RATIO 12.1 m/s    CARLYN 14 mL/m2    LV LATERAL E/E' RATIO 13.4 m/s    LA Vol 23 cm3    LVOT peak oz 1.0 m/s    Left Ventricular Outflow Tract Mean Velocity 0.79 cm/s    Left Ventricular Outflow Tract Mean Gradient 2.68 mmHg    RV- lentz basal diam 2.7 cm    RVOT peak VTI 18.5 cm    TAPSE 1.92 cm    RV/LV Ratio 0.82 cm    LA size 2.5 cm    Left Atrium Minor Axis 3.5 cm    Left Atrium Major Axis 3.9 cm    RA Major Axis 4.14 cm    AV regurgitation pressure 1/2 time 630 ms    AR Max Zo 5.02 m/s    AV mean gradient 3 mmHg    AV peak gradient 5 mmHg    Ao peak oz 1.1 m/s    Ao VTI 29.5 cm    LVOT peak VTI 24.9 cm    AV valve area 2.4 cm²    AV Velocity Ratio 0.91     AV index (prosthetic) 0.84     ELIDA by Velocity Ratio 2.6 cm²    Mr max oz 4.91 m/s    MV stenosis pressure 1/2 time 56.25 ms    MV valve area p 1/2 method 3.91 cm2    PV mean gradient 2 mmHg    RVOT peak oz 0.80 m/s    Ao root annulus 3.64 cm    STJ 3.40 cm    Ascending aorta 3.25 cm    IVC diameter 2.14 cm    Mean e' 0.10 m/s    ZLVIDS -1.70     ZLVIDD -3.32     RA Width 2.5 cm    EF 60 %    TV resting pulmonary artery pressure 32 mmHg    RV TB RVSP 6 mmHg    Est. RA pres 3 mmHg    Narrative      Left Ventricle: The left ventricle is normal in size. Normal wall   thickness. There is concentric remodeling. There is normal systolic   function. Ejection fraction is approximately 60%. There is normal   diastolic function.    Right Ventricle: The right ventricle is normal in size. Wall thickness   is normal. Systolic function is normal.    Aortic Valve: There is moderate aortic valve sclerosis.    Pulmonary Artery: The estimated pulmonary artery systolic pressure is   32 mmHg.    IVC/SVC: Normal venous  pressure at 3 mmHg.       Assessment and Plan:     Brief HPI:     * Syncope and collapse  70-year-old woman with a past medical history of  coronary artery disease ( hx of PCI) , hypertension, chronic obstructive pulmonary disease, , hyperlipidemia, abdominal aortic aneurysm   admitted for syncope.He was found to be bradycardic , 40s. Pt is on coreg. Pt has atypical CP, relieved with certain positions.EKG repeat post bradycardia nml 80s.  Cardiac enzymes are normal.      Stop b blockers, continue to monitor HR. CP atypical, nuclear stress test today.    Bradycardia  Results off beta-blockers.    Continue to hold carvedilol on discharge   Explained to patient if blood pressure starts rising to the block on amlodipine to twice a day.    We will arrange for a cardiac monitor on discharge.        Discussed with Hospital Medicine.    Stable for discharge    VTE Risk Mitigation (From admission, onward)      None            Briseida Anand MD  Cardiology  [facility] ([facility])

## 2025-03-01 NOTE — SUBJECTIVE & OBJECTIVE
Interval History: No acute events overnight, afebrile, hemodynamically stable.   MRI brain nonacute and MRI cervical spine with multilevel degenerative changes.  Bilateral upper extremity paresthesias self-resolved with alleviation in blood pressure.  Cardiology consulted for evaluation of recurrent chest pain in setting of history of prior known coronary artery disease status post stent placement.  Cardiology consult recommendations for discontinuing carvedilol due to orthostatic hypotension concerns.  Evaluation of chest pain with nuclear stress test nonacute.  Collateral obtained per patient and family, history of recurrent orthostatic syncope/presyncope concerns somewhat correlates with initiation of carvedilol in the past.  Extensive counseling provided on lifestyle modifications for orthostatic hypotension.  Counseled on following up with Cardiology for Holter monitor outpatient.  Adjusting home antihypertensive for more optimal blood pressure control. Optimizing antihypertensive regimen for more optimal control given challenges due to simultaneous orthostatic hypotension concerns(see hospital course for more details)     Objective:     Vital Signs (Most Recent):  Temp: 97.7 °F (36.5 °C) (02/28/25 1935)  Pulse: 75 (02/28/25 2042)  Resp: 17 (02/28/25 1925)  BP: (!) 160/63 (02/28/25 1935)  SpO2: 97 % (02/28/25 1935) Vital Signs (24h Range):  Temp:  [97.7 °F (36.5 °C)-98.4 °F (36.9 °C)] 97.7 °F (36.5 °C)  Pulse:  [51-92] 75  Resp:  [16-19] 17  SpO2:  [90 %-99 %] 97 %  BP: (107-171)/(57-78) 160/63     Weight: 72.1 kg (159 lb)  Body mass index is 30.04 kg/m².    Intake/Output Summary (Last 24 hours) at 2/28/2025 2201  Last data filed at 2/28/2025 0636  Gross per 24 hour   Intake 2346.38 ml   Output --   Net 2346.38 ml         Physical Exam  Vitals and nursing note reviewed.   Constitutional:       General: She is not in acute distress.     Appearance: She is not ill-appearing, toxic-appearing or diaphoretic.    HENT:      Head: Normocephalic and atraumatic.      Mouth/Throat:      Mouth: Mucous membranes are moist.   Eyes:      General: No scleral icterus.        Right eye: No discharge.         Left eye: No discharge.   Cardiovascular:      Rate and Rhythm: Normal rate and regular rhythm.      Heart sounds: Normal heart sounds.   Pulmonary:      Effort: Pulmonary effort is normal. No respiratory distress.      Breath sounds: Normal breath sounds. No wheezing, rhonchi or rales.   Abdominal:      General: Bowel sounds are normal. There is no distension.      Palpations: Abdomen is soft.      Tenderness: There is no abdominal tenderness. There is no guarding.      Hernia: No hernia is present.   Musculoskeletal:      Cervical back: No rigidity.      Right lower leg: No edema.      Left lower leg: No edema.   Skin:     General: Skin is warm and dry.      Coloration: Skin is not jaundiced.   Neurological:      Mental Status: She is alert and oriented to person, place, and time. Mental status is at baseline.   Psychiatric:         Mood and Affect: Mood normal.         Behavior: Behavior normal.             Significant Labs: All pertinent labs within the past 24 hours have been reviewed.   Recent Labs   Lab 02/22/25 1419 02/26/25 1749 02/28/25  0529   * 130* 136   K 4.2 3.9 3.9    101 106   CO2 22* 22* 23   BUN 10 14 6*   CREATININE 0.9 0.8 0.7    111* 84   ANIONGAP 10 7* 7*     Recent Labs   Lab 02/26/25 1749 02/28/25  0529   MG 2.7* 1.9   PHOS  --  2.9     Recent Labs   Lab 02/22/25 1419 02/26/25 1749 02/28/25  0529   AST 25 19 18   ALT 20 16 12   ALKPHOS 57 61 53   BILITOT 0.5 0.4 0.4   ALBUMIN 3.5 3.3* 3.1*     POCT Glucose:   Recent Labs   Lab 02/26/25  1600   POCTGLUCOSE 118*    Recent Labs   Lab 02/22/25 1419 02/26/25 1749 02/28/25  0529   WBC 8.85 8.35 5.39   HGB 11.2* 11.0* 10.0*   HCT 34.4* 32.1* 30.3*    261 254   GRAN 69.8  6.2 78.4*  6.5 47.9  2.6             Significant  Imaging:  I have reviewed all pertinent imaging results/findings within the past 24 hours.   MRI Cervical Spine Without Contrast   Final Result      Multilevel degenerative changes of the cervical spine are most pronounced at C4-C5 with mild spinal canal stenosis, moderate left and mild right-sided neural foraminal stenosis.      C3-C4, C5-C6 and C6-C7 minor spinal canal stenosis.  Mild left-sided C5-C6 neural foraminal stenosis.      Right cervical vertebral artery slow flow, stenosis or occlusion although the intracranial segment appears patent.  CTA or ultrasound could be utilized for additional valuation as clinically warranted.         Electronically signed by: Rcok Washington   Date:    02/28/2025   Time:    07:41      MRI Brain Without Contrast   Final Result      No acute intracranial abnormality.      Moderate chronic microvascular ischemic changes with new, remote appearing microhemorrhages in the left frontal lobe and bilateral parietal lobes as compared to the prior MRI 06/27/2024.         Electronically signed by: Rock Washington   Date:    02/28/2025   Time:    07:34      X-Ray Chest AP Portable   Final Result      Clear lungs.         Electronically signed by: Melo Temple MD   Date:    02/27/2025   Time:    20:11      CTA Abdomen and Pelvis   Final Result       Stable AAA and other findings as detailed above      All CT scans at [this location] are performed using dose modulation techniques as appropriate to a performed exam including the following: automated exposure control; adjustment of the mA and/or kV according to patient size (this includes techniques or standardized protocols for targeted exams where dose is matched to indication / reason for exam; i.e. extremities or head); use of iterative reconstruction technique.      Finalized on: 2/26/2025 9:13 PM By:  Magdalena Gallardo MD   [facility]# [id number]      2025-02-26 21:16:02.961     [facility]      US Carotid Bilateral   Final Result      1. Normal  velocities and ratios in the extracranial right carotid system.  Mild to moderate partially calcified plaque is present. Peak systolic velocity in the right ICA is 155 cm/sec. No significant stenosis.      2. Normal velocities and ratios in the extracranial left carotid system. Mild partially calcified plaque is present. Peak systolic velocity in the left ICA is 154 cm/sec. No significant stenosis.      3. Normal antegrade flow in vertebral arteries bilaterally.      Measurements are based on NASCET criteria.      Stenosis of  % - validated velocity measurements with angiographic measurements, velocity criteria are extrapolated from diameter data as defined by the Society of Radiologists in Ultrasound Consensus Conference Radiology 2003; 229;340-346.         Electronically signed by: Melo Temple MD   Date:    02/26/2025   Time:    21:45      CT Head Without Contrast   Final Result      X-Ray Chest AP Portable   Final Result      Clear lungs.         Electronically signed by: Melo Temple MD   Date:    02/26/2025   Time:    16:38          Inpatient Medications:  Continuous Infusions:   0.9% NaCl   Intravenous Continuous 75 mL/hr at 02/28/25 1733 New Bag at 02/28/25 1733       Scheduled Meds:   [START ON 3/1/2025] amLODIPine  5 mg Oral Daily    arformoteroL  15 mcg Nebulization BID    aspirin  81 mg Oral Daily    budesonide  0.5 mg Nebulization Q12H    clopidogreL  75 mg Oral Daily    cyanocobalamin  100 mcg Oral Daily    docusate sodium  100 mg Oral BID    memantine  10 mg Oral BID    pantoprazole  40 mg Oral Daily    vitamin D  1,000 Units Oral Daily       PRN Meds:  Current Facility-Administered Medications:     acetaminophen, 650 mg, Oral, Q6H PRN    albuterol-ipratropium, 3 mL, Nebulization, Q6H PRN    cloNIDine, 0.1 mg, Oral, Q6H PRN    hydrALAZINE, 5 mg, Intravenous, Q8H PRN    HYDROcodone-acetaminophen, 1 tablet, Oral, Q6H PRN    hydrOXYzine HCL, 25 mg, Oral, TID PRN    ibuprofen, 800 mg, Oral, Q6H  PRN    labetaloL, 10 mg, Intravenous, Q6H PRN    nitroGLYCERIN, 0.4 mg, Sublingual, Q5 Min PRN    zolpidem, 10 mg, Oral, Nightly PRN

## 2025-03-01 NOTE — ASSESSMENT & PLAN NOTE
--mild, cognition intact, oriented x4  --continue home memantine  --neuro checks  --Counseled on deferring decision on Memantine dosing to Neurology followup given side effect profile includes orthostatic hypotension

## 2025-03-01 NOTE — PROGRESS NOTES
[facility] ER Medicine  Progress Note    Patient Name: Gemma Vick  MRN: [id number]  Patient Class: IP- Inpatient   Admission Date: 2/26/2025  Length of Stay: 1 days  Attending Physician: Eliseo Yip DO  Primary Care Provider: Olga Altman MD        Subjective     Principal Problem:Syncope and collapse        HPI:  Gemma Vick is a 70-year-old woman with a past medical history of myocardial infarction, coronary artery disease, hypertension, chronic obstructive pulmonary disease, chronic back pain, benign paroxysmal positional vertigo, neuropathy, hyperlipidemia, abdominal aortic aneurysm, and dementia without cognitive or behavioral disturbances who presented to the ER with syncope. She experienced a syncopal episode at the doctors office earlier today and denies any trauma. Immediately after the event, she was noted to have a heart rate in the thirties. This is her second syncopal episode in four days. She reports gradually worsening weakness since the first episode, along with cough, chills, and fatigue leading up to her appointment. She also describes vaping as a method to reduce cigarette use. She denies any chest pain, chest tightness, or palpitations. Of note, patient's daughter reports symptoms are typically associated with patient's heart rate dropping to the low 30s and even once reaching high 20s. Denies any new medications started.     Upon arrival in the ER, initial vital signs were /90, pulse 63, RR 18, temp 97.7°F, and SpO2 96%. Abnormal labs included a BNP of 163 and sodium of 130, with other labs grossly within acceptable limits; pertinent negatives included normal TSH, normal urinalysis, negative troponin, and stable H&H. No EKG findings were provided, while CXR was unremarkable and head CT and CTA of the abdomen remain pending. Cardiology was contacted by the ER physician and recommended a consult order if admitted, planning to evaluate the patient on  rounds tomorrow. Hospital Medicine called for admission.    Overview/Hospital Course:  Admitted to Hospital Medicine for evaluation of syncope concerns.  Multiple orthostatic vitals concerning for orthostatic hypotension, started on IV fluid resuscitation and adjusting home antihypertensives.  Carotid ultrasound negative.  CTA noted stable abdominal aortic aneurysm and redemonstration of known SMA short occlusion and aortic calcification, patient follows with Vascular surgery and Cardiology outpatient. MRI brain nonacute and MRI cervical spine with multilevel degenerative changes.  Bilateral upper extremity paresthesias self-resolved with alleviation in blood pressure.  Cardiology consulted for evaluation of recurrent chest pain in setting of history of prior known coronary artery disease status post stent placement.  Cardiology consult recommendations for discontinuing carvedilol due to orthostatic hypotension concerns.  Evaluation of chest pain with nuclear test test nonacute.  Collateral obtained per patient and family, history of of recurrent orthostatic syncope/presyncope concerns somewhat correlates with initiation of carvedilol in the past.  Extensive counseling provided on lifestyle modifications for orthostatic hypotension.  Counseled on following up with Cardiology for Holter monitor outpatient.  Adjusting home antihypertensive for more optimal blood pressure control. Per NIH data, (https://pubmed.ncbi.nlm.nih.gov/94283024/), risk of orthostatic hypotension with ?-Blockers > Nitrates > Diuretics >  ?-Blockers > Calcium channel blockers, Clonidine > ACE-inhibitors, Angiotensin II receptor blockers. Memantine also has some risk. After share decision-making with family/patient will plan for Discontinuing Carvediolol, Continue amlodipine at recently lowered 5mg dose, Continue Olmesartan, Continue Clonidine PRN. Continue digital HTN program.  Close followup with Cardiology and Cardiac monitor evaluation.  Counseled on deferring Memantine dosing adjustments to Neurology followup     Interval History: No acute events overnight, afebrile, hemodynamically stable.   MRI brain nonacute and MRI cervical spine with multilevel degenerative changes.  Bilateral upper extremity paresthesias self-resolved with alleviation in blood pressure.  Cardiology consulted for evaluation of recurrent chest pain in setting of history of prior known coronary artery disease status post stent placement.  Cardiology consult recommendations for discontinuing carvedilol due to orthostatic hypotension concerns.  Evaluation of chest pain with nuclear stress test nonacute.  Collateral obtained per patient and family, history of recurrent orthostatic syncope/presyncope concerns somewhat correlates with initiation of carvedilol in the past.  Extensive counseling provided on lifestyle modifications for orthostatic hypotension.  Counseled on following up with Cardiology for Holter monitor outpatient.  Adjusting home antihypertensive for more optimal blood pressure control. Optimizing antihypertensive regimen for more optimal control given challenges due to simultaneous orthostatic hypotension concerns(see hospital course for more details)     Objective:     Vital Signs (Most Recent):  Temp: 97.7 °F (36.5 °C) (02/28/25 1935)  Pulse: 75 (02/28/25 2042)  Resp: 17 (02/28/25 1925)  BP: (!) 160/63 (02/28/25 1935)  SpO2: 97 % (02/28/25 1935) Vital Signs (24h Range):  Temp:  [97.7 °F (36.5 °C)-98.4 °F (36.9 °C)] 97.7 °F (36.5 °C)  Pulse:  [51-92] 75  Resp:  [16-19] 17  SpO2:  [90 %-99 %] 97 %  BP: (107-171)/(57-78) 160/63     Weight: 72.1 kg (159 lb)  Body mass index is 30.04 kg/m².    Intake/Output Summary (Last 24 hours) at 2/28/2025 2201  Last data filed at 2/28/2025 0636  Gross per 24 hour   Intake 2346.38 ml   Output --   Net 2346.38 ml         Physical Exam  Vitals and nursing note reviewed.   Constitutional:       General: She is not in acute distress.      Appearance: She is not ill-appearing, toxic-appearing or diaphoretic.   HENT:      Head: Normocephalic and atraumatic.      Mouth/Throat:      Mouth: Mucous membranes are moist.   Eyes:      General: No scleral icterus.        Right eye: No discharge.         Left eye: No discharge.   Cardiovascular:      Rate and Rhythm: Normal rate and regular rhythm.      Heart sounds: Normal heart sounds.   Pulmonary:      Effort: Pulmonary effort is normal. No respiratory distress.      Breath sounds: Normal breath sounds. No wheezing, rhonchi or rales.   Abdominal:      General: Bowel sounds are normal. There is no distension.      Palpations: Abdomen is soft.      Tenderness: There is no abdominal tenderness. There is no guarding.      Hernia: No hernia is present.   Musculoskeletal:      Cervical back: No rigidity.      Right lower leg: No edema.      Left lower leg: No edema.   Skin:     General: Skin is warm and dry.      Coloration: Skin is not jaundiced.   Neurological:      Mental Status: She is alert and oriented to person, place, and time. Mental status is at baseline.   Psychiatric:         Mood and Affect: Mood normal.         Behavior: Behavior normal.             Significant Labs: All pertinent labs within the past 24 hours have been reviewed.   Recent Labs   Lab 02/22/25 1419 02/26/25 1749 02/28/25  0529   * 130* 136   K 4.2 3.9 3.9    101 106   CO2 22* 22* 23   BUN 10 14 6*   CREATININE 0.9 0.8 0.7    111* 84   ANIONGAP 10 7* 7*     Recent Labs   Lab 02/26/25  1749 02/28/25  0529   MG 2.7* 1.9   PHOS  --  2.9     Recent Labs   Lab 02/22/25 1419 02/26/25  1749 02/28/25  0529   AST 25 19 18   ALT 20 16 12   ALKPHOS 57 61 53   BILITOT 0.5 0.4 0.4   ALBUMIN 3.5 3.3* 3.1*     POCT Glucose:   Recent Labs   Lab 02/26/25  1600   POCTGLUCOSE 118*    Recent Labs   Lab 02/22/25 1419 02/26/25  1749 02/28/25  0529   WBC 8.85 8.35 5.39   HGB 11.2* 11.0* 10.0*   HCT 34.4* 32.1* 30.3*    261 254    GRAN 69.8  6.2 78.4*  6.5 47.9  2.6             Significant Imaging:  I have reviewed all pertinent imaging results/findings within the past 24 hours.   MRI Cervical Spine Without Contrast   Final Result      Multilevel degenerative changes of the cervical spine are most pronounced at C4-C5 with mild spinal canal stenosis, moderate left and mild right-sided neural foraminal stenosis.      C3-C4, C5-C6 and C6-C7 minor spinal canal stenosis.  Mild left-sided C5-C6 neural foraminal stenosis.      Right cervical vertebral artery slow flow, stenosis or occlusion although the intracranial segment appears patent.  CTA or ultrasound could be utilized for additional valuation as clinically warranted.         Electronically signed by: Rock Washington   Date:    02/28/2025   Time:    07:41      MRI Brain Without Contrast   Final Result      No acute intracranial abnormality.      Moderate chronic microvascular ischemic changes with new, remote appearing microhemorrhages in the left frontal lobe and bilateral parietal lobes as compared to the prior MRI 06/27/2024.         Electronically signed by: Rock Washington   Date:    02/28/2025   Time:    07:34      X-Ray Chest AP Portable   Final Result      Clear lungs.         Electronically signed by: Melo Temple MD   Date:    02/27/2025   Time:    20:11      CTA Abdomen and Pelvis   Final Result       Stable AAA and other findings as detailed above      All CT scans at [this location] are performed using dose modulation techniques as appropriate to a performed exam including the following: automated exposure control; adjustment of the mA and/or kV according to patient size (this includes techniques or standardized protocols for targeted exams where dose is matched to indication / reason for exam; i.e. extremities or head); use of iterative reconstruction technique.      Finalized on: 2/26/2025 9:13 PM By:  Magdalena Gallardo MD   [facility]# [id number]      2025-02-26 21:16:02.961      [facility]      US Carotid Bilateral   Final Result      1. Normal velocities and ratios in the extracranial right carotid system.  Mild to moderate partially calcified plaque is present. Peak systolic velocity in the right ICA is 155 cm/sec. No significant stenosis.      2. Normal velocities and ratios in the extracranial left carotid system. Mild partially calcified plaque is present. Peak systolic velocity in the left ICA is 154 cm/sec. No significant stenosis.      3. Normal antegrade flow in vertebral arteries bilaterally.      Measurements are based on NASCET criteria.      Stenosis of  % - validated velocity measurements with angiographic measurements, velocity criteria are extrapolated from diameter data as defined by the Society of Radiologists in Ultrasound Consensus Conference Radiology 2003; 229;340-346.         Electronically signed by: Melo Temple MD   Date:    02/26/2025   Time:    21:45      CT Head Without Contrast   Final Result      X-Ray Chest AP Portable   Final Result      Clear lungs.         Electronically signed by: Melo Temple MD   Date:    02/26/2025   Time:    16:38          Inpatient Medications:  Continuous Infusions:   0.9% NaCl   Intravenous Continuous 75 mL/hr at 02/28/25 1733 New Bag at 02/28/25 1733       Scheduled Meds:   [START ON 3/1/2025] amLODIPine  5 mg Oral Daily    arformoteroL  15 mcg Nebulization BID    aspirin  81 mg Oral Daily    budesonide  0.5 mg Nebulization Q12H    clopidogreL  75 mg Oral Daily    cyanocobalamin  100 mcg Oral Daily    docusate sodium  100 mg Oral BID    memantine  10 mg Oral BID    pantoprazole  40 mg Oral Daily    vitamin D  1,000 Units Oral Daily       PRN Meds:  Current Facility-Administered Medications:     acetaminophen, 650 mg, Oral, Q6H PRN    albuterol-ipratropium, 3 mL, Nebulization, Q6H PRN    cloNIDine, 0.1 mg, Oral, Q6H PRN    hydrALAZINE, 5 mg, Intravenous, Q8H PRN    HYDROcodone-acetaminophen, 1 tablet, Oral, Q6H PRN    hydrOXYzine  HCL, 25 mg, Oral, TID PRN    ibuprofen, 800 mg, Oral, Q6H PRN    labetaloL, 10 mg, Intravenous, Q6H PRN    nitroGLYCERIN, 0.4 mg, Sublingual, Q5 Min PRN    zolpidem, 10 mg, Oral, Nightly PRN          Assessment and Plan     * Syncope and collapse  --Head CT: nonacute  --Carotid ultrasound negative.  CTA noted stable abdominal aortic aneurysm and redomenstration of known SMA short occlusion and aortic calcification, patient follows with Vascular surgery and Cardiology outpatient. MRI Head and cervical spine pending for evaluation of bilateral parasthesias.  --Multiple orthostatic vitals concerning for orthostatic hypotension, started on IV fluid resuscitation and adjusting home antihypertensives.        --fall precautions, neuro checks  --MRI brain nonacute and MRI cervical spine with multilevel degenerative changes.  Bilateral upper extremity paresthesias self-resolved with alleviation in blood pressure.    --Cardiology consulted for evaluation of recurrent chest pain in setting of history of prior known coronary artery disease status post stent placement.  Cardiology consult recommendations for discontinuing carvedilol due to orthostatic hypotension concerns.  Evaluation of chest pain with nuclear test test nonacute.  Collateral obtained per patient and family, history of of recurrent orthostatic syncope/presyncope concerns somewhat correlates with initiation of carvedilol in the past.  Extensive counseling provided on lifestyle modifications for orthostatic hypotension.   - Counseled on following up with Cardiology for Holter monitor outpatient.    -Adjusting home antihypertensive for more optimal blood pressure control. Per NIH data, (https://pubmed.ncbi.nlm.nih.gov/24628251/), risk of orthostatic hypotension with ?-Blockers > Nitrates > Diuretics >  ?-Blockers > Calcium channel blockers, Clonidine > ACE-inhibitors, Angiotensin II receptor blockers. Memantine also has some risk. After shared decision-making with  "family/patient. will plan for Discontinuing Carvediolol, Continue amlodipine at recently lowered 5mg dose, Continue Olmesartan, Continue Clonidine PRN. Continue digital HTN program.   - Close followup with Cardiology and Cardiac monitor evaluation.         Orthostatic hypotension  See "Syncope and collapse " and "Hypertension" A&P        Chronic diastolic heart failure  --Last ECHO from AUG 2024 confirm EF 65 %  --BNP elevated, but stable compared to previous on file  --no overt edema on exam, no evidence of vascular congestion on CXR  --no aggressive iv diuresis at this time  --monitor electrolytes, UOP  --strict I&O and daily weights ordered      Vascular dementia, moderate, without behavioral disturbance, psychotic disturbance, mood disturbance, and anxiety  --mild, cognition intact, oriented x4  --continue home memantine  --neuro checks  --Counseled on deferring decision on Memantine dosing to Neurology followup given side effect profile includes orthostatic hypotension    GERD (gastroesophageal reflux disease)  --stable  --continue home PPI therapy      COPD, severe  --stable on room air, not on home O2  --continue home maintenance therapy  --Monitor clinically for respiratory distress, Q4H O2 sats   --Encourage light physical activity to maintain lung function   --follows pulmonology OP, f/u at discharge    AAA (abdominal aortic aneurysm)  Last imaging in 2023. If there's been any change in aneurysm size, this could affect afterload which could indirectly contribute to patient's acute presentation.  -CTA noted stable abdominal aortic aneurysm and redomenstration of known SMA short occlusion and aortic calcification, patient follows with Vascular surgery and Cardiology outpatient.     Insomnia  Continue home zolpidem      Coronary artery disease of native artery of native heart with stable angina pectoris  --continue home statin and aspirin therapy  --monitor clinically, PRN EKG  --Cardiology consulted for " evaluation of recurrent chest pain in setting of history of prior known coronary artery disease status post stent placement.     Essential hypertension  --home medications include:  Norvasc, clonidine, Coreg, olmesartan  --cautiously resuming home medications given patient's presenting symptom of syncope concerning for orthostatic hypotension  --PRN IV hydralazine and IV labetalol for breakthrough  --Q4HVS    --Cardiology consult recommendations for discontinuing carvedilol due to orthostatic hypotension concerns.  Evaluation of chest pain with nuclear test test nonacute.  --Collateral obtained per patient and family, history of of recurrent orthostatic syncope/presyncope concerns somewhat correlates with initiation of carvedilol in the past.    --Extensive counseling provided on lifestyle modifications for orthostatic hypotension.    --Counseled on following up with Cardiology for Holter monitor outpatient.    --Adjusting home antihypertensive for more optimal blood pressure control. Per NIH data, (https://pubmed.ncbi.nlm.nih.gov/93908750/), risk of orthostatic hypotension with ?-Blockers > Nitrates > Diuretics >  ?-Blockers > Calcium channel blockers, Clonidine > ACE-inhibitors, Angiotensin II receptor blockers. Memantine also has some risk.   --After share decision-making with family/patient will plan for Discontinuing Carvediolol, Continue amlodipine at recently lowered 5mg dose, Continue Olmesartan, Continue Clonidine PRN. Continue digital HTN program.  Close followup with Cardiology and Cardiac monitor evaluation. Counseled on deferring Memantine dosing to Neurology followup     Hyperlipidemia  --continue statin therapy on admission        VTE Risk Mitigation (From admission, onward)      None            Discharge Planning   ZIGGY:      Code Status: Full Code   Medical Readiness for Discharge Date:   Discharge Plan A: Home with family              Eliseo Yip DO  Department of Hospital Medicine   [facility] -  Telemetry (Hospital)    Voice recognition software was used in the creation of this note/communication and any sound-alike/typographical errors which may have occurred despite initial review prior to signing should be taken in context when interpreting.  If such errors prevent a clear understanding of the note/communication, please contact the provider/office for clarification.

## 2025-03-01 NOTE — PLAN OF CARE
POC reviewed w/ patient. Pt verbalizes understanding of plan  Chart/Orders reviewed  All safety precautions in place; No falls this shift  Pt resting in bed  No syncopal episodes  Continuous rounding c bed in lowest position, side rails up, call light in reach  Will continue to monitor until the end of shift

## 2025-03-01 NOTE — NURSING
A250/A250 YANA Vick is a 70 y.o.female admitted on 2/26/2025 for Syncope and collapse   Code Status: Full Code MRN: [id number]   Review of patient's allergies indicates:  No Known Allergies  Past Medical History:   Diagnosis Date    AAA (abdominal aortic aneurysm) 02/13/2014    Abdominal aneurysm     3    Acute coronary syndrome     Arthritis     BPPV (benign paroxysmal positional vertigo)     Carotid artery plaque     Carotid artery stenosis and occlusion 02/13/2014    Chronic back pain     COPD (chronic obstructive pulmonary disease)     Coronary artery disease     Dementia     Emphysema lung     Hyperlipidemia     Hypertension     Myocardial infarction     x3    Neuropathy     Personal history of COVID-19 06/09/2021 11/16/2020 +Covid, recovered at home       PRN meds    acetaminophen, 650 mg, Q6H PRN  albuterol-ipratropium, 3 mL, Q6H PRN  cloNIDine, 0.1 mg, Q6H PRN  hydrALAZINE, 5 mg, Q8H PRN  HYDROcodone-acetaminophen, 1 tablet, Q6H PRN  hydrOXYzine HCL, 25 mg, TID PRN  ibuprofen, 800 mg, Q6H PRN  labetaloL, 10 mg, Q6H PRN  nitroGLYCERIN, 0.4 mg, Q5 Min PRN  zolpidem, 10 mg, Nightly PRN      AVS Discharge instructions received and reviewed with pt and family at bedside.  Pt voiced understanding and all questions answered to satisfaction.  Stressed importance to making and keeping all follow up appointments.  Medications at bedside and reviewed with pt.  Tele monitor removed and brought to monitor tech.  IV d/c'd with tip intact, pressure dressing applied.  Pt will call when ready to be transported to front of hospital via w/c to be discharged home.      Orientation: oriented x 4  Dugger Coma Scale Score: 15     Lead Monitored: Lead II Rhythm: normal sinus rhythm    Cardiac/Telemetry Box Number: [id number]  VTE Core Measure: Pharmacological prophylaxis initiated/maintained Last Bowel Movement: 02/26/25  Diet Cardiac     Terry Score: 22  Fall Risk Score: 8  Accucheck []   Freq?      Lines/Drains/Airways        None

## 2025-03-01 NOTE — SUBJECTIVE & OBJECTIVE
Review of Systems   Cardiovascular:  Negative for chest pain.     Objective:     Vital Signs (Most Recent):  Temp: 97.5 °F (36.4 °C) (03/01/25 0827)  Pulse: 76 (03/01/25 1111)  Resp: 18 (03/01/25 0827)  BP: 138/63 (03/01/25 0827)  SpO2: 96 % (03/01/25 0827) Vital Signs (24h Range):  Temp:  [97.5 °F (36.4 °C)-98.7 °F (37.1 °C)] 97.5 °F (36.4 °C)  Pulse:  [70-92] 76  Resp:  [16-19] 18  SpO2:  [90 %-98 %] 96 %  BP: (138-177)/(63-79) 138/63     Weight: 72.1 kg (159 lb)  Body mass index is 30.04 kg/m².     SpO2: 96 %       No intake or output data in the 24 hours ending 03/01/25 1345    Lines/Drains/Airways       None                      Physical Exam  Vitals reviewed.   Constitutional:       Appearance: She is well-developed.   Neck:      Vascular: No carotid bruit.   Cardiovascular:      Rate and Rhythm: Normal rate and regular rhythm.      Pulses: Intact distal pulses.      Heart sounds: Normal heart sounds. No murmur heard.  Pulmonary:      Breath sounds: Normal breath sounds.   Neurological:      Mental Status: She is oriented to person, place, and time.            Significant Labs: All pertinent lab results from the last 24 hours have been reviewed. and   Recent Lab Results         03/01/25  0513        Albumin 3.1       ALP 55       ALT 14       Anion Gap 7       AST 16       Baso # 0.07       Basophil % 1.3       BILIRUBIN TOTAL 0.4  Comment: For infants and newborns, interpretation of results should be based  on gestational age, weight and in agreement with clinical  observations.    Premature Infant recommended reference ranges:  Up to 24 hours.............<8.0 mg/dL  Up to 48 hours............<12.0 mg/dL  3-5 days..................<15.0 mg/dL  6-29 days.................<15.0 mg/dL         BUN 12       Calcium 8.8       Chloride 106       CO2 23       Creatinine 0.8       Differential Method Automated       eGFR >60       Eos # 0.5       Eos % 8.4       Glucose 83       Gran # (ANC) 2.4       Gran % 43.0        Hematocrit 30.1       Hemoglobin 9.8       Immature Grans (Abs) 0.02  Comment: Mild elevation in immature granulocytes is non specific and   can be seen in a variety of conditions including stress response,   acute inflammation, trauma and pregnancy. Correlation with other   laboratory and clinical findings is essential.         Immature Granulocytes 0.4       Lymph # 1.8       Lymph % 31.7       Magnesium  1.8       MCH 30.4       MCHC 32.6       MCV 94       Mono # 0.9       Mono % 15.2       MPV 9.4       nRBC 0       Phosphorus Level 3.4       Platelet Count 253       Potassium 4.1       PROTEIN TOTAL 5.6       RBC 3.22       RDW 14.1       Sodium 136       WBC 5.58               Significant Imaging: Echocardiogram: Transthoracic echo (TTE) complete ([facility]upid Only):   Results for orders placed or performed during the hospital encounter of 02/26/25   Echo   Result Value Ref Range    BSA 1.65 m2    LA WIDTH 2.9 cm    LVOT stroke volume 70.6 cm3    LVIDd 3.3 (A) 3.5 - 6.0 cm    LV Systolic Volume 18 mL    LV Systolic Volume Index 11.0 mL/m2    LVIDs 2.3 2.1 - 4.0 cm    LV Diastolic Volume 43 mL    LV Diastolic Volume Index 26.38 mL/m2    Left Ventricular End Systolic Volume by Teichholz Method 18.13 mL    Left Ventricular End Diastolic Volume by Teichholz Method 42.84 mL    IVS 1.2 (A) 0.6 - 1.1 cm    LVOT diameter 1.9 cm    LVOT area 2.8 cm2    FS 30.3 28 - 44 %    Left Ventricle Relative Wall Thickness 0.73 cm    PW 1.2 (A) 0.6 - 1.1 cm    LV mass 124.8 g    LV Mass Index 76.6 g/m2    MV Peak E Oz 1.21 m/s    TDI LATERAL 0.09 m/s    TDI SEPTAL 0.10 m/s    E/E' ratio 13 m/s    MV Peak A Oz 1.02 m/s    TR Max Oz 2.7 m/s    E/A ratio 1.19     IVRT 83 msec    E wave deceleration time 194 msec    LV SEPTAL E/E' RATIO 12.1 m/s    CARLYN 14 mL/m2    LV LATERAL E/E' RATIO 13.4 m/s    LA Vol 23 cm3    LVOT peak oz 1.0 m/s    Left Ventricular Outflow Tract Mean Velocity 0.79 cm/s    Left Ventricular Outflow Tract Mean  Gradient 2.68 mmHg    RV- lentz basal diam 2.7 cm    RVOT peak VTI 18.5 cm    TAPSE 1.92 cm    RV/LV Ratio 0.82 cm    LA size 2.5 cm    Left Atrium Minor Axis 3.5 cm    Left Atrium Major Axis 3.9 cm    RA Major Axis 4.14 cm    AV regurgitation pressure 1/2 time 630 ms    AR Max Oz 5.02 m/s    AV mean gradient 3 mmHg    AV peak gradient 5 mmHg    Ao peak zo 1.1 m/s    Ao VTI 29.5 cm    LVOT peak VTI 24.9 cm    AV valve area 2.4 cm²    AV Velocity Ratio 0.91     AV index (prosthetic) 0.84     ELIDA by Velocity Ratio 2.6 cm²    Mr max oz 4.91 m/s    MV stenosis pressure 1/2 time 56.25 ms    MV valve area p 1/2 method 3.91 cm2    PV mean gradient 2 mmHg    RVOT peak oz 0.80 m/s    Ao root annulus 3.64 cm    STJ 3.40 cm    Ascending aorta 3.25 cm    IVC diameter 2.14 cm    Mean e' 0.10 m/s    ZLVIDS -1.70     ZLVIDD -3.32     RA Width 2.5 cm    EF 60 %    TV resting pulmonary artery pressure 32 mmHg    RV TB RVSP 6 mmHg    Est. RA pres 3 mmHg    Narrative      Left Ventricle: The left ventricle is normal in size. Normal wall   thickness. There is concentric remodeling. There is normal systolic   function. Ejection fraction is approximately 60%. There is normal   diastolic function.    Right Ventricle: The right ventricle is normal in size. Wall thickness   is normal. Systolic function is normal.    Aortic Valve: There is moderate aortic valve sclerosis.    Pulmonary Artery: The estimated pulmonary artery systolic pressure is   32 mmHg.    IVC/SVC: Normal venous pressure at 3 mmHg.

## 2025-03-01 NOTE — ASSESSMENT & PLAN NOTE
Results off beta-blockers.    Continue to hold carvedilol on discharge   Explained to patient if blood pressure starts rising to the block on amlodipine to twice a day.    We will arrange for a cardiac monitor on discharge.

## 2025-03-01 NOTE — DISCHARGE INSTRUCTIONS
-You were admitted to our hospital for treatment of fainting concerns. Over the course of your hospitalization, our Cardiology team also evaluated you and performed a nuclear stress test    -Please read the attached information regarding orthostatic hypotension, syncope and go to your nearest ED if any of the alarm/concerning signs develop.    -Please make sure to have close outpatient follow up with Cardiology, they plan to further evaluate you outpatient and they also plan to set up a heart event monitor    -Please make sure to have close follow up with your neurologist as they can further assess your medications if they could be contributing to your hypotension concerns.  They can also evaluate your recent MRI cervical neck imaging. Please read the attached information regarding spinal stenosis and go to your nearest ED if any of the alarm/concerning signs develop.    -Please continue follow up with your vascular surgeon as they can further evaluate your SMA stenosis and abdominal aortic aneurysm concerns.     -Your blood pressures were labile during your hospitalization.  We have made adjustments to your home antihypertensive regimen  --Discontinuing Carvediolol  --Continue amlodipine at recently lowered 5mg dose[could be re-evaluated by outpatient provider for potential increase to 5mg twice daily if BP uncontrolled]   --Continue Olmesartan[Benicar]  --Continue Clonidine[only once daily IF NEEDED for high BP despite home meds, should only have to use it rarely, if requiring too frequently, reach out to provider for adjustments to daily regimen]    --Continue digital HTN program.    --Close followup with Cardiology and Cardiac monitor evaluation.    Please see the attached medication list.      -As you resume your home blood pressure medications, please make sure to frequently monitor your home BP, keep a BP log and take it to your next healthcare provider visit.  Please reach out to a healthcare provider if  your blood pressures are  too low or too high.  Please follow the lifestyle modification instructions we discussed regarding managing orthostatic hypotension.      -Your labs remained stable, but some labs still remained abnormal. As we discussed, it is extremely important for you to followup with a primary care physician within 1 week for repeat lab work to ensure normalization, follow up of pending labs, medication adjustments/refills, routine post-discharge care, and any other evaluations as necessitated.         Our goal at [facility] is to always give you outstanding care and exceptional service. You may receive a survey from [employer] by mail, text or e-mail in the next 24-48 hours asking about the care you received with us. The survey should only take 5-10 minutes to complete and is very important to us.     Your feedback provides us with a way to recognize our staff who work tirelessly to provide the best care! Also, your responses help us learn how to improve when your experience was below our aspiration of excellence. We are always looking for ways to improve your stay. We WILL use your feedback to continue making improvements to help us provide the highest quality care. We keep your personal information and feedback confidential. We appreciate your time completing this survey and can't wait to hear from you!!!    We look forward to your continued care with us! Thanks so much for choosing [facility] for your healthcare needs!

## 2025-03-01 NOTE — ASSESSMENT & PLAN NOTE
--Head CT: nonacute  --Carotid ultrasound negative.  CTA noted stable abdominal aortic aneurysm and redomenstration of known SMA short occlusion and aortic calcification, patient follows with Vascular surgery and Cardiology outpatient. MRI Head and cervical spine pending for evaluation of bilateral parasthesias.  --Multiple orthostatic vitals concerning for orthostatic hypotension, started on IV fluid resuscitation and adjusting home antihypertensives.        --fall precautions, neuro checks  --MRI brain nonacute and MRI cervical spine with multilevel degenerative changes.  Bilateral upper extremity paresthesias self-resolved with alleviation in blood pressure.    --Cardiology consulted for evaluation of recurrent chest pain in setting of history of prior known coronary artery disease status post stent placement.  Cardiology consult recommendations for discontinuing carvedilol due to orthostatic hypotension concerns.  Evaluation of chest pain with nuclear test test nonacute.  Collateral obtained per patient and family, history of of recurrent orthostatic syncope/presyncope concerns somewhat correlates with initiation of carvedilol in the past.  Extensive counseling provided on lifestyle modifications for orthostatic hypotension.   - Counseled on following up with Cardiology for Holter monitor outpatient.    -Adjusting home antihypertensive for more optimal blood pressure control. Per NIH data, (https://pubmed.ncbi.nlm.nih.gov/50450850/), risk of orthostatic hypotension with ?-Blockers > Nitrates > Diuretics >  ?-Blockers > Calcium channel blockers, Clonidine > ACE-inhibitors, Angiotensin II receptor blockers. Memantine also has some risk. After shared decision-making with family/patient. will plan for Discontinuing Carvediolol, Continue amlodipine at recently lowered 5mg dose, Continue Olmesartan, Continue Clonidine PRN. Continue digital HTN program.   - Close followup with Cardiology and Cardiac monitor evaluation.

## 2025-03-03 ENCOUNTER — OFFICE VISIT (OUTPATIENT)
Dept: FAMILY MEDICINE | Facility: CLINIC | Age: 70
End: 2025-03-03
Payer: MEDICARE

## 2025-03-03 ENCOUNTER — TELEPHONE (OUTPATIENT)
Dept: CARDIOLOGY | Facility: CLINIC | Age: 70
End: 2025-03-03
Payer: MEDICARE

## 2025-03-03 VITALS
BODY MASS INDEX: 26.06 KG/M2 | HEIGHT: 61 IN | SYSTOLIC BLOOD PRESSURE: 138 MMHG | DIASTOLIC BLOOD PRESSURE: 68 MMHG | HEART RATE: 82 BPM | WEIGHT: 138 LBS | OXYGEN SATURATION: 98 %

## 2025-03-03 DIAGNOSIS — Z09 HOSPITAL DISCHARGE FOLLOW-UP: Primary | ICD-10-CM

## 2025-03-03 DIAGNOSIS — S35.229S: ICD-10-CM

## 2025-03-03 DIAGNOSIS — I71.43 INFRARENAL ABDOMINAL AORTIC ANEURYSM (AAA) WITHOUT RUPTURE: ICD-10-CM

## 2025-03-03 DIAGNOSIS — R00.1 BRADYCARDIA: ICD-10-CM

## 2025-03-03 PROCEDURE — 99999 PR PBB SHADOW E&M-EST. PATIENT-LVL IV: CPT | Mod: PBBFAC,,, | Performed by: FAMILY MEDICINE

## 2025-03-03 NOTE — TELEPHONE ENCOUNTER
Contacted pt regarding scheduling appointment .    ----- Message from Briseida Anand MD sent at 3/1/2025  1:47 PM CST -----  Please arrange for a 2 weeks monitor and a visit to the clinic.  Order for viral connect monitor is in.  Thank you

## 2025-03-03 NOTE — PROGRESS NOTES
Chief Complaint:    Chief Complaint   Patient presents with    Follow-up    Hip Pain       History of Present Illness:    History of Present Illness    Patient presents today for follow up after recent hospitalization. She was hospitalized for 4 days during which chest XRs, CT of abdomen and pelvis, and MRI of head and neck were performed. She was provided with compression stockings and compression belt during admission. Compression pants were ordered for outpatient use. She reports episodes of syncope with complete loss of consciousness without any preceding symptoms or warning signs. She has an aortic aneurysm measuring 4 cm. Coreg was recently discontinued. She reports fatigue. She has a crooked tailbone with associated bruising and bleeding requiring padded bandage placement. She has multiple herniated discs: three cervical, two thoracic, and three lumbar. She also has mesenteric stenosis. She reports previous weight loss reaching 189 lbs.      ROS:  General: denies fever, denies chills, admits fatigue, denies weight gain, admits weight loss, denies loss of appetite  Eyes: denies vision changes, denies blurry vision, denies eye pain, denies eye discharge  ENT: denies ear pain, denies hearing loss, denies tinnitus, denies nasal congestion, denies sore throat  Cardiovascular: denies chest pain, denies palpitations, denies lower extremity edema  Respiratory: denies cough, denies shortness of breath, denies wheezing, denies sputum production  Endocrine: denies polyuria, denies polydipsia, denies heat intolerance, denies cold intolerance  Gastrointestinal: denies abdominal pain, denies heartburn, denies nausea, denies vomiting, denies diarrhea, denies constipation, denies blood in stool  Genitourinary: denies dysuria, denies urgency, denies frequency, denies hematuria, denies nocturia, denies incontinence  Heme & Lymphatic: denies easy or excessive bleeding, denies easy bruising, denies swollen lymph  nodes  Musculoskeletal: denies muscle pain, denies back pain, denies joint pain, denies joint swelling  Skin: denies rash, denies lesion, denies itching, denies skin texture changes, denies skin color changes  Neurological: denies headache, denies dizziness, denies numbness, denies tingling, denies seizure activity, denies speech difficulty, denies memory loss, denies confusion , admits syncope  Psychiatric: denies anxiety, denies depression, denies sleep difficulty           Past Medical History:   Diagnosis Date    AAA (abdominal aortic aneurysm) 2014    Abdominal aneurysm     3    Acute coronary syndrome     Anemia     Arthritis     BPPV (benign paroxysmal positional vertigo)     Carotid artery plaque     Carotid artery stenosis and occlusion 2014    Chronic back pain     COPD (chronic obstructive pulmonary disease)     Coronary artery disease     Dementia     Emphysema lung     Heart attack     Several after that with 4 stents    Hyperlipidemia     Hypertension     Joint pain     Myocardial infarction     x3    Neuropathy     Personal history of COVID-19 2021 +Covid, recovered at home        Social History:  Social History     Socioeconomic History    Marital status:    Tobacco Use    Smoking status: Former     Current packs/day: 0.00     Average packs/day: 0.6 packs/day for 50.2 years (30.0 ttl pk-yrs)     Types: Cigarettes     Start date: 1970     Quit date: 2017     Years since quittin.8    Smokeless tobacco: Never    Tobacco comments:     Cant remember but after heart attack   Substance and Sexual Activity    Alcohol use: Never    Drug use: Never    Sexual activity: Not Currently     Partners: Male     Birth control/protection: Post-menopausal, See Surgical Hx     Social Drivers of Health     Financial Resource Strain: Patient Declined (3/1/2025)    Overall Financial Resource Strain (CARDIA)     Difficulty of Paying Living Expenses: Patient declined    Recent Concern: Financial Resource Strain - Medium Risk (2/26/2025)    Overall Financial Resource Strain (CARDIA)     Difficulty of Paying Living Expenses: Somewhat hard   Food Insecurity: Food Insecurity Present (2/26/2025)    Hunger Vital Sign     Worried About Running Out of Food in the Last Year: Sometimes true     Ran Out of Food in the Last Year: Sometimes true   Transportation Needs: No Transportation Needs (2/26/2025)    PRAPARE - Transportation     Lack of Transportation (Medical): No     Lack of Transportation (Non-Medical): No   Physical Activity: Insufficiently Active (2/26/2025)    Exercise Vital Sign     Days of Exercise per Week: 1 day     Minutes of Exercise per Session: 20 min   Stress: Stress Concern Present (2/26/2025)    [employer] - Occupational Stress Questionnaire     Feeling of Stress : Very much   Housing Stability: Patient Declined (3/1/2025)    Housing Stability Vital Sign     Unable to Pay for Housing in the Last Year: Patient declined     Number of Times Moved in the Last Year: 0     Homeless in the Last Year: Patient declined       Family History:   family history includes Arthritis in her brother and father; Breast cancer in her paternal aunt; Early death in her brother, brother, father, and mother; Heart attack in her brother; Heart attacks under age 50 in her brother and father; Heart disease in her brother, brother, father, mother, and sister; Hyperlipidemia in her sister; Hypertension in her brother and sister.    Health Maintenance   Topic Date Due    Lipid Panel  04/18/2024    RSV Vaccine (Age 60+ and Pregnant patients) (1 - Risk 60-74 years 1-dose series) 03/03/2025 (Originally 2/22/2015)    Shingles Vaccine (1 of 2) 03/03/2026 (Originally 2/22/2005)    Colorectal Cancer Screening  03/03/2026 (Originally 1955)    COVID-19 Vaccine (1 - 2024-25 season) 03/03/2026 (Originally 9/1/2024)    Pneumococcal Vaccines (Age 50+) (2 of 2 - PCV) 03/03/2026  "(Originally 9/27/2013)    Hemoglobin A1c (Prediabetes)  03/26/2025    LDCT Lung Screen  11/15/2025    Mammogram  12/13/2025    TETANUS VACCINE  11/15/2026    DEXA Scan  04/04/2027    Hepatitis C Screening  Completed    Influenza Vaccine  Addressed       Exam:Physical     Vital Signs  Pulse: 82  SpO2: 98 %  BP: 138/68  Pain Score: 0-No pain  Height and Weight  Height: 5' 1" (154.9 cm)  Weight: 62.6 kg (138 lb 0.1 oz)  BSA (Calculated - sq m): 1.64 sq meters  BMI (Calculated): 26.1  Weight in (lb) to have BMI = 25: 132]    Body mass index is 26.08 kg/m².    Physical Exam    General: Well-developed. Well-nourished. No acute distress.  Eyes: EOMI. Sclerae anicteric.  HENT: Normocephalic. Atraumatic. Nares patent. Moist oral mucosa.  Cardiovascular: Regular rate. Regular rhythm. No murmurs. No rubs. No gallops. Normal S1, S2.  Respiratory: Normal respiratory effort. Clear to auscultation bilaterally. No rales. No rhonchi. No wheezing.  Musculoskeletal: No  obvious deformity.  Extremities: No lower extremity edema.  Neurological: Alert & oriented x3. No slurred speech. Normal gait.  Psychiatric: Normal mood. Normal affect. Good insight. Good judgment.  Skin: Warm. Dry. No rash.           Assessment:        ICD-10-CM ICD-9-CM   1. Hospital discharge follow-up  Z09 V67.59   2. Bradycardia  R00.1 427.89   3. Superior mesenteric artery (trunk) injury, sequela  S35.229S 908.4   4. Infrarenal abdominal aortic aneurysm (AAA) without rupture  I71.43 441.4         Plan:    Assessment & Plan    MEDICAL DECISION MAKING:  - Discontinued Coreg to address low heart rate and syncope episodes  - Monitoring blood pressure and heart rate off Coreg to determine if pacemaker needed  - Considering reducing Benicar if blood pressure remains stable  - Following aortic aneurysm, currently 4 cm; intervention typically recommended at 5 cm  - Tracking mesenteric stenosis; potential for stent placement if symptoms worsen    PATIENT EDUCATION:  - " Explained potential discrepancies in aneurysm measurements between different imaging studies and radiologist interpretations  - Discussed symptoms of mesenteric stenosis including abdominal pain, blood in stools, nausea, and vomiting    ACTION ITEMS/LIFESTYLE:  - Patient to check blood pressure 2 times daily, morning and evening  - Recommend maintaining systolic blood pressure between 130-140 mmHg, not exceeding 140 mmHg  - Patient to keep diastolic blood pressure below 90 mmHg  - Recommend performing ankle pumping exercises    MEDICATIONS:  - Discontinued Coreg  - Continued Norvasc at current dose  - Continued Benicar at current dose    ORDERS:  - Heart monitor ordered to evaluate for arrhythmias and bradycardia    REFERRALS:  - Previously referred to vascular specialist for aortic aneurysm and mesenteric stenosis management    FOLLOW UP:  - Follow up for blood pressure management  - Patient to bring blood pressure readings to next appointment         Gemma was seen today for follow-up and hip pain.    Diagnoses and all orders for this visit:    Hospital discharge follow-up    Bradycardia    Superior mesenteric artery (trunk) injury, sequela    Infrarenal abdominal aortic aneurysm (AAA) without rupture        Follow up in about 6 months (around 9/3/2025), or if symptoms worsen or fail to improve.      Olga Altman MD

## 2025-03-04 NOTE — DISCHARGE SUMMARY
[facility] Medicine  Discharge Summary      Patient Name: Gemma Vick  MRN: [id number]  KB: [id number]  Patient Class: IP- Inpatient  Admission Date: 2/26/2025  Hospital Length of Stay: 2 days  Discharge Date and Time: 3/1/2025  1:32 PM  Attending Physician: Elizabeth att. providers found   Discharging Provider: Eliseo Yip DO  Primary Care Provider: Olga Altman MD    Primary Care Team: Networked reference to record PCT     HPI:   Gemma Vick is a 70-year-old woman with a past medical history of myocardial infarction, coronary artery disease, hypertension, chronic obstructive pulmonary disease, chronic back pain, benign paroxysmal positional vertigo, neuropathy, hyperlipidemia, abdominal aortic aneurysm, and dementia without cognitive or behavioral disturbances who presented to the ER with syncope. She experienced a syncopal episode at the doctors office earlier today and denies any trauma. Immediately after the event, she was noted to have a heart rate in the thirties. This is her second syncopal episode in four days. She reports gradually worsening weakness since the first episode, along with cough, chills, and fatigue leading up to her appointment. She also describes vaping as a method to reduce cigarette use. She denies any chest pain, chest tightness, or palpitations. Of note, patient's daughter reports symptoms are typically associated with patient's heart rate dropping to the low 30s and even once reaching high 20s. Denies any new medications started.     Upon arrival in the ER, initial vital signs were /90, pulse 63, RR 18, temp 97.7°F, and SpO2 96%. Abnormal labs included a BNP of 163 and sodium of 130, with other labs grossly within acceptable limits; pertinent negatives included normal TSH, normal urinalysis, negative troponin, and stable H&H. No EKG findings were provided, while CXR was unremarkable and head CT and CTA of the abdomen remain pending.  Cardiology was contacted by the ER physician and recommended a consult order if admitted, planning to evaluate the patient on rounds tomorrow. Hospital Medicine called for admission.    * No surgery found *      Hospital Course:   Admitted to Hospital Medicine for evaluation of syncope concerns.  Multiple orthostatic vitals concerning for orthostatic hypotension, started on IV fluid resuscitation and adjusting home antihypertensives.  Carotid ultrasound negative.  CTA noted stable abdominal aortic aneurysm and redemonstration of known SMA short occlusion and aortic calcification, patient follows with Vascular surgery and Cardiology outpatient. MRI brain nonacute and MRI cervical spine with multilevel degenerative changes.  Bilateral upper extremity paresthesias self-resolved with alleviation in blood pressure.  Cardiology consulted for evaluation of recurrent chest pain in setting of history of prior known coronary artery disease status post stent placement.  Cardiology consult recommendations for discontinuing carvedilol due to orthostatic hypotension concerns.  Evaluation of chest pain with nuclear test test nonacute.  Collateral obtained per patient and family, history of of recurrent orthostatic syncope/presyncope concerns somewhat correlates with initiation of carvedilol in the past.  Extensive counseling provided on lifestyle modifications for orthostatic hypotension.  Counseled on following up with Cardiology for Holter monitor outpatient.  Adjusted home antihypertensive for more optimal blood pressure control. Per NIH data, (https://pubmed.ncbi.nlm.nih.gov/43168363/), risk of orthostatic hypotension with ?-Blockers > Nitrates > Diuretics >  ?-Blockers > Calcium channel blockers> Clonidine > ACE-inhibitors, Angiotensin II receptor blockers. Memantine also has some risk. After shared decision-making with family/patient will plan for Discontinuing Carvediolol, Continue amlodipine at recently lowered 5mg dose,  Continue Olmesartan, Continue Clonidine PRN. Continue digital HTN program.  Close followup with Cardiology and Cardiac monitor evaluation. Counseled on deferring Memantine dosing adjustments to Neurology followup     Discharge plan of care was discussed at length with patient and family at bedside including patient's need for close outpatient follow-up.  Counseled on outpatient Cardiology, Neurology follow up for orthostatic hypotension, inpatient testing and imaging findings.  Counseled on follow up with already established vascular surgeon for follow up of AAA, SMA stenosis concerns.  Counseled on trial of vitamin-D/calcium supplementation given concerns for degenerative disc disease on imaging.  Instructed on PCP follow up within 1 week with repeat lab work to ensure normalization, follow up of pending labs, or any further evaluation as necessitated. Counseled on frequent home BP monitoring while resuming home BP meds.  Extensively counseled on lifestyle modifications for orthostatic hypotension. All questions and concerns were answered. Return precautions provided.  Patient instructed to return to the hospital or seek medical care if baseline status should suddenly change, return of symptoms occurs, or for any new or concerning symptoms that arise.  Patient was in agreement with plan of care going forward. Patient continued to remain afebrile, hemodynamically stable, able to tolerate diet, and ambulate without any significant concerns. Patient seen and examined on the day of discharge. Patient deemed stable for discharge.        Goals of Care Treatment Preferences:  Code Status: Full Code         Consults:   Consults (From admission, onward)          Status Ordering Provider     Inpatient consult to Cardiology  Once        Provider:  Jeremy Santana MD    Completed PROMISE DASH              Final Active Diagnoses:    Diagnosis Date Noted POA    PRINCIPAL PROBLEM:  Syncope and collapse [R55] 02/26/2025  Yes    Bradycardia [R00.1] 03/01/2025 Yes    Orthostatic hypotension [I95.1] 02/28/2025 Yes    Chronic diastolic heart failure [I50.32] 04/01/2024 Yes    Vascular dementia, moderate, without behavioral disturbance, psychotic disturbance, mood disturbance, and anxiety [F01.B0] 04/01/2024 Yes    GERD (gastroesophageal reflux disease) [K21.9] 07/23/2021 Yes    COPD, severe [J44.9] 09/18/2017 Yes    AAA (abdominal aortic aneurysm) [I71.40] 02/13/2014 Yes    Coronary artery disease of native artery of native heart with stable angina pectoris [I25.118] 12/31/2013 Yes    Hyperlipidemia [E78.5] 12/31/2013 Yes    Essential hypertension [I10] 12/31/2013 Yes    Insomnia [G47.00] 12/31/2013 Yes      Problems Resolved During this Admission:       Discharged Condition: stable    [place] [facility] [place] Up:   Follow-up Information       Olga Altman MD. Schedule an appointment as soon as possible for a visit in 1 week(s).    Specialty: Family Medicine  Why: Your labs remained stable, but some labs still remained abnormal. As we discussed, it is extremely important for you to followup with a primary care physician within 1 week for repeat lab work to ensure normalization, follow up of pending labs, medication adjustments/refills, routine post-discharge care, and any other evaluations as necessitated.  Contact information:  [address] [address] [address] [phone]               [facility]. Schedule an appointment as soon as possible for a visit.    Specialty: Cardiology  Why: Please make sure to have close outpatient follow up with Cardiology, they plan to further evaluate you outpatient and they also plan to set up a heart event monitor  Contact information:  [address] [place] [address] [address] [place] [address] [phone]  Additional information:  Please take [address] to the Medical Office Building. Check in on the 4th floor.             Neurologist. Schedule an appointment  as soon as possible for a visit.    Why: Please make sure to have close follow up with your neurologist as they can further assess your medications if they could be contributing to your hypotension concerns.  They can also evaluate your recent MRI cervical neck imaging. Please read the attached information regarding spinal stenosis and go to your nearest ED if any of the alarm/concerning signs develop.                         Patient Instructions:      Notify your health care provider if you experience any of the following:  temperature >100.4     Notify your health care provider if you experience any of the following:  persistent nausea and vomiting or diarrhea     Notify your health care provider if you experience any of the following:  severe uncontrolled pain     Notify your health care provider if you experience any of the following:  redness, tenderness, or signs of infection (pain, swelling, redness, odor or green/yellow discharge around incision site)     Notify your health care provider if you experience any of the following:  difficulty breathing or increased cough     Notify your health care provider if you experience any of the following:  severe persistent headache     Notify your health care provider if you experience any of the following:  worsening rash     Notify your health care provider if you experience any of the following:  persistent dizziness, light-headedness, or visual disturbances     Notify your health care provider if you experience any of the following:  increased confusion or weakness     Notify your health care provider if you experience any of the following:   Order Comments: ORTHOSTATIC HYPOTENSION ALARM SIGNS: Please return to the ED emergently if you experience any of the following: a significant increase in dizziness or lightheadedness upon standing, fainting, confusion, blurred vision, chest pain, shortness of breath, or a rapid or irregular heartbeat, as these could be signs of  worsening orthostatic hypotension or other serious underlying conditions.    SYNCOPE (FAINTING) ALARM SIGNS: Please return to the ED emergently if you experience any of the following: recurrent fainting episodes, chest pain, severe headache, sudden weakness or numbness, confusion, visual disturbances, difficulty speaking, or shortness of breath, as these could suggest a serious underlying cardiovascular, neurological, or metabolic cause for your syncope.     HIGH BLOOD PRESSURE EMERGENCY: Please return to the ED emergently if you experience any of the following: a sudden and severe headache, vision changes, chest pain, shortness of breath, confusion, nausea or vomiting, or weakness, as these may indicate a hypertensive emergency, such as a stroke, myocardial infarction, or hypertensive encephalopathy.    CERVICAL STENOSIS ALARM SIGNS: Please return to the ED emergently if you experience any of the following: sudden worsening of neck pain, difficulty swallowing or breathing, weakness or numbness in the arms or legs, loss of coordination, bowel or bladder incontinence, or a sensation of instability while walking, as these could indicate worsening cervical stenosis with potential spinal cord compression.    SMA (SUPERIOR MESENTERIC ARTERY) STENOSIS ALARM SIGNS: Please return to the ED emergently if you experience any of the following: severe, persistent abdominal pain, nausea or vomiting that doesn't resolve, unintentional weight loss, blood in your stool, or signs of shock such as rapid heart rate, dizziness, or low blood pressure, as these may indicate a worsening of SMA stenosis or the development of mesenteric ischemia.    ABDOMINAL AORTIC ANEURYSM (AAA) ALARM SIGNS: Please return to the ED emergently if you experience any of the following: sudden, severe abdominal or back pain that may radiate to the groin, legs, or flank, dizziness or lightheadedness, a pulsating sensation in the abdomen, fainting, low blood  pressure, or a rapid heartbeat, as these could indicate the rupture of an abdominal aortic aneurysm, which is a life-threatening emergency requiring immediate intervention.    CHEST PAIN ALARM SIGNS: Please return to the ED emergently if you experience any of the following: worsening or sudden onset of chest pain, pressure, or tightness, pain radiating to the arms, jaw, back, or neck, shortness of breath, nausea or vomiting, dizziness or lightheadedness, sweating, fainting, or an irregular or rapid heartbeat, as these may indicate a serious cardiovascular event, such as a myocardial infarction, unstable angina, or aortic dissection, all of which require immediate evaluation and treatment.    STROKE ALARM SIGNS: Please return to the ED emergently if you experience any of the following: sudden onset of weakness or numbness on one side of the body, difficulty speaking or understanding speech, sudden confusion or trouble thinking clearly, severe headache with no known cause, vision changes in one or both eyes, dizziness or loss of balance, or difficulty walking, as these could be signs of a stroke, and prompt medical attention is essential to minimize brain damage and improve outcomes.       Significant Diagnostic Studies:   Significant Labs:   Recent Labs   Lab 02/26/25 1749 02/28/25 0529 03/01/25  0513   * 136 136   K 3.9 3.9 4.1    106 106   CO2 22* 23 23   BUN 14 6* 12   CREATININE 0.8 0.7 0.8   * 84 83   ANIONGAP 7* 7* 7*     Recent Labs   Lab 02/26/25 1749 02/28/25 0529 03/01/25  0513   MG 2.7* 1.9 1.8   PHOS  --  2.9 3.4     Recent Labs   Lab 02/26/25 1749 02/28/25 0529 03/01/25  0513   AST 19 18 16   ALT 16 12 14   ALKPHOS 61 53 55   BILITOT 0.4 0.4 0.4   ALBUMIN 3.3* 3.1* 3.1*     POCT Glucose:   Recent Labs   Lab 02/26/25  1600   POCTGLUCOSE 118*    Recent Labs   Lab 02/26/25 1749 02/28/25  0529 03/01/25  0513   WBC 8.35 5.39 5.58   HGB 11.0* 10.0* 9.8*   HCT 32.1* 30.3* 30.1*   PLT  261 254 253   GRAN 78.4*  6.5 47.9  2.6 43.0  2.4              Significant Imaging:   MRI Cervical Spine Without Contrast   Final Result      Multilevel degenerative changes of the cervical spine are most pronounced at C4-C5 with mild spinal canal stenosis, moderate left and mild right-sided neural foraminal stenosis.      C3-C4, C5-C6 and C6-C7 minor spinal canal stenosis.  Mild left-sided C5-C6 neural foraminal stenosis.      Right cervical vertebral artery slow flow, stenosis or occlusion although the intracranial segment appears patent.  CTA or ultrasound could be utilized for additional valuation as clinically warranted.         Electronically signed by: Rock Washington   Date:    02/28/2025   Time:    07:41      MRI Brain Without Contrast   Final Result      No acute intracranial abnormality.      Moderate chronic microvascular ischemic changes with new, remote appearing microhemorrhages in the left frontal lobe and bilateral parietal lobes as compared to the prior MRI 06/27/2024.         Electronically signed by: Rock Washington   Date:    02/28/2025   Time:    07:34      X-Ray Chest AP Portable   Final Result      Clear lungs.         Electronically signed by: Melo Temple MD   Date:    02/27/2025   Time:    20:11      CTA Abdomen and Pelvis   Final Result       Stable AAA and other findings as detailed above      All CT scans at [this location] are performed using dose modulation techniques as appropriate to a performed exam including the following: automated exposure control; adjustment of the mA and/or kV according to patient size (this includes techniques or standardized protocols for targeted exams where dose is matched to indication / reason for exam; i.e. extremities or head); use of iterative reconstruction technique.      Finalized on: 2/26/2025 9:13 PM By:  Magdalena Gallardo MD   [facility]# [id number]      2025-02-26 21:16:02.961     [facility]      US Carotid Bilateral   Final Result      1. Normal  velocities and ratios in the extracranial right carotid system.  Mild to moderate partially calcified plaque is present. Peak systolic velocity in the right ICA is 155 cm/sec. No significant stenosis.      2. Normal velocities and ratios in the extracranial left carotid system. Mild partially calcified plaque is present. Peak systolic velocity in the left ICA is 154 cm/sec. No significant stenosis.      3. Normal antegrade flow in vertebral arteries bilaterally.      Measurements are based on NASCET criteria.      Stenosis of  % - validated velocity measurements with angiographic measurements, velocity criteria are extrapolated from diameter data as defined by the Society of Radiologists in Ultrasound Consensus Conference Radiology 2003; 229;340-346.         Electronically signed by: Melo Temple MD   Date:    02/26/2025   Time:    21:45      CT Head Without Contrast   Final Result      X-Ray Chest AP Portable   Final Result      Clear lungs.         Electronically signed by: Melo Temple MD   Date:    02/26/2025   Time:    16:38              Pending Diagnostic Studies:       None           Medications:  Reconciled Home Medications:      Medication List        CONTINUE taking these medications      amLODIPine 5 MG tablet  Commonly known as: NORVASC  TAKE 1 TABLET BY MOUTH ONCE DAILY     aspirin 81 MG Chew  Take 81 mg by mouth once daily.     BREZTRI AEROSPHERE 160-9-4.8 mcg/actuation Hfaa  Generic drug: budesonide-glycopyr-formoterol  Inhale 2 puffs into the lungs 2 (two) times a day.     cloNIDine 0.1 MG tablet  Commonly known as: CATAPRES  Take 1 tablet (0.1 mg total) by mouth daily as needed (if Blood pressure above 170/90).     clopidogreL 75 mg tablet  Commonly known as: PLAVIX  TAKE 1 TABLET BY MOUTH ONCE DAILY     * CombiVENT RESPIMAT  mcg/actuation inhaler  Generic drug: ipratropium-albuteroL  Inhale 2 puffs into the lungs every 6 (six) hours as needed for Wheezing or Shortness of Breath.     *  albuterol-ipratropium 2.5 mg-0.5 mg/3 mL nebulizer solution  Commonly known as: DUO-NEB  Take 3 mLs by nebulization every 6 (six) hours as needed for Wheezing.     COMPACT SPACE CHAMBER  Generic drug: inhalation spacing device  USE AS DIRECTED     cyanocobalamin 100 MCG tablet  Commonly known as: VITAMIN B-12  Take 100 mcg by mouth once daily.     diclofenac sodium 1 % Gel  Commonly known as: VOLTAREN  Apply topically 4 (four) times daily.     estradioL 0.5 MG tablet  Commonly known as: ESTRACE  TAKE 1 TABLET BY MOUTH ONCE DAILY     ezetimibe-simvastatin 10-40 mg 10-40 mg per tablet  Commonly known as: VYTORIN  TAKE 1 TABLET BY MOUTH EVERY EVENING     famotidine 20 MG tablet  Commonly known as: PEPCID  Take 1 tablet (20 mg total) by mouth 2 (two) times daily as needed for Heartburn.     furosemide 20 MG tablet  Commonly known as: LASIX  TAKE 1 TABLET BY MOUTH ONCE DAILY AS NEEDED     memantine 10 MG Tab  Commonly known as: NAMENDA  Take 1 tablet (10 mg total) by mouth 2 (two) times daily.     olmesartan 40 MG tablet  Commonly known as: BENICAR  TAKE 1 TABLET BY MOUTH ONCE DAILY     OXYGEN-AIR DELIVERY SYSTEMS MISC  3 L by Misc.(Non-Drug; Combo Route) route every evening.     pantoprazole 40 MG tablet  Commonly known as: PROTONIX  TAKE 1 TABLET BY MOUTH ONCE DAILY     vitamin D 1000 units Tab  Commonly known as: VITAMIN D3  Take 1,000 Units by mouth once daily.     zolpidem 10 mg Tab  Commonly known as: AMBIEN  TAKE 1 TABLET BY MOUTH EVERY EVENING           * This list has 2 medication(s) that are the same as other medications prescribed for you. Read the directions carefully, and ask your doctor or other care provider to review them with you.                STOP taking these medications      carvediloL 6.25 MG tablet  Commonly known as: COREG              Indwelling Lines/Drains at time of discharge:   Lines/Drains/Airways       None                   Time spent on the discharge of patient: 60 minutes         Eliseo  Dm Yip DO  Department of Hospital Medicine  [facility] Tele[facility] ([facility])    Voice recognition software was used in the creation of this note/communication and any sound-alike/typographical errors which may have occurred despite initial review prior to signing should be taken in context when interpreting.  If such errors prevent a clear understanding of the note/communication, please contact the provider/office for clarification.

## 2025-03-05 ENCOUNTER — HOSPITAL ENCOUNTER (OUTPATIENT)
Dept: CARDIOLOGY | Facility: HOSPITAL | Age: 70
Discharge: HOME | End: 2025-03-05
Attending: INTERNAL MEDICINE
Payer: MEDICARE

## 2025-03-05 ENCOUNTER — PATIENT OUTREACH (OUTPATIENT)
Dept: ADMINISTRATIVE | Facility: CLINIC | Age: 70
End: 2025-03-05
Payer: MEDICARE

## 2025-03-05 ENCOUNTER — PATIENT MESSAGE (OUTPATIENT)
Dept: ADMINISTRATIVE | Facility: CLINIC | Age: 70
End: 2025-03-05
Payer: MEDICARE

## 2025-03-05 DIAGNOSIS — R55 SYNCOPE, UNSPECIFIED SYNCOPE TYPE: ICD-10-CM

## 2025-03-05 NOTE — PROGRESS NOTES
C3 nurse attempted to contact Gemma Vick  for a TCC post hospital discharge follow up call. No answer. Left voicemail with callback information. The patient does not have a scheduled HOSFU appointment.    Please contact patient and schedule follow up appointment using HOSFU visit type on or before 03/08/2025.    Message sent to PCP staff.

## 2025-03-06 DIAGNOSIS — R55 SYNCOPE AND COLLAPSE: Primary | ICD-10-CM

## 2025-03-07 RX ORDER — EZETIMIBE AND SIMVASTATIN 10; 40 MG/1; MG/1
1 TABLET ORAL NIGHTLY
Qty: 90 TABLET | Refills: 0 | Status: SHIPPED | OUTPATIENT
Start: 2025-03-07

## 2025-03-07 NOTE — TELEPHONE ENCOUNTER
Refill Routing Note   Medication(s) are not appropriate for processing by [facility] for the following reason(s):        Required labs outdated    [facility] action(s):  Defer               Appointments  past 12m or future 3m with PCP    Date Provider   Last Visit   3/3/2025 Olga Altman MD   Next Visit   Visit date not found Olga Altman MD   ED visits in past 90 days: 1        Note composed:12:40 PM 03/07/2025

## 2025-03-07 NOTE — TELEPHONE ENCOUNTER
No care due was identified.  Health [facility] Embedded Care Due Messages. Reference number: [id number].   3/07/2025 9:12:54 AM CST

## 2025-03-10 RX ORDER — DICYCLOMINE HYDROCHLORIDE 10 MG/1
10 CAPSULE ORAL
Qty: 90 CAPSULE | Refills: 2 | Status: SHIPPED | OUTPATIENT
Start: 2025-03-10

## 2025-03-10 NOTE — TELEPHONE ENCOUNTER
No care due was identified.  Health [facility] Embedded Care Due Messages. Reference number: [id number].   3/10/2025 9:12:15 AM CDT

## 2025-03-20 ENCOUNTER — OFFICE VISIT (OUTPATIENT)
Dept: DERMATOLOGY | Facility: CLINIC | Age: 70
End: 2025-03-20
Payer: MEDICARE

## 2025-03-20 DIAGNOSIS — L57.0 ACTINIC KERATOSES: ICD-10-CM

## 2025-03-20 DIAGNOSIS — L82.1 SEBORRHEIC KERATOSIS: ICD-10-CM

## 2025-03-20 DIAGNOSIS — Z12.83 SKIN CANCER SCREENING: Primary | ICD-10-CM

## 2025-03-20 DIAGNOSIS — L81.4 SOLAR LENTIGO: ICD-10-CM

## 2025-03-20 PROCEDURE — 17000 DESTRUCT PREMALG LESION: CPT | Mod: S$GLB,,, | Performed by: STUDENT IN AN ORGANIZED HEALTH CARE EDUCATION/TRAINING PROGRAM

## 2025-03-20 PROCEDURE — 17003 DESTRUCT PREMALG LES 2-14: CPT | Mod: S$GLB,,, | Performed by: STUDENT IN AN ORGANIZED HEALTH CARE EDUCATION/TRAINING PROGRAM

## 2025-03-20 PROCEDURE — 1111F DSCHRG MED/CURRENT MED MERGE: CPT | Mod: CPTII,S$GLB,, | Performed by: STUDENT IN AN ORGANIZED HEALTH CARE EDUCATION/TRAINING PROGRAM

## 2025-03-20 PROCEDURE — 1126F AMNT PAIN NOTED NONE PRSNT: CPT | Mod: CPTII,S$GLB,, | Performed by: STUDENT IN AN ORGANIZED HEALTH CARE EDUCATION/TRAINING PROGRAM

## 2025-03-20 PROCEDURE — 3288F FALL RISK ASSESSMENT DOCD: CPT | Mod: CPTII,S$GLB,, | Performed by: STUDENT IN AN ORGANIZED HEALTH CARE EDUCATION/TRAINING PROGRAM

## 2025-03-20 PROCEDURE — 1160F RVW MEDS BY RX/DR IN RCRD: CPT | Mod: CPTII,S$GLB,, | Performed by: STUDENT IN AN ORGANIZED HEALTH CARE EDUCATION/TRAINING PROGRAM

## 2025-03-20 PROCEDURE — 1159F MED LIST DOCD IN RCRD: CPT | Mod: CPTII,S$GLB,, | Performed by: STUDENT IN AN ORGANIZED HEALTH CARE EDUCATION/TRAINING PROGRAM

## 2025-03-20 PROCEDURE — 99203 OFFICE O/P NEW LOW 30 MIN: CPT | Mod: 25,S$GLB,, | Performed by: STUDENT IN AN ORGANIZED HEALTH CARE EDUCATION/TRAINING PROGRAM

## 2025-03-20 PROCEDURE — 99999 PR PBB SHADOW E&M-EST. PATIENT-LVL III: CPT | Mod: PBBFAC,,, | Performed by: STUDENT IN AN ORGANIZED HEALTH CARE EDUCATION/TRAINING PROGRAM

## 2025-03-20 PROCEDURE — 4010F ACE/ARB THERAPY RXD/TAKEN: CPT | Mod: CPTII,S$GLB,, | Performed by: STUDENT IN AN ORGANIZED HEALTH CARE EDUCATION/TRAINING PROGRAM

## 2025-03-20 PROCEDURE — 1100F PTFALLS ASSESS-DOCD GE2>/YR: CPT | Mod: CPTII,S$GLB,, | Performed by: STUDENT IN AN ORGANIZED HEALTH CARE EDUCATION/TRAINING PROGRAM

## 2025-03-20 NOTE — PROGRESS NOTES
Patient Information  Name: Gemma Vick  : 1955  MRN: [id number]     Referring Physician:  Dr. Garcia   Primary Care Physician:  Olga Walker MD   Date of Visit: 2025      Subjective:       Gemma Vick is a 70 y.o. female who presents for   Chief Complaint   Patient presents with    Skin Check     TBSE     HPI  Patient here for skin check.     Does patient have a personal hx of skin cancers? no  Does patient have family hx of melanoma?  no  Does patient have hx of strong sun exposure or tanning bed use in the past? yes    Patient was last seen:Visit date not found     Prior notes by myself reviewed.   Clinical documentation obtained by nursing staff reviewed.    Review of Systems   Skin:  Negative for itching and rash.        Objective:    Physical Exam   Constitutional: She appears well-developed and well-nourished. No distress.   Neurological: She is alert and oriented to person, place, and time. She is not disoriented.   Psychiatric: She has a normal mood and affect.   Skin:   Areas Examined (abnormalities noted in diagram):   Scalp / Hair Palpated and Inspected  Head / Face Inspection Performed  Neck Inspection Performed  Chest / Axilla Inspection Performed  Abdomen Inspection Performed  Genitals / Buttocks / Groin Inspection Performed  Back Inspection Performed  RUE Inspected  LUE Inspection Performed  RLE Inspected  LLE Inspection Performed  Nails and Digits Inspection Performed                   Diagram Legend     Erythematous scaling macule/papule c/w actinic keratosis       Vascular papule c/w angioma      Pigmented verrucoid papule/plaque c/w seborrheic keratosis      Yellow umbilicated papule c/w sebaceous hyperplasia      Irregularly shaped tan macule c/w lentigo     1-2 mm smooth white papules consistent with Milia      Movable subcutaneous cyst with punctum c/w epidermal inclusion cyst      Subcutaneous movable cyst c/w pilar cyst      Firm pink to brown papule c/w  dermatofibroma      Pedunculated fleshy papule(s) c/w skin tag(s)      Evenly pigmented macule c/w junctional nevus     Mildly variegated pigmented, slightly irregular-bordered macule c/w mildly atypical nevus      Flesh colored to evenly pigmented papule c/w intradermal nevus       Pink pearly papule/plaque c/w basal cell carcinoma      Erythematous hyperkeratotic cursted plaque c/w SCC      Surgical scar with no sign of skin cancer recurrence      Open and closed comedones      Inflammatory papules and pustules      Verrucoid papule consistent consistent with wart     Erythematous eczematous patches and plaques     Dystrophic onycholytic nail with subungual debris c/w onychomycosis     Umbilicated papule    Erythematous-base heme-crusted tan verrucoid plaque consistent with inflamed seborrheic keratosis     Erythematous Silvery Scaling Plaque c/w Psoriasis     See annotation      No images are attached to the encounter or orders placed in the encounter.    [] Data reviewed  [] Independent review of test  [] Management discussed with another provider    Assessment / Plan:        Skin cancer screening  Total body skin examination performed today including at least 12 points as noted in physical examination. No lesions suspicious for malignancy noted.    Recommend daily sun protection/avoidance, use of at least SPF 30, broad spectrum sunscreen (OTC drug), skin self examinations, and routine physician surveillance to optimize early detection    Actinic keratoses  Cryosurgery Procedure Note    Verbal consent from the patient is obtained including, but not limited to, risk of hypopigmentation/hyperpigmentation, scar, recurrence of lesion. The patient is aware of the precancerous quality and need for treatment of these lesions. Liquid nitrogen cryosurgery is applied to the 7 actinic keratoses, as detailed in the physical exam, to produce a freeze injury. The patient is aware that blisters may form and is instructed on  wound care with gentle cleansing and use of vaseline ointment to keep moist until healed. The patient is supplied a handout on cryosurgery and is instructed to call if lesions do not completely resolve.    Solar lentigo  This is a benign hyperpigmented sun induced lesion. Recommend daily sun protection/avoidance and use of at least SPF 30, broad spectrum sunscreen (OTC drug) will reduce the number of new lesions. Treatment of these benign lesions are considered cosmetic.    Seborrheic keratosis  These are benign inherited growths without a malignant potential. Reassurance given to patient. No treatment is necessary.              LOS NUMBER AND COMPLEXITY OF PROBLEMS    COMPLEXITY OF DATA RISK TOTAL TIME (m)   32390  99185 [] 1 self-limited or minor problem [x] Minimal to none [] No treatment recommended or patient to monitor 15-29  10-19   61640  89863 Low  [] 2 or > self limited or minor problems  [] 1 stable chronic illness  [] 1 acute, uncomplicated illness or injury Limited (2)  [] Prior external notes from each unique source  [] Review result of each unique test  [] Order each unique test [x]  Low  OTC medications, minor skin biopsy 30-44  20-29   32184  07755 Moderate  []  1 or > chronic illness with progression, exacerbation or SE of treatment  [x]  2 or more stable chronic illnesses  []  1 acute illness with systemic symptoms  []  1 acute complicated injury  []  1 undiagnosed new problem with uncertain prognosis Moderate (1/3 below)  []  3 or more data items        *Now includes assessment requiring independent historian  []  Independent interpretation of a test  []  Discuss management/test with another provider Moderate  []  Prescription drug mgmt  []  Minor surgery with risk discussed  []  Mgmt limited by social determinates 45-59  30-39   29776  29358 High  []  1 or more chronic illness with severe exacerbation, progression or SE of treatment  []  1 acute or chronic illness/injury that poses a threat to  life or bodily function Extensive (2/3 below)  []  3 or more data items        *Now includes assessment requiring independent historian.  []  Independent interpretation of a test  []  Discuss management/test with another provider High  []  Major surgery with risk discussed  []  Drug therapy requiring intensive monitoring for toxicity  []  Hospitalization  []  Decision for DNR 60-74  40-54      No follow-ups on file.    Nabila Alejandro MD, FAAD  [facility]

## 2025-03-27 ENCOUNTER — OFFICE VISIT (OUTPATIENT)
Dept: CARDIOLOGY | Facility: CLINIC | Age: 70
End: 2025-03-27
Payer: MEDICARE

## 2025-03-27 VITALS
DIASTOLIC BLOOD PRESSURE: 72 MMHG | HEIGHT: 61 IN | HEART RATE: 70 BPM | WEIGHT: 137 LBS | BODY MASS INDEX: 25.86 KG/M2 | SYSTOLIC BLOOD PRESSURE: 140 MMHG

## 2025-03-27 DIAGNOSIS — I10 ESSENTIAL HYPERTENSION: ICD-10-CM

## 2025-03-27 DIAGNOSIS — I70.223 ATHEROSCLEROSIS OF NATIVE ARTERIES OF EXTREMITIES WITH REST PAIN, BILATERAL LEGS: ICD-10-CM

## 2025-03-27 DIAGNOSIS — I25.118 CORONARY ARTERY DISEASE OF NATIVE ARTERY OF NATIVE HEART WITH STABLE ANGINA PECTORIS: Primary | ICD-10-CM

## 2025-03-27 DIAGNOSIS — I49.5 SINUS NODE DYSFUNCTION: ICD-10-CM

## 2025-03-27 DIAGNOSIS — I71.43 INFRARENAL ABDOMINAL AORTIC ANEURYSM (AAA) WITHOUT RUPTURE: ICD-10-CM

## 2025-03-27 DIAGNOSIS — J44.9 COPD, SEVERE: ICD-10-CM

## 2025-03-27 DIAGNOSIS — Z87.891 PERSONAL HISTORY OF NICOTINE DEPENDENCE: ICD-10-CM

## 2025-03-27 DIAGNOSIS — R00.1 BRADYCARDIA: ICD-10-CM

## 2025-03-27 DIAGNOSIS — R55 SYNCOPE AND COLLAPSE: ICD-10-CM

## 2025-03-27 DIAGNOSIS — I95.1 ORTHOSTATIC HYPOTENSION: ICD-10-CM

## 2025-03-27 DIAGNOSIS — E78.2 MIXED HYPERLIPIDEMIA: ICD-10-CM

## 2025-03-27 PROCEDURE — 99999 PR PBB SHADOW E&M-EST. PATIENT-LVL IV: CPT | Mod: PBBFAC,,, | Performed by: PHYSICIAN ASSISTANT

## 2025-03-27 NOTE — PROGRESS NOTES
Subjective   Patient ID:  Gemma Vick is a 70 y.o. female who presents for follow-up of hospital follow-up    HPI  Ms. Vick is a 70  year old female patient whose current medical conditions include COPD, former tobacco abuse, CAD s/p prior stent (most recent PCI of RCA 9/23), HTN, hyperlipidemia, GERD, chronic mesenteric ischemia, vascular dementia, and AAA who presents today for hospital follow-up. Patient recently hospitalized at [facility] earlier this month due to bradycardia/CP. Her Coreg was discontinued and she underwent MPI stress test which was negative and was discharged home with plans for OP extended Holter. She returns today and states she is doing well. Seems stable. Reports occasional episodes where the top of her head gets hot/burns, generally self-resolves once she takes her medications. Feels it may be triggered by position/when she tilts her head upward. Does not feel her BP is elevated when this occurs. No recurrent near syncope or syncopal episodes.  present states generally she would complain of weakness/fatigue prior to losing consciousness although patient has no recollection of preceding events. Some dependent edema, worse at end of the day. Resolves with elevation. No PND or orthopnea. No bouts of abdominal pain. No raffy CP, heaviness, or tightness. No LH, dizziness, or palpitations. No s/s of TIA/CVA.  BP acceptable today. Patient is compliant with her medications. VC is still pending. Wearing compression socks.       CTA abd/pelvis infrarenal AAA 4 cm, known SMA stenosis. She follows with vascular.         Review of Systems   Constitutional: Negative for chills, decreased appetite, fever and malaise/fatigue.   HENT:  Negative for congestion, hoarse voice and sore throat.    Eyes:  Negative for blurred vision and discharge.   Cardiovascular:  Negative for chest pain, claudication, cyanosis, dyspnea on exertion, irregular heartbeat, leg swelling, near-syncope, orthopnea,  "palpitations and paroxysmal nocturnal dyspnea.   Respiratory:  Negative for cough, hemoptysis, shortness of breath, snoring, sputum production and wheezing.    Endocrine: Negative for cold intolerance and heat intolerance.   Hematologic/Lymphatic: Negative for bleeding problem. Does not bruise/bleed easily.   Skin:  Negative for rash.   Musculoskeletal:  Positive for arthritis and joint pain. Negative for back pain, joint swelling, muscle cramps, muscle weakness and myalgias.   Gastrointestinal:  Negative for abdominal pain, constipation, diarrhea, heartburn, melena and nausea.   Genitourinary:  Negative for hematuria.   Neurological:  Positive for headaches and numbness. Negative for dizziness, focal weakness, light-headedness, loss of balance, paresthesias, seizures and weakness.   Psychiatric/Behavioral:  Positive for memory loss. The patient does not have insomnia.    Allergic/Immunologic: Negative for hives.     BP (!) 140/72 (BP Location: Left arm, Patient Position: Sitting)   Pulse 70   Ht 5' 1" (1.549 m)   Wt 62.1 kg (137 lb 0.3 oz)   BMI 25.89 kg/m²        Objective     Physical Exam  Vitals and nursing note reviewed.   Constitutional:       General: She is not in acute distress.     Appearance: Normal appearance. She is well-developed. She is not diaphoretic.   HENT:      Head: Normocephalic and atraumatic.   Eyes:      General:         Right eye: No discharge.         Left eye: No discharge.      Pupils: Pupils are equal, round, and reactive to light.   Cardiovascular:      Rate and Rhythm: Normal rate and regular rhythm.      Heart sounds: Normal heart sounds, S1 normal and S2 normal. No murmur heard.  Pulmonary:      Effort: Pulmonary effort is normal. No respiratory distress.      Breath sounds: Normal breath sounds. No wheezing or rales.   Musculoskeletal:      Right lower leg: No edema.      Left lower leg: No edema.   Skin:     General: Skin is warm and dry.      Findings: No erythema. "   Neurological:      Mental Status: She is alert and oriented to person, place, and time.   Psychiatric:         Mood and Affect: Mood normal.         Behavior: Behavior normal.         Thought Content: Thought content normal.       MPI Stress test Results 2/28/2025  Interpretation Summary         Normal myocardial perfusion scan. There is no evidence of myocardial ischemia or infarction.    The gated perfusion images showed an ejection fraction of 90% at rest. The gated perfusion images showed an ejection fraction of 95% post stress. Normal ejection fraction is greater than 59%.    There is normal wall motion at rest and post-stress.    LV cavity size is normal at rest and normal at post-stress.    The ECG portion of the study is negative for ischemia.    TTE Results 2/27/2025  Summary  Show Result Comparison     Left Ventricle: The left ventricle is normal in size. Normal wall thickness. There is concentric remodeling. There is normal systolic function. Ejection fraction is approximately 60%. There is normal diastolic function.    Right Ventricle: The right ventricle is normal in size. Wall thickness is normal. Systolic function is normal.    Aortic Valve: There is moderate aortic valve sclerosis.    Pulmonary Artery: The estimated pulmonary artery systolic pressure is 32 mmHg.    IVC/SVC: Normal venous pressure at 3 mmHg    Carotid U/S 11/25/2024 Results  Interpretation Summary  Show Result Comparison     There is 40-49% right Internal Carotid Stenosis.    There is 40-49% left Internal Carotid Stenosis.    Known right vertebral artery occluded.          Assessment and Plan     1. Coronary artery disease of native artery of native heart with stable angina pectoris    2. COPD, severe    3. Infrarenal abdominal aortic aneurysm (AAA) without rupture    4. Bradycardia    5. Essential hypertension    6. Mixed hyperlipidemia    7. Orthostatic hypotension    8. Sinus node dysfunction    9. Syncope and collapse    10.  Personal history of nicotine dependence      Patient presents for f/u.Doing clinically well. No recurrence of near syncope or syncope. No CP. BP stable. VC in place, results pending.   Plan:  -Continue current medical management and risk factor modification  -Cardiac, low salt diet  -Compression socks, change positions slowly  -Monitor BP at home  -VC pending  -F/u with vascular regarding AAA  -RTC 3 months with Dr. Estrada

## 2025-03-31 ENCOUNTER — DOCUMENTATION ONLY (OUTPATIENT)
Dept: CARDIOLOGY | Facility: HOSPITAL | Age: 70
End: 2025-03-31
Payer: MEDICARE

## 2025-03-31 NOTE — PROGRESS NOTES
Below is message from [facility] regarding patient monitoring:                      We would like to inform you that the following patient has ended their study early. We have updated the eos and will send what we have per SLA.     [facility]: [facility]   [facility]: [facility]  Prescriber: Briseida Anand  Patient: BHARAT MONTANA  : 1955  Reason: Spoke with patient about no patch connected/dead relay/missing data and patient stated she has already returned the monitoring device. Patient stated to end the study and send the data that we have. Updated EOS from to . Patient has collected about 14 days of the 30 day study.     Thank you,

## 2025-04-07 RX ORDER — HYDROCHLOROTHIAZIDE 12.5 MG/1
12.5 CAPSULE ORAL
Qty: 90 CAPSULE | Refills: 3 | OUTPATIENT
Start: 2025-04-07

## 2025-04-07 NOTE — TELEPHONE ENCOUNTER
No care due was identified.  [facility] Embedded Care Due Messages. Reference number: [id number].   4/07/2025 8:30:42 AM CDT

## 2025-04-07 NOTE — TELEPHONE ENCOUNTER
Refill Decision Note   Gemma Vick  is requesting a refill authorization.  Brief Assessment and Rationale for Refill:  Quick Discontinue     Medication Therapy Plan: The original prescription was discontinued on 3/28/2024 by Mert Bah MD      Comments:     Note composed:10:50 AM 04/07/2025

## 2025-04-18 ENCOUNTER — PATIENT MESSAGE (OUTPATIENT)
Dept: ADMINISTRATIVE | Facility: OTHER | Age: 70
End: 2025-04-18
Payer: MEDICARE

## 2025-04-24 ENCOUNTER — TELEPHONE (OUTPATIENT)
Dept: PULMONOLOGY | Facility: CLINIC | Age: 70
End: 2025-04-24
Payer: MEDICARE

## 2025-04-24 NOTE — TELEPHONE ENCOUNTER
----- Message from Freda sent at 4/24/2025  1:38 PM CDT -----  Contact: pt  Type:  Sooner Apoointment RequestCaller is requesting a sooner appointment.  Caller declined first available appointment listed below.  Caller will not accept being placed on the waitlist and is requesting a message be sent to doctor.Name of Caller: ptWhen is the first available appointment? [facility] insteadSymptoms: spirometry and office visitWould the patient rather a call back or a response via [facility]?  Phone Best Call Back Number: [phone] Information:

## 2025-05-14 ENCOUNTER — OFFICE VISIT (OUTPATIENT)
Dept: NEUROLOGY | Facility: CLINIC | Age: 70
End: 2025-05-14
Payer: MEDICARE

## 2025-05-14 VITALS
HEIGHT: 61 IN | HEART RATE: 80 BPM | RESPIRATION RATE: 16 BRPM | DIASTOLIC BLOOD PRESSURE: 75 MMHG | BODY MASS INDEX: 26.51 KG/M2 | WEIGHT: 140.44 LBS | SYSTOLIC BLOOD PRESSURE: 132 MMHG

## 2025-05-14 DIAGNOSIS — M54.16 LUMBAR RADICULOPATHY: ICD-10-CM

## 2025-05-14 DIAGNOSIS — K21.9 GASTROESOPHAGEAL REFLUX DISEASE, UNSPECIFIED WHETHER ESOPHAGITIS PRESENT: ICD-10-CM

## 2025-05-14 DIAGNOSIS — H81.10 BENIGN PAROXYSMAL POSITIONAL VERTIGO, UNSPECIFIED LATERALITY: ICD-10-CM

## 2025-05-14 DIAGNOSIS — F17.211 CIGARETTE NICOTINE DEPENDENCE IN REMISSION: ICD-10-CM

## 2025-05-14 DIAGNOSIS — Z91.89 AT RISK FOR PROLONGED QT INTERVAL SYNDROME: ICD-10-CM

## 2025-05-14 DIAGNOSIS — I25.118 CORONARY ARTERY DISEASE OF NATIVE ARTERY OF NATIVE HEART WITH STABLE ANGINA PECTORIS: ICD-10-CM

## 2025-05-14 DIAGNOSIS — I10 ESSENTIAL HYPERTENSION: ICD-10-CM

## 2025-05-14 DIAGNOSIS — F34.89 OTHER SPECIFIED PERSISTENT MOOD DISORDERS: ICD-10-CM

## 2025-05-14 DIAGNOSIS — G47.00 INSOMNIA, UNSPECIFIED TYPE: ICD-10-CM

## 2025-05-14 DIAGNOSIS — I49.5 SINUS NODE DYSFUNCTION: ICD-10-CM

## 2025-05-14 DIAGNOSIS — Z99.81 DEPENDENCE ON SUPPLEMENTAL OXYGEN: ICD-10-CM

## 2025-05-14 DIAGNOSIS — E78.2 MIXED HYPERLIPIDEMIA: ICD-10-CM

## 2025-05-14 DIAGNOSIS — J44.9 COPD, SEVERE: ICD-10-CM

## 2025-05-14 DIAGNOSIS — F01.B0 VASCULAR DEMENTIA, MODERATE, WITHOUT BEHAVIORAL DISTURBANCE, PSYCHOTIC DISTURBANCE, MOOD DISTURBANCE, AND ANXIETY: ICD-10-CM

## 2025-05-14 DIAGNOSIS — R26.9 UNSPECIFIED ABNORMALITIES OF GAIT AND MOBILITY: ICD-10-CM

## 2025-05-14 DIAGNOSIS — I65.23 BILATERAL CAROTID ARTERY STENOSIS: ICD-10-CM

## 2025-05-14 DIAGNOSIS — F32.A DEPRESSION, UNSPECIFIED DEPRESSION TYPE: ICD-10-CM

## 2025-05-14 DIAGNOSIS — F02.A3: ICD-10-CM

## 2025-05-14 DIAGNOSIS — Z91.81 HISTORY OF FALL: ICD-10-CM

## 2025-05-14 DIAGNOSIS — R42 DIZZINESS: ICD-10-CM

## 2025-05-14 DIAGNOSIS — R55 SYNCOPE AND COLLAPSE: ICD-10-CM

## 2025-05-14 DIAGNOSIS — R41.82 ALTERED MENTAL STATUS, UNSPECIFIED ALTERED MENTAL STATUS TYPE: ICD-10-CM

## 2025-05-14 DIAGNOSIS — F01.B0 MODERATE VASCULAR DEMENTIA WITHOUT BEHAVIORAL DISTURBANCE, PSYCHOTIC DISTURBANCE, MOOD DISTURBANCE, OR ANXIETY: Primary | ICD-10-CM

## 2025-05-14 DIAGNOSIS — I95.1 ORTHOSTATIC HYPOTENSION: ICD-10-CM

## 2025-05-14 DIAGNOSIS — R68.89 FORGETFULNESS: ICD-10-CM

## 2025-05-14 PROCEDURE — 1159F MED LIST DOCD IN RCRD: CPT | Mod: CPTII,HCNC,S$GLB, | Performed by: NURSE PRACTITIONER

## 2025-05-14 PROCEDURE — 1126F AMNT PAIN NOTED NONE PRSNT: CPT | Mod: CPTII,HCNC,S$GLB, | Performed by: NURSE PRACTITIONER

## 2025-05-14 PROCEDURE — 99999 PR PBB SHADOW E&M-EST. PATIENT-LVL V: CPT | Mod: PBBFAC,HCNC,, | Performed by: NURSE PRACTITIONER

## 2025-05-14 PROCEDURE — 3288F FALL RISK ASSESSMENT DOCD: CPT | Mod: CPTII,HCNC,S$GLB, | Performed by: NURSE PRACTITIONER

## 2025-05-14 PROCEDURE — 3008F BODY MASS INDEX DOCD: CPT | Mod: CPTII,HCNC,S$GLB, | Performed by: NURSE PRACTITIONER

## 2025-05-14 PROCEDURE — 4010F ACE/ARB THERAPY RXD/TAKEN: CPT | Mod: CPTII,HCNC,S$GLB, | Performed by: NURSE PRACTITIONER

## 2025-05-14 PROCEDURE — 1101F PT FALLS ASSESS-DOCD LE1/YR: CPT | Mod: CPTII,HCNC,S$GLB, | Performed by: NURSE PRACTITIONER

## 2025-05-14 PROCEDURE — 3075F SYST BP GE 130 - 139MM HG: CPT | Mod: CPTII,HCNC,S$GLB, | Performed by: NURSE PRACTITIONER

## 2025-05-14 PROCEDURE — 99215 OFFICE O/P EST HI 40 MIN: CPT | Mod: HCNC,S$GLB,, | Performed by: NURSE PRACTITIONER

## 2025-05-14 PROCEDURE — 1160F RVW MEDS BY RX/DR IN RCRD: CPT | Mod: CPTII,HCNC,S$GLB, | Performed by: NURSE PRACTITIONER

## 2025-05-14 PROCEDURE — 3078F DIAST BP <80 MM HG: CPT | Mod: CPTII,HCNC,S$GLB, | Performed by: NURSE PRACTITIONER

## 2025-05-14 RX ORDER — MEMANTINE HYDROCHLORIDE 10 MG/1
10 TABLET ORAL 2 TIMES DAILY
Qty: 180 TABLET | Refills: 12 | Status: SHIPPED | OUTPATIENT
Start: 2025-05-14

## 2025-05-14 RX ORDER — DONEPEZIL HYDROCHLORIDE 5 MG/1
5 TABLET, FILM COATED ORAL NIGHTLY
Qty: 30 TABLET | Refills: 11 | Status: SHIPPED | OUTPATIENT
Start: 2025-05-14 | End: 2026-05-14

## 2025-05-14 NOTE — PROGRESS NOTES
Subjective:       Patient ID: Gemma Vick is a 70 y.o. female.    Chief Complaint: Moderate vascular dementia without behavioral disturbance,           HPIThe patient is here for memory loss evaluation.     The patient is presenting with memory changes that began in 2017-year . The patient is accompanied by granddaughter. The patient has had prior Memory evaluation at [facility]. She carries Dx of Moderate Vascular Dementia. The main problems the patient has are related to forgetful, changes in appetite. For example, the patient would repeat the same question repeatedly. The patient excessively forgets where placed certain things, sometimes she is able to recover, she has misplaced wallet, phone and watch. The patient not forgetting names. The patient is no longer driving and grand daughter reports she did have episode that she had went to the store. The patient is not losing personal items and does not put them in odd places. Patient has decreased activities. No confusion around and inside the house. No trouble remembering the date and time. Patient granddaughter gives her medications and appointments The patient is not aware of major holidays. Patient is aware political changes. The patient granddaughter is handling finances. The patient is still independent with ADLs. Increased agitation. tools. No history of strokes. No history of headaches. No history of hypothyroidism. No history of alcoholism (young onset or old onset). No history of B12 deficiency, COPD wear O2 3 L nc at night. No history of depression. No history of bipolar disorder. No history of Untreated Syphilis.  No history of HIV infection. No toxic exposures.  No history of traumatic brain injury. No tremors or abnormal movements. Patient has chronic back pain, reports intolerance to pain medications. No falls or instability. Has some urgency with urination. Patient has sleep disturbance. Decreased appetite. No family history of dementia. Patient was  diginose with Moderate Vascular dementia with out behavioral disturbance psychoactive disturbance mood disturbances or anxiety Patient is currently taking Namenda 10 mg po BID.         INTERVAL HISTORY 05-: Accompanied by significant other. Patient doing well. Patient present for follow up for memory management. Patient doing well. No cognitive decline noted and no behavioral disturbance noted. Tolerating Namenda 10 mg BID for now     Discussed plan of care for DMA will start Aricept 5 mg po at bedtime. Patient agree to plan of care.   Review of Systems   Constitutional:  Positive for activity change.   HENT: Negative.     Eyes: Negative.    Respiratory:  Positive for choking and shortness of breath.    Gastrointestinal: Negative.    Endocrine: Negative.    Genitourinary: Negative.    Musculoskeletal:  Positive for arthralgias, back pain, myalgias and neck pain.   Skin: Negative.    Allergic/Immunologic: Negative.    Neurological:  Positive for numbness.   Hematological: Negative.    Psychiatric/Behavioral:  Positive for confusion, decreased concentration and dysphoric mood. Negative for agitation, behavioral problems, hallucinations and suicidal ideas. The patient is nervous/anxious.                  Current Outpatient Medications:     albuterol-ipratropium (DUO-NEB) 2.5 mg-0.5 mg/3 mL nebulizer solution, Take 3 mLs by nebulization every 6 (six) hours as needed for Wheezing., Disp: 360 mL, Rfl: 12    amLODIPine (NORVASC) 5 MG tablet, TAKE 1 TABLET BY MOUTH ONCE DAILY, Disp: 90 tablet, Rfl: 3    aspirin 81 MG Chew, Take 81 mg by mouth once daily., Disp: , Rfl:     budesonide-glycopyr-formoterol (BREZTRI AEROSPHERE) 160-9-4.8 mcg/actuation [facility], Inhale 2 puffs into the lungs 2 (two) times a day., Disp: 32.1 g, Rfl: 3    cloNIDine (CATAPRES) 0.1 MG tablet, Take 1 tablet (0.1 mg total) by mouth daily as needed (if Blood pressure above 170/90)., Disp: 30 tablet, Rfl: 11    clopidogreL (PLAVIX) 75 mg tablet, TAKE  1 TABLET BY MOUTH ONCE DAILY, Disp: 90 tablet, Rfl: 2    COMBIVENT RESPIMAT  mcg/actuation inhaler, Inhale 2 puffs into the lungs every 6 (six) hours as needed for Wheezing or Shortness of Breath., Disp: 12 g, Rfl: 3    cyanocobalamin (VITAMIN B-12) 100 MCG tablet, Take 100 mcg by mouth once daily., Disp: , Rfl:     diclofenac sodium (VOLTAREN) 1 % Gel, Apply topically 4 (four) times daily., Disp: 150 g, Rfl: 1    dicyclomine (BENTYL) 10 MG capsule, TAKE ONE CAPSULE BY MOUTH THREE TIMES DAILY AS NEEDED (Patient taking differently: Take 10 mg by mouth as needed.), Disp: 90 capsule, Rfl: 2    estradioL (ESTRACE) 0.5 MG tablet, TAKE 1 TABLET BY MOUTH ONCE DAILY, Disp: 90 tablet, Rfl: 3    ezetimibe-simvastatin 10-40 mg (VYTORIN) 10-40 mg per tablet, TAKE 1 TABLET BY MOUTH EVERY EVENING, Disp: 90 tablet, Rfl: 0    furosemide (LASIX) 20 MG tablet, TAKE 1 TABLET BY MOUTH ONCE DAILY AS NEEDED (Patient taking differently: Take 20 mg by mouth as needed.), Disp: 90 tablet, Rfl: 2    inhalation spacing device (COMPACT SPACE CHAMBER), USE AS DIRECTED, Disp: 1 each, Rfl: 2    olmesartan (BENICAR) 40 MG tablet, TAKE 1 TABLET BY MOUTH ONCE DAILY, Disp: 90 tablet, Rfl: 3    OXYGEN-AIR DELIVERY SYSTEMS MISC, 3 L by Misc.(Non-Drug; Combo Route) route every evening., Disp: , Rfl:     pantoprazole (PROTONIX) 40 MG tablet, TAKE 1 TABLET BY MOUTH ONCE DAILY, Disp: 90 tablet, Rfl: 3    vitamin D (VITAMIN D3) 1000 units Tab, Take 1,000 Units by mouth once daily., Disp: , Rfl:     zolpidem (AMBIEN) 10 mg Tab, TAKE 1 TABLET BY MOUTH EVERY EVENING, Disp: 30 tablet, Rfl: 3    donepeziL (ARICEPT) 5 MG tablet, Take 1 tablet (5 mg total) by mouth every evening., Disp: 30 tablet, Rfl: 11    famotidine (PEPCID) 20 MG tablet, Take 1 tablet (20 mg total) by mouth 2 (two) times daily as needed for Heartburn., Disp: 60 tablet, Rfl: 11    memantine (NAMENDA) 10 MG Tab, Take 1 tablet (10 mg total) by mouth 2 (two) times daily., Disp: 180 tablet,  Rfl: 12  Past Medical History:   Diagnosis Date    AAA (abdominal aortic aneurysm) 02/13/2014    Abdominal aneurysm     3    Acute coronary syndrome     Anemia     Arthritis     BPPV (benign paroxysmal positional vertigo)     Carotid artery plaque     Carotid artery stenosis and occlusion 02/13/2014    Chronic back pain     COPD (chronic obstructive pulmonary disease)     Coronary artery disease     Dementia     Emphysema lung     Heart attack 1998    Several after that with 4 stents    Hyperlipidemia     Hypertension     Joint pain 2000    Myocardial infarction     x3    Neuropathy     Personal history of COVID-19 06/09/2021 11/16/2020 +Covid, recovered at home      Past Surgical History:   Procedure Laterality Date    CARDIAC CATHETERIZATION      CHOLECYSTECTOMY  1987    Stones in bile duct    CORONARY ANGIOPLASTY      CORONARY STENT PLACEMENT N/A 09/12/2023    Procedure: INSERTION, STENT, CORONARY ARTERY;  Surgeon: Millie Juarez MD;  Location: [facility] CATH LAB;  Service: Cardiology;  Laterality: N/A;    EYE SURGERY  08/31    Catarates removed    FRACTURE SURGERY  1971    Hand surgery    HYSTERECTOMY  1988    LEFT HEART CATHETERIZATION Left 09/12/2023    Procedure: Left heart cath;  Surgeon: Millie Juarez MD;  Location: [facility] CATH LAB;  Service: Cardiology;  Laterality: Left;    PERCUTANEOUS CORONARY INTERVENTION, ARTERY N/A 09/12/2023    Procedure: Percutaneous coronary intervention;  Surgeon: Millie Juarez MD;  Location: [facility] CATH LAB;  Service: Cardiology;  Laterality: N/A;    THROMBECTOMY, CORONARY  09/12/2023    Procedure: Thrombectomy, Coronary;  Surgeon: Millie Juarez MD;  Location: [facility] CATH LAB;  Service: Cardiology;;    TONSILLECTOMY      TRANSFORAMINAL EPIDURAL INJECTION OF STEROID Right 12/01/2023    Procedure: Injection,steroid,epidural,transforaminal approach- right side, L4/5 and L5/S1;  Surgeon: Lydia Roberts MD;  Location: [facility] PAIN MGT;  Service: Pain Management;  Laterality: Right;     TUBAL LIGATION  1985    Afterr last child     Social History     Socioeconomic History    Marital status:    Tobacco Use    Smoking status: Former     Current packs/day: 0.00     Average packs/day: 0.6 packs/day for 50.2 years (30.0 ttl pk-yrs)     Types: Cigarettes     Start date: 1970     Quit date: 2017     Years since quittin.0    Smokeless tobacco: Never    Tobacco comments:     Cant remember but after heart attack   Substance and Sexual Activity    Alcohol use: Never    Drug use: Never    Sexual activity: Not Currently     Partners: Male     Birth control/protection: Post-menopausal, See Surgical Hx     Social Drivers of Health     Financial Resource Strain: Patient Declined (3/1/2025)    Overall Financial Resource Strain (CARDIA)     Difficulty of Paying Living Expenses: Patient declined   Recent Concern: Financial Resource Strain - Medium Risk (2025)    Overall Financial Resource Strain (CARDIA)     Difficulty of Paying Living Expenses: Somewhat hard   Food Insecurity: Food Insecurity Present (2025)    Hunger Vital Sign     Worried About Running Out of Food in the Last Year: Sometimes true     Ran Out of Food in the Last Year: Sometimes true   Transportation Needs: No Transportation Needs (2025)    PRAPARE - Transportation     Lack of Transportation (Medical): No     Lack of Transportation (Non-Medical): No   Physical Activity: Insufficiently Active (2025)    Exercise Vital Sign     Days of Exercise per Week: 1 day     Minutes of Exercise per Session: 20 min   Stress: Stress Concern Present (2025)    [employer] [place] [employer] - Occupational Stress Questionnaire     Feeling of Stress : Very much   Housing Stability: Patient Declined (3/1/2025)    Housing Stability Vital Sign     Unable to Pay for Housing in the Last Year: Patient declined     Number of Times Moved in the Last Year: 0     Homeless in the Last Year: Patient declined              Past/Current Medical/Surgical History, Past/Current Social History, Past/Current Family History and Past/Current Medications were reviewed in detail.        Objective:           VITAL SIGNS WERE REVIEWED      GENERAL APPEARANCE:     The patient looks comfortable.    BMI 25.36 KG    No signs of respiratory distress.    Normal breathing pattern.    No dysmorphic features    Normal eye contact.     GENERAL MEDICAL EXAM:    HEENT:  Head is atraumatic normocephalic.     No tender temporal arteries. Fundoscopic (Ophthalmoscopic) exam showed no disc edema.      Neck and Axillae: No JVD. No visible lesions.    No carotid bruits. No thyromegaly. No lymphadenopathy.    Cardiopulmonary: No cyanosis. No tachypnea. Normal respiratory effort.    Gastrointestinal/Urogenital:  No jaundice. No stomas or lesions. No visible hernias. No catheters.     Abdomen is soft non-tender. No masses or organomegaly.    Skin, Hair and Nails: No pathognonomic skin rash. No neurofibromatosis. No visible lesions.No stigmata of autoimmune disease. No clubbing.    Skin is warm and moist. No palpable masses.    Limbs: No varicose veins. No visible swelling.    No palpable edema. Pulses are symmetric. Pedal pulses are palpable.      Muskoskeletal: No visible deformities.No visible lesions.    No spine tenderness. No signs of longstanding neuropathy. No dislocations or fractures.            Neurologic Exam     Mental Status   Oriented to person, place, and time.   Registration: recalls 3 of 3 objects. Recall at 5 minutes: recalls 3 of 3 objects.   Attention: decreased. Concentration: normal.   Speech: speech is normal and not slurred   Level of consciousness: alert  Knowledge: good and consistent with education. Unable to perform simple calculations (unable to do math).   Able to name object. Able to read. Able to repeat. Able to write. Normal comprehension.     Cranial Nerves     CN II   Visual fields full to confrontation.   Visual acuity:  normal  Right visual field deficit: none  Left visual field deficit: none     CN III, IV, VI   Pupils are equal, round, and reactive to light.  Extraocular motions are normal.   Right pupil: Size: 2 mm. Shape: regular. Reactivity: brisk. Consensual response: intact. Accommodation: intact.   Left pupil: Size: 2 mm. Shape: regular. Reactivity: brisk. Consensual response: intact. Accommodation: intact.   CN III: no CN III palsy  CN VI: no CN VI palsy  Nystagmus: none   Diplopia: none  Ophthalmoparesis: none  Upgaze: normal  Downgaze: normal  Conjugate gaze: present  Vestibulo-ocular reflex: present    CN V   Facial sensation intact.   Right facial sensation deficit: none  Left facial sensation deficit: none    CN VII   Right facial weakness: none  Left facial weakness: none  Left taste: normal    CN VIII   CN VIII normal.   Hearing: intact  Right Rinne: AC > BC  Left Rinne: AC > BC  Qureshi: does not lateralize     CN IX, X   CN IX normal.   CN X normal.   Palate: symmetric    CN XI   CN XI normal.   Right sternocleidomastoid strength: normal  Left sternocleidomastoid strength: normal  Right trapezius strength: normal  Left trapezius strength: normal    CN XII   CN XII normal.   Tongue: not atrophic  Fasciculations: absent  Tongue deviation: none    Motor Exam   Muscle bulk: normal  Overall muscle tone: normal  Right arm tone: normal  Left arm tone: normal  Right arm pronator drift: absent  Left arm pronator drift: absent  Right leg tone: normal  Left leg tone: normal    Strength   Strength 5/5 throughout.   Right neck flexion: 5/5  Left neck flexion: 5/5  Right neck extension: 5/5  Left neck extension: 5/5  Right deltoid: 5/5  Left deltoid: 5/5  Right biceps: 5/5  Left biceps: 5/5  Right triceps: 5/5  Left triceps: 5/5  Right wrist flexion: 5/5  Left wrist flexion: 5/5  Right wrist extension: 5/5  Left wrist extension: 5/5  Right interossei: 5/5  Left interossei: 5/5  Right iliopsoas: 5/5  Left iliopsoas: 5/5  Right  quadriceps: 5/5  Left quadriceps: 5/5  Right hamstrin/5  Left hamstrin/5  Right glutei: 5/5  Left glutei: 5/5  Right anterior tibial: 5/5  Left anterior tibial: 5/5  Right posterior tibial: 5/5  Left posterior tibial: 5/5  Right peroneal: 5/5  Left peroneal: 5/5  Right gastroc: 5/5  Left gastroc: 5/5    Sensory Exam   Right arm light touch: normal  Left arm light touch: normal  Right leg light touch: decreased from toes  Left leg light touch: decreased from toes  Right arm vibration: normal  Left arm vibration: normal  Right leg vibration: decreased from toes  Left leg vibration: decreased from toes  Proprioception normal.   Right arm proprioception: normal  Left arm proprioception: normal  Right leg proprioception: normal  Left leg proprioception: normal  Pinprick normal.   Right arm pinprick: normal  Left arm pinprick: normal  Right leg pinprick: normal  Left leg pinprick: normal  Sensory deficit distribution on left: lateral cutaneous thigh  Graphesthesia: normal  Stereognosis: normal    Gait, Coordination, and Reflexes     Gait  Gait: wide-based    Coordination   Romberg: negative  Finger to nose coordination: normal    Tremor   Intention tremor: absent  Action tremor: right arm    Reflexes   Right brachioradialis: 2+  Left brachioradialis: 2+  Right biceps: 2+  Left biceps: 2+  Right triceps: 2+  Left triceps: 2+  Right patellar: 2+  Left patellar: 2+  Right achilles: 1+  Left achilles: 1+  Right : 0  Left : 0  Right plantar: normal  Left plantar: normal  Right Contreras: absent  Left Contreras: absent  Right ankle clonus: absent  Left ankle clonus: absent  Right pendular knee jerk: absent  Left pendular knee jerk: absent        JUN COGNITIVE ASSESSMENT (MOCA) TOTAL SCORE         NORMAL-MILD NCD 26-30    HS Graduate     DATE 2024        TOTAL SCORE 26/30       VISUOSPATIAL EXECUTIVE (5) 4       NAMING (3) 3       ATTENTION (6) 6       LANGUAGE (3) 2       ABSTRACTION(2) 1       RECALL  (5)  1 WITH CUE 3       ORIENTATION (6) 6           Lab Results   Component Value Date    WBC 5.58 03/01/2025    HGB 9.8 (L) 03/01/2025    HCT 30.1 (L) 03/01/2025    MCV 94 03/01/2025     03/01/2025     Sodium   Date Value Ref Range Status   03/01/2025 136 136 - 145 mmol/L Final     Potassium   Date Value Ref Range Status   03/01/2025 4.1 3.5 - 5.1 mmol/L Final     Chloride   Date Value Ref Range Status   03/01/2025 106 95 - 110 mmol/L Final     CO2   Date Value Ref Range Status   03/01/2025 23 23 - 29 mmol/L Final     Glucose   Date Value Ref Range Status   03/01/2025 83 70 - 110 mg/dL Final     BUN   Date Value Ref Range Status   03/01/2025 12 8 - 23 mg/dL Final     Creatinine   Date Value Ref Range Status   03/01/2025 0.8 0.5 - 1.4 mg/dL Final     Calcium   Date Value Ref Range Status   03/01/2025 8.8 8.7 - 10.5 mg/dL Final     Total Protein   Date Value Ref Range Status   03/01/2025 5.6 (L) 6.0 - 8.4 g/dL Final     Albumin   Date Value Ref Range Status   03/01/2025 3.1 (L) 3.5 - 5.2 g/dL Final     Total Bilirubin   Date Value Ref Range Status   03/01/2025 0.4 0.1 - 1.0 mg/dL Final     Comment:     For infants and newborns, interpretation of results should be based  on gestational age, weight and in agreement with clinical  observations.    Premature Infant recommended reference ranges:  Up to 24 hours.............<8.0 mg/dL  Up to 48 hours............<12.0 mg/dL  3-5 days..................<15.0 mg/dL  6-29 days.................<15.0 mg/dL       Alkaline Phosphatase   Date Value Ref Range Status   03/01/2025 55 40 - 150 U/L Final     AST   Date Value Ref Range Status   03/01/2025 16 10 - 40 U/L Final     ALT   Date Value Ref Range Status   03/01/2025 14 10 - 44 U/L Final     Anion Gap   Date Value Ref Range Status   03/01/2025 7 (L) 8 - 16 mmol/L Final     eGFR if    Date Value Ref Range Status   04/07/2022 >60.0 >60 mL/min/1.73 m^2 Final     eGFR if non    Date Value Ref  Range Status   04/07/2022 >60.0 >60 mL/min/1.73 m^2 Final     Comment:     Calculation used to obtain the estimated glomerular filtration  rate (eGFR) is the CKD-EPI equation.        Lab Results   Component Value Date    AKDOVHWH67 >2000 (H) 06/13/2024     Lab Results   Component Value Date    TSH 1.894 02/26/2025   LABORATORY EVALUATION:    06-    VITAMIN B12 >2000^, FA LEVEL 3.5 LOW, ANDREA NEG, HC LEVEL 9.9 NL, HIV -VE, TSH LEVEL 2.747 NL, RPR -VE    RADIOLOGY EVALUATION:    MRI BRAIN WO:    06-    Mild global cortical atrophy without lobar predominance.     Nonspecific white matter changes likely related to chronic microvascular ischemia.     Small focus of susceptibility artifact in the right parietotemporal lobe likely related to remote microhemorrhage.    No acute findings recommend control and management of B/p that could cause micro-hemorrhage   03-    CT BRAIN WO    No acute abnormality.     Atrophy and chronic white matter changes.    NEUROPSYCHOLOGICAL EVALUATION:    CNP EVALUATION:    11-    Mild major neurocognitive disorder due to multiple etiologies without behavioral disturbance     Reviewed the neuroimaging independently       Assessment:       1. Moderate vascular dementia without behavioral disturbance, psychotic disturbance, mood disturbance, or anxiety    2. 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 At risk for prolonged QT interval syndrome    4. Lumbar radiculopathy    5. History of fall    6. Mixed hyperlipidemia    7. COPD, severe    8. Benign paroxysmal positional vertigo, unspecified laterality    9. Depression, unspecified depression type    10. Forgetfulness    11. Other specified persistent mood disorders    12. Altered mental status, unspecified altered mental status type    13. Unspecified abnormalities of gait and mobility    14. Coronary artery disease of native artery of native heart with stable angina pectoris     15. Bilateral carotid artery stenosis    16. Essential hypertension    17. Insomnia, unspecified type    18. Sinus node dysfunction    19. Dizziness    20. Cigarette nicotine dependence in remission    21. Gastroesophageal reflux disease, unspecified whether esophagitis present    22. Dependence on supplemental oxygen    23. Vascular dementia, moderate, without behavioral disturbance, psychotic disturbance, mood disturbance, and anxiety    24. Syncope and collapse    25. Orthostatic hypotension          Plan:         MILD COGNITIVE IMPAIRMENT VS MODERATE VASCULAR DEMENTIA WITHOUT BEHAVIOR DISTURBANCES/DEPRESSION/FORGETFULNESS/CHRONIC BACK PAIN/ FORMER SMOKER/INSOMNIA       EVALUATION     Explained to the patient and family that medications are intended to slow down the progression and not stop it or reverse the disease.The disease is relentless, progressive and so far cannot be controlled.     I counseled the patient and family that the rate of progression is extremely variable and the average (10 years) is an inaccurate measure.     Recommend optimization of MDD/TANK    Refer to PT to evaluate and treat (massage and dry needling)       DISEASE-MODIFYING AGENTS     Explained to the patient and family that medications are intended to slow down the progression (in the early stages of the disease) and not stop it or reverse the disease. The disease is relentless, progressive and so far, cannot be controlled. Progression includes Cognitive decline, Behavioral disturbances, and Motor decline as well.     I counseled the patient and family that the rate of progression is extremely variable, and the average (10 years) is an inaccurate measure.     CLASSES:    NMDA RECEPTOR ANTAGONISTS    Continue memantine/Namenda 10 mg BID for now     CHOLINESTERASE INHIBITORS (CEI)    Will start donepezil/Aricept and titrate slowly to 10 mg QHS. The side effects include dizziness, diarrhea and nightmares and discussed with patient and  family.  It can rarely cause bradycardia, syncope, and prolonged QT.     If GI symptoms become an issue will try rivastigmine/Exelon patches. Will obtain EKG after Aricept to verify effects on QT interval!  (Other formulations Adlarity TTS 5/10 mg weekly). Patches are convenient, bypass the GI system but have unintended consequences of being taken off prematurely or being duplicated accidentally.         SYMPTOMATIC MANAGEMENT-BEHAVIORAL SYMPTOMS AND NON-COGNITIVE SYMPTOMS       ANTIDEPRESSANTS CLASS MEDICATIONS          HOME CARE     Falling Down Precautions. Gait is affected by the disease as well.     Avoid driving and access to firearms     Help with finances and decision making.    Join support group.    Proofing the house and use labeling.    Avoid antihistamines and anticholinergics.    Avoid changing routine.    Use written reminders.    Avoid multitasking.    Healthy diet, exercise (physical and cognitive).    Good sleep hygiene.        HERE ARE 10 WAYS TO SLOW DOWN THE PROGRESSION OF DEMENTIA        1.Be physically active.    2. Avoid smoking and alcohol consumption.    3. Track your numbers. Keep your blood pressure, cholesterol, blood sugar and weight within recommended ranges.    4. Stay socially connected.    5. Make healthy food choices. Eat a well-balance and healthy diet that rich in cereals, fish, legumes, and vegetables.    6. Reduce stress.     7. Challenge your brain by trying something new, playing games, or learning a new language.    8. Take care of your hearing. Avoid being continuously exposed to loud sounds and wear a hearing aid if hearing does become a problem.    9. Lower risk of falls. Consider installing handrails on all stairs and grab bars in bathrooms.    10. Reduce your exposure to air pollution, such as exposure to exhaust in heavy traffic.         CAREGIVERS COUNSELING     Recommend reading the following books:     The 36-Hour Day and there are many online and printed resources  to help caregivers as well.     Alzheimer's Through the Stages.     Dementia with G.R.A.C.E.            PREVENTION OF DELIRIUM       1. Good hydration and avoid electrolyte imbalance  2. Recognize and treat infections immediately especially UTI.  3. Bladder emptying and prevent constipation.   4. Provide stimulating activities and familiar objects  5. Use eyeglasses and hearing aids if needed.   6. Use simple and regular communication about people, current place, and time  7. Mobility and range-of-motion exercises  8. Reduce noise, lighting and avoid sleep interruptions  9. Non-narcotic pain management.  10.Nondrug treatment for sleep problems or anxiety  11. Avoid antihistamines.  12. Avoid narcotics.  13. Avoid benzodiazepines.           SLEEP HYGIENE     Poor sleep has a negative effect on cognition. Several strategies have been shown to improve sleep:     Caffeine intake in the afternoon and evening, as well as stuffing oneself at supper, can decrease the quality of restful sleep throughout the night.   Bedtime and wake-up times should be consistent every night and morning so the body becomes used to a single routine, even on the weekends.  Engage in daily physical activity, but not 2-3 hours before bedtime.   No technology use (television, computer, iPad) 1-2 hours before bed.   Have a wind down routine (e.g., soft lights in the house, bath before bed, reduced fluid intake, songs, reading, less noise) to promote sleep readiness.   Visit the www.sleepfoundation.org for more strategies.      COGNITIVE HYGIENE     Engage in regular exercise, which increases alertness and arousal and can improve attention and focus.  Consider lower impact exercises, such as yoga or light walking.  Get a good nights sleep, as this can enhance alertness and cognition.  Eat healthy foods and balanced meals. It is notable that research indicates certain nutrients may aid in brain function, such as B vitamins (especially B6, B12, and  folic acid), antioxidants (such as vitamins C and E, and beta carotene), and Omega-3 fatty acids. Talk with your physician or nutritionist about whats right for you.   Keep your brain active. Find activities to stay mentally active, such as reading, games (cards, checkers), puzzles (crosswords, Sudoku, jig saw), crafts (models, woodworking), gardening, or participating in activities in the community.  Stay socially engaged. Continue staying active with your family and friends.      FUTURE PLANNING     The patient and caregivers should consider formal arrangements to allow a designated person to make medical and financial decisions for the pt, should he/she become unable to do so.  Options to consider include designating a healthcare proxy, medical and/or financial power of , and completing advanced directives for healthcare decisions and estate planning (e.g., finalizing a will).  If cost is prohibitive, [facility] [employer] (https://[employer]/) provides free  for individuals with low income.       ADDITIONAL RESOURCES     [facility] (www.http://alzbr.org) have good local caregiver and patient resources.   Consider resources for support through the Governors Office of Elderly Affairs (http://goea.[place].gov/), [place] Chapter of the Alzheimers Association (www.alz.org/[place]/), the Family Caregiver [employer] (www.caregiver.org), and the American Psychological Association (http://www.apa.org/pi/about/publications/caregivers/consumers/index.aspxconsumers/index.aspx).  For More Information About Hallucinations, Delusions, and Paranoia in Alzheimer's [facility] Alzheimer's and related Dementias Education and Referral (ADEAR) Center 1-727.658.9951 (toll-free) adear@cristy.nih.gov www.cristy.nih.gov/alzheimers The National [place] on Aging's ADEAR Center offers information and free print publications about Alzheimer's disease and related dementias for families,  caregivers, and health professionals. [facility] staff answer telephone, email, and written requests and make referrals to local and national resources  MEDICAL/SURGICAL COMORBIDITIES     All relevant medical comorbidities noted and managed by primary care physician and medical care team.          MISCELLANEOUS MEDICAL PROBLEMS       HEALTHY LIFESTYLE AND PREVENTATIVE CARE    Encouraged the patient to adhere to the age-appropriate health maintenance guidelines including screening tests and vaccinations.     Discussed the overall importance of healthy lifestyle, optimal weight, exercise, healthy diet, good sleep hygiene and avoiding drugs including smoking, alcohol and recreational drugs. The patient verbalized full understanding.       Advised the patient to follow COVID-19 prevention measures.       I spent 40 minutes more than 50% face to face with the patient    Time spent in counseling and coordination of care including discussions etiology of diagnosis, pathogenesis of diagnosis, prognosis of diagnosis,, diagnostic results, impression and recommendations, diagnostic studies, management, risks and benefits of treatment, instructions of disease self-management, treatment instructions, follow up requirements, patient and family counseling/involvement in care compliance with treatment regimen. All of the patient's questions were answered during this discussion.       Morris Guillen, MSN NP      Collaborating Provider: Millie Dang MD, FAAN Neurologist/Epileptologist

## 2025-05-14 NOTE — PATIENT INSTRUCTIONS
SLEEP HYGIENE     Poor sleep has a negative effect on cognition. Several strategies have been shown to improve sleep:     Caffeine intake in the afternoon and evening, as well as stuffing oneself at supper, can decrease the quality of restful sleep throughout the night.   Bedtime and wake-up times should be consistent every night and morning so the body becomes used to a single routine, even on the weekends.  Engage in daily physical activity, but not 2-3 hours before bedtime.   No technology use (television, computer, iPad) 1-2 hours before bed.   Have a wind down routine (e.g., soft lights in the house, bath before bed, reduced fluid intake, songs, reading, less noise) to promote sleep readiness.   Visit the www.sleepfoundation.org for more strategies.      COGNITIVE HYGIENE     Engage in regular exercise, which increases alertness and arousal and can improve attention and focus.  Consider lower impact exercises, such as yoga or light walking.  Get a good nights sleep, as this can enhance alertness and cognition.  Eat healthy foods and balanced meals. It is notable that research indicates certain nutrients may aid in brain function, such as B vitamins (especially B6, B12, and folic acid), antioxidants (such as vitamins C and E, and beta carotene), and Omega-3 fatty acids. Talk with your physician or nutritionist about whats right for you.   Keep your brain active. Find activities to stay mentally active, such as reading, games (cards, checkers), puzzles (crosswords, Sudoku, jig saw), crafts (models, woodworking), gardening, or participating in activities in the community.  Stay socially engaged. Continue staying active with your family and friends.

## 2025-05-15 ENCOUNTER — TELEPHONE (OUTPATIENT)
Dept: CARDIOLOGY | Facility: CLINIC | Age: 70
End: 2025-05-15
Payer: MEDICARE

## 2025-05-15 NOTE — TELEPHONE ENCOUNTER
Millie Juarez MD to Me (Selected Message)        5/15/25  2:34 PM  Ok no problem    Pt notified of provider response.                   Saw neurology yesterday.   Has new rx for aricept.   Asking if this is ok for her to take in regards to her heart.     ----- Message from Briseyda sent at 5/15/2025 11:59 AM CDT -----  Contact: Gemma  Type:  Patient Requesting Call BackWho Called:[place] the patient know what this is regarding?:Patient has questions about [place] medicationWould the patient rather a call back or a response via [facility]? Please call [facility] Call Back Number:[phone] Information:

## 2025-05-18 ENCOUNTER — PATIENT MESSAGE (OUTPATIENT)
Dept: NEUROLOGY | Facility: CLINIC | Age: 70
End: 2025-05-18
Payer: MEDICARE

## 2025-05-19 ENCOUNTER — TELEPHONE (OUTPATIENT)
Dept: NEUROLOGY | Facility: CLINIC | Age: 70
End: 2025-05-19
Payer: MEDICARE

## 2025-05-19 DIAGNOSIS — F51.04 CHRONIC INSOMNIA: ICD-10-CM

## 2025-05-19 RX ORDER — ZOLPIDEM TARTRATE 10 MG/1
10 TABLET ORAL NIGHTLY
Qty: 30 TABLET | Refills: 3 | Status: SHIPPED | OUTPATIENT
Start: 2025-05-19

## 2025-05-19 NOTE — TELEPHONE ENCOUNTER
----- Message from Lisseth sent at 5/19/2025  3:22 PM CDT -----  Contact: Gemma Joyner is calling in regards to donepeziL (ARICEPT) 5 MG tablet after reading the side effect she decided not to take it but needing the Cymbalta.please call back at  [phone].[facility] [address] [facility] [id number] [address] [address]Izdvz: [phone] Fax: [phone]

## 2025-05-20 DIAGNOSIS — F02.A3: Primary | ICD-10-CM

## 2025-05-20 DIAGNOSIS — F32.9 MAJOR DEPRESSIVE DISORDER WITH CURRENT ACTIVE EPISODE, UNSPECIFIED DEPRESSION EPISODE SEVERITY, UNSPECIFIED WHETHER RECURRENT: ICD-10-CM

## 2025-05-20 RX ORDER — DULOXETIN HYDROCHLORIDE 60 MG/1
60 CAPSULE, DELAYED RELEASE ORAL DAILY
Qty: 30 CAPSULE | Refills: 11 | Status: SHIPPED | OUTPATIENT
Start: 2025-05-20 | End: 2026-05-20

## 2025-05-21 ENCOUNTER — CLINICAL SUPPORT (OUTPATIENT)
Dept: PULMONOLOGY | Facility: CLINIC | Age: 70
End: 2025-05-21
Payer: MEDICARE

## 2025-05-21 ENCOUNTER — OFFICE VISIT (OUTPATIENT)
Dept: PULMONOLOGY | Facility: CLINIC | Age: 70
End: 2025-05-21
Attending: NURSE PRACTITIONER
Payer: MEDICARE

## 2025-05-21 ENCOUNTER — RESULTS FOLLOW-UP (OUTPATIENT)
Dept: PULMONOLOGY | Facility: CLINIC | Age: 70
End: 2025-05-21

## 2025-05-21 ENCOUNTER — HOSPITAL ENCOUNTER (OUTPATIENT)
Dept: RADIOLOGY | Facility: HOSPITAL | Age: 70
Discharge: HOME | End: 2025-05-21
Attending: NURSE PRACTITIONER
Payer: MEDICARE

## 2025-05-21 VITALS
RESPIRATION RATE: 17 BRPM | SYSTOLIC BLOOD PRESSURE: 138 MMHG | HEART RATE: 73 BPM | WEIGHT: 141 LBS | DIASTOLIC BLOOD PRESSURE: 80 MMHG | HEIGHT: 61 IN | BODY MASS INDEX: 26.62 KG/M2 | OXYGEN SATURATION: 97 %

## 2025-05-21 VITALS — BODY MASS INDEX: 26.65 KG/M2 | HEIGHT: 61 IN | WEIGHT: 141.13 LBS

## 2025-05-21 DIAGNOSIS — Z87.891 STOPPED SMOKING WITH GREATER THAN 30 PACK YEAR HISTORY: ICD-10-CM

## 2025-05-21 DIAGNOSIS — J44.9 COPD, SEVERE: ICD-10-CM

## 2025-05-21 DIAGNOSIS — J44.9 COPD, SEVERE: Primary | ICD-10-CM

## 2025-05-21 DIAGNOSIS — J43.1 PANLOBULAR EMPHYSEMA: ICD-10-CM

## 2025-05-21 DIAGNOSIS — J43.9 NOCTURNAL HYPOXEMIA DUE TO EMPHYSEMA: ICD-10-CM

## 2025-05-21 DIAGNOSIS — F17.211 CIGARETTE NICOTINE DEPENDENCE IN REMISSION: ICD-10-CM

## 2025-05-21 DIAGNOSIS — G47.36 NOCTURNAL HYPOXEMIA DUE TO EMPHYSEMA: ICD-10-CM

## 2025-05-21 DIAGNOSIS — Z72.89 ENGAGES IN VAPING: ICD-10-CM

## 2025-05-21 PROBLEM — Z99.81 CHRONIC RESPIRATORY FAILURE WITH HYPOXIA, ON HOME O2 THERAPY: Status: ACTIVE | Noted: 2025-05-21

## 2025-05-21 PROBLEM — J96.11 CHRONIC RESPIRATORY FAILURE WITH HYPOXIA, ON HOME O2 THERAPY: Status: ACTIVE | Noted: 2025-05-21

## 2025-05-21 LAB
BRPFT: ABNORMAL
FEF 25 75 CHG: 19.3 %
FEF 25 75 LLN: 1.08
FEF 25 75 POST REF: 14.9 %
FEF 25 75 PRE REF: 12.5 %
FEF 25 75 REF: 2.48
FET100 CHG: -21.5 %
FEV1 CHG: 5.6 %
FEV1 FVC CHG: 6.3 %
FEV1 FVC LLN: 65
FEV1 FVC POST REF: 70.2 %
FEV1 FVC PRE REF: 66.1 %
FEV1 FVC REF: 79
FEV1 LLN: 1.45
FEV1 POST REF: 55.1 %
FEV1 PRE REF: 52.2 %
FEV1 REF: 1.99
FVC CHG: -0.6 %
FVC LLN: 1.86
FVC POST REF: 78 %
FVC PRE REF: 78.5 %
FVC REF: 2.55
PEF CHG: -5.5 %
PEF LLN: 3.64
PEF POST REF: 49.1 %
PEF PRE REF: 52 %
PEF REF: 5.2
POST FEF 25 75: 0.37 L/S (ref 1.08–3.88)
POST FET 100: 8.75 SEC
POST FEV1 FVC: 55.14 % (ref 65.14–90.01)
POST FEV1: 1.1 L (ref 1.45–2.51)
POST FVC: 1.99 L (ref 1.86–3.27)
POST PEF: 2.55 L/S (ref 3.64–6.75)
PRE FEF 25 75: 0.31 L/S (ref 1.08–3.88)
PRE FET 100: 11.15 SEC
PRE FEV1 FVC: 51.88 % (ref 65.14–90.01)
PRE FEV1: 1.04 L (ref 1.45–2.51)
PRE FVC: 2 L (ref 1.86–3.27)
PRE PEF: 2.7 L/S (ref 3.64–6.75)

## 2025-05-21 PROCEDURE — 71271 CT THORAX LUNG CANCER SCR C-: CPT | Mod: 26,HCNC,, | Performed by: RADIOLOGY

## 2025-05-21 PROCEDURE — 94060 EVALUATION OF WHEEZING: CPT | Mod: HCNC,59,S$GLB, | Performed by: INTERNAL MEDICINE

## 2025-05-21 PROCEDURE — 99999 PR PBB SHADOW E&M-EST. PATIENT-LVL V: CPT | Mod: PBBFAC,HCNC,, | Performed by: INTERNAL MEDICINE

## 2025-05-21 PROCEDURE — 99999 PR PBB SHADOW E&M-EST. PATIENT-LVL I: CPT | Mod: PBBFAC,HCNC,,

## 2025-05-21 PROCEDURE — 71271 CT THORAX LUNG CANCER SCR C-: CPT | Mod: TC,HCNC

## 2025-05-21 RX ORDER — CARVEDILOL 6.25 MG/1
6.25 TABLET ORAL 2 TIMES DAILY
COMMUNITY
Start: 2025-05-05

## 2025-05-21 NOTE — PROGRESS NOTES
"                                         Pulmonary Outpatient Follow Up Visit     Subjective:       Patient ID: Gemma Vick is a 70 y.o. female.    Chief Complaint: COPD        History of Present Illness    CHIEF COMPLAINT:  Patient presents today for follow up of breathing issues    RESPIRATORY:  She uses Breztri daily and Combivent as needed for rescue, with intermittent usage every few days and increased use during exacerbations. She requires 3L oxygen at night and has an oxygen cylinder for emergencies, though denies using oxygen during daytime activities.    SLEEP:  She underwent a sleep study more than five years ago. Her  reports that she snores, though she initially denied this.    SUBSTANCE USE:  She currently vapes with 3ml nicotine. She quit cigarettes in 2017 with a 30 pack-year smoking history.    MEDICAL HISTORY:  History significant for two myocardial infarctions and electrolyte abnormalities with hyponatremia and hypomagnesemia requiring hospitalization.                   Review of Systems   Respiratory:  Positive for cough, shortness of breath and dyspnea on extertion.    Psychiatric/Behavioral:  Positive for sleep disturbance.        Encounter Medications[1]    Objective:     Vital Signs (Most Recent)  Vital Signs  Pulse: 73  Resp: 17  SpO2: 97 %  BP: 138/80  Pain Score:   6  Pain Loc: Generalized  Height and Weight  Height: 5' 1" (154.9 cm)  Weight: 64 kg (141 lb)  BSA (Calculated - sq m): 1.66 sq meters  BMI (Calculated): 26.7  Weight in (lb) to have BMI = 25: 132]  Wt Readings from Last 2 Encounters:   05/21/25 64 kg (141 lb)   05/14/25 63.7 kg (140 lb 6.9 oz)       Physical Exam   Constitutional: She is oriented to person, place, and time. She appears well-developed. No distress.   HENT:   Head: Normocephalic.   Pulmonary/Chest: Normal expansion and effort normal. No stridor. No respiratory distress. She has no wheezes. She has no rhonchi.   Neurological: She is alert and oriented to " "person, place, and time.   Psychiatric: She has a normal mood and affect. Her behavior is normal. Judgment and thought content normal.   Nursing note and vitals reviewed.      Laboratory  Lab Results   Component Value Date    WBC 5.58 03/01/2025    RBC 3.22 (L) 03/01/2025    HGB 9.8 (L) 03/01/2025    HCT 30.1 (L) 03/01/2025    MCV 94 03/01/2025    MCH 30.4 03/01/2025    MCHC 32.6 03/01/2025    RDW 14.1 03/01/2025     03/01/2025    MPV 9.4 03/01/2025    GRAN 2.4 03/01/2025    GRAN 43.0 03/01/2025    LYMPH 1.8 03/01/2025    LYMPH 31.7 03/01/2025    MONO 0.9 03/01/2025    MONO 15.2 (H) 03/01/2025    EOS 0.5 03/01/2025    BASO 0.07 03/01/2025    EOSINOPHIL 8.4 (H) 03/01/2025    BASOPHIL 1.3 03/01/2025       BMP  Lab Results   Component Value Date     03/01/2025    K 4.1 03/01/2025     03/01/2025    CO2 23 03/01/2025    BUN 12 03/01/2025    CREATININE 0.8 03/01/2025    CALCIUM 8.8 03/01/2025    ANIONGAP 7 (L) 03/01/2025    ESTGFRAFRICA >60.0 04/07/2022    EGFRNONAA >60.0 04/07/2022    AST 16 03/01/2025    ALT 14 03/01/2025    PROT 5.6 (L) 03/01/2025       Lab Results   Component Value Date     (H) 02/26/2025    BNP 94 02/22/2025    BNP 55 11/14/2024     (H) 10/10/2024    BNP 42 09/12/2023    BNP 81 12/20/2017       Lab Results   Component Value Date    TSH 1.894 02/26/2025       Lab Results   Component Value Date    SEDRATE 20 08/27/2014       Lab Results   Component Value Date    CRP 10.6 (H) 04/07/2022     No results found for: "IGE"     No results found for: "ASPERGILLUS"  No results found for: "AFUMIGATUSCL"     No results found for: "ACE"     Diagnostic Results:  I have personally reviewed today the following studies:  As above    Assessment/Plan:   COPD, severe  -     Stress test, pulmonary; Future    Panlobular emphysema  -     Stress test, pulmonary; Future    Nocturnal hypoxemia due to emphysema  -     Stress test, pulmonary; Future    Stopped smoking with greater than 30 pack " year history  -     CT Chest Lung Screening Low Dose; Future; Expected date: 05/21/2026    Engages in vaping      Assessment & Plan    J44.9 COPD, severe  J43.1 Panlobular emphysema  J96.11, Z99.81 Chronic respiratory failure with hypoxia, on home O2 therapy  Z87.891 Stopped smoking with greater than 30 pack year history  J43.9, G47.36 Nocturnal hypoxemia due to emphysema    IMPRESSION:  - Former smoker with 30 pack-year history, quit in 2017. Currently uses vape with 3 mL nicotine.  - Uses home oxygen at 3 L while sleeping due to heart rate concerns.  - CT stable, to be repeated in 1 year.  - Considered need for ambulatory oxygen, but 6-minute walk test never completed despite multiple orders.  - Uses Breztri as daily inhaler and Combivent as needed.    COPD, SEVERE:  - Follow up in 6 months for check-up.  - Follow up in 1 year with new CT results.    CHRONIC RESPIRATORY FAILURE WITH HYPOXIA, ON HOME O2 THERAPY:  - Explained importance of 6-minute walk test in determining need for ambulatory oxygen.  - Ordered 6-minute walk test with SpO2 monitoring to be completed today if possible, or schedule for another day at patient's convenience.    STOPPED SMOKING WITH GREATER THAN 30 PACK YEAR HISTORY:  - Educated on potential health risks associated with vaping.  - Consider quitting vaping.  - Offered prescription for nicotine patches and Chantix if patient decides to quit vaping.         Follow up in about 6 months (around 11/21/2025).    This note was prepared using voice recognition system and is likely to have sound alike errors that may have been overlooked even after proof reading.  Please call me with any questions    Discussed diagnosis, its evaluation, treatment and usual course. All questions answered.      Alvin Anand MD         [1]   Outpatient Encounter Medications as of 5/21/2025   Medication Sig Dispense Refill    albuterol-ipratropium (DUO-NEB) 2.5 mg-0.5 mg/3 mL nebulizer solution Take 3 mLs by  nebulization every 6 (six) hours as needed for Wheezing. 360 mL 12    amLODIPine (NORVASC) 5 MG tablet TAKE 1 TABLET BY MOUTH ONCE DAILY 90 tablet 3    aspirin 81 MG Chew Take 81 mg by mouth once daily.      budesonide-glycopyr-formoterol (BREZTRI AEROSPHERE) 160-9-4.8 mcg/actuation HFAA Inhale 2 puffs into the lungs 2 (two) times a day. 32.1 g 3    carvediloL (COREG) 6.25 MG tablet Take 6.25 mg by mouth 2 (two) times daily.      cloNIDine (CATAPRES) 0.1 MG tablet Take 1 tablet (0.1 mg total) by mouth daily as needed (if Blood pressure above 170/90). 30 tablet 11    clopidogreL (PLAVIX) 75 mg tablet TAKE 1 TABLET BY MOUTH ONCE DAILY 90 tablet 2    COMBIVENT RESPIMAT  mcg/actuation inhaler Inhale 2 puffs into the lungs every 6 (six) hours as needed for Wheezing or Shortness of Breath. 12 g 3    cyanocobalamin (VITAMIN B-12) 100 MCG tablet Take 100 mcg by mouth once daily.      diclofenac sodium (VOLTAREN) 1 % Gel Apply topically 4 (four) times daily. 150 g 1    dicyclomine (BENTYL) 10 MG capsule TAKE ONE CAPSULE BY MOUTH THREE TIMES DAILY AS NEEDED (Patient taking differently: Take 10 mg by mouth as needed.) 90 capsule 2    DULoxetine (CYMBALTA) 60 MG capsule Take 1 capsule (60 mg total) by mouth once daily. 30 capsule 11    estradioL (ESTRACE) 0.5 MG tablet TAKE 1 TABLET BY MOUTH ONCE DAILY 90 tablet 3    ezetimibe-simvastatin 10-40 mg (VYTORIN) 10-40 mg per tablet TAKE 1 TABLET BY MOUTH EVERY EVENING 90 tablet 0    furosemide (LASIX) 20 MG tablet TAKE 1 TABLET BY MOUTH ONCE DAILY AS NEEDED (Patient taking differently: Take 20 mg by mouth as needed.) 90 tablet 2    inhalation spacing device (COMPACT SPACE CHAMBER) USE AS DIRECTED 1 each 2    memantine (NAMENDA) 10 MG Tab Take 1 tablet (10 mg total) by mouth 2 (two) times daily. 180 tablet 12    olmesartan (BENICAR) 40 MG tablet TAKE 1 TABLET BY MOUTH ONCE DAILY 90 tablet 3    OXYGEN-AIR DELIVERY SYSTEMS MISC 3 L by Misc.(Non-Drug; Combo Route) route every  evening.      pantoprazole (PROTONIX) 40 MG tablet TAKE 1 TABLET BY MOUTH ONCE DAILY 90 tablet 3    vitamin D (VITAMIN D3) 1000 units Tab Take 1,000 Units by mouth once daily.      zolpidem (AMBIEN) 10 mg Tab TAKE 1 TABLET BY MOUTH EVERY EVENING 30 tablet 3    famotidine (PEPCID) 20 MG tablet Take 1 tablet (20 mg total) by mouth 2 (two) times daily as needed for Heartburn. 60 tablet 11    [DISCONTINUED] donepeziL (ARICEPT) 5 MG tablet Take 1 tablet (5 mg total) by mouth every evening. 30 tablet 11    [DISCONTINUED] memantine (NAMENDA) 10 MG Tab Take 1 tablet (10 mg total) by mouth 2 (two) times daily. 180 tablet 12    [DISCONTINUED] zolpidem (AMBIEN) 10 mg Tab TAKE 1 TABLET BY MOUTH EVERY EVENING 30 tablet 3     No facility-administered encounter medications on file as of 5/21/2025.

## 2025-05-21 NOTE — PROCEDURES
"[facility] 3rdFl  Six Minute Walk     SUMMARY     Ordering Provider:    Interpreting Provider:   Performing nurse/tech/RT: GENNY Nash RRT  Diagnosis:  (COPD Severe)  Height: 5' 1" (154.9 cm)  Weight: 64 kg (141 lb 1.5 oz)  BMI (Calculated): 26.7                Phase Oxygen Assessment Supplemental O2 Heart   Rate Blood Pressure Richard Dyspnea Scale Rating   Resting 96 % Room Air 71 bpm 138/80 0   Exercise        Minute        1 97 % Room Air 82 bpm     2 94 % Room Air 91 bpm     3 95 % Room Air 88 bpm     4 95 % Room Air 96 bpm     5 95 % Room Air 101 bpm     6  96 % Room Air 97 bpm 187/81 4   Recovery        Minute        1 98 % Room Air 85 bpm     2 99 % Room Air 78 bpm     3 100 % Room Air 77 bpm     4 100 % Room Air 76 bpm 172/74 3     Six Minute Walk Summary  6MWT Status: completed without stopping  Patient Reported: Leg/hip pain, Dyspnea, Dizziness, Lightheadedness     Interpretation:  Did the patient stop or pause?: No                                         Total Time Walked (Calculated): 360 seconds  Final Partial Lap Distance (feet): 100 feet  Total Distance Meters (Calculated): 335.28 meters  Predicted Distance Meters (Calculated): 442.68 meters  Percentage of Predicted (Calculated): 75.74  Peak VO2 (Calculated): 14.04  Mets: 4.01  Has The Patient Had a Previous Six Minute Walk Test?: Yes       Previous 6MWT Results  Has The Patient Had a Previous Six Minute Walk Test?: Yes  Date of Previous Test: 03/10/20  Total Time Walked: 360 seconds  Total Distance (meters): 335.28  Predicted Distance (meters): 441.35 meters  Percentage of Predicted: 75.97  Percent Change (Calculated): 0    "

## 2025-05-22 ENCOUNTER — TELEPHONE (OUTPATIENT)
Dept: FAMILY MEDICINE | Facility: CLINIC | Age: 70
End: 2025-05-22
Payer: MEDICARE

## 2025-05-22 NOTE — TELEPHONE ENCOUNTER
----- Message from Brentondarya sent at 5/22/2025  3:06 PM CDT -----  Contact: Self [phone]  Would like to receive medical advice.Would they like a call back or a response via [facility]:  call back Additional information:  Calling to speak with the office about getting a new Rx for her supplies she states that [employer] ships out and supplies her with.   Needs an appt with labs

## 2025-05-22 NOTE — TELEPHONE ENCOUNTER
Pt called back and spoke about the pads for tens machine. Stated she needs to call therapy to get those. Pt verbalized understanding.

## 2025-06-03 RX ORDER — EZETIMIBE AND SIMVASTATIN 10; 40 MG/1; MG/1
1 TABLET ORAL NIGHTLY
Qty: 90 TABLET | Refills: 0 | Status: SHIPPED | OUTPATIENT
Start: 2025-06-03

## 2025-06-04 ENCOUNTER — RESULTS FOLLOW-UP (OUTPATIENT)
Dept: FAMILY MEDICINE | Facility: CLINIC | Age: 70
End: 2025-06-04

## 2025-06-11 ENCOUNTER — HOSPITAL ENCOUNTER (OUTPATIENT)
Dept: CARDIOLOGY | Facility: HOSPITAL | Age: 70
Discharge: HOME | End: 2025-06-11
Attending: INTERNAL MEDICINE
Payer: MEDICARE

## 2025-06-11 DIAGNOSIS — I25.10 CORONARY ARTERY DISEASE INVOLVING NATIVE CORONARY ARTERY OF NATIVE HEART WITHOUT ANGINA PECTORIS: ICD-10-CM

## 2025-06-11 DIAGNOSIS — I10 ESSENTIAL HYPERTENSION: ICD-10-CM

## 2025-06-11 DIAGNOSIS — F02.A3: ICD-10-CM

## 2025-06-11 DIAGNOSIS — Z91.89 AT RISK FOR PROLONGED QT INTERVAL SYNDROME: ICD-10-CM

## 2025-06-11 DIAGNOSIS — F51.04 CHRONIC INSOMNIA: ICD-10-CM

## 2025-06-11 DIAGNOSIS — F32.9 MAJOR DEPRESSIVE DISORDER WITH CURRENT ACTIVE EPISODE, UNSPECIFIED DEPRESSION EPISODE SEVERITY, UNSPECIFIED WHETHER RECURRENT: ICD-10-CM

## 2025-06-11 PROCEDURE — 93010 ELECTROCARDIOGRAM REPORT: CPT | Mod: HCNC,,, | Performed by: INTERNAL MEDICINE

## 2025-06-11 PROCEDURE — 93005 ELECTROCARDIOGRAM TRACING: CPT | Mod: HCNC

## 2025-06-11 RX ORDER — ESTRADIOL 0.5 MG/1
TABLET ORAL
Qty: 90 TABLET | Refills: 0 | OUTPATIENT
Start: 2025-06-11

## 2025-06-11 RX ORDER — EZETIMIBE AND SIMVASTATIN 10; 40 MG/1; MG/1
TABLET ORAL
Qty: 90 TABLET | Refills: 0 | OUTPATIENT
Start: 2025-06-11

## 2025-06-11 RX ORDER — PANTOPRAZOLE SODIUM 40 MG/1
TABLET, DELAYED RELEASE ORAL
Qty: 90 TABLET | Refills: 0 | OUTPATIENT
Start: 2025-06-11

## 2025-06-11 RX ORDER — MEMANTINE HYDROCHLORIDE 10 MG/1
TABLET ORAL
Qty: 180 TABLET | Refills: 0 | OUTPATIENT
Start: 2025-06-11

## 2025-06-11 RX ORDER — OLMESARTAN MEDOXOMIL 40 MG/1
TABLET ORAL
Qty: 90 TABLET | Refills: 0 | OUTPATIENT
Start: 2025-06-11

## 2025-06-11 RX ORDER — CLOPIDOGREL BISULFATE 75 MG/1
TABLET ORAL
Qty: 90 TABLET | Refills: 0 | OUTPATIENT
Start: 2025-06-11

## 2025-06-11 RX ORDER — AMLODIPINE BESYLATE 5 MG/1
TABLET ORAL
Qty: 90 TABLET | Refills: 0 | OUTPATIENT
Start: 2025-06-11

## 2025-06-11 RX ORDER — DULOXETIN HYDROCHLORIDE 60 MG/1
60 CAPSULE, DELAYED RELEASE ORAL DAILY
Qty: 90 CAPSULE | Refills: 0 | Status: SHIPPED | OUTPATIENT
Start: 2025-06-11 | End: 2025-09-09

## 2025-06-11 RX ORDER — CARVEDILOL 6.25 MG/1
TABLET ORAL
Qty: 180 TABLET | Refills: 0 | OUTPATIENT
Start: 2025-06-11

## 2025-06-11 RX ORDER — ZOLPIDEM TARTRATE 10 MG/1
TABLET ORAL
Qty: 90 TABLET | Refills: 0 | OUTPATIENT
Start: 2025-06-11

## 2025-06-11 NOTE — TELEPHONE ENCOUNTER
Refill Decision Note   Gemma Vick  is requesting a refill authorization.  Brief Assessment and Rationale for Refill:  Quick Discontinue     Medication Therapy Plan:   Pharmacy is requesting new scripts for the following medications without required information, (sig/ frequency/qty/etc)        Comments:   Pharmacies have been requesting medications for patients without required information, (sig, frequency, qty, etc.). In addition, requests are sent for medication(s) pt. are currently not taking, and medications patients have never taken.    We have spoken to the pharmacies about these request types and advised their teams previously that we are unable to assess these New Script requests and require all details for these requests. This is a known issue and has been reported.   Note composed:4:05 PM 06/11/2025

## 2025-06-11 NOTE — TELEPHONE ENCOUNTER
No care due was identified.  [facility] Embedded Care Due Messages. Reference number: [id number].   6/11/2025 3:43:51 PM CDT

## 2025-06-12 DIAGNOSIS — J44.9 COPD, SEVERE: ICD-10-CM

## 2025-06-12 DIAGNOSIS — J43.1 PANLOBULAR EMPHYSEMA: ICD-10-CM

## 2025-06-12 LAB
OHS QRS DURATION: 88 MS
OHS QTC CALCULATION: 407 MS

## 2025-06-13 ENCOUNTER — RESULTS FOLLOW-UP (OUTPATIENT)
Dept: NEUROLOGY | Facility: CLINIC | Age: 70
End: 2025-06-13

## 2025-06-16 RX ORDER — BUDESONIDE, GLYCOPYRROLATE, AND FORMOTEROL FUMARATE 160; 9; 4.8 UG/1; UG/1; UG/1
2 AEROSOL, METERED RESPIRATORY (INHALATION) 2 TIMES DAILY
Qty: 32.1 G | Refills: 3 | Status: SHIPPED | OUTPATIENT
Start: 2025-06-16

## 2025-06-19 ENCOUNTER — OFFICE VISIT (OUTPATIENT)
Dept: CARDIOLOGY | Facility: CLINIC | Age: 70
End: 2025-06-19
Payer: MEDICARE

## 2025-06-19 VITALS
BODY MASS INDEX: 26.93 KG/M2 | SYSTOLIC BLOOD PRESSURE: 140 MMHG | WEIGHT: 142.5 LBS | DIASTOLIC BLOOD PRESSURE: 60 MMHG | HEART RATE: 74 BPM | OXYGEN SATURATION: 98 %

## 2025-06-19 DIAGNOSIS — I71.43 INFRARENAL ABDOMINAL AORTIC ANEURYSM (AAA) WITHOUT RUPTURE: ICD-10-CM

## 2025-06-19 DIAGNOSIS — G47.36 NOCTURNAL HYPOXEMIA DUE TO EMPHYSEMA: ICD-10-CM

## 2025-06-19 DIAGNOSIS — F01.B0 VASCULAR DEMENTIA, MODERATE, WITHOUT BEHAVIORAL DISTURBANCE, PSYCHOTIC DISTURBANCE, MOOD DISTURBANCE, AND ANXIETY: ICD-10-CM

## 2025-06-19 DIAGNOSIS — K55.1 MESENTERIC ISCHEMIA, CHRONIC: ICD-10-CM

## 2025-06-19 DIAGNOSIS — I70.0 AORTIC ATHEROSCLEROSIS: ICD-10-CM

## 2025-06-19 DIAGNOSIS — J43.9 NOCTURNAL HYPOXEMIA DUE TO EMPHYSEMA: ICD-10-CM

## 2025-06-19 DIAGNOSIS — E78.2 MIXED HYPERLIPIDEMIA: ICD-10-CM

## 2025-06-19 DIAGNOSIS — J44.9 COPD, SEVERE: ICD-10-CM

## 2025-06-19 DIAGNOSIS — F01.B0 MODERATE VASCULAR DEMENTIA WITHOUT BEHAVIORAL DISTURBANCE, PSYCHOTIC DISTURBANCE, MOOD DISTURBANCE, OR ANXIETY: ICD-10-CM

## 2025-06-19 DIAGNOSIS — I70.223 ATHEROSCLEROSIS OF NATIVE ARTERIES OF EXTREMITIES WITH REST PAIN, BILATERAL LEGS: ICD-10-CM

## 2025-06-19 DIAGNOSIS — I95.1 ORTHOSTATIC HYPOTENSION: ICD-10-CM

## 2025-06-19 DIAGNOSIS — I71.40 ABDOMINAL AORTIC ANEURYSM (AAA) WITHOUT RUPTURE, UNSPECIFIED PART: ICD-10-CM

## 2025-06-19 DIAGNOSIS — I25.118 CORONARY ARTERY DISEASE OF NATIVE ARTERY OF NATIVE HEART WITH STABLE ANGINA PECTORIS: ICD-10-CM

## 2025-06-19 DIAGNOSIS — I65.23 BILATERAL CAROTID ARTERY STENOSIS: ICD-10-CM

## 2025-06-19 DIAGNOSIS — R73.03 PREDIABETES: ICD-10-CM

## 2025-06-19 DIAGNOSIS — I10 ESSENTIAL HYPERTENSION: Primary | ICD-10-CM

## 2025-06-19 PROCEDURE — 99999 PR PBB SHADOW E&M-EST. PATIENT-LVL V: CPT | Mod: PBBFAC,HCNC,, | Performed by: INTERNAL MEDICINE

## 2025-06-19 RX ORDER — ACETAMINOPHEN 500 MG
2000 TABLET ORAL NIGHTLY
COMMUNITY

## 2025-06-19 RX ORDER — ZINC GLUCONATE 50 MG
TABLET ORAL
COMMUNITY
Start: 2025-01-18

## 2025-06-19 NOTE — PROGRESS NOTES
Subjective:   Patient ID:  Gemma Vick is a 70 y.o. female who presents for follow up of Follow-up      HPI  3/26/2020  A 66 yo female with pvd carotid disease htn hlp copd exsmoker on nocturnal o2 therapy wants to discuss test results. She is in digital htn has been unable to tolerate bisoprolol daily feels tired fatigued no energy she takes meds regularily tries to be compliant with salt no exercise but stays busy in the house. She takes care of her grandbabies. Has occasional leg swelling she takes lasix prn for weight change she stands on her feet a lot. She is compliant with inhalers to control shortness of breath no palpitation has chronic cough.  Her carotid u/s reviewed unchanged.abd u/s no aneurysm  Lipids on target she is well controlled on vytorin .she has difficulty with crestor and lipitor   9/24/2020  She is here for  f/u she is complaining of recurrent episodes of abdominal pain lower quadrant and got to be upper abdomen associated with nausea vomiting no post prandial pain no weight loss or food avoidance. Has no new symptoms of chest pain she is still short of breath no new palpitation tia.   Ct abdomen showed diverticulitis aaa 3.4 cm and sma stenosis.   Her ldl has increased. She has had sore muscles she stopped statins w/o improvemnet. She needs to back on statins that would explain he rincreased ldl.      3/25/2021  Here for f/u has sharp recurrent left sided occurring at rest sitting in recliner she cannot take a deep breath no exertional symptoms she takes care of her grandbabies no smoking no tia palpitation syncope near syncope. She takes  meds regularily .reveiw of her bp chart still showed elevated indices. She claims salt compliance. She could not tolerate amlodipine bid . Her lipids aRE ON TARGET.      9/30/2021    has been using o2 therapy at nite no change in symptoms her bp is elevated she takes amlodipine 5 mg at nite she is not compliant with diet. Salt wise.she takes care of  grandkids no chest pain no cardiac symptoms. She has the urge to go to bathroom after she eats she has upperabdominal pain and feels like throwing up. Has known stenosis of the sma  She has lost weight.      11/1/2021   She is taking 7.5 mg amlodipine still needs better  salt control bp acceptable here however at home is more elevated but we have not checked her machine. She continues to have abdominal symptoms. She wants alternatives to ibesartan her pill is too big to swallow her lipids are on target. (she will check on diovan 320 and benicar 40 mg with pharmacist about size and cost).she is in digital htn her bp readings are more elevated that today clinic readings.she ahs not been compliant with diet she had fried chicken mashed potatoes french fries and   And biscuits  From [employer].   She had cta for her abdominal symptoms showing celiac stenosis and stenosis at the sma. Has also bilateral iliac stenosis greater than 50%.   Her carotid anatomy is unchanged.         12/8/2021   Today bp is controlled she ahs taken a fluid pill her bp readings at home since last visit has been 160-170 range. She si non compliant with salt she took diuretics due to leg swelling which is related to amlodipine. She has also an mason with exercise that did not suggest significant disease she continues to have symptoms suggestive of musculoskeltal on the rt and sciatica. No definite claudication no discoloration. she has no tia.      4/1/2022  Not much leg swelling has been drinking a lot of water bp fluctuates based on salt her digital htn reflects that now today is on target. Has been in after hours due abdominal pain constipation issue. Has nausea at that time . All improved still          dealing with constipation has use magnesium citrate.   She is not using diuretics except intermittently   Has question about arthritis meds she can take advised short course is ok but prolonged use is high risk of bleeding.      7/27/2022  Here for  evaluation for colonoscopy. Has a positive school screening test was scheduled for colonoscopy . Has iron deficiency anemia has fatigue. She has seen vascular surgery because her colonoscopy was cancel;ed has m,oderate carotid disease. Has cta mesenteric stenosis. She has abdominal pain has improvement after taking bentyl some times she vomited digetsed food after eating no diarrhea  Has postprandial pain  opn the rt then progresses to to the bottom and lower abdomen. She is non compliant with salt that is why her bp is elevated.      12/8/2022   Here forf /u has been evaluated by ep no need for any pacer or intervention. Has been evaluated by vascular surgery no significant carotid disease. She is getting very forgetful was evaluated at [facility] for dementia she is labeled as vascular dementia  and shrinking brain. Her aaa is around 4 cm.   She had a slaty meal before coming to see me her bp is elevated she is non complaint with salt intake. She cannot remember her meds. She stopped a bp med that she does not remember the name. No cardiac symptoms.      4/25/2023 family is present they are concerned about her situation.   here for f /u has still been having abdominal pain post prandial. Has vascular dementia per neurologist opinion. She is getting forgetful. However she is not complaint with salt .seh ate at [employer] before she comes see em today. had back pain no tia no claudication. Had a death  in family her brother passed away. She is concerned about her heart . Has aaa around 4 cm.     6/16/2023  HERE TO DISCUSS HER CTA FINDINGS SHE CONTINUES TO HAVE POSTPRANDIAL ABDOMINAL PAIN CRAMPS SHE IS NOT ABLE TOE AT HER MEALS SHE IHAS LOST A LOT OF WEIGHT. HER CTA SHOWED SEVERE CELIAC AND SMA DISEASE. . SHE IS TRYING TO UNDERSTAND HER SITUATION CLINICALLY AND NEED FOR INTERVENTION. HER DAUGHTER WAS PRESENT TO HELP UNDERSTAND SINCE SHE HAS VASCULAR DEMENTIA. I ANSWERED ALL THEIR QUESTIONS REVIEWED CTA PICTURES  AND EXPLAINED PROCEDURE IN DETAIL WITH INDICATIONS RISK BENEFITS AND COMPLICATIONS     9/21/2023  Here fro f/u had aCS STENTED RCA DUE TO ULCERATED PLAQUE AND CLOTS HER ANGINA RESOLVED. HAS ISSUE WITH HTN SALT INTAKE FATIGUE. DR UNEDRWOOD INCREASED COREG AND AMLODIPINE   HAD PUFFINESS IN LEFT RADIAL SITE         Hospital dc 3/28/2024   Gemma Vick is a 69-year-old female with a past history significant for COPD, former smoker, CAD s/p stents, KALLIE, vascular dementia, Alzheimer's, AAA-stable on CT, HTN, HLD, GERD, diverticulitis/osis, chronic mesenteric ischemia, GERD, DDD, osteoarthritis, lumbar radiculopathy, who presented to the ED c/o diffuse abdominal pain, nausea, generalized weakness onset today. Reports poor oral intake for several days. States she fell several days ago and also has left hip pain, and was started on Robaxin. Last BM yesterday, small, soft/formed, dark, but has been dark since starting iron supplements. She also endorses oliguria w/dark urine, hemorrhoids, hx of constipation and feels like she needs an enema. Reports several episodes of vomiting today, stating it was more reflux and some bile, but very small volume. Patient denies fever, chills, cough, congestion, dizziness, dyspnea, chest discomfort, dysuria, hematuria, myalgias. Patient denies sick contacts, potential of contaminated food or water.      ED workup with noted leukocytosis, hyponatremia-corrected 123, hyperglycemia. CT c/a/p shows colonic wall thickening/inflammation of descending colon consistent with colitis, surgically absent gall bladder. CXR and L hip XR w/NAF. CT head w/NAF. UA w/no evidence of infection or ketonuria. Lactate, procalcitonin, troponin, TSH, lipase, LFT's WNL. Flu/COVID negative. She was found to have a rectal temp of 92 and was placed on Mirna hugger. She was given warmed IVF, antibiotics, glycerin suppository, has cho w/bg urine, adequate output noted in bag. Critical care was consulted for admission  and management due to hypothermia requiring active re-warming.      * No surgery found *       Hospital Course:   A patient was admitted to the ICU on March 25th with severe hyponatremia, with a sodium level of 119, hypothermia at 92°F, and severe constipation. The patient underwent rewarming, and her sodium level improved to 126. She also experienced a large bowel movement and has had multiple bowel movements since then. Due to these improvements, she is now being downgraded to telemetry under hospital medicine and is resting comfortably. The hyponatremia was likely secondary to the patient's misuse of HCTZ (hydrochlorothiazide), which was prescribed by her cardiologist for as-needed use. However, the patient, misunderstanding the instructions and influenced by online information, began taking it daily. Her sodium levels have stabilized since discontinuing the medication, and repeat labs are scheduled with her primary care provider within 1-2 weeks of discharge.     Further investigations revealed inflammation in the descending colon, suggestive of either infectious or inflammatory colitis, with ischemic changes also considered due to the patient's history of mesenteric ischemia and recent symptoms of dizziness post HCTZ administration. Despite these concerns, her lactate levels have remained normal, and stool studies have been negative, although she has been receiving antibiotics. The plan is to continue antibiotics for 7-10 days. Discharged with additional 5 days of therapy after receiving 4 days of abx here. There are no immediate plans for endoscopy due to recent use of Plavix, but outpatient follow-up will be considered for a colonoscopy, given the patient's high sedation risk history. No further inpatient gastrointestinal recommendations are provided at this time, but arrangements for outpatient follow-up will be made. Cardiology has noted clinical improvement and cardiovascular stability, advising the  "continuation of optimal medical therapy with a follow-up in clinic to possibly discuss a mesenteric angiogram.     BP (!) 155/70 (Patient Position: Lying)   Pulse 77   Temp 97.6 °F (36.4 °C) (Oral)   Resp 20   Ht 5' 2" (1.575 m)   Wt 59.6 kg (131 lb 6.3 oz)   SpO2 96%   BMI 24.03 kg/m²   PHYSICAL EXAM  Vitals Reviewed  GEN: No acute distress, pleasant, body habitus normal  HEENT: atraumatic and normocephalic  CARDS: regular rate and rhythm, no m/g, pulses palpable in LE  PULM: breathing comfortably on room air, chest symmetric, nonlabored, no abnormal breath sounds on auscultation  ABD: nontender, nondistended, soft, no organomegaly, BS+  Neuro: Alert and oriented x3, CN's I-IX grossly intact, sensation and motor intact; follows directions and answers questions appropriately     Feeling weak dizzy hwer diastolic is low she has been getting pt at home her appetite is staring to improve. Denies abdominal symptoms.     7/9/2024  Here for f/u still issues with fall she slips. She is compliant with meds and diet. Is in digital htn program. Has shortness of breath evaluated with pulmonary.still ahs some low heart rate. Tries to be compliant with med. Her shortness of breath occurs at rest watching tv. Has no cardiac awareness with it.   Has hip pain limiting her after the fall she is using a walker.      11.19.2024 DR STATON   69-year-old female, ex-smoker currently vapes down to 3 mL from 20/1.    Recently admitted to the hospital with generalized symptoms of fatigue.  Some atypical symptoms.  Her troponins were normal.  EKG was nonischemic.    She had a CTA and a lower extremity duplex given mild elevated D-dimer which was negative.    She has a chronic hyponatremia.  Follows with Nephrology.    Blood pressure was elevated up to 180.    Her daughter today states that her pressure sometimes goes higher and lower.    No exertional angina.  She has wheezing on exam however     12/5/2024   Has not increased " amlodipine to 10 mg she is on 5 mg po daily.she is on coreg and olmesartan . No further chest pain . She is short of breath no change form baseline.   Digital htn showed reasonable control. She claims compliance with salt. Carotid blockage is in the 40% range  lost weight lipids on target .  Vidhya ABDULLAHI 3/27/2025  Ms. Vick is a 70  year old female patient whose current medical conditions include COPD, former tobacco abuse, CAD s/p prior stent (most recent PCI of RCA 9/23), HTN, hyperlipidemia, GERD, chronic mesenteric ischemia, vascular dementia, and AAA who presents today for hospital follow-up. Patient recently hospitalized at [facility] earlier this month due to bradycardia/CP. Her Coreg was discontinued and she underwent MPI stress test which was negative and was discharged home with plans for OP extended Holter. She returns today and states she is doing well. Seems stable. Reports occasional episodes where the top of her head gets hot/burns, generally self-resolves once she takes her medications. Feels it may be triggered by position/when she tilts her head upward. Does not feel her BP is elevated when this occurs. No recurrent near syncope or syncopal episodes.  present states generally she would complain of weakness/fatigue prior to losing consciousness although patient has no recollection of preceding events. Some dependent edema, worse at end of the day. Resolves with elevation. No PND or orthopnea. No bouts of abdominal pain. No raffy CP, heaviness, or tightness. No LH, dizziness, or palpitations. No s/s of TIA/CVA.  BP acceptable today. Patient is compliant with her medications. VC is still pending. Wearing compression socks.         CTA abd/pelvis infrarenal AAA 4 cm, known SMA stenosis. She follows with vascular.      6/19/2025   History of Present Illness    Ms. Vick presents today for follow up of multiple chronic conditions including carotid disease, PVD, COPD, vascular dementia,  abdominal aortic aneurysm, HTN, HLD, and CAD. She reports recent BP of 152/70 in RUE and 128 in LUE. She acknowledges occasional extra salt consumption on foods like hamburgers and french fries, recognizing its impact on BP fluctuations. She reports resolution of syncopal episodes since implementing compression socks and compression pants. She uses a foot peddler exercise device and performed leg and foot exercises 3 times yesterday. She maintains adequate hydration by keeping three water bottles on her desk daily. She reports experiencing unprovoked emotional episodes, including spontaneous crying without clear triggers. She also notes becoming agitated and emotionally dysregulated, primarily during evening and afternoon hours. She continues Cymbalta with discussion of potential dose reduction. Her medication adherence has improved, now taking prescribed medications consistently.      ROS:  General: -fever, -chills, -fatigue, -weight gain, -weight loss  Eyes: -vision changes, -redness, -discharge  ENT: -ear pain, -nasal congestion, -sore throat  Cardiovascular: -chest pain, -palpitations, -lower extremity edema  Respiratory: -cough, -shortness of breath  Gastrointestinal: -abdominal pain, -nausea, -vomiting, -diarrhea, -constipation, -blood in stool  Genitourinary: -dysuria, -hematuria, -frequency  Musculoskeletal: -joint pain, -muscle pain  Skin: -rash, -lesion  Neurological: -headache, -dizziness, -numbness, -tingling  Psychiatric: -anxiety, -depression, -sleep difficulty, +emotional lability              Past Medical History:   Diagnosis Date    AAA (abdominal aortic aneurysm) 02/13/2014    Abdominal aneurysm     3    Acute coronary syndrome     Anemia     Arthritis     BPPV (benign paroxysmal positional vertigo)     Carotid artery plaque     Carotid artery stenosis and occlusion 02/13/2014    Chronic back pain     COPD (chronic obstructive pulmonary disease)     Coronary artery disease     Dementia      Emphysema lung     Heart attack 1998    Several after that with 4 stents    Hyperlipidemia     Hypertension     Joint pain 2000    Myocardial infarction     x3    Neuropathy     Personal history of COVID-19 06/09/2021 11/16/2020 +Covid, recovered at home        Past Surgical History:   Procedure Laterality Date    CARDIAC CATHETERIZATION      CHOLECYSTECTOMY  1987    Stones in bile duct    CORONARY ANGIOPLASTY      CORONARY STENT PLACEMENT N/A 09/12/2023    Procedure: INSERTION, STENT, CORONARY ARTERY;  Surgeon: Millie Juarez MD;  Location: [facility] CATH LAB;  Service: Cardiology;  Laterality: N/A;    EYE SURGERY  08/31    Catarates removed    FRACTURE SURGERY  1971    Hand surgery    HYSTERECTOMY  1988    LEFT HEART CATHETERIZATION Left 09/12/2023    Procedure: Left heart cath;  Surgeon: Millie Juarez MD;  Location: [facility] CATH LAB;  Service: Cardiology;  Laterality: Left;    PERCUTANEOUS CORONARY INTERVENTION, ARTERY N/A 09/12/2023    Procedure: Percutaneous coronary intervention;  Surgeon: Millie Juarez MD;  Location: [facility] CATH LAB;  Service: Cardiology;  Laterality: N/A;    THROMBECTOMY, CORONARY  09/12/2023    Procedure: Thrombectomy, Coronary;  Surgeon: Millie Juarez MD;  Location: [facility] CATH LAB;  Service: Cardiology;;    TONSILLECTOMY      TRANSFORAMINAL EPIDURAL INJECTION OF STEROID Right 12/01/2023    Procedure: Injection,steroid,epidural,transforaminal approach- right side, L4/5 and L5/S1;  Surgeon: Lydia Roberts MD;  Location: [facility] PAIN MGT;  Service: Pain Management;  Laterality: Right;    TUBAL LIGATION  1985    Afterr last child       Social History[1]    Family History   Problem Relation Name Age of Onset    Heart disease Mother Katya     Early death Mother Katya     Heart attacks under age 50 Father Ant     Arthritis Father Ant     Early death Father Ant     Heart disease Father Ant     Heart attacks under age 50 Brother Angel         SAAVEDRA at 32    Early death Brother Angel     Heart disease  Brother Angel     Hypertension Brother Angel     Heart attack Brother          HA at 70    Breast cancer Paternal Aunt      Heart disease Sister Brenda     Hyperlipidemia Sister Brenda     Arthritis Brother Jose Armando     Early death Brother Jose Armando     Heart disease Brother Jose Armando     Hypertension Sister Marlo Carey        Current Outpatient Medications   Medication Sig    acetaminophen (TYLENOL) 500 MG tablet Take 2,000 mg by mouth every evening.    albuterol-ipratropium (DUO-NEB) 2.5 mg-0.5 mg/3 mL nebulizer solution Take 3 mLs by nebulization every 6 (six) hours as needed for Wheezing.    amLODIPine (NORVASC) 5 MG tablet TAKE 1 TABLET BY MOUTH ONCE DAILY    aspirin 81 MG Chew Take 81 mg by mouth once daily.    budesonide-glycopyr-formoterol (BREZTRI AEROSPHERE) 160-9-4.8 mcg/actuation HFAA Inhale 2 puffs into the lungs 2 (two) times a day.    cloNIDine (CATAPRES) 0.1 MG tablet Take 1 tablet (0.1 mg total) by mouth daily as needed (if Blood pressure above 170/90).    clopidogreL (PLAVIX) 75 mg tablet TAKE 1 TABLET BY MOUTH ONCE DAILY    COMBIVENT RESPIMAT  mcg/actuation inhaler Inhale 2 puffs into the lungs every 6 (six) hours as needed for Wheezing or Shortness of Breath.    cyanocobalamin (VITAMIN B-12) 100 MCG tablet Take 100 mcg by mouth once daily.    diclofenac sodium (VOLTAREN) 1 % Gel Apply topically 4 (four) times daily.    estradioL (ESTRACE) 0.5 MG tablet TAKE 1 TABLET BY MOUTH ONCE DAILY    ezetimibe-simvastatin 10-40 mg (VYTORIN) 10-40 mg per tablet TAKE 1 TABLET BY MOUTH EVERY EVENING    inhalation spacing device (COMPACT SPACE CHAMBER) USE AS DIRECTED    l-methylfolate-b2-b6-b12 (CEREFOLIN) 6-5-50-1 mg Tab Take 1 tablet by mouth once daily. B2 B6 B12    memantine (NAMENDA) 10 MG Tab Take 1 tablet (10 mg total) by mouth 2 (two) times daily.    olmesartan (BENICAR) 40 MG tablet TAKE 1 TABLET BY MOUTH ONCE DAILY    OXYGEN-AIR DELIVERY SYSTEMS MISC 3 L by Misc.(Non-Drug; Combo Route) route every  evening.    pantoprazole (PROTONIX) 40 MG tablet TAKE 1 TABLET BY MOUTH ONCE DAILY    vitamin D (VITAMIN D3) 1000 units Tab Take 1,000 Units by mouth once daily.    zinc gluconate 50 mg tablet     zolpidem (AMBIEN) 10 mg Tab TAKE 1 TABLET BY MOUTH EVERY EVENING    dicyclomine (BENTYL) 10 MG capsule TAKE ONE CAPSULE BY MOUTH THREE TIMES DAILY AS NEEDED (Patient not taking: Reported on 6/19/2025)    DULoxetine (CYMBALTA) 60 MG capsule Take 1 capsule (60 mg total) by mouth once daily. (Patient not taking: Reported on 6/19/2025)    famotidine (PEPCID) 20 MG tablet Take 1 tablet (20 mg total) by mouth 2 (two) times daily as needed for Heartburn.    furosemide (LASIX) 20 MG tablet TAKE 1 TABLET BY MOUTH ONCE DAILY AS NEEDED (Patient not taking: Reported on 6/19/2025)     No current facility-administered medications for this visit.     Current Outpatient Medications on File Prior to Visit   Medication Sig    acetaminophen (TYLENOL) 500 MG tablet Take 2,000 mg by mouth every evening.    albuterol-ipratropium (DUO-NEB) 2.5 mg-0.5 mg/3 mL nebulizer solution Take 3 mLs by nebulization every 6 (six) hours as needed for Wheezing.    amLODIPine (NORVASC) 5 MG tablet TAKE 1 TABLET BY MOUTH ONCE DAILY    aspirin 81 MG Chew Take 81 mg by mouth once daily.    budesonide-glycopyr-formoterol (BREZTRI AEROSPHERE) 160-9-4.8 mcg/actuation HFAA Inhale 2 puffs into the lungs 2 (two) times a day.    cloNIDine (CATAPRES) 0.1 MG tablet Take 1 tablet (0.1 mg total) by mouth daily as needed (if Blood pressure above 170/90).    clopidogreL (PLAVIX) 75 mg tablet TAKE 1 TABLET BY MOUTH ONCE DAILY    COMBIVENT RESPIMAT  mcg/actuation inhaler Inhale 2 puffs into the lungs every 6 (six) hours as needed for Wheezing or Shortness of Breath.    cyanocobalamin (VITAMIN B-12) 100 MCG tablet Take 100 mcg by mouth once daily.    diclofenac sodium (VOLTAREN) 1 % Gel Apply topically 4 (four) times daily.    estradioL (ESTRACE) 0.5 MG tablet TAKE 1 TABLET  BY MOUTH ONCE DAILY    ezetimibe-simvastatin 10-40 mg (VYTORIN) 10-40 mg per tablet TAKE 1 TABLET BY MOUTH EVERY EVENING    inhalation spacing device (COMPACT SPACE CHAMBER) USE AS DIRECTED    l-methylfolate-b2-b6-b12 (CEREFOLIN) 6-5-50-1 mg Tab Take 1 tablet by mouth once daily. B2 B6 B12    memantine (NAMENDA) 10 MG Tab Take 1 tablet (10 mg total) by mouth 2 (two) times daily.    olmesartan (BENICAR) 40 MG tablet TAKE 1 TABLET BY MOUTH ONCE DAILY    OXYGEN-AIR DELIVERY SYSTEMS MISC 3 L by Misc.(Non-Drug; Combo Route) route every evening.    pantoprazole (PROTONIX) 40 MG tablet TAKE 1 TABLET BY MOUTH ONCE DAILY    vitamin D (VITAMIN D3) 1000 units Tab Take 1,000 Units by mouth once daily.    zinc gluconate 50 mg tablet     zolpidem (AMBIEN) 10 mg Tab TAKE 1 TABLET BY MOUTH EVERY EVENING    dicyclomine (BENTYL) 10 MG capsule TAKE ONE CAPSULE BY MOUTH THREE TIMES DAILY AS NEEDED (Patient not taking: Reported on 6/19/2025)    DULoxetine (CYMBALTA) 60 MG capsule Take 1 capsule (60 mg total) by mouth once daily. (Patient not taking: Reported on 6/19/2025)    famotidine (PEPCID) 20 MG tablet Take 1 tablet (20 mg total) by mouth 2 (two) times daily as needed for Heartburn.    furosemide (LASIX) 20 MG tablet TAKE 1 TABLET BY MOUTH ONCE DAILY AS NEEDED (Patient not taking: Reported on 6/19/2025)     No current facility-administered medications on file prior to visit.     Review of patient's allergies indicates:  No Known Allergies         Objective:     Vitals:    06/19/25 1223 06/19/25 1224   BP: (!) 160/70 (!) 140/60   BP Location: Right arm Left arm   Patient Position: Sitting Sitting   Pulse: 74    SpO2: 98%    Weight: 64.7 kg (142 lb 8.4 oz)      Body mass index is 26.93 kg/m².         Physical Exam  Constitutional:       General: She is not in acute distress.     Appearance: Normal appearance. She is well-developed. She is not ill-appearing.   HENT:      Head: Normocephalic and atraumatic.   Eyes:      General: No  scleral icterus.     Pupils: Pupils are equal, round, and reactive to light.   Neck:      Thyroid: No thyromegaly.      Vascular: Normal carotid pulses. Carotid bruit present. No hepatojugular reflux or JVD.      Trachea: No tracheal deviation.   Cardiovascular:      Rate and Rhythm: Normal rate and regular rhythm.      Pulses:           Carotid pulses are 2+ on the right side with bruit and 2+ on the left side with bruit.       Radial pulses are 2+ on the right side and 2+ on the left side.        Dorsalis pedis pulses are 1+ on the right side and 1+ on the left side.        Posterior tibial pulses are 1+ on the right side and 1+ on the left side.      Heart sounds: Murmur heard.      Harsh midsystolic murmur is present with a grade of 2/6 at the upper right sternal border radiating to the neck.      High-pitched blowing decrescendo early diastolic murmur is present with a grade of 1/4 at the upper right sternal border radiating to the apex.      No friction rub. No gallop.      Comments: Abdominal bruits noted.  Pulmonary:      Effort: Pulmonary effort is normal. No respiratory distress.      Breath sounds: Normal breath sounds. No wheezing, rhonchi or rales.   Chest:      Chest wall: No tenderness.   Abdominal:      General: Bowel sounds are normal. There is no abdominal bruit.      Palpations: Abdomen is soft. There is no hepatomegaly or pulsatile mass.      Tenderness: There is no abdominal tenderness.   Musculoskeletal:      Right shoulder: No deformity.      Cervical back: Normal range of motion and neck supple.   Skin:     General: Skin is warm and dry.      Findings: No erythema or rash.      Nails: There is no clubbing.   Neurological:      Mental Status: She is alert and oriented to person, place, and time.      Cranial Nerves: No cranial nerve deficit.      Coordination: Coordination normal.   Psychiatric:         Speech: Speech normal.         Behavior: Behavior normal.      Lab Results   Component  Value Date    CHOL 160 06/11/2025    CHOL 138 04/18/2023    CHOL 143 12/01/2022     Lab Results   Component Value Date    HGBA1C 5.2 06/11/2025      BMP  Lab Results   Component Value Date     (L) 06/11/2025    K 4.1 06/11/2025     06/11/2025    CO2 26 06/11/2025    BUN 16 06/11/2025    CREATININE 0.9 06/11/2025    CALCIUM 8.9 06/11/2025    ANIONGAP 6 (L) 06/11/2025    EGFRNORACEVR >60 06/11/2025      Lab Results   Component Value Date    HDL 62 06/11/2025    HDL 45 04/18/2023    HDL 56 12/01/2022     Lab Results   Component Value Date    LDLCALC 82.8 06/11/2025    LDLCALC 79.0 04/18/2023     Lab Results   Component Value Date    TRIG 76 06/11/2025    TRIG 70 04/18/2023    TRIG 75 12/01/2022     Lab Results   Component Value Date    CHOLHDL 38.8 06/11/2025    CHOLHDL 32.6 04/18/2023    CHOLHDL 39.2 12/01/2022       Chemistry        Component Value Date/Time     (L) 06/11/2025 0909     03/01/2025 0513    K 4.1 06/11/2025 0909    K 4.1 03/01/2025 0513     06/11/2025 0909     03/01/2025 0513    CO2 26 06/11/2025 0909    CO2 23 03/01/2025 0513    BUN 16 06/11/2025 0909    CREATININE 0.9 06/11/2025 0909    GLU 85 06/11/2025 0909    GLU 83 03/01/2025 0513        Component Value Date/Time    CALCIUM 8.9 06/11/2025 0909    CALCIUM 8.8 03/01/2025 0513    ALKPHOS 69 06/11/2025 0909    ALKPHOS 55 03/01/2025 0513    AST 19 06/11/2025 0909    AST 16 03/01/2025 0513    ALT 13 06/11/2025 0909    ALT 14 03/01/2025 0513    BILITOT 0.2 06/11/2025 0909    BILITOT 0.4 03/01/2025 0513    ESTGFRAFRICA >60.0 04/07/2022 0922    EGFRNONAA >60.0 04/07/2022 0922          Lab Results   Component Value Date    TSH 1.894 02/26/2025     Lab Results   Component Value Date    INR 1.1 12/20/2017    INR 1.1 08/10/2012     Lab Results   Component Value Date    WBC 5.58 03/01/2025    HGB 9.8 (L) 03/01/2025    HCT 30.1 (L) 03/01/2025    MCV 94 03/01/2025     03/01/2025     BMP  Sodium   Date Value Ref Range  Status   06/11/2025 132 (L) 136 - 145 mmol/L Final   03/01/2025 136 136 - 145 mmol/L Final     Potassium   Date Value Ref Range Status   06/11/2025 4.1 3.5 - 5.1 mmol/L Final   03/01/2025 4.1 3.5 - 5.1 mmol/L Final     Chloride   Date Value Ref Range Status   06/11/2025 100 95 - 110 mmol/L Final   03/01/2025 106 95 - 110 mmol/L Final     CO2   Date Value Ref Range Status   06/11/2025 26 23 - 29 mmol/L Final   03/01/2025 23 23 - 29 mmol/L Final     BUN   Date Value Ref Range Status   06/11/2025 16 8 - 23 mg/dL Final     Creatinine   Date Value Ref Range Status   06/11/2025 0.9 0.5 - 1.4 mg/dL Final     Calcium   Date Value Ref Range Status   06/11/2025 8.9 8.7 - 10.5 mg/dL Final   03/01/2025 8.8 8.7 - 10.5 mg/dL Final     Anion Gap   Date Value Ref Range Status   06/11/2025 6 (L) 8 - 16 mmol/L Final     eGFR if    Date Value Ref Range Status   04/07/2022 >60.0 >60 mL/min/1.73 m^2 Final     eGFR if non    Date Value Ref Range Status   04/07/2022 >60.0 >60 mL/min/1.73 m^2 Final     Comment:     Calculation used to obtain the estimated glomerular filtration  rate (eGFR) is the CKD-EPI equation.        CrCl cannot be calculated (Patient's most recent lab result is older than the maximum 7 days allowed.).    Interpretation Summary  Show Result Comparison     Normal myocardial perfusion scan. There is no evidence of myocardial ischemia or infarction.    The gated perfusion images showed an ejection fraction of 90% at rest. The gated perfusion images showed an ejection fraction of 95% post stress. Normal ejection fraction is greater than 59%.    There is normal wall motion at rest and post-stress.    LV cavity size is normal at rest and normal at post-stress.    The ECG portion of the study is negative for ischemia.  Assessment:     1. Essential hypertension    2. Mixed hyperlipidemia    3. Coronary artery disease of native artery of native heart with stable angina pectoris    4. Prediabetes     5. Bilateral carotid artery stenosis    6. Infrarenal abdominal aortic aneurysm (AAA) without rupture    7. Nocturnal hypoxemia due to emphysema    8. COPD, severe    9. Aortic atherosclerosis    10. Mesenteric ischemia, chronic    11. Atherosclerosis of native arteries of extremities with rest pain, bilateral legs    12. Moderate vascular dementia without behavioral disturbance, psychotic disturbance, mood disturbance, or anxiety    13. Abdominal aortic aneurysm (AAA) without rupture, unspecified part    14. Vascular dementia, moderate, without behavioral disturbance, psychotic disturbance, mood disturbance, and anxiety    15. Orthostatic hypotension        Plan:     Assessment & Plan    I25.10 Atherosclerotic heart disease of native coronary artery without angina pectoris  I71.40 Abdominal aortic aneurysm, without rupture, unspecified  I65.23 Occlusion and stenosis of bilateral carotid arteries  I73.9 Peripheral vascular disease, unspecified  I10 Essential (primary) hypertension  R55 Syncope and collapse  J44.9 Chronic obstructive pulmonary disease, unspecified  F01.C18 Vascular dementia, severe, with other behavioral disturbance  E78.5 Hyperlipidemia, unspecified    IMPRESSION:  Blood pressure has elevated likely due to increased salt intake.  Compression socks effective in preventing blackouts.  Recent stress test results from February show normal EF.  Medication compliance has improved, with cholesterol now at reasonable levels.  Explained the reason for using the higher blood pressure reading when measurements differ between arms, likely due to stenosis in LUE from past lifestyle factors.    PLAN SUMMARY:  - Continue compression socks and pants  - Maintain current exercise regimen and foot peddler use  - Continue Cymbalta at current dose  - Continue current HTN medications  - Reduce salt intake  - Ongoing follow-up with vascular surgeon for aneurysm management  - Follow up in 6 months    ABDOMINAL AORTIC  ANEURYSM:  - Ongoing follow-up with vascular surgeon for aneurysm management.    HYPERTENSION:  - Continued current medications for HTN.  - Reduce salt intake, especially avoiding extra salt on foods like french fries.    SYNCOPE:  - Continue using compression socks and pants to prevent blackouts.  - Maintain adequate hydration.    PERIPHERAL VASCULAR DISEASE:  - Continue using the foot peddler for exercise.  - Maintain current exercise regimen and try to increase activity level.    MOOD DISORDER:  - Continued Cymbalta at current dose for mood.    FOLLOW-UP:  - Follow up in 6 months.          This note was generated with the assistance of ambient listening technology. Verbal consent was obtained by the patient and accompanying visitor(s) for the recording of patient appointment to facilitate this note. I attest to having reviewed and edited the generated note for accuracy, though some syntax or spelling errors may persist. Please contact the author of this note for any clarification.            [1]   Social History  Tobacco Use    Smoking status: Former     Current packs/day: 0.00     Average packs/day: 0.6 packs/day for 50.2 years (30.0 ttl pk-yrs)     Types: Cigarettes     Start date: 1970     Quit date: 2017     Years since quittin.1    Smokeless tobacco: Never    Tobacco comments:     Cant remember but after heart attack   Substance Use Topics    Alcohol use: Never    Drug use: Never

## 2025-06-22 ENCOUNTER — HOSPITAL ENCOUNTER (EMERGENCY)
Facility: HOSPITAL | Age: 70
Discharge: HOME | End: 2025-06-22
Attending: EMERGENCY MEDICINE
Payer: MEDICARE

## 2025-06-22 VITALS
TEMPERATURE: 98 F | RESPIRATION RATE: 20 BRPM | OXYGEN SATURATION: 95 % | SYSTOLIC BLOOD PRESSURE: 180 MMHG | HEART RATE: 81 BPM | DIASTOLIC BLOOD PRESSURE: 82 MMHG

## 2025-06-22 DIAGNOSIS — R07.9 CHEST PAIN: Primary | ICD-10-CM

## 2025-06-22 LAB
ABSOLUTE EOSINOPHIL (OHS): 0.29 K/UL
ABSOLUTE MONOCYTE (OHS): 0.83 K/UL (ref 0.3–1)
ABSOLUTE NEUTROPHIL COUNT (OHS): 4.43 K/UL (ref 1.8–7.7)
ALBUMIN SERPL BCP-MCNC: 3.5 G/DL (ref 3.5–5.2)
ALP SERPL-CCNC: 65 UNIT/L (ref 40–150)
ALT SERPL W/O P-5'-P-CCNC: 13 UNIT/L (ref 10–44)
ANION GAP (OHS): 11 MMOL/L (ref 8–16)
AST SERPL-CCNC: 20 UNIT/L (ref 11–45)
BASOPHILS # BLD AUTO: 0.08 K/UL
BASOPHILS NFR BLD AUTO: 1.1 %
BILIRUB SERPL-MCNC: 0.5 MG/DL (ref 0.1–1)
BNP SERPL-MCNC: 78 PG/ML (ref 0–99)
BUN SERPL-MCNC: 12 MG/DL (ref 8–23)
CALCIUM SERPL-MCNC: 9.1 MG/DL (ref 8.7–10.5)
CHLORIDE SERPL-SCNC: 100 MMOL/L (ref 95–110)
CK SERPL-CCNC: 102 U/L (ref 20–180)
CO2 SERPL-SCNC: 20 MMOL/L (ref 23–29)
CREAT SERPL-MCNC: 1 MG/DL (ref 0.5–1.4)
ERYTHROCYTE [DISTWIDTH] IN BLOOD BY AUTOMATED COUNT: 13.5 % (ref 11.5–14.5)
GFR SERPLBLD CREATININE-BSD FMLA CKD-EPI: >60 ML/MIN/1.73/M2
GLUCOSE SERPL-MCNC: 104 MG/DL (ref 70–110)
HCT VFR BLD AUTO: 32.4 % (ref 37–48.5)
HGB BLD-MCNC: 10.9 GM/DL (ref 12–16)
IMM GRANULOCYTES # BLD AUTO: 0.01 K/UL (ref 0–0.04)
IMM GRANULOCYTES NFR BLD AUTO: 0.1 % (ref 0–0.5)
LYMPHOCYTES # BLD AUTO: 1.35 K/UL (ref 1–4.8)
MCH RBC QN AUTO: 31.5 PG (ref 27–31)
MCHC RBC AUTO-ENTMCNC: 33.6 G/DL (ref 32–36)
MCV RBC AUTO: 94 FL (ref 82–98)
NUCLEATED RBC (/100WBC) (OHS): 0 /100 WBC
OHS QRS DURATION: 86 MS
OHS QTC CALCULATION: 426 MS
PLATELET # BLD AUTO: 253 K/UL (ref 150–450)
PMV BLD AUTO: 9.2 FL (ref 9.2–12.9)
POTASSIUM SERPL-SCNC: 3.8 MMOL/L (ref 3.5–5.1)
PROT SERPL-MCNC: 7.2 GM/DL (ref 6–8.4)
RBC # BLD AUTO: 3.46 M/UL (ref 4–5.4)
RELATIVE EOSINOPHIL (OHS): 4.1 %
RELATIVE LYMPHOCYTE (OHS): 19.3 % (ref 18–48)
RELATIVE MONOCYTE (OHS): 11.9 % (ref 4–15)
RELATIVE NEUTROPHIL (OHS): 63.5 % (ref 38–73)
SODIUM SERPL-SCNC: 131 MMOL/L (ref 136–145)
TROPONIN I SERPL DL<=0.01 NG/ML-MCNC: 0.01 NG/ML
WBC # BLD AUTO: 6.99 K/UL (ref 3.9–12.7)

## 2025-06-22 PROCEDURE — 83880 ASSAY OF NATRIURETIC PEPTIDE: CPT | Mod: HCNC | Performed by: EMERGENCY MEDICINE

## 2025-06-22 PROCEDURE — 93005 ELECTROCARDIOGRAM TRACING: CPT | Mod: HCNC

## 2025-06-22 PROCEDURE — 99285 EMERGENCY DEPT VISIT HI MDM: CPT | Mod: 25,HCNC

## 2025-06-22 PROCEDURE — 80053 COMPREHEN METABOLIC PANEL: CPT | Mod: HCNC | Performed by: EMERGENCY MEDICINE

## 2025-06-22 PROCEDURE — 82550 ASSAY OF CK (CPK): CPT | Mod: HCNC | Performed by: EMERGENCY MEDICINE

## 2025-06-22 PROCEDURE — 93010 ELECTROCARDIOGRAM REPORT: CPT | Mod: HCNC,,, | Performed by: INTERNAL MEDICINE

## 2025-06-22 PROCEDURE — 85025 COMPLETE CBC W/AUTO DIFF WBC: CPT | Mod: HCNC | Performed by: EMERGENCY MEDICINE

## 2025-06-22 PROCEDURE — 84484 ASSAY OF TROPONIN QUANT: CPT | Mod: HCNC | Performed by: EMERGENCY MEDICINE

## 2025-06-22 NOTE — ED PROVIDER NOTES
SCRIBE #1 NOTE: I, Hillary Floyd, am scribing for, and in the presence of, Akash Cole MD. I have scribed the entire note.       History     Chief Complaint   Patient presents with    Chest Pain     Pt reports chest tightness intermittently. Given 325 ASA and 1 spray nitro by [facility].      Review of patient's allergies indicates:  No Known Allergies      History of Present Illness     HPI    6/22/2025, 1:44 AM  History obtained from the patient, medical records, EMS, and granddaughter      History of Present Illness: Gemma Vick is a 70 y.o. female patient with a PMHx of CAD, COPD, HLD, dementia, emphysema, and HTN who presents to the Emergency Department for evaluation of chest tightness which began today PTA.  No mitigating or exacerbating factors reported. Associated sxs include nausea and dizziness. Patient denies any fever or chills. No prior Tx specified.  Pt saw Dr. Juarez (cardiology) on Thursday who said the pt has a possible blockage in her arm. No further complaints or concerns at this time.       Arrival mode: Ambulance Service    PCP: Olga Altman MD        Past Medical History:  Past Medical History:   Diagnosis Date    AAA (abdominal aortic aneurysm) 02/13/2014    Abdominal aneurysm     3    Acute coronary syndrome     Anemia     Arthritis     BPPV (benign paroxysmal positional vertigo)     Carotid artery plaque     Carotid artery stenosis and occlusion 02/13/2014    Chronic back pain     COPD (chronic obstructive pulmonary disease)     Coronary artery disease     Dementia     Emphysema lung     Heart attack 1998    Several after that with 4 stents    Hyperlipidemia     Hypertension     Joint pain 2000    Myocardial infarction     x3    Neuropathy     Personal history of COVID-19 06/09/2021 11/16/2020 +Covid, recovered at home        Past Surgical History:  Past Surgical History:   Procedure Laterality Date    CARDIAC CATHETERIZATION      CHOLECYSTECTOMY  1987    Stones in bile duct     CORONARY ANGIOPLASTY      CORONARY STENT PLACEMENT N/A 09/12/2023    Procedure: INSERTION, STENT, CORONARY ARTERY;  Surgeon: Millie Juarez MD;  Location: [facility] CATH LAB;  Service: Cardiology;  Laterality: N/A;    EYE SURGERY  08/31    Catarates removed    FRACTURE SURGERY  1971    Hand surgery    HYSTERECTOMY  1988    LEFT HEART CATHETERIZATION Left 09/12/2023    Procedure: Left heart cath;  Surgeon: Millie Juarez MD;  Location: [facility] CATH LAB;  Service: Cardiology;  Laterality: Left;    PERCUTANEOUS CORONARY INTERVENTION, ARTERY N/A 09/12/2023    Procedure: Percutaneous coronary intervention;  Surgeon: Millie Juarez MD;  Location: [facility] CATH LAB;  Service: Cardiology;  Laterality: N/A;    THROMBECTOMY, CORONARY  09/12/2023    Procedure: Thrombectomy, Coronary;  Surgeon: Millie Juarez MD;  Location: [facility] CATH LAB;  Service: Cardiology;;    TONSILLECTOMY      TRANSFORAMINAL EPIDURAL INJECTION OF STEROID Right 12/01/2023    Procedure: Injection,steroid,epidural,transforaminal approach- right side, L4/5 and L5/S1;  Surgeon: Lydia Roberts MD;  Location: [facility] PAIN MGT;  Service: Pain Management;  Laterality: Right;    TUBAL LIGATION  1985    Afterr last child         Family History:  Family History   Problem Relation Name Age of Onset    Heart disease Mother Katya     Early death Mother Katya     Heart attacks under age 50 Father Ant     Arthritis Father Ant     Early death Father Ant     Heart disease Father Ant     Heart attacks under age 50 Brother Angel         SAAVEDRA at 32    Early death Brother Angel     Heart disease Brother Angel     Hypertension Brother Angel     Heart attack Brother          HA at 70    Breast cancer Paternal Aunt      Heart disease Sister Brenda     Hyperlipidemia Sister Brenda     Arthritis Brother Jose Armando     Early death Brother Jose Armando     Heart disease Brother Jose Armando     Hypertension Sister Marlo Hobeth        Social History:  Social History     Tobacco Use    Smoking status: Former      Current packs/day: 0.00     Average packs/day: 0.6 packs/day for 50.2 years (30.0 ttl pk-yrs)     Types: Cigarettes     Start date: 1970     Quit date: 2017     Years since quittin.1    Smokeless tobacco: Never    Tobacco comments:     Cant remember but after heart attack   Substance and Sexual Activity    Alcohol use: Never    Drug use: Never    Sexual activity: Not Currently     Partners: Male     Birth control/protection: Post-menopausal, See Surgical Hx        Review of Systems     Review of Systems   Constitutional:  Negative for chills and fever.   HENT:  Negative for sore throat.    Respiratory:  Negative for shortness of breath.    Cardiovascular:  Negative for chest pain.        (+) chest tightness   Gastrointestinal:  Positive for nausea.   Genitourinary:  Negative for dysuria.   Musculoskeletal:  Negative for back pain.   Skin:  Negative for rash.   Neurological:  Positive for dizziness. Negative for weakness.   Hematological:  Does not bruise/bleed easily.   All other systems reviewed and are negative.       Physical Exam     Initial Vitals [25 0110]   BP Pulse Resp Temp SpO2   (!) 172/85 82 16 97.5 °F (36.4 °C) 96 %      MAP       --          Physical Exam  Nursing Notes and Vital Signs Reviewed.  Constitutional: Patient is in no apparent distress. Well-developed and well-nourished.  Head: Atraumatic. Normocephalic.  Eyes: PERRL. EOM intact. Conjunctivae are not pale. No scleral icterus.  ENT: Mucous membranes are moist. Oropharynx is clear and symmetric.    Neck: Supple. Full ROM. No lymphadenopathy.  Cardiovascular: Regular rate. Regular rhythm. No murmurs, rubs, or gallops. Distal pulses are 2+ and symmetric.  Pulmonary/Chest: No respiratory distress. Clear to auscultation bilaterally. No wheezing or rales.  Abdominal: Soft and non-distended. There is no tenderness.  No rebound, guarding, or rigidity. Good bowel sounds.  Genitourinary: No CVA tenderness.  Musculoskeletal: Moves  all extremities. No obvious deformities. No edema. No calf tenderness.  Skin: Warm and dry.  Neurological:  Alert, awake, and appropriate.  Normal speech.  No acute focal neurological deficits are appreciated.  Psychiatric: Normal affect. Good eye contact. Appropriate in content.     ED Course   Procedures  ED Vital Signs:  Vitals:    06/22/25 0110   BP: (!) 172/85   Pulse: 82   Resp: 16   Temp: 97.5 °F (36.4 °C)   TempSrc: Oral   SpO2: 96%       Abnormal Lab Results:  Labs Reviewed   COMPREHENSIVE METABOLIC PANEL - Abnormal       Result Value    Sodium 131 (*)     Potassium 3.8      Chloride 100      CO2 20 (*)     Glucose 104      BUN 12      Creatinine 1.0      Calcium 9.1      Protein Total 7.2      Albumin 3.5      Bilirubin Total 0.5      ALP 65      AST 20      ALT 13      Anion Gap 11      eGFR >60     CBC WITH DIFFERENTIAL - Abnormal    WBC 6.99      RBC 3.46 (*)     HGB 10.9 (*)     HCT 32.4 (*)     MCV 94      MCH 31.5 (*)     MCHC 33.6      RDW 13.5      Platelet Count 253      MPV 9.2      Nucleated RBC 0      Neut % 63.5      Lymph % 19.3      Mono % 11.9      Eos % 4.1      Basophil % 1.1      Imm Grans % 0.1      Neut # 4.43      Lymph # 1.35      Mono # 0.83      Eos # 0.29      Baso # 0.08      Imm Grans # 0.01     B-TYPE NATRIURETIC PEPTIDE - Normal    BNP 78     CK - Normal         TROPONIN I - Normal    Troponin-I 0.012     CBC W/ AUTO DIFFERENTIAL    Narrative:     The following orders were created for panel order CBC Auto Differential.  Procedure                               Abnormality         Status                     ---------                               -----------         ------                     CBC with Differential[[id number]]       Abnormal            Final result                 Please view results for these tests on the individual orders.   URINALYSIS, REFLEX TO URINE CULTURE        All Lab Results:  Results for orders placed or performed during the hospital encounter  of 06/22/25   Comprehensive Metabolic Panel    Collection Time: 06/22/25  1:54 AM   Result Value Ref Range    Sodium 131 (L) 136 - 145 mmol/L    Potassium 3.8 3.5 - 5.1 mmol/L    Chloride 100 95 - 110 mmol/L    CO2 20 (L) 23 - 29 mmol/L    Glucose 104 70 - 110 mg/dL    BUN 12 8 - 23 mg/dL    Creatinine 1.0 0.5 - 1.4 mg/dL    Calcium 9.1 8.7 - 10.5 mg/dL    Protein Total 7.2 6.0 - 8.4 gm/dL    Albumin 3.5 3.5 - 5.2 g/dL    Bilirubin Total 0.5 0.1 - 1.0 mg/dL    ALP 65 40 - 150 unit/L    AST 20 11 - 45 unit/L    ALT 13 10 - 44 unit/L    Anion Gap 11 8 - 16 mmol/L    eGFR >60 >60 mL/min/1.73/m2   BNP    Collection Time: 06/22/25  1:54 AM   Result Value Ref Range    BNP 78 0 - 99 pg/mL   CK    Collection Time: 06/22/25  1:54 AM   Result Value Ref Range     20 - 180 U/L   Troponin I    Collection Time: 06/22/25  1:54 AM   Result Value Ref Range    Troponin-I 0.012 <=0.026 ng/mL   CBC with Differential    Collection Time: 06/22/25  1:54 AM   Result Value Ref Range    WBC 6.99 3.90 - 12.70 K/uL    RBC 3.46 (L) 4.00 - 5.40 M/uL    HGB 10.9 (L) 12.0 - 16.0 gm/dL    HCT 32.4 (L) 37.0 - 48.5 %    MCV 94 82 - 98 fL    MCH 31.5 (H) 27.0 - 31.0 pg    MCHC 33.6 32.0 - 36.0 g/dL    RDW 13.5 11.5 - 14.5 %    Platelet Count 253 150 - 450 K/uL    MPV 9.2 9.2 - 12.9 fL    Nucleated RBC 0 <=0 /100 WBC    Neut % 63.5 38 - 73 %    Lymph % 19.3 18 - 48 %    Mono % 11.9 4 - 15 %    Eos % 4.1 <=8 %    Basophil % 1.1 <=1.9 %    Imm Grans % 0.1 0.0 - 0.5 %    Neut # 4.43 1.8 - 7.7 K/uL    Lymph # 1.35 1 - 4.8 K/uL    Mono # 0.83 0.3 - 1 K/uL    Eos # 0.29 <=0.5 K/uL    Baso # 0.08 <=0.2 K/uL    Imm Grans # 0.01 0.00 - 0.04 K/uL     *Note: Due to a large number of results and/or encounters for the requested time period, some results have not been displayed. A complete set of results can be found in Results Review.       Imaging Results:  Imaging Results              X-Ray Chest AP Portable (In process)                      The EKG was  ordered, reviewed, and independently interpreted by the ED provider.  Interpretation time: 1:39 AM  Rate: 80 BPM  Rhythm: Sinus rhythm with 1st degree AV block  Interpretation: Anterior infarct, age undetermined. No STEMI.           The Emergency Provider reviewed the vital signs and test results, which are outlined above.     ED Discussion         2:42 AM: Reassessed pt at this time. Discussed with patient and/or family/caretaker all pertinent ED information and results. Discussed pt dx and plan of tx. Gave the patient all f/u and return to the ED instructions. All questions and concerns were addressed at this time. Patient and/or family/caretaker expresses understanding of information and instructions, and is comfortable with plan to discharge. Pt is stable for discharge.     I discussed with patient and/or family/caretaker that evaluation in the ED does not suggest any emergent or life threatening medical conditions requiring immediate intervention beyond what was provided in the ED, and I believe patient is safe for discharge. Regardless, an unremarkable evaluation in the ED does not preclude the development or presence of a serious or life threatening condition. As such, I instructed that the patient is to return immediately for any worsening or change in current symptoms.         Medical Decision Making  DDx: chest pain, NSTEMI    Amount and/or Complexity of Data Reviewed  Labs: ordered. Decision-making details documented in ED Course.  Radiology: ordered. Decision-making details documented in ED Course.  ECG/medicine tests: ordered and independent interpretation performed. Decision-making details documented in ED Course.  Discussion of management or test interpretation with external provider(s): Chest tightness for the last day.  Saw cardiology a few days ago.  Troponin negative.  Feeling better, and ready to go home.                 ED Medication(s):  Medications - No data to display    New Prescriptions     No medications on file        Follow-up Information       Olga Altman MD.    Specialty: Family Medicine  Contact information:  [id number] [address] [address] [phone]                                 Scribe Attestation:   Scribe #1: I performed the above scribed service and the documentation accurately describes the services I performed. I attest to the accuracy of the note.     Attending:   Physician Attestation Statement for Scribe #1: I, Akash Cole MD, personally performed the services described in this documentation, as scribed by Hillary Floyd, in my presence, and it is both accurate and complete.           Clinical Impression       ICD-10-CM ICD-9-CM   1. Chest pain  R07.9 786.50       Disposition:   Disposition: Discharged  Condition: Stable         Akash Cole MD  06/22/25 0339

## 2025-06-23 ENCOUNTER — OFFICE VISIT (OUTPATIENT)
Dept: URGENT CARE | Facility: CLINIC | Age: 70
End: 2025-06-23
Payer: MEDICARE

## 2025-06-23 ENCOUNTER — TELEPHONE (OUTPATIENT)
Dept: CARDIOLOGY | Facility: CLINIC | Age: 70
End: 2025-06-23
Payer: MEDICARE

## 2025-06-23 VITALS
TEMPERATURE: 98 F | RESPIRATION RATE: 18 BRPM | BODY MASS INDEX: 26.93 KG/M2 | HEIGHT: 61 IN | SYSTOLIC BLOOD PRESSURE: 157 MMHG | DIASTOLIC BLOOD PRESSURE: 81 MMHG | OXYGEN SATURATION: 97 % | HEART RATE: 76 BPM | WEIGHT: 142.63 LBS

## 2025-06-23 DIAGNOSIS — I71.40 ABDOMINAL AORTIC ANEURYSM (AAA) WITHOUT RUPTURE, UNSPECIFIED PART: ICD-10-CM

## 2025-06-23 DIAGNOSIS — R53.1 GENERALIZED WEAKNESS: Primary | ICD-10-CM

## 2025-06-23 DIAGNOSIS — R10.32 LEFT LOWER QUADRANT ABDOMINAL PAIN: ICD-10-CM

## 2025-06-23 DIAGNOSIS — R53.83 FATIGUE, UNSPECIFIED TYPE: ICD-10-CM

## 2025-06-23 DIAGNOSIS — F01.B0 VASCULAR DEMENTIA, MODERATE, WITHOUT BEHAVIORAL DISTURBANCE, PSYCHOTIC DISTURBANCE, MOOD DISTURBANCE, AND ANXIETY: ICD-10-CM

## 2025-06-23 DIAGNOSIS — I10 ELEVATED BLOOD PRESSURE READING IN OFFICE WITH DIAGNOSIS OF HYPERTENSION: ICD-10-CM

## 2025-06-23 DIAGNOSIS — J44.9 COPD, SEVERE: ICD-10-CM

## 2025-06-23 PROCEDURE — 99214 OFFICE O/P EST MOD 30 MIN: CPT | Mod: S$GLB,,,

## 2025-06-23 RX ORDER — DULOXETIN HYDROCHLORIDE 20 MG/1
20 CAPSULE, DELAYED RELEASE ORAL
COMMUNITY
Start: 2025-06-20

## 2025-06-23 NOTE — TELEPHONE ENCOUNTER
Please advise      Contacted PT and scheduled a [facility] F/U for Thursday. Pt stated her BP has been high 160/170         Copied from CRM #[id number]. Topic: Appointments - Appointment Scheduling  >> Jun 23, 2025 12:57 PM Connor wrote:  Type: Patient Call Back    Who called: patient's granddaughter- Cristel     What is the request in detail: Requesting a call back to schedule the pt an appt for weakness, fatigue,  and recent ER visit due to high blood pressure. Please advise        Would the patient rather a call back or a response via [facility]?  Call     Best call back number: [phone]     Additional Information:

## 2025-06-23 NOTE — PROGRESS NOTES
"Subjective:      Patient ID: Gemma Vick is a 70 y.o. female.    Vitals:  height is 5' 1" (1.549 m) and weight is 64.7 kg (142 lb 10.2 oz). Her oral temperature is 97.9 °F (36.6 °C). Her blood pressure is 157/81 (abnormal) and her pulse is 76. Her respiration is 18 and oxygen saturation is 97%.     Chief Complaint: Abdominal Pain    70 y.o. female patient with a PMHx of CAD, COPD, HLD, dementia, emphysema, AAA (last CT abd measured 4cm) and HTN who presents to the clinic with c/o generalized fatigue and generalized weakness going on for a week.  She states she is also having left lower quadrant abdominal discomfort.  Last bowel movement was today. Reports taking her medications as directed today and only eating 4 crackers total today. States in the past, taking meds on an empty stomach caused discomfort. She denies any worsening back pain, new flank pain, nausea/vomiting/diarrhea.  Patient states she has been told at home but has not taken it.  Patient denies any fever, chills, chest pain, shortness of breath.  Patient states she has a history of neuropathy and is taking Cymbalta starting 2 days ago.  Patient states she saw her cardiologist last Thursday in has blood work ordered but has not completed.  Patient is concerned because her blood pressure has been elevated lately despite taking her medications as directed.  Patient denies any vision changes, but remarks that she needs a new prescription for her glasses that she is waiting for them to come in.  Patient denies any headache, unilateral numbness, speech difficulty.  Patient went to the ER yesterday for chest tightness and was stable for discharge.  No change in symptoms.  No known drug allergies.    Abdominal Pain  This is a new problem. The current episode started in the past 7 days. The onset quality is gradual. The problem has been gradually worsening. The pain is located in the LLQ. The pain is at a severity of 6/10. The pain is moderate. The quality " "of the pain is sharp. Pertinent negatives include no constipation, diarrhea, dysuria, fever, frequency or vomiting. Her past medical history is significant for abdominal surgery.       Constitution: Positive for chills (going down my legs (patient has neuropathy)), fatigue and generalized weakness. Negative for fever.   HENT:  Negative for ear pain, ear discharge and voice change.    Cardiovascular:  Negative for chest pain and passing out.   Eyes:  Negative for photophobia, vision loss and double vision. Blurred vision: chronic, wears glasses "waiting on my new prescription".  Respiratory:  Positive for COPD. Negative for cough and shortness of breath.    Gastrointestinal:  Positive for abdominal pain and history of abdominal surgery. Negative for vomiting, constipation and diarrhea.   Genitourinary:  Negative for dysuria, frequency, urgency and flank pain.   Musculoskeletal:  Back pain: chronic, not worsening.   Skin:  Negative for pale and rash.   Neurological:  Negative for speech difficulty, disorientation, altered mental status and loss of consciousness. Dizziness: intermittent episodes "I have crystals that get moved".  Psychiatric/Behavioral:  Negative for altered mental status and disorientation.       Objective:     Vitals:    06/23/25 1630   BP: (!) 157/81   Pulse: 76   Resp: 18   Temp: 97.9 °F (36.6 °C)       Physical Exam   Constitutional: She is oriented to person, place, and time. She appears well-developed.  Non-toxic appearance. She does not appear ill. No distress.   HENT:   Head: Normocephalic and atraumatic.   Ears:   Right Ear: Hearing, tympanic membrane, external ear and ear canal normal. Tympanic membrane is not erythematous and not bulging. No middle ear effusion. no impacted cerumen  Left Ear: Hearing, tympanic membrane, external ear and ear canal normal. Tympanic membrane is not erythematous and not bulging.  No middle ear effusion. no impacted cerumen  Nose: Nose normal. No mucosal edema, " rhinorrhea or nasal deformity. No epistaxis. Right sinus exhibits no maxillary sinus tenderness and no frontal sinus tenderness. Left sinus exhibits no maxillary sinus tenderness and no frontal sinus tenderness.   Mouth/Throat: Uvula is midline, oropharynx is clear and moist and mucous membranes are normal. Mucous membranes are moist. No trismus in the jaw. Normal dentition. No uvula swelling. No posterior oropharyngeal erythema. No tonsillar exudate.   Eyes: Conjunctivae, EOM and lids are normal. Pupils are equal, round, and reactive to light. Right eye exhibits no discharge. Left eye exhibits no discharge. No scleral icterus. Extraocular movement intact gaze aligned appropriately periorbital hyperpigmentation  Neck: Trachea normal and phonation normal. Neck supple. No neck rigidity present.   Cardiovascular: Normal rate, regular rhythm and normal pulses.   Murmur heard.  Pulmonary/Chest: Effort normal. No accessory muscle usage or stridor. No tachypnea. No respiratory distress. She has wheezes (faint diffuse wheezing bilaterally, hx of COPD). She has no rhonchi. She has no rales.   Abdominal: Normal appearance and bowel sounds are normal. She exhibits abdominal bruit. She exhibits no distension. Soft. There is no abdominal tenderness. There is no rebound, no guarding, no tenderness at McBurney's point, no left CVA tenderness, negative Rovsing's sign and no right CVA tenderness.      Comments: Low suspicion of colitis, appendicitis, patient has history of cholecystectomy, low suspicion of small bowel obstruction as NBS present in all 4 quadrants.  Will order abdominal x-ray for outpatient.  No guarding on exam.  Recent CT abdomen was done 4 months ago.   Musculoskeletal: Normal range of motion.         General: No deformity. Normal range of motion.   Neurological: no focal deficit. She is alert and oriented to person, place, and time. No cranial nerve deficit. She exhibits normal muscle tone. Coordination normal.    Skin: Skin is warm, dry, intact, not diaphoretic, not pale and no rash.   Psychiatric: Her speech is normal and behavior is normal. Judgment and thought content normal.   Nursing note and vitals reviewed.      Assessment:     1. Generalized weakness    2. Fatigue, unspecified type    3. Elevated blood pressure reading in office with diagnosis of hypertension    4. Vascular dementia, moderate, without behavioral disturbance, psychotic disturbance, mood disturbance, and anxiety    5. COPD, severe    6. Abdominal aortic aneurysm (AAA) without rupture, unspecified part    7. Left lower quadrant abdominal pain        Plan:       Generalized weakness    Fatigue, unspecified type    Elevated blood pressure reading in office with diagnosis of hypertension    Vascular dementia, moderate, without behavioral disturbance, psychotic disturbance, mood disturbance, and anxiety    COPD, severe    Abdominal aortic aneurysm (AAA) without rupture, unspecified part  -     X-Ray Abdomen Flat And Erect; Future; Expected date: 06/23/2025    Left lower quadrant abdominal pain  -     X-Ray Abdomen Flat And Erect; Future; Expected date: 06/23/2025        Patient Instructions   Discharge   Please go to the Emergency Department for any concerns or worsening of condition such as:  Shortness of breath, difficulty breathing, or breathing fast  Chest pain gets worse when you breathe  Severe pain that comes on suddenly or lasts more than an hour  Dizziness, weakness, or fainting  Fever   Left Arm Pain, increased and (pumping) heart beat,  Visual Disturbances,left  jaw, back or shoulder pain  Follow up with your PCP in the next 2-3 days for no improvement in symptoms.    If you  smoke, please stop smoking.     Monitor symptoms closely  Take medications as directed  Follow up with specialist as scheduled  Get plenty of rest  Drink plenty of fluids  Do not take medications on an empty stomach    Elevated Blood Pressure Reading  Please be aware your  blood pressure was slightly elevated today -  Make sure to take your blood pressure medicines, eat a low salt diet and recheck your blood pressure to make sure it is not getting too elevated ( greater than 160/100).  Also make sure to let your doctor know about the elevated reading.  You should recheck your blood pressure twice a day for the next 2 weeks while follow up with your PCP at your next visit regarding today's reading.  If you have any chest pain, shortness or breath, palpitations, headache, visual disturbances, ect, you should go to the ER.    In the meantime, we recommend you schedule an appointment with your PCP in the next 2-3 days for a recheck of your blood pressure.     - Here are a few generalized recommended lifestyle modifications proven to lower BPs--DASH diet, exercise, weight loss, reducing stress.  *Of note: above recommendations are generalized conservative lifestyle modifications that include but are not limited to above list.   Please do not attempt any of the above recommendations if they do not pertain to you or should cause overall detriment to your health.   Please call the clinic or your PCP with any questions you may have in regards to BP and lifestyle modifications.    Follow these steps when taking your blood pressure to ensure an accurate reading.    1. Be still - ensure at least 5 minutes of quiet rest before measurements. Do not talk or listen while taking the reading.  2. Don't smoke, drink caffeinated beverages or exercise within 30 minutes before measuring your blood pressure. Empty your bladder.  3. Sit correctly - sit with your back straight and supported (on a dining chair, rather than a sofa). Your feet should be flat on the floor and your legs should not be crossed. Your arm should be supported on a flat surface (such as a table) with the upper arm at heart level. Make sure the bottom of the cuff is placed directly above the bend of the elbow.   4. Measure at the  different times of the day, at least 45 minutes after your medications. It is best to take the readings daily.  5. Dont take the measurement over clothes.          Medical Decision Making:   History:   I obtained history from: someone other than patient.       <> Summary of History: Family member states that Cymbalta was started 2 days ago, the patient has been having symptoms for a week.  They state that patient has vascular dementia.  Reports that she feels patient's medications.  See HPI for additional details  Old Medical Records: I decided to obtain old medical records.  Urgent Care Management:  Explained to patient that symptoms such as fatigue or weakness are very broad-spectrum and will need further workup especially to complete previously ordered blood work.  No history of thyroid dysfunction.  Reviewed recent blood work.  Reviewed recent EKG done in the emergency department this past week.  Differentials include but are not limited to anxiety or depression, fibromyalgia, age, elevated blood pressure, orthostatic hypotension, viral illness, GI bleed (patient did not mention any symptoms of hematochezia or melena).  Strict ER precautions were given in depth.  At this time, I feel patient is stable for discharge and patient leaves clinic in no acute distress, vital signs stable with elevated blood pressure.  Recommended following up with primary care provider regarding blood pressure management.    Additional MDM:     Heart Failure Score:   COPD = Yes

## 2025-06-23 NOTE — PATIENT INSTRUCTIONS
Discharge   Please go to the Emergency Department for any concerns or worsening of condition such as:  Shortness of breath, difficulty breathing, or breathing fast  Chest pain gets worse when you breathe  Severe pain that comes on suddenly or lasts more than an hour  Dizziness, weakness, or fainting  Fever   Left Arm Pain, increased and (pumping) heart beat,  Visual Disturbances,left  jaw, back or shoulder pain  Follow up with your PCP in the next 2-3 days for no improvement in symptoms.    If you  smoke, please stop smoking.     Monitor symptoms closely  Take medications as directed  Follow up with specialist as scheduled  Get plenty of rest  Drink plenty of fluids  Do not take medications on an empty stomach    Elevated Blood Pressure Reading  Please be aware your blood pressure was slightly elevated today -  Make sure to take your blood pressure medicines, eat a low salt diet and recheck your blood pressure to make sure it is not getting too elevated ( greater than 160/100).  Also make sure to let your doctor know about the elevated reading.  You should recheck your blood pressure twice a day for the next 2 weeks while follow up with your PCP at your next visit regarding today's reading.  If you have any chest pain, shortness or breath, palpitations, headache, visual disturbances, ect, you should go to the ER.    In the meantime, we recommend you schedule an appointment with your PCP in the next 2-3 days for a recheck of your blood pressure.     - Here are a few generalized recommended lifestyle modifications proven to lower BPs--DASH diet, exercise, weight loss, reducing stress.  *Of note: above recommendations are generalized conservative lifestyle modifications that include but are not limited to above list.   Please do not attempt any of the above recommendations if they do not pertain to you or should cause overall detriment to your health.   Please call the clinic or your PCP with any questions you may have  in regards to BP and lifestyle modifications.    Follow these steps when taking your blood pressure to ensure an accurate reading.    1. Be still - ensure at least 5 minutes of quiet rest before measurements. Do not talk or listen while taking the reading.  2. Don't smoke, drink caffeinated beverages or exercise within 30 minutes before measuring your blood pressure. Empty your bladder.  3. Sit correctly - sit with your back straight and supported (on a dining chair, rather than a sofa). Your feet should be flat on the floor and your legs should not be crossed. Your arm should be supported on a flat surface (such as a table) with the upper arm at heart level. Make sure the bottom of the cuff is placed directly above the bend of the elbow.   4. Measure at the different times of the day, at least 45 minutes after your medications. It is best to take the readings daily.  5. Dont take the measurement over clothes.

## 2025-06-24 ENCOUNTER — HOSPITAL ENCOUNTER (OUTPATIENT)
Dept: RADIOLOGY | Facility: HOSPITAL | Age: 70
Discharge: HOME | End: 2025-06-24
Payer: MEDICARE

## 2025-06-24 DIAGNOSIS — I71.40 ABDOMINAL AORTIC ANEURYSM (AAA) WITHOUT RUPTURE, UNSPECIFIED PART: ICD-10-CM

## 2025-06-24 DIAGNOSIS — R10.32 LEFT LOWER QUADRANT ABDOMINAL PAIN: ICD-10-CM

## 2025-06-24 PROCEDURE — 74019 RADEX ABDOMEN 2 VIEWS: CPT | Mod: TC,HCNC,FY,PN

## 2025-06-24 PROCEDURE — 74019 RADEX ABDOMEN 2 VIEWS: CPT | Mod: 26,HCNC,, | Performed by: STUDENT IN AN ORGANIZED HEALTH CARE EDUCATION/TRAINING PROGRAM

## 2025-06-26 ENCOUNTER — HOSPITAL ENCOUNTER (OUTPATIENT)
Facility: HOSPITAL | Age: 70
Discharge: HOME | End: 2025-06-27
Attending: EMERGENCY MEDICINE | Admitting: FAMILY MEDICINE
Payer: MEDICARE

## 2025-06-26 ENCOUNTER — OFFICE VISIT (OUTPATIENT)
Dept: CARDIOLOGY | Facility: CLINIC | Age: 70
End: 2025-06-26
Payer: MEDICARE

## 2025-06-26 VITALS
SYSTOLIC BLOOD PRESSURE: 144 MMHG | RESPIRATION RATE: 16 BRPM | HEIGHT: 61 IN | OXYGEN SATURATION: 97 % | WEIGHT: 139.25 LBS | DIASTOLIC BLOOD PRESSURE: 78 MMHG | HEART RATE: 76 BPM | BODY MASS INDEX: 26.29 KG/M2

## 2025-06-26 DIAGNOSIS — I71.43 INFRARENAL ABDOMINAL AORTIC ANEURYSM (AAA) WITHOUT RUPTURE: ICD-10-CM

## 2025-06-26 DIAGNOSIS — I10 ESSENTIAL HYPERTENSION: ICD-10-CM

## 2025-06-26 DIAGNOSIS — I49.5 SINUS NODE DYSFUNCTION: ICD-10-CM

## 2025-06-26 DIAGNOSIS — K55.1 MESENTERIC ISCHEMIA, CHRONIC: ICD-10-CM

## 2025-06-26 DIAGNOSIS — I20.9 ANGINA PECTORIS: ICD-10-CM

## 2025-06-26 DIAGNOSIS — E78.2 MIXED HYPERLIPIDEMIA: ICD-10-CM

## 2025-06-26 DIAGNOSIS — J43.9 NOCTURNAL HYPOXEMIA DUE TO EMPHYSEMA: ICD-10-CM

## 2025-06-26 DIAGNOSIS — G47.36 NOCTURNAL HYPOXEMIA DUE TO EMPHYSEMA: ICD-10-CM

## 2025-06-26 DIAGNOSIS — I70.0 AORTIC ATHEROSCLEROSIS: ICD-10-CM

## 2025-06-26 DIAGNOSIS — R07.9 CHEST PAIN, UNSPECIFIED TYPE: Primary | ICD-10-CM

## 2025-06-26 DIAGNOSIS — E87.1 HYPONATREMIA: Primary | ICD-10-CM

## 2025-06-26 DIAGNOSIS — R07.9 CHEST PAIN: ICD-10-CM

## 2025-06-26 DIAGNOSIS — I25.118 CORONARY ARTERY DISEASE OF NATIVE ARTERY OF NATIVE HEART WITH STABLE ANGINA PECTORIS: ICD-10-CM

## 2025-06-26 DIAGNOSIS — I50.32 CHRONIC DIASTOLIC HEART FAILURE: ICD-10-CM

## 2025-06-26 DIAGNOSIS — R00.1 BRADYCARDIA: ICD-10-CM

## 2025-06-26 DIAGNOSIS — J44.9 COPD, SEVERE: ICD-10-CM

## 2025-06-26 DIAGNOSIS — R73.03 PREDIABETES: ICD-10-CM

## 2025-06-26 DIAGNOSIS — R53.1 WEAKNESS: ICD-10-CM

## 2025-06-26 PROBLEM — I20.0 UNSTABLE ANGINA: Status: ACTIVE | Noted: 2025-06-26

## 2025-06-26 LAB
ABSOLUTE EOSINOPHIL (OHS): 0.22 K/UL
ABSOLUTE MONOCYTE (OHS): 0.74 K/UL (ref 0.3–1)
ABSOLUTE NEUTROPHIL COUNT (OHS): 3.63 K/UL (ref 1.8–7.7)
ALBUMIN SERPL BCP-MCNC: 3.4 G/DL (ref 3.5–5.2)
ALP SERPL-CCNC: 61 UNIT/L (ref 40–150)
ALT SERPL W/O P-5'-P-CCNC: 11 UNIT/L (ref 10–44)
ANION GAP (OHS): 8 MMOL/L (ref 8–16)
AST SERPL-CCNC: 15 UNIT/L (ref 11–45)
BASOPHILS # BLD AUTO: 0.07 K/UL
BASOPHILS NFR BLD AUTO: 1.2 %
BILIRUB SERPL-MCNC: 0.5 MG/DL (ref 0.1–1)
BNP SERPL-MCNC: 65 PG/ML (ref 0–99)
BUN SERPL-MCNC: 14 MG/DL (ref 8–23)
CALCIUM SERPL-MCNC: 9.1 MG/DL (ref 8.7–10.5)
CHLORIDE SERPL-SCNC: 99 MMOL/L (ref 95–110)
CO2 SERPL-SCNC: 22 MMOL/L (ref 23–29)
CREAT SERPL-MCNC: 0.9 MG/DL (ref 0.5–1.4)
ERYTHROCYTE [DISTWIDTH] IN BLOOD BY AUTOMATED COUNT: 13.1 % (ref 11.5–14.5)
GFR SERPLBLD CREATININE-BSD FMLA CKD-EPI: >60 ML/MIN/1.73/M2
GLUCOSE SERPL-MCNC: 96 MG/DL (ref 70–110)
HCT VFR BLD AUTO: 34.1 % (ref 37–48.5)
HGB BLD-MCNC: 11.5 GM/DL (ref 12–16)
IMM GRANULOCYTES # BLD AUTO: 0.01 K/UL (ref 0–0.04)
IMM GRANULOCYTES NFR BLD AUTO: 0.2 % (ref 0–0.5)
LYMPHOCYTES # BLD AUTO: 0.98 K/UL (ref 1–4.8)
MCH RBC QN AUTO: 31.6 PG (ref 27–31)
MCHC RBC AUTO-ENTMCNC: 33.7 G/DL (ref 32–36)
MCV RBC AUTO: 94 FL (ref 82–98)
NUCLEATED RBC (/100WBC) (OHS): 0 /100 WBC
OHS QRS DURATION: 88 MS
OHS QTC CALCULATION: 400 MS
PLATELET # BLD AUTO: 227 K/UL (ref 150–450)
PMV BLD AUTO: 9.1 FL (ref 9.2–12.9)
POTASSIUM SERPL-SCNC: 4.1 MMOL/L (ref 3.5–5.1)
PROT SERPL-MCNC: 7.1 GM/DL (ref 6–8.4)
RBC # BLD AUTO: 3.64 M/UL (ref 4–5.4)
RELATIVE EOSINOPHIL (OHS): 3.9 %
RELATIVE LYMPHOCYTE (OHS): 17.3 % (ref 18–48)
RELATIVE MONOCYTE (OHS): 13.1 % (ref 4–15)
RELATIVE NEUTROPHIL (OHS): 64.3 % (ref 38–73)
SODIUM SERPL-SCNC: 129 MMOL/L (ref 136–145)
TROPONIN I SERPL DL<=0.01 NG/ML-MCNC: 0.01 NG/ML
TROPONIN I SERPL DL<=0.01 NG/ML-MCNC: 0.01 NG/ML
WBC # BLD AUTO: 5.65 K/UL (ref 3.9–12.7)

## 2025-06-26 PROCEDURE — G0378 HOSPITAL OBSERVATION PER HR: HCPCS | Mod: HCNC

## 2025-06-26 PROCEDURE — 25000003 PHARM REV CODE 250: Mod: HCNC | Performed by: EMERGENCY MEDICINE

## 2025-06-26 PROCEDURE — 84484 ASSAY OF TROPONIN QUANT: CPT | Mod: HCNC | Performed by: EMERGENCY MEDICINE

## 2025-06-26 PROCEDURE — 99999 PR PBB SHADOW E&M-EST. PATIENT-LVL V: CPT | Mod: PBBFAC,HCNC,, | Performed by: PHYSICIAN ASSISTANT

## 2025-06-26 PROCEDURE — 3288F FALL RISK ASSESSMENT DOCD: CPT | Mod: CPTII,HCNC,S$GLB, | Performed by: PHYSICIAN ASSISTANT

## 2025-06-26 PROCEDURE — 96372 THER/PROPH/DIAG INJ SC/IM: CPT | Performed by: FAMILY MEDICINE

## 2025-06-26 PROCEDURE — 99215 OFFICE O/P EST HI 40 MIN: CPT | Mod: HCNC,S$GLB,, | Performed by: PHYSICIAN ASSISTANT

## 2025-06-26 PROCEDURE — 1101F PT FALLS ASSESS-DOCD LE1/YR: CPT | Mod: CPTII,HCNC,S$GLB, | Performed by: PHYSICIAN ASSISTANT

## 2025-06-26 PROCEDURE — 25000003 PHARM REV CODE 250: Mod: HCNC | Performed by: FAMILY MEDICINE

## 2025-06-26 PROCEDURE — 63600175 PHARM REV CODE 636 W HCPCS: Mod: HCNC | Performed by: FAMILY MEDICINE

## 2025-06-26 PROCEDURE — 93010 ELECTROCARDIOGRAM REPORT: CPT | Mod: HCNC,,, | Performed by: INTERNAL MEDICINE

## 2025-06-26 PROCEDURE — 25000242 PHARM REV CODE 250 ALT 637 W/ HCPCS: Mod: HCNC | Performed by: FAMILY MEDICINE

## 2025-06-26 PROCEDURE — 94640 AIRWAY INHALATION TREATMENT: CPT | Mod: HCNC

## 2025-06-26 PROCEDURE — 1159F MED LIST DOCD IN RCRD: CPT | Mod: CPTII,HCNC,S$GLB, | Performed by: PHYSICIAN ASSISTANT

## 2025-06-26 PROCEDURE — 84484 ASSAY OF TROPONIN QUANT: CPT | Mod: 91,HCNC | Performed by: FAMILY MEDICINE

## 2025-06-26 PROCEDURE — 83880 ASSAY OF NATRIURETIC PEPTIDE: CPT | Mod: HCNC | Performed by: EMERGENCY MEDICINE

## 2025-06-26 PROCEDURE — 36415 COLL VENOUS BLD VENIPUNCTURE: CPT | Mod: HCNC | Performed by: FAMILY MEDICINE

## 2025-06-26 PROCEDURE — 3077F SYST BP >= 140 MM HG: CPT | Mod: CPTII,HCNC,S$GLB, | Performed by: PHYSICIAN ASSISTANT

## 2025-06-26 PROCEDURE — 93005 ELECTROCARDIOGRAM TRACING: CPT | Mod: HCNC

## 2025-06-26 PROCEDURE — 3078F DIAST BP <80 MM HG: CPT | Mod: CPTII,HCNC,S$GLB, | Performed by: PHYSICIAN ASSISTANT

## 2025-06-26 PROCEDURE — 80053 COMPREHEN METABOLIC PANEL: CPT | Mod: HCNC | Performed by: EMERGENCY MEDICINE

## 2025-06-26 PROCEDURE — 99285 EMERGENCY DEPT VISIT HI MDM: CPT | Mod: 25

## 2025-06-26 PROCEDURE — 25000003 PHARM REV CODE 250: Mod: HCNC | Performed by: NURSE PRACTITIONER

## 2025-06-26 PROCEDURE — 3008F BODY MASS INDEX DOCD: CPT | Mod: CPTII,HCNC,S$GLB, | Performed by: PHYSICIAN ASSISTANT

## 2025-06-26 PROCEDURE — 4010F ACE/ARB THERAPY RXD/TAKEN: CPT | Mod: CPTII,HCNC,S$GLB, | Performed by: PHYSICIAN ASSISTANT

## 2025-06-26 PROCEDURE — 94761 N-INVAS EAR/PLS OXIMETRY MLT: CPT | Mod: HCNC

## 2025-06-26 PROCEDURE — 3044F HG A1C LEVEL LT 7.0%: CPT | Mod: CPTII,HCNC,S$GLB, | Performed by: PHYSICIAN ASSISTANT

## 2025-06-26 PROCEDURE — 85025 COMPLETE CBC W/AUTO DIFF WBC: CPT | Mod: HCNC | Performed by: EMERGENCY MEDICINE

## 2025-06-26 RX ORDER — ENOXAPARIN SODIUM 100 MG/ML
40 INJECTION SUBCUTANEOUS EVERY 24 HOURS
Status: DISCONTINUED | OUTPATIENT
Start: 2025-06-26 | End: 2025-06-27 | Stop reason: HOSPADM

## 2025-06-26 RX ORDER — BUDESONIDE 0.5 MG/2ML
0.5 INHALANT ORAL EVERY 12 HOURS
Status: DISCONTINUED | OUTPATIENT
Start: 2025-06-26 | End: 2025-06-27 | Stop reason: HOSPADM

## 2025-06-26 RX ORDER — NITROGLYCERIN 20 MG/G
1 OINTMENT TOPICAL
Status: COMPLETED | OUTPATIENT
Start: 2025-06-26 | End: 2025-06-26

## 2025-06-26 RX ORDER — IPRATROPIUM BROMIDE 0.5 MG/2.5ML
0.5 SOLUTION RESPIRATORY (INHALATION) EVERY 12 HOURS
Status: DISCONTINUED | OUTPATIENT
Start: 2025-06-26 | End: 2025-06-27 | Stop reason: HOSPADM

## 2025-06-26 RX ORDER — ARFORMOTEROL TARTRATE 15 UG/2ML
15 SOLUTION RESPIRATORY (INHALATION) 2 TIMES DAILY
Status: DISCONTINUED | OUTPATIENT
Start: 2025-06-26 | End: 2025-06-27 | Stop reason: HOSPADM

## 2025-06-26 RX ORDER — ZOLPIDEM TARTRATE 5 MG/1
10 TABLET ORAL NIGHTLY
Status: DISCONTINUED | OUTPATIENT
Start: 2025-06-26 | End: 2025-06-27 | Stop reason: HOSPADM

## 2025-06-26 RX ORDER — AMLODIPINE BESYLATE 5 MG/1
5 TABLET ORAL DAILY
Status: DISCONTINUED | OUTPATIENT
Start: 2025-06-27 | End: 2025-06-27 | Stop reason: HOSPADM

## 2025-06-26 RX ORDER — MEMANTINE HYDROCHLORIDE 10 MG/1
10 TABLET ORAL 2 TIMES DAILY
Status: DISCONTINUED | OUTPATIENT
Start: 2025-06-26 | End: 2025-06-27 | Stop reason: HOSPADM

## 2025-06-26 RX ORDER — CLOPIDOGREL BISULFATE 75 MG/1
75 TABLET ORAL DAILY
Status: DISCONTINUED | OUTPATIENT
Start: 2025-06-27 | End: 2025-06-27 | Stop reason: HOSPADM

## 2025-06-26 RX ORDER — ISOSORBIDE MONONITRATE 30 MG/1
30 TABLET, EXTENDED RELEASE ORAL 2 TIMES DAILY
Status: DISCONTINUED | OUTPATIENT
Start: 2025-06-26 | End: 2025-06-27 | Stop reason: HOSPADM

## 2025-06-26 RX ORDER — IPRATROPIUM BROMIDE AND ALBUTEROL SULFATE 2.5; .5 MG/3ML; MG/3ML
3 SOLUTION RESPIRATORY (INHALATION) EVERY 6 HOURS PRN
Status: DISCONTINUED | OUTPATIENT
Start: 2025-06-26 | End: 2025-06-27 | Stop reason: HOSPADM

## 2025-06-26 RX ORDER — HYDRALAZINE HYDROCHLORIDE 20 MG/ML
10 INJECTION INTRAMUSCULAR; INTRAVENOUS EVERY 6 HOURS PRN
Status: DISCONTINUED | OUTPATIENT
Start: 2025-06-26 | End: 2025-06-27 | Stop reason: HOSPADM

## 2025-06-26 RX ORDER — NITROGLYCERIN 0.4 MG/1
0.4 TABLET SUBLINGUAL EVERY 5 MIN PRN
Status: DISCONTINUED | OUTPATIENT
Start: 2025-06-26 | End: 2025-06-27 | Stop reason: HOSPADM

## 2025-06-26 RX ORDER — PANTOPRAZOLE SODIUM 40 MG/1
40 TABLET, DELAYED RELEASE ORAL DAILY
Status: DISCONTINUED | OUTPATIENT
Start: 2025-06-27 | End: 2025-06-27 | Stop reason: HOSPADM

## 2025-06-26 RX ORDER — CHOLECALCIFEROL (VITAMIN D3) 25 MCG
1000 TABLET ORAL DAILY
Status: DISCONTINUED | OUTPATIENT
Start: 2025-06-27 | End: 2025-06-27 | Stop reason: HOSPADM

## 2025-06-26 RX ORDER — DULOXETIN HYDROCHLORIDE 20 MG/1
20 CAPSULE, DELAYED RELEASE ORAL DAILY
Status: DISCONTINUED | OUTPATIENT
Start: 2025-06-27 | End: 2025-06-27 | Stop reason: HOSPADM

## 2025-06-26 RX ORDER — ESTRADIOL 0.5 MG/1
0.5 TABLET ORAL DAILY
Status: DISCONTINUED | OUTPATIENT
Start: 2025-06-27 | End: 2025-06-27 | Stop reason: HOSPADM

## 2025-06-26 RX ORDER — ONDANSETRON 4 MG/1
4 TABLET, ORALLY DISINTEGRATING ORAL EVERY 6 HOURS PRN
Status: DISCONTINUED | OUTPATIENT
Start: 2025-06-26 | End: 2025-06-27 | Stop reason: HOSPADM

## 2025-06-26 RX ORDER — NAPROXEN SODIUM 220 MG/1
81 TABLET, FILM COATED ORAL DAILY
Status: DISCONTINUED | OUTPATIENT
Start: 2025-06-27 | End: 2025-06-27 | Stop reason: HOSPADM

## 2025-06-26 RX ORDER — ACETAMINOPHEN 325 MG/1
650 TABLET ORAL EVERY 6 HOURS PRN
Status: DISCONTINUED | OUTPATIENT
Start: 2025-06-26 | End: 2025-06-27 | Stop reason: HOSPADM

## 2025-06-26 RX ADMIN — NITROGLYCERIN 1 INCH: 20 OINTMENT TOPICAL at 05:06

## 2025-06-26 RX ADMIN — BUDESONIDE INHALATION 0.5 MG: 0.5 SUSPENSION RESPIRATORY (INHALATION) at 07:06

## 2025-06-26 RX ADMIN — MEMANTINE 10 MG: 10 TABLET ORAL at 08:06

## 2025-06-26 RX ADMIN — IPRATROPIUM BROMIDE 0.5 MG: 0.5 SOLUTION RESPIRATORY (INHALATION) at 07:06

## 2025-06-26 RX ADMIN — ZOLPIDEM TARTRATE 10 MG: 5 TABLET ORAL at 09:06

## 2025-06-26 RX ADMIN — ARFORMOTEROL TARTRATE 15 MCG: 15 SOLUTION RESPIRATORY (INHALATION) at 07:06

## 2025-06-26 RX ADMIN — ISOSORBIDE MONONITRATE 30 MG: 30 TABLET, EXTENDED RELEASE ORAL at 08:06

## 2025-06-26 RX ADMIN — ENOXAPARIN SODIUM 40 MG: 40 INJECTION SUBCUTANEOUS at 05:06

## 2025-06-26 NOTE — H&P
[facility] - Emergency Dept.  [facility] Medicine  History & Physical    Patient Name: Gemma Vick  MRN: [id number]  Patient Class: OP- Observation  Admission Date: 6/26/2025  Attending Physician: Arturo Carr MD  Primary Care Provider: Olga Altman MD         Patient information was obtained from patient and ER records.     Subjective:     Principal Problem:Unstable angina    Chief Complaint:   Chief Complaint   Patient presents with    Chest Pain     Crushing generalized chest pain x4 days. Hx Stemi. +SOB/N -V. +head pounding         HPI: Patient is a 70 year old  female with a PMH of COPD, former tobacco abuse, CAD s/p prior stent (most recent PCI of RCA 9/23), HTN, hyperlipidemia, GERD, chronic mesenteric ischemia, vascular dementia, and AAA who presents to the ED today with complaints of chest pain.  Patient was seen by Cardiology today and was recommended to go to the ED for unstable angina.  Per reports chest pain has been ongoing for the last week.  Symptoms waxes and wanes.  Describes chest pain as pressure-like.  Reports having similar symptoms in the past and required cardiac stents.  Of note patient had normal stress test approximately 4 months ago.  No other issues reported at this time.      In the ED, chest x-ray unremarkable, troponins negative.  Cardiology consulted and recommended adding Imdur 30 mg b.i.d..      Past Medical History:   Diagnosis Date    AAA (abdominal aortic aneurysm) 02/13/2014    Abdominal aneurysm     3    Acute coronary syndrome     Anemia     Arthritis 2009    BPPV (benign paroxysmal positional vertigo)     Carotid artery plaque     Carotid artery stenosis and occlusion 02/13/2014    Chronic back pain     COPD (chronic obstructive pulmonary disease)     Coronary artery disease     Dementia     Emphysema lung     Heart attack 1998    Several after that with 4 stents    Hyperlipidemia     Hypertension 1998    Joint pain 2000    Myocardial infarction     x3    Neuropathy      Personal history of COVID-19 06/09/2021 11/16/2020 +Covid, recovered at home        Past Surgical History:   Procedure Laterality Date    CARDIAC CATHETERIZATION      CHOLECYSTECTOMY  1987    Stones in bile duct    CORONARY ANGIOPLASTY      CORONARY STENT PLACEMENT N/A 09/12/2023    Procedure: INSERTION, STENT, CORONARY ARTERY;  Surgeon: Millie Juarez MD;  Location: [facility] CATH LAB;  Service: Cardiology;  Laterality: N/A;    EYE SURGERY  08/31    Catarates removed    FRACTURE SURGERY  1971    Hand surgery    HYSTERECTOMY  1988    LEFT HEART CATHETERIZATION Left 09/12/2023    Procedure: Left heart cath;  Surgeon: Millie Juarez MD;  Location: [facility] CATH LAB;  Service: Cardiology;  Laterality: Left;    PERCUTANEOUS CORONARY INTERVENTION, ARTERY N/A 09/12/2023    Procedure: Percutaneous coronary intervention;  Surgeon: Millie Juarez MD;  Location: [facility] CATH LAB;  Service: Cardiology;  Laterality: N/A;    THROMBECTOMY, CORONARY  09/12/2023    Procedure: Thrombectomy, Coronary;  Surgeon: Millie Juarez MD;  Location: [facility] CATH LAB;  Service: Cardiology;;    TONSILLECTOMY      TRANSFORAMINAL EPIDURAL INJECTION OF STEROID Right 12/01/2023    Procedure: Injection,steroid,epidural,transforaminal approach- right side, L4/5 and L5/S1;  Surgeon: Lydia Roberts MD;  Location: [facility];  Service: Pain Management;  Laterality: Right;    TUBAL LIGATION  1985    Afterr last child       Review of patient's allergies indicates:  No Known Allergies    No current facility-administered medications on file prior to encounter.     Current Outpatient Medications on File Prior to Encounter   Medication Sig    acetaminophen (TYLENOL) 500 MG tablet Take 2,000 mg by mouth every evening.    albuterol-ipratropium (DUO-NEB) 2.5 mg-0.5 mg/3 mL nebulizer solution Take 3 mLs by nebulization every 6 (six) hours as needed for Wheezing.    amLODIPine (NORVASC) 5 MG tablet TAKE 1 TABLET BY MOUTH ONCE DAILY    aspirin 81 MG Chew Take 81 mg  by mouth once daily.    budesonide-glycopyr-formoterol (BREZTRI AEROSPHERE) 160-9-4.8 mcg/actuation HFAA Inhale 2 puffs into the lungs 2 (two) times a day.    cloNIDine (CATAPRES) 0.1 MG tablet Take 1 tablet (0.1 mg total) by mouth daily as needed (if Blood pressure above 170/90).    clopidogreL (PLAVIX) 75 mg tablet TAKE 1 TABLET BY MOUTH ONCE DAILY    COMBIVENT RESPIMAT  mcg/actuation inhaler Inhale 2 puffs into the lungs every 6 (six) hours as needed for Wheezing or Shortness of Breath.    cyanocobalamin (VITAMIN B-12) 100 MCG tablet Take 100 mcg by mouth once daily.    diclofenac sodium (VOLTAREN) 1 % Gel Apply topically 4 (four) times daily.    dicyclomine (BENTYL) 10 MG capsule TAKE ONE CAPSULE BY MOUTH THREE TIMES DAILY AS NEEDED    DULoxetine (CYMBALTA) 20 MG capsule Take 20 mg by mouth.    estradioL (ESTRACE) 0.5 MG tablet TAKE 1 TABLET BY MOUTH ONCE DAILY    ezetimibe-simvastatin 10-40 mg (VYTORIN) 10-40 mg per tablet TAKE 1 TABLET BY MOUTH EVERY EVENING    famotidine (PEPCID) 20 MG tablet Take 1 tablet (20 mg total) by mouth 2 (two) times daily as needed for Heartburn.    furosemide (LASIX) 20 MG tablet TAKE 1 TABLET BY MOUTH ONCE DAILY AS NEEDED    inhalation spacing device (COMPACT SPACE CHAMBER) USE AS DIRECTED    l-methylfolate-b2-b6-b12 (CEREFOLIN) 6-5-50-1 mg Tab Take 1 tablet by mouth once daily. B2 B6 B12    memantine (NAMENDA) 10 MG Tab Take 1 tablet (10 mg total) by mouth 2 (two) times daily.    olmesartan (BENICAR) 40 MG tablet TAKE 1 TABLET BY MOUTH ONCE DAILY    OXYGEN-AIR DELIVERY SYSTEMS MISC 3 L by Misc.(Non-Drug; Combo Route) route every evening.    pantoprazole (PROTONIX) 40 MG tablet TAKE 1 TABLET BY MOUTH ONCE DAILY    vitamin D (VITAMIN D3) 1000 units Tab Take 1,000 Units by mouth once daily.    zinc gluconate 50 mg tablet     zolpidem (AMBIEN) 10 mg Tab TAKE 1 TABLET BY MOUTH EVERY EVENING     Family History       Problem Relation (Age of Onset)    Arthritis Father, Brother     Breast cancer Paternal Aunt    Early death Mother, Father, Brother, Brother    Heart attack Brother    Heart attacks under age 50 Father, Brother    Heart disease Mother, Father, Brother, Sister, Brother    Hyperlipidemia Sister    Hypertension Brother, Sister          Tobacco Use    Smoking status: Former     Current packs/day: 0.00     Average packs/day: 0.6 packs/day for 50.2 years (30.0 ttl pk-yrs)     Types: Cigarettes     Start date: 1970     Quit date: 2017     Years since quittin.1    Smokeless tobacco: Never    Tobacco comments:     Cant remember but after heart attack   Substance and Sexual Activity    Alcohol use: Never    Drug use: Never    Sexual activity: Not Currently     Partners: Male     Birth control/protection: Post-menopausal, See Surgical Hx     Review of Systems   Constitutional:  Negative for fatigue and fever.   HENT:  Negative for sinus pressure.    Eyes:  Negative for visual disturbance.   Respiratory:  Negative for shortness of breath.    Cardiovascular:  Positive for chest pain.   Gastrointestinal:  Negative for nausea and vomiting.   Genitourinary:  Negative for difficulty urinating.   Musculoskeletal:  Negative for back pain.   Skin:  Negative for rash.   Neurological:  Negative for headaches.   Psychiatric/Behavioral:  Negative for confusion.      Objective:     Vital Signs (Most Recent):  Temp: 98.1 °F (36.7 °C) (25 1556)  Pulse: 75 (25 1630)  Resp: 18 (25 1630)  BP: (!) 194/80 (25 1630)  SpO2: 97 % (25 1630) Vital Signs (24h Range):  Temp:  [98.1 °F (36.7 °C)] 98.1 °F (36.7 °C)  Pulse:  [74-76] 75  Resp:  [16-18] 18  SpO2:  [94 %-97 %] 97 %  BP: (144-194)/(76-80) 194/80        There is no height or weight on file to calculate BMI.     Physical Exam  Constitutional:       General: She is not in acute distress.     Appearance: She is well-developed. She is not diaphoretic.   HENT:      Head: Normocephalic and atraumatic.   Eyes:      Pupils:  Pupils are equal, round, and reactive to light.   Cardiovascular:      Rate and Rhythm: Normal rate and regular rhythm.      Heart sounds: Normal heart sounds. No murmur heard.     No friction rub. No gallop.   Pulmonary:      Effort: Pulmonary effort is normal. No respiratory distress.      Breath sounds: Normal breath sounds. No stridor. No wheezing or rales.   Abdominal:      General: Bowel sounds are normal. There is no distension.      Palpations: Abdomen is soft. There is no mass.      Tenderness: There is no abdominal tenderness. There is no guarding.   Musculoskeletal:      Right lower leg: No edema.      Left lower leg: No edema.   Skin:     General: Skin is warm.      Findings: No erythema.   Neurological:      Mental Status: She is alert and oriented to person, place, and time.              CRANIAL NERVES     CN III, IV, VI   Pupils are equal, round, and reactive to light.       Significant Labs:   Results for orders placed or performed during the hospital encounter of 06/26/25   EKG 12-lead    Collection Time: 06/26/25  2:45 PM   Result Value Ref Range    QRS Duration 88 ms    [facility] QTC Calculation 400 ms   EKG 12-lead    Collection Time: 06/26/25  4:00 PM   Result Value Ref Range    QRS Duration 88 ms    [facility] QTC Calculation 418 ms   Comprehensive metabolic panel    Collection Time: 06/26/25  4:32 PM   Result Value Ref Range    Sodium 129 (L) 136 - 145 mmol/L    Potassium 4.1 3.5 - 5.1 mmol/L    Chloride 99 95 - 110 mmol/L    CO2 22 (L) 23 - 29 mmol/L    Glucose 96 70 - 110 mg/dL    BUN 14 8 - 23 mg/dL    Creatinine 0.9 0.5 - 1.4 mg/dL    Calcium 9.1 8.7 - 10.5 mg/dL    Protein Total 7.1 6.0 - 8.4 gm/dL    Albumin 3.4 (L) 3.5 - 5.2 g/dL    Bilirubin Total 0.5 0.1 - 1.0 mg/dL    ALP 61 40 - 150 unit/L    AST 15 11 - 45 unit/L    ALT 11 10 - 44 unit/L    Anion Gap 8 8 - 16 mmol/L    eGFR >60 >60 mL/min/1.73/m2   Troponin I #1    Collection Time: 06/26/25  4:32 PM   Result Value Ref Range    Troponin-I 0.011  <=0.026 ng/mL   BNP    Collection Time: 06/26/25  4:32 PM   Result Value Ref Range    BNP 65 0 - 99 pg/mL   CBC with Differential    Collection Time: 06/26/25  4:32 PM   Result Value Ref Range    WBC 5.65 3.90 - 12.70 K/uL    RBC 3.64 (L) 4.00 - 5.40 M/uL    HGB 11.5 (L) 12.0 - 16.0 gm/dL    HCT 34.1 (L) 37.0 - 48.5 %    MCV 94 82 - 98 fL    MCH 31.6 (H) 27.0 - 31.0 pg    MCHC 33.7 32.0 - 36.0 g/dL    RDW 13.1 11.5 - 14.5 %    Platelet Count 227 150 - 450 K/uL    MPV 9.1 (L) 9.2 - 12.9 fL    Nucleated RBC 0 <=0 /100 WBC    Neut % 64.3 38 - 73 %    Lymph % 17.3 (L) 18 - 48 %    Mono % 13.1 4 - 15 %    Eos % 3.9 <=8 %    Basophil % 1.2 <=1.9 %    Imm Grans % 0.2 0.0 - 0.5 %    Neut # 3.63 1.8 - 7.7 K/uL    Lymph # 0.98 (L) 1 - 4.8 K/uL    Mono # 0.74 0.3 - 1 K/uL    Eos # 0.22 <=0.5 K/uL    Baso # 0.07 <=0.2 K/uL    Imm Grans # 0.01 0.00 - 0.04 K/uL     *Note: Due to a large number of results and/or encounters for the requested time period, some results have not been displayed. A complete set of results can be found in Results Review.        Significant Imaging:   Imaging Results              X-Ray Chest AP Portable (Final result)  Result time 06/26/25 16:41:16      Final result by Gene Tolentino MD (06/26/25 16:41:16)                   Impression:      No acute abnormality.      Finalized on: 6/26/2025 4:41 PM By:  Gene Tolentino MD  [facility]# [id number]      2025-06-26 16:43:[id number]     [facility]               Narrative:    EXAM: XR CHEST AP PORTABLE    TECHNIQUE: One view of the chest    CLINICAL HISTORY: Chest Pain;    COMPARISON:  06/22/2025    FINDINGS:    No confluent airspace opacity or consolidation.  There is no evidence of pleural effusion, pneumothorax, or other acute pulmonary disease.   Cardiomediastinal silhouette is within normal limits for AP technique.                                          Assessment/Plan:     Assessment & Plan  Unstable angina  Chest pain with normal troponins   Initial troponins negative    Will trend troponins q.6 H   Continue aspirin and Plavix   Cardiology consult   NPO at midnight       Essential hypertension  Patient's blood pressure range in the last 24 hours was: BP  Min: 144/78  Max: 194/80.The patient's inpatient anti-hypertensive regimen is listed below:  Current Antihypertensives  amLODIPine tablet 5 mg, Daily, Oral  hydrALAZINE injection 10 mg, Every 6 hours PRN, Intravenous  isosorbide mononitrate 24 hr tablet 30 mg, 2 times daily, Oral  nitroGLYCERIN SL tablet 0.4 mg, Every 5 min PRN, Sublingual    Plan  - BP is controlled, no changes needed to their regimen    COPD, severe  Currently not in exacerbation   Continue DuoNebs p.r.n.  Lama/laba/ics nebs     Vascular dementia, moderate, without behavioral disturbance, psychotic disturbance, mood disturbance, and anxiety  Continue Namenda  VTE Risk Mitigation (From admission, onward)           Ordered     enoxaparin injection 40 mg  Every 24 hours         06/26/25 1733                                   On 06/26/2025, patient should be placed in hospital observation services under my care.             Arturo Carr MD  Department of Hospital Medicine  [place] - Emergency Dept.

## 2025-06-26 NOTE — PROGRESS NOTES
Subjective   Patient ID:  Gemma Vick is a 70 y.o. female who presents for follow-up of chest pain and weakness    HPI  Ms. Vick is a 70  year old female patient whose current medical conditions include COPD, former tobacco abuse, CAD s/p prior stent (most recent PCI of RCA 9/23), HTN, hyperlipidemia, GERD, chronic mesenteric ischemia, vascular dementia, and AAA who presents today for follow-up.  She returns today, accompanied by her daughter. Reports profound weakness, unable to do much of anything without being completely wiped out. Also having bouts of waxing and waning substernal chest heaviness/pressure. Vice-like in nature. Lasts a few minutes and feels suffocating at times and then abates. Associated with SOB and extreme fatigue along with an episode of left-sided jaw shocking discomfort. No associated diaphoresis, nausea, vomiting, or palpitations. Seems to be accelerating. Of note, patient went to [facility] ED on 6/22/2025 and workup was negative (trop x 1 WNL) and was discharged home but chest pain symptoms and weakness have continued to persist. Daughter indicates these symptoms are similar in nature to what her mother experienced prior to last stent placement. She is compliant with her medications.       EKG today, SR, possible anterior infarct, no acute ischemic changes.    Recent MPI stress test 2/2025 negative. TTE 2/2025 with normal EF.   CTA abd/pelvis infrarenal AAA 4 cm, known SMA stenosis. She follows with vascular.      Review of Systems   Constitutional: Positive for decreased appetite and malaise/fatigue.   HENT: Negative.     Eyes: Negative.    Cardiovascular:  Positive for chest pain and near-syncope.   Respiratory:  Positive for shortness of breath.    Endocrine: Negative.    Hematologic/Lymphatic: Bruises/bleeds easily.   Skin: Negative.    Musculoskeletal:  Positive for arthritis and joint pain.   Gastrointestinal: Negative.    Genitourinary: Negative.    Neurological: Negative.     Psychiatric/Behavioral:  Positive for memory loss.    Allergic/Immunologic: Negative.           Objective     Physical Exam  Vitals and nursing note reviewed.   Constitutional:       Appearance: Normal appearance.   HENT:      Head: Normocephalic and atraumatic.   Eyes:      Pupils: Pupils are equal, round, and reactive to light.   Cardiovascular:      Rate and Rhythm: Regular rhythm. Bradycardia present.      Heart sounds: S1 normal and S2 normal. No murmur heard.  Pulmonary:      Effort: Pulmonary effort is normal.      Comments: Faint coarseness at bases  Abdominal:      Palpations: Abdomen is soft.   Musculoskeletal:      Right lower leg: No edema.      Left lower leg: No edema.   Skin:     General: Skin is warm and dry.   Neurological:      Mental Status: She is oriented to person, place, and time.   Psychiatric:         Mood and Affect: Mood normal.         Behavior: Behavior normal.       TTE Results 2/27/2025  Summary  Show Result Comparison     Left Ventricle: The left ventricle is normal in size. Normal wall thickness. There is concentric remodeling. There is normal systolic function. Ejection fraction is approximately 60%. There is normal diastolic function.    Right Ventricle: The right ventricle is normal in size. Wall thickness is normal. Systolic function is normal.    Aortic Valve: There is moderate aortic valve sclerosis.    Pulmonary Artery: The estimated pulmonary artery systolic pressure is 32 mmHg.    IVC/SVC: Normal venous pressure at 3 mmHg.    MPI stress test 2/28/2025  Interpretation Summary  Show Result Comparison     Normal myocardial perfusion scan. There is no evidence of myocardial ischemia or infarction.    The gated perfusion images showed an ejection fraction of 90% at rest. The gated perfusion images showed an ejection fraction of 95% post stress. Normal ejection fraction is greater than 59%.    There is normal wall motion at rest and post-stress.    LV cavity size is normal at  rest and normal at post-stress.    The ECG portion of the study is negative for ischemia.         Assessment and Plan     1. Chest pain, unspecified type    2. COPD, severe    3. Nocturnal hypoxemia due to emphysema    4. Infrarenal abdominal aortic aneurysm (AAA) without rupture    5. Aortic atherosclerosis    6. Bradycardia    7. Chronic diastolic heart failure    8. Coronary artery disease of native artery of native heart with stable angina pectoris    9. Essential hypertension    10. Mixed hyperlipidemia    11. Sinus node dysfunction    12. Prediabetes    13. Mesenteric ischemia, chronic    14. Weakness      Patient with significant vascular history who presents with weakness and accelerating episodes of CP, including one in office. Reminiscent to what she experienced prior to last stent in 2023. Given her history/continued symptoms, sent to [facility] for admission and ACS rule out/further workup.  Plan:  -To [facility] for admission/ACS rule out  -ED and cardiology teams notified of impending arrival    Visit today included increased complexity associated with the care of the episodic problem chest pain addressed and managing the longitudinal care of the patient due to the serious and/or complex managed problem(s) CAD, HTN,

## 2025-06-26 NOTE — HPI
Patient is a 70 year old  female with a PMH of COPD, former tobacco abuse, CAD s/p prior stent (most recent PCI of RCA 9/23), HTN, hyperlipidemia, GERD, chronic mesenteric ischemia, vascular dementia, and AAA who presents to the ED today with complaints of chest pain.  Patient was seen by Cardiology today and was recommended to go to the ED for unstable angina.  Per reports chest pain has been ongoing for the last week.  Symptoms waxes and wanes.  Describes chest pain as pressure-like.  Reports having similar symptoms in the past and required cardiac stents.  Of note patient had normal stress test approximately 4 months ago.  No other issues reported at this time.      In the ED, chest x-ray unremarkable, troponins negative.  Cardiology consulted and recommended adding Imdur 30 mg b.i.d..

## 2025-06-26 NOTE — ED PROVIDER NOTES
SCRIBE #1 NOTE: I, Lakeshia Cagle, am scribing for, and in the presence of, Stephane Saucedo Jr., MD. I have scribed the entire note.       History     Chief Complaint   Patient presents with    Chest Pain     Crushing generalized chest pain x4 days. Hx Stemi. +SOB/N -V. +head pounding      Review of patient's allergies indicates:  No Known Allergies      History of Present Illness     HPI    6/26/2025, 4:15 PM  History obtained from the medical records, daughter, and patient      History of Present Illness: Gemma Vick is a 70 y.o. female patient with a PMHx of MI, CAD, HTN, COPD, neuropathy, HLD, AAA, dementia, and anemia who presents to the Emergency Department for evaluation of intermittent generalized chest pain which onset a couple days ago. Family member reports 2 stent placements in 2023. Associated sxs include SOB, fatigue, nausea, and abdominal pain. Patient denies any diaphoresis, diarrhea, vomiting, fever, weakness, and all other sxs at this time. No prior Tx provided. No further complaints or concerns at this time.       Arrival mode: Personal vehicle     PCP: Olga Altman MD        Past Medical History:  Past Medical History:   Diagnosis Date    AAA (abdominal aortic aneurysm) 02/13/2014    Abdominal aneurysm     3    Acute coronary syndrome     Anemia     Arthritis 2009    BPPV (benign paroxysmal positional vertigo)     Carotid artery plaque     Carotid artery stenosis and occlusion 02/13/2014    Chronic back pain     COPD (chronic obstructive pulmonary disease)     Coronary artery disease     Dementia     Emphysema lung     Heart attack 1998    Several after that with 4 stents    Hyperlipidemia     Hypertension 1998    Joint pain 2000    Myocardial infarction     x3    Neuropathy     Personal history of COVID-19 06/09/2021 11/16/2020 +Covid, recovered at home        Past Surgical History:  Past Surgical History:   Procedure Laterality Date    CARDIAC CATHETERIZATION      CHOLECYSTECTOMY  1987     Stones in bile duct    CORONARY ANGIOPLASTY      CORONARY STENT PLACEMENT N/A 09/12/2023    Procedure: INSERTION, STENT, CORONARY ARTERY;  Surgeon: Millie Juarez MD;  Location: [facility] CATH LAB;  Service: Cardiology;  Laterality: N/A;    EYE SURGERY  08/31    Catarates removed    FRACTURE SURGERY  1971    Hand surgery    HYSTERECTOMY  1988    LEFT HEART CATHETERIZATION Left 09/12/2023    Procedure: Left heart cath;  Surgeon: Millie Juarez MD;  Location: [facility] CATH LAB;  Service: Cardiology;  Laterality: Left;    PERCUTANEOUS CORONARY INTERVENTION, ARTERY N/A 09/12/2023    Procedure: Percutaneous coronary intervention;  Surgeon: Millie Juarez MD;  Location: [facility] CATH LAB;  Service: Cardiology;  Laterality: N/A;    THROMBECTOMY, CORONARY  09/12/2023    Procedure: Thrombectomy, Coronary;  Surgeon: Millie Juarez MD;  Location: [facility] CATH LAB;  Service: Cardiology;;    TONSILLECTOMY      TRANSFORAMINAL EPIDURAL INJECTION OF STEROID Right 12/01/2023    Procedure: Injection,steroid,epidural,transforaminal approach- right side, L4/5 and L5/S1;  Surgeon: Lydia Roberts MD;  Location: [facility] PAIN T;  Service: Pain Management;  Laterality: Right;    TUBAL LIGATION  1985    Afterr last child         Family History:  Family History   Problem Relation Name Age of Onset    Heart disease Mother Katya     Early death Mother Katya     Heart attacks under age 50 Father Ant     Arthritis Father Ant     Early death Father Ant     Heart disease Father Ant     Heart attacks under age 50 Brother Angel         SAAVEDRA at 32    Early death Brother Angel     Heart disease Brother Angel     Hypertension Brother Angel     Heart attack Brother          HA at 70    Breast cancer Paternal Aunt      Heart disease Sister Brenda     Hyperlipidemia Sister Brenda     Arthritis Brother Jose Armando     Early death Brother Jose Armando     Heart disease Brother Jose Armando     Hypertension Sister Marloadrianna Hobeth        Social History:  Social History     Tobacco Use     Smoking status: Former     Current packs/day: 0.00     Average packs/day: 0.6 packs/day for 50.2 years (30.0 ttl pk-yrs)     Types: Cigarettes     Start date: 1970     Quit date: 2017     Years since quittin.1    Smokeless tobacco: Never    Tobacco comments:     Cant remember but after heart attack   Substance and Sexual Activity    Alcohol use: Never    Drug use: Never    Sexual activity: Not Currently     Partners: Male     Birth control/protection: Post-menopausal, See Surgical Hx        Review of Systems     Review of Systems   Constitutional:  Positive for fatigue. Negative for diaphoresis and fever.   HENT:  Negative for sore throat.    Respiratory:  Positive for shortness of breath.    Cardiovascular:  Positive for chest pain (Generalized).   Gastrointestinal:  Positive for nausea. Negative for diarrhea and vomiting.   Genitourinary:  Negative for dysuria.   Musculoskeletal:  Negative for back pain.   Skin:  Negative for rash.   Neurological:  Negative for weakness.   Hematological:  Does not bruise/bleed easily.   All other systems reviewed and are negative.     Physical Exam     Initial Vitals [25 1556]   BP Pulse Resp Temp SpO2   (!) 176/80 74 18 98.1 °F (36.7 °C) (!) 94 %      MAP       --          Physical Exam  Nursing Notes and Vital Signs Reviewed.  Constitutional: Patient is in no acute distress. Well-developed and well-nourished.  Head: Atraumatic. Normocephalic.  Eyes:  EOM intact.  No scleral icterus.  ENT: Mucous membranes are moist.  Nares clear   Neck:  Full ROM. No JVD.  Cardiovascular: Regular rate. Regular rhythm No murmurs, rubs, or gallops. Distal pulses are 2+ and symmetric  Pulmonary/Chest: No respiratory distress. Clear to auscultation bilaterally. No wheezing or rales.  Equal chest wall rise bilaterally  Abdominal: Soft and non-distended.  There is no tenderness.  No rebound, guarding, or rigidity. Good bowel sounds.  Genitourinary: No CVA tenderness.  No  suprapubic tenderness  Musculoskeletal: Moves all extremities. No obvious deformities.  5 x 5 strength in all extremities .  No calf tenderness or swelling.  No palpable cord.  No reproducible chest wall tenderness  Skin: Warm and dry.  Neurological:  Alert, awake, and appropriate.  Normal speech.  No acute focal neurological deficits are appreciated.  Two through 12 intact bilaterally.  Psychiatric: Normal affect. Good eye contact. Appropriate in content.     ED Course   Procedures  ED Vital Signs:  Vitals:    06/26/25 1556 06/26/25 1630 06/26/25 1735 06/26/25 1752   BP: (!) 176/80 (!) 194/80 (!) 189/86    Pulse: 74 75 78 85   Resp: 18 18 20    Temp: 98.1 °F (36.7 °C)      TempSrc: Oral      SpO2: (!) 94% 97% 95%     06/26/25 1755   BP: (!) 188/78   Pulse: 77   Resp: 18   Temp:    TempSrc:    SpO2:        Abnormal Lab Results:  Labs Reviewed   COMPREHENSIVE METABOLIC PANEL - Abnormal       Result Value    Sodium 129 (*)     Potassium 4.1      Chloride 99      CO2 22 (*)     Glucose 96      BUN 14      Creatinine 0.9      Calcium 9.1      Protein Total 7.1      Albumin 3.4 (*)     Bilirubin Total 0.5      ALP 61      AST 15      ALT 11      Anion Gap 8      eGFR >60     CBC WITH DIFFERENTIAL - Abnormal    WBC 5.65      RBC 3.64 (*)     HGB 11.5 (*)     HCT 34.1 (*)     MCV 94      MCH 31.6 (*)     MCHC 33.7      RDW 13.1      Platelet Count 227      MPV 9.1 (*)     Nucleated RBC 0      Neut % 64.3      Lymph % 17.3 (*)     Mono % 13.1      Eos % 3.9      Basophil % 1.2      Imm Grans % 0.2      Neut # 3.63      Lymph # 0.98 (*)     Mono # 0.74      Eos # 0.22      Baso # 0.07      Imm Grans # 0.01     TROPONIN I - Normal    Troponin-I 0.011     B-TYPE NATRIURETIC PEPTIDE - Normal    BNP 65     CBC W/ AUTO DIFFERENTIAL    Narrative:     The following orders were created for panel order CBC auto differential.  Procedure                               Abnormality         Status                     ---------                                -----------         ------                     CBC with Differential[[id number]]       Abnormal            Final result                 Please view results for these tests on the individual orders.   TROPONIN I        All Lab Results:  Results for orders placed or performed during the hospital encounter of 06/26/25   EKG 12-lead    Collection Time: 06/26/25  2:45 PM   Result Value Ref Range    QRS Duration 88 ms    [facility] QTC Calculation 400 ms   EKG 12-lead    Collection Time: 06/26/25  4:00 PM   Result Value Ref Range    QRS Duration 88 ms    [facility] QTC Calculation 418 ms   Comprehensive metabolic panel    Collection Time: 06/26/25  4:32 PM   Result Value Ref Range    Sodium 129 (L) 136 - 145 mmol/L    Potassium 4.1 3.5 - 5.1 mmol/L    Chloride 99 95 - 110 mmol/L    CO2 22 (L) 23 - 29 mmol/L    Glucose 96 70 - 110 mg/dL    BUN 14 8 - 23 mg/dL    Creatinine 0.9 0.5 - 1.4 mg/dL    Calcium 9.1 8.7 - 10.5 mg/dL    Protein Total 7.1 6.0 - 8.4 gm/dL    Albumin 3.4 (L) 3.5 - 5.2 g/dL    Bilirubin Total 0.5 0.1 - 1.0 mg/dL    ALP 61 40 - 150 unit/L    AST 15 11 - 45 unit/L    ALT 11 10 - 44 unit/L    Anion Gap 8 8 - 16 mmol/L    eGFR >60 >60 mL/min/1.73/m2   Troponin I #1    Collection Time: 06/26/25  4:32 PM   Result Value Ref Range    Troponin-I 0.011 <=0.026 ng/mL   BNP    Collection Time: 06/26/25  4:32 PM   Result Value Ref Range    BNP 65 0 - 99 pg/mL   CBC with Differential    Collection Time: 06/26/25  4:32 PM   Result Value Ref Range    WBC 5.65 3.90 - 12.70 K/uL    RBC 3.64 (L) 4.00 - 5.40 M/uL    HGB 11.5 (L) 12.0 - 16.0 gm/dL    HCT 34.1 (L) 37.0 - 48.5 %    MCV 94 82 - 98 fL    MCH 31.6 (H) 27.0 - 31.0 pg    MCHC 33.7 32.0 - 36.0 g/dL    RDW 13.1 11.5 - 14.5 %    Platelet Count 227 150 - 450 K/uL    MPV 9.1 (L) 9.2 - 12.9 fL    Nucleated RBC 0 <=0 /100 WBC    Neut % 64.3 38 - 73 %    Lymph % 17.3 (L) 18 - 48 %    Mono % 13.1 4 - 15 %    Eos % 3.9 <=8 %    Basophil % 1.2 <=1.9 %    Imm Grans % 0.2  0.0 - 0.5 %    Neut # 3.63 1.8 - 7.7 K/uL    Lymph # 0.98 (L) 1 - 4.8 K/uL    Mono # 0.74 0.3 - 1 K/uL    Eos # 0.22 <=0.5 K/uL    Baso # 0.07 <=0.2 K/uL    Imm Grans # 0.01 0.00 - 0.04 K/uL     *Note: Due to a large number of results and/or encounters for the requested time period, some results have not been displayed. A complete set of results can be found in Results Review.       Imaging Results:  Imaging Results              X-Ray Chest AP Portable (Final result)  Result time 06/26/25 16:41:16      Final result by Gene Tolentino MD (06/26/25 16:41:16)                   Impression:      No acute abnormality.      Finalized on: 6/26/2025 4:41 PM By:  Gene Tolentino MD  [facility]# [id number]      2025-06-26 16:43:[id number]     [facility]               Narrative:    EXAM: XR CHEST AP PORTABLE    TECHNIQUE: One view of the chest    CLINICAL HISTORY: Chest Pain;    COMPARISON:  06/22/2025    FINDINGS:    No confluent airspace opacity or consolidation.  There is no evidence of pleural effusion, pneumothorax, or other acute pulmonary disease.   Cardiomediastinal silhouette is within normal limits for AP technique.                                           The EKG was ordered, reviewed, and independently interpreted by the ED provider.  Interpretation time: 14:45  Rate: 72 BPM  Rhythm: Sinus rhythm with 1st degree A-V block  Interpretation: Anterior infarct (cited on or before 28-FEB-2025). No STEMI.    The EKG was ordered, reviewed, and independently interpreted by the ED provider.  Interpretation time: 16:00  Rate: 77 BPM  Rhythm: Sinus rhythm with 1st degree A-V block.  Interpretation: cannot rule out anteroseptal infarct (cited on or before 28-FEB-2025). No STEMI.           The Emergency Provider reviewed the vital signs and test results, which are outlined above.     ED Discussion     5:08 PM: Discussed pt's case with Selwyn Baltazar MD (Interventional Cardiology) who recommends adding Imdur 30 mg BID and serial  troponin.    5:13 PM: Discussed case with Thiago Sorto NP ([facility]). Dr. Carr agrees with current care and management of pt and accepts admission.   Admitting Service: Hospital Medicine  Admitting Physician: Dr. Carr  Admit to: obs/tele    5:13 PM: Re-evaluated pt.  I have discussed test results, shared treatment plan, and the need for admission with patient/family/caretaker at bedside. Pt and/or family/caretaker express understanding at this time and agree with all information. All questions answered. Pt/caretaker/family member(s) have no further questions or concerns at this time. Pt is ready for admit.      ED Course as of 06/26/25 1755   Thu Jun 26, 2025   1640 Cardiac monitor interpretation  Independent interpretation  Indication: Chest pain  Normal sinus rhythm.  Rate 71.  No STEMI [RT]      ED Course User Index  [RT] Stephane Saucedo Jr., MD     Medical Decision Making  Differential diagnosis: Chest pain, ASCVD, cardiac ischemia, noncardiac chest pain    Patient presents complaining of chest pain on referral from Cardiology.  The patient is has chest pain similar to prior cardiac chest pain before she was stented in 2023.  The patient is initial workup here was negative.  Cardiology consulted and Dr. Baltazar recommended admission to Hospital Medicine serial troponins in his addition of Imdur.  Hospital Medicine graciously accepted.  The patient is aware    Amount and/or Complexity of Data Reviewed  External Data Reviewed: ECG and notes.     Details: Pre-hospital notes from Cardiology reviewed.  Prior EKGs review  Labs: ordered. Decision-making details documented in ED Course.     Details: Initial troponin is normal.  That has some BNP was normal.  She has a hemoglobin 11.5 and normal white count.  That has sudden was 129.  Renal function was otherwise normal.  Albumin was 3.4  Radiology: ordered. Decision-making details documented in ED Course.     Details: Chest x-ray shows no acute  abnormalities.  ECG/medicine tests: ordered and independent interpretation performed. Decision-making details documented in ED Course.     Details: No EKG changes  Discussion of management or test interpretation with external provider(s): Consultation with the Cardiology with the recommendations for admission to Hospital Medicine.  Hospital Medicine graciously accepted.  The patient is pain-free throughout her stay    Risk  OTC drugs.  Prescription drug management.  Decision regarding hospitalization.                ED Medication(s):  Medications   aspirin chewable tablet 81 mg (has no administration in time range)   memantine tablet 10 mg (has no administration in time range)   vitamin D 1000 units tablet 1,000 Units (has no administration in time range)   albuterol-ipratropium 2.5 mg-0.5 mg/3 mL nebulizer solution 3 mL (has no administration in time range)   amLODIPine tablet 5 mg (has no administration in time range)   clopidogreL tablet 75 mg (has no administration in time range)   DULoxetine DR capsule 20 mg (has no administration in time range)   estradioL tablet 0.5 mg (has no administration in time range)   pantoprazole EC tablet 40 mg (has no administration in time range)   acetaminophen tablet 650 mg (has no administration in time range)   ondansetron disintegrating tablet 4 mg (has no administration in time range)   hydrALAZINE injection 10 mg (has no administration in time range)   isosorbide mononitrate 24 hr tablet 30 mg (has no administration in time range)   nitroGLYCERIN SL tablet 0.4 mg (has no administration in time range)   budesonide nebulizer solution 0.5 mg (has no administration in time range)   arformoteroL nebulizer solution 15 mcg (has no administration in time range)   ipratropium 0.02 % nebulizer solution 0.5 mg (has no administration in time range)   enoxaparin injection 40 mg (has no administration in time range)   nitroGLYCERIN 2% TD oint ointment 1 inch (1 inch Topical (Top) Given  6/26/25 1733)       Current Discharge Medication List                  Scribe Attestation:   Scribe #1: I performed the above scribed service and the documentation accurately describes the services I performed. I attest to the accuracy of the note.     Attending:   Physician Attestation Statement for Scribe #1: I, Stephane Saucedo Jr., MD, personally performed the services described in this documentation, as scribed by Lakeshia Cagle, in my presence, and it is both accurate and complete.           Clinical Impression       ICD-10-CM ICD-9-CM   1. Hyponatremia  E87.1 276.1   2. Chest pain  R07.9 786.50       Disposition:   Disposition: Placed in Observation  Condition: Stable       Stephane Saucedo Jr., MD  06/26/25 1755       Stephane Saucedo Jr., MD  06/26/25 1750

## 2025-06-26 NOTE — NURSING
Pt and family member refused hydalizine, wanted to wait and see if the nitro paste would take effect.

## 2025-06-26 NOTE — ASSESSMENT & PLAN NOTE
Patient's blood pressure range in the last 24 hours was: BP  Min: 144/78  Max: 194/80.The patient's inpatient anti-hypertensive regimen is listed below:  Current Antihypertensives  amLODIPine tablet 5 mg, Daily, Oral  hydrALAZINE injection 10 mg, Every 6 hours PRN, Intravenous  isosorbide mononitrate 24 hr tablet 30 mg, 2 times daily, Oral  nitroGLYCERIN SL tablet 0.4 mg, Every 5 min PRN, Sublingual    Plan  - BP is controlled, no changes needed to their regimen

## 2025-06-26 NOTE — SUBJECTIVE & OBJECTIVE
Past Medical History:   Diagnosis Date    AAA (abdominal aortic aneurysm) 02/13/2014    Abdominal aneurysm     3    Acute coronary syndrome     Anemia     Arthritis 2009    BPPV (benign paroxysmal positional vertigo)     Carotid artery plaque     Carotid artery stenosis and occlusion 02/13/2014    Chronic back pain     COPD (chronic obstructive pulmonary disease)     Coronary artery disease     Dementia     Emphysema lung     Heart attack 1998    Several after that with 4 stents    Hyperlipidemia     Hypertension 1998    Joint pain 2000    Myocardial infarction     x3    Neuropathy     Personal history of COVID-19 06/09/2021 11/16/2020 +Covid, recovered at home        Past Surgical History:   Procedure Laterality Date    CARDIAC CATHETERIZATION      CHOLECYSTECTOMY  1987    Stones in bile duct    CORONARY ANGIOPLASTY      CORONARY STENT PLACEMENT N/A 09/12/2023    Procedure: INSERTION, STENT, CORONARY ARTERY;  Surgeon: Millie Juarez MD;  Location: [facility] CATH LAB;  Service: Cardiology;  Laterality: N/A;    EYE SURGERY  08/31    Catarates removed    FRACTURE SURGERY  1971    Hand surgery    HYSTERECTOMY  1988    LEFT HEART CATHETERIZATION Left 09/12/2023    Procedure: Left heart cath;  Surgeon: Millie Juarez MD;  Location: [facility] CATH LAB;  Service: Cardiology;  Laterality: Left;    PERCUTANEOUS CORONARY INTERVENTION, ARTERY N/A 09/12/2023    Procedure: Percutaneous coronary intervention;  Surgeon: Millie Juarez MD;  Location: [facility] CATH LAB;  Service: Cardiology;  Laterality: N/A;    THROMBECTOMY, CORONARY  09/12/2023    Procedure: Thrombectomy, Coronary;  Surgeon: Millie Juarez MD;  Location: [facility] CATH LAB;  Service: Cardiology;;    TONSILLECTOMY      TRANSFORAMINAL EPIDURAL INJECTION OF STEROID Right 12/01/2023    Procedure: Injection,steroid,epidural,transforaminal approach- right side, L4/5 and L5/S1;  Surgeon: Lydia Roberts MD;  Location: [facility] PAIN MGT;  Service: Pain Management;  Laterality: Right;     TUBAL LIGATION  1985    Afterr last child       Review of patient's allergies indicates:  No Known Allergies    No current facility-administered medications on file prior to encounter.     Current Outpatient Medications on File Prior to Encounter   Medication Sig    acetaminophen (TYLENOL) 500 MG tablet Take 2,000 mg by mouth every evening.    albuterol-ipratropium (DUO-NEB) 2.5 mg-0.5 mg/3 mL nebulizer solution Take 3 mLs by nebulization every 6 (six) hours as needed for Wheezing.    amLODIPine (NORVASC) 5 MG tablet TAKE 1 TABLET BY MOUTH ONCE DAILY    aspirin 81 MG Chew Take 81 mg by mouth once daily.    budesonide-glycopyr-formoterol (BREZTRI AEROSPHERE) 160-9-4.8 mcg/actuation HFAA Inhale 2 puffs into the lungs 2 (two) times a day.    cloNIDine (CATAPRES) 0.1 MG tablet Take 1 tablet (0.1 mg total) by mouth daily as needed (if Blood pressure above 170/90).    clopidogreL (PLAVIX) 75 mg tablet TAKE 1 TABLET BY MOUTH ONCE DAILY    COMBIVENT RESPIMAT  mcg/actuation inhaler Inhale 2 puffs into the lungs every 6 (six) hours as needed for Wheezing or Shortness of Breath.    cyanocobalamin (VITAMIN B-12) 100 MCG tablet Take 100 mcg by mouth once daily.    diclofenac sodium (VOLTAREN) 1 % Gel Apply topically 4 (four) times daily.    dicyclomine (BENTYL) 10 MG capsule TAKE ONE CAPSULE BY MOUTH THREE TIMES DAILY AS NEEDED    DULoxetine (CYMBALTA) 20 MG capsule Take 20 mg by mouth.    estradioL (ESTRACE) 0.5 MG tablet TAKE 1 TABLET BY MOUTH ONCE DAILY    ezetimibe-simvastatin 10-40 mg (VYTORIN) 10-40 mg per tablet TAKE 1 TABLET BY MOUTH EVERY EVENING    famotidine (PEPCID) 20 MG tablet Take 1 tablet (20 mg total) by mouth 2 (two) times daily as needed for Heartburn.    furosemide (LASIX) 20 MG tablet TAKE 1 TABLET BY MOUTH ONCE DAILY AS NEEDED    inhalation spacing device (COMPACT SPACE CHAMBER) USE AS DIRECTED    l-methylfolate-b2-b6-b12 (CEREFOLIN) 6-5-50-1 mg Tab Take 1 tablet by mouth once daily. B2 B6 B12     memantine (NAMENDA) 10 MG Tab Take 1 tablet (10 mg total) by mouth 2 (two) times daily.    olmesartan (BENICAR) 40 MG tablet TAKE 1 TABLET BY MOUTH ONCE DAILY    OXYGEN-AIR DELIVERY SYSTEMS MISC 3 L by Misc.(Non-Drug; Combo Route) route every evening.    pantoprazole (PROTONIX) 40 MG tablet TAKE 1 TABLET BY MOUTH ONCE DAILY    vitamin D (VITAMIN D3) 1000 units Tab Take 1,000 Units by mouth once daily.    zinc gluconate 50 mg tablet     zolpidem (AMBIEN) 10 mg Tab TAKE 1 TABLET BY MOUTH EVERY EVENING     Family History       Problem Relation (Age of Onset)    Arthritis Father, Brother    Breast cancer Paternal Aunt    Early death Mother, Father, Brother, Brother    Heart attack Brother    Heart attacks under age 50 Father, Brother    Heart disease Mother, Father, Brother, Sister, Brother    Hyperlipidemia Sister    Hypertension Brother, Sister          Tobacco Use    Smoking status: Former     Current packs/day: 0.00     Average packs/day: 0.6 packs/day for 50.2 years (30.0 ttl pk-yrs)     Types: Cigarettes     Start date: 1970     Quit date: 2017     Years since quittin.1    Smokeless tobacco: Never    Tobacco comments:     Cant remember but after heart attack   Substance and Sexual Activity    Alcohol use: Never    Drug use: Never    Sexual activity: Not Currently     Partners: Male     Birth control/protection: Post-menopausal, See Surgical Hx     Review of Systems   Constitutional:  Negative for fatigue and fever.   HENT:  Negative for sinus pressure.    Eyes:  Negative for visual disturbance.   Respiratory:  Negative for shortness of breath.    Cardiovascular:  Positive for chest pain.   Gastrointestinal:  Negative for nausea and vomiting.   Genitourinary:  Negative for difficulty urinating.   Musculoskeletal:  Negative for back pain.   Skin:  Negative for rash.   Neurological:  Negative for headaches.   Psychiatric/Behavioral:  Negative for confusion.      Objective:     Vital Signs (Most  Recent):  Temp: 98.1 °F (36.7 °C) (06/26/25 1556)  Pulse: 75 (06/26/25 1630)  Resp: 18 (06/26/25 1630)  BP: (!) 194/80 (06/26/25 1630)  SpO2: 97 % (06/26/25 1630) Vital Signs (24h Range):  Temp:  [98.1 °F (36.7 °C)] 98.1 °F (36.7 °C)  Pulse:  [74-76] 75  Resp:  [16-18] 18  SpO2:  [94 %-97 %] 97 %  BP: (144-194)/(76-80) 194/80        There is no height or weight on file to calculate BMI.     Physical Exam  Constitutional:       General: She is not in acute distress.     Appearance: She is well-developed. She is not diaphoretic.   HENT:      Head: Normocephalic and atraumatic.   Eyes:      Pupils: Pupils are equal, round, and reactive to light.   Cardiovascular:      Rate and Rhythm: Normal rate and regular rhythm.      Heart sounds: Normal heart sounds. No murmur heard.     No friction rub. No gallop.   Pulmonary:      Effort: Pulmonary effort is normal. No respiratory distress.      Breath sounds: Normal breath sounds. No stridor. No wheezing or rales.   Abdominal:      General: Bowel sounds are normal. There is no distension.      Palpations: Abdomen is soft. There is no mass.      Tenderness: There is no abdominal tenderness. There is no guarding.   Musculoskeletal:      Right lower leg: No edema.      Left lower leg: No edema.   Skin:     General: Skin is warm.      Findings: No erythema.   Neurological:      Mental Status: She is alert and oriented to person, place, and time.              CRANIAL NERVES     CN III, IV, VI   Pupils are equal, round, and reactive to light.       Significant Labs:   Results for orders placed or performed during the hospital encounter of 06/26/25   EKG 12-lead    Collection Time: 06/26/25  2:45 PM   Result Value Ref Range    QRS Duration 88 ms    [facility] QTC Calculation 400 ms   EKG 12-lead    Collection Time: 06/26/25  4:00 PM   Result Value Ref Range    QRS Duration 88 ms    [facility] QTC Calculation 418 ms   Comprehensive metabolic panel    Collection Time: 06/26/25  4:32 PM   Result Value  Ref Range    Sodium 129 (L) 136 - 145 mmol/L    Potassium 4.1 3.5 - 5.1 mmol/L    Chloride 99 95 - 110 mmol/L    CO2 22 (L) 23 - 29 mmol/L    Glucose 96 70 - 110 mg/dL    BUN 14 8 - 23 mg/dL    Creatinine 0.9 0.5 - 1.4 mg/dL    Calcium 9.1 8.7 - 10.5 mg/dL    Protein Total 7.1 6.0 - 8.4 gm/dL    Albumin 3.4 (L) 3.5 - 5.2 g/dL    Bilirubin Total 0.5 0.1 - 1.0 mg/dL    ALP 61 40 - 150 unit/L    AST 15 11 - 45 unit/L    ALT 11 10 - 44 unit/L    Anion Gap 8 8 - 16 mmol/L    eGFR >60 >60 mL/min/1.73/m2   Troponin I #1    Collection Time: 06/26/25  4:32 PM   Result Value Ref Range    Troponin-I 0.011 <=0.026 ng/mL   BNP    Collection Time: 06/26/25  4:32 PM   Result Value Ref Range    BNP 65 0 - 99 pg/mL   CBC with Differential    Collection Time: 06/26/25  4:32 PM   Result Value Ref Range    WBC 5.65 3.90 - 12.70 K/uL    RBC 3.64 (L) 4.00 - 5.40 M/uL    HGB 11.5 (L) 12.0 - 16.0 gm/dL    HCT 34.1 (L) 37.0 - 48.5 %    MCV 94 82 - 98 fL    MCH 31.6 (H) 27.0 - 31.0 pg    MCHC 33.7 32.0 - 36.0 g/dL    RDW 13.1 11.5 - 14.5 %    Platelet Count 227 150 - 450 K/uL    MPV 9.1 (L) 9.2 - 12.9 fL    Nucleated RBC 0 <=0 /100 WBC    Neut % 64.3 38 - 73 %    Lymph % 17.3 (L) 18 - 48 %    Mono % 13.1 4 - 15 %    Eos % 3.9 <=8 %    Basophil % 1.2 <=1.9 %    Imm Grans % 0.2 0.0 - 0.5 %    Neut # 3.63 1.8 - 7.7 K/uL    Lymph # 0.98 (L) 1 - 4.8 K/uL    Mono # 0.74 0.3 - 1 K/uL    Eos # 0.22 <=0.5 K/uL    Baso # 0.07 <=0.2 K/uL    Imm Grans # 0.01 0.00 - 0.04 K/uL     *Note: Due to a large number of results and/or encounters for the requested time period, some results have not been displayed. A complete set of results can be found in Results Review.        Significant Imaging:   Imaging Results              X-Ray Chest AP Portable (Final result)  Result time 06/26/25 16:41:16      Final result by Gene Tolentino MD (06/26/25 16:41:16)                   Impression:      No acute abnormality.      Finalized on: 6/26/2025 4:41 PM By:  Gene BARTON  Rajan GARAY  [facility]# [id number]      2025-06-26 16:43:[id number]     [facility]               Narrative:    EXAM: XR CHEST AP PORTABLE    TECHNIQUE: One view of the chest    CLINICAL HISTORY: Chest Pain;    COMPARISON:  06/22/2025    FINDINGS:    No confluent airspace opacity or consolidation.  There is no evidence of pleural effusion, pneumothorax, or other acute pulmonary disease.   Cardiomediastinal silhouette is within normal limits for AP technique.

## 2025-06-26 NOTE — ASSESSMENT & PLAN NOTE
Chest pain with normal troponins   Initial troponins negative   Will trend troponins q.6 H   Continue aspirin and Plavix   Cardiology consult   NPO at midnight

## 2025-06-27 VITALS
DIASTOLIC BLOOD PRESSURE: 69 MMHG | SYSTOLIC BLOOD PRESSURE: 152 MMHG | WEIGHT: 142 LBS | TEMPERATURE: 97 F | BODY MASS INDEX: 26.81 KG/M2 | HEART RATE: 73 BPM | HEIGHT: 61 IN | RESPIRATION RATE: 16 BRPM | OXYGEN SATURATION: 96 %

## 2025-06-27 LAB
ABSOLUTE EOSINOPHIL (OHS): 0.36 K/UL
ABSOLUTE MONOCYTE (OHS): 0.79 K/UL (ref 0.3–1)
ABSOLUTE NEUTROPHIL COUNT (OHS): 3.42 K/UL (ref 1.8–7.7)
ANION GAP (OHS): 7 MMOL/L (ref 8–16)
AORTIC ROOT ANNULUS: 2.9 CM
AORTIC SIZE INDEX (SOV): 2.3 CM/M2
AORTIC SIZE INDEX: 2.1 CM/M2
ASCENDING AORTA: 3.5 CM
AV INDEX (PROSTH): 1.06
AV MEAN GRADIENT: 3 MMHG
AV PEAK GRADIENT: 8 MMHG
AV REGURGITATION PRESSURE HALF TIME: 584 MS
AV VALVE AREA BY VELOCITY RATIO: 2.4 CM²
AV VALVE AREA: 3 CM²
AV VELOCITY RATIO: 0.86
BASOPHILS # BLD AUTO: 0.07 K/UL
BASOPHILS NFR BLD AUTO: 1.2 %
BSA FOR ECHO PROCEDURE: 1.66 M2
BUN SERPL-MCNC: 14 MG/DL (ref 8–23)
CALCIUM SERPL-MCNC: 8.5 MG/DL (ref 8.7–10.5)
CHLORIDE SERPL-SCNC: 99 MMOL/L (ref 95–110)
CO2 SERPL-SCNC: 23 MMOL/L (ref 23–29)
CREAT SERPL-MCNC: 0.9 MG/DL (ref 0.5–1.4)
CV ECHO LV RWT: 0.5 CM
DOP CALC AO PEAK VEL: 1.4 M/S
DOP CALC AO VTI: 22.7 CM
DOP CALC LVOT AREA: 2.8 CM2
DOP CALC LVOT DIAMETER: 1.9 CM
DOP CALC LVOT PEAK VEL: 1.2 M/S
DOP CALC LVOT STROKE VOLUME: 68 CM3
DOP CALCLVOT PEAK VEL VTI: 24 CM
E WAVE DECELERATION TIME: 235 MSEC
E/A RATIO: 0.74
E/E' RATIO: 9 M/S
ECHO LV POSTERIOR WALL: 0.9 CM (ref 0.6–1.1)
ERYTHROCYTE [DISTWIDTH] IN BLOOD BY AUTOMATED COUNT: 13 % (ref 11.5–14.5)
FRACTIONAL SHORTENING: 22.2 % (ref 28–44)
GFR SERPLBLD CREATININE-BSD FMLA CKD-EPI: >60 ML/MIN/1.73/M2
GLUCOSE SERPL-MCNC: 88 MG/DL (ref 70–110)
HCT VFR BLD AUTO: 30.5 % (ref 37–48.5)
HGB BLD-MCNC: 10.3 GM/DL (ref 12–16)
IMM GRANULOCYTES # BLD AUTO: 0.01 K/UL (ref 0–0.04)
IMM GRANULOCYTES NFR BLD AUTO: 0.2 % (ref 0–0.5)
INTERVENTRICULAR SEPTUM: 1 CM (ref 0.6–1.1)
IVC DIAMETER: 0.97 CM
IVRT: 128 MSEC
LA MAJOR: 4.8 CM
LA MINOR: 4.8 CM
LA WIDTH: 3.3 CM
LEFT ATRIUM AREA SYSTOLIC (APICAL 2 CHAMBER): 14.71 CM2
LEFT ATRIUM AREA SYSTOLIC (APICAL 4 CHAMBER): 13.9 CM2
LEFT ATRIUM SIZE: 3.3 CM
LEFT ATRIUM VOLUME INDEX MOD: 21 ML/M2
LEFT ATRIUM VOLUME INDEX: 27 ML/M2
LEFT ATRIUM VOLUME MOD: 34 ML
LEFT ATRIUM VOLUME: 44 CM3
LEFT INTERNAL DIMENSION IN SYSTOLE: 2.8 CM (ref 2.1–4)
LEFT VENTRICLE DIASTOLIC VOLUME INDEX: 33.74 ML/M2
LEFT VENTRICLE DIASTOLIC VOLUME: 55 ML
LEFT VENTRICLE END SYSTOLIC VOLUME APICAL 2 CHAMBER: 36.77 ML
LEFT VENTRICLE END SYSTOLIC VOLUME APICAL 4 CHAMBER: 31.44 ML
LEFT VENTRICLE MASS INDEX: 61.5 G/M2
LEFT VENTRICLE SYSTOLIC VOLUME INDEX: 17.8 ML/M2
LEFT VENTRICLE SYSTOLIC VOLUME: 29 ML
LEFT VENTRICULAR INTERNAL DIMENSION IN DIASTOLE: 3.6 CM (ref 3.5–6)
LEFT VENTRICULAR MASS: 100.2 G
LV LATERAL E/E' RATIO: 6.6 M/S
LV SEPTAL E/E' RATIO: 13.3 M/S
LVED V (TEICH): 54.91 ML
LVES V (TEICH): 29.03 ML
LVOT MG: 2.98 MMHG
LVOT MV: 0.81 CM/S
LYMPHOCYTES # BLD AUTO: 1.27 K/UL (ref 1–4.8)
MCH RBC QN AUTO: 31.5 PG (ref 27–31)
MCHC RBC AUTO-ENTMCNC: 33.8 G/DL (ref 32–36)
MCV RBC AUTO: 93 FL (ref 82–98)
MV PEAK A VEL: 0.72 M/S
MV PEAK E VEL: 0.53 M/S
MV STENOSIS PRESSURE HALF TIME: 68.03 MS
MV VALVE AREA P 1/2 METHOD: 3.23 CM2
NUCLEATED RBC (/100WBC) (OHS): 0 /100 WBC
OHS CV RV/LV RATIO: 0.97 CM
OHS QRS DURATION: 88 MS
OHS QTC CALCULATION: 418 MS
PISA AR MAX VEL: 4.68 M/S
PISA TR MAX VEL: 2.4 M/S
PLATELET # BLD AUTO: 215 K/UL (ref 150–450)
PMV BLD AUTO: 9 FL (ref 9.2–12.9)
POTASSIUM SERPL-SCNC: 4 MMOL/L (ref 3.5–5.1)
PULM VEIN S/D RATIO: 1.44
PV MV: 0.65 M/S
PV PEAK D VEL: 0.36 M/S
PV PEAK GRADIENT: 3 MMHG
PV PEAK S VEL: 0.52 M/S
PV PEAK VELOCITY: 0.92 M/S
RA MAJOR: 4.95 CM
RA PRESSURE ESTIMATED: 3 MMHG
RA VOL SYS: 56.23 ML
RA WIDTH: 2.9 CM
RBC # BLD AUTO: 3.27 M/UL (ref 4–5.4)
RELATIVE EOSINOPHIL (OHS): 6.1 %
RELATIVE LYMPHOCYTE (OHS): 21.5 % (ref 18–48)
RELATIVE MONOCYTE (OHS): 13.3 % (ref 4–15)
RELATIVE NEUTROPHIL (OHS): 57.7 % (ref 38–73)
RIGHT ATRIAL AREA: 18.1 CM2
RIGHT ATRIUM END SYSTOLIC VOLUME APICAL 4 CHAMBER INDEX BSA: 35.4 ML/M2
RIGHT ATRIUM VOLUME AREA LENGTH APICAL 4 CHAMBER: 57.71 ML
RIGHT VENTRICLE DIASTOLIC BASEL DIMENSION: 3.5 CM
RIGHT VENTRICLE DIASTOLIC LENGTH: 6.5 CM
RIGHT VENTRICLE DIASTOLIC MID DIMENSION: 1.9 CM
RIGHT VENTRICULAR LENGTH IN DIASTOLE (APICAL 4-CHAMBER VIEW): 6.46 CM
RV MID DIAMA: 1.92 CM
RV TB RVSP: 5 MMHG
RV TISSUE DOPPLER FREE WALL SYSTOLIC VELOCITY 1 (APICAL 4 CHAMBER VIEW): 18.44 CM/S
SINUS: 3.8 CM
SODIUM SERPL-SCNC: 129 MMOL/L (ref 136–145)
STJ: 3.2 CM
TASV: 18 CM/S
TDI LATERAL: 0.08 M/S
TDI SEPTAL: 0.04 M/S
TDI: 0.06 M/S
TR MAX PG: 23 MMHG
TRICUSPID ANNULAR PLANE SYSTOLIC EXCURSION: 2.3 CM
TROPONIN I SERPL DL<=0.01 NG/ML-MCNC: 0.01 NG/ML
TV REST PULMONARY ARTERY PRESSURE: 26 MMHG
WBC # BLD AUTO: 5.92 K/UL (ref 3.9–12.7)
Z-SCORE OF LEFT VENTRICULAR DIMENSION IN END DIASTOLE: -2.45
Z-SCORE OF LEFT VENTRICULAR DIMENSION IN END SYSTOLE: -0.14

## 2025-06-27 PROCEDURE — 82374 ASSAY BLOOD CARBON DIOXIDE: CPT | Mod: HCNC | Performed by: FAMILY MEDICINE

## 2025-06-27 PROCEDURE — 84484 ASSAY OF TROPONIN QUANT: CPT | Mod: HCNC | Performed by: FAMILY MEDICINE

## 2025-06-27 PROCEDURE — 94640 AIRWAY INHALATION TREATMENT: CPT | Mod: HCNC,XB

## 2025-06-27 PROCEDURE — G0378 HOSPITAL OBSERVATION PER HR: HCPCS | Mod: HCNC

## 2025-06-27 PROCEDURE — 94761 N-INVAS EAR/PLS OXIMETRY MLT: CPT | Mod: HCNC

## 2025-06-27 PROCEDURE — 36415 COLL VENOUS BLD VENIPUNCTURE: CPT | Mod: HCNC | Performed by: FAMILY MEDICINE

## 2025-06-27 PROCEDURE — 99214 OFFICE O/P EST MOD 30 MIN: CPT | Mod: HCNC,25,, | Performed by: INTERNAL MEDICINE

## 2025-06-27 PROCEDURE — 85025 COMPLETE CBC W/AUTO DIFF WBC: CPT | Mod: HCNC | Performed by: FAMILY MEDICINE

## 2025-06-27 PROCEDURE — 25000242 PHARM REV CODE 250 ALT 637 W/ HCPCS: Mod: HCNC | Performed by: FAMILY MEDICINE

## 2025-06-27 PROCEDURE — 25000003 PHARM REV CODE 250: Mod: HCNC | Performed by: FAMILY MEDICINE

## 2025-06-27 RX ORDER — ISOSORBIDE MONONITRATE 30 MG/1
30 TABLET, EXTENDED RELEASE ORAL 2 TIMES DAILY
Qty: 60 TABLET | Refills: 11 | Status: SHIPPED | OUTPATIENT
Start: 2025-06-27 | End: 2026-06-27

## 2025-06-27 RX ADMIN — BUDESONIDE INHALATION 0.5 MG: 0.5 SUSPENSION RESPIRATORY (INHALATION) at 08:06

## 2025-06-27 RX ADMIN — ASPIRIN 81 MG CHEWABLE TABLET 81 MG: 81 TABLET CHEWABLE at 08:06

## 2025-06-27 RX ADMIN — ISOSORBIDE MONONITRATE 30 MG: 30 TABLET, EXTENDED RELEASE ORAL at 08:06

## 2025-06-27 RX ADMIN — PANTOPRAZOLE SODIUM 40 MG: 40 TABLET, DELAYED RELEASE ORAL at 08:06

## 2025-06-27 RX ADMIN — ARFORMOTEROL TARTRATE 15 MCG: 15 SOLUTION RESPIRATORY (INHALATION) at 08:06

## 2025-06-27 RX ADMIN — ACETAMINOPHEN 650 MG: 325 TABLET ORAL at 12:06

## 2025-06-27 RX ADMIN — Medication 1000 UNITS: at 08:06

## 2025-06-27 RX ADMIN — AMLODIPINE BESYLATE 5 MG: 5 TABLET ORAL at 08:06

## 2025-06-27 RX ADMIN — DULOXETINE HYDROCHLORIDE 20 MG: 20 CAPSULE, DELAYED RELEASE ORAL at 08:06

## 2025-06-27 RX ADMIN — IPRATROPIUM BROMIDE 0.5 MG: 0.5 SOLUTION RESPIRATORY (INHALATION) at 08:06

## 2025-06-27 RX ADMIN — MEMANTINE 10 MG: 10 TABLET ORAL at 08:06

## 2025-06-27 RX ADMIN — CLOPIDOGREL 75 MG: 75 TABLET ORAL at 08:06

## 2025-06-27 NOTE — DISCHARGE SUMMARY
[facility] Medicine  Discharge Summary      Patient Name: Gemma Vick  MRN: [id number]  KB: [id number]  Patient Class: OP- Observation  Admission Date: 6/26/2025  Hospital Length of Stay: 0 days  Discharge Date and Time: 6/27/2025  5:23 PM  Attending Physician: Elizabeth att. providers found   Discharging Provider: Arturo Marin MD  Primary Care Provider: Olga Altman MD    Primary Care Team: [facility] MEDICINE C    HPI:   Patient is a 70 year old  female with a PMH of COPD, former tobacco abuse, CAD s/p prior stent (most recent PCI of RCA 9/23), HTN, hyperlipidemia, GERD, chronic mesenteric ischemia, vascular dementia, and AAA who presents to the ED today with complaints of chest pain.  Patient was seen by Cardiology today and was recommended to go to the ED for unstable angina.  Per reports chest pain has been ongoing for the last week.  Symptoms waxes and wanes.  Describes chest pain as pressure-like.  Reports having similar symptoms in the past and required cardiac stents.  Of note patient had normal stress test approximately 4 months ago.  No other issues reported at this time.      In the ED, chest x-ray unremarkable, troponins negative.  Cardiology consulted and recommended adding Imdur 30 mg b.i.d..      * No surgery found *      Hospital Course:   Patient was admitted for unstable angina.  Troponins remain negative.  Cardiology consult on case.  Imdur 30 mg b.i.d. added.  Echo reviewed which was normal.  Patient medically cleared for discharge from Cardiology standpoint.  She was discharged home.     Goals of Care Treatment Preferences:  Code Status: Full Code         Consults:   Consults (From admission, onward)          Status Ordering Provider     Inpatient consult to Cardiology  Once        Provider:  Ja Feng MD    Completed ARTURO MARIN            Assessment & Plan  Unstable angina  Chest pain with normal troponins   Initial troponins negative   Will trend troponins q.6 H    Continue aspirin and Plavix   Cardiology consult   NPO at midnight       Essential hypertension  Patient's blood pressure range in the last 24 hours was: BP  Min: 108/53  Max: 188/78.The patient's inpatient anti-hypertensive regimen is listed below:  Current Antihypertensives  amLODIPine tablet 5 mg, Daily, Oral  hydrALAZINE injection 10 mg, Every 6 hours PRN, Intravenous  isosorbide mononitrate 24 hr tablet 30 mg, 2 times daily, Oral  nitroGLYCERIN SL tablet 0.4 mg, Every 5 min PRN, Sublingual  isosorbide mononitrate (IMDUR) 24 hr tablet, 2 times daily, Oral    Plan  - BP is controlled, no changes needed to their regimen    COPD, severe  Currently not in exacerbation   Continue DuoNebs p.r.n.  Lama/laba/ics nebs     Vascular dementia, moderate, without behavioral disturbance, psychotic disturbance, mood disturbance, and anxiety  Continue Namenda  Final Active Diagnoses:    Diagnosis Date Noted POA    PRINCIPAL PROBLEM:  Unstable angina [I20.0] 06/26/2025 Yes    Vascular dementia, moderate, without behavioral disturbance, psychotic disturbance, mood disturbance, and anxiety [F01.B0] 04/01/2024 Yes    COPD, severe [J44.9] 09/18/2017 Yes    Essential hypertension [I10] 12/31/2013 Yes      Problems Resolved During this Admission:       Discharged Condition: good    Disposition: Home or Self Care    Follow Up:    Patient Instructions:   No discharge procedures on file.    Significant Diagnostic Studies: N/A    Pending Diagnostic Studies:       None           Medications:  Reconciled Home Medications:      Medication List        START taking these medications      isosorbide mononitrate 30 MG 24 hr tablet  Commonly known as: IMDUR  Take 1 tablet (30 mg total) by mouth 2 (two) times a day.            CONTINUE taking these medications      acetaminophen 500 MG tablet  Commonly known as: TYLENOL  Take 2,000 mg by mouth every evening.     amLODIPine 5 MG tablet  Commonly known as: NORVASC  TAKE 1 TABLET BY MOUTH ONCE  DAILY     aspirin 81 MG Chew  Take 81 mg by mouth once daily.     BREZTRI AEROSPHERE 160-9-4.8 mcg/actuation Hfaa  Generic drug: budesonide-glycopyr-formoterol  Inhale 2 puffs into the lungs 2 (two) times a day.     cloNIDine 0.1 MG tablet  Commonly known as: CATAPRES  Take 1 tablet (0.1 mg total) by mouth daily as needed (if Blood pressure above 170/90).     clopidogreL 75 mg tablet  Commonly known as: PLAVIX  TAKE 1 TABLET BY MOUTH ONCE DAILY     * CombiVENT RESPIMAT  mcg/actuation inhaler  Generic drug: ipratropium-albuteroL  Inhale 2 puffs into the lungs every 6 (six) hours as needed for Wheezing or Shortness of Breath.     * albuterol-ipratropium 2.5 mg-0.5 mg/3 mL nebulizer solution  Commonly known as: DUO-NEB  Take 3 mLs by nebulization every 6 (six) hours as needed for Wheezing.     COMPACT SPACE CHAMBER  Generic drug: inhalation spacing device  USE AS DIRECTED     cyanocobalamin 100 MCG tablet  Commonly known as: VITAMIN B-12  Take 100 mcg by mouth once daily.     diclofenac sodium 1 % Gel  Commonly known as: VOLTAREN  Apply topically 4 (four) times daily.     dicyclomine 10 MG capsule  Commonly known as: BENTYL  TAKE ONE CAPSULE BY MOUTH THREE TIMES DAILY AS NEEDED     DULoxetine 20 MG capsule  Commonly known as: CYMBALTA  Take 20 mg by mouth.     estradioL 0.5 MG tablet  Commonly known as: ESTRACE  TAKE 1 TABLET BY MOUTH ONCE DAILY     ezetimibe-simvastatin 10-40 mg 10-40 mg per tablet  Commonly known as: VYTORIN  TAKE 1 TABLET BY MOUTH EVERY EVENING     famotidine 20 MG tablet  Commonly known as: PEPCID  Take 1 tablet (20 mg total) by mouth 2 (two) times daily as needed for Heartburn.     furosemide 20 MG tablet  Commonly known as: LASIX  TAKE 1 TABLET BY MOUTH ONCE DAILY AS NEEDED     l-methylfolate-b2-b6-b12 6-5-50-1 mg Tab  Commonly known as: CEREFOLIN  Take 1 tablet by mouth once daily. B2 B6 B12     memantine 10 MG Tab  Commonly known as: NAMENDA  Take 1 tablet (10 mg total) by mouth 2 (two)  times daily.     olmesartan 40 MG tablet  Commonly known as: BENICAR  TAKE 1 TABLET BY MOUTH ONCE DAILY     OXYGEN-AIR DELIVERY SYSTEMS MISC  3 L by Misc.(Non-Drug; Combo Route) route every evening.     pantoprazole 40 MG tablet  Commonly known as: PROTONIX  TAKE 1 TABLET BY MOUTH ONCE DAILY     vitamin D 1000 units Tab  Commonly known as: VITAMIN D3  Take 1,000 Units by mouth once daily.     zinc gluconate 50 mg tablet     zolpidem 10 mg Tab  Commonly known as: AMBIEN  TAKE 1 TABLET BY MOUTH EVERY EVENING           * This list has 2 medication(s) that are the same as other medications prescribed for you. Read the directions carefully, and ask your doctor or other care provider to review them with you.                  Indwelling Lines/Drains at time of discharge:   Lines/Drains/Airways       None                       Time spent on the discharge of patient: 37 minutes         Arturo Carr MD  Department of Hospital Medicine  [facility] - Telemetry ([facility])

## 2025-06-27 NOTE — SUBJECTIVE & OBJECTIVE
Past Medical History:   Diagnosis Date    AAA (abdominal aortic aneurysm) 02/13/2014    Abdominal aneurysm     3    Acute coronary syndrome     Anemia     Arthritis 2009    BPPV (benign paroxysmal positional vertigo)     Carotid artery plaque     Carotid artery stenosis and occlusion 02/13/2014    Chronic back pain     COPD (chronic obstructive pulmonary disease)     Coronary artery disease     Dementia     Emphysema lung     Heart attack 1998    Several after that with 4 stents    Hyperlipidemia     Hypertension 1998    Joint pain 2000    Myocardial infarction     x3    Neuropathy     Personal history of COVID-19 06/09/2021 11/16/2020 +Covid, recovered at home        Past Surgical History:   Procedure Laterality Date    CARDIAC CATHETERIZATION      CHOLECYSTECTOMY  1987    Stones in bile duct    CORONARY ANGIOPLASTY      CORONARY STENT PLACEMENT N/A 09/12/2023    Procedure: INSERTION, STENT, CORONARY ARTERY;  Surgeon: Millie Juarez MD;  Location: [facility] CATH LAB;  Service: Cardiology;  Laterality: N/A;    EYE SURGERY  08/31    Catarates removed    FRACTURE SURGERY  1971    Hand surgery    HYSTERECTOMY  1988    LEFT HEART CATHETERIZATION Left 09/12/2023    Procedure: Left heart cath;  Surgeon: Millie Juarez MD;  Location: [facility] CATH LAB;  Service: Cardiology;  Laterality: Left;    PERCUTANEOUS CORONARY INTERVENTION, ARTERY N/A 09/12/2023    Procedure: Percutaneous coronary intervention;  Surgeon: Millie Juarez MD;  Location: [facility] CATH LAB;  Service: Cardiology;  Laterality: N/A;    THROMBECTOMY, CORONARY  09/12/2023    Procedure: Thrombectomy, Coronary;  Surgeon: Millie Juarez MD;  Location: [facility] CATH LAB;  Service: Cardiology;;    TONSILLECTOMY      TRANSFORAMINAL EPIDURAL INJECTION OF STEROID Right 12/01/2023    Procedure: Injection,steroid,epidural,transforaminal approach- right side, L4/5 and L5/S1;  Surgeon: Lydia Roberts MD;  Location: [facility] PAIN MGT;  Service: Pain Management;  Laterality: Right;     TUBAL LIGATION  1985    Afterr last child       Review of patient's allergies indicates:  No Known Allergies    No current facility-administered medications on file prior to encounter.     Current Outpatient Medications on File Prior to Encounter   Medication Sig    acetaminophen (TYLENOL) 500 MG tablet Take 2,000 mg by mouth every evening.    albuterol-ipratropium (DUO-NEB) 2.5 mg-0.5 mg/3 mL nebulizer solution Take 3 mLs by nebulization every 6 (six) hours as needed for Wheezing.    amLODIPine (NORVASC) 5 MG tablet TAKE 1 TABLET BY MOUTH ONCE DAILY    aspirin 81 MG Chew Take 81 mg by mouth once daily.    budesonide-glycopyr-formoterol (BREZTRI AEROSPHERE) 160-9-4.8 mcg/actuation HFAA Inhale 2 puffs into the lungs 2 (two) times a day.    cloNIDine (CATAPRES) 0.1 MG tablet Take 1 tablet (0.1 mg total) by mouth daily as needed (if Blood pressure above 170/90).    clopidogreL (PLAVIX) 75 mg tablet TAKE 1 TABLET BY MOUTH ONCE DAILY    COMBIVENT RESPIMAT  mcg/actuation inhaler Inhale 2 puffs into the lungs every 6 (six) hours as needed for Wheezing or Shortness of Breath.    cyanocobalamin (VITAMIN B-12) 100 MCG tablet Take 100 mcg by mouth once daily.    diclofenac sodium (VOLTAREN) 1 % Gel Apply topically 4 (four) times daily.    dicyclomine (BENTYL) 10 MG capsule TAKE ONE CAPSULE BY MOUTH THREE TIMES DAILY AS NEEDED    DULoxetine (CYMBALTA) 20 MG capsule Take 20 mg by mouth.    estradioL (ESTRACE) 0.5 MG tablet TAKE 1 TABLET BY MOUTH ONCE DAILY    ezetimibe-simvastatin 10-40 mg (VYTORIN) 10-40 mg per tablet TAKE 1 TABLET BY MOUTH EVERY EVENING    famotidine (PEPCID) 20 MG tablet Take 1 tablet (20 mg total) by mouth 2 (two) times daily as needed for Heartburn.    furosemide (LASIX) 20 MG tablet TAKE 1 TABLET BY MOUTH ONCE DAILY AS NEEDED    inhalation spacing device (COMPACT SPACE CHAMBER) USE AS DIRECTED    l-methylfolate-b2-b6-b12 (CEREFOLIN) 6-5-50-1 mg Tab Take 1 tablet by mouth once daily. B2 B6 B12     memantine (NAMENDA) 10 MG Tab Take 1 tablet (10 mg total) by mouth 2 (two) times daily.    olmesartan (BENICAR) 40 MG tablet TAKE 1 TABLET BY MOUTH ONCE DAILY    OXYGEN-AIR DELIVERY SYSTEMS MISC 3 L by Misc.(Non-Drug; Combo Route) route every evening.    pantoprazole (PROTONIX) 40 MG tablet TAKE 1 TABLET BY MOUTH ONCE DAILY    vitamin D (VITAMIN D3) 1000 units Tab Take 1,000 Units by mouth once daily.    zinc gluconate 50 mg tablet     zolpidem (AMBIEN) 10 mg Tab TAKE 1 TABLET BY MOUTH EVERY EVENING     Family History       Problem Relation (Age of Onset)    Arthritis Father, Brother    Breast cancer Paternal Aunt    Early death Mother, Father, Brother, Brother    Heart attack Brother    Heart attacks under age 50 Father, Brother    Heart disease Mother, Father, Brother, Sister, Brother    Hyperlipidemia Sister    Hypertension Brother, Sister          Tobacco Use    Smoking status: Former     Current packs/day: 0.00     Average packs/day: 0.6 packs/day for 50.2 years (30.0 ttl pk-yrs)     Types: Cigarettes     Start date: 1970     Quit date: 2017     Years since quittin.1    Smokeless tobacco: Never    Tobacco comments:     Cant remember but after heart attack   Substance and Sexual Activity    Alcohol use: Never    Drug use: Never    Sexual activity: Not Currently     Partners: Male     Birth control/protection: Post-menopausal, See Surgical Hx     Review of Systems   Constitutional: Negative.   HENT: Negative.     Eyes: Negative.    Cardiovascular:  Positive for chest pain.   Respiratory: Negative.     Skin: Negative.    Musculoskeletal: Negative.    Gastrointestinal: Negative.    Genitourinary: Negative.    Neurological: Negative.    Psychiatric/Behavioral: Negative.       Objective:     Vital Signs (Most Recent):  Temp: 97.9 °F (36.6 °C) (25 0756)  Pulse: 69 (25 0802)  Resp: 18 (25 0802)  BP: (!) 142/64 (25 0756)  SpO2: 96 % (25 08) Vital Signs (24h Range):  Temp:   [97.4 °F (36.3 °C)-98.1 °F (36.7 °C)] 97.9 °F (36.6 °C)  Pulse:  [66-90] 69  Resp:  [14-20] 18  SpO2:  [93 %-97 %] 96 %  BP: (108-194)/(53-86) 142/64     Weight: 64.5 kg (142 lb 3.2 oz)  Body mass index is 26.87 kg/m².    SpO2: 96 %       No intake or output data in the 24 hours ending 06/27/25 1052    Lines/Drains/Airways       Peripheral Intravenous Line  Duration             Peripheral IV Single Lumen 06/26/25 1633 20 G Left Antecubital <1 day                     Physical Exam  Vitals and nursing note reviewed.   Constitutional:       Appearance: Normal appearance.   HENT:      Head: Normocephalic.   Eyes:      Pupils: Pupils are equal, round, and reactive to light.   Cardiovascular:      Rate and Rhythm: Normal rate and regular rhythm.      Heart sounds: Normal heart sounds, S1 normal and S2 normal. No murmur heard.     No S3 or S4 sounds.   Pulmonary:      Effort: Pulmonary effort is normal.      Breath sounds: Normal breath sounds.   Abdominal:      General: Bowel sounds are normal.      Palpations: Abdomen is soft.   Musculoskeletal:         General: Normal range of motion.      Cervical back: Normal range of motion.   Skin:     Capillary Refill: Capillary refill takes less than 2 seconds.   Neurological:      General: No focal deficit present.      Mental Status: She is alert and oriented to person, place, and time.   Psychiatric:         Mood and Affect: Mood normal.         Behavior: Behavior normal.         Thought Content: Thought content normal.          Significant Labs: BMP:   Recent Labs   Lab 06/26/25  1632 06/27/25  0356   GLU 96 88   * 129*   K 4.1 4.0   CL 99 99   CO2 22* 23   BUN 14 14   CREATININE 0.9 0.9   CALCIUM 9.1 8.5*   , CMP   Recent Labs   Lab 06/26/25  1632 06/27/25  0356   * 129*   K 4.1 4.0   CL 99 99   CO2 22* 23   GLU 96 88   BUN 14 14   CREATININE 0.9 0.9   CALCIUM 9.1 8.5*   PROT 7.1  --    ALBUMIN 3.4*  --    BILITOT 0.5  --    ALKPHOS 61  --    AST 15  --    ALT 11   "--    ANIONGAP 8 7*   , CBC   Recent Labs   Lab 06/26/25  1632 06/27/25  0356   WBC 5.65 5.92   HGB 11.5* 10.3*   HCT 34.1* 30.5*    215   , INR No results for input(s): "INR", "PROTIME" in the last 48 hours., Lipid Panel No results for input(s): "CHOL", "HDL", "LDLCALC", "TRIG", "CHOLHDL" in the last 48 hours., Troponin No results for input(s): "TROPONINIHS" in the last 48 hours., and All pertinent lab results from the last 24 hours have been reviewed.    Significant Imaging: Cardiac Cath: reviewed, Echocardiogram: Transthoracic echo (TTE) complete (Cupid Only):   Results for orders placed or performed during the hospital encounter of 02/26/25   Echo   Result Value Ref Range    BSA 1.65 m2    LA WIDTH 2.9 cm    LVOT stroke volume 70.6 cm3    LVIDd 3.3 (A) 3.5 - 6.0 cm    LV Systolic Volume 18 mL    LV Systolic Volume Index 11.0 mL/m2    LVIDs 2.3 2.1 - 4.0 cm    LV Diastolic Volume 43 mL    LV Diastolic Volume Index 26.38 mL/m2    Left Ventricular End Systolic Volume by Teichholz Method 18.13 mL    Left Ventricular End Diastolic Volume by Teichholz Method 42.84 mL    IVS 1.2 (A) 0.6 - 1.1 cm    LVOT diameter 1.9 cm    LVOT area 2.8 cm2    FS 30.3 28 - 44 %    Left Ventricle Relative Wall Thickness 0.73 cm    PW 1.2 (A) 0.6 - 1.1 cm    LV mass 124.8 g    LV Mass Index 76.6 g/m2    MV Peak E Oz 1.21 m/s    TDI LATERAL 0.09 m/s    TDI SEPTAL 0.10 m/s    E/E' ratio 13 m/s    MV Peak A Oz 1.02 m/s    TR Max Oz 2.7 m/s    E/A ratio 1.19     IVRT 83 msec    E wave deceleration time 194 msec    LV SEPTAL E/E' RATIO 12.1 m/s    CARLYN 14 mL/m2    LV LATERAL E/E' RATIO 13.4 m/s    LA Vol 23 cm3    LVOT peak oz 1.0 m/s    Left Ventricular Outflow Tract Mean Velocity 0.79 cm/s    Left Ventricular Outflow Tract Mean Gradient 2.68 mmHg    RV- lentz basal diam 2.7 cm    RVOT peak VTI 18.5 cm    TAPSE 1.92 cm    RV/LV Ratio 0.82 cm    LA size 2.5 cm    Left Atrium Minor Axis 3.5 cm    Left Atrium Major Axis 3.9 cm    RA " Major Axis 4.14 cm    AV regurgitation pressure 1/2 time 630 ms    AR Max Oz 5.02 m/s    AV mean gradient 3 mmHg    AV peak gradient 5 mmHg    Ao peak oz 1.1 m/s    Ao VTI 29.5 cm    LVOT peak VTI 24.9 cm    AV valve area 2.4 cm²    AV Velocity Ratio 0.91     AV index (prosthetic) 0.84     ELIDA by Velocity Ratio 2.6 cm²    Mr max oz 4.91 m/s    MV stenosis pressure 1/2 time 56.25 ms    MV valve area p 1/2 method 3.91 cm2    PV mean gradient 2 mmHg    RVOT peak oz 0.80 m/s    Ao root annulus 3.64 cm    STJ 3.40 cm    Ascending aorta 3.25 cm    IVC diameter 2.14 cm    Mean e' 0.10 m/s    ZLVIDS -1.70     ZLVIDD -3.32     RA Width 2.5 cm    EF 60 %    TV resting pulmonary artery pressure 32 mmHg    RV TB RVSP 6 mmHg    Est. RA pres 3 mmHg    Narrative      Left Ventricle: The left ventricle is normal in size. Normal wall   thickness. There is concentric remodeling. There is normal systolic   function. Ejection fraction is approximately 60%. There is normal   diastolic function.    Right Ventricle: The right ventricle is normal in size. Wall thickness   is normal. Systolic function is normal.    Aortic Valve: There is moderate aortic valve sclerosis.    Pulmonary Artery: The estimated pulmonary artery systolic pressure is   32 mmHg.    IVC/SVC: Normal venous pressure at 3 mmHg.     , EKG: reviewed, Stress Test: reviewed, and X-Ray: CXR: X-Ray Chest 1 View (CXR): No results found for this visit on 06/26/25.

## 2025-06-27 NOTE — PLAN OF CARE
[facility] - Telemetry (Hospital)  Discharge Final Note    Primary Care Provider: Olga Altman MD    Expected Discharge Date: 6/27/2025    Final Discharge Note (most recent)       Final Note - 06/27/25 1717          Final Note    Assessment Type Final Discharge Note     Anticipated Discharge Disposition Home or Self Care        Post-Acute Status    Discharge Delays None known at this time                     Important Message from Medicare      Discharge home, no home health or dme orders noted.

## 2025-06-27 NOTE — ASSESSMENT & PLAN NOTE
No chest pain on exam  EKG SR 1st AVB  Cont amlodipine, ASA, plavix  Added Imdur  Assess response  Nuc stress neg in Feb 25'

## 2025-06-27 NOTE — PLAN OF CARE
[facility] - Telemetry (Hospital)  Initial Discharge Assessment       Primary Care Provider: Olga Altman MD    Admission Diagnosis: Chest pain [R07.9]    Admission Date: 6/26/2025  Expected Discharge Date:     Transition of Care Barriers: None    Payor: HU[employer] MANAGED MEDICARE / Plan: [employer] SNP HMO PPO SPECIAL NEEDS / Product Type: Medicare Advantage /     Extended Emergency Contact Information  Primary Emergency Contact: Uzma Dominique  Address: [address] [place] [address]  Mobile Phone: [phone]  Relation: Grandchild  Secondary Emergency Contact: Haseeb Vick  Mobile Phone: [phone]  Relation: Spouse    Discharge Plan A: Home  Discharge Plan B: Home with family      [facility] [address] [address]  [address] [address] [address]  Phone: [phone] Fax: [phone]    [facility] - [facility] [address] [facility]  [address] [facility] [address]  Phone: [phone] Fax: [phone]      Initial Assessment (most recent)       Adult Discharge Assessment - 06/27/25 1418          Discharge Assessment    Assessment Type Discharge Planning Assessment     Confirmed/corrected address, phone number and insurance Yes     Confirmed Demographics Correct on Facesheet     Source of Information patient     People in Home spouse     Name(s) of People in Home Haseeb Vick, Spouse [phone]     Do you expect to return to your current living situation? Yes     Do you have help at home or someone to help you manage your care at home? Yes     Who are your caregiver(s) and their phone number(s)? Haseeb Vick, Spouse [phone]     Prior to hospitilization cognitive status: Alert/Oriented     Current cognitive status: Alert/Oriented     Walking or Climbing Stairs Difficulty no     Dressing/Bathing Difficulty yes     Dressing/Bathing bathing difficulty, assistance 1 person      Dressing/Bathing Management pt requires assitance getting in tub     Equipment Currently Used at Home oxygen     Readmission within 30 days? No     Patient currently being followed by outpatient case management? No     Do you currently have service(s) that help you manage your care at home? No     Do you take prescription medications? Yes     Do you have prescription coverage? Yes     Do you have any problems affording any of your prescribed medications? No     Is the patient taking medications as prescribed? yes     Who is going to help you get home at discharge? Haseeb Vick, Spouse [phone] or Grand daughter Uzma Dominique     How do you get to doctors appointments? family or friend will provide     Are you on dialysis? No     Do you take coumadin? No     Discharge Plan A Home     Discharge Plan B Home with family     DME Needed Upon Discharge  none     Discharge Plan discussed with: Patient     Transition of Care Barriers None                      MSW met with pt to complete d/c assessment. Pt reports no needs and family available at d/c. YOHANA,HENRIETTA

## 2025-06-27 NOTE — CONSULTS
[facility] ([facility])  Cardiology  Consult Note    Patient Name: Gemma Vick  MRN: [id number]  Admission Date: 6/26/2025  Hospital Length of Stay: 0 days  Code Status: Full Code   Attending Provider: Arturo Carr MD   Consulting Provider: Tanisha Sharma NP  Primary Care Physician: Olga Altman MD  Principal Problem:Unstable angina    Patient information was obtained from patient and ER records.     Inpatient consult to Cardiology  Consult performed by: Tanisha Sharma NP  Consult ordered by: Arturo Carr MD        Subjective:     Chief Complaint:  chest pain     HPI:   Ms. Vick is a 70 year old female patient whose current medical conditions include COPD, former tobacco abuse, CAD s/p prior stent (most recent PCI of RCA 9/23), HTN, hyperlipidemia, GERD, chronic mesenteric ischemia, vascular dementia, and AAA who presented to [facility] ED from cardiology clinic after c/o profound weakness, unable to do much of anything without being completely wiped out. Also having bouts of waxing and waning substernal chest heaviness/pressure. Vice-like in nature. Lasts a few minutes and feels suffocating at times and then abates. Associated with SOB and extreme fatigue along with an episode of left-sided jaw shocking discomfort.   Cardiology consulted to assist with management Pt seen and examined today resting in bed states CP episodes accompanied by jaw pain. [place] reports h/o POTs. Labs reviewed, Troponin nml, BNP nml, Crt 0.9, Echo pending, EKG SR 1st AVB    Past Medical History:   Diagnosis Date    AAA (abdominal aortic aneurysm) 02/13/2014    Abdominal aneurysm     3    Acute coronary syndrome     Anemia     Arthritis 2009    BPPV (benign paroxysmal positional vertigo)     Carotid artery plaque     Carotid artery stenosis and occlusion 02/13/2014    Chronic back pain     COPD (chronic obstructive pulmonary disease)     Coronary artery disease     Dementia     Emphysema lung     Heart attack 1998    Several after  that with 4 stents    Hyperlipidemia     Hypertension 1998    Joint pain 2000    Myocardial infarction     x3    Neuropathy     Personal history of COVID-19 06/09/2021 11/16/2020 +Covid, recovered at home        Past Surgical History:   Procedure Laterality Date    CARDIAC CATHETERIZATION      CHOLECYSTECTOMY  1987    Stones in bile duct    CORONARY ANGIOPLASTY      CORONARY STENT PLACEMENT N/A 09/12/2023    Procedure: INSERTION, STENT, CORONARY ARTERY;  Surgeon: Millie Juarez MD;  Location: [facility] CATH LAB;  Service: Cardiology;  Laterality: N/A;    EYE SURGERY  08/31    Catarates removed    FRACTURE SURGERY  1971    Hand surgery    HYSTERECTOMY  1988    LEFT HEART CATHETERIZATION Left 09/12/2023    Procedure: Left heart cath;  Surgeon: Millie Juarez MD;  Location: [facility] CATH LAB;  Service: Cardiology;  Laterality: Left;    PERCUTANEOUS CORONARY INTERVENTION, ARTERY N/A 09/12/2023    Procedure: Percutaneous coronary intervention;  Surgeon: Millie Juarez MD;  Location: [facility] CATH LAB;  Service: Cardiology;  Laterality: N/A;    THROMBECTOMY, CORONARY  09/12/2023    Procedure: Thrombectomy, Coronary;  Surgeon: Millie Juarez MD;  Location: [facility] CATH LAB;  Service: Cardiology;;    TONSILLECTOMY      TRANSFORAMINAL EPIDURAL INJECTION OF STEROID Right 12/01/2023    Procedure: Injection,steroid,epidural,transforaminal approach- right side, L4/5 and L5/S1;  Surgeon: Lydia Roberts MD;  Location: [facility];  Service: Pain Management;  Laterality: Right;    TUBAL LIGATION  1985    Afterr last child       Review of patient's allergies indicates:  No Known Allergies    No current facility-administered medications on file prior to encounter.     Current Outpatient Medications on File Prior to Encounter   Medication Sig    acetaminophen (TYLENOL) 500 MG tablet Take 2,000 mg by mouth every evening.    albuterol-ipratropium (DUO-NEB) 2.5 mg-0.5 mg/3 mL nebulizer solution Take 3 mLs by nebulization every 6 (six)  hours as needed for Wheezing.    amLODIPine (NORVASC) 5 MG tablet TAKE 1 TABLET BY MOUTH ONCE DAILY    aspirin 81 MG Chew Take 81 mg by mouth once daily.    budesonide-glycopyr-formoterol (BREZTRI AEROSPHERE) 160-9-4.8 mcg/actuation HFAA Inhale 2 puffs into the lungs 2 (two) times a day.    cloNIDine (CATAPRES) 0.1 MG tablet Take 1 tablet (0.1 mg total) by mouth daily as needed (if Blood pressure above 170/90).    clopidogreL (PLAVIX) 75 mg tablet TAKE 1 TABLET BY MOUTH ONCE DAILY    COMBIVENT RESPIMAT  mcg/actuation inhaler Inhale 2 puffs into the lungs every 6 (six) hours as needed for Wheezing or Shortness of Breath.    cyanocobalamin (VITAMIN B-12) 100 MCG tablet Take 100 mcg by mouth once daily.    diclofenac sodium (VOLTAREN) 1 % Gel Apply topically 4 (four) times daily.    dicyclomine (BENTYL) 10 MG capsule TAKE ONE CAPSULE BY MOUTH THREE TIMES DAILY AS NEEDED    DULoxetine (CYMBALTA) 20 MG capsule Take 20 mg by mouth.    estradioL (ESTRACE) 0.5 MG tablet TAKE 1 TABLET BY MOUTH ONCE DAILY    ezetimibe-simvastatin 10-40 mg (VYTORIN) 10-40 mg per tablet TAKE 1 TABLET BY MOUTH EVERY EVENING    famotidine (PEPCID) 20 MG tablet Take 1 tablet (20 mg total) by mouth 2 (two) times daily as needed for Heartburn.    furosemide (LASIX) 20 MG tablet TAKE 1 TABLET BY MOUTH ONCE DAILY AS NEEDED    inhalation spacing device (COMPACT SPACE CHAMBER) USE AS DIRECTED    l-methylfolate-b2-b6-b12 (CEREFOLIN) 6-5-50-1 mg Tab Take 1 tablet by mouth once daily. B2 B6 B12    memantine (NAMENDA) 10 MG Tab Take 1 tablet (10 mg total) by mouth 2 (two) times daily.    olmesartan (BENICAR) 40 MG tablet TAKE 1 TABLET BY MOUTH ONCE DAILY    OXYGEN-AIR DELIVERY SYSTEMS MISC 3 L by Misc.(Non-Drug; Combo Route) route every evening.    pantoprazole (PROTONIX) 40 MG tablet TAKE 1 TABLET BY MOUTH ONCE DAILY    vitamin D (VITAMIN D3) 1000 units Tab Take 1,000 Units by mouth once daily.    zinc gluconate 50 mg tablet     zolpidem (AMBIEN) 10  mg Tab TAKE 1 TABLET BY MOUTH EVERY EVENING     Family History       Problem Relation (Age of Onset)    Arthritis Father, Brother    Breast cancer Paternal Aunt    Early death Mother, Father, Brother, Brother    Heart attack Brother    Heart attacks under age 50 Father, Brother    Heart disease Mother, Father, Brother, Sister, Brother    Hyperlipidemia Sister    Hypertension Brother, Sister          Tobacco Use    Smoking status: Former     Current packs/day: 0.00     Average packs/day: 0.6 packs/day for 50.2 years (30.0 ttl pk-yrs)     Types: Cigarettes     Start date: 1970     Quit date: 2017     Years since quittin.1    Smokeless tobacco: Never    Tobacco comments:     Cant remember but after heart attack   Substance and Sexual Activity    Alcohol use: Never    Drug use: Never    Sexual activity: Not Currently     Partners: Male     Birth control/protection: Post-menopausal, See Surgical Hx     Review of Systems   Constitutional: Negative.   HENT: Negative.     Eyes: Negative.    Cardiovascular:  Positive for chest pain.   Respiratory: Negative.     Skin: Negative.    Musculoskeletal: Negative.    Gastrointestinal: Negative.    Genitourinary: Negative.    Neurological: Negative.    Psychiatric/Behavioral: Negative.       Objective:     Vital Signs (Most Recent):  Temp: 97.9 °F (36.6 °C) (25 0756)  Pulse: 69 (25 0802)  Resp: 18 (25 0802)  BP: (!) 142/64 (25 0756)  SpO2: 96 % (25 0802) Vital Signs (24h Range):  Temp:  [97.4 °F (36.3 °C)-98.1 °F (36.7 °C)] 97.9 °F (36.6 °C)  Pulse:  [66-90] 69  Resp:  [14-20] 18  SpO2:  [93 %-97 %] 96 %  BP: (108-194)/(53-86) 142/64     Weight: 64.5 kg (142 lb 3.2 oz)  Body mass index is 26.87 kg/m².    SpO2: 96 %       No intake or output data in the 24 hours ending 25 1052    Lines/Drains/Airways       Peripheral Intravenous Line  Duration             Peripheral IV Single Lumen 25 1633 20 G Left Antecubital <1 day               "       Physical Exam  Vitals and nursing note reviewed.   Constitutional:       Appearance: Normal appearance.   HENT:      Head: Normocephalic.   Eyes:      Pupils: Pupils are equal, round, and reactive to light.   Cardiovascular:      Rate and Rhythm: Normal rate and regular rhythm.      Heart sounds: Normal heart sounds, S1 normal and S2 normal. No murmur heard.     No S3 or S4 sounds.   Pulmonary:      Effort: Pulmonary effort is normal.      Breath sounds: Normal breath sounds.   Abdominal:      General: Bowel sounds are normal.      Palpations: Abdomen is soft.   Musculoskeletal:         General: Normal range of motion.      Cervical back: Normal range of motion.   Skin:     Capillary Refill: Capillary refill takes less than 2 seconds.   Neurological:      General: No focal deficit present.      Mental Status: She is alert and oriented to person, place, and time.   Psychiatric:         Mood and Affect: Mood normal.         Behavior: Behavior normal.         Thought Content: Thought content normal.          Significant Labs: BMP:   Recent Labs   Lab 06/26/25  1632 06/27/25  0356   GLU 96 88   * 129*   K 4.1 4.0   CL 99 99   CO2 22* 23   BUN 14 14   CREATININE 0.9 0.9   CALCIUM 9.1 8.5*   , CMP   Recent Labs   Lab 06/26/25  1632 06/27/25  0356   * 129*   K 4.1 4.0   CL 99 99   CO2 22* 23   GLU 96 88   BUN 14 14   CREATININE 0.9 0.9   CALCIUM 9.1 8.5*   PROT 7.1  --    ALBUMIN 3.4*  --    BILITOT 0.5  --    ALKPHOS 61  --    AST 15  --    ALT 11  --    ANIONGAP 8 7*   , CBC   Recent Labs   Lab 06/26/25  1632 06/27/25  0356   WBC 5.65 5.92   HGB 11.5* 10.3*   HCT 34.1* 30.5*    215   , INR No results for input(s): "INR", "PROTIME" in the last 48 hours., Lipid Panel No results for input(s): "CHOL", "HDL", "LDLCALC", "TRIG", "CHOLHDL" in the last 48 hours., Troponin No results for input(s): "TROPONINIHS" in the last 48 hours., and All pertinent lab results from the last 24 hours have been " reviewed.    Significant Imaging: Cardiac Cath: reviewed, Echocardiogram: Transthoracic echo (TTE) complete (Cupid Only):   Results for orders placed or performed during the hospital encounter of 02/26/25   Echo   Result Value Ref Range    BSA 1.65 m2    LA WIDTH 2.9 cm    LVOT stroke volume 70.6 cm3    LVIDd 3.3 (A) 3.5 - 6.0 cm    LV Systolic Volume 18 mL    LV Systolic Volume Index 11.0 mL/m2    LVIDs 2.3 2.1 - 4.0 cm    LV Diastolic Volume 43 mL    LV Diastolic Volume Index 26.38 mL/m2    Left Ventricular End Systolic Volume by Teichholz Method 18.13 mL    Left Ventricular End Diastolic Volume by Teichholz Method 42.84 mL    IVS 1.2 (A) 0.6 - 1.1 cm    LVOT diameter 1.9 cm    LVOT area 2.8 cm2    FS 30.3 28 - 44 %    Left Ventricle Relative Wall Thickness 0.73 cm    PW 1.2 (A) 0.6 - 1.1 cm    LV mass 124.8 g    LV Mass Index 76.6 g/m2    MV Peak E Oz 1.21 m/s    TDI LATERAL 0.09 m/s    TDI SEPTAL 0.10 m/s    E/E' ratio 13 m/s    MV Peak A Oz 1.02 m/s    TR Max Oz 2.7 m/s    E/A ratio 1.19     IVRT 83 msec    E wave deceleration time 194 msec    LV SEPTAL E/E' RATIO 12.1 m/s    CARLYN 14 mL/m2    LV LATERAL E/E' RATIO 13.4 m/s    LA Vol 23 cm3    LVOT peak oz 1.0 m/s    Left Ventricular Outflow Tract Mean Velocity 0.79 cm/s    Left Ventricular Outflow Tract Mean Gradient 2.68 mmHg    RV- lentz basal diam 2.7 cm    RVOT peak VTI 18.5 cm    TAPSE 1.92 cm    RV/LV Ratio 0.82 cm    LA size 2.5 cm    Left Atrium Minor Axis 3.5 cm    Left Atrium Major Axis 3.9 cm    RA Major Axis 4.14 cm    AV regurgitation pressure 1/2 time 630 ms    AR Max Oz 5.02 m/s    AV mean gradient 3 mmHg    AV peak gradient 5 mmHg    Ao peak oz 1.1 m/s    Ao VTI 29.5 cm    LVOT peak VTI 24.9 cm    AV valve area 2.4 cm²    AV Velocity Ratio 0.91     AV index (prosthetic) 0.84     ELIDA by Velocity Ratio 2.6 cm²    Mr max oz 4.91 m/s    MV stenosis pressure 1/2 time 56.25 ms    MV valve area p 1/2 method 3.91 cm2    PV mean gradient 2 mmHg     RVOT peak samantha 0.80 m/s    Ao root annulus 3.64 cm    STJ 3.40 cm    Ascending aorta 3.25 cm    IVC diameter 2.14 cm    Mean e' 0.10 m/s    ZLVIDS -1.70     ZLVIDD -3.32     RA Width 2.5 cm    EF 60 %    TV resting pulmonary artery pressure 32 mmHg    RV TB RVSP 6 mmHg    Est. RA pres 3 mmHg    Narrative      Left Ventricle: The left ventricle is normal in size. Normal wall   thickness. There is concentric remodeling. There is normal systolic   function. Ejection fraction is approximately 60%. There is normal   diastolic function.    Right Ventricle: The right ventricle is normal in size. Wall thickness   is normal. Systolic function is normal.    Aortic Valve: There is moderate aortic valve sclerosis.    Pulmonary Artery: The estimated pulmonary artery systolic pressure is   32 mmHg.    IVC/SVC: Normal venous pressure at 3 mmHg.     , EKG: reviewed, Stress Test: reviewed, and X-Ray: CXR: X-Ray Chest 1 View (CXR): No results found for this visit on 06/26/25.  Assessment and Plan:     * Unstable angina  No chest pain on exam  EKG SR 1st AVB  Cont amlodipine, ASA, plavix  Added Imdur  Assess response  Nuc stress neg in Feb 25'  Echo pending      COPD, severe  Per primary team    Essential hypertension  Titrate medications        VTE Risk Mitigation (From admission, onward)           Ordered     enoxaparin injection 40 mg  Every 24 hours         06/26/25 1873                    Thank you for your consult. I will follow-up with patient. Please contact us if you have any additional questions.    Tanisha Sharma, NP  Cardiology   [facility] Tele[facility] ([facility])

## 2025-06-27 NOTE — HPI
Ms. Vick is a 70 year old female patient whose current medical conditions include COPD, former tobacco abuse, CAD s/p prior stent (most recent PCI of RCA 9/23), HTN, hyperlipidemia, GERD, chronic mesenteric ischemia, vascular dementia, and AAA who presented to [facility] ED from cardiology clinic after c/o profound weakness, unable to do much of anything without being completely wiped out. Also having bouts of waxing and waning substernal chest heaviness/pressure. Vice-like in nature. Lasts a few minutes and feels suffocating at times and then abates. Associated with SOB and extreme fatigue along with an episode of left-sided jaw shocking discomfort.   Cardiology consulted to assist with management Pt seen and examined today resting in bed. Labs reviewed, Troponin nml, BNP nml, Crt 0.9, Echo pending, EKG SR 1st AVB

## 2025-06-27 NOTE — HOSPITAL COURSE
Patient was admitted for unstable angina.  Troponins remain negative.  Cardiology consult on case.  Imdur 30 mg b.i.d. added.  Echo reviewed which was normal.  Patient medically cleared for discharge from Cardiology standpoint.  She was discharged home.

## 2025-06-27 NOTE — NURSING
Patient states she wears 3L O2 at home during the night. Patient's O2 on room air is 94%. 2L NC placed on patient O2 reading at 95%.

## 2025-06-27 NOTE — DISCHARGE INSTRUCTIONS
Our goal at [facility] is to always give you outstanding care and exceptional service. You may receive a survey from [employer] by mail, text or e-mail in the next 24-48 hours asking about the care you received with us. The survey should only take 5-10 minutes to complete and is very important to us.     Your feedback provides us with a way to recognize our staff who work tirelessly to provide the best care! Also, your responses help us learn how to improve when your experience was below our aspiration of excellence. We are always looking for ways to improve your stay. We WILL use your feedback to continue making improvements to help us provide the highest quality care. We keep your personal information and feedback confidential. We appreciate your time completing this survey and can't wait to hear from you!!!    We look forward to your continued care with us! Thanks so much for choosing [facility] for your healthcare needs! MEHRAN

## 2025-06-27 NOTE — ASSESSMENT & PLAN NOTE
Patient's blood pressure range in the last 24 hours was: BP  Min: 108/53  Max: 188/78.The patient's inpatient anti-hypertensive regimen is listed below:  Current Antihypertensives  amLODIPine tablet 5 mg, Daily, Oral  hydrALAZINE injection 10 mg, Every 6 hours PRN, Intravenous  isosorbide mononitrate 24 hr tablet 30 mg, 2 times daily, Oral  nitroGLYCERIN SL tablet 0.4 mg, Every 5 min PRN, Sublingual  isosorbide mononitrate (IMDUR) 24 hr tablet, 2 times daily, Oral    Plan  - BP is controlled, no changes needed to their regimen

## 2025-06-27 NOTE — PLAN OF CARE
A209/A209 YANA Vick is a 70 y.o.female admitted on 6/26/2025 for Unstable angina   Code Status: Full Code MRN: [id number]   Review of patient's allergies indicates:  No Known Allergies  Past Medical History:   Diagnosis Date    AAA (abdominal aortic aneurysm) 02/13/2014    Abdominal aneurysm     3    Acute coronary syndrome     Anemia     Arthritis 2009    BPPV (benign paroxysmal positional vertigo)     Carotid artery plaque     Carotid artery stenosis and occlusion 02/13/2014    Chronic back pain     COPD (chronic obstructive pulmonary disease)     Coronary artery disease     Dementia     Emphysema lung     Heart attack 1998    Several after that with 4 stents    Hyperlipidemia     Hypertension 1998    Joint pain 2000    Myocardial infarction     x3    Neuropathy     Personal history of COVID-19 06/09/2021 11/16/2020 +Covid, recovered at home       PRN meds    acetaminophen, 650 mg, Q6H PRN  albuterol-ipratropium, 3 mL, Q6H PRN  hydrALAZINE, 10 mg, Q6H PRN  nitroGLYCERIN, 0.4 mg, Q5 Min PRN  ondansetron, 4 mg, Q6H PRN      Chart check completed. Will continue plan of care.         [place] Coma Scale Score: 15     Lead Monitored: Lead II Rhythm: normal sinus rhythm    Cardiac/Telemetry Box Number: [id number]    Last Bowel Movement: 06/25/25  Diet NPO     Terry Score: 20  Fall Risk Score: 8  Accucheck []   Freq?      Lines/Drains/Airways       Peripheral Intravenous Line  Duration             Peripheral IV Single Lumen 06/26/25 1633 20 G Left Antecubital <1 day

## 2025-06-30 ENCOUNTER — TELEPHONE (OUTPATIENT)
Dept: FAMILY MEDICINE | Facility: CLINIC | Age: 70
End: 2025-06-30
Payer: MEDICARE

## 2025-06-30 ENCOUNTER — PATIENT OUTREACH (OUTPATIENT)
Dept: ADMINISTRATIVE | Facility: CLINIC | Age: 70
End: 2025-06-30
Payer: MEDICARE

## 2025-06-30 NOTE — TELEPHONE ENCOUNTER
----- Message from Nurse Ogden sent at 6/30/2025 11:44 AM CDT -----  Regarding: Medication  Patient reports taking the following medications that are not a part of the discharge summary or MAR.     PLEASE RESPOND DIRECTLY TO THE PT IN REGARD TO THIS MESSAGE.    List Meds  Salt stick tablet OTC one tablet daily.      Respectfully,  Alin Martinez LPN  Transition of Care

## 2025-06-30 NOTE — PROGRESS NOTES
C3 nurse spoke with Gemma Vick  for a TCC post hospital discharge follow up call. The patient has a scheduled [facility] appointment with Olga Altman MD  on 7/7/25 @ 1489.

## 2025-07-07 ENCOUNTER — OFFICE VISIT (OUTPATIENT)
Dept: FAMILY MEDICINE | Facility: CLINIC | Age: 70
End: 2025-07-07
Payer: MEDICARE

## 2025-07-07 ENCOUNTER — LAB VISIT (OUTPATIENT)
Dept: LAB | Facility: HOSPITAL | Age: 70
End: 2025-07-07
Attending: FAMILY MEDICINE
Payer: MEDICARE

## 2025-07-07 VITALS
DIASTOLIC BLOOD PRESSURE: 68 MMHG | WEIGHT: 138.75 LBS | BODY MASS INDEX: 26.2 KG/M2 | SYSTOLIC BLOOD PRESSURE: 130 MMHG | HEIGHT: 61 IN | OXYGEN SATURATION: 98 % | HEART RATE: 87 BPM

## 2025-07-07 DIAGNOSIS — E87.1 HYPONATREMIA: ICD-10-CM

## 2025-07-07 DIAGNOSIS — D64.9 CHRONIC ANEMIA: ICD-10-CM

## 2025-07-07 DIAGNOSIS — Z09 HOSPITAL DISCHARGE FOLLOW-UP: Primary | ICD-10-CM

## 2025-07-07 DIAGNOSIS — I05.8 MITRAL VALVE SCLEROSIS: ICD-10-CM

## 2025-07-07 DIAGNOSIS — I10 ESSENTIAL (PRIMARY) HYPERTENSION: ICD-10-CM

## 2025-07-07 DIAGNOSIS — L29.9 ITCHING: ICD-10-CM

## 2025-07-07 LAB
FERRITIN SERPL-MCNC: 62 NG/ML (ref 20–300)
LDH SERPL-CCNC: 162 U/L (ref 110–260)
RETICS/RBC NFR AUTO: 2 % (ref 0.5–2.5)

## 2025-07-07 PROCEDURE — 85045 AUTOMATED RETICULOCYTE COUNT: CPT

## 2025-07-07 PROCEDURE — 83615 LACTATE (LD) (LDH) ENZYME: CPT

## 2025-07-07 PROCEDURE — 82525 ASSAY OF COPPER: CPT

## 2025-07-07 PROCEDURE — 36415 COLL VENOUS BLD VENIPUNCTURE: CPT | Mod: PN

## 2025-07-07 PROCEDURE — 82728 ASSAY OF FERRITIN: CPT

## 2025-07-07 PROCEDURE — 83540 ASSAY OF IRON: CPT

## 2025-07-07 PROCEDURE — 82607 VITAMIN B-12: CPT

## 2025-07-07 PROCEDURE — 99214 OFFICE O/P EST MOD 30 MIN: CPT | Mod: PBBFAC,PN | Performed by: FAMILY MEDICINE

## 2025-07-07 PROCEDURE — 82746 ASSAY OF FOLIC ACID SERUM: CPT

## 2025-07-07 PROCEDURE — 99999 PR PBB SHADOW E&M-EST. PATIENT-LVL IV: CPT | Mod: PBBFAC,,, | Performed by: FAMILY MEDICINE

## 2025-07-07 RX ORDER — FUROSEMIDE 20 MG/1
20 TABLET ORAL
Qty: 90 TABLET | Refills: 2 | Status: SHIPPED | OUTPATIENT
Start: 2025-07-07 | End: 2025-07-07

## 2025-07-07 RX ORDER — EZETIMIBE AND SIMVASTATIN 10; 40 MG/1; MG/1
1 TABLET ORAL NIGHTLY
Qty: 90 TABLET | Refills: 0 | Status: SHIPPED | OUTPATIENT
Start: 2025-07-07

## 2025-07-07 NOTE — TELEPHONE ENCOUNTER
No care due was identified.  [facility] Embedded Care Due Messages. Reference number: [id number].   7/07/2025 8:38:00 AM CDT

## 2025-07-07 NOTE — PROGRESS NOTES
Chief Complaint:    Chief Complaint   Patient presents with    Follow-up       History of Present Illness:    History of Present Illness    Patient presents today for medication refill of Vyvatorin She reports a recent 2-day hospitalization due to severely elevated blood pressure of 210/180. Home blood pressure readings vary significantly, ranging from 130/65 to 180/100. She has a long-standing history of hypertension. She experiences a persistent pulsing sensation in neck and ears but denies chest pain or neck pain. Echocardiogram during hospitalization was abnormal. During recent hospitalization, multiple lab values were significantly low. She has long-standing anemia, confirmed by CBC. She experiences coldness which may be related to anemia. She also has extremely low sodium levels with associated symptoms including frequent muscle cramps. She takes Vyvatorin 10/40 mg, Amlodipine 5 mg, and Imdur. Her supplements include Vitamin B2, B6, B12, zinc, and magnesium. She reports needing a new Vytorin prescription after misplacing the previous one. She reports new onset of severe pruritus with self-inflicted excoriations, suggesting possible correlation with recent medication or treatment. She reports back pain. Colonoscopy one month ago showed normal findings.      ROS:  General: denies fever, admits chills, denies fatigue, denies weight gain, denies weight loss, denies loss of appetite  Eyes: denies vision changes, denies blurry vision, denies eye pain, denies eye discharge  ENT: denies ear pain, denies hearing loss, denies tinnitus, denies nasal congestion, denies sore throat  Cardiovascular: denies chest pain, denies palpitations, denies lower extremity edema  Respiratory: denies cough, denies shortness of breath, denies wheezing, denies sputum production  Endocrine: denies polyuria, denies polydipsia, denies heat intolerance, denies cold intolerance  Gastrointestinal: denies abdominal pain, denies heartburn, denies  nausea, denies vomiting, denies diarrhea, denies constipation, denies blood in stool  Genitourinary: denies dysuria, denies urgency, denies frequency, denies hematuria, denies nocturia, denies incontinence  Heme & Lymphatic: denies easy or excessive bleeding, denies easy bruising, denies swollen lymph nodes  Musculoskeletal: denies muscle pain, admits back pain, denies joint pain, denies joint swelling  Skin: denies rash, denies lesion, admits itching, denies skin texture changes, denies skin color changes  Neurological: denies headache, denies dizziness, denies numbness, denies tingling, denies seizure activity, denies speech difficulty, denies memory loss, denies confusion  Psychiatric: denies anxiety, denies depression, denies sleep difficulty           Past Medical History:   Diagnosis Date    AAA (abdominal aortic aneurysm) 2014    Abdominal aneurysm     3    Acute coronary syndrome     Anemia     Arthritis     BPPV (benign paroxysmal positional vertigo)     Carotid artery plaque     Carotid artery stenosis and occlusion 2014    Chronic back pain     COPD (chronic obstructive pulmonary disease)     Coronary artery disease     Dementia     Emphysema lung     Heart attack 1998    Several after that with 4 stents    Hyperlipidemia     Hypertension     Joint pain     Myocardial infarction     x3    Neuropathy     Personal history of COVID-19 2021 +Covid, recovered at home        Social History:  Social History     Socioeconomic History    Marital status:    Tobacco Use    Smoking status: Former     Current packs/day: 0.00     Average packs/day: 0.6 packs/day for 50.2 years (30.0 ttl pk-yrs)     Types: Cigarettes     Start date: 1970     Quit date: 2017     Years since quittin.1    Smokeless tobacco: Never    Tobacco comments:     Cant remember but after heart attack   Substance and Sexual Activity    Alcohol use: Never    Drug use: Never    Sexual  activity: Not Currently     Partners: Male     Birth control/protection: Post-menopausal, See Surgical Hx     Social Drivers of Health     Financial Resource Strain: Patient Declined (3/1/2025)    Overall Financial Resource Strain (CARDIA)     Difficulty of Paying Living Expenses: Patient declined   Recent Concern: Financial Resource Strain - Medium Risk (2/26/2025)    Overall Financial Resource Strain (CARDIA)     Difficulty of Paying Living Expenses: Somewhat hard   Food Insecurity: Food Insecurity Present (2/26/2025)    Hunger Vital Sign     Worried About Running Out of Food in the Last Year: Sometimes true     Ran Out of Food in the Last Year: Sometimes true   Transportation Needs: No Transportation Needs (2/26/2025)    PRAPARE - Transportation     Lack of Transportation (Medical): No     Lack of Transportation (Non-Medical): No   Physical Activity: Insufficiently Active (2/26/2025)    Exercise Vital Sign     Days of Exercise per Week: 1 day     Minutes of Exercise per Session: 20 min   Stress: Stress Concern Present (2/26/2025)    [employer] - Occupational Stress Questionnaire     Feeling of Stress : Very much   Housing Stability: Patient Declined (3/1/2025)    Housing Stability Vital Sign     Unable to Pay for Housing in the Last Year: Patient declined     Number of Times Moved in the Last Year: 0     Homeless in the Last Year: Patient declined       Family History:   family history includes Arthritis in her brother and father; Breast cancer in her paternal aunt; Early death in her brother, brother, father, and mother; Heart attack in her brother; Heart attacks under age 50 in her brother and father; Heart disease in her brother, brother, father, mother, and sister; Hyperlipidemia in her sister; Hypertension in her brother and sister.    Health Maintenance   Topic Date Due    RSV Vaccine (Age 60+ and Pregnant patients) (1 - Risk 60-74 years 1-dose series) Never done    Colorectal  "Cancer Screening  05/26/2025    Shingles Vaccine (1 of 2) 03/03/2026 (Originally 2/22/2005)    COVID-19 Vaccine (1 - 2024-25 season) 03/03/2026 (Originally 9/1/2024)    Pneumococcal Vaccines (Age 50+) (2 of 2 - PCV) 03/03/2026 (Originally 9/27/2013)    Influenza Vaccine (1) 09/01/2025    Mammogram  12/13/2025    LDCT Lung Screen  05/21/2026    Hemoglobin A1c (Prediabetes)  06/11/2026    Lipid Panel  06/11/2026    TETANUS VACCINE  11/15/2026    DEXA Scan  04/04/2029    Hepatitis C Screening  Completed       Exam:Physical     Vital Signs  Pulse: 87  SpO2: 98 %  BP: (!) 140/68  Pain Score: 0-No pain  Height and Weight  Height: 5' 1" (154.9 cm)  Weight: 62.9 kg (138 lb 12.5 oz)  BSA (Calculated - sq m): 1.65 sq meters  BMI (Calculated): 26.2  Weight in (lb) to have BMI = 25: 132]    Body mass index is 26.22 kg/m².    Physical Exam    General: Well-developed. Well-nourished. No acute distress.  Eyes: EOMI. Sclerae anicteric.  HENT: Normocephalic. Atraumatic. Nares patent. Moist oral mucosa.  Cardiovascular: Regular rate. Regular rhythm. No murmurs. No rubs. No gallops. Normal S1, S2.  Respiratory: Normal respiratory effort. Clear to auscultation bilaterally. No rales. No rhonchi. No wheezing.  Musculoskeletal: No  obvious deformity.  Extremities: No lower extremity edema.  Neurological: Alert & oriented x3. No slurred speech. Normal gait.  Psychiatric: Normal mood. Normal affect. Good insight. Good judgment.  Skin: Warm. Dry. No rash.           Assessment:        ICD-10-CM ICD-9-CM   1. Hospital discharge follow-up  Z09 V67.59   2. Itching  L29.9 698.9   3. Chronic anemia  D64.9 285.9   4. Mitral valve sclerosis  I05.8 394.9   5. Hyponatremia  E87.1 276.1         Plan:    Assessment & Plan    MEDICAL DECISION MAKING:  - Reviewed recent hospital discharge summary and echo results.  - Aortic valve sclerosis with mild regurgitation and diastolic dysfunction, not requiring immediate intervention but warranting monitoring.  - " BP control remains suboptimal.  - Considered medication adjustments to address HTN.  - Anemia on CBC, requiring further workup.  - low sodium levels, requiring nephrology consult.    PATIENT EDUCATION:  - Explained aortic valve sclerosis, its potential progression, and future treatment options.  - Discussed importance of maintaining BP below 140/90 mmHg.  - Educated on significance of low sodium levels and need for further evaluation.    ACTION ITEMS/LIFESTYLE:  - Patient to monitor BP at home and bring log to next appointment, aiming for readings below 140/90 mmHg.    MEDICATIONS:  - Increased amlodipine from 5 mg to 10 mg daily for better BP control.  - Refilled Vyvatorin prescription.  - Discontinued medication temporarily to assess if it is causing itching.    ORDERS:  - Ordered CBC and additional labs to investigate anemia.  - Ordered stool sample collection (3 separate days) for further anemia workup.    REFERRALS:  - Referred to nephrology for evaluation of low sodium levels.    FOLLOW UP:  - Follow up in a few weeks.         Gemma was seen today for follow-up.    Diagnoses and all orders for this visit:    Hospital discharge follow-up    Itching    Chronic anemia  -     Iron and TIBC; Future  -     Occult blood x 1, stool; Future  -     Occult blood x 1, stool; Future  -     Occult blood x 1, stool; Future  -     Reticulocytes; Future  -     Ferritin; Future  -     Lactate Dehydrogenase; Future  -     Copper, Serum; Future  -     Folate; Future  -     Vitamin B12; Future    Mitral valve sclerosis    Hyponatremia  -     E-Consult to Nephrology    Other orders  -     ezetimibe-simvastatin 10-40 mg (VYTORIN) 10-40 mg per tablet; Take 1 tablet by mouth every evening.        Follow up in about 3 weeks (around 7/28/2025).      Olga Altman MD

## 2025-07-08 ENCOUNTER — E-CONSULT (OUTPATIENT)
Dept: NEPHROLOGY | Facility: HOSPITAL | Age: 70
End: 2025-07-08
Payer: MEDICARE

## 2025-07-08 DIAGNOSIS — E87.1 HYPONATREMIA: Primary | ICD-10-CM

## 2025-07-08 LAB
FOLATE SERPL-MCNC: 7.1 NG/ML (ref 4–24)
IRON SATN MFR SERPL: 20 % (ref 20–50)
IRON SERPL-MCNC: 76 UG/DL (ref 30–160)
TIBC SERPL-MCNC: 371 UG/DL (ref 250–450)
TRANSFERRIN SERPL-MCNC: 251 MG/DL (ref 200–375)
VIT B12 SERPL-MCNC: >2000 PG/ML (ref 210–950)

## 2025-07-08 PROCEDURE — 99451 NTRPROF PH1/NTRNET/EHR 5/>: CPT | Mod: ,,, | Performed by: INTERNAL MEDICINE

## 2025-07-08 NOTE — CONSULTS
[facility] NEPHROLOGY  Response for E-Consult     Patient Name: Gemma Vick  MRN: [id number]  Primary Care Provider: Olga Altman MD   Requesting Provider: Olga Altman MD  Consults    Recommendation:  70-year-old female with mild-to-moderate hyponatremia.  Nephrology has been consulted for evaluation.  On review of the patient's chemistries she has had mild-to-moderate hyponatremia since for the past several years.  Serum sodium runs between about 128 and 135.  She is on several medications that can cause or exacerbate hyponatremia.  These include Cymbalta, Namenda, and Ambien.    If possible discontinuing these medications may help her hyponatremia.  Additionally her hyponatremia is mild and may be representative of a reset Osmo stat or an SIADH like syndrome..    Additional future steps to consider:  As above    Total time of Consultation: 5 minute    I did not speak to the requesting provider verbally about this.     *This eConsult is based on the clinical data available to me and is furnished without benefit of a physical examination. The eConsult will need to be interpreted in light of any clinical issues or changes in patient status not available to me at the time of filing this eConsults. Significant changes in patient condition or level of acuity should result in immediate formal consultation and reevaluation. Please alert me if you have further questions.    Thank you for this eConsult referral.     Jacob Swenson MD  [facility] NEPHROLOGY

## 2025-07-10 ENCOUNTER — OFFICE VISIT (OUTPATIENT)
Dept: CARDIOLOGY | Facility: CLINIC | Age: 70
End: 2025-07-10
Payer: MEDICARE

## 2025-07-10 ENCOUNTER — LAB VISIT (OUTPATIENT)
Dept: LAB | Facility: HOSPITAL | Age: 70
End: 2025-07-10
Attending: FAMILY MEDICINE
Payer: MEDICARE

## 2025-07-10 ENCOUNTER — TELEPHONE (OUTPATIENT)
Dept: CARDIOLOGY | Facility: CLINIC | Age: 70
End: 2025-07-10
Payer: MEDICARE

## 2025-07-10 VITALS
HEART RATE: 82 BPM | HEIGHT: 61 IN | WEIGHT: 139.75 LBS | DIASTOLIC BLOOD PRESSURE: 70 MMHG | SYSTOLIC BLOOD PRESSURE: 130 MMHG | BODY MASS INDEX: 26.39 KG/M2 | OXYGEN SATURATION: 97 %

## 2025-07-10 DIAGNOSIS — E78.2 MIXED HYPERLIPIDEMIA: ICD-10-CM

## 2025-07-10 DIAGNOSIS — D64.9 CHRONIC ANEMIA: ICD-10-CM

## 2025-07-10 DIAGNOSIS — I10 ESSENTIAL HYPERTENSION: ICD-10-CM

## 2025-07-10 DIAGNOSIS — R55 PRE-SYNCOPE: ICD-10-CM

## 2025-07-10 DIAGNOSIS — I25.118 CORONARY ARTERY DISEASE OF NATIVE ARTERY OF NATIVE HEART WITH STABLE ANGINA PECTORIS: Primary | ICD-10-CM

## 2025-07-10 DIAGNOSIS — R00.1 BRADYCARDIA: ICD-10-CM

## 2025-07-10 DIAGNOSIS — I50.32 CHRONIC DIASTOLIC HEART FAILURE: ICD-10-CM

## 2025-07-10 DIAGNOSIS — I49.5 SINUS NODE DYSFUNCTION: ICD-10-CM

## 2025-07-10 PROCEDURE — 99214 OFFICE O/P EST MOD 30 MIN: CPT | Mod: S$GLB,,,

## 2025-07-10 PROCEDURE — 3288F FALL RISK ASSESSMENT DOCD: CPT | Mod: CPTII,S$GLB,,

## 2025-07-10 PROCEDURE — 3008F BODY MASS INDEX DOCD: CPT | Mod: CPTII,S$GLB,,

## 2025-07-10 PROCEDURE — 1160F RVW MEDS BY RX/DR IN RCRD: CPT | Mod: CPTII,S$GLB,,

## 2025-07-10 PROCEDURE — 82272 OCCULT BLD FECES 1-3 TESTS: CPT

## 2025-07-10 PROCEDURE — 3044F HG A1C LEVEL LT 7.0%: CPT | Mod: CPTII,S$GLB,,

## 2025-07-10 PROCEDURE — 4010F ACE/ARB THERAPY RXD/TAKEN: CPT | Mod: CPTII,S$GLB,,

## 2025-07-10 PROCEDURE — 3078F DIAST BP <80 MM HG: CPT | Mod: CPTII,S$GLB,,

## 2025-07-10 PROCEDURE — 3075F SYST BP GE 130 - 139MM HG: CPT | Mod: CPTII,S$GLB,,

## 2025-07-10 PROCEDURE — 1159F MED LIST DOCD IN RCRD: CPT | Mod: CPTII,S$GLB,,

## 2025-07-10 PROCEDURE — 1101F PT FALLS ASSESS-DOCD LE1/YR: CPT | Mod: CPTII,S$GLB,,

## 2025-07-10 PROCEDURE — 99999 PR PBB SHADOW E&M-EST. PATIENT-LVL IV: CPT | Mod: PBBFAC,,,

## 2025-07-10 NOTE — TELEPHONE ENCOUNTER
Copied from CRM #[id number]. Topic: Appointments - Appointment Scheduling  >> Jul 10, 2025 12:30 PM Magi wrote:  Type:  Sooner Apoointment Request    Caller is requesting a sooner appointment.  Caller declined first available appointment listed below.  Caller will not accept being placed on the waitlist and is requesting a message be sent to doctor.  Name of Caller:Gemma Vick  When is the first available appointment?nov  Symptoms:f.u  Would the patient rather a call back or a response via [facility]? Call back  Best Call Back Number:[phone]  Additional Information: n/a

## 2025-07-10 NOTE — PROGRESS NOTES
Subjective:   Patient ID:  Gemma Vick is a 70 y.o. female who presents for evaluation after recent hospital admission. Her current medical conditions include CAD, HLD, HTN, sinus node dysfunction, chronic diastolic heart failure, bradycardia       HPI: Hospital Admission   Ms. Vick is a 70 year old female patient whose current medical conditions include COPD, former tobacco abuse, CAD s/p prior stent (most recent PCI of RCA 9/23), HTN, hyperlipidemia, GERD, chronic mesenteric ischemia, vascular dementia, and AAA who presented to [facility] ED from cardiology clinic after c/o profound weakness, unable to do much of anything without being completely wiped out. Also having bouts of waxing and waning substernal chest heaviness/pressure. Vice-like in nature. Lasts a few minutes and feels suffocating at times and then abates. Associated with SOB and extreme fatigue along with an episode of left-sided jaw shocking discomfort.   Cardiology consulted to assist with management Pt seen and examined today resting in bed states CP episodes accompanied by jaw pain. [place] reports h/o POTs. Labs reviewed, Troponin nml, BNP nml, Crt 0.9, Echo pending, EKG SR 1st AVB    7/10/25  Patient presents for follow up after recent hospital admission for chest pain. On discharge, Imdur was added for chest pain. She then saw her PCP who increased her amlodipine to 10 mg. She has continued to feel weak, tired, cold, and pounding in the neck and ears. She continues to have pounding in the chest as well. She also notes dizziness and her balance is off. She has had syncopal episodes in the past. Her PCP has worked her up for these symptoms as well.   Troponin and BNP were normal in the hospital. Echo with EF of 60-65%, grade I diastolic dysfunction. No wall motion abnormalities.     Past Medical History:   Diagnosis Date    AAA (abdominal aortic aneurysm) 02/13/2014    Abdominal aneurysm     3    Acute coronary syndrome     Anemia     Arthritis  2009    BPPV (benign paroxysmal positional vertigo)     Carotid artery plaque     Carotid artery stenosis and occlusion 02/13/2014    Chronic back pain     COPD (chronic obstructive pulmonary disease)     Coronary artery disease     Dementia     Emphysema lung     Heart attack 1998    Several after that with 4 stents    Hyperlipidemia     Hypertension 1998    Joint pain 2000    Myocardial infarction     x3    Neuropathy     Personal history of COVID-19 06/09/2021 11/16/2020 +Covid, recovered at home        Past Surgical History:   Procedure Laterality Date    CARDIAC CATHETERIZATION      CHOLECYSTECTOMY  1987    Stones in bile duct    CORONARY ANGIOPLASTY      CORONARY STENT PLACEMENT N/A 09/12/2023    Procedure: INSERTION, STENT, CORONARY ARTERY;  Surgeon: Millie Juarez MD;  Location: [facility] CATH LAB;  Service: Cardiology;  Laterality: N/A;    EYE SURGERY  08/31    Catarates removed    FRACTURE SURGERY  1971    Hand surgery    HYSTERECTOMY  1988    LEFT HEART CATHETERIZATION Left 09/12/2023    Procedure: Left heart cath;  Surgeon: Millei Juarez MD;  Location: [facility] CATH LAB;  Service: Cardiology;  Laterality: Left;    PERCUTANEOUS CORONARY INTERVENTION, ARTERY N/A 09/12/2023    Procedure: Percutaneous coronary intervention;  Surgeon: Millie Juarez MD;  Location: [facility] CATH LAB;  Service: Cardiology;  Laterality: N/A;    THROMBECTOMY, CORONARY  09/12/2023    Procedure: Thrombectomy, Coronary;  Surgeon: Millie Juarez MD;  Location: [facility] CATH LAB;  Service: Cardiology;;    TONSILLECTOMY      TRANSFORAMINAL EPIDURAL INJECTION OF STEROID Right 12/01/2023    Procedure: Injection,steroid,epidural,transforaminal approach- right side, L4/5 and L5/S1;  Surgeon: Lydia Roberts MD;  Location: [facility] PAIN T;  Service: Pain Management;  Laterality: Right;    TUBAL LIGATION  1985    Afterr last child       Social History[1]    Family History   Problem Relation Name Age of Onset    Heart disease Mother Katya     Early  death Mother Katya     Heart attacks under age 50 Father Ant     Arthritis Father Ant     Early death Father Ant     Heart disease Father Ant     Heart attacks under age 50 Brother Angel         SAAVEDRA at 32    Early death Brother Angel     Heart disease Brother Angel     Hypertension Brother Angel     Heart attack Brother          HA at 70    Breast cancer Paternal Aunt      Heart disease Sister Brenda     Hyperlipidemia Sister Brenda     Arthritis Brother Jose Armando     Early death Brother Jose Armando     Heart disease Brother Jose Armando     Hypertension Sister Marlo Carey        Wt Readings from Last 3 Encounters:   07/10/25 63.4 kg (139 lb 12.4 oz)   07/07/25 62.9 kg (138 lb 12.5 oz)   06/27/25 64.4 kg (142 lb)     Temp Readings from Last 3 Encounters:   06/27/25 97.4 °F (36.3 °C) (Oral)   06/23/25 97.9 °F (36.6 °C) (Oral)   06/22/25 97.5 °F (36.4 °C) (Oral)     BP Readings from Last 3 Encounters:   07/10/25 130/70   07/07/25 130/68   06/27/25 (!) 152/69     Pulse Readings from Last 3 Encounters:   07/10/25 82   07/07/25 87   06/27/25 73       Medications Ordered Prior to Encounter[2]    Cardiac Monitor - 3-15 Day Adult  Result Date: 4/8/2025    The predominant rhythm is sinus.    The patient was monitored for a total of 14d, underlying rhythm is Sinus.  The minimum heart rate was 65 bpm; the maximum 125 bpm; the average 83   bpm.  0 % of Atrial fibrillation/Atrial flutter with longest episode of 0 ms.  The total burden of AV Block present was 0 % [Complete Heart Block: 0 %;   Advanced (High Grade):  0 %; 2nd Degree, Mobitz II: 0 %; 2nd Degree, Mobitz I: 0 %].  There were 0 pauses, the longest pause was 0 ms at --.  Total count of Ventricular Tachycardia (VT): 0 episode(s). Longest VT: 0 s   on --. Fastest Ventricular Run: -- bpm on --. Total  Count of Ventricular Episodes <100bpm: 0 episode(s). Longest Ventricular   Event <100bpm: 0 s on --  1 supraventricular episodes were found. Longest SVT Episode 3 beats,   Fastest SVT  114 bpm  There were a total of 47 PVCs with 1 morphologies and 0 couplets. Overall   PVC [place] at < 0.01 %  There were a total of 0 Other Beats. There were 0 total number of paced   beats.  There were a total of 1016 PSVCs with 0 couplets. Overall PSVC [place] at  0.06 %  There is a total of 2 patient events.          Results for orders placed during the hospital encounter of 06/26/25    Echo    Interpretation Summary    Left Ventricle: The left ventricle is normal in size. There is normal systolic function with a visually estimated ejection fraction of 60 - 65%. Grade I diastolic dysfunction.    Right Ventricle: The right ventricle is normal in size.  Systolic function is normal.    Aortic Valve: There is moderate aortic valve sclerosis. There is mild aortic regurgitation.    Tricuspid Valve: There is mild regurgitation.    IVC/SVC: Normal venous pressure at 3 mmHg.    Results for orders placed during the hospital encounter of 02/26/25    Nuclear Stress - Cardiology Interpreted    Interpretation Summary    Normal myocardial perfusion scan. There is no evidence of myocardial ischemia or infarction.    The gated perfusion images showed an ejection fraction of 90% at rest. The gated perfusion images showed an ejection fraction of 95% post stress. Normal ejection fraction is greater than 59%.    There is normal wall motion at rest and post-stress.    LV cavity size is normal at rest and normal at post-stress.    The ECG portion of the study is negative for ischemia.        Results for orders placed or performed during the hospital encounter of 06/26/25   EKG 12-lead    Collection Time: 06/26/25  4:00 PM   Result Value Ref Range    QRS Duration 88 ms    [facility] QTC Calculation 418 ms    Narrative    Test Reason : R07.9,    Vent. Rate :  77 BPM     Atrial Rate :  77 BPM     P-R Int : 238 ms          QRS Dur :  88 ms      QT Int : 370 ms       P-R-T Axes :  41  77  28 degrees    QTcB Int : 418 ms    Sinus rhythm with 1st  degree A-V block  Cannot rule out Anteroseptal infarct (cited on or before 28-Feb-2025)  Abnormal ECG  When compared with ECG of 26-Jun-2025 14:45,  No significant change was found  Confirmed by Selwyn Baltazar ([id number]) on 6/27/2025 7:43:07 PM    Referred By: AAAREFERRAL SELF           Confirmed By: Selwyn Baltazar         Review of Systems   Constitutional: Positive for malaise/fatigue.   HENT: Negative.     Eyes: Negative.    Cardiovascular:  Positive for near-syncope and palpitations. Negative for chest pain, dyspnea on exertion, leg swelling, orthopnea, paroxysmal nocturnal dyspnea and syncope.   Respiratory: Negative.     Skin: Negative.    Musculoskeletal: Negative.    Gastrointestinal: Negative.    Genitourinary: Negative.    Neurological:  Positive for dizziness and weakness.   Psychiatric/Behavioral: Negative.           Physical Exam  Vitals and nursing note reviewed.   Constitutional:       Appearance: Normal appearance.   HENT:      Head: Normocephalic.   Eyes:      Pupils: Pupils are equal, round, and reactive to light.   Cardiovascular:      Rate and Rhythm: Normal rate and regular rhythm.      Heart sounds: Normal heart sounds, S1 normal and S2 normal. No murmur heard.     No S3 or S4 sounds.   Pulmonary:      Effort: Pulmonary effort is normal.      Breath sounds: Normal breath sounds.   Abdominal:      General: Bowel sounds are normal.      Palpations: Abdomen is soft.   Musculoskeletal:      Cervical back: Normal range of motion.      Right lower leg: No edema.      Left lower leg: No edema.   Skin:     Capillary Refill: Capillary refill takes less than 2 seconds.   Neurological:      General: No focal deficit present.      Mental Status: She is alert and oriented to person, place, and time.   Psychiatric:         Mood and Affect: Mood normal.         Behavior: Behavior normal.         Thought Content: Thought content normal.         Lab Results   Component Value Date    CHOL 160 06/11/2025    CHOL 138  04/18/2023    CHOL 143 12/01/2022     Lab Results   Component Value Date    HDL 62 06/11/2025    HDL 45 04/18/2023    HDL 56 12/01/2022     Lab Results   Component Value Date    LDLCALC 82.8 06/11/2025    LDLCALC 79.0 04/18/2023    LDLCALC 72.0 12/01/2022     Lab Results   Component Value Date    TRIG 76 06/11/2025    TRIG 70 04/18/2023    TRIG 75 12/01/2022     Lab Results   Component Value Date    CHOLHDL 38.8 06/11/2025    CHOLHDL 32.6 04/18/2023    CHOLHDL 39.2 12/01/2022       Chemistry        Component Value Date/Time     (L) 06/27/2025 0356     03/01/2025 0513    K 4.0 06/27/2025 0356    K 4.1 03/01/2025 0513    CL 99 06/27/2025 0356     03/01/2025 0513    CO2 23 06/27/2025 0356    CO2 23 03/01/2025 0513    BUN 14 06/27/2025 0356    CREATININE 0.9 06/27/2025 0356    GLU 88 06/27/2025 0356    GLU 83 03/01/2025 0513        Component Value Date/Time    CALCIUM 8.5 (L) 06/27/2025 0356    CALCIUM 8.8 03/01/2025 0513    ALKPHOS 61 06/26/2025 1632    ALKPHOS 55 03/01/2025 0513    AST 15 06/26/2025 1632    AST 16 03/01/2025 0513    ALT 11 06/26/2025 1632    ALT 14 03/01/2025 0513    BILITOT 0.5 06/26/2025 1632    BILITOT 0.4 03/01/2025 0513    ESTGFRAFRICA >60.0 04/07/2022 0922    EGFRNONAA >60.0 04/07/2022 0922          Lab Results   Component Value Date    TSH 1.894 02/26/2025     Lab Results   Component Value Date    INR 1.1 12/20/2017    INR 1.1 08/10/2012     Lab Results   Component Value Date    WBC 5.92 06/27/2025    HGB 10.3 (L) 06/27/2025    HCT 30.5 (L) 06/27/2025    MCV 93 06/27/2025     06/27/2025       Assessment:      1. Coronary artery disease of native artery of native heart with stable angina pectoris    2. Chronic diastolic heart failure    3. Bradycardia    4. Mixed hyperlipidemia    5. Essential hypertension    6. Pre-syncope        Plan:   Coronary artery disease of native artery of native heart with stable angina pectoris    Chronic diastolic heart  failure    Bradycardia  -     Cardiac Monitor - 3-15 Day Adult (Cupid Only); Future    Mixed hyperlipidemia    Essential hypertension    Pre-syncope  -     Cardiac Monitor - 3-15 Day Adult (Cupid Only); Future      VC x 7 days   1 month with Dr. Juarez's PA   Continue amlodipine, imdur, clonidine, olmesartan - HTN  Continue asa, imdur, plavix, statin - CAD  Continue statin - HLD     Haley Smith, PA-C [facility]               [1]   Social History  Tobacco Use    Smoking status: Former     Current packs/day: 0.00     Average packs/day: 0.6 packs/day for 50.2 years (30.0 ttl pk-yrs)     Types: Cigarettes     Start date: 1970     Quit date: 2017     Years since quittin.1    Smokeless tobacco: Never    Tobacco comments:     Cant remember but after heart attack   Substance Use Topics    Alcohol use: Never    Drug use: Never   [2]   Current Outpatient Medications on File Prior to Visit   Medication Sig Dispense Refill    acetaminophen (TYLENOL) 500 MG tablet Take 2,000 mg by mouth every evening.      albuterol-ipratropium (DUO-NEB) 2.5 mg-0.5 mg/3 mL nebulizer solution Take 3 mLs by nebulization every 6 (six) hours as needed for Wheezing. 360 mL 12    amLODIPine (NORVASC) 5 MG tablet TAKE 1 TABLET BY MOUTH ONCE DAILY 90 tablet 3    aspirin 81 MG Chew Take 81 mg by mouth once daily.      budesonide-glycopyr-formoterol (BREZTRI AEROSPHERE) 160-9-4.8 mcg/actuation HFAA Inhale 2 puffs into the lungs 2 (two) times a day. 32.1 g 3    cloNIDine (CATAPRES) 0.1 MG tablet Take 1 tablet (0.1 mg total) by mouth daily as needed (if Blood pressure above 170/90). 30 tablet 11    clopidogreL (PLAVIX) 75 mg tablet TAKE 1 TABLET BY MOUTH ONCE DAILY 90 tablet 2    COMBIVENT RESPIMAT  mcg/actuation inhaler Inhale 2 puffs into the lungs every 6 (six) hours as needed for Wheezing or Shortness of Breath. 12 g 3    cyanocobalamin (VITAMIN B-12) 100 MCG tablet Take 100 mcg by mouth once daily.      diclofenac sodium  (VOLTAREN) 1 % Gel Apply topically 4 (four) times daily. 150 g 1    dicyclomine (BENTYL) 10 MG capsule TAKE ONE CAPSULE BY MOUTH THREE TIMES DAILY AS NEEDED 90 capsule 2    DULoxetine (CYMBALTA) 20 MG capsule Take 20 mg by mouth.      estradioL (ESTRACE) 0.5 MG tablet TAKE 1 TABLET BY MOUTH ONCE DAILY 90 tablet 3    ezetimibe-simvastatin 10-40 mg (VYTORIN) 10-40 mg per tablet Take 1 tablet by mouth every evening. 90 tablet 0    inhalation spacing device (COMPACT SPACE CHAMBER) USE AS DIRECTED 1 each 2    isosorbide mononitrate (IMDUR) 30 MG 24 hr tablet Take 1 tablet (30 mg total) by mouth 2 (two) times a day. 60 tablet 11    l-methylfolate-b2-b6-b12 (CEREFOLIN) 6-5-50-1 mg Tab Take 1 tablet by mouth once daily. B2 B6 B12      memantine (NAMENDA) 10 MG Tab Take 1 tablet (10 mg total) by mouth 2 (two) times daily. 180 tablet 12    olmesartan (BENICAR) 40 MG tablet TAKE 1 TABLET BY MOUTH ONCE DAILY 90 tablet 3    OXYGEN-AIR DELIVERY SYSTEMS MISC 3 L by Misc.(Non-Drug; Combo Route) route every evening.      pantoprazole (PROTONIX) 40 MG tablet TAKE 1 TABLET BY MOUTH ONCE DAILY 90 tablet 3    vitamin D (VITAMIN D3) 1000 units Tab Take 1,000 Units by mouth once daily.      zinc gluconate 50 mg tablet       zolpidem (AMBIEN) 10 mg Tab TAKE 1 TABLET BY MOUTH EVERY EVENING 30 tablet 3    famotidine (PEPCID) 20 MG tablet Take 1 tablet (20 mg total) by mouth 2 (two) times daily as needed for Heartburn. 60 tablet 11     No current facility-administered medications on file prior to visit.

## 2025-07-11 LAB
OB PNL STL: NEGATIVE
W COPPER: 1167 UG/L

## 2025-07-16 ENCOUNTER — HOSPITAL ENCOUNTER (OUTPATIENT)
Dept: CARDIOLOGY | Facility: HOSPITAL | Age: 70
Discharge: HOME | End: 2025-07-16
Payer: MEDICARE

## 2025-07-16 DIAGNOSIS — R00.1 BRADYCARDIA: ICD-10-CM

## 2025-07-16 DIAGNOSIS — R55 PRE-SYNCOPE: ICD-10-CM

## 2025-07-28 ENCOUNTER — OFFICE VISIT (OUTPATIENT)
Dept: FAMILY MEDICINE | Facility: CLINIC | Age: 70
End: 2025-07-28
Payer: MEDICARE

## 2025-07-28 DIAGNOSIS — J01.90 ACUTE BACTERIAL SINUSITIS: Primary | ICD-10-CM

## 2025-07-28 DIAGNOSIS — B96.89 ACUTE BACTERIAL SINUSITIS: Primary | ICD-10-CM

## 2025-07-28 PROCEDURE — 4010F ACE/ARB THERAPY RXD/TAKEN: CPT | Mod: CPTII,95,, | Performed by: FAMILY MEDICINE

## 2025-07-28 PROCEDURE — 3044F HG A1C LEVEL LT 7.0%: CPT | Mod: CPTII,95,, | Performed by: FAMILY MEDICINE

## 2025-07-28 PROCEDURE — 98005 SYNCH AUDIO-VIDEO EST LOW 20: CPT | Mod: 95,,, | Performed by: FAMILY MEDICINE

## 2025-07-28 PROCEDURE — 1157F ADVNC CARE PLAN IN RCRD: CPT | Mod: CPTII,95,, | Performed by: FAMILY MEDICINE

## 2025-07-28 RX ORDER — AMOXICILLIN AND CLAVULANATE POTASSIUM 875; 125 MG/1; MG/1
1 TABLET, FILM COATED ORAL EVERY 12 HOURS
Qty: 14 TABLET | Refills: 0 | Status: SHIPPED | OUTPATIENT
Start: 2025-07-28 | End: 2025-08-04

## 2025-07-28 RX ORDER — BENZONATATE 200 MG/1
200 CAPSULE ORAL 3 TIMES DAILY PRN
Qty: 30 CAPSULE | Refills: 0 | Status: SHIPPED | OUTPATIENT
Start: 2025-07-28 | End: 2025-08-07

## 2025-07-28 NOTE — PROGRESS NOTES
Chief Complaint:  No chief complaint on file.      History of Present Illness:    History of Present Illness    Patient presents today with nasal congestion and productive cough. She reports respiratory symptoms ongoing for approximately one week, including runny nose with yellowish sputum production and intermittent fever with fluctuating hot and cold sensations. She experiences breathing difficulty only during coughing episodes but denies significant breathing problems otherwise. She is currently using her inhalers. She currently uses a heart monitor.      ROS:  General: admits fever, admits chills, denies fatigue, denies weight gain, denies weight loss, denies loss of appetite  Eyes: denies vision changes, denies blurry vision, denies eye pain, denies eye discharge  ENT: denies ear pain, denies hearing loss, denies tinnitus, denies nasal congestion, denies sore throat, admits runny nose  Cardiovascular: denies chest pain, denies palpitations, denies lower extremity edema  Respiratory: admits cough, denies shortness of breath, denies wheezing, admits sputum production, admits productive cough  Endocrine: denies polyuria, denies polydipsia, denies heat intolerance, denies cold intolerance  Gastrointestinal: denies abdominal pain, denies heartburn, denies nausea, denies vomiting, denies diarrhea, denies constipation, denies blood in stool  Genitourinary: denies dysuria, denies urgency, denies frequency, denies hematuria, denies nocturia, denies incontinence  Heme & Lymphatic: denies easy or excessive bleeding, denies easy bruising, denies swollen lymph nodes  Musculoskeletal: denies muscle pain, denies back pain, denies joint pain, denies joint swelling  Skin: denies rash, denies lesion, denies itching, denies skin texture changes, denies skin color changes  Neurological: denies headache, denies dizziness, denies numbness, denies tingling, denies seizure activity, denies speech difficulty, denies memory loss, denies  confusion  Psychiatric: denies anxiety, denies depression, denies sleep difficulty           Past Medical History:   Diagnosis Date    AAA (abdominal aortic aneurysm) 2014    Abdominal aneurysm     3    Acute coronary syndrome     Anemia     Arthritis 2009    BPPV (benign paroxysmal positional vertigo)     Carotid artery plaque     Carotid artery stenosis and occlusion 2014    Chronic back pain     COPD (chronic obstructive pulmonary disease)     Coronary artery disease     Dementia     Emphysema lung     Heart attack 1998    Several after that with 4 stents    Hyperlipidemia     Hypertension     Joint pain     Myocardial infarction     x3    Neuropathy     Personal history of COVID-19 2021 +Covid, recovered at home        Social History:  Social History     Socioeconomic History    Marital status:    Tobacco Use    Smoking status: Former     Current packs/day: 0.00     Average packs/day: 0.6 packs/day for 50.2 years (30.0 ttl pk-yrs)     Types: Cigarettes     Start date: 1970     Quit date: 2017     Years since quittin.2    Smokeless tobacco: Never    Tobacco comments:     Cant remember but after heart attack   Substance and Sexual Activity    Alcohol use: Never    Drug use: Never    Sexual activity: Not Currently     Partners: Male     Birth control/protection: Post-menopausal, See Surgical Hx     Social Drivers of Health     Financial Resource Strain: Patient Declined (3/1/2025)    Overall Financial Resource Strain (CARDIA)     Difficulty of Paying Living Expenses: Patient declined   Recent Concern: Financial Resource Strain - Medium Risk (2025)    Overall Financial Resource Strain (CARDIA)     Difficulty of Paying Living Expenses: Somewhat hard   Food Insecurity: Food Insecurity Present (2025)    Hunger Vital Sign     Worried About Running Out of Food in the Last Year: Sometimes true     Ran Out of Food in the Last Year: Sometimes true    Transportation Needs: No Transportation Needs (2/26/2025)    PRAPARE - Transportation     Lack of Transportation (Medical): No     Lack of Transportation (Non-Medical): No   Physical Activity: Insufficiently Active (2/26/2025)    Exercise Vital Sign     Days of Exercise per Week: 1 day     Minutes of Exercise per Session: 20 min   Stress: Stress Concern Present (2/26/2025)    Citizen of [place] [employer] - Occupational Stress Questionnaire     Feeling of Stress : Very much   Housing Stability: Patient Declined (3/1/2025)    Housing Stability Vital Sign     Unable to Pay for Housing in the Last Year: Patient declined     Number of Times Moved in the Last Year: 0     Homeless in the Last Year: Patient declined       Family History:   family history includes Arthritis in her brother and father; Breast cancer in her paternal aunt; Early death in her brother, brother, father, and mother; Heart attack in her brother; Heart attacks under age 50 in her brother and father; Heart disease in her brother, brother, father, mother, and sister; Hyperlipidemia in her sister; Hypertension in her brother and sister.    Health Maintenance   Topic Date Due    RSV Vaccine (Age 60+ and Pregnant patients) (1 - Risk 60-74 years 1-dose series) Never done    Colorectal Cancer Screening  05/26/2025    Shingles Vaccine (1 of 2) 03/03/2026 (Originally 2/22/2005)    COVID-19 Vaccine (1 - 2024-25 season) 03/03/2026 (Originally 9/1/2024)    Pneumococcal Vaccines (Age 50+) (2 of 2 - PCV) 03/03/2026 (Originally 9/27/2013)    Influenza Vaccine (1) 09/01/2025    Mammogram  12/13/2025    LDCT Lung Screen  05/21/2026    Hemoglobin A1c (Prediabetes)  06/11/2026    Lipid Panel  06/11/2026    TETANUS VACCINE  11/15/2026    DEXA Scan  04/04/2029    Hepatitis C Screening  Completed       Exam:Physical      ]    There is no height or weight on file to calculate BMI.    Physical Exam    General: Well-developed. Well-nourished. No acute  distress.      Assessment:        ICD-10-CM ICD-9-CM   1. Acute bacterial sinusitis  J01.90 461.9    B96.89          Plan:    Assessment & Plan    MEDICAL DECISION MAKING:  - Assessed symptoms of runny nose, yellowish sputum, and intermittent low-grade fever lasting about a week.  - Determined antibiotic treatment necessary based on symptom presentation and duration.  - Considered cardiac condition when selecting appropriate medications.    MEDICATIONS:  - Started antibiotic.  - Started cough medicine.         Diagnoses and all orders for this visit:    Acute bacterial sinusitis  -     benzonatate (TESSALON) 200 MG capsule; Take 1 capsule (200 mg total) by mouth 3 (three) times daily as needed for Cough.    Other orders  -     amoxicillin-clavulanate 875-125mg (AUGMENTIN) 875-125 mg per tablet; Take 1 tablet by mouth every 12 (twelve) hours. for 7 days        No follow-ups on file.      Olga Altman MD    The patient location is: Home   The chief complaint leading to consultation is: as below  Visit type: Virtual visit with synchronous audio and video  Total time spent with patient: 20+ mins  Each patient to whom he or she provides medical services by telemedicine is:  (1) informed of the relationship between the physician and patient and the respective role of any other health care provider with respect to management of the patient; and (2) notified that he or she may decline to receive medical services by telemedicine and may withdraw from such care at any time.    Notes: see below

## 2025-08-21 ENCOUNTER — OFFICE VISIT (OUTPATIENT)
Dept: CARDIOLOGY | Facility: CLINIC | Age: 70
End: 2025-08-21
Payer: MEDICARE

## 2025-08-21 VITALS
SYSTOLIC BLOOD PRESSURE: 124 MMHG | DIASTOLIC BLOOD PRESSURE: 62 MMHG | WEIGHT: 144.38 LBS | HEART RATE: 84 BPM | HEIGHT: 61 IN | BODY MASS INDEX: 27.26 KG/M2

## 2025-08-21 DIAGNOSIS — I25.118 CORONARY ARTERY DISEASE OF NATIVE ARTERY OF NATIVE HEART WITH STABLE ANGINA PECTORIS: ICD-10-CM

## 2025-08-21 DIAGNOSIS — I10 ESSENTIAL HYPERTENSION: ICD-10-CM

## 2025-08-21 DIAGNOSIS — J44.9 COPD, SEVERE: ICD-10-CM

## 2025-08-21 DIAGNOSIS — F17.211 CIGARETTE NICOTINE DEPENDENCE IN REMISSION: ICD-10-CM

## 2025-08-21 DIAGNOSIS — E78.2 MIXED HYPERLIPIDEMIA: ICD-10-CM

## 2025-08-21 DIAGNOSIS — I50.32 CHRONIC DIASTOLIC HEART FAILURE: ICD-10-CM

## 2025-08-21 DIAGNOSIS — K21.9 GASTROESOPHAGEAL REFLUX DISEASE, UNSPECIFIED WHETHER ESOPHAGITIS PRESENT: ICD-10-CM

## 2025-08-21 DIAGNOSIS — R55 PRE-SYNCOPE: Primary | ICD-10-CM

## 2025-08-21 DIAGNOSIS — I70.0 AORTIC ATHEROSCLEROSIS: ICD-10-CM

## 2025-08-21 DIAGNOSIS — I65.23 BILATERAL CAROTID ARTERY STENOSIS: ICD-10-CM

## 2025-08-21 PROCEDURE — 99999 PR PBB SHADOW E&M-EST. PATIENT-LVL III: CPT | Mod: PBBFAC,,,

## 2025-08-21 RX ORDER — FUROSEMIDE 20 MG/1
20 TABLET ORAL DAILY PRN
COMMUNITY
Start: 2025-07-07

## 2025-08-31 DIAGNOSIS — F51.04 CHRONIC INSOMNIA: ICD-10-CM

## 2025-09-02 RX ORDER — ZOLPIDEM TARTRATE 10 MG/1
10 TABLET ORAL NIGHTLY
Qty: 30 TABLET | Refills: 3 | Status: SHIPPED | OUTPATIENT
Start: 2025-09-02

## (undated) DEVICE — WIRE X-SUP CHOICE PT .014X182

## (undated) DEVICE — CATH NC EMERGE MR 4.50X15MM

## (undated) DEVICE — OMNIPAQUE 300MG 150ML VIAL

## (undated) DEVICE — ANGIOTOUCH KIT

## (undated) DEVICE — CATH EMERGE MR 30 X 4.00

## (undated) DEVICE — BAND TR COMP DEVICE REG 24CM

## (undated) DEVICE — KIT MANIFOLD LOW PRESS TUBING

## (undated) DEVICE — GUIDEWIRE EMERALD .035IN 260CM

## (undated) DEVICE — CATH INFINITI MULTIPAK JR4 5FR

## (undated) DEVICE — KIT GLIDESHEATH SLEND 6FR 10CM

## (undated) DEVICE — DRAPE ANGIO BRACH 38X44IN

## (undated) DEVICE — CATH JL4 5FR

## (undated) DEVICE — CATH PIG145 INFINITI 5X110CM

## (undated) DEVICE — CATH JR4 5FR

## (undated) DEVICE — KIT INDIGO CATH RX ASP 140CM

## (undated) DEVICE — GUIDE LAUNCHER 6FR JR 4.0

## (undated) DEVICE — PACK HEART CATH BR

## (undated) DEVICE — KIT SYR REUSABLE